# Patient Record
Sex: MALE | Race: BLACK OR AFRICAN AMERICAN | Employment: OTHER | URBAN - METROPOLITAN AREA
[De-identification: names, ages, dates, MRNs, and addresses within clinical notes are randomized per-mention and may not be internally consistent; named-entity substitution may affect disease eponyms.]

---

## 2019-10-30 ENCOUNTER — HOSPITAL ENCOUNTER (OUTPATIENT)
Dept: NUCLEAR MEDICINE | Age: 59
Discharge: HOME | End: 2019-10-30
Attending: INTERNAL MEDICINE
Payer: MEDICARE

## 2019-10-30 ENCOUNTER — HOSPITAL ENCOUNTER (OUTPATIENT)
Dept: NON INVASIVE DIAGNOSTICS | Age: 59
Discharge: HOME | End: 2019-10-30
Attending: INTERNAL MEDICINE
Payer: MEDICARE

## 2019-10-30 DIAGNOSIS — Z01.810 PRE-OPERATIVE CARDIOVASCULAR EXAMINATION: ICD-10-CM

## 2019-10-30 DIAGNOSIS — R06.02 SHORTNESS OF BREATH: ICD-10-CM

## 2019-10-30 PROCEDURE — A9500 TC99M SESTAMIBI: HCPCS

## 2019-10-30 PROCEDURE — 93017 CV STRESS TEST TRACING ONLY: CPT

## 2019-10-30 PROCEDURE — 74011250636 HC RX REV CODE- 250/636: Performed by: INTERNAL MEDICINE

## 2019-10-30 RX ADMIN — REGADENOSON 0.4 MG: 0.08 INJECTION, SOLUTION INTRAVENOUS at 12:30

## 2020-06-01 LAB
STRESS BASELINE HR: 65 BPM
STRESS ESTIMATED WORKLOAD: 1 METS
STRESS EXERCISE DUR MIN: NORMAL
STRESS PEAK DIAS BP: 76 MMHG
STRESS PEAK SYS BP: 165 MMHG
STRESS PERCENT HR ACHIEVED: 52 %
STRESS POST PEAK HR: 83 BPM
STRESS RATE PRESSURE PRODUCT: NORMAL BPM*MMHG
STRESS ST DEPRESSION: 0 MM
STRESS ST ELEVATION: 0 MM
STRESS TARGET HR: 160 BPM

## 2022-06-27 ENCOUNTER — APPOINTMENT (OUTPATIENT)
Dept: GENERAL RADIOLOGY | Age: 62
DRG: 629 | End: 2022-06-27
Attending: PHYSICIAN ASSISTANT
Payer: MEDICARE

## 2022-06-27 ENCOUNTER — HOSPITAL ENCOUNTER (INPATIENT)
Age: 62
LOS: 24 days | Discharge: NURSING, REHAB OR LONG-TERM CARE | DRG: 629 | End: 2022-07-21
Attending: STUDENT IN AN ORGANIZED HEALTH CARE EDUCATION/TRAINING PROGRAM | Admitting: INTERNAL MEDICINE
Payer: MEDICARE

## 2022-06-27 ENCOUNTER — APPOINTMENT (OUTPATIENT)
Dept: MRI IMAGING | Age: 62
DRG: 629 | End: 2022-06-27
Attending: PHYSICIAN ASSISTANT
Payer: MEDICARE

## 2022-06-27 ENCOUNTER — APPOINTMENT (OUTPATIENT)
Dept: GENERAL RADIOLOGY | Age: 62
DRG: 629 | End: 2022-06-27
Attending: INTERNAL MEDICINE
Payer: MEDICARE

## 2022-06-27 DIAGNOSIS — R07.9 CHEST PAIN AT REST: ICD-10-CM

## 2022-06-27 DIAGNOSIS — Z79.4 TYPE 2 DIABETES MELLITUS WITH FOOT ULCER, WITH LONG-TERM CURRENT USE OF INSULIN (HCC): Primary | ICD-10-CM

## 2022-06-27 DIAGNOSIS — N18.6 ESRD ON HEMODIALYSIS (HCC): ICD-10-CM

## 2022-06-27 DIAGNOSIS — E11.621 TYPE 2 DIABETES MELLITUS WITH FOOT ULCER, WITH LONG-TERM CURRENT USE OF INSULIN (HCC): Primary | ICD-10-CM

## 2022-06-27 DIAGNOSIS — Z99.2 ESRD ON HEMODIALYSIS (HCC): ICD-10-CM

## 2022-06-27 DIAGNOSIS — I25.10 CAD (CORONARY ARTERY DISEASE): ICD-10-CM

## 2022-06-27 DIAGNOSIS — L97.509 TYPE 2 DIABETES MELLITUS WITH FOOT ULCER, WITH LONG-TERM CURRENT USE OF INSULIN (HCC): Primary | ICD-10-CM

## 2022-06-27 DIAGNOSIS — M86.9 OSTEOMYELITIS OF RIGHT FOOT, UNSPECIFIED TYPE (HCC): ICD-10-CM

## 2022-06-27 DIAGNOSIS — R94.39 ABNORMAL NUCLEAR STRESS TEST: ICD-10-CM

## 2022-06-27 LAB
ANION GAP SERPL CALC-SCNC: 6 MMOL/L (ref 3–18)
BASOPHILS # BLD: 0 K/UL (ref 0–0.1)
BASOPHILS NFR BLD: 0 % (ref 0–2)
BUN SERPL-MCNC: 44 MG/DL (ref 7–18)
BUN/CREAT SERPL: 6 (ref 12–20)
CALCIUM SERPL-MCNC: 8.5 MG/DL (ref 8.5–10.1)
CHLORIDE SERPL-SCNC: 96 MMOL/L (ref 100–111)
CO2 SERPL-SCNC: 32 MMOL/L (ref 21–32)
CREAT SERPL-MCNC: 7.5 MG/DL (ref 0.6–1.3)
DIFFERENTIAL METHOD BLD: ABNORMAL
EOSINOPHIL # BLD: 0.3 K/UL (ref 0–0.4)
EOSINOPHIL NFR BLD: 2 % (ref 0–5)
ERYTHROCYTE [DISTWIDTH] IN BLOOD BY AUTOMATED COUNT: 16.7 % (ref 11.6–14.5)
GLUCOSE BLD STRIP.AUTO-MCNC: 210 MG/DL (ref 70–110)
GLUCOSE BLD STRIP.AUTO-MCNC: 293 MG/DL (ref 70–110)
GLUCOSE BLD STRIP.AUTO-MCNC: 417 MG/DL (ref 70–110)
GLUCOSE SERPL-MCNC: 237 MG/DL (ref 74–99)
HCT VFR BLD AUTO: 30 % (ref 36–48)
HGB BLD-MCNC: 9.6 G/DL (ref 13–16)
IMM GRANULOCYTES # BLD AUTO: 0.1 K/UL (ref 0–0.04)
IMM GRANULOCYTES NFR BLD AUTO: 1 % (ref 0–0.5)
LYMPHOCYTES # BLD: 1.3 K/UL (ref 0.9–3.6)
LYMPHOCYTES NFR BLD: 9 % (ref 21–52)
MCH RBC QN AUTO: 30.6 PG (ref 24–34)
MCHC RBC AUTO-ENTMCNC: 32 G/DL (ref 31–37)
MCV RBC AUTO: 95.5 FL (ref 78–100)
MONOCYTES # BLD: 1.9 K/UL (ref 0.05–1.2)
MONOCYTES NFR BLD: 13 % (ref 3–10)
NEUTS SEG # BLD: 11.2 K/UL (ref 1.8–8)
NEUTS SEG NFR BLD: 75 % (ref 40–73)
NRBC # BLD: 0 K/UL (ref 0–0.01)
NRBC BLD-RTO: 0 PER 100 WBC
PLATELET # BLD AUTO: 259 K/UL (ref 135–420)
PMV BLD AUTO: 10.8 FL (ref 9.2–11.8)
POTASSIUM SERPL-SCNC: 4.4 MMOL/L (ref 3.5–5.5)
RBC # BLD AUTO: 3.14 M/UL (ref 4.35–5.65)
RBC MORPH BLD: ABNORMAL
SODIUM SERPL-SCNC: 134 MMOL/L (ref 136–145)
WBC # BLD AUTO: 14.8 K/UL (ref 4.6–13.2)

## 2022-06-27 PROCEDURE — 74011000258 HC RX REV CODE- 258: Performed by: PHYSICIAN ASSISTANT

## 2022-06-27 PROCEDURE — 87040 BLOOD CULTURE FOR BACTERIA: CPT

## 2022-06-27 PROCEDURE — 73620 X-RAY EXAM OF FOOT: CPT

## 2022-06-27 PROCEDURE — 74011636637 HC RX REV CODE- 636/637: Performed by: INTERNAL MEDICINE

## 2022-06-27 PROCEDURE — 87205 SMEAR GRAM STAIN: CPT

## 2022-06-27 PROCEDURE — 74011250636 HC RX REV CODE- 250/636: Performed by: PHYSICIAN ASSISTANT

## 2022-06-27 PROCEDURE — 80048 BASIC METABOLIC PNL TOTAL CA: CPT

## 2022-06-27 PROCEDURE — 74011250637 HC RX REV CODE- 250/637: Performed by: FAMILY MEDICINE

## 2022-06-27 PROCEDURE — 82962 GLUCOSE BLOOD TEST: CPT

## 2022-06-27 PROCEDURE — 96374 THER/PROPH/DIAG INJ IV PUSH: CPT

## 2022-06-27 PROCEDURE — 73718 MRI LOWER EXTREMITY W/O DYE: CPT

## 2022-06-27 PROCEDURE — 99285 EMERGENCY DEPT VISIT HI MDM: CPT

## 2022-06-27 PROCEDURE — 85025 COMPLETE CBC W/AUTO DIFF WBC: CPT

## 2022-06-27 PROCEDURE — 71045 X-RAY EXAM CHEST 1 VIEW: CPT

## 2022-06-27 PROCEDURE — 65270000029 HC RM PRIVATE

## 2022-06-27 RX ORDER — AMLODIPINE BESYLATE 10 MG/1
10 TABLET ORAL DAILY
COMMUNITY

## 2022-06-27 RX ORDER — VANCOMYCIN 2 GRAM/500 ML IN 0.9 % SODIUM CHLORIDE INTRAVENOUS
2000 ONCE
Status: DISCONTINUED | OUTPATIENT
Start: 2022-06-27 | End: 2022-06-27

## 2022-06-27 RX ORDER — CARVEDILOL 12.5 MG/1
12.5 TABLET ORAL 2 TIMES DAILY
COMMUNITY

## 2022-06-27 RX ORDER — FUROSEMIDE 80 MG/1
80 TABLET ORAL 2 TIMES DAILY
COMMUNITY
End: 2022-07-21

## 2022-06-27 RX ORDER — ALLOPURINOL 100 MG/1
100 TABLET ORAL DAILY
COMMUNITY

## 2022-06-27 RX ORDER — INSULIN LISPRO 100 [IU]/ML
INJECTION, SOLUTION INTRAVENOUS; SUBCUTANEOUS
Status: DISCONTINUED | OUTPATIENT
Start: 2022-06-27 | End: 2022-07-21 | Stop reason: HOSPADM

## 2022-06-27 RX ORDER — GUAIFENESIN 100 MG/5ML
81 LIQUID (ML) ORAL DAILY
COMMUNITY

## 2022-06-27 RX ORDER — DEXTROSE MONOHYDRATE 100 MG/ML
0-250 INJECTION, SOLUTION INTRAVENOUS AS NEEDED
Status: DISCONTINUED | OUTPATIENT
Start: 2022-06-27 | End: 2022-07-21 | Stop reason: HOSPADM

## 2022-06-27 RX ORDER — ASCORBIC ACID 500 MG
500 TABLET ORAL
COMMUNITY

## 2022-06-27 RX ORDER — INSULIN LISPRO 100 [IU]/ML
INJECTION, SOLUTION INTRAVENOUS; SUBCUTANEOUS
COMMUNITY
End: 2022-07-21

## 2022-06-27 RX ORDER — ACETAMINOPHEN 325 MG/1
650 TABLET ORAL
Status: DISCONTINUED | OUTPATIENT
Start: 2022-06-27 | End: 2022-07-21 | Stop reason: HOSPADM

## 2022-06-27 RX ORDER — EZETIMIBE 10 MG/1
10 TABLET ORAL
COMMUNITY

## 2022-06-27 RX ORDER — INSULIN GLARGINE 100 [IU]/ML
10 INJECTION, SOLUTION SUBCUTANEOUS
COMMUNITY

## 2022-06-27 RX ORDER — MAGNESIUM SULFATE 100 %
4 CRYSTALS MISCELLANEOUS AS NEEDED
Status: DISCONTINUED | OUTPATIENT
Start: 2022-06-27 | End: 2022-07-21 | Stop reason: HOSPADM

## 2022-06-27 RX ADMIN — ACETAMINOPHEN 650 MG: 325 TABLET ORAL at 21:53

## 2022-06-27 RX ADMIN — PIPERACILLIN AND TAZOBACTAM 4.5 G: 4; .5 INJECTION, POWDER, LYOPHILIZED, FOR SOLUTION INTRAVENOUS at 13:33

## 2022-06-27 RX ADMIN — Medication 6 UNITS: at 21:52

## 2022-06-27 RX ADMIN — PIPERACILLIN AND TAZOBACTAM 2.25 G: 2; .25 INJECTION, POWDER, LYOPHILIZED, FOR SOLUTION INTRAVENOUS at 21:52

## 2022-06-27 NOTE — CONSULTS
Podiatry Consult    Subjective:         Date of Consultation: June 27, 2022     Referring Physician: Dr. Farideh Golden    Patient is a 60 y.o.  male who is being seen for open necrotic infected right heel ulcer. Patient states that this ulcer started approximately 2 to 3 months ago, and he was seeing his foot doctor, Dr. Marjorie Almonte weekly. He states the last month it seemed to worsen with increased odor, and was given antibiotics. He states he did not help very much and now is much worse. He states Dr. Ewelina Garber sent him here today to [facility] today for admission, but does not have privileges at this hospital.  He has hypertension end-stage renal disease on hemodialysis. He states he had Charcot reconstruction of his right ankle and foot about 8 years ago. Patient Active Problem List    Diagnosis Date Noted    Diabetes mellitus with foot ulcer ([facility] [address] 06/27/2022     Past Medical History:   Diagnosis Date    Diabetes mellitus     Hypertension       History reviewed. No pertinent surgical history. History reviewed. No pertinent family history.    Social History     Tobacco Use    Smoking status: Not on file    Smokeless tobacco: Not on file   Substance Use Topics    Alcohol use: Not on file     Current Facility-Administered Medications   Medication Dose Route Frequency Provider Last Rate Last Admin    piperacillin-tazobactam (ZOSYN) 2.25 g in 0.9% sodium chloride (MBP/ADV) 50 mL MBP  2.25 g IntraVENous Q8H Sinai Damon PA        insulin lispro (HUMALOG) injection   SubCUTAneous AC&HS Polina Garcia MD        glucose chewable tablet 16 g  4 Tablet Oral PRN Polina Garcia MD        glucagon (GLUCAGEN) injection 1 mg  1 mg IntraMUSCular PRN Polina Garcia MD        dextrose 10% infusion 0-250 mL  0-250 mL IntraVENous PRN Polina Garcia MD          No Known Allergies     Review of Systems:  A comprehensive review of systems was negative except for that written in the History of Present Illness. Objective:     Patient Vitals for the past 8 hrs:   BP Temp Pulse Resp SpO2 Height Weight   22 1611 138/66 -- 71 -- 100 % -- --   22 1506 (!) 149/65 -- -- -- 100 % -- --   22 1451 (!) 156/96 -- -- -- 100 % -- --   22 1436 (!) 145/67 -- -- -- 100 % -- --   22 1421 (!) 144/95 -- -- -- 100 % -- --   22 1406 (!) 152/66 -- -- -- 100 % -- --   22 1351 (!) 142/64 -- -- -- 98 % -- --   22 1345 (!) 136/59 -- -- -- 100 % -- --   22 1336 -- -- -- -- 100 % -- --   22 1330 -- -- -- -- 100 % -- --   22 1321 -- -- -- -- 100 % -- --   22 1315 -- -- -- -- 100 % -- --   22 1306 -- -- -- -- 100 % -- --   22 1300 -- -- -- -- 100 % -- --   22 1251 -- -- -- -- 100 % -- --   22 1245 (!) 125/58 -- -- -- 96 % -- --   22 1236 -- -- -- -- (!) 86 % -- --   22 1230 (!) 130/57 -- -- -- 90 % -- --   22 1221 -- -- -- -- 95 % -- --   22 1215 (!) 134/58 -- -- -- 100 % -- --   22 1206 -- -- -- -- 100 % -- --   22 1205 (!) 122/58 -- -- -- 100 % -- --   22 1200 -- -- -- -- 96 % -- --   22 1155 -- -- -- -- 98 % -- --   22 1146 (!) 109/58 97.3 °F (36.3 °C) 67 18 100 % 6' 2\" (1.88 m) 96.2 kg (212 lb)     Temp (24hrs), Av.3 °F (36.3 °C), Min:97.3 °F (36.3 °C), Max:97.3 °F (36.3 °C)      Physical Exam:    Vascular:  Dorsalis pedis pulses are 0/4 bilateral.  Posterior tibial pulses are 0/4 bilateral.  Capillary fill time is greater than 3 seconds, bilateral.  Edema is observed right lower extremity. Varicosities are not observed bilateral lower extremities. Derm:  Skin temperature of lower extremities warm to cool proximal to distal bilateral.  Inspection of skin shows additive digital hair with thin shiny skin bilateral.  Large open ulcer right posterior heel measuring approximately 6 x 6 x 1 cm.   There is gray-black necrotic tissue centrally with brown fibrotic base with hyperkeratotic rim. Positive serosanguineous drainage surrounding erythema. Positive malodor. Positive probing to bone/calcaneus. Ortho:  Muscle strength 4/5 for all groups tested. Muscle tone normal. Inspection/palpation of bones - joints- muscle shows - within normal limits on the bilateral lower extremities. Status post right ankle fusion and status post left hallux amputation. neuro:  Light touch, sharp/dull, vibratory, and proprioception sensations all decreased bilateral lower extremities. Deep tendon reflexes decreased bilaterally. Lab Review:   Recent Results (from the past 24 hour(s))   CBC WITH AUTOMATED DIFF    Collection Time: 06/27/22  1:20 PM   Result Value Ref Range    WBC 14.8 (H) 4.6 - 13.2 K/uL    RBC 3.14 (L) 4.35 - 5.65 M/uL    HGB 9.6 (L) 13.0 - 16.0 g/dL    HCT 30.0 (L) 36.0 - 48.0 %    MCV 95.5 78.0 - 100.0 FL    MCH 30.6 24.0 - 34.0 PG    MCHC 32.0 31.0 - 37.0 g/dL    RDW 16.7 (H) 11.6 - 14.5 %    PLATELET 736 675 - 134 K/uL    MPV 10.8 9.2 - 11.8 FL    NRBC 0.0 0  WBC    ABSOLUTE NRBC 0.00 0.00 - 0.01 K/uL    NEUTROPHILS 75 (H) 40 - 73 %    LYMPHOCYTES 9 (L) 21 - 52 %    MONOCYTES 13 (H) 3 - 10 %    EOSINOPHILS 2 0 - 5 %    BASOPHILS 0 0 - 2 %    IMMATURE GRANULOCYTES 1 (H) 0.0 - 0.5 %    ABS. NEUTROPHILS 11.2 (H) 1.8 - 8.0 K/UL    ABS. LYMPHOCYTES 1.3 0.9 - 3.6 K/UL    ABS. MONOCYTES 1.9 (H) 0.05 - 1.2 K/UL    ABS. EOSINOPHILS 0.3 0.0 - 0.4 K/UL    ABS. BASOPHILS 0.0 0.0 - 0.1 K/UL    ABS. IMM.  GRANS. 0.1 (H) 0.00 - 0.04 K/UL    DF AUTOMATED      RBC COMMENTS ANISOCYTOSIS  1+       METABOLIC PANEL, BASIC    Collection Time: 06/27/22  1:20 PM   Result Value Ref Range    Sodium 134 (L) 136 - 145 mmol/L    Potassium 4.4 3.5 - 5.5 mmol/L    Chloride 96 (L) 100 - 111 mmol/L    CO2 32 21 - 32 mmol/L    Anion gap 6 3.0 - 18 mmol/L    Glucose 237 (H) 74 - 99 mg/dL    BUN 44 (H) 7.0 - 18 MG/DL    Creatinine 7.50 (H) 0.6 - 1.3 MG/DL    BUN/Creatinine ratio 6 (L) 12 - 20 GFR est AA 9 (L) >60 ml/min/1.73m2    GFR est non-AA 7 (L) >60 ml/min/1.73m2    Calcium 8.5 8.5 - 10.1 MG/DL   GLUCOSE, POC    Collection Time: 06/27/22  5:34 PM   Result Value Ref Range    Glucose (POC) 210 (H) 70 - 110 mg/dL       Impression:   Grade 3 Sanchez ulcer right heel with radiographic signs of osteomyelitis (MRI pending)  Cellulitis right heel ulcer  Stage renal disease with hemodialysis and diabetes ASO PVD PN bilateral lower extremities with Charcot right foot    Recommendation:   IV antibiotics as per Dr. Manriquez/ID  Recommend surgical I&D with calcaneal debridement and bone biopsy for ID. We will try and schedule for Tuesday afternoon around 6 PM given OR availability. Dr. Prashanth Kyle is aware of dialysis controversy with anesthesia for tomorrow's surgery, and is planning on holding dialysis until after surgery. Recommend arterial Doppler bilateral lower extremities (PALAK) due to nonpalpable pedal pulses. Depending on results, may need vascular consult. Recommend wound care consult Baltazar Downs. Discussed the severity of his right heel ulcer with the patient including the possibility of loss of his right leg, and answered all his questions. Patient stated that he understood this and agreed for surgical intervention tomorrow.

## 2022-06-27 NOTE — Clinical Note
Contrast Dose Calculator:   Patient's age: 60.   Patient's sex: Male. Patient weight (kg) = 102.6. Creatinine level (mg/dL) = 8.75. Creatinine clearance (mL/min): 12.54. Max Contrast dose per Creatinine Cl calculator = 28.22 mL.

## 2022-06-27 NOTE — ED TRIAGE NOTES
Pt arrives to ed reporting right foot pain that has worsened. Pt was at his foot doctor and instructed to come here for further eval. Pt states his right foot does have an odor and he has had chills for two days.

## 2022-06-27 NOTE — PROGRESS NOTES
Podiatry:  Seen at bedside today for evaluation and treatment of chronic infected right heel ulcer with osteomyelitis. He states it started over 2 months ago was seeing Dr. Meliza Laguna but unfortunately progressed to the point requiring admission with IV antibiotics and surgical intervention. Dr. Darlin Núñez will follow him for IV antibiotic treatment. Try to schedule him for surgery Tuesday afternoon around 6 for I&D, calcaneal debridement with bone cultures for infectious disease. Patient was scheduled for dialysis tomorrow, and Dr. Dequan Tello will hold this until after surgery due to anesthesia. We will call the OR in the morning for availability. See consult for details. .. Paty Potter

## 2022-06-27 NOTE — PROGRESS NOTES
Called the floor and spoke with RN Carlo Rivas about the exam getting read in the morning by the Radiologist.  Per nurse patient is not going to surgery tonight and can be read in the am by Radiologist. Dr. Clotilde Martin aware that it can be read in the AM per floor RN called at 16:40 .

## 2022-06-27 NOTE — PROGRESS NOTES
[facility] Pharmacokinetic Monitoring Service - Vancomycin - Day 1    Phill Yang is a 60 y.o. male starting on vancomycin therapy for Diabetic Foot Infection. Pharmacy consulted by Chayo Stauffer for monitoring and adjustment. Target Concentration: Goal AUC/LYNDA 400-600 mg*hr/L    Additional Antimicrobials: zosyn    Pertinent Laboratory Values: Wt Readings from Last 1 Encounters:   06/27/22 96.2 kg (212 lb)     Temp Readings from Last 1 Encounters:   06/27/22 97.3 °F (36.3 °C)     No components found for: PROCAL  Estimated Creatinine Clearance: 11.7 mL/min (A) (based on SCr of 7.5 mg/dL (H)). Recent Labs     06/27/22  1320   WBC 14.8*       Pertinent Cultures:  Culture Date Source Results   6/27 blood pending   MRSA Nasal Swab: N/A. Non-respiratory infection. .    Plan:  Dosing recommendations based on Bayesian software  Start vancomycin 2000 mg IV x 1  Dose per levels  Renal labs as indicated   Vancomycin concentration ordered for 6/28 @ 0400   Pharmacy will continue to monitor patient and adjust therapy as indicated    Thank you for the consult,  Chu Ty, PHARMD  6/27/2022 2:05 PM

## 2022-06-27 NOTE — Clinical Note
TRANSFER - IN REPORT:     Verbal report received from: rn.     Report consisted of patient's Situation, Background, Assessment and   Recommendations(SBAR). Opportunity for questions and clarification was provided. Assessment completed upon patient's arrival to unit and care assumed.

## 2022-06-27 NOTE — PROGRESS NOTES
Problem: General Medical Care Plan  Goal: *Vital signs within specified parameters  6/27/2022 1738 by Blair Johnson RN  Outcome: Progressing Towards Goal  6/27/2022 1737 by Blair Johnson RN  Outcome: Progressing Towards Goal  Goal: *Labs within defined limits  6/27/2022 1738 by Blair Johnson RN  Outcome: Progressing Towards Goal  6/27/2022 1737 by Blair Johnson RN  Outcome: Progressing Towards Goal  Goal: *Absence of infection signs and symptoms  6/27/2022 1738 by Blair Johnson RN  Outcome: Progressing Towards Goal  6/27/2022 1737 by Blair Johnson RN  Outcome: Progressing Towards Goal  Goal: *Optimal pain control at patient's stated goal  6/27/2022 1738 by Blair Johnson RN  Outcome: Progressing Towards Goal  6/27/2022 1737 by Blair Johnson RN  Outcome: Progressing Towards Goal  Goal: *Skin integrity maintained  6/27/2022 1738 by Blair Johnson RN  Outcome: Progressing Towards Goal  6/27/2022 1737 by Blair Johnson RN  Outcome: Progressing Towards Goal  Goal: *Fluid volume balance  6/27/2022 1738 by Blair Johnson RN  Outcome: Progressing Towards Goal  6/27/2022 1737 by Blair Johnson RN  Outcome: Progressing Towards Goal  Goal: *Optimize nutritional status  6/27/2022 1738 by Blair Johnson RN  Outcome: Progressing Towards Goal  6/27/2022 1737 by Blair Johnson RN  Outcome: Progressing Towards Goal  Goal: *Anxiety reduced or absent  6/27/2022 1738 by Blair Johnson RN  Outcome: Progressing Towards Goal  6/27/2022 1737 by Blair Johnson RN  Outcome: Progressing Towards Goal  Goal: *Progressive mobility and function (eg: ADL's)  6/27/2022 1738 by Blair Johnson RN  Outcome: Progressing Towards Goal  6/27/2022 1737 by Blair Johnson RN  Outcome: Progressing Towards Goal

## 2022-06-27 NOTE — CALL BACK NOTE
Called by ED PA for ID consult  ESRD on HD     Pt with chronic heel infection-- going on for 2 months-- worse past 3 weeks, had received amoxacillin by Outside Podiatarist  Sent in for inpatient management  X-ray suggestive of OM    Wbc is up 14K--no bands. No fever, no hypotension. No tachycardia  Visit Vitals  BP (!) 136/59   Pulse 67   Temp 97.3 °F (36.3 °C)   Resp 18   Ht 6' 2\" (1.88 m)   Wt 96.2 kg (212 lb)   SpO2 100%   BMI 27.22 kg/m²     Received 1 dose of Zosyn    Suggest:    Chronic OM heel-> over many weeks. Can wait few more days for Podiatry interventon and intra-op cultures  Will be monitored as inpatient, so can hold off ABX    If high fevers/ progressing soft tissue infection under our watch, can start Unasyn/Vanco emperic rx    [facility] ED PA  Podiatry consult - also to be placed. Thanks for the consult. Please call if questions/Concern    Amanda Yeh MD  [facility]  Office #:     [phone]   Office Fax: [phone]
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
## 2022-06-27 NOTE — PROGRESS NOTES
Pharmacy Renal Dosing Services:     Zosyn  was automatically dose-adjusted per [facility] P[facility]T [facility] Protocol, with respect to renal function. Consult provided for this   60 y.o. , male , for the indication of Diabetic Foot Infection  Dose adjusted to:  Zosyn 2.25 grams IV q8h    Pt Weight:   Wt Readings from Last 1 Encounters:   06/27/22 96.2 kg (212 lb)     Previous Regimen   Zosyn 4.5 grams IV q6h   Serum Creatinine Lab Results   Component Value Date/Time    Creatinine 7.50 (H) 06/27/2022 01:20 PM       Creatinine Clearance Estimated Creatinine Clearance: 11.7 mL/min (A) (based on SCr of 7.5 mg/dL (H)). BUN Lab Results   Component Value Date/Time    BUN 44 (H) 06/27/2022 01:20 PM         Pharmacy to continue to monitor patient daily. Will make dosage adjustments based upon changing renal function.   Signed Kathy Alvarado information:  [phone]

## 2022-06-27 NOTE — ED NOTES
TRANSFER - OUT REPORT:    Verbal report given to [address] Jesus Torres RN (name) on Nahun Zepeda  being transferred to MEDICAL (unit) for routine progression of care       Report consisted of patients Situation, Background, Assessment and   Recommendations(SBAR). Information from the following report(s) SBAR, ED Summary, [facility] and Recent Results was reviewed with the receiving nurse. Lines:   Peripheral IV 06/27/22 Right Antecubital (Active)   Site Assessment Clean, dry, & intact 06/27/22 1204   Phlebitis Assessment 0 06/27/22 1204   Infiltration Assessment 0 06/27/22 1204   Dressing Status Clean, dry, & intact 06/27/22 1204   Dressing Type Transparent 06/27/22 1204   Hub Color/Line Status Patent; Flushed 06/27/22 1204   Action Taken Blood drawn 06/27/22 1204        Opportunity for questions and clarification was provided.       Patient transported with:   Registered Nurse        '

## 2022-06-27 NOTE — ED PROVIDER NOTES
EMERGENCY DEPARTMENT HISTORY AND PHYSICAL EXAM    Date: 6/27/2022  Patient Name: Petar Oneil    History of Presenting Illness     Chief Complaint   Patient presents with    Foot Ulcer         History Provided By: Patient    1:51 PM  Petar Oneil is a 60 y.o. male with PMHX of HTN, DM, ESRD on HD (T/Th/Sat) who presents to the emergency department C/O nonhealing/worsening malodorous right heel wound x 2 months. Completed antibiotics about a month ago without any change. For the last 2 days he has had some chills and nausea. Seen by his podiatrist Dr. Karen Martin today and was sent to ED for admission, IV antibiotics and imaging to rule out osteomyelitis. PHSx of right Charcot foot fusion. Pt denies new or worsening leg swelling, injury or trauma, fever, and any other sxs or complaints. PCP: Isamar Street MD    Current Facility-Administered Medications   Medication Dose Route Frequency Provider Last Rate Last Admin    piperacillin-tazobactam (ZOSYN) 4.5 g in 0.9% sodium chloride (MBP/ADV) 100 mL MBP  4.5 g IntraVENous Q6H Gerald Blizzard J,  mL/hr at 06/27/22 1333 4.5 g at 06/27/22 1333     Current Outpatient Medications   Medication Sig Dispense Refill    lisinopril (PRINIVIL, ZESTRIL) 40 mg tablet Take 40 mg by mouth daily.  simvastatin (ZOCOR) 10 mg tablet Take  by mouth nightly.  potassium chloride SR (KLOR-CON 10) 10 mEq tablet Take 20 mEq by mouth two (2) times a day. Past History     Past Medical History:  Past Medical History:   Diagnosis Date    Diabetes mellitus     Hypertension        Past Surgical History:  No past surgical history on file. Family History:  No family history on file.     Social History:  Social History     Tobacco Use    Smoking status: Not on file    Smokeless tobacco: Not on file   Substance Use Topics    Alcohol use: Not on file    Drug use: Not on file       Allergies:  No Known Allergies      Review of Systems   Review of Systems   Constitutional: Positive for chills. Negative for fever. Gastrointestinal: Positive for nausea. Musculoskeletal: Positive for arthralgias, joint swelling and myalgias. Skin: Positive for wound. All other systems reviewed and are negative. Physical Exam     Vitals:    06/27/22 1300 06/27/22 1315 06/27/22 1330 06/27/22 1345   BP:    (!) 136/59   Pulse:       Resp:       Temp:       SpO2: 100% 100% 100% 100%   Weight:       Height:         Physical Exam  Vitals and nursing note reviewed. Constitutional:       General: He is not in acute distress. Appearance: Normal appearance. He is normal weight. HENT:      Head: Normocephalic and atraumatic. Musculoskeletal:        Feet:       Comments: RLE: + Slightly swollen with scattered areas of chronically hypo- and hyperpigmented skin. Skin:     General: Skin is warm and dry. Neurological:      General: No focal deficit present. Mental Status: He is alert and oriented to person, place, and time. Psychiatric:         Mood and Affect: Mood normal.         Behavior: Behavior normal.               Diagnostic Study Results     Labs -     Recent Results (from the past 12 hour(s))   CBC WITH AUTOMATED DIFF    Collection Time: 06/27/22  1:20 PM   Result Value Ref Range    WBC 14.8 (H) 4.6 - 13.2 K/uL    RBC 3.14 (L) 4.35 - 5.65 M/uL    HGB 9.6 (L) 13.0 - 16.0 g/dL    HCT 30.0 (L) 36.0 - 48.0 %    MCV 95.5 78.0 - 100.0 FL    MCH 30.6 24.0 - 34.0 PG    MCHC 32.0 31.0 - 37.0 g/dL    RDW 16.7 (H) 11.6 - 14.5 %    PLATELET 458 207 - 119 K/uL    MPV 10.8 9.2 - 11.8 FL    NRBC 0.0 0  WBC    ABSOLUTE NRBC 0.00 0.00 - 0.01 K/uL    NEUTROPHILS 75 (H) 40 - 73 %    LYMPHOCYTES 9 (L) 21 - 52 %    MONOCYTES 13 (H) 3 - 10 %    EOSINOPHILS 2 0 - 5 %    BASOPHILS 0 0 - 2 %    IMMATURE GRANULOCYTES 1 (H) 0.0 - 0.5 %    ABS. NEUTROPHILS 11.2 (H) 1.8 - 8.0 K/UL    ABS. LYMPHOCYTES 1.3 0.9 - 3.6 K/UL    ABS. MONOCYTES 1.9 (H) 0.05 - 1.2 K/UL    ABS. EOSINOPHILS 0.3 0.0 - 0.4 K/UL    ABS. BASOPHILS 0.0 0.0 - 0.1 K/UL    ABS. IMM. GRANS. 0.1 (H) 0.00 - 0.04 K/UL    DF AUTOMATED      RBC COMMENTS ANISOCYTOSIS  1+       METABOLIC PANEL, BASIC    Collection Time: 06/27/22  1:20 PM   Result Value Ref Range    Sodium 134 (L) 136 - 145 mmol/L    Potassium 4.4 3.5 - 5.5 mmol/L    Chloride 96 (L) 100 - 111 mmol/L    CO2 32 21 - 32 mmol/L    Anion gap 6 3.0 - 18 mmol/L    Glucose 237 (H) 74 - 99 mg/dL    BUN 44 (H) 7.0 - 18 MG/DL    Creatinine 7.50 (H) 0.6 - 1.3 MG/DL    BUN/Creatinine ratio 6 (L) 12 - 20      GFR est AA 9 (L) >60 ml/min/1.73m2    GFR est non-AA 7 (L) >60 ml/min/1.73m2    Calcium 8.5 8.5 - 10.1 MG/DL       Radiologic Studies -   XR FOOT RT AP/LAT   Final Result      Ulceration overlying the heel with likely exposed calcaneus highly suggestive of   osteomyelitis with adjacent cortical irregularity and sclerosis. Periprosthetic lucency along the first ray orthopedic screw. Periprosthetic   infection and/or loosening suggested. Chronic deformity and collapse of the mid/hindfoot and ankle compatible with   Charcot arthropathy. Irregular cortical destructive change noted of the distal   fifth metatarsal head possibly related to this process as well although   superimposed infection not excluded. Miguelito Cici MRI FOOT RT WO CONT    (Results Pending)     CT Results  (Last 48 hours)    None        CXR Results  (Last 48 hours)    None          Medications given in the ED-  Medications   piperacillin-tazobactam (ZOSYN) 4.5 g in 0.9% sodium chloride (MBP/ADV) 100 mL MBP (4.5 g IntraVENous New Bag 6/27/22 1333)         Medical Decision Making   I am the first provider for this patient. I reviewed the vital signs, available nursing notes, past medical history, past surgical history, family history and social history. Vital Signs-Reviewed the patient's vital signs.     Records Reviewed: Nursing Notes and Old Medical Records      Procedures:  Procedures    ED Course:  1:51 PM   Initial assessment performed. The patients presenting problems have been discussed, and they are in agreement with the care plan formulated and outlined with them. I have encouraged them to ask questions as they arise throughout their visit. 1:55 PM consult note  Case discussed with ED attending Dr. Crystal Levin who agrees with consulting hospitalist for admission. Awaiting callback from patient's podiatrist, though he did send patient with a note outlining request for admission IV antibiotics etc.    2:30 PM consult note  Case discussed with hospitalist Dr. Arleen Villarreal who will admit to medical floor. He is aware that I am waiting callback from podiatry. 2:40 p.m. consult note  Case discussed with podiatrist Dr. Sharee Pratt, who states that he does not have privileges at this hospital and that his partner Dr. Olamide Garcia will be unable to see him as well. Requests that we consult staff podiatrist.    2:46 PM consult note  Case discussed with podiatrist on-call Dr. Katherine Carbajal, who will consult, requests potential wound culture if any drainage can be expressed and consulting infectious disease for antibiotic management. 3 PM consult note  Case discussed with infectious disease, Dr. Enid Bray, who actually recommends holding off on further antibiotics. Is aware that patient already received Zosyn but recommends canceling the vancomycin. She would like to hold off on further antibiotic therapy until podiatry can evaluate and direct abx therapy based on cultures. Diagnosis and Disposition       ADMISSION NOTE:  2:30 PM  Patient is being admitted to the hospital by Dr. Arleen Villarreal. The results of their tests and reasons for their admission have been discussed with them and/or available family. They convey agreement and understanding for the need to be admitted and for their admission diagnosis.         CONDITIONS ON ADMISSION:  Deep Vein Thrombosis is not present at the time of admission. Thrombosis is not present at the time of admission. Urinary Tract Infection is not present at the time of admission. Pneumonia is not present at the time of admission. MRSA is not present at the time of admission. Wound infection is not present at the time of admission. Pressure Ulcer is not present at the time of admission. CLINICAL IMPRESSION:    1. Type 2 diabetes mellitus with foot ulcer, with long-term current use of insulin ([facility]    2. Osteomyelitis of right foot, unspecified type ([facility]    3. ESRD on hemodialysis ([facility]        PLAN:  1. Admit        Please note that this dictation was completed with uTest, the computer voice recognition software. Quite often unanticipated grammatical, syntax, homophones, and other interpretive errors are inadvertently transcribed by the computer software. Please disregard these errors. Please excuse any errors that have escaped final proofreading.

## 2022-06-28 ENCOUNTER — APPOINTMENT (OUTPATIENT)
Dept: VASCULAR SURGERY | Age: 62
DRG: 629 | End: 2022-06-28
Attending: INTERNAL MEDICINE
Payer: MEDICARE

## 2022-06-28 ENCOUNTER — APPOINTMENT (OUTPATIENT)
Dept: GENERAL RADIOLOGY | Age: 62
DRG: 629 | End: 2022-06-28
Attending: PODIATRIST
Payer: MEDICARE

## 2022-06-28 ENCOUNTER — ANESTHESIA EVENT (OUTPATIENT)
Dept: SURGERY | Age: 62
DRG: 629 | End: 2022-06-28
Payer: MEDICARE

## 2022-06-28 ENCOUNTER — ANESTHESIA (OUTPATIENT)
Dept: SURGERY | Age: 62
DRG: 629 | End: 2022-06-28
Payer: MEDICARE

## 2022-06-28 PROBLEM — M86.071 ACUTE HEMATOGENOUS OSTEOMYELITIS OF RIGHT FOOT (HCC): Status: ACTIVE | Noted: 2022-06-28

## 2022-06-28 PROBLEM — E11.69 DIABETIC FOOT ULCER WITH OSTEOMYELITIS (HCC): Status: ACTIVE | Noted: 2022-06-28

## 2022-06-28 PROBLEM — I25.10 CAD (CORONARY ARTERY DISEASE): Status: ACTIVE | Noted: 2022-06-28

## 2022-06-28 PROBLEM — N18.6 ESRD (END STAGE RENAL DISEASE) (HCC): Status: ACTIVE | Noted: 2022-06-28

## 2022-06-28 PROBLEM — L97.414 ULCER OF RIGHT HEEL, WITH NECROSIS OF BONE (HCC): Status: ACTIVE | Noted: 2022-06-28

## 2022-06-28 PROBLEM — L03.115 CELLULITIS OF RIGHT HEEL: Status: ACTIVE | Noted: 2022-06-28

## 2022-06-28 PROBLEM — M86.9 DIABETIC FOOT ULCER WITH OSTEOMYELITIS (HCC): Status: ACTIVE | Noted: 2022-06-28

## 2022-06-28 PROBLEM — E11.621 DIABETIC FOOT ULCER WITH OSTEOMYELITIS (HCC): Status: ACTIVE | Noted: 2022-06-28

## 2022-06-28 PROBLEM — L97.509 DIABETIC FOOT ULCER WITH OSTEOMYELITIS (HCC): Status: ACTIVE | Noted: 2022-06-28

## 2022-06-28 LAB
ANION GAP SERPL CALC-SCNC: 7 MMOL/L (ref 3–18)
BUN SERPL-MCNC: 51 MG/DL (ref 7–18)
BUN/CREAT SERPL: 6 (ref 12–20)
CALCIUM SERPL-MCNC: 7.8 MG/DL (ref 8.5–10.1)
CHLORIDE SERPL-SCNC: 98 MMOL/L (ref 100–111)
CO2 SERPL-SCNC: 29 MMOL/L (ref 21–32)
CREAT SERPL-MCNC: 8.9 MG/DL (ref 0.6–1.3)
EST. AVERAGE GLUCOSE BLD GHB EST-MCNC: 200 MG/DL
GLUCOSE BLD STRIP.AUTO-MCNC: 103 MG/DL (ref 70–110)
GLUCOSE BLD STRIP.AUTO-MCNC: 127 MG/DL (ref 70–110)
GLUCOSE BLD STRIP.AUTO-MCNC: 158 MG/DL (ref 70–110)
GLUCOSE BLD STRIP.AUTO-MCNC: 207 MG/DL (ref 70–110)
GLUCOSE BLD STRIP.AUTO-MCNC: 289 MG/DL (ref 70–110)
GLUCOSE BLD STRIP.AUTO-MCNC: 304 MG/DL (ref 70–110)
GLUCOSE SERPL-MCNC: 286 MG/DL (ref 74–99)
HBA1C MFR BLD: 8.6 % (ref 4.2–5.6)
HBV SURFACE AB SER QL IA: POSITIVE
HBV SURFACE AB SERPL IA-ACNC: 21.09 MIU/ML
HBV SURFACE AG SER QL: <0.1 INDEX
HBV SURFACE AG SER QL: NEGATIVE
HEP BS AB COMMENT,HBSAC: NORMAL
LEFT CFA DIST SYS PSV: 105.2 CM/S
LEFT DIST ATA VELOCITY: 67.6 CM/S
LEFT DIST PTA PSV: 110.3 CM/S
LEFT PERONEAL DIST VELOCITY: 113.3 CM/S
LEFT POP A PROX VEL RATIO: 0.64
LEFT POPLITEAL DIST SYS PSV: 123.3 CM/S
LEFT POPLITEAL PROX SYS PSV: 110.4 CM/S
LEFT PROX PFA A PSV: 66.8 CM/S
LEFT PTA MID SYS PSV: 105.2 CM/S
LEFT SFA DIST VEL RATIO: 1.41
LEFT SFA MID VEL RATIO: 1.1
LEFT SFA PROX VEL RATIO: 1.05
LEFT SUPER FEMORAL DIST SYS PSV: 172.5 CM/S
LEFT SUPER FEMORAL MID SYS PSV: 122 CM/S
LEFT SUPER FEMORAL PROX SYS PSV: 110.6 CM/S
POTASSIUM SERPL-SCNC: 4.7 MMOL/L (ref 3.5–5.5)
RIGHT CFA DIST SYS PSV: 105.2 CM/S
RIGHT DIST ATA VELOCITY: 39.3 CM/S
RIGHT DIST PTA PSV: 83.3 CM/S
RIGHT POP A PROX VEL RATIO: 0.64
RIGHT POPLITEAL DIST SYS PSV: 123.3 CM/S
RIGHT POPLITEAL PROX SYS PSV: 110.4 CM/S
RIGHT PROX PFA A PSV: 66.8 CM/S
RIGHT PTA MID SYS PSV: 74.1 CM/S
RIGHT SFA DIST VEL RATIO: 1.56
RIGHT SFA MID VEL RATIO: 1
RIGHT SFA PROX VEL RATIO: 1.1
RIGHT SUPER FEMORAL DIST SYS PSV: 172.5 CM/S
RIGHT SUPER FEMORAL MID SYS PSV: 110.6 CM/S
RIGHT SUPER FEMORAL PROX SYS PSV: 110.6 CM/S
SODIUM SERPL-SCNC: 134 MMOL/L (ref 136–145)

## 2022-06-28 PROCEDURE — 74011250636 HC RX REV CODE- 250/636: Performed by: NURSE ANESTHETIST, CERTIFIED REGISTERED

## 2022-06-28 PROCEDURE — 36415 COLL VENOUS BLD VENIPUNCTURE: CPT

## 2022-06-28 PROCEDURE — 65270000029 HC RM PRIVATE

## 2022-06-28 PROCEDURE — 0QBL0ZX EXCISION OF RIGHT TARSAL, OPEN APPROACH, DIAGNOSTIC: ICD-10-PCS | Performed by: PODIATRIST

## 2022-06-28 PROCEDURE — 87205 SMEAR GRAM STAIN: CPT

## 2022-06-28 PROCEDURE — 88311 DECALCIFY TISSUE: CPT

## 2022-06-28 PROCEDURE — 77030031139 HC SUT VCRL2 J&J -A: Performed by: PODIATRIST

## 2022-06-28 PROCEDURE — 76060000034 HC ANESTHESIA 1.5 TO 2 HR: Performed by: PODIATRIST

## 2022-06-28 PROCEDURE — 0QBL0ZZ EXCISION OF RIGHT TARSAL, OPEN APPROACH: ICD-10-PCS | Performed by: PODIATRIST

## 2022-06-28 PROCEDURE — 87077 CULTURE AEROBIC IDENTIFY: CPT

## 2022-06-28 PROCEDURE — 87116 MYCOBACTERIA CULTURE: CPT

## 2022-06-28 PROCEDURE — 80048 BASIC METABOLIC PNL TOTAL CA: CPT

## 2022-06-28 PROCEDURE — 74011250636 HC RX REV CODE- 250/636: Performed by: PHYSICIAN ASSISTANT

## 2022-06-28 PROCEDURE — 74011000250 HC RX REV CODE- 250: Performed by: NURSE ANESTHETIST, CERTIFIED REGISTERED

## 2022-06-28 PROCEDURE — 77030040361 HC SLV COMPR DVT MDII -B: Performed by: PODIATRIST

## 2022-06-28 PROCEDURE — C1713 ANCHOR/SCREW BN/BN,TIS/BN: HCPCS | Performed by: PODIATRIST

## 2022-06-28 PROCEDURE — 77030002933 HC SUT MCRYL J&J -A: Performed by: PODIATRIST

## 2022-06-28 PROCEDURE — 77030008683 HC TU ET CUF COVD -A: Performed by: ANESTHESIOLOGY

## 2022-06-28 PROCEDURE — 3E0V329 INTRODUCTION OF OTHER ANTI-INFECTIVE INTO BONES, PERCUTANEOUS APPROACH: ICD-10-PCS | Performed by: PODIATRIST

## 2022-06-28 PROCEDURE — 5A1D70Z PERFORMANCE OF URINARY FILTRATION, INTERMITTENT, LESS THAN 6 HOURS PER DAY: ICD-10-PCS | Performed by: INTERNAL MEDICINE

## 2022-06-28 PROCEDURE — 77030006643: Performed by: ANESTHESIOLOGY

## 2022-06-28 PROCEDURE — 87340 HEPATITIS B SURFACE AG IA: CPT

## 2022-06-28 PROCEDURE — 74011000250 HC RX REV CODE- 250: Performed by: PODIATRIST

## 2022-06-28 PROCEDURE — 87075 CULTR BACTERIA EXCEPT BLOOD: CPT

## 2022-06-28 PROCEDURE — 74011636637 HC RX REV CODE- 636/637: Performed by: ANESTHESIOLOGY

## 2022-06-28 PROCEDURE — 87102 FUNGUS ISOLATION CULTURE: CPT

## 2022-06-28 PROCEDURE — 73620 X-RAY EXAM OF FOOT: CPT

## 2022-06-28 PROCEDURE — 74011000258 HC RX REV CODE- 258: Performed by: PHYSICIAN ASSISTANT

## 2022-06-28 PROCEDURE — 2709999900 HC NON-CHARGEABLE SUPPLY: Performed by: PODIATRIST

## 2022-06-28 PROCEDURE — 77030000032 HC CUF TRNQT ZIMM -B: Performed by: PODIATRIST

## 2022-06-28 PROCEDURE — 77030019895 HC PCKNG STRP IODO -A: Performed by: PODIATRIST

## 2022-06-28 PROCEDURE — 88304 TISSUE EXAM BY PATHOLOGIST: CPT

## 2022-06-28 PROCEDURE — 82962 GLUCOSE BLOOD TEST: CPT

## 2022-06-28 PROCEDURE — 87185 SC STD ENZYME DETCJ PER NZM: CPT

## 2022-06-28 PROCEDURE — 86706 HEP B SURFACE ANTIBODY: CPT

## 2022-06-28 PROCEDURE — 74011636637 HC RX REV CODE- 636/637: Performed by: INTERNAL MEDICINE

## 2022-06-28 PROCEDURE — 74011250636 HC RX REV CODE- 250/636: Performed by: PODIATRIST

## 2022-06-28 PROCEDURE — 83036 HEMOGLOBIN GLYCOSYLATED A1C: CPT

## 2022-06-28 PROCEDURE — 88307 TISSUE EXAM BY PATHOLOGIST: CPT

## 2022-06-28 PROCEDURE — 76210000006 HC OR PH I REC 0.5 TO 1 HR: Performed by: PODIATRIST

## 2022-06-28 PROCEDURE — 76010000153 HC OR TIME 1.5 TO 2 HR: Performed by: PODIATRIST

## 2022-06-28 PROCEDURE — 77030020782 HC GWN BAIR PAWS FLX 3M -B: Performed by: PODIATRIST

## 2022-06-28 PROCEDURE — 87186 SC STD MICRODIL/AGAR DIL: CPT

## 2022-06-28 PROCEDURE — 93970 EXTREMITY STUDY: CPT

## 2022-06-28 PROCEDURE — 77030002916 HC SUT ETHLN J&J -A: Performed by: PODIATRIST

## 2022-06-28 PROCEDURE — 02HV33Z INSERTION OF INFUSION DEVICE INTO SUPERIOR VENA CAVA, PERCUTANEOUS APPROACH: ICD-10-PCS | Performed by: SURGERY

## 2022-06-28 PROCEDURE — 77030008477 HC STYL SATN SLP COVD -A: Performed by: ANESTHESIOLOGY

## 2022-06-28 PROCEDURE — 93925 LOWER EXTREMITY STUDY: CPT

## 2022-06-28 PROCEDURE — 74011000258 HC RX REV CODE- 258: Performed by: PODIATRIST

## 2022-06-28 DEVICE — STIMULAN® RAPID CURE PROVIDED STERILE FOR SINGLE PATIENT USE. STIMULAN® RAPID CURE CONTAINS CALCIUM SULFATE POWDER AND MIXING SOLUTION IN PRE-MEASURED QUANTITIES SO THAT WHEN MIXED TOGETHER IN A STERILE MIXING BOWL, THE RESULTANT PASTE IS TO BE DIGITALLY PACKED INTO OPEN BONE VOID/GAP TO SET INSITU OR PLACED INTO THE MOULD PROVIDED, THE MIXTURE SETS TO FORM BEADS. THE BIODEGRADABLE, RADIOPAQUE BEADS ARE RESORBED IN APPROXIMATELY 30 – 60 DAYS WHEN USED IN ACCORDANCE WITH THE DEVICE LABELLING. STIMULAN® RAPID CURE IS MANUFACTURED FROM SYNTHETIC IMPLANT GRADE CALCIUM SULFATE DIHYDRATE(CASO4.2H2O) THAT RESORBS AND IS REPLACED WITH BONE DURING THE HEALING PROCESS. ALSO, AS THE BONE VOID FILLER BEADS ARE BIODEGRADABLE AND BIOCOMPATIBLE, THEY MAY BE USED AT AN INFECTED SITE.
Type: IMPLANTABLE DEVICE | Site: HEEL | Status: FUNCTIONAL
Brand: STIMULAN® RAPID CURE

## 2022-06-28 RX ORDER — MIDAZOLAM HYDROCHLORIDE 1 MG/ML
INJECTION, SOLUTION INTRAMUSCULAR; INTRAVENOUS AS NEEDED
Status: DISCONTINUED | OUTPATIENT
Start: 2022-06-28 | End: 2022-06-28 | Stop reason: HOSPADM

## 2022-06-28 RX ORDER — ALBUTEROL SULFATE 0.83 MG/ML
2.5 SOLUTION RESPIRATORY (INHALATION) AS NEEDED
Status: DISCONTINUED | OUTPATIENT
Start: 2022-06-28 | End: 2022-06-28 | Stop reason: HOSPADM

## 2022-06-28 RX ORDER — FENTANYL CITRATE 50 UG/ML
25 INJECTION, SOLUTION INTRAMUSCULAR; INTRAVENOUS AS NEEDED
Status: DISCONTINUED | OUTPATIENT
Start: 2022-06-28 | End: 2022-06-28 | Stop reason: HOSPADM

## 2022-06-28 RX ORDER — NALOXONE HYDROCHLORIDE 0.4 MG/ML
0.2 INJECTION, SOLUTION INTRAMUSCULAR; INTRAVENOUS; SUBCUTANEOUS AS NEEDED
Status: DISCONTINUED | OUTPATIENT
Start: 2022-06-28 | End: 2022-06-28 | Stop reason: HOSPADM

## 2022-06-28 RX ORDER — DIPHENHYDRAMINE HYDROCHLORIDE 50 MG/ML
12.5 INJECTION, SOLUTION INTRAMUSCULAR; INTRAVENOUS
Status: DISCONTINUED | OUTPATIENT
Start: 2022-06-28 | End: 2022-06-28 | Stop reason: HOSPADM

## 2022-06-28 RX ORDER — INSULIN LISPRO 100 [IU]/ML
INJECTION, SOLUTION INTRAVENOUS; SUBCUTANEOUS ONCE
Status: CANCELLED | OUTPATIENT
Start: 2022-06-28 | End: 2022-06-29

## 2022-06-28 RX ORDER — DEXTROSE MONOHYDRATE 100 MG/ML
0-250 INJECTION, SOLUTION INTRAVENOUS AS NEEDED
Status: DISCONTINUED | OUTPATIENT
Start: 2022-06-28 | End: 2022-06-28 | Stop reason: HOSPADM

## 2022-06-28 RX ORDER — DEXTROSE MONOHYDRATE 100 MG/ML
0-250 INJECTION, SOLUTION INTRAVENOUS AS NEEDED
Status: CANCELLED | OUTPATIENT
Start: 2022-06-28

## 2022-06-28 RX ORDER — GLYCOPYRROLATE 0.2 MG/ML
INJECTION INTRAMUSCULAR; INTRAVENOUS AS NEEDED
Status: DISCONTINUED | OUTPATIENT
Start: 2022-06-28 | End: 2022-06-28 | Stop reason: HOSPADM

## 2022-06-28 RX ORDER — PROPOFOL 10 MG/ML
INJECTION, EMULSION INTRAVENOUS AS NEEDED
Status: DISCONTINUED | OUTPATIENT
Start: 2022-06-28 | End: 2022-06-28 | Stop reason: HOSPADM

## 2022-06-28 RX ORDER — SODIUM CHLORIDE, SODIUM LACTATE, POTASSIUM CHLORIDE, CALCIUM CHLORIDE 600; 310; 30; 20 MG/100ML; MG/100ML; MG/100ML; MG/100ML
1000 INJECTION, SOLUTION INTRAVENOUS CONTINUOUS
Status: DISCONTINUED | OUTPATIENT
Start: 2022-06-28 | End: 2022-06-28 | Stop reason: HOSPADM

## 2022-06-28 RX ORDER — ONDANSETRON 2 MG/ML
INJECTION INTRAMUSCULAR; INTRAVENOUS AS NEEDED
Status: DISCONTINUED | OUTPATIENT
Start: 2022-06-28 | End: 2022-06-28 | Stop reason: HOSPADM

## 2022-06-28 RX ORDER — INSULIN LISPRO 100 [IU]/ML
3 INJECTION, SOLUTION INTRAVENOUS; SUBCUTANEOUS ONCE
Status: COMPLETED | OUTPATIENT
Start: 2022-06-28 | End: 2022-06-28

## 2022-06-28 RX ORDER — NEOSTIGMINE METHYLSULFATE 1 MG/ML
INJECTION, SOLUTION INTRAVENOUS AS NEEDED
Status: DISCONTINUED | OUTPATIENT
Start: 2022-06-28 | End: 2022-06-28 | Stop reason: HOSPADM

## 2022-06-28 RX ORDER — HYDROMORPHONE HYDROCHLORIDE 1 MG/ML
0.5 INJECTION, SOLUTION INTRAMUSCULAR; INTRAVENOUS; SUBCUTANEOUS
Status: DISCONTINUED | OUTPATIENT
Start: 2022-06-28 | End: 2022-06-28 | Stop reason: HOSPADM

## 2022-06-28 RX ORDER — MAGNESIUM SULFATE 100 %
4 CRYSTALS MISCELLANEOUS AS NEEDED
Status: DISCONTINUED | OUTPATIENT
Start: 2022-06-28 | End: 2022-06-28 | Stop reason: HOSPADM

## 2022-06-28 RX ORDER — SODIUM CHLORIDE 9 MG/ML
INJECTION, SOLUTION INTRAVENOUS
Status: DISCONTINUED | OUTPATIENT
Start: 2022-06-28 | End: 2022-06-28 | Stop reason: HOSPADM

## 2022-06-28 RX ORDER — INSULIN LISPRO 100 [IU]/ML
INJECTION, SOLUTION INTRAVENOUS; SUBCUTANEOUS ONCE
Status: DISCONTINUED | OUTPATIENT
Start: 2022-06-28 | End: 2022-06-28 | Stop reason: HOSPADM

## 2022-06-28 RX ORDER — ONDANSETRON 2 MG/ML
4 INJECTION INTRAMUSCULAR; INTRAVENOUS ONCE
Status: DISCONTINUED | OUTPATIENT
Start: 2022-06-28 | End: 2022-06-28 | Stop reason: HOSPADM

## 2022-06-28 RX ORDER — VANCOMYCIN HYDROCHLORIDE 1 G/20ML
INJECTION, POWDER, LYOPHILIZED, FOR SOLUTION INTRAVENOUS AS NEEDED
Status: DISCONTINUED | OUTPATIENT
Start: 2022-06-28 | End: 2022-06-28 | Stop reason: HOSPADM

## 2022-06-28 RX ORDER — RIFAMPIN 600 MG/10ML
INJECTION, POWDER, LYOPHILIZED, FOR SOLUTION INTRAVENOUS AS NEEDED
Status: DISCONTINUED | OUTPATIENT
Start: 2022-06-28 | End: 2022-06-28 | Stop reason: HOSPADM

## 2022-06-28 RX ORDER — OXYCODONE AND ACETAMINOPHEN 5; 325 MG/1; MG/1
1 TABLET ORAL AS NEEDED
Status: DISCONTINUED | OUTPATIENT
Start: 2022-06-28 | End: 2022-06-28 | Stop reason: HOSPADM

## 2022-06-28 RX ORDER — FENTANYL CITRATE 50 UG/ML
INJECTION, SOLUTION INTRAMUSCULAR; INTRAVENOUS AS NEEDED
Status: DISCONTINUED | OUTPATIENT
Start: 2022-06-28 | End: 2022-06-28 | Stop reason: HOSPADM

## 2022-06-28 RX ORDER — MAGNESIUM SULFATE 100 %
4 CRYSTALS MISCELLANEOUS AS NEEDED
Status: CANCELLED | OUTPATIENT
Start: 2022-06-28

## 2022-06-28 RX ORDER — CISATRACURIUM BESYLATE 2 MG/ML
INJECTION, SOLUTION INTRAVENOUS AS NEEDED
Status: DISCONTINUED | OUTPATIENT
Start: 2022-06-28 | End: 2022-06-28 | Stop reason: HOSPADM

## 2022-06-28 RX ORDER — LIDOCAINE HYDROCHLORIDE 20 MG/ML
INJECTION, SOLUTION EPIDURAL; INFILTRATION; INTRACAUDAL; PERINEURAL AS NEEDED
Status: DISCONTINUED | OUTPATIENT
Start: 2022-06-28 | End: 2022-06-28 | Stop reason: HOSPADM

## 2022-06-28 RX ORDER — SODIUM CHLORIDE 9 MG/ML
25 INJECTION, SOLUTION INTRAVENOUS ONCE
Status: COMPLETED | OUTPATIENT
Start: 2022-06-28 | End: 2022-06-28

## 2022-06-28 RX ORDER — FLUMAZENIL 0.1 MG/ML
0.2 INJECTION INTRAVENOUS
Status: DISCONTINUED | OUTPATIENT
Start: 2022-06-28 | End: 2022-06-28 | Stop reason: HOSPADM

## 2022-06-28 RX ADMIN — SODIUM CHLORIDE: 900 INJECTION, SOLUTION INTRAVENOUS at 18:44

## 2022-06-28 RX ADMIN — ONDANSETRON HYDROCHLORIDE 4 MG: 2 INJECTION INTRAMUSCULAR; INTRAVENOUS at 20:07

## 2022-06-28 RX ADMIN — Medication 8 UNITS: at 11:43

## 2022-06-28 RX ADMIN — Medication 6 UNITS: at 08:17

## 2022-06-28 RX ADMIN — PIPERACILLIN AND TAZOBACTAM 2.25 G: 2; .25 INJECTION, POWDER, LYOPHILIZED, FOR SOLUTION INTRAVENOUS at 22:26

## 2022-06-28 RX ADMIN — CISATRACURIUM BESYLATE 10 MG: 2 INJECTION, SOLUTION INTRAVENOUS at 18:48

## 2022-06-28 RX ADMIN — PIPERACILLIN AND TAZOBACTAM 2.25 G: 2; .25 INJECTION, POWDER, LYOPHILIZED, FOR SOLUTION INTRAVENOUS at 13:33

## 2022-06-28 RX ADMIN — PIPERACILLIN AND TAZOBACTAM 2.25 G: 2; .25 INJECTION, POWDER, LYOPHILIZED, FOR SOLUTION INTRAVENOUS at 05:57

## 2022-06-28 RX ADMIN — Medication 3 UNITS: at 18:15

## 2022-06-28 RX ADMIN — SODIUM CHLORIDE 25 ML/HR: 9 INJECTION, SOLUTION INTRAVENOUS at 18:18

## 2022-06-28 RX ADMIN — PROPOFOL 150 MG: 10 INJECTION, EMULSION INTRAVENOUS at 18:48

## 2022-06-28 RX ADMIN — Medication 3 MG: at 20:08

## 2022-06-28 RX ADMIN — Medication 4 UNITS: at 16:21

## 2022-06-28 RX ADMIN — FENTANYL CITRATE 100 MCG: 50 INJECTION, SOLUTION INTRAMUSCULAR; INTRAVENOUS at 18:48

## 2022-06-28 RX ADMIN — MIDAZOLAM 2 MG: 1 INJECTION INTRAMUSCULAR; INTRAVENOUS at 18:42

## 2022-06-28 RX ADMIN — LIDOCAINE HYDROCHLORIDE 60 MG: 20 INJECTION, SOLUTION INTRAVENOUS at 18:48

## 2022-06-28 RX ADMIN — GLYCOPYRROLATE 0.4 MG: 0.2 INJECTION INTRAMUSCULAR; INTRAVENOUS at 20:08

## 2022-06-28 NOTE — ANESTHESIA PREPROCEDURE EVALUATION
Relevant Problems   CARDIOVASCULAR   (+) CAD (coronary artery disease)      RENAL FAILURE   (+) ESRD (end stage renal disease) (HCC)      ENDOCRINE   (+) Diabetes mellitus with foot ulcer (HCC)       Anesthetic History   No history of anesthetic complications            Review of Systems / Medical History  Patient summary reviewed and nursing notes reviewed    Pulmonary  Within defined limits                 Neuro/Psych   Within defined limits           Cardiovascular    Hypertension          CAD    Exercise tolerance: >4 METS     GI/Hepatic/Renal         Renal disease: ESRD       Endo/Other    Diabetes    Obesity     Other Findings              Physical Exam    Airway  Mallampati: II  TM Distance: 4 - 6 cm  Neck ROM: normal range of motion   Mouth opening: Normal     Cardiovascular  Regular rate and rhythm,  S1 and S2 normal,  no murmur, click, rub, or gallop             Dental  No notable dental hx       Pulmonary  Breath sounds clear to auscultation               Abdominal  GI exam deferred       Other Findings            Anesthetic Plan    ASA: 4  Anesthesia type: general          Induction: Intravenous  Anesthetic plan and risks discussed with: Patient

## 2022-06-28 NOTE — PROGRESS NOTES
Hospitalist Progress Note    Patient: Phill Yang MRN: [id number]  CSN: [id number]    YOB: 1960  Age: 60 y.o. Sex: male    DOA: 6/27/2022 LOS:  LOS: 1 day          Chief Complaint:    worsening pain and swelling and chills     Assessment/Plan   Phill Yang is a 60 y.o. male who history, stent, hypertension, Charcot's foot presents with worsening pain and swelling and chills in his right foot despite multiple cleanings and being managed by podiatry as outpatient.     Diabetes mellitus with foot ulcer   End-stage renal disease on dialysis Tuesday Thursday and Saturday  History of coronary artery disease  Chronic compressive myelopathy pending surgery    Surgical I&D with calcaneal debridement and bone biopsy for ID, likely today at 6pm.  Planning on holding dialysis until after surgery, nephrology agrees  MRI in keeping with cellulitis, suspicion for osteomyelitis, possible degenerative/Charcot  Arthropathy  seen by his cardiologist 5/14/22 per H&P, Dr. John Villalobos, for preop clearance for his neck echo was done which showed normal EF and left ventricular hypertrophy  Will order ekg today  ID folllowing       Disposition : TBD  Patient Active Problem List   Diagnosis Code    Diabetes mellitus with foot ulcer ([facility] E11.621, L97.509    CAD (coronary artery disease) I25.10    ESRD (end stage renal disease) ([facility] N18.6       Subjective:    Wants to know how soon he can go home  Ready for surgery today    Review of systems:    Constitutional: denies fevers, chills, myalgias  Respiratory: denies SOB, cough  Cardiovascular: denies chest pain, palpitations  Gastrointestinal: denies nausea, vomiting, diarrhea      Vital signs/Intake and Output:  Visit Vitals  /66 (BP 1 Location: Right upper arm, BP Patient Position: At rest)   Pulse 63   Temp 98.5 °F (36.9 °C)   Resp 18   Ht 6' 2\" (1.88 m)   Wt 101.4 kg (223 lb 8 oz)   SpO2 99%   BMI 28.70 kg/m²     Current Shift:  06/28 0701 - 06/28 1900  In: 100 [P.O.:100]  Out: -   Last three shifts:  06/26 1901 - 06/28 0700  In: 240 [P.O.:240]  Out: -     Exam:    General: Well developed, alert, NAD, OX3  Head/Neck: NCAT, supple, No masses, No lymphadenopathy  CVS:Regular rate and rhythm, no M/R/G, S1/S2 heard, no thrill  Lungs:Clear to auscultation bilaterally, no wheezes, rhonchi, or rales  Abdomen: Soft, Nontender, No distention, Normal Bowel sounds, No hepatomegaly  Extremities: No C/C/E, pulses palpable 2+  Skin:normal texture and turgor, no rashes, no lesions  Neuro:grossly normal , follows commands  Psych:appropriate                Labs: Results:       Chemistry Recent Labs     06/28/22  0558 06/27/22  1320   * 237*   * 134*   K 4.7 4.4   CL 98* 96*   CO2 29 32   BUN 51* 44*   CREA 8.90* 7.50*   CA 7.8* 8.5   AGAP 7 6   BUCR 6* 6*      CBC w/Diff Recent Labs     06/27/22  1320   WBC 14.8*   RBC 3.14*   HGB 9.6*   HCT 30.0*      GRANS 75*   LYMPH 9*   EOS 2      Cardiac Enzymes No results for input(s): CPK, CKND1, PAULO in the last 72 hours. No lab exists for component: CKRMB, TROIP   Coagulation No results for input(s): PTP, INR, APTT, INREXT in the last 72 hours. Lipid Panel No results found for: CHOL, CHOLPOCT, CHOLX, CHLST, CHOLV, 370392, HDL, HDLP, LDL, LDLC, DLDLP, 788869, VLDLC, VLDL, TGLX, TRIGL, TRIGP, TGLPOCT, CHHD, CHHDX   BNP No results for input(s): BNPP in the last 72 hours. Liver Enzymes No results for input(s): TP, ALB, TBIL, AP in the last 72 hours.     No lab exists for component: SGOT, GPT, DBIL   Thyroid Studies No results found for: T4, T3U, TSH, TSHEXT     Procedures/imaging: see electronic medical records for all procedures/Xrays and details which were not copied into this note but were reviewed prior to creation of Erin Keith MD

## 2022-06-28 NOTE — CONSULTS
Consult Note  Consult requested by: Dr Annalisa Gardner is a 60 y.o. male BLACK/ who is being seen on consult for ESRD. Chief Complaint   Patient presents with    Foot Ulcer     Admission diagnosis: Diabetes mellitus with foot ulcer ([facility]    HPI:  61 yo PMH DM, HTN, CAD hx stentem ESRD on HD TTS admitted for right foot infection. MRI suggested osteomyelitis. No fever, chills. Seen by Podiatry who plans debridement, I&D. Pt has been on abx  Past Medical History:   Diagnosis Date    Diabetes mellitus     Hypertension      History reviewed. No pertinent surgical history. Social History     Socioeconomic History    Marital status:      Spouse name: Not on file    Number of children: Not on file    Years of education: Not on file    Highest education level: Not on file   Occupational History    Not on file   Tobacco Use    Smoking status: Not on file    Smokeless tobacco: Not on file   Substance and Sexual Activity    Alcohol use: Not on file    Drug use: Not on file    Sexual activity: Not on file   Other Topics Concern    Dental Braces Not Asked    Endoscopic Camera Pill Not Asked    Metallic Foreign Body Not Asked    Medication Patches Not Asked    Taking Feraheme Not Asked    Claustrophobic Not Asked   Social History Narrative    Not on file     Social Determinants of Health     Financial Resource Strain:     Difficulty of Paying Living Expenses: Not on file   Food Insecurity:     Worried About Running Out of Food in the Last Year: Not on file    Vane of Food in the Last Year: Not on file   Transportation Needs:     Lack of Transportation (Medical): Not on file    Lack of Transportation (Non-Medical):  Not on file   Physical Activity:     Days of Exercise per Week: Not on file    Minutes of Exercise per Session: Not on file   Stress:     Feeling of Stress : Not on file   Social Connections:     Frequency of Communication with Friends and Family: Not on file    Frequency of Social Gatherings with Friends and Family: Not on file    Attends [employer] Services: Not on file    Active Member of Clubs or Organizations: Not on file    Attends Club or Organization Meetings: Not on file    Marital Status: Not on file   Intimate Partner Violence:     Fear of Current or Ex-Partner: Not on file    Emotionally Abused: Not on file    Physically Abused: Not on file    Sexually Abused: Not on file   Housing Stability:     Unable to Pay for Housing in the Last Year: Not on file    Number of [place] in the Last Year: Not on file    Unstable Housing in the Last Year: Not on file       Family Hx:no hx kidney disease  No Known Allergies    Home Medications:     reviewed  Review of Systems:     Visit Vitals  /66 (BP 1 Location: Right upper arm, BP Patient Position: At rest)   Pulse 63   Temp 98.5 °F (36.9 °C)   Resp 18   Ht 6' 2\" (1.88 m)   Wt 101.4 kg (223 lb 8 oz)   SpO2 99%   BMI 28.70 kg/m²     GENERAL: Patient alert, awake and oriented times 3, able to communicate full sentences and not in distress. HEENT: No change in vision, no earache, tinnitus, sore throat or sinus congestion. NECK: No pain or stiffness. PULMONARY: No shortness of breath, cough or wheeze. Cardiovascular: no pnd or orthopnea, no CP  GASTROINTESTINAL: No abdominal pain, nausea, vomiting or diarrhea, melena or bright red blood per rectum. GENITOURINARY: No urinary frequency, urgency, hesitancy or dysuria. MUSCULOSKELETAL: No joint or muscle pain, no back pain, no recent trauma. DERMATOLOGIC: No rash, no itching, no lesions. ENDOCRINE: No polyuria, polydipsia, no heat or cold intolerance. No recent change in weight. HEMATOLOGICAL: No anemia or easy bruising or bleeding.    NEUROLOGIC: No headache, seizures, numbness, tingling or weakness      Physical Assessment:     Wt Readings from Last 3 Encounters:   06/27/22 101.4 kg (223 lb 8 oz)     Temp Readings from Last 3 Encounters:   06/28/22 98.5 °F (36.9 °C)     BP Readings from Last 3 Encounters:   06/28/22 131/66     Pulse Readings from Last 3 Encounters:   06/28/22 63        General: Well developed, alert, NAD, OX3  Head/Neck: NCAT, supple, No masses, No lymphadenopathy  CVS:Regular rate and rhythm, no M/R/G, S1/S2 heard, no thrill  Lungs:Clear to auscultation bilaterally, no wheezes, rhonchi, or rales  Abdomen: Soft, Nontender, No distention, Normal Bowel sounds, No hepatomegaly  Extremities: No C/C/E, pulses palpable 2+  Skin:normal texture and turgor, no rashes, no lesions  Neuro:grossly normal , follows commands  Psych:appropriate    WBC 14.8 H/H 9.6/30 plt 259  Na 134 K 4.7 Cl 98 CO2 29 BUN/Cr 51/8.9 Ca 7.8  Impression:   ESRD  Diabetic foot ulcer with possible osteomyelitis  DM  HTN  Anemia  SHPT  CAD  Plan:   Pt due to HD today but anesthesia would like it done after sugery  Pt comfortable, labs stable  For debridement, I&D today  If surgery is late tonight, can have HD in the AM    Kathryn Cueto MD  W- [phone]  O-[phone]

## 2022-06-28 NOTE — H&P
Date of Surgery Update:  Lauri Stinson was seen and examined. History and physical has been reviewed. The patient has been examined.  There have been no significant clinical changes since the completion of the originally dated History and Physical.    Signed By: Lucia Chapman DPM     June 28, 2022 6:31 PM

## 2022-06-28 NOTE — PROGRESS NOTES
D/C plan: Home vs home / [facility] pending medical progression. Chart review assessment completed as pt is off of the floor. Pt lives at home. Pt is a T,Th, S dialysis pt. CM to f/u w/ pt to confirm location and chair time. Reason for Admission:   Diabetes mellitus with foot ulcer ([facility] [J63.402, L97.509]    PCP: Emily Hernandez MD    There are currently no Active Isolations  No active infections                RUR Score:    15%               Barriers to discharge: Pain            Plan for utilizing home health:    yes                          Likelihood of readmission:   Moderate       Transition of Care Plan:         Home vs home / [facility]           Initial assessment completed with past medical records. Cognitive status of patient: oriented to time, place, person and situation. Face sheet information confirmed:  no.   Patient is able to navigate steps as needed. Prior to hospitalization, patient was considered to be independent with ADLs/IADLS : yes . Patient is not currently active with home health. This patient is on dialysis/days :yes    If yes, hemodialysis patient and receives treatment on Tuesday/Thursday/Saturday at         The patient states that he can obtain his medications from the pharmacy, and take his medications as directed. Patient's current insurance is Payor: [facility] MEDICARE COMPLETE / Plan: [employer] [facility] MEDICARE COMPLETE / Product Type: Managed Care Medicare /        Care Management Interventions  PCP Verified by CM: Yes (Per chart: Dr. Myron Dee )  Mode of Transport at Discharge:  Other (see comment)  Transition of Care Consult (CM Consult): Home Health (Anticipate home [facility] pending medical progression )  Discharge Durable Medical Equipment:  (TBD)  Physical Therapy Consult: Yes  Occupational Therapy Consult: Yes  Support Systems: Child(dafne)  Confirm Follow Up Transport: Family  The Plan for Transition of Care is Related to the Following Treatment Goals : Home vs home w/ [facility]  Discharge Location  Patient Expects to be Discharged to[de-identified] Home

## 2022-06-28 NOTE — PROGRESS NOTES
As per Dr. Dequan Soto, Nephrologist patient will be dialyzed tomorrow 06/29/2022. Primary Nurse London Huber made aware of same.

## 2022-06-28 NOTE — DIABETES MGMT
Diabetes/ Glycemic Control Plan of Care  Recommendations:   Recommend to initiate basal insulin, suggest 10 units Lantus q 24 hours. Assessment: Patient with a history of ESRD-HD and type 2 diabetes admitted with a foot ulcer. BG ranging 210-417 mg/dl over the last 24 hours. Patient is currently receiving corrective insulin and may benefit with the addition of basal insulin. A1c pending. DX:   1. Type 2 diabetes mellitus with foot ulcer, with long-term current use of insulin ([facility] [address] [address]     2. Osteomyelitis of right foot, unspecified type ([facility]     3. ESRD on hemodialysis (HCC)       Recent Glucose Results:   Lab Results   Component Value Date/Time     (H) 06/28/2022 05:58 AM     (H) 06/27/2022 01:20 PM    GLUCPOC 289 (H) 06/28/2022 07:50 AM    GLUCPOC 293 (H) 06/27/2022 09:33 PM    GLUCPOC 417 ([facility]) 06/27/2022 09:31 PM          BG within target range (non-ICU: <180; -180):  [] Yes    [x] No   Current insulin orders: corrective Humalog  Total Daily Dose previous 24 hours = 12 units Humalog  Current A1c: No results found for: HBA1C, HRL0RIUA, [id number]     Nutrition/Diet: DIET NPO   Meal Intake:  Patient Vitals for the past 168 hrs:   % Diet Eaten   06/27/22 1820 51 - 75%     Home diabetes medications:   Key Antihyperglycemic Medications             insulin glargine (Lantus U-100 Insulin) 100 unit/mL injection (Taking) 10 Units by SubCUTAneous route nightly. insulin lispro (HUMALOG) 100 unit/mL injection (Taking) by SubCUTAneous route. Plan/Goals:   Blood glucose will be within target of 70 - 180 mg/dl within 72 hours  Reinforce dietary and medication compliance at home.        Education:  [] Refer to Diabetes Education Record                       [x] Education not indicated at this time - patient off floor     Kaylene Baez RD  Glycemic Control Team  [phone]    Monday-Friday   9 am - 3 pm

## 2022-06-28 NOTE — CONSULTS
[facility]  (A Division of [address] [place] [address])                                                                                                                      Jass Clark MD  Office #: - Option # [phone]  Fax #: [phone]     Date of Admission: 6/27/2022Date of Note: 6/28/2022      Reason for Referral: Evaluation and antibiotic management of R heel infection    Thank you for involving me in the care of this patient. Please do not hesitate to contact me on the above number if question or concern. Current Antimicrobials:    Prior Antimicrobials:  Zosyn 6/27 to date   Bactrim 1 month ago       Assessment- ID related:  --------------------------------------------------------------------------  · DFU R heel  · Subacute/chronic OM of heel-- over 2 months time  · Leucocytosis 2/2 above  --BCX 6/27: NGSF  --WCX-- GNR and GPC in pairs--> pending  · ESRD on HD TTS  · Obese BMI of 28  · DM   · History of L hallux ampuation for infection- 5yrs ago Recommendation for ID issues I am following:  --------------------------------------------------------------------------------    FU BCX/WCX: NGSF    Zosyn on board for high concern of sepsis by admitting team-- can continue    OR today-- will be able to know how much can be salvaged after OR look-- DW Dr Jene Cooks           HPI:  Kathy Meyer is a 60 y.o. BLACK/ with PMH as listed below-- he had ulcer for 2 months or so that he was followed by as OP by Podiatry. His heel wound progressed and was advised to come to ED yesterday. Had foot pain and fould drainage, but denies high F/C/R/SOB/ N/V prior to admission. BCX/WCX taken in ED, started on Zosyn and plan was to hold off abx and monitor until surgery. Admission assessment there was high concern for sepsis and Zosyn was continued. Hx of MRSA infection and treatment few years ago.        Active Hospital Problems    Diagnosis Date Noted    CAD (coronary artery disease) 06/28/2022    ESRD (end stage renal disease) ([facility] 06/28/2022    Diabetes mellitus with foot ulcer ([facility] 06/27/2022     Past Medical History:   Diagnosis Date    Diabetes mellitus     Hypertension      History reviewed. No pertinent surgical history. History reviewed. No pertinent family history. Social History     Socioeconomic History    Marital status:      Spouse name: Not on file    Number of children: Not on file    Years of education: Not on file    Highest education level: Not on file   Occupational History    Not on file   Tobacco Use    Smoking status: Not on file    Smokeless tobacco: Not on file   Substance and Sexual Activity    Alcohol use: Not on file    Drug use: Not on file    Sexual activity: Not on file   Other Topics Concern    Dental Braces Not Asked    Endoscopic Camera Pill Not Asked    Metallic Foreign Body Not Asked    Medication Patches Not Asked    Taking Feraheme Not Asked    Claustrophobic Not Asked   Social History Narrative    Not on file     Social Determinants of Health     Financial Resource Strain:     Difficulty of Paying Living Expenses: Not on file   Food Insecurity:     Worried About Running Out of Food in the Last Year: Not on file    Vane of Food in the Last Year: Not on file   Transportation Needs:     Lack of Transportation (Medical): Not on file    Lack of Transportation (Non-Medical):  Not on file   Physical Activity:     Days of Exercise per Week: Not on file    Minutes of Exercise per Session: Not on file   Stress:     Feeling of Stress : Not on file   Social Connections:     Frequency of Communication with Friends and Family: Not on file    Frequency of Social Gatherings with Friends and Family: Not on file    Attends Uatsdin Services: Not on file    Active Member of Clubs or Organizations: Not on file    Attends Club or Organization Meetings: Not on file    Marital Status: Not on file Intimate Partner Violence:     Fear of Current or Ex-Partner: Not on file    Emotionally Abused: Not on file    Physically Abused: Not on file    Sexually Abused: Not on file   Housing Stability:     Unable to Pay for Housing in the Last Year: Not on file    Number of [place] in the Last Year: Not on file    Unstable Housing in the Last Year: Not on file       Allergies:  Patient has no known allergies. Medications:  Current Facility-Administered Medications   Medication Dose Route Frequency    piperacillin-tazobactam (ZOSYN) 2.25 g in 0.9% sodium chloride (MBP/ADV) 50 mL MBP  2.25 g IntraVENous Q8H    insulin lispro (HUMALOG) injection   SubCUTAneous AC&HS    glucose chewable tablet 16 g  4 Tablet Oral PRN    glucagon (GLUCAGEN) injection 1 mg  1 mg IntraMUSCular PRN    dextrose 10% infusion 0-250 mL  0-250 mL IntraVENous PRN    acetaminophen (TYLENOL) tablet 650 mg  650 mg Oral Q6H PRN        ROS:  Pertinent items are noted in the History of Present Illness. Physical Exam:    Temp (24hrs), Av.7 °F (37.1 °C), Min:98.3 °F (36.8 °C), Max:99.2 °F (37.3 °C)    Visit Vitals  /66 (BP 1 Location: Right upper arm, BP Patient Position: At rest)   Pulse 63   Temp 98.5 °F (36.9 °C)   Resp 18   Ht 6' 2\" (1.88 m)   Wt 101.4 kg (223 lb 8 oz)   SpO2 99%   BMI 28.70 kg/m²      GEN: WD Obese, on RA--not in resp distress. Right chest TDC    HEENT: Unicteric. EOMI intact  No neck swelling  CHEST: Non laboured breathing. CTA  CVS:RRR, no mur/gallop  ABD: Obese/soft. Non tender. PASCUAL: Deferred  EXT: No apparent swelling or redness on UE/LE joints except:  R foot with deformity, heel ulcer with necrotic greyish tissue, no cellulitis extending up his leg  Foul smell  Left hallux old ampuation  Skin: Dry and intact. No rash, no redness. CNS: A, OX3. Moves all extremity. CN grossly ok.       Microbiology  All Micro Results     Procedure Component Value Units Date/Time    CULTURE, BLOOD [[id number]] Collected: 06/27/22 1240    Order Status: Completed Specimen: Blood Updated: 06/28/22 0715     Special Requests: NO SPECIAL REQUESTS        Culture result: NO GROWTH AFTER 16 HOURS       CULTURE, BLOOD [[id number]] Collected: 06/27/22 1245    Order Status: Completed Specimen: Blood Updated: 06/28/22 0715     Special Requests: NO SPECIAL REQUESTS        Culture result: NO GROWTH AFTER 18 HOURS       CULTURE, WOUND Raford Payer STAIN [[id number]] Collected: 06/27/22 1530    Order Status: Completed Specimen: Wound Drainage Updated: 06/28/22 0007     Special Requests: NO SPECIAL REQUESTS        GRAM STAIN RARE WBCS SEEN         2+ GRAM NEGATIVE RODS               1+ GRAM POSITIVE COCCI IN PAIRS           Culture result: PENDING           Lab results:    Chemistry  Recent Labs     06/28/22  0558 06/27/22  1320   * 237*   * 134*   K 4.7 4.4   CL 98* 96*   CO2 29 32   BUN 51* 44*   CREA 8.90* 7.50*   CA 7.8* 8.5   AGAP 7 6   BUCR 6* 6*       CBC w/ Diff  Recent Labs     06/27/22  1320   WBC 14.8*   RBC 3.14*   HGB 9.6*   HCT 30.0*      GRANS 75*   LYMPH 9*   EOS 2       Imaging: report reviewed and as posted by radiologist   No results found for this or any previous visit. MRI:       1. Posterior heel skin defect, at the margin of the Achilles tendon attachment  on the calcaneus, in keeping with known ulcer. Infiltration of subjacent  subcutaneous fat in keeping with cellulitis.     2. Cortical discontinuity and marrow signal abnormality in the subjacent dorsal  calcaneus extending to the threaded tip of the oblique tibiocalcaneal screw is  suspicious for osteomyelitis.     3.  Fluid signal intensity surrounding the threaded tibial tip of the 1st digit  long partially threaded screw, with osseous fragments at the inferior margin,  concerning for loosening and/or infection.     4. Marrow signal abnormalities at the 2nd-3rd and to a lesser degree 4th-5th  tarsometatarsal joint, potentially simply related to degenerative/Charcot  arthropathy. In view of marrow signal abnormalities, infection cannot be  excluded if clinically suspected. If clinically warranted, consider correlation with nuclear medicine imaging to  further assess.     5. Edema/myositis in the musculature above the ankle. There is discontinuity of  FHL and peroneal tendons, best correlated with operative findings/history of  prior intervention for significance/chronicity. Fatty change in the musculature  about the foot.

## 2022-06-29 ENCOUNTER — APPOINTMENT (OUTPATIENT)
Dept: INTERVENTIONAL RADIOLOGY/VASCULAR | Age: 62
DRG: 629 | End: 2022-06-29
Attending: SURGERY
Payer: MEDICARE

## 2022-06-29 LAB
ANION GAP SERPL CALC-SCNC: 7 MMOL/L (ref 3–18)
BACTERIA SPEC CULT: NORMAL
BACTERIA SPEC CULT: NORMAL
BUN SERPL-MCNC: 61 MG/DL (ref 7–18)
BUN/CREAT SERPL: 6 (ref 12–20)
CALCIUM SERPL-MCNC: 7.5 MG/DL (ref 8.5–10.1)
CHLORIDE SERPL-SCNC: 97 MMOL/L (ref 100–111)
CO2 SERPL-SCNC: 30 MMOL/L (ref 21–32)
CREAT SERPL-MCNC: 10.2 MG/DL (ref 0.6–1.3)
GLUCOSE BLD STRIP.AUTO-MCNC: 176 MG/DL (ref 70–110)
GLUCOSE BLD STRIP.AUTO-MCNC: 223 MG/DL (ref 70–110)
GLUCOSE BLD STRIP.AUTO-MCNC: 224 MG/DL (ref 70–110)
GLUCOSE BLD STRIP.AUTO-MCNC: 235 MG/DL (ref 70–110)
GLUCOSE SERPL-MCNC: 262 MG/DL (ref 74–99)
GRAM STN SPEC: NORMAL
POTASSIUM SERPL-SCNC: 6 MMOL/L (ref 3.5–5.5)
SERVICE CMNT-IMP: NORMAL
SODIUM SERPL-SCNC: 134 MMOL/L (ref 136–145)

## 2022-06-29 PROCEDURE — 74011636637 HC RX REV CODE- 636/637: Performed by: INTERNAL MEDICINE

## 2022-06-29 PROCEDURE — 74011000250 HC RX REV CODE- 250

## 2022-06-29 PROCEDURE — 77010033678 HC OXYGEN DAILY

## 2022-06-29 PROCEDURE — 90935 HEMODIALYSIS ONE EVALUATION: CPT

## 2022-06-29 PROCEDURE — 77001 FLUOROGUIDE FOR VEIN DEVICE: CPT

## 2022-06-29 PROCEDURE — 74011250636 HC RX REV CODE- 250/636

## 2022-06-29 PROCEDURE — 74011250636 HC RX REV CODE- 250/636: Performed by: PHYSICIAN ASSISTANT

## 2022-06-29 PROCEDURE — 82962 GLUCOSE BLOOD TEST: CPT

## 2022-06-29 PROCEDURE — 80048 BASIC METABOLIC PNL TOTAL CA: CPT

## 2022-06-29 PROCEDURE — 74011250636 HC RX REV CODE- 250/636: Performed by: SURGERY

## 2022-06-29 PROCEDURE — 74011000258 HC RX REV CODE- 258: Performed by: PHYSICIAN ASSISTANT

## 2022-06-29 PROCEDURE — 65270000029 HC RM PRIVATE

## 2022-06-29 PROCEDURE — 36415 COLL VENOUS BLD VENIPUNCTURE: CPT

## 2022-06-29 RX ORDER — DIPHENHYDRAMINE HYDROCHLORIDE 50 MG/ML
25-50 INJECTION, SOLUTION INTRAMUSCULAR; INTRAVENOUS ONCE
Status: COMPLETED | OUTPATIENT
Start: 2022-06-29 | End: 2022-06-29

## 2022-06-29 RX ORDER — HEPARIN SODIUM 1000 [USP'U]/ML
10000 INJECTION, SOLUTION INTRAVENOUS; SUBCUTANEOUS
Status: COMPLETED | OUTPATIENT
Start: 2022-06-29 | End: 2022-06-29

## 2022-06-29 RX ORDER — HEPARIN SODIUM 200 [USP'U]/100ML
INJECTION, SOLUTION INTRAVENOUS
Status: COMPLETED
Start: 2022-06-29 | End: 2022-06-29

## 2022-06-29 RX ORDER — FENTANYL CITRATE 50 UG/ML
25-100 INJECTION, SOLUTION INTRAMUSCULAR; INTRAVENOUS
Status: DISCONTINUED | OUTPATIENT
Start: 2022-06-29 | End: 2022-06-29

## 2022-06-29 RX ORDER — LIDOCAINE HYDROCHLORIDE 10 MG/ML
INJECTION, SOLUTION EPIDURAL; INFILTRATION; INTRACAUDAL; PERINEURAL
Status: COMPLETED
Start: 2022-06-29 | End: 2022-06-29

## 2022-06-29 RX ORDER — HEPARIN SODIUM 1000 [USP'U]/ML
INJECTION, SOLUTION INTRAVENOUS; SUBCUTANEOUS
Status: COMPLETED
Start: 2022-06-29 | End: 2022-06-29

## 2022-06-29 RX ORDER — LIDOCAINE HYDROCHLORIDE 10 MG/ML
1-10 INJECTION, SOLUTION EPIDURAL; INFILTRATION; INTRACAUDAL; PERINEURAL
Status: COMPLETED | OUTPATIENT
Start: 2022-06-29 | End: 2022-06-29

## 2022-06-29 RX ORDER — NALOXONE HYDROCHLORIDE 0.4 MG/ML
0.4 INJECTION, SOLUTION INTRAMUSCULAR; INTRAVENOUS; SUBCUTANEOUS
Status: DISCONTINUED | OUTPATIENT
Start: 2022-06-29 | End: 2022-06-29

## 2022-06-29 RX ORDER — HEPARIN SODIUM 200 [USP'U]/100ML
500 INJECTION, SOLUTION INTRAVENOUS
Status: COMPLETED | OUTPATIENT
Start: 2022-06-29 | End: 2022-06-29

## 2022-06-29 RX ADMIN — FENTANYL CITRATE 50 MCG: 50 INJECTION, SOLUTION INTRAMUSCULAR; INTRAVENOUS at 13:50

## 2022-06-29 RX ADMIN — Medication 4 UNITS: at 08:49

## 2022-06-29 RX ADMIN — HEPARIN SODIUM 1000 UNITS: 200 INJECTION, SOLUTION INTRAVENOUS at 14:14

## 2022-06-29 RX ADMIN — HEPARIN SODIUM 3600 UNITS: 1000 INJECTION, SOLUTION INTRAVENOUS; SUBCUTANEOUS at 14:11

## 2022-06-29 RX ADMIN — PIPERACILLIN AND TAZOBACTAM 2.25 G: 2; .25 INJECTION, POWDER, LYOPHILIZED, FOR SOLUTION INTRAVENOUS at 13:17

## 2022-06-29 RX ADMIN — HEPARIN SODIUM IN SODIUM CHLORIDE 1000 UNITS: 200 INJECTION INTRAVENOUS at 14:14

## 2022-06-29 RX ADMIN — HEPARIN SODIUM 3600 UNITS: 1000 INJECTION INTRAVENOUS; SUBCUTANEOUS at 14:11

## 2022-06-29 RX ADMIN — LIDOCAINE HYDROCHLORIDE 20 ML: 10 INJECTION, SOLUTION EPIDURAL; INFILTRATION; INTRACAUDAL; PERINEURAL at 14:10

## 2022-06-29 RX ADMIN — PIPERACILLIN AND TAZOBACTAM 2.25 G: 2; .25 INJECTION, POWDER, LYOPHILIZED, FOR SOLUTION INTRAVENOUS at 06:00

## 2022-06-29 RX ADMIN — PIPERACILLIN AND TAZOBACTAM 2.25 G: 2; .25 INJECTION, POWDER, LYOPHILIZED, FOR SOLUTION INTRAVENOUS at 22:08

## 2022-06-29 RX ADMIN — Medication 4 UNITS: at 12:15

## 2022-06-29 RX ADMIN — DIPHENHYDRAMINE HYDROCHLORIDE 50 MG: 50 INJECTION, SOLUTION INTRAMUSCULAR; INTRAVENOUS at 13:50

## 2022-06-29 RX ADMIN — Medication 2 UNITS: at 18:24

## 2022-06-29 RX ADMIN — Medication 4 UNITS: at 22:08

## 2022-06-29 NOTE — PROGRESS NOTES
3555 Assessed patient with dialysis RN . ... no dialysis catheter present in right chest wall; only catheter incision site. .. assessed  fistula in L arm; no bruit or thrill present. ..   1000 Discussed concern with Dr. Conrad Asencio. .. advised will consult vascular. .. rcvd order to make patient NPO. .. Patient updated. .. Hygiene care completed. . catheter found in patient sheets. .. catheter appears intact with blood at distal tip. ..

## 2022-06-29 NOTE — PERIOP NOTES
TRANSFER - OUT REPORT:    Verbal report given to [address] RN(name) on Meliza Boles  being transferred to [address] [place] [address](unit) for routine post - op       Report consisted of patients Situation, Background, Assessment and   Recommendations(SBAR). Information from the following report(s) SBAR, OR Summary, Procedure Summary, Intake/Output, MAR, Recent Results, Med Rec Status and Cardiac Rhythm NSR was reviewed with the receiving nurse. Lines:   Peripheral IV 06/28/22 Right Forearm (Active)   Site Assessment Clean, dry, & intact 06/28/22 2035   Phlebitis Assessment 0 06/28/22 2035   Infiltration Assessment 0 06/28/22 2035   Dressing Status Clean, dry, & intact 06/28/22 2035   Dressing Type Tape;Transparent 06/28/22 2035   Hub Color/Line Status Infusing 06/28/22 2035        Opportunity for questions and clarification was provided.       Patient transported with:   O2 @ 2 liters  Registered Nurse

## 2022-06-29 NOTE — PROGRESS NOTES
Reported to offer dialysis treatment to MR. Carlos Chowdhury, at 10:00 am, and after  preparing the machine, done pt  assessment and found out that the patient had no central line in place on his right upper chest. Reported to primary nurse and both primary doctor, and nephrologist were informed. A new arrangement was made for cath to be placed, since his Right A V G was also not functioning. Second dialysis  treatment started at 1530 ,after started the patient, he keeps on covering his access, I told him, not to, he became agitated, and started, I am cold, cut the AC off. I pleaded with him, if we can offer more blankets, since it was hot inside even with [facility], but he escalated and stated, \"I need to talk to house supervisor, and pt care advocate\". I don't care about your comfort, its my comfort. \" The primary nurse was called who tried to offer him more blankets , he refused, finally we cut off the air, and  I continue to work eventhough the environment was not favorable.

## 2022-06-29 NOTE — OP NOTES
Operative Note    Patient: Max Hernandez  YOB: 1960  MRN: [id number]    Date of Procedure: 6/28/2022     Pre-Op Diagnosis: DIABETES MELLUELITUS WITH FOOT ULCER    Post-Op Diagnosis: Right heel ulcer with calcaneal osteomyelitis, deep abscess with bone necrosis      Procedure(s):  INCISION BONE CORTEX RIGHT CALCANEOUS, INCISION AND DRAINAGE RIGHT HEAL ABSCESS, DEEP WOUND DEBRIDEMENT WITH MULTIPLE BONE BIOPSIES  AND APPLICATION OF ANTIBIOTIC BEADS    Surgeon(s):  Eliseo Pandey DPM    Surgical Assistant: Surg Asst-1: Collin Palomares    Anesthesia: MAC     Estimated Blood Loss (mL):  Minimal    Complications: None    Specimens:   ID Type Source Tests Collected by Time Destination   1 : RIGHT Calcanceous Bone Preservative Foot, Right  Eliseo Pandey DPM 6/28/2022 1947 Pathology   1 : RIGHT HEEL WOUND  Wound Heel CULTURE, ANAEROBIC, CULTURE, WOUND W GRAM STAIN, GRAM STAIN, CULTURE & SMEAR, AFB, CULTURE, Robet Bence 6/28/2022 1925 Microbiology   2 : RIGHT Calcanceous Bone Tissue Heel CULTURE, ANAEROBIC, CULTURE, TISSUE W Bonilla Paulinoy, GRAM Jessica Gambino 6/28/2022 1926 Microbiology        Implants:   Implant Name Type Inv.  Item Serial No.  Lot No. LRB No. Used Action   GRAFT BNE SUB 10CC BEAD 25CC CA SULPHATE RAP SET W/ INDIV - [id number]  GRAFT BNE SUB 10CC BEAD 25CC CA SULPHATE RAP SET W/ INDIV  [employer] [id number] 1 Implanted       Drains: * No LDAs found *    Findings: Large right heel necrotic ulcer with osteomyelitis of the calcaneus, and deep abscess    Detailed Description of Procedure:   See op note    Electronically Signed by Ashely oTrres DPM on 6/28/2022 at 8:30 PM

## 2022-06-29 NOTE — PROGRESS NOTES
TRANSFER - OUT REPORT:    Verbal report given to Abigail ALVAREZ on Serge Reza  being transferred to  for routine progression of care       Report consisted of patients Situation, Background, Assessment and   Recommendations(SBAR). Information from the following report(s) SBAR, Procedure Summary and MAR was reviewed with the receiving nurse. Lines:   Peripheral IV 06/28/22 Right Forearm (Active)   Site Assessment Clean, dry, & intact 06/29/22 0800   Phlebitis Assessment 0 06/29/22 0800   Infiltration Assessment 0 06/29/22 0800   Dressing Status Clean, dry, & intact 06/29/22 0800   Dressing Type Transparent 06/29/22 0800   Hub Color/Line Status Infusing 06/28/22 2035   Action Taken Open ports on tubing capped 06/29/22 0800        Opportunity for questions and clarification was provided.       Patient transported with:   [employer]

## 2022-06-29 NOTE — PROGRESS NOTES
Pt educated on procedure and sedation. Verbalized understanding. Pt ate at 0830. Pt not currently on anticoagulation medication.  Pt confirmed no issues with sedation in the past.

## 2022-06-29 NOTE — ANESTHESIA POSTPROCEDURE EVALUATION
Post-Anesthesia Evaluation and Assessment    Cardiovascular Function/Vital Signs  Visit Vitals  BP (!) 140/62   Pulse 60   Temp 36.3 °C (97.4 °F)   Resp 23   Ht 6' 2\" (1.88 m)   Wt 101.4 kg (223 lb 8 oz)   SpO2 100%   BMI 28.70 kg/m²       Patient is status post Procedure(s):  INCISION BONE CORTEX RIGHT CALCANEOUS, INCISION AND DRAINAGE RIGHT HEAL ABSCESS, DEEP WOUND DEBRIDEMENT WITH MULTIPLE BONE BIOPSIES  AND APPLICATION OF ANTIBIOTIC BEADS. Nausea/Vomiting: Controlled. Postoperative hydration reviewed and adequate. Pain:  Pain Scale 1: Numeric (0 - 10) (06/28/22 2035)  Pain Intensity 1: 0 (06/28/22 2035)   Managed. Neurological Status:   Neuro (WDL): Exceptions to WDL (06/28/22 2035)   At baseline. Mental Status and Level of Consciousness: Arousable. Pulmonary Status:   O2 Device: Nasal cannula (06/28/22 2030)   Adequate oxygenation and airway patent. Complications related to anesthesia: None    Post-anesthesia assessment completed. No concerns. Patient has met all discharge requirements.     Signed By: Stevie Mohan CRNA    June 28, 2022

## 2022-06-29 NOTE — OP NOTES
Operative Note    Patient: Meliza Boles  YOB: 1960  MRN: [id number]    Date of Procedure: 6/28/2022     Pre-Op Diagnosis: DIABETES MELLUELITUS WITH FOOT ULCER    Post-Op Diagnosis: Same as preoperative diagnosis. Procedure(s):  INCISION BONE CORTEX RIGHT CALCANEOUS, INCISION AND DRAINAGE RIGHT HEAL ABSCESS, DEEP WOUND DEBRIDEMENT WITH MULTIPLE BONE BIOPSIES  AND APPLICATION OF ANTIBIOTIC BEADS    Surgeon(s):  Analia Fields DPM    Surgical Assistant: Surg Asst-1: Sierra Paez    Anesthesia: MAC     Estimated Blood Loss (mL):  Minimal    Complications: None    Specimens:   ID Type Source Tests Collected by Time Destination   1 : RIGHT Calcanceous Bone Preservative Foot, Right  Analia Fields DPM 6/28/2022 1947 Pathology   1 : RIGHT HEEL WOUND  Wound Heel CULTURE, ANAEROBIC, CULTURE, WOUND W GRAM STAIN, GRAM STAIN, CULTURE & SMEAR, AFB, CULTURE, Sirisha Jordan 6/28/2022 1925 Microbiology   2 : RIGHT Calcanceous Bone Tissue Heel CULTURE, ANAEROBIC, CULTURE, TISSUE W ANGLE Castellano 6/28/2022 1926 Microbiology        Implants:   Implant Name Type Inv.  Item Serial No.  Lot No. LRB No. Used Action   GRAFT BNE SUB 10CC BEAD 25CC CA SULPHATE RAP SET W/ INDIV - [id number]  GRAFT BNE SUB 10CC BEAD 25CC CA SULPHATE RAP SET W/ INDIV  [employer] [id number] Right 1 Implanted       Drains: * No LDAs found *    Findings: Large necrotic ulcer right posterior heel with osteomyelitis of the calcaneus and deep abscess    Detailed Description of Procedure:   See op note for details    Electronically Signed by Beba Camarillo DPM on 6/28/2022 at 8:32 PM

## 2022-06-29 NOTE — PROGRESS NOTES
Nephrology Progress Note    Tee Montague is a 60 y.o. male BLACK/ who is being seen on consult for ESRD. Chief Complaint   Patient presents with    Foot Ulcer     Admission diagnosis: Acute hematogenous osteomyelitis of right foot ([address]    Subjectives: This is a 59 yo male with a PMH of DM, HTN, CAD, ESRD on HD TTS admitted for right foot infection. MRI suggested osteomyelitis. No fever, chills. Seen by Podiatry who plans debridement, I&D. Pt has been on abx. Catheter fell off overnight. Vascular was consulted. Past Medical History:   Diagnosis Date    Diabetes mellitus     Hypertension      History reviewed. No pertinent surgical history. Social History     Socioeconomic History    Marital status:      Spouse name: Not on file    Number of children: Not on file    Years of education: Not on file    Highest education level: Not on file   Occupational History    Not on file   Tobacco Use    Smoking status: Not on file    Smokeless tobacco: Not on file   Substance and Sexual Activity    Alcohol use: Not on file    Drug use: Not on file    Sexual activity: Not on file   Other Topics Concern    Dental Braces Not Asked    Endoscopic Camera Pill Not Asked    Metallic Foreign Body Not Asked    Medication Patches Not Asked    Taking Feraheme Not Asked    Claustrophobic Not Asked   Social History Narrative    Not on file     Social Determinants of Health     Financial Resource Strain:     Difficulty of Paying Living Expenses: Not on file   Food Insecurity:     Worried About Running Out of Food in the Last Year: Not on file    Vane of Food in the Last Year: Not on file   Transportation Needs:     Lack of Transportation (Medical): Not on file    Lack of Transportation (Non-Medical):  Not on file   Physical Activity:     Days of Exercise per Week: Not on file    Minutes of Exercise per Session: Not on file   Stress:     Feeling of Stress : Not on file Social Connections:     Frequency of Communication with Friends and Family: Not on file    Frequency of Social Gatherings with Friends and Family: Not on file    Attends Pentecostalism Services: Not on file    Active Member of Clubs or Organizations: Not on file    Attends Club or Organization Meetings: Not on file    Marital Status: Not on file   Intimate Partner Violence:     Fear of Current or Ex-Partner: Not on file    Emotionally Abused: Not on file    Physically Abused: Not on file    Sexually Abused: Not on file   Housing Stability:     Unable to Pay for Housing in the Last Year: Not on file    Number of [place] in the Last Year: Not on file    Unstable Housing in the Last Year: Not on file       Family Hx:no hx kidney disease  No Known Allergies    Meds:  reviewed    Visit Vitals  BP (!) 169/89   Pulse 72   Temp 98.1 °F (36.7 °C)   Resp 18   Ht 6' 2\" (1.88 m)   Wt 101.9 kg (224 lb 11.2 oz)   SpO2 99%   BMI 28.85 kg/m²       Physical Assessment:     Wt Readings from Last 3 Encounters:   06/28/22 101.9 kg (224 lb 11.2 oz)     Temp Readings from Last 3 Encounters:   06/29/22 98.1 °F (36.7 °C)     BP Readings from Last 3 Encounters:   06/29/22 (!) 169/89     Pulse Readings from Last 3 Encounters:   06/29/22 72        General: Well developed, alert, NAD  Head/Neck: NCAT, supple, No masses, No lymphadenopathy  CVS:Regular rate and rhythm, no M/R/G, S1/S2 heard, no thrill  Lungs:Clear to auscultation bilaterally, no wheezes, rhonchi, or rales  Abdomen: Soft, Nontender, No distention, Normal Bowel sounds, No hepatomegaly  Extremities: No C/C/E, pulses palpable 2+  Skin:normal texture and turgor, no rashes, no lesions  Neuro:grossly normal , follows commands    Labs: reviewed    Impression:   ESRD on HD, has no access this AM.  Diabetic foot ulcer with possible osteomyelitis  DM  HTN  Anemia in CKD  SHPT  CAD    Plan:   Podiatry following  Consult vascular for a new dialysis catheter  Will arrange for HD after catheter in.     Signed By: Philippe Goncalves MD     June 29, 2022

## 2022-06-29 NOTE — PROGRESS NOTES
Podiatry:  Patient is status post right heel I&D with incision bone cortex right calcaneus secondary to osteomyelitis. Multiple deep bone biopsies were taken for infectious disease reference and pathology. Patient will require long-term IV antibiotics as per Dr. Jocelin Steven. Antibiotic beads with vancomycin and Rocephin were implemented. Patient is nonweightbearing right foot. Will order Prevalon boots for both heels. Nursing to monitor right heel wound dressing for any bleedthrough, and do dressing change in 2 days being careful to leave antibiotic beads in place. We will plan on returning to the operating room in approximately 1 week for removal of antibiotic beads and possible further debridement and STRAVIX grafting of the large heel ulcer with wound VAC. I will follow the patient as needed.

## 2022-06-29 NOTE — OP NOTES
Operative Report    Patient: Tee Montague MRN: [id number]  SSN: [id number]    YOB: 1960  Age: 60 y.o. Sex: male       Date of Surgery: 6/28/2022     Preoperative Diagnosis: Dislodged right-sided tunneled dialysis catheter    Postoperative Diagnosis: Dislodged right-sided IJ tunneled dialysis catheter    Surgeon(s) and Role:     Merrick Ventura MD      Anesthesia: Local with lidocaine     Procedure: Replacement of right sided tunneled dialysis catheter right IJ. With fluoroscopy guidance    Procedure in Detail: After informed consent the patient was brought to the angios suite in supine position the right neck and right chest were prepped and draped in standard sterile fashion there was no purulent discharge from the previous tunneling port in the right neck after confirming the appropriate patient appropriate site the area was numbed using lidocaine and the tract was dilated using a hemostat. Over the same tunnel a bare catheter followed by a glide advantage wire was navigated into the IJ venotomy site then navigated down all the way to the IVC. A catheter was followed all the way to the IVC and then the wire was taken out and a stiff wire was inserted into the IVC. Over the stiff wire were able to put a new 23 cm tunneled dialysis catheter the end of the terminated in the right atrium with no ectopic heartbeats. It flushed and aspirated easily and secured in place using nylon sutures the area where the catheter entered the chest wall was also secured in place using nylon suture. The cuff was inside the chest wall. The catheter was flushed with heparin the patient Radha Ivory procedure well he had no immediate complications the catheter can be used right away with dialysis      Estimated Blood Loss: Minimal                      Complications: None    Counts: Sponge and needle counts were correct times two.     Signed By:  Merrick Ventura MD     June 29, 2022

## 2022-06-29 NOTE — PROGRESS NOTES
Hospitalist Progress Note    Patient: Tee Montague MRN: [id number]  CSN: [id number]    YOB: 1960  Age: 60 y.o. Sex: male    DOA: 6/27/2022 LOS:  LOS: 2 days          Chief Complaint:    worsening pain and swelling and chills     Assessment/Plan   Tee Montague is a 60 y.o. male who history, stent, hypertension, Charcot's foot presents with worsening pain and swelling and chills in his right foot despite multiple cleanings and being managed by podiatry as outpatient.     Large necrotic ulcer right posterior heel with osteomyelitis of the calcaneus and deep abscess-  S/p incision bone cortex right valcaneous, I&D right heel abcsess, deep wound debridement with multiple bone biopsies and application of antibiotic beads of vancomycin and rocephin  cultures pending   Dr. Cleve Betancourt following, 2nd surgery will be required in about a week  Dr. Kai Chiu following     Diabetes mellitus -  ADA, SSI, FSBG qac and qhs     End-stage renal disease on dialysis Tuesday Thursday and Saturday -  Patient apparently pulled out his [facility] catheter while sleeping, patient states he has no memory of doing this  Catheter found in the bed along with stitches  Nephrology consulted, stat vascular consult needed, hemodialysis will be done once catheter replaced    History of coronary artery disease-  Supportive care    Chronic compressive myelopathy pending surgery -  Supportive care    DVT and GI prophylaxis        Disposition : TBD  Patient Active Problem List   Diagnosis Code    Diabetes mellitus with foot ulcer ([address] E11.621, L97.509    CAD (coronary artery disease) I25.10    ESRD (end stage renal disease) ([address] N18.6    Acute hematogenous osteomyelitis of right foot ([address] M86.071    Cellulitis of right heel L03.115    Diabetic foot ulcer with osteomyelitis ([address] E11.621, E11.69, L97.509, M86.9    Ulcer of right heel, with necrosis of bone ([address] L97.414       Subjective:    Says he is not sure how he pulled out his [facility] catheter, states \"I was sleep! I don't know what happened. \"    No other concerns     Review of systems:    Constitutional: denies fevers, chills, myalgias  Respiratory: denies SOB, cough  Cardiovascular: denies chest pain, palpitations  Gastrointestinal: denies nausea, vomiting, diarrhea      Vital signs/Intake and Output:  Visit Vitals  /61   Pulse 67   Temp 98.9 °F (37.2 °C)   Resp 16   Ht 6' 2\" (1.88 m)   Wt 101.9 kg (224 lb 11.2 oz)   SpO2 91%   BMI 28.85 kg/m²     Current Shift:  No intake/output data recorded. Last three shifts:  06/27 1901 - 06/29 0700  In: 180 [P.O.:100; I.V.:80]  Out: 10     Exam:    General: Well developed, alert, NAD, OX3  Head/Neck: NCAT, supple, No masses, No lymphadenopathy  CVS:Regular rate and rhythm, no M/R/G, S1/S2 heard, no thrill  Lungs:Clear to auscultation bilaterally, no wheezes, rhonchi, or rales  Abdomen: Soft, Nontender, No distention, Normal Bowel sounds, No hepatomegaly  Extremities: right foot in surgical boot  Skin:normal texture and turgor, no rashes, no lesions  Neuro:grossly normal , follows commands  Psych:appropriate                Labs: Results:       Chemistry Recent Labs     06/29/22  0355 06/28/22  0558 06/27/22  1320   * 286* 237*   * 134* 134*   K 6.0* 4.7 4.4   CL 97* 98* 96*   CO2 30 29 32   BUN 61* 51* 44*   CREA 10.20* 8.90* 7.50*   CA 7.5* 7.8* 8.5   AGAP 7 7 6   BUCR 6* 6* 6*      CBC w/Diff Recent Labs     06/27/22  1320   WBC 14.8*   RBC 3.14*   HGB 9.6*   HCT 30.0*      GRANS 75*   LYMPH 9*   EOS 2      Cardiac Enzymes No results for input(s): CPK, CKND1, PAULO in the last 72 hours. No lab exists for component: CKRMB, TROIP   Coagulation No results for input(s): PTP, INR, APTT, INREXT, INREXT in the last 72 hours.     Lipid Panel No results found for: CHOL, CHOLPOCT, CHOLX, CHLST, CHOLV, 399468, HDL, HDLP, LDL, LDLC, DLDLP, 920400, VLDLC, VLDL, TGLX, TRIGL, TRIGP, TGLPOCT, CHHD, CHHDX   BNP No results for input(s): BNPP in the last 72 hours. Liver Enzymes No results for input(s): TP, ALB, TBIL, AP in the last 72 hours.     No lab exists for component: SGOT, GPT, DBIL   Thyroid Studies No results found for: T4, T3U, TSH, TSHEXT, TSHEXT     Procedures/imaging: see electronic medical records for all procedures/Xrays and details which were not copied into this note but were reviewed prior to creation of Javier Grissom MD

## 2022-06-29 NOTE — PROGRESS NOTES
Problem: Patient Education: Go to Patient Education Activity  Goal: Patient/Family Education  Outcome: Progressing Towards Goal     Problem: Chronic Renal Failure  Goal: *Fluid and electrolytes stabilized  Outcome: Progressing Towards Goal

## 2022-06-29 NOTE — PROGRESS NOTES
Pt's TDC was accidentally removed while pt was sleeping. Vascular re-consulted today for replacement of his TDC. Of note potassium was 6.0 on am labs. Proceed with TDC replacement by Dr. Bill Arriaga today.     Thompson Vásquez NP, pager [phone]  [facility]

## 2022-06-29 NOTE — ROUTINE PROCESS
Dr. Pradip Reyes called and notified that patient has no HD access. He was noted to have only gauze over his right chest this morning. Patient will be NPO now.

## 2022-06-29 NOTE — PROGRESS NOTES
Patient arrived to floor from PACU s/p I & D R heel wound, via stretcher accompanied by staff. Patient drowsy but oriented, VSS, no c/o pain at this time. Per orders, suspension boot applied to R heel, dressing clean/dry/intact. SCD devices applied to bilateral LE for DVT prophylaxis. Patient's supper tray warmed, consumed 90% without difficulty. Patient repositioned for comfort, no s/sx distress/discomfort noted.

## 2022-06-29 NOTE — PROGRESS NOTES
TREATMENT SUMMARY      Patient in room 325 dialyzed for 3.5 hours. Tolerated tx well with without complaint or complications. Right TDC functioning without complication accessing or BFR        2800 ml UF removed with a net UF removal of 2000 ml. Report given to [facility] with all questions answered. Patient left in bed after eating dinner, watching TV. TREATMENT NOTES       Received report from Seble Deleon RN at 1500. TX was initiated while patient was in bed without difficulty. Aseptically, the CVC ports were accesed and flushed without problem. Treatment was started without reversing the lines. Will continue to monitor patients closely during treatment.                                                                                                ACUTE HEMODIALYSIS FLOW SHEET       PATIENT INFORMATION   [facility]       [id number]    []1st Time Acute  []Stat[x] Routine []Urgent []Chronic Unit   []Acute Room []Bedside  []ICU/CCU []ER   Isolation Precautions: []Dialysis[] Airborne []Contact []Droplet []Reverse    Special Considerations:_______  [] Blood Consent Verified  [x]N/A   Allergies:[x] NKA                 [] _____________ Code Status [x]Full Code [] DNR  [] Other_____   Diet: [] Renal [] NPO [x] Diabetic   [] Enteral Feeding [] Cardiac Diabetic: [x]Yes []No     [x]Signed Treatment Consent Verified   [x] Time Out/ Safety Check 4635   PRIMARY NURSE REPORT: FIRST INITIAL/ LAST NAME/TITLE  PRE DIALYSIS:  Seble Deleon RN                                        TIME:1500   ACCESS   CATHETER ACCESS: [] N/A  [x] RIGHT  [] LEFT  [x] IJ  [] SUBCL [] FEM                    [] First use X-ray  [] Tunnel     [] Non-Tunneled      [x] No S/S infection  [] Redness [] Drainage  [] Cultured [] Swelling [] Pain                    [] Medical Aseptic [] Prep Dressing Changed                  [] Clotted [x] Patent []      Flows: [x] Good [] Poor [] Reversed If Access Problem Dr. Han Given: [] Yes [] No    Date:_____  [x] N/A[]   GRAFT/FISTULA ACCESS:  [] N/A  [] RIGHT  [x] LEFT  [x] UE   [] LE       [x] AVG  [] AVF [] BUTTONHOLE    [] +BRUIT/THRILL [] MEDICAL ASEPTIC PREP     [] No S/S infection  [] Redness [] Drainage  [] Cultured [] Swelling [] Pain              If Access Problem Dr. Han Given: [] Yes [] No    Date:______ [] N/A. . non functional   GENERAL ASSESSMENT   LUNGS:  SaO2% ____ [] Clear [x] Coarse [] Crackles [] Wheezing               [x] Diminished Location: [x] RLL [x] LLL [] RUL [] JOBY    COUGH:  [] Productive  [] Loose[x] Dry [] N/A  RESPIRATIONS: [x] Easy [] Labored    THERAPY: [x] RA   [] NC _____. L/min    Mask: [] NRB [] Venti  _____O2%                  [] Ventilator [] Intubated [] Trach [] BiPap [] CPap [] HI Flow   CARDIAC: [x] Regular [] Irregular [] Pericardial Rub [] JVD               Monitored Rhythm:______ [] N/A   EDEMA: [] None [] Generalized [] Facial [] Pedal [] UE [] LE             [] Pitting [x] 1 [] 2 [] 3 [] 4    [] Right [] Left [x] Bilateral   SKIN:    [x] Warm [] Hot [] Cold  [] Dry [] Pale [] Diaphoretic              [] Flushed [] Jaundiced [] Cyanotic [] Rash [] Weeping     LOC:    [x] Alert  Oriented to: [] Person [] Place [] Time             [] Confused [] Lethargic [] Medicated [] Non-responsive    GI/ABDOMEN: [] Flat [] Distended [] Soft [] Firm [] Diarrhea [] Bowel Sounds Present [] Nausea [] Vomiting    PAIN: [x] 0 [] 1 [] 2 [] 3 [] 4 [] 5 [] 6 [] 7 [] 8 [] 9 [] 10          Scale 1-10 Action/Follow Up_____   MOBILITY: [] Amb [x] Amb/Assist [] Bed  [] Wheelchair    CURRENT LABS   HBsAg ONLY: Date Drawn: 06/28/22            [x] Negative [] Positive [] Unknown.      HBsAb: Date Drawn:  06/28/22            [] Susceptible <10 [x] Immune ?10 [] Unknown   Date of Current Labs:  ATTACHED     EDUCATION   Person Educated: [x] Patient [] Other_________   Knowledge base: [] None [] Minimal [] Substantial    Barriers to learning  [x] N Preferred method of learning: [] Written [] Oral [x] Visual [] Hands on    Topic: [] Access Care [] S&S of infection [x] Fluid Management   [x] K+  [x] Procedural  [] Albumin [] Medications [] Tx Options   [] Transplant [] Diet [] Other    Teaching Tools: [] Explain [] Demonstration [] Handout_____ [] Video______  CARE PLAN    [x] Renal Failure (Adult)  Interdisciplinary  · Fluid and electrolytes stabilized  ? Interventions  · Dehydration signs and symptoms (eg: Weight/lab monitoring; vomiting/diarrhea/urine; tenting; mucous membranes; dizziness/lethargy/irritability/confusion; weak pulse; tachycardia; blood pressure; I&O)  · Fluid overload signs and symptoms assessment (eg: Body weight increased; dyspnea; edema; hypertension; respiratory crackles/wheezing; JVD; lab monitoring; mental status changes; I&O)  · Monitor appropriate lab values  · COMPLIANCE WITH PRESCRIBED THERAPY  · ARTERIAL ACCESS SITE ASSESSMENT  · NUTRITION SCREENING  · Vital signs monitoring per assessed patient condition or unit standard  · Cardiac monitoring  · Hydration management  · Intake and output measurement  · Body weight monitoring  · Skin care  · DIALYSIS  · Nutrition Care Process per nutrition screen  · Oral hygiene care every 2 hours  · Pain management     · Outcome   ? [x] Progressing Towards Goal  ? [] Not Progressing Towards Goals  ? [] Goals Met/Resolved  ? [] Goals Not Met/ Resolved        · Patient/ Family Education  ?  Progressing Towards Goals          RO/HEMODIAYLSIS MACHINE SAFETY CHECKS- BEFORE EACH TREATMENT          [] [facility]: Machine Serial #1:  [id number]   RO Serial #1: [id number]                       [x] [facility]: Machine Serial #2:  [id number]   RO Serial #2: [id number]        [] [facility]: Machine Serial #3:  [id number]   RO Serial #[id number]    Alarm Test: [x] Pass  Time_1515_  [x] RO/Machine Log Complete    [x] Extracorporeal circuit Tested for integrity           Dialyzer_[id number]_   Tubing [id number]    Dialysate: pH_7.4__ Temp.____Conductivity: Meter 14.0_ HD Machine_13.9   CHLORINE TESTING- BEFORE EACH TREATMENT AND EVERY 4 HOURS   Total Chlorine: [x] Less than 0.1 ppm Time:_1515_2nd Check Time:_1800  (If greater than 0.1 ppm from Primary then every 30 minutes from Secondary)   TREATMENT INIATION-WITH DIALYSIS PRECAUTIONS   [x] All Connections Secured   [x] Saline Line Double Clamped    [x] Venous Parameters Set [x] Arterial Parameters Set    [x] Prime Given 250 ml     [x] Air Foam Detector Engaged   PRE-TREATMENT   UF Calculations: Wt to lose:_2000___ml(+) Oral:_300_ml(+)IV Meds/Fluids/Blood prods___ml(+) Prime/Rinse_500__ml(=)Total UF Goal_2800___mL   Scale Type:[x] Bed scale [] Sling Scale [] Wheel Chair Scale []  Not Ordered [] [] Unable to obtain pt on stretcher/ bed scale malfunctioning      [x] Time Out/Safety Check  Time:_1525   INTRADIALYTIC MONITORING  (SEE ATTACHED FLOWSHEET)     POST TREATMENT    Time Medication Dose Volume Route    Initials                                           [facility] Signatures Title Initials Time                     Dialyzer cleared: [x] Good [] Fair [] Poor     Blood Processed _76.8__Liters    Net UF Removed 2000____mL  Post Tx Access:                  AVF/AVG: Bleeding Stop       Art.___min Avtar.____min []+bruit/thrill                Catheter: Locking Solution [] Heparin 1 ml/1000 units  [x] Normal Saline                                                                               Art._1.8____ ml Avtar._1.8____ml  Post Assessment:              Skin: [x]Warm []Dry []Diaphoretic []Flushed [] Pale []Cyanotic            Lungs: [x]Clear []Coarse []Crackles []Wheezing             Cardiac: [x]Regular []Irregular  []Monitored rhythm____ []N/A            Edema: []None []General []Facial []Pedal  []UE []LE []RIGHT []LEFT            Pain: []0 [x]1 []2 []3 []4 []5 []6 []7 []8 []9 []10   POST Tx Note: Patient in room 325 dialyzed for 3.5 hours. Tolerated tx well with without complaint or complications.   Right TDC functioning without complication accessing or BFR        2800 ml UF removed with a net UF removal of 2000 ml. Report given to Carine Ferro with all questions answered. Patient left in bed after eating dinner, watching TV.        Primary Nurse Report: First initial/Last name/Title    Post Dialysis:Army Forrester Rn         Time:_1920   Abbreviations: AVG-arterial venous graft, AVF-arterial venous fistula, IJ-Internal Jugular,  Subcl-Subclavian, Fem-Femoral, Tx-treatment, AP/HR-apical heart rate, DFR-dialysate flow rate, BFR-blood flow rate, AP-arterial pressure, -venous pressure, UF-ultrafiltrate, TMP-transmembrane pressure, Avtar-Venous, Art-Arterial, RO-Reverse Osmosis

## 2022-06-29 NOTE — PROGRESS NOTES
[facility]  (A Division of [address] [place] [address])                                                                                                                     Jaylon Campbell MD  Office #: - Option # [phone]  Fax #: [phone]     Date of Admission: 6/27/2022Date of Note: 6/29/2022  Reason for Referral: Evaluation and antibiotic management of R heel infection    Current Antimicrobials:    Prior Antimicrobials:  Zosyn 6/27 to date   Bactrim 1 month ago       Assessment- ID related:  --------------------------------------------------------------------------  · DFU and OM R heel  OP finding: Large right heel necrotic ulcer with osteomyelitis of the calcaneus, and deep abscess  · Subacute/chronic OM of heel-- over 2 months time  · Leucocytosis 2/2 above  --BCX 6/27: NGSF  --WCX-- GNR and GPC in pairs--> pending  · ESRD on HD TTS  · Obese BMI of 28  · DM   · History of L hallux ampuation for infection- 5yrs ago Recommendation for ID issues I am following:  --------------------------------------------------------------------------------  MILTON Sandoval Fam- on OR findings    FU BCX/WCX: NGSF  FU OR culture-6/28, histology    Cont Mariya    Likely will need PICC line, will do close to his DC         Subjective:  Pt seen during HD  No complaint  No issue with HD  No F/N/V     HPI:  Mala Salamanca is a 60 y.o. BLACK/ with PMH as listed below-- he had ulcer for 2 months or so that he was followed by as OP by Podiatry. His heel wound progressed and was advised to come to ED yesterday. Had foot pain and fould drainage, but denies high F/C/R/SOB/ N/V prior to admission. BCX/WCX taken in ED, started on Zosyn and plan was to hold off abx and monitor until surgery. Admission assessment there was high concern for sepsis and Zosyn was continued. Hx of MRSA infection and treatment few years ago.        Active Hospital Problems    Diagnosis Date Noted    CAD (coronary artery disease) 06/28/2022    ESRD (end stage renal disease) ([address] 06/28/2022    Acute hematogenous osteomyelitis of right foot ([address] 06/28/2022    Cellulitis of right heel 06/28/2022    Diabetic foot ulcer with osteomyelitis ([address] 06/28/2022    Ulcer of right heel, with necrosis of bone ([address] 06/28/2022    Diabetes mellitus with foot ulcer ([address] 06/27/2022     Past Medical History:   Diagnosis Date    Diabetes mellitus     Hypertension      History reviewed. No pertinent surgical history. History reviewed. No pertinent family history. Social History     Socioeconomic History    Marital status:      Spouse name: Not on file    Number of children: Not on file    Years of education: Not on file    Highest education level: Not on file   Occupational History    Not on file   Tobacco Use    Smoking status: Not on file    Smokeless tobacco: Not on file   Substance and Sexual Activity    Alcohol use: Not on file    Drug use: Not on file    Sexual activity: Not on file   Other Topics Concern    Dental Braces Not Asked    Endoscopic Camera Pill Not Asked    Metallic Foreign Body Not Asked    Medication Patches Not Asked    Taking Feraheme Not Asked    Claustrophobic Not Asked   Social History Narrative    Not on file     Social Determinants of Health     Financial Resource Strain:     Difficulty of Paying Living Expenses: Not on file   Food Insecurity:     Worried About Running Out of Food in the Last Year: Not on file    Vane of Food in the Last Year: Not on file   Transportation Needs:     Lack of Transportation (Medical): Not on file    Lack of Transportation (Non-Medical):  Not on file   Physical Activity:     Days of Exercise per Week: Not on file    Minutes of Exercise per Session: Not on file   Stress:     Feeling of Stress : Not on file   Social Connections:     Frequency of Communication with Friends and Family: Not on file    Frequency of Social Gatherings with Friends and Family: Not on file    Attends Gnosticism Services: Not on file    Active Member of Clubs or Organizations: Not on file    Attends Club or Organization Meetings: Not on file    Marital Status: Not on file   Intimate Partner Violence:     Fear of Current or Ex-Partner: Not on file    Emotionally Abused: Not on file    Physically Abused: Not on file    Sexually Abused: Not on file   Housing Stability:     Unable to Pay for Housing in the Last Year: Not on file    Number of [place] in the Last Year: Not on file    Unstable Housing in the Last Year: Not on file       Allergies:  Patient has no known allergies. Medications:  Current Facility-Administered Medications   Medication Dose Route Frequency    piperacillin-tazobactam (ZOSYN) 2.25 g in 0.9% sodium chloride (MBP/ADV) 50 mL MBP  2.25 g IntraVENous Q8H    insulin lispro (HUMALOG) injection   SubCUTAneous AC&HS    glucose chewable tablet 16 g  4 Tablet Oral PRN    glucagon (GLUCAGEN) injection 1 mg  1 mg IntraMUSCular PRN    dextrose 10% infusion 0-250 mL  0-250 mL IntraVENous PRN    acetaminophen (TYLENOL) tablet 650 mg  650 mg Oral Q6H PRN        ROS:  Pertinent items are noted in the History of Present Illness. Physical Exam:    Temp (24hrs), Av.3 °F (36.8 °C), Min:97.4 °F (36.3 °C), Max:99 °F (37.2 °C)    Visit Vitals  BP (!) 169/89   Pulse 72   Temp 98.1 °F (36.7 °C)   Resp 18   Ht 6' 2\" (1.88 m)   Wt 101.9 kg (224 lb 11.2 oz)   SpO2 99%   BMI 28.85 kg/m²      GEN: WD Obese, on RA--not in resp distress. Right chest TDC    HEENT: Unicteric. EOMI intact  No neck swelling  CHEST: Non laboured breathing. CTA  CVS:RRR, no mur/gallop  ABD: Obese/soft. Non tender. PASCUAL: Deferred  EXT: not examined today- dressed  Skin: Dry and intact. No rash, no redness. CNS: A, OX3. Moves all extremity. CN grossly ok.       Microbiology  All Micro Results     Procedure Component Value Units Date/Time    CULTURE, WOUND Davonte Trujillo STAIN [[id number]] Collected: 06/27/22 1530    Order Status: Completed Specimen: Wound Drainage Updated: 06/29/22 1511     Special Requests: NO SPECIAL REQUESTS        GRAM STAIN RARE WBCS SEEN         2+ GRAM NEGATIVE RODS               1+ GRAM POSITIVE COCCI IN PAIRS           Culture result:       MODERATE  MIXED ENTERIC GRAM NEGATIVE RODS              HEAVY MIXED SKIN TO ISOLATED          CULTURE, TISSUE W Ann Davey [[id number]] Collected: 06/28/22 1926    Order Status: Completed Specimen: Bone Updated: 06/29/22 1025     Special Requests: NO SPECIAL REQUESTS        GRAM STAIN 2+ WBCS SEEN         NO ORGANISMS SEEN        Culture result: PENDING    CULTURE, Audie Merl STAIN [[id number]] Collected: 06/28/22 1925    Order Status: Completed Specimen: Heel Updated: 06/29/22 0939     Special Requests: NO SPECIAL REQUESTS        GRAM STAIN OCCASIONAL WBCS SEEN         NO ORGANISMS SEEN        Culture result: PENDING    CULTURE, FUNGUS [[id number]] Collected: 06/28/22 1925    Order Status: Completed Updated: 06/29/22 0758    CULTURE, ANAEROBIC [[id number]] Collected: 06/28/22 1925    Order Status: Completed Updated: 06/29/22 0758    CULTURE, BLOOD [[id number]] Collected: 06/27/22 1240    Order Status: Completed Specimen: Blood Updated: 06/29/22 0706     Special Requests: NO SPECIAL REQUESTS        Culture result: NO GROWTH 2 DAYS       CULTURE, BLOOD [[id number]] Collected: 06/27/22 1245    Order Status: Completed Specimen: Blood Updated: 06/29/22 0706     Special Requests: NO SPECIAL REQUESTS        Culture result: NO GROWTH 2 DAYS       AFB CULTURE + SMEAR W/RFLX ID FROM CULTURE [[id number]] Collected: 06/28/22 1925    Order Status: Completed Updated: 06/28/22 2148    CULTURE, ANAEROBIC [[id number]] Collected: 06/28/22 1926    Order Status: Canceled     AFB CULTURE + SMEAR W/RFLX ID FROM CULTURE [[id number]]     Order Status: Sent            Lab results:    Chemistry  Recent Labs     06/29/22  0355 06/28/22  3712 06/27/22  1320   * 286* 237*   * 134* 134*   K 6.0* 4.7 4.4   CL 97* 98* 96*   CO2 30 29 32   BUN 61* 51* 44*   CREA 10.20* 8.90* 7.50*   CA 7.5* 7.8* 8.5   AGAP 7 7 6   BUCR 6* 6* 6*       CBC w/ Diff  Recent Labs     06/27/22  1320   WBC 14.8*   RBC 3.14*   HGB 9.6*   HCT 30.0*      GRANS 75*   LYMPH 9*   EOS 2       Imaging: report reviewed and as posted by radiologist   No results found for this or any previous visit. MRI:       1. Posterior heel skin defect, at the margin of the Achilles tendon attachment  on the calcaneus, in keeping with known ulcer. Infiltration of subjacent  subcutaneous fat in keeping with cellulitis.     2. Cortical discontinuity and marrow signal abnormality in the subjacent dorsal  calcaneus extending to the threaded tip of the oblique tibiocalcaneal screw is  suspicious for osteomyelitis.     3. Fluid signal intensity surrounding the threaded tibial tip of the 1st digit  long partially threaded screw, with osseous fragments at the inferior margin,  concerning for loosening and/or infection.     4. Marrow signal abnormalities at the 2nd-3rd and to a lesser degree 4th-5th  tarsometatarsal joint, potentially simply related to degenerative/Charcot  arthropathy. In view of marrow signal abnormalities, infection cannot be  excluded if clinically suspected. If clinically warranted, consider correlation with nuclear medicine imaging to  further assess.     5. Edema/myositis in the musculature above the ankle. There is discontinuity of  FHL and peroneal tendons, best correlated with operative findings/history of  prior intervention for significance/chronicity. Fatty change in the musculature  about the foot.

## 2022-06-30 LAB
ANION GAP SERPL CALC-SCNC: 7 MMOL/L (ref 3–18)
BASOPHILS # BLD: 0.1 K/UL (ref 0–0.1)
BASOPHILS NFR BLD: 1 % (ref 0–2)
BUN SERPL-MCNC: 36 MG/DL (ref 7–18)
BUN/CREAT SERPL: 5 (ref 12–20)
CALCIUM SERPL-MCNC: 7.8 MG/DL (ref 8.5–10.1)
CHLORIDE SERPL-SCNC: 100 MMOL/L (ref 100–111)
CO2 SERPL-SCNC: 30 MMOL/L (ref 21–32)
CREAT SERPL-MCNC: 6.96 MG/DL (ref 0.6–1.3)
DIFFERENTIAL METHOD BLD: ABNORMAL
EOSINOPHIL # BLD: 0.3 K/UL (ref 0–0.4)
EOSINOPHIL NFR BLD: 3 % (ref 0–5)
ERYTHROCYTE [DISTWIDTH] IN BLOOD BY AUTOMATED COUNT: 16.5 % (ref 11.6–14.5)
GLUCOSE BLD STRIP.AUTO-MCNC: 136 MG/DL (ref 70–110)
GLUCOSE BLD STRIP.AUTO-MCNC: 250 MG/DL (ref 70–110)
GLUCOSE BLD STRIP.AUTO-MCNC: 253 MG/DL (ref 70–110)
GLUCOSE BLD STRIP.AUTO-MCNC: 260 MG/DL (ref 70–110)
GLUCOSE SERPL-MCNC: 269 MG/DL (ref 74–99)
HCT VFR BLD AUTO: 31.7 % (ref 36–48)
HGB BLD-MCNC: 10 G/DL (ref 13–16)
IMM GRANULOCYTES # BLD AUTO: 0.1 K/UL (ref 0–0.04)
IMM GRANULOCYTES NFR BLD AUTO: 1 % (ref 0–0.5)
LYMPHOCYTES # BLD: 1.3 K/UL (ref 0.9–3.6)
LYMPHOCYTES NFR BLD: 13 % (ref 21–52)
MCH RBC QN AUTO: 30.6 PG (ref 24–34)
MCHC RBC AUTO-ENTMCNC: 31.5 G/DL (ref 31–37)
MCV RBC AUTO: 96.9 FL (ref 78–100)
MONOCYTES # BLD: 1.1 K/UL (ref 0.05–1.2)
MONOCYTES NFR BLD: 11 % (ref 3–10)
NEUTS SEG # BLD: 7.2 K/UL (ref 1.8–8)
NEUTS SEG NFR BLD: 72 % (ref 40–73)
NRBC # BLD: 0 K/UL (ref 0–0.01)
NRBC BLD-RTO: 0 PER 100 WBC
PLATELET # BLD AUTO: 240 K/UL (ref 135–420)
PMV BLD AUTO: 10.6 FL (ref 9.2–11.8)
POTASSIUM SERPL-SCNC: 5 MMOL/L (ref 3.5–5.5)
RBC # BLD AUTO: 3.27 M/UL (ref 4.35–5.65)
SODIUM SERPL-SCNC: 137 MMOL/L (ref 136–145)
WBC # BLD AUTO: 10 K/UL (ref 4.6–13.2)

## 2022-06-30 PROCEDURE — 74011636637 HC RX REV CODE- 636/637: Performed by: INTERNAL MEDICINE

## 2022-06-30 PROCEDURE — 77030019934 HC DRSG VAC ASST KCON -B

## 2022-06-30 PROCEDURE — 77030027138 HC INCENT SPIROMETER -A

## 2022-06-30 PROCEDURE — 74011000258 HC RX REV CODE- 258: Performed by: PHYSICIAN ASSISTANT

## 2022-06-30 PROCEDURE — 80048 BASIC METABOLIC PNL TOTAL CA: CPT

## 2022-06-30 PROCEDURE — 2709999900 HC NON-CHARGEABLE SUPPLY

## 2022-06-30 PROCEDURE — 90935 HEMODIALYSIS ONE EVALUATION: CPT

## 2022-06-30 PROCEDURE — 85025 COMPLETE CBC W/AUTO DIFF WBC: CPT

## 2022-06-30 PROCEDURE — 74011250636 HC RX REV CODE- 250/636: Performed by: INTERNAL MEDICINE

## 2022-06-30 PROCEDURE — 36415 COLL VENOUS BLD VENIPUNCTURE: CPT

## 2022-06-30 PROCEDURE — 74011250636 HC RX REV CODE- 250/636: Performed by: PHYSICIAN ASSISTANT

## 2022-06-30 PROCEDURE — 82962 GLUCOSE BLOOD TEST: CPT

## 2022-06-30 PROCEDURE — 97605 NEG PRS WND THER DME<=50SQCM: CPT

## 2022-06-30 PROCEDURE — 65270000029 HC RM PRIVATE

## 2022-06-30 RX ADMIN — PIPERACILLIN AND TAZOBACTAM 2.25 G: 2; .25 INJECTION, POWDER, LYOPHILIZED, FOR SOLUTION INTRAVENOUS at 22:09

## 2022-06-30 RX ADMIN — EPOETIN ALFA-EPBX 4000 UNITS: 4000 INJECTION, SOLUTION INTRAVENOUS; SUBCUTANEOUS at 22:21

## 2022-06-30 RX ADMIN — Medication 6 UNITS: at 12:34

## 2022-06-30 RX ADMIN — Medication 6 UNITS: at 22:22

## 2022-06-30 RX ADMIN — Medication 6 UNITS: at 08:10

## 2022-06-30 RX ADMIN — PIPERACILLIN AND TAZOBACTAM 2.25 G: 2; .25 INJECTION, POWDER, LYOPHILIZED, FOR SOLUTION INTRAVENOUS at 06:14

## 2022-06-30 NOTE — PROGRESS NOTES
TREATMENT SUMMARY      Patient in room 325 dialyzed for 3.5 hours. Tolerated tx well with without complaint or complications. Right TDC functioning without complication accessing or BFR        2500 ml UF removed with a net UF removal of 2000 ml. Report given to [facility] with all questions answered. Patient left in bed talking, watching TV, and without complaints. Red Curos caps placed to the end of both lumens.                     TREATMENT NOTES       [facility] Tx initiated without difficulty. Aseptically, the CVC ports were accessed and flushed without problem. Treatment was started with no issues. Will continue to monitor patient. Gauze dressing intact and dry. Changed to sterile occlusive dressing per policy.                                                                                                                         ACUTE HEMODIALYSIS FLOW SHEET              PATIENT INFORMATION      [address] Efraín Killian MD       []? 1st Time Acute  []? Stat[x]? Routine []? Urgent []? Chronic Unit   []? Acute Room []? Bedside  []? ICU/CCU []?ER      Isolation Precautions: []? Dialysis[]? Airborne [x]? Contact []? Droplet []? Reverse       Special Considerations:_______  []? Blood Consent Verified  [x]? N/A      Allergies:[x]? NKA                 []? _____________ Code Status [x]? Full Code []? DNR  []? Other_____       Diet: [x]? Renal []? NPO [x]? Diabetic   []? Enteral Feeding []? Cardiac Diabetic: [x]? Yes []? No          [x]? Signed Treatment Consent Verified   [x]? Time Out/ Safety Check 1330      PRIMARY NURSE REPORT: FIRST INITIAL/ LAST NAME/TITLE  PRE DIALYSIS: St kadie RN                                        [id number]      ACCESS      CATHETER ACCESS: []? N/A  [x]? RIGHT  []? LEFT  [x]? IJ  []? SUBCL []? FEM                       []? First use X-ray  []? Tunnel     []? Non-Tunneled         [x]? No S/S infection  []? Redness []? Drainage  []? Cultured []? Swelling []? Pain                       []? Medical Aseptic []? Prep Dressing Changed                     []? Clotted [x]? Patent []? Flows: [x]? Good []? Poor []? Reversed                    If Access Problem Dr. Luz Banegas: []? Yes []? No    Date:_____  [x]? N/A[]? GRAFT/FISTULA ACCESS:  []? N/A  []? RIGHT  []? LEFT  []? UE   []? LE          []? AVG  []? AVF []? BUTTONHOLE    []? +BRUIT/THRILL []? MEDICAL ASEPTIC PREP        []? No S/S infection  []? Redness []? Drainage  []? Cultured []? Swelling []? Pain                 If Access Problem Dr. Luz Banegas: []? Yes []? No    Date:______ []? N/A. . non functional      GENERAL ASSESSMENT      LUNGS:  SaO2% __98__ []? Clear [x]? Coarse []? Crackles []? Wheezing               [x]? Diminished Location: [x]? RLL [x]? LLL []? RUL []? JOBY       COUGH:  []? Productive  []? Loose[]? Dry [x]? N/A  RESPIRATIONS: [x]? Easy []? Labored       THERAPY: [x]? RA   []? NC _____. L/min    Mask: []? NRB []? Venti  ___98__O2%                  []? Ventilator []? Intubated []? Trach []? BiPap []? CPap []? HI Flow      CARDIAC: [x]? Regular []? Irregular []? Pericardial Rub []? JVD               Monitored Rhythm:______ []? N/A      EDEMA: []? None []? Generalized []? Facial []? Pedal []? UE []? LE             []? Pitting [x]? 1 []? 2 []? 3 []? 4    []? Right []? Left [x]? Bilateral      SKIN:    [x]? Warm []? Hot []? Cold  [x]? Dry []? Pale []? Diaphoretic              []? Flushed []? Jaundiced []? Cyanotic []? Rash []? Weeping        LOC:    [x]? Alert  Oriented to: [x]? Person [x]? Place [x]? Time             []? Confused []? Lethargic []? Medicated []? Non-responsive       GI/ABDOMEN: []? Flat [x]? Distended [x]? Soft []? Firm []? Diarrhea [x]? Bowel Sounds Present []? Nausea []? Vomiting       PAIN: [x]? 0 []? 1 []? 2 []? 3 []? 4 []? 5 []? 6 []? 7 []? 8 []? 9 []? 10          Scale 1-10 Action/Follow Up_____      MOBILITY: []? Amb []? Amb/Assist [x]? Bed  []?  Wheelchair CURRENT LABS      HBsAg ONLY: Date Drawn: 06/28/22            [x]? Negative []? Positive []? Unknown. HBsAb: Date Drawn:  06/28/22            []? Susceptible <10 [x]? Immune ?10 []? Unknown      Date of Current Labs:6/30/22      EDUCATION   Person Educated: [x]? Patient []? Other_________   Knowledge base: []? None []? Minimal [x]? Substantial    Barriers to learning  [x]? N   Preferred method of learning: []? Written []? Oral [x]? Visual []? Hands on    Topic: []? Access Care []? S&S of infection [x]? Fluid Management   []? K+  [x]? Procedural  []? Albumin []? Medications []? Tx Options   []? Transplant []? Diet []? Other    Teaching Tools: []? Explain []? Demonstration []? Handout_____ []? Video______  CARE PLAN    [x]?  Renal Failure (Adult)  Interdisciplinary  · Fluid and electrolytes stabilized  ? Interventions  · Dehydration signs and symptoms (eg: Weight/lab monitoring; vomiting/diarrhea/urine; tenting; mucous membranes; dizziness/lethargy/irritability/confusion; weak pulse; tachycardia; blood pressure; I&O)  · Fluid overload signs and symptoms assessment (eg: Body weight increased; dyspnea; edema; hypertension; respiratory crackles/wheezing; JVD; lab monitoring; mental status changes; I&O)  · Monitor appropriate lab values  · COMPLIANCE WITH PRESCRIBED THERAPY  · ARTERIAL ACCESS SITE ASSESSMENT  · NUTRITION SCREENING  · Vital signs monitoring per assessed patient condition or unit standard  · Cardiac monitoring  · Hydration management  · Intake and output measurement  · Body weight monitoring  · Skin care  · DIALYSIS  · Nutrition Care Process per nutrition screen  · Oral hygiene care every 2 hours  · Pain management     · Outcome   ? [x]? Progressing Towards Goal  ? []? Not Progressing Towards Goals  ? []? Goals Met/Resolved  ? []? Goals Not Met/ Resolved        · Patient/ Family Education  ?  Progressing Towards Goals          RO/HEMODIAYLSIS MACHINE SAFETY CHECKS- BEFORE EACH TREATMENT  [x]? [facility]: Machine Serial #1: [id number] Serial #1: [id number]                       []? [facility]: Machine Serial #2: [id number] Serial #2: [id number]        []? [facility]: Machine Serial #3:  [id number]    Serial [id number]     Alarm Test: [x]? Pass  Time_1330_  [x]? RO/Machine Log Complete    [x]? Extracorporeal circuit Tested for integrity           Dialyzer_[id number]_   Tubing 30M64-32    Dialysate: pH_7.4__  Temp.____Conductivity: Meter 14.0_ HD Machine_13.9   CHLORINE TESTING- BEFORE EACH TREATMENT AND EVERY 4 HOURS   Total Chlorine: [x]? Less than 0.1 ppm Time:_1330_2nd Check Time:  (If greater than 0.1 ppm from Primary then every 30 minutes from Secondary)   TREATMENT INIATION-WITH DIALYSIS PRECAUTIONS   [x]? All Connections Secured   [x]? Saline Line Double Clamped    [x]? Venous Parameters Set [x]? Arterial Parameters Set    [x]? Prime Given 250 ml     [x]? Air Foam Detector Engaged   PRE-TREATMENT   UF Calculations: Wt to lose:_2000___ml(+) Oral:__ml(+)IV Meds/Fluids/Blood prods___ml(+) Prime/Rinse_500__ml(=)Total UF Goal_2500___mL   Scale Type:[x]? Bed scale []? Sling Scale []? Wheel Chair Scale []? Not Ordered []? []? Unable to obtain pt on stretcher/ bed scale malfunctioning       [x]? Time Out/Safety Check  Time:_1330   INTRADIALYTIC MONITORING  (SEE ATTACHED FLOWSHEET)      POST TREATMENT    Time Medication Dose Volume Route    Initials                                                                         [facility] Signatures Title Initials Time                                     Dialyzer cleared: [x]? Good []? Fair []? Poor     Blood Processed _66.7_Liters    Net UF Removed 2000____mL  Post Tx Access:                  AVF/AVG: Bleeding Stop       Art.___min Avtar.____min []? +bruit/thrill                Catheter: Locking Solution [x]? Heparin 1 ml/1000 units  []?  Normal Saline                                                                               Art._1.8____ ml Avtar._1.8____ml  Post Assessment:              Skin: [x]? Warm []? Dry []? Diaphoretic []? Flushed []? Pale []? Cyanotic            Lungs: [x]? Clear []? Coarse []? Crackles []? Wheezing             Cardiac: [x]? Regular []? Irregular  []? Monitored rhythm____ []?N/A            Edema: []? None []? General []? Facial []? Pedal  []?UE []?LE []? RIGHT []? LEFT            Pain: []?0 [x]? 1 []?2 []?3 []?4 []?5 []?6 []?7 []?8 []?9 []?10   POST Tx Note: Patient in room 325 dialyzed for 3.5 hours. Tolerated tx well with without complaint or complications. Right TDC functioning without complication accessing or BFR        2500 ml UF removed with a net UF removal of 2000 ml. Report given to [facility] with all questions answered.  Patient left in stable condition.         Primary Nurse Report: First initial/Last name/Title    Post Dialysis:Duke Brown         Time:_1720   Abbreviations: AVG-arterial venous graft, AVF-arterial venous fistula, IJ-Internal Jugular,  Subcl-Subclavian, Fem-Femoral, Tx-treatment, AP/HR-apical heart rate, DFR-dialysate flow rate, BFR-blood flow rate, AP-arterial pressure, -venous pressure, UF-ultrafiltrate, TMP-transmembrane pressure, Avtar-Venous, Art-Arterial, RO-Reverse Osmosis

## 2022-06-30 NOTE — PROGRESS NOTES
Hospitalist Progress Note    Patient: Ana M Crespo MRN: [id number]  [facility]: [id number]    YOB: 1960  Age: 60 y.o. Sex: male    DOA: 6/27/2022 LOS:  LOS: 3 days          Chief Complaint:    worsening pain and swelling and chills     Assessment/Plan   Ana M Crespo is a 60 y.o. male who history, stent, hypertension, Charcot's foot presents with worsening pain and swelling and chills in his right foot despite multiple cleanings and being managed by podiatry as outpatient. Large necrotic ulcer right posterior heel with osteomyelitis of the calcaneus and deep abscess-  S/p incision bone cortex right valcaneous, I&D right heel abcsess, deep wound debridement with multiple bone biopsies and application of antibiotic beads of vancomycin and rocephin  cultures pending   Dr. Brian Conrad following, 2nd surgery will be required in about a week, detailed discussion with Dr. Brian Conrad, pt's best chance of saving his foot is too leave the abx beads in place for ~1 week and complete second surgery next Tuesday pm. He will likely need facility placement afterwards. Based on his overall condition and situation, he needs to remain hospitalized until 2nd surgery. Reinterated to pt as well.    Dr. Jane Peres following     Diabetes mellitus -  ADA, SSI, FSBG qac and qhs     End-stage renal disease on dialysis Tuesday Thursday and Saturday -  S/p [facility] replacement   HD per nephrology     History of coronary artery disease-  Supportive care    Chronic compressive myelopathy pending surgery -  Supportive care    DVT and GI prophylaxis        Disposition : TBD  Patient Active Problem List   Diagnosis Code    Diabetes mellitus with foot ulcer ([facility] [address] E11.621, L97.509    CAD (coronary artery disease) I25.10    ESRD (end stage renal disease) ([facility] [address] N18.6    Acute hematogenous osteomyelitis of right foot ([facility] [address] M86.071    Cellulitis of right heel L03.115    Diabetic foot ulcer with osteomyelitis ([facility] [address] E11.621, E11.69, L97.509, M86.9    Ulcer of right heel, with necrosis of bone (H[facility]) L97.414       Subjective: In good spirits  \"hey doc, ill do whatever I need to do to try and keep my foot\"    No other concerns     Review of systems:    Constitutional: denies fevers, chills, myalgias  Respiratory: denies SOB, cough  Cardiovascular: denies chest pain, palpitations  Gastrointestinal: denies nausea, vomiting, diarrhea      Vital signs/Intake and Output:  Visit Vitals  /61 (BP 1 Location: Right upper arm, BP Patient Position: At rest)   Pulse 68   Temp 98.9 °F (37.2 °C)   Resp 18   Ht 6' 2\" (1.88 m)   Wt 101.9 kg (224 lb 11.2 oz)   SpO2 97%   BMI 28.85 kg/m²     Current Shift:  No intake/output data recorded. Last three shifts:  06/28 1901 - 06/30 0700  In: 660 [P.O.:480; I.V.:180]  Out: 2010     Exam:    General: Well developed, alert, NAD, OX3  Head/Neck: NCAT, supple, No masses, No lymphadenopathy  CVS:Regular rate and rhythm, no M/R/G, S1/S2 heard, no thrill  Lungs:Clear to auscultation bilaterally, no wheezes, rhonchi, or rales  Abdomen: Soft, Nontender, No distention, Normal Bowel sounds, No hepatomegaly  Extremities: right foot in surgical boot  Skin:normal texture and turgor, no rashes, no lesions  Neuro:grossly normal , follows commands  Psych:appropriate                Labs: Results:       Chemistry Recent Labs     06/30/22  0030 06/29/22  0355 06/28/22  0558   * 262* 286*    134* 134*   K 5.0 6.0* 4.7    97* 98*   CO2 30 30 29   BUN 36* 61* 51*   CREA 6.96* 10.20* 8.90*   CA 7.8* 7.5* 7.8*   AGAP 7 7 7   BUCR 5* 6* 6*      CBC w/Diff Recent Labs     06/30/22  0030 06/27/22  1320   WBC 10.0 14.8*   RBC 3.27* 3.14*   HGB 10.0* 9.6*   HCT 31.7* 30.0*    259   GRANS 72 75*   LYMPH 13* 9*   EOS 3 2      Cardiac Enzymes No results for input(s): CPK, CKND1, PAULO in the last 72 hours.     No lab exists for component: CKRMB, TROIP   Coagulation No results for input(s): PTP, INR, APTT, INREXT, INREXT in the last 72 hours. Lipid Panel No results found for: CHOL, CHOLPOCT, CHOLX, CHLST, CHOLV, 467456, HDL, HDLP, LDL, LDLC, DLDLP, 653052, VLDLC, VLDL, TGLX, TRIGL, TRIGP, TGLPOCT, CHHD, CHHDX   BNP No results for input(s): BNPP in the last 72 hours. Liver Enzymes No results for input(s): TP, ALB, TBIL, AP in the last 72 hours.     No lab exists for component: SGOT, GPT, DBIL   Thyroid Studies No results found for: T4, T3U, TSH, TSHEXT, TSHEXT     Procedures/imaging: see electronic medical records for all procedures/Xrays and details which were not copied into this note but were reviewed prior to creation of Ngozi Clay MD

## 2022-06-30 NOTE — PROGRESS NOTES
[facility]  (A Division of [address] [place] [address])                                                                                                                     Radha Newsome MD  Office #: - Option # [phone]  Fax #: [phone]     Date of Admission: 6/27/2022Date of Note: 6/30/2022  Reason for Referral: Evaluation and antibiotic management of R heel infection    Tomorrow- Friday July 1, Dr Georgina Puente will be covering. I am on call over weekend. Please call via  or Perfect Serve. Thanks. Current Antimicrobials:    Prior Antimicrobials:  Zosyn 6/27 to date   Bactrim 1 month ago       Assessment- ID related:  --------------------------------------------------------------------------  · DFU and OM R heel  OP finding: Large right heel necrotic ulcer with osteomyelitis of the calcaneus, and deep abscess  · Subacute/chronic OM of heel-- over 2 months time  · Leucocytosis 2/2 above  --BCX 6/27: NGSF  --WCX-- GNR and GPC in pairs--> pending  · ESRD on HD TTS  · Obese BMI of 28  · DM   · History of L hallux ampuation for infection- 5yrs ago Recommendation for ID issues I am following:  --------------------------------------------------------------------------------  MILTON renal MD, wound care RN    FU OR culture until final- E.fecalis and Proteus so far  Once final SS - available, if Cefazolin and Vancomycin is an option, can be done post hemodialysis TTS-- Dr Noris Gonzalez agreeable to plan. Alternate would be TDC and 6 weeks abx     Cont Zosyn-> will await prior to making final plan    Wound care per Podiatry    Dr Georgina Puente will finalize plan if adequate data available        Subjective:  Pt seen during HD  No complaint  No issue with HD  No F/N/V  Wound care RN changing heel wound and placing vac         HPI:  Ramya Gaspar is a 60 y.o. BLACK/ with PMH as listed below-- he had ulcer for 2 months or so that he was followed by as OP by Podiatry.  His heel wound progressed and was advised to come to ED yesterday. Had foot pain and fould drainage, but denies high F/C/R/SOB/ N/V prior to admission. BCX/WCX taken in ED, started on Zosyn and plan was to hold off abx and monitor until surgery. Admission assessment there was high concern for sepsis and Zosyn was continued. Hx of MRSA infection and treatment few years ago. Active Hospital Problems    Diagnosis Date Noted    CAD (coronary artery disease) 06/28/2022    ESRD (end stage renal disease) ([facility] 06/28/2022    Acute hematogenous osteomyelitis of right foot ([facility] [address] 06/28/2022    Cellulitis of right heel 06/28/2022    Diabetic foot ulcer with osteomyelitis ([facility] [address] 06/28/2022    Ulcer of right heel, with necrosis of bone ([facility] [address] 06/28/2022    Diabetes mellitus with foot ulcer ([facility] [address] 06/27/2022     Past Medical History:   Diagnosis Date    Diabetes mellitus     Hypertension      History reviewed. No pertinent surgical history. History reviewed. No pertinent family history.   Social History     Socioeconomic History    Marital status:      Spouse name: Not on file    Number of children: Not on file    Years of education: Not on file    Highest education level: Not on file   Occupational History    Not on file   Tobacco Use    Smoking status: Not on file    Smokeless tobacco: Not on file   Substance and Sexual Activity    Alcohol use: Not on file    Drug use: Not on file    Sexual activity: Not on file   Other Topics Concern    Dental Braces Not Asked    Endoscopic Camera Pill Not Asked    Metallic Foreign Body Not Asked    Medication Patches Not Asked    Taking Feraheme Not Asked    Claustrophobic Not Asked   Social History Narrative    Not on file     Social Determinants of Health     Financial Resource Strain:     Difficulty of Paying Living Expenses: Not on file   Food Insecurity:     Worried About Running Out of Food in the Last Year: Not on file    [address] in the Last Year: Not on file   Transportation Needs:     Lack of Transportation (Medical): Not on file    Lack of Transportation (Non-Medical): Not on file   Physical Activity:     Days of Exercise per Week: Not on file    Minutes of Exercise per Session: Not on file   Stress:     Feeling of Stress : Not on file   Social Connections:     Frequency of Communication with Friends and Family: Not on file    Frequency of Social Gatherings with Friends and Family: Not on file    Attends [employer] Services: Not on file    Active Member of [address] [place] [address] [employer] or Organizations: Not on file    Attends Club or Organization Meetings: Not on file    Marital Status: Not on file   Intimate Partner Violence:     Fear of Current or Ex-Partner: Not on file    Emotionally Abused: Not on file    Physically Abused: Not on file    Sexually Abused: Not on file   Housing Stability:     Unable to Pay for Housing in the Last Year: Not on file    Number of [place] in the Last Year: Not on file    Unstable Housing in the Last Year: Not on file       Allergies:  Patient has no known allergies. Medications:  Current Facility-Administered Medications   Medication Dose Route Frequency    epoetin lisette-epbx (RETACRIT) injection 4,000 Units  4,000 Units SubCUTAneous Q TUE, THU & SAT    piperacillin-tazobactam (ZOSYN) 2.25 g in 0.9% sodium chloride (MBP/ADV) 50 mL MBP  2.25 g IntraVENous Q8H    insulin lispro (HUMALOG) injection   SubCUTAneous AC&HS    glucose chewable tablet 16 g  4 Tablet Oral PRN    glucagon (GLUCAGEN) injection 1 mg  1 mg IntraMUSCular PRN    dextrose 10% infusion 0-250 mL  0-250 mL IntraVENous PRN    acetaminophen (TYLENOL) tablet 650 mg  650 mg Oral Q6H PRN        ROS:  Pertinent items are noted in the History of Present Illness.            Physical Exam:    Temp (24hrs), Av.7 °F (37.1 °C), Min:97.2 °F (36.2 °C), Max:99.5 °F (37.5 °C)    Visit Vitals  BP (!) 142/67   Pulse 71   Temp 97.2 °F (36.2 °C)   Resp 18   Ht 6' 2\" (1.88 m)   Wt 101.9 kg (224 lb 11.2 oz)   SpO2 98%   BMI 28.85 kg/m²      GEN: WD Obese, on RA--not in resp distress. Right chest TDC    HEENT: Unicteric. EOMI intact  No neck swelling  CHEST: Non laboured breathing. ABD: Obese/soft. Non tender. PASCUAL: Deferred  EXT: not examined today- dressed  Skin: Dry and intact. No rash, no redness. CNS: A, OX3. Moves all extremity. CN grossly ok.       Microbiology  All Micro Results     Procedure Component Value Units Date/Time    CULTURE, TISSUE Kim Cordial STAIN [[id number]]  (Abnormal) Collected: 06/28/22 1926    Order Status: Completed Specimen: Bone Updated: 06/30/22 1133     Special Requests: NO SPECIAL REQUESTS        GRAM STAIN 2+ WBCS SEEN         NO ORGANISMS SEEN        Culture result: FEW ENTEROCOCCUS FAECALIS               POSSIBLE SCANT PROTEUS SPECIES          CULTURE, Ananya Hacking STAIN [[id number]]  (Abnormal) Collected: 06/28/22 1925    Order Status: Completed Specimen: Heel Updated: 06/30/22 0845     Special Requests: NO SPECIAL REQUESTS        GRAM STAIN OCCASIONAL WBCS SEEN         NO ORGANISMS SEEN        Culture result:       PROBABLE FEW PROTEUS SPECIES                  CHECKING FOR POSSIBLE OTHER ORGANISIMS          CULTURE, BLOOD [[id number]] Collected: 06/27/22 1240    Order Status: Completed Specimen: Blood Updated: 06/30/22 0738     Special Requests: NO SPECIAL REQUESTS        Culture result: NO GROWTH 3 DAYS       CULTURE, BLOOD [[id number]] Collected: 06/27/22 1245    Order Status: Completed Specimen: Blood Updated: 06/30/22 0738     Special Requests: NO SPECIAL REQUESTS        Culture result: NO GROWTH 3 DAYS       CULTURE, Ananya Hacking STAIN [[id number]] Collected: 06/27/22 1530    Order Status: Completed Specimen: Wound Drainage Updated: 06/29/22 1511     Special Requests: NO SPECIAL REQUESTS        GRAM STAIN RARE WBCS SEEN         2+ GRAM NEGATIVE RODS               1+ GRAM POSITIVE COCCI IN PAIRS           Culture result: MODERATE  MIXED ENTERIC GRAM NEGATIVE RODS              HEAVY MIXED SKIN TO ISOLATED          CULTURE, FUNGUS [[id number]] Collected: 06/28/22 1925    Order Status: Completed Updated: 06/29/22 0758    CULTURE, ANAEROBIC [[id number]] Collected: 06/28/22 1925    Order Status: Completed Updated: 06/29/22 0758    AFB CULTURE + SMEAR W/RFLX ID FROM CULTURE [[id number]] Collected: 06/28/22 1925    Order Status: Completed Updated: 06/28/22 2148    CULTURE, ANAEROBIC [[id number]] Collected: 06/28/22 1926    Order Status: Canceled     AFB CULTURE + SMEAR W/RFLX ID FROM CULTURE [[id number]]     Order Status: Sent            Lab results:    Chemistry  Recent Labs     06/30/22  0030 06/29/22  0355 06/28/22  0558   * 262* 286*    134* 134*   K 5.0 6.0* 4.7    97* 98*   CO2 30 30 29   BUN 36* 61* 51*   CREA 6.96* 10.20* 8.90*   CA 7.8* 7.5* 7.8*   AGAP 7 7 7   BUCR 5* 6* 6*       CBC w/ Diff  Recent Labs     06/30/22  0030   WBC 10.0   RBC 3.27*   HGB 10.0*   HCT 31.7*      GRANS 72   LYMPH 13*   EOS 3       Imaging: report reviewed and as posted by radiologist   No results found for this or any previous visit. MRI:       1. Posterior heel skin defect, at the margin of the Achilles tendon attachment  on the calcaneus, in keeping with known ulcer. Infiltration of subjacent  subcutaneous fat in keeping with cellulitis.     2. Cortical discontinuity and marrow signal abnormality in the subjacent dorsal  calcaneus extending to the threaded tip of the oblique tibiocalcaneal screw is  suspicious for osteomyelitis.     3. Fluid signal intensity surrounding the threaded tibial tip of the 1st digit  long partially threaded screw, with osseous fragments at the inferior margin,  concerning for loosening and/or infection.     4. Marrow signal abnormalities at the 2nd-3rd and to a lesser degree 4th-5th  tarsometatarsal joint, potentially simply related to degenerative/Charcot  arthropathy.  In view of marrow signal abnormalities, infection cannot be  excluded if clinically suspected. If clinically warranted, consider correlation with nuclear medicine imaging to  further assess.     5. Edema/myositis in the musculature above the ankle. There is discontinuity of  FHL and peroneal tendons, best correlated with operative findings/history of  prior intervention for significance/chronicity. Fatty change in the musculature  about the foot.

## 2022-06-30 NOTE — WOUND CARE
IP WOUND CONSULT    Ramya Gaspar  MEDICAL RECORD NUMBER:  [id number]  AGE: 60 y.o. GENDER: male  : 1960  TODAY'S DATE:  2022    GENERAL     [] Follow-up   [x] New Consult    Ramya Gaspar is a 60 y.o. male referred by:   [] Physician  [x] Nursing  [] Other:         PAST MEDICAL HISTORY    Past Medical History:   Diagnosis Date    Diabetes mellitus     Hypertension         PAST SURGICAL HISTORY    History reviewed. No pertinent surgical history. FAMILY HISTORY    History reviewed. No pertinent family history. SOCIAL HISTORY    Social History     Tobacco Use    Smoking status: Not on file    Smokeless tobacco: Not on file   Substance Use Topics    Alcohol use: Not on file    Drug use: Not on file       ALLERGIES    No Known Allergies    MEDICATIONS    No current facility-administered medications on file prior to encounter. Current Outpatient Medications on File Prior to Encounter   Medication Sig Dispense Refill    allopurinoL (Zyloprim) 100 mg tablet Take 100 mg by mouth daily.  ezetimibe (Zetia) 10 mg tablet Take 10 mg by mouth.  carvediloL (COREG) 12.5 mg tablet Take 12.5 mg by mouth two (2) times a day.  amLODIPine (NORVASC) 10 mg tablet Take 10 mg by mouth daily.  aspirin 81 mg chewable tablet Take 81 mg by mouth daily.  ascorbic acid, vitamin C, (Vitamin C) 500 mg tablet Take 500 mg by mouth.  insulin glargine (Lantus U-100 Insulin) 100 unit/mL injection 10 Units by SubCUTAneous route nightly.  insulin lispro (HUMALOG) 100 unit/mL injection by SubCUTAneous route.  furosemide (Lasix) 80 mg tablet Take 80 mg by mouth two (2) times a day.  lisinopril (PRINIVIL, ZESTRIL) 40 mg tablet Take 40 mg by mouth daily.  simvastatin (ZOCOR) 10 mg tablet Take  by mouth nightly.  potassium chloride SR (KLOR-CON 10) 10 mEq tablet Take 20 mEq by mouth two (2) times a day.    (Patient not taking: Reported on 2022)         Wt Readings from Last 3 Encounters:   06/28/22 101.9 kg (224 lb 11.2 oz)       [unfilled]  Visit Vitals  BP (!) 130/58   Pulse 66   Temp 97.2 °F (36.2 °C)   Resp 18   Ht 6' 2\" (1.88 m)   Wt 101.9 kg (224 lb 11.2 oz)   SpO2 98%   BMI 28.85 kg/m²       ASSESSMENT     Skin impairment Identification:  Type: surgical wound    Contributing Factors: none    Incision 06/28/22 Foot Right (Active)   Wound Image   06/30/22 1443   Dressing Status Intact 06/30/22 1443   Dressing Change Due 07/05/22 06/30/22 1443   Dressing/Treatment Non-adherent;Negative Pressure Wound Therapy 06/30/22 1443   Wound Length (cm) 7 cm 06/30/22 1443   Wound Width (cm) 7 cm 06/30/22 1443   Wound Odor None 06/30/22 1443   Leticia-Wound/Incision Assessment Intact 06/30/22 1443   Number of days: 2          PLAN     Skin Care & Pressure Relief Recommendations  Minimize layers of linen  Pads under patient to optimize support surface  Promote continence; Skin Protective lotion/cream to buttocks and sacrum daily and as needed with incontinence care  Offloading boots    Recommendations: if no able to maintain suction follow instructions in order set    Teaching completed with:   [x] Patient           [] Family member       [] Caregiver          [x] Nursing  [] Other    Patient/Caregiver Teaching:  Level of patient/caregiver understanding able to:   [x] Indicates understanding       [] Needs reinforcement  [] Unsuccessful      [] Verbal Understanding  [] Demonstrated understanding       [] No evidence of learning  [] Refused teaching         [] N/A       Electronically signed by Donis Reyes RN on 6/30/2022 at 2:45 PM

## 2022-06-30 NOTE — PROGRESS NOTES
Barrier cream applied to sacral area, pt repositioned self without assist. Snack provided as requested. Dressing to R foot CDI.    0600 On O2 at 1-2 li prn specially when pt is asleep and his O2 sats can drop to the 80's. IS by bedside and instructed on use.    0720 Bedside and Verbal shift change report given to Michele Mckay RN (oncoming nurse) by Amanda Mason RN   (offgoing nurse). Report included the following information SBAR, Kardex, Intake/Output, MAR and Recent Results.

## 2022-06-30 NOTE — PROGRESS NOTES
Nephrology Progress Note    Ana M Crespo is a 60 y.o. male BLACK/ who is being seen on consult for ESRD. Chief Complaint   Patient presents with    Foot Ulcer     Admission diagnosis: Acute hematogenous osteomyelitis of right foot ([address]    Subjectives: This is a 59 yo male with a PMH of DM, HTN, CAD, ESRD on HD TTS admitted for right foot infection. MRI suggested osteomyelitis. No fever, chills. Pt s/p debridement, I&D- on abx. Catheter fell off overnight- replaced yesterday. S/p HD yesterday  For HD this AM    Past Medical History:   Diagnosis Date    Diabetes mellitus     Hypertension      History reviewed. No pertinent surgical history. Social History     Socioeconomic History    Marital status:      Spouse name: Not on file    Number of children: Not on file    Years of education: Not on file    Highest education level: Not on file   Occupational History    Not on file   Tobacco Use    Smoking status: Not on file    Smokeless tobacco: Not on file   Substance and Sexual Activity    Alcohol use: Not on file    Drug use: Not on file    Sexual activity: Not on file   Other Topics Concern    Dental Braces Not Asked    Endoscopic Camera Pill Not Asked    Metallic Foreign Body Not Asked    Medication Patches Not Asked    Taking Feraheme Not Asked    Claustrophobic Not Asked   Social History Narrative    Not on file     Social Determinants of Health     Financial Resource Strain:     Difficulty of Paying Living Expenses: Not on file   Food Insecurity:     Worried About Running Out of Food in the Last Year: Not on file    Vane of Food in the Last Year: Not on file   Transportation Needs:     Lack of Transportation (Medical): Not on file    Lack of Transportation (Non-Medical):  Not on file   Physical Activity:     Days of Exercise per Week: Not on file    Minutes of Exercise per Session: Not on file   Stress:     Feeling of Stress : Not on file Social Connections:     Frequency of Communication with Friends and Family: Not on file    Frequency of Social Gatherings with Friends and Family: Not on file    Attends Sikh Services: Not on file    Active Member of Clubs or Organizations: Not on file    Attends Club or Organization Meetings: Not on file    Marital Status: Not on file   Intimate Partner Violence:     Fear of Current or Ex-Partner: Not on file    Emotionally Abused: Not on file    Physically Abused: Not on file    Sexually Abused: Not on file   Housing Stability:     Unable to Pay for Housing in the Last Year: Not on file    Number of [place] in the Last Year: Not on file    Unstable Housing in the Last Year: Not on file       Family Hx:no hx kidney disease  No Known Allergies    Meds:  reviewed    Visit Vitals  /61 (BP 1 Location: Right upper arm, BP Patient Position: At rest)   Pulse 68   Temp 98.9 °F (37.2 °C)   Resp 18   Ht 6' 2\" (1.88 m)   Wt 101.9 kg (224 lb 11.2 oz)   SpO2 97%   BMI 28.85 kg/m²       Physical Assessment:     Wt Readings from Last 3 Encounters:   06/28/22 101.9 kg (224 lb 11.2 oz)     Temp Readings from Last 3 Encounters:   06/30/22 98.9 °F (37.2 °C)     BP Readings from Last 3 Encounters:   06/30/22 134/61     Pulse Readings from Last 3 Encounters:   06/30/22 68        General: Well developed, alert, NAD  Head/Neck: NCAT, supple, No masses, No lymphadenopathy  CVS:Regular rate and rhythm, no M/R/G, S1/S2 heard, no thrill  Lungs:Clear to auscultation bilaterally, no wheezes, rhonchi, or rales  Abdomen: Soft, Nontender, No distention, Normal Bowel sounds, No hepatomegaly  Extremities: No C/C/E, pulses palpable 2+  Skin:normal texture and turgor, no rashes, no lesions  Neuro:grossly normal , follows commands    Labs: reviewed    Impression:   ESRD on HD, has no access this AM.  Diabetic foot ulcer with possible osteomyelitis  DM  HTN  Anemia in CKD  SHPT  CAD    Plan:   Podiatry following  HD today per usual schedule  [place]      Signed By: Jennifer Sotomayor MD     June 30, 2022

## 2022-06-30 NOTE — PROGRESS NOTES
D/c plan: SNF pending acceptance and insurance auth. Pt will need stretcher transport. CM spoke w/ pt. Confirmed info on pt's face sheet. Pt lives home alone and is independent at base line w/ a RW and a w/c. Confirmed pt is a [place], Th S dialysis pt at [facility] on [address]. Pt's chair time is 10:00am and run time is 4 hours. Pt states he uses [employer] [facility] to get to and from dialysis. Discussed SNF at d/c. Pt is in agreement. Advised him that PT/OT will evaluate him s/p his upcoming surgery on Tuesday and those evaluations will assist w/ obtaining a SNF bed for the pt if their evaluation deems SNF is the safest level of care at d/c. The pt is in agreement w/ referrals being sent to all [facility] and  SNF's and will choose one from the accepting facilities. CM to continue to follow. Care Management Interventions  PCP Verified by CM:  Yes (Dr. Ricardo Butler )  Mode of Transport at Discharge: BLS  Transition of Care Consult (CM Consult): SNF (Pt is in agreement w/ SNF at d/c. )  Discharge Durable Medical Equipment:  (TBD)  Physical Therapy Consult: Yes  Occupational Therapy Consult: Yes  Support Systems: Child(dafne)  Confirm Follow Up Transport: Family  The Plan for Transition of Care is Related to the Following Treatment Goals : Skilled nursing facility  The Patient and/or Patient Representative was Provided with a Choice of Provider and Agrees with the Discharge Plan?: Yes (The pt is alert and oriented. )  Freedom of Choice List was Provided with Basic Dialogue that Supports the Patient's Individualized Plan of Care/Goals, Treatment Preferences and Shares the Quality Data Associated with the Providers?: Yes (Pt is in agreement w/ referrals being sent to all  and [facility] facilities and then he will choose one from the accepting facilities. )  Discharge Location  Patient Expects to be Discharged to[de-identified] Skilled nursing facility

## 2022-07-01 LAB
GLUCOSE BLD STRIP.AUTO-MCNC: 226 MG/DL (ref 70–110)
GLUCOSE BLD STRIP.AUTO-MCNC: 228 MG/DL (ref 70–110)
GLUCOSE BLD STRIP.AUTO-MCNC: 237 MG/DL (ref 70–110)
GLUCOSE BLD STRIP.AUTO-MCNC: 300 MG/DL (ref 70–110)

## 2022-07-01 PROCEDURE — 74011250636 HC RX REV CODE- 250/636: Performed by: PHYSICIAN ASSISTANT

## 2022-07-01 PROCEDURE — 65270000029 HC RM PRIVATE

## 2022-07-01 PROCEDURE — 74011000258 HC RX REV CODE- 258: Performed by: PHYSICIAN ASSISTANT

## 2022-07-01 PROCEDURE — 74011636637 HC RX REV CODE- 636/637: Performed by: INTERNAL MEDICINE

## 2022-07-01 PROCEDURE — 74011636637 HC RX REV CODE- 636/637: Performed by: FAMILY MEDICINE

## 2022-07-01 PROCEDURE — 82962 GLUCOSE BLOOD TEST: CPT

## 2022-07-01 RX ORDER — INSULIN GLARGINE 100 [IU]/ML
10 INJECTION, SOLUTION SUBCUTANEOUS
Status: DISCONTINUED | OUTPATIENT
Start: 2022-07-01 | End: 2022-07-21 | Stop reason: HOSPADM

## 2022-07-01 RX ORDER — POLYETHYLENE GLYCOL 3350 17 G/17G
17 POWDER, FOR SOLUTION ORAL DAILY
Status: DISCONTINUED | OUTPATIENT
Start: 2022-07-02 | End: 2022-07-02

## 2022-07-01 RX ADMIN — INSULIN GLARGINE 10 UNITS: 100 INJECTION, SOLUTION SUBCUTANEOUS at 21:39

## 2022-07-01 RX ADMIN — Medication 8 UNITS: at 21:38

## 2022-07-01 RX ADMIN — PIPERACILLIN AND TAZOBACTAM 2.25 G: 2; .25 INJECTION, POWDER, LYOPHILIZED, FOR SOLUTION INTRAVENOUS at 21:39

## 2022-07-01 RX ADMIN — Medication 4 UNITS: at 11:59

## 2022-07-01 RX ADMIN — PIPERACILLIN AND TAZOBACTAM 2.25 G: 2; .25 INJECTION, POWDER, LYOPHILIZED, FOR SOLUTION INTRAVENOUS at 13:40

## 2022-07-01 RX ADMIN — Medication 4 UNITS: at 09:00

## 2022-07-01 RX ADMIN — PIPERACILLIN AND TAZOBACTAM 2.25 G: 2; .25 INJECTION, POWDER, LYOPHILIZED, FOR SOLUTION INTRAVENOUS at 05:20

## 2022-07-01 RX ADMIN — Medication 4 UNITS: at 17:00

## 2022-07-01 NOTE — PROGRESS NOTES
Hospitalist Progress Note    Patient: Adi Angulo MRN: [id number]  CSN: [id number]    YOB: 1960  Age: 60 y.o. Sex: male    DOA: 6/27/2022 LOS:  LOS: 4 days          Chief Complaint:    worsening pain and swelling and chills     Assessment/Plan   Adi Angulo is a 60 y.o. male who history, stent, hypertension, Charcot's foot presents with worsening pain and swelling and chills in his right foot despite multiple cleanings and being managed by podiatry as outpatient. Large necrotic ulcer right posterior heel with osteomyelitis of the calcaneus and deep abscess-  S/p incision bone cortex right valcaneous, I&D right heel abcsess, deep wound debridement with multiple bone biopsies and application of antibiotic beads of vancomycin and rocephin  cultures pending   Dr. Lafonda Kayser following, 2nd surgery will be required in about a week, detailed discussion with Dr. Lafonda Kayser, pt's best chance of saving his foot is too leave the abx beads in place for ~1 week and complete second surgery next Tuesday pm. He will likely need facility placement afterwards. Based on his overall condition and situation, he needs to remain hospitalized until 2nd surgery. Reinterated to pt as well.    Dr. Tyrel Browning following     Diabetes mellitus -  ADA, SSI, FSBG qac and qhs   Hyperglycemia -will start lantus 10units qpm     End-stage renal disease on dialysis Tuesday Thursday and Saturday -  S/p [facility] replacement   HD per nephrology     History of coronary artery disease-  Supportive care    Chronic compressive myelopathy pending surgery -  Supportive care    DVT and GI prophylaxis        Disposition : TBD  Patient Active Problem List   Diagnosis Code    Diabetes mellitus with foot ulcer ([facility] E11.621, L97.509    CAD (coronary artery disease) I25.10    ESRD (end stage renal disease) ([facility] N18.6    Acute hematogenous osteomyelitis of right foot ([facility] [facility] M86.071    Cellulitis of right heel L03.115    Diabetic foot ulcer with osteomyelitis ([facility] E11.621, E11.69, L97.509, M86.9    Ulcer of right heel, with necrosis of bone (HCC) L97.414       Subjective:    Wants help with constipation   No other complaints      No other concerns     Review of systems:    Constitutional: denies fevers, chills, myalgias  Respiratory: denies SOB, cough  Cardiovascular: denies chest pain, palpitations  Gastrointestinal: denies nausea, vomiting, diarrhea      Vital signs/Intake and Output:  Visit Vitals  BP (!) 147/63   Pulse 77   Temp 98.7 °F (37.1 °C)   Resp 16   Ht 6' 2\" (1.88 m)   Wt 96.7 kg (213 lb 3 oz)   SpO2 92%   BMI 27.37 kg/m²     Current Shift:  No intake/output data recorded. Last three shifts:  06/29 1901 - 07/01 0700  In: -   Out: 2000     Exam:    General: Well developed, alert, NAD, OX3  Head/Neck: NCAT, supple, No masses, No lymphadenopathy  CVS:Regular rate and rhythm, no M/R/G, S1/S2 heard, no thrill  Lungs:Clear to auscultation bilaterally, no wheezes, rhonchi, or rales  Abdomen: Soft, Nontender, No distention, Normal Bowel sounds, No hepatomegaly  Extremities: right foot in surgical boot  Skin:normal texture and turgor, no rashes, no lesions  Neuro:grossly normal , follows commands  Psych:appropriate                Labs: Results:       Chemistry Recent Labs     06/30/22  0030 06/29/22  0355   * 262*    134*   K 5.0 6.0*    97*   CO2 30 30   BUN 36* 61*   CREA 6.96* 10.20*   CA 7.8* 7.5*   AGAP 7 7   BUCR 5* 6*      CBC w/Diff Recent Labs     06/30/22  0030   WBC 10.0   RBC 3.27*   HGB 10.0*   HCT 31.7*      GRANS 72   LYMPH 13*   EOS 3      Cardiac Enzymes No results for input(s): CPK, CKND1, PAULO in the last 72 hours. No lab exists for component: CKRMB, TROIP   Coagulation No results for input(s): PTP, INR, APTT, INREXT, INREXT in the last 72 hours.     Lipid Panel No results found for: CHOL, CHOLPOCT, CHOLX, CHLST, CHOLV, 700992, HDL, HDLP, LDL, LDLC, DLDLP, 458964, VLDLC, VLDL, TGLX, TRIGL, TRIGP, TGLPOCT, CHHD, CHHDX   BNP No results for input(s): BNPP in the last 72 hours. Liver Enzymes No results for input(s): TP, ALB, TBIL, AP in the last 72 hours.     No lab exists for component: SGOT, GPT, DBIL   Thyroid Studies No results found for: T4, T3U, TSH, TSHEXT, TSHEXT     Procedures/imaging: see electronic medical records for all procedures/Xrays and details which were not copied into this note but were reviewed prior to creation of Gualberto Putnam MD

## 2022-07-01 NOTE — PROGRESS NOTES
Comprehensive Nutrition Assessment    Type and Reason for Visit: Initial,Consult (Wound)    Nutrition Recommendations/Plan:   1. Nicolás BID to help with wound healing. Malnutrition Assessment:  Malnutrition Status:  No malnutrition (07/01/22 1110)      Nutrition Assessment:    H/o HTN, Charcot's foot, DM. Also ESRD on HD s/p TDC replacement. Admitted with Large necrotic ulcer right posterior heel with osteomyelitis of the calcaneus and deep abscess. S/p incision bone cortex right valcaneous, I&D right heel abcsess, deep wound debridement with multiple bone biopsies and application of antibiotic beads of vancomycin and rocephin. Spoke with pt- had breakfast this AM (~%). Pt reports good intake. Informed pt regarding [place]; educated pt to mix with liquid such as juice or water. Pt has no questions or concerns at this time. Nutrition Related Findings:    Glucose 269, POC glucose 136-253, GFR est AA 10, BUN 36, creatinine 6.96. Humalog, retacrit. No significant weight lost noted per chart review: 213lb (6/25/2021), 220lb (8/28/2021), 225lb (12/30/2021), 220lb (1/24/2022), 202lb (4/13/2022), 212lb (5/13/2022). Wound Type: Surgical incision    Current Nutrition Intake & Therapies:  Average Meal Intake: Unable to assess  Average Supplement Intake: None ordered  ADULT DIET Regular; 4 carb choices (60 gm/meal)    Anthropometric Measures:  Height: 6' 2\" (188 cm)  Ideal Body Weight (IBW): 190 lbs (86 kg)  Admission Body Weight: 212 lb (per H&P)  Current Body Wt:  96.7 kg (213 lb 3 oz), 112.2 % IBW. Current BMI (kg/m2): 27.4  Usual Body Weight: 99.8 kg (220 lb) (1/24/2022)  % Weight Change (Calculated): -3.1  Weight Adjustment: No adjustment                 BMI Category: Overweight (BMI 25.0-29. 9)    Estimated Daily Nutrient Needs:  Energy Requirements Based On: Formula  Weight Used for Energy Requirements: Current  Energy (kcal/day): 1123-5002  Weight Used for Protein Requirements: Current  Protein (g/day): 116-125  Method Used for Fluid Requirements: 1 ml/kcal  Fluid (ml/day): 1621-6632    Nutrition Diagnosis:   · Increased nutrient needs related to acute injury/trauma as evidenced by wounds      Nutrition Interventions:   Food and/or Nutrient Delivery: Continue current diet,Start oral nutrition supplement  Nutrition Education/Counseling: No recommendations at this time  Coordination of Nutrition Care: Continue to monitor while inpatient       Goals:     Goals: PO intake 75% or greater,by next RD assessment       Nutrition Monitoring and Evaluation:   Behavioral-Environmental Outcomes: None identified  Food/Nutrient Intake Outcomes: Food and nutrient intake,Supplement intake  Physical Signs/Symptoms Outcomes: Biochemical data,Meal time behavior,Hemodynamic status,Nutrition focused physical findings,Skin,Weight    Discharge Planning:    Continue current diet    Lila Castillo RD

## 2022-07-01 NOTE — WOUND CARE
Wound Care nurse in to check wound Vac. Found VAC off and sponge expanded with small amount of rust colored drainage between sponge and drape. further assessment of problem showed that machine was unplugged. Wound care nurse restarted machine and informed bedside nurse.

## 2022-07-01 NOTE — PROGRESS NOTES
Problem: General Medical Care Plan  Goal: *Vital signs within specified parameters  Outcome: Progressing Towards Goal  Goal: *Labs within defined limits  Outcome: Progressing Towards Goal  Goal: *Absence of infection signs and symptoms  Outcome: Progressing Towards Goal  Goal: *Optimal pain control at patient's stated goal  Outcome: Progressing Towards Goal  Goal: *Skin integrity maintained  Outcome: Progressing Towards Goal  Goal: *Fluid volume balance  Outcome: Progressing Towards Goal  Goal: *Optimize nutritional status  Outcome: Progressing Towards Goal  Goal: *Anxiety reduced or absent  Outcome: Progressing Towards Goal  Goal: *Progressive mobility and function (eg: ADL's)  Outcome: Progressing Towards Goal     Problem: Patient Education: Go to Patient Education Activity  Goal: Patient/Family Education  Outcome: Progressing Towards Goal     Problem: Falls - Risk of  Goal: *Absence of Falls  Description: Document Jaxson Fall Risk and appropriate interventions in the flowsheet. Outcome: Progressing Towards Goal  Note: Fall Risk Interventions:  Mobility Interventions: Bed/chair exit alarm,PT Consult for mobility concerns,Patient to call before getting OOB,Utilize walker, cane, or other assistive device,Strengthening exercises (ROM-active/passive)         Medication Interventions: Patient to call before getting OOB,Bed/chair exit alarm,Teach patient to arise slowly    Elimination Interventions: Call light in reach,Toilet paper/wipes in reach              Problem: Patient Education: Go to Patient Education Activity  Goal: Patient/Family Education  Outcome: Progressing Towards Goal     Problem: Pressure Injury - Risk of  Goal: *Prevention of pressure injury  Description: Document Semaj Scale and appropriate interventions in the flowsheet.   Outcome: Progressing Towards Goal     Problem: Patient Education: Go to Patient Education Activity  Goal: Patient/Family Education  Outcome: Progressing Towards Goal Problem: Diabetes Self-Management  Goal: *Disease process and treatment process  Description: Define diabetes and identify own type of diabetes; list 3 options for treating diabetes. Outcome: Progressing Towards Goal  Goal: *Incorporating nutritional management into lifestyle  Description: Describe effect of type, amount and timing of food on blood glucose; list 3 methods for planning meals. Outcome: Progressing Towards Goal  Goal: *Incorporating physical activity into lifestyle  Description: State effect of exercise on blood glucose levels. Outcome: Progressing Towards Goal  Goal: *Developing strategies to promote health/change behavior  Description: Define the ABC's of diabetes; identify appropriate screenings, schedule and personal plan for screenings. Outcome: Progressing Towards Goal  Goal: *Using medications safely  Description: State effect of diabetes medications on diabetes; name diabetes medication taking, action and side effects. Outcome: Progressing Towards Goal  Goal: *Monitoring blood glucose, interpreting and using results  Description: Identify recommended blood glucose targets  and personal targets. Outcome: Progressing Towards Goal  Goal: *Prevention, detection, treatment of acute complications  Description: List symptoms of hyper- and hypoglycemia; describe how to treat low blood sugar and actions for lowering  high blood glucose level. Outcome: Progressing Towards Goal  Goal: *Prevention, detection and treatment of chronic complications  Description: Define the natural course of diabetes and describe the relationship of blood glucose levels to long term complications of diabetes.   Outcome: Progressing Towards Goal  Goal: *Developing strategies to address psychosocial issues  Description: Describe feelings about living with diabetes; identify support needed and support network  Outcome: Progressing Towards Goal  Goal: *Insulin pump training  Outcome: Progressing Towards Goal  Goal: *Sick day guidelines  Outcome: Progressing Towards Goal  Goal: *Patient Specific Goal (EDIT GOAL, INSERT TEXT)  Outcome: Progressing Towards Goal     Problem: Patient Education: Go to Patient Education Activity  Goal: Patient/Family Education  Outcome: Progressing Towards Goal     Problem: Chronic Renal Failure  Goal: *Fluid and electrolytes stabilized  Outcome: Progressing Towards Goal

## 2022-07-01 NOTE — PROGRESS NOTES
Nephrology Progress Note    Marcel Stevenson is a 60 y.o. male BLACK/ who is being seen on consult for ESRD. Chief Complaint   Patient presents with    Foot Ulcer     Admission diagnosis: Acute hematogenous osteomyelitis of right foot ([address]    Subjectives: This is a 59 yo male with a PMH of DM, HTN, CAD, ESRD on HD TTS admitted for right foot infection. MRI suggested osteomyelitis. No fever, chills. Pt s/p debridement, I&D- on abx. HD Catheter fell off and was  replaced 6/29. Most recent HD session 6/30    Uneventful night. Denies SOB/fever/chills today    Past Medical History:   Diagnosis Date    Diabetes mellitus     Hypertension      History reviewed. No pertinent surgical history. Social History     Socioeconomic History    Marital status:      Spouse name: Not on file    Number of children: Not on file    Years of education: Not on file    Highest education level: Not on file   Occupational History    Not on file   Tobacco Use    Smoking status: Not on file    Smokeless tobacco: Not on file   Substance and Sexual Activity    Alcohol use: Not on file    Drug use: Not on file    Sexual activity: Not on file   Other Topics Concern    Dental Braces Not Asked    Endoscopic Camera Pill Not Asked    Metallic Foreign Body Not Asked    Medication Patches Not Asked    Taking Feraheme Not Asked    Claustrophobic Not Asked   Social History Narrative    Not on file     Social Determinants of Health     Financial Resource Strain:     Difficulty of Paying Living Expenses: Not on file   Food Insecurity:     Worried About Running Out of Food in the Last Year: Not on file    Vane of Food in the Last Year: Not on file   Transportation Needs:     Lack of Transportation (Medical): Not on file    Lack of Transportation (Non-Medical):  Not on file   Physical Activity:     Days of Exercise per Week: Not on file    Minutes of Exercise per Session: Not on file   Stress:  Feeling of Stress : Not on file   Social Connections:     Frequency of Communication with Friends and Family: Not on file    Frequency of Social Gatherings with Friends and Family: Not on file    Attends Latter-day Services: Not on file    Active Member of Clubs or Organizations: Not on file    Attends Club or Organization Meetings: Not on file    Marital Status: Not on file   Intimate Partner Violence:     Fear of Current or Ex-Partner: Not on file    Emotionally Abused: Not on file    Physically Abused: Not on file    Sexually Abused: Not on file   Housing Stability:     Unable to Pay for Housing in the Last Year: Not on file    Number of [place] in the Last Year: Not on file    Unstable Housing in the Last Year: Not on file       Family Hx:no hx kidney disease  No Known Allergies    Meds:  reviewed    Visit Vitals  BP (!) 147/63   Pulse 77   Temp 98.7 °F (37.1 °C)   Resp 16   Ht 6' 2\" (1.88 m)   Wt 96.7 kg (213 lb 3 oz)   SpO2 92%   BMI 27.37 kg/m²       Physical Assessment:     Wt Readings from Last 3 Encounters:   06/30/22 96.7 kg (213 lb 3 oz)     Temp Readings from Last 3 Encounters:   07/01/22 98.7 °F (37.1 °C)     BP Readings from Last 3 Encounters:   07/01/22 (!) 147/63     Pulse Readings from Last 3 Encounters:   07/01/22 77        General: Well developed, alert, NAD  Head/Neck: NCAT, supple, No JVD No lymphadenopathy  CVS:Regular rate and rhythm, no M/R/G, S1/S2 heard  Lungs:Clear to auscultation bilaterally, no wheezes, rhonchi, or rales  Abdomen: Soft, Nontender, No distention, Normal Bowel sounds  Extremities: Wound Vac R foot  Skin:normal texture and turgor, no rashes, no lesions  Neuro:grossly normal , follows commands    Labs: reviewed    Impression:   ESRD on HD  Diabetic foot ulcer with possible osteomyelitis  DM  HTN  Anemia in CKD  SHPT  CAD    Plan:   HD tomorrow 7/2  IV Antibiotics  Podiatry following  [place]      Signed By: Radha Kim MD     July 1, 2022

## 2022-07-01 NOTE — DIABETES MGMT
Diabetes/ Glycemic Control Plan of Care  Recommendations:   Recommend initiation of basal insulin, suggest Lantus to 10 units q 24 hrs. Assessment: BG ranging 136-253 mg/dl over the last 24 hours. Morning BG of 228 mg/dl. Patient is currently only receiving corrective coverage, TDD -22 units Humalog. Patient would benefit from a starting dose of basal insulin. Recent Glucose Results:   Lab Results   Component Value Date/Time    GLUCPOC 228 (H) 07/01/2022 08:08 AM    GLUCPOC 260 (H) 06/30/2022 09:37 PM    GLUCPOC 136 (H) 06/30/2022 05:19 PM           BG within target range (non-ICU: <180; -180):  [] Yes    [x] No   Current insulin orders: corrective Humalog  Total Daily Dose previous 24 hours = 22 units Humalog     Plan/Goals:   Blood glucose will be within target of 70 - 180 mg/dl within 72 hours  Reinforce dietary and medication compliance at home. Education:  [x] Refer to Diabetes Education Record                       [] Education not indicated at this time                                                          Diabetes Patient/Family Education Record    Factors That May Influence Patients Ability to Learn or Comply with Recommendations   []   Language barrier    []   Cultural needs   []   Motivation    []   Cognitive limitation    []   Physical   []   Education    []   Physiological factors   []   Hearing/vision/speaking impairment   []   Faith beliefs    []   Financial factors   []  Other:   [x]  No factors identified at this time. Person Instructed:   [x]   Patient   []   Family   []  Other     Preference for Learning:   [x]   Verbal   []   Written   []  Demonstration     Level of Comprehension & Competence:    [x]  Good                                      [] Fair                                     []  Poor                             []  Needs Reinforcement   []  Teach back completed    Education Component: Patient reports compliance with home insulin regimen.  He monitors his BG 2-3 times per day. Patient tends to snack on items like chips and crackers. Reviewed MyPlate as a way to incorporate more vegetables and provide more a more filling meal. Patient able to identify multiple non-starchy vegetables he enjoys. He enjoys eating grapes, encouraged to pair fruit with protein/fats. []  Medication management, including how to administer insulin (if appropriate) and potential medication interactions    [x]  Nutritional management - [x] Obtained usual meal pattern   []   Basic carbohydrate counting  [x]  Plate method  []  Limit concentrated sweets and avoid sweetened beverages  [x]  Portion control  []    Avoid skipping meals   []  Exercise   []  Signs, symptoms, and treatment of hyperglycemia and hypoglycemia   [] Prevention, recognition and treatment of hyperglycemia and hypoglycemia   []  Importance of blood glucose monitoring  [] Blood Glucose targets   []   Provided patient with blood glucose meter  [x]  Has glucometer and supplies at home   []  Instruction on use of the blood glucose meter and recommended monitoring schedule   [x]  Discuss the importance of HbA1C monitoring. Patients A1c is 8.6%.  This is equivalent to average glucose of ___ mg/dl for the past 2-3 months.   []  Sick day guidelines   []  Proper use and disposal of lancets, needles, syringes or insulin pens (if appropriate)   []  Potential long-term complications (retinopathy, kidney disease, neuropathy, foot care)   [] Information about whom to contact in case of emergency or for more information    []  Goal:  Patient/family will demonstrate understanding of Diabetes Self- Management Skills by: (date) __7/8_____  Plan for post-discharge education or self-management support:    [] Outpatient class schedule provided            [] Patient Declined    [] Scheduled for outpatient classes (date) _______    [] Written information provided  Verify: [x] Prior to admission Diabetes medications    Does patient understand how diabetes medications work? ____________________________  Does patient have difficulty obtaining diabetes medications or testing supplies? _________________       Reese Hogan RD  Glycemic Control Team  [phone]    Monday-Friday   9 am - 3 pm

## 2022-07-01 NOTE — PROGRESS NOTES
D/C Plan: SNF with a wound vac    Noted plan for surgical intervention on Tuesday (7/5/22) with podiatry. Anticipate pt will transition to SNF when medically stable. Confirmed pt is a [de-identified], Th S dialysis pt at [facility] on [address]. Pt's chair time is 10:00am and run time is 4 hours. Pt states he uses [facility] to get to and from dialysis. Anticipate pt will remain inpt through the weekend. Care Management Interventions  PCP Verified by CM:  Yes (Dr. Radha Moctezuma )  Mode of Transport at Discharge: BLS  Transition of Care Consult (CM Consult): SNF (Pt is in agreement w/ SNF at d/c. )  Discharge Durable Medical Equipment:  (TBD)  Physical Therapy Consult: Yes  Occupational Therapy Consult: Yes  Support Systems: Child(dafne)  Confirm Follow Up Transport: Family  The Plan for Transition of Care is Related to the Following Treatment Goals : Skilled nursing facility  The Patient and/or Patient Representative was Provided with a Choice of Provider and Agrees with the Discharge Plan?: Yes (The pt is alert and oriented. )  Freedom of Choice List was Provided with Basic Dialogue that Supports the Patient's Individualized Plan of Care/Goals, Treatment Preferences and Shares the Quality Data Associated with the Providers?: Yes (Pt is in agreement w/ referrals being sent to all  and [place] facilities and then he will choose one from the accepting facilities. )  Discharge Location  Patient Expects to be Discharged to[de-identified] Skilled nursing facility

## 2022-07-01 NOTE — PROGRESS NOTES
[facility]  (A Division of [address] [place] [address])                                                                                                                     [place], [place]  Office #: - Option # [phone]  Fax #: [phone]     Date of Admission: 6/27/2022Date of Note: 7/1/2022  Reason for Referral: Evaluation and antibiotic management of R heel infection    Dr. Evelia Banegas to cover patients this weekend. Please call her at [phone] with questions. Current Antimicrobials:    Prior Antimicrobials:  Zosyn 6/27 to date   Bactrim 1 month ago       Assessment- ID related:  --------------------------------------------------------------------------  · DFU and OM R heel  OP finding: Large right heel necrotic ulcer with osteomyelitis of the calcaneus, and deep abscess  · Subacute/chronic OM of heel-- over 2 months time  · Leucocytosis 2/2 above  --BCX 6/27: NGSF  --WCX-- GNR and GPC in pairs--> pending  · ESRD on HD TTS  · Obese BMI of 28  · DM   · History of L hallux ampuation for infection- 5yrs ago Recommendation for ID issues I am following:  --------------------------------------------------------------------------------  Continue with wound vac    FU OR culture until final- Alpha strep, E.fecalis and Proteus so far  Once final SS - available, if Cefazolin and Vancomycin is an option, can be done post hemodialysis TTS-- Dr Tony Tavarez agreeable to plan. Alternate would be TDC and 6 weeks abx     Cont Zosyn-> will await prior to making final plan    Patient will remain in hospital per podiatry until additional sx on Tues- gives a little extra time to adjust Abx            Subjective:  Doing ok. Slept ok last night. No fevers or chills. No n/v/d. Pain controlled. HPI:  Grant Levin is a 60 y.o. BLACK/ with PMH as listed below-- he had ulcer for 2 months or so that he was followed by as OP by Podiatry.  His heel wound progressed and was advised to come to ED yesterday. Had foot pain and fould drainage, but denies high F/C/R/SOB/ N/V prior to admission. BCX/WCX taken in ED, started on Zosyn and plan was to hold off abx and monitor until surgery. Admission assessment there was high concern for sepsis and Zosyn was continued. Hx of MRSA infection and treatment few years ago. Active Hospital Problems    Diagnosis Date Noted    CAD (coronary artery disease) 06/28/2022    ESRD (end stage renal disease) ([facility] 06/28/2022    Acute hematogenous osteomyelitis of right foot ([facility] 06/28/2022    Cellulitis of right heel 06/28/2022    [facility] 06/28/[facility]22    Ulcer of right heel, with necrosis of bone ([facility] 06/28/2022    Diabetes mellitus with foot ulcer ([facility] 06/27/2022     Past Medical History:   Diagnosis Date    Diabetes mellitus     Hypertension      History reviewed. No pertinent surgical history. History reviewed. No pertinent family history.   Social History     Socioeconomic History    Marital status:      Spouse name: Not on file    Number of children: Not on file    Years of education: Not on file    Highest education level: Not on file   Occupational History    Not on file   Tobacco Use    Smoking status: Not on file    Smokeless tobacco: Not on file   Substance and Sexual Activity    Alcohol use: Not on file    Drug use: Not on file    Sexual activity: Not on file   Other Topics Concern    Dental Braces Not Asked    Endoscopic Camera Pill Not Asked    Metallic Foreign Body Not Asked    Medication Patches Not Asked    Taking Feraheme Not Asked    Claustrophobic Not Asked   Social History Narrative    Not on file     Social Determinants of Health     Financial Resource Strain:     Difficulty of Paying Living Expenses: Not on file   Food Insecurity:     Worried About Running Out of Food in the Last Year: Not on file    Vane of Food in the Last Year: Not on file Transportation Needs:     Lack of Transportation (Medical): Not on file    Lack of Transportation (Non-Medical): Not on file   Physical Activity:     Days of Exercise per Week: Not on file    Minutes of Exercise per Session: Not on file   Stress:     Feeling of Stress : Not on file   Social Connections:     Frequency of Communication with Friends and Family: Not on file    Frequency of Social Gatherings with Friends and Family: Not on file    Attends [employer] Services: Not on file    Active Member of [address] [place] [address] [employer] or Organizations: Not on file    Attends Club or Organization Meetings: Not on file    Marital Status: Not on file   Intimate Partner Violence:     Fear of Current or Ex-Partner: Not on file    Emotionally Abused: Not on file    Physically Abused: Not on file    Sexually Abused: Not on file   Housing Stability:     Unable to Pay for Housing in the Last Year: Not on file    Number of [place] in the Last Year: Not on file    Unstable Housing in the Last Year: Not on file       Allergies:  Patient has no known allergies. Medications:  Current Facility-Administered Medications   Medication Dose Route Frequency    [START ON 2022] polyethylene glycol (MIRALAX) packet 17 g  17 g Oral DAILY    insulin glargine (LANTUS) injection 10 Units  10 Units SubCUTAneous QHS    epoetin lisette-epbx (RETACRIT) injection 4,000 Units  4,000 Units SubCUTAneous Q TUE, THU & SAT    piperacillin-tazobactam (ZOSYN) 2.25 g in 0.9% sodium chloride (MBP/ADV) 50 mL MBP  2.25 g IntraVENous Q8H    insulin lispro (HUMALOG) injection   SubCUTAneous AC&HS    glucose chewable tablet 16 g  4 Tablet Oral PRN    glucagon (GLUCAGEN) injection 1 mg  1 mg IntraMUSCular PRN    dextrose 10% infusion 0-250 mL  0-250 mL IntraVENous PRN    acetaminophen (TYLENOL) tablet 650 mg  650 mg Oral Q6H PRN        ROS:  Pertinent items are noted in the History of Present Illness.            Physical Exam:    Temp (24hrs), Av.3 °F (36.8 °C), Min:97.5 °F (36.4 °C), Max:98.7 °F (37.1 °C)    Visit Vitals  BP (!) 147/63   Pulse 77   Temp 98.7 °F (37.1 °C)   Resp 16   Ht 6' 2\" (1.88 m)   Wt 96.7 kg (213 lb 3 oz)   SpO2 92%   BMI 27.37 kg/m²      GEN: WD Obese, on RA--not in resp distress. Right chest TDC    HEENT: Unicteric. EOMI intact  No neck swelling  CHEST: Non laboured breathing. ABD: Obese/soft. Non tender. PASCUAL: Deferred  EXT: not examined today- dressed  Skin: Dry and intact. No rash, no redness. CNS: A, OX3. Moves all extremity. CN grossly ok.       Microbiology  All Micro Results     Procedure Component Value Units Date/Time    CULTURE, TISSUE [place] STAIN [[id number]]  (Abnormal) Collected: 06/28/22 1926    Order Status: Completed Specimen: Bone Updated: 07/01/22 1342     Special Requests: NO SPECIAL REQUESTS        GRAM STAIN 2+ WBCS SEEN         NO ORGANISMS SEEN        Culture result: FEW ENTEROCOCCUS FAECALIS               POSSIBLE SCANT PROTEUS SPECIES            SCANT ALPHA STREPTOCOCCUS               CHECKING FOR POSSIBLE 2ND GRAM NEGATIVE JORI          CULTURE, ANAEROBIC [[id number]] Collected: 06/28/22 1925    Order Status: Completed Specimen: Heel Updated: 07/01/22 1048     Special Requests: NO SPECIAL REQUESTS        Culture result:       CHECKING FOR POSSIBLE ANAEROBES          CULTURE, [place] STAIN [[id number]]  (Abnormal) Collected: 06/28/22 1925    Order Status: Completed Specimen: Heel Updated: 07/01/22 1024     Special Requests: NO SPECIAL REQUESTS        GRAM STAIN OCCASIONAL WBCS SEEN         NO ORGANISMS SEEN        Culture result:       PROBABLE FEW PROTEUS SPECIES                  CHECKING FOR POSSIBLE 2ND GRAM NEGATIVE JORI                  MODERATE POSSIBLE ENTEROCOCCUS SPECIES          CULTURE, BLOOD [[id number]] Collected: 06/27/22 1240    Order Status: Completed Specimen: Blood Updated: 07/01/22 0722     Special Requests: NO SPECIAL REQUESTS        Culture result: NO GROWTH 4 DAYS       CULTURE, BLOOD [[id number]] Collected: 06/27/22 1245    Order Status: Completed Specimen: Blood Updated: 07/01/22 0722     Special Requests: NO SPECIAL REQUESTS        Culture result: NO GROWTH 4 DAYS       AFB CULTURE + SMEAR W/RFLX ID FROM CULTURE [[id number]] Collected: 06/28/22 1925    Order Status: Completed Specimen: Miscellaneous sample Updated: 06/30/22 1738     Source [email or web address]. [email or web address]        AFB Specimen processing Concentration     Acid Fast Smear Negative        Comment: (NOTE)  Performed At: [address] [id number]  Deya Conrad MD [place]:[id number]          Acid Fast Culture PENDING    CULTURE, Audie Merl STAIN [[id number]] Collected: 06/27/22 1530    Order Status: Completed Specimen: Wound Drainage Updated: 06/29/22 1511     Special Requests: NO SPECIAL REQUESTS        GRAM STAIN RARE WBCS SEEN         2+ GRAM NEGATIVE RODS               1+ GRAM POSITIVE COCCI IN PAIRS           Culture result:       MODERATE  MIXED ENTERIC GRAM NEGATIVE RODS              HEAVY MIXED SKIN TO ISOLATED          CULTURE, FUNGUS [[id number]] Collected: 06/28/22 1925    Order Status: Completed Updated: 06/29/22 0758    AFB CULTURE + SMEAR W/RFLX ID FROM CULTURE [[id number]] Collected: 06/28/22 1930    Order Status: Canceled     CULTURE, ANAEROBIC [[id number]] Collected: 06/28/22 1926    Order Status: Canceled            Lab results:    Chemistry  Recent Labs     06/30/22  0030 06/29/22  0355   * 262*    134*   K 5.0 6.0*    97*   CO2 30 30   BUN 36* 61*   CREA 6.96* 10.20*   CA 7.8* 7.5*   AGAP 7 7   BUCR 5* 6*       CBC w/ Diff  Recent Labs     06/30/22  0030   WBC 10.0   RBC 3.27*   HGB 10.0*   HCT 31.7*      GRANS 72   LYMPH 13*   EOS 3       Imaging: report reviewed and as posted by radiologist   No results found for this or any previous visit. MRI:       1.  Posterior heel skin defect, at the margin of the Achilles tendon attachment  on the calcaneus, in keeping with known ulcer. Infiltration of subjacent  subcutaneous fat in keeping with cellulitis.     2. Cortical discontinuity and marrow signal abnormality in the subjacent dorsal  calcaneus extending to the threaded tip of the oblique tibiocalcaneal screw is  suspicious for osteomyelitis.     3. Fluid signal intensity surrounding the threaded tibial tip of the 1st digit  long partially threaded screw, with osseous fragments at the inferior margin,  concerning for loosening and/or infection.     4. Marrow signal abnormalities at the 2nd-3rd and to a lesser degree 4th-5th  tarsometatarsal joint, potentially simply related to degenerative/Charcot  arthropathy. In view of marrow signal abnormalities, infection cannot be  excluded if clinically suspected. If clinically warranted, consider correlation with nuclear medicine imaging to  further assess.     5. Edema/myositis in the musculature above the ankle. There is discontinuity of  FHL and peroneal tendons, best correlated with operative findings/history of  prior intervention for significance/chronicity. Fatty change in the musculature  about the foot.

## 2022-07-02 LAB
BACTERIA SPEC CULT: ABNORMAL
GLUCOSE BLD STRIP.AUTO-MCNC: 167 MG/DL (ref 70–110)
GLUCOSE BLD STRIP.AUTO-MCNC: 183 MG/DL (ref 70–110)
GLUCOSE BLD STRIP.AUTO-MCNC: 187 MG/DL (ref 70–110)
GLUCOSE BLD STRIP.AUTO-MCNC: 195 MG/DL (ref 70–110)
GRAM STN SPEC: ABNORMAL
SERVICE CMNT-IMP: ABNORMAL
SERVICE CMNT-IMP: ABNORMAL

## 2022-07-02 PROCEDURE — 74011636637 HC RX REV CODE- 636/637: Performed by: INTERNAL MEDICINE

## 2022-07-02 PROCEDURE — 74011636637 HC RX REV CODE- 636/637: Performed by: HOSPITALIST

## 2022-07-02 PROCEDURE — 90935 HEMODIALYSIS ONE EVALUATION: CPT

## 2022-07-02 PROCEDURE — 74011636637 HC RX REV CODE- 636/637: Performed by: FAMILY MEDICINE

## 2022-07-02 PROCEDURE — 82962 GLUCOSE BLOOD TEST: CPT

## 2022-07-02 PROCEDURE — 65270000029 HC RM PRIVATE

## 2022-07-02 PROCEDURE — 74011250637 HC RX REV CODE- 250/637: Performed by: HOSPITALIST

## 2022-07-02 PROCEDURE — 74011250636 HC RX REV CODE- 250/636: Performed by: PHYSICIAN ASSISTANT

## 2022-07-02 PROCEDURE — 74011250636 HC RX REV CODE- 250/636: Performed by: INTERNAL MEDICINE

## 2022-07-02 PROCEDURE — 74011000258 HC RX REV CODE- 258: Performed by: PHYSICIAN ASSISTANT

## 2022-07-02 RX ORDER — ALLOPURINOL 100 MG/1
100 TABLET ORAL DAILY
Status: DISCONTINUED | OUTPATIENT
Start: 2022-07-03 | End: 2022-07-21 | Stop reason: HOSPADM

## 2022-07-02 RX ORDER — INSULIN LISPRO 100 [IU]/ML
3 INJECTION, SOLUTION INTRAVENOUS; SUBCUTANEOUS
Status: DISCONTINUED | OUTPATIENT
Start: 2022-07-02 | End: 2022-07-21 | Stop reason: HOSPADM

## 2022-07-02 RX ORDER — CARVEDILOL 12.5 MG/1
12.5 TABLET ORAL 2 TIMES DAILY
Status: DISCONTINUED | OUTPATIENT
Start: 2022-07-02 | End: 2022-07-21 | Stop reason: HOSPADM

## 2022-07-02 RX ORDER — GABAPENTIN 300 MG/1
300 CAPSULE ORAL
COMMUNITY

## 2022-07-02 RX ORDER — GUAIFENESIN 100 MG/5ML
81 LIQUID (ML) ORAL DAILY
Status: DISCONTINUED | OUTPATIENT
Start: 2022-07-03 | End: 2022-07-21 | Stop reason: HOSPADM

## 2022-07-02 RX ORDER — ATORVASTATIN CALCIUM 40 MG/1
40 TABLET, FILM COATED ORAL DAILY
COMMUNITY

## 2022-07-02 RX ORDER — AMLODIPINE BESYLATE 5 MG/1
10 TABLET ORAL DAILY
Status: DISCONTINUED | OUTPATIENT
Start: 2022-07-03 | End: 2022-07-21 | Stop reason: HOSPADM

## 2022-07-02 RX ORDER — EZETIMIBE 10 MG/1
10 TABLET ORAL DAILY
Status: DISCONTINUED | OUTPATIENT
Start: 2022-07-03 | End: 2022-07-21 | Stop reason: HOSPADM

## 2022-07-02 RX ADMIN — INSULIN LISPRO 3 UNITS: 100 INJECTION, SOLUTION INTRAVENOUS; SUBCUTANEOUS at 13:39

## 2022-07-02 RX ADMIN — PIPERACILLIN AND TAZOBACTAM 2.25 G: 2; .25 INJECTION, POWDER, LYOPHILIZED, FOR SOLUTION INTRAVENOUS at 13:42

## 2022-07-02 RX ADMIN — Medication 2 UNITS: at 17:31

## 2022-07-02 RX ADMIN — Medication 2 UNITS: at 21:27

## 2022-07-02 RX ADMIN — PIPERACILLIN AND TAZOBACTAM 2.25 G: 2; .25 INJECTION, POWDER, LYOPHILIZED, FOR SOLUTION INTRAVENOUS at 06:01

## 2022-07-02 RX ADMIN — Medication 2 UNITS: at 08:47

## 2022-07-02 RX ADMIN — CARVEDILOL 12.5 MG: 12.5 TABLET, FILM COATED ORAL at 20:30

## 2022-07-02 RX ADMIN — INSULIN LISPRO 3 UNITS: 100 INJECTION, SOLUTION INTRAVENOUS; SUBCUTANEOUS at 17:30

## 2022-07-02 RX ADMIN — Medication 2 UNITS: at 12:00

## 2022-07-02 RX ADMIN — EPOETIN ALFA-EPBX 8000 UNITS: 4000 INJECTION, SOLUTION INTRAVENOUS; SUBCUTANEOUS at 20:32

## 2022-07-02 RX ADMIN — INSULIN GLARGINE 10 UNITS: 100 INJECTION, SOLUTION SUBCUTANEOUS at 21:28

## 2022-07-02 RX ADMIN — PIPERACILLIN AND TAZOBACTAM 2.25 G: 2; .25 INJECTION, POWDER, LYOPHILIZED, FOR SOLUTION INTRAVENOUS at 21:27

## 2022-07-02 NOTE — PROGRESS NOTES
Podiatry:  Patient is scheduled for right foot surgery on Tuesday afternoon at 6 PM, to remove antibiotic beads and further debridement, then grafting with Stravix graft to try and close his large wound. Will continue IV antibiotics as per Dr. Sunita Yeh. Continue wound VAC. and prevalon boots. He will require long term IV antibiotics with PICC.

## 2022-07-02 NOTE — PROGRESS NOTES
Nephrology Progress Note      Subjectives: This is a 59 yo male with a PMH of DM, HTN, CAD, ESRD on HD TTS admitted for right foot infection. MRI suggested osteomyelitis. No fever, chills. Pt s/p debridement, I&D- on abx. HD Catheter fell off and was replaced 6/29. Most recent HD session 6/30    Uneventful night. Pending HD today. ID on board. Past Medical History:   Diagnosis Date    Diabetes mellitus     Hypertension      History reviewed. No pertinent surgical history. Social History     Socioeconomic History    Marital status:      Spouse name: Not on file    Number of children: Not on file    Years of education: Not on file    Highest education level: Not on file   Occupational History    Not on file   Tobacco Use    Smoking status: Not on file    Smokeless tobacco: Not on file   Substance and Sexual Activity    Alcohol use: Not on file    Drug use: Not on file    Sexual activity: Not on file   Other Topics Concern    Dental Braces Not Asked    Endoscopic Camera Pill Not Asked    Metallic Foreign Body Not Asked    Medication Patches Not Asked    Taking Feraheme Not Asked    Claustrophobic Not Asked   Social History Narrative    Not on file     Social Determinants of Health     Financial Resource Strain:     Difficulty of Paying Living Expenses: Not on file   Food Insecurity:     Worried About Running Out of Food in the Last Year: Not on file    Vane of Food in the Last Year: Not on file   Transportation Needs:     Lack of Transportation (Medical): Not on file    Lack of Transportation (Non-Medical):  Not on file   Physical Activity:     Days of Exercise per Week: Not on file    Minutes of Exercise per Session: Not on file   Stress:     Feeling of Stress : Not on file   Social Connections:     Frequency of Communication with Friends and Family: Not on file    Frequency of Social Gatherings with Friends and Family: Not on file    Attends Ca[employer] Services: Not on file   [address] [facility] [employer] or Organizations: Not on file    Attends Club or Organization Meetings: Not on file    Marital Status: Not on file   Intimate Partner Violence:     Fear of Current or Ex-Partner: Not on file    Emotionally Abused: Not on file    Physically Abused: Not on file    Sexually Abused: Not on file   Housing Stability:     Unable to Pay for Housing in the Last Year: Not on file    Number of [place] in the Last Year: Not on file    Unstable Housing in the Last Year: Not on file         No Known Allergies    Meds:  reviewed    Visit Vitals  BP (!) 145/58 (BP 1 Location: Right upper arm, BP Patient Position: At rest)   Pulse 66   Temp 98.5 °F (36.9 °C)   Resp 16   Ht 6' 2\" (1.88 m)   Wt 108 kg (238 lb)   SpO2 93%   BMI 30.56 kg/m²       Physical Assessment:     Wt Readings from Last 3 Encounters:   07/01/22 108 kg (238 lb)     Temp Readings from Last 3 Encounters:   07/02/22 98.5 °F (36.9 °C)     BP Readings from Last 3 Encounters:   07/02/22 (!) 145/58     Pulse Readings from Last 3 Encounters:   07/02/22 66      PE:  General: NAD  Head/Neck: NCAT, supple, No JVD  CVS:Regular rate and rhythm, no M/R/G, S1/S2 heard  Lungs:Clear to auscultation bilaterally, no wheezes, rhonchi, or rales  Abdomen: Soft, Nontender, No distention, Normal Bowel sounds  Extremities: Wound Vac R foot  Skin:normal texture and turgor, no rashes, no lesions  Neuro:grossly normal , follows commands    Labs: reviewed    Impression:   ESRD on HD TTS  Diabetic foot ulcer with possible osteomyelitis  DM  HTN  Anemia in CKD  SHPT  CAD    Plan:   HD today Saturday via [facility]  IV Antibiotics per ID  Podiatry following  [place] with dialysis  D/w HD staff      Signed By: Ariadna Hernandez MD     July 2, 2022

## 2022-07-02 NOTE — PROGRESS NOTES
Problem: General Medical Care Plan  Goal: *Vital signs within specified parameters  Outcome: Progressing Towards Goal  Goal: *Labs within defined limits  Outcome: Progressing Towards Goal  Goal: *Absence of infection signs and symptoms  Outcome: Progressing Towards Goal  Goal: *Optimal pain control at patient's stated goal  Outcome: Progressing Towards Goal  Goal: *Skin integrity maintained  Outcome: Progressing Towards Goal  Goal: *Fluid volume balance  Outcome: Progressing Towards Goal  Goal: *Optimize nutritional status  Outcome: Progressing Towards Goal  Goal: *Anxiety reduced or absent  Outcome: Progressing Towards Goal  Goal: *Progressive mobility and function (eg: ADL's)  Outcome: Progressing Towards Goal     Problem: Patient Education: Go to Patient Education Activity  Goal: Patient/Family Education  Outcome: Progressing Towards Goal     Problem: Falls - Risk of  Goal: *Absence of Falls  Description: Document Jaxson Fall Risk and appropriate interventions in the flowsheet. Outcome: Progressing Towards Goal  Note: Fall Risk Interventions:  Mobility Interventions: Bed/chair exit alarm,Assess mobility with egress test,PT Consult for mobility concerns         Medication Interventions: Patient to call before getting OOB,Evaluate medications/consider consulting pharmacy    Elimination Interventions: Call light in reach,Bed/chair exit alarm              Problem: Patient Education: Go to Patient Education Activity  Goal: Patient/Family Education  Outcome: Progressing Towards Goal     Problem: Pressure Injury - Risk of  Goal: *Prevention of pressure injury  Description: Document Semaj Scale and appropriate interventions in the flowsheet.   Outcome: Progressing Towards Goal     Problem: Patient Education: Go to Patient Education Activity  Goal: Patient/Family Education  Outcome: Progressing Towards Goal     Problem: Diabetes Self-Management  Goal: *Disease process and treatment process  Description: Define diabetes and identify own type of diabetes; list 3 options for treating diabetes. Outcome: Progressing Towards Goal  Goal: *Incorporating nutritional management into lifestyle  Description: Describe effect of type, amount and timing of food on blood glucose; list 3 methods for planning meals. Outcome: Progressing Towards Goal  Goal: *Incorporating physical activity into lifestyle  Description: State effect of exercise on blood glucose levels. Outcome: Progressing Towards Goal  Goal: *Developing strategies to promote health/change behavior  Description: Define the ABC's of diabetes; identify appropriate screenings, schedule and personal plan for screenings. Outcome: Progressing Towards Goal  Goal: *Using medications safely  Description: State effect of diabetes medications on diabetes; name diabetes medication taking, action and side effects. Outcome: Progressing Towards Goal  Goal: *Monitoring blood glucose, interpreting and using results  Description: Identify recommended blood glucose targets  and personal targets. Outcome: Progressing Towards Goal  Goal: *Prevention, detection, treatment of acute complications  Description: List symptoms of hyper- and hypoglycemia; describe how to treat low blood sugar and actions for lowering  high blood glucose level. Outcome: Progressing Towards Goal  Goal: *Prevention, detection and treatment of chronic complications  Description: Define the natural course of diabetes and describe the relationship of blood glucose levels to long term complications of diabetes.   Outcome: Progressing Towards Goal  Goal: *Developing strategies to address psychosocial issues  Description: Describe feelings about living with diabetes; identify support needed and support network  Outcome: Progressing Towards Goal  Goal: *Insulin pump training  Outcome: Progressing Towards Goal  Goal: *Sick day guidelines  Outcome: Progressing Towards Goal  Goal: *Patient Specific Goal (EDIT GOAL, INSERT TEXT)  Outcome: Progressing Towards Goal     Problem: Patient Education: Go to Patient Education Activity  Goal: Patient/Family Education  Outcome: Progressing Towards Goal     Problem: Chronic Renal Failure  Goal: *Fluid and electrolytes stabilized  Outcome: Progressing Towards Goal

## 2022-07-02 NOTE — PROGRESS NOTES
Chart remotely reviewed    Notes/Labs/Imaging reviewed  --he is waiting for 2nd OR look on 7/5  --OR culture growing multiple GNR including Morganella, E.fecalis and anaerobic GNR    He will need Tunneled line placement-- for Zosyn 6 weeks    I am on call over weekend. Please call via  or Perfect Serve. Thanks. Jf Clarke MD  [facility])  Office #:     [phone][phone]   Office Fax: [phone]

## 2022-07-02 NOTE — PROGRESS NOTES
Hospitalist Progress Note-critical care note     Patient: Petar Oneil MRN: [id number]  CSN: [id number]    YOB: 1960  Age: 60 y.o. Sex: male    DOA: 6/27/2022 LOS:  LOS: 5 days            Chief complaint: cad , esrd on hd, OM , cellulitis , DM ,     Assessment/Plan         Hospital Problems  Date Reviewed: 6/28/2022          Codes Class Noted POA    CAD (coronary artery disease) ICD-10-CM: I25.10  ICD-9-CM: 414.00  6/28/2022 Unknown        ESRD (end stage renal disease) ([facility] ICD-10-CM: N18.6  ICD-9-CM: 585.6  6/28/2022 Unknown        * (Principal) Acute hematogenous osteomyelitis of right foot ([facility] ICD-10-CM: M86.071  ICD-9-CM: 730.07  6/28/2022 Unknown        Cellulitis of right heel ICD-10-CM: L03.115  ICD-9-CM: 682.7  6/28/2022 Unknown        Diabetic foot ulcer with osteomyelitis ([facility] ICD-10-CM: E11.621, E11.69, L97.509, M86.9  ICD-9-CM: 250.80, 707.15, 730.27, 731.8  6/28/2022 Unknown        Ulcer of right heel, with necrosis of bone ([facility] ICD-10-CM: L97.414  ICD-9-CM: 707.14, 730.17  6/28/2022 Unknown        Diabetes mellitus with foot ulcer ([facility] ICD-10-CM: E11.621, L97.509  ICD-9-CM: 250.80, 707.15  6/27/2022 Unknown                 Gideon Dom a 60 y. o. male who history, stent, hypertension, Charcot's foot presents with worsening pain and swelling and chills in his right foot despite multiple cleanings and being managed by podiatry as outpatient.     Large necrotic ulcer right posterior heel with osteomyelitis of the calcaneus and deep abscess-  S/p incision bone cortex right valcaneous, I&D right heel abcsess, deep wound debridement with multiple bone biopsies and application of antibiotic beads done per Dr. Madeline Aguayo and will have  2nd surgery on Tuesday   Podiatrist and ID f/u   cx multiple GNR-on zosyn   Need long term abx , need tunnel catheter for long term abx      Diabetes mellitus -  On  lantus 10units qpm add premeal insulin and continue ssi      End-stage renal disease on dialysis Tuesday Thursday and Saturday -  S/p [facility] replacement   HD per nephrology      History of coronary artery disease-  Restart coreg and aspirin      Chronic compressive myelopathy pending surgery -  Supportive care    Subjective: I did not received allupurinal , zetia and my vitamin and binder , some loose stool     He cannot remember phose  binder name and dose -will defer to renal     Restarted home meds , d/c marilax     Disposition :tbd,   Review of systems:    General: No fevers or chills. Cardiovascular: No chest pain or pressure. No palpitations. Pulmonary: No shortness of breath. Gastrointestinal: No nausea, vomiting. Vital signs/Intake and Output:  Visit Vitals  BP (!) 145/58 (BP 1 Location: Right upper arm, BP Patient Position: At rest)   Pulse 66   Temp 98.5 °F (36.9 °C)   Resp 16   Ht 6' 2\" (1.88 m)   Wt 108 kg (238 lb)   SpO2 93%   BMI 30.56 kg/m²     Current Shift:  No intake/output data recorded. Last three shifts:  No intake/output data recorded. Physical Exam:  General: WD, WN. Alert, cooperative, no acute distress    HEENT: NC, Atraumatic. PERRLA, anicteric sclerae. Lungs: CTA Bilaterally. No Wheezing/Rhonchi/Rales. HD cath noted   Heart:  Regular  rhythm,  No murmur, No Rubs, No Gallops  Abdomen: Soft, Non distended, Non tender. +Bowel sounds,   Extremities: No c/c, bilateral feet covered with protect boots and wrapped with ace   Psych:   Not anxious or agitated. Neurologic:  No acute neurological deficit. Labs: Results:       Chemistry Recent Labs     06/30/22  0030   *      K 5.0      CO2 30   BUN 36*   CREA 6.96*   CA 7.8*   AGAP 7   BUCR 5*      CBC w/Diff Recent Labs     06/30/22  0030   WBC 10.0   RBC 3.27*   HGB 10.0*   HCT 31.7*      GRANS 72   LYMPH 13*   EOS 3      Cardiac Enzymes No results for input(s): CPK, CKND1, PAULO in the last 72 hours.     No lab exists for component: CKRMB, TROIP   Coagulation No results for input(s): PTP, INR, APTT, INREXT in the last 72 hours. Lipid Panel No results found for: CHOL, CHOLPOCT, CHOLX, CHLST, CHOLV, 503932, HDL, HDLP, LDL, LDLC, DLDLP, 337909, VLDLC, VLDL, TGLX, TRIGL, TRIGP, TGLPOCT, CHHD, CHHDX   BNP No results for input(s): BNPP in the last 72 hours. Liver Enzymes No results for input(s): TP, ALB, TBIL, AP in the last 72 hours. No lab exists for component: SGOT, GPT, DBIL   Thyroid Studies No results found for: T4, T3U, TSH, TSHEXT     Procedures/imaging: see electronic medical records for all procedures/Xrays and details which were not copied into this note but were reviewed prior to creation of Plan    XR FOOT RT AP/LAT    Result Date: 6/29/2022  EXAM: FOOT SERIES Indication: Postoperative. Technique: Frontal and lateral views of the right foot. Comparison: 6/27/2022 _______________ FINDINGS: -OSSEOUS: Interval postoperative changes of posterior calcaneal osteotomy with placement of adjacent drug-eluting beads at the osteotomy site. Overlying bandage material partially degrades evaluation of the osteotomy site. The anterior approach tibial calcaneal screw tip projects at or slightly beyond the osteotomy site, difficult to evaluate given overlying leads and bandage material. Redemonstration of mid and hindfoot deformity, with cannulated screws, and loss of normal definable anatomy. There is extensive periosteal thickening and irregularity of the first metatarsal, with unchanged plantar displacement distal screw head, with adjacent bone fragment. There is diffuse lucency surrounding the proximal and distal thirds of the indwelling screw. There is diffuse periosteal thickening of the posterior distal talus. No acute displaced fracture is identified. -SOFT TISSUES: Diffuse soft tissue edematous changes, with numerous drug-eluting beads at the posterior aspect of the calcaneal osteotomy. Diffuse atherosclerotic changes.      1. Interval posterior calcaneal osteotomy with adjacent drug-eluting beads and bandage material. Otherwise, no acute osseous abnormality. XR FOOT RT AP/LAT    Result Date: 6/27/2022  EXAM:  XR FOOT RT AP/LAT INDICATION:   right foot pain/wound with odor COMPARISON:  None. FINDINGS: 2 views of the right foot demonstrate chronic deformity, fragmentation, and collapse of the mid/hindfoot. Orthopedic screw in traversing the first ray with periprosthetic lucency. Additional hardware in the mid foot and ankle/hindfoot noted as well. Soft tissue defect seen along the calcaneus posteriorly with adjacent cortical irregularity of the calcaneal cortex. Irregular destructive appearance noted of the distal fifth metatarsal.     Ulceration overlying the heel with likely exposed calcaneus highly suggestive of osteomyelitis with adjacent cortical irregularity and sclerosis. Periprosthetic lucency along the first ray orthopedic screw. Periprosthetic infection and/or loosening suggested. Chronic deformity and collapse of the mid/hindfoot and ankle compatible with Charcot arthropathy. Irregular cortical destructive change noted of the distal fifth metatarsal head possibly related to this process as well although superimposed infection not excluded. [facility] MRI FOOT RT WO CONT    Result Date: 6/28/2022  ============================ [facility] ============================ HISTORY: -From Provider:  right heel wound, r/o osteomyelitis -Additional: None TECHNIQUE: Sagittal T1, STIR and T2 fat-sat; axial PD and T2 with fat saturation; coronal T2 and STIR with fat saturation and axial oblique PD images of the right ankle were obtained. COMPARISONS: None. FINDINGS: ----------- Postoperative changes are present in the mid and hindfoot, with sequela of prior fusion delineation of original osseous structures.  Punctate foci of susceptibility artifact in the subcutaneous fat about the ankle would be consistent with postoperative changes, assuming gas is not identified on plain film. There is plantar settling of the tibia and fibula, in the setting of hindfoot/midfoot fusion and associated postoperative deformity/remodeling. Susceptibility artifact from fixation hardware is present about the ankle. There is a partially visualized, long partially threaded cannulated screw in the 1st digit, terminating in the hind foot. There is a small amount of fluid signal intensity surrounding the tip of the screw tracking posteriorly and inferiorly, with suggested tiny osseous fragments. There is an oblique partially threaded cannulated screw from the lateral tibia to the dorsal calcaneus. There is surrounding T1 hypointense, STIR hyperintense dorsal calcaneal marrow signal, extending to the discontinuous calcaneal cortex, deep to the skin defect/ulceration. There are 2 oblique partially threaded cannulated screws in the fused hindfoot, extending from plantar aspect of the medial cuneiform and residual cuboid to the to the distal tibia. There is fatty change in the musculature about the ankle. Feathery hyperintensity is present in the distal calf musculature above the ankle. Retracted FHL tendon stump cannot be followed distal to the ankle. Peroneus longus and brevis appear discontinuous at the ankle. There is ill-defined soft tissue thickening, severe cartilage loss in marrow hyperintensity on both sides of the 2nd and 3rd and minimally at the 4th and 5th tarsometatarsal joints. Anterior tendons and Achilles tendons unremarkable within limits of exam. OTHER: Plantar fascia: Undulating/thinned plantar aponeurosis to insertion on inferior calcaneal enthesophyte.     -------------- 1. Posterior heel skin defect, at the margin of the Achilles tendon attachment on the calcaneus, in keeping with known ulcer. Infiltration of subjacent subcutaneous fat in keeping with cellulitis.  2. Cortical discontinuity and marrow signal abnormality in the subjacent dorsal calcaneus extending to the threaded tip of the oblique tibiocalcaneal screw is suspicious for osteomyelitis. 3. Fluid signal intensity surrounding the threaded tibial tip of the 1st digit long partially threaded screw, with osseous fragments at the inferior margin, concerning for loosening and/or infection. 4. Marrow signal abnormalities at the 2nd-3rd and to a lesser degree 4th-5th tarsometatarsal joint, potentially simply related to degenerative/Charcot arthropathy. In view of marrow signal abnormalities, infection cannot be excluded if clinically suspected. If clinically warranted, consider correlation with nuclear medicine imaging to further assess. 5. Edema/myositis in the musculature above the ankle. There is discontinuity of FHL and peroneal tendons, best correlated with operative findings/history of prior intervention for significance/chronicity. Fatty change in the musculature about the foot. XR CHEST PORT    Result Date: 6/27/2022  CLINICAL: Chest pain. COMPARISON: December 1, 2008. A portable view of the chest from 1909 hours: Mild elevation left hemidiaphragm. No focal airspace density. Pulmonary vascular congestion. Right IJ PermCath. No pneumothorax. Pulmonary vascular congestion. No focal airspace densities. DUPLEX LOWER EXT VENOUS BILAT    Result Date: 6/28/2022  · No evidence of deep vein thrombosis in the left lower extremity. · No evidence of deep vein thrombosis in the right lower extremity. DUPLEX LOWER EXT ARTERY BILAT    Result Date: 6/28/2022  · Bilateral tibial arteries are patnet at the ankle but with monophasic doppler signal. · Bilateral anterior tibial artery is occluded proximally but reconstituted distally by the collateral flow.         Kye Atwood MD

## 2022-07-02 NOTE — PROGRESS NOTES
TREATMENT SUMMARY      Patient in room 325 dialyzed for 3.5 hours. Tolerated tx well with without complaint or complications. Right TDC functioning without complication accessing or BFR  BFR 400  DFR 800   3500 ml UF removed with a net UF removal of 3000 ml. Report given to [facility] with all questions answered. Patient left in bed talking, watching TV, and without complaints. Red Curos caps placed to the end of both lumens.                     TREATMENT NOTES       Received report from [facility] at 1540. Tx initiated without difficulty. Aseptically, the CVC ports were accessed and flushed without problem. Treatment was started with no issues. Will continue to monitor patient. Dressing is dry and intact, dated 6/30.                                                                                                                                            ACUTE HEMODIALYSIS FLOW SHEET                  PATIENT INFORMATION         Hospital:[facility]         []??1st Time Acute  []? ?Stat[x]? ? Routine []? ? Urgent []? ? Chronic Unit   []? ? Acute Room []? ?Bedside  []??ICU/CCU []??ER         Isolation Precautions: []??Dialysis[]? ? Airborne [x]? ? Contact []? ? Droplet []? ? Reverse          Special Considerations:_______  []? ?[address] Consent Verified  [x]? ? N/A         Allergies:[x]? ? NKA                 []? ? _____________ Code Status [x]? ? Full Code []? ?[facility]  []?? Other_____           Diet: [x]? ? Renal []? ? NPO [x]? ? Diabetic   []? ? Enteral Feeding []? ? Cardiac Diabetic: [x]? ?Yes []? ? No              [x]? ? Signed Treatment Consent Verified   [x]? ? Time Out/ Safety Check 1540         PRIMARY NURSE REPORT: FIRST INITIAL/ LAST NAME/TITLE  PRE DIALYSIS: Noe ZAKIA                                        [id number]         ACCESS         CATHETER ACCESS: []?? N/A  [x]? ? RIGHT  []?? LEFT  [x]? ? IJ  []?? SUBCL []?? FEM                          []? ? First use X-ray  []? ? Tunnel     []? ? Non-Tunneled          [x]? ? No S/S infection  []? ? Redness []? ? Drainage  []?? Cultured []? ? Swelling []? ? Pain                          []? ? Medical Aseptic []? ? Prep Dressing Changed                        []? ? Clotted [x]? ? Patent []? ?      Flows: [x]? ? Good []? ? Poor []? ? Reversed                       If Access Problem Dr. Jg Palafox: []?? Yes []? ? No    Date:_____  [x]? ? N/A[]? ?         GRAFT/FISTULA ACCESS:  []?? N/A  []?? RIGHT  []?? LEFT  []?? UE   []? ? LE             []? ? AVG  []?? AVF []?? BUTTONHOLE    []? ? +BRUIT/THRILL []?? MEDICAL ASEPTIC PREP           []? ? No S/S infection  []? ? Redness []? ? Drainage  []?? Cultured []? ? Swelling []? ?Jessenia Burch              If Access Problem Dr. Jg Palafox: []?? Yes []? ? No    Date:______ []? ? N/A. . non functional         GENERAL ASSESSMENT         LUNGS:  SaO2% __99__ []? ? Clear [x]? ? Coarse []? ? Crackles []? ? Wheezing               [x]? ? Diminished Location: [x]? ? RLL [x]? ? LLL []? ?Anthony Nelson []? ? JOBY          COUGH:  []? ? Productive  []? ? Loose[]? ? Dry [x]? ? N/A  RESPIRATIONS: [x]? ? Easy []? ? Labored          THERAPY: [x]? ? RA   []? ?[address] _____. L/min    Mask: []?? NRB []? ? Venti  ___99__O2%                  []? ? Ventilator []? ? Intubated []? ?Landon Chadwick []? ? BiPap []? ? CPap []?? HI Flow         CARDIAC: [x]? ? Regular []? ? Irregular []? ? Pericardial Rub []?? JVD               Monitored Rhythm:______ []? ? N/A         EDEMA: []?? None []? ? Generalized []? ? Facial []? ? Pedal []?? UE []?? LE             []? ? Pitting [x]? ? 1 []?? 2 []? ? 3 []? ? 4    []? ? Right []? ? Left [x]? ? Bilateral         SKIN:    [x]? ? Warm []? ? Hot []? ?[address]  [x]? ? Dry []? ? Pale []? ? Diaphoretic              []? ? Flushed []?? Jaundiced []? ? Cyanotic []? ? Rash []? ? Weeping           LOC:    [x]? ? Alert  Oriented to: [x]? ? Person [x]? ? Place [x]? ? Time             []? ? Confused []? ? Lethargic []? ? Medicated []? ? Non-responsive          GI/ABDOMEN: []?? Flat [x]? ? Distended [x]? ? Soft []? ? Firm []? ?Beuford Cozier [x]? ? Bowel Sounds Present []? ? Nausea []? ? Vomiting          PAIN: [x]? ? 0 []? ? 1 []?? 2 []? ? 3 []? ? 4 []? ? 5 []? ? 6 []?? 7 []?? 8 []?? 9 []?? 10          Scale 1-10 Action/Follow Up_____         MOBILITY: []?? Amb []? ? Amb/Assist [x]? ? Bed  []? ? Wheelchair          CURRENT LABS         HBsAg ONLY: Date Drawn: 06/28/22            [x]? ? Negative []? ? Positive []? ? Unknown.           HBsAb: Date Drawn:  06/28/22            []? ? Susceptible <10 [x]? ? Immune ?10 []? ? Unknown         Date of Current Labs:      EDUCATION   Person Educated: [x]? ? Patient []? ? Other_________   Knowledge base: []?? None []? ? Minimal [x]? ? Substantial    Barriers to learning  [x]? ? N   Preferred method of learning: []?? Written []? ? Oral [x]? ? Visual []? ? Hands on    Topic: []?? Access Care []? ? S&S of infection [x]? ? Fluid Management   []?? K+  [x]? ? Procedural  []? ? Albumin []? ? Medications []? ? Tx Options   []? ? Transplant []? ? Diet []? ? Other    Teaching Tools: []?? Explain []? ? Demonstration []? ? Handout_____ []? ? Video______  CARE PLAN    [x]? ? Renal Failure (Adult)  Interdisciplinary  · Fluid and electrolytes stabilized  ?  Interventions  · Dehydration signs and symptoms (eg: Weight/lab monitoring; vomiting/diarrhea/urine; tenting; mucous membranes; dizziness/lethargy/irritability/confusion; weak pulse; tachycardia; blood pressure; I&O)  · Fluid overload signs and symptoms assessment (eg: Body weight increased; dyspnea; edema; hypertension; respiratory crackles/wheezing; JVD; lab monitoring; mental status changes; I&O)  · Monitor appropriate lab values  · COMPLIANCE WITH PRESCRIBED THERAPY  · ARTERIAL ACCESS SITE ASSESSMENT  · NUTRITION SCREENING  · Vital signs monitoring per assessed patient condition or unit standard  · Cardiac monitoring  · Hydration management  · Intake and output measurement  · Body weight monitoring  · Skin care  · DIALYSIS  · Nutrition Care Process per nutrition screen  · Oral hygiene care every 2 hours  · Pain management     · Outcome   ? [x]? ? Progressing Towards Goal  ? []?? Not Progressing Towards Goals  ? []?? Goals Met/Resolved  ? []?? Goals Not Met/ Resolved        · Patient/ Family Education  ? Progressing Towards Goals          RO/HEMODIAYLSIS MACHINE SAFETY CHECKS- BEFORE EACH TREATMENT          [x]? ?[facility]: Machine Serial #1: [id number] Serial #1: [id number]                       []? ?[address]: Machine Serial #2: [id number] Serial #2: [id number]      []? ?[address] [facility]: Machine Serial #3: [id number] Serial [id number]     Alarm Test: [x]? ? Pass  Time_1540_  [x]? ? RO/Machine Log Complete    [x]? ? Extracorporeal circuit Tested for integrity           Dialyzer_[id number]_   Tubing [id number]    Dialysate: pH_7.4__  Temp.____Conductivity: Meter 14_ HD Machine_14   CHLORINE TESTING- BEFORE EACH TREATMENT AND EVERY 4 HOURS   Total Chlorine: [x]? ? Less than 0.1 ppm Time:_1540_2nd Check Time:  (If greater than 0.1 ppm from Primary then every 30 minutes from Secondary)   TREATMENT INIATION-WITH DIALYSIS PRECAUTIONS   [x]? ? All Connections Secured   [x]? ?[employer] Double Clamped    [x]? ? Venous Parameters Set [x]? ? Arterial Parameters Set    [x]? ? Prime Given 250 ml     [x]? ? Air Foam Detector Engaged   PRE-TREATMENT   UF Calculations: Wt to lose:_3000___ml(+) Oral:__ml(+)IV Meds/Fluids/Blood prods___ml(+) Prime/Rinse_500__ml(=)Total UF Goal_3500___mL   Scale Type:[x]? ? Bed scale []? ? Sling Scale []? ? Wheel Chair Scale []? ?  Not Ordered []?? []?? Unable to obtain pt on stretcher/ bed scale malfunctioning       [x]? ? Time Out/Safety Check  Time:_1540   INTRADIALYTIC MONITORING  (SEE ATTACHED FLOWSHEET)      POST TREATMENT    Time Medication Dose Volume Route    Initials                                                                         [facility] Signatures Title Initials Time                                         Dialyzer cleared: [x]? ? Good []? ? Fair []? ? Poor     Blood Processed 68.1__Liters    Net UF Removed 3000_mL  Post Tx Access:                  AVF/AVG: Bleeding Stop       Art.___min Avtar.____min []??+bruit/thrill                Catheter: Locking Solution [x]? ? Heparin 1 ml/1000 units  []? ? Normal Saline                                                                               Art. _1.8____ ml Avtar._1.8____ml  Post Assessment:              Skin: [x]? ? Warm []? ?Dry []? ? Diaphoretic []? ? Flushed []? ? Pale []? ?Cyanotic            Lungs: [x]? ? Clear []? ?Coarse []? ?Crackles []? ?Wheezing             Cardiac: [x]? ? Regular []? ?Irregular  []? ? Monitored rhythm____ []? ? N/A            Edema: []? ?None []? ?General []? ?Facial []? ?Pedal  []??UE []??LE []??RIGHT []? ?LEFT            Pain: []??0 [x]? ?1 []??2 []??3 []??4 []??5 []??6 []??7 []??8 []??9 []??10   POST Tx Note: Patient in room 325 dialyzed for 3.5 hours. Tolerated tx well with without complaint or complications. Right TDC functioning without complication accessing or BFR  BFR 400  DFR 800   3500 ml UF removed with a net UF removal of 3000 ml.   Report given to KIM Liu with all questions answered. Patient left in stable condition.         Primary Nurse Report: First initial/Last name/Title    Post Dialysis:_, QW         [facility]:_[id number]   Abbreviations: AVG-arterial venous graft, AVF-arterial venous fistula, IJ-Internal Jugular,  Subcl-Subclavian, Fem-Femoral, Tx-treatment, AP/HR-apical heart rate, DFR-dialysate flow rate, BFR-blood flow rate, AP-arterial pressure, -venous pressure, UF-ultrafiltrate, TMP-transmembrane pressure, Avtar-Venous, Art-Arterial, RO-Reverse Osmosis

## 2022-07-03 LAB
ALBUMIN SERPL-MCNC: 2.4 G/DL (ref 3.4–5)
ALBUMIN/GLOB SERPL: 0.6 {RATIO} (ref 0.8–1.7)
ALP SERPL-CCNC: 116 U/L (ref 45–117)
ALT SERPL-CCNC: 30 U/L (ref 16–61)
ANION GAP SERPL CALC-SCNC: 7 MMOL/L (ref 3–18)
AST SERPL-CCNC: 28 U/L (ref 10–38)
BACTERIA SPEC CULT: NORMAL
BACTERIA SPEC CULT: NORMAL
BASOPHILS # BLD: 0.1 K/UL (ref 0–0.1)
BASOPHILS NFR BLD: 1 % (ref 0–2)
BILIRUB SERPL-MCNC: 0.4 MG/DL (ref 0.2–1)
BUN SERPL-MCNC: 39 MG/DL (ref 7–18)
BUN/CREAT SERPL: 6 (ref 12–20)
CALCIUM SERPL-MCNC: 8.3 MG/DL (ref 8.5–10.1)
CHLORIDE SERPL-SCNC: 103 MMOL/L (ref 100–111)
CO2 SERPL-SCNC: 29 MMOL/L (ref 21–32)
CREAT SERPL-MCNC: 6.6 MG/DL (ref 0.6–1.3)
DIFFERENTIAL METHOD BLD: ABNORMAL
EOSINOPHIL # BLD: 0.4 K/UL (ref 0–0.4)
EOSINOPHIL NFR BLD: 4 % (ref 0–5)
ERYTHROCYTE [DISTWIDTH] IN BLOOD BY AUTOMATED COUNT: 16.3 % (ref 11.6–14.5)
GLOBULIN SER CALC-MCNC: 4.3 G/DL (ref 2–4)
GLUCOSE BLD STRIP.AUTO-MCNC: 154 MG/DL (ref 70–110)
GLUCOSE BLD STRIP.AUTO-MCNC: 182 MG/DL (ref 70–110)
GLUCOSE BLD STRIP.AUTO-MCNC: 204 MG/DL (ref 70–110)
GLUCOSE BLD STRIP.AUTO-MCNC: 215 MG/DL (ref 70–110)
GLUCOSE SERPL-MCNC: 200 MG/DL (ref 74–99)
HCT VFR BLD AUTO: 32 % (ref 36–48)
HGB BLD-MCNC: 10.1 G/DL (ref 13–16)
IMM GRANULOCYTES # BLD AUTO: 0.1 K/UL (ref 0–0.04)
IMM GRANULOCYTES NFR BLD AUTO: 1 % (ref 0–0.5)
LYMPHOCYTES # BLD: 1.7 K/UL (ref 0.9–3.6)
LYMPHOCYTES NFR BLD: 15 % (ref 21–52)
MAGNESIUM SERPL-MCNC: 2.3 MG/DL (ref 1.6–2.6)
MCH RBC QN AUTO: 30.4 PG (ref 24–34)
MCHC RBC AUTO-ENTMCNC: 31.6 G/DL (ref 31–37)
MCV RBC AUTO: 96.4 FL (ref 78–100)
MONOCYTES # BLD: 0.9 K/UL (ref 0.05–1.2)
MONOCYTES NFR BLD: 8 % (ref 3–10)
NEUTS SEG # BLD: 8.2 K/UL (ref 1.8–8)
NEUTS SEG NFR BLD: 72 % (ref 40–73)
NRBC # BLD: 0 K/UL (ref 0–0.01)
NRBC BLD-RTO: 0 PER 100 WBC
PLATELET # BLD AUTO: 257 K/UL (ref 135–420)
PMV BLD AUTO: 10.6 FL (ref 9.2–11.8)
POTASSIUM SERPL-SCNC: 4.2 MMOL/L (ref 3.5–5.5)
PROT SERPL-MCNC: 6.7 G/DL (ref 6.4–8.2)
RBC # BLD AUTO: 3.32 M/UL (ref 4.35–5.65)
SERVICE CMNT-IMP: NORMAL
SERVICE CMNT-IMP: NORMAL
SODIUM SERPL-SCNC: 139 MMOL/L (ref 136–145)
WBC # BLD AUTO: 11.4 K/UL (ref 4.6–13.2)

## 2022-07-03 PROCEDURE — 83735 ASSAY OF MAGNESIUM: CPT

## 2022-07-03 PROCEDURE — 74011000258 HC RX REV CODE- 258: Performed by: PHYSICIAN ASSISTANT

## 2022-07-03 PROCEDURE — 74011636637 HC RX REV CODE- 636/637: Performed by: FAMILY MEDICINE

## 2022-07-03 PROCEDURE — 74011250636 HC RX REV CODE- 250/636: Performed by: PHYSICIAN ASSISTANT

## 2022-07-03 PROCEDURE — 74011636637 HC RX REV CODE- 636/637: Performed by: HOSPITALIST

## 2022-07-03 PROCEDURE — 65270000029 HC RM PRIVATE

## 2022-07-03 PROCEDURE — 85025 COMPLETE CBC W/AUTO DIFF WBC: CPT

## 2022-07-03 PROCEDURE — 80053 COMPREHEN METABOLIC PANEL: CPT

## 2022-07-03 PROCEDURE — 74011636637 HC RX REV CODE- 636/637: Performed by: INTERNAL MEDICINE

## 2022-07-03 PROCEDURE — 74011250637 HC RX REV CODE- 250/637: Performed by: INTERNAL MEDICINE

## 2022-07-03 PROCEDURE — 82962 GLUCOSE BLOOD TEST: CPT

## 2022-07-03 PROCEDURE — 36415 COLL VENOUS BLD VENIPUNCTURE: CPT

## 2022-07-03 PROCEDURE — 74011250637 HC RX REV CODE- 250/637: Performed by: HOSPITALIST

## 2022-07-03 RX ORDER — UREA 10 %
2 LOTION (ML) TOPICAL 2 TIMES DAILY
Status: DISCONTINUED | OUTPATIENT
Start: 2022-07-03 | End: 2022-07-18 | Stop reason: ALTCHOICE

## 2022-07-03 RX ORDER — LOPERAMIDE HYDROCHLORIDE 2 MG/1
2 CAPSULE ORAL
Status: COMPLETED | OUTPATIENT
Start: 2022-07-03 | End: 2022-07-03

## 2022-07-03 RX ORDER — PSEUDOEPHED/ACETAMINOPHEN/CPM 30-500-2MG
2 TABLET ORAL
Status: DISPENSED | OUTPATIENT
Start: 2022-07-03 | End: 2022-07-03

## 2022-07-03 RX ORDER — PSEUDOEPHED/ACETAMINOPHEN/CPM 30-500-2MG
2 TABLET ORAL
Status: DISCONTINUED | OUTPATIENT
Start: 2022-07-03 | End: 2022-07-03 | Stop reason: RX

## 2022-07-03 RX ADMIN — ASPIRIN 81 MG: 81 TABLET, CHEWABLE ORAL at 08:29

## 2022-07-03 RX ADMIN — ALLOPURINOL 100 MG: 100 TABLET ORAL at 08:28

## 2022-07-03 RX ADMIN — CARVEDILOL 12.5 MG: 12.5 TABLET, FILM COATED ORAL at 08:29

## 2022-07-03 RX ADMIN — INSULIN LISPRO 3 UNITS: 100 INJECTION, SOLUTION INTRAVENOUS; SUBCUTANEOUS at 17:18

## 2022-07-03 RX ADMIN — AMLODIPINE BESYLATE 10 MG: 5 TABLET ORAL at 08:29

## 2022-07-03 RX ADMIN — B-COMPLEX W/ C & FOLIC ACID TAB 1 MG 1 TABLET: 1 TAB at 08:28

## 2022-07-03 RX ADMIN — Medication 2 TABLET: at 21:26

## 2022-07-03 RX ADMIN — Medication 4 UNITS: at 17:18

## 2022-07-03 RX ADMIN — Medication 2 UNITS: at 08:29

## 2022-07-03 RX ADMIN — EZETIMIBE 10 MG: 10 TABLET ORAL at 08:28

## 2022-07-03 RX ADMIN — CARVEDILOL 12.5 MG: 12.5 TABLET, FILM COATED ORAL at 21:27

## 2022-07-03 RX ADMIN — PIPERACILLIN AND TAZOBACTAM 2.25 G: 2; .25 INJECTION, POWDER, LYOPHILIZED, FOR SOLUTION INTRAVENOUS at 21:27

## 2022-07-03 RX ADMIN — LOPERAMIDE HYDROCHLORIDE 2 MG: 2 CAPSULE ORAL at 04:30

## 2022-07-03 RX ADMIN — Medication 2 TABLET: at 08:28

## 2022-07-03 RX ADMIN — INSULIN LISPRO 3 UNITS: 100 INJECTION, SOLUTION INTRAVENOUS; SUBCUTANEOUS at 12:25

## 2022-07-03 RX ADMIN — INSULIN LISPRO 3 UNITS: 100 INJECTION, SOLUTION INTRAVENOUS; SUBCUTANEOUS at 08:29

## 2022-07-03 RX ADMIN — PIPERACILLIN AND TAZOBACTAM 2.25 G: 2; .25 INJECTION, POWDER, LYOPHILIZED, FOR SOLUTION INTRAVENOUS at 13:48

## 2022-07-03 RX ADMIN — Medication 4 UNITS: at 21:30

## 2022-07-03 RX ADMIN — Medication 2 UNITS: at 12:25

## 2022-07-03 RX ADMIN — INSULIN GLARGINE 10 UNITS: 100 INJECTION, SOLUTION SUBCUTANEOUS at 21:30

## 2022-07-03 RX ADMIN — PIPERACILLIN AND TAZOBACTAM 2.25 G: 2; .25 INJECTION, POWDER, LYOPHILIZED, FOR SOLUTION INTRAVENOUS at 05:25

## 2022-07-03 NOTE — PROGRESS NOTES
[facility]  (A Division of [address] [place] [address])                                                                                                                     Dr Nell Hare #: - Option # [phone]  Fax #: [phone]     Date of Admission: 6/27/2022Date of Note: 7/3/2022  Reason for Referral: Evaluation and antibiotic management of R heel infection    Current Antimicrobials:    Prior Antimicrobials:  Zosyn 6/27 to date   Bactrim 1 month ago       Assessment- ID related:  --------------------------------------------------------------------------  · DFU and OM R heel  OP finding: Large right heel necrotic ulcer with osteomyelitis of the calcaneus, and deep abscess  --Proteus/Mroganella/ E.fecalis and alpha strep on culture  · Subacute/chronic OM of heel-- over 2 months time  · Leucocytosis 2/2 above  --BCX 6/27: NGSF  --WCX-- GNR and GPC in pairs--> pending  · ESRD on HD TTS  · Obese BMI of 28  · DM   · History of L hallux ampuation for infection- 5yrs ago Recommendation for ID issues I am following:  --------------------------------------------------------------------------------  Continue with wound vac    Need TDC for Zosyn X 6 weeks abx     Daily CBC with diff, monitor BM response with probiotics. Monitor abd-- if going up WBC or worsening of BM, check CDI      Patient will remain in hospital per podiatry until additional sx on Tues- gives a little extra time to adjust Abx            Subjective:  Afebrile, reports loose BM X2 today and X2 yesterday    Review of systems:    No F/C/R  No N/V  No cough/sob/chest pain  No joint swelling/pain  No itching or rash           HPI:  Phill Yang is a 60 y.o. BLACK/ with PMH as listed below-- he had ulcer for 2 months or so that he was followed by as OP by Podiatry. His heel wound progressed and was advised to come to ED yesterday.  Had foot pain and fould drainage, but denies high F/C/R/SOB/ N/V prior to admission. BCX/WCX taken in ED, started on Zosyn and plan was to hold off abx and monitor until surgery. Admission assessment there was high concern for sepsis and Zosyn was continued. Hx of MRSA infection and treatment few years ago. Active Hospital Problems    Diagnosis Date Noted    CAD (coronary artery disease) 06/28/2022    ESRD (end stage renal disease) ([facility] 06/28/2022    [facility] hemat[facility] osteomyel[facility] foot ([facility] 06/28/2022    Cellulitis of right heel 06/28/2022    Diabetic foot ulcer with osteomyelitis ([facility] 06/28/2022    Ulcer of right heel, with necrosis of bone ([facility] 06/28/2022    Diabetes mellitus with foot ulcer ([facility] 06/27/2022     Past Medical History:   Diagnosis Date    Diabetes mellitus     Hypertension      History reviewed. No pertinent surgical history. History reviewed. No pertinent family history. Social History     Socioeconomic History    Marital status:      Spouse name: Not on file    Number of children: Not on file    Years of education: Not on file    Highest education level: Not on file   Occupational History    Not on file   Tobacco Use    Smoking status: Not on file    Smokeless tobacco: Not on file   Substance and Sexual Activity    Alcohol use: Not on file    Drug use: Not on file    Sexual activity: Not on file   Other Topics Concern    Dental Braces Not Asked    Endoscopic Camera Pill Not Asked    Metallic Foreign Body Not Asked    Medication Patches Not Asked    Taking Feraheme Not Asked    Claustrophobic Not Asked   Social History Narrative    Not on file     Social Determinants of Health     Financial Resource Strain:     Difficulty of Paying Living Expenses: Not on file   Food Insecurity:     Worried About Running Out of Food in the Last Year: Not on file    Vane of Food in the Last Year: Not on file   Transportation Needs:     Lack of Transportation (Medical):  Not on file    Lack of Transportation (Non-Medical): Not on file   Physical Activity:     Days of Exercise per Week: Not on file    Minutes of Exercise per Session: Not on file   Stress:     Feeling of Stress : Not on file   Social Connections:     Frequency of Communication with Friends and Family: Not on file    Frequency of Social Gatherings with Friends and Family: Not on file    Attends Mandaen Services: Not on file    Active Member of [address] [place] [address] or Organizations: Not on file    Attends Club or Organization Meetings: Not on file    Marital Status: Not on file   Intimate Partner Violence:     Fear of Current or Ex-Partner: Not on file    Emotionally Abused: Not on file    Physically Abused: Not on file    Sexually Abused: Not on file   Housing Stability:     Unable to Pay for Housing in the Last Year: Not on file    Number of [place] in the Last Year: Not on file    Unstable Housing in the Last Year: Not on file       Allergies:  Patient has no known allergies.      Medications:  Current Facility-Administered Medications   Medication Dose Route Frequency    loperamide (IMODIUM) 1 mg/7.5 mL oral solution 2 mg  2 mg Oral NOW    Lactobacillus Acidoph & Bulgar (FLORANEX) tablet 2 Tablet  2 Tablet Oral BID    epoetin lisette-epbx (RETACRIT) injection 8,000 Units  8,000 Units SubCUTAneous Q TUE, THU & SAT    allopurinoL (ZYLOPRIM) tablet 100 mg  100 mg Oral DAILY    carvediloL (COREG) tablet 12.5 mg  12.5 mg Oral BID    ezetimibe (ZETIA) tablet 10 mg  10 mg Oral DAILY    amLODIPine (NORVASC) tablet 10 mg  10 mg Oral DAILY    vit B Cmplx 3-FA-Vit C-Biotin (NEPHRO NIKITA RX) tablet 1 Tablet  1 Tablet Oral DAILY    insulin lispro (HUMALOG) injection 3 Units  3 Units SubCUTAneous TIDAC    aspirin chewable tablet 81 mg  81 mg Oral DAILY    insulin glargine (LANTUS) injection 10 Units  10 Units SubCUTAneous QHS    piperacillin-tazobactam (ZOSYN) 2.25 g in 0.9% sodium chloride (MBP/ADV) 50 mL MBP  2.25 g IntraVENous Q8H    insulin lispro (HUMALOG) injection   SubCUTAneous AC&HS    glucose chewable tablet 16 g  4 Tablet Oral PRN    glucagon (GLUCAGEN) injection 1 mg  1 mg IntraMUSCular PRN    dextrose 10% infusion 0-250 mL  0-250 mL IntraVENous PRN    acetaminophen (TYLENOL) tablet 650 mg  650 mg Oral Q6H PRN        ROS:  Pertinent items are noted in the History of Present Illness. Physical Exam:    Temp (24hrs), Av.3 °F (36.8 °C), Min:98 °F (36.7 °C), Max:99 °F (37.2 °C)    Visit Vitals  BP (!) 149/58 (BP 1 Location: Right upper arm, BP Patient Position: At rest)   Pulse 63   Temp 98 °F (36.7 °C)   Resp 18   Ht 6' 2\" (1.88 m)   Wt 104.3 kg (230 lb)   SpO2 99%   BMI 29.53 kg/m²      GEN: WD Obese, on RA--not in resp distress. Right chest TDC for HD    HEENT: Unicteric. EOMI intact  No neck swelling  CHEST: Non laboured breathing. CTA  CVS: RRR  ABD: Obese/soft. Non tender. PASCUAL: Deferred  EXT: not examined today- dressed- wound vac in place  Skin: Dry and intact. No rash, no redness. CNS: A, OX3. Moves all extremity. CN grossly ok.       Microbiology  All Micro Results     Procedure Component Value Units Date/Time    CULTURE, BLOOD [[id number]] Collected: 22 1240    Order Status: Completed Specimen: Blood Updated: 22     Special Requests: NO SPECIAL REQUESTS        Culture result: NO GROWTH 6 DAYS       CULTURE, BLOOD [[id number]] Collected: 22 1245    Order Status: Completed Specimen: Blood Updated: 22     Special Requests: NO SPECIAL REQUESTS        Culture result: NO GROWTH 6 DAYS       CULTURE, TISSUE Walsh Kayser STAIN [[id number]]  (Abnormal) Collected: 22 1926    Order Status: Completed Specimen: Bone Updated: 22 1221     Special Requests: NO SPECIAL REQUESTS        GRAM STAIN 2+ WBCS SEEN         NO ORGANISMS SEEN        Culture result: FEW ENTEROCOCCUS FAECALIS         SCANT PROTEUS VULGARIS         SCANT ALPHA STREPTOCOCCUS               SCANT Morganella morganii ssp morganii            REFER TO [facility] [id number] FOR SENSITIVITIES    CULTURE, ANAEROBIC [[id number]]  (Abnormal) Collected: 06/28/22 1925    Order Status: Completed Specimen: Heel Updated: 07/02/22 1007     Special Requests: NO SPECIAL REQUESTS        Culture result:       CHECKING FOR POSSIBLE ANAEROBIC GRAM NEGATIVE RODS          CULTURE, WOUND Davonte Mckusick STAIN [[id number]]  (Abnormal)  (Susceptibility) Collected: 06/28/22 1925    Order Status: Completed Specimen: Heel Updated: 07/02/22 1003     Special Requests: NO SPECIAL REQUESTS        GRAM STAIN OCCASIONAL WBCS SEEN         NO ORGANISMS SEEN        Culture result: LIGHT PROTEUS VULGARIS               SCANT Morganella morganii ssp morganii                  MODERATE ENTEROCOCCUS FAECALIS          AFB CULTURE + SMEAR W/RFLX ID FROM CULTURE [[id number]] Collected: 06/28/22 1925    Order Status: Completed Specimen: Miscellaneous sample Updated: 06/30/22 1738     Source MISC.  WOUND        AFB Specimen processing Concentration     Acid Fast Smear Negative        Comment: (NOTE)  Performed At: [facility] & [address] [id number]  Deya Conrad MD HX:[id number]          Acid Fast Culture PENDING    CULTURE, Audie Merl STAIN [[id number]] Collected: 06/27/22 1530    Order Status: Completed Specimen: Wound Drainage Updated: 06/29/22 1511     Special Requests: NO SPECIAL REQUESTS        GRAM STAIN RARE WBCS SEEN         2+ GRAM NEGATIVE RODS               1+ GRAM POSITIVE COCCI IN PAIRS           Culture result:       MODERATE  MIXED ENTERIC GRAM NEGATIVE RODS              HEAVY MIXED SKIN TO ISOLATED          CULTURE, Nam Mcneill [[id number]] Collected: 06/28/22 1925    Order Status: Completed Updated: 06/29/22 0758    AFB CULTURE + SMEAR W/RFLX ID FROM CULTURE [[id number]] Collected: 06/28/22 1930    Order Status: Chava Pedro [[id number]] Collected: 06/28/22 1926    Order Status: Canceled            Lab results:    Chemistry  Recent Labs     07/03/22  0527   *      K 4.2      CO2 29   BUN 39*   CREA 6.60*   CA 8.3*   AGAP 7   BUCR 6*      TP 6.7   ALB 2.4*   GLOB 4.3*   AGRAT 0.6*       CBC w/ Diff  Recent Labs     07/03/22  0527   WBC 11.4   RBC 3.32*   HGB 10.1*   HCT 32.0*      GRANS 72   LYMPH 15*   EOS 4       Imaging: report reviewed and as posted by radiologist   No results found for this or any previous visit. MRI:       1. Posterior heel skin defect, at the margin of the Achilles tendon attachment  on the calcaneus, in keeping with known ulcer. Infiltration of subjacent  subcutaneous fat in keeping with cellulitis.     2. Cortical discontinuity and marrow signal abnormality in the subjacent dorsal  calcaneus extending to the threaded tip of the oblique tibiocalcaneal screw is  suspicious for osteomyelitis.     3. Fluid signal intensity surrounding the threaded tibial tip of the 1st digit  long partially threaded screw, with osseous fragments at the inferior margin,  concerning for loosening and/or infection.     4. Marrow signal abnormalities at the 2nd-3rd and to a lesser degree 4th-5th  tarsometatarsal joint, potentially simply related to degenerative/Charcot  arthropathy. In view of marrow signal abnormalities, infection cannot be  excluded if clinically suspected. If clinically warranted, consider correlation with nuclear medicine imaging to  further assess.     5. Edema/myositis in the musculature above the ankle. There is discontinuity of  FHL and peroneal tendons, best correlated with operative findings/history of  prior intervention for significance/chronicity. Fatty change in the musculature  about the foot.

## 2022-07-03 NOTE — PROGRESS NOTES
Hospitalist Progress Note-critical care note     Patient: Marcel Stevenson MRN: [id number]  CSN: [id number]    YOB: 1960  Age: 60 y.o. Sex: male    DOA: 6/27/2022 LOS:  LOS: 6 days            Chief complaint: cad , esrd on hd, OM , cellulitis , DM ,     Assessment/Plan         Hospital Problems  Date Reviewed: 6/28/2022          Codes Class Noted POA    CAD (coronary artery disease) ICD-10-CM: I25.10  ICD-9-CM: 414.00  6/28/2022 Unknown        ESRD (end stage renal disease) ([facility] ICD-10-CM: N18.6  ICD-9-CM: 585.6  6/28/2022 Unknown        * (Principal) Acute hematogenous osteomyelitis of right foot ([facility] ICD-10-CM: M86.071  ICD-9-CM: 730.07  6/28/2022 Unknown        Cellulitis of right heel ICD-10-CM: L03.115  ICD-9-CM: 682.7  6/28/2022 Unknown        Diabetic foot ulcer with osteomyelitis ([facility] ICD-10-CM: E11.621, E11.69, L97.509, M86.9  ICD-9-CM: 250.80, 707.15, 730.27, 731.8  6/28/2022 Unknown        Ulcer of right heel, with necrosis of bone ([facility] ICD-10-CM: L97.414  ICD-9-CM: 707.14, 730.17  6/28/2022 Unknown        Diabetes mellitus with foot ulcer ([facility] ICD-10-CM: E11.621, L97.509  ICD-9-CM: 250.80, 707.15  6/27/2022 Unknown                 Lindsey Finch a 60 y. o. male who history, stent, hypertension, Charcot's foot presents with worsening pain and swelling and chills in his right foot despite multiple cleanings and being managed by podiatry as outpatient.     Large necrotic ulcer right posterior heel with osteomyelitis of the calcaneus and deep abscess-  S/p incision bone cortex right valcaneous, I&D right heel abcsess, deep wound debridement with multiple bone biopsies and application of antibiotic beads done per Dr. Carlos Molina and will have  2nd surgery on Tuesday   Podiatrist and ID f/u   cx multiple GNR-on zosyn     Diabetes mellitus -  On  lantus 10units qpm add premeal insulin and continue ssi better controlled and will continue current regimen      End-stage renal disease on dialysis Tuesday Thursday and Saturday -  S/p [facility] replacement   HD per nephrology      History of coronary artery disease-  Restart coreg and aspirin      Chronic compressive myelopathy pending surgery -  Supportive care    Subjective: I am doing fine, some loose stool       Disposition :tbd,   Review of systems:    General: No fevers or chills. Cardiovascular: No chest pain or pressure. No palpitations. Pulmonary: No shortness of breath. Gastrointestinal: No nausea, vomiting. Vital signs/Intake and Output:  Visit Vitals  BP (!) 149/58 (BP 1 Location: Right upper arm, BP Patient Position: At rest)   Pulse 63   Temp 98 °F (36.7 °C)   Resp 18   Ht 6' 2\" (1.88 m)   Wt 104.3 kg (230 lb)   SpO2 99%   BMI 29.53 kg/m²     Current Shift:  No intake/output data recorded. Last three shifts:  07/01 1901 - 07/03 0700  In: -   Out: 3000     Physical Exam:  General: WD, WN. Alert, cooperative, no acute distress    HEENT: NC, Atraumatic. PERRLA, anicteric sclerae. Lungs: CTA Bilaterally. No Wheezing/Rhonchi/Rales. HD cath noted   Heart:  Regular  rhythm,  No murmur, No Rubs, No Gallops  Abdomen: Soft, Non distended, Non tender. +Bowel sounds,   Extremities: No c/c, bilateral feet covered with protect boots and wrapped with ace , wound vac noted   Psych:   Not anxious or agitated. Neurologic:  No acute neurological deficit. Labs: Results:       Chemistry Recent Labs     07/03/22  0527   *      K 4.2      CO2 29   BUN 39*   CREA 6.60*   CA 8.3*   AGAP 7   BUCR 6*      TP 6.7   ALB 2.4*   GLOB 4.3*   AGRAT 0.6*      CBC w/Diff Recent Labs     07/03/22  0527   WBC 11.4   RBC 3.32*   HGB 10.1*   HCT 32.0*      GRANS 72   LYMPH 15*   EOS 4      Cardiac Enzymes No results for input(s): CPK, CKND1, PAULO in the last 72 hours. No lab exists for component: CKRMB, TROIP   Coagulation No results for input(s): PTP, INR, APTT, INREXT, INREXT in the last 72 hours.     Lipid Panel No results found for: CHOL, CHOLPOCT, CHOLX, CHLST, CHOLV, 981996, HDL, HDLP, LDL, LDLC, DLDLP, 795807, VLDLC, VLDL, TGLX, TRIGL, TRIGP, TGLPOCT, CHHD, CHHDX   BNP No results for input(s): BNPP in the last 72 hours. Liver Enzymes Recent Labs     07/03/22  0527   TP 6.7   ALB 2.4*         Thyroid Studies No results found for: T4, T3U, TSH, TSHEXT, TSHEXT     Procedures/imaging: see electronic medical records for all procedures/Xrays and details which were not copied into this note but were reviewed prior to creation of Plan    XR FOOT RT AP/LAT    Result Date: 6/29/2022  EXAM: FOOT SERIES Indication: Postoperative. Technique: Frontal and lateral views of the right foot. Comparison: 6/27/2022 _______________ FINDINGS: -OSSEOUS: Interval postoperative changes of posterior calcaneal osteotomy with placement of adjacent drug-eluting beads at the osteotomy site. Overlying bandage material partially degrades evaluation of the osteotomy site. The anterior approach tibial calcaneal screw tip projects at or slightly beyond the osteotomy site, difficult to evaluate given overlying leads and bandage material. Redemonstration of mid and hindfoot deformity, with cannulated screws, and loss of normal definable anatomy. There is extensive periosteal thickening and irregularity of the first metatarsal, with unchanged plantar displacement distal screw head, with adjacent bone fragment. There is diffuse lucency surrounding the proximal and distal thirds of the indwelling screw. There is diffuse periosteal thickening of the posterior distal talus. No acute displaced fracture is identified. -SOFT TISSUES: Diffuse soft tissue edematous changes, with numerous drug-eluting beads at the posterior aspect of the calcaneal osteotomy. Diffuse atherosclerotic changes. 1. Interval posterior calcaneal osteotomy with adjacent drug-eluting beads and bandage material. Otherwise, no acute osseous abnormality.      XR FOOT RT AP/LAT    Result Date: 6/27/2022  EXAM:  XR FOOT RT AP/LAT INDICATION:   right foot pain/wound with odor COMPARISON:  None. FINDINGS: 2 views of the right foot demonstrate chronic deformity, fragmentation, and collapse of the mid/hindfoot. Orthopedic screw in traversing the first ray with periprosthetic lucency. Additional hardware in the mid foot and ankle/hindfoot noted as well. Soft tissue defect seen along the calcaneus posteriorly with adjacent cortical irregularity of the calcaneal cortex. Irregular destructive appearance noted of the distal fifth metatarsal.     Ulceration overlying the heel with likely exposed calcaneus highly suggestive of osteomyelitis with adjacent cortical irregularity and sclerosis. Periprosthetic lucency along the first ray orthopedic screw. Periprosthetic infection and/or loosening suggested. Chronic deformity and collapse of the mid/hindfoot and ankle compatible with Charcot arthropathy. Irregular cortical destructive change noted of the distal fifth metatarsal head possibly related to this process as well although superimposed infection not excluded. Liya Ojeda MRI FOOT RT WO CONT    Result Date: 6/28/2022  ============================ [facility] ============================ HISTORY: -From Provider:  right heel wound, r/o osteomyelitis -Additional: None TECHNIQUE: Sagittal T1, STIR and T2 fat-sat; axial PD and T2 with fat saturation; coronal T2 and STIR with fat saturation and axial oblique PD images of the right ankle were obtained. COMPARISONS: None. FINDINGS: ----------- Postoperative changes are present in the mid and hindfoot, with sequela of prior fusion delineation of original osseous structures. Punctate foci of susceptibility artifact in the subcutaneous fat about the ankle would be consistent with postoperative changes, assuming gas is not identified on plain film.  There is plantar settling of the tibia and fibula, in the setting of hindfoot/midfoot fusion and associated postoperative deformity/remodeling. Susceptibility artifact from fixation hardware is present about the ankle. There is a partially visualized, long partially threaded cannulated screw in the 1st digit, terminating in the hind foot. There is a small amount of fluid signal intensity surrounding the tip of the screw tracking posteriorly and inferiorly, with suggested tiny osseous fragments. There is an oblique partially threaded cannulated screw from the lateral tibia to the dorsal calcaneus. There is surrounding T1 hypointense, STIR hyperintense dorsal calcaneal marrow signal, extending to the discontinuous calcaneal cortex, deep to the skin defect/ulceration. There are 2 oblique partially threaded cannulated screws in the fused hindfoot, extending from plantar aspect of the medial cuneiform and residual cuboid to the to the distal tibia. There is fatty change in the musculature about the ankle. Feathery hyperintensity is present in the distal calf musculature above the ankle. Retracted FHL tendon stump cannot be followed distal to the ankle. Peroneus longus and brevis appear discontinuous at the ankle. There is ill-defined soft tissue thickening, severe cartilage loss in marrow hyperintensity on both sides of the 2nd and 3rd and minimally at the 4th and 5th tarsometatarsal joints. Anterior tendons and Achilles tendons unremarkable within limits of exam. OTHER: Plantar fascia: Undulating/thinned plantar aponeurosis to insertion on inferior calcaneal enthesophyte.     -------------- 1. Posterior heel skin defect, at the margin of the Achilles tendon attachment on the calcaneus, in keeping with known ulcer. Infiltration of subjacent subcutaneous fat in keeping with cellulitis. 2. Cortical discontinuity and marrow signal abnormality in the subjacent dorsal calcaneus extending to the threaded tip of the oblique tibiocalcaneal screw is suspicious for osteomyelitis.  3. Fluid signal intensity surrounding the threaded tibial tip of the 1st digit long partially threaded screw, with osseous fragments at the inferior margin, concerning for loosening and/or infection. 4. Marrow signal abnormalities at the 2nd-3rd and to a lesser degree 4th-5th tarsometatarsal joint, potentially simply related to degenerative/Charcot arthropathy. In view of marrow signal abnormalities, infection cannot be excluded if clinically suspected. If clinically warranted, consider correlation with nuclear medicine imaging to further assess. 5. Edema/myositis in the musculature above the ankle. There is discontinuity of FHL and peroneal tendons, best correlated with operative findings/history of prior intervention for significance/chronicity. Fatty change in the musculature about the foot. XR CHEST PORT    Result Date: 6/27/2022  CLINICAL: Chest pain. COMPARISON: December 1, 2008. A portable view of the chest from 1909 hours: Mild elevation left hemidiaphragm. No focal airspace density. Pulmonary vascular congestion. Right IJ PermCath. No pneumothorax. Pulmonary vascular congestion. No focal airspace densities. DUPLEX LOWER EXT VENOUS BILAT    Result Date: 6/28/2022  · No evidence of deep vein thrombosis in the left lower extremity. · No evidence of deep vein thrombosis in the right lower extremity. DUPLEX LOWER EXT ARTERY BILAT    Result Date: 6/28/2022  · Bilateral tibial arteries are patnet at the ankle but with monophasic doppler signal. · Bilateral anterior tibial artery is occluded proximally but reconstituted distally by the collateral flow.         Fransisco Miller MD

## 2022-07-03 NOTE — PROGRESS NOTES
Physician Progress Note      PATIENT:               Alanna Barajas  CSN #:                  [id number]  :                       1960  ADMIT DATE:       2022 11:48 AM  DISCH DATE:  RESPONDING  PROVIDER #:        Kindra Ruff DPM          QUERY TEXT:    Patient admitted with  DM with foot ulcer. Per Op note dated /  documentation of debridement. To accurately reflect the procedure performed please document if debridement was excisional or nonexcisional and the deepest depth of tissue removed as down to and including: The medical record reflects the following:  Risk Factors: diabetic foot ulcer  Clinical Indicators: DIABETES MELLUELITUS WITH FOOT ULCER  ? Treatment: INCISION BONE CORTEX RIGHT CALCANEOUS, INCISION AND DRAINAGE RIGHT HEAL ABSCESS, DEEP WOUND DEBRIDEMENT WITH MULTIPLE BONE BIOPSIES  AND APPLICATION OF ANTIBIOTIC BEADS    Thank You  Dominguez Veliz RN, CDI ,CRCR  Options provided:  -- Nonexcisional debridement of skin  -- Excisional debridement of skin  -- Nonexcisional debridement of subcutaneous tissue  -- Excisional debridement of subcutaneous tissue  -- Nonexcisional debridement of fascia  -- Excisional debridement of fascia  -- Nonexcisional debridement of muscle  -- Excisional debridement of muscle  -- Nonexcisional debridement of bone  -- Excisional debridement of bone  -- Other - I will add my own diagnosis  -- Disagree - Not applicable / Not valid  -- Disagree - Clinically unable to determine / Unknown  -- Refer to Clinical Documentation Reviewer    PROVIDER RESPONSE TEXT:    Excisional debridement of bone of right Heel was performed during procedure on .     Query created by: Zahira Bobo on 2022 2:01 PM      Electronically signed by:  Kindra Ruff DPM 7/3/2022 1:18 PM

## 2022-07-03 NOTE — PROGRESS NOTES
Nephrology Progress Note      Subjectives: This is a 61 yo male with a PMH of DM, HTN, CAD, ESRD on HD TTS admitted for right foot infection. MRI suggested osteomyelitis. No fever, chills. Pt s/p debridement, I&D- on abx. HD Catheter fell off and was replaced 6/29. Most recent HD session 6/30    Uneventful night. Tolerated HD yesterday    Past Medical History:   Diagnosis Date    Diabetes mellitus     Hypertension      History reviewed. No pertinent surgical history. Social History     Socioeconomic History    Marital status:      Spouse name: Not on file    Number of children: Not on file    Years of education: Not on file    Highest education level: Not on file   Occupational History    Not on file   Tobacco Use    Smoking status: Not on file    Smokeless tobacco: Not on file   Substance and Sexual Activity    Alcohol use: Not on file    Drug use: Not on file    Sexual activity: Not on file   Other Topics Concern    Dental Braces Not Asked    Endoscopic Camera Pill Not Asked    Metallic Foreign Body Not Asked    Medication Patches Not Asked    Taking Feraheme Not Asked    Claustrophobic Not Asked   Social History Narrative    Not on file     Social Determinants of Health     Financial Resource Strain:     Difficulty of Paying Living Expenses: Not on file   Food Insecurity:     Worried About Running Out of Food in the Last Year: Not on file    Vane of Food in the Last Year: Not on file   Transportation Needs:     Lack of Transportation (Medical): Not on file    Lack of Transportation (Non-Medical):  Not on file   Physical Activity:     Days of Exercise per Week: Not on file    Minutes of Exercise per Session: Not on file   Stress:     Feeling of Stress : Not on file   Social Connections:     Frequency of Communication with Friends and Family: Not on file    Frequency of Social Gatherings with Friends and Family: Not on file    Attends Mormon Services: Not on file  Active Member of Clubs or Organizations: Not on file    Attends Club or Organization Meetings: Not on file    Marital Status: Not on file   Intimate Partner Violence:     Fear of Current or Ex-Partner: Not on file    Emotionally Abused: Not on file    Physically Abused: Not on file    Sexually Abused: Not on file   Housing Stability:     Unable to Pay for Housing in the Last Year: Not on file    Number of [place] in the Last Year: Not on file    Unstable Housing in the Last Year: Not on file         No Known Allergies    Meds:  reviewed    Visit Vitals  BP (!) 148/57 (BP 1 Location: Right upper arm, BP Patient Position: At rest)   Pulse 63   Temp 98 °F (36.7 °C)   Resp 18   Ht 6' 2\" (1.88 m)   Wt 104.3 kg (230 lb)   SpO2 96%   BMI 29.53 kg/m²       Physical Assessment:     Wt Readings from Last 3 Encounters:   07/03/22 104.3 kg (230 lb)     Temp Readings from Last 3 Encounters:   07/03/22 98 °F (36.7 °C)     BP Readings from Last 3 Encounters:   07/03/22 (!) 148/57     Pulse Readings from Last 3 Encounters:   07/03/22 63      PE:  General: No resp distress  Head/Neck: NCAT, supple, No JVD  CVS:Regular rate and rhythm, no M/R/G, S1/S2 heard  Lungs:Clear to auscultation bilaterally, no wheezes, rhonchi, or rales  Abdomen: Soft, Nontender, No distention, Normal Bowel sounds  Extremities: Wound Vac R foot  Skin:normal texture and turgor, no rashes, no lesions  Neuro:no change    Labs: reviewed    Impression:   ESRD on HD TTS schedule  Diabetic foot ulcer with possible osteomyelitis  DM  HTN  Anemia in CKD  SHPT  CAD    Plan:   Cont HD on TTS schedule  IV Antibiotics per ID  Podiatry following  [facility] with dialysis      Signed By: Kieran Joyner MD     July 3, 2022

## 2022-07-03 NOTE — PROGRESS NOTES
Problem: General Medical Care Plan  Goal: *Vital signs within specified parameters  Outcome: Progressing Towards Goal  Goal: *Labs within defined limits  Outcome: Progressing Towards Goal  Goal: *Absence of infection signs and symptoms  Outcome: Progressing Towards Goal  Goal: *Optimal pain control at patient's stated goal  Outcome: Progressing Towards Goal  Goal: *Skin integrity maintained  Outcome: Progressing Towards Goal  Goal: *Fluid volume balance  Outcome: Progressing Towards Goal  Goal: *Optimize nutritional status  Outcome: Progressing Towards Goal  Goal: *Anxiety reduced or absent  Outcome: Progressing Towards Goal  Goal: *Progressive mobility and function (eg: ADL's)  Outcome: Progressing Towards Goal     Problem: Patient Education: Go to Patient Education Activity  Goal: Patient/Family Education  Outcome: Progressing Towards Goal     Problem: Falls - Risk of  Goal: *Absence of Falls  Description: Document Jaxson Fall Risk and appropriate interventions in the flowsheet. Outcome: Progressing Towards Goal  Note: Fall Risk Interventions:  Mobility Interventions: Bed/chair exit alarm,PT Consult for mobility concerns,Strengthening exercises (ROM-active/passive),Utilize walker, cane, or other assistive device         Medication Interventions: Patient to call before getting OOB,Teach patient to arise slowly    Elimination Interventions: Call light in reach,Bed/chair exit alarm              Problem: Patient Education: Go to Patient Education Activity  Goal: Patient/Family Education  Outcome: Progressing Towards Goal     Problem: Pressure Injury - Risk of  Goal: *Prevention of pressure injury  Description: Document Semaj Scale and appropriate interventions in the flowsheet.   Outcome: Progressing Towards Goal  Note: Pressure Injury Interventions:  Sensory Interventions: Pressure redistribution bed/mattress (bed type),Monitor skin under medical devices,Minimize linen layers    Moisture Interventions: Absorbent underpads,Limit adult briefs,Maintain skin hydration (lotion/cream),Minimize layers    Activity Interventions: PT/OT evaluation,Pressure redistribution bed/mattress(bed type)    Mobility Interventions: PT/OT evaluation,Pressure redistribution bed/mattress (bed type)    Nutrition Interventions: Document food/fluid/supplement intake    Friction and Shear Interventions: Minimize layers                Problem: Patient Education: Go to Patient Education Activity  Goal: Patient/Family Education  Outcome: Progressing Towards Goal     Problem: Diabetes Self-Management  Goal: *Disease process and treatment process  Description: Define diabetes and identify own type of diabetes; list 3 options for treating diabetes. Outcome: Progressing Towards Goal  Goal: *Incorporating nutritional management into lifestyle  Description: Describe effect of type, amount and timing of food on blood glucose; list 3 methods for planning meals. Outcome: Progressing Towards Goal  Goal: *Incorporating physical activity into lifestyle  Description: State effect of exercise on blood glucose levels. Outcome: Progressing Towards Goal  Goal: *Developing strategies to promote health/change behavior  Description: Define the ABC's of diabetes; identify appropriate screenings, schedule and personal plan for screenings. Outcome: Progressing Towards Goal  Goal: *Using medications safely  Description: State effect of diabetes medications on diabetes; name diabetes medication taking, action and side effects. Outcome: Progressing Towards Goal  Goal: *Monitoring blood glucose, interpreting and using results  Description: Identify recommended blood glucose targets  and personal targets. Outcome: Progressing Towards Goal  Goal: *Prevention, detection, treatment of acute complications  Description: List symptoms of hyper- and hypoglycemia; describe how to treat low blood sugar and actions for lowering  high blood glucose level.   Outcome: Progressing Towards Goal  Goal: *Prevention, detection and treatment of chronic complications  Description: Define the natural course of diabetes and describe the relationship of blood glucose levels to long term complications of diabetes.   Outcome: Progressing Towards Goal  Goal: *Developing strategies to address psychosocial issues  Description: Describe feelings about living with diabetes; identify support needed and support network  Outcome: Progressing Towards Goal  Goal: *Insulin pump training  Outcome: Progressing Towards Goal  Goal: *Sick day guidelines  Outcome: Progressing Towards Goal  Goal: *Patient Specific Goal (EDIT GOAL, INSERT TEXT)  Outcome: Progressing Towards Goal     Problem: Patient Education: Go to Patient Education Activity  Goal: Patient/Family Education  Outcome: Progressing Towards Goal     Problem: Chronic Renal Failure  Goal: *Fluid and electrolytes stabilized  Outcome: Progressing Towards Goal

## 2022-07-04 LAB
ALBUMIN SERPL-MCNC: 2.3 G/DL (ref 3.4–5)
ALBUMIN/GLOB SERPL: 0.5 {RATIO} (ref 0.8–1.7)
ALP SERPL-CCNC: 116 U/L (ref 45–117)
ALT SERPL-CCNC: 26 U/L (ref 16–61)
ANION GAP SERPL CALC-SCNC: 11 MMOL/L (ref 3–18)
AST SERPL-CCNC: 23 U/L (ref 10–38)
BACTERIA SPEC CULT: ABNORMAL
BASOPHILS # BLD: 0.1 K/UL (ref 0–0.1)
BASOPHILS NFR BLD: 1 % (ref 0–2)
BILIRUB SERPL-MCNC: 0.4 MG/DL (ref 0.2–1)
BUN SERPL-MCNC: 56 MG/DL (ref 7–18)
BUN/CREAT SERPL: 7 (ref 12–20)
CALCIUM SERPL-MCNC: 7.8 MG/DL (ref 8.5–10.1)
CHLORIDE SERPL-SCNC: 103 MMOL/L (ref 100–111)
CO2 SERPL-SCNC: 24 MMOL/L (ref 21–32)
CREAT SERPL-MCNC: 8.42 MG/DL (ref 0.6–1.3)
DIFFERENTIAL METHOD BLD: ABNORMAL
EOSINOPHIL # BLD: 0.4 K/UL (ref 0–0.4)
EOSINOPHIL NFR BLD: 4 % (ref 0–5)
ERYTHROCYTE [DISTWIDTH] IN BLOOD BY AUTOMATED COUNT: 16.3 % (ref 11.6–14.5)
GLOBULIN SER CALC-MCNC: 4.3 G/DL (ref 2–4)
GLUCOSE BLD STRIP.AUTO-MCNC: 178 MG/DL (ref 70–110)
GLUCOSE BLD STRIP.AUTO-MCNC: 196 MG/DL (ref 70–110)
GLUCOSE BLD STRIP.AUTO-MCNC: 203 MG/DL (ref 70–110)
GLUCOSE BLD STRIP.AUTO-MCNC: 271 MG/DL (ref 70–110)
GLUCOSE SERPL-MCNC: 178 MG/DL (ref 74–99)
HCT VFR BLD AUTO: 32.3 % (ref 36–48)
HGB BLD-MCNC: 10.3 G/DL (ref 13–16)
IMM GRANULOCYTES # BLD AUTO: 0.1 K/UL (ref 0–0.04)
IMM GRANULOCYTES NFR BLD AUTO: 1 % (ref 0–0.5)
LYMPHOCYTES # BLD: 1.7 K/UL (ref 0.9–3.6)
LYMPHOCYTES NFR BLD: 16 % (ref 21–52)
MAGNESIUM SERPL-MCNC: 2.3 MG/DL (ref 1.6–2.6)
MCH RBC QN AUTO: 30.3 PG (ref 24–34)
MCHC RBC AUTO-ENTMCNC: 31.9 G/DL (ref 31–37)
MCV RBC AUTO: 95 FL (ref 78–100)
MONOCYTES # BLD: 0.9 K/UL (ref 0.05–1.2)
MONOCYTES NFR BLD: 9 % (ref 3–10)
NEUTS SEG # BLD: 7.6 K/UL (ref 1.8–8)
NEUTS SEG NFR BLD: 70 % (ref 40–73)
NRBC # BLD: 0 K/UL (ref 0–0.01)
NRBC BLD-RTO: 0 PER 100 WBC
PLATELET # BLD AUTO: 256 K/UL (ref 135–420)
PMV BLD AUTO: 10 FL (ref 9.2–11.8)
POTASSIUM SERPL-SCNC: 4.5 MMOL/L (ref 3.5–5.5)
PROT SERPL-MCNC: 6.6 G/DL (ref 6.4–8.2)
RBC # BLD AUTO: 3.4 M/UL (ref 4.35–5.65)
SERVICE CMNT-IMP: ABNORMAL
SODIUM SERPL-SCNC: 138 MMOL/L (ref 136–145)
WBC # BLD AUTO: 10.8 K/UL (ref 4.6–13.2)

## 2022-07-04 PROCEDURE — 74011250637 HC RX REV CODE- 250/637: Performed by: HOSPITALIST

## 2022-07-04 PROCEDURE — 85025 COMPLETE CBC W/AUTO DIFF WBC: CPT

## 2022-07-04 PROCEDURE — 74011250637 HC RX REV CODE- 250/637: Performed by: INTERNAL MEDICINE

## 2022-07-04 PROCEDURE — 80053 COMPREHEN METABOLIC PANEL: CPT

## 2022-07-04 PROCEDURE — 83735 ASSAY OF MAGNESIUM: CPT

## 2022-07-04 PROCEDURE — 82962 GLUCOSE BLOOD TEST: CPT

## 2022-07-04 PROCEDURE — 74011636637 HC RX REV CODE- 636/637: Performed by: FAMILY MEDICINE

## 2022-07-04 PROCEDURE — 74011250636 HC RX REV CODE- 250/636: Performed by: PHYSICIAN ASSISTANT

## 2022-07-04 PROCEDURE — 74011636637 HC RX REV CODE- 636/637: Performed by: HOSPITALIST

## 2022-07-04 PROCEDURE — 36415 COLL VENOUS BLD VENIPUNCTURE: CPT

## 2022-07-04 PROCEDURE — 65270000029 HC RM PRIVATE

## 2022-07-04 PROCEDURE — 74011636637 HC RX REV CODE- 636/637: Performed by: INTERNAL MEDICINE

## 2022-07-04 PROCEDURE — 74011000258 HC RX REV CODE- 258: Performed by: PHYSICIAN ASSISTANT

## 2022-07-04 RX ORDER — SEVELAMER CARBONATE 800 MG/1
1600 TABLET, FILM COATED ORAL
Status: DISCONTINUED | OUTPATIENT
Start: 2022-07-05 | End: 2022-07-21 | Stop reason: HOSPADM

## 2022-07-04 RX ADMIN — INSULIN LISPRO 3 UNITS: 100 INJECTION, SOLUTION INTRAVENOUS; SUBCUTANEOUS at 16:52

## 2022-07-04 RX ADMIN — B-COMPLEX W/ C & FOLIC ACID TAB 1 MG 1 TABLET: 1 TAB at 08:15

## 2022-07-04 RX ADMIN — INSULIN GLARGINE 10 UNITS: 100 INJECTION, SOLUTION SUBCUTANEOUS at 21:52

## 2022-07-04 RX ADMIN — AMLODIPINE BESYLATE 10 MG: 5 TABLET ORAL at 08:15

## 2022-07-04 RX ADMIN — EZETIMIBE 10 MG: 10 TABLET ORAL at 08:15

## 2022-07-04 RX ADMIN — PIPERACILLIN AND TAZOBACTAM 2.25 G: 2; .25 INJECTION, POWDER, LYOPHILIZED, FOR SOLUTION INTRAVENOUS at 05:56

## 2022-07-04 RX ADMIN — CARVEDILOL 12.5 MG: 12.5 TABLET, FILM COATED ORAL at 08:15

## 2022-07-04 RX ADMIN — CARVEDILOL 12.5 MG: 12.5 TABLET, FILM COATED ORAL at 21:12

## 2022-07-04 RX ADMIN — ASPIRIN 81 MG: 81 TABLET, CHEWABLE ORAL at 08:15

## 2022-07-04 RX ADMIN — Medication 6 UNITS: at 21:53

## 2022-07-04 RX ADMIN — Medication 2 UNITS: at 08:15

## 2022-07-04 RX ADMIN — PIPERACILLIN AND TAZOBACTAM 2.25 G: 2; .25 INJECTION, POWDER, LYOPHILIZED, FOR SOLUTION INTRAVENOUS at 21:19

## 2022-07-04 RX ADMIN — Medication 2 UNITS: at 16:52

## 2022-07-04 RX ADMIN — Medication 2 TABLET: at 21:11

## 2022-07-04 RX ADMIN — Medication 2 TABLET: at 08:15

## 2022-07-04 RX ADMIN — Medication 4 UNITS: at 12:21

## 2022-07-04 RX ADMIN — INSULIN LISPRO 3 UNITS: 100 INJECTION, SOLUTION INTRAVENOUS; SUBCUTANEOUS at 08:15

## 2022-07-04 RX ADMIN — PIPERACILLIN AND TAZOBACTAM 2.25 G: 2; .25 INJECTION, POWDER, LYOPHILIZED, FOR SOLUTION INTRAVENOUS at 13:08

## 2022-07-04 RX ADMIN — INSULIN LISPRO 3 UNITS: 100 INJECTION, SOLUTION INTRAVENOUS; SUBCUTANEOUS at 12:21

## 2022-07-04 RX ADMIN — ALLOPURINOL 100 MG: 100 TABLET ORAL at 08:16

## 2022-07-04 NOTE — PROGRESS NOTES
CM spoke with podiatry on rounds, patient will go to the OR at 1800 on 7/5, has osteomyelitis of heel, per ID will need [address] for Zosyn 6 weeks for 6/28 to end August 8.

## 2022-07-04 NOTE — PROGRESS NOTES
Podiatry:  Patient scheduled to the OR on Tuesday afternoon at [facility] for right heel surgery and Stravix grafting.

## 2022-07-04 NOTE — PROGRESS NOTES
Nephrology Progress Note    Sharolyn Seip is a 60 y.o. male BLACK/ who is being seen on consult for ESRD. Chief Complaint   Patient presents with    Foot Ulcer     Admission diagnosis: Acute hematogenous osteomyelitis of right foot ([address]    Subjectives: This is a 61 yo male with a PMH of DM, HTN, CAD, ESRD on HD TTS admitted for right foot infection. MRI suggested osteomyelitis. No fever, chills. Pt s/p debridement, I&D- on abx. HD Catheter fell off and was  replaced 6/29. Most recent HD session 7/3  Uneventful night. Denies SOB/fever/chills today    Past Medical History:   Diagnosis Date    Diabetes mellitus     Hypertension      History reviewed. No pertinent surgical history. Social History     Socioeconomic History    Marital status:      Spouse name: Not on file    Number of children: Not on file    Years of education: Not on file    Highest education level: Not on file   Occupational History    Not on file   Tobacco Use    Smoking status: Not on file    Smokeless tobacco: Not on file   Substance and Sexual Activity    Alcohol use: Not on file    Drug use: Not on file    Sexual activity: Not on file   Other Topics Concern    Dental Braces Not Asked    Endoscopic Camera Pill Not Asked    Metallic Foreign Body Not Asked    Medication Patches Not Asked    Taking Feraheme Not Asked    Claustrophobic Not Asked   Social History Narrative    Not on file     Social Determinants of Health     Financial Resource Strain:     Difficulty of Paying Living Expenses: Not on file   Food Insecurity:     Worried About Running Out of Food in the Last Year: Not on file    Vane of Food in the Last Year: Not on file   Transportation Needs:     Lack of Transportation (Medical): Not on file    Lack of Transportation (Non-Medical):  Not on file   Physical Activity:     Days of Exercise per Week: Not on file    Minutes of Exercise per Session: Not on file   Stress:  Feeling of Stress : Not on file   Social Connections:     Frequency of Communication with Friends and Family: Not on file    Frequency of Social Gatherings with Friends and Family: Not on file    Attends Mormonism Services: Not on file    Active Member of Clubs or Organizations: Not on file    Attends Club or Organization Meetings: Not on file    Marital Status: Not on file   Intimate Partner Violence:     Fear of Current or Ex-Partner: Not on file    Emotionally Abused: Not on file    Physically Abused: Not on file    Sexually Abused: Not on file   Housing Stability:     Unable to Pay for Housing in the Last Year: Not on file    Number of [place] in the Last Year: Not on file    Unstable Housing in the Last Year: Not on file       Family Hx:no hx kidney disease  No Known Allergies    Meds:  reviewed    Visit Vitals  BP (!) 148/66 (BP 1 Location: Right upper arm, BP Patient Position: At rest)   Pulse 65   Temp 98 °F (36.7 °C)   Resp 19   Ht 6' 2\" (1.88 m)   Wt 104.3 kg (230 lb)   SpO2 96%   BMI 29.53 kg/m²       Physical Assessment:     Wt Readings from Last 3 Encounters:   07/03/22 104.3 kg (230 lb)     Temp Readings from Last 3 Encounters:   07/04/22 98 °F (36.7 °C)     BP Readings from Last 3 Encounters:   07/04/22 (!) 148/66     Pulse Readings from Last 3 Encounters:   07/04/22 65        General: Well developed, alert, NAD  Head/Neck: NCAT, supple, No JVD No lymphadenopathy  CVS:Regular rate and rhythm, no M/R/G, S1/S2 heard  Lungs:Clear to auscultation bilaterally, no wheezes, rhonchi, or rales  Abdomen: Soft, Nontender, No distention, Normal Bowel sounds  Extremities: Wound Vac R foot  Skin:normal texture and turgor, no rashes, no lesions  Neuro:grossly normal , follows commands    Labs: reviewed    Impression:   ESRD on HD  Diabetic foot ulcer with possible osteomyelitis  DM  HTN  Anemia in CKD  SHPT  CAD    Plan:   HD tomorrow 7/2  Anticipate Surgery tomorrow after HD  IV Antibiotics  [facility]  [facility] for Phos binder in place of [place]      Signed By: Royer Salas MD     July 4, 2022

## 2022-07-04 NOTE — PROGRESS NOTES
Hospitalist Progress Note-critical care note     Patient: Johnathon Lerma MRN: [id number]  [facility]: [id number]    YOB: 1960  Age: 60 y.o. Sex: male    DOA: 6/27/2022 LOS:  LOS: 7 days            Chief complaint: cad , esrd on hd, OM , cellulitis , DM ,     Assessment/Plan         Hospital Problems  Date Reviewed: 6/28/2022          Codes Class Noted POA    CAD (coronary artery disease) ICD-10-CM: I25.10  ICD-9-CM: 414.00  6/28/2022 Unknown        ESRD (end stage renal disease) ([facility] ICD-10-CM: N18.6  ICD-9-CM: 585.6  6/28/2022 Unknown        * (Principal) Acute hematogenous osteomyelitis of right foot ([facility] ICD-10-CM: M86.071  ICD-9-CM: 730.07  6/28/2022 Unknown        Cellulitis of right heel ICD-10-CM: L03.115  ICD-9-CM: 682.7  6/28/2022 Unknown        Diabetic foot ulcer with osteomyelitis ([facility] ICD-10-CM: E11.621, E11.69, L97.509, M86.9  ICD-9-CM: 250.80, 707.15, 730.27, 731.8  6/28/2022 Unknown        Ulcer of right heel, with necrosis of bone ([facility] ICD-10-CM: L97.414  ICD-9-CM: 707.14, 730.17  6/28/2022 Unknown        Diabetes mellitus with foot ulcer ([facility] ICD-10-CM: E11.621, L97.509  ICD-9-CM: 250.80, 707.15  6/27/2022 Unknown                 [place] a 60 y. o. male who history, stent, hypertension, Charcot's foot presents with worsening pain and swelling and chills in his right foot despite multiple cleanings and being managed by podiatry as outpatient.     Large necrotic ulcer right posterior heel with osteomyelitis of the calcaneus and deep abscess-  S/p incision bone cortex right valcaneous, I&D right heel abcsess, deep wound debridement with multiple bone biopsies and application of antibiotic beads done per Dr. Anita Olivares and will have  2nd surgery on Tuesday at 6 PM will keep him npo at 10 AM -discussed with Dr. Cami Lanza and ID f/u   cx multiple GNR-on zosyn-need tunnel catheter      Diabetes mellitus -  On  lantus 10units qpm add premeal insulin and continue ssi better controlled and will continue current regimen      End-stage renal disease on dialysis Tuesday Thursday and Saturday -  S/p [facility] replacement   HD per nephrology      History of coronary artery disease-  Restart coreg and aspirin      Chronic compressive myelopathy pending surgery -  Supportive care    Subjective: I am thinking about my HD and surgery, will I have HD tomorrow       Disposition :tbd,   Review of systems:    General: No fevers or chills. Cardiovascular: No chest pain or pressure. No palpitations. Pulmonary: No shortness of breath. Gastrointestinal: No nausea, vomiting. Vital signs/Intake and Output:  Visit Vitals  BP (!) 145/63 (BP 1 Location: Right upper arm, BP Patient Position: At rest)   Pulse 63   Temp 97.9 °F (36.6 °C)   Resp 18   Ht 6' 2\" (1.88 m)   Wt 104.3 kg (230 lb)   SpO2 92%   BMI 29.53 kg/m²     Current Shift:  No intake/output data recorded. Last three shifts:  07/02 1901 - 07/04 0700  In: 0   Out: 3000     Physical Exam:  General: WD, WN. Alert, cooperative, no acute distress    HEENT: NC, Atraumatic. PERRLA, anicteric sclerae. Lungs: CTA Bilaterally. No Wheezing/Rhonchi/Rales. HD cath noted   Heart:  Regular  rhythm,  No murmur, No Rubs, No Gallops  Abdomen: Soft, Non distended, Non tender. +Bowel sounds,   Extremities: No c/c, bilateral feet covered with protect boots and wrapped with ace , wound vac noted   Psych:   Not anxious or agitated. Neurologic:  No acute neurological deficit.              Labs: Results:       Chemistry Recent Labs     07/04/22  0511 07/03/22  0527   * 200*    139   K 4.5 4.2    103   CO2 24 29   BUN 56* 39*   CREA 8.42* 6.60*   CA 7.8* 8.3*   AGAP 11 7   BUCR 7* 6*    116   TP 6.6 6.7   ALB 2.3* 2.4*   GLOB 4.3* 4.3*   AGRAT 0.5* 0.6*      CBC w/Diff Recent Labs     07/04/22  0511 07/03/22  0527   WBC 10.8 11.4   RBC 3.40* 3.32*   HGB 10.3* 10.1*   HCT 32.3* 32.0*    257   GRANS 70 72   LYMPH 16* 15*   EOS 4 4      Cardiac Enzymes No results for input(s): CPK, CKND1, PAULO in the last 72 hours. No lab exists for component: CKRMB, TROIP   Coagulation No results for input(s): PTP, INR, APTT, INREXT, INREXT in the last 72 hours. Lipid Panel No results found for: CHOL, CHOLPOCT, CHOLX, CHLST, CHOLV, 830362, HDL, HDLP, LDL, LDLC, DLDLP, 841710, VLDLC, VLDL, TGLX, TRIGL, TRIGP, TGLPOCT, CHHD, CHHDX   BNP No results for input(s): BNPP in the last 72 hours. Liver Enzymes Recent Labs     07/04/22  0511   TP 6.6   ALB 2.3*         Thyroid Studies No results found for: T4, T3U, TSH, TSHEXT, TSHEXT     Procedures/imaging: see electronic medical records for all procedures/Xrays and details which were not copied into this note but were reviewed prior to creation of Plan    XR FOOT RT AP/LAT    Result Date: 6/29/2022  EXAM: FOOT SERIES Indication: Postoperative. Technique: Frontal and lateral views of the right foot. Comparison: 6/27/2022 _______________ FINDINGS: -OSSEOUS: Interval postoperative changes of posterior calcaneal osteotomy with placement of adjacent drug-eluting beads at the osteotomy site. Overlying bandage material partially degrades evaluation of the osteotomy site. The anterior approach tibial calcaneal screw tip projects at or slightly beyond the osteotomy site, difficult to evaluate given overlying leads and bandage material. Redemonstration of mid and hindfoot deformity, with cannulated screws, and loss of normal definable anatomy. There is extensive periosteal thickening and irregularity of the first metatarsal, with unchanged plantar displacement distal screw head, with adjacent bone fragment. There is diffuse lucency surrounding the proximal and distal thirds of the indwelling screw. There is diffuse periosteal thickening of the posterior distal talus. No acute displaced fracture is identified.  -SOFT TISSUES: Diffuse soft tissue edematous changes, with numerous drug-eluting beads at the posterior aspect of the calcaneal osteotomy. Diffuse atherosclerotic changes. 1. Interval posterior calcaneal osteotomy with adjacent drug-eluting beads and bandage material. Otherwise, no acute osseous abnormality. XR FOOT RT AP/LAT    Result Date: 6/27/2022  EXAM:  XR FOOT RT AP/LAT INDICATION:   right foot pain/wound with odor COMPARISON:  None. FINDINGS: 2 views of the right foot demonstrate chronic deformity, fragmentation, and collapse of the mid/hindfoot. Orthopedic screw in traversing the first ray with periprosthetic lucency. Additional hardware in the mid foot and ankle/hindfoot noted as well. Soft tissue defect seen along the calcaneus posteriorly with adjacent cortical irregularity of the calcaneal cortex. Irregular destructive appearance noted of the distal fifth metatarsal.     Ulceration overlying the heel with likely exposed calcaneus highly suggestive of osteomyelitis with adjacent cortical irregularity and sclerosis. Periprosthetic lucency along the first ray orthopedic screw. Periprosthetic infection and/or loosening suggested. Chronic deformity and collapse of the mid/hindfoot and ankle compatible with Charcot arthropathy. Irregular cortical destructive change noted of the distal fifth metatarsal head possibly related to this process as well although superimposed infection not excluded. Rick [place] MRI FOOT RT WO CONT    Result Date: 6/28/2022  ============================ [facility] ============================ HISTORY: -From Provider:  right heel wound, r/o osteomyelitis -Additional: None TECHNIQUE: Sagittal T1, STIR and T2 fat-sat; axial PD and T2 with fat saturation; coronal T2 and STIR with fat saturation and axial oblique PD images of the right ankle were obtained. COMPARISONS: None. FINDINGS: ----------- Postoperative changes are present in the mid and hindfoot, with sequela of prior fusion delineation of original osseous structures.  Punctate foci of susceptibility artifact in the subcutaneous fat about the ankle would be consistent with postoperative changes, assuming gas is not identified on plain film. There is plantar settling of the tibia and fibula, in the setting of hindfoot/midfoot fusion and associated postoperative deformity/remodeling. Susceptibility artifact from fixation hardware is present about the ankle. There is a partially visualized, long partially threaded cannulated screw in the 1st digit, terminating in the hind foot. There is a small amount of fluid signal intensity surrounding the tip of the screw tracking posteriorly and inferiorly, with suggested tiny osseous fragments. There is an oblique partially threaded cannulated screw from the lateral tibia to the dorsal calcaneus. There is surrounding T1 hypointense, STIR hyperintense dorsal calcaneal marrow signal, extending to the discontinuous calcaneal cortex, deep to the skin defect/ulceration. There are 2 oblique partially threaded cannulated screws in the fused hindfoot, extending from plantar aspect of the medial cuneiform and residual cuboid to the to the distal tibia. There is fatty change in the musculature about the ankle. Feathery hyperintensity is present in the distal calf musculature above the ankle. Retracted FHL tendon stump cannot be followed distal to the ankle. Peroneus longus and brevis appear discontinuous at the ankle. There is ill-defined soft tissue thickening, severe cartilage loss in marrow hyperintensity on both sides of the 2nd and 3rd and minimally at the 4th and 5th tarsometatarsal joints. Anterior tendons and Achilles tendons unremarkable within limits of exam. OTHER: Plantar fascia: Undulating/thinned plantar aponeurosis to insertion on inferior calcaneal enthesophyte.     -------------- 1. Posterior heel skin defect, at the margin of the Achilles tendon attachment on the calcaneus, in keeping with known ulcer. Infiltration of subjacent subcutaneous fat in keeping with cellulitis.  2. Cortical discontinuity and marrow signal abnormality in the subjacent dorsal calcaneus extending to the threaded tip of the oblique tibiocalcaneal screw is suspicious for osteomyelitis. 3. Fluid signal intensity surrounding the threaded tibial tip of the 1st digit long partially threaded screw, with osseous fragments at the inferior margin, concerning for loosening and/or infection. 4. Marrow signal abnormalities at the 2nd-3rd and to a lesser degree 4th-5th tarsometatarsal joint, potentially simply related to degenerative/Charcot arthropathy. In view of marrow signal abnormalities, infection cannot be excluded if clinically suspected. If clinically warranted, consider correlation with nuclear medicine imaging to further assess. 5. Edema/myositis in the musculature above the ankle. There is discontinuity of FHL and peroneal tendons, best correlated with operative findings/history of prior intervention for significance/chronicity. Fatty change in the musculature about the foot. XR CHEST PORT    Result Date: 6/27/2022  CLINICAL: Chest pain. COMPARISON: December 1, 2008. A portable view of the chest from 1909 hours: Mild elevation left hemidiaphragm. No focal airspace density. Pulmonary vascular congestion. Right IJ PermCath. No pneumothorax. Pulmonary vascular congestion. No focal airspace densities. DUPLEX LOWER EXT VENOUS BILAT    Result Date: 6/28/2022  · No evidence of deep vein thrombosis in the left lower extremity. · No evidence of deep vein thrombosis in the right lower extremity. DUPLEX LOWER EXT ARTERY BILAT    Result Date: 6/28/2022  · Bilateral tibial arteries are patnet at the ankle but with monophasic doppler signal. · Bilateral anterior tibial artery is occluded proximally but reconstituted distally by the collateral flow.         Nora Trejo MD

## 2022-07-04 NOTE — PROGRESS NOTES
[id number]  Bedside and verbal shift change report given to Tiffani Hodge RN (on coming nurse) by [address], RN (off going nurse). Report included the following information SBAR, Kardex, Intake/Output and MAR. KIM Moncada updated Dr. Leana Hurt.    [id number]  Bedside and verbal shift change report given by KIM Tran (off going nurse) to Reno Halsted, RN(on coming nurse). Report included the following information SBAR, Kardex, Intake/Output and MAR. Pt wanted to have Pepto Bismo. Notified the next shift RN for protonix.

## 2022-07-04 NOTE — PROGRESS NOTES
Problem: General Medical Care Plan  Goal: *Absence of infection signs and symptoms  Outcome: Progressing Towards Goal  Goal: *Optimal pain control at patient's stated goal  Outcome: Progressing Towards Goal  Goal: *Skin integrity maintained  Outcome: Progressing Towards Goal  Goal: *Progressive mobility and function (eg: ADL's)  Outcome: Progressing Towards Goal     Problem: Falls - Risk of  Goal: *Absence of Falls  Description: Document Jaxson Fall Risk and appropriate interventions in the flowsheet.   Outcome: Progressing Towards Goal  Note: Fall Risk Interventions:  Mobility Interventions: Assess mobility with egress test         Medication Interventions: Teach patient to arise slowly    Elimination Interventions: Call light in reach

## 2022-07-04 NOTE — PROGRESS NOTES
[facility]  (A Division of [address] [place] [address])                                                                                                                     Dr Sarah Salazar #: - Option # [phone]  Fax #: [phone]     Date of Admission: 6/27/2022Date of Note: 7/4/2022  Reason for Referral: Evaluation and antibiotic management of R heel infection    Current Antimicrobials:    Prior Antimicrobials:  Zosyn 6/27 to date   Bactrim 1 month ago       Assessment- ID related:  --------------------------------------------------------------------------  · DFU and OM R heel  OP finding: Large right heel necrotic ulcer with osteomyelitis of the calcaneus, and deep abscess  --Proteus/Mroganella/ E.fecalis and alpha strep on culture  · Subacute/chronic OM of heel-- over 2 months time  · Leucocytosis 2/2 above  --BCX 6/27: NGSF  --WCX-- GNR and GPC in pairs--> pending  · ESRD on HD TTS  · Obese BMI of 28  · DM   · History of L hallux ampuation for infection- 5yrs ago Recommendation for ID issues I am following:  --------------------------------------------------------------------------------  Continue with wound vac per Podiatry      Need TDC for Zosyn X 6 weeks abx ( from 6/28)  End: August 8    Daily CBC with diff, monitor BM response with probiotics. Monitor abd-- if going up WBC or worsening of BM, check CDI      Patient will remain in hospital per podiatry until additional sx on Tues- gives a little extra time to adjust Abx            Subjective:  Afebrile, reports BM is better  Order for [facility] is already placed    Review of systems:    No F/C/R  No N/V  No cough/sob/chest pain  No joint swelling/pain  No itching or rash           HPI:  Sol Montiel is a 60 y.o. BLACK/ with PMH as listed below-- he had ulcer for 2 months or so that he was followed by as OP by Podiatry. His heel wound progressed and was advised to come to ED yesterday.  Had foot pain and fould drainage, but denies high F/C/R/SOB/ N/V prior to admission. BCX/WCX taken in ED, started on Zosyn and plan was to hold off abx and monitor until surgery. Admission assessment there was high concern for sepsis and Zosyn was continued. Hx of MRSA infection and treatment few years ago. Active Hospital Problems    Diagnosis Date Noted    CAD (coronary artery disease) 06/28/2022    ESRD (end stage renal disease) ([facility] 06/28/2022    Acute hematogenous osteomyelitis of right foot ([facility] 06/28/2022    Cellulitis of right heel 06/28/2022    Diabetic [facility]elitis [facility] 06/28/2022    Ulcer of right heel, with necrosis of bone ([facility] 06/28/2022    Diabetes mellitus with foot ulcer ([facility] 06/27/2022     Past Medical History:   Diagnosis Date    Diabetes mellitus     Hypertension      History reviewed. No pertinent surgical history. History reviewed. No pertinent family history.   Social History     Socioeconomic History    Marital status:      Spouse name: Not on file    Number of children: Not on file    Years of education: Not on file    Highest education level: Not on file   Occupational History    Not on file   Tobacco Use    Smoking status: Not on file    Smokeless tobacco: Not on file   Substance and Sexual Activity    Alcohol use: Not on file    Drug use: Not on file    Sexual activity: Not on file   Other Topics Concern    Dental Braces Not Asked    Endoscopic Camera Pill Not Asked    Metallic Foreign Body Not Asked    Medication Patches Not Asked    Taking Feraheme Not Asked    Claustrophobic Not Asked   Social History Narrative    Not on file     Social Determinants of Health     Financial Resource Strain:     Difficulty of Paying Living Expenses: Not on file   Food Insecurity:     Worried About Running Out of Food in the Last Year: Not on file    Vane of Food in the Last Year: Not on file   Transportation Needs:     Lack of Transportation (Medical): Not on file    Lack of Transportation (Non-Medical): Not on file   Physical Activity:     Days of Exercise per Week: Not on file    Minutes of Exercise per Session: Not on file   Stress:     Feeling of Stress : Not on file   Social Connections:     Frequency of Communication with Friends and Family: Not on file    Frequency of Social Gatherings with Friends and Family: Not on file    Attends Congregational Services: Not on file    Active Member of [address] [place] [address] or Organizations: Not on file    Attends Club or Organization Meetings: Not on file    Marital Status: Not on file   Intimate Partner Violence:     Fear of Current or Ex-Partner: Not on file    Emotionally Abused: Not on file    Physically Abused: Not on file    Sexually Abused: Not on file   Housing Stability:     Unable to Pay for Housing in the Last Year: Not on file    Number of [place] in the Last Year: Not on file    Unstable Housing in the Last Year: Not on file       Allergies:  Patient has no known allergies.      Medications:  Current Facility-Administered Medications   Medication Dose Route Frequency    Lactobacillus Acidoph & Bulgar (FLORANEX) tablet 2 Tablet  2 Tablet Oral BID    epoetin lisette-epbx (RETACRIT) injection 8,000 Units  8,000 Units SubCUTAneous Q TUE, THU & SAT    allopurinoL (ZYLOPRIM) tablet 100 mg  100 mg Oral DAILY    carvediloL (COREG) tablet 12.5 mg  12.5 mg Oral BID    ezetimibe (ZETIA) tablet 10 mg  10 mg Oral DAILY    amLODIPine (NORVASC) tablet 10 mg  10 mg Oral DAILY    vit B Cmplx 3-FA-Vit C-Biotin (NEPHRO NIKITA RX) tablet 1 Tablet  1 Tablet Oral DAILY    insulin lispro (HUMALOG) injection 3 Units  3 Units SubCUTAneous TIDAC    aspirin chewable tablet 81 mg  81 mg Oral DAILY    insulin glargine (LANTUS) injection 10 Units  10 Units SubCUTAneous QHS    piperacillin-tazobactam (ZOSYN) 2.25 g in 0.9% sodium chloride (MBP/ADV) 50 mL MBP  2.25 g IntraVENous Q8H    insulin lispro (HUMALOG) injection   SubCUTAneous AC&HS    glucose chewable tablet 16 g  4 Tablet Oral PRN    glucagon (GLUCAGEN) injection 1 mg  1 mg IntraMUSCular PRN    dextrose 10% infusion 0-250 mL  0-250 mL IntraVENous PRN    acetaminophen (TYLENOL) tablet 650 mg  650 mg Oral Q6H PRN        ROS:  Pertinent items are noted in the History of Present Illness. Physical Exam:    Temp (24hrs), Av.3 °F (36.8 °C), Min:97.9 °F (36.6 °C), Max:98.6 °F (37 °C)    Visit Vitals  BP (!) 145/63 (BP 1 Location: Right upper arm, BP Patient Position: At rest)   Pulse 63   Temp 97.9 °F (36.6 °C)   Resp 18   Ht 6' 2\" (1.88 m)   Wt 104.3 kg (230 lb)   SpO2 92%   BMI 29.53 kg/m²      GEN: WD Obese, on RA--not in resp distress. Right chest TDC for HD    HEENT: Unicteric. EOMI intact  No neck swelling  CHEST: Non laboured breathing. CTA  CVS: RRR  ABD: Obese/soft. Non tender. PASCUAL: Deferred  EXT: not examined today- dressed- wound vac in place  Skin: Dry and intact. No rash, no redness. CNS: A, OX3. Moves all extremity. CN grossly ok.       Microbiology  All Micro Results     Procedure Component Value Units Date/Time    CULTURE, BLOOD [[id number]] Collected: 22 1240    Order Status: Completed Specimen: Blood Updated: 22     Special Requests: NO SPECIAL REQUESTS        Culture result: NO GROWTH 6 DAYS       CULTURE, BLOOD [[id number]] Collected: 22 1245    Order Status: Completed Specimen: Blood Updated: 2234     Special Requests: NO SPECIAL REQUESTS        Culture result: NO GROWTH 6 DAYS       CULTURE, TISSUE Raford Payer STAIN [[id number]]  (Abnormal) Collected: 22 1926    Order Status: Completed Specimen: Bone Updated: 22 1221     Special Requests: NO SPECIAL REQUESTS        GRAM STAIN 2+ WBCS SEEN         NO ORGANISMS SEEN        Culture result: FEW ENTEROCOCCUS FAECALIS         SCANT PROTEUS VULGARIS         SCANT ALPHA STREPTOCOCCUS               SCANT Morganella morganii ssp morganii REFER TO [facility] [id number] FOR SENSITIVITIES    CULTURE, ANAEROBIC [[id number]]  (Abnormal) Collected: 06/28/22 1925    Order Status: Completed Specimen: Heel Updated: 07/02/22 1007     Special Requests: NO SPECIAL REQUESTS        Culture result:       CHECKING FOR POSSIBLE ANAEROBIC GRAM NEGATIVE RODS          CULTURE, WOUND [place] STAIN [[id number]]  (Abnormal)  (Susceptibility) Collected: 06/28/22 1925    Order Status: Completed Specimen: Heel Updated: 07/02/22 1003     Special Requests: NO SPECIAL REQUESTS        GRAM STAIN OCCASIONAL WBCS SEEN         NO ORGANISMS SEEN        Culture result: LIGHT PROTEUS VULGARIS               SCANT Morganella morganii ssp morganii                  MODERATE ENTEROCOCCUS FAECALIS          AFB CULTURE + SMEAR W/RFLX ID FROM CULTURE [[id number]] Collected: 06/28/22 1925    Order Status: Completed Specimen: Miscellaneous sample Updated: 06/30/22 1738     Source MISC.  WOUND        AFB Specimen processing Concentration     Acid Fast Smear Negative        Comment: (NOTE)  Performed At: [facility] & [address] [id number]  Xenia Castillo MD Serbian:[id number]          Acid Fast Culture PENDING    CULTURE, Thompson Jesus STAIN [[id number]] Collected: 06/27/22 1530    Order Status: Completed Specimen: Wound Drainage Updated: 06/29/22 1511     Special Requests: NO SPECIAL REQUESTS        GRAM STAIN RARE WBCS SEEN         2+ GRAM NEGATIVE RODS               1+ GRAM POSITIVE COCCI IN PAIRS           Culture result:       MODERATE  MIXED ENTERIC GRAM NEGATIVE RODS              HEAVY MIXED SKIN TO ISOLATED          CULTURE, Hulon Sacks [[id number]] Collected: 06/28/22 1925    Order Status: Completed Updated: 06/29/22 0758    AFB CULTURE + SMEAR W/RFLX ID FROM CULTURE [[id number]] Collected: 06/28/22 1930    Order Status: Meri De Jesus [[id number]] Collected: 06/28/22 1926    Order Status: Canceled            Lab results:    Chemistry  Recent Labs 07/04/22  0511 07/03/22 0527   * 200*    139   K 4.5 4.2    103   CO2 24 29   BUN 56* 39*   CREA 8.42* 6.60*   CA 7.8* 8.3*   AGAP 11 7   BUCR 7* 6*    116   TP 6.6 6.7   ALB 2.3* 2.4*   GLOB 4.3* 4.3*   AGRAT 0.5* 0.6*       CBC w/ Diff  Recent Labs     07/04/22 0511 07/03/22 0527   WBC 10.8 11.4   RBC 3.40* 3.32*   HGB 10.3* 10.1*   HCT 32.3* 32.0*    257   GRANS 70 72   LYMPH 16* 15*   EOS 4 4       Imaging: report reviewed and as posted by radiologist   No results found for this or any previous visit. MRI:       1. Posterior heel skin defect, at the margin of the Achilles tendon attachment  on the calcaneus, in keeping with known ulcer. Infiltration of subjacent  subcutaneous fat in keeping with cellulitis.     2. Cortical discontinuity and marrow signal abnormality in the subjacent dorsal  calcaneus extending to the threaded tip of the oblique tibiocalcaneal screw is  suspicious for osteomyelitis.     3. Fluid signal intensity surrounding the threaded tibial tip of the 1st digit  long partially threaded screw, with osseous fragments at the inferior margin,  concerning for loosening and/or infection.     4. Marrow signal abnormalities at the 2nd-3rd and to a lesser degree 4th-5th  tarsometatarsal joint, potentially simply related to degenerative/Charcot  arthropathy. In view of marrow signal abnormalities, infection cannot be  excluded if clinically suspected. If clinically warranted, consider correlation with nuclear medicine imaging to  further assess.     5. Edema/myositis in the musculature above the ankle. There is discontinuity of  FHL and peroneal tendons, best correlated with operative findings/history of  prior intervention for significance/chronicity. Fatty change in the musculature  about the foot.

## 2022-07-05 ENCOUNTER — ANESTHESIA (OUTPATIENT)
Dept: SURGERY | Age: 62
DRG: 629 | End: 2022-07-05
Payer: MEDICARE

## 2022-07-05 ENCOUNTER — APPOINTMENT (OUTPATIENT)
Dept: GENERAL RADIOLOGY | Age: 62
DRG: 629 | End: 2022-07-05
Attending: PODIATRIST
Payer: MEDICARE

## 2022-07-05 ENCOUNTER — ANESTHESIA EVENT (OUTPATIENT)
Dept: SURGERY | Age: 62
DRG: 629 | End: 2022-07-05
Payer: MEDICARE

## 2022-07-05 PROBLEM — T84.69XA: Status: ACTIVE | Noted: 2022-07-05

## 2022-07-05 LAB
ALBUMIN SERPL-MCNC: 2.3 G/DL (ref 3.4–5)
ALBUMIN/GLOB SERPL: 0.6 {RATIO} (ref 0.8–1.7)
ALP SERPL-CCNC: 115 U/L (ref 45–117)
ALT SERPL-CCNC: 26 U/L (ref 16–61)
ANION GAP SERPL CALC-SCNC: 10 MMOL/L (ref 3–18)
AST SERPL-CCNC: 22 U/L (ref 10–38)
BASOPHILS # BLD: 0.1 K/UL (ref 0–0.1)
BASOPHILS NFR BLD: 1 % (ref 0–2)
BILIRUB SERPL-MCNC: 0.4 MG/DL (ref 0.2–1)
BUN SERPL-MCNC: 71 MG/DL (ref 7–18)
BUN/CREAT SERPL: 7 (ref 12–20)
CALCIUM SERPL-MCNC: 7.4 MG/DL (ref 8.5–10.1)
CHLORIDE SERPL-SCNC: 103 MMOL/L (ref 100–111)
CO2 SERPL-SCNC: 24 MMOL/L (ref 21–32)
CREAT SERPL-MCNC: 10.1 MG/DL (ref 0.6–1.3)
DIFFERENTIAL METHOD BLD: ABNORMAL
EOSINOPHIL # BLD: 0.4 K/UL (ref 0–0.4)
EOSINOPHIL NFR BLD: 4 % (ref 0–5)
ERYTHROCYTE [DISTWIDTH] IN BLOOD BY AUTOMATED COUNT: 16.7 % (ref 11.6–14.5)
GLOBULIN SER CALC-MCNC: 3.9 G/DL (ref 2–4)
GLUCOSE BLD STRIP.AUTO-MCNC: 118 MG/DL (ref 70–110)
GLUCOSE BLD STRIP.AUTO-MCNC: 125 MG/DL (ref 70–110)
GLUCOSE BLD STRIP.AUTO-MCNC: 144 MG/DL (ref 70–110)
GLUCOSE BLD STRIP.AUTO-MCNC: 155 MG/DL (ref 70–110)
GLUCOSE BLD STRIP.AUTO-MCNC: 181 MG/DL (ref 70–110)
GLUCOSE SERPL-MCNC: 224 MG/DL (ref 74–99)
HCT VFR BLD AUTO: 29 % (ref 36–48)
HGB BLD-MCNC: 9.2 G/DL (ref 13–16)
IMM GRANULOCYTES # BLD AUTO: 0.1 K/UL (ref 0–0.04)
IMM GRANULOCYTES NFR BLD AUTO: 1 % (ref 0–0.5)
LYMPHOCYTES # BLD: 1.6 K/UL (ref 0.9–3.6)
LYMPHOCYTES NFR BLD: 16 % (ref 21–52)
MAGNESIUM SERPL-MCNC: 2.2 MG/DL (ref 1.6–2.6)
MCH RBC QN AUTO: 30.2 PG (ref 24–34)
MCHC RBC AUTO-ENTMCNC: 31.7 G/DL (ref 31–37)
MCV RBC AUTO: 95.1 FL (ref 78–100)
MONOCYTES # BLD: 0.7 K/UL (ref 0.05–1.2)
MONOCYTES NFR BLD: 7 % (ref 3–10)
NEUTS SEG # BLD: 7.2 K/UL (ref 1.8–8)
NEUTS SEG NFR BLD: 71 % (ref 40–73)
NRBC # BLD: 0 K/UL (ref 0–0.01)
NRBC BLD-RTO: 0 PER 100 WBC
PLATELET # BLD AUTO: 233 K/UL (ref 135–420)
PMV BLD AUTO: 10.6 FL (ref 9.2–11.8)
POTASSIUM SERPL-SCNC: 4.7 MMOL/L (ref 3.5–5.5)
POTASSIUM SERPL-SCNC: 5 MMOL/L (ref 3.5–5.5)
PROT SERPL-MCNC: 6.2 G/DL (ref 6.4–8.2)
RBC # BLD AUTO: 3.05 M/UL (ref 4.35–5.65)
SODIUM SERPL-SCNC: 137 MMOL/L (ref 136–145)
WBC # BLD AUTO: 10.1 K/UL (ref 4.6–13.2)

## 2022-07-05 PROCEDURE — 74011000250 HC RX REV CODE- 250

## 2022-07-05 PROCEDURE — 74011250636 HC RX REV CODE- 250/636: Performed by: PHYSICIAN ASSISTANT

## 2022-07-05 PROCEDURE — 77030020782 HC GWN BAIR PAWS FLX 3M -B: Performed by: PODIATRIST

## 2022-07-05 PROCEDURE — 74011000258 HC RX REV CODE- 258: Performed by: PHYSICIAN ASSISTANT

## 2022-07-05 PROCEDURE — 77030006643

## 2022-07-05 PROCEDURE — 65270000029 HC RM PRIVATE

## 2022-07-05 PROCEDURE — 74011636637 HC RX REV CODE- 636/637: Performed by: HOSPITALIST

## 2022-07-05 PROCEDURE — 0L8S0ZZ DIVISION OF RIGHT ANKLE TENDON, OPEN APPROACH: ICD-10-PCS | Performed by: PODIATRIST

## 2022-07-05 PROCEDURE — 74011000250 HC RX REV CODE- 250: Performed by: PODIATRIST

## 2022-07-05 PROCEDURE — 74011636637 HC RX REV CODE- 636/637: Performed by: INTERNAL MEDICINE

## 2022-07-05 PROCEDURE — 77030002916 HC SUT ETHLN J&J -A: Performed by: PODIATRIST

## 2022-07-05 PROCEDURE — 74011250636 HC RX REV CODE- 250/636: Performed by: PODIATRIST

## 2022-07-05 PROCEDURE — 2709999900 HC NON-CHARGEABLE SUPPLY: Performed by: PODIATRIST

## 2022-07-05 PROCEDURE — 0QPL04Z REMOVAL OF INTERNAL FIXATION DEVICE FROM RIGHT TARSAL, OPEN APPROACH: ICD-10-PCS | Performed by: PODIATRIST

## 2022-07-05 PROCEDURE — 77030013079 HC BLNKT BAIR HGGR 3M -A

## 2022-07-05 PROCEDURE — 73620 X-RAY EXAM OF FOOT: CPT

## 2022-07-05 PROCEDURE — 88300 SURGICAL PATH GROSS: CPT

## 2022-07-05 PROCEDURE — 74011250637 HC RX REV CODE- 250/637: Performed by: INTERNAL MEDICINE

## 2022-07-05 PROCEDURE — 36415 COLL VENOUS BLD VENIPUNCTURE: CPT

## 2022-07-05 PROCEDURE — 0LMN0ZZ REATTACHMENT OF RIGHT LOWER LEG TENDON, OPEN APPROACH: ICD-10-PCS | Performed by: PODIATRIST

## 2022-07-05 PROCEDURE — 74011000272 HC RX REV CODE- 272: Performed by: PODIATRIST

## 2022-07-05 PROCEDURE — 90935 HEMODIALYSIS ONE EVALUATION: CPT

## 2022-07-05 PROCEDURE — 77030008683 HC TU ET CUF COVD -A

## 2022-07-05 PROCEDURE — 83735 ASSAY OF MAGNESIUM: CPT

## 2022-07-05 PROCEDURE — 76210000006 HC OR PH I REC 0.5 TO 1 HR: Performed by: PODIATRIST

## 2022-07-05 PROCEDURE — 77030018835 HC SOL IRR LR ICUM -A: Performed by: PODIATRIST

## 2022-07-05 PROCEDURE — 82962 GLUCOSE BLOOD TEST: CPT

## 2022-07-05 PROCEDURE — 77030032015 HC HNDPC IRR VRSAJET EXCT S&N -F: Performed by: PODIATRIST

## 2022-07-05 PROCEDURE — 74011250637 HC RX REV CODE- 250/637: Performed by: HOSPITALIST

## 2022-07-05 PROCEDURE — 77030013708 HC HNDPC SUC IRR PULS STRY –B: Performed by: PODIATRIST

## 2022-07-05 PROCEDURE — 77030040361 HC SLV COMPR DVT MDII -B: Performed by: PODIATRIST

## 2022-07-05 PROCEDURE — 74011000250 HC RX REV CODE- 250: Performed by: ANESTHESIOLOGY

## 2022-07-05 PROCEDURE — 0LBN0ZZ EXCISION OF RIGHT LOWER LEG TENDON, OPEN APPROACH: ICD-10-PCS | Performed by: PODIATRIST

## 2022-07-05 PROCEDURE — 0HRMXK3 REPLACEMENT OF RIGHT FOOT SKIN WITH NONAUTOLOGOUS TISSUE SUBSTITUTE, FULL THICKNESS, EXTERNAL APPROACH: ICD-10-PCS | Performed by: PODIATRIST

## 2022-07-05 PROCEDURE — 84132 ASSAY OF SERUM POTASSIUM: CPT

## 2022-07-05 PROCEDURE — 76060000036 HC ANESTHESIA 2.5 TO 3 HR: Performed by: PODIATRIST

## 2022-07-05 PROCEDURE — 0QBL0ZZ EXCISION OF RIGHT TARSAL, OPEN APPROACH: ICD-10-PCS | Performed by: PODIATRIST

## 2022-07-05 PROCEDURE — 85025 COMPLETE CBC W/AUTO DIFF WBC: CPT

## 2022-07-05 PROCEDURE — 0QPG04Z REMOVAL OF INTERNAL FIXATION DEVICE FROM RIGHT TIBIA, OPEN APPROACH: ICD-10-PCS | Performed by: PODIATRIST

## 2022-07-05 PROCEDURE — 77030008477 HC STYL SATN SLP COVD -A

## 2022-07-05 PROCEDURE — 74011636637 HC RX REV CODE- 636/637: Performed by: ANESTHESIOLOGY

## 2022-07-05 PROCEDURE — 74011250636 HC RX REV CODE- 250/636

## 2022-07-05 PROCEDURE — 93005 ELECTROCARDIOGRAM TRACING: CPT

## 2022-07-05 PROCEDURE — 77030031139 HC SUT VCRL2 J&J -A: Performed by: PODIATRIST

## 2022-07-05 PROCEDURE — 76010000132 HC OR TIME 2.5 TO 3 HR: Performed by: PODIATRIST

## 2022-07-05 PROCEDURE — 0HRKXK3 REPLACEMENT OF RIGHT LOWER LEG SKIN WITH NONAUTOLOGOUS TISSUE SUBSTITUTE, FULL THICKNESS, EXTERNAL APPROACH: ICD-10-PCS | Performed by: PODIATRIST

## 2022-07-05 PROCEDURE — 77030018836 HC SOL IRR NACL ICUM -A: Performed by: PODIATRIST

## 2022-07-05 DEVICE — GRAFT HUM TISS W3XL6CM CRYOPRESERVED PLCNTA TISS UMB AMNION: Type: IMPLANTABLE DEVICE | Site: HEEL | Status: FUNCTIONAL

## 2022-07-05 RX ORDER — CISATRACURIUM BESYLATE 2 MG/ML
INJECTION, SOLUTION INTRAVENOUS AS NEEDED
Status: DISCONTINUED | OUTPATIENT
Start: 2022-07-05 | End: 2022-07-05 | Stop reason: HOSPADM

## 2022-07-05 RX ORDER — PROPOFOL 10 MG/ML
INJECTION, EMULSION INTRAVENOUS AS NEEDED
Status: DISCONTINUED | OUTPATIENT
Start: 2022-07-05 | End: 2022-07-05 | Stop reason: HOSPADM

## 2022-07-05 RX ORDER — SUCCINYLCHOLINE CHLORIDE 100 MG/5ML
SYRINGE (ML) INTRAVENOUS AS NEEDED
Status: DISCONTINUED | OUTPATIENT
Start: 2022-07-05 | End: 2022-07-05 | Stop reason: HOSPADM

## 2022-07-05 RX ORDER — SODIUM CHLORIDE 0.9 % (FLUSH) 0.9 %
5-40 SYRINGE (ML) INJECTION EVERY 8 HOURS
Status: DISCONTINUED | OUTPATIENT
Start: 2022-07-05 | End: 2022-07-05 | Stop reason: HOSPADM

## 2022-07-05 RX ORDER — EPHEDRINE SULFATE/0.9% NACL/PF 50 MG/5 ML
SYRINGE (ML) INTRAVENOUS AS NEEDED
Status: DISCONTINUED | OUTPATIENT
Start: 2022-07-05 | End: 2022-07-05 | Stop reason: HOSPADM

## 2022-07-05 RX ORDER — SODIUM CHLORIDE, SODIUM LACTATE, POTASSIUM CHLORIDE, CALCIUM CHLORIDE 600; 310; 30; 20 MG/100ML; MG/100ML; MG/100ML; MG/100ML
125 INJECTION, SOLUTION INTRAVENOUS CONTINUOUS
Status: DISCONTINUED | OUTPATIENT
Start: 2022-07-05 | End: 2022-07-05 | Stop reason: HOSPADM

## 2022-07-05 RX ORDER — FENTANYL CITRATE 50 UG/ML
25 INJECTION, SOLUTION INTRAMUSCULAR; INTRAVENOUS AS NEEDED
Status: DISCONTINUED | OUTPATIENT
Start: 2022-07-05 | End: 2022-07-05 | Stop reason: HOSPADM

## 2022-07-05 RX ORDER — INSULIN LISPRO 100 [IU]/ML
INJECTION, SOLUTION INTRAVENOUS; SUBCUTANEOUS ONCE
Status: COMPLETED | OUTPATIENT
Start: 2022-07-05 | End: 2022-07-05

## 2022-07-05 RX ORDER — FENTANYL CITRATE 50 UG/ML
INJECTION, SOLUTION INTRAMUSCULAR; INTRAVENOUS AS NEEDED
Status: DISCONTINUED | OUTPATIENT
Start: 2022-07-05 | End: 2022-07-05 | Stop reason: HOSPADM

## 2022-07-05 RX ORDER — MAGNESIUM SULFATE 100 %
4 CRYSTALS MISCELLANEOUS AS NEEDED
Status: DISCONTINUED | OUTPATIENT
Start: 2022-07-05 | End: 2022-07-05 | Stop reason: HOSPADM

## 2022-07-05 RX ORDER — HYDROMORPHONE HYDROCHLORIDE 1 MG/ML
0.5 INJECTION, SOLUTION INTRAMUSCULAR; INTRAVENOUS; SUBCUTANEOUS
Status: DISCONTINUED | OUTPATIENT
Start: 2022-07-05 | End: 2022-07-05 | Stop reason: HOSPADM

## 2022-07-05 RX ORDER — SODIUM CHLORIDE 9 MG/ML
75 INJECTION, SOLUTION INTRAVENOUS CONTINUOUS
Status: DISCONTINUED | OUTPATIENT
Start: 2022-07-05 | End: 2022-07-08

## 2022-07-05 RX ORDER — SODIUM CHLORIDE 0.9 % (FLUSH) 0.9 %
5-40 SYRINGE (ML) INJECTION AS NEEDED
Status: DISCONTINUED | OUTPATIENT
Start: 2022-07-05 | End: 2022-07-05 | Stop reason: HOSPADM

## 2022-07-05 RX ORDER — LIDOCAINE HYDROCHLORIDE 20 MG/ML
INJECTION, SOLUTION EPIDURAL; INFILTRATION; INTRACAUDAL; PERINEURAL AS NEEDED
Status: DISCONTINUED | OUTPATIENT
Start: 2022-07-05 | End: 2022-07-05 | Stop reason: HOSPADM

## 2022-07-05 RX ORDER — DEXAMETHASONE SODIUM PHOSPHATE 4 MG/ML
INJECTION, SOLUTION INTRA-ARTICULAR; INTRALESIONAL; INTRAMUSCULAR; INTRAVENOUS; SOFT TISSUE AS NEEDED
Status: DISCONTINUED | OUTPATIENT
Start: 2022-07-05 | End: 2022-07-05 | Stop reason: HOSPADM

## 2022-07-05 RX ORDER — ONDANSETRON 2 MG/ML
INJECTION INTRAMUSCULAR; INTRAVENOUS AS NEEDED
Status: DISCONTINUED | OUTPATIENT
Start: 2022-07-05 | End: 2022-07-05 | Stop reason: HOSPADM

## 2022-07-05 RX ADMIN — DEXAMETHASONE SODIUM PHOSPHATE 4 MG: 4 INJECTION, SOLUTION INTRAMUSCULAR; INTRAVENOUS at 19:17

## 2022-07-05 RX ADMIN — Medication 2 TABLET: at 23:51

## 2022-07-05 RX ADMIN — Medication 2 UNITS: at 08:33

## 2022-07-05 RX ADMIN — Medication 140 MG: at 18:55

## 2022-07-05 RX ADMIN — FENTANYL CITRATE 50 MCG: 50 INJECTION, SOLUTION INTRAMUSCULAR; INTRAVENOUS at 18:53

## 2022-07-05 RX ADMIN — LIDOCAINE HYDROCHLORIDE 100 MG: 20 INJECTION, SOLUTION INTRAVENOUS at 18:54

## 2022-07-05 RX ADMIN — AMLODIPINE BESYLATE 10 MG: 5 TABLET ORAL at 08:34

## 2022-07-05 RX ADMIN — SEVELAMER CARBONATE 1600 MG: 800 TABLET, FILM COATED ORAL at 08:34

## 2022-07-05 RX ADMIN — FENTANYL CITRATE 25 MCG: 50 INJECTION, SOLUTION INTRAMUSCULAR; INTRAVENOUS at 19:37

## 2022-07-05 RX ADMIN — EZETIMIBE 10 MG: 10 TABLET ORAL at 08:34

## 2022-07-05 RX ADMIN — PIPERACILLIN AND TAZOBACTAM 2.25 G: 2; .25 INJECTION, POWDER, LYOPHILIZED, FOR SOLUTION INTRAVENOUS at 15:19

## 2022-07-05 RX ADMIN — FENTANYL CITRATE 25 MCG: 50 INJECTION, SOLUTION INTRAMUSCULAR; INTRAVENOUS at 19:22

## 2022-07-05 RX ADMIN — ALLOPURINOL 100 MG: 100 TABLET ORAL at 08:34

## 2022-07-05 RX ADMIN — INSULIN LISPRO 2 UNITS: 100 INJECTION, SOLUTION INTRAVENOUS; SUBCUTANEOUS at 21:53

## 2022-07-05 RX ADMIN — INSULIN LISPRO 3 UNITS: 100 INJECTION, SOLUTION INTRAVENOUS; SUBCUTANEOUS at 08:34

## 2022-07-05 RX ADMIN — Medication 2 TABLET: at 08:34

## 2022-07-05 RX ADMIN — SODIUM CHLORIDE 75 ML/HR: 900 INJECTION, SOLUTION INTRAVENOUS at 18:00

## 2022-07-05 RX ADMIN — Medication 10 MG: at 19:43

## 2022-07-05 RX ADMIN — CARVEDILOL 12.5 MG: 12.5 TABLET, FILM COATED ORAL at 08:35

## 2022-07-05 RX ADMIN — B-COMPLEX W/ C & FOLIC ACID TAB 1 MG 1 TABLET: 1 TAB at 08:34

## 2022-07-05 RX ADMIN — PROPOFOL 130 MG: 10 INJECTION, EMULSION INTRAVENOUS at 18:54

## 2022-07-05 RX ADMIN — CISATRACURIUM BESYLATE 2 MG: 2 INJECTION, SOLUTION INTRAVENOUS at 18:54

## 2022-07-05 RX ADMIN — SODIUM CHLORIDE, PRESERVATIVE FREE 10 ML: 5 INJECTION INTRAVENOUS at 22:00

## 2022-07-05 RX ADMIN — SODIUM CHLORIDE: 900 INJECTION, SOLUTION INTRAVENOUS at 20:59

## 2022-07-05 RX ADMIN — ASPIRIN 81 MG: 81 TABLET, CHEWABLE ORAL at 08:34

## 2022-07-05 RX ADMIN — PROPOFOL 50 MG: 10 INJECTION, EMULSION INTRAVENOUS at 18:55

## 2022-07-05 RX ADMIN — Medication 10 MG: at 19:28

## 2022-07-05 RX ADMIN — CARVEDILOL 12.5 MG: 12.5 TABLET, FILM COATED ORAL at 23:51

## 2022-07-05 RX ADMIN — PIPERACILLIN AND TAZOBACTAM 2.25 G: 2; .25 INJECTION, POWDER, LYOPHILIZED, FOR SOLUTION INTRAVENOUS at 06:36

## 2022-07-05 RX ADMIN — ONDANSETRON HYDROCHLORIDE 4 MG: 2 INJECTION INTRAMUSCULAR; INTRAVENOUS at 19:17

## 2022-07-05 NOTE — PROGRESS NOTES
PENDING INSURANCE AUTHORIZATION    D/C Plan: [facility]     Pt has been accepted to [address], [facility] and [facility]. CM spoke with pt and notified him of acceptances. Pt has selected [facility]. CMS has been notified. Facility has been made aware pt will need a wound vac at the facility. Pt with plan surgical intervention today. Pt will arrange handi ride to transport him to and from dialysis. CM to continue to follow and assist.    Transition of care to SNF: [facility]     Communication to Patient/Family:  Met with patient and family, and they are agreeable to the transition plan. The Plan for Transition of Care is related to the following treatment goals: SNF    The Patient and/or patient representative was provided with a choice of provider and agrees   with the discharge plan. Yes [x] No []    Freedom of choice list was provided with basic dialogue that supports the patient's individualized plan of care/goals and shares the quality data associated with the providers. Yes [x] No []    SNF/Rehab Transition:  Patient has been accepted to [facility] at [address] [facility], [address] [facility], and meets criteria for admission. Patient will transported by medical transport and expected to leave once medically stable and insurance [place] has been obtained. Communication to SNF/Rehab:  Bedside RN has been notified to update the transition plan to the facility and call report [phone]    Discharge information has been updated on the AVS and sent via [facility] and/or CC link. Discharge instructions to be fax'd to facility at [phone]     Please include all hard scripts for controlled substances, med rec and dc summary, and AVS in packet. Please medicate for pain prior to dc if possible and needed to help offset delay when patient first arrives to facility.     Reviewed and confirmed with facility, [address] can manage the patient care needs for the following:     Robb Neighbours with (X) only those applicable:  Medication:  []Medications are available at the facility  []IV Antibiotics    []Controlled Substance - hard copies available sent. []Weekly Labs    Equipment:  []CPAP/BiPAP  []Wound Vacuum  []Mcmahon or Urinary Device  []PICC/Central Line  []Nebulizer  []Ventilator    Treatment:  []Isolation (for MRSA, VRE, etc.)  []Surgical Drain Management  []Tracheostomy Care  []Dressing Changes  []Dialysis with transportation  []PEG Care  []Oxygen  []Daily Weights for Heart Failure    Dietary:  []Any diet limitations  []Tube Feedings   []Total Parenteral Management (TPN)    Financial Resources:  []Medicaid Application Completed    []UAI Completed and copy given to pt/family    []A screening has previously been completed. []Level II Completed    [] Private pay individual who will not become   financially eligible for Medicaid within 6 months from admission to a [address] [place] [address] facility. [] Individual refused to have screening conducted. []Medicaid Application Completed    []The screening denied because it was determined individual did not need/did not qualify for nursing facility level of care. [] Out of state residents seeking direct admission to a [address] facility. [] Individuals who are inpatients of an out of state hospital, or in state or out of state veterans/ hospital and seek direct admission to a [address] facility  [] Individuals who are pateints or residents of a state owned/operated facility that is licensed by Department of [employer] and seek direct admission to [address] [place] [address]  [] A screening not required for enrollment in [facility] services as set out in [facility]-  [] [facility]) staff shall perform screenings of the [facility] clients.     Advanced Care Plan:  []Surrogate Decision Maker of Care  []POA  []Communicated Code Status and copy sent. Other:           Care Management Interventions  PCP Verified by CM:  Yes (Dr. Ricardo Butler )  Mode of Transport at Discharge: BLS  Transition of Care Consult (CM Consult): SNF (Pt is in agreement w/ SNF at d/c. )  Discharge Durable Medical Equipment:  (TBD)  Physical Therapy Consult: Yes  Occupational Therapy Consult: Yes  Support Systems: Child(dafne)  Confirm Follow Up Transport: Family  The Plan for Transition of Care is Related to the Following Treatment Goals : Skilled nursing facility  The Patient and/or Patient Representative was Provided with a Choice of Provider and Agrees with the Discharge Plan?: Yes (The pt is alert and oriented. )  Freedom of Choice List was Provided with Basic Dialogue that Supports the Patient's Individualized Plan of Care/Goals, Treatment Preferences and Shares the Quality Data Associated with the Providers?: Yes (Pt is in agreement w/ referrals being sent to all  and [place] facilities and then he will choose one from the accepting facilities. )  Discharge Location  Patient Expects to be Discharged to[de-identified] Skilled nursing facility

## 2022-07-05 NOTE — H&P
.Date of Surgery Update:  Marcel Stevenson was seen and examined. History and physical has been reviewed. The patient has been examined.  There have been no significant clinical changes since the completion of the originally dated History and Physical.    Signed By: Danielle Cox DPM     July 5, 2022 6:16 PM

## 2022-07-05 NOTE — PROGRESS NOTES
Comprehensive Nutrition Assessment    Type and Reason for Visit: Reassess    Nutrition Recommendations/Plan:   1. Continue with oral diet and Nicolás to meet nutritional needs and heal wounds. 2. Monitor intake of meals and supplement. Malnutrition Assessment:  Malnutrition Status:  No malnutrition (07/01/22 1110)      Nutrition Assessment:    H/o HTN, Charcot's foot, DM. Also ESRD on HD s/p TDC replacement. Admitted with Large necrotic ulcer right posterior heel with osteomyelitis of the calcaneus and deep abscess. S/p incision bone cortex right valcaneous, I&D right heel abcsess, deep wound debridement with multiple bone biopsies and application of antibiotic beads of vancomycin and rocephin. Nutrition Related Findings:    GFR est AA 6, BUN 71, creatinine 10.10, glucose 224. Nephro natividad rx, renvela, floranex, humalog, lantus. BM 7/4. Attempted to speak with pt- busy. Will reattempt at a better time Wound Type: Surgical incision    Current Nutrition Intake & Therapies:  Average Meal Intake: Unable to assess  Average Supplement Intake: Unable to assess  ADULT ORAL NUTRITION SUPPLEMENT Breakfast, Dinner; Wound Healing Supplement  DIET NPO    Anthropometric Measures:  Height: 6' 2\" (188 cm)  Ideal Body Weight (IBW): 190 lbs (86 kg)  Admission Body Weight: 212 lb (per H&P)  Current Body Wt:  96.7 kg (213 lb 3 oz), 112.2 % IBW. Current BMI (kg/m2): 27.4  Usual Body Weight: 99.8 kg (220 lb) (1/24/2022)  % Weight Change (Calculated): -3.1  Weight Adjustment: No adjustment                 BMI Category: Overweight (BMI 25.0-29. 9)    Estimated Daily Nutrient Needs:  Energy Requirements Based On: Formula  Weight Used for Energy Requirements: Current  Energy (kcal/day): 2894-4164  Weight Used for Protein Requirements: Current  Protein (g/day): 116-125  Method Used for Fluid Requirements: 1 ml/kcal  Fluid (ml/day): 7001-4176    Nutrition Diagnosis:   · Increased nutrient needs related to acute injury/trauma as evidenced by wounds      Nutrition Interventions:   Food and/or Nutrient Delivery: Continue current diet,Continue oral nutrition supplement  Nutrition Education/Counseling: No recommendations at this time  Coordination of Nutrition Care: Continue to monitor while inpatient       Goals:     Goals: PO intake 75% or greater,by next RD assessment       Nutrition Monitoring and Evaluation:   Behavioral-Environmental Outcomes: None identified  Food/Nutrient Intake Outcomes: Food and nutrient intake,Supplement intake  Physical Signs/Symptoms Outcomes: Biochemical data,Hemodynamic status,Meal time behavior,Nutrition focused physical findings,Skin,Weight    Discharge Planning:    Continue current diet    Antonio Mcbride RD

## 2022-07-05 NOTE — PROGRESS NOTES
TREATMENT SUMMARY   Patient in room 325 dialyzed for 3.5 hours. Tolerated tx well with without complaint or complications. (right TDC) functioning without complication accessing or BFR      3000 ml UF removed with a net UF removal of 2500 ml. Report given to Karen Macias RN with all questions answered. TREATMENT NOTES       Received report from Oleg Hughes RN at 10:00 [address] was initiated while patient was in bed without difficulty. Aseptically, the CVC ports were accesed and flushed without problem. Treatment was started without reversing the lines. Will continue to monitor patients slowly during treatment. ACUTE HEMODIALYSIS FLOW SHEET       PATIENT INFORMATION   [address] [facility]       DR. Apple    []1st Time Acute  []Stat[x] Routine []Urgent []Chronic Unit   []Acute Room [x]Bedside  []ICU/CCU []ER   Isolation Precautions: []Dialysis[] Airborne []Contact [x]Droplet []Reverse    Special Considerations:_______  [] Blood Consent Verified  []N/A   Allergies:[x] NKA                 [] _____________ Code Status [x]Full Code [] DNR  [] Other_____   Diet: [] Renal [] NPO [x] Diabetic   [] Enteral Feeding [] Cardiac Diabetic: [x]Yes []No     [x]Signed Treatment Consent Verified   [x] Time Out/ Safety Check 10:25   PRIMARY NURSE REPORT: FIRST INITIAL/ LAST NAME/TITLE  PRE DIALYSIS:  Daxa Arin.                                          TIME:10:00   ACCESS   CATHETER ACCESS: [] N/A  [x] RIGHT  [] LEFT  [x] IJ  [] SUBCL [] FEM                    [] First use X-ray  [] Tunnel     [] Non-Tunneled      [x] No S/S infection  [] Redness [] Drainage  [] Cultured [] Swelling [] Pain                    [] Medical Aseptic [] Prep Dressing Changed                  [] Clotted [] Patent []      Flows: [x] Good [] Poor [] Reversed                 If Access Problem Dr. Bing Adames: [] Yes [] No    Date:_____  [] N/A[]   GRAFT/FISTULA ACCESS:  [] N/A  [] RIGHT  [] LEFT  [] UE   [] LE       [] AVG  [] AVF [] BUTTONHOLE    [] +BRUIT/THRILL [] MEDICAL ASEPTIC PREP     [] No S/S infection  [] Redness [] Drainage  [] Cultured [] Swelling [] Pain              If Access Problem Dr. Suleman Jones: [] Yes [] No    Date:______ [x] N/A   GENERAL ASSESSMENT   LUNGS:  SaO2% ____ [x] Clear [] Coarse [] Crackles [] Wheezing               [] Diminished Location: [] RLL [] LLL [] RUL [] JOBY    COUGH:  [] Productive  [] Loose[x] Dry [] N/A  RESPIRATIONS: [] Easy [] Labored    THERAPY: [x] RA   [] NC _____. L/min    Mask: [] NRB [] Venti  _____O2%                  [] Ventilator [] Intubated [] Trach [] BiPap [] CPap [] HI Flow   CARDIAC: [x] Regular [] Irregular [] Pericardial Rub [] JVD               Monitored Rhythm:______ [] N/A   EDEMA: [x] None [] Generalized [] Facial [] Pedal [] UE [] LE             [] Pitting [] 1 [] 2 [] 3 [] 4    [] Right [] Left [] Bilateral   SKIN:    [x] Warm [] Hot [] Cold  [] Dry [] Pale [] Diaphoretic              [] Flushed [] Jaundiced [] Cyanotic [] Rash [] Weeping     LOC:    [x] Alert  Oriented to: [x] Person [x] Place [x] Time             [] Confused [] Lethargic [] Medicated [] Non-responsive    GI/ABDOMEN: [x] Flat [] Distended [] Soft [] Firm [] Diarrhea [x] Bowel Sounds Present [] Nausea [] Vomiting    PAIN: [x] 0 [] 1 [] 2 [] 3 [] 4 [] 5 [] 6 [] 7 [] 8 [] 9 [] 10          Scale 1-10 Action/Follow Up_____   MOBILITY: [] Amb [x] Amb/Assist [] Bed  [] Wheelchair    CURRENT LABS   HBsAg ONLY: Date Drawn: 06/28/2022            [x] Negative [] Positive [] Unknown.      HBsAb: Date Drawn: 06/28/2022             [] Susceptible <10 [x] Immune ?10 [] Unknown   Date of Current Labs:  ATTACHED     EDUCATION   Person Educated: [x] Patient [] Other__   Knowledge base: [] None [] Minimal [x] Substantial    Barriers to learning  [x] None _______________   Preferred method of learning: [] Written [] Oral [x] Visual [] Hands on    Topic: [] Access Care [x] S&S of infection [x] Fluid Management   [] K+  [x] Procedural  [] Albumin [] Medications [] Tx Options   [] Transplant [] Diet [] Other    Teaching Tools: [] Explain [] Demonstration [] Handout_____ [] Video______  CARE PLAN    [x] Renal Failure (Adult)  Interdisciplinary  · Fluid and electrolytes stabilized  ? Interventions  · Dehydration signs and symptoms (eg: Weight/lab monitoring; vomiting/diarrhea/urine; tenting; mucous membranes; dizziness/lethargy/irritability/confusion; weak pulse; tachycardia; blood pressure; I&O)  · Fluid overload signs and symptoms assessment (eg: Body weight increased; dyspnea; edema; hypertension; respiratory crackles/wheezing; JVD; lab monitoring; mental status changes; I&O)  · Monitor appropriate lab values  · COMPLIANCE WITH PRESCRIBED THERAPY  · ARTERIAL ACCESS SITE ASSESSMENT  · NUTRITION SCREENING  · Vital signs monitoring per assessed patient condition or unit standard  · Cardiac monitoring  · Hydration management  · Intake and output measurement  · Body weight monitoring  · Skin care  · DIALYSIS  · Nutrition Care Process per nutrition screen  · Oral hygiene care every 2 hours  · Pain management     · Outcome   ? [x] Progressing Towards Goal  ? [] Not Progressing Towards Goals  ? [] Goals Met/Resolved  ? [] Goals Not Met/ Resolved        · Patient/ Family Education  ? Progressing Towards Goals          RO/HEMODIAYLSIS MACHINE SAFETY CHECKS- BEFORE EACH TREATMENT          [x] [facility]: Machine Serial [id number]       RO Serial [id number]                                   Alarm Test: [x] Pass  Time 10:11  [x] RO/Machine Log Complete    [x] Extracorporeal circuit Tested for integrity           Dialyzer [id number]   [id number]   Dialysate: pH 7.2 Temp. 36_Conductivity: Meter 14.0_ HD Machine_13.9   CHLORINE TESTING- BEFORE EACH TREATMENT AND EVERY 4 HOURS   Total Chlorine: [x] Less than 0.1 ppm Time:_1015_2nd Check Time:_1230  (If greater than 0.1 ppm from Primary then every 30 minutes from Secondary)   TREATMENT INIATION-WITH DIALYSIS PRECAUTIONS   [x] All Connections Secured   [x] Saline Line Double Clamped    [x] Venous Parameters Set [x] Arterial Parameters Set    [x] Prime Given 250 ml     [x] Air Foam Detector Engaged   PRE-TREATMENT   UF Calculations: Wt to lose:2500 ml(+) Oral:ml (+)IV Meds/Fluids/Blood prods_ml(+) Prime/Rinse 500 ml(=)Total UF Goal 3000 mL   Scale Type:[x] Bed scale [] Sling Scale [] Wheel Chair Scale []  Not Ordered [] [] Unable to obtain pt on stretcher/ bed scale malfunctioning      [x] Time Out/Safety Check: Time: 10:25   INTRADIALYTIC MONITORING  (SEE ATTACHED FLOWSHEET)     POST TREATMENT    Time Medication Dose Volume Route    Initials                                           [facility] Signatures Title Initials Time                     Dialyzer cleared: [x] Good [] Fair [] Poor     Blood Processed 74.9 Liters    Net UF Removed 2500 mL  Post Tx Access:                  AVF/AVG: Bleeding Stop       Art.___min Avtar.____min []+bruit/thrill                Catheter: Locking Solution [] Heparin 1 ml/1000 units  [x] Normal Saline                                                                               Art.1.6 ml Avtar. 1.6 ml  Post Assessment:              Skin: [x]Warm []Dry []Diaphoretic []Flushed [] Pale []Cyanotic            Lungs: [x]Clear []Coarse []Crackles []Wheezing             Cardiac: [x]Regular []Irregular  []Monitored rhythm____ []N/A            Edema: [x]None []General []Facial []Pedal  []UE []LE []RIGHT []LEFT            Pain: [x]0 []1 []2 []3 []4 []5 []6 []7 []8 []9 []10   POST Tx Note:     Patient in room 325 dialyzed for 3.5 hours. Tolerated tx well with without complaint or complications. (right TDC) functioning without complication accessing or BFR      3000 ml UF removed with a net UF removal of 2500 ml. Report given to Arsenio Madrigal RN with all questions answered.        Primary Nurse Report: First initial/Last name/Title    Post Dialysis: Keeley Perez RN         [id number]    Abbreviations: AVG-arterial venous graft, AVF-arterial venous fistula, IJ-Internal Jugular,  Subcl-Subclavian, Fem-Femoral,Tx-treatment, AP/HR-apical heart rate, DFR-dialysate flow rate, BFR-blood flow rate, AP-arterial pressure, -venous pressure, UF-ultrafiltrate, TMP-transmembrane pressure, Avtar-Venous, Art-Arterial, RO-Reverse Osmosis

## 2022-07-05 NOTE — PERIOP NOTES
Patient assessed and admitted to Holding. Patient does have dialysis cath present to right upper chest.  Recent incisions to lower left arm.     Paged Dr. Rashmi Kat to read EKG

## 2022-07-05 NOTE — PROGRESS NOTES
[facility]  (A Division of [address] [place] [address])                                                                                                                     Dr Adele Ocampo #: - Option # [phone]  Fax #: [phone]     Date of Admission: 6/27/2022Date of Note: 7/5/2022  Reason for Referral: Evaluation and antibiotic management of R heel infection    Current Antimicrobials:    Prior Antimicrobials:  Zosyn 6/27 to date   Bactrim 1 month ago       Assessment- ID related:  --------------------------------------------------------------------------  · DFU and OM R heel  OP finding: Large right heel necrotic ulcer with osteomyelitis of the calcaneus, and deep abscess  --Proteus/Mroganella/ E.fecalis and alpha strep on culture  · Subacute/chronic OM of heel-- over 2 months time  · Leucocytosis 2/2 above  --BCX 6/27: NGSF  --WCX-- GNR and GPC in pairs--> pending  · ESRD on HD TTS  · Obese BMI of 28  · DM   · History of L hallux ampuation for infection- 5yrs ago Recommendation for ID issues I am following:  --------------------------------------------------------------------------------  Continue with wound vac per Podiatry  Awaiting OR for graft   TDC placement pending    Need TDC for Zosyn 2.25gm Q8 hour X 6 weeks abx ( from 6/28)  End: August 8  OPAT to be arranged for above by CM +  Weekly labs- cbc w/diff, bmp, LFT, uantitative CRP on Mon  Monitor above labs for ABX adverse effects  Fax labs to Dr Darlin Núñez-- [phone]     FU in 2-3 weeks in ID clinic on DC                    Subjective:  Afebrile, occasional abd cramps, overall better  Placement of TDC pending  Seen during HD    Review of systems:    No F/C/R  No N/V  No cough/sob/chest pain  No joint swelling/pain  No itching or rash           HPI:  Gaudencio Kerns is a 60 y.o. BLACK/ with PMH as listed below-- he had ulcer for 2 months or so that he was followed by as OP by Podiatry. His heel wound progressed and was advised to come to ED yesterday. Had foot pain and fould drainage, but denies high F/C/R/SOB/ N/V prior to admission. BCX/WCX taken in ED, started on Zosyn and plan was to hold off abx and monitor until surgery. Admission assessment there was high concern for sepsis and Zosyn was continued. Hx of MRSA infection and treatment few years ago. Active Hospital Problems    Diagnosis Date Noted    CAD (coronary artery disease) 06/28/2022    ESRD (end stage renal disease) ([address] 06/28/2022    Acute hematogenous osteomyelitis of right foot ([address] 06/28/2022    Cellulitis of right heel 06/28/2022    Diabetic foot ulcer with osteomyelitis ([address] 06/28/2022    Ulcer of right heel, with necrosis of bone ([address] 06/28/2022    Diabetes mellitus with foot ulcer ([address] 06/27/2022     Past Medical History:   Diagnosis Date    Diabetes mellitus     Hypertension      History reviewed. No pertinent surgical history. History reviewed. No pertinent family history.   Social History     Socioeconomic History    Marital status:      Spouse name: Not on file    Number of children: Not on file    Years of education: Not on file    Highest education level: Not on file   Occupational History    Not on file   Tobacco Use    Smoking status: Not on file    Smokeless tobacco: Not on file   Substance and Sexual Activity    Alcohol use: Not on file    Drug use: Not on file    Sexual activity: Not on file   Other Topics Concern    Dental Braces Not Asked    Endoscopic Camera Pill Not Asked    Metallic Foreign Body Not Asked    Medication Patches Not Asked    Taking Feraheme Not Asked    Claustrophobic Not Asked   Social History Narrative    Not on file     Social Determinants of Health     Financial Resource Strain:     Difficulty of Paying Living Expenses: Not on file   Food Insecurity:     Worried About Running Out of Food in the Last Year: Not on file    [address] in the Last Year: Not on file   Transportation Needs:     Lack of Transportation (Medical): Not on file    Lack of Transportation (Non-Medical): Not on file   Physical Activity:     Days of Exercise per Week: Not on file    Minutes of Exercise per Session: Not on file   Stress:     Feeling of Stress : Not on file   Social Connections:     Frequency of Communication with Friends and Family: Not on file    Frequency of Social Gatherings with Friends and Family: Not on file    Attends [employer] Services: Not on file    Active Member of [address] [place] [address] [employer] or Organizations: Not on file    Attends Club or Organization Meetings: Not on file    Marital Status: Not on file   Intimate Partner Violence:     Fear of Current or Ex-Partner: Not on file    Emotionally Abused: Not on file    Physically Abused: Not on file    Sexually Abused: Not on file   Housing Stability:     Unable to Pay for Housing in the Last Year: Not on file    Number of [place] in the Last Year: Not on file    Unstable Housing in the Last Year: Not on file       Allergies:  Patient has no known allergies.      Medications:  Current Facility-Administered Medications   Medication Dose Route Frequency    sevelamer carbonate (RENVELA) tab 1,600 mg  1,600 mg Oral TID WITH MEALS    Lactobacillus Acidoph & Bulgar (FLORANEX) tablet 2 Tablet  2 Tablet Oral BID    epoetin lisette-epbx (RETACRIT) injection 8,000 Units  8,000 Units SubCUTAneous Q TUE, THU & SAT    allopurinoL (ZYLOPRIM) tablet 100 mg  100 mg Oral DAILY    carvediloL (COREG) tablet 12.5 mg  12.5 mg Oral BID    ezetimibe (ZETIA) tablet 10 mg  10 mg Oral DAILY    amLODIPine (NORVASC) tablet 10 mg  10 mg Oral DAILY    vit B Cmplx 3-FA-Vit C-Biotin (NEPHRO NIKITA RX) tablet 1 Tablet  1 Tablet Oral DAILY    insulin lispro (HUMALOG) injection 3 Units  3 Units SubCUTAneous TIDAC    aspirin chewable tablet 81 mg  81 mg Oral DAILY    insulin glargine (LANTUS) injection 10 Units  10 Units SubCUTAneous QHS    piperacillin-tazobactam (ZOSYN) 2.25 g in 0.9% sodium chloride (MBP/ADV) 50 mL MBP  2.25 g IntraVENous Q8H    insulin lispro (HUMALOG) injection   SubCUTAneous AC&HS    glucose chewable tablet 16 g  4 Tablet Oral PRN    glucagon (GLUCAGEN) injection 1 mg  1 mg IntraMUSCular PRN    dextrose 10% infusion 0-250 mL  0-250 mL IntraVENous PRN    acetaminophen (TYLENOL) tablet 650 mg  650 mg Oral Q6H PRN        ROS:  Pertinent items are noted in the History of Present Illness. Physical Exam:    Temp (24hrs), Av.1 °F (36.7 °C), Min:97.8 °F (36.6 °C), Max:98.3 °F (36.8 °C)    Visit Vitals  BP (!) 157/79   Pulse 64   Temp 98.3 °F (36.8 °C)   Resp 18   Ht 6' 2\" (1.88 m)   Wt 104.3 kg (230 lb)   SpO2 97%   BMI 29.53 kg/m²      GEN: WD Obese, on RA--not in resp distress. Right chest TDC for HD    HEENT: Unicteric. EOMI intact  No neck swelling  CHEST: Non laboured breathing. ABD: Obese/soft. Non tender. PASCUAL: Deferred  EXT: not examined today- dressed- wound vac in place  Skin: Dry and intact. No rash, no redness. CNS: A, OX3. Moves all extremity. CN grossly ok.       Microbiology  All Micro Results     Procedure Component Value Units Date/Time    CULTURE, FUNGUS [[id number]] Collected: 22    Order Status: Completed Specimen: Heel Updated: 22 1223     Special Requests: NO SPECIAL REQUESTS        Culture result: NO FUNGUS ISOLATED 6 DAYS       CULTURE, ANAEROBIC [[id number]]  (Abnormal) Collected: 22    Order Status: Completed Specimen: Heel Updated: 22 1637     Special Requests: NO SPECIAL REQUESTS        Culture result:       MODERATE Porphyromonas assacharolyticus (Bacteroides) BETA LACTAMASE POSITIVE          CULTURE, BLOOD [[id number]] Collected: 22 1240    Order Status: Completed Specimen: Blood Updated: 22 0734     Special Requests: NO SPECIAL REQUESTS        Culture result: NO GROWTH 6 DAYS       CULTURE, BLOOD [[id number]] Collected: 06/27/22 1245    Order Status: Completed Specimen: Blood Updated: 07/03/22 0734     Special Requests: NO SPECIAL REQUESTS        Culture result: NO GROWTH 6 DAYS       CULTURE, TISSUE Dannial Hane STAIN [[id number]]  (Abnormal) Collected: 06/28/22 1926    Order Status: Completed Specimen: Bone Updated: 07/02/22 1221     Special Requests: NO SPECIAL REQUESTS        GRAM STAIN 2+ WBCS SEEN         NO ORGANISMS SEEN        Culture result: FEW ENTEROCOCCUS FAECALIS         SCANT PROTEUS VULGARIS         SCANT ALPHA STREPTOCOCCUS               SCANT Morganella morganii ssp morganii            REFER TO [facility] [id number] FOR SENSITIVITIES    CULTURE, Darral Pears STAIN [[id number]]  (Abnormal)  (Susceptibility) Collected: 06/28/22 1925    Order Status: Completed Specimen: Heel Updated: 07/02/22 1003     Special Requests: NO SPECIAL REQUESTS        GRAM STAIN OCCASIONAL WBCS SEEN         NO ORGANISMS SEEN        Culture result: LIGHT PROTEUS VULGARIS               SCANT Morganella morganii ssp morganii                  MODERATE ENTEROCOCCUS FAECALIS          AFB CULTURE + SMEAR W/RFLX ID FROM CULTURE [[id number]] Collected: 06/28/22 1925    Order Status: Completed Specimen: Miscellaneous sample Updated: 06/30/22 1738     Source MISC.  WOUND        AFB Specimen processing Concentration     Acid Fast Smear Negative        Comment: (NOTE)  Performed At: [facility] & [address] [id number]  Derek Ledezma MD NB:[id number]          Acid Fast Culture PENDING    CULTURE, Darral Pears STAIN [[id number]] Collected: 06/27/22 1530    Order Status: Completed Specimen: Wound Drainage Updated: 06/29/22 1511     Special Requests: NO SPECIAL REQUESTS        GRAM STAIN RARE WBCS SEEN         2+ GRAM NEGATIVE RODS               1+ GRAM POSITIVE COCCI IN PAIRS           Culture result:       MODERATE  MIXED ENTERIC GRAM NEGATIVE RODS              HEAVY MIXED SKIN TO ISOLATED          AFB CULTURE + SMEAR W/RFLX ID FROM CULTURE [[id number]] Collected: 06/28/22 1930    Order Status: Canceled     CULTURE, ANAEROBIC [[id number]] Collected: 06/28/22 1926    Order Status: Canceled            Lab results:    Chemistry  Recent Labs     07/05/22  0140 07/04/22  0511 07/03/22 0527   * 178* 200*    138 139   K 4.7 4.5 4.2    103 103   CO2 24 24 29   BUN 71* 56* 39*   CREA 10.10* 8.42* 6.60*   CA 7.4* 7.8* 8.3*   AGAP 10 11 7   BUCR 7* 7* 6*    116 116   TP 6.2* 6.6 6.7   ALB 2.3* 2.3* 2.4*   GLOB 3.9 4.3* 4.3*   AGRAT 0.6* 0.5* 0.6*       CBC w/ Diff  Recent Labs     07/05/22 0140 07/04/22 0511 07/03/22 0527   WBC 10.1 10.8 11.4   RBC 3.05* 3.40* 3.32*   HGB 9.2* 10.3* 10.1*   HCT 29.0* 32.3* 32.0*    256 257   GRANS 71 70 72   LYMPH 16* 16* 15*   EOS 4 4 4       Imaging: report reviewed and as posted by radiologist   No results found for this or any previous visit. MRI:       1. Posterior heel skin defect, at the margin of the Achilles tendon attachment  on the calcaneus, in keeping with known ulcer. Infiltration of subjacent  subcutaneous fat in keeping with cellulitis.     2. Cortical discontinuity and marrow signal abnormality in the subjacent dorsal  calcaneus extending to the threaded tip of the oblique tibiocalcaneal screw is  suspicious for osteomyelitis.     3. Fluid signal intensity surrounding the threaded tibial tip of the 1st digit  long partially threaded screw, with osseous fragments at the inferior margin,  concerning for loosening and/or infection.     4. Marrow signal abnormalities at the 2nd-3rd and to a lesser degree 4th-5th  tarsometatarsal joint, potentially simply related to degenerative/Charcot  arthropathy. In view of marrow signal abnormalities, infection cannot be  excluded if clinically suspected. If clinically warranted, consider correlation with nuclear medicine imaging to  further assess.     5. Edema/myositis in the musculature above the ankle.  There is discontinuity of  FHL and peroneal tendons, best correlated with operative findings/history of  prior intervention for significance/chronicity. Fatty change in the musculature  about the foot.

## 2022-07-05 NOTE — PROGRESS NOTES
Problem: General Medical Care Plan  Goal: *Vital signs within specified parameters  Outcome: Progressing Towards Goal     Problem: General Medical Care Plan  Goal: *Absence of infection signs and symptoms  Outcome: Progressing Towards Goal     Problem: General Medical Care Plan  Goal: *Optimal pain control at patient's stated goal  Outcome: Progressing Towards Goal     Problem: General Medical Care Plan  Goal: *Skin integrity maintained  Outcome: Progressing Towards Goal     Problem: Falls - Risk of  Goal: *Absence of Falls  Description: Document Jaxson Fall Risk and appropriate interventions in the flowsheet.   Outcome: Progressing Towards Goal  Note: Fall Risk Interventions:  Mobility Interventions: Assess mobility with egress test         Medication Interventions: Bed/chair exit alarm    Elimination Interventions: Call light in reach

## 2022-07-05 NOTE — PROGRESS NOTES
Nephrology Progress note    Subjective:     Sandra Perkins is a 60 y.o. male with  a PMH of DM, HTN, CAD, ESRD on HD TTS admitted for right foot infection. MRI suggested osteomyelitis. No fever, chills. Pt s/p debridement, I&D- on abx.     HD Catheter fell off and was  replaced 6/29.        Uneventful night.   Seen on HD today, tolerating procedure well  Denies SOB/fever/chills today     Admit Date: 6/27/2022  Principal Problem:    Acute hematogenous osteomyelitis of right foot ([address] (6/28/2022)    Active Problems:    Diabetes mellitus with foot ulcer ([address] (6/27/2022)      CAD (coronary artery disease) (6/28/2022)      ESRD (end stage renal disease) ([facility] (6/28/2022)      Cellulitis of right heel (6/28/2022)      Diabetic foot ulcer with osteomyelitis ([facility] (6/28/2022)      Ulcer of right heel, with necrosis of bone ([facility] (6/28/2022)      Current Facility-Administered Medications   Medication Dose Route Frequency    sevelamer carbonate (RENVELA) tab 1,600 mg  1,600 mg Oral TID WITH MEALS    Lactobacillus Acidoph & Bulgar (FLORANEX) tablet 2 Tablet  2 Tablet Oral BID    epoetin lisette-epbx (RETACRIT) injection 8,000 Units  8,000 Units SubCUTAneous Q TUE, THU & SAT    allopurinoL (ZYLOPRIM) tablet 100 mg  100 mg Oral DAILY    carvediloL (COREG) tablet 12.5 mg  12.5 mg Oral BID    ezetimibe (ZETIA) tablet 10 mg  10 mg Oral DAILY    amLODIPine (NORVASC) tablet 10 mg  10 mg Oral DAILY    vit B Cmplx 3-FA-Vit C-Biotin (NEPHRO NIKITA RX) tablet 1 Tablet  1 Tablet Oral DAILY    insulin lispro (HUMALOG) injection 3 Units  3 Units SubCUTAneous TIDAC    aspirin chewable tablet 81 mg  81 mg Oral DAILY    insulin glargine (LANTUS) injection 10 Units  10 Units SubCUTAneous QHS    piperacillin-tazobactam (ZOSYN) 2.25 g in 0.9% sodium chloride (MBP/ADV) 50 mL MBP  2.25 g IntraVENous Q8H    insulin lispro (HUMALOG) injection   SubCUTAneous AC&HS    glucose chewable tablet 16 g  4 Tablet Oral PRN    glucagon (GLUCAGEN) injection 1 mg  1 mg IntraMUSCular PRN    dextrose 10% infusion 0-250 mL  0-250 mL IntraVENous PRN    acetaminophen (TYLENOL) tablet 650 mg  650 mg Oral Q6H PRN         Allergy:   No Known Allergies     Objective:     Visit Vitals  BP (!) 151/78   Pulse 64   Temp 97.8 °F (36.6 °C)   Resp 20   Ht 6' 2\" (1.88 m)   Wt 104.3 kg (230 lb)   SpO2 97%   BMI 29.53 kg/m²         Intake/Output Summary (Last 24 hours) at 7/5/2022 1218  Last data filed at 7/5/2022 0620  Gross per 24 hour   Intake 200 ml   Output --   Net 200 ml       Physical Exam:       General: No acute distress   HENT: Atraumatic and normocephalic   Eyes: Normal conjunctiva   Neck: Supple No JVD   Cardiovascular: Normal S1 & S2, no m/r/g   Pulmonary/Chest Wall: Clear to auscultation bilaterally   Abdominal: Soft and non-tender   Musculoskeletal: Wound Vac on R foot   Neurological: AA and O X 3, No focal deficits     RIJ TDC in Place with good flow    Data Review:  Lab Results   Component Value Date/Time    Sodium 137 07/05/2022 01:40 AM    Potassium 4.7 07/05/2022 01:40 AM    Chloride 103 07/05/2022 01:40 AM    CO2 24 07/05/2022 01:40 AM    Anion gap 10 07/05/2022 01:40 AM    Glucose 224 (H) 07/05/2022 01:40 AM    BUN 71 (H) 07/05/2022 01:40 AM    Creatinine 10.10 (H) 07/05/2022 01:40 AM    BUN/Creatinine ratio 7 (L) 07/05/2022 01:40 AM    GFR est AA 6 (L) 07/05/2022 01:40 AM    GFR est non-AA 5 (L) 07/05/2022 01:40 AM    Calcium 7.4 (L) 07/05/2022 01:40 AM     Lab Results   Component Value Date/Time    WBC 10.1 07/05/2022 01:40 AM    HGB 9.2 (L) 07/05/2022 01:40 AM    HCT 29.0 (L) 07/05/2022 01:40 AM    PLATELET 718 38/45/6362 01:40 AM    MCV 95.1 07/05/2022 01:40 AM     Lab Results   Component Value Date/Time    Calcium 7.4 (L) 07/05/2022 01:40 AM     No results found for: IRON, FE, TIBC, IBCT, PSAT, FERR  No results found for: FERR      Impression:     ESRD on HD  Diabetic foot ulcer with osteomyelitis  DM  HTN  Anemia in CKD  SHPT  CAD    Plan:     HD today  Anticipate Surgery today after HD (corrected)  IV Antibiotics  DALTON  Renvela for Phos binder in place of Velph[place]        Deborah Tyson MD, MPH [facility]  [phone]

## 2022-07-05 NOTE — ANESTHESIA PREPROCEDURE EVALUATION
Relevant Problems   CARDIOVASCULAR   (+) CAD (coronary artery disease)      RENAL FAILURE   (+) ESRD (end stage renal disease) (HCC)      ENDOCRINE   (+) Diabetes mellitus with foot ulcer (HCC)   (+) Diabetic foot ulcer with osteomyelitis (HCC)      HEMATOLOGY   (+) Acute hematogenous osteomyelitis of right foot (HCC)       Anesthetic History   No history of anesthetic complications            Review of Systems / Medical History  Patient summary reviewed, nursing notes reviewed and pertinent labs reviewed    Pulmonary  Within defined limits                 Neuro/Psych   Within defined limits           Cardiovascular    Hypertension          CAD    Exercise tolerance: >4 METS     GI/Hepatic/Renal         Renal disease: ESRD and dialysis       Endo/Other    Diabetes         Other Findings              Physical Exam    Airway  Mallampati: III  TM Distance: 4 - 6 cm  Neck ROM: decreased range of motion        Cardiovascular  Regular rate and rhythm,  S1 and S2 normal,  no murmur, click, rub, or gallop  Rhythm: regular  Rate: normal         Dental  No notable dental hx       Pulmonary  Breath sounds clear to auscultation               Abdominal  GI exam deferred       Other Findings            Anesthetic Plan    ASA: 4  Anesthesia type: general            Anesthetic plan and risks discussed with: Patient

## 2022-07-05 NOTE — PROGRESS NOTES
Hospitalist Progress Note-critical care note     Patient: Rosie Otero MRN: [id number]  CSN: [id number]    YOB: 1960  Age: 60 y.o. Sex: male    DOA: 6/27/2022 LOS:  LOS: 8 days            Chief complaint: cad , esrd on hd, OM , cellulitis , DM ,     Assessment/Plan         Hospital Problems  Date Reviewed: 6/28/2022          Codes Class Noted POA    CAD (coronary artery disease) ICD-10-CM: I25.10  ICD-9-CM: 414.00  6/28/2022 Unknown        ESRD (end stage renal disease) ([facility] ICD-10-CM: N18.6  ICD-9-CM: 585.6  6/28/2022 Unknown        * (Principal) Acute hematogenous osteomyelitis of right foot ([facility] ICD-10-CM: M86.071  ICD-9-CM: 730.07  6/28/2022 Unknown        Cellulitis of right heel ICD-10-CM: L03.115  ICD-9-CM: 682.7  6/28/2022 Unknown        Diabetic foot ulcer with osteomyelitis ([facility] ICD-10-CM: E11.621, E11.69, L97.509, M86.9  ICD-9-CM: 250.80, 707.15, 730.27, 731.8  6/28/2022 Unknown        Ulcer of right heel, with necrosis of bone ([facility] ICD-10-CM: L97.414  ICD-9-CM: 707.14, 730.17  6/28/2022 Unknown        Diabetes mellitus with foot ulcer ([facility] ICD-10-CM: E11.621, L97.509  ICD-9-CM: 250.80, 707.15  6/27/2022 Unknown                 Maricarmen Falling a 60 y. o. male who history, stent, hypertension, Charcot's foot presents with worsening pain and swelling and chills in his right foot despite multiple cleanings and being managed by podiatry as outpatient.     Large necrotic ulcer right posterior heel with osteomyelitis of the calcaneus and deep abscess-  S/p incision bone cortex right valcaneous, I&D right heel abcsess, deep wound debridement with multiple bone biopsies and application of antibiotic beads done  Dr. Elbert Resendiz   will have  2nd surgery today   Podiatrist and ID f/u   cx multiple GNR-on zosyn-need tunnel catheter -ID f/u      Diabetes mellitus -  On  lantus 10units and  premeal insulin and continue ssi better controlled and will continue current regimen      End-stage renal disease on dialysis Tuesday Thursday and Saturday -  S/p [facility] replacement   HD per nephrology      History of coronary artery disease-  Restart coreg and aspirin      Chronic compressive myelopathy pending surgery -  Supportive care    Subjective: will I have hd before surgery     Disposition :d/c planning , 1-2 days   Review of systems:    General: No fevers or chills. Cardiovascular: No chest pain or pressure. No palpitations. Pulmonary: No shortness of breath. Gastrointestinal: No nausea, vomiting. Vital signs/Intake and Output:  Visit Vitals  BP (!) 151/78   Pulse 64   Temp 97.8 °F (36.6 °C)   Resp 20   Ht 6' 2\" (1.88 m)   Wt 104.3 kg (230 lb)   SpO2 97%   BMI 29.53 kg/m²     Current Shift:  No intake/output data recorded. Last three shifts:  07/03 1901 - 07/05 0700  In: 200 [P.O.:150; I.V.:50]  Out: -     Physical Exam:  General: WD, WN. Alert, cooperative, no acute distress    HEENT: NC, Atraumatic. PERRLA, anicteric sclerae. Lungs: CTA Bilaterally. No Wheezing/Rhonchi/Rales. HD cath noted   Heart:  Regular  rhythm,  No murmur, No Rubs, No Gallops  Abdomen: Soft, Non distended, Non tender. +Bowel sounds,   Extremities: No c/c, bilateral feet covered with protect boots and wrapped with ace , wound vac noted   Psych:   Not anxious or agitated. Neurologic:  No acute neurological deficit.              Labs: Results:       Chemistry Recent Labs     07/05/22  0140 07/04/22  0511 07/03/22  0527   * 178* 200*    138 139   K 4.7 4.5 4.2    103 103   CO2 24 24 29   BUN 71* 56* 39*   CREA 10.10* 8.42* 6.60*   CA 7.4* 7.8* 8.3*   AGAP 10 11 7   BUCR 7* 7* 6*    116 116   TP 6.2* 6.6 6.7   ALB 2.3* 2.3* 2.4*   GLOB 3.9 4.3* 4.3*   AGRAT 0.6* 0.5* 0.6*      CBC w/Diff Recent Labs     07/05/22  0140 07/04/22  0511 07/03/22  0527   WBC 10.1 10.8 11.4   RBC 3.05* 3.40* 3.32*   HGB 9.2* 10.3* 10.1*   HCT 29.0* 32.3* 32.0*    256 257   GRANS 71 70 72   LYMPH 16* 16* 15*   EOS 4 4 4      Cardiac Enzymes No results for input(s): CPK, CKND1, PAULO in the last 72 hours. No lab exists for component: CKRMB, TROIP   Coagulation No results for input(s): PTP, INR, APTT, INREXT, INREXT in the last 72 hours. Lipid Panel No results found for: CHOL, CHOLPOCT, CHOLX, CHLST, CHOLV, 035693, HDL, HDLP, LDL, LDLC, DLDLP, 233601, VLDLC, VLDL, TGLX, TRIGL, TRIGP, TGLPOCT, CHHD, CHHDX   BNP No results for input(s): BNPP in the last 72 hours. Liver Enzymes Recent Labs     07/05/22  0140   TP 6.2*   ALB 2.3*         Thyroid Studies No results found for: T4, T3U, TSH, TSHEXT, TSHEXT     Procedures/imaging: see electronic medical records for all procedures/Xrays and details which were not copied into this note but were reviewed prior to creation of Plan    XR FOOT RT AP/LAT    Result Date: 6/29/2022  EXAM: FOOT SERIES Indication: Postoperative. Technique: Frontal and lateral views of the right foot. Comparison: 6/27/2022 _______________ FINDINGS: -OSSEOUS: Interval postoperative changes of posterior calcaneal osteotomy with placement of adjacent drug-eluting beads at the osteotomy site. Overlying bandage material partially degrades evaluation of the osteotomy site. The anterior approach tibial calcaneal screw tip projects at or slightly beyond the osteotomy site, difficult to evaluate given overlying leads and bandage material. Redemonstration of mid and hindfoot deformity, with cannulated screws, and loss of normal definable anatomy. There is extensive periosteal thickening and irregularity of the first metatarsal, with unchanged plantar displacement distal screw head, with adjacent bone fragment. There is diffuse lucency surrounding the proximal and distal thirds of the indwelling screw. There is diffuse periosteal thickening of the posterior distal talus. No acute displaced fracture is identified.  -SOFT TISSUES: Diffuse soft tissue edematous changes, with numerous drug-eluting beads at the posterior aspect of the calcaneal osteotomy. Diffuse atherosclerotic changes. 1. Interval posterior calcaneal osteotomy with adjacent drug-eluting beads and bandage material. Otherwise, no acute osseous abnormality. XR FOOT RT AP/LAT    Result Date: 6/27/2022  EXAM:  XR FOOT RT AP/LAT INDICATION:   right foot pain/wound with odor COMPARISON:  None. FINDINGS: 2 views of the right foot demonstrate chronic deformity, fragmentation, and collapse of the mid/hindfoot. Orthopedic screw in traversing the first ray with periprosthetic lucency. Additional hardware in the mid foot and ankle/hindfoot noted as well. Soft tissue defect seen along the calcaneus posteriorly with adjacent cortical irregularity of the calcaneal cortex. Irregular destructive appearance noted of the distal fifth metatarsal.     Ulceration overlying the heel with likely exposed calcaneus highly suggestive of osteomyelitis with adjacent cortical irregularity and sclerosis. Periprosthetic lucency along the first ray orthopedic screw. Periprosthetic infection and/or loosening suggested. Chronic deformity and collapse of the mid/hindfoot and ankle compatible with Charcot arthropathy. Irregular cortical destructive change noted of the distal fifth metatarsal head possibly related to this process as well although superimposed infection not excluded. Honey Sotelo MRI FOOT RT WO CONT    Result Date: 6/28/2022  ============================ [facility] ============================ HISTORY: -From Provider:  right heel wound, r/o osteomyelitis -Additional: None TECHNIQUE: Sagittal T1, STIR and T2 fat-sat; axial PD and T2 with fat saturation; coronal T2 and STIR with fat saturation and axial oblique PD images of the right ankle were obtained. COMPARISONS: None. FINDINGS: ----------- Postoperative changes are present in the mid and hindfoot, with sequela of prior fusion delineation of original osseous structures.  Punctate foci of susceptibility artifact in the subcutaneous fat about the ankle would be consistent with postoperative changes, assuming gas is not identified on plain film. There is plantar settling of the tibia and fibula, in the setting of hindfoot/midfoot fusion and associated postoperative deformity/remodeling. Susceptibility artifact from fixation hardware is present about the ankle. There is a partially visualized, long partially threaded cannulated screw in the 1st digit, terminating in the hind foot. There is a small amount of fluid signal intensity surrounding the tip of the screw tracking posteriorly and inferiorly, with suggested tiny osseous fragments. There is an oblique partially threaded cannulated screw from the lateral tibia to the dorsal calcaneus. There is surrounding T1 hypointense, STIR hyperintense dorsal calcaneal marrow signal, extending to the discontinuous calcaneal cortex, deep to the skin defect/ulceration. There are 2 oblique partially threaded cannulated screws in the fused hindfoot, extending from plantar aspect of the medial cuneiform and residual cuboid to the to the distal tibia. There is fatty change in the musculature about the ankle. Feathery hyperintensity is present in the distal calf musculature above the ankle. Retracted FHL tendon stump cannot be followed distal to the ankle. Peroneus longus and brevis appear discontinuous at the ankle. There is ill-defined soft tissue thickening, severe cartilage loss in marrow hyperintensity on both sides of the 2nd and 3rd and minimally at the 4th and 5th tarsometatarsal joints. Anterior tendons and Achilles tendons unremarkable within limits of exam. OTHER: Plantar fascia: Undulating/thinned plantar aponeurosis to insertion on inferior calcaneal enthesophyte.     -------------- 1. Posterior heel skin defect, at the margin of the Achilles tendon attachment on the calcaneus, in keeping with known ulcer. Infiltration of subjacent subcutaneous fat in keeping with cellulitis.  2. Cortical discontinuity and marrow signal abnormality in the subjacent dorsal calcaneus extending to the threaded tip of the oblique tibiocalcaneal screw is suspicious for osteomyelitis. 3. Fluid signal intensity surrounding the threaded tibial tip of the 1st digit long partially threaded screw, with osseous fragments at the inferior margin, concerning for loosening and/or infection. 4. Marrow signal abnormalities at the 2nd-3rd and to a lesser degree 4th-5th tarsometatarsal joint, potentially simply related to degenerative/Charcot arthropathy. In view of marrow signal abnormalities, infection cannot be excluded if clinically suspected. If clinically warranted, consider correlation with nuclear medicine imaging to further assess. 5. Edema/myositis in the musculature above the ankle. There is discontinuity of FHL and peroneal tendons, best correlated with operative findings/history of prior intervention for significance/chronicity. Fatty change in the musculature about the foot. XR CHEST PORT    Result Date: 6/27/2022  CLINICAL: Chest pain. COMPARISON: December 1, 2008. A portable view of the chest from 1909 hours: Mild elevation left hemidiaphragm. No focal airspace density. Pulmonary vascular congestion. Right IJ PermCath. No pneumothorax. Pulmonary vascular congestion. No focal airspace densities. DUPLEX LOWER EXT VENOUS BILAT    Result Date: 6/28/2022  · No evidence of deep vein thrombosis in the left lower extremity. · No evidence of deep vein thrombosis in the right lower extremity. DUPLEX LOWER EXT ARTERY BILAT    Result Date: 6/28/2022  · Bilateral tibial arteries are patnet at the ankle but with monophasic doppler signal. · Bilateral anterior tibial artery is occluded proximally but reconstituted distally by the collateral flow.         Felipa Porter MD

## 2022-07-05 NOTE — PERIOP NOTES
TRANSFER - IN REPORT:    Verbal report received from Chalino Spence RN on Sharolyn Seip  being received from PACU for ordered procedure      Report consisted of patients Situation, Background, Assessment and   Recommendations(SBAR). Information from the following report(s) SBAR, Kardex and MAR was reviewed with the receiving nurse. Opportunity for questions and clarification was provided. Assessment completed upon patients arrival to unit and care assumed.

## 2022-07-05 NOTE — NURSE NAVIGATOR
Per Dr. Leopold Borne, nephrologist \"Okay to proceed with surgery today at 6 p.m. Dr. Cleve Betancourt and anesthesia aware.

## 2022-07-06 LAB
ALBUMIN SERPL-MCNC: 2.5 G/DL (ref 3.4–5)
ALBUMIN/GLOB SERPL: 0.6 {RATIO} (ref 0.8–1.7)
ALP SERPL-CCNC: 114 U/L (ref 45–117)
ALT SERPL-CCNC: 22 U/L (ref 16–61)
ANION GAP SERPL CALC-SCNC: 10 MMOL/L (ref 3–18)
AST SERPL-CCNC: 19 U/L (ref 10–38)
BASOPHILS # BLD: 0.1 K/UL (ref 0–0.1)
BASOPHILS NFR BLD: 1 % (ref 0–2)
BILIRUB SERPL-MCNC: 0.5 MG/DL (ref 0.2–1)
BUN SERPL-MCNC: 49 MG/DL (ref 7–18)
BUN/CREAT SERPL: 7 (ref 12–20)
CALCIUM SERPL-MCNC: 7.6 MG/DL (ref 8.5–10.1)
CHLORIDE SERPL-SCNC: 102 MMOL/L (ref 100–111)
CO2 SERPL-SCNC: 24 MMOL/L (ref 21–32)
CREAT SERPL-MCNC: 7.18 MG/DL (ref 0.6–1.3)
DIFFERENTIAL METHOD BLD: ABNORMAL
EOSINOPHIL # BLD: 0 K/UL (ref 0–0.4)
EOSINOPHIL NFR BLD: 0 % (ref 0–5)
ERYTHROCYTE [DISTWIDTH] IN BLOOD BY AUTOMATED COUNT: 16.8 % (ref 11.6–14.5)
GLOBULIN SER CALC-MCNC: 4.1 G/DL (ref 2–4)
GLUCOSE BLD STRIP.AUTO-MCNC: 173 MG/DL (ref 70–110)
GLUCOSE BLD STRIP.AUTO-MCNC: 174 MG/DL (ref 70–110)
GLUCOSE BLD STRIP.AUTO-MCNC: 209 MG/DL (ref 70–110)
GLUCOSE BLD STRIP.AUTO-MCNC: 239 MG/DL (ref 70–110)
GLUCOSE BLD STRIP.AUTO-MCNC: 249 MG/DL (ref 70–110)
GLUCOSE SERPL-MCNC: 300 MG/DL (ref 74–99)
HCT VFR BLD AUTO: 31.5 % (ref 36–48)
HGB BLD-MCNC: 9.9 G/DL (ref 13–16)
IMM GRANULOCYTES # BLD AUTO: 0.1 K/UL (ref 0–0.04)
IMM GRANULOCYTES NFR BLD AUTO: 1 % (ref 0–0.5)
LYMPHOCYTES # BLD: 0.6 K/UL (ref 0.9–3.6)
LYMPHOCYTES NFR BLD: 4 % (ref 21–52)
MAGNESIUM SERPL-MCNC: 2.1 MG/DL (ref 1.6–2.6)
MCH RBC QN AUTO: 30.4 PG (ref 24–34)
MCHC RBC AUTO-ENTMCNC: 31.4 G/DL (ref 31–37)
MCV RBC AUTO: 96.6 FL (ref 78–100)
MONOCYTES # BLD: 0.3 K/UL (ref 0.05–1.2)
MONOCYTES NFR BLD: 2 % (ref 3–10)
NEUTS SEG # BLD: 12.5 K/UL (ref 1.8–8)
NEUTS SEG NFR BLD: 93 % (ref 40–73)
NRBC # BLD: 0 K/UL (ref 0–0.01)
NRBC BLD-RTO: 0 PER 100 WBC
PLATELET # BLD AUTO: 242 K/UL (ref 135–420)
PMV BLD AUTO: 10.9 FL (ref 9.2–11.8)
POTASSIUM SERPL-SCNC: 5.1 MMOL/L (ref 3.5–5.5)
PROT SERPL-MCNC: 6.6 G/DL (ref 6.4–8.2)
RBC # BLD AUTO: 3.26 M/UL (ref 4.35–5.65)
SODIUM SERPL-SCNC: 136 MMOL/L (ref 136–145)
WBC # BLD AUTO: 13.5 K/UL (ref 4.6–13.2)

## 2022-07-06 PROCEDURE — 74011636637 HC RX REV CODE- 636/637: Performed by: FAMILY MEDICINE

## 2022-07-06 PROCEDURE — 74011636637 HC RX REV CODE- 636/637: Performed by: INTERNAL MEDICINE

## 2022-07-06 PROCEDURE — 74011250636 HC RX REV CODE- 250/636: Performed by: PHYSICIAN ASSISTANT

## 2022-07-06 PROCEDURE — 65270000029 HC RM PRIVATE

## 2022-07-06 PROCEDURE — 82962 GLUCOSE BLOOD TEST: CPT

## 2022-07-06 PROCEDURE — 85025 COMPLETE CBC W/AUTO DIFF WBC: CPT

## 2022-07-06 PROCEDURE — 2709999900 HC NON-CHARGEABLE SUPPLY

## 2022-07-06 PROCEDURE — 36415 COLL VENOUS BLD VENIPUNCTURE: CPT

## 2022-07-06 PROCEDURE — 83735 ASSAY OF MAGNESIUM: CPT

## 2022-07-06 PROCEDURE — 77030019934 HC DRSG VAC ASST KCON -B

## 2022-07-06 PROCEDURE — 74011250637 HC RX REV CODE- 250/637: Performed by: HOSPITALIST

## 2022-07-06 PROCEDURE — 74011636637 HC RX REV CODE- 636/637: Performed by: HOSPITALIST

## 2022-07-06 PROCEDURE — 74011250637 HC RX REV CODE- 250/637: Performed by: INTERNAL MEDICINE

## 2022-07-06 PROCEDURE — 74011000258 HC RX REV CODE- 258: Performed by: PHYSICIAN ASSISTANT

## 2022-07-06 PROCEDURE — 80053 COMPREHEN METABOLIC PANEL: CPT

## 2022-07-06 PROCEDURE — 74011250636 HC RX REV CODE- 250/636: Performed by: PODIATRIST

## 2022-07-06 RX ORDER — LOPERAMIDE HYDROCHLORIDE 2 MG/1
2 CAPSULE ORAL
Status: DISCONTINUED | OUTPATIENT
Start: 2022-07-06 | End: 2022-07-21 | Stop reason: HOSPADM

## 2022-07-06 RX ADMIN — PIPERACILLIN AND TAZOBACTAM 4.5 G: 4; .5 INJECTION, POWDER, FOR SOLUTION INTRAVENOUS at 21:13

## 2022-07-06 RX ADMIN — INSULIN GLARGINE 10 UNITS: 100 INJECTION, SOLUTION SUBCUTANEOUS at 21:59

## 2022-07-06 RX ADMIN — EZETIMIBE 10 MG: 10 TABLET ORAL at 08:33

## 2022-07-06 RX ADMIN — Medication 4 UNITS: at 12:03

## 2022-07-06 RX ADMIN — Medication 2 UNITS: at 16:41

## 2022-07-06 RX ADMIN — Medication 4 UNITS: at 21:59

## 2022-07-06 RX ADMIN — ALLOPURINOL 100 MG: 100 TABLET ORAL at 08:33

## 2022-07-06 RX ADMIN — INSULIN LISPRO 3 UNITS: 100 INJECTION, SOLUTION INTRAVENOUS; SUBCUTANEOUS at 08:35

## 2022-07-06 RX ADMIN — SEVELAMER CARBONATE 1600 MG: 800 TABLET, FILM COATED ORAL at 08:32

## 2022-07-06 RX ADMIN — PIPERACILLIN AND TAZOBACTAM 2.25 G: 2; .25 INJECTION, POWDER, LYOPHILIZED, FOR SOLUTION INTRAVENOUS at 00:00

## 2022-07-06 RX ADMIN — SEVELAMER CARBONATE 1600 MG: 800 TABLET, FILM COATED ORAL at 12:04

## 2022-07-06 RX ADMIN — CARVEDILOL 12.5 MG: 12.5 TABLET, FILM COATED ORAL at 08:33

## 2022-07-06 RX ADMIN — PIPERACILLIN AND TAZOBACTAM 2.25 G: 2; .25 INJECTION, POWDER, LYOPHILIZED, FOR SOLUTION INTRAVENOUS at 06:55

## 2022-07-06 RX ADMIN — ASPIRIN 81 MG: 81 TABLET, CHEWABLE ORAL at 08:32

## 2022-07-06 RX ADMIN — AMLODIPINE BESYLATE 10 MG: 5 TABLET ORAL at 08:33

## 2022-07-06 RX ADMIN — INSULIN LISPRO 3 UNITS: 100 INJECTION, SOLUTION INTRAVENOUS; SUBCUTANEOUS at 12:04

## 2022-07-06 RX ADMIN — Medication 2 TABLET: at 21:13

## 2022-07-06 RX ADMIN — Medication 2 TABLET: at 08:32

## 2022-07-06 RX ADMIN — INSULIN LISPRO 3 UNITS: 100 INJECTION, SOLUTION INTRAVENOUS; SUBCUTANEOUS at 16:42

## 2022-07-06 RX ADMIN — Medication 4 UNITS: at 08:35

## 2022-07-06 RX ADMIN — INSULIN GLARGINE 10 UNITS: 100 INJECTION, SOLUTION SUBCUTANEOUS at 00:00

## 2022-07-06 RX ADMIN — SODIUM CHLORIDE 75 ML/HR: 900 INJECTION, SOLUTION INTRAVENOUS at 08:36

## 2022-07-06 RX ADMIN — CARVEDILOL 12.5 MG: 12.5 TABLET, FILM COATED ORAL at 21:13

## 2022-07-06 RX ADMIN — B-COMPLEX W/ C & FOLIC ACID TAB 1 MG 1 TABLET: 1 TAB at 08:32

## 2022-07-06 RX ADMIN — SEVELAMER CARBONATE 1600 MG: 800 TABLET, FILM COATED ORAL at 16:42

## 2022-07-06 NOTE — PERIOP NOTES
Patient's wound vac that he came with in PACU with patient to go back to floor. Patient still wearing heel padding boot on left foot and right taken with patient.

## 2022-07-06 NOTE — PROGRESS NOTES
Nephrology Progress note    Subjective:     Meliza Boles is a 60 y.o. male with  a PMH of DM, HTN, CAD, ESRD on HD TTS admitted for right foot infection. MRI suggested osteomyelitis. No fever, chills. Pt s/p debridement, I&D- on abx.     HD Catheter fell off and was  replaced 6/29.        Uneventful night.  S/p HD yesterday  Denies SOB/fever/chills today     Admit Date: 6/27/2022  Principal Problem:    Acute hematogenous osteomyelitis of right foot ([facility] (6/28/2022)    Active Problems:    Diabetes mellitus with foot ulcer ([facility] (6/27/2022)      CAD (coronary artery disease) (6/28/2022)      ESRD (end stage renal disease) ([facility] (6/28/2022)      Cellulitis of right heel (6/28/2022)      Diabetic foot ulcer with osteomyelitis ([facility] (6/28/2022)      Ulcer of right heel, with necrosis of bone ([facility] (6/28/2022)      Infection and inflammatory reaction due to internal fixation device of other site, initial encounter ([facility] (7/5/2022)      Current Facility-Administered Medications   Medication Dose Route Frequency    loperamide (IMODIUM) capsule 2 mg  2 mg Oral Q4H PRN    piperacillin-tazobactam (ZOSYN) 4.5 g in 0.9% sodium chloride (MBP/ADV) 100 mL MBP  4.5 g IntraVENous Q12H    0.9% sodium chloride infusion  75 mL/hr IntraVENous CONTINUOUS    sevelamer carbonate (RENVELA) tab 1,600 mg  1,600 mg Oral TID WITH MEALS    Lactobacillus Acidoph & Bulgar (FLORANEX) tablet 2 Tablet  2 Tablet Oral BID    epoetin lisette-epbx (RETACRIT) injection 8,000 Units  8,000 Units SubCUTAneous Q TUE, THU & SAT    allopurinoL (ZYLOPRIM) tablet 100 mg  100 mg Oral DAILY    carvediloL (COREG) tablet 12.5 mg  12.5 mg Oral BID    ezetimibe (ZETIA) tablet 10 mg  10 mg Oral DAILY    amLODIPine (NORVASC) tablet 10 mg  10 mg Oral DAILY    vit B Cmplx 3-FA-Vit C-Biotin (NEPHRO NIKITA RX) tablet 1 Tablet  1 Tablet Oral DAILY    insulin lispro (HUMALOG) injection 3 Units  3 Units SubCUTAneous TIDAC    aspirin chewable tablet 81 mg  81 mg Oral DAILY    insulin glargine (LANTUS) injection 10 Units  10 Units SubCUTAneous QHS    insulin lispro (HUMALOG) injection   SubCUTAneous AC&HS    glucose chewable tablet 16 g  4 Tablet Oral PRN    glucagon (GLUCAGEN) injection 1 mg  1 mg IntraMUSCular PRN    dextrose 10% infusion 0-250 mL  0-250 mL IntraVENous PRN    acetaminophen (TYLENOL) tablet 650 mg  650 mg Oral Q6H PRN         Allergy:   No Known Allergies     Objective:     Visit Vitals  BP (!) 152/84 (BP 1 Location: Right upper arm, BP Patient Position: At rest)   Pulse 70   Temp 98.2 °F (36.8 °C)   Resp 17   Ht 6' 2\" (1.88 m)   Wt 101.6 kg (224 lb)   SpO2 96%   BMI 28.76 kg/m²         Intake/Output Summary (Last 24 hours) at 7/6/2022 1450  Last data filed at 7/6/2022 8659  Gross per 24 hour   Intake 1298 ml   Output --   Net 1298 ml       Physical Exam:       General: No acute distress   HENT: Atraumatic and normocephalic   Eyes: Normal conjunctiva   Neck: Supple No JVD   Cardiovascular: Normal S1 & S2, no m/r/g   Pulmonary/Chest Wall: Clear to auscultation bilaterally   Abdominal: Soft and non-tender   Musculoskeletal: Wound Vac on R foot   Neurological: AA and O X 3, No focal deficits     RIJ TDC in Place with good flow    Data Review:  Lab Results   Component Value Date/Time    Sodium 136 07/06/2022 03:10 AM    Potassium 5.1 07/06/2022 03:10 AM    Chloride 102 07/06/2022 03:10 AM    CO2 24 07/06/2022 03:10 AM    Anion gap 10 07/06/2022 03:10 AM    Glucose 300 (H) 07/06/2022 03:10 AM    BUN 49 (H) 07/06/2022 03:10 AM    Creatinine 7.18 (H) 07/06/2022 03:10 AM    BUN/Creatinine ratio 7 (L) 07/06/2022 03:10 AM    GFR est AA 9 (L) 07/06/2022 03:10 AM    GFR est non-AA 8 (L) 07/06/2022 03:10 AM    Calcium 7.6 (L) 07/06/2022 03:10 AM     Lab Results   Component Value Date/Time    WBC 13.5 (H) 07/06/2022 03:10 AM    HGB 9.9 (L) 07/06/2022 03:10 AM    HCT 31.5 (L) 07/06/2022 03:10 AM    PLATELET 594 39/83/3909 03:10 AM    MCV 96.6 07/06/2022 03:10 AM Lab Results   Component Value Date/Time    Calcium 7.6 (L) 07/06/2022 03:10 AM     No results found for: IRON, FE, TIBC, IBCT, PSAT, FERR  No results found for: FERR      Impression:     ESRD on HD  Diabetic foot ulcer with osteomyelitis s/p rt heel debridement w/grafting  DM  HTN  Anemia in CKD  SHPT  CAD    Plan:     HD tomorrow  IV Antibiotics  DALTON  Renvela for Phos binder in place of [address] [facility] MD Bonifacio Kaye  [phone]

## 2022-07-06 NOTE — DIABETES MGMT
Diabetes/ Glycemic Control Plan of Care  Recommendations:  Continue 10 units Lantus daily and 3 units Humalog TIDAC. Continue corrective coverage. Assessment: BG ranging 118-300 mg/dl over the last 24 hours. BG elevation noted and is likely related to one time dose of steroid. Recent Glucose Results:   Lab Results   Component Value Date/Time     (H) 07/06/2022 03:10 AM    GLUCPOC 249 (H) 07/06/2022 07:43 AM    GLUCPOC 174 (H) 07/05/2022 11:49 PM    GLUCPOC 155 (H) 07/05/2022 09:49 PM           BG within target range (non-ICU: <180; -180):  [] Yes    [x] No   Current insulin orders: 10 units Lantus, 3 units Humalog, corrective Humalog  Total Daily Dose previous 24 hours = 24 units      Plan/Goals:   Blood glucose will be within target of 70 - 180 mg/dl within 72 hours - 7/1  Reinforce dietary and medication compliance at home.        Education:  [x] Refer to Diabetes Education Record                       [] Education not indicated at this time     Shruthi Cat RD  Glycemic Control Team  [phone]    Monday-Friday   9 am - 3 pm

## 2022-07-06 NOTE — CALL BACK NOTE
Pt was admitted on 6/28/22  with R chest [facility] for his HD, it fell off and was replaced on 6/29. Due to his mixed infection on R heel, he will need central line that can be used to deliver Zosyn Q8 hour. Will check with renal MD if this can be used for his ABX delivery or what they would recommend for central line for IV abx-- usually PICC is avoided in dialysis patient. Addendum: contacted Renal -Dr Pradip Reyes. The current TDC on R chest - not for long term ABX IV infusion. Advised can place central line- either central or PICC. Will place order. Alvarez Santamaria MD  [facility])  Office #:     [phone]  [phone] [phone]   Office Fax: [phone]

## 2022-07-06 NOTE — PROGRESS NOTES
Podiatry:  Patient is status post right foot surgery with removal of screw from calcaneus secondary to bone infection, partial resection of posterior calcaneus, deep debridement with Application of Stratus graft and repair of ruptured Achilles tendon. Patient will continue IV antibiotics as per infectious disease and will require PICC line with long-term antibiotics as per Dr. Cathie Bowie. He will continue nonweightbearing right foot and continue the Prevalon boots. Jean Quinones for wound care will reapply his wound VAC Wednesday morning to his right heel. We will continue care as per medicine, and from podiatry standpoint he may be discharged to [facility] when stable. I will follow-up with him in the wound care center on Friday when possible.

## 2022-07-06 NOTE — PROGRESS NOTES
Pharmacy Renal Dosing Services:     Zosyn  was automatically dose-adjusted per THE [facility] P&T Committee Protocol, with respect to renal function. Consult provided for this   60 y.o. , male , for the indication of Diabetic Foot Infection  Dose adjusted to:  Zosyn 4.5 grams IV q12h    Pt Weight:   Wt Readings from Last 1 Encounters:   07/05/22 101.6 kg (224 lb)     Previous Regimen   Zosyn 2.25 grams IV q8h   Serum Creatinine Lab Results   Component Value Date/Time    Creatinine 7.18 (H) 07/06/2022 03:10 AM       Creatinine Clearance Estimated Creatinine Clearance: 13.4 mL/min (A) (based on SCr of 7.18 mg/dL (H)). BUN Lab Results   Component Value Date/Time    BUN 49 (H) 07/06/2022 03:10 AM         Pharmacy to continue to monitor patient daily. Will make dosage adjustments based upon changing renal function.   [id number] information:  [phone]

## 2022-07-06 NOTE — PROGRESS NOTES
[facility]  (A Division of [address] [place] [address])                                                                                                                     Dr Alanna Ba #: - Option # [phone]  Fax #: [phone]     Date of Admission: 6/27/2022Date of Note: 7/6/2022  Reason for Referral: Evaluation and antibiotic management of R heel infection    Current Antimicrobials:    Prior Antimicrobials:  Zosyn 6/27 to date   Bactrim 1 month ago       Assessment- ID related:  --------------------------------------------------------------------------  · DFU and OM R heel  OP finding: Large right heel necrotic ulcer with osteomyelitis of the calcaneus, and deep abscess  --Proteus/Mroganella/ E.fecalis and alpha strep on culture  --S/P OR 6/5/22- R heel I and D with grafting, removal of screw  · Subacute/chronic OM of heel-- over 2 months time  · Leucocytosis 2/2 above  --BCX 6/27: NGSF  --WCX-- GNR and GPC in pairs--> pending  · ESRD on HD TTS  · Obese BMI of 28  · DM   · History of L hallux ampuation for infection- 5yrs ago Recommendation for ID issues I am following:  --------------------------------------------------------------------------------  DW Dr Christy Berger, RN  DC MRSA isolation  Wound care/dressing per Dr Leana Hurt-- is there still bone necrosis? Biopsy sent 7/5     [facility] placement pending--for zosyn    Need TDC for Zosyn 2.25gm Q8 hour X 6 weeks abx ( from 6/28).  End: August 8  OPAT to be arranged for above by CM +  Weekly labs- cbc w/diff, LFT, uantitative CRP on Mon  Monitor above labs for ABX adverse effects  Fax labs to Dr Cathie Bowie-- [phone]     FU in 2-3 weeks in ID clinic on DC    If the above rx fails,  Likely for BKA       Subjective:  Afebrile, occasional abd cramps, overall better  BM recorded lower on I/O, pt tells me  Placement of TDC pending    Review of systems:    No F/C/R  No N/V  No cough/sob/chest pain  No joint swelling/pain  No itching or rash           HPI:  Ana M Crespo is a 60 y.o. BLACK/ with PMH as listed below-- he had ulcer for 2 months or so that he was followed by as OP by Podiatry. His heel wound progressed and was advised to come to ED yesterday. Had foot pain and fould drainage, but denies high F/C/R/SOB/ N/V prior to admission. BCX/WCX taken in ED, started on Zosyn and plan was to hold off abx and monitor until surgery. Admission assessment there was high concern for sepsis and Zosyn was continued. Hx of MRSA infection and treatment few years ago. Active Hospital Problems    Diagnosis Date Noted    Infection and inflammatory reaction due to internal fixation device of other site, initial encounter ([facility] 07/05/2022    CAD (coronary artery disease) 06/28/2022    ESRD (end stage renal disease) ([facility] 06/28/2022    Acute hematogenous osteomyelitis of right foot ([facility] 06/28/2022    Cellulitis of right heel 06/28/2022    Diabetic foot ulcer with osteomyelitis ([facility] 06/28/2022    Ulcer of right heel, with necrosis of bone ([facility] 06/28/2022    Diabetes mellitus with foot ulcer ([facility] 06/27/2022     Past Medical History:   Diagnosis Date    Diabetes mellitus     Hypertension      History reviewed. No pertinent surgical history. History reviewed. No pertinent family history.   Social History     Socioeconomic History    Marital status:      Spouse name: Not on file    Number of children: Not on file    Years of education: Not on file    Highest education level: Not on file   Occupational History    Not on file   Tobacco Use    Smoking status: Not on file    Smokeless tobacco: Not on file   Substance and Sexual Activity    Alcohol use: Not on file    Drug use: Not on file    Sexual activity: Not on file   Other Topics Concern    Dental Braces Not Asked    Endoscopic Camera Pill Not Asked    Metallic Foreign Body Not Asked    Medication Patches Not Asked    Taking Feraheme Not Asked [facility] Claustrophobic Not Asked   Social History Narrative    Not on file     Social Determinants of Health     Financial Resource Strain:     Difficulty of Paying Living Expenses: Not on file   Food Insecurity:     Worried About Running Out of Food in the Last Year: Not on file    Vane of Food in the Last Year: Not on file   Transportation Needs:     Lack of Transportation (Medical): Not on file    Lack of Transportation (Non-Medical): Not on file   Physical Activity:     Days of Exercise per Week: Not on file    Minutes of Exercise per Session: Not on file   Stress:     Feeling of Stress : Not on file   Social Connections:     Frequency of Communication with Friends and Family: Not on file    Frequency of Social Gatherings with Friends and Family: Not on file    Attends Anabaptism Services: Not on file    Active Member of [address] [place] [address] FullContact or Organizations: Not on file    Attends Club or Organization Meetings: Not on file    Marital Status: Not on file   Intimate Partner Violence:     Fear of Current or Ex-Partner: Not on file    Emotionally Abused: Not on file    Physically Abused: Not on file    Sexually Abused: Not on file   Housing Stability:     Unable to Pay for Housing in the Last Year: Not on file    Number of [place] in the Last Year: Not on file    Unstable Housing in the Last Year: Not on file       Allergies:  Patient has no known allergies.      Medications:  Current Facility-Administered Medications   Medication Dose Route Frequency    loperamide (IMODIUM) capsule 2 mg  2 mg Oral Q4H PRN    piperacillin-tazobactam (ZOSYN) 4.5 g in 0.9% sodium chloride (MBP/ADV) 100 mL MBP  4.5 g IntraVENous Q12H    0.9% sodium chloride infusion  75 mL/hr IntraVENous CONTINUOUS    sevelamer carbonate (RENVELA) tab 1,600 mg  1,600 mg Oral TID WITH MEALS    Lactobacillus Acidoph & Bulgar (FLORANEX) tablet 2 Tablet  2 Tablet Oral BID    epoetin lisette-epbx (RETACRIT) injection 8,000 Units  8,000 Units SubCUTAneous Q TUE, THU & SAT    allopurinoL (ZYLOPRIM) tablet 100 mg  100 mg Oral DAILY    carvediloL (COREG) tablet 12.5 mg  12.5 mg Oral BID    ezetimibe (ZETIA) tablet 10 mg  10 mg Oral DAILY    amLODIPine (NORVASC) tablet 10 mg  10 mg Oral DAILY    vit B Cmplx 3-FA-Vit C-Biotin (NEPHRO NIKITA RX) tablet 1 Tablet  1 Tablet Oral DAILY    insulin lispro (HUMALOG) injection 3 Units  3 Units SubCUTAneous TIDAC    aspirin chewable tablet 81 mg  81 mg Oral DAILY    insulin glargine (LANTUS) injection 10 Units  10 Units SubCUTAneous QHS    insulin lispro (HUMALOG) injection   SubCUTAneous AC&HS    glucose chewable tablet 16 g  4 Tablet Oral PRN    glucagon (GLUCAGEN) injection 1 mg  1 mg IntraMUSCular PRN    dextrose 10% infusion 0-250 mL  0-250 mL IntraVENous PRN    acetaminophen (TYLENOL) tablet 650 mg  650 mg Oral Q6H PRN        ROS:  Pertinent items are noted in the History of Present Illness. Physical Exam:    Temp (24hrs), Av.3 °F (36.8 °C), Min:97.9 °F (36.6 °C), Max:98.7 °F (37.1 °C)    Visit Vitals  BP (!) 152/84 (BP 1 Location: Right upper arm, BP Patient Position: At rest)   Pulse 70   Temp 98.2 °F (36.8 °C)   Resp 17   Ht 6' 2\" (1.88 m)   Wt 101.6 kg (224 lb)   SpO2 96%   BMI 28.76 kg/m²      GEN: WD Obese, on RA--not in resp distress. Right chest TDC for HD    HEENT: Unicteric. EOMI intact  No neck swelling  CHEST: Non laboured breathing. ABD: Obese/soft. Non tender. PASCUAL: Deferred  EXT: not examined today- dressed- wound vac is removed from foot  Skin: Dry and intact. No rash, no redness. CNS: A, OX3. Moves all extremity. CN grossly ok.       Microbiology  All Micro Results     Procedure Component Value Units Date/Time    CULTURE, FUNGUS [[id number]] Collected: 22 1925    Order Status: Completed Specimen: Heel Updated: 22 1223     Special Requests: NO SPECIAL REQUESTS        Culture result: NO FUNGUS ISOLATED 6 DAYS       CULTURE, ANAEROBIC [[id number]]  (Abnormal) Collected: 06/28/22 1925    Order Status: Completed Specimen: Heel Updated: 07/04/22 1637     Special Requests: NO SPECIAL REQUESTS        Culture result:       MODERATE Porphyromonas assacharolyticus (Bacteroides) BETA LACTAMASE POSITIVE          CULTURE, BLOOD [[id number]] Collected: 06/27/22 1240    Order Status: Completed Specimen: Blood Updated: 07/03/22 0734     Special Requests: NO SPECIAL REQUESTS        Culture result: NO GROWTH 6 DAYS       CULTURE, BLOOD [[id number]] Collected: 06/27/22 1245    Order Status: Completed Specimen: Blood Updated: 07/03/22 0734     Special Requests: NO SPECIAL REQUESTS        Culture result: NO GROWTH 6 DAYS       CULTURE, TISSUE Jennye Coombe STAIN [[id number]]  (Abnormal) Collected: 06/28/22 1926    Order Status: Completed Specimen: Bone Updated: 07/02/22 1221     Special Requests: NO SPECIAL REQUESTS        GRAM STAIN 2+ WBCS SEEN         NO ORGANISMS SEEN        Culture result: FEW ENTEROCOCCUS FAECALIS         SCANT PROTEUS VULGARIS         SCANT ALPHA STREPTOCOCCUS               SCANT Morganella morganii ssp morganii            REFER TO [facility] [id number] FOR SENSITIVITIES    CULTURE, Gorge Saleh STAIN [[id number]]  (Abnormal)  (Susceptibility) Collected: 06/28/22 1925    Order Status: Completed Specimen: Heel Updated: 07/02/22 1003     Special Requests: NO SPECIAL REQUESTS        GRAM STAIN OCCASIONAL WBCS SEEN         NO ORGANISMS SEEN        Culture result: LIGHT PROTEUS VULGARIS               SCANT Morganella morganii ssp morganii                  MODERATE ENTEROCOCCUS FAECALIS          AFB CULTURE + SMEAR W/RFLX ID FROM CULTURE [[id number]] Collected: 06/28/22 1925    Order Status: Completed Specimen: Miscellaneous sample Updated: 06/30/22 1738     Source MISC.  WOUND        AFB Specimen processing Concentration     Acid Fast Smear Negative        Comment: (NOTE)  Performed At: [facility] & [address] [id number]  Nanci Pickett MD EL:[id number] Acid Fast Culture PENDING    CULTURE, Mikayla Duarte STAIN [[id number]] Collected: 06/27/22 1530    Order Status: Completed Specimen: Wound Drainage Updated: 06/29/22 1511     Special Requests: NO SPECIAL REQUESTS        GRAM STAIN RARE WBCS SEEN         2+ GRAM NEGATIVE RODS               1+ GRAM POSITIVE COCCI IN PAIRS           Culture result:       MODERATE  MIXED ENTERIC GRAM NEGATIVE RODS              HEAVY MIXED SKIN TO ISOLATED          AFB CULTURE + SMEAR W/RFLX ID FROM CULTURE [[id number]] Collected: 06/28/22 1930    Order Status: Canceled     CULTURE, ANAEROBIC [[id number]] Collected: 06/28/22 1926    Order Status: Canceled            Lab results:    Chemistry  Recent Labs     07/06/22 0310 07/05/22  1612 07/05/22  0140 07/04/22  0511 07/04/22  0511   *  --  224*  --  178*     --  137  --  138   K 5.1 5.0 4.7   < > 4.5     --  103  --  103   CO2 24  --  24  --  24   BUN 49*  --  71*  --  56*   CREA 7.18*  --  10.10*  --  8.42*   CA 7.6*  --  7.4*  --  7.8*   AGAP 10  --  10  --  11   BUCR 7*  --  7*  --  7*     --  115  --  116   TP 6.6  --  6.2*  --  6.6   ALB 2.5*  --  2.3*  --  2.3*   GLOB 4.1*  --  3.9  --  4.3*   AGRAT 0.6*  --  0.6*  --  0.5*    < > = values in this interval not displayed. CBC w/ Diff  Recent Labs     07/06/22 0310 07/05/22  0140 07/04/22  0511   WBC 13.5* 10.1 10.8   RBC 3.26* 3.05* 3.40*   HGB 9.9* 9.2* 10.3*   HCT 31.5* 29.0* 32.3*    233 256   GRANS 93* 71 70   LYMPH 4* 16* 16*   EOS 0 4 4       Imaging: report reviewed and as posted by radiologist   No results found for this or any previous visit. MRI:       1. Posterior heel skin defect, at the margin of the Achilles tendon attachment  on the calcaneus, in keeping with known ulcer. Infiltration of subjacent  subcutaneous fat in keeping with cellulitis.     2.  Cortical discontinuity and marrow signal abnormality in the subjacent dorsal  calcaneus extending to the threaded tip of the oblique tibiocalcaneal screw is  suspicious for osteomyelitis.     3. Fluid signal intensity surrounding the threaded tibial tip of the 1st digit  long partially threaded screw, with osseous fragments at the inferior margin,  concerning for loosening and/or infection.     4. Marrow signal abnormalities at the 2nd-3rd and to a lesser degree 4th-5th  tarsometatarsal joint, potentially simply related to degenerative/Charcot  arthropathy. In view of marrow signal abnormalities, infection cannot be  excluded if clinically suspected. If clinically warranted, consider correlation with nuclear medicine imaging to  further assess.     5. Edema/myositis in the musculature above the ankle. There is discontinuity of  FHL and peroneal tendons, best correlated with operative findings/history of  prior intervention for significance/chronicity. Fatty change in the musculature  about the foot.

## 2022-07-06 NOTE — PERIOP NOTES
TRANSFER - OUT REPORT:    Verbal report given to Anatoly Fletcher RN on Sarai Mcginnis  being transferred to [facility] for routine post - op       Report consisted of patients Situation, Background, Assessment and   Recommendations(SBAR). Information from the following report(s) SBAR, Kardex, OR Summary, Intake/Output and MAR was reviewed with the receiving nurse. Lines:   Peripheral IV 06/28/22 Right Forearm (Active)   Site Assessment Clean, dry, & intact 07/05/22 2144   Phlebitis Assessment 0 07/05/22 2144   Infiltration Assessment 0 07/05/22 2144   Dressing Status Clean, dry, & intact 07/05/22 2144   Dressing Type Tape;Transparent 07/05/22 2144   Hub Color/Line Status Blue; Infusing;Patent 07/05/22 2144   Action Taken Open ports on tubing capped 07/04/22 2342   Alcohol Cap Used No 07/05/22 1800        Opportunity for questions and clarification was provided.       Patient transported with:   Registered Nurse

## 2022-07-06 NOTE — ROUTINE PROCESS
TRANSFER - IN REPORT:    Verbal report received from KIM Hutchins(name) on Will Craig  being received from KIM Carter(unit) for routine post - op      Report consisted of patients Situation, Background, Assessment and   Recommendations(SBAR). Information from the following report(s) SBAR, Kardex, Intake/Output and MAR was reviewed with the receiving nurse. Opportunity for questions and clarification was provided. Assessment completed upon patients arrival to unit and care assumed. 5761 - Dressing to right foot CDI. BLE with numbness due to neuropathy. Popliteal and femoral pulses present. Patient denied pain or discomfort through the night. Vital signs stable. 0720 - Bedside and Verbal shift change report given to KIM Wooten (oncoming nurse) by Violet Mendoza (offgoing nurse). Report included the following information SBAR, Kardex, Intake/Output and MAR.

## 2022-07-06 NOTE — ANESTHESIA POSTPROCEDURE EVALUATION
Procedure(s):  RIGHT HEEL DEBRIDEMENT WITH GRAFTING,REMOVAL OF SCREW  (PT IN ROOM #325) WITH C-ARM. MAC    Anesthesia Post Evaluation      Multimodal analgesia: multimodal analgesia used between 6 hours prior to anesthesia start to PACU discharge  Patient location during evaluation: bedside  Patient participation: complete - patient participated  Level of consciousness: awake  Pain score: 0  Pain management: adequate  Airway patency: patent  Anesthetic complications: no  Cardiovascular status: acceptable  Respiratory status: acceptable  Hydration status: acceptable  Post anesthesia nausea and vomiting:  none  Final Post Anesthesia Temperature Assessment:  Normothermia (36.0-37.5 degrees C)      INITIAL Post-op Vital signs:   Vitals Value Taken Time   /86 07/05/22 2134   Temp 36.6 °C (97.9 °F) 07/05/22 2134   Pulse 71 07/05/22 2135   Resp 14 07/05/22 2134   SpO2 98 % 07/05/22 2136   Vitals shown include unvalidated device data.

## 2022-07-06 NOTE — OP NOTES
Operative Note    Patient: Yomi Hammond  YOB: 1960  MRN: [id number]    Date of Procedure: 7/5/2022     Pre-Op Diagnosis: RIGHT HEEL ABSCESS    Post-Op Diagnosis: Orthopedic implant/screw causing calcaneal osteomyelitis, open ulcer with bone necrosis posterior right heel, spontaneous rupture right Achilles tendon, osteomyelitis right calcaneus      Procedure(s):  RIGHT HEEL DEBRIDEMENT WITH GRAFTING,REMOVAL OF SCREW  (PT IN ROOM #325) WITH C-ARM    Surgeon(s):  Dougie Shankar DPM    Surgical Assistant: None    Anesthesia: General     Estimated Blood Loss (mL):  Minimal    Complications: None    Specimens:   ID Type Source Tests Collected by Time Destination   1 : Explanted Right Foot Screw Fresh Foot, Right  Dougie Shankar DPM 7/5/2022 2050 Pathology        Implants: * No implants in log *    Drains: * No LDAs found *    Findings: Orthopedic implant/screw causing bone infection, osteomyelitis right calcaneus spontaneous rupture right Achilles tendon, open ulcer with bone necrosis posterior right heel    Detailed Description of Procedure:   Dictate follow-up report into the system    Electronically Signed by Talib Gipson DPM on 7/5/2022 at 9:44 PM

## 2022-07-06 NOTE — WOUND CARE
IP WOUND CONSULT    Leonora Rust  MEDICAL RECORD NUMBER:  [id number]  AGE: 60 y.o. GENDER: male  : 1960  TODAY'S DATE:  2022    GENERAL     [x] Follow-up   [] New Consult    Leonora Rust is a 60 y.o. male referred by:   [x] Physician  [] Nursing  [] Other:         PAST MEDICAL HISTORY    Past Medical History:   Diagnosis Date    Diabetes mellitus     Hypertension         PAST SURGICAL HISTORY    History reviewed. No pertinent surgical history. FAMILY HISTORY    History reviewed. No pertinent family history. SOCIAL HISTORY    Social History     Tobacco Use    Smoking status: Not on file    Smokeless tobacco: Not on file   Substance Use Topics    Alcohol use: Not on file    Drug use: Not on file       ALLERGIES    No Known Allergies    MEDICATIONS    No current facility-administered medications on file prior to encounter. Current Outpatient Medications on File Prior to Encounter   Medication Sig Dispense Refill    atorvastatin (LIPITOR) 40 mg tablet Take 40 mg by mouth daily.  gabapentin (NEURONTIN) 300 mg capsule Take 300 mg by mouth nightly.  allopurinoL (Zyloprim) 100 mg tablet Take 100 mg by mouth daily.  ezetimibe (Zetia) 10 mg tablet Take 10 mg by mouth.  carvediloL (COREG) 12.5 mg tablet Take 12.5 mg by mouth two (2) times a day.  amLODIPine (NORVASC) 10 mg tablet Take 10 mg by mouth daily.  aspirin 81 mg chewable tablet Take 81 mg by mouth daily.  ascorbic acid, vitamin C, (Vitamin C) 500 mg tablet Take 500 mg by mouth.  insulin glargine (Lantus U-100 Insulin) 100 unit/mL injection 10 Units by SubCUTAneous route nightly.  insulin lispro (HUMALOG) 100 unit/mL injection by SubCUTAneous route.  furosemide (Lasix) 80 mg tablet Take 80 mg by mouth two (2) times a day.  lisinopril (PRINIVIL, ZESTRIL) 40 mg tablet Take 40 mg by mouth daily.         potassium chloride SR (KLOR-CON 10) 10 mEq tablet Take 20 mEq by mouth two (2) times a day. (Patient not taking: Reported on 6/27/2022)         Wt Readings from Last 3 Encounters:   07/05/22 101.6 kg (224 lb)       [unfilled]  Visit Vitals  BP (!) 141/60 (BP 1 Location: Right upper arm, BP Patient Position: At rest)   Pulse 71   Temp 98.2 °F (36.8 °C)   Resp 17   Ht 6' 2\" (1.88 m)   Wt 101.6 kg (224 lb)   SpO2 97%   BMI 28.76 kg/m²       ASSESSMENT     Skin impairment Identification:  Type: surgical    Contributing Factors: chronic pressure    Incision 07/05/22 Foot Right (Active)   Wound Image   07/06/22 1601   Dressing Status Reinforced dressing;Breakthrough drainage noted 07/06/22 1601   Cleansed Cleansed with saline 07/06/22 1601   Dressing/Treatment ABD pad;Roll gauze 07/06/22 1601   Wound Length (cm) 7 cm 07/06/22 1601   Wound Width (cm) 6.5 cm 07/06/22 1601   Closure Other (Comment) 07/06/22 1601   Margins Other (Comment) 07/06/22 1601   Incision Assessment Other (Comment) 07/06/22 1601   Drainage Amount Large 07/06/22 1601   Drainage Description Sanguineous 07/06/22 1601   Wound Odor Other (Comment) 07/06/22 1601   Leticia-Wound/Incision Assessment Excoriated 07/06/22 1601   Number of days: 1       Incision 07/05/22 Foot Right (Active)   Dressing Status Clean;Dry; Intact 07/05/22 2115   Dressing/Treatment Other (Comment) 07/05/22 2115   Number of days: 1       [REMOVED] Incision 06/28/22 Foot Right (Removed)   Wound Image   06/30/22 1443   Dressing Status Clean;Dry; Intact 07/05/22 2144   Dressing Change Due 07/05/22 06/30/22 1443   Dressing/Treatment Non-adherent;Negative Pressure Wound Therapy 06/30/22 1443   Wound Length (cm) 7 cm 06/30/22 1443   Wound Width (cm) 7 cm 06/30/22 1443   Wound Odor None 06/30/22 1443   Leticia-Wound/Incision Assessment Intact 06/30/22 1443   Number of days: 7          PLAN     Skin Care & Pressure Relief Recommendations  Minimize layers of linen  Pads under patient to optimize support surface  Turn/reposition approximately every 2 hours  Manage incontinence   Promote continence; Skin Protective lotion/cream to buttocks and sacrum daily and as needed with incontinence care    Recommendations: reinforce dressing for bleed through if have  To reinforce more than 1 time in 24 hours call Dr. Chon Eli for further orders    Teaching completed with:   [x] Patient           [] Family member       [] Caregiver          [x] Nursing  [] Other    Patient/Caregiver Teaching:  Level of patient/caregiver understanding able to:   [x] Indicates understanding       [] Needs reinforcement  [] Unsuccessful      [] Verbal Understanding  [] Demonstrated understanding       [] No evidence of learning  [] Refused teaching         [] N/A       Electronically signed by Gay Bella RN on 7/6/2022 at 4:06 PM

## 2022-07-06 NOTE — PROGRESS NOTES
Hospitalist Progress Note-critical care note     Patient: Will Craig MRN: [id number]  [facility]: [id number]    YOB: 1960  Age: 60 y.o. Sex: male    DOA: 6/27/2022 LOS:  LOS: 9 days            Chief complaint: cad , esrd on hd, OM , cellulitis , DM ,     Assessment/Plan         Hospital Problems  Date Reviewed: 6/28/2022          Codes Class Noted POA    Infection and inflammatory reaction due to internal fixation device of other site, initial encounter [facility]) ICD-10-CM: Z44.58GL  ICD-9-CM: 996.67  7/5/2022 Unknown        CAD (coronary artery disease) ICD-10-CM: I25.10  ICD-9-CM: 414.00  6/28/2022 Unknown        ESRD (end stage renal disease) ([facility] ICD-10-CM: N18.6  ICD-9-CM: 585.6  6/28/2022 Unknown        * (Principal) Acute hematogenous osteomyelitis of right foot ([facility] ICD-10-CM: M86.071  ICD-9-CM: 730.07  6/28/2022 Unknown        Cellulitis of right heel ICD-10-CM: L03.115  ICD-9-CM: 682.7  6/28/2022 Unknown        Diabetic foot ulcer with osteomyelitis ([facility] ICD-10-CM: E11.621, E11.69, L97.509, M86.9  ICD-9-CM: 250.80, 707.15, 730.27, 731.8  6/28/2022 Unknown        Ulcer of right heel, with necrosis of bone ([facility] ICD-10-CM: L97.414  ICD-9-CM: 707.14, 730.17  6/28/2022 Unknown        Diabetes mellitus with foot ulcer ([facility] ICD-10-CM: E11.621, L97.509  ICD-9-CM: 250.80, 707.15  6/27/2022 Unknown                 Electerin Reyes a 60 y. o. male who history, stent, hypertension, Charcot's foot presents with worsening pain and swelling and chills in his right foot despite multiple cleanings and being managed by podiatry as outpatient.     Large necrotic ulcer right posterior heel with osteomyelitis of the calcaneus and deep abscess-  S/p incision bone cortex right valcaneous, I&D right heel abcsess, deep wound debridement with multiple bone biopsies and application of antibiotic beads done  Dr. Jo Ann Shields   will have  2nd surgery done   Podiatrist and ID f/u   Tunnel line today-needs long term abx      Diabetes mellitus -  On  lantus 10units and  premeal insulin and continue ssi better controlled and will continue current regimen      End-stage renal disease on dialysis Tuesday Thursday and Saturday -  S/p [facility] replacement   HD per nephrology      History of coronary artery disease-  Restart coreg and aspirin      Chronic compressive myelopathy pending surgery -  Supportive care    Subjective: diarrhea     Put imodium     Disposition :d/c planning when having authorization   Review of systems:    General: No fevers or chills. Cardiovascular: No chest pain or pressure. No palpitations. Pulmonary: No shortness of breath. Gastrointestinal: No nausea, vomiting. Vital signs/Intake and Output:  Visit Vitals  BP (!) 138/98 (BP 1 Location: Right upper arm, BP Patient Position: At rest)   Pulse 72   Temp 97.4 °F (36.3 °C)   Resp 18   Ht 6' 2\" (1.88 m)   Wt 101.6 kg (224 lb)   SpO2 99%   BMI 28.76 kg/m²     Current Shift:  No intake/output data recorded. Last three shifts:  07/04 1901 - 07/06 0700  In: 1498 [P.O.:390; I.V.:1108]  Out: 2500     Physical Exam:  General: WD, WN. Alert, cooperative, no acute distress    HEENT: NC, Atraumatic. PERRLA, anicteric sclerae. Lungs: CTA Bilaterally. No Wheezing/Rhonchi/Rales. HD cath noted   Heart:  Regular  rhythm,  No murmur, No Rubs, No Gallops  Abdomen: Soft, Non distended, Non tender. +Bowel sounds,   Extremities: No c/c, bilateral feet covered with protect boots and wrapped with ace , wound vac noted   Psych:   Not anxious or agitated. Neurologic:  No acute neurological deficit.              Labs: Results:       Chemistry Recent Labs     07/06/22  0310 07/05/22  1612 07/05/22  0140 07/04/22  0511 07/04/22  0511   *  --  224*  --  178*     --  137  --  138   K 5.1 5.0 4.7   < > 4.5     --  103  --  103   CO2 24  --  24  --  24   BUN 49*  --  71*  --  56*   CREA 7.18*  --  10.10*  --  8.42*   CA 7.6*  --  7.4*  --  7.8*   AGAP 10  --  10 --  11   BUCR 7*  --  7*  --  7*     --  115  --  116   TP 6.6  --  6.2*  --  6.6   ALB 2.5*  --  2.3*  --  2.3*   GLOB 4.1*  --  3.9  --  4.3*   AGRAT 0.6*  --  0.6*  --  0.5*    < > = values in this interval not displayed. CBC w/Diff Recent Labs     07/06/22  0310 07/05/22  0140 07/04/22  0511   WBC 13.5* 10.1 10.8   RBC 3.26* 3.05* 3.40*   HGB 9.9* 9.2* 10.3*   HCT 31.5* 29.0* 32.3*    233 256   GRANS 93* 71 70   LYMPH 4* 16* 16*   EOS 0 4 4      Cardiac Enzymes No results for input(s): CPK, CKND1, PAULO in the last 72 hours. No lab exists for component: CKRMB, TROIP   Coagulation No results for input(s): PTP, INR, APTT, INREXT, INREXT in the last 72 hours. Lipid Panel No results found for: CHOL, CHOLPOCT, CHOLX, CHLST, CHOLV, 265053, HDL, HDLP, LDL, LDLC, DLDLP, 203100, VLDLC, VLDL, TGLX, TRIGL, TRIGP, TGLPOCT, CHHD, CHHDX   BNP No results for input(s): BNPP in the last 72 hours. Liver Enzymes Recent Labs     07/06/22  0310   TP 6.6   ALB 2.5*         Thyroid Studies No results found for: T4, T3U, TSH, TSHEXT, TSHEXT     Procedures/imaging: see electronic medical records for all procedures/Xrays and details which were not copied into this note but were reviewed prior to creation of Plan    XR FOOT RT AP/LAT    Result Date: 6/29/2022  EXAM: FOOT SERIES Indication: Postoperative. Technique: Frontal and lateral views of the right foot. Comparison: 6/27/2022 _______________ FINDINGS: -OSSEOUS: Interval postoperative changes of posterior calcaneal osteotomy with placement of adjacent drug-eluting beads at the osteotomy site. Overlying bandage material partially degrades evaluation of the osteotomy site. The anterior approach tibial calcaneal screw tip projects at or slightly beyond the osteotomy site, difficult to evaluate given overlying leads and bandage material. Redemonstration of mid and hindfoot deformity, with cannulated screws, and loss of normal definable anatomy.  There is extensive periosteal thickening and irregularity of the first metatarsal, with unchanged plantar displacement distal screw head, with adjacent bone fragment. There is diffuse lucency surrounding the proximal and distal thirds of the indwelling screw. There is diffuse periosteal thickening of the posterior distal talus. No acute displaced fracture is identified. -SOFT TISSUES: Diffuse soft tissue edematous changes, with numerous drug-eluting beads at the posterior aspect of the calcaneal osteotomy. Diffuse atherosclerotic changes. 1. Interval posterior calcaneal osteotomy with adjacent drug-eluting beads and bandage material. Otherwise, no acute osseous abnormality. XR FOOT RT AP/LAT    Result Date: 6/27/2022  EXAM:  XR FOOT RT AP/LAT INDICATION:   right foot pain/wound with odor COMPARISON:  None. FINDINGS: 2 views of the right foot demonstrate chronic deformity, fragmentation, and collapse of the mid/hindfoot. Orthopedic screw in traversing the first ray with periprosthetic lucency. Additional hardware in the mid foot and ankle/hindfoot noted as well. Soft tissue defect seen along the calcaneus posteriorly with adjacent cortical irregularity of the calcaneal cortex. Irregular destructive appearance noted of the distal fifth metatarsal.     Ulceration overlying the heel with likely exposed calcaneus highly suggestive of osteomyelitis with adjacent cortical irregularity and sclerosis. Periprosthetic lucency along the first ray orthopedic screw. Periprosthetic infection and/or loosening suggested. Chronic deformity and collapse of the mid/hindfoot and ankle compatible with Charcot arthropathy. Irregular cortical destructive change noted of the distal fifth metatarsal head possibly related to this process as well although superimposed infection not excluded. [facility]      MRI FOOT RT WO CONT    Result Date: 6/28/2022  ============================ [facility] ============================ HISTORY: -From Provider:  right heel wound, r/o osteomyelitis -Additional: None TECHNIQUE: Sagittal T1, STIR and T2 fat-sat; axial PD and T2 with fat saturation; coronal T2 and STIR with fat saturation and axial oblique PD images of the right ankle were obtained. COMPARISONS: None. FINDINGS: ----------- Postoperative changes are present in the mid and hindfoot, with sequela of prior fusion delineation of original osseous structures. Punctate foci of susceptibility artifact in the subcutaneous fat about the ankle would be consistent with postoperative changes, assuming gas is not identified on plain film. There is plantar settling of the tibia and fibula, in the setting of hindfoot/midfoot fusion and associated postoperative deformity/remodeling. Susceptibility artifact from fixation hardware is present about the ankle. There is a partially visualized, long partially threaded cannulated screw in the 1st digit, terminating in the hind foot. There is a small amount of fluid signal intensity surrounding the tip of the screw tracking posteriorly and inferiorly, with suggested tiny osseous fragments. There is an oblique partially threaded cannulated screw from the lateral tibia to the dorsal calcaneus. There is surrounding T1 hypointense, STIR hyperintense dorsal calcaneal marrow signal, extending to the discontinuous calcaneal cortex, deep to the skin defect/ulceration. There are 2 oblique partially threaded cannulated screws in the fused hindfoot, extending from plantar aspect of the medial cuneiform and residual cuboid to the to the distal tibia. There is fatty change in the musculature about the ankle. Feathery hyperintensity is present in the distal calf musculature above the ankle. Retracted FHL tendon stump cannot be followed distal to the ankle. Peroneus longus and brevis appear discontinuous at the ankle.  There is ill-defined soft tissue thickening, severe cartilage loss in marrow hyperintensity on both sides of the 2nd and 3rd and minimally at the 4th and 5th tarsometatarsal joints. Anterior tendons and Achilles tendons unremarkable within limits of exam. OTHER: Plantar fascia: Undulating/thinned plantar aponeurosis to insertion on inferior calcaneal enthesophyte.     -------------- 1. Posterior heel skin defect, at the margin of the Achilles tendon attachment on the calcaneus, in keeping with known ulcer. Infiltration of subjacent subcutaneous fat in keeping with cellulitis. 2. Cortical discontinuity and marrow signal abnormality in the subjacent dorsal calcaneus extending to the threaded tip of the oblique tibiocalcaneal screw is suspicious for osteomyelitis. 3. Fluid signal intensity surrounding the threaded tibial tip of the 1st digit long partially threaded screw, with osseous fragments at the inferior margin, concerning for loosening and/or infection. 4. Marrow signal abnormalities at the 2nd-3rd and to a lesser degree 4th-5th tarsometatarsal joint, potentially simply related to degenerative/Charcot arthropathy. In view of marrow signal abnormalities, infection cannot be excluded if clinically suspected. If clinically warranted, consider correlation with nuclear medicine imaging to further assess. 5. Edema/myositis in the musculature above the ankle. There is discontinuity of FHL and peroneal tendons, best correlated with operative findings/history of prior intervention for significance/chronicity. Fatty change in the musculature about the foot. XR CHEST PORT    Result Date: 6/27/2022  CLINICAL: Chest pain. COMPARISON: December 1, 2008. A portable view of the chest from 1909 hours: Mild elevation left hemidiaphragm. No focal airspace density. Pulmonary vascular congestion. Right IJ PermCath. No pneumothorax. Pulmonary vascular congestion. No focal airspace densities. DUPLEX LOWER EXT VENOUS BILAT    Result Date: 6/28/2022  · No evidence of deep vein thrombosis in the left lower extremity.  · No evidence of deep vein thrombosis in the right lower extremity. DUPLEX LOWER EXT ARTERY BILAT    Result Date: 6/28/2022  · Bilateral tibial arteries are patnet at the ankle but with monophasic doppler signal. · Bilateral anterior tibial artery is occluded proximally but reconstituted distally by the collateral flow.         Cherie Patino MD

## 2022-07-06 NOTE — PROGRESS NOTES
Order for [facility] in chart. Please arrange today    Amanda Iyer MD  [facility]  Office #:     [phone]  [id number] [phone]   Office Fax: [phone]

## 2022-07-06 NOTE — PROGRESS NOTES
Problem: Falls - Risk of  Goal: *Absence of Falls  Description: Document Arron Sol Fall Risk and appropriate interventions in the flowsheet. Outcome: Progressing Towards Goal  Note: Fall Risk Interventions:  Mobility Interventions: Communicate number of staff needed for ambulation/transfer,Patient to call before getting OOB         Medication Interventions: Patient to call before getting OOB    Elimination Interventions: Patient to call for help with toileting needs,Call light in reach    History of Falls Interventions: Consult care management for discharge planning,Door open when patient unattended         Problem: Pressure Injury - Risk of  Goal: *Prevention of pressure injury  Description: Document Semaj Scale and appropriate interventions in the flowsheet.   Outcome: Progressing Towards Goal  Note: Pressure Injury Interventions:  Sensory Interventions: Keep linens dry and wrinkle-free,Pressure redistribution bed/mattress (bed type)    Moisture Interventions: Check for incontinence Q2 hours and as needed,Apply protective barrier, creams and emollients,Absorbent underpads    Activity Interventions: Pressure redistribution bed/mattress(bed type)    Mobility Interventions: Pressure redistribution bed/mattress (bed type)    Nutrition Interventions: Offer support with meals,snacks and hydration    Friction and Shear Interventions: Apply protective barrier, creams and emollients                Problem: Chronic Renal Failure  Goal: *Fluid and electrolytes stabilized  Outcome: Progressing Towards Goal

## 2022-07-06 NOTE — BRIEF OP NOTE
Brief Postoperative Note    Patient: Neli Dorantes  YOB: 1960  MRN: [id number]    Date of Procedure: 7/5/2022     Pre-Op Diagnosis: RIGHT HEEL ABSCESS    Post-Op Diagnosis: Osteomyelitis right calcaneous, open ulcer with bone necrosis right heel, spontaneous rupture right Achilles tendon, orthopedic hardware/screw causing infection right calcaneus      Procedure(s):  RIGHT HEEL DEBRIDEMENT WITH GRAFTING,REMOVAL OF SCREW  (PT IN ROOM #325) WITH C-ARM    Surgeon(s):  Camille Lundy DPM    Surgical Assistant: None    Anesthesia: General     Estimated Blood Loss (mL): Minimal    Complications: None    Specimens:   ID Type Source Tests Collected by Time Destination   1 : Explanted Right Foot Screw Fresh Foot, Right  Camille Lundy DPM 7/5/2022 2050 Pathology        Implants: * No implants in log *    Drains: * No LDAs found *    Findings: Orthopedic implant/screw causing bone infection, osteomyelitis right calcaneus spontaneous rupture right Achilles tendon, open ulcer with bone necrosis posterior right heel    Electronically Signed by Jorge Rosas DPM on 7/5/2022 at 9:36 PM

## 2022-07-07 ENCOUNTER — APPOINTMENT (OUTPATIENT)
Dept: INTERVENTIONAL RADIOLOGY/VASCULAR | Age: 62
DRG: 629 | End: 2022-07-07
Attending: INTERNAL MEDICINE
Payer: MEDICARE

## 2022-07-07 LAB
ALBUMIN SERPL-MCNC: 2.4 G/DL (ref 3.4–5)
ALBUMIN/GLOB SERPL: 0.5 {RATIO} (ref 0.8–1.7)
ALP SERPL-CCNC: 124 U/L (ref 45–117)
ALT SERPL-CCNC: 18 U/L (ref 16–61)
ANION GAP SERPL CALC-SCNC: 9 MMOL/L (ref 3–18)
AST SERPL-CCNC: 16 U/L (ref 10–38)
BASOPHILS # BLD: 0.1 K/UL (ref 0–0.1)
BASOPHILS NFR BLD: 0 % (ref 0–2)
BILIRUB SERPL-MCNC: 0.3 MG/DL (ref 0.2–1)
BUN SERPL-MCNC: 61 MG/DL (ref 7–18)
BUN/CREAT SERPL: 7 (ref 12–20)
CALCIUM SERPL-MCNC: 7.7 MG/DL (ref 8.5–10.1)
CHLORIDE SERPL-SCNC: 105 MMOL/L (ref 100–111)
CO2 SERPL-SCNC: 25 MMOL/L (ref 21–32)
CREAT SERPL-MCNC: 8.76 MG/DL (ref 0.6–1.3)
CRP SERPL-MCNC: 4.1 MG/DL (ref 0–0.3)
DIFFERENTIAL METHOD BLD: ABNORMAL
EOSINOPHIL # BLD: 0.4 K/UL (ref 0–0.4)
EOSINOPHIL NFR BLD: 3 % (ref 0–5)
ERYTHROCYTE [DISTWIDTH] IN BLOOD BY AUTOMATED COUNT: 17.1 % (ref 11.6–14.5)
ERYTHROCYTE [SEDIMENTATION RATE] IN BLOOD: 75 MM/HR (ref 0–20)
GLOBULIN SER CALC-MCNC: 4.8 G/DL (ref 2–4)
GLUCOSE BLD STRIP.AUTO-MCNC: 135 MG/DL (ref 70–110)
GLUCOSE BLD STRIP.AUTO-MCNC: 151 MG/DL (ref 70–110)
GLUCOSE BLD STRIP.AUTO-MCNC: 185 MG/DL (ref 70–110)
GLUCOSE BLD STRIP.AUTO-MCNC: 193 MG/DL (ref 70–110)
GLUCOSE SERPL-MCNC: 197 MG/DL (ref 74–99)
HCT VFR BLD AUTO: 28 % (ref 36–48)
HGB BLD-MCNC: 8.8 G/DL (ref 13–16)
IMM GRANULOCYTES # BLD AUTO: 0.1 K/UL (ref 0–0.04)
IMM GRANULOCYTES NFR BLD AUTO: 1 % (ref 0–0.5)
INR PPP: 1 (ref 0.8–1.2)
LYMPHOCYTES # BLD: 1.7 K/UL (ref 0.9–3.6)
LYMPHOCYTES NFR BLD: 14 % (ref 21–52)
MAGNESIUM SERPL-MCNC: 2.3 MG/DL (ref 1.6–2.6)
MCH RBC QN AUTO: 30.8 PG (ref 24–34)
MCHC RBC AUTO-ENTMCNC: 31.4 G/DL (ref 31–37)
MCV RBC AUTO: 97.9 FL (ref 78–100)
MONOCYTES # BLD: 1 K/UL (ref 0.05–1.2)
MONOCYTES NFR BLD: 9 % (ref 3–10)
NEUTS SEG # BLD: 8.4 K/UL (ref 1.8–8)
NEUTS SEG NFR BLD: 73 % (ref 40–73)
NRBC # BLD: 0 K/UL (ref 0–0.01)
NRBC BLD-RTO: 0 PER 100 WBC
PLATELET # BLD AUTO: 227 K/UL (ref 135–420)
PMV BLD AUTO: 10.7 FL (ref 9.2–11.8)
POTASSIUM SERPL-SCNC: 4.4 MMOL/L (ref 3.5–5.5)
PROT SERPL-MCNC: 7.2 G/DL (ref 6.4–8.2)
PROTHROMBIN TIME: 14 SEC (ref 11.5–15.2)
RBC # BLD AUTO: 2.86 M/UL (ref 4.35–5.65)
SODIUM SERPL-SCNC: 139 MMOL/L (ref 136–145)
WBC # BLD AUTO: 11.6 K/UL (ref 4.6–13.2)

## 2022-07-07 PROCEDURE — 74011250637 HC RX REV CODE- 250/637: Performed by: INTERNAL MEDICINE

## 2022-07-07 PROCEDURE — 85025 COMPLETE CBC W/AUTO DIFF WBC: CPT

## 2022-07-07 PROCEDURE — 80053 COMPREHEN METABOLIC PANEL: CPT

## 2022-07-07 PROCEDURE — 74011250637 HC RX REV CODE- 250/637: Performed by: HOSPITALIST

## 2022-07-07 PROCEDURE — 74011636637 HC RX REV CODE- 636/637: Performed by: FAMILY MEDICINE

## 2022-07-07 PROCEDURE — 74011000258 HC RX REV CODE- 258: Performed by: PHYSICIAN ASSISTANT

## 2022-07-07 PROCEDURE — 77001 FLUOROGUIDE FOR VEIN DEVICE: CPT

## 2022-07-07 PROCEDURE — 74011250636 HC RX REV CODE- 250/636: Performed by: INTERNAL MEDICINE

## 2022-07-07 PROCEDURE — 74011000636 HC RX REV CODE- 636: Performed by: RADIOLOGY

## 2022-07-07 PROCEDURE — 74011250636 HC RX REV CODE- 250/636: Performed by: PHYSICIAN ASSISTANT

## 2022-07-07 PROCEDURE — 74011636637 HC RX REV CODE- 636/637: Performed by: HOSPITALIST

## 2022-07-07 PROCEDURE — 74011636637 HC RX REV CODE- 636/637: Performed by: INTERNAL MEDICINE

## 2022-07-07 PROCEDURE — 85652 RBC SED RATE AUTOMATED: CPT

## 2022-07-07 PROCEDURE — 85610 PROTHROMBIN TIME: CPT

## 2022-07-07 PROCEDURE — 65270000029 HC RM PRIVATE

## 2022-07-07 PROCEDURE — 74011250636 HC RX REV CODE- 250/636: Performed by: PODIATRIST

## 2022-07-07 PROCEDURE — 74011000250 HC RX REV CODE- 250: Performed by: RADIOLOGY

## 2022-07-07 PROCEDURE — 74011000258 HC RX REV CODE- 258: Performed by: RADIOLOGY

## 2022-07-07 PROCEDURE — 36415 COLL VENOUS BLD VENIPUNCTURE: CPT

## 2022-07-07 PROCEDURE — 82962 GLUCOSE BLOOD TEST: CPT

## 2022-07-07 PROCEDURE — 86140 C-REACTIVE PROTEIN: CPT

## 2022-07-07 PROCEDURE — 83735 ASSAY OF MAGNESIUM: CPT

## 2022-07-07 PROCEDURE — 90935 HEMODIALYSIS ONE EVALUATION: CPT

## 2022-07-07 PROCEDURE — 74011250636 HC RX REV CODE- 250/636: Performed by: RADIOLOGY

## 2022-07-07 RX ORDER — LIDOCAINE HYDROCHLORIDE 10 MG/ML
1-5 INJECTION INFILTRATION; PERINEURAL
Status: COMPLETED | OUTPATIENT
Start: 2022-07-07 | End: 2022-07-07

## 2022-07-07 RX ORDER — LIDOCAINE HYDROCHLORIDE 10 MG/ML
1-20 INJECTION, SOLUTION EPIDURAL; INFILTRATION; INTRACAUDAL; PERINEURAL
Status: ACTIVE | OUTPATIENT
Start: 2022-07-07 | End: 2022-07-08

## 2022-07-07 RX ORDER — NALOXONE HYDROCHLORIDE 0.4 MG/ML
0.1 INJECTION, SOLUTION INTRAMUSCULAR; INTRAVENOUS; SUBCUTANEOUS
Status: DISCONTINUED | OUTPATIENT
Start: 2022-07-07 | End: 2022-07-21 | Stop reason: HOSPADM

## 2022-07-07 RX ORDER — LIDOCAINE HYDROCHLORIDE 10 MG/ML
1-20 INJECTION INFILTRATION; PERINEURAL
Status: DISCONTINUED | OUTPATIENT
Start: 2022-07-07 | End: 2022-07-07 | Stop reason: RX

## 2022-07-07 RX ORDER — HEPARIN SODIUM 200 [USP'U]/100ML
500 INJECTION, SOLUTION INTRAVENOUS
Status: COMPLETED | OUTPATIENT
Start: 2022-07-07 | End: 2022-07-07

## 2022-07-07 RX ORDER — HEPARIN SODIUM 1000 [USP'U]/ML
3400 INJECTION, SOLUTION INTRAVENOUS; SUBCUTANEOUS
Status: DISCONTINUED | OUTPATIENT
Start: 2022-07-07 | End: 2022-07-21 | Stop reason: HOSPADM

## 2022-07-07 RX ORDER — SODIUM CHLORIDE 0.9 % (FLUSH) 0.9 %
5-40 SYRINGE (ML) INJECTION AS NEEDED
Status: DISCONTINUED | OUTPATIENT
Start: 2022-07-07 | End: 2022-07-21 | Stop reason: HOSPADM

## 2022-07-07 RX ORDER — HEPARIN SODIUM (PORCINE) LOCK FLUSH IV SOLN 100 UNIT/ML 100 UNIT/ML
500 SOLUTION INTRAVENOUS
Status: DISCONTINUED | OUTPATIENT
Start: 2022-07-07 | End: 2022-07-21 | Stop reason: HOSPADM

## 2022-07-07 RX ORDER — FENTANYL CITRATE 50 UG/ML
25-100 INJECTION, SOLUTION INTRAMUSCULAR; INTRAVENOUS
Status: DISCONTINUED | OUTPATIENT
Start: 2022-07-07 | End: 2022-07-18 | Stop reason: HOSPADM

## 2022-07-07 RX ORDER — SODIUM CHLORIDE 0.9 % (FLUSH) 0.9 %
5-40 SYRINGE (ML) INJECTION EVERY 8 HOURS
Status: DISCONTINUED | OUTPATIENT
Start: 2022-07-07 | End: 2022-07-21 | Stop reason: HOSPADM

## 2022-07-07 RX ADMIN — B-COMPLEX W/ C & FOLIC ACID TAB 1 MG 1 TABLET: 1 TAB at 08:50

## 2022-07-07 RX ADMIN — EPOETIN ALFA-EPBX 10000 UNITS: 10000 INJECTION, SOLUTION INTRAVENOUS; SUBCUTANEOUS at 22:00

## 2022-07-07 RX ADMIN — SEVELAMER CARBONATE 1600 MG: 800 TABLET, FILM COATED ORAL at 08:51

## 2022-07-07 RX ADMIN — INSULIN LISPRO 3 UNITS: 100 INJECTION, SOLUTION INTRAVENOUS; SUBCUTANEOUS at 13:11

## 2022-07-07 RX ADMIN — AMLODIPINE BESYLATE 10 MG: 5 TABLET ORAL at 08:51

## 2022-07-07 RX ADMIN — ALLOPURINOL 100 MG: 100 TABLET ORAL at 08:51

## 2022-07-07 RX ADMIN — SODIUM CHLORIDE, PRESERVATIVE FREE 10 ML: 5 INJECTION INTRAVENOUS at 22:00

## 2022-07-07 RX ADMIN — PIPERACILLIN AND TAZOBACTAM 4.5 G: 4; .5 INJECTION, POWDER, FOR SOLUTION INTRAVENOUS at 21:09

## 2022-07-07 RX ADMIN — SODIUM CHLORIDE 75 ML/HR: 900 INJECTION, SOLUTION INTRAVENOUS at 00:41

## 2022-07-07 RX ADMIN — EZETIMIBE 10 MG: 10 TABLET ORAL at 08:51

## 2022-07-07 RX ADMIN — Medication 2 UNITS: at 08:48

## 2022-07-07 RX ADMIN — SEVELAMER CARBONATE 1600 MG: 800 TABLET, FILM COATED ORAL at 13:12

## 2022-07-07 RX ADMIN — CARVEDILOL 12.5 MG: 12.5 TABLET, FILM COATED ORAL at 21:10

## 2022-07-07 RX ADMIN — HEPARIN SODIUM (PORCINE) LOCK FLUSH IV SOLN 100 UNIT/ML 200 UNITS: 100 SOLUTION at 15:20

## 2022-07-07 RX ADMIN — Medication 2 UNITS: at 16:16

## 2022-07-07 RX ADMIN — INSULIN LISPRO 3 UNITS: 100 INJECTION, SOLUTION INTRAVENOUS; SUBCUTANEOUS at 08:48

## 2022-07-07 RX ADMIN — Medication 2 TABLET: at 21:09

## 2022-07-07 RX ADMIN — CARVEDILOL 12.5 MG: 12.5 TABLET, FILM COATED ORAL at 08:51

## 2022-07-07 RX ADMIN — INSULIN GLARGINE 10 UNITS: 100 INJECTION, SOLUTION SUBCUTANEOUS at 21:11

## 2022-07-07 RX ADMIN — CEFAZOLIN SODIUM 1 G: 1 INJECTION, POWDER, FOR SOLUTION INTRAMUSCULAR; INTRAVENOUS at 14:43

## 2022-07-07 RX ADMIN — INSULIN LISPRO 3 UNITS: 100 INJECTION, SOLUTION INTRAVENOUS; SUBCUTANEOUS at 16:17

## 2022-07-07 RX ADMIN — PIPERACILLIN AND TAZOBACTAM 4.5 G: 4; .5 INJECTION, POWDER, FOR SOLUTION INTRAVENOUS at 08:49

## 2022-07-07 RX ADMIN — Medication 2 UNITS: at 21:22

## 2022-07-07 RX ADMIN — IOPAMIDOL 7 ML: 612 INJECTION, SOLUTION INTRAVENOUS at 15:29

## 2022-07-07 RX ADMIN — HEPARIN SODIUM IN SODIUM CHLORIDE 1000 UNITS: 200 INJECTION INTRAVENOUS at 15:20

## 2022-07-07 RX ADMIN — LIDOCAINE HYDROCHLORIDE 5 ML: 10 INJECTION, SOLUTION INFILTRATION; PERINEURAL at 15:26

## 2022-07-07 RX ADMIN — SEVELAMER CARBONATE 1600 MG: 800 TABLET, FILM COATED ORAL at 16:17

## 2022-07-07 RX ADMIN — Medication 2 TABLET: at 08:50

## 2022-07-07 RX ADMIN — ASPIRIN 81 MG: 81 TABLET, CHEWABLE ORAL at 08:50

## 2022-07-07 NOTE — PROGRESS NOTES
PT order received and chart reviewed. Attempted in AM 1155 and pt on HD. In PM pt going NATASHA for testing/procedure. Will follow up as pt schedule allows.

## 2022-07-07 NOTE — PROGRESS NOTES
TRANSFER - OUT REPORT:    Verbal report given to Isa 61 (name) on Phill Snowball  being transferred to [facility](unit) for routine progression of care       Report consisted of patients Situation, Background, Assessment and   Recommendations(SBAR). Information from the following report(s) SBAR, Procedure Summary, Intake/Output, MAR and Recent Results was reviewed with the receiving nurse. Lines:   Peripheral IV 07/06/22 Anterior;Proximal;Right Forearm (Active)   Dressing Status New 07/06/22 0100   Dressing Type Transparent 07/06/22 0100   Action Taken Open ports on tubing capped 07/06/22 0100        Opportunity for questions and clarification was provided.       Patient transported with:   [employer]

## 2022-07-07 NOTE — PROGRESS NOTES
Patient educated on procedure. Time for questions provided. Patient has not been NPO, no moderate sedation for procedure.

## 2022-07-07 NOTE — PROGRESS NOTES
[facility]  (A Division of [address] [place] [address])                                                                                                                     Dr Geetha Vann #: - Option # [phone]  Fax #: [phone]     Date of Admission: 6/27/2022Date of Note: 7/7/2022  Reason for Referral: Evaluation and antibiotic management of R heel infection    Current Antimicrobials:    Prior Antimicrobials:  Zosyn 6/27 to date   Bactrim 1 month ago       Assessment- ID related:  --------------------------------------------------------------------------  · DFU and OM R heel  S/P I and D 6/28:  Large right heel necrotic ulcer with osteomyelitis of the calcaneus, and deep abscess  --Proteus/Mroganella/ E.fecalis / alpha strep and Bacteroides on culture  --S/P OR 7/5/22- R heel I and D with grafting, removal of screw. No culture sent  · Subacute/chronic OM of heel-- over 2 months time  · Leucocytosis 2/2 above  --BCX 6/27: NGSF  --WCX-- GNR and GPC in pairs--> pending  · ESRD on HD TTS  · Obese BMI of 28  · DM   · History of L hallux ampuation for infection- 5yrs ago    R chest TDC is for his HD Recommendation for ID issues I am following:  --------------------------------------------------------------------------------  MILTON Chacon, RN  DC MRSA isolation  Wound care/dressing per Dr Goldie Pennington     [facility] placement pending--for zosyn-Pending    Need TDC for Zosyn 2.25gm Q8 hour X 6 weeks abx ( from 6/28).  End: August 8  OPAT to be arranged for above by CM     Weekly labs- cbc w/diff, LFT, uantitative CRP on Mon  Monitor above labs for ABX adverse effects  Fax labs to Dr Kavitha Tobias-- [phone]     FU in 2 weeks in ID clinic on DC    If the above rx fails,  Likely needs BKA       Subjective:  Afebrile, occasional abd cramps, overall better  BM recorded lower on I/O, pt tells me  Placement of TDC pending  Seen during HD  Reviewed issues with his IV access for ABX yesterday/todayBM X3 overnight, none so far [facility]  WBC 11K    Review of systems:    No F/C/R  No N/V  No cough/sob/chest pain  No itching or rash       HPI:  Maria D Hendrickson is a 60 y.o. BLACK/ with PMH as listed below-- he had ulcer for 2 months or so that he was followed by as OP by Podiatry. His heel wound progressed and was advised to come to ED yesterday. Had foot pain and fould drainage, but denies high F/C/R/SOB/ N/V prior to admission. BCX/WCX taken in ED, started on Zosyn and plan was to hold off abx and monitor until surgery. Admission assessment there was high concern for sepsis and Zosyn was continued. Hx of MRSA infection and treatment few years ago. Active Hospital Problems    Diagnosis Date Noted    Infection and inflammatory reaction due to internal fixation device of other site, [facility] 07/05/2022    CAD (coronary artery disease) 06/28/2022    ESRD (end stage renal disease) ([facility] 06/28/2022    Acute hematogenous osteomyelitis of right foot ([facility] 06/28/2022    Cellulitis of right heel 06/28/2022    Diabetic foot ulcer with osteomyelitis ([facility] 06/28/2022    Ulcer of right heel, with necrosis of bone ([facility] 06/28/2022    Diabetes mellitus with foot ulcer ([facility] 06/27/2022     Past Medical History:   Diagnosis Date    Diabetes mellitus     Hypertension      History reviewed. No pertinent surgical history. History reviewed. No pertinent family history.   Social History     Socioeconomic History    Marital status:      Spouse name: Not on file    Number of children: Not on file    Years of education: Not on file    Highest education level: Not on file   Occupational History    Not on file   Tobacco Use    Smoking status: Not on file    Smokeless tobacco: Not on file   Substance and Sexual Activity    Alcohol use: Not on file    Drug use: Not on file    Sexual activity: Not on file   Other Topics Concern    Dental Braces Not Asked    Endoscopic Camera Pill Not Asked    Metallic Foreign Body Not Asked    Medication Patches Not Asked    Taking Feraheme Not Asked    Claustrophobic Not Asked   Social History Narrative    Not on file     Social Determinants of Health     Financial Resource Strain:     Difficulty of Paying Living Expenses: Not on file   Food Insecurity:     Worried About Running Out of Food in the Last Year: Not on file    Vane of Food in the Last Year: Not on file   Transportation Needs:     Lack of Transportation (Medical): Not on file    Lack of Transportation (Non-Medical): Not on file   Physical Activity:     Days of Exercise per Week: Not on file    Minutes of Exercise per Session: Not on file   Stress:     Feeling of Stress : Not on file   Social Connections:     Frequency of Communication with Friends and Family: Not on file    Frequency of Social Gatherings with Friends and Family: Not on file    Attends Worship Services: Not on file    Active Member of [address] [place] [address] or Organizations: Not on file    Attends Club or Organization Meetings: Not on file    Marital Status: Not on file   Intimate Partner Violence:     Fear of Current or Ex-Partner: Not on file    Emotionally Abused: Not on file    Physically Abused: Not on file    Sexually Abused: Not on file   Housing Stability:     Unable to Pay for Housing in the Last Year: Not on file    Number of [place] in the Last Year: Not on file    Unstable Housing in the Last Year: Not on file       Allergies:  Patient has no known allergies.      Medications:  Current Facility-Administered Medications   Medication Dose Route Frequency    epoetin lisette-epbx (RETACRIT) injection 10,000 Units  10,000 Units SubCUTAneous Q TUE, THU & SAT    heparin (porcine) 1,000 unit/mL injection 3,400 Units  3,400 Units Hemodialysis DIALYSIS PRN    loperamide (IMODIUM) capsule 2 mg  2 mg Oral Q4H PRN    piperacillin-tazobactam (ZOSYN) 4.5 g in 0.9% sodium chloride (MBP/ADV) 100 mL MBP 4.5 g IntraVENous Q12H    0.9% sodium chloride infusion  75 mL/hr IntraVENous CONTINUOUS    sevelamer carbonate (RENVELA) tab 1,600 mg  1,600 mg Oral TID WITH MEALS    Lactobacillus Acidoph & Bulgar (FLORANEX) tablet 2 Tablet  2 Tablet Oral BID    allopurinoL (ZYLOPRIM) tablet 100 mg  100 mg Oral DAILY    carvediloL (COREG) tablet 12.5 mg  12.5 mg Oral BID    ezetimibe (ZETIA) tablet 10 mg  10 mg Oral DAILY    amLODIPine (NORVASC) tablet 10 mg  10 mg Oral DAILY    vit B Cmplx 3-FA-Vit C-Biotin (NEPHRO NIKITA RX) tablet 1 Tablet  1 Tablet Oral DAILY    insulin lispro (HUMALOG) injection 3 Units  3 Units SubCUTAneous TIDAC    aspirin chewable tablet 81 mg  81 mg Oral DAILY    insulin glargine (LANTUS) injection 10 Units  10 Units SubCUTAneous QHS    insulin lispro (HUMALOG) injection   SubCUTAneous AC&HS    glucose chewable tablet 16 g  4 Tablet Oral PRN    glucagon (GLUCAGEN) injection 1 mg  1 mg IntraMUSCular PRN    dextrose 10% infusion 0-250 mL  0-250 mL IntraVENous PRN    acetaminophen (TYLENOL) tablet 650 mg  650 mg Oral Q6H PRN        ROS:  Pertinent items are noted in the History of Present Illness. Physical Exam:    Temp (24hrs), Av.9 °F (36.6 °C), Min:96.8 °F (36 °C), Max:98.8 °F (37.1 °C)    Visit Vitals  BP (!) 157/67 (BP 1 Location: Right upper arm, BP Patient Position: At rest)   Pulse 67   Temp 97.3 °F (36.3 °C)   Resp 18   Ht 6' 2\" (1.88 m)   Wt 101.6 kg (224 lb)   SpO2 96%   BMI 28.76 kg/m²      GEN: WD Obese, on RA--not in resp distress. Right chest TDC for HD    HEENT: Unicteric. EOMI intact  No neck swelling  CHEST: Non laboured breathing. ABD: Obese/soft. Non tender. PASCUAL: Deferred  EXT: not examined today- dressed- wound vac is removed from foot  Skin: Dry and intact. No rash, no redness. CNS: A, OX3. Moves all extremity. CN grossly ok.       Microbiology  All Micro Results     Procedure Component Value Units Date/Time    CULTURE, FUNGUS [[id number]] Collected: 06/28/22 1925    Order Status: Completed Specimen: Heel Updated: 07/05/22 1223     Special Requests: NO SPECIAL REQUESTS        Culture result: NO FUNGUS ISOLATED 6 DAYS       CULTURE, ANAEROBIC [[id number]]  (Abnormal) Collected: 06/28/22 1925    Order Status: Completed Specimen: Heel Updated: 07/04/22 1637     Special Requests: NO SPECIAL REQUESTS        Culture result:       MODERATE Porphyromonas assacharolyticus (Bacteroides) BETA LACTAMASE POSITIVE          CULTURE, BLOOD [[id number]] Collected: 06/27/22 1240    Order Status: Completed Specimen: Blood Updated: 07/03/22 0734     Special Requests: NO SPECIAL REQUESTS        Culture result: NO GROWTH 6 DAYS       CULTURE, BLOOD [[id number]] Collected: 06/27/22 1245    Order Status: Completed Specimen: Blood Updated: 07/03/22 0734     Special Requests: NO SPECIAL REQUESTS        Culture result: NO GROWTH 6 DAYS       CULTURE, TISSUE Lavada Gambles STAIN [[id number]]  (Abnormal) Collected: 06/28/22 1926    Order Status: Completed Specimen: Bone Updated: 07/02/22 1221     Special Requests: NO SPECIAL REQUESTS        GRAM STAIN 2+ WBCS SEEN         NO ORGANISMS SEEN        Culture result: FEW ENTEROCOCCUS FAECALIS         SCANT PROTEUS VULGARIS         SCANT ALPHA STREPTOCOCCUS               SCANT Morganella morganii ssp morganii            REFER TO [facility] [id number] FOR SENSITIVITIES    CULTURE, Ivar [place] STAIN [[id number]]  (Abnormal)  (Susceptibility) Collected: 06/28/22 1925    Order Status: Completed Specimen: Heel Updated: 07/02/22 1003     Special Requests: NO SPECIAL REQUESTS        GRAM STAIN OCCASIONAL WBCS SEEN         NO ORGANISMS SEEN        Culture result: LIGHT PROTEUS VULGARIS               SCANT Morganella morganii ssp morganii                  MODERATE ENTEROCOCCUS FAECALIS          AFB CULTURE + SMEAR W/RFLX ID FROM CULTURE [[id number]] Collected: 06/28/22 1925    Order Status: Completed Specimen: Miscellaneous sample Updated: 06/30/22 1736     Source MISC.  WOUND AFB Specimen processing Concentration     Acid Fast Smear Negative        Comment: (NOTE)  Performed At: [facility]  [address], [place] [id number]  Jayda Preston MD UL:[id number]          Acid Fast Culture PENDING    CULTURE, Bethblake Damian STAIN [[id number]] Collected: 06/27/22 1530    Order Status: Completed Specimen: Wound Drainage Updated: 06/29/22 1511     Special Requests: NO SPECIAL REQUESTS        GRAM STAIN RARE WBCS SEEN         2+ GRAM NEGATIVE RODS               1+ GRAM POSITIVE COCCI IN PAIRS           Culture result:       MODERATE  MIXED ENTERIC GRAM NEGATIVE RODS              HEAVY MIXED SKIN TO ISOLATED          AFB CULTURE + SMEAR W/RFLX ID FROM CULTURE [[id number]] Collected: 06/28/22 1930    Order Status: Canceled     CULTURE, ANAEROBIC [[id number]] Collected: 06/28/22 1926    Order Status: Canceled            Lab results:    Chemistry  Recent Labs     07/07/22 0215 07/06/22 0310 07/05/22  1612 07/05/22  0140 07/05/22  0140   * 300*  --   --  224*    136  --   --  137   K 4.4 5.1 5.0   < > 4.7    102  --   --  103   CO2 25 24  --   --  24   BUN 61* 49*  --   --  71*   CREA 8.76* 7.18*  --   --  10.10*   CA 7.7* 7.6*  --   --  7.4*   AGAP 9 10  --   --  10   BUCR 7* 7*  --   --  7*   * 114  --   --  115   TP 7.2 6.6  --   --  6.2*   ALB 2.4* 2.5*  --   --  2.3*   GLOB 4.8* 4.1*  --   --  3.9   AGRAT 0.5* 0.6*  --   --  0.6*    < > = values in this interval not displayed. CBC w/ Diff  Recent Labs     07/07/22 0215 07/06/22  0310 07/05/22  0140   WBC 11.6 13.5* 10.1   RBC 2.86* 3.26* 3.05*   HGB 8.8* 9.9* 9.2*   HCT 28.0* 31.5* 29.0*    242 233   GRANS 73 93* 71   LYMPH 14* 4* 16*   EOS 3 0 4       Imaging: report reviewed and as posted by radiologist   No results found for this or any previous visit. MRI:       1.  Posterior heel skin defect, at the margin of the Achilles tendon attachment  on the calcaneus, in keeping with known ulcer. Infiltration of subjacent  subcutaneous fat in keeping with cellulitis.     2. Cortical discontinuity and marrow signal abnormality in the subjacent dorsal  calcaneus extending to the threaded tip of the oblique tibiocalcaneal screw is  suspicious for osteomyelitis.     3. Fluid signal intensity surrounding the threaded tibial tip of the 1st digit  long partially threaded screw, with osseous fragments at the inferior margin,  concerning for loosening and/or infection.     4. Marrow signal abnormalities at the 2nd-3rd and to a lesser degree 4th-5th  tarsometatarsal joint, potentially simply related to degenerative/Charcot  arthropathy. In view of marrow signal abnormalities, infection cannot be  excluded if clinically suspected. If clinically warranted, consider correlation with nuclear medicine imaging to  further assess.     5. Edema/myositis in the musculature above the ankle. There is discontinuity of  FHL and peroneal tendons, best correlated with operative findings/history of  prior intervention for significance/chronicity. Fatty change in the musculature  about the foot.

## 2022-07-07 NOTE — PROGRESS NOTES
Occupational Therapy Evaluation Attempt     OT eval orders received. Chart reviewed. Attempted Occupational Therapy Evaluation, however, patient unable to be seen due to:  []  Nausea/vomiting  []  Eating  []  Pain  []  Patient too lethargic  []  Off Unit for testing/procedure  [x]  Dialysis treatment in progress  []  Telemetry Results  []  Other:      Will f/u later as patient's schedule allows.   Miriam Odell, OTR/L

## 2022-07-07 NOTE — PROGRESS NOTES
D/C Plan: [address] for wound vac placement. Wound vac has not been able to be placed at this time. Clinical has been sent to [facility] for review to assist with care transition. Anticipate pt will transition to the above facility onc pt is medically stable and insurance [employer] [place] has been obtained. CM spoke with pt and provided an update. CM to continue to follow and assist.      Care Management Interventions  PCP Verified by CM:  Yes (Dr. Gilford Hone )  Mode of Transport at Discharge: BLS  Transition of Care Consult (CM Consult): SNF (Pt is in agreement w/ SNF at d/c. )  Discharge Durable Medical Equipment:  (TBD)  Physical Therapy Consult: Yes  Occupational Therapy Consult: Yes  Support Systems: Child(dafne)  Confirm Follow Up Transport: Family  The Plan for Transition of Care is Related to the Following Treatment Goals : Skilled nursing facility  The Patient and/or Patient Representative was Provided with a Choice of Provider and Agrees with the Discharge Plan?: Yes (The pt is alert and oriented. )  Freedom of Choice List was Provided with Basic Dialogue that Supports the Patient's Individualized Plan of Care/Goals, Treatment Preferences and Shares the Quality Data Associated with the Providers?: Yes (Pt is in agreement w/ referrals being sent to all  and [place] facilities and then he will choose one from the accepting facilities. )  Discharge Location  Patient Expects to be Discharged to[de-identified] Skilled nursing facility

## 2022-07-07 NOTE — PROCEDURES
Interventional Radiology Brief Procedure Note    Interventional Radiologist: Luh Servin MD    Pre-operative Diagnosis: Osteomyelitis. Post-operative Diagnosis: Same as pre-operative diagnosis    Procedure(s) Performed: Tunneled central venous catheter placement    Anesthesia:  Local     Findings: Tunneled central venous catheter placed via right IJV. Ready for use.      Complications: None    Estimated Blood Loss:  minimal    Specimens: None    Condition: Good    Luh Servin MD  [facility]  7/7/2022

## 2022-07-07 NOTE — DIALYSIS
TREATMENT SUMMARY   Patient dialyzed in room 325  Tolerated treatment well without complaint or complications. RIJ TDC functioning well without complication of BFR or accessing       3000 removed via UF with a with a net removal 2500 ml  Report given to  With all questions answered  all questions answered     TREATMENT  NOTES      HD conducted by Rocco Pace PCT, Treatment vitals and machine details located on [email or web address] paper flow sheet located in hard chart to be scanned.                                                                                                    ACUTE HEMODIALYSIS FLOW SHEET       ACCESS   CATHETER ACCESS: [] N/A  [] RIGHT  [] LEFT  [] IJ  [] SUBCL [] FEM                    [] First use X-ray  [] Tunnel     [] Non-Tunneled      [] No S/S infection  [] Redness [] Drainage  [] Cultured [] Swelling [] Pain                    [] Medical Aseptic [] Prep Dressing Changed                  [] Clotted [] Patent []      Flows: [] Good [] Poor [] Reversed                 If Access Problem Dr. Emily Hickey: [] Yes [] No    Date:_____  [] N/A[]   GRAFT/FISTULA ACCESS:  [x] N/A  [] RIGHT  [] LEFT  [] UE   [] LE       [] AVG  [] AVF [] BUTTONHOLE    [] +BRUIT/THRILL [] MEDICAL ASEPTIC PREP     [] No S/S infection  [] Redness [] Drainage  [] Cultured [] Swelling [] Pain              If Access Problem Dr. Emily Hickey: [] Yes [] No    Date:______ [] N/A     RO/HEMODIAYLSIS MACHINE SAFETY CHECKS- 2 Blaise Dufur TREATMENT          [] [facility]: Machine Serial #1:  [id number]    RO Serial #4:[id number]                       [x] [facility]: Machine Serial #2:  [id number] RO Serial #3:[id number]     [] [facility]: Machine Serial #3:  [id number]  RO Serial #4:[id number]    Alarm Test: [x] Pass  Time__0920______  [x] RO/Machine Log Complete    [x] Extracorporeal circuit Tested for integrity              Dialysate: pH__7.4_  Temp.__37___Conductivity: Meter __14__ HD Machine__13.8_   CHLORINE TESTING- BEFORE EACH TREATMENT AND EVERY 4 HOURS   Total Chlorine: [x] Less than 0.1 ppm Time:_____2nd Check Time:______  (If greater than 0.1 ppm from Primary then every 30 minutes from Secondary)   TREATMENT INIATION-WITH DIALYSIS PRECAUTIONS   [x] All Connections Secured   [x] Saline Line Double Clamped    [x] Venous Parameters Set [x] Arterial Parameters Set    [x] Prime Given 250 ml     [x] Air Foam Detector Engaged   PRE-TREATMENT   UF Calculations: Wt to lose:_2500___ml(+) Oral:_0_ml(+)IV Meds/Fluids/Blood prods_0__ml(+) Prime/Rinse__500_ml(=)Total UF Goal__3000__mL     Tx Initiation Note: RECEIVED REPORT. PATIENT ALERT AND ORIENTED. VITAL SIGNS WNL. NAD. ALL QUESTIONS ANSWERED WITH PATIENT  SAFETY CHECKS COMPLETE. TIME OUT COMPLETE. TREATMENT INITIATED VIA _RIJ TDC____. NO CONCERNS NOTED.     [x] Time Out/Safety Check  Time:_0930____     Dialyzer cleared: [x] Good [] Fair [] Poor     Blood Volume Processed __76.9__L   Net UF Removed _2500___mL  Post Tx Access:                  AVF/AVG: Bleeding Stop Time      Art1_min Avtar._min []+bruit/thrill                              Catheter: Locking Solution  [] Heparin 1 ml/1000 units                                                             [] Normal Saline                                                                      Art.__1.6___ ml Avtar.__1.8___ml                                                           [x] New caps placed       Abbreviations: AVG-arterial venous graft, AVF-arterial venous fistula, IJ-Internal Jugular,  Subcl-Subclavian, Fem-Femoral, Tx-treatment, AP/HR-apical heart rate, DFR-dialysate flow rate, BFR-blood flow rate, AP-arterial pressure, -venous pressure, UF-ultrafiltrate, TMP-transmembrane pressure, Avtar-Venous, Art-Arterial, RO-Reverse Osmosis

## 2022-07-07 NOTE — PROGRESS NOTES
Nephrology Progress/HD note    Subjective:     Sol Montiel is a 60 y.o. male with  a PMH of DM, HTN, CAD, ESRD on HD TTS admitted for right foot infection. MRI suggested osteomyelitis. No fever, chills. Pt s/p debridement, I&D- on abx.     HD Catheter fell off and was  replaced 6/29.      Uneventful night.  Seen on HD today, stable.     Admit Date: 6/27/2022  Principal Problem:    Acute hematogenous osteomyelitis of right foot ([facility] (6/28/2022)    Active Problems:    Diabetes mellitus with foot ulcer ([facility] (6/27/2022)      CAD (coronary artery disease) (6/28/2022)      ESRD (end stage renal disease) ([facility] (6/28/2022)      Cellulitis of right heel (6/28/2022)      Diabetic foot ulcer with osteomyelitis ([facility] (6/28/2022)      Ulcer of right heel, with necrosis of bone ([facility] (6/28/2022)      Infection and inflammatory reaction due to internal fixation device of other site, initial encounter ([facility] (7/5/2022)      Current Facility-Administered Medications   Medication Dose Route Frequency    loperamide (IMODIUM) capsule 2 mg  2 mg Oral Q4H PRN    piperacillin-tazobactam (ZOSYN) 4.5 g in 0.9% sodium chloride (MBP/ADV) 100 mL MBP  4.5 g IntraVENous Q12H    0.9% sodium chloride infusion  75 mL/hr IntraVENous CONTINUOUS    sevelamer carbonate (RENVELA) tab 1,600 mg  1,600 mg Oral TID WITH MEALS    Lactobacillus Acidoph & Bulgar (FLORANEX) tablet 2 Tablet  2 Tablet Oral BID    epoetin lisette-epbx (RETACRIT) injection 8,000 Units  8,000 Units SubCUTAneous Q TUE, THU & SAT    allopurinoL (ZYLOPRIM) tablet 100 mg  100 mg Oral DAILY    carvediloL (COREG) tablet 12.5 mg  12.5 mg Oral BID    ezetimibe (ZETIA) tablet 10 mg  10 mg Oral DAILY    amLODIPine (NORVASC) tablet 10 mg  10 mg Oral DAILY    vit B Cmplx 3-FA-Vit C-Biotin (NEPHRO NIKITA RX) tablet 1 Tablet  1 Tablet Oral DAILY    insulin lispro (HUMALOG) injection 3 Units  3 Units SubCUTAneous TIDAC    aspirin chewable tablet 81 mg  81 mg Oral DAILY    insulin glargine (LANTUS) injection 10 Units  10 Units SubCUTAneous QHS    insulin lispro (HUMALOG) injection   SubCUTAneous AC&HS    glucose chewable tablet 16 g  4 Tablet Oral PRN    glucagon (GLUCAGEN) injection 1 mg  1 mg IntraMUSCular PRN    dextrose 10% infusion 0-250 mL  0-250 mL IntraVENous PRN    acetaminophen (TYLENOL) tablet 650 mg  650 mg Oral Q6H PRN         Allergy:   No Known Allergies     Objective:     Visit Vitals  BP (!) 178/71 (BP 1 Location: Left upper arm, BP Patient Position: At rest)   Pulse 79   Temp 98.4 °F (36.9 °C)   Resp 18   Ht 6' 2\" (1.88 m)   Wt 101.6 kg (224 lb)   SpO2 98%   BMI 28.76 kg/m²       No intake or output data in the 24 hours ending 07/07/22 0826    Physical Exam:       General: No acute distress   HENT: Atraumatic and normocephalic   Eyes: Normal conjunctiva   Neck: Supple, no mass   Cardiovascular: Normal S1 & S2, no m/r/g   Pulmonary/Chest Wall: Clear to auscultation bilaterally   Abdominal: Soft and non-tender   Musculoskeletal: Wound Vac on R foot   Neurological: AA and O X 3, No focal deficits   Access:  RIJ TDC with good Qb    Data Review:  Lab Results   Component Value Date/Time    Sodium 139 07/07/2022 02:15 AM    Potassium 4.4 07/07/2022 02:15 AM    Chloride 105 07/07/2022 02:15 AM    CO2 25 07/07/2022 02:15 AM    Anion gap 9 07/07/2022 02:15 AM    Glucose 197 (H) 07/07/2022 02:15 AM    BUN 61 (H) 07/07/2022 02:15 AM    Creatinine 8.76 (H) 07/07/2022 02:15 AM    BUN/Creatinine ratio 7 (L) 07/07/2022 02:15 AM    GFR est AA 7 (L) 07/07/2022 02:15 AM    GFR est non-AA 6 (L) 07/07/2022 02:15 AM    Calcium 7.7 (L) 07/07/2022 02:15 AM     Lab Results   Component Value Date/Time    WBC 11.6 07/07/2022 02:15 AM    HGB 8.8 (L) 07/07/2022 02:15 AM    HCT 28.0 (L) 07/07/2022 02:15 AM    PLATELET 968 87/30/7268 02:15 AM    MCV 97.9 07/07/2022 02:15 AM     Lab Results   Component Value Date/Time    Calcium 7.7 (L) 07/07/2022 02:15 AM     No results found for: IRON, FE, TIBC, IBCT, PSAT, FERR  No results found for: FERR      Impression:     ESRD on HD TTS schedule  Diabetic foot ulcer with osteomyelitis s/p rt heel debridement w/grafting  DM  HTN  Anemia in CKD  SHPT  CAD    Plan:     HD today  IV Antibiotics per ID specialist  Cont DALTON  Renvela for Phos binder in place of Velphoro   D/w HD staff    MD Bonifacio Rao  [phone]

## 2022-07-07 NOTE — PROGRESS NOTES
Hospitalist Progress Note    Patient: Max Hernandez MRN: [id number]  CSN: [id number]    YOB: 1960  Age: 60 y.o. Sex: male    DOA: 6/27/2022 LOS:  LOS: 10 days          Chief Complaint:    Heel ulcer      Assessment/Plan        Yordy khan 60 y. o. male who history of stent, hypertension, Charcot's foot presents with worsening pain and swelling  in his right foot      Large necrotic ulcer right posterior heel with osteomyelitis of the calcaneus and deep abscess-  S/p incision bone cortex right calcaneous, I&D right heel abcsess, deep wound debridement with multiple bone biopsies and application of antibiotic beads done    Podiatrist and ID f/u   May need hematoma tx prior to wound vac applcn    Tunnel line today-needs long term abx      Diabetes mellitus -  On  lantus 10units and  premeal insulin     Anemia of ESRD     End-stage renal disease on dialysis Tuesday Thursday and Saturday -  S/p [facility] replacement   HD per nephrology today     History of coronary artery disease-  coreg and aspirin      Chronic compressive myelopathy pending surgery -  Supportive care    Awaiting wound vac, needs IV abx until 8/8  Will go to SNF once auth received          Disposition :  Patient Active Problem List   Diagnosis Code    Diabetes mellitus with foot ulcer ([address] E11.621, L97.509    CAD (coronary artery disease) I25.10    ESRD (end stage renal disease) ([address] N18.6    Acute hematogenous osteomyelitis of right foot ([address] M86.071    Cellulitis of right heel L03.115    Diabetic foot ulcer with osteomyelitis ([address] E11.621, E11.69, L97.509, M86.9    Ulcer of right heel, with necrosis of bone ([address] L97.414    Infection and inflammatory reaction due to internal fixation device of other site, initial encounter ([address] T84.69XA       Subjective:    sleeping during dialysis  No new issues per nursing  Ate full lunch tray    Review of systems:  None reported  Just fell asleep prior to my arrival, per dialysis nurse so did not awaken      Vital signs/Intake and Output:  Visit Vitals  BP (!) 145/61 (BP 1 Location: Right upper arm, BP Patient Position: At rest)   Pulse 69   Temp 98.8 °F (37.1 °C)   Resp 18   Ht 6' 2\" (1.88 m)   Wt 101.6 kg (224 lb)   SpO2 94%   BMI 28.76 kg/m²     Current Shift:  No intake/output data recorded. Last three shifts:  07/05 1901 - 07/07 0700  In: 7446 [P.O.:240; I.V.:1058]  Out: -     Exam:    General: chronically ill appearing male, asleep, NAD  CVS:Regular rate and rhythm, no M/R/G, S1/S2 heard, no thrill  Lungs:Clear to auscultation bilaterally, no wheezes, rhonchi, or rales  Abdomen: Soft, Nontender, No distention, Normal Bowel sounds  Extremities: heel boots on, dressing right foot  Neuro:grossly normal                 Labs: Results:       Chemistry Recent Labs     07/07/22 0215 07/06/22 0310 07/05/22  1612 07/05/22  0140 07/05/22  0140   * 300*  --   --  224*    136  --   --  137   K 4.4 5.1 5.0   < > 4.7    102  --   --  103   CO2 25 24  --   --  24   BUN 61* 49*  --   --  71*   CREA 8.76* 7.18*  --   --  10.10*   CA 7.7* 7.6*  --   --  7.4*   AGAP 9 10  --   --  10   BUCR 7* 7*  --   --  7*   * 114  --   --  115   TP 7.2 6.6  --   --  6.2*   ALB 2.4* 2.5*  --   --  2.3*   GLOB 4.8* 4.1*  --   --  3.9   AGRAT 0.5* 0.6*  --   --  0.6*    < > = values in this interval not displayed. CBC w/Diff Recent Labs     07/07/22 0215 07/06/22  0310 07/05/22  0140   WBC 11.6 13.5* 10.1   RBC 2.86* 3.26* 3.05*   HGB 8.8* 9.9* 9.2*   HCT 28.0* 31.5* 29.0*    242 233   GRANS 73 93* 71   LYMPH 14* 4* 16*   EOS 3 0 4      Cardiac Enzymes No results for input(s): CPK, CKND1, PAULO in the last 72 hours. No lab exists for component: CKRMB, TROIP   Coagulation No results for input(s): PTP, INR, APTT, INREXT in the last 72 hours.     Lipid Panel No results found for: CHOL, CHOLPOCT, CHOLX, CHLST, CHOLV, 546144, HDL, HDLP, LDL, LDLC, DLDLP, 870292, VLDLC, VLDL, TGLX, TRIGL, TRIGP, TGLPOCT, CHHD, CHHDX   BNP No results for input(s): BNPP in the last 72 hours.    Liver Enzymes Recent Labs     07/07/22  0215   TP 7.2   ALB 2.4*   *      Thyroid Studies No results found for: T4, T3U, TSH, TSHEXT     Procedures/imaging: see electronic medical records for all procedures/Xrays and details which were not copied into this note but were reviewed prior to creation of Jenni Cummings MD

## 2022-07-07 NOTE — PROGRESS NOTES
Problem: Chronic Renal Failure  Goal: *Fluid and electrolytes stabilized  7/7/2022 1209 by Caroline Boyd RN  Outcome: Progressing Towards Goal  7/7/2022 1208 by Caroline Boyd RN  Outcome: Progressing Towards Goal     Problem: Patient Education: Go to Patient Education Activity  Goal: Patient/Family Education  Outcome: Progressing Towards Goal

## 2022-07-08 LAB
GLUCOSE BLD STRIP.AUTO-MCNC: 118 MG/DL (ref 70–110)
GLUCOSE BLD STRIP.AUTO-MCNC: 142 MG/DL (ref 70–110)
GLUCOSE BLD STRIP.AUTO-MCNC: 163 MG/DL (ref 70–110)
GLUCOSE BLD STRIP.AUTO-MCNC: 176 MG/DL (ref 70–110)
MAGNESIUM SERPL-MCNC: 2.1 MG/DL (ref 1.6–2.6)

## 2022-07-08 PROCEDURE — 97605 NEG PRS WND THER DME<=50SQCM: CPT

## 2022-07-08 PROCEDURE — 97161 PT EVAL LOW COMPLEX 20 MIN: CPT

## 2022-07-08 PROCEDURE — 97530 THERAPEUTIC ACTIVITIES: CPT

## 2022-07-08 PROCEDURE — 74011000250 HC RX REV CODE- 250: Performed by: RADIOLOGY

## 2022-07-08 PROCEDURE — 36415 COLL VENOUS BLD VENIPUNCTURE: CPT

## 2022-07-08 PROCEDURE — 74011636637 HC RX REV CODE- 636/637: Performed by: HOSPITALIST

## 2022-07-08 PROCEDURE — 74011000258 HC RX REV CODE- 258: Performed by: PHYSICIAN ASSISTANT

## 2022-07-08 PROCEDURE — 74011250637 HC RX REV CODE- 250/637: Performed by: HOSPITALIST

## 2022-07-08 PROCEDURE — 02HV33Z INSERTION OF INFUSION DEVICE INTO SUPERIOR VENA CAVA, PERCUTANEOUS APPROACH: ICD-10-PCS | Performed by: RADIOLOGY

## 2022-07-08 PROCEDURE — 65270000029 HC RM PRIVATE

## 2022-07-08 PROCEDURE — 82962 GLUCOSE BLOOD TEST: CPT

## 2022-07-08 PROCEDURE — 0JH60WZ INSERTION OF TOTALLY IMPLANTABLE VASCULAR ACCESS DEVICE INTO CHEST SUBCUTANEOUS TISSUE AND FASCIA, OPEN APPROACH: ICD-10-PCS | Performed by: RADIOLOGY

## 2022-07-08 PROCEDURE — 83735 ASSAY OF MAGNESIUM: CPT

## 2022-07-08 PROCEDURE — 97166 OT EVAL MOD COMPLEX 45 MIN: CPT

## 2022-07-08 PROCEDURE — 74011636637 HC RX REV CODE- 636/637: Performed by: FAMILY MEDICINE

## 2022-07-08 PROCEDURE — 74011250636 HC RX REV CODE- 250/636: Performed by: PHYSICIAN ASSISTANT

## 2022-07-08 PROCEDURE — 74011636637 HC RX REV CODE- 636/637: Performed by: INTERNAL MEDICINE

## 2022-07-08 PROCEDURE — 97535 SELF CARE MNGMENT TRAINING: CPT

## 2022-07-08 PROCEDURE — 74011250637 HC RX REV CODE- 250/637: Performed by: INTERNAL MEDICINE

## 2022-07-08 RX ADMIN — CARVEDILOL 12.5 MG: 12.5 TABLET, FILM COATED ORAL at 21:58

## 2022-07-08 RX ADMIN — B-COMPLEX W/ C & FOLIC ACID TAB 1 MG 1 TABLET: 1 TAB at 08:16

## 2022-07-08 RX ADMIN — INSULIN LISPRO 3 UNITS: 100 INJECTION, SOLUTION INTRAVENOUS; SUBCUTANEOUS at 12:02

## 2022-07-08 RX ADMIN — SODIUM CHLORIDE, PRESERVATIVE FREE 10 ML: 5 INJECTION INTRAVENOUS at 22:17

## 2022-07-08 RX ADMIN — AMLODIPINE BESYLATE 10 MG: 5 TABLET ORAL at 08:16

## 2022-07-08 RX ADMIN — ALLOPURINOL 100 MG: 100 TABLET ORAL at 08:16

## 2022-07-08 RX ADMIN — PIPERACILLIN AND TAZOBACTAM 4.5 G: 4; .5 INJECTION, POWDER, FOR SOLUTION INTRAVENOUS at 21:57

## 2022-07-08 RX ADMIN — CARVEDILOL 12.5 MG: 12.5 TABLET, FILM COATED ORAL at 08:16

## 2022-07-08 RX ADMIN — INSULIN LISPRO 3 UNITS: 100 INJECTION, SOLUTION INTRAVENOUS; SUBCUTANEOUS at 08:09

## 2022-07-08 RX ADMIN — INSULIN LISPRO 3 UNITS: 100 INJECTION, SOLUTION INTRAVENOUS; SUBCUTANEOUS at 16:35

## 2022-07-08 RX ADMIN — ASPIRIN 81 MG: 81 TABLET, CHEWABLE ORAL at 08:16

## 2022-07-08 RX ADMIN — SEVELAMER CARBONATE 1600 MG: 800 TABLET, FILM COATED ORAL at 12:03

## 2022-07-08 RX ADMIN — SEVELAMER CARBONATE 1600 MG: 800 TABLET, FILM COATED ORAL at 17:00

## 2022-07-08 RX ADMIN — Medication 2 TABLET: at 08:16

## 2022-07-08 RX ADMIN — Medication 2 UNITS: at 12:02

## 2022-07-08 RX ADMIN — Medication 2 UNITS: at 16:36

## 2022-07-08 RX ADMIN — PIPERACILLIN AND TAZOBACTAM 4.5 G: 4; .5 INJECTION, POWDER, FOR SOLUTION INTRAVENOUS at 08:08

## 2022-07-08 RX ADMIN — EZETIMIBE 10 MG: 10 TABLET ORAL at 08:16

## 2022-07-08 RX ADMIN — SODIUM CHLORIDE, PRESERVATIVE FREE 10 ML: 5 INJECTION INTRAVENOUS at 14:00

## 2022-07-08 RX ADMIN — SODIUM CHLORIDE, PRESERVATIVE FREE 10 ML: 5 INJECTION INTRAVENOUS at 08:17

## 2022-07-08 RX ADMIN — Medication 2 TABLET: at 21:58

## 2022-07-08 RX ADMIN — INSULIN GLARGINE 10 UNITS: 100 INJECTION, SOLUTION SUBCUTANEOUS at 22:17

## 2022-07-08 RX ADMIN — SEVELAMER CARBONATE 1600 MG: 800 TABLET, FILM COATED ORAL at 08:09

## 2022-07-08 NOTE — PROGRESS NOTES
Nephrology Progress/HD note    Subjective:     Ramya Gaspar is a 60 y.o. male with  a PMH of DM, HTN, CAD, ESRD on HD TTS admitted for right foot infection. MRI suggested osteomyelitis. No fever, chills. Pt s/p debridement, I&D- on abx.     HD Catheter fell off and was  replaced 6/29.      Uneventful night.  S/p HD yesterday     Admit Date: 6/27/2022  Principal Problem:    Acute hematogenous osteomyelitis of right foot ([facility] (6/28/2022)    Active Problems:    Diabetes mellitus with foot ulcer ([facility] (6/27/2022)      CAD (coronary artery disease) (6/28/2022)      ESRD (end stage renal disease) ([facility] (6/28/2022)      Cellulitis of right heel (6/28/2022)      Diabetic foot ulcer with osteomyelitis ([facility] (6/28/2022)      Ulcer of right heel, with necrosis of bone ([facility] (6/28/2022)      Infection and inflammatory reaction due to internal fixation device of other site, initial encounter ([facility] (7/5/2022)      Current Facility-Administered Medications   Medication Dose Route Frequency    epoetin lisette-epbx (RETACRIT) injection 10,000 Units  10,000 Units SubCUTAneous Q TUE, THU & SAT    heparin (porcine) 1,000 unit/mL injection 3,400 Units  3,400 Units Hemodialysis DIALYSIS PRN    heparin (porcine) 100 unit/mL injection 500 Units  500 Units InterCATHeter Q8H PRN    sodium chloride (NS) flush 5-40 mL  5-40 mL IntraVENous Q8H    sodium chloride (NS) flush 5-40 mL  5-40 mL IntraVENous PRN    fentaNYL citrate (PF) injection  mcg   mcg IntraVENous Rad Multiple    naloxone (NARCAN) injection 0.1 mg  0.1 mg IntraVENous Multiple    loperamide (IMODIUM) capsule 2 mg  2 mg Oral Q4H PRN    piperacillin-tazobactam (ZOSYN) 4.5 g in 0.9% sodium chloride (MBP/ADV) 100 mL MBP  4.5 g IntraVENous Q12H    sevelamer carbonate (RENVELA) tab 1,600 mg  1,600 mg Oral TID WITH MEALS    Lactobacillus Acidoph & Bulgar (FLORANEX) tablet 2 Tablet  2 Tablet Oral BID    allopurinoL (ZYLOPRIM) tablet 100 mg  100 mg Oral DAILY  carvediloL (COREG) tablet 12.5 mg  12.5 mg Oral BID    ezetimibe (ZETIA) tablet 10 mg  10 mg Oral DAILY    amLODIPine (NORVASC) tablet 10 mg  10 mg Oral DAILY    vit B Cmplx 3-FA-Vit C-Biotin (NEPHRO INKITA RX) tablet 1 Tablet  1 Tablet Oral DAILY    insulin lispro (HUMALOG) injection 3 Units  3 Units SubCUTAneous TIDAC    aspirin chewable tablet 81 mg  81 mg Oral DAILY    insulin glargine (LANTUS) injection 10 Units  10 Units SubCUTAneous QHS    insulin lispro (HUMALOG) injection   SubCUTAneous AC&HS    glucose chewable tablet 16 g  4 Tablet Oral PRN    glucagon (GLUCAGEN) injection 1 mg  1 mg IntraMUSCular PRN    dextrose 10% infusion 0-250 mL  0-250 mL IntraVENous PRN    acetaminophen (TYLENOL) tablet 650 mg  650 mg Oral Q6H PRN         Allergy:   No Known Allergies     Objective:     Visit Vitals  BP (!) 152/71 (BP 1 Location: Right upper arm, BP Patient Position: At rest)   Pulse 67   Temp 98 °F (36.7 °C)   Resp 18   Ht 6' 2\" (1.88 m)   Wt 103.6 kg (228 lb 8 oz)   SpO2 92% Comment: notified Abigail RN about pt dipping into 80s    BMI 29.34 kg/m²       No intake or output data in the 24 hours ending 07/08/22 1408    Physical Exam:       General: No acute distress   HENT: Atraumatic and normocephalic   Eyes: Normal conjunctiva   Neck: Supple, no mass   Cardiovascular: Normal S1 & S2, no m/r/g   Pulmonary/Chest Wall: Clear to auscultation bilaterally   Abdominal: Soft and non-tender   Musculoskeletal: Wound Vac on R foot   Neurological: AA and O X 3, No focal deficits   Access:  [facility] TDC with good Qb    Data Review:  Lab Results   Component Value Date/Time    Sodium 139 07/07/2022 02:15 AM    Potassium 4.4 07/07/2022 02:15 AM    Chloride 105 07/07/2022 02:15 AM    CO2 25 07/07/2022 02:15 AM    Anion gap 9 07/07/2022 02:15 AM    Glucose 197 (H) 07/07/2022 02:15 AM    BUN 61 (H) 07/07/2022 02:15 AM    Creatinine 8.76 (H) 07/07/2022 02:15 AM    BUN/Creatinine ratio 7 (L) 07/07/2022 02:15 AM    GFR est AA 7 (L) 07/07/2022 02:15 AM    GFR est non-AA 6 (L) 07/07/2022 02:15 AM    Calcium 7.7 (L) 07/07/2022 02:15 AM     Lab Results   Component Value Date/Time    WBC 11.6 07/07/2022 02:15 AM    HGB 8.8 (L) 07/07/2022 02:15 AM    HCT 28.0 (L) 07/07/2022 02:15 AM    PLATELET 813 22/15/1768 02:15 AM    MCV 97.9 07/07/2022 02:15 AM     Lab Results   Component Value Date/Time    Calcium 7.7 (L) 07/07/2022 02:15 AM     No results found for: IRON, FE, TIBC, IBCT, PSAT, FERR  No results found for: FERR      Impression:     ESRD on HD TTS schedule  Diabetic foot ulcer with osteomyelitis s/p rt heel debridement w/grafting  DM  HTN  Anemia in CKD  SHPT  CAD    Plan:     HD tomorrow  IV Antibiotics per ID specialist  Cont DALTON  SNF    MD Bonifacio Hi  [phone]

## 2022-07-08 NOTE — ROUTINE PROCESS
Bedside shift change report given to Thor Carrizales RN (oncoming nurse) by Ponce Schwab, RN   (offgoing nurse). Report included the following information SBAR, Kardex, Intake/Output, MAR and Recent Results.

## 2022-07-08 NOTE — PROGRESS NOTES
Problem: Self Care Deficits Care Plan (Adult)  Goal: *Acute Goals and Plan of Care (Insert Text)  Description: Occupational Therapy Goals  Initiated 7/8/2022 within 7 day(s). 1.  Patient will perform grooming with supervision/set-up seated in chair. 2.  Patient will perform upper body dressing with supervision/set-up. 3.  Patient will perform lower body dressing with minimal assistance/contact guard assist and use of AEs as needed. 4.  Patient will perform bed<>bsc transfers with minimal assistance/contact guard assist.  5.  Patient will perform all aspects of toileting with minimal assistance/contact guard assist.  6.  Patient will participate in upper extremity therapeutic exercise/activities with supervision/set-up for 10 minutes. 7.  Patient will utilize energy conservation techniques during functional activities with verbal, visual, and tactile cues. OCCUPATIONAL THERAPY EVALUATION    Patient: Sarai Mcginnis (74 y.o. male)  Date: 7/8/2022  Primary Diagnosis: Diabetes mellitus with foot ulcer ([facility] [E11.621, L97.509]  Procedure(s) (LRB):  RIGHT HEEL DEBRIDEMENT WITH GRAFTING,REMOVAL OF SCREW  (PT IN ROOM #325) WITH C-ARM (Right) 3 Days Post-Op   Precautions:   Fall,NWB  PLOF:     ASSESSMENT :  Based on the objective data described below, the patient presents with decreased strength, endurance and balance for carryover of ADLs and transfers following above mentioned surgical procedure. Pt presented supine in bed at the beginning of session and agrees to participate with therapy. Pt reports having a bowel movement and requires assistance for ashutosh care. Pt performed bed mobility, supine<>sit, sit<>stand transfers, and ashutosh-care with min-mod A. Pt tolerated sitting EOB for ~15 minutes while linens were changed. Requires occasional verbal cues to maintain NWB at R LEPt was left supine in bed with prevolone boots on at feet at the end of session.     Patient will benefit from skilled intervention to address the above impairments. Patient's rehabilitation potential is considered to be Good  Factors which may influence rehabilitation potential include:   []             None noted  []             Mental ability/status  [x]             Medical condition  []             Home/family situation and support systems  []             Safety awareness  []             Pain tolerance/management  []             Other:      PLAN :  Recommendations and Planned Interventions:   [x]               Self Care Training                  [x]      Therapeutic Activities  [x]               Functional Mobility Training   []      Cognitive Retraining  [x]               Therapeutic Exercises           [x]      Endurance Activities  [x]               Balance Training                    []      Neuromuscular Re-Education  []               Visual/Perceptual Training     [x]      Home Safety Training  [x]               Patient Education                   [x]      Family Training/Education  []               Other (comment):    Frequency/Duration: Patient will be followed by occupational therapy 1-2 times per day/4-7 days per week to address goals. Discharge Recommendations: Skilled Nursing Facility  Further Equipment Recommendations for Discharge: N/A     SUBJECTIVE:   Patient stated I am doing alright.     OBJECTIVE DATA SUMMARY:     Past Medical History:   Diagnosis Date    Diabetes mellitus     Hypertension      Past Surgical History:   Procedure Laterality Date    IR INSERT TUNL CVC W/O PORT OVER 5 YR  7/7/2022     Barriers to Learning/Limitations: None  Compensate with: visual, verbal, tactile, kinesthetic cues/model    Home Situation:   Home Situation  Home Environment: Private residence  # Steps to Enter: 3  Rails to Enter: Yes  Hand Rails : Right  One/Two Story Residence: One story  Living Alone: Yes  Support Systems: Child(dafne)  Patient Expects to be Discharged to[de-identified] Skilled nursing facility  Current DME Used/Available at Home: Keara Lee  []  Right hand dominant   []  Left hand dominant    Cognitive/Behavioral Status:  Neurologic State: Alert  Orientation Level: Oriented X4  Cognition: Appropriate for age attention/concentration; Follows commands  Safety/Judgement: Fall prevention    Skin: intact  Edema: none    Vision/Perceptual:    Tracking: Able to track stimulus in all quadrants w/o difficulty    Coordination: BUE  Coordination: Within functional limits  Fine Motor Skills-Upper: Left Intact; Right Intact    Gross Motor Skills-Upper: Left Intact; Right Intact  Balance:  Sitting: Impaired; With support  Sitting - Static: Fair (occasional)  Sitting - Dynamic: Fair (occasional)  Standing: Impaired;Pull to stand; With support  Standing - Static: Poor  Standing - Dynamic : Not tested  Strength: BUE  Strength: Generally decreased, functional  Tone & Sensation: BUE  Tone: Normal  Sensation: Intact  Range of Motion: BUE  AROM: Generally decreased, functional  PROM: Generally decreased, functional  Functional Mobility and Transfers for ADLs:  Bed Mobility:  Rolling: Moderate assistance  Supine to Sit: Moderate assistance  Sit to Supine: Moderate assistance  Transfers:  Sit to Stand: Moderate assistance; Additional time;Assist x2  Stand to Sit: Moderate assistance  ADL Assessment:   Feeding: Setup    Oral Facial Hygiene/Grooming: Minimum assistance    Upper Body Dressing: Minimum assistance    Lower Body Dressing: Total assistance    Toileting: Total assistance  ADL Intervention:  Lower Body Bathing  Bathing Assistance: Total assistance(dependent)  Perineal  : Total assistance (dependent)  Position Performed: Supine    Lower Body Dressing Assistance  Dressing Assistance: Total assistance(dependent)  Socks: Total assistance (dependent)  Leg Crossed Method Used: No  Position Performed: Supine  Cues: Don;Doff    Toileting  Toileting Assistance: Total assistance(dependent) (bed level)  Bowel Hygiene:  Total assistance (dependent)    Cognitive Retraining  Safety/Judgement: Fall prevention  Pain:  Pain level pre-treatment: 0/10   Pain level post-treatment: 0/10   Pain Intervention(s): Medication (see MAR); Rest, Ice, Repositioning   Response to intervention: Nurse notified, See doc flow    Activity Tolerance:   Good   Please refer to the flowsheet for vital signs taken during this treatment. After treatment:   [] Patient left in no apparent distress sitting up in chair  [x] Patient left in no apparent distress in bed  [x] Call bell left within reach  [x] Nursing notified  [] Caregiver present  [] Bed alarm activated    COMMUNICATION/EDUCATION:   [x] Role of Occupational Therapy in the acute care setting  [x] Home safety education was provided and the patient/caregiver indicated understanding. [x] Patient/family have participated as able in goal setting and plan of care. [x] Patient/family agree to work toward stated goals and plan of care. [] Patient understands intent and goals of therapy, but is neutral about his/her participation. [] Patient is unable to participate in goal setting and plan of care. Thank you for this referral.  Amor Culp, OTR/L  Time Calculation: 41 mins    Eval Complexity: History: MEDIUM Complexity : Expanded review of history including physical, cognitive and psychosocial  history ; Examination: MEDIUM Complexity : 3-5 performance deficits relating to physical, cognitive , or psychosocial skils that result in activity limitations and / or participation restrictions; Decision Making:MEDIUM Complexity : Patient may present with comorbidities that affect occupational performnce.  Miniml to moderate modification of tasks or assistance (eg, physical or verbal ) with assesment(s) is necessary to enable patient to complete evaluation

## 2022-07-08 NOTE — PROGRESS NOTES
a  [address]   Progress Note and Procedure Note     Jaki Farah  MEDICAL RECORD NUMBER:  [id number]  AGE: 60 y.o. RACE BLACK/  GENDER: male  : 1960  EPISODE DATE:  2022    Subjective:     Chief Complaint   Patient presents with    Foot Ulcer         HISTORY of PRESENT ILLNESS HPI    [email or web address] Shital Maldonado is a 60 y.o. male who presents today for wound/ulcer evaluation. History of Wound Context: Patient is status post 1 day right calcaneal and heel wound debridement and application of Stravix graft. Unfortunately he developed a large hematoma under the graft from bone bleeding. Therefore we were unable to reapply the wound VAC. This required a bedside procedure to evacuate the hematoma under the graft of his right heel today. Wound/Ulcer Pain Timing/Severity: none  Quality of pain: dull  Severity:  2 / 10   Modifying Factors: None  Associated Signs/Symptoms: edema    Ulcer Identification:  Ulcer Type: diabetic and pressure    Contributing Factors: anticoagulation therapy and Postop surgery    Wound: Right posterior heel        PAST MEDICAL HISTORY    Past Medical History:   Diagnosis Date    Diabetes mellitus     Hypertension         PAST SURGICAL HISTORY    Past Surgical History:   Procedure Laterality Date    IR INSERT TUNL CVC W/O PORT OVER 5 YR  2022       FAMILY HISTORY    History reviewed. No pertinent family history. SOCIAL HISTORY    Social History     Tobacco Use    Smoking status: Not on file    Smokeless tobacco: Not on file   Substance Use Topics    Alcohol use: Not on file    Drug use: Not on file       ALLERGIES    No Known Allergies    MEDICATIONS    No current facility-administered medications on file prior to encounter. Current Outpatient Medications on File Prior to Encounter   Medication Sig Dispense Refill    atorvastatin (LIPITOR) 40 mg tablet Take 40 mg by mouth daily.       gabapentin (NEURONTIN) 300 mg capsule Take 300 mg by mouth nightly.  allopurinoL (Zyloprim) 100 mg tablet Take 100 mg by mouth daily.  ezetimibe (Zetia) 10 mg tablet Take 10 mg by mouth.  carvediloL (COREG) 12.5 mg tablet Take 12.5 mg by mouth two (2) times a day.  amLODIPine (NORVASC) 10 mg tablet Take 10 mg by mouth daily.  aspirin 81 mg chewable tablet Take 81 mg by mouth daily.  ascorbic acid, vitamin C, (Vitamin C) 500 mg tablet Take 500 mg by mouth.  insulin glargine (Lantus U-100 Insulin) 100 unit/mL injection 10 Units by SubCUTAneous route nightly.  insulin lispro (HUMALOG) 100 unit/mL injection by SubCUTAneous route.  furosemide (Lasix) 80 mg tablet Take 80 mg by mouth two (2) times a day.  lisinopril (PRINIVIL, ZESTRIL) 40 mg tablet Take 40 mg by mouth daily.  potassium chloride SR (KLOR-CON 10) 10 mEq tablet Take 20 mEq by mouth two (2) times a day. (Patient not taking: Reported on 6/27/2022)         REVIEW OF SYSTEMS    A comprehensive review of systems was negative except for that written in the HPI. Objective:     Visit Vitals  BP (!) 152/71 (BP 1 Location: Right upper arm, BP Patient Position: At rest)   Pulse 67   Temp 98 °F (36.7 °C)   Resp 18   Ht 6' 2\" (1.88 m)   Wt 103.6 kg (228 lb 8 oz)   SpO2 92% Comment: notified Abigail ALVAREZ about pt dipping into 80s    BMI 29.34 kg/m²       Wt Readings from Last 3 Encounters:   07/07/22 103.6 kg (228 lb 8 oz)       PHYSICAL EXAM    Dermatology: Open wound right posterior heel 8 x 7 x 1 cm. Large hematoma is noted under the [address] graft which is sutured in place with 3-0 Vicryl. Negative erythema, negative drainage, positive probing to bone/calcaneus.     Assessment:      Problem List Items Addressed This Visit     None      Visit Diagnoses     Type 2 diabetes mellitus with foot ulcer, with long-term current use of insulin (HCC)    -  Primary    Relevant Medications    ezetimibe (Zetia) 10 mg tablet    carvediloL (COREG) 12.5 mg tablet    amLODIPine (NORVASC) 10 mg tablet    aspirin 81 mg chewable tablet    insulin glargine (Lantus U-100 Insulin) 100 unit/mL injection    insulin lispro (HUMALOG) 100 unit/mL injection    furosemide (Lasix) 80 mg tablet    atorvastatin (LIPITOR) 40 mg tablet    gabapentin (NEURONTIN) 300 mg capsule    Osteomyelitis of right foot, unspecified type (HCC)        ESRD on hemodialysis (HCC)        Relevant Medications    furosemide (Lasix) 80 mg tablet          POP IN PROCEDURE TYPE (DEBRIDEMENT, BIOPSY, I&D, SKIN SUB, CHEMICAL CAUERTY)     Plan:   Remove previous dressing, aggressive saline flush under the stravix graft to break up the hematoma, then surgical debridement with curette to evacuate the hematoma under the graft deep to bone. Apply dry sterile dressing. Mc Alfaro from wound care will reapply the wound VAC today for continued negative pressure therapy. I will monitor his right heel for the next few days as needed. If looks like the graft will not take, he may require return to the operating room for a new graft.     Electronically signed by Daryn Shields DPM on 7/8/2022 at 1:05 PM

## 2022-07-08 NOTE — PROGRESS NOTES
Problem: Mobility Impaired (Adult and Pediatric)  Goal: *Acute Goals and Plan of Care (Insert Text)  Description: Physical Therapy Goals  Initiated 7/8/2022 and to be accomplished within 7 day(s)  1. Patient will move from supine to sit and sit to supine  in bed with supervision/set-up. 2.  Patient will transfer from bed to chair and chair to bed with minimal assistance/contact guard assist using the least restrictive device. 3.  Patient will perform sit to stand with moderate assistance . 7/8/2022 1853 by Mary PAGAN  Note:   physical Therapy TREATMENT    Patient: Nahun Zepeda (9 y.o. male)  Date: 7/8/2022  Diagnosis: Diabetes mellitus with foot ulcer ([facility] [E11.621, L97.509] Acute hematogenous osteomyelitis of right foot ([facility]  Procedure(s) (LRB):  RIGHT HEEL DEBRIDEMENT WITH GRAFTING,REMOVAL OF SCREW  (PT IN ROOM #325) WITH C-ARM (Right) 3 Days Post-Op  Precautions: Fall,NWB   Chart, physical therapy assessment, plan of care and goals were reviewed. ASSESSMENT:  Seen with OT for second session. Pt performed sit to stand with MaxAx2. Pt continues to have good adherence to NWB and caution take to prevent R foot from touching floor. Pt able to stand for increased time in upright ~ 1 minute. Pt returned to supine with Justine. Will continue to progress mobility as pt tolerates. Progression toward goals:  []      Improving appropriately and progressing toward goals  [x]      Improving slowly and progressing toward goals  []      Not making progress toward goals and plan of care will be adjusted     PLAN:  Patient continues to benefit from skilled intervention to address the above impairments. Continue treatment per established plan of care. Discharge Recommendations:  Hakan Story  Further Equipment Recommendations for Discharge:  N/A     SUBJECTIVE:   Patient stated I am doing ok.     OBJECTIVE DATA SUMMARY:   Critical Behavior:  Neurologic State: Alert  Functional Mobility Training:  Bed Mobility:  Supine to Sit: Moderate assistance; Additional time;Bed Modified  Transfers:  Sit to Stand: Maximum assistance;Assist x2  Stand to Sit: Maximum assistance  Balance:  Sitting: Impaired;High guard; With support  Sitting - Static: Fair (occasional)  Sitting - Dynamic: Fair (occasional)  Standing: With support; Impaired  Standing - Static: Poor  Standing - Dynamic : Poor  Activity Tolerance:   Fair  Please refer to the flowsheet for vital signs taken during this treatment. After treatment:   [] Patient left in no apparent distress sitting up in chair  [x] Patient left in no apparent distress in bed  [x] Call bell left within reach  [x] Nursing notified  [] Caregiver present  [] Bed alarm activated      Lynda Nawaf Lopez   Time Calculation: 17 mins   7/8/2022 1132 by Liz PAGAN  Note:   physical Therapy EVALUATION    Patient: Maria D Hendrickson (51 y.o. male)  Date: 7/8/2022  Primary Diagnosis: Diabetes mellitus with foot ulcer ([facility] [E11.621, L97.509]  Procedure(s) (LRB):  RIGHT HEEL DEBRIDEMENT WITH GRAFTING,REMOVAL OF SCREW  (PT IN ROOM #325) WITH C-ARM (Right) 3 Days Post-Op   Precautions:   Fall,NWB    ASSESSMENT :  Based on the objective data described below, the patient presents with lower extremity weakness, impaired bed mobility and transfers, s/p R heel debridement with grafting. Pt is NWB on R LE. Pt performed supine to sit with ModA from elevated HOB with extra time and effort. Several rest breaks required. Performed 1 sit to partial stand from elevated bed with MaxAx2 with careful caution take to ensure NWB on R foot. Pt with good adherence to NWB status. Patient assisted with return to supine with Justine. Pt repositioned at [facility] with [address] [place]. Will continue to progress mobility as pt tolerates. Pt would benefit from rehab placement at d/c. Patient will benefit from skilled intervention to address the above impairments.   Patients rehabilitation potential is considered to be Good  Factors which may influence rehabilitation potential include:   []         None noted  []         Mental ability/status  [x]         Medical condition  [x]         Home/family situation and support systems  [x]         Safety awareness  [x]         Pain tolerance/management  []         Other:      PLAN :  Recommendations and Planned Interventions:  [x]           Bed Mobility Training             []    Neuromuscular Re-Education  [x]           Transfer Training                   []    Orthotic/Prosthetic Training  []           Gait Training                          []    Modalities  [x]           Therapeutic Exercises          []    Edema Management/Control  [x]           Therapeutic Activities            [x]    Patient and Family Training/Education  []           Other (comment):    Frequency/Duration: Patient will be followed by physical therapy 1-2 times per day for 3-7 days per week to address goals.   Discharge Recommendations: Skilled Nursing Facility  Further Equipment Recommendations for Discharge: N/A     SUBJECTIVE:   Patient stated I want this foot to heal.    OBJECTIVE DATA SUMMARY:     Past Medical History:   Diagnosis Date    Diabetes mellitus     Hypertension      Past Surgical History:   Procedure Laterality Date    IR INSERT TUNL CVC W/O PORT OVER 5 YR  7/7/2022     Barriers to Learning/Limitations: None  Compensate with: Visual Cues, Verbal Cues, and Tactile Cues  Prior Level of Function/Home Situation: Independent ambulation for short distances with RW, w/c  Home Situation  Home Environment: Private residence  # Steps to Enter: 3  Rails to Enter: Yes  Hand Rails : Right  One/Two Story Residence: One story  Living Alone: Yes  Support Systems: Child(dafne)  Patient Expects to be Discharged to[de-identified] Skilled nursing facility  Current DME Used/Available at Home: [employer],Wheelchair  Critical Behavior:  Neurologic State: Alert  Psychosocial  Purposeful Interaction: Yes  Pt Identified Daily Priority: Clinical issues (comment)  Ashley Process: Establish trust  Caring Interventions: Therapeutic modalities  Reassure: Therapeutic listening; Informing;Caring rounds  Strength:    Strength: Generally decreased, functional  Tone & Sensation:   Tone: Normal  Sensation: Impaired  Range Of Motion:  AROM: Generally decreased, functional  PROM: Generally decreased, functional  Functional Mobility:  Bed Mobility:  Supine to Sit: Moderate assistance; Additional time;Bed Modified  Sit to Supine: Minimum assistance  Balance:   Sitting: Impaired; With support  Sitting - Static: Fair (occasional)  Sitting - Dynamic: Fair (occasional)  Standing: With support; Impaired  Standing - Static: Poor  Standing - Dynamic : Poor  Pain:  6/10 R foot  Activity Tolerance:   Good  Please refer to the flowsheet for vital signs taken during this treatment. After treatment:   []         Patient left in no apparent distress sitting up in chair  [x]         Patient left in no apparent distress in bed  [x]         Call bell left within reach  [x]         Nursing notified  []         Caregiver present  []         Bed alarm activated    COMMUNICATION/EDUCATION:   [x]         Fall prevention education was provided and the patient/caregiver indicated understanding. [x]         Patient/family have participated as able in goal setting and plan of care. [x]         Patient/family agree to work toward stated goals and plan of care. []         Patient understands intent and goals of therapy, but is neutral about his/her participation. []         Patient is unable to participate in goal setting and plan of care.     Thank you for this referral.  Fausto Lopez   Time Calculation: 30 mins    Eval Complexity: History: MEDIUM  Complexity : 1-2 comorbidities / personal factors will impact the outcome/ POC Exam:MEDIUM Complexity : 3 Standardized tests and measures addressing body structure, function, activity limitation and / or participation in recreation  Presentation: MEDIUM Complexity : Evolving with changing characteristics  Clinical Decision Making:Medium Complexity    Overall Complexity:MEDIUM

## 2022-07-08 NOTE — PROGRESS NOTES
[facility]  (A Division of [address] [place] [address])                                                                                                                     Dr Karen Banks #: - Option # [phone]  Fax #: [phone]     Date of Admission: 6/27/2022Date of Note: 7/8/2022  Reason for Referral: Evaluation and antibiotic management of R heel infection    Current Antimicrobials:    Prior Antimicrobials:  Zosyn 6/27 to date   Bactrim 1 month ago       Assessment- ID related:  --------------------------------------------------------------------------  · DFU and OM R heel  S/P I and D 6/28:  Large right heel necrotic ulcer with osteomyelitis of the calcaneus, and deep abscess  --Proteus/Mroganella/ E.fecalis / alpha strep and Bacteroides on culture  --S/P OR 7/5/22- R heel I and D with grafting, removal of screw. No culture sent  · Subacute/chronic OM of heel-- over 2 months time  · Leucocytosis 2/2 above  --BCX 6/27: NGSF  --WCX-- GNR and GPC in pairs--> pending  · ESRD on HD TTS  · Obese BMI of 28  · DM   · History of L hallux ampuation for infection- 5yrs ago    R chest TDC is for his HD  R IV line for his ABX- 7/8/22 Recommendation for ID issues I am following:  --------------------------------------------------------------------------------    Wound care/dressing per Dr Danuta Paul     [facility] placement pending--for zosyn-Pending    OPAT with Zosyn 2.25gm Q8 hour X 6 weeks abx ( from 6/28).  End: August 8  OPAT to be arranged for above by CM     Weekly labs- cbc w/diff, LFT, uantitative CRP on Mon  Monitor above labs for ABX adverse effects  Fax labs to Dr Imani Vivas-- [phone]     FU in 2 weeks in ID clinic on DC    If the above rx fails,  Likely needs BKA       Subjective:  IV line placed on same side as HD  He feels ok, asking which rehab he is going      Review of systems:    No F/C/R  No N/V  No cough/sob/chest pain  No itching or rash       HPI:  Derik Amaya Calderon Uribe is a 60 y.o. BLACK/ with PMH as listed below-- he had ulcer for 2 months or so that he was followed by as OP by Podiatry. His heel wound progressed and was advised to come to ED yesterday. Had foot pain and fould drainage, but denies high F/C/R/SOB/ N/V prior to admission. BCX/WCX taken in ED, started on Zosyn and plan was to hold off abx and monitor until surgery. Admission assessment there was high concern for sepsis and Zosyn was continued. Hx of MRSA infection and treatment few years ago. Active Hospital Problems    Diagnosis Date Noted    Infection and inflammatory reaction due to internal fixation device of other site, initial encounter ([facility] 07/05/2022    CAD (coronary artery disease) 06/28/2022    ESRD (end stage renal disease) ([facility] 06/28/2022    Acute hematogenous osteomyelitis of right foot ([facility] 06/28/2022    Cellulitis of right heel 06/28/2022    Diabetic foot ulcer with osteomyelitis ([address] 06/28/2022    Ulcer of right heel, with necrosis of bone ([address] 06/28/2022    Diabetes mellitus with foot ulcer ([address] 06/27/2022     Past Medical History:   Diagnosis Date    Diabetes mellitus     Hypertension      Past Surgical History:   Procedure Laterality Date    IR INSERT TUNL CVC W/O PORT OVER 5 YR  7/7/2022     History reviewed. No pertinent family history.   Social History     Socioeconomic History    Marital status:      Spouse name: Not on file    Number of children: Not on file    Years of education: Not on file    Highest education level: Not on file   Occupational History    Not on file   Tobacco Use    Smoking status: Not on file    Smokeless tobacco: Not on file   Substance and Sexual Activity    Alcohol use: Not on file    Drug use: Not on file    Sexual activity: Not on file   Other Topics Concern    Dental Braces Not Asked    Endoscopic Camera Pill Not Asked    Metallic Foreign Body Not Asked    Medication Patches Not Asked    Taking Feraheme Not Asked    Claustrophobic Not Asked   Social History Narrative    Not on file     Social Determinants of Health     Financial Resource Strain:     Difficulty of Paying Living Expenses: Not on file   Food Insecurity:     Worried About Running Out of Food in the Last Year: Not on file    Vane of Food in the Last Year: Not on file   Transportation Needs:     Lack of Transportation (Medical): Not on file    Lack of Transportation (Non-Medical): Not on file   Physical Activity:     Days of Exercise per Week: Not on file    Minutes of Exercise per Session: Not on file   Stress:     Feeling of Stress : Not on file   Social Connections:     Frequency of Communication with Friends and Family: Not on file    Frequency of Social Gatherings with Friends and Family: Not on file    Attends Hinduism Services: Not on file    Active Member of [address] [place] [address] [employer] or Organizations: Not on file    Attends Club or Organization Meetings: Not on file    Marital Status: Not on file   Intimate Partner Violence:     Fear of Current or Ex-Partner: Not on file    Emotionally Abused: Not on file    Physically Abused: Not on file    Sexually Abused: Not on file   Housing Stability:     Unable to Pay for Housing in the Last Year: Not on file    Number of [place] in the Last Year: Not on file    Unstable Housing in the Last Year: Not on file       Allergies:  Patient has no known allergies.      Medications:  Current Facility-Administered Medications   Medication Dose Route Frequency    epoetin lisette-epbx (RETACRIT) injection 10,000 Units  10,000 Units SubCUTAneous Q TUE, THU & SAT    heparin (porcine) 1,000 unit/mL injection 3,400 Units  3,400 Units Hemodialysis DIALYSIS PRN    heparin (porcine) 100 unit/mL injection 500 Units  500 Units InterCATHeter Q8H PRN    sodium chloride (NS) flush 5-40 mL  5-40 mL IntraVENous Q8H    sodium chloride (NS) flush 5-40 mL  5-40 mL IntraVENous PRN    fentaNYL citrate (PF) injection  mcg   mcg IntraVENous Rad Multiple    naloxone (NARCAN) injection 0.1 mg  0.1 mg IntraVENous Multiple    loperamide (IMODIUM) capsule 2 mg  2 mg Oral Q4H PRN    piperacillin-tazobactam (ZOSYN) 4.5 g in 0.9% sodium chloride (MBP/ADV) 100 mL MBP  4.5 g IntraVENous Q12H    sevelamer carbonate (RENVELA) tab 1,600 mg  1,600 mg Oral TID WITH MEALS    Lactobacillus Acidoph & Bulgar (FLORANEX) tablet 2 Tablet  2 Tablet Oral BID    allopurinoL (ZYLOPRIM) tablet 100 mg  100 mg Oral DAILY    carvediloL (COREG) tablet 12.5 mg  12.5 mg Oral BID    ezetimibe (ZETIA) tablet 10 mg  10 mg Oral DAILY    amLODIPine (NORVASC) tablet 10 mg  10 mg Oral DAILY    vit B Cmplx 3-FA-Vit C-Biotin (NEPHRO NIKITA RX) tablet 1 Tablet  1 Tablet Oral DAILY    insulin lispro (HUMALOG) injection 3 Units  3 Units SubCUTAneous TIDAC    aspirin chewable tablet 81 mg  81 mg Oral DAILY    insulin glargine (LANTUS) injection 10 Units  10 Units SubCUTAneous QHS    insulin lispro (HUMALOG) injection   SubCUTAneous AC&HS    glucose chewable tablet 16 g  4 Tablet Oral PRN    glucagon (GLUCAGEN) injection 1 mg  1 mg IntraMUSCular PRN    dextrose 10% infusion 0-250 mL  0-250 mL IntraVENous PRN    acetaminophen (TYLENOL) tablet 650 mg  650 mg Oral Q6H PRN        ROS:  Pertinent items are noted in the History of Present Illness. Physical Exam:    Temp (24hrs), Av.3 °F (36.8 °C), Min:98 °F (36.7 °C), Max:98.7 °F (37.1 °C)    Visit Vitals  BP (!) 151/63 (BP 1 Location: Right upper arm, BP Patient Position: At rest)   Pulse 69   Temp 98.7 °F (37.1 °C)   Resp 20   Ht 6' 2\" (1.88 m)   Wt 103.6 kg (228 lb 8 oz)   SpO2 100%   BMI 29.34 kg/m²      GEN: WD Obese, on RA--not in resp distress. Right chest TDC for HD    HEENT: Unicteric. EOMI intact  No neck swelling  CHEST: Non laboured breathing. ABD: Obese/soft. Non tender. PASCUAL: Deferred  EXT: not examined today- dressed-   Skin: Dry and intact.  No rash, no redness. CNS: A, OX3. Moves all extremity. CN grossly ok.       Microbiology  All Micro Results     Procedure Component Value Units Date/Time    CULTURE, FUNGUS [[id number]] Collected: 06/28/22 1925    Order Status: Completed Specimen: Heel Updated: 07/05/22 1223     Special Requests: NO SPECIAL REQUESTS        Culture result: NO FUNGUS ISOLATED 6 DAYS       CULTURE, ANAEROBIC [[id number]]  (Abnormal) Collected: 06/28/22 1925    Order Status: Completed Specimen: Heel Updated: 07/04/22 1637     Special Requests: NO SPECIAL REQUESTS        Culture result:       MODERATE Porphyromonas assacharolyticus (Bacteroides) BETA LACTAMASE POSITIVE          CULTURE, BLOOD [[id number]] Collected: 06/27/22 1240    Order Status: Completed Specimen: Blood Updated: 07/03/22 0734     Special Requests: NO SPECIAL REQUESTS        Culture result: NO GROWTH 6 DAYS       CULTURE, BLOOD [[id number]] Collected: 06/27/22 1245    Order Status: Completed Specimen: Blood Updated: 07/03/22 0734     Special Requests: NO SPECIAL REQUESTS        Culture result: NO GROWTH 6 DAYS       CULTURE, TISSUE Kim Cordial STAIN [[id number]]  (Abnormal) Collected: 06/28/22 1926    Order Status: Completed Specimen: Bone Updated: 07/02/22 1221     Special Requests: NO SPECIAL REQUESTS        GRAM STAIN 2+ WBCS SEEN         NO ORGANISMS SEEN        Culture result: FEW ENTEROCOCCUS FAECALIS         SCANT PROTEUS VULGARIS         SCANT ALPHA STREPTOCOCCUS               SCANT Morganella morganii ssp morganii            REFER TO [facility] [id number] FOR SENSITIVITIES    CULTURE, Ananya Hacking STAIN [[id number]]  (Abnormal)  (Susceptibility) Collected: 06/28/22 1925    Order Status: Completed Specimen: Heel Updated: 07/02/22 1003     Special Requests: NO SPECIAL REQUESTS        GRAM STAIN OCCASIONAL WBCS SEEN         NO ORGANISMS SEEN        Culture result: LIGHT PROTEUS VULGARIS               SCANT Morganella morganii ssp morganii                  MODERATE ENTEROCOCCUS FAECALIS AFB CULTURE + SMEAR W/RFLX ID FROM CULTURE [[id number]] Collected: 06/28/22 1925    Order Status: Completed Specimen: Miscellaneous sample Updated: 06/30/22 1738     Source MISC. WOUND        AFB Specimen processing Concentration     Acid Fast Smear Negative        Comment: (NOTE)  Performed At: [facility] & [address] [id number]  Xenia Castillo MD YQ:[id number]          Acid Fast Culture PENDING    CULTURE, [place] STAIN [[id number]] Collected: 06/27/22 1530    Order Status: Completed Specimen: Wound Drainage Updated: 06/29/22 1511     Special Requests: NO SPECIAL REQUESTS        GRAM STAIN RARE WBCS SEEN         2+ GRAM NEGATIVE RODS               1+ GRAM POSITIVE COCCI IN PAIRS           Culture result:       MODERATE  MIXED ENTERIC GRAM NEGATIVE RODS              HEAVY MIXED SKIN TO ISOLATED          AFB CULTURE + SMEAR W/RFLX ID FROM CULTURE [[id number]] Collected: 06/28/22 1930    Order Status: Canceled     CULTURE, ANAEROBIC [[id number]] Collected: 06/28/22 1926    Order Status: Canceled            Lab results:    Chemistry  Recent Labs     07/07/22 0215 07/06/22 0310   * 300*    136   K 4.4 5.1    102   CO2 25 24   BUN 61* 49*   CREA 8.76* 7.18*   CA 7.7* 7.6*   AGAP 9 10   BUCR 7* 7*   * 114   TP 7.2 6.6   ALB 2.4* 2.5*   GLOB 4.8* 4.1*   AGRAT 0.5* 0.6*       CBC w/ Diff  Recent Labs     07/07/22 0215 07/06/22  0310   WBC 11.6 13.5*   RBC 2.86* 3.26*   HGB 8.8* 9.9*   HCT 28.0* 31.5*    242   GRANS 73 93*   LYMPH 14* 4*   EOS 3 0       Imaging: report reviewed and as posted by radiologist   No results found for this or any previous visit. MRI:       1. Posterior heel skin defect, at the margin of the Achilles tendon attachment  on the calcaneus, in keeping with known ulcer. Infiltration of subjacent  subcutaneous fat in keeping with cellulitis.     2.  Cortical discontinuity and marrow signal abnormality in the subjacent dorsal  calcaneus extending to the threaded tip of the oblique tibiocalcaneal screw is  suspicious for osteomyelitis.     3. Fluid signal intensity surrounding the threaded tibial tip of the 1st digit  long partially threaded screw, with osseous fragments at the inferior margin,  concerning for loosening and/or infection.     4. Marrow signal abnormalities at the 2nd-3rd and to a lesser degree 4th-5th  tarsometatarsal joint, potentially simply related to degenerative/Charcot  arthropathy. In view of marrow signal abnormalities, infection cannot be  excluded if clinically suspected. If clinically warranted, consider correlation with nuclear medicine imaging to  further assess.     5. Edema/myositis in the musculature above the ankle. There is discontinuity of  FHL and peroneal tendons, best correlated with operative findings/history of  prior intervention for significance/chronicity. Fatty change in the musculature  about the foot.

## 2022-07-08 NOTE — PROGRESS NOTES
Comprehensive Nutrition Assessment    Type and Reason for Visit: Reassess    Nutrition Recommendations/Plan:   1. Continue with current diet and Nicolás to help with wounds. 2. Continue to monitor intake of meals and supplement. Malnutrition Assessment:  Malnutrition Status:  No malnutrition (07/01/22 1110)      Nutrition Assessment:    Admitted with Large necrotic ulcer right posterior heel with osteomyelitis of the calcaneus and deep abscess. S/p incision bone cortex right valcaneous, I&D right heel abcsess, deep wound debridement with multiple bone biopsies and application of antibiotic beads. Nutrition Related Findings:    Nephro natividad rx, renvela, floranex, humalog, lantus. BM 7/7. Per pt- appetite has been good. Radha Fent is okay. Informed pt again to mix with liquid of choice. Wound Type: Surgical incision    Current Nutrition Intake & Therapies:  Average Meal Intake: %  Average Supplement Intake: Unable to assess  ADULT ORAL NUTRITION SUPPLEMENT Breakfast, Dinner; Wound Healing Supplement  ADULT DIET Regular; 4 carb choices (60 gm/meal)    Anthropometric Measures:  Height: 6' 2\" (188 cm)  Ideal Body Weight (IBW): 190 lbs (86 kg)  Admission Body Weight: 212 lb (per H&P)  Current Body Wt:  103.6 kg (228 lb 6.3 oz), 120.2 % IBW. Current BMI (kg/m2): 29.3  Usual Body Weight: 99.8 kg (220 lb) (1/24/2022)  % Weight Change (Calculated): -3.1  Weight Adjustment: No adjustment                 BMI Category: Overweight (BMI 25.0-29. 9)    Estimated Daily Nutrient Needs:  Energy Requirements Based On: Formula  Weight Used for Energy Requirements: Current  Energy (kcal/day): 9259-3939  Weight Used for Protein Requirements: Current  Protein (g/day): 124-135  Method Used for Fluid Requirements: 1 ml/kcal  Fluid (ml/day): 6636-0708    Nutrition Diagnosis:   · Increased nutrient needs related to acute injury/trauma as evidenced by wounds    Nutrition Interventions:   Food and/or Nutrient Delivery: Continue current diet,Continue oral nutrition supplement  Nutrition Education/Counseling: No recommendations at this time  Coordination of Nutrition Care: Continue to monitor while inpatient       Goals:  Previous Goal Met: Progressing toward goal(s)  Goals: PO intake 75% or greater,by next RD assessment       Nutrition Monitoring and Evaluation:   Behavioral-Environmental Outcomes: None identified  Food/Nutrient Intake Outcomes: Food and nutrient intake,Supplement intake  Physical Signs/Symptoms Outcomes: Biochemical data,Hemodynamic status,Meal time behavior,Nutrition focused physical findings,Skin,Weight    Discharge Planning:    Continue current diet    Marino Wong RD

## 2022-07-08 NOTE — PROGRESS NOTES
PENDING INSURANCE AUTHORIZATION     D/C Plan: [facility]      Pt has been accepted [facility] and insurance Herlene Montalvo has been initiated. Pt will arrange handi ride to transport him to and from dialysis from the facility. CM to continue to follow and assist.     Transition of care to SNF: [facility]      Communication to Patient/Family:  Met with patient and family, and they are agreeable to the transition plan. The Plan for Transition of Care is related to the following treatment goals: SNF     The Patient and/or patient representative was provided with a choice of provider and agrees   with the discharge plan.  Yes [x]? No []?     Freedom of choice list was provided with basic dialogue that supports the patient's individualized plan of care/goals and shares the quality data associated with the providers.     Yes [x]? No []?     SNF/Rehab Transition:  Patient has been accepted to [facility] at [facility], [address] [place] [address], and meets criteria for admission. Patient will transported by medical transport and expected to leave once medically stable and insurance Herlene Montalvo has been obtained.       Communication to SNF/Rehab:  Bedside RN has been notified to update the transition plan to the facility and call report [phone]     Discharge information has been updated on the AVS and sent via [facility] and/or CC link. Discharge instructions to be fax'd to facility at [phone]     Please include all hard scripts for controlled substances, med rec and dc summary, and AVS in packet. Please medicate for pain prior to dc if possible and needed to help offset delay when patient first arrives to facility.     Reviewed and confirmed with facility, [address] can manage the patient care needs for the following:      [place] with (X) only those applicable:  Medication:  []? Medications are available at the facility  []? IV Antibiotics []?Controlled Substance - hard copies available sent.  []? Weekly Labs     Equipment:  []?CPAP/BiPAP  []? Wound Vacuum  []? Mcmahon or Urinary Device  []? PICC/Central Line  []? Nebulizer  []? Ventilator     Treatment:  []? Isolation (for MRSA, VRE, etc.)  []? Surgical Drain Management  []? Tracheostomy Care  []? Dressing Changes  []? Dialysis with transportation  []? PEG Care  []? Oxygen  []? Daily Weights for Heart Failure     Dietary:  []? Any diet limitations  []? Tube Feedings   []? Total Parenteral Management (TPN)     Financial Resources:  []? Medicaid Application Completed     []? [employer] Completed and copy given to pt/family     []? A screening has previously been completed.     []? Level II Completed     []? Private pay individual who will not become   financially eligible for Medicaid within 6 months from admission to a [address] [place] [address] facility.      []? Individual refused to have screening conducted.      []? Medicaid Application Completed     []? The screening denied because it was determined individual did not need/did not qualify for nursing facility level of care. []? Out of state residents seeking direct admission to a [address] facility.  []? Individuals who are inpatients of an out of state hospital, or in state or out of state veterans/ hospital and seek direct admission to a [address] facility  []? Individuals who are pateints or residents of a state owned/operated facility that is licensed by Department of Behavioral Services (DBHDS) and seek direct admission to [address] facility  []? A screening not required for enrollment in [facility] services as set out in [facility]-  []? [address] [facility]) staff shall perform screenings of the [facility] clients.     Advanced Care Plan:  []? Surrogate Decision Maker of Care  []? POA  []? Communicated Code Status and copy sent.     Other:              Care Management Interventions  PCP Verified by CM:  Yes (Dr. Lui Sharpe )  Mode of Transport at Discharge: BLS  Transition of Care Consult (CM Consult): SNF (Pt is in agreement w/ SNF at d/c. )  Discharge Durable Medical Equipment:  (TBD)  Physical Therapy Consult: Yes  Occupational Therapy Consult: Yes  Support Systems: Child(dafne)  Confirm Follow Up Transport: Family  The Plan for Transition of Care is Related to the Following Treatment Goals : Skilled nursing facility  The Patient and/or Patient Representative was Provided with a Choice of Provider and Agrees with the Discharge Plan?: Yes (The pt is alert and oriented. )  Freedom of Choice List was Provided with Basic Dialogue that Supports the Patient's Individualized Plan of Care/Goals, Treatment Preferences and Shares the Quality Data Associated with the Providers?: Yes (Pt is in agreement w/ referrals being sent to all  and [place] Levels facilities and then he will choose one from the accepting facilities. )  Discharge Location  Patient Expects to be Discharged to[de-identified] Skilled nursing facility

## 2022-07-08 NOTE — PROGRESS NOTES
Hospitalist Progress Note    Patient: Lauri Stinson MRN: [id number]  CSN: [id number]    YOB: 1960  Age: 60 y.o. Sex: male    DOA: 6/27/2022 LOS:  LOS: 11 days          Chief Complaint:    Heel infection      Assessment/Plan        Arabella Wilks a 60 y. o. male who history of stent, hypertension, Charcot's foot presents with worsening pain and swelling  in his right foot      Large necrotic ulcer right posterior heel with osteomyelitis of the calcaneus and deep abscess-  S/p incision bone cortex right calcaneous, I&D right heel abcsess, deep wound debridement with multiple bone biopsies and application of antibiotic beads done    May need hematoma tx prior to wound vac applcn     Tunnel line in place-needs long term abx      Diabetes mellitus -  On lantus 10units and  premeal insulin      Anemia of ESRD     End-stage renal disease on dialysis Tuesday Thursday and Saturday -  S/p [facility] replacement      History of coronary artery disease-  coreg and aspirin      Chronic compressive myelopathy pending surgery -  Supportive care     Awaiting wound vac, needs IV abx until 8/8  Will go to SNF once auth received    Disposition :SNF  Patient Active Problem List   Diagnosis Code    Diabetes mellitus with foot ulcer ([address] E11.621, L97.509    CAD (coronary artery disease) I25.10    ESRD (end stage renal disease) ([address] N18.6    Acute hematogenous osteomyelitis of right foot ([address] M86.071    Cellulitis of right heel L03.115    Diabetic foot ulcer with osteomyelitis ([address] E11.621, E11.69, L97.509, M86.9    Ulcer of right heel, with necrosis of bone ([address] L97.414    Infection and inflammatory reaction due to internal fixation device of other site, initial encounter ([address] T84.69XA       Subjective:    He asked about a  as lives alone usually  Denies any new physical issues today  No uncontrolled pain, SOB or CP    Review of systems:    Constitutional: denies fevers, chills  Respiratory: denies SOB  Cardiovascular: denies chest pain  Gastrointestinal: denies nausea, vomiting, diarrhea      Vital signs/Intake and Output:  Visit Vitals  BP (!) 155/59 (BP 1 Location: Right upper arm, BP Patient Position: At rest)   Pulse 70   Temp 98.3 °F (36.8 °C)   Resp 18   Ht 6' 2\" (1.88 m)   Wt 103.6 kg (228 lb 8 oz)   SpO2 95%   BMI 29.34 kg/m²     Current Shift:  No intake/output data recorded. Last three shifts:  No intake/output data recorded. Exam:    General: generally weal elderly appearing AAm, alert, NAD, OX3  CVS:Regular rate and rhythm, no M/R/G, S1/S2 heard, no thrill  Lungs:Clear to auscultation bilaterally, no wheezes, rhonchi, or rales  Abdomen: Soft, Nontender, No distention, Normal Bowel sounds, No hepatomegaly  Extremities: right foot ulcer dressed  Neuro:grossly normal , follows commands  Psych:appropriate                Labs: Results:       Chemistry Recent Labs     07/07/22 0215 07/06/22  0310 07/05/22  1612   * 300*  --     136  --    K 4.4 5.1 5.0    102  --    CO2 25 24  --    BUN 61* 49*  --    CREA 8.76* 7.18*  --    CA 7.7* 7.6*  --    AGAP 9 10  --    BUCR 7* 7*  --    * 114  --    TP 7.2 6.6  --    ALB 2.4* 2.5*  --    GLOB 4.8* 4.1*  --    AGRAT 0.5* 0.6*  --       CBC w/Diff Recent Labs     07/07/22 0215 07/06/22  0310   WBC 11.6 13.5*   RBC 2.86* 3.26*   HGB 8.8* 9.9*   HCT 28.0* 31.5*    242   GRANS 73 93*   LYMPH 14* 4*   EOS 3 0      Cardiac Enzymes No results for input(s): CPK, CKND1, PAULO in the last 72 hours. No lab exists for component: CKRMB, TROIP   Coagulation Recent Labs     07/07/22  1400   PTP 14.0   INR 1.0       Lipid Panel No results found for: CHOL, CHOLPOCT, CHOLX, CHLST, CHOLV, 808750, HDL, HDLP, LDL, LDLC, DLDLP, 613415, VLDLC, VLDL, TGLX, TRIGL, TRIGP, TGLPOCT, CHHD, CHHDX   BNP No results for input(s): BNPP in the last 72 hours.    Liver Enzymes Recent Labs     07/07/22  0215   TP 7.2   ALB 2.4*   AP 124*      Thyroid Studies No results found for: T4, T3U, TSH, TSHEXT     Procedures/imaging: see electronic medical records for all procedures/Xrays and details which were not copied into this note but were reviewed prior to creation of Joaquin Senior MD

## 2022-07-08 NOTE — PROGRESS NOTES
Problem: Mobility Impaired (Adult and Pediatric)  Goal: *Acute Goals and Plan of Care (Insert Text)  Description: Physical Therapy Goals  Initiated 7/8/2022 and to be accomplished within 7 day(s)  1. Patient will move from supine to sit and sit to supine  in bed with supervision/set-up. 2.  Patient will transfer from bed to chair and chair to bed with minimal assistance/contact guard assist using the least restrictive device. 3.  Patient will perform sit to stand with moderate assistance . Note:   physical Therapy EVALUATION    Patient: Grant Levin (1 y.o. male)  Date: 7/8/2022  Primary Diagnosis: Diabetes mellitus with foot ulcer ([facility] [E11.621, L97.509]  Procedure(s) (LRB):  RIGHT HEEL DEBRIDEMENT WITH GRAFTING,REMOVAL OF SCREW  (PT IN ROOM #325) WITH C-ARM (Right) 3 Days Post-Op   Precautions:   Fall,NWB    ASSESSMENT :  Based on the objective data described below, the patient presents with lower extremity weakness, impaired bed mobility and transfers, s/p R heel debridement with grafting. Pt is NWB on R LE. Pt performed supine to sit with ModA from elevated HOB with extra time and effort. Several rest breaks required. Performed 1 sit to partial stand from elevated bed with MaxAx2 with careful caution take to ensure NWB on R foot. Pt with good adherence to NWB status. Patient assisted with return to supine with Justine. Pt repositioned at [facility] with [address]. Will continue to progress mobility as pt tolerates. Pt would benefit from rehab placement at d/c. Patient will benefit from skilled intervention to address the above impairments.   Patients rehabilitation potential is considered to be Good  Factors which may influence rehabilitation potential include:   []         None noted  []         Mental ability/status  [x]         Medical condition  [x]         Home/family situation and support systems  [x]         Safety awareness  [x]         Pain tolerance/management  []         Other:      PLAN :  Recommendations and Planned Interventions:  [x]           Bed Mobility Training             []    Neuromuscular Re-Education  [x]           Transfer Training                   []    Orthotic/Prosthetic Training  []           Gait Training                          []    Modalities  [x]           Therapeutic Exercises          []    Edema Management/Control  [x]           Therapeutic Activities            [x]    Patient and Family Training/Education  []           Other (comment):    Frequency/Duration: Patient will be followed by physical therapy 1-2 times per day for 3-7 days per week to address goals. Discharge Recommendations: Skilled Nursing Facility  Further Equipment Recommendations for Discharge: N/A     SUBJECTIVE:   Patient stated I want this foot to heal.    OBJECTIVE DATA SUMMARY:     Past Medical History:   Diagnosis Date    Diabetes mellitus     Hypertension      Past Surgical History:   Procedure Laterality Date    IR INSERT TUNL CVC W/O PORT OVER 5 YR  7/7/2022     Barriers to Learning/Limitations: None  Compensate with: Visual Cues, Verbal Cues, and Tactile Cues  Prior Level of Function/Home Situation: Independent ambulation for short distances with RW, w/c  Home Situation  Home Environment: Private residence  # Steps to Enter: 3  Rails to Enter: Yes  Hand Rails : Right  One/Two Story Residence: One story  Living Alone: Yes  Support Systems: Child(dafne)  Patient Expects to be Discharged to[de-identified] Skilled nursing facility  Current DME Used/Available at Home: [employer] [place], rolling,Wheelchair  Critical Behavior:  Neurologic State: Alert  Psychosocial  Purposeful Interaction: Yes  Pt Identified Daily Priority: Clinical issues (comment)  Caritas Process: Establish trust  Caring Interventions: Therapeutic modalities  Reassure: Therapeutic listening; Informing;Caring rounds  Strength:    Strength: Generally decreased, functional  Tone & Sensation:   Tone: Normal  Sensation: Impaired  Range Of Motion:  AROM: Generally decreased, functional  PROM: Generally decreased, functional  Functional Mobility:  Bed Mobility:  Supine to Sit: Moderate assistance; Additional time;Bed Modified  Sit to Supine: Minimum assistance  Balance:   Sitting: Impaired; With support  Sitting - Static: Fair (occasional)  Sitting - Dynamic: Fair (occasional)  Standing: With support; Impaired  Standing - Static: Poor  Standing - Dynamic : Poor  Pain:  6/10 R foot  Activity Tolerance:   Good  Please refer to the flowsheet for vital signs taken during this treatment. After treatment:   []         Patient left in no apparent distress sitting up in chair  [x]         Patient left in no apparent distress in bed  [x]         Call bell left within reach  [x]         Nursing notified  []         Caregiver present  []         Bed alarm activated    COMMUNICATION/EDUCATION:   [x]         Fall prevention education was provided and the patient/caregiver indicated understanding. [x]         Patient/family have participated as able in goal setting and plan of care. [x]         Patient/family agree to work toward stated goals and plan of care. []         Patient understands intent and goals of therapy, but is neutral about his/her participation. []         Patient is unable to participate in goal setting and plan of care.     Thank you for this referral.  Carmen Lopez   Time Calculation: 30 mins    Eval Complexity: History: MEDIUM  Complexity : 1-2 comorbidities / personal factors will impact the outcome/ POC Exam:MEDIUM Complexity : 3 Standardized tests and measures addressing body structure, function, activity limitation and / or participation in recreation  Presentation: MEDIUM Complexity : Evolving with changing characteristics  Clinical Decision Making:Medium Complexity    Overall Complexity:MEDIUM

## 2022-07-08 NOTE — WOUND CARE
IP WOUND CONSULT    Sandra Perkins  MEDICAL RECORD NUMBER:  [id number]  AGE: 60 y.o. GENDER: male  : 1960  TODAY'S DATE:  2022    GENERAL     [x] Follow-up   [] New Consult    Sandra Perkins is a 60 y.o. male referred by:   [x] Physician  [] Nursing  [] Other:         PAST MEDICAL HISTORY    Past Medical History:   Diagnosis Date    Diabetes mellitus     Hypertension         PAST SURGICAL HISTORY    Past Surgical History:   Procedure Laterality Date    IR INSERT TUNL CVC W/O PORT OVER 5 YR  2022       FAMILY HISTORY    History reviewed. No pertinent family history. SOCIAL HISTORY    Social History     Tobacco Use    Smoking status: Not on file    Smokeless tobacco: Not on file   Substance Use Topics    Alcohol use: Not on file    Drug use: Not on file       ALLERGIES    No Known Allergies    MEDICATIONS    No current facility-administered medications on file prior to encounter. Current Outpatient Medications on File Prior to Encounter   Medication Sig Dispense Refill    atorvastatin (LIPITOR) 40 mg tablet Take 40 mg by mouth daily.  gabapentin (NEURONTIN) 300 mg capsule Take 300 mg by mouth nightly.  allopurinoL (Zyloprim) 100 mg tablet Take 100 mg by mouth daily.  ezetimibe (Zetia) 10 mg tablet Take 10 mg by mouth.  carvediloL (COREG) 12.5 mg tablet Take 12.5 mg by mouth two (2) times a day.  amLODIPine (NORVASC) 10 mg tablet Take 10 mg by mouth daily.  aspirin 81 mg chewable tablet Take 81 mg by mouth daily.  ascorbic acid, vitamin C, (Vitamin C) 500 mg tablet Take 500 mg by mouth.  insulin glargine (Lantus U-100 Insulin) 100 unit/mL injection 10 Units by SubCUTAneous route nightly.  insulin lispro (HUMALOG) 100 unit/mL injection by SubCUTAneous route.  furosemide (Lasix) 80 mg tablet Take 80 mg by mouth two (2) times a day.  lisinopril (PRINIVIL, ZESTRIL) 40 mg tablet Take 40 mg by mouth daily.         potassium chloride SR (KLOR-CON 10) 10 mEq tablet Take 20 mEq by mouth two (2) times a day. (Patient not taking: Reported on 6/27/2022)         Wt Readings from Last 3 Encounters:   07/07/22 103.6 kg (228 lb 8 oz)       [unfilled]  Visit Vitals  BP (!) 152/71 (BP 1 Location: Right upper arm, BP Patient Position: At rest)   Pulse 67   Temp 98 °F (36.7 °C)   Resp 18   Ht 6' 2\" (1.88 m)   Wt 103.6 kg (228 lb 8 oz)   SpO2 92% Comment: notified Abigail RN about pt dipping into 80s    BMI 29.34 kg/m²       ASSESSMENT     Skin impairment Identification:  Type: surgical    Contributing Factors: diabetes    Incision 07/05/22 Foot Right (Active)   Wound Image   07/08/22 1445   Dressing Status New drainage noted; Intact 07/08/22 1445   Dressing Change Due 07/11/22 07/08/22 1445   Cleansed Irrigated with saline;Cleansed with saline 07/08/22 1445   Dressing/Treatment Non-adherent;Negative Pressure Wound Therapy 07/08/22 1445   Wound Length (cm) 9 cm 07/08/22 1445   Wound Width (cm) 7 cm 07/08/22 1445   Closure Other (Comment) 07/08/22 1445   Margins Other (Comment) 07/06/22 1601   Incision Assessment Other (Comment) 07/06/22 1601   Drainage Amount Large 07/08/22 1445   Drainage Description Sanguineous 07/08/22 1445   Wound Odor Other (Comment) 07/06/22 1601   [place]osh-Wound/Incision Assessment Intact 07/08/22 1445   Number of days: 3       Negative Pressure    NAME:  Yulia Kent  YOB: 1960  MEDICAL RECORD NUMBER:  [id number]  DATE:  6/27/2022     Applied Negative Pressure to heel wound.  [x] Applied skin barrier prep to ashutosh-wound.  [x] Cut strips of plastic drape to picture frame wound so that ashutosh-wound is     covered with the drape.  [x] Cut sponge, gauze or channel drain to size which will fit into the wound/ulcer bed without being forced.  [x] Be sure the sponge is large enough to hold the entire round plastic flange which is attached to the tubing.   Never allow flange to be larger than the sponge or it will produce suction damaging intact skin.  Total number of individual pieces of foam used within the wound bed: 1.    [] Covered sponge, gauze or channel drain with plastic drape.  [x] Cut a hole in this plastic drape directly over the sponge the same size as the plastic drain tubing.  [x] Removed plastic liner from flange and apply it directly over the hole you cut.  [x] Removed the plastic cover from the flange.  [x] Attached the tubing to the wound/ulcer Negative Pressure Therapy and turn it on to be sure a vacuum is created and that there are no leaks.        Response to treatment: Well tolerated by patient      Applied per  Guidelines        PLAN     Skin Care & Pressure Relief Recommendations  Minimize layers of linen  Pads under patient to optimize support surface  Turn/reposition approximately every 2 hours  Promote continence; Skin Protective lotion/cream to buttocks and sacrum daily and as needed with incontinence care    Recommendations: keep dressing in place if not able to keep suction for more than 2 hours remove dressing and apply ABD and loose kerlix    Teaching completed with:   [x] Patient           [] Family member       [] Caregiver          [x] Nursing  [] Other    Patient/Caregiver Teaching:  Level of patient/caregiver understanding able to:   [x] Indicates understanding       [] Needs reinforcement  [] Unsuccessful      [] Verbal Understanding  [] Demonstrated understanding       [] No evidence of learning  [] Refused teaching         [] N/A       Electronically signed by Rik Melvin RN on 7/8/2022 at 2:59 PM

## 2022-07-09 LAB
GLUCOSE BLD STRIP.AUTO-MCNC: 119 MG/DL (ref 70–110)
GLUCOSE BLD STRIP.AUTO-MCNC: 157 MG/DL (ref 70–110)
GLUCOSE BLD STRIP.AUTO-MCNC: 175 MG/DL (ref 70–110)
GLUCOSE BLD STRIP.AUTO-MCNC: 191 MG/DL (ref 70–110)
GLUCOSE BLD STRIP.AUTO-MCNC: 217 MG/DL (ref 70–110)
MAGNESIUM SERPL-MCNC: 2.2 MG/DL (ref 1.6–2.6)

## 2022-07-09 PROCEDURE — 77010033678 HC OXYGEN DAILY

## 2022-07-09 PROCEDURE — 36415 COLL VENOUS BLD VENIPUNCTURE: CPT

## 2022-07-09 PROCEDURE — 74011250637 HC RX REV CODE- 250/637: Performed by: INTERNAL MEDICINE

## 2022-07-09 PROCEDURE — 74011000250 HC RX REV CODE- 250: Performed by: RADIOLOGY

## 2022-07-09 PROCEDURE — 74011250637 HC RX REV CODE- 250/637: Performed by: HOSPITALIST

## 2022-07-09 PROCEDURE — 74011000258 HC RX REV CODE- 258: Performed by: PHYSICIAN ASSISTANT

## 2022-07-09 PROCEDURE — 74011636637 HC RX REV CODE- 636/637: Performed by: FAMILY MEDICINE

## 2022-07-09 PROCEDURE — 65270000029 HC RM PRIVATE

## 2022-07-09 PROCEDURE — 74011250637 HC RX REV CODE- 250/637: Performed by: FAMILY MEDICINE

## 2022-07-09 PROCEDURE — 83735 ASSAY OF MAGNESIUM: CPT

## 2022-07-09 PROCEDURE — 74011636637 HC RX REV CODE- 636/637: Performed by: HOSPITALIST

## 2022-07-09 PROCEDURE — 82962 GLUCOSE BLOOD TEST: CPT

## 2022-07-09 PROCEDURE — 90935 HEMODIALYSIS ONE EVALUATION: CPT

## 2022-07-09 PROCEDURE — 74011250636 HC RX REV CODE- 250/636: Performed by: PHYSICIAN ASSISTANT

## 2022-07-09 PROCEDURE — 74011636637 HC RX REV CODE- 636/637: Performed by: INTERNAL MEDICINE

## 2022-07-09 PROCEDURE — 74011250636 HC RX REV CODE- 250/636: Performed by: INTERNAL MEDICINE

## 2022-07-09 RX ORDER — CALCIUM CARB/MAGNESIUM CARB 311-232MG
10 TABLET ORAL
Status: DISCONTINUED | OUTPATIENT
Start: 2022-07-09 | End: 2022-07-21 | Stop reason: HOSPADM

## 2022-07-09 RX ORDER — HEPARIN SODIUM (PORCINE) LOCK FLUSH IV SOLN 100 UNIT/ML 100 UNIT/ML
SOLUTION INTRAVENOUS
Status: DISCONTINUED
Start: 2022-07-09 | End: 2022-07-09 | Stop reason: WASHOUT

## 2022-07-09 RX ORDER — HEPARIN SODIUM 5000 [USP'U]/ML
INJECTION, SOLUTION INTRAVENOUS; SUBCUTANEOUS
Status: DISCONTINUED
Start: 2022-07-09 | End: 2022-07-09 | Stop reason: WASHOUT

## 2022-07-09 RX ADMIN — Medication 2 UNITS: at 08:40

## 2022-07-09 RX ADMIN — SEVELAMER CARBONATE 1600 MG: 800 TABLET, FILM COATED ORAL at 08:40

## 2022-07-09 RX ADMIN — B-COMPLEX W/ C & FOLIC ACID TAB 1 MG 1 TABLET: 1 TAB at 08:39

## 2022-07-09 RX ADMIN — ALLOPURINOL 100 MG: 100 TABLET ORAL at 08:40

## 2022-07-09 RX ADMIN — SODIUM CHLORIDE, PRESERVATIVE FREE 10 ML: 5 INJECTION INTRAVENOUS at 21:54

## 2022-07-09 RX ADMIN — AMLODIPINE BESYLATE 10 MG: 5 TABLET ORAL at 08:39

## 2022-07-09 RX ADMIN — EZETIMIBE 10 MG: 10 TABLET ORAL at 08:39

## 2022-07-09 RX ADMIN — HEPARIN SODIUM 3600 UNITS: 1000 INJECTION INTRAVENOUS; SUBCUTANEOUS at 14:37

## 2022-07-09 RX ADMIN — SEVELAMER CARBONATE 1600 MG: 800 TABLET, FILM COATED ORAL at 16:47

## 2022-07-09 RX ADMIN — INSULIN LISPRO 3 UNITS: 100 INJECTION, SOLUTION INTRAVENOUS; SUBCUTANEOUS at 08:46

## 2022-07-09 RX ADMIN — CARVEDILOL 12.5 MG: 12.5 TABLET, FILM COATED ORAL at 08:40

## 2022-07-09 RX ADMIN — Medication 10 MG: at 22:48

## 2022-07-09 RX ADMIN — INSULIN GLARGINE 10 UNITS: 100 INJECTION, SOLUTION SUBCUTANEOUS at 21:53

## 2022-07-09 RX ADMIN — Medication 2 TABLET: at 21:53

## 2022-07-09 RX ADMIN — SODIUM CHLORIDE, PRESERVATIVE FREE 10 ML: 5 INJECTION INTRAVENOUS at 14:15

## 2022-07-09 RX ADMIN — LOPERAMIDE HYDROCHLORIDE 2 MG: 2 CAPSULE ORAL at 08:39

## 2022-07-09 RX ADMIN — Medication 2 UNITS: at 22:48

## 2022-07-09 RX ADMIN — PIPERACILLIN AND TAZOBACTAM 4.5 G: 4; .5 INJECTION, POWDER, FOR SOLUTION INTRAVENOUS at 08:38

## 2022-07-09 RX ADMIN — CARVEDILOL 12.5 MG: 12.5 TABLET, FILM COATED ORAL at 21:53

## 2022-07-09 RX ADMIN — Medication 7 UNITS: at 16:46

## 2022-07-09 RX ADMIN — PIPERACILLIN AND TAZOBACTAM 4.5 G: 4; .5 INJECTION, POWDER, FOR SOLUTION INTRAVENOUS at 21:53

## 2022-07-09 RX ADMIN — INSULIN LISPRO 3 UNITS: 100 INJECTION, SOLUTION INTRAVENOUS; SUBCUTANEOUS at 16:46

## 2022-07-09 RX ADMIN — EPOETIN ALFA-EPBX 10000 UNITS: 10000 INJECTION, SOLUTION INTRAVENOUS; SUBCUTANEOUS at 22:48

## 2022-07-09 RX ADMIN — Medication 2 TABLET: at 08:38

## 2022-07-09 RX ADMIN — ASPIRIN 81 MG: 81 TABLET, CHEWABLE ORAL at 08:38

## 2022-07-09 NOTE — PROGRESS NOTES
Wound vac to right foot intact with popliteal and femoral pulses intact, good coloration. Patient denied pain or discomfort overnight. BP elevated, but scheduled for dialysis today. 0720 - Bedside and Verbal shift change report given to DANIEL Ortiz RN (oncoming nurse) by Fabian Dupree (offgoing nurse). Report included the following information SBAR, Kardex, Intake/Output and MAR.

## 2022-07-09 NOTE — PROGRESS NOTES
TREATMENT SUMMARY   Patient in room 325 dialyzed for 3.5 hours. Tolerated tx well with without complaint or complications. (Right TDC) functioning without complication accessing or BFR      3000 ml UF removed with a net UF removal of 2500 ml. Report given to DAVID Sinclair RN with all questions answered. TREATMENT NOTES       Received report from DAVID Henry RN at 10:15. TX was initiated while patient was in bed without difficulty. Aseptically, the CVC ports were accesed and flushed without problem. Treatment was started without reversing the lines. Will continue to monitor patients closely during treatment. ACUTE HEMODIALYSIS FLOW SHEET       PATIENT INFORMATION   [facility]       DR. Yomi Ortiz    []1st Time Acute  []Stat[x] Routine []Urgent []Chronic Unit   []Acute Room []Bedside  []ICU/CCU []ER   Isolation Precautions: []Dialysis[] Airborne [x]Contact []Droplet []Reverse    Special Considerations:_______  [] Blood Consent Verified  []N/A   Allergies:[x] NKA                 [] _____________ Code Status [x]Full Code [] DNR  [] Other_____   Diet: [] Renal [] NPO [] Diabetic   [] Enteral Feeding [] Cardiac Diabetic: [x]Yes []No     [x]Signed Treatment Consent Verified   [x] Time Out/ Safety Check 10:40   PRIMARY NURSE REPORT: FIRST INITIAL/ LAST NAME/TITLE  PRE DIALYSIS:   Sandeep Chan RN                                        TIME: 10:15   ACCESS   CATHETER ACCESS: [] N/A  [x] RIGHT  [] LEFT  [x] IJ  [] SUBCL [] FEM                    [] First use X-ray  [] Tunnel     [] Non-Tunneled      [x] No S/S infection  [] Redness [] Drainage  [] Cultured [] Swelling [] Pain                    [] Medical Aseptic [] Prep Dressing Changed                  [] Clotted [] Patent []      Flows: [x] Good [] Poor [] Reversed                 If Access Problem Dr. Chavez Lean: [] Yes [] No    Date:_____  [] N/A[x]   GRAFT/FISTULA ACCESS:  [] N/A  [] RIGHT  [x] LEFT  [x] UE   [] LE       [] AVG  [] AVF [] BUTTONHOLE    [] +BRUIT/THRILL [] MEDICAL ASEPTIC PREP     [x] No S/S infection  [] Redness [] Drainage  [] Cultured [] Swelling [] Pain              If Access Problem Dr. Sheryle Sora: [] Yes [] No    Date:______ [] N/A   GENERAL ASSESSMENT   LUNGS:  SaO2% ____ [] Clear [] Coarse [] Crackles [] Wheezing               [x] Diminished Location: [x] RLL [x] LLL [] RUL [] JOBY    COUGH:  [] Productive  [] Loose[x] Dry [] N/A  RESPIRATIONS: [] Easy [] Labored    THERAPY: [] RA   [x] NC __2___. L/min    Mask: [] NRB [] Venti  _____O2%                  [] Ventilator [] Intubated [] Trach [] BiPap [] CPap [] HI Flow   CARDIAC: [x] Regular [] Irregular [] Pericardial Rub [] JVD               Monitored Rhythm:______ [] N/A   EDEMA: [x] None [] Generalized [] Facial [] Pedal [] UE [] LE             [] Pitting [] 1 [] 2 [] 3 [] 4    [] Right [] Left [] Bilateral   SKIN:    [x] Warm [] Hot [] Cold  [] Dry [] Pale [] Diaphoretic              [] Flushed [] Jaundiced [] Cyanotic [] Rash [] Weeping     LOC:    [x] Alert  Oriented to: [] Person [] Place [] Time             [] Confused [] Lethargic [] Medicated [] Non-responsive    GI/ABDOMEN: [x] Flat [] Distended [] Soft [] Firm [] Diarrhea [x] Bowel Sounds Present [] Nausea [] Vomiting    PAIN: [x] 0 [] 1 [] 2 [] 3 [] 4 [] 5 [] 6 [] 7 [] 8 [] 9 [] 10          Scale 1-10 Action/Follow Up_____   MOBILITY: [] Amb [x] Amb/Assist [] Bed  [] Wheelchair    CURRENT LABS   HBsAg ONLY: Date Drawn:  6/28/22           [x] Negative [] Positive [] Unknown.      HBsAb: Date Drawn: 6/28/2022             [] Susceptible <10 [x] Immune ?10 [] Unknown   Date of Current Labs:  ATTACHED     EDUCATION   Person Educated: [x] Patient [] Other_________   Knowledge base: [] None [x] Minimal [] Substantial    Barriers to learning  [x] None _______________   Preferred method of learning: [] Written [] Oral [x] Visual [] Hands on    Topic: [] Access Care [] S&S of infection [] Fluid Management   [x] K+  [x] Procedural  [] Albumin [] Medications [] Tx Options   [] Transplant [] Diet [] Other    Teaching Tools: [] Explain [] Demonstration [] Handout_____ [] Video______  CARE PLAN    [x] Renal Failure (Adult)  Interdisciplinary  · Fluid and electrolytes stabilized  ? Interventions  · Dehydration signs and symptoms (eg: Weight/lab monitoring; vomiting/diarrhea/urine; tenting; mucous membranes; dizziness/lethargy/irritability/confusion; weak pulse; tachycardia; blood pressure; I&O)  · Fluid overload signs and symptoms assessment (eg: Body weight increased; dyspnea; edema; hypertension; respiratory crackles/wheezing; JVD; lab monitoring; mental status changes; I&O)  · Monitor appropriate lab values  · COMPLIANCE WITH PRESCRIBED THERAPY  · ARTERIAL ACCESS SITE ASSESSMENT  · NUTRITION SCREENING  · Vital signs monitoring per assessed patient condition or unit standard  · Cardiac monitoring  · Hydration management  · Intake and output measurement  · Body weight monitoring  · Skin care  · DIALYSIS  · Nutrition Care Process per nutrition screen  · Oral hygiene care every 2 hours  · Pain management     · Outcome   ? [x] Progressing Towards Goal  ? [] Not Progressing Towards Goals  ? [] Goals Met/Resolved  ? [] Goals Not Met/ Resolved        · Patient/ Family Education  ?  Progressing Towards Goals          RO/HEMODIAYLSIS MACHINE SAFETY CHECKS- BEFORE EACH TREATMENT          [] [facility]: Machine Serial #1:  [id number]   RO Serial #1: [id number]                       [x] [facility]: Machine Serial #2:  [id number]   RO Serial #2: [id number]        [] [facility]: Machine Serial #3:  [id number]    Serial #5:[id number]    Alarm Test: [x] Pass  Time_1041_  [x] RO/Machine Log Complete    [x] Extracorporeal circuit Tested for integrity           Dialyzer_[id number]_   Tubing_21G27-12    Dialysate: pH_7.4_  Temp._36.0_Conductivity: Meter 14.0_ HD Machine_14.2   CHLORINE TESTING- BEFORE EACH TREATMENT AND EVERY 4 HOURS   Total Chlorine: [x] Less than 0.1 ppm Time:_1030_2nd Check Time:1330_  (If greater than 0.1 ppm from Primary then every 30 minutes from Secondary)   TREATMENT INIATION-WITH DIALYSIS PRECAUTIONS   [x] All Connections Secured   [x] Saline Line Double Clamped    [x] Venous Parameters Set [x] Arterial Parameters Set    [x] Prime Given 250 ml     [x] Air Foam Detector Engaged   PRE-TREATMENT   UF Calculations: Wt to lose:2500_ml(+) Oral:__ml(+)IV Meds/Fluids/Blood prods___ml(+) Prime/Rinse 500_ml(=)Total UF Goal_3000_mL   Scale Type:[x] Bed scale [] Sling Scale [] Wheel Chair Scale []  Not Ordered [] [] Unable to obtain pt on stretcher/ bed scale malfunctioning      [x] Time Out/Safety Check  Time:_1040_   INTRADIALYTIC MONITORING  (SEE ATTACHED FLOWSHEET)     POST TREATMENT    Time Medication Dose Volume Route    Initials                                           [facility] Signatures Title Initials Time                     Dialyzer cleared: [x] Good [] Fair [] Poor     Blood Processed 72___Liters    Net UF Removed 2500_mL  Post Tx Access:                  AVF/AVG: Bleeding Stop       Art.___min Avtar.____min []+bruit/thrill                Catheter: Locking Solution [] Heparin 1 ml/1000 units  [x] Normal Saline                                                                               Art._1.6____ ml Avtar._1.6 ml  Post Assessment:             Skin: [x]Warm []Dry []Diaphoretic []Flushed [] Pale []Cyanotic            Lungs: [x]Clear []Coarse []Crackles []Wheezing             Cardiac: [x]Regular []Irregular  []Monitored rhythm____ []N/A            Edema: [x]None []General []Facial []Pedal  []UE []LE []RIGHT []LEFT            Pain: [x]0 []1 []2 []3 []4 []5 []6 []7 []8 []9 []10   POST Tx Note: Patient in room 325 dialyzed for 3.5 hours. Tolerated tx well with without complaint or complications.   (Right TDC) functioning without complication accessing or BFR    DFR 800  3000 ml UF removed with a net UF removal of 2500 ml. Report given to DAVID Sinclair RN with all questions answered. Primary Nurse Report: First initial/Last name/Title    Post Dialysis:_DAVID Paz RN_         Time:_1430_   Abbreviations: AVG-arterial venous graft, AVF-arterial venous fistula, IJ-Internal Jugular,  Subcl-Subclavian, Fem-Femoral, Tx-treatment, AP/HR-apical heart rate, DFR-dialysate flow rate, BFR-blood flow rate, AP-arterial pressure, -venous pressure, UF-ultrafiltrate, TMP-transmembrane pressure, Avtar-Venous, Art-Arterial, RO-Reverse Osmosis

## 2022-07-09 NOTE — PROGRESS NOTES
Hospitalist Progress Note    Patient: Sreedhar Garner MRN: [id number]  CSN: [id number]    YOB: 1960  Age: 60 y.o. Sex: male    DOA: 6/27/2022 LOS:  LOS: 12 days          Chief Complaint:    Heel infection      Assessment/Plan     [facility] a 60 y. o. male who history of stent, hypertension, Charcot's foot presents with worsening pain and swelling  in his right foot      Large necrotic ulcer right posterior heel with osteomyelitis of the calcaneus and deep abscess-  S/p incision bone cortex right calcaneous, I&D right heel abcsess, deep wound debridement with multiple bone biopsies and application of antibiotic beads done    Hematoma evac, now with wound vac  Monitor graft take per podiatry     Tunnel line in place-needs long term abx until 8/8     Diabetes mellitus -  On lantus 10units and  premeal insulin      Anemia of ESRD stable     End-stage renal disease on dialysis Tuesday Thursday and Saturday -  S/p [facility] replacement      History of coronary artery disease-  coreg and aspirin      Chronic compressive myelopathy pending surgery -  Supportive care     needs IV abx until 8/8  Will go to SNF once auth received      Disposition :  Patient Active Problem List   Diagnosis Code    Diabetes mellitus with foot ulcer ([address] E11.621, L97.509    CAD (coronary artery disease) I25.10    ESRD (end stage renal disease) ([address] N18.6    Acute hematogenous osteomyelitis of right foot ([address] M86.071    Cellulitis of right heel L03.115    Diabetic foot ulcer with osteomyelitis ([address] E11.621, E11.69, L97.509, M86.9    Ulcer of right heel, with necrosis of bone ([address] L97.414    Infection and inflammatory reaction due to internal fixation device of other site, initial encounter ([address] T84.69XA       Subjective:  Feeling ok  Denies any new issues      Review of systems:    Constitutional: denies fevers, chills  Respiratory: denies SOB  Cardiovascular: denies chest pain  Gastrointestinal: denies nausea, vomiting, diarrhea      Vital signs/Intake and Output:  Visit Vitals  BP (!) 176/88   Pulse 74   Temp 97.8 °F (36.6 °C)   Resp 18   Ht 6' 2\" (1.88 m)   Wt 103.6 kg (228 lb 8 oz)   SpO2 95%   BMI 29.34 kg/m²     Current Shift:  No intake/output data recorded. Last three shifts:  07/07 1901 - 07/09 0700  In: 2180 [P.O.:1680; I.V.:500]  Out: 0     Exam:    General: debilitated AAM alert, NAD, OX3  CVS:Regular rate and rhythm, no M/R/G, S1/S2 heard, no thrill  Lungs:Clear to auscultation bilaterally, no wheezes, rhonchi, or rales  Abdomen: Soft, Nontender, No distention, Normal Bowel sounds  Extremities: right heel dressed, vacuum  Neuro:grossly normal , follows commands  Psych:appropriate                Labs: Results:       Chemistry Recent Labs     07/07/22 0215   *      K 4.4      CO2 25   BUN 61*   CREA 8.76*   CA 7.7*   AGAP 9   BUCR 7*   *   TP 7.2   ALB 2.4*   GLOB 4.8*   AGRAT 0.5*      CBC w/Diff Recent Labs     07/07/22 0215   WBC 11.6   RBC 2.86*   HGB 8.8*   HCT 28.0*      GRANS 73   LYMPH 14*   EOS 3      Cardiac Enzymes No results for input(s): CPK, CKND1, PAULO in the last 72 hours. No lab exists for component: CKRMB, TROIP   Coagulation Recent Labs     07/07/22  1400   PTP 14.0   INR 1.0       Lipid Panel No results found for: CHOL, CHOLPOCT, CHOLX, CHLST, CHOLV, 504829, HDL, HDLP, LDL, LDLC, DLDLP, 138952, VLDLC, VLDL, TGLX, TRIGL, TRIGP, TGLPOCT, CHHD, CHHDX   BNP No results for input(s): BNPP in the last 72 hours.    Liver Enzymes Recent Labs     07/07/22 0215   TP 7.2   ALB 2.4*   *      Thyroid Studies No results found for: T4, T3U, TSH, TSHEXT     Procedures/imaging: see electronic medical records for all procedures/Xrays and details which were not copied into this note but were reviewed prior to creation of Baldemar Segura MD

## 2022-07-09 NOTE — PROGRESS NOTES
In patient nephrology progress note    Admit Date:    2022  Name:  Sarai Mcginnis  Age :  60 y.o. Impression:   ESRD TTS   Osteomyelitis   DM  CAD   Recommendations:   HD tomorrow  IV Antibiotics per ID specialist  Cont [facility]  SNF    Subjective:   Sarai Mcginnis is a 60 y.o. male with  a PMH of DM, HTN, CAD, ESRD on HD TTS admitted for right foot infection. MRI suggested osteomyelitis. No fever, chills. Pt s/p debridement, I&D- on abx. HD Catheter fell off and replaced .   : no acute events over night see patient during HD today .  Patient has no c/o's      Objective:   Temp (24hrs), Av.3 °F (36.8 °C), Min:97 °F (36.1 °C), Max:100.2 °F (37.9 °C)      Intake/Output Summary (Last 24 hours) at 2022 1603  Last data filed at 2022 1415  Gross per 24 hour   Intake 1220 ml   Output 2500 ml   Net -1280 ml     Last 3 Recorded Weights in this Encounter    22 0827 22 2350 22 2314   Weight: 104.3 kg (230 lb) 101.6 kg (224 lb) 103.6 kg (228 lb 8 oz)       Physical Exam:     General Appearance: no pain, no distress  Head: atraumatic  HEENT: no jaundice, moist oral mucosa  Neck: no JVD noted  Chest: LS clear to auscultation bilaterally  Heart: S1/S2, no murmur, gallop or rub, RRR  Adbomen: soft, nontender, BS active   : no flank tenderness, no lew catheter  Skin: intact, no rash  Extremity: no edema, cyanosis or clubbing  MS: No joint deformity or tenderness  Neuro: conscious, alert, oriented x 3, no focal deficit    Data Review:    BMP  Lab Results   Component Value Date/Time    Sodium 139 2022 02:15 AM    Potassium 4.4 2022 02:15 AM    Chloride 105 2022 02:15 AM    CO2 25 2022 02:15 AM    Anion gap 9 2022 02:15 AM    Glucose 197 (H) 2022 02:15 AM    BUN 61 (H) 2022 02:15 AM    Creatinine 8.76 (H) 2022 02:15 AM    BUN/Creatinine ratio 7 (L) 2022 02:15 AM    GFR est AA 7 (L) 2022 02:15 AM    GFR est non-AA 6 (L) 07/07/2022 02:15 AM    Calcium 7.7 (L) 07/07/2022 02:15 AM       CBC  Lab Results   Component Value Date/Time    WBC 11.6 07/07/2022 02:15 AM    HGB 8.8 (L) 07/07/2022 02:15 AM    HCT 28.0 (L) 07/07/2022 02:15 AM    PLATELET 653 64/29/6782 02:15 AM    MCV 97.9 07/07/2022 02:15 AM       No components found for: PTHINT  No results found for: IRON      Chito Kyle MD  [facility] [phone]  Pager [phone]

## 2022-07-10 ENCOUNTER — APPOINTMENT (OUTPATIENT)
Dept: VASCULAR SURGERY | Age: 62
DRG: 629 | End: 2022-07-10
Attending: HOSPITALIST
Payer: MEDICARE

## 2022-07-10 PROBLEM — M79.89 LEFT ARM SWELLING: Status: ACTIVE | Noted: 2022-07-10

## 2022-07-10 LAB
BASOPHILS # BLD: 0.1 K/UL (ref 0–0.1)
BASOPHILS NFR BLD: 1 % (ref 0–2)
DIFFERENTIAL METHOD BLD: ABNORMAL
EOSINOPHIL # BLD: 0.5 K/UL (ref 0–0.4)
EOSINOPHIL NFR BLD: 5 % (ref 0–5)
ERYTHROCYTE [DISTWIDTH] IN BLOOD BY AUTOMATED COUNT: 17.7 % (ref 11.6–14.5)
GLUCOSE BLD STRIP.AUTO-MCNC: 141 MG/DL (ref 70–110)
GLUCOSE BLD STRIP.AUTO-MCNC: 154 MG/DL (ref 70–110)
GLUCOSE BLD STRIP.AUTO-MCNC: 158 MG/DL (ref 70–110)
GLUCOSE BLD STRIP.AUTO-MCNC: 163 MG/DL (ref 70–110)
HCT VFR BLD AUTO: 29.2 % (ref 36–48)
HGB BLD-MCNC: 9.2 G/DL (ref 13–16)
IMM GRANULOCYTES # BLD AUTO: 0.1 K/UL (ref 0–0.04)
IMM GRANULOCYTES NFR BLD AUTO: 1 % (ref 0–0.5)
LYMPHOCYTES # BLD: 1.2 K/UL (ref 0.9–3.6)
LYMPHOCYTES NFR BLD: 11 % (ref 21–52)
MAGNESIUM SERPL-MCNC: 2.3 MG/DL (ref 1.6–2.6)
MCH RBC QN AUTO: 31.7 PG (ref 24–34)
MCHC RBC AUTO-ENTMCNC: 31.5 G/DL (ref 31–37)
MCV RBC AUTO: 100.7 FL (ref 78–100)
MONOCYTES # BLD: 1.1 K/UL (ref 0.05–1.2)
MONOCYTES NFR BLD: 10 % (ref 3–10)
NEUTS SEG # BLD: 7.9 K/UL (ref 1.8–8)
NEUTS SEG NFR BLD: 73 % (ref 40–73)
NRBC # BLD: 0 K/UL (ref 0–0.01)
NRBC BLD-RTO: 0 PER 100 WBC
PLATELET # BLD AUTO: 210 K/UL (ref 135–420)
PMV BLD AUTO: 11 FL (ref 9.2–11.8)
RBC # BLD AUTO: 2.9 M/UL (ref 4.35–5.65)
WBC # BLD AUTO: 10.9 K/UL (ref 4.6–13.2)

## 2022-07-10 PROCEDURE — 85025 COMPLETE CBC W/AUTO DIFF WBC: CPT

## 2022-07-10 PROCEDURE — 82962 GLUCOSE BLOOD TEST: CPT

## 2022-07-10 PROCEDURE — 65270000029 HC RM PRIVATE

## 2022-07-10 PROCEDURE — 74011250636 HC RX REV CODE- 250/636: Performed by: PHYSICIAN ASSISTANT

## 2022-07-10 PROCEDURE — 74011636637 HC RX REV CODE- 636/637: Performed by: INTERNAL MEDICINE

## 2022-07-10 PROCEDURE — 74011000258 HC RX REV CODE- 258: Performed by: PHYSICIAN ASSISTANT

## 2022-07-10 PROCEDURE — 93971 EXTREMITY STUDY: CPT

## 2022-07-10 PROCEDURE — 74011000250 HC RX REV CODE- 250: Performed by: RADIOLOGY

## 2022-07-10 PROCEDURE — 74011250637 HC RX REV CODE- 250/637: Performed by: INTERNAL MEDICINE

## 2022-07-10 PROCEDURE — 36415 COLL VENOUS BLD VENIPUNCTURE: CPT

## 2022-07-10 PROCEDURE — 74011636637 HC RX REV CODE- 636/637: Performed by: HOSPITALIST

## 2022-07-10 PROCEDURE — 74011250636 HC RX REV CODE- 250/636: Performed by: HOSPITALIST

## 2022-07-10 PROCEDURE — 83735 ASSAY OF MAGNESIUM: CPT

## 2022-07-10 PROCEDURE — 74011250637 HC RX REV CODE- 250/637: Performed by: HOSPITALIST

## 2022-07-10 PROCEDURE — 74011636637 HC RX REV CODE- 636/637: Performed by: FAMILY MEDICINE

## 2022-07-10 RX ORDER — HEPARIN SODIUM 5000 [USP'U]/ML
5000 INJECTION, SOLUTION INTRAVENOUS; SUBCUTANEOUS EVERY 8 HOURS
Status: DISCONTINUED | OUTPATIENT
Start: 2022-07-10 | End: 2022-07-21 | Stop reason: HOSPADM

## 2022-07-10 RX ADMIN — Medication 2 TABLET: at 09:10

## 2022-07-10 RX ADMIN — SODIUM CHLORIDE, PRESERVATIVE FREE 10 ML: 5 INJECTION INTRAVENOUS at 16:20

## 2022-07-10 RX ADMIN — INSULIN LISPRO 3 UNITS: 100 INJECTION, SOLUTION INTRAVENOUS; SUBCUTANEOUS at 16:18

## 2022-07-10 RX ADMIN — EZETIMIBE 10 MG: 10 TABLET ORAL at 09:09

## 2022-07-10 RX ADMIN — B-COMPLEX W/ C & FOLIC ACID TAB 1 MG 1 TABLET: 1 TAB at 09:09

## 2022-07-10 RX ADMIN — SEVELAMER CARBONATE 1600 MG: 800 TABLET, FILM COATED ORAL at 11:25

## 2022-07-10 RX ADMIN — PIPERACILLIN AND TAZOBACTAM 4.5 G: 4; .5 INJECTION, POWDER, FOR SOLUTION INTRAVENOUS at 09:11

## 2022-07-10 RX ADMIN — INSULIN LISPRO 3 UNITS: 100 INJECTION, SOLUTION INTRAVENOUS; SUBCUTANEOUS at 09:11

## 2022-07-10 RX ADMIN — INSULIN LISPRO 3 UNITS: 100 INJECTION, SOLUTION INTRAVENOUS; SUBCUTANEOUS at 11:26

## 2022-07-10 RX ADMIN — SEVELAMER CARBONATE 1600 MG: 800 TABLET, FILM COATED ORAL at 16:17

## 2022-07-10 RX ADMIN — SODIUM CHLORIDE, PRESERVATIVE FREE 10 ML: 5 INJECTION INTRAVENOUS at 21:36

## 2022-07-10 RX ADMIN — Medication 2 TABLET: at 21:32

## 2022-07-10 RX ADMIN — Medication 2 UNITS: at 16:17

## 2022-07-10 RX ADMIN — Medication 2 UNITS: at 11:25

## 2022-07-10 RX ADMIN — Medication 2 UNITS: at 09:10

## 2022-07-10 RX ADMIN — AMLODIPINE BESYLATE 10 MG: 5 TABLET ORAL at 09:09

## 2022-07-10 RX ADMIN — ALLOPURINOL 100 MG: 100 TABLET ORAL at 09:09

## 2022-07-10 RX ADMIN — CARVEDILOL 12.5 MG: 12.5 TABLET, FILM COATED ORAL at 09:10

## 2022-07-10 RX ADMIN — CARVEDILOL 12.5 MG: 12.5 TABLET, FILM COATED ORAL at 21:32

## 2022-07-10 RX ADMIN — HEPARIN SODIUM 5000 UNITS: 5000 INJECTION INTRAVENOUS; SUBCUTANEOUS at 18:23

## 2022-07-10 RX ADMIN — ASPIRIN 81 MG: 81 TABLET, CHEWABLE ORAL at 09:10

## 2022-07-10 RX ADMIN — SEVELAMER CARBONATE 1600 MG: 800 TABLET, FILM COATED ORAL at 09:09

## 2022-07-10 RX ADMIN — PIPERACILLIN AND TAZOBACTAM 4.5 G: 4; .5 INJECTION, POWDER, FOR SOLUTION INTRAVENOUS at 21:32

## 2022-07-10 RX ADMIN — HEPARIN SODIUM 5000 UNITS: 5000 INJECTION INTRAVENOUS; SUBCUTANEOUS at 11:24

## 2022-07-10 RX ADMIN — INSULIN GLARGINE 10 UNITS: 100 INJECTION, SOLUTION SUBCUTANEOUS at 21:34

## 2022-07-10 NOTE — PROGRESS NOTES
Hospitalist Progress Note    Patient: Rosie Otero MRN: [id number]  [facility]: [id number]    YOB: 1960  Age: 60 y.o. Sex: male    DOA: 6/27/2022 LOS:  LOS: 13 days          Chief Complaint:    Arm swelling      Assessment/Plan        Maricarmen Falling a 60 y. o. male who history of stent, hypertension, Charcot's foot presents with worsening pain and swelling  in his right foot      Large necrotic ulcer right posterior heel with osteomyelitis of the calcaneus and deep abscess-  S/p incision bone cortex right calcaneous, I&D right heel abcsess, deep wound debridement with multiple bone biopsies and application of antibiotic beads done    Hematoma evac, now with wound vac  Monitor graft take per podiatry    He was not on heparin due to development of hematoma in heel, will start again now with edema and possible DVT-need stat US LUE  Continue asa  ELEVATE left UE     Tunnel line in place-needs long term abx until 8/8     Diabetes mellitus -  On lantus 10units and  premeal insulin      Anemia of ESRD stable     End-stage renal disease on dialysis Tuesday Thursday and Saturday -  S/p [facility] replacement      History of coronary artery disease-  coreg and aspirin      Chronic compressive myelopathy pending surgery -  Supportive care     needs IV abx until 8/8  Will go to SNF once auth received     Disposition :  Patient Active Problem List   Diagnosis Code    Diabetes mellitus with foot ulcer ([address] E11.621, L97.509    CAD (coronary artery disease) I25.10    ESRD (end stage renal disease) ([address] N18.6    Acute hematogenous osteomyelitis of right foot ([facility]) M86.071    Cellulitis of right heel L03.115    Diabetic foot ulcer with osteomyelitis ([address] E11.621, E11.69, L97.509, M86.9    Ulcer of right heel, with necrosis of bone ([address] L97.414    Infection and inflammatory reaction due to internal fixation device of other site, initial encounter ([address] T84.69XA    Left arm swelling M79.89 Subjective:    My left arm hurts and is swollen  Tender in left AC area  Swelling to hand  Started last night      Review of systems:    Constitutional: denies fevers, chills  Respiratory: denies SOB  Cardiovascular: denies chest pain  Gastrointestinal: denies nausea, vomiting, diarrhea      Vital signs/Intake and Output:  Visit Vitals  BP (!) 155/68 (BP 1 Location: Right upper arm)   Pulse 91   Temp 97.8 °F (36.6 °C)   Resp 17   Ht 6' 2\" (1.88 m)   Wt 107.7 kg (237 lb 8 oz)   SpO2 95%   BMI 30.49 kg/m²     Current Shift:  07/10 0701 - 07/10 1900  In: 360 [P.O.:360]  Out: -   Last three shifts:  07/08 1901 - 07/10 0700  In: 340 [P.O.:240; I.V.:100]  Out: 2500     Exam:    General: elderly appearing AAM, nAD 0x3  CVS:Regular rate and rhythm, no M/R/G, S1/S2 heard, no thrill  Lungs:Clear to auscultation bilaterally, no wheezes, rhonchi, or rales  Abdomen: Soft, Nontender, No distention, Normal Bowel sounds  Extremities:heel boots on, right foot wound vac and dressing  LUE edema and tenderness Left AC fossa, swelling to hand and radial pulse palpable  Prior surgery scars left wrist and over fistula area left upper ext  Neuro:grossly normal , follows commands  Psych:appropriate                Labs: Results:       Chemistry No results for input(s): GLU, NA, K, CL, CO2, BUN, CREA, CA, AGAP, BUCR, TBIL, AP, TP, ALB, GLOB, AGRAT in the last 72 hours. No lab exists for component: GPT   CBC w/Diff Recent Labs     07/10/22  0541   WBC 10.9   RBC 2.90*   HGB 9.2*   HCT 29.2*      GRANS 73   LYMPH 11*   EOS 5      Cardiac Enzymes No results for input(s): CPK, CKND1, PAULO in the last 72 hours.     No lab exists for component: CKRMB, TROIP   Coagulation Recent Labs     07/07/22  1400   PTP 14.0   INR 1.0       Lipid Panel No results found for: CHOL, CHOLPOCT, CHOLX, CHLST, CHOLV, 599320, HDL, HDLP, LDL, LDLC, DLDLP, 213995, VLDLC, VLDL, TGLX, TRIGL, TRIGP, TGLPOCT, CHHD, CHHDX   BNP No results for input(s): BNPP in the last 72 hours. Liver Enzymes No results for input(s): TP, ALB, TBIL, AP in the last 72 hours.     No lab exists for component: SGOT, GPT, DBIL   Thyroid Studies No results found for: T4, T3U, TSH, TSHEXT, TSHEXT     Procedures/imaging: see electronic medical records for all procedures/Xrays and details which were not copied into this note but were reviewed prior to creation of Baldemar Segura MD

## 2022-07-10 NOTE — PROGRESS NOTES
In patient nephrology progress note    Admit Date:    2022  Name:  Johnathon Lerma  Age :  60 y.o. Impression:   ESRD TTS   Osteomyelitis   DM  CAD   Recommendations:   No indication for HD today   IV Antibiotics per ID specialist  Cont [facility]  SNF     Subjective:   Diana Hooper a 60 y. o. male with  a PMH of DM, HTN, CAD, ESRD on HD TTS admitted for right foot infection. MRI suggested osteomyelitis. No fever, chills. Pt s/p debridement, I&D- on abx. HD Catheter fell off and replaced .   : no acute events over night see patient during HD today .  Patient has no c/o's    7/10: right food pain, no other complaints     Objective:   Temp (24hrs), Av.3 °F (36.8 °C), Min:97 °F (36.1 °C), Max:100.2 °F (37.9 °C)      Intake/Output Summary (Last 24 hours) at 7/10/2022 2328  Last data filed at 2022 1415  Gross per 24 hour   Intake --   Output 2500 ml   Net -2500 ml     Last 3 Recorded Weights in this Encounter    22 2350 22 2314 22 2337   Weight: 101.6 kg (224 lb) 103.6 kg (228 lb 8 oz) 107.7 kg (237 lb 8 oz)       Physical Exam:     General Appearance: no pain, no distress  Head: atraumatic  HEENT: no jaundice, moist oral mucosa  Neck: no JVD noted  Chest: LS clear to auscultation bilaterally  Heart: S1/S2, no murmur, gallop or rub, RRR  Adbomen: soft, nontender, BS active   : no flank tenderness, no lew catheter  Skin: intact, no rash  Extremity: no edema, cyanosis or clubbing  MS: No joint deformity or tenderness  Neuro: conscious, alert, oriented x 3, no focal deficit    Data Review:    BMP  Lab Results   Component Value Date/Time    Sodium 139 2022 02:15 AM    Potassium 4.4 2022 02:15 AM    Chloride 105 2022 02:15 AM    CO2 25 2022 02:15 AM    Anion gap 9 2022 02:15 AM    Glucose 197 (H) 2022 02:15 AM    BUN 61 (H) 2022 02:15 AM    Creatinine 8.76 (H) 2022 02:15 AM    BUN/Creatinine ratio 7 (L) 2022 02:15 AM    GFR est AA 7 (L) 07/07/2022 02:15 AM    GFR est non-AA 6 (L) 07/07/2022 02:15 AM    Calcium 7.7 (L) 07/07/2022 02:15 AM       CBC  Lab Results   Component Value Date/Time    WBC 10.9 07/10/2022 05:41 AM    HGB 9.2 (L) 07/10/2022 05:41 AM    HCT 29.2 (L) 07/10/2022 05:41 AM    PLATELET 142 67/80/3793 05:41 AM    .7 (H) 07/10/2022 05:41 AM       No components found for: PTHINT  No results found for: IRON      Atiya Stover MD  [facility] [phone]  Pager [phone]

## 2022-07-10 NOTE — PROGRESS NOTES
Incorrect number paged by  on first attempt. Vascular tech now paged for STAT venous duplex, vascular tech Juanita notified.

## 2022-07-10 NOTE — PROGRESS NOTES
Patient reports pain and swelling in left arm that he stated he noted last night. Dr. Mark Salinas notified, no new orders at this time.

## 2022-07-11 LAB
ALBUMIN SERPL-MCNC: 2.6 G/DL (ref 3.4–5)
ANION GAP SERPL CALC-SCNC: 10 MMOL/L (ref 3–18)
BASOPHILS # BLD: 0.1 K/UL (ref 0–0.1)
BASOPHILS NFR BLD: 1 % (ref 0–2)
BUN SERPL-MCNC: 47 MG/DL (ref 7–18)
BUN/CREAT SERPL: 6 (ref 12–20)
CALCIUM SERPL-MCNC: 8 MG/DL (ref 8.5–10.1)
CHLORIDE SERPL-SCNC: 106 MMOL/L (ref 100–111)
CO2 SERPL-SCNC: 26 MMOL/L (ref 21–32)
CREAT SERPL-MCNC: 7.92 MG/DL (ref 0.6–1.3)
DIFFERENTIAL METHOD BLD: ABNORMAL
EOSINOPHIL # BLD: 0.6 K/UL (ref 0–0.4)
EOSINOPHIL NFR BLD: 5 % (ref 0–5)
ERYTHROCYTE [DISTWIDTH] IN BLOOD BY AUTOMATED COUNT: 17.6 % (ref 11.6–14.5)
GLUCOSE BLD STRIP.AUTO-MCNC: 115 MG/DL (ref 70–110)
GLUCOSE BLD STRIP.AUTO-MCNC: 128 MG/DL (ref 70–110)
GLUCOSE BLD STRIP.AUTO-MCNC: 225 MG/DL (ref 70–110)
GLUCOSE BLD STRIP.AUTO-MCNC: 92 MG/DL (ref 70–110)
GLUCOSE SERPL-MCNC: 117 MG/DL (ref 74–99)
HCT VFR BLD AUTO: 29.1 % (ref 36–48)
HGB BLD-MCNC: 9.2 G/DL (ref 13–16)
IMM GRANULOCYTES # BLD AUTO: 0.1 K/UL (ref 0–0.04)
IMM GRANULOCYTES NFR BLD AUTO: 1 % (ref 0–0.5)
LYMPHOCYTES # BLD: 1.6 K/UL (ref 0.9–3.6)
LYMPHOCYTES NFR BLD: 15 % (ref 21–52)
MCH RBC QN AUTO: 30.9 PG (ref 24–34)
MCHC RBC AUTO-ENTMCNC: 31.6 G/DL (ref 31–37)
MCV RBC AUTO: 97.7 FL (ref 78–100)
MONOCYTES # BLD: 1 K/UL (ref 0.05–1.2)
MONOCYTES NFR BLD: 9 % (ref 3–10)
NEUTS SEG # BLD: 7.3 K/UL (ref 1.8–8)
NEUTS SEG NFR BLD: 69 % (ref 40–73)
NRBC # BLD: 0 K/UL (ref 0–0.01)
NRBC BLD-RTO: 0 PER 100 WBC
PHOSPHATE SERPL-MCNC: 4.8 MG/DL (ref 2.5–4.9)
PLATELET # BLD AUTO: 209 K/UL (ref 135–420)
PMV BLD AUTO: 10.8 FL (ref 9.2–11.8)
POTASSIUM SERPL-SCNC: 5 MMOL/L (ref 3.5–5.5)
RBC # BLD AUTO: 2.98 M/UL (ref 4.35–5.65)
SODIUM SERPL-SCNC: 142 MMOL/L (ref 136–145)
WBC # BLD AUTO: 10.5 K/UL (ref 4.6–13.2)

## 2022-07-11 PROCEDURE — 65270000029 HC RM PRIVATE

## 2022-07-11 PROCEDURE — 74011250637 HC RX REV CODE- 250/637: Performed by: INTERNAL MEDICINE

## 2022-07-11 PROCEDURE — 77030019934 HC DRSG VAC ASST KCON -B

## 2022-07-11 PROCEDURE — 74011250637 HC RX REV CODE- 250/637: Performed by: HOSPITALIST

## 2022-07-11 PROCEDURE — 74011250637 HC RX REV CODE- 250/637: Performed by: FAMILY MEDICINE

## 2022-07-11 PROCEDURE — 74011636637 HC RX REV CODE- 636/637: Performed by: INTERNAL MEDICINE

## 2022-07-11 PROCEDURE — 97530 THERAPEUTIC ACTIVITIES: CPT

## 2022-07-11 PROCEDURE — 97605 NEG PRS WND THER DME<=50SQCM: CPT

## 2022-07-11 PROCEDURE — 74011250636 HC RX REV CODE- 250/636: Performed by: HOSPITALIST

## 2022-07-11 PROCEDURE — 82962 GLUCOSE BLOOD TEST: CPT

## 2022-07-11 PROCEDURE — 74011636637 HC RX REV CODE- 636/637: Performed by: FAMILY MEDICINE

## 2022-07-11 PROCEDURE — 74011000250 HC RX REV CODE- 250: Performed by: RADIOLOGY

## 2022-07-11 PROCEDURE — 74011000258 HC RX REV CODE- 258: Performed by: PHYSICIAN ASSISTANT

## 2022-07-11 PROCEDURE — 36415 COLL VENOUS BLD VENIPUNCTURE: CPT

## 2022-07-11 PROCEDURE — 85025 COMPLETE CBC W/AUTO DIFF WBC: CPT

## 2022-07-11 PROCEDURE — 80069 RENAL FUNCTION PANEL: CPT

## 2022-07-11 PROCEDURE — 74011636637 HC RX REV CODE- 636/637: Performed by: HOSPITALIST

## 2022-07-11 PROCEDURE — 74011250636 HC RX REV CODE- 250/636: Performed by: PHYSICIAN ASSISTANT

## 2022-07-11 RX ORDER — GABAPENTIN 300 MG/1
300 CAPSULE ORAL
Status: DISCONTINUED | OUTPATIENT
Start: 2022-07-12 | End: 2022-07-21 | Stop reason: HOSPADM

## 2022-07-11 RX ORDER — ONDANSETRON 2 MG/ML
4 INJECTION INTRAMUSCULAR; INTRAVENOUS
Status: DISCONTINUED | OUTPATIENT
Start: 2022-07-11 | End: 2022-07-21 | Stop reason: HOSPADM

## 2022-07-11 RX ADMIN — SODIUM CHLORIDE, PRESERVATIVE FREE 10 ML: 5 INJECTION INTRAVENOUS at 08:28

## 2022-07-11 RX ADMIN — HEPARIN SODIUM 5000 UNITS: 5000 INJECTION INTRAVENOUS; SUBCUTANEOUS at 18:46

## 2022-07-11 RX ADMIN — Medication 2 TABLET: at 08:27

## 2022-07-11 RX ADMIN — CARVEDILOL 12.5 MG: 12.5 TABLET, FILM COATED ORAL at 08:28

## 2022-07-11 RX ADMIN — SEVELAMER CARBONATE 1600 MG: 800 TABLET, FILM COATED ORAL at 08:28

## 2022-07-11 RX ADMIN — CARVEDILOL 12.5 MG: 12.5 TABLET, FILM COATED ORAL at 20:08

## 2022-07-11 RX ADMIN — SODIUM CHLORIDE, PRESERVATIVE FREE 10 ML: 5 INJECTION INTRAVENOUS at 21:51

## 2022-07-11 RX ADMIN — B-COMPLEX W/ C & FOLIC ACID TAB 1 MG 1 TABLET: 1 TAB at 08:27

## 2022-07-11 RX ADMIN — PIPERACILLIN AND TAZOBACTAM 4.5 G: 4; .5 INJECTION, POWDER, FOR SOLUTION INTRAVENOUS at 20:08

## 2022-07-11 RX ADMIN — PIPERACILLIN AND TAZOBACTAM 4.5 G: 4; .5 INJECTION, POWDER, FOR SOLUTION INTRAVENOUS at 08:26

## 2022-07-11 RX ADMIN — INSULIN LISPRO 3 UNITS: 100 INJECTION, SOLUTION INTRAVENOUS; SUBCUTANEOUS at 17:12

## 2022-07-11 RX ADMIN — AMLODIPINE BESYLATE 10 MG: 5 TABLET ORAL at 08:28

## 2022-07-11 RX ADMIN — ASPIRIN 81 MG: 81 TABLET, CHEWABLE ORAL at 08:28

## 2022-07-11 RX ADMIN — Medication 4 UNITS: at 21:51

## 2022-07-11 RX ADMIN — SEVELAMER CARBONATE 1600 MG: 800 TABLET, FILM COATED ORAL at 11:12

## 2022-07-11 RX ADMIN — Medication 2 TABLET: at 20:08

## 2022-07-11 RX ADMIN — SODIUM CHLORIDE, PRESERVATIVE FREE 10 ML: 5 INJECTION INTRAVENOUS at 17:13

## 2022-07-11 RX ADMIN — EZETIMIBE 10 MG: 10 TABLET ORAL at 08:28

## 2022-07-11 RX ADMIN — SEVELAMER CARBONATE 1600 MG: 800 TABLET, FILM COATED ORAL at 17:11

## 2022-07-11 RX ADMIN — Medication 10 MG: at 03:15

## 2022-07-11 RX ADMIN — HEPARIN SODIUM 5000 UNITS: 5000 INJECTION INTRAVENOUS; SUBCUTANEOUS at 10:34

## 2022-07-11 RX ADMIN — INSULIN GLARGINE 10 UNITS: 100 INJECTION, SOLUTION SUBCUTANEOUS at 21:51

## 2022-07-11 RX ADMIN — ALLOPURINOL 100 MG: 100 TABLET ORAL at 08:28

## 2022-07-11 RX ADMIN — INSULIN LISPRO 3 UNITS: 100 INJECTION, SOLUTION INTRAVENOUS; SUBCUTANEOUS at 08:27

## 2022-07-11 RX ADMIN — HEPARIN SODIUM 5000 UNITS: 5000 INJECTION INTRAVENOUS; SUBCUTANEOUS at 03:16

## 2022-07-11 RX ADMIN — ONDANSETRON HYDROCHLORIDE 4 MG: 2 INJECTION INTRAMUSCULAR; INTRAVENOUS at 11:12

## 2022-07-11 RX ADMIN — INSULIN LISPRO 3 UNITS: 100 INJECTION, SOLUTION INTRAVENOUS; SUBCUTANEOUS at 12:29

## 2022-07-11 NOTE — PROGRESS NOTES
Problem: General Medical Care Plan  Goal: *Vital signs within specified parameters  Outcome: Progressing Towards Goal  Goal: *Labs within defined limits  Outcome: Progressing Towards Goal  Goal: *Absence of infection signs and symptoms  Outcome: Progressing Towards Goal  Goal: *Optimal pain control at patient's stated goal  Outcome: Progressing Towards Goal  Goal: *Skin integrity maintained  Outcome: Progressing Towards Goal  Goal: *Fluid volume balance  Outcome: Progressing Towards Goal  Goal: *Optimize nutritional status  Outcome: Progressing Towards Goal  Goal: *Anxiety reduced or absent  Outcome: Progressing Towards Goal  Goal: *Progressive mobility and function (eg: ADL's)  Outcome: Progressing Towards Goal     Problem: Patient Education: Go to Patient Education Activity  Goal: Patient/Family Education  Outcome: Progressing Towards Goal     Problem: Falls - Risk of  Goal: *Absence of Falls  Description: Document Jaxson Fall Risk and appropriate interventions in the flowsheet. Outcome: Progressing Towards Goal  Note: Fall Risk Interventions:  Mobility Interventions: Communicate number of staff needed for ambulation/transfer         Medication Interventions: Patient to call before getting OOB    Elimination Interventions: Call light in reach    History of Falls Interventions: Consult care management for discharge planning         Problem: Patient Education: Go to Patient Education Activity  Goal: Patient/Family Education  Outcome: Progressing Towards Goal     Problem: Pressure Injury - Risk of  Goal: *Prevention of pressure injury  Description: Document Semaj Scale and appropriate interventions in the flowsheet.   Outcome: Progressing Towards Goal  Note: Pressure Injury Interventions:  Sensory Interventions: Assess changes in LOC,Minimize linen layers    Moisture Interventions: Absorbent underpads,Maintain skin hydration (lotion/cream),Minimize layers    Activity Interventions: Assess need for specialty bed    Mobility Interventions: Pressure redistribution bed/mattress (bed type)    Nutrition Interventions: Document food/fluid/supplement intake    Friction and Shear Interventions: Minimize layers                Problem: Patient Education: Go to Patient Education Activity  Goal: Patient/Family Education  Outcome: Progressing Towards Goal     Problem: Diabetes Self-Management  Goal: *Disease process and treatment process  Description: Define diabetes and identify own type of diabetes; list 3 options for treating diabetes. Outcome: Progressing Towards Goal  Goal: *Incorporating nutritional management into lifestyle  Description: Describe effect of type, amount and timing of food on blood glucose; list 3 methods for planning meals. Outcome: Progressing Towards Goal  Goal: *Incorporating physical activity into lifestyle  Description: State effect of exercise on blood glucose levels. Outcome: Progressing Towards Goal  Goal: *Developing strategies to promote health/change behavior  Description: Define the ABC's of diabetes; identify appropriate screenings, schedule and personal plan for screenings. Outcome: Progressing Towards Goal  Goal: *Using medications safely  Description: State effect of diabetes medications on diabetes; name diabetes medication taking, action and side effects. Outcome: Progressing Towards Goal  Goal: *Monitoring blood glucose, interpreting and using results  Description: Identify recommended blood glucose targets  and personal targets. Outcome: Progressing Towards Goal  Goal: *Prevention, detection, treatment of acute complications  Description: List symptoms of hyper- and hypoglycemia; describe how to treat low blood sugar and actions for lowering  high blood glucose level.   Outcome: Progressing Towards Goal  Goal: *Prevention, detection and treatment of chronic complications  Description: Define the natural course of diabetes and describe the relationship of blood glucose levels to long term complications of diabetes.   Outcome: Progressing Towards Goal  Goal: *Developing strategies to address psychosocial issues  Description: Describe feelings about living with diabetes; identify support needed and support network  Outcome: Progressing Towards Goal  Goal: *Insulin pump training  Outcome: Progressing Towards Goal  Goal: *Sick day guidelines  Outcome: Progressing Towards Goal  Goal: *Patient Specific Goal (EDIT GOAL, INSERT TEXT)  Outcome: Progressing Towards Goal     Problem: Patient Education: Go to Patient Education Activity  Goal: Patient/Family Education  Outcome: Progressing Towards Goal     Problem: Chronic Renal Failure  Goal: *Fluid and electrolytes stabilized  Outcome: Progressing Towards Goal     Problem: Chronic Renal Failure  Goal: *Fluid and electrolytes stabilized  Outcome: Progressing Towards Goal     Problem: Patient Education: Go to Patient Education Activity  Goal: Patient/Family Education  Outcome: Progressing Towards Goal     Problem: Patient Education: Go to Patient Education Activity  Goal: Patient/Family Education  Outcome: Progressing Towards Goal     Problem: Patient Education: Go to Patient Education Activity  Goal: Patient/Family Education  Outcome: Progressing Towards Goal

## 2022-07-11 NOTE — PROGRESS NOTES
OCCUPATIONAL THERAPY TREATMENT    Problem: Self Care Deficits Care Plan (Adult)  Goal: *Acute Goals and Plan of Care (Insert Text)  Description: Occupational Therapy Goals  Initiated 7/8/2022 within 7 day(s). 1.  Patient will perform grooming with supervision/set-up seated in chair. 2.  Patient will perform upper body dressing with supervision/set-up. 3.  Patient will perform lower body dressing with minimal assistance/contact guard assist and use of AEs as needed. 4.  Patient will perform bed<>bsc transfers with minimal assistance/contact guard assist.  5.  Patient will perform all aspects of toileting with minimal assistance/contact guard assist.  6.  Patient will participate in upper extremity therapeutic exercise/activities with supervision/set-up for 10 minutes. 7.  Patient will utilize energy conservation techniques during functional activities with verbal, visual, and tactile cues. Outcome: Progressing Towards Goal     Patient: Marcel Stevenson (44 y.o. male)  Date: 7/11/2022  Diagnosis: Diabetes mellitus with foot ulcer ([facility] [R18.171, L97.509] Acute hematogenous osteomyelitis of right foot ([address]  Procedure(s) (LRB):  RIGHT HEEL DEBRIDEMENT WITH GRAFTING,REMOVAL OF SCREW  (PT IN ROOM #325) WITH C-ARM (Right) 6 Days Post-Op  Precautions: Fall,NWB      Chart, occupational therapy assessment, plan of care, and goals were reviewed. ASSESSMENT:  Pt reports in sidelying, agreeable to attempt therapy today. Pt is able to maintain sitting balance during LB dressing, spends extended time with rest breaks doffing soft boot and donning shoe but is able to maintain balance while crossing legs and adjusting body position. Pt still requires mod A to right side to stand is very unbalance while in standing. Able to maintain for less than 15 seconds prior to needing to sit x2. Pt returns to supine with CGA, pull up in bed to CGA.    Progression toward goals:  []          Improving appropriately and progressing toward goals  [x]          Improving slowly and progressing toward goals  []          Not making progress toward goals and plan of care will be adjusted     PLAN:  Patient continues to benefit from skilled intervention to address the above impairments. Continue treatment per established plan of care. Discharge Recommendations:  Rehab  Further Equipment Recommendations for Discharge:  TBD      SUBJECTIVE:   Patient stated I can try it.     OBJECTIVE DATA SUMMARY:   Cognitive/Behavioral Status:  Neurologic State: Alert,Appropriate for age  Orientation Level: Oriented X4  Cognition: Follows commands  Safety/Judgement: Fall prevention    Functional Mobility and Transfers for ADLs:   Bed Mobility:     Supine to Sit: Minimum assistance  Sit to Supine: Contact guard assistance  Scooting: Contact guard assistance   Transfers:  Sit to Stand: Moderate assistance      Balance:  Sitting: Intact  Sitting - Static: Good (unsupported)  Standing: Impaired;Pull to stand; With support  Standing - Static: Poor  Standing - Dynamic : Not tested    ADL Intervention:    Lower Body Dressing Assistance  Dressing Assistance: Moderate assistance  Socks: Moderate assistance  Shoes with Elastic Laces: Moderate assistance  Leg Crossed Method Used: Yes  Position Performed: Seated edge of bed  Cues: Verbal cues provided         Cognitive Retraining  Problem Solving: General alternative solution  Executive Functions: Managing time;Regulating behavior  Safety/Judgement: Fall prevention    Pain:  Pain level pre-treatment: 3/10   Pain level post-treatment: 3/10  Pain Intervention(s): Medication administered by RN (see MAR); Rest, Ice, Repositioning   Response to intervention: Nurse notified, See doc flow sheet    Activity Tolerance:    Improving but still takes additional time in all tasks, very unsteady in standing and with attempt at functional transfers.      Please refer to the flowsheet for vital signs taken during this treatment. After treatment:   []  Patient left in no apparent distress sitting up in chair  [x]  Patient left in no apparent distress in bed  [x]  Call bell left within reach  [x]  Nursing notified  []  Caregiver present  []  Bed alarm activated    COMMUNICATION/EDUCATION:   [x] Role of Occupational Therapy in the acute care setting  [x] Home safety education was provided and the patient/caregiver indicated understanding. [x] Patient/family have participated as able in working towards goals and plan of care. [x] Patient/family agree to work toward stated goals and plan of care. [] Patient understands intent and goals of therapy, but is neutral about his/her participation. [] Patient is unable to participate in goal setting and plan of care.       Thank you for this referral.  Debria Freeze OTD, OTR/L   Time Calculation: 30 mins

## 2022-07-11 NOTE — WOUND CARE
IP WOUND CONSULT    Rosie Otero  MEDICAL RECORD NUMBER:  [id number]  AGE: 60 y.o. GENDER: male  : 1960  TODAY'S DATE:  2022    GENERAL     [x] Follow-up   [] New Consult    Rosie Otero is a 60 y.o. male referred by:   [] Physician  [x] Nursing  [] Other:         PAST MEDICAL HISTORY    Past Medical History:   Diagnosis Date    Diabetes mellitus     Hypertension         PAST SURGICAL HISTORY    Past Surgical History:   Procedure Laterality Date    IR INSERT TUNL CVC W/O PORT OVER 5 YR  2022       FAMILY HISTORY    History reviewed. No pertinent family history. SOCIAL HISTORY    Social History     Tobacco Use    Smoking status: Not on file    Smokeless tobacco: Not on file   Substance Use Topics    Alcohol use: Not on file    Drug use: Not on file       ALLERGIES    No Known Allergies    MEDICATIONS    No current facility-administered medications on file prior to encounter. Current Outpatient Medications on File Prior to Encounter   Medication Sig Dispense Refill    atorvastatin (LIPITOR) 40 mg tablet Take 40 mg by mouth daily.  gabapentin (NEURONTIN) 300 mg capsule Take 300 mg by mouth nightly.  allopurinoL (Zyloprim) 100 mg tablet Take 100 mg by mouth daily.  ezetimibe (Zetia) 10 mg tablet Take 10 mg by mouth.  carvediloL (COREG) 12.5 mg tablet Take 12.5 mg by mouth two (2) times a day.  amLODIPine (NORVASC) 10 mg tablet Take 10 mg by mouth daily.  aspirin 81 mg chewable tablet Take 81 mg by mouth daily.  ascorbic acid, vitamin C, (Vitamin C) 500 mg tablet Take 500 mg by mouth.  insulin glargine (Lantus U-100 Insulin) 100 unit/mL injection 10 Units by SubCUTAneous route nightly.  insulin lispro (HUMALOG) 100 unit/mL injection by SubCUTAneous route.  furosemide (Lasix) 80 mg tablet Take 80 mg by mouth two (2) times a day.  lisinopril (PRINIVIL, ZESTRIL) 40 mg tablet Take 40 mg by mouth daily.         potassium chloride SR (KLOR-CON 10) 10 mEq tablet Take 20 mEq by mouth two (2) times a day. (Patient not taking: Reported on 6/27/2022)         Wt Readings from Last 3 Encounters:   07/09/22 107.7 kg (237 lb 8 oz)       [unfilled]  Visit Vitals  BP (!) 134/59 (BP 1 Location: Right upper arm, BP Patient Position: Sitting)   Pulse 69   Temp 98.1 °F (36.7 °C)   Resp 17   Ht 6' 2\" (1.88 m)   Wt 107.7 kg (237 lb 8 oz)   SpO2 91%   BMI 30.49 kg/m²       ASSESSMENT     Skin impairment Identification:  Type: surgical    Contributing Factors: diabetes    Incision 07/05/22 Foot Right (Active)   Wound Image  picture not taken 07/08/22 1445   Dressing Status Old drainage noted;New dressing applied;Breakthrough drainage noted 07/11/22 1505   Dressing Change Due 07/14/22 07/11/22 1505   Cleansed Cleansed with saline 07/11/22 1505   Dressing/Treatment Negative Pressure Wound Therapy 07/11/22 1505   Wound Length (cm) 6 cm 07/11/22 1505   Wound Width (cm) 6 cm 07/11/22 1505   Wound Depth (cm) 0.1 cm 07/11/22 1505   Wound Volume (cm^3) 3.6 cm^3 07/11/22 1505   Closure Other (Comment) 07/11/22 1505   Margins Other (Comment) 07/11/22 1505   Incision Assessment Other (Comment) 07/06/22 1601   Drainage Amount Moderate 07/11/22 1505   Drainage Description Serosanguinous 07/11/22 1505   Wound Odor None 07/11/22 1505   Ashutosh-Wound/Incision Assessment Intact 07/11/22 1505   Number of days: 6   Negative Pressure    NAME:  Gaudencio Kerns  YOB: 1960  MEDICAL RECORD NUMBER:  [id number]  DATE:  6/27/2022     Applied Negative Pressure to heel wound wound(s)/ulcer(s).  [x] Applied skin barrier prep to ashutosh-wound.  [x] Cut strips of plastic drape to picture frame wound so that ashutosh-wound is     covered with the drape.  [x] Cut sponge, gauze or channel drain to size which will fit into the wound/ulcer bed without being forced.     [x] Be sure the sponge is large enough to hold the entire round plastic flange which is attached to the tubing. Never allow flange to be larger than the sponge or it will produce suction damaging intact skin.  Total number of individual pieces of foam used within the wound bed: 1 black sponge   [x] Covered sponge, gauze or channel drain with plastic drape.  [x] Cut a hole in this plastic drape directly over the sponge the same size as the plastic drain tubing.  [x] Removed plastic liner from flange and apply it directly over the hole you cut.  [x] Removed the plastic cover from the flange.  [x] Attached the tubing to the wound/ulcer Negative Pressure Therapy and turn it on to be sure a vacuum is created and that there are no leaks.  [x] If air leaks occur, use plastic drape to patch them.         Response to treatment: Well tolerated by patient      Applied per  Guidelines             PLAN     Skin Care & Pressure Relief Recommendations  Minimize layers of linen  Pads under patient to optimize support surface  Turn/reposition approximately every 2 hours  Pillow wedges  Promote continence; Skin Protective lotion/cream to buttocks and sacrum daily and as needed with incontinence care    Recommendations: remove negative pressure dressing and apply wet to dry  With rolled gauze  at time of transfer to SNF do not send VAC with patient     Teaching completed with:   [x] Patient           [] Family member       [] Caregiver          [] Nursing  [] Other    Patient/Caregiver Teaching:  Level of patient/caregiver understanding able to:   [x] Indicates understanding       [] Needs reinforcement  [] Unsuccessful      [] Verbal Understanding  [] Demonstrated understanding       [] No evidence of learning  [] Refused teaching         [] N/A       Electronically signed by Martell Ohara RN on 7/11/2022 at 3:10 PM

## 2022-07-11 NOTE — PROGRESS NOTES
conducted an initial consultation and Spiritual Assessment for Bryce Goldstein, who is a 60 y.o.,male. Patients Primary Language is: [place]. According to the patients EMR Adventism Affiliation is: [facility].     The reason the Patient came to the hospital is:   Patient Active Problem List    Diagnosis Date Noted    Left arm swelling 07/10/2022    Infection and inflammatory reaction due to internal fixation device of other site, initial encounter ([facility] 07/05/2022    CAD (coronary artery disease) 06/28/2022    ESRD (end stage renal disease) ([facility] 06/28/2022    Acute hematogenous osteomyelitis of right foot ([facility] 06/28/2022    Cellulitis of right heel 06/28/2022    Diabetic foot ulcer with osteomyelitis ([facility] 06/28/2022    Ulcer of right heel, with necrosis of bone ([facility] 06/28/2022    Diabetes mellitus with foot ulcer ([facility] 06/27/2022        The  provided the following Interventions:  Initiated a relationship of care and support. Explored issues of jenny, belief, spirituality and Sabianist/ritual needs while hospitalized. Listened empathically. Provided chaplaincy education. Provided information about Spiritual Care Services. Offered assurance of continued prayers on patients behalf. Chart reviewed. The following outcomes were achieved:  Patient shared limited information about both their medical narrative and spiritual journey/beliefs. Patient processed feeling about current hospitalization. Patient expressed gratitude for pastoral care visit. Assessment:  Patient does not have any Sabianist/cultural needs that will affect patients preferences in health care. There are no further spiritual or Sabianist issues which require intervention at this time. Plan:  Chaplains will continue to follow and will provide pastoral care on an as needed/requested basis.    recommends bedside caregivers page  on duty if patient shows signs of acute spiritual or emotional distress.       Sister Peter Blount, [place], [address]  [phone]

## 2022-07-11 NOTE — PROGRESS NOTES
7/11/2022  PT treatment not completed due to:  [] Off Unit for testing/procedure  [] Pain  [] Eating  [] Patient too lethargic  [x] Nausea -worse earlier  [] Dialysis treatment in progress   [x] Other: c/o no appetite (min eating dinner). Reports pain 7/10 R foot. Pt supine in bed, bilat foot protectors in place. Will f/u tomorrow for PT treatment. Thank you.    Karely Hyatt, PT

## 2022-07-11 NOTE — PROGRESS NOTES
Nephrology Progress note    Subjective:     Serge Reza is a 60 y.o. male with  a PMH of DM, HTN, CAD, ESRD on HD TTS admitted for right foot infection. MRI suggested osteomyelitis. No fever, chills. Pt s/p debridement, I&D- on abx.     HD Catheter fell off and was  replaced 6/29.      Last HD session 7/12  Denies SOB/fever/chills today     Admit Date: 6/27/2022  Principal Problem:    Acute hematogenous osteomyelitis of right foot ([address] (6/28/2022)    Active Problems:    Diabetes mellitus with foot ulcer ([address] (6/27/2022)      CAD (coronary artery disease) (6/28/2022)      ESRD (end stage renal disease) ([address] (6/28/2022)      Cellulitis of right heel (6/28/2022)      Diabetic foot ulcer with osteomyelitis ([facility] (6/28/2022)      Ulcer of right heel, with necrosis of bone ([address] (6/28/2022)      Infection and inflammatory reaction due to internal fixation device of other site, initial encounter ([address] (7/5/2022)      Left arm swelling (7/10/2022)      Current Facility-Administered Medications   Medication Dose Route Frequency    ondansetron (ZOFRAN) injection 4 mg  4 mg IntraVENous Q6H PRN    heparin (porcine) injection 5,000 Units  5,000 Units SubCUTAneous Q8H    melatonin (rapid dissolve) tablet 10 mg  10 mg Oral QHS PRN    epoetin lisette-epbx (RETACRIT) injection 10,000 Units  10,000 Units SubCUTAneous Q TUE, THU & SAT    heparin (porcine) 1,000 unit/mL injection 3,400 Units  3,400 Units Hemodialysis DIALYSIS PRN    heparin (porcine) 100 unit/mL injection 500 Units  500 Units InterCATHeter Q8H PRN    sodium chloride (NS) flush 5-40 mL  5-40 mL IntraVENous Q8H    sodium chloride (NS) flush 5-40 mL  5-40 mL IntraVENous PRN    fentaNYL citrate (PF) injection  mcg   mcg IntraVENous Rad Multiple    naloxone (NARCAN) injection 0.1 mg  0.1 mg IntraVENous Multiple    loperamide (IMODIUM) capsule 2 mg  2 mg Oral Q4H PRN    piperacillin-tazobactam (ZOSYN) 4.5 g in 0.9% sodium chloride (MBP/ADV) 100 mL MBP  4.5 g IntraVENous Q12H    sevelamer carbonate (RENVELA) tab 1,600 mg  1,600 mg Oral TID WITH MEALS    Lactobacillus Acidoph & Bulgar (FLORANEX) tablet 2 Tablet  2 Tablet Oral BID    allopurinoL (ZYLOPRIM) tablet 100 mg  100 mg Oral DAILY    carvediloL (COREG) tablet 12.5 mg  12.5 mg Oral BID    ezetimibe (ZETIA) tablet 10 mg  10 mg Oral DAILY    amLODIPine (NORVASC) tablet 10 mg  10 mg Oral DAILY    vit B Cmplx 3-FA-Vit C-Biotin (NEPHRO NIKITA RX) tablet 1 Tablet  1 Tablet Oral DAILY    insulin lispro (HUMALOG) injection 3 Units  3 Units SubCUTAneous TIDAC    aspirin chewable tablet 81 mg  81 mg Oral DAILY    insulin glargine (LANTUS) injection 10 Units  10 Units SubCUTAneous QHS    insulin lispro (HUMALOG) injection   SubCUTAneous AC&HS    glucose chewable tablet 16 g  4 Tablet Oral PRN    glucagon (GLUCAGEN) injection 1 mg  1 mg IntraMUSCular PRN    dextrose 10% infusion 0-250 mL  0-250 mL IntraVENous PRN    acetaminophen (TYLENOL) tablet 650 mg  650 mg Oral Q6H PRN         Allergy:   No Known Allergies     Objective:     Visit Vitals  BP (!) 153/83 (BP 1 Location: Right upper arm, BP Patient Position: Lying; At rest)   Pulse 69   Temp 98.1 °F (36.7 °C)   Resp 16   Ht 6' 2\" (1.88 m)   Wt 107.7 kg (237 lb 8 oz)   SpO2 95%   BMI 30.49 kg/m²         Intake/Output Summary (Last 24 hours) at 7/11/2022 1854  Last data filed at 7/11/2022 1505  Gross per 24 hour   Intake 1200 ml   Output 0 ml   Net 1200 ml       Physical Exam:       General: No acute distress   HENT: Atraumatic and normocephalic   Eyes: Normal conjunctiva   Neck: Supple No JVD   Cardiovascular: Normal S1 & S2, no m/r/g   Pulmonary/Chest Wall: Clear to auscultation bilaterally   Abdominal: Soft and non-tender   Musculoskeletal: Wound Vac on R foot   Neurological: AA and O X 3, No focal deficits     RIJ TDC in Place     Data Review:  Lab Results   Component Value Date/Time    Sodium 142 07/11/2022 05:30 AM    Potassium 5.0 07/11/2022 05:30 AM    Chloride 106 07/11/2022 05:30 AM    CO2 26 07/11/2022 05:30 AM    Anion gap 10 07/11/2022 05:30 AM    Glucose 117 (H) 07/11/2022 05:30 AM    BUN 47 (H) 07/11/2022 05:30 AM    Creatinine 7.92 (H) 07/11/2022 05:30 AM    BUN/Creatinine ratio 6 (L) 07/11/2022 05:30 AM    GFR est AA 8 (L) 07/11/2022 05:30 AM    GFR est non-AA 7 (L) 07/11/2022 05:30 AM    Calcium 8.0 (L) 07/11/2022 05:30 AM     Lab Results   Component Value Date/Time    WBC 10.5 07/11/2022 05:30 AM    HGB 9.2 (L) 07/11/2022 05:30 AM    HCT 29.1 (L) 07/11/2022 05:30 AM    PLATELET 023 22/63/9436 05:30 AM    MCV 97.7 07/11/2022 05:30 AM     Lab Results   Component Value Date/Time    Calcium 8.0 (L) 07/11/2022 05:30 AM    Phosphorus 4.8 07/11/2022 05:30 AM     No results found for: IRON, FE, TIBC, IBCT, PSAT, FERR  No results found for: FERR      Impression:     ESRD on HD  Diabetic foot ulcer with osteomyelitis  DM  HTN  Anemia in CKD  SHPT  CAD    Plan:     HD Tuesday 7/12  IV Antibiotics: Zosyn  DALTON  Renvela for Phos binder       Sandra Mendoza MD, MPH [facility]  [phone]

## 2022-07-11 NOTE — ROUTINE PROCESS
Bedside shift change report given to [facility]', RN (oncoming nurse) by Keli Levine RN   (offgoing nurse). Report included the following information SBAR, Kardex, Intake/Output, MAR and Recent Results.

## 2022-07-11 NOTE — PROGRESS NOTES
PENDING INSURANCE AUTHORIZATION     D/C Plan: [facility] updates have been provided to assist with obtaining insurance auth. Anticipate pt will transition to the above facility once insurance auth has been obtained. Pt will arrange handi ride to transport him to and from dialysis from the facility. CM to continue to follow and assist.     Transition of care to SNF: [facility]      Communication to Patient/Family:  Met with patient and family, and they are agreeable to the transition plan. The Plan for Transition of Care is related to the following treatment goals: SNF     The Patient and/or patient representative was provided with a choice of provider and agrees   with the discharge plan.  Yes [x]? ? No []? ?     Freedom of choice list was provided with basic dialogue that supports the patient's individualized plan of care/goals and shares the quality data associated with the providers.     Yes [x]? ? No []? ?     SNF/Rehab Transition:  Patient has been accepted to [facility] at [address] [facility], [facility] meets criteria for admission. Theodora Dacosta will transported by medical transport and expected to [address] has been obtained.       Communication to SNF/Rehab:  Bedside RN has been notified to update the transition plan to the facility and call report [phone]     Discharge information has been updated on the AVS and sent via [employer] and/or CC link.    Discharge instructions to be fax'd to facility at      Please include all hard scripts for controlled substances, med rec and dc summary, and AVS in packet.  Please medicate for pain prior to dc if possible and needed to help offset delay when patient first arrives to facility.     Reviewed and confirmed with facility, [address]es can manage the patient care needs for the following:      [place] with (X) only those applicable:  Medication:  []??Medications are available at the facility  []? ?IV Antibiotics    []? ? Controlled Substance - hard copies available sent.  []?? Weekly Labs     Equipment:  []??CPAP/BiPAP  []? ? Wound Vacuum  []? ? Mcmahon or Urinary Device  []? ?PICC/Central Line  []? ?Nebulizer  []? ? Ventilator     Treatment:  []??Isolation (for MRSA, VRE, etc.)  []? ?Surgical Drain Management  []? ? Tracheostomy Care  []? ?Dressing Changes  []? ?Dialysis with transportation  []? ?PEG Care  []? ? Oxygen  []? ?Daily Weights for Heart Failure     Dietary:  []??Any diet limitations  []? ?Tube Feedings   []? ? Total Parenteral Management (TPN)     Financial Resources:  []??Medicaid Application Completed     []?? [employer]AI Completed and copy given to pt/family     []? ? A screening has previously been completed.     []? ?Level II Completed     []? ? Private pay individual who will not become   financially eligible for Medicaid within 6 months from admission to a [address] [facility].      []? ? Individual refused to have screening conducted.      []? ? Medicaid Application Completed     []? ? The screening denied because it was determined individual did not need/did not qualify for nursing facility level of care. []?? Out of state residents seeking direct admission to a [address] [place] [address] Street.  []?? Individuals who are inpatients of an out of state hospital, or in state or out of state veterans/ hospital and seek direct admission to a [address] facility  []? ? Individuals who are pateints or residents of a state owned/operated facility that is licensed by Department of Behavioral Services (DBHDS) and seek direct admission to [address] facility  []? ? A screening not required for enrollment in [facility] services as set out in [address] [facility] [address]-  []? ?[facility] staff shall perform screenings of the [facility] clients.     Advanced Care Plan:  []??Surrogate Decision Maker of Care  []? ?POA  []??Communicated Code Status and copy sent.     Other:              Care Management Interventions  PCP Verified by CM:  Yes (Dr. Myron Dee )  Mode of Transport at Discharge: BLS  Transition of Care Consult (CM Consult): SNF (Pt is in agreement w/ SNF at d/c. )  Discharge Durable Medical Equipment:  (TBD)  Physical Therapy Consult: Yes  Occupational Therapy Consult: Yes  Support Systems: Child(dafne)  Confirm Follow Up Transport: Family  The Plan for Transition of Care is Related to the Following Treatment Goals : Skilled nursing facility  The Patient and/or Patient Representative was Provided with a Choice of Provider and Agrees with the Discharge Plan?: Yes (The pt is alert and oriented. )  Freedom of Choice List was Provided with Basic Dialogue that Supports the Patient's Individualized Plan of Care/Goals, Treatment Preferences and Shares the Quality Data Associated with the Providers?: Yes (Pt is in agreement w/ referrals being sent to all  and [place] facilities and then he will choose one from the accepting facilities. )  Discharge Location  Patient Expects to be Discharged to[de-identified] Skilled nursing facility

## 2022-07-11 NOTE — PROGRESS NOTES
Nutrition Note    Consulted for wounds. Nutrition Recommendations/Plan:  1. Continue with oral diet to meet nutritional needs. Continue with Nicolás BID to help with healing wound. 2. Monitor intake of meals and supplements. Current Nutrition Intake & Therapies:  ADULT ORAL NUTRITION SUPPLEMENT Breakfast, Dinner; Wound Healing Supplement  ADULT DIET Regular; 4 carb choices (60 gm/meal)    Noted good PO intake of mostly 51-75%. Noted 0-25% on 7/9/22. Pt already receiving Nicolás BID (breakfast and dinner) to help with wound healing. Continue with Nicolás- mix with liquid of choice. Full assessment on 7/8/22.     Electronically signed by Beryle Lovely, RD on 7/11/2022 at 1:42 PM

## 2022-07-11 NOTE — PROGRESS NOTES
Hospitalist Progress Note    Patient: Rosie Otero MRN: [id number]  CSN: [id number]    YOB: 1960  Age: 60 y.o. Sex: male    DOA: 6/27/2022 LOS:  LOS: 14 days          Chief Complaint:    Infected foot and heel      Assessment/Plan     Maricarmen Falling a 60 y. o. male who history of stent, hypertension, Charcot's foot presents with worsening pain and swelling  in his right foot      Large necrotic ulcer right posterior heel with osteomyelitis of the calcaneus and deep abscess-  S/p incision bone cortex right calcaneous, I&D right heel abcsess, deep wound debridement with multiple bone biopsies and application of antibiotic beads done    Hematoma evac, now with wound vac  Monitor graft take per podiatry     He was not on heparin due to development of hematoma in heel, started again now  DVT-need stat US LUE  Continue asa  ELEVATE left UE, no DVT left arm  Has chronic thrombus right IJ, will see if nephrology recommends [facility] with his port there     Tunnel line in place-needs long term abx until 8/8     Diabetes mellitus -  On lantus 10units and  premeal insulin      Anemia of ESRD stable     End-stage renal disease on dialysis Tuesday Thursday and Saturday -  S/p [facility] replacement      History of coronary artery disease-  coreg and aspirin      Chronic compressive myelopathy pending surgery -  Supportive care     needs IV abx until 8/8  Will go to SNF once auth received     Disposition :  Patient Active Problem List   Diagnosis Code    Diabetes mellitus with foot ulcer ([address] E11.621, L97.509    CAD (coronary artery disease) I25.10    ESRD (end stage renal disease) ([address] N18.6    Acute hematogenous osteomyelitis of right foot ([address] M86.071    Cellulitis of right heel L03.115    Diabetic foot ulcer with osteomyelitis ([address] E11.621, E11.69, L97.509, M86.9    Ulcer of right heel, with necrosis of bone ([address] L97.414    Infection and inflammatory reaction due to internal fixation device of other site, initial encounter [facility]) T84.69XA    Left arm swelling M79.89       Subjective:    Left arm feels better  No new issues  Had BM    Review of systems:    Constitutional: denies fevers, chills  Respiratory: denies SOB  Cardiovascular: denies chest pain  Gastrointestinal: denies nausea, vomiting, diarrhea      Vital signs/Intake and Output:  Visit Vitals  BP (!) 162/70 (BP 1 Location: Right upper arm, BP Patient Position: At rest)   Pulse 78   Temp 98.2 °F (36.8 °C)   Resp 17   Ht 6' 2\" (1.88 m)   Wt 107.7 kg (237 lb 8 oz)   SpO2 97%   BMI 30.49 kg/m²     Current Shift:  No intake/output data recorded. Last three shifts:  07/09 1901 - 07/11 0700  In: 1320 [P.O.:1320]  Out: -     Exam:    General: chronically ill AAM alert, NAD, OX3  CVS:Regular rate and rhythm, no M/R/G, S1/S2 heard, no thrill  Lungs:Clear to auscultation bilaterally, no wheezes, rhonchi, or rales  Abdomen: Soft, Nontender, No distention, Normal Bowel sounds  Extremities: both feet in heel floats, left arm with less edema of forearm and hand today  Skin:normal texture and turgor, no rashes, no lesions  Neuro:grossly normal , follows commands  Psych:appropriate                Labs: Results:       Chemistry Recent Labs     07/11/22  0530   *      K 5.0      CO2 26   BUN 47*   CREA 7.92*   CA 8.0*   AGAP 10   BUCR 6*   ALB 2.6*      CBC w/Diff Recent Labs     07/11/22  0530 07/10/22  0541   WBC 10.5 10.9   RBC 2.98* 2.90*   HGB 9.2* 9.2*   HCT 29.1* 29.2*    210   GRANS 69 73   LYMPH 15* 11*   EOS 5 5      Cardiac Enzymes No results for input(s): CPK, CKND1, PAULO in the last 72 hours. No lab exists for component: CKRMB, TROIP   Coagulation No results for input(s): PTP, INR, APTT, INREXT in the last 72 hours.     Lipid Panel No results found for: CHOL, CHOLPOCT, CHOLX, CHLST, CHOLV, 146974, HDL, HDLP, LDL, LDLC, DLDLP, 375043, VLDLC, VLDL, TGLX, TRIGL, TRIGP, TGLPOCT, CHHD, CHHDX   BNP No results for input(s): BNPP in the last 72 hours.    Liver Enzymes Recent Labs     07/11/22  0530   ALB 2.6*      Thyroid Studies No results found for: T4, T3U, TSH, TSHEXT     Procedures/imaging: see electronic medical records for all procedures/Xrays and details which were not copied into this note but were reviewed prior to creation of Ruperto Coffey MD

## 2022-07-11 NOTE — PROGRESS NOTES
Wound vac alarm tube clogged. Per order set wound vac removed and wound care order placed to access.

## 2022-07-12 ENCOUNTER — APPOINTMENT (OUTPATIENT)
Dept: GENERAL RADIOLOGY | Age: 62
DRG: 629 | End: 2022-07-12
Attending: HOSPITALIST
Payer: MEDICARE

## 2022-07-12 LAB
ATRIAL RATE: 70 BPM
BASOPHILS # BLD: 0.1 K/UL (ref 0–0.1)
BASOPHILS NFR BLD: 1 % (ref 0–2)
CALCULATED P AXIS, ECG09: 96 DEGREES
CALCULATED R AXIS, ECG10: -28 DEGREES
CALCULATED T AXIS, ECG11: 35 DEGREES
DIAGNOSIS, 93000: NORMAL
DIFFERENTIAL METHOD BLD: ABNORMAL
EOSINOPHIL # BLD: 0.6 K/UL (ref 0–0.4)
EOSINOPHIL NFR BLD: 5 % (ref 0–5)
ERYTHROCYTE [DISTWIDTH] IN BLOOD BY AUTOMATED COUNT: 17.7 % (ref 11.6–14.5)
GLUCOSE BLD STRIP.AUTO-MCNC: 113 MG/DL (ref 70–110)
GLUCOSE BLD STRIP.AUTO-MCNC: 131 MG/DL (ref 70–110)
GLUCOSE BLD STRIP.AUTO-MCNC: 156 MG/DL (ref 70–110)
GLUCOSE BLD STRIP.AUTO-MCNC: 228 MG/DL (ref 70–110)
HCT VFR BLD AUTO: 28.9 % (ref 36–48)
HGB BLD-MCNC: 9 G/DL (ref 13–16)
IMM GRANULOCYTES # BLD AUTO: 0.1 K/UL (ref 0–0.04)
IMM GRANULOCYTES NFR BLD AUTO: 1 % (ref 0–0.5)
LYMPHOCYTES # BLD: 1.3 K/UL (ref 0.9–3.6)
LYMPHOCYTES NFR BLD: 11 % (ref 21–52)
MCH RBC QN AUTO: 30.9 PG (ref 24–34)
MCHC RBC AUTO-ENTMCNC: 31.1 G/DL (ref 31–37)
MCV RBC AUTO: 99.3 FL (ref 78–100)
MONOCYTES # BLD: 0.9 K/UL (ref 0.05–1.2)
MONOCYTES NFR BLD: 8 % (ref 3–10)
NEUTS SEG # BLD: 8.1 K/UL (ref 1.8–8)
NEUTS SEG NFR BLD: 74 % (ref 40–73)
NRBC # BLD: 0 K/UL (ref 0–0.01)
NRBC BLD-RTO: 0 PER 100 WBC
P-R INTERVAL, ECG05: 176 MS
PLATELET # BLD AUTO: 194 K/UL (ref 135–420)
PMV BLD AUTO: 11.1 FL (ref 9.2–11.8)
Q-T INTERVAL, ECG07: 472 MS
QRS DURATION, ECG06: 86 MS
QTC CALCULATION (BEZET), ECG08: 509 MS
RBC # BLD AUTO: 2.91 M/UL (ref 4.35–5.65)
TROPONIN-HIGH SENSITIVITY: 19 NG/L (ref 0–78)
VENTRICULAR RATE, ECG03: 70 BPM
WBC # BLD AUTO: 11 K/UL (ref 4.6–13.2)

## 2022-07-12 PROCEDURE — 74011250636 HC RX REV CODE- 250/636: Performed by: INTERNAL MEDICINE

## 2022-07-12 PROCEDURE — 74011636637 HC RX REV CODE- 636/637: Performed by: INTERNAL MEDICINE

## 2022-07-12 PROCEDURE — 84484 ASSAY OF TROPONIN QUANT: CPT

## 2022-07-12 PROCEDURE — 97530 THERAPEUTIC ACTIVITIES: CPT

## 2022-07-12 PROCEDURE — 74011250637 HC RX REV CODE- 250/637: Performed by: INTERNAL MEDICINE

## 2022-07-12 PROCEDURE — 93005 ELECTROCARDIOGRAM TRACING: CPT

## 2022-07-12 PROCEDURE — 90935 HEMODIALYSIS ONE EVALUATION: CPT

## 2022-07-12 PROCEDURE — 74011000258 HC RX REV CODE- 258: Performed by: PHYSICIAN ASSISTANT

## 2022-07-12 PROCEDURE — 71045 X-RAY EXAM CHEST 1 VIEW: CPT

## 2022-07-12 PROCEDURE — 82962 GLUCOSE BLOOD TEST: CPT

## 2022-07-12 PROCEDURE — 74011250637 HC RX REV CODE- 250/637: Performed by: HOSPITALIST

## 2022-07-12 PROCEDURE — 74011250636 HC RX REV CODE- 250/636: Performed by: HOSPITALIST

## 2022-07-12 PROCEDURE — 74011000250 HC RX REV CODE- 250: Performed by: HOSPITALIST

## 2022-07-12 PROCEDURE — 65270000029 HC RM PRIVATE

## 2022-07-12 PROCEDURE — 74011250636 HC RX REV CODE- 250/636: Performed by: PHYSICIAN ASSISTANT

## 2022-07-12 PROCEDURE — 74011000250 HC RX REV CODE- 250: Performed by: RADIOLOGY

## 2022-07-12 PROCEDURE — 85025 COMPLETE CBC W/AUTO DIFF WBC: CPT

## 2022-07-12 PROCEDURE — 74011636637 HC RX REV CODE- 636/637: Performed by: HOSPITALIST

## 2022-07-12 PROCEDURE — 74011250637 HC RX REV CODE- 250/637: Performed by: FAMILY MEDICINE

## 2022-07-12 PROCEDURE — 74011636637 HC RX REV CODE- 636/637: Performed by: FAMILY MEDICINE

## 2022-07-12 PROCEDURE — C9113 INJ PANTOPRAZOLE SODIUM, VIA: HCPCS | Performed by: HOSPITALIST

## 2022-07-12 PROCEDURE — 36415 COLL VENOUS BLD VENIPUNCTURE: CPT

## 2022-07-12 RX ORDER — MORPHINE SULFATE 2 MG/ML
1 INJECTION, SOLUTION INTRAMUSCULAR; INTRAVENOUS
Status: DISCONTINUED | OUTPATIENT
Start: 2022-07-12 | End: 2022-07-21 | Stop reason: HOSPADM

## 2022-07-12 RX ORDER — MORPHINE SULFATE 2 MG/ML
1 INJECTION, SOLUTION INTRAMUSCULAR; INTRAVENOUS
Status: DISCONTINUED | OUTPATIENT
Start: 2022-07-12 | End: 2022-07-12

## 2022-07-12 RX ORDER — NITROGLYCERIN 0.4 MG/1
0.4 TABLET SUBLINGUAL AS NEEDED
Status: DISCONTINUED | OUTPATIENT
Start: 2022-07-12 | End: 2022-07-21 | Stop reason: HOSPADM

## 2022-07-12 RX ADMIN — GABAPENTIN 300 MG: 300 CAPSULE ORAL at 00:00

## 2022-07-12 RX ADMIN — GABAPENTIN 300 MG: 300 CAPSULE ORAL at 21:36

## 2022-07-12 RX ADMIN — INSULIN GLARGINE 10 UNITS: 100 INJECTION, SOLUTION SUBCUTANEOUS at 21:59

## 2022-07-12 RX ADMIN — HEPARIN SODIUM 5000 UNITS: 5000 INJECTION INTRAVENOUS; SUBCUTANEOUS at 02:10

## 2022-07-12 RX ADMIN — PIPERACILLIN AND TAZOBACTAM 4.5 G: 4; .5 INJECTION, POWDER, FOR SOLUTION INTRAVENOUS at 19:27

## 2022-07-12 RX ADMIN — PIPERACILLIN AND TAZOBACTAM 4.5 G: 4; .5 INJECTION, POWDER, FOR SOLUTION INTRAVENOUS at 08:08

## 2022-07-12 RX ADMIN — B-COMPLEX W/ C & FOLIC ACID TAB 1 MG 1 TABLET: 1 TAB at 08:07

## 2022-07-12 RX ADMIN — Medication 2 TABLET: at 09:39

## 2022-07-12 RX ADMIN — HEPARIN SODIUM 5000 UNITS: 5000 INJECTION INTRAVENOUS; SUBCUTANEOUS at 11:49

## 2022-07-12 RX ADMIN — Medication 2 TABLET: at 21:34

## 2022-07-12 RX ADMIN — HEPARIN SODIUM 3400 UNITS: 1000 INJECTION INTRAVENOUS; SUBCUTANEOUS at 18:14

## 2022-07-12 RX ADMIN — CARVEDILOL 12.5 MG: 12.5 TABLET, FILM COATED ORAL at 21:34

## 2022-07-12 RX ADMIN — EZETIMIBE 10 MG: 10 TABLET ORAL at 08:07

## 2022-07-12 RX ADMIN — INSULIN LISPRO 3 UNITS: 100 INJECTION, SOLUTION INTRAVENOUS; SUBCUTANEOUS at 11:48

## 2022-07-12 RX ADMIN — EPOETIN ALFA-EPBX 10000 UNITS: 10000 INJECTION, SOLUTION INTRAVENOUS; SUBCUTANEOUS at 22:00

## 2022-07-12 RX ADMIN — SEVELAMER CARBONATE 1600 MG: 800 TABLET, FILM COATED ORAL at 08:07

## 2022-07-12 RX ADMIN — Medication 2 UNITS: at 11:48

## 2022-07-12 RX ADMIN — SEVELAMER CARBONATE 1600 MG: 800 TABLET, FILM COATED ORAL at 11:49

## 2022-07-12 RX ADMIN — HEPARIN SODIUM 5000 UNITS: 5000 INJECTION INTRAVENOUS; SUBCUTANEOUS at 21:33

## 2022-07-12 RX ADMIN — MORPHINE SULFATE 1 MG: 2 INJECTION, SOLUTION INTRAMUSCULAR; INTRAVENOUS at 08:57

## 2022-07-12 RX ADMIN — INSULIN LISPRO 3 UNITS: 100 INJECTION, SOLUTION INTRAVENOUS; SUBCUTANEOUS at 08:07

## 2022-07-12 RX ADMIN — LOPERAMIDE HYDROCHLORIDE 2 MG: 2 CAPSULE ORAL at 05:15

## 2022-07-12 RX ADMIN — ASPIRIN 81 MG: 81 TABLET, CHEWABLE ORAL at 08:07

## 2022-07-12 RX ADMIN — Medication 10 MG: at 00:00

## 2022-07-12 RX ADMIN — SODIUM CHLORIDE, PRESERVATIVE FREE 10 ML: 5 INJECTION INTRAVENOUS at 06:34

## 2022-07-12 RX ADMIN — SODIUM CHLORIDE 40 MG: 9 INJECTION, SOLUTION INTRAMUSCULAR; INTRAVENOUS; SUBCUTANEOUS at 09:40

## 2022-07-12 RX ADMIN — SODIUM CHLORIDE, PRESERVATIVE FREE 10 ML: 5 INJECTION INTRAVENOUS at 19:28

## 2022-07-12 RX ADMIN — Medication 4 UNITS: at 21:58

## 2022-07-12 RX ADMIN — ALLOPURINOL 100 MG: 100 TABLET ORAL at 08:07

## 2022-07-12 NOTE — PROGRESS NOTES
Patient c/o of chest pain and discomfort unrelieved by Imodium and supplemental O2. STAT EKG ordered and this result is normal and unchanged from previous EKG obtained on 7/5. At present, patient is lying in bed with his eyes closed. No signs/symptoms of distress noted or reported at this time. Nursing will continue to monitor.

## 2022-07-12 NOTE — PROGRESS NOTES
Bedside and verbal shift change report given to Edelmira Diaz RN (on coming nurse) by Dariel Knapp RN (off going nurse). Report included the following information SBAR, Kardex, OR Summary, Intake/Output and MAR.    0807 Pt c/o difficulty breathing and chest tighting and 7/10 pain to right foot and states \"it feels like I cant get enough air. \"  No s/s of distress noted. Lungs sound clear diminish in bases. Page Awa Coyle MD aware and will place orders. 0940 Reassess pt, pt states\" pain is 3/10 and I can breath better. \"    1930 Bedside and verbal shift change report given by Edelmira Diaz RN (off going nurse) to [address] , RN(on coming nurse). Report included the following information SBAR, Kardex, OR Summary, Intake/Output and MAR.

## 2022-07-12 NOTE — PROGRESS NOTES
Hospitalist Progress Note    Patient: Petar Oneil MRN: [id number]  CSN: [id number]    YOB: 1960  Age: 60 y.o. Sex: male    DOA: 6/27/2022 LOS:  LOS: 15 days          Chief Complaint:    Chest pain      Assessment/Plan     Gideon Priest a 60 y. o. male who history of stent, hypertension, Charcot's foot presents with worsening pain and swelling  in his right foot      Large necrotic ulcer right posterior heel with osteomyelitis of the calcaneus and deep abscess-  S/p incision bone cortex right calcaneous, I&D right heel abcsess, deep wound debridement with multiple bone biopsies and application of antibiotic beads done    Hematoma evac, now with wound vac  Monitor graft take per podiatry     Chronic thrombus right IJ  No acute DVT LUE    Chest pain-CXR shows small effusion  Troponin is normal  EKG unchanged  Morphine PRN severe pain, and GI cocktail this am  Monitor for recurrence  On medical therapy fo rhx of CAD  Last stress test 2019 neg for ischemia  monitor     Tunnel line in place-needs long term abx until 8/8     Diabetes mellitus -  On lantus 10units and  premeal insulin      Anemia of ESRD stable     End-stage renal disease on dialysis Tuesday Thursday and Saturday -  S/p [facility] replacement      History of coronary artery disease-  coreg and aspirin      Chronic compressive myelopathy pending surgery -  Supportive care     needs IV abx until 8/8  Will go to SNF once auth received      Disposition :  Patient Active Problem List   Diagnosis Code    Diabetes mellitus with foot ulcer ([address] E11.621, L97.509    CAD (coronary artery disease) I25.10    ESRD (end stage renal disease) ([address] N18.6    Acute hematogenous osteomyelitis of right foot ([facility]) M86.071    Cellulitis of right heel L03.115    Diabetic foot ulcer with osteomyelitis ([address] E11.621, E11.69, L97.509, M86.9    Ulcer of right heel, with necrosis of bone ([address] L97.414    Infection and inflammatory reaction due to internal fixation device of other site, initial encounter [facility]) T84.69XA    Left arm swelling M79.89       Subjective:    Chest pain  Started last night  making it hard to take breath  Burns and is pressure he states    Review of systems:    Constitutional: denies fevers, chills  Respiratory: denies  cough  Cardiovascular: denies palpitations  Gastrointestinal: denies nausea, vomiting, diarrhea      Vital signs/Intake and Output:  Visit Vitals  BP (!) 142/63 (BP 1 Location: Right upper arm, BP Patient Position: At rest)   Pulse 70   Temp 98.2 °F (36.8 °C)   Resp 20   Ht 6' 2\" (1.88 m)   Wt 107.7 kg (237 lb 8 oz)   SpO2 100%   BMI 30.49 kg/m²     Current Shift:  No intake/output data recorded. Last three shifts:  07/10 1901 - 07/12 0700  In: 1200 [P.O.:1200]  Out: 0     Exam:    General: chronically debilitated AAM, NAD ox3  CVS:Regular rate and rhythm, no M/R/G, S1/S2 heard, no thrill  Lungs:Clear to auscultation bilaterally, no wheezes, rhonchi, or rales  Abdomen: Soft, Nontender, No distention, Normal Bowel sounds  Extremities: heel floats, LUE with reduced edema, no tenderness  Neuro:grossly normal , follows commands  Psych:appropriate                Labs: Results:       Chemistry Recent Labs     07/11/22  0530   *      K 5.0      CO2 26   BUN 47*   CREA 7.92*   CA 8.0*   AGAP 10   BUCR 6*   ALB 2.6*      CBC w/Diff Recent Labs     07/12/22  0228 07/11/22  0530 07/10/22  0541   WBC 11.0 10.5 10.9   RBC 2.91* 2.98* 2.90*   HGB 9.0* 9.2* 9.2*   HCT 28.9* 29.1* 29.2*    209 210   GRANS 74* 69 73   LYMPH 11* 15* 11*   EOS 5 5 5      Cardiac Enzymes No results for input(s): CPK, CKND1, PAULO in the last 72 hours. No lab exists for component: CKRMB, TROIP   Coagulation No results for input(s): PTP, INR, APTT, INREXT in the last 72 hours.     Lipid Panel No results found for: CHOL, CHOLPOCT, CHOLX, CHLST, CHOLV, 835280, HDL, HDLP, LDL, LDLC, DLDLP, 808712, VLDLC, VLDL, TGLX, TRIGL, TRIGP, TGLPOCT, CHHD, CHHDX   BNP No results for input(s): BNPP in the last 72 hours.    Liver Enzymes Recent Labs     07/11/22  0530   ALB 2.6*      Thyroid Studies No results found for: T4, T3U, TSH, TSHEXT     Procedures/imaging: see electronic medical records for all procedures/Xrays and details which were not copied into this note but were reviewed prior to creation of Maribel Schulz MD

## 2022-07-12 NOTE — PROGRESS NOTES
Nephrology Progress note    Subjective:     Lauri Stinson is a 60 y.o. male with  a PMH of DM, HTN, CAD, ESRD on HD TTS admitted for right foot infection. MRI suggested osteomyelitis. No fever, chills. Pt s/p debridement, I&D- on abx.     HD Catheter fell off and was replaced 6/29. C/o Chest pains this a.m. and mild SOB.  Pain is sternal in location, non-radiating, worse after breakfast.          Admit Date: 6/27/2022  Principal Problem:    Acute hematogenous osteomyelitis of right foot ([facility] [address] (6/28/2022)    Active Problems:    Diabetes mellitus with foot ulcer ([facility] [address] (6/27/2022)      CAD (coronary artery disease) (6/28/2022)      ESRD (end stage renal disease) ([facility] [address] (6/28/2022)      Cellulitis of right heel (6/28/2022)      Diabetic foot ulcer with osteomyelitis ([facility] [address] (6/28/2022)      Ulcer of right heel, with necrosis of bone ([facility] (6/28/2022)      Infection and inflammatory reaction due to internal fixation device of other site, initial encounter ([facility] [address] (7/5/2022)      Left arm swelling (7/10/2022)      Current Facility-Administered Medications   Medication Dose Route Frequency    morphine injection 1 mg  1 mg IntraVENous Q2H PRN    nitroglycerin (NITROSTAT) tablet 0.4 mg  0.4 mg SubLINGual PRN    pantoprazole (PROTONIX) 40 mg in 0.9% sodium chloride 10 mL injection  40 mg IntraVENous DAILY    mylanta/viscous lidocaine (GI COCKTAIL)  40 mL Oral ONCE    ondansetron (ZOFRAN) injection 4 mg  4 mg IntraVENous Q6H PRN    gabapentin (NEURONTIN) capsule 300 mg  300 mg Oral QHS    heparin (porcine) injection 5,000 Units  5,000 Units SubCUTAneous Q8H    melatonin (rapid dissolve) tablet 10 mg  10 mg Oral QHS PRN    epoetin lisette-epbx (RETACRIT) injection 10,000 Units  10,000 Units SubCUTAneous Q TUE, THU & SAT    heparin (porcine) 1,000 unit/mL injection 3,400 Units  3,400 Units Hemodialysis DIALYSIS PRN    heparin (porcine) 100 unit/mL injection 500 Units  500 Units InterCATHeter Q8H PRN    sodium chloride (NS) flush 5-40 mL  5-40 mL IntraVENous Q8H    sodium chloride (NS) flush 5-40 mL  5-40 mL IntraVENous PRN    fentaNYL citrate (PF) injection  mcg   mcg IntraVENous Rad Multiple    naloxone (NARCAN) injection 0.1 mg  0.1 mg IntraVENous Multiple    loperamide (IMODIUM) capsule 2 mg  2 mg Oral Q4H PRN    piperacillin-tazobactam (ZOSYN) 4.5 g in 0.9% sodium chloride (MBP/ADV) 100 mL MBP  4.5 g IntraVENous Q12H    sevelamer carbonate (RENVELA) tab 1,600 mg  1,600 mg Oral TID WITH MEALS    Lactobacillus Acidoph & Bulgar (FLORANEX) tablet 2 Tablet  2 Tablet Oral BID    allopurinoL (ZYLOPRIM) tablet 100 mg  100 mg Oral DAILY    carvediloL (COREG) tablet 12.5 mg  12.5 mg Oral BID    ezetimibe (ZETIA) tablet 10 mg  10 mg Oral DAILY    amLODIPine (NORVASC) tablet 10 mg  10 mg Oral DAILY    vit B Cmplx 3-FA-Vit C-Biotin (NEPHRO NIKITA RX) tablet 1 Tablet  1 Tablet Oral DAILY    insulin lispro (HUMALOG) injection 3 Units  3 Units SubCUTAneous TIDAC    aspirin chewable tablet 81 mg  81 mg Oral DAILY    insulin glargine (LANTUS) injection 10 Units  10 Units SubCUTAneous QHS    insulin lispro (HUMALOG) injection   SubCUTAneous AC&HS    glucose chewable tablet 16 g  4 Tablet Oral PRN    glucagon (GLUCAGEN) injection 1 mg  1 mg IntraMUSCular PRN    dextrose 10% infusion 0-250 mL  0-250 mL IntraVENous PRN    acetaminophen (TYLENOL) tablet 650 mg  650 mg Oral Q6H PRN         Allergy:   No Known Allergies     Objective:     Visit Vitals  BP (!) 142/63 (BP 1 Location: Right upper arm, BP Patient Position: At rest)   Pulse 70   Temp 98.2 °F (36.8 °C)   Resp 20   Ht 6' 2\" (1.88 m)   Wt 107.7 kg (237 lb 8 oz)   SpO2 100%   BMI 30.49 kg/m²         Intake/Output Summary (Last 24 hours) at 7/12/2022 0946  Last data filed at 7/11/2022 1505  Gross per 24 hour   Intake 720 ml   Output 0 ml   Net 720 ml       Physical Exam:       General: No acute distress   HENT: Atraumatic and normocephalic   Eyes: Normal conjunctiva   Neck: Supple No JVD   Cardiovascular: Normal S1 & S2, no m/r/g   Pulmonary/Chest Wall: Clear to auscultation bilaterally   Abdominal: Soft and non-tender   Musculoskeletal: Wound Vac on R foot   Neurological: AA and O X 3, No focal deficits     RIJ TDC in Place     Data Review:  Lab Results   Component Value Date/Time    Sodium 142 07/11/2022 05:30 AM    Potassium 5.0 07/11/2022 05:30 AM    Chloride 106 07/11/2022 05:30 AM    CO2 26 07/11/2022 05:30 AM    Anion gap 10 07/11/2022 05:30 AM    Glucose 117 (H) 07/11/2022 05:30 AM    BUN 47 (H) 07/11/2022 05:30 AM    Creatinine 7.92 (H) 07/11/2022 05:30 AM    BUN/Creatinine ratio 6 (L) 07/11/2022 05:30 AM    GFR est AA 8 (L) 07/11/2022 05:30 AM    GFR est non-AA 7 (L) 07/11/2022 05:30 AM    Calcium 8.0 (L) 07/11/2022 05:30 AM     Lab Results   Component Value Date/Time    WBC 11.0 07/12/2022 02:28 AM    HGB 9.0 (L) 07/12/2022 02:28 AM    HCT 28.9 (L) 07/12/2022 02:28 AM    PLATELET 163 27/90/4625 02:28 AM    MCV 99.3 07/12/2022 02:28 AM     Lab Results   Component Value Date/Time    Calcium 8.0 (L) 07/11/2022 05:30 AM    Phosphorus 4.8 07/11/2022 05:30 AM     No results found for: IRON, FE, TIBC, IBCT, PSAT, FERR  No results found for: FERR      Impression:     ESRD on HD  Diabetic foot ulcer with osteomyelitis  DM  HTN  Anemia in CKD  SHPT  Chest Pain.   Work up initiated by Attending Hospitalist    Plan:     HD Today  IV Antibiotics: Zosyn  DALTON  Renvela for Phos binder       Mack Yu MD,  [address]  [phone]

## 2022-07-12 NOTE — PROGRESS NOTES
TREATMENT SUMMARY   Patient dialyzed in room 325  Tolerated treatment well without complaint or complications.    RIJ TDC very positional with movements causing reduced BFR/ blood flow rate  -350    3000 ml removed via UF with a with a net removal 2500  Report given to Christ Ashraf RN with all questions answered     TREATMENT  NOTES                                                                                                     ACUTE HEMODIALYSIS FLOW SHEET       ACCESS   CATHETER ACCESS: [] N/A  [x] RIGHT  [] LEFT  [] IJ  [] SUBCL [] FEM                    [] First use X-ray  [x] Tunnel     [] Non-Tunneled      [x] No S/S infection  [] Redness [] Drainage  [] Cultured [] Swelling [] Pain                    [x] Medical Aseptic [] Prep Dressing Changed                  [] Clotted [] Patent []      Flows: [] Good [] Poor [] Reversed                 If Access Problem Dr. Hussain tSover: [] Yes [] No    Date:_____  [] N/A[]   GRAFT/FISTULA ACCESS:  [x] N/A  [] RIGHT  [] LEFT  [] UE   [] LE       [] AVG  [] AVF [] BUTTONHOLE    [] +BRUIT/THRILL [] MEDICAL ASEPTIC PREP     [] No S/S infection  [] Redness [] Drainage  [] Cultured [] Swelling [] Pain              If Access Problem Dr. Hussain Stover: [] Yes [] No    Date:______ [] N/A     RO/HEMODIAYLSIS MACHINE [address] [place] [address] TREATMENT          [] [facility]: Machine Serial #1:  [id number]    RO Serial #[id number]                       [x] [facility]: Machine Serial #2:  [id number] RO Serial #[id number]     [] [facility]: Machine Serial #3:  [id number]  RO Serial #[id number]    Alarm Test: [x] Pass  Time_1434__  [x] RO/Machine Log Complete    [x] Extracorporeal circuit Tested for integrity           Dialyzer_[id number]_   Tubing_21i02-11_    Dialysate: pH__7.2_  Temp.__37___Conductivity: Meter _13.8__ HD Machine__13.8_   CHLORINE TESTING- BEFORE EACH TREATMENT AND EVERY 4 HOURS   Total Chlorine: [x] Less than 0.1 ppm Time:__1430___2nd Check Time:__nr____  (If greater than 0.1 ppm from Primary then every 30 minutes from Secondary)   TREATMENT INIATION-WITH DIALYSIS PRECAUTIONS   [x] All Connections Secured   [x] Saline Line Double Clamped    [x] Venous Parameters Set [x] Arterial Parameters Set    [x] Prime Given 250 ml     [x] Air Foam Detector Engaged   PRE-TREATMENT   UF Calculations: Wt to lose:_2500___ml(+) Oral:_0_ml(+)IV Meds/Fluids/Blood prods_0__ml(+) Prime/Rinse__500_ml(=)Total UF Goal__3000__mL     Tx Initiation Note: RECEIVED REPORT. PATIENT ALERT AND ORIENTED. VITAL SIGNS WNL. NAD. ALL QUESTIONS ANSWERED WITH PATIENT  SAFETY CHECKS COMPLETE. TIME OUT COMPLETE. TREATMENT INITIATED VIA _RIJ TDC____. NO CONCERNS NOTED. [x] Time Out/Safety Check  Time:__1440__     Dialyzer cleared: [x] Good [] Fair [] Poor     Blood Volume Processed _57.7___L   Net UF Removed _2500___mL  Post Tx Access:                  AVF/AVG: Bleeding Stop Time      Art. NA_min Avtar.__NA_min []+bruit/thrill                              Catheter: Locking Solution  [x] Heparin 1 ml/1000 units                                                             [] Normal Saline                                                                      Art.__1.8___ ml Avtar.__1.8___ml                                                           [x] New caps placed       Abbreviations: AVG-arterial venous graft, AVF-arterial venous fistula, IJ-Internal Jugular,  Subcl-Subclavian, Fem-Femoral, Tx-treatment, AP/HR-apical heart rate, DFR-dialysate flow rate, BFR-blood flow rate, AP-arterial pressure, -venous pressure, UF-ultrafiltrate, TMP-transmembrane pressure, Avtar-Venous, Art-Arterial, RO-Reverse Osmosis

## 2022-07-12 NOTE — PROGRESS NOTES
[facility]  (A Division of [address] [place] [address])                                                                                                                     Dr Nazario Dominguez #: - Option # 8  Fax #: [phone]     Date of Admission: 6/27/2022Date of Note: 7/12/2022  Reason for Referral: Evaluation and antibiotic management of R heel infection    Current Antimicrobials:    Prior Antimicrobials:  Zosyn 6/27 to date   Bactrim 1 month ago       Assessment- ID related:  --------------------------------------------------------------------------  · DFU and OM R heel  S/P I and D 6/28:  Large right heel necrotic ulcer with osteomyelitis of the calcaneus, and deep abscess  --Proteus/Mroganella/ E.fecalis / alpha strep and Bacteroides on culture  --S/P OR 7/5/22- R heel I and D with grafting, removal of screw. No culture sent  · Subacute/chronic OM of heel-- over 2 months time  · Leucocytosis 2/2 above  --BCX 6/27: NGSF  --WCX-- GNR and GPC in pairs--> pending  · ESRD on HD TTS  · Obese BMI of 28  · DM   · History of L hallux ampuation for infection- 5yrs ago  ESR 75, CRP 4.1- 7/7/22  R chest TDC is for his HD  R IV line for his ABX- 7/8/22 Recommendation for ID issues I am following:  --------------------------------------------------------------------------------    No new ID plans/order  Call PRN if ID issues  Will see him Friday/Monday if still here    Wound care/dressing per Dr Melisa BERTRAND with Zosyn 2.25gm Q8 hour X 6 weeks abx ( from 6/28).  End: August 8  OPAT to be arranged for above by CM with SNF    Weekly labs- cbc w/diff, LFT, uantitative CRP on Mon  Monitor above labs for ABX adverse effects  Fax labs to Dr Mikhail Nieves-- [phone]     FU in 2-3 weeks in ID clinic on DC    If the above rx fails,  Likely needs BKA       Subjective:  Seen before 10am, charted late    Issues with CP earlier , but resting comfortably at time of exam  Review of systems:    No F/C/R  No N/V  No itching or rash       HPI:  Kathy Meyer is a 60 y.o. BLACK/ with PMH as listed below-- he had ulcer for 2 months or so that he was followed by as OP by Podiatry. His heel wound progressed and was advised to come to ED yesterday. Had foot pain and fould drainage, but denies high F/C/R/SOB/ N/V prior to admission. BCX/WCX taken in ED, started on Zosyn and plan was to hold off abx and monitor until surgery. Admission assessment there was high concern for sepsis and Zosyn was continued. Hx of MRSA infection and treatment few years ago. Active Hospital Problems    Diagnosis Date Noted    Left arm swelling 07/10/2022    Infection and inflammatory reaction due to internal fixation device of other site, initial encounter ([facility] [address] 07/05/2022    CAD (coronary artery disease) 06/28/2022    ESRD (end stage renal disease) ([facility] 06/28/2022    Acute hematogenous osteomyelitis of right foot ([address] 06/28/2022    Cellulitis of right heel 06/28/2022    Diabetic foot ulcer with osteomyelitis ([facility] [address] 06/28/2022    Ulcer of right heel, with necrosis of bone ([address] 06/28/2022    Diabetes mellitus with foot ulcer ([facility] [address] 06/27/2022     Past Medical History:   Diagnosis Date    Diabetes mellitus     Hypertension      Past Surgical History:   Procedure Laterality Date    IR INSERT TUNL CVC W/O PORT OVER 5 YR  7/7/2022     History reviewed. No pertinent family history.   Social History     Socioeconomic History    Marital status:      Spouse name: Not on file    Number of children: Not on file    Years of education: Not on file    Highest education level: Not on file   Occupational History    Not on file   Tobacco Use    Smoking status: Not on file    Smokeless tobacco: Not on file   Substance and Sexual Activity    Alcohol use: Not on file    Drug use: Not on file    Sexual activity: Not on file   Other Topics Concern   Regis Roman Not Asked    Endoscopic Camera Pill Not Asked    Metallic Foreign Body Not Asked    Medication Patches Not Asked    Taking Feraheme Not Asked    Claustrophobic Not Asked   Social History Narrative    Not on file     Social Determinants of Health     Financial Resource Strain:     Difficulty of Paying Living Expenses: Not on file   Food Insecurity:     Worried About Running Out of Food in the Last Year: Not on file    Vane of Food in the Last Year: Not on file   Transportation Needs:     Lack of Transportation (Medical): Not on file    Lack of Transportation (Non-Medical): Not on file   Physical Activity:     Days of Exercise per Week: Not on file    Minutes of Exercise per Session: Not on file   Stress:     Feeling of Stress : Not on file   Social Connections:     Frequency of Communication with Friends and Family: Not on file    Frequency of Social Gatherings with Friends and Family: Not on file    Attends Quaker Services: Not on file    Active Member of [address] [place] [address] or Organizations: Not on file    Attends Club or Organization Meetings: Not on file    Marital Status: Not on file   Intimate Partner Violence:     Fear of Current or Ex-Partner: Not on file    Emotionally Abused: Not on file    Physically Abused: Not on file    Sexually Abused: Not on file   Housing Stability:     Unable to Pay for Housing in the Last Year: Not on file    Number of [place] in the Last Year: Not on file    Unstable Housing in the Last Year: Not on file       Allergies:  Patient has no known allergies.      Medications:  Current Facility-Administered Medications   Medication Dose Route Frequency    nitroglycerin (NITROSTAT) tablet 0.4 mg  0.4 mg SubLINGual PRN    pantoprazole (PROTONIX) 40 mg in 0.9% sodium chloride 10 mL injection  40 mg IntraVENous DAILY    mylanta/viscous lidocaine (GI COCKTAIL)  40 mL Oral ONCE    morphine injection 1 mg  1 mg IntraVENous Q4H PRN    ondansetron (ZOFRAN) injection 4 mg  4 mg IntraVENous Q6H PRN    gabapentin (NEURONTIN) capsule 300 mg  300 mg Oral QHS    heparin (porcine) injection 5,000 Units  5,000 Units SubCUTAneous Q8H    melatonin (rapid dissolve) tablet 10 mg  10 mg Oral QHS PRN    epoetin lisette-epbx (RETACRIT) injection 10,000 Units  10,000 Units SubCUTAneous Q TUE, THU & SAT    heparin (porcine) 1,000 unit/mL injection 3,400 Units  3,400 Units Hemodialysis DIALYSIS PRN    heparin (porcine) 100 unit/mL injection 500 Units  500 Units InterCATHeter Q8H PRN    sodium chloride (NS) flush 5-40 mL  5-40 mL IntraVENous Q8H    sodium chloride (NS) flush 5-40 mL  5-40 mL IntraVENous PRN    fentaNYL citrate (PF) injection  mcg   mcg IntraVENous Rad Multiple    naloxone (NARCAN) injection 0.1 mg  0.1 mg IntraVENous Multiple    loperamide (IMODIUM) capsule 2 mg  2 mg Oral Q4H PRN    piperacillin-tazobactam (ZOSYN) 4.5 g in 0.9% sodium chloride (MBP/ADV) 100 mL MBP  4.5 g IntraVENous Q12H    sevelamer carbonate (RENVELA) tab 1,600 mg  1,600 mg Oral TID WITH MEALS    Lactobacillus Acidoph & Bulgar (FLORANEX) tablet 2 Tablet  2 Tablet Oral BID    allopurinoL (ZYLOPRIM) tablet 100 mg  100 mg Oral DAILY    carvediloL (COREG) tablet 12.5 mg  12.5 mg Oral BID    ezetimibe (ZETIA) tablet 10 mg  10 mg Oral DAILY    amLODIPine (NORVASC) tablet 10 mg  10 mg Oral DAILY    vit B Cmplx 3-FA-Vit C-Biotin (NEPHRO NIKITA RX) tablet 1 Tablet  1 Tablet Oral DAILY    insulin lispro (HUMALOG) injection 3 Units  3 Units SubCUTAneous TIDAC    aspirin chewable tablet 81 mg  81 mg Oral DAILY    insulin glargine (LANTUS) injection 10 Units  10 Units SubCUTAneous QHS    insulin lispro (HUMALOG) injection   SubCUTAneous AC&HS    glucose chewable tablet 16 g  4 Tablet Oral PRN    glucagon (GLUCAGEN) injection 1 mg  1 mg IntraMUSCular PRN    dextrose 10% infusion 0-250 mL  0-250 mL IntraVENous PRN    acetaminophen (TYLENOL) tablet 650 mg  650 mg Oral Q6H PRN        ROS:  Pertinent items are noted in the History of Present Illness. Physical Exam:    Temp (24hrs), Av.1 °F (36.7 °C), Min:97.5 °F (36.4 °C), Max:98.6 °F (37 °C)    Visit Vitals  BP (!) 150/71   Pulse 69   Temp 98.6 °F (37 °C)   Resp 20   Ht 6' 2\" (1.88 m)   Wt 107.7 kg (237 lb 8 oz)   SpO2 100%   BMI 30.49 kg/m²      GEN: WD Obese, on RA--not in resp distress. Right chest TDC for HD    HEENT: Unicteric. EOMI intact  No neck swelling  CHEST: Non laboured breathing. ABD: Obese/soft. Non tender. PASCUAL: Deferred  EXT: not examined today- dressed-   Skin: Dry and intact. No rash, no redness.   CNS: A, comfortable     Microbiology  All Micro Results     Procedure Component Value Units Date/Time    CULTURE, FUNGUS [[id number]] Collected: 22    Order Status: Completed Specimen: Heel Updated: 22 1407     Special Requests: NO SPECIAL REQUESTS        Culture result:       NO FUNGUS ISOLATED 12 DAYS          CULTURE, ANAEROBIC [[id number]]  (Abnormal) Collected: 22    Order Status: Completed Specimen: Heel Updated: 22 1637     Special Requests: NO SPECIAL REQUESTS        Culture result:       MODERATE Porphyromonas assacharolyticus (Bacteroides) BETA LACTAMASE POSITIVE          CULTURE, BLOOD [[id number]] Collected: 22 1240    Order Status: Completed Specimen: Blood Updated: 2234     Special Requests: NO SPECIAL REQUESTS        Culture result: NO GROWTH 6 DAYS       CULTURE, BLOOD [[id number]] Collected: 22 1245    Order Status: Completed Specimen: Blood Updated: 2234     Special Requests: NO SPECIAL REQUESTS        Culture result: NO GROWTH 6 DAYS       CULTURE, TISSUE Daiana Ananya STAIN [[id number]]  (Abnormal) Collected: 22    Order Status: Completed Specimen: Bone Updated: 22 1221     Special Requests: NO SPECIAL REQUESTS        GRAM STAIN 2+ WBCS SEEN         NO ORGANISMS SEEN        Culture result: FEW ENTEROCOCCUS FAECALIS         SCANT PROTEUS VULGARIS SCANT ALPHA STREPTOCOCCUS               SCANT Morganella morganii ssp morganii            REFER TO [facility] [id number] FOR SENSITIVITIES    CULTURE, Jesse Yury STAIN [[id number]]  (Abnormal)  (Susceptibility) Collected: 06/28/22 1925    Order Status: Completed Specimen: Heel Updated: 07/02/22 1003     Special Requests: NO SPECIAL REQUESTS        GRAM STAIN OCCASIONAL WBCS SEEN         NO ORGANISMS SEEN        Culture result: LIGHT PROTEUS VULGARIS               SCANT Morganella morganii ssp morganii                  MODERATE ENTEROCOCCUS FAECALIS          AFB CULTURE + SMEAR W/RFLX ID FROM CULTURE [[id number]] Collected: 06/28/22 1925    Order Status: Completed Specimen: Miscellaneous sample Updated: 06/30/22 1738     Source MISC.  WOUND        AFB Specimen processing Concentration     Acid Fast Smear Negative        Comment: (NOTE)  Performed At: [address] [id number]  Monserrat Bethea MD [id number]          Acid Fast Culture PENDING    CULTURE, Jesse Yury STAIN [[id number]] Collected: 06/27/22 1530    Order Status: Completed Specimen: Wound Drainage Updated: 06/29/22 1511     Special Requests: NO SPECIAL REQUESTS        GRAM STAIN RARE WBCS SEEN         2+ GRAM NEGATIVE RODS               1+ GRAM POSITIVE COCCI IN PAIRS           Culture result:       MODERATE  MIXED ENTERIC GRAM NEGATIVE RODS              HEAVY MIXED SKIN TO ISOLATED          AFB CULTURE + SMEAR W/RFLX ID FROM CULTURE [[id number]] Collected: 06/28/22 1930    Order Status: Canceled     CULTURE, ANAEROBIC [[id number]] Collected: 06/28/22 1926    Order Status: Canceled            Lab results:    Chemistry  Recent Labs     07/11/22  0530   *      K 5.0      CO2 26   BUN 47*   CREA 7.92*   CA 8.0*   AGAP 10   BUCR 6*   ALB 2.6*       CBC w/ Diff  Recent Labs     07/12/22  0228 07/11/22  0530 07/10/22  0541   WBC 11.0 10.5 10.9   RBC 2.91* 2.98* 2.90*   HGB 9.0* 9.2* 9.2*   HCT 28.9* 29.1* 29.2*    209 210   GRANS 74* 69 73   LYMPH 11* 15* 11*   EOS 5 5 5       Imaging: report reviewed and as posted by radiologist   No results found for this or any previous visit. MRI:       1. Posterior heel skin defect, at the margin of the Achilles tendon attachment  on the calcaneus, in keeping with known ulcer. Infiltration of subjacent  subcutaneous fat in keeping with cellulitis.     2. Cortical discontinuity and marrow signal abnormality in the subjacent dorsal  calcaneus extending to the threaded tip of the oblique tibiocalcaneal screw is  suspicious for osteomyelitis.     3. Fluid signal intensity surrounding the threaded tibial tip of the 1st digit  long partially threaded screw, with osseous fragments at the inferior margin,  concerning for loosening and/or infection.     4. Marrow signal abnormalities at the 2nd-3rd and to a lesser degree 4th-5th  tarsometatarsal joint, potentially simply related to degenerative/Charcot  arthropathy. In view of marrow signal abnormalities, infection cannot be  excluded if clinically suspected. If clinically warranted, consider correlation with nuclear medicine imaging to  further assess.     5. Edema/myositis in the musculature above the ankle. There is discontinuity of  FHL and peroneal tendons, best correlated with operative findings/history of  prior intervention for significance/chronicity. Fatty change in the musculature  about the foot.

## 2022-07-12 NOTE — PROGRESS NOTES
Problem: Mobility Impaired (Adult and Pediatric)  Goal: *Acute Goals and Plan of Care (Insert Text)  Description: Physical Therapy Goals  Initiated 7/8/2022 and to be accomplished within 7 day(s)  1. Patient will move from supine to sit and sit to supine  in bed with supervision/set-up. 2.  Patient will transfer from bed to chair and chair to bed with minimal assistance/contact guard assist using the least restrictive device. 3.  Patient will perform sit to stand with moderate assistance . Outcome: Progressing Towards Goal    physical Therapy TREATMENT    Patient: Grant Levin (89 y.o. male)  Date: 7/12/2022  Diagnosis: Diabetes mellitus with foot ulcer ([facility] [I87.826, L97.509] Acute hematogenous osteomyelitis of right foot ([facility] [address]  Procedure(s) (LRB):  RIGHT HEEL DEBRIDEMENT WITH GRAFTING,REMOVAL OF SCREW  (PT IN ROOM #325) WITH C-ARM (Right) 7 Days Post-Op  Precautions: Fall,NWB   Chart, physical therapy assessment, plan of care and goals were reviewed. ASSESSMENT:  Pt found supine in bed; sleeping but awakened when name called and willing to participate with PT session. Nurse in at start of session for meds administration. Pt c/o being soiled and requesting cleanup. Noted [place] sheet soiled, at the end of its length and in need of change also. Hygiene care completed, with brief applied, new gown donned and new sheet to [place] [id number] bed. Pt rolls L/R with min A to pelvis. L hand/arm edematous and pt with incr'd difficulty gripping bedrail with L hand. Pt supine>sit CGA. Adaptive shoe donned by PT to L foot. Wound vac remains in place R foot. Pt able to scoot along side of bed with Andrade/additional time/vc for safety. Transfers bed >chair performed using slide board, with pt moving toward R side and requiring min/mod assist.  Pt left up in chair with LE's elevated in armless chair (no leg rests on w/c), all needs in reach and pt in NAD (calling lunch order in).   Nurse Mary Anne Martino notified of above.  Will continue work on transfers via slide board as pt with poor standing balance JANE RODGERS. Recommend SNF for rehab at discharge. Progression toward goals:  []      Improving appropriately and progressing toward goals  [x]      Improving slowly and progressing toward goals  []      Not making progress toward goals and plan of care will be adjusted     PLAN:  Patient continues to benefit from skilled intervention to address the above impairments. Continue treatment per established plan of care. Discharge Recommendations:  [facility] for rehab  Further Equipment Recommendations for Discharge:  TBD     SUBJECTIVE:   Patient stated I;m sleepy.     OBJECTIVE DATA SUMMARY:   Critical Behavior:  Neurologic State: Alert  Orientation Level: Oriented X4  Cognition: Follows commands  Safety/Judgement: Fall prevention  Functional Mobility Training:  Bed Mobility:  Rolling: Minimum assistance  Supine to Sit: Contact guard assistance  Scooting: Minimum assistance; Additional time (seated at side of bed )  Transfers:  Sliding Board : Minimum assistance; Moderate assistance; Additional time (bed to w/c)  Balance:  Sitting: Intact  Sitting - Static: Good (unsupported); Fair (occasional)  Sitting - Dynamic: Fair (occasional)  Pain:  Pain Scale 1: Numeric (0 - 10)  Pain Intensity 1: 0  Pain Location 1: Chest;Foot  Pain Orientation 1: Anterior; Lower  Pain Description 1: Burning  Pain Intervention(s) 1: Medication (see MAR)  Activity Tolerance:   Good  Please refer to the flowsheet for vital signs taken during this treatment.   After treatment:   [x] Patient left in no apparent distress sitting up in chair  [] Patient left in no apparent distress in bed  [x] Call bell left within reach  [x] Nursing notified  [] Caregiver present  [] Bed alarm activated      Jade Mazariegos PT   Time Calculation: 56 mins

## 2022-07-12 NOTE — PROGRESS NOTES
Occupational Therapy Treatment Attempt     Chart reviewed. Attempted Occupational Therapy Treatment, however, patient unable to be seen due to:  []  Nausea/vomiting  []  Eating  []  Pain  []  Patient too lethargic  [x]  Off Unit for testing/procedure  []  Dialysis treatment in progress  []  Telemetry Results  []  Other:      Will f/u later as patient's schedule allows.   Ad Flanagan, OTR/L

## 2022-07-12 NOTE — PROGRESS NOTES
PENDING INSURANCE AUTHORIZATION     D/C Plan: [facility]      Chart reviewed. Noted pt was unable to work with therapy yesterday. CM met with pt at bedside and notified him of the importance of working with both PT and OT to assist with insurance authorization. Pt verbalized an understanding. Anticipate pt will transition to the above facility once insurance auth has been obtained. Pt will arrange handi ride to transport him to and from dialysis from the facility.  CM to continue to follow and assist.     Transition of care to SNF: [facility]      Communication to Patient/Family:  Met with patient and family, and they are agreeable to the transition plan. The Plan for Transition of Care is related to the following treatment goals: SNF     The Patient and/or patient representative was provided with a choice of provider and agrees   with the discharge plan.  Yes [x]? ?? No []???     Freedom of choice list was provided with basic dialogue that supports the patient's individualized plan of care/goals and shares the quality data associated with the providers.     Yes [x]? ?? No []???     SNF/Rehab Transition:  Patient has been accepted to [facility] at [facility], [address] [place] [address] Drive meets criteria for admission. Dearl Nim will transported by medical transport and expected to [address] has been obtained.       Communication to SNF/Rehab:  Bedside RN has been notified to update the transition plan to the facility and call report [phone]     Discharge information has been updated on the AVS and sent via [facility] and/or CC link.    Discharge instructions to be fax'd to facility at      Please include all hard scripts for controlled substances, med rec and dc summary, and AVS in packet.  Please medicate for pain prior to dc if possible and needed to help offset delay when patient first arrives to facility.     Reviewed and confirmed with facility, Norbert Coles can manage the patient care needs for the following:      [place] with (X) only those applicable:  Medication:  []? ? ? Medications are available at the facility  []? ??IV Antibiotics    []? ? ? Controlled Substance - hard copies available sent.  []?? ?Weekly Labs     Equipment:  []???CPAP/BiPAP  []? ? ? Wound Vacuum  []? ? ? Mcmahon or Urinary Device  []? ??PICC/Central Line  []? ? ?Nebulizer  []? ? ? Ventilator     Treatment:  []? ? ?Isolation (for MRSA, VRE, etc.)  []? ??Surgical Drain Management  []? ? ? Tracheostomy Care  []? ? ?Dressing Changes  []? ? ? Dialysis with transportation  []? ??PEG Care  []? ? ? Oxygen  []? ? ?Daily Weights for Heart Failure     Dietary:  []? ? ? Any diet limitations  []? ? ?Tube Feedings   []? ? ? Total Parenteral Management (TPN)     Financial Resources:  []? ? ? Medicaid Application Completed     []? ??UAI Completed and copy given to pt/family     []? ? ? A screening has previously been completed.     []? ? ?Level II Completed     []? ?? Private pay individual who will not become   financially eligible for Medicaid within 6 months from admission to a [facility].      []??? Individual refused to have screening conducted.      []? ? ? Medicaid Application Completed     []? ? ? The screening denied because it was determined individual did not need/did not qualify for nursing facility level of care.  []??? Out of state residents seeking direct admission to a [address] facility.  []??? Individuals who are inpatients of an out of state hospital, or in state or out of state veterans/ hospital and seek direct admission to a [address] facility  []? ?? Individuals who are pateints or residents of a state owned/operated facility that is licensed by [address] [facility] and seek direct admission to [address] facility  []? ?? A screening not required for enrollment in [facility] services as set out in [facility]-  []??? [facility]) staff shall perform screenings of the [facility] clients.     Advanced Care Plan:  []? ? ?Surrogate Decision Maker of Care  []? ??POA  []? ?? Communicated Code Status and copy sent.     Other:          Care Management Interventions  PCP Verified by CM:  Yes (Dr. Pancho Lennon )  Mode of Transport at Discharge: S  Transition of Care Consult (CM Consult): SNF (Pt is in agreement w/ SNF at d/c. )  Discharge Durable Medical Equipment:  (TBD)  Physical Therapy Consult: Yes  Occupational Therapy Consult: Yes  Support Systems: Child(dafne)  Confirm Follow Up Transport: Family  The Plan for Transition of Care is Related to the Following Treatment Goals : Skilled nursing facility  The Patient and/or Patient Representative was Provided with a Choice of Provider and Agrees with the Discharge Plan?: Yes (The pt is alert and oriented. )  Freedom of Choice List was Provided with Basic Dialogue that Supports the Patient's Individualized Plan of Care/Goals, Treatment Preferences and Shares the Quality Data Associated with the Providers?: Yes (Pt is in agreement w/ referrals being sent to all  and [place] facilities and then he will choose one from the accepting facilities. )  Discharge Location  Patient Expects to be Discharged to[de-identified] Skilled nursing facility

## 2022-07-12 NOTE — PROGRESS NOTES
Problem: Chronic Renal Failure  Goal: *Fluid and electrolytes stabilized  Outcome: Progressing Towards Goal     Problem: Chronic Renal Failure  Goal: *Fluid and electrolytes stabilized  Outcome: Progressing Towards Goal     Problem: Patient Education: Go to Patient Education Activity  Goal: Patient/Family Education  Outcome: Progressing Towards Goal

## 2022-07-12 NOTE — PROGRESS NOTES
Problem: General Medical Care Plan  Goal: *Vital signs within specified parameters  Outcome: Progressing Towards Goal     Problem: Falls - Risk of  Goal: *Absence of Falls  Description: Document Arron Sol Fall Risk and appropriate interventions in the flowsheet.   Outcome: Progressing Towards Goal  Note: Fall Risk Interventions:  Mobility Interventions: Communicate number of staff needed for ambulation/transfer         Medication Interventions: Patient to call before getting OOB    Elimination Interventions: Call light in reach    History of Falls Interventions: Consult care management for discharge planning

## 2022-07-13 ENCOUNTER — APPOINTMENT (OUTPATIENT)
Dept: VASCULAR SURGERY | Age: 62
DRG: 629 | End: 2022-07-13
Attending: INTERNAL MEDICINE
Payer: MEDICARE

## 2022-07-13 LAB
GLUCOSE BLD STRIP.AUTO-MCNC: 149 MG/DL (ref 70–110)
GLUCOSE BLD STRIP.AUTO-MCNC: 175 MG/DL (ref 70–110)
GLUCOSE BLD STRIP.AUTO-MCNC: 181 MG/DL (ref 70–110)
GLUCOSE BLD STRIP.AUTO-MCNC: 199 MG/DL (ref 70–110)

## 2022-07-13 PROCEDURE — 65270000029 HC RM PRIVATE

## 2022-07-13 PROCEDURE — 74011250636 HC RX REV CODE- 250/636: Performed by: PHYSICIAN ASSISTANT

## 2022-07-13 PROCEDURE — 74011250637 HC RX REV CODE- 250/637: Performed by: FAMILY MEDICINE

## 2022-07-13 PROCEDURE — 74011250637 HC RX REV CODE- 250/637: Performed by: INTERNAL MEDICINE

## 2022-07-13 PROCEDURE — 74011636637 HC RX REV CODE- 636/637: Performed by: FAMILY MEDICINE

## 2022-07-13 PROCEDURE — 74011000258 HC RX REV CODE- 258: Performed by: PHYSICIAN ASSISTANT

## 2022-07-13 PROCEDURE — 74011636637 HC RX REV CODE- 636/637: Performed by: HOSPITALIST

## 2022-07-13 PROCEDURE — 82962 GLUCOSE BLOOD TEST: CPT

## 2022-07-13 PROCEDURE — 74011000250 HC RX REV CODE- 250: Performed by: RADIOLOGY

## 2022-07-13 PROCEDURE — 77010033678 HC OXYGEN DAILY

## 2022-07-13 PROCEDURE — 93970 EXTREMITY STUDY: CPT

## 2022-07-13 PROCEDURE — 74011250637 HC RX REV CODE- 250/637: Performed by: HOSPITALIST

## 2022-07-13 PROCEDURE — 74011000250 HC RX REV CODE- 250: Performed by: HOSPITALIST

## 2022-07-13 PROCEDURE — C9113 INJ PANTOPRAZOLE SODIUM, VIA: HCPCS | Performed by: HOSPITALIST

## 2022-07-13 PROCEDURE — 74011250636 HC RX REV CODE- 250/636: Performed by: HOSPITALIST

## 2022-07-13 PROCEDURE — 74011636637 HC RX REV CODE- 636/637: Performed by: INTERNAL MEDICINE

## 2022-07-13 RX ORDER — UREA 10 %
2 LOTION (ML) TOPICAL 2 TIMES DAILY
Qty: 60 TABLET | Refills: 0 | Status: SHIPPED
Start: 2022-07-13

## 2022-07-13 RX ORDER — CALCIUM CARB/MAGNESIUM CARB 311-232MG
10 TABLET ORAL
Qty: 30 TABLET | Refills: 0 | Status: SHIPPED
Start: 2022-07-13

## 2022-07-13 RX ORDER — INSULIN LISPRO 100 [IU]/ML
INJECTION, SOLUTION INTRAVENOUS; SUBCUTANEOUS
Qty: 1 EACH | Refills: 0 | Status: SHIPPED
Start: 2022-07-13

## 2022-07-13 RX ORDER — SEVELAMER CARBONATE 800 MG/1
1600 TABLET, FILM COATED ORAL
Qty: 90 TABLET | Refills: 0 | Status: SHIPPED
Start: 2022-07-13

## 2022-07-13 RX ADMIN — Medication 2 UNITS: at 21:49

## 2022-07-13 RX ADMIN — SODIUM CHLORIDE, PRESERVATIVE FREE 10 ML: 5 INJECTION INTRAVENOUS at 14:21

## 2022-07-13 RX ADMIN — PIPERACILLIN AND TAZOBACTAM 4.5 G: 4; .5 INJECTION, POWDER, FOR SOLUTION INTRAVENOUS at 20:16

## 2022-07-13 RX ADMIN — INSULIN LISPRO 3 UNITS: 100 INJECTION, SOLUTION INTRAVENOUS; SUBCUTANEOUS at 17:06

## 2022-07-13 RX ADMIN — Medication 2 UNITS: at 17:07

## 2022-07-13 RX ADMIN — Medication 2 TABLET: at 08:21

## 2022-07-13 RX ADMIN — EZETIMIBE 10 MG: 10 TABLET ORAL at 08:21

## 2022-07-13 RX ADMIN — SEVELAMER CARBONATE 1600 MG: 800 TABLET, FILM COATED ORAL at 17:07

## 2022-07-13 RX ADMIN — SEVELAMER CARBONATE 1600 MG: 800 TABLET, FILM COATED ORAL at 11:45

## 2022-07-13 RX ADMIN — B-COMPLEX W/ C & FOLIC ACID TAB 1 MG 1 TABLET: 1 TAB at 08:21

## 2022-07-13 RX ADMIN — HEPARIN SODIUM 5000 UNITS: 5000 INJECTION INTRAVENOUS; SUBCUTANEOUS at 21:48

## 2022-07-13 RX ADMIN — INSULIN LISPRO 3 UNITS: 100 INJECTION, SOLUTION INTRAVENOUS; SUBCUTANEOUS at 08:21

## 2022-07-13 RX ADMIN — ASPIRIN 81 MG: 81 TABLET, CHEWABLE ORAL at 08:21

## 2022-07-13 RX ADMIN — SODIUM CHLORIDE 40 MG: 9 INJECTION, SOLUTION INTRAMUSCULAR; INTRAVENOUS; SUBCUTANEOUS at 08:21

## 2022-07-13 RX ADMIN — AMLODIPINE BESYLATE 10 MG: 5 TABLET ORAL at 08:21

## 2022-07-13 RX ADMIN — Medication 2 TABLET: at 21:49

## 2022-07-13 RX ADMIN — GABAPENTIN 300 MG: 300 CAPSULE ORAL at 21:49

## 2022-07-13 RX ADMIN — SEVELAMER CARBONATE 1600 MG: 800 TABLET, FILM COATED ORAL at 08:21

## 2022-07-13 RX ADMIN — SODIUM CHLORIDE, PRESERVATIVE FREE 10 ML: 5 INJECTION INTRAVENOUS at 05:01

## 2022-07-13 RX ADMIN — ALLOPURINOL 100 MG: 100 TABLET ORAL at 08:21

## 2022-07-13 RX ADMIN — CARVEDILOL 12.5 MG: 12.5 TABLET, FILM COATED ORAL at 21:49

## 2022-07-13 RX ADMIN — INSULIN LISPRO 3 UNITS: 100 INJECTION, SOLUTION INTRAVENOUS; SUBCUTANEOUS at 11:46

## 2022-07-13 RX ADMIN — INSULIN GLARGINE 10 UNITS: 100 INJECTION, SOLUTION SUBCUTANEOUS at 21:50

## 2022-07-13 RX ADMIN — HEPARIN SODIUM 5000 UNITS: 5000 INJECTION INTRAVENOUS; SUBCUTANEOUS at 05:00

## 2022-07-13 RX ADMIN — HEPARIN SODIUM 5000 UNITS: 5000 INJECTION INTRAVENOUS; SUBCUTANEOUS at 14:21

## 2022-07-13 RX ADMIN — PIPERACILLIN AND TAZOBACTAM 4.5 G: 4; .5 INJECTION, POWDER, FOR SOLUTION INTRAVENOUS at 08:21

## 2022-07-13 RX ADMIN — Medication 2 UNITS: at 11:46

## 2022-07-13 RX ADMIN — CARVEDILOL 12.5 MG: 12.5 TABLET, FILM COATED ORAL at 08:22

## 2022-07-13 RX ADMIN — SODIUM CHLORIDE, PRESERVATIVE FREE 10 ML: 5 INJECTION INTRAVENOUS at 21:53

## 2022-07-13 NOTE — PROGRESS NOTES
Bedside and verbal shift change report given to Kierra Singletary RN (on coming nurse) by Naya Carrion RN (off going nurse). Report included the following information SBAR, Kardex, OR Summary, Intake/Output and MAR.    1424 titrate off oxygen. 90% on room air    Shift summary: Wound vac intact. No s/s of distress note. 1915 Bedside and verbal shift change report given by Kierra Singletary RN (off going nurse) to KIM Cai(on coming nurse). Report included the following information SBAR, Kardex, OR Summary, Intake/Output and MAR.

## 2022-07-13 NOTE — PROGRESS NOTES
Comprehensive Nutrition Assessment    Type and Reason for Visit: Reassess    Nutrition Recommendations/Plan:   1. Continue with oral diet to meet nutritional needs. Continue with Nicolás BID to help with wounds. Encourage pt to mix nicolás with water or juice. Malnutrition Assessment:  Malnutrition Status:  No malnutrition (07/01/22 1110)      Nutrition Assessment:    Admitted with Large necrotic ulcer right posterior heel with osteomyelitis of the calcaneus and deep abscess. S/p incision bone cortex right valcaneous, I&D right heel abcsess, deep wound debridement with multiple bone biopsies and application of antibiotic beads. Nutrition Related Findings:    Nephro natividad rx, renvela, protonix, floranex, humalog, lantus. Labs (7/11): GFR est AA 8, BUN 47, creatinine 7.92. BM 7/12. 100% breakfast this AM. Per pr- appetite has been well. States enjoys breakfast the most. Intake at lunch and dinner varies- states tired of food selection however does try to eat something during meals. Noted Nicolás on table. Pt was unsure of how to mix Nicolás. Informed pt to mix with water or juice. pt states will try it again later. Wound Type: Surgical incision    Current Nutrition Intake & Therapies:  Average Meal Intake: %  Average Supplement Intake: Unable to assess  ADULT ORAL NUTRITION SUPPLEMENT Breakfast, Dinner; Wound Healing Supplement  ADULT DIET Regular; 4 carb choices (60 gm/meal)    Anthropometric Measures:  Height: 6' 2\" (188 cm)  Ideal Body Weight (IBW): 190 lbs (86 kg)  Admission Body Weight: 212 lb (per H&P)  Current Body Wt:  103.5 kg (228 lb 2.8 oz), 120.2 % IBW. Bed scale  Current BMI (kg/m2): 29.3  Usual Body Weight: 99.8 kg (220 lb) (1/24/2022)  % Weight Change (Calculated): -3.1  Weight Adjustment: No adjustment                 BMI Category: Overweight (BMI 25.0-29. 9)    Estimated Daily Nutrient Needs:  Energy Requirements Based On: Formula  Weight Used for Energy Requirements: Current  Energy (kcal/day): 1952-6094  Weight Used for Protein Requirements: Current  Protein (g/day): 124-135  Method Used for Fluid Requirements: 1 ml/kcal  Fluid (ml/day): 5353-1079    Nutrition Diagnosis:   · Increased nutrient needs related to acute injury/trauma as evidenced by wounds      Nutrition Interventions:   Food and/or Nutrient Delivery: Continue current diet,Continue oral nutrition supplement  Nutrition Education/Counseling: No recommendations at this time  Coordination of Nutrition Care: Continue to monitor while inpatient       Goals:  Previous Goal Met: Progressing toward goal(s)  Goals: PO intake 75% or greater,by next RD assessment       Nutrition Monitoring and Evaluation:   Behavioral-Environmental Outcomes: None identified  Food/Nutrient Intake Outcomes: Food and nutrient intake,Supplement intake  Physical Signs/Symptoms Outcomes: Biochemical data,Hemodynamic status,Meal time behavior,Nutrition focused physical findings,Skin,Weight    Discharge Planning:    Continue current diet    Marino Wong RD

## 2022-07-13 NOTE — ROUTINE PROCESS
Shift summary: Pt is on bed rest, no complaint nor required pain medication. R foot with cradle boot. R heel attached to wound vac at 125 mmHg - no drainage noted in cannister. No urine output nor BM for shift. Dialysis TTS. Verbal shift change report given to Delilah Paz RN/Sandra nurse extern (oncoming nurse) by Zev Biswas RN   (offgoing nurse). Report included the following information SBAR, Kardex, Intake/Output, MAR and Recent Results.

## 2022-07-13 NOTE — PROGRESS NOTES
Hospitalist Progress Note    Patient: Ana M Crespo MRN: [id number]  CSN: [id number]    YOB: 1960  Age: 60 y.o. Sex: male    DOA: 6/27/2022 LOS:  LOS: 16 days          Chief Complaint:    Chest pain      Assessment/Plan     Evie Saavedra a 60 y. o. male who history of stent, hypertension, Charcot's foot presents with worsening pain and swelling  in his right foot      Large necrotic ulcer right posterior heel with osteomyelitis of the calcaneus and deep abscess-  S/p incision bone cortex right calcaneous, I&D right heel abcsess, deep wound debridement with multiple bone biopsies and application of antibiotic beads done    Hematoma evac, now with wound vac  Monitor graft take per podiatry     Chronic thrombus right IJ  No acute DVT LUE    Chest pain-CXR shows small effusion  Troponin is normal  EKG unchanged  Morphine PRN severe pain, and GI cocktail this am  Monitor for recurrence  On medical therapy fo rhx of CAD  Last stress test 2019 neg for ischemia  monitor     Tunnel line in place-needs long term abx until 8/8     Diabetes mellitus -  On lantus 10units and  premeal insulin      Anemia of ESRD stable     End-stage renal disease on dialysis Tuesday Thursday and Saturday -  S/p [facility] replacement      History of coronary artery disease-  coreg and aspirin      Chronic compressive myelopathy pending surgery -  Supportive care     needs IV abx until 8/8  Will go to SNF once auth received     Sleep Apnea   Restart Cpap wean oxygen  Xray chronic congestion    Disposition :  Patient Active Problem List   Diagnosis Code    Diabetes mellitus with foot ulcer ([facility] E11.621, L97.509    CAD (coronary artery disease) I25.10    ESRD (end stage renal disease) ([address] N18.6    Acute hematogenous [facility]omyelitis of right foot ([facility]) M86.071    Cellulitis of right heel L03.115    Diabetic foot ulcer with osteomyelitis ([address] E11.621, E11.69, L97.509, M86.9    Ulcer of right heel, with necrosis of bone ([facility] L97.414    Infection and inflammatory reaction due to internal fixation device of other site, initial encounter [facility]) T84.69XA    Left arm swelling M79.89       Subjective:    Cdenies chest pain or dyspnea today  Had dialysis yesterday    Review of systems:    Constitutional: denies fevers, chills  Respiratory: denies  cough  Cardiovascular: denies palpitations  Gastrointestinal: denies nausea, vomiting, diarrhea      Vital signs/Intake and Output:  Visit Vitals  BP (!) 163/86 (BP 1 Location: Right upper arm, BP Patient Position: At rest)   Pulse 70   Temp 98.1 °F (36.7 °C)   Resp 16   Ht 6' 2\" (1.88 m)   Wt 103.5 kg (228 lb 3.2 oz)   SpO2 98%   BMI 29.30 kg/m²     Current Shift:  No intake/output data recorded. Last three shifts:  07/11 1901 - 07/13 0700  In: -   Out: 2500     Exam:    General: chronically debilitated AAM, NAD ox3  CVS:Regular rate and rhythm, no M/R/G, S1/S2 heard, no thrill  Lungs:Clear to auscultation bilaterally, no wheezes, rhonchi, or rales  Abdomen: Soft, Nontender, No distention, Normal Bowel sounds  Extremities: heel floats, LUE with reduced edema, no tenderness  Neuro:grossly normal , follows commands  Psych:appropriate                Labs: Results:       Chemistry Recent Labs     07/11/22  0530   *      K 5.0      CO2 26   BUN 47*   CREA 7.92*   CA 8.0*   AGAP 10   BUCR 6*   ALB 2.6*      CBC w/Diff Recent Labs     07/12/22  0228 07/11/22  0530   WBC 11.0 10.5   RBC 2.91* 2.98*   HGB 9.0* 9.2*   HCT 28.9* 29.1*    209   GRANS 74* 69   LYMPH 11* 15*   EOS 5 5      Cardiac Enzymes No results for input(s): CPK, CKND1, PAULO in the last 72 hours. No lab exists for component: CKRMB, TROIP   Coagulation No results for input(s): PTP, INR, APTT, INREXT, INREXT in the last 72 hours.     Lipid Panel No results found for: CHOL, CHOLPOCT, CHOLX, CHLST, CHOLV, 556351, HDL, HDLP, LDL, LDLC, DLDLP, 203135, VLDLC, VLDL, TGLX, TRIGL, TRIGP, TGLPOCT, CHHD, CHHDX BNP No results for input(s): BNPP in the last 72 hours.    Liver Enzymes Recent Labs     07/11/22  0530   ALB 2.6*      Thyroid Studies No results found for: T4, T3U, TSH, TSHEXT, TSHEXT     Procedures/imaging: see electronic medical records for all procedures/Xrays and details which were not copied into this note but were reviewed prior to creation of Erin Beckwith MD

## 2022-07-13 NOTE — PROGRESS NOTES
Nephrology Progress note    Subjective:     Ramya Gaspar is a 60 y.o. male with  a PMH of DM, HTN, CAD, ESRD on HD TTS admitted for right foot infection. MRI suggested osteomyelitis. No fever, chills. Pt s/p debridement, I&D- on abx.     HD Catheter fell off and was replaced 6/29.      Last HD session 7/12    Denies CP/SOB/Fever/Chills       Admit Date: 6/27/2022  Principal Problem:    Acute hematogenous osteomyelitis of right foot ([facility] (6/28/2022)    Active Problems:    Diabetes mellitus with foot ulcer ([facility] (6/27/2022)      CAD (coronary artery disease) (6/28/2022)      ESRD (end stage renal disease) ([facility] (6/28/2022)      Cellulitis of right heel (6/28/2022)      Diabetic foot ulcer with osteomyelitis ([facility] (6/28/2022)      Ulcer of right heel, with necrosis of bone ([facility] (6/28/2022)      Infection and inflammatory reaction due to internal fixation device of other site, initial encounter ([facility] (7/5/2022)      Left arm swelling (7/10/2022)      Current Facility-Administered Medications   Medication Dose Route Frequency    nitroglycerin (NITROSTAT) tablet 0.4 mg  0.4 mg SubLINGual PRN    pantoprazole (PROTONIX) 40 mg in 0.9% sodium chloride 10 mL injection  40 mg IntraVENous DAILY    morphine injection 1 mg  1 mg IntraVENous Q4H PRN    ondansetron (ZOFRAN) injection 4 mg  4 mg IntraVENous Q6H PRN    gabapentin (NEURONTIN) capsule 300 mg  300 mg Oral QHS    heparin (porcine) injection 5,000 Units  5,000 Units SubCUTAneous Q8H    melatonin (rapid dissolve) tablet 10 mg  10 mg Oral QHS PRN    epoetin lisette-epbx (RETACRIT) injection 10,000 Units  10,000 Units SubCUTAneous Q TUE, THU & SAT    heparin (porcine) 1,000 unit/mL injection 3,400 Units  3,400 Units Hemodialysis DIALYSIS PRN    heparin (porcine) 100 unit/mL injection 500 Units  500 Units InterCATHeter Q8H PRN    sodium chloride (NS) flush 5-40 mL  5-40 mL IntraVENous Q8H    sodium chloride (NS) flush 5-40 mL  5-40 mL IntraVENous PRN    fentaNYL citrate (PF) injection  mcg   mcg IntraVENous Rad Multiple    naloxone (NARCAN) injection 0.1 mg  0.1 mg IntraVENous Multiple    loperamide (IMODIUM) capsule 2 mg  2 mg Oral Q4H PRN    piperacillin-tazobactam (ZOSYN) 4.5 g in 0.9% sodium chloride (MBP/ADV) 100 mL MBP  4.5 g IntraVENous Q12H    sevelamer carbonate (RENVELA) tab 1,600 mg  1,600 mg Oral TID WITH MEALS    Lactobacillus Acidoph & Bulgar (FLORANEX) tablet 2 Tablet  2 Tablet Oral BID    allopurinoL (ZYLOPRIM) tablet 100 mg  100 mg Oral DAILY    carvediloL (COREG) tablet 12.5 mg  12.5 mg Oral BID    ezetimibe (ZETIA) tablet 10 mg  10 mg Oral DAILY    amLODIPine (NORVASC) tablet 10 mg  10 mg Oral DAILY    vit B Cmplx 3-FA-Vit C-Biotin (NEPHRO NIKITA RX) tablet 1 Tablet  1 Tablet Oral DAILY    insulin lispro (HUMALOG) injection 3 Units  3 Units SubCUTAneous TIDAC    aspirin chewable tablet 81 mg  81 mg Oral DAILY    insulin glargine (LANTUS) injection 10 Units  10 Units SubCUTAneous QHS    insulin lispro (HUMALOG) injection   SubCUTAneous AC&HS    glucose chewable tablet 16 g  4 Tablet Oral PRN    glucagon (GLUCAGEN) injection 1 mg  1 mg IntraMUSCular PRN    dextrose 10% infusion 0-250 mL  0-250 mL IntraVENous PRN    acetaminophen (TYLENOL) tablet 650 mg  650 mg Oral Q6H PRN         Allergy:   No Known Allergies     Objective:     Visit Vitals  /64 (BP 1 Location: Right upper arm, BP Patient Position: At rest)   Pulse 69   Temp 98.2 °F (36.8 °C)   Resp 18   Ht 6' 2\" (1.88 m)   Wt 103.5 kg (228 lb 3.2 oz)   SpO2 93%   BMI 29.30 kg/m²         Intake/Output Summary (Last 24 hours) at 7/13/2022 1811  Last data filed at 7/12/2022 1814  Gross per 24 hour   Intake --   Output 2500 ml   Net -2500 ml       Physical Exam:       General: No acute distress   HENT: Atraumatic and normocephalic   Eyes: Normal conjunctiva   Neck: Supple No JVD   Cardiovascular: Normal S1 & S2, no m/r/g   Pulmonary/Chest Wall: Clear to auscultation bilaterally   Abdominal: Soft and non-tender   Musculoskeletal: Wound Vac on R foot   Neurological: AA and O X 3, No focal deficits     RIJ TDC in Place     Data Review:  Lab Results   Component Value Date/Time    Sodium 142 07/11/2022 05:30 AM    Potassium 5.0 07/11/2022 05:30 AM    Chloride 106 07/11/2022 05:30 AM    CO2 26 07/11/2022 05:30 AM    Anion gap 10 07/11/2022 05:30 AM    Glucose 117 (H) 07/11/2022 05:30 AM    BUN 47 (H) 07/11/2022 05:30 AM    Creatinine 7.92 (H) 07/11/2022 05:30 AM    BUN/Creatinine ratio 6 (L) 07/11/2022 05:30 AM    GFR est AA 8 (L) 07/11/2022 05:30 AM    GFR est non-AA 7 (L) 07/11/2022 05:30 AM    Calcium 8.0 (L) 07/11/2022 05:30 AM     Lab Results   Component Value Date/Time    WBC 11.0 07/12/2022 02:28 AM    HGB 9.0 (L) 07/12/2022 02:28 AM    HCT 28.9 (L) 07/12/2022 02:28 AM    PLATELET 255 57/21/7585 02:28 AM    MCV 99.3 07/12/2022 02:28 AM     Lab Results   Component Value Date/Time    Calcium 8.0 (L) 07/11/2022 05:30 AM    Phosphorus 4.8 07/11/2022 05:30 AM     No results found for: IRON, FE, TIBC, IBCT, PSAT, FERR  No results found for: FERR      Impression:     ESRD on HD  Diabetic foot ulcer with osteomyelitis  DM  HTN  Anemia in CKD  SHPT      Plan:     HD tomorrow 7/14  IV Antibiotics: Zosyn  DALTON  Renvela for Phos binder       Radha Kim MD,  [address]  [phone]

## 2022-07-14 LAB
ATRIAL RATE: 63 BPM
CALCULATED P AXIS, ECG09: 94 DEGREES
CALCULATED R AXIS, ECG10: -34 DEGREES
CALCULATED T AXIS, ECG11: 61 DEGREES
DIAGNOSIS, 93000: NORMAL
GLUCOSE BLD STRIP.AUTO-MCNC: 159 MG/DL (ref 70–110)
GLUCOSE BLD STRIP.AUTO-MCNC: 240 MG/DL (ref 70–110)
GLUCOSE BLD STRIP.AUTO-MCNC: 97 MG/DL (ref 70–110)
GLUCOSE BLD STRIP.AUTO-MCNC: 98 MG/DL (ref 70–110)
P-R INTERVAL, ECG05: 170 MS
Q-T INTERVAL, ECG07: 494 MS
QRS DURATION, ECG06: 90 MS
QTC CALCULATION (BEZET), ECG08: 505 MS
VENTRICULAR RATE, ECG03: 63 BPM

## 2022-07-14 PROCEDURE — 74011250637 HC RX REV CODE- 250/637: Performed by: INTERNAL MEDICINE

## 2022-07-14 PROCEDURE — 94660 CPAP INITIATION&MGMT: CPT

## 2022-07-14 PROCEDURE — 90935 HEMODIALYSIS ONE EVALUATION: CPT

## 2022-07-14 PROCEDURE — 74011250636 HC RX REV CODE- 250/636: Performed by: PHYSICIAN ASSISTANT

## 2022-07-14 PROCEDURE — 74011250636 HC RX REV CODE- 250/636: Performed by: HOSPITALIST

## 2022-07-14 PROCEDURE — 74011250636 HC RX REV CODE- 250/636: Performed by: INTERNAL MEDICINE

## 2022-07-14 PROCEDURE — 74011250637 HC RX REV CODE- 250/637: Performed by: HOSPITALIST

## 2022-07-14 PROCEDURE — 65270000029 HC RM PRIVATE

## 2022-07-14 PROCEDURE — 74011250637 HC RX REV CODE- 250/637: Performed by: FAMILY MEDICINE

## 2022-07-14 PROCEDURE — C9113 INJ PANTOPRAZOLE SODIUM, VIA: HCPCS | Performed by: HOSPITALIST

## 2022-07-14 PROCEDURE — 74011636637 HC RX REV CODE- 636/637: Performed by: FAMILY MEDICINE

## 2022-07-14 PROCEDURE — 74011636637 HC RX REV CODE- 636/637: Performed by: INTERNAL MEDICINE

## 2022-07-14 PROCEDURE — 74011000250 HC RX REV CODE- 250: Performed by: HOSPITALIST

## 2022-07-14 PROCEDURE — 82962 GLUCOSE BLOOD TEST: CPT

## 2022-07-14 PROCEDURE — 74011000250 HC RX REV CODE- 250: Performed by: RADIOLOGY

## 2022-07-14 PROCEDURE — 74011000258 HC RX REV CODE- 258: Performed by: PHYSICIAN ASSISTANT

## 2022-07-14 PROCEDURE — 74011636637 HC RX REV CODE- 636/637: Performed by: HOSPITALIST

## 2022-07-14 PROCEDURE — 74011000250 HC RX REV CODE- 250: Performed by: INTERNAL MEDICINE

## 2022-07-14 PROCEDURE — 97110 THERAPEUTIC EXERCISES: CPT

## 2022-07-14 PROCEDURE — 97530 THERAPEUTIC ACTIVITIES: CPT

## 2022-07-14 PROCEDURE — 5A09357 ASSISTANCE WITH RESPIRATORY VENTILATION, LESS THAN 24 CONSECUTIVE HOURS, CONTINUOUS POSITIVE AIRWAY PRESSURE: ICD-10-PCS | Performed by: HOSPITALIST

## 2022-07-14 RX ORDER — PANTOPRAZOLE SODIUM 40 MG/1
40 TABLET, DELAYED RELEASE ORAL
Status: DISCONTINUED | OUTPATIENT
Start: 2022-07-15 | End: 2022-07-21 | Stop reason: HOSPADM

## 2022-07-14 RX ADMIN — SEVELAMER CARBONATE 1600 MG: 800 TABLET, FILM COATED ORAL at 09:03

## 2022-07-14 RX ADMIN — GABAPENTIN 300 MG: 300 CAPSULE ORAL at 21:24

## 2022-07-14 RX ADMIN — PIPERACILLIN AND TAZOBACTAM 4.5 G: 4; .5 INJECTION, POWDER, FOR SOLUTION INTRAVENOUS at 20:10

## 2022-07-14 RX ADMIN — INSULIN GLARGINE 10 UNITS: 100 INJECTION, SOLUTION SUBCUTANEOUS at 21:25

## 2022-07-14 RX ADMIN — ALLOPURINOL 100 MG: 100 TABLET ORAL at 09:02

## 2022-07-14 RX ADMIN — WATER 1 MG: 1 INJECTION INTRAMUSCULAR; INTRAVENOUS; SUBCUTANEOUS at 17:31

## 2022-07-14 RX ADMIN — SODIUM CHLORIDE 40 MG: 9 INJECTION, SOLUTION INTRAMUSCULAR; INTRAVENOUS; SUBCUTANEOUS at 09:02

## 2022-07-14 RX ADMIN — PIPERACILLIN AND TAZOBACTAM 4.5 G: 4; .5 INJECTION, POWDER, FOR SOLUTION INTRAVENOUS at 09:02

## 2022-07-14 RX ADMIN — CARVEDILOL 12.5 MG: 12.5 TABLET, FILM COATED ORAL at 09:03

## 2022-07-14 RX ADMIN — INSULIN LISPRO 3 UNITS: 100 INJECTION, SOLUTION INTRAVENOUS; SUBCUTANEOUS at 09:01

## 2022-07-14 RX ADMIN — Medication 2 TABLET: at 09:03

## 2022-07-14 RX ADMIN — EZETIMIBE 10 MG: 10 TABLET ORAL at 09:02

## 2022-07-14 RX ADMIN — SODIUM CHLORIDE, PRESERVATIVE FREE 10 ML: 5 INJECTION INTRAVENOUS at 21:26

## 2022-07-14 RX ADMIN — SODIUM CHLORIDE, PRESERVATIVE FREE 10 ML: 5 INJECTION INTRAVENOUS at 06:26

## 2022-07-14 RX ADMIN — CARVEDILOL 12.5 MG: 12.5 TABLET, FILM COATED ORAL at 21:24

## 2022-07-14 RX ADMIN — Medication 2 UNITS: at 09:02

## 2022-07-14 RX ADMIN — Medication 2 TABLET: at 21:24

## 2022-07-14 RX ADMIN — HEPARIN SODIUM 5000 UNITS: 5000 INJECTION INTRAVENOUS; SUBCUTANEOUS at 21:24

## 2022-07-14 RX ADMIN — EPOETIN ALFA-EPBX 10000 UNITS: 10000 INJECTION, SOLUTION INTRAVENOUS; SUBCUTANEOUS at 21:24

## 2022-07-14 RX ADMIN — HEPARIN SODIUM 5000 UNITS: 5000 INJECTION INTRAVENOUS; SUBCUTANEOUS at 06:26

## 2022-07-14 RX ADMIN — AMLODIPINE BESYLATE 10 MG: 5 TABLET ORAL at 09:02

## 2022-07-14 RX ADMIN — WATER 1 MG: 1 INJECTION INTRAMUSCULAR; INTRAVENOUS; SUBCUTANEOUS at 17:30

## 2022-07-14 RX ADMIN — B-COMPLEX W/ C & FOLIC ACID TAB 1 MG 1 TABLET: 1 TAB at 09:02

## 2022-07-14 RX ADMIN — SEVELAMER CARBONATE 1600 MG: 800 TABLET, FILM COATED ORAL at 16:25

## 2022-07-14 RX ADMIN — ASPIRIN 81 MG: 81 TABLET, CHEWABLE ORAL at 09:02

## 2022-07-14 RX ADMIN — Medication 4 UNITS: at 21:25

## 2022-07-14 NOTE — PROGRESS NOTES
Hospitalist Progress Note    Patient: Bryce Goldstein MRN: [id number]  CSN: [id number]    YOB: 1960  Age: 60 y.o. Sex: male    DOA: 6/27/2022 LOS:  LOS: 17 days          Chief Complaint:    Foot infection      Assessment/Plan     Corinna Christopher a 60 y. o. male who history of stent, hypertension, Charcot's foot presents with worsening pain and swelling in his right foot      Large necrotic ulcer right posterior heel with osteomyelitis of the calcaneus and deep abscess-  S/p incision bone cortex right calcaneous, I&D right heel abcsess, deep wound debridement with multiple bone biopsies and application of antibiotic beads done    Hematoma evac, now with wound vac  Monitor graft take per podiatry     Chronic thrombus right IJ  No acute DVT LUE  No DVT in legs     Chest pain-CXR shows small effusion  Troponin is normal  EKG unchanged  Monitor for recurrence  On medical therapy fo rhx of CAD  Last stress test 2019 neg for ischemia     Tunnel line in place-needs long term abx until 8/8     Diabetes mellitus -  On lantus 10units and  premeal insulin      Anemia of ESRD stable     End-stage renal disease on dialysis Tuesday Thursday and Saturday -  S/p [facility] replacement      History of coronary artery disease-  coreg and aspirin      Chronic compressive myelopathy pending surgery -  Supportive care     needs IV abx until 8/8  Will go to SNF once auth received     Sleep Apnea   Restart Cpap wean oxygen    Disposition :  Patient Active Problem List   Diagnosis Code    Diabetes mellitus with foot ulcer ([address] E11.621, L97.509    CAD (coronary artery disease) I25.10    ESRD (end stage renal disease) ([address] N18.6    Acute hematogenous osteomyelitis of right foot ([facility]) M86.071    Cellulitis of right heel L03.115    Diabetic foot ulcer with osteomyelitis ([address] E11.621, E11.69, L97.509, M86.9    Ulcer of right heel, with necrosis of bone ([address] L97.414    Infection and inflammatory reaction due to internal fixation device of other site, initial encounter [facility]) T84.69XA    Left arm swelling M79.89       Subjective:    Doing better  Resting this am  Left arm swelling better  Has a cpap now  No changes with foot, wound vac on    Review of systems:    Constitutional: denies fevers, chills  Respiratory: denies SOB  Cardiovascular: denies chest pain  Gastrointestinal: denies nausea, vomiting, diarrhea      Vital signs/Intake and Output:  Visit Vitals  BP (!) 148/64 (BP 1 Location: Right upper arm, BP Patient Position: At rest)   Pulse 75   Temp 97.9 °F (36.6 °C)   Resp 18   Ht 6' 2\" (1.88 m)   Wt 103.5 kg (228 lb 3.2 oz)   SpO2 93%   BMI 29.30 kg/m²     Current Shift:  No intake/output data recorded. Last three shifts:  07/12 1901 - 07/14 0700  In: -   Out: 25 [Drains:25]    Exam:    General: chronically ill AAM, alert, NAD, OX3  CVS:Regular rate and rhythm, no M/R/G, S1/S2 heard, no thrill  Lungs:Clear to auscultation bilaterally, no wheezes, rhonchi, or rales  Abdomen: Soft, Nontender, No distention, Normal Bowel sounds  Extremities: right foot dressed and vac in place  Neuro:grossly normal , follows commands  Psych:appropriate                Labs: Results:       Chemistry No results for input(s): GLU, NA, K, CL, CO2, BUN, CREA, CA, AGAP, BUCR, TBIL, AP, TP, ALB, GLOB, AGRAT in the last 72 hours. No lab exists for component: GPT   CBC w/Diff Recent Labs     07/12/22  0228   WBC 11.0   RBC 2.91*   HGB 9.0*   HCT 28.9*      GRANS 74*   LYMPH 11*   EOS 5      Cardiac Enzymes No results for input(s): CPK, CKND1, PAULO in the last 72 hours. No lab exists for component: CKRMB, TROIP   Coagulation No results for input(s): PTP, INR, APTT, INREXT in the last 72 hours. Lipid Panel No results found for: CHOL, CHOLPOCT, CHOLX, CHLST, CHOLV, 172819, HDL, HDLP, LDL, LDLC, DLDLP, 926979, VLDLC, VLDL, TGLX, TRIGL, TRIGP, TGLPOCT, CHHD, CHHDX   BNP No results for input(s): BNPP in the last 72 hours.    Liver Enzymes No results for input(s): TP, ALB, TBIL, AP in the last 72 hours.     No lab exists for component: SGOT, GPT, DBIL   Thyroid Studies No results found for: T4, T3U, TSH, TSHEXT     Procedures/imaging: see electronic medical records for all procedures/Xrays and details which were not copied into this note but were reviewed prior to creation of Ashlee Terrazas MD

## 2022-07-14 NOTE — PROGRESS NOTES
Nephrology Progress note    Subjective:     Blair Hutchison is a 60 y.o. male with  a PMH of DM, HTN, CAD, ESRD on HD TTS admitted for right foot infection. MRI suggested osteomyelitis. No fever, chills. Pt s/p debridement, I&D- on abx.     HD Catheter fell off and was replaced 6/29.       -Pt seen on HD.  Denies CP/SOB/Fever/Chills       Admit Date: 6/27/2022  Principal Problem:    Acute hematogenous osteomyelitis of right foot ([facility] (6/28/2022)    Active Problems:    Diabetes mellitus with foot ulcer ([address] (6/27/2022)      CAD (coronary artery disease) (6/28/2022)      ESRD (end stage renal disease) ([facility] (6/28/2022)      Cellulitis of right heel (6/28/2022)      Diabetic foot ulcer with osteomyelitis ([facility] (6/28/2022)      Ulcer of right heel, with necrosis of bone ([facility] (6/28/2022)      Infection and inflammatory reaction due to internal fixation device of other site, initial encounter ([facility] (7/5/2022)      Left arm swelling (7/10/2022)      Current Facility-Administered Medications   Medication Dose Route Frequency    [START ON 7/15/2022] pantoprazole (PROTONIX) tablet 40 mg  40 mg Oral ACB    alteplase (CATHFLO) 1 mg in sterile water (preservative free) 1 mL injection  1 mg InterCATHeter DIALYSIS PRN    nitroglycerin (NITROSTAT) tablet 0.4 mg  0.4 mg SubLINGual PRN    morphine injection 1 mg  1 mg IntraVENous Q4H PRN    ondansetron (ZOFRAN) injection 4 mg  4 mg IntraVENous Q6H PRN    gabapentin (NEURONTIN) capsule 300 mg  300 mg Oral QHS    heparin (porcine) injection 5,000 Units  5,000 Units SubCUTAneous Q8H    melatonin (rapid dissolve) tablet 10 mg  10 mg Oral QHS PRN    epoetin lisette-epbx (RETACRIT) injection 10,000 Units  10,000 Units SubCUTAneous Q TUE, THU & SAT    heparin (porcine) 1,000 unit/mL injection 3,400 Units  3,400 Units Hemodialysis DIALYSIS PRN    heparin (porcine) 100 unit/mL injection 500 Units  500 Units InterCATHeter Q8H PRN    sodium chloride (NS) flush 5-40 mL  5-40 mL IntraVENous Q8H    sodium chloride (NS) flush 5-40 mL  5-40 mL IntraVENous PRN    fentaNYL citrate (PF) injection  mcg   mcg IntraVENous Rad Multiple    naloxone (NARCAN) injection 0.1 mg  0.1 mg IntraVENous Multiple    loperamide (IMODIUM) capsule 2 mg  2 mg Oral Q4H PRN    piperacillin-tazobactam (ZOSYN) 4.5 g in 0.9% sodium chloride (MBP/ADV) 100 mL MBP  4.5 g IntraVENous Q12H    sevelamer carbonate (RENVELA) tab 1,600 mg  1,600 mg Oral TID WITH MEALS    Lactobacillus Acidoph & Bulgar (FLORANEX) tablet 2 Tablet  2 Tablet Oral BID    allopurinoL (ZYLOPRIM) tablet 100 mg  100 mg Oral DAILY    carvediloL (COREG) tablet 12.5 mg  12.5 mg Oral BID    ezetimibe (ZETIA) tablet 10 mg  10 mg Oral DAILY    amLODIPine (NORVASC) tablet 10 mg  10 mg Oral DAILY    vit B Cmplx 3-FA-Vit C-Biotin (NEPHRO NIKITA RX) tablet 1 Tablet  1 Tablet Oral DAILY    insulin lispro (HUMALOG) injection 3 Units  3 Units SubCUTAneous TIDAC    aspirin chewable tablet 81 mg  81 mg Oral DAILY    insulin glargine (LANTUS) injection 10 Units  10 Units SubCUTAneous QHS    insulin lispro (HUMALOG) injection   SubCUTAneous AC&HS    glucose chewable tablet 16 g  4 Tablet Oral PRN    glucagon (GLUCAGEN) injection 1 mg  1 mg IntraMUSCular PRN    dextrose 10% infusion 0-250 mL  0-250 mL IntraVENous PRN    acetaminophen (TYLENOL) tablet 650 mg  650 mg Oral Q6H PRN         Allergy:   No Known Allergies     Objective:     Visit Vitals  BP (!) 143/59 (BP 1 Location: Right upper arm, BP Patient Position: At rest;Lying)   Pulse 78   Temp 98.9 °F (37.2 °C)   Resp 18   Ht 6' 2\" (1.88 m)   Wt 104.4 kg (230 lb 3.2 oz)   SpO2 91%   BMI 29.56 kg/m²         Intake/Output Summary (Last 24 hours) at 7/14/2022 1815  Last data filed at 7/14/2022 0935  Gross per 24 hour   Intake 440 ml   Output 25 ml   Net 415 ml       Physical Exam:       General: No acute distress   HENT: Atraumatic and normocephalic   Eyes: Normal conjunctiva   Neck: Supple No JVD   Cardiovascular: Normal S1 & S2, no m/r/g   Pulmonary/Chest Wall: Clear to auscultation bilaterally   Abdominal: Soft and non-tender   Musculoskeletal: Wound Vac on R foot   Neurological: AA and O X 3, No focal deficits     RIJ TDC in Place     Data Review:  Lab Results   Component Value Date/Time    Sodium 142 07/11/2022 05:30 AM    Potassium 5.0 07/11/2022 05:30 AM    Chloride 106 07/11/2022 05:30 AM    CO2 26 07/11/2022 05:30 AM    Anion gap 10 07/11/2022 05:30 AM    Glucose 117 (H) 07/11/2022 05:30 AM    BUN 47 (H) 07/11/2022 05:30 AM    Creatinine 7.92 (H) 07/11/2022 05:30 AM    BUN/Creatinine ratio 6 (L) 07/11/2022 05:30 AM    GFR est AA 8 (L) 07/11/2022 05:30 AM    GFR est non-AA 7 (L) 07/11/2022 05:30 AM    Calcium 8.0 (L) 07/11/2022 05:30 AM     Lab Results   Component Value Date/Time    WBC 11.0 07/12/2022 02:28 AM    HGB 9.0 (L) 07/12/2022 02:28 AM    HCT 28.9 (L) 07/12/2022 02:28 AM    PLATELET 917 16/00/3782 02:28 AM    MCV 99.3 07/12/2022 02:28 AM     Lab Results   Component Value Date/Time    Calcium 8.0 (L) 07/11/2022 05:30 AM    Phosphorus 4.8 07/11/2022 05:30 AM     No results found for: IRON, FE, TIBC, IBCT, PSAT, FERR  No results found for: FERR      Impression:   -ESRD on HD  -Diabetic foot ulcer with osteomyelitis  -DM  -HTN  -Anemia in CKD  -SHPT      Plan:   HD today  IV Antibiotics: Zosyn  DALTON  Renvela for Phos binder         MD Bonifacio Duran  [phone]

## 2022-07-14 NOTE — PROGRESS NOTES
Occupational Therapy Treatment Attempt     Chart reviewed. Attempted Occupational Therapy Treatment, however, patient unable to be seen due to:  []  Nausea/vomiting  []  Eating  []  Pain  []  Patient too lethargic  []  Off Unit for testing/procedure  [x]  Dialysis treatment in progress  []  Telemetry Results  []  Other:      Will f/u later as patient's schedule allows.   Kylee Prado, OTR/L

## 2022-07-14 NOTE — PROGRESS NOTES
IV to PO Conversion Recommendation     Patient: Ramya Gaspar   MRN#: [id number]   Admission Date: [id number]     IV TO PO  Medication(s) Pantoprazole   Oral Meds  Yes   Diet:     Active Orders   Diet    ADULT DIET Regular; 4 carb choices (60 gm/meal)      TEMP 98.7 °F (37.1 °C)   WBC Lab Results   Component Value Date/Time    WBC 11.0 07/12/2022 02:28 AM           Pharmacy Dosing Services:  Summary:   Does the patient meet all criteria for IV to PO switch as listed below? Yes   If no, list:    Is the patient excluded from IV to PO automatic switch based on criteria listed below? No   If yes, list:      Criteria for IV to PO switch - Antibiotics   Received IV therapy for at least 24 hours   2. Functioning GI tract   Taking scheduled oral medications  Tolerating diet more advanced than clear liquids   3. No signs/symptoms of shock  No vasopressor support    Criteria for IV to PO switch - Nonantibiotics   Functioning GI tract   Taking scheduled oral medications  Toleratind duet more advanced than clear liquids  2. No signs/symptoms of shock  No vasopressor support        3. No seizures for >72 hours (antiepileptic medications only)    Exclusion Criteria   Patient is being treated for an infection that requires IV therapy such as: endocarditis, osteomyelitis, meningitis, pancreatitis (antibiotics only)   NG tube with continous suction   Nausea and/or vomiting or severe diarrhea within past 24 hours  Malabsorption syndromes, partial or total gastrectomy, ileus, gastric outlet or bowel obstruction  Active GI bleeding   Significant painful oral ulceration  Unable to swallow  On total parenteral nutrition with a NPO order  NPO  Febrile in last 24 hours (antibiotics only)  Clinically deteriorating or unstable (antibiotics only)      Assessment/Recommendation:    Pt on REGUALR DIET for several days    This IV to PO conversion is based on the [facility] P&T approved automatic conversion policy for eligible patients. Please call with questions.     Juan Francisco Romero  Pharmacist  [phone]

## 2022-07-14 NOTE — PROGRESS NOTES
Problem: Mobility Impaired (Adult and Pediatric)  Goal: *Acute Goals and Plan of Care (Insert Text)  Description: Physical Therapy Goals  Initiated 7/8/2022 and to be accomplished within 7 day(s)  1. Patient will move from supine to sit and sit to supine  in bed with supervision/set-up. 2.  Patient will transfer from bed to chair and chair to bed with minimal assistance/contact guard assist using the least restrictive device. 3.  Patient will perform sit to stand with moderate assistance . Note:   physical Therapy TREATMENT    Patient: Phill Yang (67 y.o. male)  Date: 7/14/2022  Diagnosis: Diabetes mellitus with foot ulcer ([facility] [L23.958, L97.509] Acute hematogenous osteomyelitis of right foot ([facility]  Procedure(s) (LRB):  RIGHT HEEL DEBRIDEMENT WITH GRAFTING,REMOVAL OF SCREW  (PT IN ROOM #325) WITH C-ARM (Right) 9 Days Post-Op  Precautions: Fall,NWB   Chart, physical therapy assessment, plan of care and goals were reviewed. ASSESSMENT:  Pt continues to be motivated to participate in PT session and motivated to improve functional mobility. Pt with area of skin stuck to prevalon boot; appreciate RN assistance to address. Pt recently finished dialysis however still able to tolerate PT. Transition to EOB with CGA. Pt tolerates supine and seated there ex for LE and core strengthening. Pt performed scooting up EOB and tricep press with Justine-ModA. Pt transitioned to EOB with CGA. Will continue to progress as pt tolerates. Progression toward goals:  [x]      Improving appropriately and progressing toward goals  []      Improving slowly and progressing toward goals  []      Not making progress toward goals and plan of care will be adjusted     PLAN:  Patient continues to benefit from skilled intervention to address the above impairments. Continue treatment per established plan of care.   Discharge Recommendations:  Hakan Story  Further Equipment Recommendations for Discharge:  N/A SUBJECTIVE:   Patient stated I really want to work with you.     OBJECTIVE DATA SUMMARY:   Critical Behavior:  Neurologic State: Eyes open spontaneously  Orientation Level: Oriented X4  Cognition: Follows commands  Safety/Judgement: Fall prevention  Functional Mobility Training:  Bed Mobility:   Supine to Sit: Contact guard assistance  Sit to Supine: Contact guard assistance  Scooting: Minimum assistance; Moderate assistance  Balance:  Sitting: Intact  Therapeutic Exercises:   Seated there ex: LAQ, marches, gentle hamstring stretch, x10 reps ea  Activity Tolerance:   Good  Please refer to the flowsheet for vital signs taken during this treatment.   After treatment:   [] Patient left in no apparent distress sitting up in chair  [x] Patient left in no apparent distress in bed  [x] Call bell left within reach  [x] Nursing notified  [] Caregiver present  [] Bed alarm activated      Fausto Lopez   Time Calculation: 54 mins

## 2022-07-14 NOTE — PROGRESS NOTES
7/14/2022  PT treatment not completed due to:  [] Off Unit for testing/procedure  [] Pain  [] Eating  [] Patient too lethargic  [] Nausea/vomiting  [x] Dialysis treatment in progress   [] Other: pt with 1 hr 45 min treatment time left per HD nurse. Will f/u at later time for PT treatment. Thank you.    Nelson Cash, PT

## 2022-07-14 NOTE — PROGRESS NOTES
PENDING INSURANCE AUTHORIZATION     D/C Plan: [address] spoke with the above facility and pt will need current PT and OT notes with in a 48 hour period submitted to pt's insurance company to assist with obtaining authorization for SNF. Noted pt was unable to work with therapy today due to pt receiving dialysis. Anticipate pt will transition to the above facility once insurance auth has been obtained.  Pt will arrange handi ride to transport him to and from dialysis from the facility.  CM to continue to follow and assist.     Transition of care to SNF: [facility]      Communication to Patient/Family:  Met with patient and family, and they are agreeable to the transition plan. The Plan for Transition of Care is related to the following treatment goals: SNF     The Patient and/or patient representative was provided with a choice of provider and agrees   with the discharge plan.  Yes [x]???? No []????     Freedom of choice list was provided with basic dialogue that supports the patient's individualized plan of care/goals and shares the quality data associated with the providers.     Yes [x]???? No []????     SNF/Rehab Transition:  Patient has been accepted to [facility] at [address] [facility] meets criteria for admission.   Patient will transported by medical transport and expected to [address] has been obtained.       Communication to SNF/Rehab:  Bedside RN has been notified to update the transition plan to the facility and call report [phone]     Discharge information has been updated on the AVS and sent via [facility] and/or CC link.    Discharge instructions to be fax'd to facility at      Please include all hard scripts for controlled substances, med rec and dc summary, and AVS in packet.  Please medicate for pain prior to dc if possible and needed to help offset delay when patient first arrives to facility.     Reviewed and confirmed with facility, [address] can manage the patient care needs for the following:      [place] with (X) only those applicable:  Medication:  []????Medications are available at the facility  []????IV Antibiotics    []???? Controlled Substance - hard copies available sent.  []????Weekly Labs     Equipment:  []????CPAP/BiPAP  []???? Wound Vacuum  []????[place] or Urinary Device  []????PICC/Central Line  []????Nebulizer  []????Ventilator     Treatment:  []????Isolation (for MRSA, VRE, etc.)  []????Surgical Drain Management  []????Tracheostomy Care  []????Dressing Changes  []????Dialysis with transportation  []????PEG Care  []?? ??Oxygen  []????Daily Weights for Heart Failure     Dietary:  []?? ?? Any diet limitations  []????Tube Feedings   []???? Total Parenteral Management (TPN)     Financial Resources:  []????Medicaid Application Completed     []????UAI Completed and copy given to pt/family     []????A screening has previously been completed.     []?? ??Level II Completed     []???? Private pay individual who will not become   financially eligible for Medicaid within 6 months from admission to a [address] [facility].      []???? Individual refused to have screening conducted.      []????Medicaid Application Completed     []?? ?? The screening denied because it was determined individual did not need/did not qualify for nursing facility level of care.  []???? Out of state residents seeking direct admission to a [address] facility.  []???? Individuals who are inpatients of an out of state hospital, or in state or out of state veterans/ hospital and seek direct admission to a [address] facility  []???? Individuals who are pateints or residents of a state owned/operated facility that is licensed by [employer] ([employer]) and seek direct admission to [address] [place] [address]  []???? A screening not required for enrollment in [facility] Hospice services as set out in [address] [facility] 18-  []???? [facility]) staff shall perform screenings of the [facility] clients.     Advanced Care Plan:  []????Surrogate Decision Maker of Care  []????POA  []????Communicated Code Status and copy sent.     Other:              Care Management Interventions  PCP Verified by CM:  Yes (Dr. Ana Lozano )  Mode of Transport at Discharge: BLS  Transition of Care Consult (CM Consult): SNF (Pt is in agreement w/ SNF at d/c. )  Discharge Durable Medical Equipment:  (TBD)  Physical Therapy Consult: Yes  Occupational Therapy Consult: Yes  Support Systems: Child(dafne)  Confirm Follow Up Transport: Family  The Plan for Transition of Care is Related to the Following Treatment Goals : Skilled nursing facility  The Patient and/or Patient Representative was Provided with a Choice of Provider and Agrees with the Discharge Plan?: Yes (The pt is alert and oriented. )  Freedom of Choice List was Provided with Basic Dialogue that Supports the Patient's Individualized Plan of Care/Goals, Treatment Preferences and Shares the Quality Data Associated with the Providers?: Yes (Pt is in agreement w/ referrals being sent to all  and [place] facilities and then he will choose one from the accepting facilities. )  Discharge Location  Patient Expects to be Discharged to[de-identified] Skilled nursing facility

## 2022-07-15 LAB
GLUCOSE BLD STRIP.AUTO-MCNC: 127 MG/DL (ref 70–110)
GLUCOSE BLD STRIP.AUTO-MCNC: 148 MG/DL (ref 70–110)
GLUCOSE BLD STRIP.AUTO-MCNC: 165 MG/DL (ref 70–110)
GLUCOSE BLD STRIP.AUTO-MCNC: 195 MG/DL (ref 70–110)

## 2022-07-15 PROCEDURE — 74011250637 HC RX REV CODE- 250/637: Performed by: INTERNAL MEDICINE

## 2022-07-15 PROCEDURE — 2709999900 HC NON-CHARGEABLE SUPPLY

## 2022-07-15 PROCEDURE — 74011250636 HC RX REV CODE- 250/636: Performed by: PHYSICIAN ASSISTANT

## 2022-07-15 PROCEDURE — 74011250636 HC RX REV CODE- 250/636: Performed by: HOSPITALIST

## 2022-07-15 PROCEDURE — 74011636637 HC RX REV CODE- 636/637: Performed by: INTERNAL MEDICINE

## 2022-07-15 PROCEDURE — 77010033678 HC OXYGEN DAILY

## 2022-07-15 PROCEDURE — 97110 THERAPEUTIC EXERCISES: CPT

## 2022-07-15 PROCEDURE — 74011250637 HC RX REV CODE- 250/637: Performed by: HOSPITALIST

## 2022-07-15 PROCEDURE — 65270000029 HC RM PRIVATE

## 2022-07-15 PROCEDURE — 74011000258 HC RX REV CODE- 258: Performed by: PHYSICIAN ASSISTANT

## 2022-07-15 PROCEDURE — 97535 SELF CARE MNGMENT TRAINING: CPT

## 2022-07-15 PROCEDURE — 97164 PT RE-EVAL EST PLAN CARE: CPT

## 2022-07-15 PROCEDURE — 97605 NEG PRS WND THER DME<=50SQCM: CPT

## 2022-07-15 PROCEDURE — 74011250637 HC RX REV CODE- 250/637: Performed by: FAMILY MEDICINE

## 2022-07-15 PROCEDURE — 74011636637 HC RX REV CODE- 636/637: Performed by: HOSPITALIST

## 2022-07-15 PROCEDURE — 94660 CPAP INITIATION&MGMT: CPT

## 2022-07-15 PROCEDURE — 74011636637 HC RX REV CODE- 636/637: Performed by: FAMILY MEDICINE

## 2022-07-15 PROCEDURE — 82962 GLUCOSE BLOOD TEST: CPT

## 2022-07-15 PROCEDURE — 97530 THERAPEUTIC ACTIVITIES: CPT

## 2022-07-15 PROCEDURE — 74011000250 HC RX REV CODE- 250: Performed by: RADIOLOGY

## 2022-07-15 PROCEDURE — 77030019934 HC DRSG VAC ASST KCON -B

## 2022-07-15 RX ADMIN — SEVELAMER CARBONATE 1600 MG: 800 TABLET, FILM COATED ORAL at 09:05

## 2022-07-15 RX ADMIN — AMLODIPINE BESYLATE 10 MG: 5 TABLET ORAL at 09:05

## 2022-07-15 RX ADMIN — SEVELAMER CARBONATE 1600 MG: 800 TABLET, FILM COATED ORAL at 12:21

## 2022-07-15 RX ADMIN — Medication 2 TABLET: at 09:05

## 2022-07-15 RX ADMIN — Medication 2 UNITS: at 16:48

## 2022-07-15 RX ADMIN — ALLOPURINOL 100 MG: 100 TABLET ORAL at 09:06

## 2022-07-15 RX ADMIN — HEPARIN SODIUM 5000 UNITS: 5000 INJECTION INTRAVENOUS; SUBCUTANEOUS at 06:30

## 2022-07-15 RX ADMIN — EZETIMIBE 10 MG: 10 TABLET ORAL at 09:05

## 2022-07-15 RX ADMIN — SEVELAMER CARBONATE 1600 MG: 800 TABLET, FILM COATED ORAL at 16:47

## 2022-07-15 RX ADMIN — Medication 2 TABLET: at 21:29

## 2022-07-15 RX ADMIN — CARVEDILOL 12.5 MG: 12.5 TABLET, FILM COATED ORAL at 09:05

## 2022-07-15 RX ADMIN — ASPIRIN 81 MG: 81 TABLET, CHEWABLE ORAL at 09:05

## 2022-07-15 RX ADMIN — SODIUM CHLORIDE, PRESERVATIVE FREE 10 ML: 5 INJECTION INTRAVENOUS at 06:33

## 2022-07-15 RX ADMIN — HEPARIN SODIUM 5000 UNITS: 5000 INJECTION INTRAVENOUS; SUBCUTANEOUS at 21:28

## 2022-07-15 RX ADMIN — GABAPENTIN 300 MG: 300 CAPSULE ORAL at 21:28

## 2022-07-15 RX ADMIN — PIPERACILLIN AND TAZOBACTAM 4.5 G: 4; .5 INJECTION, POWDER, FOR SOLUTION INTRAVENOUS at 20:43

## 2022-07-15 RX ADMIN — PANTOPRAZOLE SODIUM 40 MG: 40 TABLET, DELAYED RELEASE ORAL at 09:06

## 2022-07-15 RX ADMIN — INSULIN LISPRO 3 UNITS: 100 INJECTION, SOLUTION INTRAVENOUS; SUBCUTANEOUS at 09:05

## 2022-07-15 RX ADMIN — CARVEDILOL 12.5 MG: 12.5 TABLET, FILM COATED ORAL at 21:28

## 2022-07-15 RX ADMIN — SODIUM CHLORIDE, PRESERVATIVE FREE 10 ML: 5 INJECTION INTRAVENOUS at 21:31

## 2022-07-15 RX ADMIN — SODIUM CHLORIDE, PRESERVATIVE FREE 10 ML: 5 INJECTION INTRAVENOUS at 14:00

## 2022-07-15 RX ADMIN — B-COMPLEX W/ C & FOLIC ACID TAB 1 MG 1 TABLET: 1 TAB at 09:05

## 2022-07-15 RX ADMIN — INSULIN LISPRO 3 UNITS: 100 INJECTION, SOLUTION INTRAVENOUS; SUBCUTANEOUS at 16:48

## 2022-07-15 RX ADMIN — HEPARIN SODIUM 5000 UNITS: 5000 INJECTION INTRAVENOUS; SUBCUTANEOUS at 12:21

## 2022-07-15 RX ADMIN — INSULIN GLARGINE 10 UNITS: 100 INJECTION, SOLUTION SUBCUTANEOUS at 21:29

## 2022-07-15 RX ADMIN — Medication 2 UNITS: at 21:29

## 2022-07-15 RX ADMIN — INSULIN LISPRO 3 UNITS: 100 INJECTION, SOLUTION INTRAVENOUS; SUBCUTANEOUS at 12:20

## 2022-07-15 RX ADMIN — PIPERACILLIN AND TAZOBACTAM 4.5 G: 4; .5 INJECTION, POWDER, FOR SOLUTION INTRAVENOUS at 09:04

## 2022-07-15 NOTE — WOUND CARE
IP WOUND CONSULT    Marcel Stevenson  MEDICAL RECORD NUMBER:  [id number]  AGE: 60 y.o. GENDER: male  : 1960  TODAY'S DATE:  7/15/2022    GENERAL     [x] Follow-up   [] New Consult    Marcel Stevenson is a 60 y.o. male referred by:   [x] Physician  [] Nursing  [] Other:         PAST MEDICAL HISTORY    Past Medical History:   Diagnosis Date    Diabetes mellitus     Hypertension         PAST SURGICAL HISTORY    Past Surgical History:   Procedure Laterality Date    IR INSERT TUNL CVC W/O PORT OVER 5 YR  2022       FAMILY HISTORY    History reviewed. No pertinent family history. SOCIAL HISTORY    Social History     Tobacco Use    Smoking status: Not on file    Smokeless tobacco: Not on file   Substance Use Topics    Alcohol use: Not on file    Drug use: Not on file       ALLERGIES    No Known Allergies    MEDICATIONS    No current facility-administered medications on file prior to encounter. Current Outpatient Medications on File Prior to Encounter   Medication Sig Dispense Refill    gabapentin (NEURONTIN) 300 mg capsule Take 300 mg by mouth nightly.  allopurinoL (Zyloprim) 100 mg tablet Take 100 mg by mouth daily.  ezetimibe (Zetia) 10 mg tablet Take 10 mg by mouth.  carvediloL (COREG) 12.5 mg tablet Take 12.5 mg by mouth two (2) times a day.  amLODIPine (NORVASC) 10 mg tablet Take 10 mg by mouth daily.  aspirin 81 mg chewable tablet Take 81 mg by mouth daily.  ascorbic acid, vitamin C, (Vitamin C) 500 mg tablet Take 500 mg by mouth.  insulin glargine (Lantus U-100 Insulin) 100 unit/mL injection 10 Units by SubCUTAneous route nightly.  insulin lispro (HUMALOG) 100 unit/mL injection by SubCUTAneous route.  lisinopril (PRINIVIL, ZESTRIL) 40 mg tablet Take 40 mg by mouth daily.  atorvastatin (LIPITOR) 40 mg tablet Take 40 mg by mouth daily.  furosemide (Lasix) 80 mg tablet Take 80 mg by mouth two (2) times a day.       potassium chloride SR (KLOR-CON 10) 10 mEq tablet Take 20 mEq by mouth two (2) times a day. (Patient not taking: Reported on 6/27/2022)         Wt Readings from Last 3 Encounters:   07/14/22 104.4 kg (230 lb 3.2 oz)       [unfilled]  Visit Vitals  BP (!) 149/70 (BP 1 Location: Right upper arm, BP Patient Position: At rest)   Pulse 69   Temp 98 °F (36.7 °C)   Resp 18   Ht 6' 2\" (1.88 m)   Wt 104.4 kg (230 lb 3.2 oz)   SpO2 97%   BMI 29.56 kg/m²       ASSESSMENT     Skin impairment Identification:  Type: skin tear and surgical    Contributing Factors: diabetes and ESRD    Wound Ankle Right;Medial;Proximal (Active)   Wound Image   07/15/22 1020   Wound Etiology Skin Tear 07/15/22 1020   Dressing Status Clean;Dry; Intact 07/15/22 1020   Dressing/Treatment Xeroform; Adhesive bandage 07/15/22 1020   Wound Length (cm) 1.5 cm 07/15/22 1020   Wound Width (cm) 1.5 cm 07/15/22 1020   Wound Depth (cm) 0.1 cm 07/15/22 1020   Wound Surface Area (cm^2) 2.25 cm^2 07/15/22 1020   Wound Volume (cm^3) 0.225 cm^3 07/15/22 1020   Wound Assessment Pink/red 07/15/22 1020   Drainage Amount Small 07/15/22 1020   Drainage Description Serosanguinous 07/15/22 1020   Wound Odor None 07/15/22 1020   Leticia-Wound/Incision Assessment Fragile 07/15/22 1020   Edges Attached edges 07/15/22 1020   Wound Thickness Description Partial thickness 07/15/22 1020   Number of days: 0       Wound Ankle Right;Medial (Active)   Wound Image   07/15/22 1020   Wound Etiology Skin Tear 07/15/22 1020   Dressing Status Clean;Dry; Intact 07/15/22 1020   Cleansed Other (Comment) 07/15/22 1020   Dressing/Treatment Xeroform;Band-Aid/Adhesive bandage 07/15/22 1020   Wound Length (cm) 5.5 cm 07/15/22 1020   Wound Width (cm) 3 cm 07/15/22 1020   Wound Depth (cm) 0.1 cm 07/15/22 1020   Wound Surface Area (cm^2) 16.5 cm^2 07/15/22 1020   Wound Volume (cm^3) 1.65 cm^3 07/15/22 1020   Wound Assessment Granulation tissue 07/15/22 1020   Drainage Amount Moderate 07/15/22 1020 Drainage Description Serosanguinous 07/15/22 1020   Wound Odor None 07/15/22 1020   Leticia-Wound/Incision Assessment Fragile 07/15/22 1020   Edges Attached edges 07/15/22 1020   Wound Thickness Description Partial thickness 07/15/22 1020   Number of days: 0       Incision 07/05/22 Foot Right (Active)   Wound Image   07/15/22 1020   Dressing Status Clean;Dry; Intact 07/15/22 1020   Dressing Change Due 07/18/22 07/15/22 1020   Cleansed Cleansed with saline 07/15/22 1020   Dressing/Treatment Negative Pressure Wound Therapy 07/15/22 1020   Wound Length (cm) 6 cm 07/15/22 1020   Wound Width (cm) 6 cm 07/15/22 1020   Wound Depth (cm) 0.2 cm 07/15/22 1020   Wound Volume (cm^3) 7.2 cm^3 07/15/22 1020   Wound Healing % -100 07/15/22 1020   Closure Other (Comment) 07/15/22 1020   Margins Other (Comment) 07/15/22 1020   Incision Assessment Other (Comment) 07/15/22 1020   Drainage Amount Small 07/15/22 1020   Drainage Description Serosanguinous 07/15/22 1020   Wound Odor None 07/15/22 1020   Leticia-Wound/Incision Assessment Intact 07/15/22 1020   Number of days: 10       Incision 07/05/22 Foot Right;Dorsal (Active)   Dressing Status Clean;Dry; Intact 07/15/22 1020   Dressing Change Due 07/18/22 07/15/22 1020   Cleansed Cleansed with saline 07/15/22 1020   Dressing/Treatment Band-Aid/Adhesive bandage 07/15/22 1020   Wound Length (cm) 0.3 cm 07/15/22 1020   Wound Width (cm) 0.3 cm 07/15/22 1020   Closure Sutures 07/15/22 1020   Margins Approximated 07/15/22 1020   Incision Assessment Epithelialization 07/15/22 1020   Drainage Amount None 07/15/22 1020   Wound Odor None 07/15/22 1020   Leticia-Wound/Incision Assessment Fragile 07/15/22 1020   Number of days: 10       [REMOVED] Incision 06/28/22 Foot Right (Removed)   Wound Image   06/30/22 1443   Dressing Status Clean;Dry; Intact 07/05/22 2144   Dressing Change Due 07/05/22 06/30/22 1443   Dressing/Treatment Non-adherent;Negative Pressure Wound Therapy 06/30/22 1443   Wound Length (cm) 7 cm 06/30/22 1443   Wound Width (cm) 7 cm 06/30/22 1443   Wound Odor None 06/30/22 1443   Leticia-Wound/Incision Assessment Intact 06/30/22 1443   Number of days: 7      Negative Pressure    NAME:  Jaki Farah  YOB: 1960  MEDICAL RECORD NUMBER:  [id number]  DATE:  6/27/2022     Applied Negative Pressure to right heel wound.  [x] Applied skin barrier prep to leticia-wound.  [x] Cut strips of plastic drape to picture frame wound so that leticia-wound is     covered with the drape.  [x] Cut sponge, gauze or channel drain to size which will fit into the wound/ulcer bed without being forced.  [x] Be sure the sponge is large enough to hold the entire round plastic flange which is attached to the tubing. Never allow flange to be larger than the sponge or it will produce suction damaging intact skin.  Total number of individual pieces of foam used within the wound bed: 1 black   [x] Covered sponge, gauze or channel drain with plastic drape.  [x] Cut a hole in this plastic drape directly over the sponge the same size as the plastic drain tubing.  [x] Removed plastic liner from flange and apply it directly over the hole you cut.  [x] Removed the plastic cover from the flange.  [x] Attached the tubing to the wound/ulcer Negative Pressure Therapy and turn it on to be sure a vacuum is created and that there are no leaks.  [x] If air leaks occur, use plastic drape to patch them.        Response to treatment: Well tolerated by patient      Applied per  Guidelines          PLAN     Skin Care & Pressure Relief Recommendations  Minimize layers of linen  Pads under patient to optimize support surface  Turn/reposition approximately every 2 hours  Promote continence; Skin Protective lotion/cream to buttocks and sacrum daily and as needed with incontinence care    Recommendations: leave dressing intact do not wrap leg with kerlix    Teaching completed with:   [x] Patient           [] Family member       [] Caregiver          [] Nursing  [] Other    Patient/Caregiver Teaching:  Level of patient/caregiver understanding able to:   [x] Indicates understanding       [x] Needs reinforcement  [] Unsuccessful      [] Verbal Understanding  [] Demonstrated understanding       [] No evidence of learning  [] Refused teaching         [] N/A       Electronically signed by Jerel Bautista RN on 7/15/2022 at 11:36 AM

## 2022-07-15 NOTE — DIALYSIS
Dialysis complete by Donato Dugan RN. Patient tolerated treatment without distress or complications. 3000 ml removed via UF with a net removal of 2500 ml  Full treatment record completed on [facility] Flow Sheet and placed in hard chart.

## 2022-07-15 NOTE — PROGRESS NOTES
[facility]  (A Division of [address] [place] [address])    Follow-up Note    My partner Jj Weiss MD will  ID service Monday morning. Date of Admission: 6/27/2022       Date of Note:  7/15/2022    Summary:  Meliza Boles is a 60 y.o. BLACK/ with PMH as listed below-- he had ulcer for 2 months or so that he was followed by as OP by Podiatry. His heel wound progressed and was advised to come to ED yesterday. Had foot pain and fould drainage, but denies high F/C/R/SOB/ N/V prior to admission.     BCX/WCX taken in ED, started on Zosyn and plan was to hold off abx and monitor until surgery. Admission assessment there was high concern for sepsis and Zosyn was continued.     Hx of MRSA infection and treatment few years ago.      CC:  \"Heel doing good\"  HPI:  Chart reviewed/pt seen  Doing well. Lined up for SNF early next week  Tm <100  No complaints. [address] working fine. Current Antimicrobials:    Prior Antimicrobials:  1. Pip/tzb IV (6/27-) #10 1. Tmp-smx PO PTA       Assessment: Rec / Plan:   R Heel DFU/OM - polymicrobial  7/7:  CRP 41mg/L  Reviewed everything. On target. 2wks down and 4 to go. Just need once weekly labs -- will order some for this coming Monday. Ready for DC next week/Monday. ->Pip/tzb #10/42    HH orders in per Dr Darryl Lovett.    ESRD/HD    DMT2 poor control A1c 8.6%    HTN            Microbiology:  6/28 - OR (+) Proteus vulgaris (R amp, cefazolin), Morganella morganii (R amp-sulb, cefazolin, ceftazidime), Enterococcus faecalis (S amp), and an anaerobe Porphyromonas assacharolyticus (BL +)     6/27 - BCx x2 (-)      Lines / Catheters: RCW line and HD access        Patient Active Problem List   Diagnosis Code    Diabetes mellitus with foot ulcer ([facility] E11.621, L97.509    CAD (coronary artery disease) I25.10    ESRD (end stage renal disease) ([facility] N18.6    Acute hematogenous osteomyelitis of right foot (HCC) M86.071    Cellulitis of right heel L03.115    Diabetic foot ulcer with osteomyelitis ([facility] E11.621, E11.69, L97.509, M86.9    Ulcer of right heel, with necrosis of bone (HCC) L97.414    Infection and inflammatory reaction due to internal fixation device of other site, initial encounter ([facility] T84.69XA    Left arm swelling M79.89       Current Facility-Administered Medications   Medication Dose Route Frequency    pantoprazole (PROTONIX) tablet 40 mg  40 mg Oral ACB    alteplase (CATHFLO) 1 mg in sterile water (preservative free) 1 mL injection  1 mg InterCATHeter DIALYSIS PRN    nitroglycerin (NITROSTAT) tablet 0.4 mg  0.4 mg SubLINGual PRN    morphine injection 1 mg  1 mg IntraVENous Q4H PRN    ondansetron (ZOFRAN) injection 4 mg  4 mg IntraVENous Q6H PRN    gabapentin (NEURONTIN) capsule 300 mg  300 mg Oral QHS    heparin (porcine) injection 5,000 Units  5,000 Units SubCUTAneous Q8H    melatonin (rapid dissolve) tablet 10 mg  10 mg Oral QHS PRN    epoetin lisette-epbx (RETACRIT) injection 10,000 Units  10,000 Units SubCUTAneous Q TUE, THU & SAT    heparin (porcine) 1,000 unit/mL injection 3,400 Units  3,400 Units Hemodialysis DIALYSIS PRN    heparin (porcine) 100 unit/mL injection 500 Units  500 Units InterCATHeter Q8H PRN    sodium chloride (NS) flush 5-40 mL  5-40 mL IntraVENous Q8H    sodium chloride (NS) flush 5-40 mL  5-40 mL IntraVENous PRN    fentaNYL citrate (PF) injection  mcg   mcg IntraVENous Rad Multiple    naloxone (NARCAN) injection 0.1 mg  0.1 mg IntraVENous Multiple    loperamide (IMODIUM) capsule 2 mg  2 mg Oral Q4H PRN    piperacillin-tazobactam (ZOSYN) 4.5 g in 0.9% sodium chloride (MBP/ADV) 100 mL MBP  4.5 g IntraVENous Q12H    sevelamer carbonate (RENVELA) tab 1,600 mg  1,600 mg Oral TID WITH MEALS    Lactobacillus Acidoph & Bulgar (FLORANEX) tablet 2 Tablet  2 Tablet Oral BID    allopurinoL (ZYLOPRIM) tablet 100 mg  100 mg Oral DAILY    carvediloL (COREG) tablet 12.5 mg  12.5 mg Oral BID    ezetimibe (ZETIA) tablet 10 mg  10 mg Oral DAILY    amLODIPine (NORVASC) tablet 10 mg  10 mg Oral DAILY    vit B Cmplx 3-FA-Vit C-Biotin (NEPHRO NIKITA RX) tablet 1 Tablet  1 Tablet Oral DAILY    insulin lispro (HUMALOG) injection 3 Units  3 Units SubCUTAneous TIDAC    aspirin chewable tablet 81 mg  81 mg Oral DAILY    insulin glargine (LANTUS) injection 10 Units  10 Units SubCUTAneous QHS    insulin lispro (HUMALOG) injection   SubCUTAneous AC&HS    glucose chewable tablet 16 g  4 Tablet Oral PRN    glucagon (GLUCAGEN) injection 1 mg  1 mg IntraMUSCular PRN    dextrose 10% infusion 0-250 mL  0-250 mL IntraVENous PRN    acetaminophen (TYLENOL) tablet 650 mg  650 mg Oral Q6H PRN         Review of Systems - General ROS: negative for - chills, fever or night sweats  Respiratory ROS: no cough, shortness of breath, or wheezing  Cardiovascular ROS: no chest pain or dyspnea on exertion  Gastrointestinal ROS: no abdominal pain, change in bowel habits, or black or bloody stools       Objective:    Visit Vitals  /62 (BP 1 Location: Right upper arm, BP Patient Position: Sitting)   Pulse 73   Temp 98.9 °F (37.2 °C)   Resp 18   Ht 6' 2\" (1.88 m)   Wt 104.4 kg (230 lb 3.2 oz)   SpO2 94%   BMI 29.56 kg/m²       Temp (24hrs), Av.6 °F (37 °C), Min:98 °F (36.7 °C), Max:99 °F (37.2 °C)      GEN: WDWN AAM in NAD  HEENT: anicteric  CHEST: CTA  CVS:RRR  ABD: NT  EXT: R heel wrapped         Lab results:    Chemistry  No results for input(s): GLU, NA, K, CL, CO2, BUN, CREA, CA, AGAP, BUCR, TBIL, AP, TP, ALB, GLOB, AGRAT in the last 72 hours. No lab exists for component: GPT    CBC w/ Diff  No results for input(s): WBC, RBC, HGB, HCT, PLT, GRANS, LYMPH, EOS, HGBEXT, HCTEXT, PLTEXT in the last 72 hours.     Microbiology  All Micro Results     Procedure Component Value Units Date/Time    CULTURE, FUNGUS [[id number]] Collected: 22 1925    Order Status: Completed Specimen: Heel Updated: 22 1406 Special Requests: NO SPECIAL REQUESTS        Culture result:       NO FUNGUS ISOLATED 12 DAYS          CULTURE, ANAEROBIC [[id number]]  (Abnormal) Collected: 06/28/22 1925    Order Status: Completed Specimen: Heel Updated: 07/04/22 1637     Special Requests: NO SPECIAL REQUESTS        Culture result:       MODERATE Porphyromonas assacharolyticus (Bacteroides) BETA LACTAMASE POSITIVE          CULTURE, BLOOD [[id number]] Collected: 06/27/22 1240    Order Status: Completed Specimen: Blood Updated: 07/03/22 0734     Special Requests: NO SPECIAL REQUESTS        Culture result: NO GROWTH 6 DAYS       CULTURE, BLOOD [[id number]] Collected: 06/27/22 1245    Order Status: Completed Specimen: Blood Updated: 07/03/22 0734     Special Requests: NO SPECIAL REQUESTS        Culture result: NO GROWTH 6 DAYS       CULTURE, TISSUE Raford Payer STAIN [[id number]]  (Abnormal) Collected: 06/28/22 1926    Order Status: Completed Specimen: Bone Updated: 07/02/22 1221     Special Requests: NO SPECIAL REQUESTS        GRAM STAIN 2+ WBCS SEEN         NO ORGANISMS SEEN        Culture result: FEW ENTEROCOCCUS FAECALIS         SCANT PROTEUS VULGARIS         SCANT ALPHA STREPTOCOCCUS               SCANT Morganella morganii ssp morganii            REFER TO [facility] [id number] FOR SENSITIVITIES    CULTURE, Maria Eugenia Page STAIN [[id number]]  (Abnormal)  (Susceptibility) Collected: 06/28/22 1925    Order Status: Completed Specimen: Heel Updated: 07/02/22 1003     Special Requests: NO SPECIAL REQUESTS        GRAM STAIN OCCASIONAL WBCS SEEN         NO ORGANISMS SEEN        Culture result: LIGHT PROTEUS VULGARIS               SCANT Morganella morganii ssp morganii                  MODERATE ENTEROCOCCUS FAECALIS          AFB CULTURE + SMEAR W/RFLX ID FROM CULTURE [[id number]] Collected: 06/28/22 1925    Order Status: Completed Specimen: Miscellaneous sample Updated: 06/30/22 1738     Source MISC.  WOUND        AFB Specimen processing Concentration     Acid Fast Smear Negative        Comment: (NOTE)  Performed At: [facility] & [address] [id number]  Hany Hamm MD OV:[id number]          Acid Fast Culture PENDING    CULTURE, Saint Milling STAIN [[id number]] Collected: 06/27/22 1530    Order Status: Completed Specimen: Wound Drainage Updated: 06/29/22 1511     Special Requests: NO SPECIAL REQUESTS        GRAM STAIN RARE WBCS SEEN         2+ GRAM NEGATIVE RODS               1+ GRAM POSITIVE COCCI IN PAIRS           Culture result:       MODERATE  MIXED ENTERIC GRAM NEGATIVE RODS              HEAVY MIXED SKIN TO ISOLATED          AFB CULTURE + SMEAR W/RFLX ID FROM CULTURE [[id number]] Collected: 06/28/22 1930    Order Status: Canceled     CULTURE, Hiram Cotton [[id number]] Collected: 06/28/22 1926    Order Status: Linnea Caldwell MD  Cell [phone]  [facility]   7/15/2022   1:22 PM

## 2022-07-15 NOTE — PROGRESS NOTES
Problem: Mobility Impaired (Adult and Pediatric)  Goal: *Acute Goals and Plan of Care (Insert Text)  Description: Physical Therapy Goals  Updated  7/8/2022 and to be accomplished within 7 day(s)  1. Patient will move from supine to sit and sit to supine in bed with supervision/set-up. 2.  Patient will transfer from bed <> w/c  with min/contact guard assist via slide board. 3.  Patient will propel w/c with SBA 50ft in [place] for increased functional independence with w/c mobility. 4.  Patient will demonstrate LE strengthening exercise program for achievement of above goals. Physical Therapy Goals  Initiated 7/8/2022 and to be accomplished within 7 day(s)  1. Patient will move from supine to sit and sit to supine  in bed with supervision/set-up. 2.  Patient will transfer from bed to chair and chair to bed with minimal assistance/contact guard assist using the least restrictive device. 3.  Patient will perform sit to stand with moderate assistance . Outcome: Progressing Towards Goal  physical Therapy RE-EVALUATION and TREATMENT    Patient: Jose Richards (54 y.o. male)  Date: 7/15/2022  Diagnosis: Diabetes mellitus with foot ulcer ([facility] [T30.522, L97.509] Acute hematogenous osteomyelitis of right foot (HCC)  Procedure(s) (LRB):  RIGHT HEEL DEBRIDEMENT WITH GRAFTING,REMOVAL OF SCREW  (PT IN ROOM #325) WITH C-ARM (Right) 10 Days Post-Op  Precautions: Fall,NWB (R foot-wound vac in place)    ASSESSMENT:  Pt found partial seated EOB, reclining back on pillows. Reports no pain or nausea and willing to participate with PT session. Wound vac remains in place R foot and pt NWB same. ROM/motor performance BUE's decr'd-L UE <R and edematous; LE's decr'd grossly 3-/5 hip flexors bilat, 3+/5 quadriceps/HS, 3-/5 L DF. Pt with decr'd sitting balance, requiring UE support often. UE's with decr'd functional strength and fatigue quickly. Fair tolerance for balance activity as noted below.   Able to use slide board for bed > w/c transfers min/mod assist and vc/tc for technique and safety. Encouraged pt to self propel w/c for UE strengthening short distance in room. Pt left up in chair with R LE supported for R heel floating. All needs in reach and nurse [facility] aware. Rec SNF at discharge to continue rehab. Patient's progression toward goals since last assessment: Goals updated as above     PLAN:  Goals have been updated based on progression since last assessment. Patient continues to benefit from skilled intervention to address the above impairments. Continue to follow the patient 1-2 times per day/4-7 days per week to address goals. Planned Interventions:  [x]     Bed Mobility Training          [x]     Neuromuscular Re-Education  [x]     Transfer Training                []    Orthotic/Prosthetic Training  []     Gait Training                       []     Modalities  [x]     Therapeutic Exercises       []     Edema Management/Control  [x]     Therapeutic Activities         []     Patient and Family Training/Education  []     Other (comment):  Discharge Recommendations: Skilled Nursing Facility  Further Equipment Recommendations for Discharge: N/A     SUBJECTIVE:   Patient stated Okay, no pain, no nausea.     OBJECTIVE DATA SUMMARY:   Critical Behavior:  Neurologic State: Alert  Orientation Level: Oriented X4  Cognition: Appropriate for age attention/concentration,Follows commands  Safety/Judgement: Fall prevention  Functional Mobility Training:  Bed Mobility:  Scooting: Contact guard assistance to move out further while seated EOB  Transfers:  Sliding Board : Minimum assistance; Moderate assistance  Balance:  Sitting: Intact; With support  Sitting - Static: Good (unsupported), Fair (occasional)  Sitting - Dynamic: Fair (occasional)  Ambulation/Gait Training:  Right Side Weight Bearing: Non-weight bearing  Therapeutic Exercises:   Seated EOB static and dynamic balance activity with emphasis on core engagement: Static sitting progressing from bilat UE ->single UE -> no UE support; Forward/backward lean HHA to UE's, and Target reaching L/R UE outside COG x 5 each  Pain:  Pain Scale 1: Numeric (0 - 10)  Pain Intensity 1: 0 no pain, no nausea  Activity Tolerance:   Fair   Please refer to the flowsheet for vital signs taken during this treatment.   After treatment:   [x] Patient left in no apparent distress sitting up in w/c  [] Patient left in no apparent distress in bed  [x] Call bell left within reach  [x] Nursing notified-Pau  [] Caregiver present  [] Bed alarm activated      Hécotr Renato, PT   Time Calculation: 35 mins    []  Patient left in no apparent distress in bed  []  Call bell left within reach  []  Nursing notified  []  Caregiver present  []  Bed alarm activated    Héctor Renato, PT   Time Calculation: 35 mins

## 2022-07-15 NOTE — PROGRESS NOTES
Problem: Self Care Deficits Care Plan (Adult)  Goal: *Acute Goals and Plan of Care (Insert Text)  Description: Occupational Therapy Goals  Initiated 7/8/2022 within 7 day(s). 1.  Patient will perform grooming with supervision/set-up seated in chair. 2.  Patient will perform upper body dressing with supervision/set-up. 3.  Patient will perform lower body dressing with minimal assistance/contact guard assist and use of AEs as needed. 4.  Patient will perform bed<>bsc transfers with minimal assistance/contact guard assist.  5.  Patient will perform all aspects of toileting with minimal assistance/contact guard assist.  6.  Patient will participate in upper extremity therapeutic exercise/activities with supervision/set-up for 10 minutes. 7.  Patient will utilize energy conservation techniques during functional activities with verbal, visual, and tactile cues. Outcome: Progressing Towards Goal    OCCUPATIONAL THERAPY TREATMENT    Patient: Gaudencio Kerns (63 y.o. male)  Date: 7/15/2022  Diagnosis: Diabetes mellitus with foot ulcer ([facility] [D71.219, L97.509] Acute hematogenous osteomyelitis of right foot ([facility]  Procedure(s) (LRB):  RIGHT HEEL DEBRIDEMENT WITH GRAFTING,REMOVAL OF SCREW  (PT IN ROOM #325) WITH C-ARM (Right) 10 Days Post-Op  Precautions: Fall,NWB  PLOF:     Chart, occupational therapy assessment, plan of care, and goals were reviewed. ASSESSMENT:  Pt presented seated in w/c after participating with PT. Pt agreeable to participate with therapy. Participated with UB strengthening exercises and grooming activities while sitting in w/c. Pt attempted w/c push-ups for carryover to perform safe transfers but unable to do so with max A provided and RLE elevated. Pt was left seated in w/c to eat lunch at the end of session in NAD.      Progression toward goals:  []          Improving appropriately and progressing toward goals  [x]          Improving slowly and progressing toward goals  []          Not making progress toward goals and plan of care will be adjusted     PLAN:  Patient continues to benefit from skilled intervention to address the above impairments. Continue treatment per established plan of care. Discharge Recommendations:  Hakan Story  Further Equipment Recommendations for Discharge:  N/A     SUBJECTIVE:   Patient stated  I will try.     OBJECTIVE DATA SUMMARY:   Cognitive/Behavioral Status:  Neurologic State: Alert  Orientation Level: Oriented X4  Cognition: Appropriate for age attention/concentration,Follows commands  Safety/Judgement: Fall prevention    Functional Mobility and Transfers for ADLs:   Bed Mobility:  Scooting: Maximum assistance  Balance:  Sitting: Intact; With support  Sitting - Static: Good (unsupported)  Sitting - Dynamic: Fair (occasional)  ADL Intervention:  Grooming  Grooming Assistance: Minimum assistance  Position Performed: Seated in chair  Washing Hands: Minimum assistance    Cognitive Retraining  Safety/Judgement: Fall prevention    UE Therapeutic Exercises:   BUE strengthening exercises; 2 x10. 1-2 rest breaks required with each set. Pain:  Pain level pre-treatment: 4/10   Pain level post-treatment: 4/10  Pain Intervention(s): Medication (see MAR); Rest, Ice, Repositioning   Response to intervention: Nurse notified, See doc flow    Activity Tolerance:    Fair     Please refer to the flowsheet for vital signs taken during this treatment. After treatment:   [x]  Patient left in no apparent distress sitting up in wheelchair  []  Patient left in no apparent distress in bed  [x]  Call bell left within reach  [x]  Nursing notified  []  Caregiver present  []  Bed alarm activated    COMMUNICATION/EDUCATION:   [x] Role of Occupational Therapy in the acute care setting  [] Home safety education was provided and the patient/caregiver indicated understanding. [x] Patient/family have participated as able in working towards goals and plan of care.   [x] Patient/family agree to work toward stated goals and plan of care. [] Patient understands intent and goals of therapy, but is neutral about his/her participation. [] Patient is unable to participate in goal setting and plan of care.       Thank you for this referral.  Esperanza Mcfarlane, OTR/L  Time Calculation: 24 mins

## 2022-07-15 NOTE — PROGRESS NOTES
PENDING INSURANCE AUTHORIZATION     D/C Plan: [address] spoke with the above facility and pt will need current PT and OT notes with in a 48 hour period submitted to pt's insurance company to assist with obtaining authorization for SNF. Anticipate pt will transition to the above facility once insurance auth has been obtained.  Pt will arrange handi ride to transport him to and from dialysis from the facility.  CM to continue to follow and assist.     Transition of care to SNF: [facility]      Communication to Patient/Family:  Met with patient and family, and they are agreeable to the transition plan. The Plan for Transition of Care is related to the following treatment goals: SNF     The Patient and/or patient representative was provided with a choice of provider and agrees   with the discharge plan.  Yes [x]????? No []?????     Freedom of choice list was provided with basic dialogue that supports the patient's individualized plan of care/goals and shares the quality data associated with the providers.     Yes [x]????? No []?????     SNF/Rehab Transition:  Patient has been accepted to [facility] at [address] meets criteria for admission.   Patient will transported by medical transport and expected to [address] has been obtained.       Communication to SNF/Rehab:  Bedside RN has been notified to update the transition plan to the facility and call report [phone]     Discharge information has been updated on the AVS and sent via [facility] and/or CC link.    Discharge instructions to be fax'd to facility at      Please include all hard scripts for controlled substances, med rec and dc summary, and AVS in packet.  Please medicate for pain prior to dc if possible and needed to help offset delay when patient first arrives to facility.     Reviewed and confirmed with facility, [facility] can manage the patient care needs for the following:      [place] with (X) only those applicable:  Medication:  []??? ? ? Medications are available at the facility  []??? ? ?IV Antibiotics    []??? ? ? Controlled Substance - hard copies available sent.  []??? ? ? Weekly Labs     Equipment:  []?????CPAP/BiPAP  []??? ?? Wound Vacuum  []??? ? ? Mcmahon or Urinary Device  []?????PICC/Central Line  []??? ??Nebulizer  []??? ? ? Ventilator     Treatment:  []??? ? ? Isolation (for MRSA, VRE, etc.)  []??? ??Surgical Drain Management  []??? ? ? Tracheostomy Care  []??? ? ? Dressing Changes  []??? ? ? Dialysis with transportation  []?????PEG Care  []??? ? ? Oxygen  []??? ?? Daily Weights for Heart Failure     Dietary:  []??? ? ? Any diet limitations  []??? ??Tube Feedings   []??? ? ? Total Parenteral Management (TPN)     Financial Resources:  []??? ? ? Medicaid Application Completed     []?????UAI Completed and copy given to pt/family     []??? ??A screening has previously been completed.     []??? ? ? Level II Completed     []????? Private pay individual who will not become   financially eligible for Medicaid within 6 months from admission to a [address] [facility].      []????? Individual refused to have screening conducted.      []??? ? ? Medicaid Application Completed     []??? ? ? The screening denied because it was determined individual did not need/did not qualify for nursing facility level of care.  []????? Out of state residents seeking direct admission to a [address] facility.  []????? Individuals who are inpatients of an out of state hospital, or in state or out of state veterans/ hospital and seek direct admission to a [address] facility  []????? Individuals who are pateints or residents of a state owned/operated facility that is licensed by [employer] ([employer]) and seek direct admission to [address] [place] [address]  []????? A screening not required for enrollment in [facility] services as set out in [facility]-  []????? Matt [address] [place] [address] - [place]) staff shall perform screenings of the [facility] clients.     Advanced Care Plan:  []??? ? ?Surrogate Decision Maker of Care  []?????POA  []??? ? ? Communicated Code Status and copy sent.     Other:              Care Management Interventions  PCP Verified by CM:  Yes (Dr. Lui Sharpe )  Mode of Transport at Discharge: BLS  Transition of Care Consult (CM Consult): SNF (Pt is in agreement w/ SNF at d/c. )  Discharge Durable Medical Equipment:  (TBD)  Physical Therapy Consult: Yes  Occupational Therapy Consult: Yes  Support Systems: Child(dafne)  Confirm Follow Up Transport: Family  The Plan for Transition of Care is Related to the Following Treatment Goals : Skilled nursing facility  The Patient and/or Patient Representative was Provided with a Choice of Provider and Agrees with the Discharge Plan?: Yes (The pt is alert and oriented. )  Freedom of Choice List was Provided with Basic Dialogue that Supports the Patient's Individualized Plan of Care/Goals, Treatment Preferences and Shares the Quality Data Associated with the Providers?: Yes (Pt is in agreement w/ referrals being sent to all  and [place] facilities and then he will choose one from the accepting facilities. )  Discharge Location  Patient Expects to be Discharged to[de-identified] Skilled nursing facility

## 2022-07-15 NOTE — PROGRESS NOTES
Hospitalist Progress Note    Patient: Sarai Mcginnis MRN: [id number]  CSN: [id number]    YOB: 1960  Age: 60 y.o. Sex: male    DOA: 6/27/2022 LOS:  LOS: 18 days          Chief Complaint:    Foot infection      Assessment/Plan        Celso khan 60 y. o. male who history of stent, hypertension, Charcot's foot presents with worsening pain and swelling in his right foot      Large necrotic ulcer right posterior heel with osteomyelitis of the calcaneus and deep abscess-  S/p incision bone cortex right calcaneous, I&D right heel abcsess, deep wound debridement with multiple bone biopsies and application of antibiotic beads done    Hematoma evac, now with wound vac  Graft doing well     Chronic thrombus right IJ  No acute DVT LUE  No DVT in legs    LUE swelling is better     Chest pain-resolved  Troponin is normal  EKG unchanged  Monitor for recurrence  On medical therapy for hx of CAD  Last stress test 2019 neg for ischemia     Tunnel line in place-needs long term abx until 8/8     Diabetes mellitus -  On lantus 10units and  premeal insulin      Anemia of ESRD stable     End-stage renal disease on dialysis Tuesday Thursday and Saturday -  S/p [facility] replacement      History of coronary artery disease-  coreg and aspirin      Chronic compressive myelopathy pending surgery -  Supportive care     needs IV abx until 8/8  Will go to SNF once auth received-expected Monday     Sleep Apnea   CPAP     Disposition :  Patient Active Problem List   Diagnosis Code    Diabetes mellitus with foot ulcer ([address] E11.621, L97.509    CAD (coronary artery disease) I25.10    ESRD (end stage renal disease) ([address] N18.6    Acute hematogenous osteomyelitis of right foot ([facility]) M86.071    Cellulitis of right heel L03.115    Diabetic foot ulcer with osteomyelitis ([address] E11.621, E11.69, L97.509, M86.9    Ulcer of right heel, with necrosis of bone ([address] L97.414    Infection and inflammatory reaction due to internal fixation device of other site, initial encounter [facility]) T84.69XA    Left arm swelling M79.89       Subjective:    I feel fine  No complaints  Podiatry states foot is stable and ready for d/c  Wound vac being reapplied after exam by wound care nurse    Review of systems:    Constitutional: denies fevers  Respiratory: denies SOB  Cardiovascular: denies chest pain, palpitations  Gastrointestinal: denies nausea, vomiting, diarrhea      Vital signs/Intake and Output:  Visit Vitals  BP (!) 149/70 (BP 1 Location: Right upper arm, BP Patient Position: At rest)   Pulse 69   Temp 98 °F (36.7 °C)   Resp 18   Ht 6' 2\" (1.88 m)   Wt 104.4 kg (230 lb 3.2 oz)   SpO2 97%   BMI 29.56 kg/m²     Current Shift:  07/15 0701 - 07/15 1900  In: 240 [P.O.:240]  Out: -   Last three shifts:  07/13 1901 - 07/15 0700  In: 540 [P.O.:440]  Out: 2525 [Drains:25]    Exam:    General: debilitated AAM, alert, NAD, OX3  CVS:Regular rate and rhythm, no M/R/G, S1/S2 heard, no thrill  Lungs:Clear to auscultation bilaterally, no wheezes, rhonchi, or rales  Abdomen: Soft, Nontender, No distention, Normal Bowel sounds  Extremities: No C/C/E, pulses palpable 2+  Neuro:grossly normal , follows commands  Psych:appropriate                Labs: Results:       Chemistry No results for input(s): GLU, NA, K, CL, CO2, BUN, CREA, CA, AGAP, BUCR, TBIL, AP, TP, ALB, GLOB, AGRAT in the last 72 hours. No lab exists for component: GPT   CBC w/Diff No results for input(s): WBC, RBC, HGB, HCT, PLT, GRANS, LYMPH, EOS, HGBEXT, HCTEXT, PLTEXT, HGBEXT, HCTEXT, PLTEXT in the last 72 hours. Cardiac Enzymes No results for input(s): CPK, CKND1, PAULO in the last 72 hours. No lab exists for component: CKRMB, TROIP   Coagulation No results for input(s): PTP, INR, APTT, INREXT, INREXT in the last 72 hours.     Lipid Panel No results found for: CHOL, CHOLPOCT, CHOLX, CHLST, CHOLV, 672269, HDL, HDLP, LDL, LDLC, DLDLP, 811446, VLDLC, VLDL, TGLX, TRIGL, TRIGP, TGLPOCT, CHHD, CHHDX   BNP No results for input(s): BNPP in the last 72 hours. Liver Enzymes No results for input(s): TP, ALB, TBIL, AP in the last 72 hours.     No lab exists for component: SGOT, GPT, DBIL   Thyroid Studies No results found for: T4, T3U, TSH, TSHEXT, TSHEXT     Procedures/imaging: see electronic medical records for all procedures/Xrays and details which were not copied into this note but were reviewed prior to creation of Lydia Morales MD

## 2022-07-15 NOTE — PROGRESS NOTES
Nephrology Progress note    Subjective:     Ulysses Lung is a 60 y.o. male with  a PMH of DM, HTN, CAD, ESRD on HD TTS admitted for right foot infection. MRI suggested osteomyelitis. No fever, chills. Pt s/p debridement, I&D- on abx.     HD Catheter fell off and was replaced 6/29.       -Pt denies CP/SOB/Fever/Chills.  Last HD 7/14       Admit Date: 6/27/2022  Principal Problem:    Acute hematogenous osteomyelitis of right foot ([facility] (6/28/2022)    Active Problems:    Diabetes mellitus with foot ulcer ([address] (6/27/2022)      CAD (coronary artery disease) (6/28/2022)      ESRD (end stage renal disease) ([facility] (6/28/2022)      Cellulitis of right heel (6/28/2022)      Diabetic foot ulcer with osteomyelitis ([facility] (6/28/2022)      Ulcer of right heel, with necrosis of bone ([address] (6/28/2022)      Infection and inflammatory reaction due to internal fixation device of other site, initial encounter ([facility] (7/5/2022)      Left arm swelling (7/10/2022)      Current Facility-Administered Medications   Medication Dose Route Frequency    pantoprazole (PROTONIX) tablet 40 mg  40 mg Oral ACB    alteplase (CATHFLO) 1 mg in sterile water (preservative free) 1 mL injection  1 mg InterCATHeter DIALYSIS PRN    nitroglycerin (NITROSTAT) tablet 0.4 mg  0.4 mg SubLINGual PRN    morphine injection 1 mg  1 mg IntraVENous Q4H PRN    ondansetron (ZOFRAN) injection 4 mg  4 mg IntraVENous Q6H PRN    gabapentin (NEURONTIN) capsule 300 mg  300 mg Oral QHS    heparin (porcine) injection 5,000 Units  5,000 Units SubCUTAneous Q8H    melatonin (rapid dissolve) tablet 10 mg  10 mg Oral QHS PRN    epoetin lisette-epbx (RETACRIT) injection 10,000 Units  10,000 Units SubCUTAneous Q TUE, THU & SAT    heparin (porcine) 1,000 unit/mL injection 3,400 Units  3,400 Units Hemodialysis DIALYSIS PRN    heparin (porcine) 100 unit/mL injection 500 Units  500 Units InterCATHeter Q8H PRN    sodium chloride (NS) flush 5-40 mL  5-40 mL IntraVENous Q8H    sodium chloride (NS) flush 5-40 mL  5-40 mL IntraVENous PRN    fentaNYL citrate (PF) injection  mcg   mcg IntraVENous Rad Multiple    naloxone (NARCAN) injection 0.1 mg  0.1 mg IntraVENous Multiple    loperamide (IMODIUM) capsule 2 mg  2 mg Oral Q4H PRN    piperacillin-tazobactam (ZOSYN) 4.5 g in 0.9% sodium chloride (MBP/ADV) 100 mL MBP  4.5 g IntraVENous Q12H    sevelamer carbonate (RENVELA) tab 1,600 mg  1,600 mg Oral TID WITH MEALS    Lactobacillus Acidoph & Bulgar (FLORANEX) tablet 2 Tablet  2 Tablet Oral BID    allopurinoL (ZYLOPRIM) tablet 100 mg  100 mg Oral DAILY    carvediloL (COREG) tablet 12.5 mg  12.5 mg Oral BID    ezetimibe (ZETIA) tablet 10 mg  10 mg Oral DAILY    amLODIPine (NORVASC) tablet 10 mg  10 mg Oral DAILY    vit B Cmplx 3-FA-Vit C-Biotin (NEPHRO NIKITA RX) tablet 1 Tablet  1 Tablet Oral DAILY    insulin lispro (HUMALOG) injection 3 Units  3 Units SubCUTAneous TIDAC    aspirin chewable tablet 81 mg  81 mg Oral DAILY    insulin glargine (LANTUS) injection 10 Units  10 Units SubCUTAneous QHS    insulin lispro (HUMALOG) injection   SubCUTAneous AC&HS    glucose chewable tablet 16 g  4 Tablet Oral PRN    glucagon (GLUCAGEN) injection 1 mg  1 mg IntraMUSCular PRN    dextrose 10% infusion 0-250 mL  0-250 mL IntraVENous PRN    acetaminophen (TYLENOL) tablet 650 mg  650 mg Oral Q6H PRN         Allergy:   No Known Allergies     Objective:     Visit Vitals  /62 (BP 1 Location: Right upper arm, BP Patient Position: Sitting)   Pulse 73   Temp 98.9 °F (37.2 °C)   Resp 18   Ht 6' 2\" (1.88 m)   Wt 104.4 kg (230 lb 3.2 oz)   SpO2 94%   BMI 29.56 kg/m²         Intake/Output Summary (Last 24 hours) at 7/15/2022 1508  Last data filed at 7/15/2022 4936  Gross per 24 hour   Intake 290 ml   Output 2500 ml   Net -2210 ml       Physical Exam:       General: No acute distress   HENT: Atraumatic and normocephalic   Eyes: Normal conjunctiva   Neck: Supple No JVD   Cardiovascular: Normal S1 & S2, no m/r/g   Pulmonary/Chest Wall: Clear to auscultation bilaterally   Abdominal: Soft and non-tender   Musculoskeletal: Wound Vac on R foot   Neurological: AA and O X 3, No focal deficits     RIJ TDC in Place     Data Review:  Lab Results   Component Value Date/Time    Sodium 142 07/11/2022 05:30 AM    Potassium 5.0 07/11/2022 05:30 AM    Chloride 106 07/11/2022 05:30 AM    CO2 26 07/11/2022 05:30 AM    Anion gap 10 07/11/2022 05:30 AM    Glucose 117 (H) 07/11/2022 05:30 AM    BUN 47 (H) 07/11/2022 05:30 AM    Creatinine 7.92 (H) 07/11/2022 05:30 AM    BUN/Creatinine ratio 6 (L) 07/11/2022 05:30 AM    GFR est AA 8 (L) 07/11/2022 05:30 AM    GFR est non-AA 7 (L) 07/11/2022 05:30 AM    Calcium 8.0 (L) 07/11/2022 05:30 AM     Lab Results   Component Value Date/Time    WBC 11.0 07/12/2022 02:28 AM    HGB 9.0 (L) 07/12/2022 02:28 AM    HCT 28.9 (L) 07/12/2022 02:28 AM    PLATELET 693 03/43/9913 02:28 AM    MCV 99.3 07/12/2022 02:28 AM     Lab Results   Component Value Date/Time    Calcium 8.0 (L) 07/11/2022 05:30 AM    Phosphorus 4.8 07/11/2022 05:30 AM     No results found for: IRON, FE, TIBC, IBCT, PSAT, FERR  No results found for: FERR      Impression:   -ESRD on HD  -Diabetic foot ulcer with osteomyelitis, on abx   -DM  -HTN  -Anemia in CKD  -SHPT  -Hyperphosphatemia: on Renvela       Plan:   Next HD tomorrow   DALTON TIMD Bonifacio Augustin  [phone]

## 2022-07-16 LAB
GLUCOSE BLD STRIP.AUTO-MCNC: 117 MG/DL (ref 70–110)
GLUCOSE BLD STRIP.AUTO-MCNC: 147 MG/DL (ref 70–110)
GLUCOSE BLD STRIP.AUTO-MCNC: 165 MG/DL (ref 70–110)
GLUCOSE BLD STRIP.AUTO-MCNC: 253 MG/DL (ref 70–110)
GLUCOSE BLD STRIP.AUTO-MCNC: 81 MG/DL (ref 70–110)

## 2022-07-16 PROCEDURE — 74011000250 HC RX REV CODE- 250: Performed by: RADIOLOGY

## 2022-07-16 PROCEDURE — 74011250636 HC RX REV CODE- 250/636: Performed by: HOSPITALIST

## 2022-07-16 PROCEDURE — 97530 THERAPEUTIC ACTIVITIES: CPT

## 2022-07-16 PROCEDURE — 74011000258 HC RX REV CODE- 258: Performed by: PHYSICIAN ASSISTANT

## 2022-07-16 PROCEDURE — 77010033678 HC OXYGEN DAILY

## 2022-07-16 PROCEDURE — 74011250637 HC RX REV CODE- 250/637: Performed by: HOSPITALIST

## 2022-07-16 PROCEDURE — 74011250637 HC RX REV CODE- 250/637: Performed by: INTERNAL MEDICINE

## 2022-07-16 PROCEDURE — 74011250636 HC RX REV CODE- 250/636: Performed by: INTERNAL MEDICINE

## 2022-07-16 PROCEDURE — 82962 GLUCOSE BLOOD TEST: CPT

## 2022-07-16 PROCEDURE — 90935 HEMODIALYSIS ONE EVALUATION: CPT

## 2022-07-16 PROCEDURE — 74011636637 HC RX REV CODE- 636/637: Performed by: FAMILY MEDICINE

## 2022-07-16 PROCEDURE — 74011636637 HC RX REV CODE- 636/637: Performed by: HOSPITALIST

## 2022-07-16 PROCEDURE — 97535 SELF CARE MNGMENT TRAINING: CPT

## 2022-07-16 PROCEDURE — 74011250637 HC RX REV CODE- 250/637: Performed by: FAMILY MEDICINE

## 2022-07-16 PROCEDURE — 65270000029 HC RM PRIVATE

## 2022-07-16 PROCEDURE — 74011636637 HC RX REV CODE- 636/637: Performed by: INTERNAL MEDICINE

## 2022-07-16 PROCEDURE — 94660 CPAP INITIATION&MGMT: CPT

## 2022-07-16 PROCEDURE — 74011250636 HC RX REV CODE- 250/636: Performed by: PHYSICIAN ASSISTANT

## 2022-07-16 RX ADMIN — HEPARIN SODIUM 5000 UNITS: 5000 INJECTION INTRAVENOUS; SUBCUTANEOUS at 04:52

## 2022-07-16 RX ADMIN — Medication 2 TABLET: at 21:34

## 2022-07-16 RX ADMIN — INSULIN LISPRO 3 UNITS: 100 INJECTION, SOLUTION INTRAVENOUS; SUBCUTANEOUS at 09:07

## 2022-07-16 RX ADMIN — CARVEDILOL 12.5 MG: 12.5 TABLET, FILM COATED ORAL at 21:35

## 2022-07-16 RX ADMIN — EZETIMIBE 10 MG: 10 TABLET ORAL at 09:07

## 2022-07-16 RX ADMIN — PIPERACILLIN AND TAZOBACTAM 4.5 G: 4; .5 INJECTION, POWDER, FOR SOLUTION INTRAVENOUS at 20:28

## 2022-07-16 RX ADMIN — Medication 6 UNITS: at 22:48

## 2022-07-16 RX ADMIN — HEPARIN SODIUM 5000 UNITS: 5000 INJECTION INTRAVENOUS; SUBCUTANEOUS at 21:35

## 2022-07-16 RX ADMIN — EPOETIN ALFA-EPBX 10000 UNITS: 10000 INJECTION, SOLUTION INTRAVENOUS; SUBCUTANEOUS at 21:46

## 2022-07-16 RX ADMIN — PIPERACILLIN AND TAZOBACTAM 4.5 G: 4; .5 INJECTION, POWDER, FOR SOLUTION INTRAVENOUS at 09:11

## 2022-07-16 RX ADMIN — SODIUM CHLORIDE, PRESERVATIVE FREE 10 ML: 5 INJECTION INTRAVENOUS at 06:07

## 2022-07-16 RX ADMIN — SODIUM CHLORIDE, PRESERVATIVE FREE 10 ML: 5 INJECTION INTRAVENOUS at 22:41

## 2022-07-16 RX ADMIN — ASPIRIN 81 MG: 81 TABLET, CHEWABLE ORAL at 09:07

## 2022-07-16 RX ADMIN — B-COMPLEX W/ C & FOLIC ACID TAB 1 MG 1 TABLET: 1 TAB at 09:07

## 2022-07-16 RX ADMIN — INSULIN GLARGINE 10 UNITS: 100 INJECTION, SOLUTION SUBCUTANEOUS at 22:48

## 2022-07-16 RX ADMIN — HEPARIN SODIUM 5000 UNITS: 5000 INJECTION INTRAVENOUS; SUBCUTANEOUS at 13:31

## 2022-07-16 RX ADMIN — SEVELAMER CARBONATE 1600 MG: 800 TABLET, FILM COATED ORAL at 17:34

## 2022-07-16 RX ADMIN — PANTOPRAZOLE SODIUM 40 MG: 40 TABLET, DELAYED RELEASE ORAL at 09:07

## 2022-07-16 RX ADMIN — INSULIN LISPRO 3 UNITS: 100 INJECTION, SOLUTION INTRAVENOUS; SUBCUTANEOUS at 17:39

## 2022-07-16 RX ADMIN — Medication 2 TABLET: at 09:06

## 2022-07-16 RX ADMIN — SEVELAMER CARBONATE 1600 MG: 800 TABLET, FILM COATED ORAL at 09:07

## 2022-07-16 RX ADMIN — CARVEDILOL 12.5 MG: 12.5 TABLET, FILM COATED ORAL at 09:07

## 2022-07-16 RX ADMIN — ALLOPURINOL 100 MG: 100 TABLET ORAL at 09:07

## 2022-07-16 RX ADMIN — AMLODIPINE BESYLATE 10 MG: 5 TABLET ORAL at 09:07

## 2022-07-16 RX ADMIN — INSULIN LISPRO 3 UNITS: 100 INJECTION, SOLUTION INTRAVENOUS; SUBCUTANEOUS at 13:32

## 2022-07-16 RX ADMIN — GABAPENTIN 300 MG: 300 CAPSULE ORAL at 21:34

## 2022-07-16 RX ADMIN — SODIUM CHLORIDE, PRESERVATIVE FREE 10 ML: 5 INJECTION INTRAVENOUS at 14:00

## 2022-07-16 NOTE — PROGRESS NOTES
Problem: General Medical Care Plan  Goal: *Vital signs within specified parameters  Outcome: Progressing Towards Goal  Goal: *Labs within defined limits  Outcome: Progressing Towards Goal  Goal: *Absence of infection signs and symptoms  Outcome: Progressing Towards Goal  Goal: *Optimal pain control at patient's stated goal  Outcome: Progressing Towards Goal  Goal: *Skin integrity maintained  Outcome: Progressing Towards Goal  Goal: *Fluid volume balance  Outcome: Progressing Towards Goal  Goal: *Optimize nutritional status  Outcome: Progressing Towards Goal  Goal: *Anxiety reduced or absent  Outcome: Progressing Towards Goal  Goal: *Progressive mobility and function (eg: ADL's)  Outcome: Progressing Towards Goal     Problem: Patient Education: Go to Patient Education Activity  Goal: Patient/Family Education  Outcome: Progressing Towards Goal     Problem: Falls - Risk of  Goal: *Absence of Falls  Description: Document Jaxson Fall Risk and appropriate interventions in the flowsheet. Outcome: Progressing Towards Goal  Note: Fall Risk Interventions:  Mobility Interventions: Patient to call before getting OOB,OT consult for ADLs,Bed/chair exit alarm,PT Consult for mobility concerns         Medication Interventions: Bed/chair exit alarm,Patient to call before getting OOB,Teach patient to arise slowly    Elimination Interventions: Call light in reach,Patient to call for help with toileting needs    History of Falls Interventions: Door open when patient unattended,Utilize gait belt for transfer/ambulation,Evaluate medications/consider consulting pharmacy         Problem: Patient Education: Go to Patient Education Activity  Goal: Patient/Family Education  Outcome: Progressing Towards Goal     Problem: Pressure Injury - Risk of  Goal: *Prevention of pressure injury  Description: Document Semaj Scale and appropriate interventions in the flowsheet.   Outcome: Progressing Towards Goal  Note: Pressure Injury Interventions:  Sensory Interventions: Minimize linen layers,Discuss PT/OT consult with provider,Monitor skin under medical devices,Pad between skin to skin,Pressure redistribution bed/mattress (bed type)    Moisture Interventions: Maintain skin hydration (lotion/cream),Limit adult briefs,Minimize layers,Check for incontinence Q2 hours and as needed    Activity Interventions: Pressure redistribution bed/mattress(bed type),PT/OT evaluation    Mobility Interventions: PT/OT evaluation,Pressure redistribution bed/mattress (bed type)    Nutrition Interventions: Document food/fluid/supplement intake    Friction and Shear Interventions: Minimize layers                Problem: Patient Education: Go to Patient Education Activity  Goal: Patient/Family Education  Outcome: Progressing Towards Goal     Problem: Diabetes Self-Management  Goal: *Disease process and treatment process  Description: Define diabetes and identify own type of diabetes; list 3 options for treating diabetes. Outcome: Progressing Towards Goal  Goal: *Incorporating nutritional management into lifestyle  Description: Describe effect of type, amount and timing of food on blood glucose; list 3 methods for planning meals. Outcome: Progressing Towards Goal  Goal: *Incorporating physical activity into lifestyle  Description: State effect of exercise on blood glucose levels. Outcome: Progressing Towards Goal  Goal: *Developing strategies to promote health/change behavior  Description: Define the ABC's of diabetes; identify appropriate screenings, schedule and personal plan for screenings. Outcome: Progressing Towards Goal  Goal: *Using medications safely  Description: State effect of diabetes medications on diabetes; name diabetes medication taking, action and side effects. Outcome: Progressing Towards Goal  Goal: *Monitoring blood glucose, interpreting and using results  Description: Identify recommended blood glucose targets  and personal targets.   Outcome: Progressing Towards Goal  Goal: *Prevention, detection, treatment of acute complications  Description: List symptoms of hyper- and hypoglycemia; describe how to treat low blood sugar and actions for lowering  high blood glucose level. Outcome: Progressing Towards Goal  Goal: *Prevention, detection and treatment of chronic complications  Description: Define the natural course of diabetes and describe the relationship of blood glucose levels to long term complications of diabetes.   Outcome: Progressing Towards Goal  Goal: *Developing strategies to address psychosocial issues  Description: Describe feelings about living with diabetes; identify support needed and support network  Outcome: Progressing Towards Goal  Goal: *Insulin pump training  Outcome: Progressing Towards Goal  Goal: *Sick day guidelines  Outcome: Progressing Towards Goal  Goal: *Patient Specific Goal (EDIT GOAL, INSERT TEXT)  Outcome: Progressing Towards Goal     Problem: Patient Education: Go to Patient Education Activity  Goal: Patient/Family Education  Outcome: Progressing Towards Goal     Problem: Chronic Renal Failure  Goal: *Fluid and electrolytes stabilized  Outcome: Progressing Towards Goal     Problem: Chronic Renal Failure  Goal: *Fluid and electrolytes stabilized  Outcome: Progressing Towards Goal     Problem: Patient Education: Go to Patient Education Activity  Goal: Patient/Family Education  Outcome: Progressing Towards Goal     Problem: Patient Education: Go to Patient Education Activity  Goal: Patient/Family Education  Outcome: Progressing Towards Goal     Problem: Patient Education: Go to Patient Education Activity  Goal: Patient/Family Education  Outcome: Progressing Towards Goal

## 2022-07-16 NOTE — PROGRESS NOTES
7/16/2022  PT treatment not completed due to:  [] Off Unit for testing/procedure  [] Pain  [] Eating  [] Patient too lethargic  [] Nausea/vomiting  [x] Dialysis treatment in progress   [] Other:  Will f/u at later time as pt schedule allows. Thank you.    Helen Livingston, PT

## 2022-07-16 NOTE — PROGRESS NOTES
Hospitalist Progress Note-critical care note     Patient: Maria D Hendrickson MRN: [id number]  [facility]: [id number]    YOB: 1960  Age: 60 y.o. Sex: male    DOA: 6/27/2022 LOS:  LOS: 19 days            Chief complaint: cad , esrd on hd, OM , cellulitis , DM ,     Assessment/Plan         Hospital Problems  Date Reviewed: 6/28/2022          Codes Class Noted POA    Left arm swelling ICD-10-CM: M79.89  ICD-9-CM: 729.81  7/10/2022 Unknown        Infection and inflammatory reaction due to internal fixation device of other site, initial encounter [facility]) ICD-10-CM: K87.48ME  ICD-9-CM: 996.67  7/5/2022 Unknown        CAD (coronary artery disease) ICD-10-CM: I25.10  ICD-9-CM: 414.00  6/28/2022 Unknown        ESRD (end stage renal disease) ([facility] ICD-10-CM: N18.6  ICD-9-CM: 585.6  6/28/2022 Unknown        * (Principal) Acute hematogenous osteomyelitis of right foot ([facility] ICD-10-CM: M86.071  ICD-9-CM: 730.07  6/28/2022 Unknown        Cellulitis of right heel ICD-10-CM: L03.115  ICD-9-CM: 682.7  6/28/2022 Unknown        Diabetic foot ulcer with osteomyelitis ([facility] ICD-10-CM: E11.621, E11.69, L97.509, M86.9  ICD-9-CM: 250.80, 707.15, 730.27, 731.8  6/28/2022 Unknown        Ulcer of right heel, with necrosis of bone ([facility] ICD-10-CM: L97.414  ICD-9-CM: 707.14, 730.17  6/28/2022 Unknown        Diabetes mellitus with foot ulcer ([facility] ICD-10-CM: E11.621, L97.509  ICD-9-CM: 250.80, 707.15  6/27/2022 Unknown                 Cardell Hatchet a 60 y. o. male who history, stent, hypertension, Charcot's foot presents with worsening pain and swelling and chills in his right foot despite multiple cleanings and being managed by podiatry as outpatient.     Large necrotic ulcer right posterior heel with osteomyelitis of the calcaneus and deep abscess-  S/p incision bone cortex right valcaneous, I&D right heel abcsess, deep wound debridement with multiple bone biopsies and application of antibiotic beads done  Dr. De La Rosa Failing Podiatrist and ID f/u Tunnel line placed ,continue iv abx      Diabetes mellitus -  On  lantus 10units and  premeal insulin and continue ssi better controlled and will continue current regimen      End-stage renal disease on dialysis Tuesday Thursday and Saturday -  S/p [facility] replacement   HD per nephrology      History of coronary artery disease-  Restart coreg and aspirin      Chronic compressive myelopathy pending surgery -  Supportive care    Subjective: I feel fine, used cpap 2 hrs     Encourage pt to use cpap as prescribed     Disposition :[facility]   Review of systems:    General: No fevers or chills. Cardiovascular: No chest pain or pressure. No palpitations. Pulmonary: No shortness of breath. Gastrointestinal: No nausea, vomiting. Vital signs/Intake and Output:  Visit Vitals  BP (!) 146/70   Pulse 72   Temp 98.2 °F (36.8 °C)   Resp 18   Ht 6' 2\" (1.88 m)   Wt 110.4 kg (243 lb 4.8 oz)   SpO2 90%   BMI 31.24 kg/m²     Current Shift:  No intake/output data recorded. Last three shifts:  07/14 1901 - 07/16 0700  In: 290 [P.O.:240]  Out: -     Physical Exam:  General: WD, WN. Alert, cooperative, no acute distress    HEENT: NC, Atraumatic. PERRLA, anicteric sclerae. Lungs: CTA Bilaterally. No Wheezing/Rhonchi/Rales. HD cath noted   Heart:  Regular  rhythm,  No murmur, No Rubs, No Gallops  Abdomen: Soft, Non distended, Non tender. +Bowel sounds,   Extremities: No c/c, bilateral feet covered with protect boots and wrapped with ace , wound vac noted   Psych:   Not anxious or agitated. Neurologic:  No acute neurological deficit. Labs: Results:       Chemistry No results for input(s): GLU, NA, K, CL, CO2, BUN, CREA, CA, AGAP, BUCR, TBIL, AP, TP, ALB, GLOB, AGRAT in the last 72 hours. No lab exists for component: GPT   CBC w/Diff No results for input(s): WBC, RBC, HGB, HCT, PLT, GRANS, LYMPH, EOS, HGBEXT, HCTEXT, PLTEXT, HGBEXT, HCTEXT, PLTEXT in the last 72 hours.    Cardiac Enzymes No results for input(s): CPK, CKND1, PAULO in the last 72 hours. No lab exists for component: CKRMB, TROIP   Coagulation No results for input(s): PTP, INR, APTT, INREXT, INREXT in the last 72 hours. Lipid Panel No results found for: CHOL, CHOLPOCT, CHOLX, CHLST, CHOLV, 735408, HDL, HDLP, LDL, LDLC, DLDLP, 545902, VLDLC, VLDL, TGLX, TRIGL, TRIGP, TGLPOCT, CHHD, CHHDX   BNP No results for input(s): BNPP in the last 72 hours. Liver Enzymes No results for input(s): TP, ALB, TBIL, AP in the last 72 hours. No lab exists for component: SGOT, GPT, DBIL   Thyroid Studies No results found for: T4, T3U, TSH, TSHEXT, TSHEXT     Procedures/imaging: see electronic medical records for all procedures/Xrays and details which were not copied into this note but were reviewed prior to creation of Plan    XR FOOT RT AP/LAT    Result Date: 6/29/2022  EXAM: FOOT SERIES Indication: Postoperative. Technique: Frontal and lateral views of the right foot. Comparison: 6/27/2022 _______________ FINDINGS: -OSSEOUS: Interval postoperative changes of posterior calcaneal osteotomy with placement of adjacent drug-eluting beads at the osteotomy site. Overlying bandage material partially degrades evaluation of the osteotomy site. The anterior approach tibial calcaneal screw tip projects at or slightly beyond the osteotomy site, difficult to evaluate given overlying leads and bandage material. Redemonstration of mid and hindfoot deformity, with cannulated screws, and loss of normal definable anatomy. There is extensive periosteal thickening and irregularity of the first metatarsal, with unchanged plantar displacement distal screw head, with adjacent bone fragment. There is diffuse lucency surrounding the proximal and distal thirds of the indwelling screw. There is diffuse periosteal thickening of the posterior distal talus. No acute displaced fracture is identified.  -SOFT TISSUES: Diffuse soft tissue edematous changes, with numerous drug-eluting beads at the posterior aspect of the calcaneal osteotomy. Diffuse atherosclerotic changes. 1. Interval posterior calcaneal osteotomy with adjacent drug-eluting beads and bandage material. Otherwise, no acute osseous abnormality. XR FOOT RT AP/LAT    Result Date: 6/27/2022  EXAM:  XR FOOT RT AP/LAT INDICATION:   right foot pain/wound with odor COMPARISON:  None. FINDINGS: 2 views of the right foot demonstrate chronic deformity, fragmentation, and collapse of the mid/hindfoot. Orthopedic screw in traversing the first ray with periprosthetic lucency. Additional hardware in the mid foot and ankle/hindfoot noted as well. Soft tissue defect seen along the calcaneus posteriorly with adjacent cortical irregularity of the calcaneal cortex. Irregular destructive appearance noted of the distal fifth metatarsal.     Ulceration overlying the heel with likely exposed calcaneus highly suggestive of osteomyelitis with adjacent cortical irregularity and sclerosis. Periprosthetic lucency along the first ray orthopedic screw. Periprosthetic infection and/or loosening suggested. Chronic deformity and collapse of the mid/hindfoot and ankle compatible with Charcot arthropathy. Irregular cortical destructive change noted of the distal fifth metatarsal head possibly related to this process as well although superimposed infection not excluded. Leslie Eastmanmackenzie MRI FOOT RT WO CONT    Result Date: 6/28/2022  ============================ [facility] ============================ HISTORY: -From Provider:  right heel wound, r/o osteomyelitis -Additional: None TECHNIQUE: Sagittal T1, STIR and T2 fat-sat; axial PD and T2 with fat saturation; coronal T2 and STIR with fat saturation and axial oblique PD images of the right ankle were obtained. COMPARISONS: None. FINDINGS: ----------- Postoperative changes are present in the mid and hindfoot, with sequela of prior fusion delineation of original osseous structures.  Punctate foci of susceptibility artifact in the subcutaneous fat about the ankle would be consistent with postoperative changes, assuming gas is not identified on plain film. There is plantar settling of the tibia and fibula, in the setting of hindfoot/midfoot fusion and associated postoperative deformity/remodeling. Susceptibility artifact from fixation hardware is present about the ankle. There is a partially visualized, long partially threaded cannulated screw in the 1st digit, terminating in the hind foot. There is a small amount of fluid signal intensity surrounding the tip of the screw tracking posteriorly and inferiorly, with suggested tiny osseous fragments. There is an oblique partially threaded cannulated screw from the lateral tibia to the dorsal calcaneus. There is surrounding T1 hypointense, STIR hyperintense dorsal calcaneal marrow signal, extending to the discontinuous calcaneal cortex, deep to the skin defect/ulceration. There are 2 oblique partially threaded cannulated screws in the fused hindfoot, extending from plantar aspect of the medial cuneiform and residual cuboid to the to the distal tibia. There is fatty change in the musculature about the ankle. Feathery hyperintensity is present in the distal calf musculature above the ankle. Retracted FHL tendon stump cannot be followed distal to the ankle. Peroneus longus and brevis appear discontinuous at the ankle. There is ill-defined soft tissue thickening, severe cartilage loss in marrow hyperintensity on both sides of the 2nd and 3rd and minimally at the 4th and 5th tarsometatarsal joints. Anterior tendons and Achilles tendons unremarkable within limits of exam. OTHER: Plantar fascia: Undulating/thinned plantar aponeurosis to insertion on inferior calcaneal enthesophyte.     -------------- 1. Posterior heel skin defect, at the margin of the Achilles tendon attachment on the calcaneus, in keeping with known ulcer.  Infiltration of subjacent subcutaneous fat in keeping with cellulitis. 2. Cortical discontinuity and marrow signal abnormality in the subjacent dorsal calcaneus extending to the threaded tip of the oblique tibiocalcaneal screw is suspicious for osteomyelitis. 3. Fluid signal intensity surrounding the threaded tibial tip of the 1st digit long partially threaded screw, with osseous fragments at the inferior margin, concerning for loosening and/or infection. 4. Marrow signal abnormalities at the 2nd-3rd and to a lesser degree 4th-5th tarsometatarsal joint, potentially simply related to degenerative/Charcot arthropathy. In view of marrow signal abnormalities, infection cannot be excluded if clinically suspected. If clinically warranted, consider correlation with nuclear medicine imaging to further assess. 5. Edema/myositis in the musculature above the ankle. There is discontinuity of FHL and peroneal tendons, best correlated with operative findings/history of prior intervention for significance/chronicity. Fatty change in the musculature about the foot. XR CHEST PORT    Result Date: 6/27/2022  CLINICAL: Chest pain. COMPARISON: December 1, 2008. A portable view of the chest from 1909 hours: Mild elevation left hemidiaphragm. No focal airspace density. Pulmonary vascular congestion. Right IJ PermCath. No pneumothorax. Pulmonary vascular congestion. No focal airspace densities. DUPLEX LOWER EXT VENOUS BILAT    Result Date: 6/28/2022  · No evidence of deep vein thrombosis in the left lower extremity. · No evidence of deep vein thrombosis in the right lower extremity. DUPLEX LOWER EXT ARTERY BILAT    Result Date: 6/28/2022  · Bilateral tibial arteries are patnet at the ankle but with monophasic doppler signal. · Bilateral anterior tibial artery is occluded proximally but reconstituted distally by the collateral flow.         Kisha See MD

## 2022-07-16 NOTE — PROGRESS NOTES
TREATMENT SUMMARY   Patient dialyzed in room 325  Tolerated treatment well without complaint or complications.    RIJ TDC functioning well without complications      3000 ml removed via UF with a with a net removal 2500  Report given to Niru Lagos RN with all questions answered     TREATMENT  NOTES                                                                                                     ACUTE HEMODIALYSIS FLOW SHEET       ACCESS   CATHETER ACCESS: [] N/A  [x] RIGHT  [] LEFT  [] IJ  [] SUBCL [] FEM                    [] First use X-ray  [x] Tunnel     [] Non-Tunneled      [x] No S/S infection  [] Redness [] Drainage  [] Cultured [] Swelling [] Pain                    [x] Medical Aseptic [] Prep Dressing Changed                  [] Clotted [] Patent []      Flows: [] Good [] Poor [] Reversed                 If Access Problem Dr. Elle Montiel: [] Yes [] No    Date:_____  [] N/A[]   GRAFT/FISTULA ACCESS:  [x] N/A  [] RIGHT  [] LEFT  [] UE   [] LE       [] AVG  [] AVF [] BUTTONHOLE    [] +BRUIT/THRILL [] MEDICAL ASEPTIC PREP     [] No S/S infection  [] Redness [] Drainage  [] Cultured [] Swelling [] Pain              If Access Problem Dr. Elle Montiel: [] Yes [] No    Date:______ [] N/A     RO/HEMODIAYLSIS MACHINE [address] [place] [address] TREATMENT          [] [facility]: Machine Serial #1:  [id number]    RO Serial #[id number]                       [x] [facility]: Machine Serial #2:  [id number] RO Serial #[id number]     [] [facility]: Machine Serial #3:  [id number]  RO Serial #[id number]    Alarm Test: [x] Pass  Time_1051__  [x] RO/Machine Log Complete    [x] Extracorporeal circuit Tested for integrity           Dialyzer_[id number]_   Tubing_[id number]_    Dialysate: pH__7.2_  Temp.__37___Conductivity: Quaml43__ HD Machine__14.1   CHLORINE TESTING- BEFORE EACH TREATMENT AND EVERY 4 HOURS   Total Chlorine: [x] Less than 0.1 ppm Time:__1045___2nd Check Time:__nr____  (If greater than 0.1 ppm from Primary then every 30 minutes from Secondary)   TREATMENT INIATION-WITH DIALYSIS PRECAUTIONS   [x] All Connections Secured   [x] Saline Line Double Clamped    [x] Venous Parameters Set [x] Arterial Parameters Set    [x] Prime Given 250 ml     [x] Air Foam Detector Engaged   PRE-TREATMENT   UF Calculations: Wt to lose:_2500___ml(+) Oral:_0_ml(+)IV Meds/Fluids/Blood prods_0__ml(+) Prime/Rinse__500_ml(=)Total UF Goal__3000__mL     Tx Initiation Note: RECEIVED REPORT. PATIENT ALERT AND ORIENTED. VITAL SIGNS WNL. NAD. ALL QUESTIONS ANSWERED WITH PATIENT  SAFETY CHECKS COMPLETE. TIME OUT COMPLETE. TREATMENT INITIATED VIA _RIJ TDC____. NO CONCERNS NOTED. [x] Time Out/Safety Check  Time:__1055__     Dialyzer cleared: [x] Good [] Fair [] Poor     Blood Volume Processed _79___L   Net UF Removed _2500___mL  Post Tx Access:                  AVF/AVG: Bleeding Stop Time      Art. NA_min Avtar.__NA_min []+bruit/thrill                              Catheter: Locking Solution  [x] Heparin 1 ml/1000 units                                                             [] Normal Saline                                                                      Art.__1.8___ ml Avtar.__1.8___ml                                                           [x] New caps placed       Abbreviations: AVG-arterial venous graft, AVF-arterial venous fistula, IJ-Internal Jugular,  Subcl-Subclavian, Fem-Femoral, Tx-treatment, AP/HR-apical heart rate, DFR-dialysate flow rate, BFR-blood flow rate, AP-arterial pressure, -venous pressure, UF-ultrafiltrate, TMP-transmembrane pressure, Avtar-Venous, Art-Arterial, RO-Reverse Osmosis

## 2022-07-16 NOTE — PROGRESS NOTES
Nephrology Progress note    Subjective:     Nahun Zepeda is a 60 y.o. male with  a PMH of DM, HTN, CAD, ESRD on HD TTS admitted for right foot infection. MRI suggested osteomyelitis. No fever, chills. Pt s/p debridement, I&D- on abx.     HD Catheter fell off and was replaced 6/29.       -Pt denies CP/SOB/Fever/Chills.    Seen on HD today- felt vernell this AM       Admit Date: 6/27/2022  Principal Problem:    Acute hematogenous osteomyelitis of right foot ([address] (6/28/2022)    Active Problems:    Diabetes mellitus with foot ulcer ([address] (6/27/2022)      CAD (coronary artery disease) (6/28/2022)      ESRD (end stage renal disease) ([address] (6/28/2022)      Cellulitis of right heel (6/28/2022)      Diabetic foot ulcer with osteomyelitis ([address] (6/28/2022)      Ulcer of right heel, with necrosis of bone ([address] (6/28/2022)      Infection and inflammatory reaction due to internal fixation device of other site, initial encounter ([address] (7/5/2022)      Left arm swelling (7/10/2022)      Current Facility-Administered Medications   Medication Dose Route Frequency    pantoprazole (PROTONIX) tablet 40 mg  40 mg Oral ACB    alteplase (CATHFLO) 1 mg in sterile water (preservative free) 1 mL injection  1 mg InterCATHeter DIALYSIS PRN    nitroglycerin (NITROSTAT) tablet 0.4 mg  0.4 mg SubLINGual PRN    morphine injection 1 mg  1 mg IntraVENous Q4H PRN    ondansetron (ZOFRAN) injection 4 mg  4 mg IntraVENous Q6H PRN    gabapentin (NEURONTIN) capsule 300 mg  300 mg Oral QHS    heparin (porcine) injection 5,000 Units  5,000 Units SubCUTAneous Q8H    melatonin (rapid dissolve) tablet 10 mg  10 mg Oral QHS PRN    epoetin lisette-epbx (RETACRIT) injection 10,000 Units  10,000 Units SubCUTAneous Q TUE, THU & SAT    heparin (porcine) 1,000 unit/mL injection 3,400 Units  3,400 Units Hemodialysis DIALYSIS PRN    heparin (porcine) 100 unit/mL injection 500 Units  500 Units InterCATHeter Q8H PRN    sodium chloride (NS) flush 5-40 mL 5-40 mL IntraVENous Q8H    sodium chloride (NS) flush 5-40 mL  5-40 mL IntraVENous PRN    fentaNYL citrate (PF) injection  mcg   mcg IntraVENous Rad Multiple    naloxone (NARCAN) injection 0.1 mg  0.1 mg IntraVENous Multiple    loperamide (IMODIUM) capsule 2 mg  2 mg Oral Q4H PRN    piperacillin-tazobactam (ZOSYN) 4.5 g in 0.9% sodium chloride (MBP/ADV) 100 mL MBP  4.5 g IntraVENous Q12H    sevelamer carbonate (RENVELA) tab 1,600 mg  1,600 mg Oral TID WITH MEALS    Lactobacillus Acidoph & Bulgar (FLORANEX) tablet 2 Tablet  2 Tablet Oral BID    allopurinoL (ZYLOPRIM) tablet 100 mg  100 mg Oral DAILY    carvediloL (COREG) tablet 12.5 mg  12.5 mg Oral BID    ezetimibe (ZETIA) tablet 10 mg  10 mg Oral DAILY    amLODIPine (NORVASC) tablet 10 mg  10 mg Oral DAILY    vit B Cmplx 3-FA-Vit C-Biotin (NEPHRO NIKITA RX) tablet 1 Tablet  1 Tablet Oral DAILY    insulin lispro (HUMALOG) injection 3 Units  3 Units SubCUTAneous TIDAC    aspirin chewable tablet 81 mg  81 mg Oral DAILY    insulin glargine (LANTUS) injection 10 Units  10 Units SubCUTAneous QHS    insulin lispro (HUMALOG) injection   SubCUTAneous AC&HS    glucose chewable tablet 16 g  4 Tablet Oral PRN    glucagon (GLUCAGEN) injection 1 mg  1 mg IntraMUSCular PRN    dextrose 10% infusion 0-250 mL  0-250 mL IntraVENous PRN    acetaminophen (TYLENOL) tablet 650 mg  650 mg Oral Q6H PRN         Allergy:   No Known Allergies     Objective:     Visit Vitals  BP (!) 165/88   Pulse 76   Temp 98.5 °F (36.9 °C) (Oral)   Resp 19   Ht 6' 2\" (1.88 m)   Wt 110.4 kg (243 lb 4.8 oz)   SpO2 97%   BMI 31.24 kg/m²       No intake or output data in the 24 hours ending 07/16/22 1216    Physical Exam:       General: No acute distress   HENT: Atraumatic and normocephalic   Eyes: Normal conjunctiva   Neck: Supple No JVD   Cardiovascular: Normal S1 & S2, no m/r/g   Pulmonary/Chest Wall: Clear to auscultation bilaterally   Abdominal: Soft and non-tender Musculoskeletal: Wound Vac on R foot   Neurological: AA and O X 3, No focal deficits     RIJ TDC in Place     Data Review:  Lab Results   Component Value Date/Time    Sodium 142 07/11/2022 05:30 AM    Potassium 5.0 07/11/2022 05:30 AM    Chloride 106 07/11/2022 05:30 AM    CO2 26 07/11/2022 05:30 AM    Anion gap 10 07/11/2022 05:30 AM    Glucose 117 (H) 07/11/2022 05:30 AM    BUN 47 (H) 07/11/2022 05:30 AM    Creatinine 7.92 (H) 07/11/2022 05:30 AM    BUN/Creatinine ratio 6 (L) 07/11/2022 05:30 AM    GFR est AA 8 (L) 07/11/2022 05:30 AM    GFR est non-AA 7 (L) 07/11/2022 05:30 AM    Calcium 8.0 (L) 07/11/2022 05:30 AM     Lab Results   Component Value Date/Time    WBC 11.0 07/12/2022 02:28 AM    HGB 9.0 (L) 07/12/2022 02:28 AM    HCT 28.9 (L) 07/12/2022 02:28 AM    PLATELET 771 23/89/9274 02:28 AM    MCV 99.3 07/12/2022 02:28 AM     Lab Results   Component Value Date/Time    Calcium 8.0 (L) 07/11/2022 05:30 AM    Phosphorus 4.8 07/11/2022 05:30 AM     No results found for: IRON, FE, TIBC, IBCT, PSAT, FERR  No results found for: FERR      Impression:   -ESRD on HD  -Diabetic foot ulcer with osteomyelitis, on abx   -DM  -HTN  -Anemia in CKD  -SHPT  -Hyperphosphatemia: on Renvela       Plan:   HD today  [facility]AL[facility] TIW  May need to cut back on pain meds           MD Bonifacio Penanison  [phone]

## 2022-07-16 NOTE — PROGRESS NOTES
Problem: Self Care Deficits Care Plan (Adult)  Goal: *Acute Goals and Plan of Care (Insert Text)  Description: Occupational Therapy Goals  Initiated 7/8/2022 within 7 day(s). 1.  Patient will perform grooming with supervision/set-up seated in chair. 2.  Patient will perform upper body dressing with supervision/set-up. 3.  Patient will perform lower body dressing with minimal assistance/contact guard assist and use of AEs as needed. 4.  Patient will perform bed<>bsc transfers with minimal assistance/contact guard assist.  5.  Patient will perform all aspects of toileting with minimal assistance/contact guard assist.  6.  Patient will participate in upper extremity therapeutic exercise/activities with supervision/set-up for 10 minutes. 7.  Patient will utilize energy conservation techniques during functional activities with verbal, visual, and tactile cues. Outcome: Progressing Towards Goal    OCCUPATIONAL THERAPY TREATMENT    Patient: Rosie Otero (34 y.o. male)  Date: 7/16/2022  Diagnosis: Diabetes mellitus with foot ulcer ([address] [B69.473, L97.509] Acute hematogenous osteomyelitis of right foot ([address]  Procedure(s) (LRB):  RIGHT HEEL DEBRIDEMENT WITH GRAFTING,REMOVAL OF SCREW  (PT IN ROOM #325) WITH C-ARM (Right) 11 Days Post-Op  Precautions: Fall,NWB (R foot-wound vac in place)  PLOF:     Chart, occupational therapy assessment, plan of care, and goals were reviewed. ASSESSMENT:  Pt presented supine in bed at the beginning of session and agrees to participate with therapy. Pt co-treat with PT for the need of another set of skilled hands and safety with transfers/ADLs. Pt performed bed mobility, rolling R<>L for ashutosh care and don brief before attempting OOB mobility. Pt performed supine to sit, bed to w/c transfers utilizing sliding board; frequent verbal cues for safe hand placement with transfers on sliding board were provided. Pt requires mod-max A x2 for transfers and be mobility.  Pt was left seated in w/c at the end of session in NAD. Progression toward goals:  []          Improving appropriately and progressing toward goals  [x]          Improving slowly and progressing toward goals  []          Not making progress toward goals and plan of care will be adjusted     PLAN:  Patient continues to benefit from skilled intervention to address the above impairments. Continue treatment per established plan of care. Discharge Recommendations:  Skilled Nursing Facility  Further Equipment Recommendations for Discharge:  N/A     SUBJECTIVE:   Patient stated I can get up.     OBJECTIVE DATA SUMMARY:   Cognitive/Behavioral Status:  Neurologic State: Alert  Orientation Level: Oriented X4  Cognition: Appropriate for age attention/concentration,Follows commands  Safety/Judgement: Fall prevention    Functional Mobility and Transfers for ADLs:   Bed Mobility:  Rolling: Moderate assistance  Supine to Sit: Minimum assistance  Scooting: Moderate assistance   Transfers:  Bed to Chair: Moderate assistance; Additional time;Assist x2 (sliding board transfers)  Balance:  Sitting: Impaired; With support  Sitting - Static: Fair (occasional)  Sitting - Dynamic: Fair (occasional)  ADL Interventio  Lower Body Dressing Assistance  Dressing Assistance: Maximum assistance  Protective Undergarmet: Maximum assistance  Shoes with Velcro: Maximum assistance  Leg Crossed Method Used: No  Position Performed: Seated edge of bed  Cues: Apolinar Edi  Toileting Assistance: Maximum assistance  Bowel Hygiene: Maximum assistance  Clothing Management: Maximum assistance    Cognitive Retraining  Safety/Judgement: Fall prevention  Pain:  Pain level pre-treatment: 6/10   Pain level post-treatment: 6/10  Pain Intervention(s): Medication (see MAR);  Rest, Ice, Repositioning   Response to intervention: Nurse notified, See doc flow    Activity Tolerance:    Good     Please refer to the flowsheet for vital signs taken during this treatment. After treatment:   [x]  Patient left in no apparent distress sitting up in wheelchair  []  Patient left in no apparent distress in bed  [x]  Call bell left within reach  [x]  Nursing notified  []  Caregiver present  []  Bed alarm activated    COMMUNICATION/EDUCATION:   [x] Role of Occupational Therapy in the acute care setting  [x] Home safety education was provided and the patient/caregiver indicated understanding. [x] Patient/family have participated as able in working towards goals and plan of care. [x] Patient/family agree to work toward stated goals and plan of care. [] Patient understands intent and goals of therapy, but is neutral about his/her participation. [] Patient is unable to participate in goal setting and plan of care.       Thank you for this referral.  Tamara Vega OTR/L  Time Calculation: 34 mins

## 2022-07-16 NOTE — PROGRESS NOTES
Problem: Mobility Impaired (Adult and Pediatric)  Goal: *Acute Goals and Plan of Care (Insert Text)  Description: Physical Therapy Goals  Updated  7/8/2022 and to be accomplished within 7 day(s)  1. Patient will move from supine to sit and sit to supine in bed with supervision/set-up. 2.  Patient will transfer from bed <> w/c  with min/contact guard assist via slide board. 3.  Patient will propel w/c with SBA 50ft in [place] for increased functional independence with w/c mobility. 4.  Patient will demonstrate LE strengthening exercise program for achievement of above goals. Physical Therapy Goals  Initiated 7/8/2022 and to be accomplished within 7 day(s)  1. Patient will move from supine to sit and sit to supine  in bed with supervision/set-up. 2.  Patient will transfer from bed to chair and chair to bed with minimal assistance/contact guard assist using the least restrictive device. 3.  Patient will perform sit to stand with moderate assistance . Outcome: Progressing Towards Goal   physical Therapy TREATMENT    Patient: Max Hernandez (76 y.o. male)  Date: 7/16/2022 (late entry)  Diagnosis: Diabetes mellitus with foot ulcer ([facility] [E11.621, L97.509] Acute hematogenous osteomyelitis of right foot ([facility] [address]  Procedure(s) (LRB):  RIGHT HEEL DEBRIDEMENT WITH GRAFTING,REMOVAL OF SCREW  (PT IN ROOM #325) WITH C-ARM (Right) 11 Days Post-Op  Precautions: Fall,NWB (R foot-wound vac in place)   Chart, physical therapy assessment, plan of care and goals were reviewed. ASSESSMENT:  Pt c/o being sleepy on arrival but willing to participate with therapy session. Seen with OT for second set of skilled hands. PT/OT donned brief on pt in prep for therapeutic activity. Pt supine>sit EOB with min/mod assist.  Sitting balance fair today. Utilized slide board for bed to w/c transfer with pt requiring mod A of 2 and vc for safe use of slide board. Performs transfer bed with orthotic shoe applied to L foot. Wound vac R foot. Greater assist today for balance and transfer to chair, possibly due to fatigue following HD treatment. Left up in chair in NAD with all needs in reach and nurse aware. Progression toward goals:  []      Improving appropriately and progressing toward goals  [x]      Improving slowly and progressing toward goals  []      Not making progress toward goals and plan of care will be adjusted     PLAN:  Patient continues to benefit from skilled intervention to address the above impairments. Continue treatment per established plan of care. Discharge Recommendations:  [facility]  Further Equipment Recommendations for Discharge:  N/A     SUBJECTIVE:   Patient stated I'm still sleeping.     OBJECTIVE DATA SUMMARY:   Critical Behavior:  Neurologic State: Alert  Orientation Level: Oriented X4  Cognition: Appropriate for age attention/concentration,Follows commands  Safety/Judgement: Fall prevention  Functional Mobility Training:  Bed Mobility:  Rolling: Moderate assistance  Supine to Sit: Minimum assistance  Scooting: Moderate assistance  Transfers:  Bed to Chair: Moderate assistance; Additional time;Assist x2 (sliding board transfers)  Balance:  Sitting: Impaired; With support  Sitting - Static: Fair (occasional)  Sitting - Dynamic: Fair (occasional)  Pain:  Pain Scale 1: Numeric (0 - 10)  Pain Intensity 1: 0  Activity Tolerance:   Fair   Please refer to the flowsheet for vital signs taken during this treatment.   After treatment:   [x] Patient left in no apparent distress sitting up in w/chair  [] Patient left in no apparent distress in bed  [x] Call bell left within reach  [x] Nursing notified  [] Caregiver present  [] Bed alarm activated      Selina Cates PT   Time Calculation: 34 mins

## 2022-07-17 LAB
GLUCOSE BLD STRIP.AUTO-MCNC: 113 MG/DL (ref 70–110)
GLUCOSE BLD STRIP.AUTO-MCNC: 115 MG/DL (ref 70–110)
GLUCOSE BLD STRIP.AUTO-MCNC: 152 MG/DL (ref 70–110)
GLUCOSE BLD STRIP.AUTO-MCNC: 183 MG/DL (ref 70–110)

## 2022-07-17 PROCEDURE — 74011636637 HC RX REV CODE- 636/637: Performed by: INTERNAL MEDICINE

## 2022-07-17 PROCEDURE — 74011250637 HC RX REV CODE- 250/637: Performed by: FAMILY MEDICINE

## 2022-07-17 PROCEDURE — 82962 GLUCOSE BLOOD TEST: CPT

## 2022-07-17 PROCEDURE — 74011250637 HC RX REV CODE- 250/637: Performed by: INTERNAL MEDICINE

## 2022-07-17 PROCEDURE — 74011000258 HC RX REV CODE- 258: Performed by: PHYSICIAN ASSISTANT

## 2022-07-17 PROCEDURE — 74011000250 HC RX REV CODE- 250: Performed by: RADIOLOGY

## 2022-07-17 PROCEDURE — 74011250637 HC RX REV CODE- 250/637: Performed by: HOSPITALIST

## 2022-07-17 PROCEDURE — 97110 THERAPEUTIC EXERCISES: CPT

## 2022-07-17 PROCEDURE — 74011636637 HC RX REV CODE- 636/637: Performed by: HOSPITALIST

## 2022-07-17 PROCEDURE — 74011250636 HC RX REV CODE- 250/636: Performed by: HOSPITALIST

## 2022-07-17 PROCEDURE — 94660 CPAP INITIATION&MGMT: CPT

## 2022-07-17 PROCEDURE — 77010033678 HC OXYGEN DAILY

## 2022-07-17 PROCEDURE — 65270000029 HC RM PRIVATE

## 2022-07-17 PROCEDURE — 74011250636 HC RX REV CODE- 250/636: Performed by: PHYSICIAN ASSISTANT

## 2022-07-17 RX ORDER — BACITRACIN ZINC 500 UNIT/G
OINTMENT (GRAM) TOPICAL 2 TIMES DAILY
Status: DISCONTINUED | OUTPATIENT
Start: 2022-07-17 | End: 2022-07-21 | Stop reason: HOSPADM

## 2022-07-17 RX ADMIN — HEPARIN SODIUM 5000 UNITS: 5000 INJECTION INTRAVENOUS; SUBCUTANEOUS at 21:28

## 2022-07-17 RX ADMIN — SODIUM CHLORIDE, PRESERVATIVE FREE 10 ML: 5 INJECTION INTRAVENOUS at 14:47

## 2022-07-17 RX ADMIN — GABAPENTIN 300 MG: 300 CAPSULE ORAL at 21:28

## 2022-07-17 RX ADMIN — SODIUM CHLORIDE, PRESERVATIVE FREE 10 ML: 5 INJECTION INTRAVENOUS at 21:29

## 2022-07-17 RX ADMIN — INSULIN LISPRO 3 UNITS: 100 INJECTION, SOLUTION INTRAVENOUS; SUBCUTANEOUS at 14:46

## 2022-07-17 RX ADMIN — Medication 2 UNITS: at 18:40

## 2022-07-17 RX ADMIN — PIPERACILLIN AND TAZOBACTAM 4.5 G: 4; .5 INJECTION, POWDER, FOR SOLUTION INTRAVENOUS at 09:11

## 2022-07-17 RX ADMIN — PIPERACILLIN AND TAZOBACTAM 4.5 G: 4; .5 INJECTION, POWDER, FOR SOLUTION INTRAVENOUS at 21:28

## 2022-07-17 RX ADMIN — Medication 2 TABLET: at 21:28

## 2022-07-17 RX ADMIN — Medication 2 TABLET: at 09:11

## 2022-07-17 RX ADMIN — B-COMPLEX W/ C & FOLIC ACID TAB 1 MG 1 TABLET: 1 TAB at 09:11

## 2022-07-17 RX ADMIN — EZETIMIBE 10 MG: 10 TABLET ORAL at 09:11

## 2022-07-17 RX ADMIN — SEVELAMER CARBONATE 1600 MG: 800 TABLET, FILM COATED ORAL at 18:40

## 2022-07-17 RX ADMIN — CARVEDILOL 12.5 MG: 12.5 TABLET, FILM COATED ORAL at 09:12

## 2022-07-17 RX ADMIN — CARVEDILOL 12.5 MG: 12.5 TABLET, FILM COATED ORAL at 21:28

## 2022-07-17 RX ADMIN — INSULIN LISPRO 3 UNITS: 100 INJECTION, SOLUTION INTRAVENOUS; SUBCUTANEOUS at 09:10

## 2022-07-17 RX ADMIN — SEVELAMER CARBONATE 1600 MG: 800 TABLET, FILM COATED ORAL at 14:46

## 2022-07-17 RX ADMIN — LOPERAMIDE HYDROCHLORIDE 2 MG: 2 CAPSULE ORAL at 05:51

## 2022-07-17 RX ADMIN — SEVELAMER CARBONATE 1600 MG: 800 TABLET, FILM COATED ORAL at 09:12

## 2022-07-17 RX ADMIN — INSULIN LISPRO 3 UNITS: 100 INJECTION, SOLUTION INTRAVENOUS; SUBCUTANEOUS at 18:40

## 2022-07-17 RX ADMIN — AMLODIPINE BESYLATE 10 MG: 5 TABLET ORAL at 09:11

## 2022-07-17 RX ADMIN — HEPARIN SODIUM 5000 UNITS: 5000 INJECTION INTRAVENOUS; SUBCUTANEOUS at 05:35

## 2022-07-17 RX ADMIN — ALLOPURINOL 100 MG: 100 TABLET ORAL at 09:11

## 2022-07-17 RX ADMIN — PANTOPRAZOLE SODIUM 40 MG: 40 TABLET, DELAYED RELEASE ORAL at 09:12

## 2022-07-17 RX ADMIN — HEPARIN SODIUM 5000 UNITS: 5000 INJECTION INTRAVENOUS; SUBCUTANEOUS at 14:46

## 2022-07-17 RX ADMIN — ASPIRIN 81 MG: 81 TABLET, CHEWABLE ORAL at 09:11

## 2022-07-17 RX ADMIN — SODIUM CHLORIDE, PRESERVATIVE FREE 10 ML: 5 INJECTION INTRAVENOUS at 05:55

## 2022-07-17 NOTE — PROGRESS NOTES
Problem: Mobility Impaired (Adult and Pediatric)  Goal: *Acute Goals and Plan of Care (Insert Text)  Description: Physical Therapy Goals  Updated  7/8/2022 and to be accomplished within 7 day(s)  1. Patient will move from supine to sit and sit to supine in bed with supervision/set-up. 2.  Patient will transfer from bed <> w/c  with min/contact guard assist via slide board. 3.  Patient will propel w/c with SBA 50ft in [place] for increased functional independence with w/c mobility. 4.  Patient will demonstrate LE strengthening exercise program for achievement of above goals. Physical Therapy Goals  Initiated 7/8/2022 and to be accomplished within 7 day(s)  1. Patient will move from supine to sit and sit to supine  in bed with supervision/set-up. 2.  Patient will transfer from bed to chair and chair to bed with minimal assistance/contact guard assist using the least restrictive device. 3.  Patient will perform sit to stand with moderate assistance . Outcome: Progressing Towards Goal  physical Therapy TREATMENT    Patient: Sharolyn Seip (81 y.o. male)  Date: 7/17/2022  Diagnosis: Diabetes mellitus with foot ulcer ([facility] [W07.629, L97.509] Acute hematogenous osteomyelitis of right foot ([facility] [address]  Procedure(s) (LRB):  RIGHT HEEL DEBRIDEMENT WITH GRAFTING,REMOVAL OF SCREW  (PT IN ROOM #325) WITH C-ARM (Right) 12 Days Post-Op  Precautions: Fall,NWB (R foot-wound vac in place)   Chart, physical therapy assessment, plan of care and goals were reviewed. ASSESSMENT:  Appears fatigued but willing to participate with therapy session. LE's weak but pt with fair tolerance for ROM/strengthening ex LE's. Tendency to keep L LE ER with slight knee flex at rest and during ex. Pain 4/10 R foot pre/post session. Pt repositioned to [facility] with [place] bed control utilized often during session. Pt would benefit from SNF for rehab at discharge.   Progression toward goals:  []      Improving appropriately and progressing toward goals  [x]      Improving slowly and progressing toward goals  []      Not making progress toward goals and plan of care will be adjusted     PLAN:  Patient continues to benefit from skilled intervention to address the above impairments. Continue treatment per established plan of care. Discharge Recommendations:  Skilled Nursing Facility  Further Equipment Recommendations for Discharge:  N/A     SUBJECTIVE:   Patient stated I feel alright.     OBJECTIVE DATA SUMMARY:   Critical Behavior:  Neurologic State: Alert  Orientation Level: Oriented X4  Cognition: Appropriate decision making  Safety/Judgement: Fall prevention  Functional Mobility Training:  Bed Mobility:  Scooting: Other (comment) ([place] bed utilized to move up in bed)  Therapeutic Exercises:   QS/GS, Unilat Bridging L 2x 5, HS L x 10, SKTC R x 5, SLR 2x5 Bilat  Pain:  Pain Scale 1: Numeric (0 - 10)  Pain Intensity 1: 4  Pain Location 1: Foot  Pain Orientation 1: Right  Pain Description 1: Aching  Pain Intervention(s) 1: Repositioned  Activity Tolerance:   Fair:  appears tired and needs vc intermittently to remain engaged  Please refer to the flowsheet for vital signs taken during this treatment.   After treatment:   [] Patient left in no apparent distress sitting up in chair  [x] Patient left in no apparent distress in bed  [x] Call bell left within reach  [] Nursing notified  [] Caregiver present  [] Bed alarm activated      Marcelina Ramos PT   Time Calculation: 34 mins

## 2022-07-17 NOTE — PROGRESS NOTES
Hospitalist Progress Note-critical care note     Patient: An aM Crespo MRN: [id number]  CSN: [id number]    YOB: 1960  Age: 60 y.o. Sex: male    DOA: 6/27/2022 LOS:  LOS: 20 days            Chief complaint: cad , esrd on hd, OM , cellulitis , DM ,     Assessment/Plan         Hospital Problems  Date Reviewed: 6/28/2022          Codes Class Noted POA    Left arm swelling ICD-10-CM: M79.89  ICD-9-CM: 729.81  7/10/2022 Unknown        Infection and inflammatory reaction due to internal fixation device of other site, initial encounter [facility]) ICD-10-CM: U67.87VJ  ICD-9-CM: 996.67  7/5/2022 Unknown        CAD (coronary artery disease) ICD-10-CM: I25.10  ICD-9-CM: 414.00  6/28/2022 Unknown        ESRD (end stage renal disease) ([facility] ICD-10-CM: N18.6  ICD-9-CM: 585.6  6/28/2022 Unknown        * (Principal) Acute hematogenous osteomyelitis of right foot ([facility] ICD-10-CM: M86.071  ICD-9-CM: 730.07  6/28/2022 Unknown        Cellulitis of right heel ICD-10-CM: L03.115  ICD-9-CM: 682.7  6/28/2022 Unknown        Diabetic foot ulcer with osteomyelitis ([facility] ICD-10-CM: E11.621, E11.69, L97.509, M86.9  ICD-9-CM: 250.80, 707.15, 730.27, 731.8  6/28/2022 Unknown        Ulcer of right heel, with necrosis of bone ([facility] ICD-10-CM: L97.414  ICD-9-CM: 707.14, 730.17  6/28/2022 Unknown        Diabetes mellitus with foot ulcer ([facility] ICD-10-CM: E11.621, L97.509  ICD-9-CM: 250.80, 707.15  6/27/2022 Unknown                 Evie Saavedra a 60 y. o. male who history, stent, hypertension, Charcot's foot presents with worsening pain and swelling and chills in his right foot despite multiple cleanings and being managed by podiatry as outpatient.     Large necrotic ulcer right posterior heel with osteomyelitis of the calcaneus and deep abscess-  I&D and grafts done,    Podiatrist and ID f/u Tunnel line placed ,continue iv abx and local wound care -waiting for placement      Diabetes mellitus -  On  lantus 10units and premeal insulin and continue ssi better controlled and will continue current regimen      End-stage renal disease on dialysis Tuesday Thursday and Saturday -  S/p [facility] [place], operated by [facility] replacement   HD per nephrology , received HD yesterday and tolerated well      History of coronary artery disease-  Restart coreg and aspirin      Chronic compressive myelopathy pending surgery -  Supportive care    mis continue cpap     Subjective: I am ok, used cpap for several hrs last night       Disposition :snf   Review of systems:    General: No fevers or chills. Cardiovascular: No chest pain or pressure. No palpitations. Pulmonary: No shortness of breath. Gastrointestinal: No nausea, vomiting. Vital signs/Intake and Output:  Visit Vitals  BP (!) 143/61 (BP 1 Location: Right upper arm, BP Patient Position: At rest)   Pulse 73   Temp 98.4 °F (36.9 °C)   Resp 18   Ht 6' 2\" (1.88 m)   Wt 110.4 kg (243 lb 4.8 oz)   SpO2 95%   BMI 31.24 kg/m²     Current Shift:  No intake/output data recorded. Last three shifts:  07/15 1901 - 07/17 0700  In: -   Out: 2500     Physical Exam:  General: WD, WN. Alert, cooperative, no acute distress    HEENT: NC, Atraumatic. PERRLA, anicteric sclerae. Lungs: CTA Bilaterally. No Wheezing/Rhonchi/Rales. HD cath noted   Heart:  Regular  rhythm,  No murmur, No Rubs, No Gallops  Abdomen: Soft, Non distended, Non tender. +Bowel sounds,   Extremities: No c/c, bilateral feet covered with protect boots and wrapped with ace , wound vac noted   Psych:   Not anxious or agitated. Neurologic:  No acute neurological deficit. Labs: Results:       Chemistry No results for input(s): GLU, NA, K, CL, CO2, BUN, CREA, CA, AGAP, BUCR, TBIL, AP, TP, ALB, GLOB, AGRAT in the last 72 hours. No lab exists for component: GPT   CBC w/Diff No results for input(s): WBC, RBC, HGB, HCT, PLT, GRANS, LYMPH, EOS, HGBEXT, HCTEXT, PLTEXT, HGBEXT, HCTEXT, PLTEXT in the last 72 hours.    Cardiac Enzymes No results for input(s): CPK, CKND1, PAULO in the last 72 hours. No lab exists for component: CKRMB, TROIP   Coagulation No results for input(s): PTP, INR, APTT, INREXT, INREXT in the last 72 hours. Lipid Panel No results found for: CHOL, CHOLPOCT, CHOLX, CHLST, CHOLV, 113095, HDL, HDLP, LDL, LDLC, DLDLP, 110658, VLDLC, VLDL, TGLX, TRIGL, TRIGP, TGLPOCT, CHHD, CHHDX   BNP No results for input(s): BNPP in the last 72 hours. Liver Enzymes No results for input(s): TP, ALB, TBIL, AP in the last 72 hours. No lab exists for component: SGOT, GPT, DBIL   Thyroid Studies No results found for: T4, T3U, TSH, TSHEXT, TSHEXT     Procedures/imaging: see electronic medical records for all procedures/Xrays and details which were not copied into this note but were reviewed prior to creation of Plan    XR FOOT RT AP/LAT    Result Date: 6/29/2022  EXAM: FOOT SERIES Indication: Postoperative. Technique: Frontal and lateral views of the right foot. Comparison: 6/27/2022 _______________ FINDINGS: -OSSEOUS: Interval postoperative changes of posterior calcaneal osteotomy with placement of adjacent drug-eluting beads at the osteotomy site. Overlying bandage material partially degrades evaluation of the osteotomy site. The anterior approach tibial calcaneal screw tip projects at or slightly beyond the osteotomy site, difficult to evaluate given overlying leads and bandage material. Redemonstration of mid and hindfoot deformity, with cannulated screws, and loss of normal definable anatomy. There is extensive periosteal thickening and irregularity of the first metatarsal, with unchanged plantar displacement distal screw head, with adjacent bone fragment. There is diffuse lucency surrounding the proximal and distal thirds of the indwelling screw. There is diffuse periosteal thickening of the posterior distal talus. No acute displaced fracture is identified.  -SOFT TISSUES: Diffuse soft tissue edematous changes, with numerous drug-eluting beads at the posterior aspect of the calcaneal osteotomy. Diffuse atherosclerotic changes. 1. Interval posterior calcaneal osteotomy with adjacent drug-eluting beads and bandage material. Otherwise, no acute osseous abnormality. XR FOOT RT AP/LAT    Result Date: 6/27/2022  EXAM:  XR FOOT RT AP/LAT INDICATION:   right foot pain/wound with odor COMPARISON:  None. FINDINGS: 2 views of the right foot demonstrate chronic deformity, fragmentation, and collapse of the mid/hindfoot. Orthopedic screw in traversing the first ray with periprosthetic lucency. Additional hardware in the mid foot and ankle/hindfoot noted as well. Soft tissue defect seen along the calcaneus posteriorly with adjacent cortical irregularity of the calcaneal cortex. Irregular destructive appearance noted of the distal fifth metatarsal.     Ulceration overlying the heel with likely exposed calcaneus highly suggestive of osteomyelitis with adjacent cortical irregularity and sclerosis. Periprosthetic lucency along the first ray orthopedic screw. Periprosthetic infection and/or loosening suggested. Chronic deformity and collapse of the mid/hindfoot and ankle compatible with Charcot arthropathy. Irregular cortical destructive change noted of the distal fifth metatarsal head possibly related to this process as well although superimposed infection not excluded. Steve Serragrazyna MRI FOOT RT WO CONT    Result Date: 6/28/2022  ============================ [facility] ============================ HISTORY: -From Provider:  right heel wound, r/o osteomyelitis -Additional: None TECHNIQUE: Sagittal T1, STIR and T2 fat-sat; axial PD and T2 with fat saturation; coronal T2 and STIR with fat saturation and axial oblique PD images of the right ankle were obtained. COMPARISONS: None. FINDINGS: ----------- Postoperative changes are present in the mid and hindfoot, with sequela of prior fusion delineation of original osseous structures.  Punctate foci of susceptibility artifact in the subcutaneous fat about the ankle would be consistent with postoperative changes, assuming gas is not identified on plain film. There is plantar settling of the tibia and fibula, in the setting of hindfoot/midfoot fusion and associated postoperative deformity/remodeling. Susceptibility artifact from fixation hardware is present about the ankle. There is a partially visualized, long partially threaded cannulated screw in the 1st digit, terminating in the hind foot. There is a small amount of fluid signal intensity surrounding the tip of the screw tracking posteriorly and inferiorly, with suggested tiny osseous fragments. There is an oblique partially threaded cannulated screw from the lateral tibia to the dorsal calcaneus. There is surrounding T1 hypointense, STIR hyperintense dorsal calcaneal marrow signal, extending to the discontinuous calcaneal cortex, deep to the skin defect/ulceration. There are 2 oblique partially threaded cannulated screws in the fused hindfoot, extending from plantar aspect of the medial cuneiform and residual cuboid to the to the distal tibia. There is fatty change in the musculature about the ankle. Feathery hyperintensity is present in the distal calf musculature above the ankle. Retracted FHL tendon stump cannot be followed distal to the ankle. Peroneus longus and brevis appear discontinuous at the ankle. There is ill-defined soft tissue thickening, severe cartilage loss in marrow hyperintensity on both sides of the 2nd and 3rd and minimally at the 4th and 5th tarsometatarsal joints. Anterior tendons and Achilles tendons unremarkable within limits of exam. OTHER: Plantar fascia: Undulating/thinned plantar aponeurosis to insertion on inferior calcaneal enthesophyte.     -------------- 1. Posterior heel skin defect, at the margin of the Achilles tendon attachment on the calcaneus, in keeping with known ulcer. Infiltration of subjacent subcutaneous fat in keeping with cellulitis.  2. Cortical discontinuity and marrow signal abnormality in the subjacent dorsal calcaneus extending to the threaded tip of the oblique tibiocalcaneal screw is suspicious for osteomyelitis. 3. Fluid signal intensity surrounding the threaded tibial tip of the 1st digit long partially threaded screw, with osseous fragments at the inferior margin, concerning for loosening and/or infection. 4. Marrow signal abnormalities at the 2nd-3rd and to a lesser degree 4th-5th tarsometatarsal joint, potentially simply related to degenerative/Charcot arthropathy. In view of marrow signal abnormalities, infection cannot be excluded if clinically suspected. If clinically warranted, consider correlation with nuclear medicine imaging to further assess. 5. Edema/myositis in the musculature above the ankle. There is discontinuity of FHL and peroneal tendons, best correlated with operative findings/history of prior intervention for significance/chronicity. Fatty change in the musculature about the foot. XR CHEST PORT    Result Date: 6/27/2022  CLINICAL: Chest pain. COMPARISON: December 1, 2008. A portable view of the chest from 1909 hours: Mild elevation left hemidiaphragm. No focal airspace density. Pulmonary vascular congestion. Right IJ PermCath. No pneumothorax. Pulmonary vascular congestion. No focal airspace densities. DUPLEX LOWER EXT VENOUS BILAT    Result Date: 6/28/2022  · No evidence of deep vein thrombosis in the left lower extremity. · No evidence of deep vein thrombosis in the right lower extremity. DUPLEX LOWER EXT ARTERY BILAT    Result Date: 6/28/2022  · Bilateral tibial arteries are patnet at the ankle but with monophasic doppler signal. · Bilateral anterior tibial artery is occluded proximally but reconstituted distally by the collateral flow.         Jayshree Barakat MD

## 2022-07-17 NOTE — PROGRESS NOTES
7/17/2022  PT treatment not completed due to:  [] Off Unit for testing/procedure  [] Pain  [] Eating  [] Patient too lethargic  [] Nausea/vomiting  [] Dialysis treatment in progress   [x] Other:Nurse Meka Urena in changing dressing and performing hygiene care. Will f/u at later time as pt schedule allows. Thank you.    Gabriella Wynne, PT

## 2022-07-17 NOTE — PROGRESS NOTES
Nephrology Progress note    Subjective:     Sarai Mcginnis is a 60 y.o. male with  a PMH of DM, HTN, CAD, ESRD on HD TTS admitted for right foot infection. MRI suggested osteomyelitis. No fever, chills. Pt s/p debridement, I&D- on abx.     HD Catheter fell off and was replaced 6/29.       -Pt denies CP/SOB/Fever/Chills.    S/p HD yest, c/o loose stools with abx       Admit Date: 6/27/2022  Principal Problem:    Acute hematogenous osteomyelitis of right foot ([facility] (6/28/2022)    Active Problems:    Diabetes mellitus with foot ulcer ([facility] (6/27/2022)      CAD (coronary artery disease) (6/28/2022)      ESRD (end stage renal disease) ([facility] (6/28/2022)      Cellulitis of right heel (6/28/2022)      Diabetic foot ulcer with osteomyelitis ([facility] (6/28/2022)      Ulcer of right heel, with necrosis of bone ([facility] (6/28/2022)      Infection and inflammatory reaction due to internal fixation device of other site, initial encounter ([facility] (7/5/2022)      Left arm swelling (7/10/2022)      Current Facility-Administered Medications   Medication Dose Route Frequency    pantoprazole (PROTONIX) tablet 40 mg  40 mg Oral ACB    alteplase (CATHFLO) 1 mg in sterile water (preservative free) 1 mL injection  1 mg InterCATHeter DIALYSIS PRN    nitroglycerin (NITROSTAT) tablet 0.4 mg  0.4 mg SubLINGual PRN    morphine injection 1 mg  1 mg IntraVENous Q4H PRN    ondansetron (ZOFRAN) injection 4 mg  4 mg IntraVENous Q6H PRN    gabapentin (NEURONTIN) capsule 300 mg  300 mg Oral QHS    heparin (porcine) injection 5,000 Units  5,000 Units SubCUTAneous Q8H    melatonin (rapid dissolve) tablet 10 mg  10 mg Oral QHS PRN    epoetin lisette-epbx (RETACRIT) injection 10,000 Units  10,000 Units SubCUTAneous Q TUE, THU & SAT    heparin (porcine) 1,000 unit/mL injection 3,400 Units  3,400 Units Hemodialysis DIALYSIS PRN    heparin (porcine) 100 unit/mL injection 500 Units  500 Units InterCATHeter Q8H PRN    sodium chloride (NS) flush 5-40 mL 5-40 mL IntraVENous Q8H    sodium chloride (NS) flush 5-40 mL  5-40 mL IntraVENous PRN    fentaNYL citrate (PF) injection  mcg   mcg IntraVENous Rad Multiple    naloxone (NARCAN) injection 0.1 mg  0.1 mg IntraVENous Multiple    loperamide (IMODIUM) capsule 2 mg  2 mg Oral Q4H PRN    piperacillin-tazobactam (ZOSYN) 4.5 g in 0.9% sodium chloride (MBP/ADV) 100 mL MBP  4.5 g IntraVENous Q12H    sevelamer carbonate (RENVELA) tab 1,600 mg  1,600 mg Oral TID WITH MEALS    Lactobacillus Acidoph & Bulgar (FLORANEX) tablet 2 Tablet  2 Tablet Oral BID    allopurinoL (ZYLOPRIM) tablet 100 mg  100 mg Oral DAILY    carvediloL (COREG) tablet 12.5 mg  12.5 mg Oral BID    ezetimibe (ZETIA) tablet 10 mg  10 mg Oral DAILY    amLODIPine (NORVASC) tablet 10 mg  10 mg Oral DAILY    vit B Cmplx 3-FA-Vit C-Biotin (NEPHRO NIKITA RX) tablet 1 Tablet  1 Tablet Oral DAILY    insulin lispro (HUMALOG) injection 3 Units  3 Units SubCUTAneous TIDAC    aspirin chewable tablet 81 mg  81 mg Oral DAILY    insulin glargine (LANTUS) injection 10 Units  10 Units SubCUTAneous QHS    insulin lispro (HUMALOG) injection   SubCUTAneous AC&HS    glucose chewable tablet 16 g  4 Tablet Oral PRN    glucagon (GLUCAGEN) injection 1 mg  1 mg IntraMUSCular PRN    dextrose 10% infusion 0-250 mL  0-250 mL IntraVENous PRN    acetaminophen (TYLENOL) tablet 650 mg  650 mg Oral Q6H PRN         Allergy:   No Known Allergies     Objective:     Visit Vitals  BP (!) 145/75   Pulse 72   Temp 98.7 °F (37.1 °C)   Resp 18   Ht 6' 2\" (1.88 m)   Wt 110.4 kg (243 lb 4.8 oz)   SpO2 95%   BMI 31.24 kg/m²         Intake/Output Summary (Last 24 hours) at 7/17/2022 1418  Last data filed at 7/16/2022 1427  Gross per 24 hour   Intake --   Output 2500 ml   Net -2500 ml       Physical Exam:       General: No acute distress   HENT: Atraumatic and normocephalic   Eyes: Normal conjunctiva   Neck: Supple No JVD   Cardiovascular: Normal S1 & S2, no m/r/g Pulmonary/Chest Wall: Clear to auscultation bilaterally   Abdominal: Soft and non-tender   Musculoskeletal: Wound Vac on R foot   Neurological: AA and O X 3, No focal deficits     RIJ TDC in Place     Data Review:  Lab Results   Component Value Date/Time    Sodium 142 07/11/2022 05:30 AM    Potassium 5.0 07/11/2022 05:30 AM    Chloride 106 07/11/2022 05:30 AM    CO2 26 07/11/2022 05:30 AM    Anion gap 10 07/11/2022 05:30 AM    Glucose 117 (H) 07/11/2022 05:30 AM    BUN 47 (H) 07/11/2022 05:30 AM    Creatinine 7.92 (H) 07/11/2022 05:30 AM    BUN/Creatinine ratio 6 (L) 07/11/2022 05:30 AM    GFR est AA 8 (L) 07/11/2022 05:30 AM    GFR est non-AA 7 (L) 07/11/2022 05:30 AM    Calcium 8.0 (L) 07/11/2022 05:30 AM     Lab Results   Component Value Date/Time    WBC 11.0 07/12/2022 02:28 AM    HGB 9.0 (L) 07/12/2022 02:28 AM    HCT 28.9 (L) 07/12/2022 02:28 AM    PLATELET 186 89/67/8894 02:28 AM    MCV 99.3 07/12/2022 02:28 AM     Lab Results   Component Value Date/Time    Calcium 8.0 (L) 07/11/2022 05:30 AM    Phosphorus 4.8 07/11/2022 05:30 AM     No results found for: IRON, FE, TIBC, IBCT, PSAT, FERR  No results found for: FERR      Impression:   -ESRD on HD  -Diabetic foot ulcer with osteomyelitis, on abx   -DM  -HTN  -Anemia in CKD  -SHPT  -Hyperphosphatemia: on Renvela       Plan:   HD TTS  [facility] TIW           MD Bonifacio Lanier  [phone]

## 2022-07-18 ENCOUNTER — APPOINTMENT (OUTPATIENT)
Dept: NON INVASIVE DIAGNOSTICS | Age: 62
DRG: 629 | End: 2022-07-18
Attending: HOSPITALIST
Payer: MEDICARE

## 2022-07-18 LAB
ANION GAP SERPL CALC-SCNC: 8 MMOL/L (ref 3–18)
BASOPHILS # BLD: 0.1 K/UL (ref 0–0.1)
BASOPHILS NFR BLD: 1 % (ref 0–2)
BUN SERPL-MCNC: 59 MG/DL (ref 7–18)
BUN/CREAT SERPL: 7 (ref 12–20)
CALCIUM SERPL-MCNC: 8.3 MG/DL (ref 8.5–10.1)
CHLORIDE SERPL-SCNC: 104 MMOL/L (ref 100–111)
CO2 SERPL-SCNC: 26 MMOL/L (ref 21–32)
CREAT SERPL-MCNC: 8.67 MG/DL (ref 0.6–1.3)
CRP SERPL-MCNC: 6.2 MG/DL (ref 0–0.3)
DIFFERENTIAL METHOD BLD: ABNORMAL
ECHO AO ASC DIAM: 3 CM
ECHO AO ASCENDING AORTA INDEX: 1.32 CM/M2
ECHO AV AREA PEAK VELOCITY: 2.2 CM2
ECHO AV AREA VTI: 2.2 CM2
ECHO AV AREA/BSA PEAK VELOCITY: 1 CM2/M2
ECHO AV AREA/BSA VTI: 1 CM2/M2
ECHO AV MEAN GRADIENT: 3 MMHG
ECHO AV MEAN VELOCITY: 0.9 M/S
ECHO AV PEAK GRADIENT: 6 MMHG
ECHO AV PEAK VELOCITY: 1.2 M/S
ECHO AV VELOCITY RATIO: 0.58
ECHO AV VTI: 29.7 CM
ECHO LA DIAMETER INDEX: 2.02 CM/M2
ECHO LA DIAMETER: 4.6 CM
ECHO LA VOL 2C: 55 ML (ref 18–58)
ECHO LA VOL 4C: 92 ML (ref 18–58)
ECHO LA VOL BP: 73 ML (ref 18–58)
ECHO LA VOL/BSA BIPLANE: 32 ML/M2 (ref 16–34)
ECHO LA VOLUME AREA LENGTH: 79 ML
ECHO LA VOLUME INDEX A2C: 24 ML/M2 (ref 16–34)
ECHO LA VOLUME INDEX A4C: 40 ML/M2 (ref 16–34)
ECHO LA VOLUME INDEX AREA LENGTH: 35 ML/M2 (ref 16–34)
ECHO LV E' LATERAL VELOCITY: 6 CM/S
ECHO LV E' SEPTAL VELOCITY: 5 CM/S
ECHO LV EDV A2C: 213 ML
ECHO LV EDV A4C: 173 ML
ECHO LV EDV BP: 197 ML (ref 67–155)
ECHO LV EDV INDEX A4C: 76 ML/M2
ECHO LV EDV INDEX BP: 86 ML/M2
ECHO LV EDV NDEX A2C: 93 ML/M2
ECHO LV EJECTION FRACTION A2C: 53 %
ECHO LV EJECTION FRACTION A4C: 51 %
ECHO LV EJECTION FRACTION BIPLANE: 53 % (ref 55–100)
ECHO LV ESV A2C: 100 ML
ECHO LV ESV A4C: 84 ML
ECHO LV ESV BP: 93 ML (ref 22–58)
ECHO LV ESV INDEX A2C: 44 ML/M2
ECHO LV ESV INDEX A4C: 37 ML/M2
ECHO LV ESV INDEX BP: 41 ML/M2
ECHO LV FRACTIONAL SHORTENING: 20 % (ref 28–44)
ECHO LV INTERNAL DIMENSION DIASTOLE INDEX: 2.41 CM/M2
ECHO LV INTERNAL DIMENSION DIASTOLIC: 5.5 CM (ref 4.2–5.9)
ECHO LV INTERNAL DIMENSION SYSTOLIC INDEX: 1.93 CM/M2
ECHO LV INTERNAL DIMENSION SYSTOLIC: 4.4 CM
ECHO LV IVSD: 1 CM (ref 0.6–1)
ECHO LV MASS 2D: 185.8 G (ref 88–224)
ECHO LV MASS INDEX 2D: 81.5 G/M2 (ref 49–115)
ECHO LV POSTERIOR WALL DIASTOLIC: 0.8 CM (ref 0.6–1)
ECHO LV RELATIVE WALL THICKNESS RATIO: 0.29
ECHO LVOT AREA: 3.8 CM2
ECHO LVOT AV VTI INDEX: 0.59
ECHO LVOT DIAM: 2.2 CM
ECHO LVOT MEAN GRADIENT: 1 MMHG
ECHO LVOT PEAK GRADIENT: 2 MMHG
ECHO LVOT PEAK VELOCITY: 0.7 M/S
ECHO LVOT STROKE VOLUME INDEX: 29 ML/M2
ECHO LVOT SV: 66.1 ML
ECHO LVOT VTI: 17.4 CM
ECHO MV A VELOCITY: 0.77 M/S
ECHO MV E DECELERATION TIME (DT): 161.2 MS
ECHO MV E VELOCITY: 0.85 M/S
ECHO MV E/A RATIO: 1.1
ECHO MV E/E' LATERAL: 14.17
ECHO MV E/E' RATIO (AVERAGED): 15.58
ECHO MV E/E' SEPTAL: 17
ECHO PV MAX VELOCITY: 0.6 M/S
ECHO PV MEAN GRADIENT: 1 MMHG
ECHO PV MEAN VELOCITY: 0.4 M/S
ECHO PV PEAK GRADIENT: 1 MMHG
ECHO RV FREE WALL PEAK S': 9 CM/S
ECHO RV INTERNAL DIMENSION: 3.9 CM
ECHO RV TAPSE: 1.8 CM (ref 1.7–?)
EOSINOPHIL # BLD: 0.6 K/UL (ref 0–0.4)
EOSINOPHIL NFR BLD: 5 % (ref 0–5)
ERYTHROCYTE [DISTWIDTH] IN BLOOD BY AUTOMATED COUNT: 17.3 % (ref 11.6–14.5)
GLUCOSE BLD STRIP.AUTO-MCNC: 178 MG/DL (ref 70–110)
GLUCOSE BLD STRIP.AUTO-MCNC: 182 MG/DL (ref 70–110)
GLUCOSE BLD STRIP.AUTO-MCNC: 201 MG/DL (ref 70–110)
GLUCOSE BLD STRIP.AUTO-MCNC: 249 MG/DL (ref 70–110)
GLUCOSE BLD STRIP.AUTO-MCNC: 341 MG/DL (ref 70–110)
GLUCOSE SERPL-MCNC: 187 MG/DL (ref 74–99)
HCT VFR BLD AUTO: 28.3 % (ref 36–48)
HGB BLD-MCNC: 8.8 G/DL (ref 13–16)
IMM GRANULOCYTES # BLD AUTO: 0.1 K/UL (ref 0–0.04)
IMM GRANULOCYTES NFR BLD AUTO: 1 % (ref 0–0.5)
LYMPHOCYTES # BLD: 1.2 K/UL (ref 0.9–3.6)
LYMPHOCYTES NFR BLD: 11 % (ref 21–52)
MCH RBC QN AUTO: 31.7 PG (ref 24–34)
MCHC RBC AUTO-ENTMCNC: 31.1 G/DL (ref 31–37)
MCV RBC AUTO: 101.8 FL (ref 78–100)
MONOCYTES # BLD: 1.1 K/UL (ref 0.05–1.2)
MONOCYTES NFR BLD: 9 % (ref 3–10)
NEUTS SEG # BLD: 8.2 K/UL (ref 1.8–8)
NEUTS SEG NFR BLD: 73 % (ref 40–73)
NRBC # BLD: 0 K/UL (ref 0–0.01)
NRBC BLD-RTO: 0 PER 100 WBC
PLATELET # BLD AUTO: 186 K/UL (ref 135–420)
PMV BLD AUTO: 11.5 FL (ref 9.2–11.8)
POTASSIUM SERPL-SCNC: 5.9 MMOL/L (ref 3.5–5.5)
RBC # BLD AUTO: 2.78 M/UL (ref 4.35–5.65)
SODIUM SERPL-SCNC: 138 MMOL/L (ref 136–145)
TROPONIN-HIGH SENSITIVITY: 23 NG/L (ref 0–78)
WBC # BLD AUTO: 11.2 K/UL (ref 4.6–13.2)

## 2022-07-18 PROCEDURE — 74011250637 HC RX REV CODE- 250/637: Performed by: INTERNAL MEDICINE

## 2022-07-18 PROCEDURE — 86140 C-REACTIVE PROTEIN: CPT

## 2022-07-18 PROCEDURE — 74011250637 HC RX REV CODE- 250/637: Performed by: HOSPITALIST

## 2022-07-18 PROCEDURE — 97530 THERAPEUTIC ACTIVITIES: CPT

## 2022-07-18 PROCEDURE — 74011250637 HC RX REV CODE- 250/637: Performed by: FAMILY MEDICINE

## 2022-07-18 PROCEDURE — 74011000250 HC RX REV CODE- 250: Performed by: INTERNAL MEDICINE

## 2022-07-18 PROCEDURE — 74011250636 HC RX REV CODE- 250/636: Performed by: PHYSICIAN ASSISTANT

## 2022-07-18 PROCEDURE — 74011000258 HC RX REV CODE- 258: Performed by: PHYSICIAN ASSISTANT

## 2022-07-18 PROCEDURE — 36415 COLL VENOUS BLD VENIPUNCTURE: CPT

## 2022-07-18 PROCEDURE — 84484 ASSAY OF TROPONIN QUANT: CPT

## 2022-07-18 PROCEDURE — 80048 BASIC METABOLIC PNL TOTAL CA: CPT

## 2022-07-18 PROCEDURE — 74011636637 HC RX REV CODE- 636/637: Performed by: FAMILY MEDICINE

## 2022-07-18 PROCEDURE — 93306 TTE W/DOPPLER COMPLETE: CPT

## 2022-07-18 PROCEDURE — 74011000250 HC RX REV CODE- 250: Performed by: RADIOLOGY

## 2022-07-18 PROCEDURE — 97535 SELF CARE MNGMENT TRAINING: CPT

## 2022-07-18 PROCEDURE — 74011250636 HC RX REV CODE- 250/636: Performed by: HOSPITALIST

## 2022-07-18 PROCEDURE — 74011636637 HC RX REV CODE- 636/637: Performed by: HOSPITALIST

## 2022-07-18 PROCEDURE — 65270000029 HC RM PRIVATE

## 2022-07-18 PROCEDURE — 97110 THERAPEUTIC EXERCISES: CPT

## 2022-07-18 PROCEDURE — 74011636637 HC RX REV CODE- 636/637: Performed by: INTERNAL MEDICINE

## 2022-07-18 PROCEDURE — 93005 ELECTROCARDIOGRAM TRACING: CPT

## 2022-07-18 PROCEDURE — 82962 GLUCOSE BLOOD TEST: CPT

## 2022-07-18 PROCEDURE — 85025 COMPLETE CBC W/AUTO DIFF WBC: CPT

## 2022-07-18 RX ORDER — SAME BUTANEDISULFONATE/BETAINE 400-600 MG
250 POWDER IN PACKET (EA) ORAL 2 TIMES DAILY
Status: DISCONTINUED | OUTPATIENT
Start: 2022-07-18 | End: 2022-07-21 | Stop reason: HOSPADM

## 2022-07-18 RX ORDER — NYSTATIN 100000 [USP'U]/G
POWDER TOPICAL 2 TIMES DAILY
Status: DISCONTINUED | OUTPATIENT
Start: 2022-07-18 | End: 2022-07-21 | Stop reason: HOSPADM

## 2022-07-18 RX ORDER — ISOSORBIDE MONONITRATE 30 MG/1
30 TABLET, EXTENDED RELEASE ORAL DAILY
Status: DISCONTINUED | OUTPATIENT
Start: 2022-07-19 | End: 2022-07-21 | Stop reason: HOSPADM

## 2022-07-18 RX ADMIN — PANTOPRAZOLE SODIUM 40 MG: 40 TABLET, DELAYED RELEASE ORAL at 08:19

## 2022-07-18 RX ADMIN — SEVELAMER CARBONATE 1600 MG: 800 TABLET, FILM COATED ORAL at 14:14

## 2022-07-18 RX ADMIN — INSULIN LISPRO 3 UNITS: 100 INJECTION, SOLUTION INTRAVENOUS; SUBCUTANEOUS at 14:14

## 2022-07-18 RX ADMIN — Medication 4 UNITS: at 18:14

## 2022-07-18 RX ADMIN — Medication 250 MG: at 20:55

## 2022-07-18 RX ADMIN — NYSTATIN: 100000 POWDER TOPICAL at 21:00

## 2022-07-18 RX ADMIN — HEPARIN SODIUM 5000 UNITS: 5000 INJECTION INTRAVENOUS; SUBCUTANEOUS at 20:55

## 2022-07-18 RX ADMIN — SODIUM CHLORIDE, PRESERVATIVE FREE 10 ML: 5 INJECTION INTRAVENOUS at 14:20

## 2022-07-18 RX ADMIN — ASPIRIN 81 MG: 81 TABLET, CHEWABLE ORAL at 08:19

## 2022-07-18 RX ADMIN — Medication 4 UNITS: at 08:18

## 2022-07-18 RX ADMIN — SEVELAMER CARBONATE 1600 MG: 800 TABLET, FILM COATED ORAL at 08:19

## 2022-07-18 RX ADMIN — SODIUM CHLORIDE, PRESERVATIVE FREE 10 ML: 5 INJECTION INTRAVENOUS at 20:58

## 2022-07-18 RX ADMIN — B-COMPLEX W/ C & FOLIC ACID TAB 1 MG 1 TABLET: 1 TAB at 08:19

## 2022-07-18 RX ADMIN — CARVEDILOL 12.5 MG: 12.5 TABLET, FILM COATED ORAL at 21:00

## 2022-07-18 RX ADMIN — Medication: at 21:00

## 2022-07-18 RX ADMIN — Medication 2 TABLET: at 08:19

## 2022-07-18 RX ADMIN — INSULIN GLARGINE 10 UNITS: 100 INJECTION, SOLUTION SUBCUTANEOUS at 00:04

## 2022-07-18 RX ADMIN — NYSTATIN: 100000 POWDER TOPICAL at 14:19

## 2022-07-18 RX ADMIN — INSULIN LISPRO 3 UNITS: 100 INJECTION, SOLUTION INTRAVENOUS; SUBCUTANEOUS at 08:19

## 2022-07-18 RX ADMIN — ALLOPURINOL 100 MG: 100 TABLET ORAL at 08:19

## 2022-07-18 RX ADMIN — HEPARIN SODIUM 5000 UNITS: 5000 INJECTION INTRAVENOUS; SUBCUTANEOUS at 14:19

## 2022-07-18 RX ADMIN — EZETIMIBE 10 MG: 10 TABLET ORAL at 08:19

## 2022-07-18 RX ADMIN — Medication: at 09:00

## 2022-07-18 RX ADMIN — SEVELAMER CARBONATE 1600 MG: 800 TABLET, FILM COATED ORAL at 18:13

## 2022-07-18 RX ADMIN — AMLODIPINE BESYLATE 10 MG: 5 TABLET ORAL at 08:49

## 2022-07-18 RX ADMIN — INSULIN GLARGINE 10 UNITS: 100 INJECTION, SOLUTION SUBCUTANEOUS at 22:40

## 2022-07-18 RX ADMIN — PIPERACILLIN AND TAZOBACTAM 4.5 G: 4; .5 INJECTION, POWDER, FOR SOLUTION INTRAVENOUS at 08:19

## 2022-07-18 RX ADMIN — Medication 8 UNITS: at 22:39

## 2022-07-18 RX ADMIN — CARVEDILOL 12.5 MG: 12.5 TABLET, FILM COATED ORAL at 08:20

## 2022-07-18 RX ADMIN — INSULIN LISPRO 3 UNITS: 100 INJECTION, SOLUTION INTRAVENOUS; SUBCUTANEOUS at 18:13

## 2022-07-18 RX ADMIN — PIPERACILLIN AND TAZOBACTAM 4.5 G: 4; .5 INJECTION, POWDER, FOR SOLUTION INTRAVENOUS at 20:55

## 2022-07-18 RX ADMIN — GABAPENTIN 300 MG: 300 CAPSULE ORAL at 22:39

## 2022-07-18 RX ADMIN — Medication 2 UNITS: at 00:05

## 2022-07-18 RX ADMIN — Medication 2 UNITS: at 14:16

## 2022-07-18 RX ADMIN — HEPARIN SODIUM 5000 UNITS: 5000 INJECTION INTRAVENOUS; SUBCUTANEOUS at 04:54

## 2022-07-18 RX ADMIN — SODIUM CHLORIDE, PRESERVATIVE FREE 10 ML: 5 INJECTION INTRAVENOUS at 05:16

## 2022-07-18 NOTE — PROGRESS NOTES
Hospitalist Progress Note    Patient: Sreedhar Garner MRN: [id number]  CSN: [id number]    YOB: 1960  Age: 60 y.o. Sex: male    DOA: 6/27/2022 LOS:  LOS: 21 days          Chief Complaint:    Chest pain      Assessment/Plan        [facility] a 60 y. o. male who history, stent, hypertension, Charcot's foot presents with worsening pain and swelling and chills in his right foot despite multiple cleanings and being managed by podiatry as outpatient.     Large necrotic ulcer right posterior heel with osteomyelitis of the calcaneus and deep abscess-  I&D and grafts done,    Podiatrist and ID f/u Tunnel line placed ,continue iv abx and local wound care -waiting for placement      Diabetes mellitus -  On  lantus 10units and  premeal insulin and continue ssi better controlled and will continue current regimen      End-stage renal disease on dialysis Tuesday Thursday and Saturday -  S/p [facility] replacement      History of coronary artery disease-  On coreg, asa    He has had recurrent chest pain, eval last week neg with trops, EKG, but with second recurrence, will pursue stress test prior to d/c due to risk factors and history  Repeat troponin  Get echo as well  Stress test toorrow  Consult with cardiology    Anemia of ESRD stable     Chronic compressive myelopathy pending surgery -  Supportive care     mis continue cpap     Debility and weakness    Add nystatin to rectal area due to soreness/redness    For SNF when stable and auth received  Disposition :  Patient Active Problem List   Diagnosis Code    Diabetes mellitus with foot ulcer ([address] E11.621, L97.509    CAD (coronary artery disease) I25.10    ESRD (end stage renal disease) ([address] N18.6    Acute hematogenous osteomyelitis of right foot ([address] M86.071    Cellulitis of right heel L03.115    Diabetic foot ulcer with osteomyelitis ([address] E11.621, E11.69, L97.509, M86.9    Ulcer of right heel, with necrosis of bone ([address] L97.414    Infection and inflammatory reaction due to internal fixation device of other site, initial encounter ([facility] T84.69XA    Left arm swelling M79.89       Subjective: Had episode again of chest filemon last night per nurse  Pt denies any this morning  States he is tired  No arm pain  No SOB  Wore CPAP last milton  C/o pain in rectal area with stools      Review of systems:    Constitutional: denies fevers, chills  Respiratory: denies SOB, cough  Cardiovascular: denies palpitations  Gastrointestinal: denies nausea, vomiting, diarrhea      Vital signs/Intake and Output:  Visit Vitals  BP (!) 159/71   Pulse 79   Temp 98.7 °F (37.1 °C)   Resp 18   Ht 6' 2\" (1.88 m)   Wt 106.1 kg (234 lb)   SpO2 99%   BMI 30.04 kg/m²     Current Shift:  No intake/output data recorded. Last three shifts:  No intake/output data recorded. Exam:    General: debilitated OW AAm, alert, NAD, OX3  CVS:Regular rate and rhythm, no M/R/G, S1/S2 heard, no thrill  Lungs:Clear to auscultation bilaterally, no wheezes, rhonchi, or rales  Abdomen: Soft, Nontender, No distention, Normal Bowel sounds  Extremities: right foot wound vac  Mild edema LUE  Neuro:grossly normal , follows commands  Psych:appropriate                Labs: Results:       Chemistry Recent Labs     07/18/22  0130   *      K 5.9*      CO2 26   BUN 59*   CREA 8.67*   CA 8.3*   AGAP 8   BUCR 7*      CBC w/Diff Recent Labs     07/18/22  0130   WBC 11.2   RBC 2.78*   HGB 8.8*   HCT 28.3*      GRANS 73   LYMPH 11*   EOS 5      Cardiac Enzymes No results for input(s): CPK, CKND1, PAULO in the last 72 hours. No lab exists for component: CKRMB, TROIP   Coagulation No results for input(s): PTP, INR, APTT, INREXT in the last 72 hours. Lipid Panel No results found for: CHOL, CHOLPOCT, CHOLX, CHLST, CHOLV, 838956, HDL, HDLP, LDL, LDLC, DLDLP, 214587, VLDLC, VLDL, TGLX, TRIGL, TRIGP, TGLPOCT, CHHD, CHHDX   BNP No results for input(s): BNPP in the last 72 hours.    Liver Enzymes No results for input(s): TP, ALB, TBIL, AP in the last 72 hours.     No lab exists for component: SGOT, GPT, DBIL   Thyroid Studies No results found for: T4, T3U, TSH, TSHEXT     Procedures/imaging: see electronic medical records for all procedures/Xrays and details which were not copied into this note but were reviewed prior to creation of Baldemar Segura MD

## 2022-07-18 NOTE — DIABETES MGMT
GLYCEMIC CONTROL PLAN OF CARE      Diabetes/ Glycemic Control Plan of Care  Recommendations: continue current regimen  - recommend monitor trends and make adjustments to home regimen to address current HbA1C  - pt might benefit from scheduled dose of insulin at meals versus SSI    Assessment: known h/o T2DM, HbA1C not within target range for age + comorbids on basal/bolus home regimen  DX:   1. Type 2 diabetes mellitus with foot ulcer, with long-term current use of insulin ([facility] [address]     2. Osteomyelitis of right foot, unspecified type ([facility] [address]     3.  ESRD on hemodialysis ([facility] [address]        Fasting/ Morning blood glucose:   Lab Results   Component Value Date/Time    Glucose 187 (H) 07/18/2022 01:30 AM    Glucose (POC) 201 (H) 07/18/2022 07:39 AM     Recent blood glucose:   Lab Results   Component Value Date/Time     (H) 07/18/2022 01:30 AM    GLUCPOC 201 (H) 07/18/2022 07:39 AM    GLUCPOC 183 (H) 07/17/2022 08:59 PM    GLUCPOC 152 (H) 07/17/2022 04:02 PM         IV Fluids containing dextrose: no  Steroids:   no    Blood glucose values: 115-201 mg/dL    Within target range (non-ICU: <140; ICU<180):  [] Yes    [x] No  Current insulin orders: Lantus 10 units + 3 qac + - Humalog Normal Insulin Sensitivity Corrective Coverage  Total Daily Dose previous 24 hours = 11 (9(3) MTI + 2 units - Humalog Normal Insulin Sensitivity Corrective Coverage)  Current A1c:   Lab Results   Component Value Date/Time    Hemoglobin A1c 8.6 (H) 06/28/2022 08:43 AM      equivalent  to ave Blood Glucose of 200 mg/dl for 2-3 months prior to admission  Adequate glycemic control PTA:   [] Yes   [x] No  Nutrition/Diet:   Active Orders   Diet    ADULT DIET Regular; 4 carb choices (60 gm/meal)    DIET NPO      Meal Intake:  Patient Vitals for the past 168 hrs:   % Diet Eaten   07/15/22 0905 76 - 100%   07/14/22 0935 76 - 100%   07/12/22 2148 76 - 100%     Supplement Intake:  Home diabetes medications:   Key Antihyperglycemic Medications             insulin lispro (HUMALOG) 100 unit/mL injection (Taking) Use as directed    insulin glargine (Lantus U-100 Insulin) 100 unit/mL injection (Taking) 10 Units by SubCUTAneous route nightly. insulin lispro (HUMALOG) 100 unit/mL injection (Taking) by SubCUTAneous route. Plan/Goals:   Blood Glucose will be within target range within 72 hours  Reinforce dietary and medication compliance at home.          Education:  [x] Refer to Diabetes Education Record                       [] Education not indicated at this time       Sol Jhaveri MS, RN, CDE  Glycemic Control Team  [phone]

## 2022-07-18 NOTE — PROGRESS NOTES
[facility]  (A Division of [address] [place] [address])                                                                                                                     Dr Donna Brandt #: - Option # [phone]  Fax #: [phone]     Date of Admission: 6/27/2022Date of Note: 7/18/2022  Reason for Referral: Evaluation and antibiotic management of R heel infection    Current Antimicrobials:    Prior Antimicrobials:  Zosyn 6/27 to date   Bactrim 1 month ago       Assessment- ID related:  --------------------------------------------------------------------------  · DFU and OM R heel  S/P I and D 6/28:  Large right heel necrotic ulcer with osteomyelitis of the calcaneus, and deep abscess  --Proteus/Mroganella/ E.fecalis / alpha strep and Bacteroides on culture  --S/P OR 7/5/22- R heel I and D with grafting, removal of screw. No culture sent  · Subacute/chronic OM of heel-- over 2 months time  · Leucocytosis 2/2 above  --BCX 6/27: NGSF  --WCX-- GNR and GPC in pairs--> pending  · ESRD on HD TTS  · Obese BMI of 28  · DM   · History of L hallux ampuation for infection- 5yrs ago  ESR 75  CRP 4.1- 7/7/22--> 6.2 7/18  R chest TDC is for his HD  R IV line for his ABX- 7/8/22 Recommendation for ID issues I am following:  --------------------------------------------------------------------------------    Wound care/dressing per Dr Willie Chester     OPAT with Zosyn 2.25gm Q8 hour X 6 weeks abx ( from 6/28). End: August 8  OPAT to be arranged for above by CM with SNF    Weekly labs- cbc w/diff, LFT, uantitative CRP on Mon  Monitor above labs for ABX adverse effects  Fax labs to Dr Lashaun Hanley-- [phone]     FU in 2-3 weeks in ID clinic on DC    If the above rx fails,  Likely needs BKA       Subjective:  Seen before noon -- charted late  Still issues with BM- loose anywhere from 2-4 BM, no associated abd pain or fever.   No Vomiting-- C/O skin itching  Notes/Labs reviewed--issues with CP and work up noted       HPI:  Ulysses Lung is a 60 y.o. BLACK/ with PMH as listed below-- he had ulcer for 2 months or so that he was followed by as OP by Podiatry. His heel wound progressed and was advised to come to ED yesterday. Had foot pain and fould drainage, but denies high F/C/R/SOB/ N/V prior to admission. BCX/WCX taken in ED, started on Zosyn and plan was to hold off abx and monitor until surgery. Admission assessment there was high concern for sepsis and Zosyn was continued. Hx of MRSA infection and treatment few years ago. Active Hospital Problems    Diagnosis Date Noted    Left arm swelling 07/10/2022    Infection and inflammatory reaction due to internal fixation device of other site, initial encounter ([facility] [address] 07/05/2022    CAD (coronary artery disease) 06/28/2022    ESRD (end stage renal disease) ([address] 06/28/2022    Acute hematogenous osteomyelitis of right foot ([address] 06/28/2022    Cellulitis of right heel 06/28/2022    Diabetic foot ulcer with osteomyelitis ([address] 06/28/2022    Ulcer of right heel, with necrosis of bone ([address] 06/28/2022    Diabetes mellitus with foot ulcer ([address] 06/27/2022     Past Medical History:   Diagnosis Date    Diabetes mellitus     Hypertension      Past Surgical History:   Procedure Laterality Date    IR INSERT TUNL CVC W/O PORT OVER 5 YR  7/7/2022     History reviewed. No pertinent family history.   Social History     Socioeconomic History    Marital status:      Spouse name: Not on file    Number of children: Not on file    Years of education: Not on file    Highest education level: Not on file   Occupational History    Not on file   Tobacco Use    Smoking status: Not on file    Smokeless tobacco: Not on file   Substance and Sexual Activity    Alcohol use: Not on file    Drug use: Not on file    Sexual activity: Not on file   Other Topics Concern    Dental Braces Not Asked    Endoscopic Camera Pill Not Asked    Metallic Foreign Body Not Asked    Medication Patches Not Asked    Taking Feraheme Not Asked    Claustrophobic Not Asked   Social History Narrative    Not on file     Social Determinants of Health     Financial Resource Strain:     Difficulty of Paying Living Expenses: Not on file   Food Insecurity:     Worried About Running Out of Food in the Last Year: Not on file    Vane of Food in the Last Year: Not on file   Transportation Needs:     Lack of Transportation (Medical): Not on file    Lack of Transportation (Non-Medical): Not on file   Physical Activity:     Days of Exercise per Week: Not on file    Minutes of Exercise per Session: Not on file   Stress:     Feeling of Stress : Not on file   Social Connections:     Frequency of Communication with Friends and Family: Not on file    Frequency of Social Gatherings with Friends and Family: Not on file    Attends [employer] Services: Not on file    Active Member of [address] [place] [address] or Organizations: Not on file    Attends Club or Organization Meetings: Not on file    Marital Status: Not on file   Intimate Partner Violence:     Fear of Current or Ex-Partner: Not on file    Emotionally Abused: Not on file    Physically Abused: Not on file    Sexually Abused: Not on file   Housing Stability:     Unable to Pay for Housing in the Last Year: Not on file    Number of [place] in the Last Year: Not on file    Unstable Housing in the Last Year: Not on file       Allergies:  Patient has no known allergies.      Medications:  Current Facility-Administered Medications   Medication Dose Route Frequency    nystatin (MYCOSTATIN) 100,000 unit/gram powder   Topical BID    Saccharomyces boulardii (FLORASTOR) capsule 250 mg  250 mg Oral BID    bacitracin zinc (BACITRACIN) 500 unit/gram ointment   Topical BID    pantoprazole (PROTONIX) tablet 40 mg  40 mg Oral ACB    alteplase (CATHFLO) 1 mg in sterile water (preservative free) 1 mL injection  1 mg InterCATHeter DIALYSIS PRN    nitroglycerin (NITROSTAT) tablet 0.4 mg  0.4 mg SubLINGual PRN    morphine injection 1 mg  1 mg IntraVENous Q4H PRN    ondansetron (ZOFRAN) injection 4 mg  4 mg IntraVENous Q6H PRN    gabapentin (NEURONTIN) capsule 300 mg  300 mg Oral QHS    heparin (porcine) injection 5,000 Units  5,000 Units SubCUTAneous Q8H    melatonin (rapid dissolve) tablet 10 mg  10 mg Oral QHS PRN    epoetin lisette-epbx (RETACRIT) injection 10,000 Units  10,000 Units SubCUTAneous Q TUE, THU & SAT    heparin (porcine) 1,000 unit/mL injection 3,400 Units  3,400 Units Hemodialysis DIALYSIS PRN    heparin (porcine) 100 unit/mL injection 500 Units  500 Units InterCATHeter Q8H PRN    sodium chloride (NS) flush 5-40 mL  5-40 mL IntraVENous Q8H    sodium chloride (NS) flush 5-40 mL  5-40 mL IntraVENous PRN    naloxone (NARCAN) injection 0.1 mg  0.1 mg IntraVENous Multiple    loperamide (IMODIUM) capsule 2 mg  2 mg Oral Q4H PRN    piperacillin-tazobactam (ZOSYN) 4.5 g in 0.9% sodium chloride (MBP/ADV) 100 mL MBP  4.5 g IntraVENous Q12H    sevelamer carbonate (RENVELA) tab 1,600 mg  1,600 mg Oral TID WITH MEALS    allopurinoL (ZYLOPRIM) tablet 100 mg  100 mg Oral DAILY    carvediloL (COREG) tablet 12.5 mg  12.5 mg Oral BID    ezetimibe (ZETIA) tablet 10 mg  10 mg Oral DAILY    amLODIPine (NORVASC) tablet 10 mg  10 mg Oral DAILY    vit B Cmplx 3-FA-Vit C-Biotin (NEPHRO NIKITA RX) tablet 1 Tablet  1 Tablet Oral DAILY    insulin lispro (HUMALOG) injection 3 Units  3 Units SubCUTAneous TIDAC    aspirin chewable tablet 81 mg  81 mg Oral DAILY    insulin glargine (LANTUS) injection 10 Units  10 Units SubCUTAneous QHS    insulin lispro (HUMALOG) injection   SubCUTAneous AC&HS    glucose chewable tablet 16 g  4 Tablet Oral PRN    glucagon (GLUCAGEN) injection 1 mg  1 mg IntraMUSCular PRN    dextrose 10% infusion 0-250 mL  0-250 mL IntraVENous PRN    acetaminophen (TYLENOL) tablet 650 mg  650 mg Oral Q6H PRN ROS:  Pertinent items are noted in the History of Present Illness. Physical Exam:    Temp (24hrs), Av.4 °F (36.9 °C), Min:97.6 °F (36.4 °C), Max:98.8 °F (37.1 °C)    Visit Vitals  /66   Pulse 75   Temp 97.6 °F (36.4 °C)   Resp 18   Ht 6' (1.829 m)   Wt 106 kg (233 lb 11 oz)   SpO2 100%   BMI 31.69 kg/m²      GEN: WD Obese, on RA--not in resp distress. Right chest TDC for HD    HEENT: Unicteric. EOMI intact  No neck swelling  CHEST: Non laboured breathing. ABD: Obese/soft. Non tender. PASCUAL: Deferred  EXT: not examined today- dressed- wound vac in place  Skin: Dry and intact. No rash, no redness. -- buttock area not seen  CNS: A, comfortable     Microbiology  All Micro Results     Procedure Component Value Units Date/Time    CULTURE, FUNGUS [[id number]] Collected: 22    Order Status: Completed Specimen: Heel Updated: 2225     Special Requests: NO SPECIAL REQUESTS        Culture result:       NO FUNGUS ISOLATED 19 DAYS          CULTURE, ANAEROBIC [[id number]]  (Abnormal) Collected: 22    Order Status: Completed Specimen: Heel Updated: 22 1637     Special Requests: NO SPECIAL REQUESTS        Culture result:       MODERATE Porphyromonas assacharolyticus (Bacteroides) BETA LACTAMASE POSITIVE          CULTURE, BLOOD [[id number]] Collected: 22 1240    Order Status: Completed Specimen: Blood Updated: 2234     Special Requests: NO SPECIAL REQUESTS        Culture result: NO GROWTH 6 DAYS       CULTURE, BLOOD [[id number]] Collected: 22 1245    Order Status: Completed Specimen: Blood Updated: 2234     Special Requests: NO SPECIAL REQUESTS        Culture result: NO GROWTH 6 DAYS       CULTURE, TISSUE Darrel Nimesh STAIN [[id number]]  (Abnormal) Collected: 22    Order Status: Completed Specimen: Bone Updated: 22 1221     Special Requests: NO SPECIAL REQUESTS        GRAM STAIN 2+ WBCS SEEN         NO ORGANISMS SEEN        Culture result: FEW ENTEROCOCCUS FAECALIS         SCANT PROTEUS VULGARIS         SCANT ALPHA STREPTOCOCCUS               SCANT Morganella morganii ssp morganii            REFER TO [facility] [id number] FOR SENSITIVITIES    CULTURE, Tomrashid Clas STAIN [[id number]]  (Abnormal)  (Susceptibility) Collected: 06/28/22 1925    Order Status: Completed Specimen: Heel Updated: 07/02/22 1003     Special Requests: NO SPECIAL REQUESTS        GRAM STAIN OCCASIONAL WBCS SEEN         NO ORGANISMS SEEN        Culture result: LIGHT PROTEUS VULGARIS               SCANT Morganella morganii ssp morganii                  MODERATE ENTEROCOCCUS FAECALIS          AFB CULTURE + SMEAR W/RFLX ID FROM CULTURE [[id number]] Collected: 06/28/22 1925    Order Status: Completed Specimen: Miscellaneous sample Updated: 06/30/22 1738     Source MISC.  WOUND        AFB Specimen processing Concentration     Acid Fast Smear Negative        Comment: (NOTE)  Performed At: [facility] & [address] [id number]  Stas Yi MD LR:[id number]          Acid Fast Culture PENDING    CULTURE, Tina Clas STAIN [[id number]] Collected: 06/27/22 1530    Order Status: Completed Specimen: Wound Drainage Updated: 06/29/22 1511     Special Requests: NO SPECIAL REQUESTS        GRAM STAIN RARE WBCS SEEN         2+ GRAM NEGATIVE RODS               1+ GRAM POSITIVE COCCI IN PAIRS           Culture result:       MODERATE  MIXED ENTERIC GRAM NEGATIVE RODS              HEAVY MIXED SKIN TO ISOLATED          AFB CULTURE + SMEAR W/RFLX ID FROM CULTURE [[id number]] Collected: 06/28/22 1930    Order Status: Canceled     CULTURE, ANAEROBIC [[id number]] Collected: 06/28/22 1926    Order Status: Canceled            Lab results:    Chemistry  Recent Labs     07/18/22  0130   *      K 5.9*      CO2 26   BUN 59*   CREA 8.67*   CA 8.3*   AGAP 8   BUCR 7*       CBC w/ Diff  Recent Labs     07/18/22  0130   WBC 11.2   RBC 2.78*   HGB 8.8*   HCT 28.3*    GRANS 73   LYMPH 11*   EOS 5       Imaging: report reviewed and as posted by radiologist   No results found for this or any previous visit. MRI:       1. Posterior heel skin defect, at the margin of the Achilles tendon attachment  on the calcaneus, in keeping with known ulcer. Infiltration of subjacent  subcutaneous fat in keeping with cellulitis.     2. Cortical discontinuity and marrow signal abnormality in the subjacent dorsal  calcaneus extending to the threaded tip of the oblique tibiocalcaneal screw is  suspicious for osteomyelitis.     3. Fluid signal intensity surrounding the threaded tibial tip of the 1st digit  long partially threaded screw, with osseous fragments at the inferior margin,  concerning for loosening and/or infection.     4. Marrow signal abnormalities at the 2nd-3rd and to a lesser degree 4th-5th  tarsometatarsal joint, potentially simply related to degenerative/Charcot  arthropathy. In view of marrow signal abnormalities, infection cannot be  excluded if clinically suspected. If clinically warranted, consider correlation with nuclear medicine imaging to  further assess.     5. Edema/myositis in the musculature above the ankle. There is discontinuity of  FHL and peroneal tendons, best correlated with operative findings/history of  prior intervention for significance/chronicity. Fatty change in the musculature  about the foot.

## 2022-07-18 NOTE — PROGRESS NOTES
Problem: Falls - Risk of  Goal: *Absence of Falls  Description: Document Leafy Montalvo Fall Risk and appropriate interventions in the flowsheet. Outcome: Progressing Towards Goal  Note: Fall Risk Interventions:  Mobility Interventions: Communicate number of staff needed for ambulation/transfer         Medication Interventions: Patient to call before getting OOB    Elimination Interventions: Bed/chair exit alarm,Call light in reach    History of Falls Interventions: Bed/chair exit alarm         Problem: Pressure Injury - Risk of  Goal: *Prevention of pressure injury  Description: Document Semaj Scale and appropriate interventions in the flowsheet.   Outcome: Progressing Towards Goal  Note: Pressure Injury Interventions:  Sensory Interventions: Minimize linen layers    Moisture Interventions: Minimize layers    Activity Interventions: Assess need for specialty bed,Pressure redistribution bed/mattress(bed type)    Mobility Interventions: Assess need for specialty bed,Pressure redistribution bed/mattress (bed type)    Nutrition Interventions: Document food/fluid/supplement intake    Friction and Shear Interventions: Minimize layers,Foam dressings/transparent film/skin sealants

## 2022-07-18 NOTE — PROGRESS NOTES
OCCUPATIONAL THERAPY TREATMENT    Problem: Self Care Deficits Care Plan (Adult)  Goal: *Acute Goals and Plan of Care (Insert Text)  Description: Occupational Therapy Goals  Initiated 7/8/2022 within 7 day(s). 1.  Patient will perform grooming with supervision/set-up seated in chair. 2.  Patient will perform upper body dressing with supervision/set-up. 3.  Patient will perform lower body dressing with minimal assistance/contact guard assist and use of AEs as needed. 4.  Patient will perform bed<>bsc transfers with minimal assistance/contact guard assist.  5.  Patient will perform all aspects of toileting with minimal assistance/contact guard assist.  6.  Patient will participate in upper extremity therapeutic exercise/activities with supervision/set-up for 10 minutes. 7.  Patient will utilize energy conservation techniques during functional activities with verbal, visual, and tactile cues. Outcome: Progressing Towards Goal     Patient: Kathy Meyer (37 y.o. male)  Date: 7/18/2022  Diagnosis: Diabetes mellitus with foot ulcer ([facility] [address] [X84.233, L97.509] Acute hematogenous osteomyelitis of right foot ([facility] [address]  Procedure(s) (LRB):  RIGHT HEEL DEBRIDEMENT WITH GRAFTING,REMOVAL OF SCREW  (PT IN ROOM #325) WITH C-ARM (Right) 13 Days Post-Op  Precautions: Fall,NWB      Chart, occupational therapy assessment, plan of care, and goals were reviewed. ASSESSMENT:  Pt presents supine in bed, wound vac in place, agreeable to therapy. Pt CGA supine to sit, pt aware of NWB status. Pt able to achieve siting, needs cues to stop heavy leaning to left, pt has strength to support but typically rests in \"comfortable\" position. Education on core strength and rotatation needed to improve transfers. Pt able to perform sitting un assisted while eating lunch for 20 minutes. Cues needed to maintain midline sitting. Pt then practice slide board transfer with manipulation in bed.  Pt able to get clearance to move along slide board and back. 5 scoots to clear slide board and pt expressing heavy fatigue and returns to supine with CGA. Left with all needs in reach. Progression toward goals:  []          Improving appropriately and progressing toward goals  [x]          Improving slowly and progressing toward goals  []          Not making progress toward goals and plan of care will be adjusted     PLAN:  Patient continues to benefit from skilled intervention to address the above impairments. Continue treatment per established plan of care. Discharge Recommendations:  Rehab  Further Equipment Recommendations for Discharge:  Slide board, drop arm BSC     SUBJECTIVE:   Patient stated that wears me out.     OBJECTIVE DATA SUMMARY:   Cognitive/Behavioral Status:  Neurologic State: Alert  Orientation Level: Oriented X4  Cognition: Appropriate decision making  Safety/Judgement: Fall prevention,Good awareness of safety precautions    Functional Mobility and Transfers for ADLs:   Bed Mobility:     Supine to Sit: Contact guard assistance  Sit to Supine: Contact guard assistance  Scooting: Contact guard assistance   Transfers: Toilet Transfer : Maximum assistance         Balance:  Sitting: Intact  Sitting - Static: Good (unsupported)  Sitting - Dynamic: Good (unsupported)    ADL Intervention:  Feeding  Feeding Assistance: Set-up      Cognitive Retraining  Problem Solving: General alternative solution  Executive Functions: Managing time;Regulating behavior  Safety/Judgement: Fall prevention;Good awareness of safety precautions    Pain:  Pain level pre-treatment: 4/10   Pain level post-treatment: 4/10  Pain Intervention(s): Medication administered by RN (see MAR); Rest, Ice, Repositioning   Response to intervention: Nurse notified, See doc flow sheet    Activity Tolerance:    Fair, needs motivation and additional time to complete tasks. Please refer to the flowsheet for vital signs taken during this treatment.   After treatment:   []  Patient left in no apparent distress sitting up in chair  [x]  Patient left in no apparent distress in bed  [x]  Call bell left within reach  [x]  Nursing notified  []  Caregiver present  []  Bed alarm activated    COMMUNICATION/EDUCATION:   [x] Role of Occupational Therapy in the acute care setting  [x] Home safety education was provided and the patient/caregiver indicated understanding. [x] Patient/family have participated as able in working towards goals and plan of care. [x] Patient/family agree to work toward stated goals and plan of care. [] Patient understands intent and goals of therapy, but is neutral about his/her participation. [] Patient is unable to participate in goal setting and plan of care.       Thank you for this referral.  Florence Beck OTD, OTR/L   Time Calculation: 29 mins

## 2022-07-18 NOTE — PROGRESS NOTES
[facility] at this time. 1950 - Assessment completed at this time. Pt A&Ox4, on 2l NC. Denies chest pain/SOB. Lung sounds clear. Woundvac with dressings intact and patent. Pain rated 4/10, pt refused pain medication at this time. No concerns voiced. Telephone and call bell within reach, bed in lowest position. Pt encouraged to call for assistance.

## 2022-07-18 NOTE — PROGRESS NOTES
D/C Plan: [facility] updates have been sent to assist with care transition. CM spoke with the above facility and pt will need current PT and OT notes with in a 48 hour period submitted to pt's insurance company to assist with obtaining authorization for SNF.   Anticipate pt will transition to the above facility once insurance auth has been obtained.  Pt will arrange handi ride to transport him to and from [address] the facility.  CM to continue to follow and assist.    [id number]: Insurance Tim Green has been obtained. CM has requested medical transport be arranged at 3:00pm tomorrow per facility request.  CM spoke with pt and he is aware. CM discussed transport to and from dialysis and pt has confirmed that he will arrange transport to and from dialysis through [facility]. CM to continue to follow and assist.     Transition [facility] care [facility]      Communication to Patient/Family:  Met with patient and family, and they are agreeable to the transition plan. The Plan for Transition of Care is related to the following treatment goals: SNF     The Patient and/or patient representative was provided with a choice of provider and agrees   with the discharge plan.  Yes [x]?????? No []??????     Freedom of choice list was provided with basic dialogue that supports the patient's individualized plan of care/goals and shares the quality data associated with the providers.     Yes [x]?????? No []??????     SNF/Rehab Transition:  [facility] [address] [place] [address], [address] [place] [address] meets criteria for admission.   Patient will transported by medical transport and expected to leave tomorrow at 3:00pm.       Communication to SNF/Rehab:  Bedside RN has been notified to update the transition plan to the facility and call report [phone]     Discharge information has been updated on the AVS and sent via [facility] and/or CC link.    Discharge instructions to be fax'd to facility at [phone]     Please include all hard scripts for controlled substances, med rec and dc summary, and AVS in packet. Please medicate for pain prior to dc if possible and needed to help offset delay when patient first arrives to facility.     Reviewed and confirmed with facility, [address] can manage the patient care needs for the following:      [place] with (X) only those applicable:  Medication:  []???? ? ? Medications are available at the facility  []??????IV Antibiotics    []?????? Controlled Substance - hard copies available sent.  []?????? Weekly Labs     Equipment:  []??????CPAP/BiPAP  []?????? Wound Vacuum  []???? ?? Mcmahon or Urinary Device  []??????PICC/Central Line  []???? ??Nebulizer  []???? ?? Ventilator     Treatment:  []?????? Isolation (for MRSA, VRE, etc.)  []??????Surgical Drain Management  []???? ? ? Tracheostomy Care  []???? ?? Dressing Changes  []???? ?? Dialysis with transportation  []??????PEG Care  []???? ? ? Oxygen  []???? ?? Daily Weights for Heart Failure     Dietary:  []???? ? ? Any diet limitations  []???? ??Tube Feedings   []???? ? ? Total Parenteral Management (TPN)     Financial Resources:  []???? ? ? Medicaid Application Completed     []??????UAI Completed and copy given to pt/family     []???? ??A screening has previously been completed.     []???? ? ? Level II Completed     []?????? Private pay individual who will not become   financially eligible for Medicaid within 6 months from admission to a [address] [facility] [facility].      []?????? Individual refused to have screening conducted.      []???? ? ? Medicaid Application Completed     []???? ? ? The screening denied because it was determined individual did not need/did not qualify for nursing facility level of care.  []?????? Out of state residents seeking direct admission to a [address] [place] [address] facility.  []?????? Individuals who are inpatients of an out of state hospital, or in state or out of state veterans/ hospital and seek direct admission to a [address] [place] [address]  []?????? Individuals who are pateints or residents of a state owned/operated facility that is licensed by [facility] ([facility]) and seek direct admission to [address] [place] [address]  []?????? A screening not required for enrollment in [facility] services as set out in [facility]-  []?????? Matt [address] [place] [address] - [employer]) staff shall perform screenings of the [facility] clients.     Advanced Care Plan:  []??????Surrogate Decision Maker of Care  []??????POA  []???? ?? Communicated Code Status and copy sent.     Other:              Care Management Interventions  PCP Verified by CM:  Yes (Dr. Leslie Tam )  Mode of Transport at Discharge: BLS  Transition of Care Consult (CM Consult): SNF (Pt is in agreement w/ SNF at d/c. )  Discharge Durable Medical Equipment:  (TBD)  Physical Therapy Consult: Yes  Occupational Therapy Consult: Yes  Support Systems: Child(dafne)  Confirm Follow Up Transport: Family  The Plan for Transition of Care is Related to the Following Treatment Goals : Skilled nursing facility  The Patient and/or Patient Representative was Provided with a Choice of Provider and Agrees with the Discharge Plan?: Yes (The pt is alert and oriented. )  Freedom of Choice List was Provided with Basic Dialogue that Supports the Patient's Individualized Plan of Care/Goals, Treatment Preferences and Shares the Quality Data Associated with the Providers?: Yes (Pt is in agreement w/ referrals being sent to all  and [place] facilities and then he will choose one from the accepting facilities. )  Discharge Location  Patient Expects to be Discharged to[de-identified] Skilled nursing facility

## 2022-07-18 NOTE — DIABETES MGMT
Diabetes Patient/Family Education Record    Factors That May Influence Patients Ability to Learn or Comply with Recommendations   []   Language barrier    []   Cultural needs   []   Motivation    []   Cognitive limitation    []   Physical   []   Education    []   Physiological factors   []   Hearing/vision/speaking impairment   []   Yarsanism beliefs    []   Financial factors   []  Other:   [x]  No factors identified at this time. Person Instructed:   [x]   Patient   []   Family   []  Other     Preference for Learning:   [x]   Verbal   []   Written   []  Demonstration     Level of Comprehension & Competence:    []  Good                                      [x] Fair                                     []  Poor                             [x]  Needs Reinforcement   [x]  Teach back completed    Education Component:   [x]  Medication management, including how to administer insulin (if appropriate) and potential medication interactions    []  Nutritional management - [] Obtained usual meal pattern   []   Basic carbohydrate counting  []  Plate method  []  Limit concentrated sweets and avoid sweetened beverages  []  Portion control  []    Avoid skipping meals   []  Exercise   []  Signs, symptoms, and treatment of hyperglycemia and hypoglycemia   [] Prevention, recognition and treatment of hyperglycemia and hypoglycemia   [x]  Importance of blood glucose monitoring  [x] Blood Glucose targets   []   Provided patient with blood glucose meter  [x]  Has glucometer and supplies at home   []  Instruction on use of the blood glucose meter and recommended monitoring schedule   [x]  Discuss the importance of HbA1C monitoring. Patients A1c is 8.6___ %.  This is equivalent to average glucose of 200___ mg/dl for the past 2-3 months.   []  Sick day guidelines   []  Proper use and disposal of lancets, needles, syringes or insulin pens (if appropriate)   [x]  Potential long-term complications (retinopathy, kidney disease, neuropathy, foot care)   [] Information about whom to contact in case of emergency or for more information    [x]  Goal:  Patient/family will demonstrate understanding of Diabetes Self- Management Skills by: (date) ___9/30____  Plan for post-discharge education or self-management support:    [] Outpatient class schedule provided            [] Patient Declined    [] Scheduled for outpatient classes (date) _______    [] Written information provided  Verify: [x] Prior to admission Diabetes medications    Does patient understand how diabetes medications work? __________yes__________________  Does patient have difficulty obtaining diabetes medications or testing supplies? ______no___________       Bernie Ghosh MS, RN, CDE  Glycemic Control Team  [phone]

## 2022-07-18 NOTE — PROGRESS NOTES
Problem: Mobility Impaired (Adult and Pediatric)  Goal: *Acute Goals and Plan of Care (Insert Text)  Description: Physical Therapy Goals  Updated  7/8/2022 and to be accomplished within 7 day(s)  1. Patient will move from supine to sit and sit to supine in bed with supervision/set-up. 2.  Patient will transfer from bed <> w/c  with min/contact guard assist via slide board. 3.  Patient will propel w/c with SBA 50ft in g[place] for increased functional independence with w/c mobility. 4.  Patient will demonstrate LE strengthening exercise program for achievement of above goals. Physical Therapy Goals  Initiated 7/8/2022 and to be accomplished within 7 day(s)  1. Patient will move from supine to sit and sit to supine  in bed with supervision/set-up. 2.  Patient will transfer from bed to chair and chair to bed with minimal assistance/contact guard assist using the least restrictive device. 3.  Patient will perform sit to stand with moderate assistance . Note:   physical Therapy TREATMENT    Patient: Sheryle Capers (8 y.o. male)  Date: 7/18/2022  Diagnosis: Diabetes mellitus with foot ulcer ([facility] [address] [C59.200, L97.509] Acute hematogenous osteomyelitis of right foot ([facility] [address]  Procedure(s) (LRB):  RIGHT HEEL DEBRIDEMENT WITH GRAFTING,REMOVAL OF SCREW  (PT IN ROOM #325) WITH C-ARM (Right) 13 Days Post-Op  Precautions: Fall,NWB   Chart, physical therapy assessment, plan of care and goals were reviewed. ASSESSMENT:  Pt worked with OT earlier in the day. Fatigued however agreeable and motivated to participate with PT. Pt performs bed mobility with CGA. Pt required frequent cues today for sitting balance. Pt performed exercises for LE and core strengthening. Cues for breathing technique. Pt performed scooting with Justine. Continue to progress bed mobility, chair transfers, endurance training, strengthening and balance training. Consider SNF at d/c.   Progression toward goals:  []      Improving appropriately and progressing toward goals  [x]      Improving slowly and progressing toward goals  []      Not making progress toward goals and plan of care will be adjusted     PLAN:  Patient continues to benefit from skilled intervention to address the above impairments. Continue treatment per established plan of care. Discharge Recommendations:  Hakan Story  Further Equipment Recommendations for Discharge:  N/A     SUBJECTIVE:   Patient stated I am up for working.     OBJECTIVE DATA SUMMARY:   Critical Behavior:  Neurologic State: Alert  Orientation Level: Oriented X4  Cognition: Appropriate decision making  Safety/Judgement: Fall prevention,Good awareness of safety precautions  Functional Mobility Training:  Bed Mobility:   Supine to Sit: Contact guard assistance  Sit to Supine: Contact guard assistance  Scooting: Contact guard assistance  Balance:  Sitting: Intact  Sitting - Static: Good (unsupported)  Sitting - Dynamic: Good (unsupported)  Therapeutic Exercises:   Seated there ex: eldon AMBROCIO, gentle stretching to R hamstrings, scooting R/L  Activity Tolerance:   Good  Please refer to the flowsheet for vital signs taken during this treatment.   After treatment:   [] Patient left in no apparent distress sitting up in chair  [x] Patient left in no apparent distress in bed  [x] Call bell left within reach  [x] Nursing notified  [] Caregiver present  [] Bed alarm activated      Binu Running Titcomb   Time Calculation: 38 mins

## 2022-07-18 NOTE — PROGRESS NOTES
Nephrology Progress note    Subjective:     Alecia Butterfield is a 60 y.o. male with  a PMH of DM, HTN, CAD, ESRD on HD TTS admitted for right foot infection. MRI suggested osteomyelitis. No fever, chills. Pt s/p debridement, I&D- on abx.     HD Catheter fell off and was replaced 6/29.       -Pt denies CP/SOB/Fever/Chills.    S/p HD Sat, c/o loose stools with abx, c/o CP       Admit Date: 6/27/2022  Principal Problem:    Acute hematogenous osteomyelitis of right foot ([facility] [address] [address] (6/28/2022)    Active Problems:    Diabetes mellitus with foot ulcer ([facility] [address] (6/27/2022)      CAD (coronary artery disease) (6/28/2022)      ESRD (end stage renal disease) ([facility] [address] (6/28/2022)      Cellulitis of right heel (6/28/2022)      Diabetic foot ulcer with osteomyelitis ([facility] [address] (6/28/2022)      Ulcer of right heel, with necrosis of bone ([facility] [address] (6/28/2022)      Infection and inflammatory reaction due to internal fixation device of other site, initial encounter ([facility] [address] (7/5/2022)      Left arm swelling (7/10/2022)      Current Facility-Administered Medications   Medication Dose Route Frequency    nystatin (MYCOSTATIN) 100,000 unit/gram powder   Topical BID    bacitracin zinc (BACITRACIN) 500 unit/gram ointment   Topical BID    pantoprazole (PROTONIX) tablet 40 mg  40 mg Oral ACB    alteplase (CATHFLO) 1 mg in sterile water (preservative free) 1 mL injection  1 mg InterCATHeter DIALYSIS PRN    nitroglycerin (NITROSTAT) tablet 0.4 mg  0.4 mg SubLINGual PRN    morphine injection 1 mg  1 mg IntraVENous Q4H PRN    ondansetron (ZOFRAN) injection 4 mg  4 mg IntraVENous Q6H PRN    gabapentin (NEURONTIN) capsule 300 mg  300 mg Oral QHS    heparin (porcine) injection 5,000 Units  5,000 Units SubCUTAneous Q8H    melatonin (rapid dissolve) tablet 10 mg  10 mg Oral QHS PRN    epoetin lisette-epbx (RETACRIT) injection 10,000 Units  10,000 Units SubCUTAneous Q TUE, THU & SAT    heparin (porcine) 1,000 unit/mL injection 3,400 Units  3,400 Units Hemodialysis DIALYSIS PRN    heparin (porcine) 100 unit/mL injection 500 Units  500 Units InterCATHeter Q8H PRN    sodium chloride (NS) flush 5-40 mL  5-40 mL IntraVENous Q8H    sodium chloride (NS) flush 5-40 mL  5-40 mL IntraVENous PRN    fentaNYL citrate (PF) injection  mcg   mcg IntraVENous Rad Multiple    naloxone (NARCAN) injection 0.1 mg  0.1 mg IntraVENous Multiple    loperamide (IMODIUM) capsule 2 mg  2 mg Oral Q4H PRN    piperacillin-tazobactam (ZOSYN) 4.5 g in 0.9% sodium chloride (MBP/ADV) 100 mL MBP  4.5 g IntraVENous Q12H    sevelamer carbonate (RENVELA) tab 1,600 mg  1,600 mg Oral TID WITH MEALS    Lactobacillus Acidoph & Bulgar (FLORANEX) tablet 2 Tablet  2 Tablet Oral BID    allopurinoL (ZYLOPRIM) tablet 100 mg  100 mg Oral DAILY    carvediloL (COREG) tablet 12.5 mg  12.5 mg Oral BID    ezetimibe (ZETIA) tablet 10 mg  10 mg Oral DAILY    amLODIPine (NORVASC) tablet 10 mg  10 mg Oral DAILY    vit B Cmplx 3-FA-Vit C-Biotin (NEPHRO NIKITA RX) tablet 1 Tablet  1 Tablet Oral DAILY    insulin lispro (HUMALOG) injection 3 Units  3 Units SubCUTAneous TIDAC    aspirin chewable tablet 81 mg  81 mg Oral DAILY    insulin glargine (LANTUS) injection 10 Units  10 Units SubCUTAneous QHS    insulin lispro (HUMALOG) injection   SubCUTAneous AC&HS    glucose chewable tablet 16 g  4 Tablet Oral PRN    glucagon (GLUCAGEN) injection 1 mg  1 mg IntraMUSCular PRN    dextrose 10% infusion 0-250 mL  0-250 mL IntraVENous PRN    acetaminophen (TYLENOL) tablet 650 mg  650 mg Oral Q6H PRN         Allergy:   No Known Allergies     Objective:     Visit Vitals  BP (!) 159/71   Pulse 79   Temp 98.8 °F (37.1 °C)   Resp 18   Ht 6' 2\" (1.88 m)   Wt 106.1 kg (234 lb)   SpO2 99%   BMI 30.04 kg/m²       No intake or output data in the 24 hours ending 07/18/22 1011    Physical Exam:       General: No acute distress   HENT: Atraumatic and normocephalic   Eyes: Normal conjunctiva   Neck: Supple No JVD   Cardiovascular: Normal S1 & S2, no m/r/g   Pulmonary/Chest Wall: Clear to auscultation bilaterally   Abdominal: Soft and non-tender   Musculoskeletal: Wound Vac on R foot   Neurological: AA and O X 3, No focal deficits     RIJ TDC in Place     Data Review:  Lab Results   Component Value Date/Time    Sodium 138 07/18/2022 01:30 AM    Potassium 5.9 (H) 07/18/2022 01:30 AM    Chloride 104 07/18/2022 01:30 AM    CO2 26 07/18/2022 01:30 AM    Anion gap 8 07/18/2022 01:30 AM    Glucose 187 (H) 07/18/2022 01:30 AM    BUN 59 (H) 07/18/2022 01:30 AM    Creatinine 8.67 (H) 07/18/2022 01:30 AM    BUN/Creatinine ratio 7 (L) 07/18/2022 01:30 AM    GFR est AA 8 (L) 07/18/2022 01:30 AM    GFR est non-AA 6 (L) 07/18/2022 01:30 AM    Calcium 8.3 (L) 07/18/2022 01:30 AM     Lab Results   Component Value Date/Time    WBC 11.2 07/18/2022 01:30 AM    HGB 8.8 (L) 07/18/2022 01:30 AM    HCT 28.3 (L) 07/18/2022 01:30 AM    PLATELET 715 48/54/7330 01:30 AM    .8 (H) 07/18/2022 01:30 AM     Lab Results   Component Value Date/Time    Calcium 8.3 (L) 07/18/2022 01:30 AM    Phosphorus 4.8 07/11/2022 05:30 AM     No results found for: IRON, FE, TIBC, IBCT, PSAT, FERR  No results found for: FERR      Impression:   -ESRD on HD  -Diabetic foot ulcer with osteomyelitis, on abx   -DM  -HTN  -Anemia in CKD  -SHPT  -Hyperphosphatemia: on Renvela       Plan:   HD TTS  DALTON TIW  Stress test, cardiology consult           MD Bonifacio Varela  [phone]

## 2022-07-18 NOTE — PROGRESS NOTES
4830 - Bedside shift report received from [facility]. Assumed care of patient. Patient noted resting in bed at this time. Call light in reach. 2104 - Assessment completed as per flowsheet. Patient alert and oriented x4. On 1L via NC a this time. Respirations even and unlabored. No s/s of any respiratory distress. Reports frequesnt loose stools. Excoriation noted to sacrum. Wound vac intact to right heel. Unna boots in place bilaterally. Patient resting in bed with call light in reach. 1446 - Air leak noted to wound vac dressing. Dressing reinforced. Incontinence care provided. 2005 - On-call MD Dr. Seble Archibald notified of excoriation to groin and sacrum and request made for an order for a better ointment to help treat excoriation.

## 2022-07-18 NOTE — PROGRESS NOTES
Problem: General Medical Care Plan  Goal: *Vital signs within specified parameters  Outcome: Progressing Towards Goal  Goal: *Labs within defined limits  Outcome: Progressing Towards Goal  Goal: *Absence of infection signs and symptoms  Outcome: Progressing Towards Goal  Goal: *Optimal pain control at patient's stated goal  Outcome: Progressing Towards Goal  Goal: *Skin integrity maintained  Outcome: Progressing Towards Goal  Goal: *Fluid volume balance  Outcome: Progressing Towards Goal  Goal: *Optimize nutritional status  Outcome: Progressing Towards Goal  Goal: *Anxiety reduced or absent  Outcome: Progressing Towards Goal  Goal: *Progressive mobility and function (eg: ADL's)  Outcome: Progressing Towards Goal     Problem: Patient Education: Go to Patient Education Activity  Goal: Patient/Family Education  Outcome: Progressing Towards Goal     Problem: Falls - Risk of  Goal: *Absence of Falls  Description: Document Jaxson Fall Risk and appropriate interventions in the flowsheet. Outcome: Progressing Towards Goal  Note: Fall Risk Interventions:  Mobility Interventions: Communicate number of staff needed for ambulation/transfer         Medication Interventions: Patient to call before getting OOB    Elimination Interventions: Bed/chair exit alarm,Call light in reach    History of Falls Interventions: Bed/chair exit alarm         Problem: Patient Education: Go to Patient Education Activity  Goal: Patient/Family Education  Outcome: Progressing Towards Goal     Problem: Pressure Injury - Risk of  Goal: *Prevention of pressure injury  Description: Document Semaj Scale and appropriate interventions in the flowsheet.   Outcome: Progressing Towards Goal  Note: Pressure Injury Interventions:  Sensory Interventions: Minimize linen layers    Moisture Interventions: Minimize layers    Activity Interventions: Assess need for specialty bed,Pressure redistribution bed/mattress(bed type)    Mobility Interventions: Assess need for specialty bed,Pressure redistribution bed/mattress (bed type)    Nutrition Interventions: Document food/fluid/supplement intake    Friction and Shear Interventions: Minimize layers,Foam dressings/transparent film/skin sealants                Problem: Patient Education: Go to Patient Education Activity  Goal: Patient/Family Education  Outcome: Progressing Towards Goal     Problem: Diabetes Self-Management  Goal: *Disease process and treatment process  Description: Define diabetes and identify own type of diabetes; list 3 options for treating diabetes. Outcome: Progressing Towards Goal  Goal: *Incorporating nutritional management into lifestyle  Description: Describe effect of type, amount and timing of food on blood glucose; list 3 methods for planning meals. Outcome: Progressing Towards Goal  Goal: *Incorporating physical activity into lifestyle  Description: State effect of exercise on blood glucose levels. Outcome: Progressing Towards Goal  Goal: *Developing strategies to promote health/change behavior  Description: Define the ABC's of diabetes; identify appropriate screenings, schedule and personal plan for screenings. Outcome: Progressing Towards Goal  Goal: *Using medications safely  Description: State effect of diabetes medications on diabetes; name diabetes medication taking, action and side effects. Outcome: Progressing Towards Goal  Goal: *Monitoring blood glucose, interpreting and using results  Description: Identify recommended blood glucose targets  and personal targets. Outcome: Progressing Towards Goal  Goal: *Prevention, detection, treatment of acute complications  Description: List symptoms of hyper- and hypoglycemia; describe how to treat low blood sugar and actions for lowering  high blood glucose level.   Outcome: Progressing Towards Goal  Goal: *Prevention, detection and treatment of chronic complications  Description: Define the natural course of diabetes and describe the relationship of blood glucose levels to long term complications of diabetes.   Outcome: Progressing Towards Goal  Goal: *Developing strategies to address psychosocial issues  Description: Describe feelings about living with diabetes; identify support needed and support network  Outcome: Progressing Towards Goal  Goal: *Insulin pump training  Outcome: Progressing Towards Goal  Goal: *Sick day guidelines  Outcome: Progressing Towards Goal  Goal: *Patient Specific Goal (EDIT GOAL, INSERT TEXT)  Outcome: Progressing Towards Goal     Problem: Patient Education: Go to Patient Education Activity  Goal: Patient/Family Education  Outcome: Progressing Towards Goal     Problem: Chronic Renal Failure  Goal: *Fluid and electrolytes stabilized  Outcome: Progressing Towards Goal     Problem: Chronic Renal Failure  Goal: *Fluid and electrolytes stabilized  Outcome: Progressing Towards Goal     Problem: Patient Education: Go to Patient Education Activity  Goal: Patient/Family Education  Outcome: Progressing Towards Goal

## 2022-07-18 NOTE — ROUTINE PROCESS
Bedside and Verbal shift change report given to KIM Hayes (oncoming nurse) by JOANNA Mobley RN (offgoing nurse). Report included the following information SBAR, Kardex, ED Summary, Intake/Output, MAR and Recent Results.

## 2022-07-19 ENCOUNTER — APPOINTMENT (OUTPATIENT)
Dept: NON INVASIVE DIAGNOSTICS | Age: 62
DRG: 629 | End: 2022-07-19
Attending: INTERNAL MEDICINE
Payer: MEDICARE

## 2022-07-19 ENCOUNTER — APPOINTMENT (OUTPATIENT)
Dept: NUCLEAR MEDICINE | Age: 62
DRG: 629 | End: 2022-07-19
Attending: INTERNAL MEDICINE
Payer: MEDICARE

## 2022-07-19 PROBLEM — R94.39 ABNORMAL NUCLEAR STRESS TEST: Status: ACTIVE | Noted: 2022-06-27

## 2022-07-19 PROBLEM — R07.9 CHEST PAIN AT REST: Status: ACTIVE | Noted: 2022-06-27

## 2022-07-19 LAB
ATRIAL RATE: 78 BPM
CALCULATED P AXIS, ECG09: 94 DEGREES
CALCULATED R AXIS, ECG10: -36 DEGREES
CALCULATED T AXIS, ECG11: 59 DEGREES
CHOLEST SERPL-MCNC: 188 MG/DL
DIAGNOSIS, 93000: NORMAL
ERYTHROCYTE [SEDIMENTATION RATE] IN BLOOD: >140 MM/HR (ref 0–20)
GLUCOSE BLD STRIP.AUTO-MCNC: 100 MG/DL (ref 70–110)
GLUCOSE BLD STRIP.AUTO-MCNC: 138 MG/DL (ref 70–110)
GLUCOSE BLD STRIP.AUTO-MCNC: 173 MG/DL (ref 70–110)
HDLC SERPL-MCNC: 42 MG/DL (ref 40–60)
HDLC SERPL: 4.5 {RATIO} (ref 0–5)
LDLC SERPL CALC-MCNC: 131.2 MG/DL (ref 0–100)
LIPID PROFILE,FLP: ABNORMAL
NUC STRESS EJECTION FRACTION: 48 %
P-R INTERVAL, ECG05: 186 MS
Q-T INTERVAL, ECG07: 436 MS
QRS DURATION, ECG06: 90 MS
QTC CALCULATION (BEZET), ECG08: 497 MS
STRESS BASELINE HR: 68 BPM
STRESS ESTIMATED WORKLOAD: 1 METS
STRESS EXERCISE DUR MIN: 0 MIN
STRESS EXERCISE DUR SEC: 58 SEC
STRESS PEAK DIAS BP: 65 MMHG
STRESS PEAK SYS BP: 167 MMHG
STRESS PERCENT HR ACHIEVED: 46 %
STRESS POST PEAK HR: 73 BPM
STRESS RATE PRESSURE PRODUCT: NORMAL BPM*MMHG
STRESS ST DEPRESSION: 0 MM
STRESS TARGET HR: 157 BPM
TID: 1.02
TRIGL SERPL-MCNC: 74 MG/DL (ref ?–150)
VENTRICULAR RATE, ECG03: 78 BPM
VLDLC SERPL CALC-MCNC: 14.8 MG/DL

## 2022-07-19 PROCEDURE — 65270000029 HC RM PRIVATE

## 2022-07-19 PROCEDURE — 77010033678 HC OXYGEN DAILY

## 2022-07-19 PROCEDURE — 74011250637 HC RX REV CODE- 250/637: Performed by: INTERNAL MEDICINE

## 2022-07-19 PROCEDURE — 74011250636 HC RX REV CODE- 250/636: Performed by: INTERNAL MEDICINE

## 2022-07-19 PROCEDURE — 74011636637 HC RX REV CODE- 636/637: Performed by: INTERNAL MEDICINE

## 2022-07-19 PROCEDURE — 74011250636 HC RX REV CODE- 250/636: Performed by: PHYSICIAN ASSISTANT

## 2022-07-19 PROCEDURE — 74011250636 HC RX REV CODE- 250/636: Performed by: HOSPITALIST

## 2022-07-19 PROCEDURE — 90935 HEMODIALYSIS ONE EVALUATION: CPT

## 2022-07-19 PROCEDURE — 36415 COLL VENOUS BLD VENIPUNCTURE: CPT

## 2022-07-19 PROCEDURE — 74011000250 HC RX REV CODE- 250: Performed by: RADIOLOGY

## 2022-07-19 PROCEDURE — 74011000258 HC RX REV CODE- 258: Performed by: PHYSICIAN ASSISTANT

## 2022-07-19 PROCEDURE — 74011250637 HC RX REV CODE- 250/637: Performed by: FAMILY MEDICINE

## 2022-07-19 PROCEDURE — 80061 LIPID PANEL: CPT

## 2022-07-19 PROCEDURE — 85652 RBC SED RATE AUTOMATED: CPT

## 2022-07-19 PROCEDURE — 94660 CPAP INITIATION&MGMT: CPT

## 2022-07-19 PROCEDURE — 93017 CV STRESS TEST TRACING ONLY: CPT

## 2022-07-19 PROCEDURE — A9500 TC99M SESTAMIBI: HCPCS

## 2022-07-19 PROCEDURE — 74011250637 HC RX REV CODE- 250/637: Performed by: HOSPITALIST

## 2022-07-19 PROCEDURE — 74011636637 HC RX REV CODE- 636/637: Performed by: FAMILY MEDICINE

## 2022-07-19 PROCEDURE — 74011636637 HC RX REV CODE- 636/637: Performed by: HOSPITALIST

## 2022-07-19 PROCEDURE — 82962 GLUCOSE BLOOD TEST: CPT

## 2022-07-19 RX ORDER — ISOSORBIDE MONONITRATE 30 MG/1
30 TABLET, EXTENDED RELEASE ORAL DAILY
Qty: 30 TABLET | Refills: 0 | Status: SHIPPED
Start: 2022-07-20

## 2022-07-19 RX ORDER — SAME BUTANEDISULFONATE/BETAINE 400-600 MG
250 POWDER IN PACKET (EA) ORAL 2 TIMES DAILY
Qty: 14 CAPSULE | Refills: 0 | Status: SHIPPED
Start: 2022-07-19 | End: 2022-07-26

## 2022-07-19 RX ORDER — HYDRALAZINE HYDROCHLORIDE 20 MG/ML
10 INJECTION INTRAMUSCULAR; INTRAVENOUS
Status: DISCONTINUED | OUTPATIENT
Start: 2022-07-19 | End: 2022-07-21 | Stop reason: HOSPADM

## 2022-07-19 RX ORDER — PANTOPRAZOLE SODIUM 40 MG/1
40 TABLET, DELAYED RELEASE ORAL
Qty: 30 TABLET | Refills: 0 | Status: SHIPPED | OUTPATIENT
Start: 2022-07-20

## 2022-07-19 RX ORDER — BACITRACIN ZINC 500 UNIT/G
OINTMENT (GRAM) TOPICAL 2 TIMES DAILY
Qty: 15 G | Refills: 0 | Status: SHIPPED | OUTPATIENT
Start: 2022-07-19

## 2022-07-19 RX ORDER — NYSTATIN 100000 [USP'U]/G
POWDER TOPICAL 2 TIMES DAILY
Qty: 5 G | Refills: 0 | Status: SHIPPED
Start: 2022-07-19

## 2022-07-19 RX ADMIN — PIPERACILLIN AND TAZOBACTAM 4.5 G: 4; .5 INJECTION, POWDER, FOR SOLUTION INTRAVENOUS at 20:27

## 2022-07-19 RX ADMIN — NYSTATIN: 100000 POWDER TOPICAL at 22:23

## 2022-07-19 RX ADMIN — SEVELAMER CARBONATE 1600 MG: 800 TABLET, FILM COATED ORAL at 18:07

## 2022-07-19 RX ADMIN — EPOETIN ALFA-EPBX 10000 UNITS: 10000 INJECTION, SOLUTION INTRAVENOUS; SUBCUTANEOUS at 22:23

## 2022-07-19 RX ADMIN — SODIUM CHLORIDE, PRESERVATIVE FREE 10 ML: 5 INJECTION INTRAVENOUS at 15:44

## 2022-07-19 RX ADMIN — PANTOPRAZOLE SODIUM 40 MG: 40 TABLET, DELAYED RELEASE ORAL at 06:54

## 2022-07-19 RX ADMIN — GABAPENTIN 300 MG: 300 CAPSULE ORAL at 21:57

## 2022-07-19 RX ADMIN — EZETIMIBE 10 MG: 10 TABLET ORAL at 12:37

## 2022-07-19 RX ADMIN — INSULIN LISPRO 3 UNITS: 100 INJECTION, SOLUTION INTRAVENOUS; SUBCUTANEOUS at 18:05

## 2022-07-19 RX ADMIN — INSULIN GLARGINE 10 UNITS: 100 INJECTION, SOLUTION SUBCUTANEOUS at 21:55

## 2022-07-19 RX ADMIN — HEPARIN SODIUM 5000 UNITS: 5000 INJECTION INTRAVENOUS; SUBCUTANEOUS at 05:44

## 2022-07-19 RX ADMIN — Medication 2 UNITS: at 21:55

## 2022-07-19 RX ADMIN — REGADENOSON 0.4 MG: 0.08 INJECTION, SOLUTION INTRAVENOUS at 11:00

## 2022-07-19 RX ADMIN — B-COMPLEX W/ C & FOLIC ACID TAB 1 MG 1 TABLET: 1 TAB at 12:40

## 2022-07-19 RX ADMIN — CARVEDILOL 12.5 MG: 12.5 TABLET, FILM COATED ORAL at 20:27

## 2022-07-19 RX ADMIN — HYDRALAZINE HYDROCHLORIDE 10 MG: 20 INJECTION INTRAMUSCULAR; INTRAVENOUS at 21:55

## 2022-07-19 RX ADMIN — NYSTATIN: 100000 POWDER TOPICAL at 12:39

## 2022-07-19 RX ADMIN — SODIUM CHLORIDE, PRESERVATIVE FREE 10 ML: 5 INJECTION INTRAVENOUS at 22:15

## 2022-07-19 RX ADMIN — SODIUM CHLORIDE, PRESERVATIVE FREE 10 ML: 5 INJECTION INTRAVENOUS at 05:44

## 2022-07-19 RX ADMIN — HEPARIN SODIUM 5000 UNITS: 5000 INJECTION INTRAVENOUS; SUBCUTANEOUS at 12:35

## 2022-07-19 RX ADMIN — Medication 250 MG: at 12:37

## 2022-07-19 RX ADMIN — Medication: at 21:00

## 2022-07-19 RX ADMIN — Medication 250 MG: at 20:27

## 2022-07-19 RX ADMIN — LOPERAMIDE HYDROCHLORIDE 2 MG: 2 CAPSULE ORAL at 15:53

## 2022-07-19 RX ADMIN — Medication: at 09:00

## 2022-07-19 RX ADMIN — ALLOPURINOL 100 MG: 100 TABLET ORAL at 12:37

## 2022-07-19 RX ADMIN — SEVELAMER CARBONATE 1600 MG: 800 TABLET, FILM COATED ORAL at 12:40

## 2022-07-19 RX ADMIN — ASPIRIN 81 MG: 81 TABLET, CHEWABLE ORAL at 12:37

## 2022-07-19 RX ADMIN — INSULIN LISPRO 3 UNITS: 100 INJECTION, SOLUTION INTRAVENOUS; SUBCUTANEOUS at 12:39

## 2022-07-19 RX ADMIN — HEPARIN SODIUM 5000 UNITS: 5000 INJECTION INTRAVENOUS; SUBCUTANEOUS at 21:56

## 2022-07-19 RX ADMIN — HEPARIN SODIUM 3400 UNITS: 1000 INJECTION INTRAVENOUS; SUBCUTANEOUS at 19:01

## 2022-07-19 NOTE — PROGRESS NOTES
Cardiology Progress Note        Patient: Jose Richards        Sex: male          DOA: 6/27/2022  YOB: 1960      Age:  60 y.o.        LOS:  LOS: 22 days   Assessment/Plan     Principal Problem:    Acute hematogenous osteomyelitis of right foot ([address] (6/28/2022)    Active Problems:    Diabetes mellitus with foot ulcer ([address] (6/27/2022)      CAD (coronary artery disease) (6/28/2022)      ESRD (end stage renal disease) ([address] (6/28/2022)      Cellulitis of right heel (6/28/2022)      Diabetic foot ulcer with osteomyelitis ([address] (6/28/2022)      Ulcer of right heel, with necrosis of bone ([address] (6/28/2022)      Infection and inflammatory reaction due to internal fixation device of other site, initial encounter ([address] (7/5/2022)      Left arm swelling (7/10/2022)        Plan:    Chest pain  Known CAD  Abnormal stress test  Finding discussed in detail with the patient  Patient preferred to undergo cardiac catheterization possible PCI  I will schedule tomorrow. I have discussed with patient risk of bleeding, stroke, heart attack patient is already on dialysis  Will discuss with Dr. Tita Lozano it is okay to do procedure from an infection standpoint                    Subjective:    cc:  Chest pain        REVIEW OF SYSTEMS:     General: No fevers or chills. Cardiovascular: No chest pain or pressure. No palpitations. No ankle swelling  Pulmonary: No SOB, orthopnea, PND  Gastrointestinal: No nausea, vomiting or diarrhea      Objective:      Visit Vitals  BP (!) 128/103   Pulse 96   Temp 98.1 °F (36.7 °C)   Resp 21   Ht 6' (1.829 m)   Wt 106 kg (233 lb 11 oz)   SpO2 100%   BMI 31.69 kg/m²     Body mass index is 31.69 kg/m². Physical Exam:  General Appearance: Comfortable, not using accessory muscles of respiration. NECK: No JVD, no thyroidomeglay. LUNGS: Clear bilaterally.    HEART: S1+S2 audible,    ABD: Non-tender, BS Audible       PSYCHIATRIC EXAM: Mood is appropriate.     Medication:  Current Facility-Administered Medications   Medication Dose Route Frequency    nystatin (MYCOSTATIN) 100,000 unit/gram powder   Topical BID    Saccharomyces boulardii (FLORASTOR) capsule 250 mg  250 mg Oral BID    isosorbide mononitrate ER (IMDUR) tablet 30 mg  30 mg Oral DAILY    bacitracin zinc (BACITRACIN) 500 unit/gram ointment   Topical BID    pantoprazole (PROTONIX) tablet 40 mg  40 mg Oral ACB    alteplase (CATHFLO) 1 mg in sterile water (preservative free) 1 mL injection  1 mg InterCATHeter DIALYSIS PRN    nitroglycerin (NITROSTAT) tablet 0.4 mg  0.4 mg SubLINGual PRN    morphine injection 1 mg  1 mg IntraVENous Q4H PRN    ondansetron (ZOFRAN) injection 4 mg  4 mg IntraVENous Q6H PRN    gabapentin (NEURONTIN) capsule 300 mg  300 mg Oral QHS    heparin (porcine) injection 5,000 Units  5,000 Units SubCUTAneous Q8H    melatonin (rapid dissolve) tablet 10 mg  10 mg Oral QHS PRN    epoetin lisette-epbx (RETACRIT) injection 10,000 Units  10,000 Units SubCUTAneous Q TUE, THU & SAT    heparin (porcine) 1,000 unit/mL injection 3,400 Units  3,400 Units Hemodialysis DIALYSIS PRN    heparin (porcine) 100 unit/mL injection 500 Units  500 Units InterCATHeter Q8H PRN    sodium chloride (NS) flush 5-40 mL  5-40 mL IntraVENous Q8H    sodium chloride (NS) flush 5-40 mL  5-40 mL IntraVENous PRN    naloxone (NARCAN) injection 0.1 mg  0.1 mg IntraVENous Multiple    loperamide (IMODIUM) capsule 2 mg  2 mg Oral Q4H PRN    piperacillin-tazobactam (ZOSYN) 4.5 g in 0.9% sodium chloride (MBP/ADV) 100 mL MBP  4.5 g IntraVENous Q12H    sevelamer carbonate (RENVELA) tab 1,600 mg  1,600 mg Oral TID WITH MEALS    allopurinoL (ZYLOPRIM) tablet 100 mg  100 mg Oral DAILY    carvediloL (COREG) tablet 12.5 mg  12.5 mg Oral BID    ezetimibe (ZETIA) tablet 10 mg  10 mg Oral DAILY    amLODIPine (NORVASC) tablet 10 mg  10 mg Oral DAILY    vit B Cmplx 3-FA-Vit C-Biotin (NEPHRO NIKITA RX) tablet 1 Tablet  1 Tablet Oral DAILY    insulin lispro (HUMALOG) injection 3 Units  3 Units SubCUTAneous TIDAC    aspirin chewable tablet 81 mg  81 mg Oral DAILY    insulin glargine (LANTUS) injection 10 Units  10 Units SubCUTAneous QHS    insulin lispro (HUMALOG) injection   SubCUTAneous AC&HS    glucose chewable tablet 16 g  4 Tablet Oral PRN    glucagon (GLUCAGEN) injection 1 mg  1 mg IntraMUSCular PRN    dextrose 10% infusion 0-250 mL  0-250 mL IntraVENous PRN    acetaminophen (TYLENOL) tablet 650 mg  650 mg Oral Q6H PRN               Lab/Data Reviewed:  Procedures/imaging: see electronic medical records for all procedures/Xrays   and details which were not copied into this note but were reviewed prior to creation of Plan       All lab results for the last 24 hours reviewed.      Recent Labs     07/18/22  0130   WBC 11.2   HGB 8.8*   HCT 28.3*        Recent Labs     07/18/22  0130      K 5.9*      CO2 26   *   BUN 59*   CREA 8.67*   CA 8.3*       RADIOLOGY:  CT Results  (Last 48 hours)    None        CXR Results  (Last 48 hours)    None            Cardiology Procedures:   Results for orders placed or performed during the hospital encounter of 06/27/22   EKG, 12 LEAD, INITIAL   Result Value Ref Range    Ventricular Rate 78 BPM    Atrial Rate 78 BPM    P-R Interval 186 ms    QRS Duration 90 ms    Q-T Interval 436 ms    QTC Calculation (Bezet) 497 ms    Calculated P Axis 94 degrees    Calculated R Axis -36 degrees    Calculated T Axis 59 degrees    Diagnosis       Sinus rhythm with premature atrial complexes  Left axis deviation  Prolonged QT  Abnormal ECG  When compared with ECG of 12-JUL-2022 05:56,  premature atrial complexes are now present        Echo Results  (Last 48 hours)    None       Cardiolite (Tc-99m Sestamibi) stress test    Signed By: Jackson Chowdhury MD     July 19, 2022

## 2022-07-19 NOTE — ROUTINE PROCESS
Bedside and Verbal shift change report given to BREANA Goodman RN (oncoming nurse) by JOANNA Jules RN (offgoing nurse). Report included the following information SBAR, Kardex, OR Summary, Intake/Output, MAR and Recent Results.

## 2022-07-19 NOTE — PROGRESS NOTES
Comprehensive Nutrition Assessment    Type and Reason for Visit: Reassess    Nutrition Recommendations/Plan:   1. Advance diet when feasible to meet nutritional needs. Avoid prolong inadequate nutrition intake. Malnutrition Assessment:  Malnutrition Status:  No malnutrition (07/01/22 1110)      Nutrition Assessment:    Admitted with Large necrotic ulcer right posterior heel with osteomyelitis of the calcaneus and deep abscess. NPO since midnight for stress test. New weight obtained- no weight lost noted. Noted per CM 7/18- expected to discharge today at 3pm    Nutrition Related Findings:    nephro natividad rx, renvela, florastor, protonix, humalog, lantus. BM 7/18. % intake prior to NPO. Wound Type: Surgical incision    Current Nutrition Intake & Therapies:  Average Meal Intake: NPO  Average Supplement Intake: NPO  ADULT ORAL NUTRITION SUPPLEMENT Breakfast, Dinner; Wound Healing Supplement  DIET NPO    Anthropometric Measures:  Height: 6' (182.9 cm)  Ideal Body Weight (IBW): 178 lbs (81 kg)  Admission Body Weight: 212 lb (per H&P)  Current Body Wt:  106 kg (233 lb 11 oz), 120.2 % IBW. Bed scale  Current BMI (kg/m2): 31.7  Usual Body Weight: 99.8 kg (220 lb) (1/24/2022)  % Weight Change (Calculated): -3.1  Weight Adjustment: No adjustment                 BMI Category: Obese class 1 (BMI 30.0-34. 9)    Estimated Daily Nutrient Needs:  Energy Requirements Based On: Formula  Weight Used for Energy Requirements: Current  Energy (kcal/day): 9463-7847  Weight Used for Protein Requirements: Current  Protein (g/day): 127-138  Method Used for Fluid Requirements: 1 ml/kcal  Fluid (ml/day): 9426-7221    Nutrition Diagnosis:   · Inadequate oral intake related to acute injury/trauma as evidenced by NPO or clear liquid status due to medical condition      Nutrition Interventions:   Food and/or Nutrient Delivery: Start oral diet,Continue oral nutrition supplement  Nutrition Education/Counseling: No recommendations at this time  Coordination of Nutrition Care: Continue to monitor while inpatient       Goals:  Previous Goal Met: Goal(s) achieved  Goals: PO intake 75% or greater,by next RD assessment       Nutrition Monitoring and Evaluation:   Behavioral-Environmental Outcomes: None identified  Food/Nutrient Intake Outcomes: Food and nutrient intake,Supplement intake  Physical Signs/Symptoms Outcomes: Biochemical data,Hemodynamic status,Meal time behavior,Nutrition focused physical findings,Skin,Weight    Discharge Planning:    Continue current diet    Ally Ba RD

## 2022-07-19 NOTE — DIABETES MGMT
Diabetes Patient/Family Education Record    Factors That May Influence Patients Ability to Learn or Comply with Recommendations   []   Language barrier    []   Cultural needs   []   Motivation    []   Cognitive limitation    []   Physical   []   Education    []   Physiological factors   []   Hearing/vision/speaking impairment   []   Roman Catholic beliefs    []   Financial factors   []  Other:   [x]  No factors identified at this time. Person Instructed:   [x]   Patient   []   Family   []  Other     Preference for Learning:   [x]   Verbal   [x]   Written   []  Demonstration     Level of Comprehension & Competence:    []  Good                                      [x] Fair                                     []  Poor                             []  Needs Reinforcement   [x]  Teach back completed    Education Component:   [x]  Medication management, including how to administer insulin (if appropriate) and potential medication interactions   Reinforced importance of taking insulin as prescribed, effect of missed doses on BG, and importance of regular PCP follow up for insulin management/adjustment. [x]  Nutritional management - [x] Obtained usual meal pattern   []   Basic carbohydrate counting  [x]  Plate method  [x]  Limit concentrated sweets and avoid sweetened beverages  [x]  Portion control  []    Avoid skipping meals   []  Exercise   []  Signs, symptoms, and treatment of hyperglycemia and hypoglycemia   [] Prevention, recognition and treatment of hyperglycemia and hypoglycemia   [x]  Importance of blood glucose monitoring  [x] Blood Glucose targets   []   Provided patient with blood glucose meter  [x]  Has glucometer and supplies at home  Patient uses a OT Verio meter - reports that he is almost out of test strips, however he usually gets them through his regular pharmacy, [facility].  Instructed to contact PCP for new Rx if needed.    []  Instruction on use of the blood glucose meter and recommended monitoring schedule   [x]  Discuss the importance of HbA1C monitoring. Patients A1c is _8.6__ %.  This is equivalent to average glucose of _200__ mg/dl for the past 2-3 months.   []  Sick day guidelines   []  Proper use and disposal of lancets, needles, syringes or insulin pens (if appropriate)   []  Potential long-term complications (retinopathy, kidney disease, neuropathy, foot care)   [] Information about whom to contact in case of emergency or for more information    [x]  Goal:  Patient/family will demonstrate understanding of Diabetes Self- Management Skills by: (date) _8/19/2022______  Plan for post-discharge education or self-management support:    [x] Outpatient class schedule provided            [] Patient Declined    [] Scheduled for outpatient classes (date) _______    [x] Written information provided  Verify: [x] Prior to admission Diabetes medications    Does patient understand how diabetes medications work? ______yes______________________  Does patient have difficulty obtaining diabetes medications or testing supplies? ______No___________    Donald Hong RN, BSN, [facility]  Professional   Glycemic Control Team   Phone:  [phone]  Tues - Thurs 8:30 - 4:30

## 2022-07-19 NOTE — PROGRESS NOTES
Nephrology Progress note    Subjective:     Phill Yang is a 60 y.o. male with  a PMH of DM, HTN, CAD, ESRD on HD TTS admitted for right foot infection. MRI suggested osteomyelitis. No fever, chills. Pt s/p debridement, I&D- on abx.     HD Catheter fell off and was replaced 6/29.       -Pt denies CP/SOB/Fever/Chills.  Seen on HD.       Admit Date: 6/27/2022  Principal Problem:    Acute hematogenous osteomyelitis of right foot ([facility] (6/28/2022)    Active Problems:    Diabetes mellitus with foot ulcer ([facility] (6/27/2022)      CAD (coronary artery disease) (6/28/2022)      ESRD (end stage renal disease) ([facility] (6/28/2022)      Cellulitis of right heel (6/28/2022)      Diabetic foot ulcer with osteomyelitis ([facility] [facility] (6/28/2022)      Ulcer of right heel, with necrosis of bone ([facility] [facility] (6/28/2022)      Infection and inflammatory reaction due to internal fixation device of other site, initial encounter ([facility] (7/5/2022)      Left arm swelling (7/10/2022)      Current Facility-Administered Medications   Medication Dose Route Frequency    nystatin (MYCOSTATIN) 100,000 unit/gram powder   Topical BID    Saccharomyces boulardii (FLORASTOR) capsule 250 mg  250 mg Oral BID    isosorbide mononitrate ER (IMDUR) tablet 30 mg  30 mg Oral DAILY    bacitracin zinc (BACITRACIN) 500 unit/gram ointment   Topical BID    pantoprazole (PROTONIX) tablet 40 mg  40 mg Oral ACB    alteplase (CATHFLO) 1 mg in sterile water (preservative free) 1 mL injection  1 mg InterCATHeter DIALYSIS PRN    nitroglycerin (NITROSTAT) tablet 0.4 mg  0.4 mg SubLINGual PRN    morphine injection 1 mg  1 mg IntraVENous Q4H PRN    ondansetron (ZOFRAN) injection 4 mg  4 mg IntraVENous Q6H PRN    gabapentin (NEURONTIN) capsule 300 mg  300 mg Oral QHS    heparin (porcine) injection 5,000 Units  5,000 Units SubCUTAneous Q8H    melatonin (rapid dissolve) tablet 10 mg  10 mg Oral QHS PRN    epoetin lisette-epbx (RETACRIT) injection 10,000 Units  10,000 Units SubCUTAneous Q TUE, THU & SAT    heparin (porcine) 1,000 unit/mL injection 3,400 Units  3,400 Units Hemodialysis DIALYSIS PRN    heparin (porcine) 100 unit/mL injection 500 Units  500 Units InterCATHeter Q8H PRN    sodium chloride (NS) flush 5-40 mL  5-40 mL IntraVENous Q8H    sodium chloride (NS) flush 5-40 mL  5-40 mL IntraVENous PRN    naloxone (NARCAN) injection 0.1 mg  0.1 mg IntraVENous Multiple    loperamide (IMODIUM) capsule 2 mg  2 mg Oral Q4H PRN    piperacillin-tazobactam (ZOSYN) 4.5 g in 0.9% sodium chloride (MBP/ADV) 100 mL MBP  4.5 g IntraVENous Q12H    sevelamer carbonate (RENVELA) tab 1,600 mg  1,600 mg Oral TID WITH MEALS    allopurinoL (ZYLOPRIM) tablet 100 mg  100 mg Oral DAILY    carvediloL (COREG) tablet 12.5 mg  12.5 mg Oral BID    ezetimibe (ZETIA) tablet 10 mg  10 mg Oral DAILY    amLODIPine (NORVASC) tablet 10 mg  10 mg Oral DAILY    vit B Cmplx 3-FA-Vit C-Biotin (NEPHRO NIKITA RX) tablet 1 Tablet  1 Tablet Oral DAILY    insulin lispro (HUMALOG) injection 3 Units  3 Units SubCUTAneous TIDAC    aspirin chewable tablet 81 mg  81 mg Oral DAILY    insulin glargine (LANTUS) injection 10 Units  10 Units SubCUTAneous QHS    insulin lispro (HUMALOG) injection   SubCUTAneous AC&HS    glucose chewable tablet 16 g  4 Tablet Oral PRN    glucagon (GLUCAGEN) injection 1 mg  1 mg IntraMUSCular PRN    dextrose 10% infusion 0-250 mL  0-250 mL IntraVENous PRN    acetaminophen (TYLENOL) tablet 650 mg  650 mg Oral Q6H PRN         Allergy:   No Known Allergies     Objective:     Visit Vitals  /63 (BP 1 Location: Right upper arm, BP Patient Position: At rest;Lying)   Pulse 72   Temp 97.4 °F (36.3 °C)   Resp 20   Ht 6' (1.829 m)   Wt 106 kg (233 lb 11 oz)   SpO2 99%   BMI 31.69 kg/m²         Intake/Output Summary (Last 24 hours) at 7/19/2022 1515  Last data filed at 7/18/2022 1955  Gross per 24 hour   Intake 360 ml   Output --   Net 360 ml       Physical Exam:       General: No acute distress   HENT: Atraumatic and normocephalic   Eyes: Normal conjunctiva   Neck: Supple No JVD   Cardiovascular: Normal S1 & S2, no m/r/g   Pulmonary/Chest Wall: Clear to auscultation bilaterally   Abdominal: Soft and non-tender   Musculoskeletal: Wound Vac on R foot   Neurological: AA and O X 3, No focal deficits     RIJ TDC in Place     Data Review:  Lab Results   Component Value Date/Time    Sodium 138 07/18/2022 01:30 AM    Potassium 5.9 (H) 07/18/2022 01:30 AM    Chloride 104 07/18/2022 01:30 AM    CO2 26 07/18/2022 01:30 AM    Anion gap 8 07/18/2022 01:30 AM    Glucose 187 (H) 07/18/2022 01:30 AM    BUN 59 (H) 07/18/2022 01:30 AM    Creatinine 8.67 (H) 07/18/2022 01:30 AM    BUN/Creatinine ratio 7 (L) 07/18/2022 01:30 AM    GFR est AA 8 (L) 07/18/2022 01:30 AM    GFR est non-AA 6 (L) 07/18/2022 01:30 AM    Calcium 8.3 (L) 07/18/2022 01:30 AM     Lab Results   Component Value Date/Time    WBC 11.2 07/18/2022 01:30 AM    HGB 8.8 (L) 07/18/2022 01:30 AM    HCT 28.3 (L) 07/18/2022 01:30 AM    PLATELET 012 21/53/4500 01:30 AM    .8 (H) 07/18/2022 01:30 AM     Lab Results   Component Value Date/Time    Calcium 8.3 (L) 07/18/2022 01:30 AM    Phosphorus 4.8 07/11/2022 05:30 AM     No results found for: IRON, FE, TIBC, IBCT, PSAT, FERR  No results found for: FERR      Impression:   -ESRD on HD TTS  -Diabetic foot ulcer with osteomyelitis, on abx   -DM  -HTN  -Anemia in CKD  -SHPT  -Hyperphosphatemia: on Renvela       Plan:   HD today  DALTON TIW  Cardiac stress test result pending            MD Bonifacio Kelley  [phone]

## 2022-07-19 NOTE — OP NOTES
[facility] [place]  OPERATIVE REPORT    Name:  Samm Crabtree  MR#:   [id number]  :  1960  ACCOUNT #:  [de-identified]  DATE OF SERVICE:  2022    LOCATION:  OPS. PREOPERATIVE DIAGNOSES  1. Diabetic with spontaneous rupture of the right Achilles tendon secondary to infection. 2.  Chronic multifocal osteomyelitis of the right calcaneus. 3.  Acquired short right Achilles tendon deformity. 4.  Ulcer of the right heel and ankle with necrosis of muscle and bone. 5.  Bone infection due to internal orthopedic implant, right calcaneus. POSTOPERATIVE DIAGNOSES:  1. Diabetic with spontaneous rupture of the right Achilles tendon secondary to infection. 2.  Chronic multifocal osteomyelitis of the right calcaneus. 3.  Acquired short right Achilles tendon deformity. 4.  Ulcer of the right heel and ankle with necrosis of muscle and bone. 5.  Bone infection due to internal orthopedic implant, right calcaneus. PROCEDURES PERFORMED:  1. Repair primary right Achilles tendon. 2.  Partial excision, right calcaneus secondary to osteomyelitis. 3.  Right lengthening of the Achilles tendon with a Z-plasty technique secondary to short Achilles tendon. 4.  Removal of deep orthopedic implant, right tibia, callus, and calcaneus secondary to bone infection of the calcaneus. The implant was a long screw. 5.  Application of skin substitute which is a [employer] Stravix graft, right posterior heel and ankle to cover the Achilles tendon and heel wound. SURGEON:  Radha Roberto DPM    ASSISTANT:  Meghan Bearden    ANESTHESIA:  General with local consisting of 8 mL of 0.5% Marcaine with epinephrine given to the right posterior ankle and heel area. HEMOSTASIS:  There was no tourniquet used in this procedure. COMPLICATIONS:  None. SPECIMENS REMOVED:  Screw from the right calcaneus. IMPLANTS:  None. ESTIMATED BLOOD LOSS:  Minimal, less than 5 mL. MATERIALS:  Nation and [employer] graft. Also, 3-0 Vicryl and 3-0 nylon. INJECTABLES:  None. INDICATIONS FOR PROCEDURE:  The patient is a 79-year-old male diabetic, who was admitted to [facility] on 06/27/2022 for evaluation and treatment of right heel and ankle open wound with osteomyelitis of the calcaneus, also deep screw into the infected calcaneus and spontaneous rupture of the right Achilles tendon and open wound. All conservative treatments have failed to offer the patient adequate relief and the patient desires surgical correction. The planned procedures were explained to the patient in detail including all risks, benefits and possible complications and the patient still desires surgical correction. Medical clearance was obtained prior to surgery. Consent was signed and on chart. All the patient's questions were answered and no guarantees were given. Lastly, the patient was asked if he had any history of bleeding disorders, blood clots, DVTs or sickle cell anemia and he stated that he had none. It was also explained to the patient that this procedure would be considered a salvage procedure with no guarantees being given due to the fact of the severe infection of his right calcaneus and also the ruptured Achilles tendon and past surgical procedures. The patient stated that he understood this and agreed. PROCEDURE:  The patient was brought to the operating room and placed on the operating table in the supine position. The patient was then transferred to a prone position. The right calf area, ankle, and heel were then anesthetized using 8 mL of 0.5% Marcaine with epinephrine. The right lower extremity was then prepped and draped in the usual sterile manner. Again, no tourniquet was used in this procedure.     Next, attention was then drawn to the patient's anterior ankle, where a 3-cm linear skin incision was made and then deepened down to bone to expose the head of a previously placed screw that extends into the tibia, callus, and calcaneus. Unfortunately, the screw extends into the infected body of the calcaneus and must be removed in order to allow the 6 weeks of IV antibiotics to cure his osteomyelitis in his calcaneus. Therefore, the proper star screwdriver was then used to remove this deep screw. The skin incision was then closed using 3-0 nylon in a simple interrupted technique. Attention was then drawn to the posterior right ankle and heel area where a large open wound, approximately 7 cm x 6 cm deep to bone was then observed. Next, the orange antibiotic beads were all then removed from the previous surgery. Next, a #10 blade was then used to remove some of the dark, necrotic tissue at the posterior right heel and ankle area. This exposed the posterior calcaneus and also showed the ruptured Achilles tendon at its attachment. The surface of the calcaneal bone was then noted to have several 2 cm to 3 cm sections of soft gray bone that appeared to be nonviable in nature due to the osteomyelitis. Therefore, a partial excision of calcaneal bone was then done using an osteotome and a bone curette to remove all necrotic bone from the posterior aspect of the right calcaneus down to healthy bleeding tissue. Next, the ruptured distal Achilles tendon at the calcaneal attachment was then observed and there was noted to be an approximately 2 cm to 3 cm necrotic tendon section that was then excised at the end of the Achilles tendon with a #10 blade. This was done up to healthy bleeding tendon. This was done in preparation to prep the tendon for repair after this debridement of the tendon. This ended up leaving approximately a 4 cm gap at the distal Achilles tendon measured down to its attachment at the calcaneus. At this point, the blood supply of the Achilles tendon appeared to be adequate.   The distal end of the Achilles tendon was then grasped using an Allis, hemostat and then mobilized to try to reapproximate the end of the Achilles tendon down to its calcaneal attachments. At this point, the gap measured approximately 3 cm which still remained due to his short Achilles tendon. Therefore, the Achilles tendon needed to be lengthened. Next, a #10 blade was then used to make an approximately 5-cm linear incision of the back of the patient's left ankle area to expose the Achilles tendon. A second #10 blade was then used to deepen this incision to expose the Achilles tendon. A Z-plasty technique was then used to length the Achilles tendon. The Achilles tendon was then sutured and repaired using 3-0 Vicryl in a running technique. This procedure was done to reduce the Achilles tendon gap distally and it was found to be totally reduced with the Achilles tendon able to repair into the calcaneus. Next, attention was then drawn to the distal Achilles tendon for reattachment through the remaining portion of the calcaneus. Normally, bone anchors would be used, but they could not be used due to calcaneal osteomyelitis or infection. Therefore, 3-0 nylon was then used for reattachment of the distal Achilles tendon back to the calcaneus using a Wheelwright suture technique. Due to the large original open ulcer or wound in the back of the left heel and ankle, there was no way to close this wound with skin and also close over the exposed Achilles tendon. Therefore, the surgical wound was then prepped using a #10 blade and [employer] ultrasonic high-pressure water debridement system. This was used to removed any necrotic and remaining nonviable tissue, down to healthy bleeding bone and soft tissue. This was done to complete the surgical wound prep. This was also done to accept the Stravix graft. Next, two pieces of Smith and [employer] graft, 3 cm x 6 cm were then placed over the exposed Achilles tendon and calcaneus and sutured into place using 3-0 Vicryl in a simple interrupted technique.   The wound was then dressed using Adaptic, 4x4s, ABDs, Kerlix, and an Ace wrap. The patient was then transported to the recovery room with vital signs stable and neurovascular status returned to baseline for the patient's right lower extremity. POSTOPERATIVE CHECK. The patient was later seen in the recovery room with his dressings clean, dry, and intact with no bleeding noted to his right heel or ankle. The patient stated that he had no pain. The patient's capillary refill time was then checked for all five toes and found to be less than 4 seconds for each toe. The patient was later returned to the room medical floor, third floor, for continued IV antibiotic treatment and medical care. Dr. Jeanne Villalobos will follow this patient as needed. A wound VAC will be planned to be placed on his right heel wound tomorrow to complete his dressing.       NICOLLE [id number]  D:  07/18/2022 18:07  T:  07/19/2022 6:47  JOB #:  [id number]

## 2022-07-19 NOTE — PROGRESS NOTES
INSURANCE AUTH WILL HAVE TO BE UPDATED    D/C Plan: [facility]      Noted pt had a positive stress test.  Pt is pending further work up.   Pt will need current PT and OT notes with in a 48 hour period submitted to pt's insurance company to assist with obtaining authorization for SNF.   Anticipate pt will transition to the above facility once pt is medically stable and  insurance auth has been obtained.  Pt will arrange handi ride to transport him to and from dialysis from the facility.  CM to continue to follow and assist.

## 2022-07-19 NOTE — PROGRESS NOTES
1100 - Bedside shift report received from \[facility]\"". Assumed care of patient. Patient noted resting in bed at this time. Call light in reach. 1420 - Assessment completed as per flowsheet. Patient alert and oriented x4. Patient on 2L via NC. No s/s of any respiratory distress at this time. CPAP at bedside. Continues with excoriation to groin/sacrum. Wound vac intact to right heel. Dressings remain intact to right foot/ankle. Unna boots to bilateral feet. Dialysis catheter intact to right chest. IV access to right upper chest. Patient resting in bed with call light in reach.

## 2022-07-19 NOTE — PROGRESS NOTES
Hospitalist Progress Note-critical care note     Patient: Ulysses Lung MRN: [id number]  CSN: [id number]    YOB: 1960  Age: 60 y.o. Sex: male    DOA: 6/27/2022 LOS:  LOS: 22 days            Chief complaint: cad , esrd on hd, OM , cellulitis , DM ,     Assessment/Plan         Hospital Problems  Date Reviewed: 6/28/2022          Codes Class Noted POA    Left arm swelling ICD-10-CM: M79.89  ICD-9-CM: 729.81  7/10/2022 Unknown        Infection and inflammatory reaction due to internal fixation device of other site, initial encounter [facility]) ICD-10-CM: P67.24QY  ICD-9-CM: 996.67  7/5/2022 Unknown        CAD (coronary artery disease) ICD-10-CM: I25.10  ICD-9-CM: 414.00  6/28/2022 Unknown        ESRD (end stage renal disease) ([facility] ICD-10-CM: N18.6  ICD-9-CM: 585.6  6/28/2022 Unknown        * (Principal) Acute hematogenous osteomyelitis of right foot ([facility] ICD-10-CM: M86.071  ICD-9-CM: 730.07  6/28/2022 Unknown        Cellulitis of right heel ICD-10-CM: L03.115  ICD-9-CM: 682.7  6/28/2022 Unknown        Diabetic foot ulcer with osteomyelitis ([facility] ICD-10-CM: E11.621, E11.69, L97.509, M86.9  ICD-9-CM: 250.80, 707.15, 730.27, 731.8  6/28/2022 Unknown        Ulcer of right heel, with necrosis of bone ([facility] ICD-10-CM: L97.414  ICD-9-CM: 707.14, 730.17  6/28/2022 Unknown        Diabetes mellitus with foot ulcer ([facility] ICD-10-CM: E11.621, L97.509  ICD-9-CM: 250.80, 707.15  6/27/2022 Unknown                 Sb Brown a 60 y. o. male who history, stent, hypertension, Charcot's foot presents with worsening pain and swelling and chills in his right foot despite multiple cleanings and being managed by podiatry as outpatient.       Chest pain cardiologist consulted stress test today -discussed with dr. Finesse Arias -like positive -he will discuss with pt about cath     Large necrotic ulcer right posterior heel with osteomyelitis of the calcaneus and deep abscess-  I&D and grafts done,    Podiatrist and ID f/u Tunnel line placed ,continue iv abx and local wound care      Diabetes mellitus -  On  lantus 10units and  premeal insulin and continue ssi better controlled and will continue current regimen      End-stage renal disease on dialysis Tuesday Thursday and Saturday -  S/p [facility] [place], operated by [facility] replacement   HD per nephrology , received HD yesterday and tolerated well      History of coronary artery disease-  Restart coreg and aspirin      Chronic compressive myelopathy pending surgery -  Supportive care    mis continue cpap     Subjective: no chest pain now, I got stress test today     Disposition :d/c hold b/c positive stress test   Review of systems:    General: No fevers or chills. Cardiovascular: No chest pain or pressure. No palpitations. Pulmonary: No shortness of breath. Gastrointestinal: No nausea, vomiting. Vital signs/Intake and Output:  Visit Vitals  BP (!) (P) 185/95   Pulse (P) 90   Temp 97.9 °F (36.6 °C)   Resp (P) 20   Ht 6' (1.829 m)   Wt 106 kg (233 lb 11 oz)   SpO2 99%   BMI 31.69 kg/m²     Current Shift:  No intake/output data recorded. Last three shifts:  07/17 1901 - 07/19 0700  In: 720 [P.O.:720]  Out: -     Physical Exam:  General: WD, WN. Alert, cooperative, no acute distress    HEENT: NC, Atraumatic. PERRLA, anicteric sclerae. Lungs: CTA Bilaterally. No Wheezing/Rhonchi/Rales. HD cath noted   Heart:  Regular  rhythm,  No murmur, No Rubs, No Gallops  Abdomen: Soft, Non distended, Non tender. +Bowel sounds,   Extremities: No c/c, bilateral feet covered with protect boots and wrapped with ace , wound vac noted   Psych:   Not anxious or agitated. Neurologic:  No acute neurological deficit.              Labs: Results:       Chemistry Recent Labs     07/18/22  0130   *      K 5.9*      CO2 26   BUN 59*   CREA 8.67*   CA 8.3*   AGAP 8   BUCR 7*      CBC w/Diff Recent Labs     07/18/22  0130   WBC 11.2   RBC 2.78*   HGB 8.8*   HCT 28.3*      GRANS 73   LYMPH 11*   EOS 5 Cardiac Enzymes No results for input(s): CPK, CKND1, PAULO in the last 72 hours. No lab exists for component: CKRMB, TROIP   Coagulation No results for input(s): PTP, INR, APTT, INREXT, INREXT in the last 72 hours. Lipid Panel Lab Results   Component Value Date/Time    Cholesterol, total 188 07/19/2022 03:12 AM    HDL Cholesterol 42 07/19/2022 03:12 AM    LDL, calculated 131.2 (H) 07/19/2022 03:12 AM    VLDL, calculated 14.8 07/19/2022 03:12 AM    Triglyceride 74 07/19/2022 03:12 AM    CHOL/HDL Ratio 4.5 07/19/2022 03:12 AM      BNP No results for input(s): BNPP in the last 72 hours. Liver Enzymes No results for input(s): TP, ALB, TBIL, AP in the last 72 hours. No lab exists for component: SGOT, GPT, DBIL   Thyroid Studies No results found for: T4, T3U, TSH, TSHEXT, TSHEXT     Procedures/imaging: see electronic medical records for all procedures/Xrays and details which were not copied into this note but were reviewed prior to creation of Plan    XR FOOT RT AP/LAT    Result Date: 6/29/2022  EXAM: FOOT SERIES Indication: Postoperative. Technique: Frontal and lateral views of the right foot. Comparison: 6/27/2022 _______________ FINDINGS: -OSSEOUS: Interval postoperative changes of posterior calcaneal osteotomy with placement of adjacent drug-eluting beads at the osteotomy site. Overlying bandage material partially degrades evaluation of the osteotomy site. The anterior approach tibial calcaneal screw tip projects at or slightly beyond the osteotomy site, difficult to evaluate given overlying leads and bandage material. Redemonstration of mid and hindfoot deformity, with cannulated screws, and loss of normal definable anatomy. There is extensive periosteal thickening and irregularity of the first metatarsal, with unchanged plantar displacement distal screw head, with adjacent bone fragment. There is diffuse lucency surrounding the proximal and distal thirds of the indwelling screw.  There is diffuse periosteal thickening of the posterior distal talus. No acute displaced fracture is identified. -SOFT TISSUES: Diffuse soft tissue edematous changes, with numerous drug-eluting beads at the posterior aspect of the calcaneal osteotomy. Diffuse atherosclerotic changes. 1. Interval posterior calcaneal osteotomy with adjacent drug-eluting beads and bandage material. Otherwise, no acute osseous abnormality. XR FOOT RT AP/LAT    Result Date: 6/27/2022  EXAM:  XR FOOT RT AP/LAT INDICATION:   right foot pain/wound with odor COMPARISON:  None. FINDINGS: 2 views of the right foot demonstrate chronic deformity, fragmentation, and collapse of the mid/hindfoot. Orthopedic screw in traversing the first ray with periprosthetic lucency. Additional hardware in the mid foot and ankle/hindfoot noted as well. Soft tissue defect seen along the calcaneus posteriorly with adjacent cortical irregularity of the calcaneal cortex. Irregular destructive appearance noted of the distal fifth metatarsal.     Ulceration overlying the heel with likely exposed calcaneus highly suggestive of osteomyelitis with adjacent cortical irregularity and sclerosis. Periprosthetic lucency along the first ray orthopedic screw. Periprosthetic infection and/or loosening suggested. Chronic deformity and collapse of the mid/hindfoot and ankle compatible with Charcot arthropathy. Irregular cortical destructive change noted of the distal fifth metatarsal head possibly related to this process as well although superimposed infection not excluded. Bashir Ra MRI FOOT RT WO CONT    Result Date: 6/28/2022  ============================ [facility] ============================ HISTORY: -From Provider:  right heel wound, r/o osteomyelitis -Additional: None TECHNIQUE: Sagittal T1, STIR and T2 fat-sat; axial PD and T2 with fat saturation; coronal T2 and STIR with fat saturation and axial oblique PD images of the right ankle were obtained.  COMPARISONS: None. FINDINGS: ----------- Postoperative changes are present in the mid and hindfoot, with sequela of prior fusion delineation of original osseous structures. Punctate foci of susceptibility artifact in the subcutaneous fat about the ankle would be consistent with postoperative changes, assuming gas is not identified on plain film. There is plantar settling of the tibia and fibula, in the setting of hindfoot/midfoot fusion and associated postoperative deformity/remodeling. Susceptibility artifact from fixation hardware is present about the ankle. There is a partially visualized, long partially threaded cannulated screw in the 1st digit, terminating in the hind foot. There is a small amount of fluid signal intensity surrounding the tip of the screw tracking posteriorly and inferiorly, with suggested tiny osseous fragments. There is an oblique partially threaded cannulated screw from the lateral tibia to the dorsal calcaneus. There is surrounding T1 hypointense, STIR hyperintense dorsal calcaneal marrow signal, extending to the discontinuous calcaneal cortex, deep to the skin defect/ulceration. There are 2 oblique partially threaded cannulated screws in the fused hindfoot, extending from plantar aspect of the medial cuneiform and residual cuboid to the to the distal tibia. There is fatty change in the musculature about the ankle. Feathery hyperintensity is present in the distal calf musculature above the ankle. Retracted FHL tendon stump cannot be followed distal to the ankle. Peroneus longus and brevis appear discontinuous at the ankle. There is ill-defined soft tissue thickening, severe cartilage loss in marrow hyperintensity on both sides of the 2nd and 3rd and minimally at the 4th and 5th tarsometatarsal joints.  Anterior tendons and Achilles tendons unremarkable within limits of exam. OTHER: Plantar fascia: Undulating/thinned plantar aponeurosis to insertion on inferior calcaneal enthesophyte.     -------------- 1. Posterior heel skin defect, at the margin of the Achilles tendon attachment on the calcaneus, in keeping with known ulcer. Infiltration of subjacent subcutaneous fat in keeping with cellulitis. 2. Cortical discontinuity and marrow signal abnormality in the subjacent dorsal calcaneus extending to the threaded tip of the oblique tibiocalcaneal screw is suspicious for osteomyelitis. 3. Fluid signal intensity surrounding the threaded tibial tip of the 1st digit long partially threaded screw, with osseous fragments at the inferior margin, concerning for loosening and/or infection. 4. Marrow signal abnormalities at the 2nd-3rd and to a lesser degree 4th-5th tarsometatarsal joint, potentially simply related to degenerative/Charcot arthropathy. In view of marrow signal abnormalities, infection cannot be excluded if clinically suspected. If clinically warranted, consider correlation with nuclear medicine imaging to further assess. 5. Edema/myositis in the musculature above the ankle. There is discontinuity of FHL and peroneal tendons, best correlated with operative findings/history of prior intervention for significance/chronicity. Fatty change in the musculature about the foot. XR CHEST PORT    Result Date: 6/27/2022  CLINICAL: Chest pain. COMPARISON: December 1, 2008. A portable view of the chest from 1909 hours: Mild elevation left hemidiaphragm. No focal airspace density. Pulmonary vascular congestion. Right IJ PermCath. No pneumothorax. Pulmonary vascular congestion. No focal airspace densities. DUPLEX LOWER EXT VENOUS BILAT    Result Date: 6/28/2022  · No evidence of deep vein thrombosis in the left lower extremity. · No evidence of deep vein thrombosis in the right lower extremity.       DUPLEX LOWER EXT ARTERY BILAT    Result Date: 6/28/2022  · Bilateral tibial arteries are patnet at the ankle but with monophasic doppler signal. · Bilateral anterior tibial artery is occluded proximally but reconstituted distally by the collateral flow.         Lesly Diaz MD

## 2022-07-19 NOTE — OP NOTES
[facility]  OPERAT[facility] REPORT    Name:  Jane Gallegos  MR#:   [id number]  :  1960  ACCOUNT #:  [de-identified]  DATE OF SERVICE:  2022    LOCATION:  OPS. PREOPERATIVE DIAGNOSES  1. Diabetes mellitus with osteomyelitis of the right calcaneus. 2.  Cellulitis with abscess of the right heel. 3.  Grade IIIB [employer] necrotic ulcer of the right heel. POSTOPERATIVE DIAGNOSES  1. Diabetes mellitus with osteomyelitis of the right calcaneus. 2.  Cellulitis with abscess of the right heel. 3.  Grade IIIB [employer] necrotic ulcer of the right heel. PROCEDURES PERFORMED  1. Incision bone cortex right calcaneus with implantation of antibiotic beads secondary to osteomyelitis. 2.  Incision and drainage deep/complicated, right heel. 3.  Surgical preparation of right heel wound for planned Stravix graft in 1-1/2 weeks to the right heel. 4.  Multiple bone biopsies of the right calcaneus secondary to osteomyelitis, done as a separate procedure. SURGEON:  Quang Goodman DPM    ASSISTANT:  Oliverio Zavala. ANESTHESIA:  General with local consisting of 10 mL of 0.5% Marcaine with epinephrine given to the right heel. COMPLICATIONS:  None. HEMOSTASIS:  None. There was no tourniquet used in this procedure. SPECIMENS REMOVED:  A portion of the right calcaneus, given bone biopsies and also wound cultures. MATERIALS:  Stimulan Rapid Cure antibiotic beads with vancomycin and Rocephin were used. Also, aerobic and anaerobic cultures were used. INJECTABLES:  None. IMPLANTS:  Stimulan antibiotic beads. ESTIMATED BLOOD LOSS:  Minimal, less than 10 mL. INDICATIONS FOR PROCEDURE:  The patient is a 39-year-old diabetic male, who was admitted to the [facility] on 2022 for evaluation and treatment of open and infected ulcer with abscess of the right heel, also osteomyelitis of the calcaneus.   The patient is on IV antibiotics and his MRI shows that the body of the calcaneus requires surgical correction secondary to chronic osteomyelitis. All conservative treatments have failed to offer the patient adequate relief and the patient desires surgical correction. The planned procedures were explained to the patient in detail including all risks, benefits and possible complications and the patient still desires surgical correction. Medical clearance was obtained prior to surgery. Consent was signed and on chart. All the patient's questions were answered. No guarantees were given. Lastly, the patient was asked if he had any history of bleeding disorders, blood clots, or sickle cell anemia and the patient stated that he had none. It should also be noted, at this time, that this will be the first of multiple planned surgeries to remove the infected bone and then apply stem cell mesenchymal Stravix graft to his right heel and followup planned procedures in approximately one and half weeks and this will be a staged and planned surgery ahead of time for his right heel to try and help this large open wound approximately 7 cm x 6 cm deep to bone on his right heel and this will be done to try to help this close up. PROCEDURE:  The patient was brought to the operating room and placed on the operating room table in the supine position. The patient's right heel was then anesthetized using 10 mL of 0.5% Marcaine with epinephrine as a local block to the right heel. Next, the patient was rotated prone. The patient's right foot was then prepped and draped in the usual sterile manner. Attention was then drawn to the posterior right heel area where there was noted a large necrotic open ulcer approximately 7 cm x 6 cm with a black gangrenous eschar over the ulcer. Next, a #10 blade was then used to debride away the gangrenous necrotic tissue of the right heel. This was done deep to bone.   Next, the incision and drainage of the right heel was then done using a #10 blade to make an approximately 5-cm linear skin incision at both the medial and lateral aspects of the right heel. A second #10 blade was then used to deepen these incisions down to muscle fascia and then the bone layer. Next, both aerobic and anaerobic cultures were then taken and sent to micro for Gram stain and sensitivity. Blunt dissection was then continued deeper into the hind foot to drain all the pus and infected exudate, both medial and laterally and superiorly and inferiorly in the heel. Next, the surgical site was then flushed with copious amounts of normal sterile saline. Next, a pulse lavage mixed with polymyxin was completed, and this was done to finish the deep incision and drainage complex of the right heel. Next, the body of the calcaneus was then inspected and found to be soft at the posterior aspect and gray in nature. Therefore, an osteotome and a bone curette was then used to incise the bone cortex and then complete saucerization and curettage of the right calcaneus. This was done to remove all soft, gray, necrotic, infected calcaneal bone down to healthy bleeding bone. The remaining portion of the calcaneus was then inspected and found to be hard and healthy looking. Next, a rongeur was then used to take multiple bone biopsies deep into the calcaneus to send for Pathology and also micro. This was done to help the Infectious Disease doctor reference the correct antibiotic for long-term IV antibiotic treatment to try and cure the osteomyelitis with a PICC line. Next, Stimulan antibiotic beads were then fabricated and made with Rocephin and vancomycin. The antibiotic beads were then pressed into the large deficits in the posterior aspect of the right calcaneus. This was done to try to eradicate and try to get rid of the osteomyelitis.     Next, a surgical wound prep was then performed in preparation for the mesenchymal stem cell Calera and [employer] graft that will be placed on the right heel in one and half weeks. This is staged or planned procedure and this will try to cover over the exposed calcaneus bone and try to get the large, now 7 cm x 6 cm x 2.5 cm deep wound to close the patient's right heel. The wound was then dressed with Xeroform, 4x4s, ABD, Kerlix, and and Ace wrap. The patient was then transported to the recovery room with vital signs stable and neurovascular status returned to baseline for the patient's right heel and foot. The patient tolerated the procedures and anesthesia well with no complications. POSTOPERATIVE CHECK:  The patient was later seen in the recovery room with his dressings clean, dry and intact with no bleeding noted to his right foot. He stated that he had no pain. The patient was then returned to his room on the medical floor for continued IV antibiotic and medical care. Dr. Sangeetha Cardona will follow this patient as needed.       Aayush Allison DPM      [id number]  D:  07/18/2022 16:26  T:  07/19/2022 6:29  JOB #:  [id number]

## 2022-07-19 NOTE — PROGRESS NOTES
[facility]  (A Division of [address] [place] [address])                                                                                                                     Dr Heidy Azul #: - Option # [phone]  Fax #: [phone]     Date of Admission: 6/27/2022Date of Note: 7/19/2022  Reason for Referral: Evaluation and antibiotic management of R heel infection    Current Antimicrobials:    Prior Antimicrobials:  Zosyn 6/27 to date   Bactrim 1 month ago       Assessment- ID related:  --------------------------------------------------------------------------  · Increasing inflammatory markers  --DW wound care-> his wounds are improving, graft taking. Issues with CP ongoing GEORGE noted  · DFU and OM R heel  S/P I and D 6/28:  Large right heel necrotic ulcer with osteomyelitis of the calcaneus, and deep abscess  --Proteus/Mroganella/ E.fecalis / alpha strep and Bacteroides on culture  --S/P OR 7/5/22- R heel I and D with grafting, removal of screw. No culture sent  · Subacute/chronic OM of heel-- over 2 months time  · Leucocytosis 2/2 above  --BCX 6/27: NGSF  --WCX-- GNR and GPC in pairs--> pending  · ESRD on HD TTS  · Obese BMI of 28  · DM   · History of L hallux ampuation for infection- 5yrs ago  ESR 75-> >140 7/19  CRP 4.1- 7/7/22--> 6.2 7/18  R chest TDC is for his HD  R IV line for his ABX- 7/8/22 Recommendation for ID issues I am following:  --------------------------------------------------------------------------------  --could be other non infectious issues contributing for elevated inflammatory markers. . will continue periodic change without change    Wound care/dressing per Dr Senia Patterson     OPAT with Zosyn 2.25gm Q8 hour X 6 weeks abx ( from 6/28).  End: August 8  OPAT to be arranged for above by CM with SNF    Weekly labs- cbc w/diff, LFT, quantitative CRP on Mon  Monitor above labs for ABX adverse effects  Fax labs to Dr Alisha Lundy-- [phone]     FU in 2-3 weeks in ID clinic on DC    If the above rx fails,  Likely needs BKA       Subjective:  Pt seen, is resting post stress test. No complaints of abd pain  Formed and fewer BM per nursing  No fevers    DW wound care RN, wound pictures reviewed       HPI:  Diamond Martinez is a 60 y.o. BLACK/ with PMH as listed below-- he had ulcer for 2 months or so that he was followed by as OP by Podiatry. His heel wound progressed and was advised to come to ED yesterday. Had foot pain and fould drainage, but denies high F/C/R/SOB/ N/V prior to admission. BCX/WCX taken in ED, started on Zosyn and plan was to hold off abx and monitor until surgery. Admission assessment there was high concern for sepsis and Zosyn was continued. Hx of MRSA infection and treatment few years ago. Active Hospital Problems    Diagnosis Date Noted    Left arm swelling 07/10/2022    Infection and inflammatory reaction due to internal fixation device of other site, [facility] 07/05/2022    CAD (coronary artery disease) 06/28/2022    ESRD (end stage renal disease) ([facility] 06/28/2022    Acute hematogenous osteomyelitis of right foot ([address] 06/28/2022    Cellulitis of right heel 06/28/2022    Diabetic foot ulcer with osteomyelitis ([address] 06/28/2022    Ulcer of right heel, with necrosis of bone ([address] 06/28/2022    Diabetes mellitus with foot ulcer ([facility] 06/27/2022     Past Medical History:   Diagnosis Date    Diabetes mellitus     Hypertension      Past Surgical History:   Procedure Laterality Date    IR INSERT TUNL CVC W/O PORT OVER 5 YR  7/7/2022     History reviewed. No pertinent family history.   Social History     Socioeconomic History    Marital status:      Spouse name: Not on file    Number of children: Not on file    Years of education: Not on file    Highest education level: Not on file   Occupational History    Not on file   Tobacco Use    Smoking status: Not on file    Smokeless tobacco: Not on file   Substance and Sexual Activity    Alcohol use: Not on file    Drug use: Not on file    Sexual activity: Not on file   Other Topics Concern    Dental Braces Not Asked    Endoscopic Camera Pill Not Asked    Metallic Foreign Body Not Asked    Medication Patches Not Asked    Taking Feraheme Not Asked    Claustrophobic Not Asked   Social History Narrative    Not on file     Social Determinants of Health     Financial Resource Strain:     Difficulty of Paying Living Expenses: Not on file   Food Insecurity:     Worried About Running Out of Food in the Last Year: Not on file    Vane of Food in the Last Year: Not on file   Transportation Needs:     Lack of Transportation (Medical): Not on file    Lack of Transportation (Non-Medical): Not on file   Physical Activity:     Days of Exercise per Week: Not on file    Minutes of Exercise per Session: Not on file   Stress:     Feeling of Stress : Not on file   Social Connections:     Frequency of Communication with Friends and Family: Not on file    Frequency of Social Gatherings with Friends and Family: Not on file    Attends Orthodox Services: Not on file    Active Member of [address] [place] [address] or Organizations: Not on file    Attends Club or Organization Meetings: Not on file    Marital Status: Not on file   Intimate Partner Violence:     Fear of Current or Ex-Partner: Not on file    Emotionally Abused: Not on file    Physically Abused: Not on file    Sexually Abused: Not on file   Housing Stability:     Unable to Pay for Housing in the Last Year: Not on file    Number of [place] in the Last Year: Not on file    Unstable Housing in the Last Year: Not on file       Allergies:  Patient has no known allergies.      Medications:  Current Facility-Administered Medications   Medication Dose Route Frequency    nystatin (MYCOSTATIN) 100,000 unit/gram powder   Topical BID    Saccharomyces boulardii (FLORASTOR) capsule 250 mg  250 mg Oral BID    isosorbide mononitrate ER (IMDUR) tablet 30 mg  30 mg Oral DAILY    bacitracin zinc (BACITRACIN) 500 unit/gram ointment   Topical BID    pantoprazole (PROTONIX) tablet 40 mg  40 mg Oral ACB    alteplase (CATHFLO) 1 mg in sterile water (preservative free) 1 mL injection  1 mg InterCATHeter DIALYSIS PRN    nitroglycerin (NITROSTAT) tablet 0.4 mg  0.4 mg SubLINGual PRN    morphine injection 1 mg  1 mg IntraVENous Q4H PRN    ondansetron (ZOFRAN) injection 4 mg  4 mg IntraVENous Q6H PRN    gabapentin (NEURONTIN) capsule 300 mg  300 mg Oral QHS    heparin (porcine) injection 5,000 Units  5,000 Units SubCUTAneous Q8H    melatonin (rapid dissolve) tablet 10 mg  10 mg Oral QHS PRN    epoetin lisette-epbx (RETACRIT) injection 10,000 Units  10,000 Units SubCUTAneous Q TUE, THU & SAT    heparin (porcine) 1,000 unit/mL injection 3,400 Units  3,400 Units Hemodialysis DIALYSIS PRN    heparin (porcine) 100 unit/mL injection 500 Units  500 Units InterCATHeter Q8H PRN    sodium chloride (NS) flush 5-40 mL  5-40 mL IntraVENous Q8H    sodium chloride (NS) flush 5-40 mL  5-40 mL IntraVENous PRN    naloxone (NARCAN) injection 0.1 mg  0.1 mg IntraVENous Multiple    loperamide (IMODIUM) capsule 2 mg  2 mg Oral Q4H PRN    piperacillin-tazobactam (ZOSYN) 4.5 g in 0.9% sodium chloride (MBP/ADV) 100 mL MBP  4.5 g IntraVENous Q12H    sevelamer carbonate (RENVELA) tab 1,600 mg  1,600 mg Oral TID WITH MEALS    allopurinoL (ZYLOPRIM) tablet 100 mg  100 mg Oral DAILY    carvediloL (COREG) tablet 12.5 mg  12.5 mg Oral BID    ezetimibe (ZETIA) tablet 10 mg  10 mg Oral DAILY    amLODIPine (NORVASC) tablet 10 mg  10 mg Oral DAILY    vit B Cmplx 3-FA-Vit C-Biotin (NEPHRO NIKITA RX) tablet 1 Tablet  1 Tablet Oral DAILY    insulin lispro (HUMALOG) injection 3 Units  3 Units SubCUTAneous TIDAC    aspirin chewable tablet 81 mg  81 mg Oral DAILY    insulin glargine (LANTUS) injection 10 Units  10 Units SubCUTAneous QHS    insulin lispro (HUMALOG) injection   SubCUTAneous AC&HS    glucose chewable tablet 16 g  4 Tablet Oral PRN    glucagon (GLUCAGEN) injection 1 mg  1 mg IntraMUSCular PRN    dextrose 10% infusion 0-250 mL  0-250 mL IntraVENous PRN    acetaminophen (TYLENOL) tablet 650 mg  650 mg Oral Q6H PRN        ROS:  Pertinent items are noted in the History of Present Illness. Physical Exam:    Temp (24hrs), Av.9 °F (36.6 °C), Min:97.4 °F (36.3 °C), Max:98.4 °F (36.9 °C)    Visit Vitals  /63 (BP 1 Location: Right upper arm, BP Patient Position: At rest;Lying)   Pulse 72   Temp 97.4 °F (36.3 °C)   Resp 20   Ht 6' (1.829 m)   Wt 106 kg (233 lb 11 oz)   SpO2 99%   BMI 31.69 kg/m²      GEN: WD Obese, on RA--not in resp distress. Right chest TDC for HD    HEENT: Unicteric. EOMI intact  No neck swelling  CHEST: Non laboured breathing. ABD: Obese/soft. Non tender.    PASCUAL: Deferred  EXT: not examined today- dressed- wound vac in place--pictures reviewed  Skin: Dry and intact on limited exam  CNS: A, comfortable     Microbiology  All Micro Results     Procedure Component Value Units Date/Time    CULTURE, FUNGUS [[id number]] Collected: 22    Order Status: Completed Specimen: Heel Updated: 22 0825     Special Requests: NO SPECIAL REQUESTS        Culture result:       NO FUNGUS ISOLATED 19 DAYS          CULTURE, ANAEROBIC [[id number]]  (Abnormal) Collected: 22    Order Status: Completed Specimen: Heel Updated: 22 1637     Special Requests: NO SPECIAL REQUESTS        Culture result:       MODERATE Porphyromonas assacharolyticus (Bacteroides) BETA LACTAMASE POSITIVE          CULTURE, BLOOD [[id number]] Collected: 22 124    Order Status: Completed Specimen: Blood Updated: 2234     Special Requests: NO SPECIAL REQUESTS        Culture result: NO GROWTH 6 DAYS       CULTURE, BLOOD [[id number]] Collected: 22    Order Status: Completed Specimen: Blood Updated: 2234     Special Requests: NO SPECIAL REQUESTS        Culture result: NO GROWTH 6 DAYS       CULTURE, TISSUE Lavada Gambles STAIN [[id number]]  (Abnormal) Collected: 06/28/22 1926    Order Status: Completed Specimen: Bone Updated: 07/02/22 1221     Special Requests: NO SPECIAL REQUESTS        GRAM STAIN 2+ WBCS SEEN         NO ORGANISMS SEEN        Culture result: FEW ENTEROCOCCUS FAECALIS         SCANT PROTEUS VULGARIS         SCANT ALPHA STREPTOCOCCUS               SCANT Morganella morganii ssp morganii            REFER TO [facility] [id number] FOR SENSITIVITIES    CULTURE, Ivar Ackerman STAIN [[id number]]  (Abnormal)  (Susceptibility) Collected: 06/28/22 1925    Order Status: Completed Specimen: Heel Updated: 07/02/22 1003     Special Requests: NO SPECIAL REQUESTS        GRAM STAIN OCCASIONAL WBCS SEEN         NO ORGANISMS SEEN        Culture result: LIGHT PROTEUS VULGARIS               SCANT Morganella morganii ssp morganii                  MODERATE ENTEROCOCCUS FAECALIS          AFB CULTURE + SMEAR W/RFLX ID FROM CULTURE [[id number]] Collected: 06/28/22 1925    Order Status: Completed Specimen: Miscellaneous sample Updated: 06/30/22 1738     Source MISC.  WOUND        AFB Specimen processing Concentration     Acid Fast Smear Negative        Comment: (NOTE)  Performed At: [facility] & [address] [id number]  Sheryle Sicks MD Telugu:[id number]          Acid Fast Culture PENDING    CULTURE, Ivar Ackerman STAIN [[id number]] Collected: 06/27/22 1530    Order Status: Completed Specimen: Wound Drainage Updated: 06/29/22 1511     Special Requests: NO SPECIAL REQUESTS        GRAM STAIN RARE WBCS SEEN         2+ GRAM NEGATIVE RODS               1+ GRAM POSITIVE COCCI IN PAIRS           Culture result:       MODERATE  MIXED ENTERIC GRAM NEGATIVE RODS              HEAVY MIXED SKIN TO ISOLATED          AFB CULTURE + SMEAR W/RFLX ID FROM CULTURE [[id number]] Collected: 06/28/22 1930    Order Status: Canceled     CULTURE, ANAEROBIC [[id number]] Collected: 06/28/22 1926    Order Status: Canceled            Lab results:    Chemistry  Recent Labs     07/18/22  0130   *      K 5.9*      CO2 26   BUN 59*   CREA 8.67*   CA 8.3*   AGAP 8   BUCR 7*       CBC w/ Diff  Recent Labs     07/18/22  0130   WBC 11.2   RBC 2.78*   HGB 8.8*   HCT 28.3*      GRANS 73   LYMPH 11*   EOS 5       Imaging: report reviewed and as posted by radiologist   No results found for this or any previous visit. MRI:       1. Posterior heel skin defect, at the margin of the Achilles tendon attachment  on the calcaneus, in keeping with known ulcer. Infiltration of subjacent  subcutaneous fat in keeping with cellulitis.     2. Cortical discontinuity and marrow signal abnormality in the subjacent dorsal  calcaneus extending to the threaded tip of the oblique tibiocalcaneal screw is  suspicious for osteomyelitis.     3. Fluid signal intensity surrounding the threaded tibial tip of the 1st digit  long partially threaded screw, with osseous fragments at the inferior margin,  concerning for loosening and/or infection.     4. Marrow signal abnormalities at the 2nd-3rd and to a lesser degree 4th-5th  tarsometatarsal joint, potentially simply related to degenerative/Charcot  arthropathy. In view of marrow signal abnormalities, infection cannot be  excluded if clinically suspected. If clinically warranted, consider correlation with nuclear medicine imaging to  further assess.     5. Edema/myositis in the musculature above the ankle. There is discontinuity of  FHL and peroneal tendons, best correlated with operative findings/history of  prior intervention for significance/chronicity. Fatty change in the musculature  about the foot.

## 2022-07-19 NOTE — PROGRESS NOTES
Occupational Therapy Treatment Attempt     Chart reviewed. Attempted Occupational Therapy Treatment, however, patient unable to be seen due to:  []  Nausea/vomiting  []  Eating  []  Pain  []  Patient too lethargic  []  Off Unit for testing/procedure  [x]  Dialysis treatment in progress  []  Telemetry Results  []  Other:      Will f/u later as patient's schedule allows.   Min Barraza, OTR/L

## 2022-07-19 NOTE — PROGRESS NOTES
7/19/2022  PT treatment not completed due to:  [x] Off Unit for testing/procedure-Nuc Med  [] Pain  [] Eating  [] Patient too lethargic  [] Nausea/vomiting  [x] Dialysis treatment in progress   [] Other: pt being cleaned in prep for HD treatment (unit in room). Also note pt with positive stress test today. Will f/up accordingly tomorrow. Thank you.    Nanette Sicard, PT

## 2022-07-19 NOTE — PROGRESS NOTES
TREATMENT SUMMARY   Patient dialyzed in room 325  Tolerated treatment well without complaint or complications.    RIJ TDC functioning well without complications      3000 ml removed via UF with a with a net removal 2500  Report given to Bob Santos RN with all questions answered     TREATMENT  NOTES      1845 Bob Santos RN to change HD cath dressing r/t shared space/proximity with central line cath  dressing                                                                                                   ACUTE HEMODIALYSIS FLOW SHEET       ACCESS   CATHETER ACCESS: [] N/A  [x] RIGHT  [] LEFT  [] IJ  [] SUBCL [] FEM                    [] First use X-ray  [x] Tunnel     [] Non-Tunneled      [x] No S/S infection  [] Redness [] Drainage  [] Cultured [] Swelling [] Pain                    [x] Medical Aseptic [] Prep Dressing Changed TO BE CHANGED BY Bob Santos RN                  [] Clotted [x] Patent []      Flows: [x] Good [] Poor [] Reversed                 If Access Problem Dr. Emily Hickey: [] Yes [] No    Date:_____  [] N/A[]   GRAFT/FISTULA ACCESS:  [x] N/A  [] RIGHT  [] LEFT  [] UE   [] LE       [] AVG  [] AVF [] BUTTONHOLE    [] +BRUIT/THRILL [] MEDICAL ASEPTIC PREP     [] No S/S infection  [] Redness [] Drainage  [] Cultured [] Swelling [] Pain              If Access Problem Dr. Emily Hickey: [] Yes [] No    Date:______ [] N/A     RO/HEMODIAYLSIS MACHINE [facility] TREATMENT          [] THE [facility]: Machine Serial #1:  [id number]    RO Serial #[id number]                       [x] THE [facility]: Machine Serial #2:  [id number] Serial #[id number]     [] THE [facility]: Machine Serial #3:  [id number]  RO Serial #[id number]    Alarm Test: [x] Pass  Time_1450_  [x] RO/Machine Log Complete    [x] Extracorporeal circuit Tested for integrity           Dialyzer_[id number]_   Tubing_21i02-11_    Dialysate: pH__7.2_  Temp.__37___Conductivity: Rqvom91__ HD Machine__14.1   CHLORINE TESTING- BEFORE EACH TREATMENT AND EVERY 4 HOURS   Total Chlorine: [x] Less than 0.1 ppm Time:__1540___2nd Check Time:__nr____  (If greater than 0.1 ppm from Primary then every 30 minutes from Secondary)   TREATMENT INIATION-WITH DIALYSIS PRECAUTIONS   [x] All Connections Secured   [x] Saline Line Double Clamped    [x] Venous Parameters Set [x] Arterial Parameters Set    [x] Prime Given 250 ml     [x] Air Foam Detector Engaged   PRE-TREATMENT   UF Calculations: Wt to lose:_2500___ml(+) Oral:_0_ml(+)IV Meds/Fluids/Blood prods_0__ml(+) Prime/Rinse__500_ml(=)Total UF Goal__3000__mL     Tx Initiation Note: RECEIVED REPORT. PATIENT ALERT AND ORIENTED. VITAL SIGNS WNL. NAD. ALL QUESTIONS ANSWERED WITH PATIENT  SAFETY CHECKS COMPLETE. TIME OUT COMPLETE. TREATMENT INITIATED VIA _RIJ TDC____. NO CONCERNS NOTED. [x] Time Out/Safety Check  Time:__1055__     Dialyzer cleared: [x] Good [] Fair [] Poor     Blood Volume Processed _67___L   Net UF Removed _2500___mL  Post Tx Access:                  AVF/AVG: Bleeding Stop Time      Art. NA_min Avtar.__NA_min []+bruit/thrill                              Catheter: Locking Solution  [x] Heparin 1 ml/1000 units                                                             [] Normal Saline                                                                      Art.__1.8___ ml Avtar.__1.8___ml                                                           [x] New caps placed       Abbreviations: AVG-arterial venous graft, AVF-arterial venous fistula, IJ-Internal Jugular,  Subcl-Subclavian, Fem-Femoral, Tx-treatment, AP/HR-apical heart rate, DFR-dialysate flow rate, BFR-blood flow rate, AP-arterial pressure, -venous pressure, UF-ultrafiltrate, TMP-transmembrane pressure, Avtar-Venous, Art-Arterial, RO-Reverse Osmosis

## 2022-07-19 NOTE — WOUND CARE
Discharge Instructions from  [facility]  [address], [address] [facility]  [phone] Fax [phone]    NAME:  Cookie Breeding OF BIRTH:  1960  MEDICAL RECORD NUMBER:  [id number]  DATE:  6/27/2022    Wound Cleansing:   Do not scrub or use excessive force. Cleanse wound prior to applying a clean dressing with:   Normal Saline ONLY    Topical Treatments:  Do not apply lotions, creams, or ointments to wound bed unless directed.      Dressings:           Wound Location right heel  Apply Primary Dressing:  Mepitel one or other non adherent dressing    Saline moist gauze  Gauze [] Kerlix and secure with  Ace Wrap    Change dressing:  Daily   Until wound Vac is available    Pressure Relief:  [] prevalon boots to both feet while in bed      Electronically signed Blanca Bonilla RN on 7/19/2022 at 2:45 PM

## 2022-07-19 NOTE — CONSULTS
[facility] Consult Note      Patient: Ana M Crespo MRN: [id number]  SSN: [id number]    YOB: 1960  Age: 60 y.o. Sex: male        Date of Consultation: 7/18/2022  Referring Physician: Dr. Tamiko Cisneros  Reason for Consultation: Chest pain    HPI:  I was asked by  to see this pleasant patient for chest pain. Lisa Alvarez is a 69-year-old gentleman with a history of diabetes mellitus, hypertension and CAD history of PCI in 2020 to LAD and RCA. Patient is on hemodialysis. Patient was admitted with diabetic foot ulcer. Patient having on and off chest pain without any evidence of ACS. Cardiology was called to assess his chest pain. He is nonambulatory.     Past Medical History:   Diagnosis Date    Diabetes mellitus     Hypertension      Past Surgical History:   Procedure Laterality Date    IR INSERT TUNL CVC W/O PORT OVER 5 YR  7/7/2022     Current Facility-Administered Medications   Medication Dose Route Frequency    nystatin (MYCOSTATIN) 100,000 unit/gram powder   Topical BID    Saccharomyces boulardii (FLORASTOR) capsule 250 mg  250 mg Oral BID    bacitracin zinc (BACITRACIN) 500 unit/gram ointment   Topical BID    pantoprazole (PROTONIX) tablet 40 mg  40 mg Oral ACB    alteplase (CATHFLO) 1 mg in sterile water (preservative free) 1 mL injection  1 mg InterCATHeter DIALYSIS PRN    nitroglycerin (NITROSTAT) tablet 0.4 mg  0.4 mg SubLINGual PRN    morphine injection 1 mg  1 mg IntraVENous Q4H PRN    ondansetron (ZOFRAN) injection 4 mg  4 mg IntraVENous Q6H PRN    gabapentin (NEURONTIN) capsule 300 mg  300 mg Oral QHS    heparin (porcine) injection 5,000 Units  5,000 Units SubCUTAneous Q8H    melatonin (rapid dissolve) tablet 10 mg  10 mg Oral QHS PRN    epoetin lisette-epbx (RETACRIT) injection 10,000 Units  10,000 Units SubCUTAneous Q TUE, THU & SAT    heparin (porcine) 1,000 unit/mL injection 3,400 Units  3,400 Units Hemodialysis DIALYSIS PRN    heparin (porcine) 100 unit/mL injection 500 Units  500 Units InterCATHeter Q8H PRN    sodium chloride (NS) flush 5-40 mL  5-40 mL IntraVENous Q8H    sodium chloride (NS) flush 5-40 mL  5-40 mL IntraVENous PRN    naloxone (NARCAN) injection 0.1 mg  0.1 mg IntraVENous Multiple    loperamide (IMODIUM) capsule 2 mg  2 mg Oral Q4H PRN    piperacillin-tazobactam (ZOSYN) 4.5 g in 0.9% sodium chloride (MBP/ADV) 100 mL MBP  4.5 g IntraVENous Q12H    sevelamer carbonate (RENVELA) tab 1,600 mg  1,600 mg Oral TID WITH MEALS    allopurinoL (ZYLOPRIM) tablet 100 mg  100 mg Oral DAILY    carvediloL (COREG) tablet 12.5 mg  12.5 mg Oral BID    ezetimibe (ZETIA) tablet 10 mg  10 mg Oral DAILY    amLODIPine (NORVASC) tablet 10 mg  10 mg Oral DAILY    vit B Cmplx 3-FA-Vit C-Biotin (NEPHRO NIKITA RX) tablet 1 Tablet  1 Tablet Oral DAILY    insulin lispro (HUMALOG) injection 3 Units  3 Units SubCUTAneous TIDAC    aspirin chewable tablet 81 mg  81 mg Oral DAILY    insulin glargine (LANTUS) injection 10 Units  10 Units SubCUTAneous QHS    insulin lispro (HUMALOG) injection   SubCUTAneous AC&HS    glucose chewable tablet 16 g  4 Tablet Oral PRN    glucagon (GLUCAGEN) injection 1 mg  1 mg IntraMUSCular PRN    dextrose 10% infusion 0-250 mL  0-250 mL IntraVENous PRN    acetaminophen (TYLENOL) tablet 650 mg  650 mg Oral Q6H PRN       Allergies and Intolerances:   No Known Allergies    Family History:   History reviewed. No pertinent family history. Social History:   He  has no history on file for tobacco use. He  has no history on file for alcohol use. Review of Systems  Gen: No fever, chills, malaise, weight loss/gain. Heent: No headache, rhinorrhea, epistaxis, ear pain, hearing loss, sinus pain, neck pain/stiffness, sore throat. Heart: + chest pain, palpitations, SIGALA, pnd, or orthopnea. Resp: No cough, hemoptysis, wheezing and shortness of breath. GI: No nausea, vomiting, diarrhea, constipation, melena or hematochezia.    : No urinary obstruction, dysuria or hematuria. Derm: No rash, new skin lesion or pruritis. Musc/skeletal: + bone or +joint complains. Vasc: No edema, cyanosis or claudication. Endo: No heat/cold intolerance, no polyuria,polydipsia or polyphagia. Neuro: No unilateral weakness, numbness, tingling. No seizures. Heme: No easy bruising or bleeding. Physical:   Patient Vitals for the past 6 hrs:   Temp Pulse Resp BP   07/18/22 1525 97.6 °F (36.4 °C) 75 18 139/66         Exam:   General Appearance: Comfortable, not using accessory muscles of respiration. HEENT: SANDOVAL. HEAD: Atraumatic  NECK: No JVD, no thyroidomeglay. LUNGS: Bilateral entry positive   HEART: S1+S2     ABD: Non-tender, BS Audible    PSYCHIATRIC EXAM: Mood is appropriate.     NEUROLOGICAL: Alert oriented in time place and person  Review of Data:   LABS:   Lab Results   Component Value Date/Time    WBC 11.2 07/18/2022 01:30 AM    HGB 8.8 (L) 07/18/2022 01:30 AM    HCT 28.3 (L) 07/18/2022 01:30 AM    PLATELET 278 11/92/9247 01:30 AM     Lab Results   Component Value Date/Time    Sodium 138 07/18/2022 01:30 AM    Potassium 5.9 (H) 07/18/2022 01:30 AM    Chloride 104 07/18/2022 01:30 AM    CO2 26 07/18/2022 01:30 AM    Glucose 187 (H) 07/18/2022 01:30 AM    BUN 59 (H) 07/18/2022 01:30 AM    Creatinine 8.67 (H) 07/18/2022 01:30 AM     No results found for: CHOL, CHOLX, CHLST, CHOLV, HDL, HDLP, LDL, LDLC, DLDLP, TGLX, TRIGL, TRIGP  No components found for: GPT  Lab Results   Component Value Date/Time    Hemoglobin A1c 8.6 (H) 06/28/2022 08:43 AM       RADIOLOGY:  CT Results  (Last 48 hours)    None        CXR Results  (Last 48 hours)    None            Cardiology Procedures:   Results for orders placed or performed during the hospital encounter of 06/27/22   EKG, 12 LEAD, INITIAL   Result Value Ref Range    Ventricular Rate 78 BPM    Atrial Rate 78 BPM    P-R Interval 186 ms    QRS Duration 90 ms    Q-T Interval 436 ms    QTC Calculation (Bezet) 497 ms    Calculated P Axis 94 degrees    Calculated R Axis -36 degrees    Calculated T Axis 59 degrees    Diagnosis       Sinus rhythm with premature atrial complexes  Left axis deviation  Prolonged QT  Abnormal ECG  When compared with ECG of 12-JUL-2022 05:56,  premature atrial complexes are now present        Echo Results  (Last 48 hours)    None       Cardiolite (Tc-99m Sestamibi) stress test        Impression / Plan:    Patient Active Problem List   Diagnosis Code    Diabetes mellitus with foot ulcer ([facility] E11.621, L97.509    CAD (coronary artery disease) I25.10    ESRD (end stage renal disease) ([facility]) N18.6    Acute hematogenous osteomyelitis of right foot ([facility]) M86.071    Cellulitis of right heel L03.115    Diabetic foot ulcer with osteomyelitis ([facility] E11.621, E11.69, L97.509, M86.9    Ulcer of right heel, with necrosis of bone ([facility]) L97.414    Infection and inflammatory reaction due to internal fixation device of other site, initial encounter ([facility] T84.69XA    Left arm swelling M79.89       Assessment and plan    Recurrent chest pain  CAD with history of PCI to LAD and RCA in 2020  Diabetes mellitus  End-stage renal disease on dialysis      Plan. Echocardiogram done overall EF is low normal  Clinically no evidence of ACS  I will add low-dose isosorbide mononitrate  Check lipid profile  Consider restarting statins if there is no contraindication  Stress test is scheduled for tomorrow.   Signed By: Maldonado Mancuso MD     July 18, 2022

## 2022-07-19 NOTE — ROUTINE PROCESS
Bedside and Verbal shift change report given to Concepcion Roca RN by Josefina Bland RN. Report included the following information SBAR, Kardex, Intake/Output and MAR.

## 2022-07-20 LAB
ACT BLD: 341 SECS (ref 79–138)
ANION GAP SERPL CALC-SCNC: 7 MMOL/L (ref 3–18)
BASOPHILS # BLD: 0 K/UL (ref 0–0.1)
BASOPHILS NFR BLD: 0 % (ref 0–2)
BUN SERPL-MCNC: 49 MG/DL (ref 7–18)
BUN/CREAT SERPL: 6 (ref 12–20)
CALCIUM SERPL-MCNC: 8.6 MG/DL (ref 8.5–10.1)
CHLORIDE SERPL-SCNC: 103 MMOL/L (ref 100–111)
CO2 SERPL-SCNC: 27 MMOL/L (ref 21–32)
CREAT SERPL-MCNC: 8.75 MG/DL (ref 0.6–1.3)
DIFFERENTIAL METHOD BLD: ABNORMAL
EOSINOPHIL # BLD: 0.5 K/UL (ref 0–0.4)
EOSINOPHIL NFR BLD: 5 % (ref 0–5)
ERYTHROCYTE [DISTWIDTH] IN BLOOD BY AUTOMATED COUNT: 17.2 % (ref 11.6–14.5)
GLUCOSE BLD STRIP.AUTO-MCNC: 124 MG/DL (ref 70–110)
GLUCOSE BLD STRIP.AUTO-MCNC: 194 MG/DL (ref 70–110)
GLUCOSE BLD STRIP.AUTO-MCNC: 203 MG/DL (ref 70–110)
GLUCOSE SERPL-MCNC: 225 MG/DL (ref 74–99)
HCT VFR BLD AUTO: 25 % (ref 36–48)
HCT VFR BLD AUTO: 27 % (ref 36–48)
HGB BLD-MCNC: 7.7 G/DL (ref 13–16)
HGB BLD-MCNC: 8.3 G/DL (ref 13–16)
IMM GRANULOCYTES # BLD AUTO: 0 K/UL (ref 0–0.04)
IMM GRANULOCYTES NFR BLD AUTO: 0 % (ref 0–0.5)
LYMPHOCYTES # BLD: 0.9 K/UL (ref 0.9–3.6)
LYMPHOCYTES NFR BLD: 9 % (ref 21–52)
MCH RBC QN AUTO: 31.3 PG (ref 24–34)
MCHC RBC AUTO-ENTMCNC: 30.7 G/DL (ref 31–37)
MCV RBC AUTO: 101.9 FL (ref 78–100)
MONOCYTES # BLD: 1 K/UL (ref 0.05–1.2)
MONOCYTES NFR BLD: 10 % (ref 3–10)
NEUTS SEG # BLD: 7.8 K/UL (ref 1.8–8)
NEUTS SEG NFR BLD: 76 % (ref 40–73)
NRBC # BLD: 0 K/UL (ref 0–0.01)
NRBC BLD-RTO: 0 PER 100 WBC
PLATELET # BLD AUTO: 171 K/UL (ref 135–420)
PMV BLD AUTO: 11.4 FL (ref 9.2–11.8)
POTASSIUM SERPL-SCNC: 5.3 MMOL/L (ref 3.5–5.5)
RBC # BLD AUTO: 2.65 M/UL (ref 4.35–5.65)
SODIUM SERPL-SCNC: 137 MMOL/L (ref 136–145)
WBC # BLD AUTO: 10.3 K/UL (ref 4.6–13.2)

## 2022-07-20 PROCEDURE — C1894 INTRO/SHEATH, NON-LASER: HCPCS | Performed by: INTERNAL MEDICINE

## 2022-07-20 PROCEDURE — 74011636637 HC RX REV CODE- 636/637: Performed by: HOSPITALIST

## 2022-07-20 PROCEDURE — 82962 GLUCOSE BLOOD TEST: CPT

## 2022-07-20 PROCEDURE — 77030004522 HC CATH ANGI DX EXPO BSC -A: Performed by: INTERNAL MEDICINE

## 2022-07-20 PROCEDURE — 65270000046 HC RM TELEMETRY

## 2022-07-20 PROCEDURE — 74011000250 HC RX REV CODE- 250: Performed by: RADIOLOGY

## 2022-07-20 PROCEDURE — 36415 COLL VENOUS BLD VENIPUNCTURE: CPT

## 2022-07-20 PROCEDURE — 74011250636 HC RX REV CODE- 250/636: Performed by: INTERNAL MEDICINE

## 2022-07-20 PROCEDURE — C1725 CATH, TRANSLUMIN NON-LASER: HCPCS | Performed by: INTERNAL MEDICINE

## 2022-07-20 PROCEDURE — 85347 COAGULATION TIME ACTIVATED: CPT

## 2022-07-20 PROCEDURE — B2111ZZ FLUOROSCOPY OF MULTIPLE CORONARY ARTERIES USING LOW OSMOLAR CONTRAST: ICD-10-PCS | Performed by: INTERNAL MEDICINE

## 2022-07-20 PROCEDURE — C1874 STENT, COATED/COV W/DEL SYS: HCPCS | Performed by: INTERNAL MEDICINE

## 2022-07-20 PROCEDURE — 99152 MOD SED SAME PHYS/QHP 5/>YRS: CPT | Performed by: INTERNAL MEDICINE

## 2022-07-20 PROCEDURE — 94660 CPAP INITIATION&MGMT: CPT

## 2022-07-20 PROCEDURE — 74011636637 HC RX REV CODE- 636/637: Performed by: FAMILY MEDICINE

## 2022-07-20 PROCEDURE — C1769 GUIDE WIRE: HCPCS | Performed by: INTERNAL MEDICINE

## 2022-07-20 PROCEDURE — 77030010221 HC SPLNT WR POS TELE -B: Performed by: INTERNAL MEDICINE

## 2022-07-20 PROCEDURE — 74011250637 HC RX REV CODE- 250/637: Performed by: FAMILY MEDICINE

## 2022-07-20 PROCEDURE — 74011000258 HC RX REV CODE- 258: Performed by: PHYSICIAN ASSISTANT

## 2022-07-20 PROCEDURE — 77030008543 HC TBNG MON PRSS MRTM -A: Performed by: INTERNAL MEDICINE

## 2022-07-20 PROCEDURE — 74011250636 HC RX REV CODE- 250/636: Performed by: PHYSICIAN ASSISTANT

## 2022-07-20 PROCEDURE — 74011636637 HC RX REV CODE- 636/637: Performed by: INTERNAL MEDICINE

## 2022-07-20 PROCEDURE — 74011000250 HC RX REV CODE- 250: Performed by: INTERNAL MEDICINE

## 2022-07-20 PROCEDURE — 74011250637 HC RX REV CODE- 250/637: Performed by: HOSPITALIST

## 2022-07-20 PROCEDURE — 80048 BASIC METABOLIC PNL TOTAL CA: CPT

## 2022-07-20 PROCEDURE — 93458 L HRT ARTERY/VENTRICLE ANGIO: CPT | Performed by: INTERNAL MEDICINE

## 2022-07-20 PROCEDURE — 92928 PRQ TCAT PLMT NTRAC ST 1 LES: CPT | Performed by: INTERNAL MEDICINE

## 2022-07-20 PROCEDURE — 74011250637 HC RX REV CODE- 250/637: Performed by: INTERNAL MEDICINE

## 2022-07-20 PROCEDURE — 85018 HEMOGLOBIN: CPT

## 2022-07-20 PROCEDURE — 4A023N7 MEASUREMENT OF CARDIAC SAMPLING AND PRESSURE, LEFT HEART, PERCUTANEOUS APPROACH: ICD-10-PCS | Performed by: INTERNAL MEDICINE

## 2022-07-20 PROCEDURE — 74011000636 HC RX REV CODE- 636: Performed by: INTERNAL MEDICINE

## 2022-07-20 PROCEDURE — 77030016699 HC CATH ANGI DX INFN1 CARD -A: Performed by: INTERNAL MEDICINE

## 2022-07-20 PROCEDURE — 74011000258 HC RX REV CODE- 258: Performed by: INTERNAL MEDICINE

## 2022-07-20 PROCEDURE — 027034Z DILATION OF CORONARY ARTERY, ONE ARTERY WITH DRUG-ELUTING INTRALUMINAL DEVICE, PERCUTANEOUS APPROACH: ICD-10-PCS | Performed by: INTERNAL MEDICINE

## 2022-07-20 PROCEDURE — 85025 COMPLETE CBC W/AUTO DIFF WBC: CPT

## 2022-07-20 PROCEDURE — 77030004521 HC CATH ANGI DX COOK -B: Performed by: INTERNAL MEDICINE

## 2022-07-20 PROCEDURE — C1887 CATHETER, GUIDING: HCPCS | Performed by: INTERNAL MEDICINE

## 2022-07-20 PROCEDURE — 99153 MOD SED SAME PHYS/QHP EA: CPT | Performed by: INTERNAL MEDICINE

## 2022-07-20 PROCEDURE — 77030013797 HC KT TRNSDUC PRSSR EDWD -A: Performed by: INTERNAL MEDICINE

## 2022-07-20 PROCEDURE — 77030012468 HC VLV BLEEDBK CNTRL ABBT -B: Performed by: INTERNAL MEDICINE

## 2022-07-20 DEVICE — STENT RONYX30022UX RESOLUTE ONYX 3.00X22
Type: IMPLANTABLE DEVICE | Status: FUNCTIONAL
Brand: RESOLUTE ONYX™

## 2022-07-20 RX ORDER — SODIUM CHLORIDE 0.9 % (FLUSH) 0.9 %
5-40 SYRINGE (ML) INJECTION EVERY 8 HOURS
Status: CANCELLED | OUTPATIENT
Start: 2022-07-20

## 2022-07-20 RX ORDER — HEPARIN SODIUM 200 [USP'U]/100ML
INJECTION, SOLUTION INTRAVENOUS
Status: COMPLETED | OUTPATIENT
Start: 2022-07-20 | End: 2022-07-20

## 2022-07-20 RX ORDER — CLOPIDOGREL BISULFATE 75 MG/1
75 TABLET ORAL DAILY
Status: DISCONTINUED | OUTPATIENT
Start: 2022-07-21 | End: 2022-07-21 | Stop reason: HOSPADM

## 2022-07-20 RX ORDER — FENTANYL CITRATE 50 UG/ML
INJECTION, SOLUTION INTRAMUSCULAR; INTRAVENOUS AS NEEDED
Status: DISCONTINUED | OUTPATIENT
Start: 2022-07-20 | End: 2022-07-20 | Stop reason: HOSPADM

## 2022-07-20 RX ORDER — CLOPIDOGREL 300 MG/1
600 TABLET, FILM COATED ORAL ONCE
Status: COMPLETED | OUTPATIENT
Start: 2022-07-20 | End: 2022-07-20

## 2022-07-20 RX ORDER — CLOPIDOGREL 300 MG/1
TABLET, FILM COATED ORAL AS NEEDED
Status: DISCONTINUED | OUTPATIENT
Start: 2022-07-20 | End: 2022-07-20 | Stop reason: HOSPADM

## 2022-07-20 RX ORDER — LIDOCAINE HYDROCHLORIDE 10 MG/ML
INJECTION INFILTRATION; PERINEURAL AS NEEDED
Status: DISCONTINUED | OUTPATIENT
Start: 2022-07-20 | End: 2022-07-20 | Stop reason: HOSPADM

## 2022-07-20 RX ORDER — ATORVASTATIN CALCIUM 20 MG/1
40 TABLET, FILM COATED ORAL DAILY
Status: DISCONTINUED | OUTPATIENT
Start: 2022-07-20 | End: 2022-07-21 | Stop reason: HOSPADM

## 2022-07-20 RX ORDER — SODIUM CHLORIDE 0.9 % (FLUSH) 0.9 %
5-40 SYRINGE (ML) INJECTION AS NEEDED
Status: CANCELLED | OUTPATIENT
Start: 2022-07-20

## 2022-07-20 RX ORDER — MIDAZOLAM HYDROCHLORIDE 1 MG/ML
INJECTION, SOLUTION INTRAMUSCULAR; INTRAVENOUS AS NEEDED
Status: DISCONTINUED | OUTPATIENT
Start: 2022-07-20 | End: 2022-07-20 | Stop reason: HOSPADM

## 2022-07-20 RX ADMIN — B-COMPLEX W/ C & FOLIC ACID TAB 1 MG 1 TABLET: 1 TAB at 08:28

## 2022-07-20 RX ADMIN — GABAPENTIN 300 MG: 300 CAPSULE ORAL at 22:02

## 2022-07-20 RX ADMIN — CARVEDILOL 12.5 MG: 12.5 TABLET, FILM COATED ORAL at 22:05

## 2022-07-20 RX ADMIN — CARVEDILOL 12.5 MG: 12.5 TABLET, FILM COATED ORAL at 08:29

## 2022-07-20 RX ADMIN — Medication 250 MG: at 08:28

## 2022-07-20 RX ADMIN — Medication 2 UNITS: at 08:27

## 2022-07-20 RX ADMIN — Medication 250 MG: at 22:03

## 2022-07-20 RX ADMIN — PIPERACILLIN AND TAZOBACTAM 4.5 G: 4; .5 INJECTION, POWDER, FOR SOLUTION INTRAVENOUS at 22:02

## 2022-07-20 RX ADMIN — ATORVASTATIN CALCIUM 40 MG: 20 TABLET, FILM COATED ORAL at 22:03

## 2022-07-20 RX ADMIN — Medication 4 UNITS: at 22:03

## 2022-07-20 RX ADMIN — SODIUM CHLORIDE, PRESERVATIVE FREE 10 ML: 5 INJECTION INTRAVENOUS at 15:45

## 2022-07-20 RX ADMIN — EZETIMIBE 10 MG: 10 TABLET ORAL at 08:28

## 2022-07-20 RX ADMIN — PIPERACILLIN AND TAZOBACTAM 4.5 G: 4; .5 INJECTION, POWDER, FOR SOLUTION INTRAVENOUS at 08:29

## 2022-07-20 RX ADMIN — ASPIRIN 81 MG: 81 TABLET, CHEWABLE ORAL at 08:28

## 2022-07-20 RX ADMIN — SEVELAMER CARBONATE 1600 MG: 800 TABLET, FILM COATED ORAL at 08:29

## 2022-07-20 RX ADMIN — SODIUM CHLORIDE, PRESERVATIVE FREE 10 ML: 5 INJECTION INTRAVENOUS at 05:01

## 2022-07-20 RX ADMIN — INSULIN LISPRO 3 UNITS: 100 INJECTION, SOLUTION INTRAVENOUS; SUBCUTANEOUS at 08:25

## 2022-07-20 RX ADMIN — NYSTATIN: 100000 POWDER TOPICAL at 10:08

## 2022-07-20 RX ADMIN — INSULIN GLARGINE 10 UNITS: 100 INJECTION, SOLUTION SUBCUTANEOUS at 22:04

## 2022-07-20 RX ADMIN — AMLODIPINE BESYLATE 10 MG: 5 TABLET ORAL at 08:29

## 2022-07-20 RX ADMIN — Medication: at 10:08

## 2022-07-20 RX ADMIN — SODIUM CHLORIDE, PRESERVATIVE FREE 10 ML: 5 INJECTION INTRAVENOUS at 22:08

## 2022-07-20 RX ADMIN — PANTOPRAZOLE SODIUM 40 MG: 40 TABLET, DELAYED RELEASE ORAL at 08:28

## 2022-07-20 RX ADMIN — ALLOPURINOL 100 MG: 100 TABLET ORAL at 08:29

## 2022-07-20 RX ADMIN — ISOSORBIDE MONONITRATE 30 MG: 30 TABLET, EXTENDED RELEASE ORAL at 08:28

## 2022-07-20 NOTE — PROGRESS NOTES
INSURANCE AUTH WILL HAVE TO BE UPDATED     D/C Plan: [facility]    Noted plan for a cardiac calth today. Clinical updates have been sent to [facility]. Care Management Interventions  PCP Verified by CM:  Yes (Dr. Cathryn Herrera )  Mode of Transport at Discharge: BLS  Transition of Care Consult (CM Consult): SNF (Pt is in agreement w/ SNF at d/c. )  Discharge Durable Medical Equipment:  (TBD)  Physical Therapy Consult: Yes  Occupational Therapy Consult: Yes  Support Systems: Child(dafne)  Confirm Follow Up Transport: Family  The Plan for Transition of Care is Related to the Following Treatment Goals : Skilled nursing facility  The Patient and/or Patient Representative was Provided with a Choice of Provider and Agrees with the Discharge Plan?: Yes (The pt is alert and oriented. )  Freedom of Choice List was Provided with Basic Dialogue that Supports the Patient's Individualized Plan of Care/Goals, Treatment Preferences and Shares the Quality Data Associated with the Providers?: Yes (Pt is in agreement w/ referrals being sent to all  and [place] facilities and then he will choose one from the accepting facilities. )  Discharge Location  Patient Expects to be Discharged to[de-identified] Skilled nursing facility

## 2022-07-20 NOTE — PROGRESS NOTES
Cardiology Progress Note        Patient: Marcel Stevenson        Sex: male          DOA: 6/27/2022  YOB: 1960      Age:  60 y.o.        LOS:  LOS: 23 days   Assessment/Plan     Principal Problem:    Acute hematogenous osteomyelitis of right foot ([address] (6/28/2022)    Active Problems:    Diabetes mellitus with foot ulcer ([address] (6/27/2022)      CAD (coronary artery disease) (6/28/2022)      ESRD (end stage renal disease) ([address] (6/28/2022)      Cellulitis of right heel (6/28/2022)      Diabetic foot ulcer with osteomyelitis ([address] (6/28/2022)      Ulcer of right heel, with necrosis of bone ([address] (6/28/2022)      Infection and inflammatory reaction due to internal fixation device of other site, initial encounter ([address] (7/5/2022)      Left arm swelling (7/10/2022)      Chest pain at rest (6/27/2022)      Overview: Added automatically from request for surgery [id number]      Abnormal nuclear stress test (6/27/2022)      Overview: Added automatically from request for surgery [id number]      Plan:  7/20/2022  Recurrent chest pain with known history of CAD and PCI to RCA and LAD  Abnormal stress test  Left heart cath possible PCI today  Discussed with Dr. Jensen Hughes, ID clear from ID standpoint to proceed for procedure  Risk, benefits and alternatives were discussed in detail with patient and patient agreed to proceed. Chest pain  Known CAD  Abnormal stress test  Finding discussed in detail with the patient  Patient preferred to undergo cardiac catheterization possible PCI  I will schedule tomorrow. I have discussed with patient risk of bleeding, stroke, heart attack patient is already on dialysis  Will discuss with Dr. Jensen Hughes it is okay to do procedure from an infection standpoint                    Subjective:    cc:  Chest pain        REVIEW OF SYSTEMS:     General: No fevers or chills. Cardiovascular: No chest pain or pressure. No palpitations.  No ankle swelling  Pulmonary: No SOB, orthopnea, PND  Gastrointestinal: No nausea, vomiting or diarrhea      Objective:      Visit Vitals  /72 (BP 1 Location: Right upper arm, BP Patient Position: At rest)   Pulse 78   Temp 98.3 °F (36.8 °C)   Resp 18   Ht 6' (1.829 m)   Wt 102.6 kg (226 lb 4.8 oz)   SpO2 100%   BMI 30.69 kg/m²     Body mass index is 30.69 kg/m². Physical Exam:  General Appearance: Comfortable, not using accessory muscles of respiration. NECK: No JVD, no thyroidomeglay. LUNGS: Clear bilaterally. HEART: S1+S2 audible,    ABD: Non-tender, BS Audible       PSYCHIATRIC EXAM: Mood is appropriate.     Medication:  Current Facility-Administered Medications   Medication Dose Route Frequency    hydrALAZINE (APRESOLINE) 20 mg/mL injection 10 mg  10 mg IntraVENous Q6H PRN    nystatin (MYCOSTATIN) 100,000 unit/gram powder   Topical BID    Saccharomyces boulardii (FLORASTOR) capsule 250 mg  250 mg Oral BID    isosorbide mononitrate ER (IMDUR) tablet 30 mg  30 mg Oral DAILY    bacitracin zinc (BACITRACIN) 500 unit/gram ointment   Topical BID    pantoprazole (PROTONIX) tablet 40 mg  40 mg Oral ACB    alteplase (CATHFLO) 1 mg in sterile water (preservative free) 1 mL injection  1 mg InterCATHeter DIALYSIS PRN    nitroglycerin (NITROSTAT) tablet 0.4 mg  0.4 mg SubLINGual PRN    morphine injection 1 mg  1 mg IntraVENous Q4H PRN    ondansetron (ZOFRAN) injection 4 mg  4 mg IntraVENous Q6H PRN    gabapentin (NEURONTIN) capsule 300 mg  300 mg Oral QHS    heparin (porcine) injection 5,000 Units  5,000 Units SubCUTAneous Q8H    melatonin (rapid dissolve) tablet 10 mg  10 mg Oral QHS PRN    epoetin lisette-epbx (RETACRIT) injection 10,000 Units  10,000 Units SubCUTAneous Q TUE, THU & SAT    heparin (porcine) 1,000 unit/mL injection 3,400 Units  3,400 Units Hemodialysis DIALYSIS PRN    heparin (porcine) 100 unit/mL injection 500 Units  500 Units InterCATHeter Q8H PRN    sodium chloride (NS) flush 5-40 mL  5-40 mL IntraVENous Q8H    sodium chloride (NS) flush 5-40 mL  5-40 mL IntraVENous PRN    naloxone (NARCAN) injection 0.1 mg  0.1 mg IntraVENous Multiple    loperamide (IMODIUM) capsule 2 mg  2 mg Oral Q4H PRN    piperacillin-tazobactam (ZOSYN) 4.5 g in 0.9% sodium chloride (MBP/ADV) 100 mL MBP  4.5 g IntraVENous Q12H    sevelamer carbonate (RENVELA) tab 1,600 mg  1,600 mg Oral TID WITH MEALS    allopurinoL (ZYLOPRIM) tablet 100 mg  100 mg Oral DAILY    carvediloL (COREG) tablet 12.5 mg  12.5 mg Oral BID    ezetimibe (ZETIA) tablet 10 mg  10 mg Oral DAILY    amLODIPine (NORVASC) tablet 10 mg  10 mg Oral DAILY    vit B Cmplx 3-FA-Vit C-Biotin (NEPHRO NIKITA RX) tablet 1 Tablet  1 Tablet Oral DAILY    insulin lispro (HUMALOG) injection 3 Units  3 Units SubCUTAneous TIDAC    aspirin chewable tablet 81 mg  81 mg Oral DAILY    insulin glargine (LANTUS) injection 10 Units  10 Units SubCUTAneous QHS    insulin lispro (HUMALOG) injection   SubCUTAneous AC&HS    glucose chewable tablet 16 g  4 Tablet Oral PRN    glucagon (GLUCAGEN) injection 1 mg  1 mg IntraMUSCular PRN    dextrose 10% infusion 0-250 mL  0-250 mL IntraVENous PRN    acetaminophen (TYLENOL) tablet 650 mg  650 mg Oral Q6H PRN               Lab/Data Reviewed:  Procedures/imaging: see electronic medical records for all procedures/Xrays   and details which were not copied into this note but were reviewed prior to creation of Plan       All lab results for the last 24 hours reviewed.      Recent Labs     07/20/22  0922 07/18/22  0130   WBC 10.3 11.2   HGB 8.3* 8.8*   HCT 27.0* 28.3*    186       Recent Labs     07/20/22  0922 07/18/22  0130    138   K 5.3 5.9*    104   CO2 27 26   * 187*   BUN 49* 59*   CREA 8.75* 8.67*   CA 8.6 8.3*         RADIOLOGY:  CT Results  (Last 48 hours)      None          CXR Results  (Last 48 hours)      None              Cardiology Procedures:   Results for orders placed or performed during the hospital encounter of 06/27/22   EKG, 12 LEAD, INITIAL Result Value Ref Range    Ventricular Rate 78 BPM    Atrial Rate 78 BPM    P-R Interval 186 ms    QRS Duration 90 ms    Q-T Interval 436 ms    QTC Calculation (Bezet) 497 ms    Calculated P Axis 94 degrees    Calculated R Axis -36 degrees    Calculated T Axis 59 degrees    Diagnosis       Sinus rhythm with premature atrial complexes  Left axis deviation  Cannot exclude Inferior infarct , age undetermined  Prolonged QT  Abnormal ECG  When compared with ECG of 12-JUL-2022 05:56,  premature atrial complexes are now present  Confirmed by Chana Bennett MD. ([id number]) on 7/19/2022 11:52:11 PM        Echo Results  (Last 48 hours)      None         Cardiolite (Tc-99m Sestamibi) stress test    Signed By: Mariann Cash MD     July 20, 2022

## 2022-07-20 NOTE — PROGRESS NOTES
Pt is all prepped and ready for procedure    7759 Pt picked up for procedure    1400 Pt back to care unit [facility] with stent placement. Pt awake and alert and tolerated procedure well. Rt femoral accessed with arterial sheath still in place attached to Heparin flush bag to a pressure bag. Site WNl. Rt foot with wound in place attached to wound vac, unable assess pulses. Left pedal and post tibial  palpable. Pt laying flat for complete bedrest. Angiomax drip infusing well, to run for three hours as per report. 1452 Angiomax drip stopped    1755 ACT run by [employer] =resulted 348   1800 Repeated by her resulted= 242  1845 Repeated, result = 231   Has been flushed every 5 minutes by Viktoriya    1915 ACT Resulted= [facility] groin arterial sheath pulled by Dossie Plater, ongoing manual pressure for 30 minutes applied   2000 Sheath pulled, no bleeding.  Tegaderm and pressure dressing applied by Dossie Plater  2015 Report called to receiving nurse with update  1030 Pt taken to room in stable conditio by two RICS in stable condition

## 2022-07-20 NOTE — BRIEF OP NOTE
Brief Postoperative Note    Patient: Grant Levin  YOB: 1960  MRN: [id number]    Date of Procedure: 7/20/2022     Pre-Op Diagnosis: Chest pain at rest [R07.9]  CAD (coronary artery disease) [I25.10]  Abnormal nuclear stress test [R94.39]    Post-Op Diagnosis: Same as preoperative diagnosis. Procedure(s):  LEFT HEART CATH / CORONARY ANGIOGRAPHY    Surgeon(s):  Edith Navarro MD    Surgical Assistant: None    Anesthesia: Other     Estimated Blood Loss (mL): less than 50     Complications: Bleeding    Specimens: * No specimens in log *     Implants:   Implant Name Type Inv. Item Serial No.  Lot No. LRB No. Used Action   STENT COR MEGHAN [id number] -- MEGHAN RESOLUTE SHERYL - [id number]  STENT COR MEGHAN [id number] -- MEGHAN RESOLUTE SHERYL  [employer]WD [id number] N/A 1 Implanted       Drains: * No LDAs found *    Findings: Distal RCA have 90% stenosis treated with single drug-eluting stent with excellent result. Complete report to follow.   Thanks    Electronically Signed by Carol Churchill MD on 7/20/2022 at 1:56 PM

## 2022-07-20 NOTE — PROGRESS NOTES
Occupational Therapy Treatment Attempt     Chart reviewed. Attempted Occupational Therapy Treatment, however, patient unable to be seen due to:  []  Nausea/vomiting  []  Eating  []  Pain  []  Patient too lethargic  [x]  Off Unit for testing/procedure  []  Dialysis treatment in progress  []  Telemetry Results  []  Other:      Will f/u later as patient's schedule allows.   Hyacinth Aguirre, OTR/L

## 2022-07-20 NOTE — PROGRESS NOTES
7/20/2022  PT treatment not completed due to:  [x] Off Unit for testing/procedure  [] Pain  [] Eating  [] Patient too lethargic  [] Nausea/vomiting  [] Dialysis treatment in progress   [] Other:  Will f/u at later time as pt schedule allows. Thank you.    Trudy Cain, PT

## 2022-07-20 NOTE — PROGRESS NOTES
Paged Dr. Vera Moder to clarify if heparin should be held since patient will have cardiac cath with possible PCI tomorrow. Orders received to give heparin dose tonight and hold AM dose. /93, received orders for PRN hydralazine. Port dressings changed.

## 2022-07-20 NOTE — PROGRESS NOTES
Nephrology Progress note    Subjective:     Lauri Stinson is a 60 y.o. male with  a PMH of DM, HTN, CAD, ESRD on HD TTS admitted for right foot infection. MRI suggested osteomyelitis. No fever, chills. Pt s/p debridement, I&D- on abx. HD Catheter fell off and was replaced 6/29.       -Pt denies CP/SOB/Fever/Chills.  S/p HD yesterday       Admit Date: 6/27/2022  Principal Problem:    Acute hematogenous osteomyelitis of right foot ([address] (6/28/2022)    Active Problems:    Diabetes mellitus with foot ulcer ([address] (6/27/2022)      CAD (coronary artery disease) (6/28/2022)      ESRD (end stage renal disease) ([address] (6/28/2022)      Cellulitis of right heel (6/28/2022)      Diabetic foot ulcer with osteomyelitis ([address] (6/28/2022)      Ulcer of right heel, with necrosis of bone ([address] (6/28/2022)      Infection and inflammatory reaction due to internal fixation device of other site, initial encounter ([address] (7/5/2022)      Left arm swelling (7/10/2022)      Chest pain at rest (6/27/2022)      Overview: Added automatically from request for surgery [id number]      Abnormal nuclear stress test (6/27/2022)      Overview: Added automatically from request for surgery [id number]    Current Facility-Administered Medications   Medication Dose Route Frequency    hydrALAZINE (APRESOLINE) 20 mg/mL injection 10 mg  10 mg IntraVENous Q6H PRN    nystatin (MYCOSTATIN) 100,000 unit/gram powder   Topical BID    Saccharomyces boulardii (FLORASTOR) capsule 250 mg  250 mg Oral BID    isosorbide mononitrate ER (IMDUR) tablet 30 mg  30 mg Oral DAILY    bacitracin zinc (BACITRACIN) 500 unit/gram ointment   Topical BID    pantoprazole (PROTONIX) tablet 40 mg  40 mg Oral ACB    alteplase (CATHFLO) 1 mg in sterile water (preservative free) 1 mL injection  1 mg InterCATHeter DIALYSIS PRN    nitroglycerin (NITROSTAT) tablet 0.4 mg  0.4 mg SubLINGual PRN    morphine injection 1 mg  1 mg IntraVENous Q4H PRN    ondansetron (ZOFRAN) injection 4 mg  4 mg IntraVENous Q6H PRN    gabapentin (NEURONTIN) capsule 300 mg  300 mg Oral QHS    heparin (porcine) injection 5,000 Units  5,000 Units SubCUTAneous Q8H    melatonin (rapid dissolve) tablet 10 mg  10 mg Oral QHS PRN    epoetin lisette-epbx (RETACRIT) injection 10,000 Units  10,000 Units SubCUTAneous Q TUE, THU & SAT    heparin (porcine) 1,000 unit/mL injection 3,400 Units  3,400 Units Hemodialysis DIALYSIS PRN    heparin (porcine) 100 unit/mL injection 500 Units  500 Units InterCATHeter Q8H PRN    sodium chloride (NS) flush 5-40 mL  5-40 mL IntraVENous Q8H    sodium chloride (NS) flush 5-40 mL  5-40 mL IntraVENous PRN    naloxone (NARCAN) injection 0.1 mg  0.1 mg IntraVENous Multiple    loperamide (IMODIUM) capsule 2 mg  2 mg Oral Q4H PRN    piperacillin-tazobactam (ZOSYN) 4.5 g in 0.9% sodium chloride (MBP/ADV) 100 mL MBP  4.5 g IntraVENous Q12H    sevelamer carbonate (RENVELA) tab 1,600 mg  1,600 mg Oral TID WITH MEALS    allopurinoL (ZYLOPRIM) tablet 100 mg  100 mg Oral DAILY    carvediloL (COREG) tablet 12.5 mg  12.5 mg Oral BID    ezetimibe (ZETIA) tablet 10 mg  10 mg Oral DAILY    amLODIPine (NORVASC) tablet 10 mg  10 mg Oral DAILY    vit B Cmplx 3-FA-Vit C-Biotin (NEPHRO NIKITA RX) tablet 1 Tablet  1 Tablet Oral DAILY    insulin lispro (HUMALOG) injection 3 Units  3 Units SubCUTAneous TIDAC    aspirin chewable tablet 81 mg  81 mg Oral DAILY    insulin glargine (LANTUS) injection 10 Units  10 Units SubCUTAneous QHS    insulin lispro (HUMALOG) injection   SubCUTAneous AC&HS    glucose chewable tablet 16 g  4 Tablet Oral PRN    glucagon (GLUCAGEN) injection 1 mg  1 mg IntraMUSCular PRN    dextrose 10% infusion 0-250 mL  0-250 mL IntraVENous PRN    acetaminophen (TYLENOL) tablet 650 mg  650 mg Oral Q6H PRN         Allergy:   No Known Allergies     Objective:     Visit Vitals  /72 (BP 1 Location: Right upper arm, BP Patient Position: At rest)   Pulse 78   Temp 98.3 °F (36.8 °C)   Resp 18   Ht 6' (1.829 m)   [facility] 102.6 kg (226 lb 4.8 oz)   SpO2 100%   BMI 30.69 kg/m²         Intake/Output Summary (Last 24 hours) at 7/20/2022 6669  Last data filed at 7/20/2022 5722  Gross per 24 hour   Intake 550 ml   Output 2500 ml   Net -1950 ml         Physical Exam:       General: No acute distress   HENT: Atraumatic and normocephalic   Eyes: Normal conjunctiva   Neck: Supple No JVD   Cardiovascular: Normal S1 & S2, no m/r/g   Pulmonary/Chest Wall: Clear to auscultation bilaterally   Abdominal: Soft and non-tender   Musculoskeletal: Wound Vac on R foot   Neurological: AA and O X 3, No focal deficits     RIJ TDC in Place     Data Review:  Lab Results   Component Value Date/Time    Sodium 138 07/18/2022 01:30 AM    Potassium 5.9 (H) 07/18/2022 01:30 AM    Chloride 104 07/18/2022 01:30 AM    CO2 26 07/18/2022 01:30 AM    Anion gap 8 07/18/2022 01:30 AM    Glucose 187 (H) 07/18/2022 01:30 AM    BUN 59 (H) 07/18/2022 01:30 AM    Creatinine 8.67 (H) 07/18/2022 01:30 AM    BUN/Creatinine ratio 7 (L) 07/18/2022 01:30 AM    GFR est AA 8 (L) 07/18/2022 01:30 AM    GFR est non-AA 6 (L) 07/18/2022 01:30 AM    Calcium 8.3 (L) 07/18/2022 01:30 AM     Lab Results   Component Value Date/Time    WBC 11.2 07/18/2022 01:30 AM    HGB 8.8 (L) 07/18/2022 01:30 AM    HCT 28.3 (L) 07/18/2022 01:30 AM    PLATELET 525 60/17/4459 01:30 AM    .8 (H) 07/18/2022 01:30 AM     Lab Results   Component Value Date/Time    Calcium 8.3 (L) 07/18/2022 01:30 AM    Phosphorus 4.8 07/11/2022 05:30 AM     No results found for: IRON, FE, TIBC, IBCT, PSAT, FERR  No results found for: FERR      Impression:   -ESRD on HD TTS  -Diabetic foot ulcer with osteomyelitis, on abx   -DM  -HTN  -Anemia in CKD  -SHPT  -Hyperphosphatemia: on Renvela   - Abn stress test    Plan:   HD tomorrow  [facility] TIW  For cath/PCI today           MD Bonifacio Ware  [phone]

## 2022-07-20 NOTE — ROUTINE PROCESS
Assumed patient care. Patient is alert and oriented. Resting on bed. Wound vac to  right foot. Bed at low position, wheels locked and call light within reach.

## 2022-07-20 NOTE — PROGRESS NOTES
Hospitalist Progress Note    Patient: Grant Levin MRN: [id number]  CSN: [id number]    YOB: 1960  Age: 60 y.o. Sex: male    DOA: 6/27/2022 LOS:  LOS: 23 days          Chief Complaint:    Chest pain      Assessment/Plan          Grant Levin is a 60 y.o. male who history, stent, hypertension, Charcot's foot presents with worsening pain and swelling and chills in his right foot despite multiple cleanings and being managed by podiatry as outpatient.      Abnormal stress test-for cardiac cath now, stress test done for chest pain complaints     Large necrotic ulcer right posterior heel with osteomyelitis of the calcaneus and deep abscess-  I&D and grafts done,    Podiatrist and ID f/u Tunnel line placed ,continue iv abx and local wound care, wound vacuum     Diabetes mellitus -  On  lantus 10units and  premeal insulin and continue ssi better controlled and will continue current regimen     End-stage renal disease on dialysis Tuesday Thursday and Saturday -  S/p [facility] replacement  HD per nephrology , received HD yesterday and tolerated well     History of coronary artery disease-  coreg and aspirin    Debility and weakness, needs SNF on discharge     Chronic compressive myelopathy pending surgery -  Supportive care     mis -continue cpap    Obesity    LUE swelling-no DVT    Cath today    Further d/c plans pending results       Disposition :  Patient Active Problem List   Diagnosis Code    Diabetes mellitus with foot ulcer ([address] E11.621, L97.509    CAD (coronary artery disease) I25.10    ESRD (end stage renal disease) ([address] N18.6    Acute hematogenous osteomyelitis of right foot ([address] M86.071    Cellulitis of right heel L03.115    Diabetic foot ulcer with osteomyelitis ([address] E11.621, E11.69, L97.509, M86.9    Ulcer of right heel, with necrosis of bone ([address] L97.414    Infection and inflammatory reaction due to internal fixation device of other site, initial encounter [facility]) T84.69XA    Left arm swelling M79.89    Chest pain at rest R07.9    Abnormal nuclear stress test R94.39       Subjective:    Feeling ok    Denies CP, SOB this am    Review of systems:    Constitutional: denies fevers, chills  Respiratory: denies SOB, cough  Cardiovascular: denies chest pain  Gastrointestinal: denies nausea, vomiting, diarrhea      Vital signs/Intake and Output:  Visit Vitals  /72 (BP 1 Location: Right upper arm, BP Patient Position: At rest)   Pulse 78   Temp 98.3 °F (36.8 °C)   Resp 18   Ht 6' (1.829 m)   Wt 102.6 kg (226 lb 4.8 oz)   SpO2 100%   BMI 30.69 kg/m²     Current Shift:  No intake/output data recorded. Last three shifts:  07/18 1901 - 07/20 0700  In: 910 [P.O.:810; I.V.:100]  Out: 2500     Exam:    General: debilitated obese AAM, alert, NAD, OX3  CVS:Regular rate and rhythm, no M/R/G, S1/S2 heard, no thrill  Lungs:Clear to auscultation bilaterally, no wheezes, rhonchi, or rales  Abdomen: Soft, Nontender, No distention, Normal Bowel sounds, No hepatomegaly  Extremities: right foot in vac and heel float  Neuro:grossly normal , follows commands  Psych:appropriate                Labs: Results:       Chemistry Recent Labs     07/18/22  0130   *      K 5.9*      CO2 26   BUN 59*   CREA 8.67*   CA 8.3*   AGAP 8   BUCR 7*      CBC w/Diff Recent Labs     07/20/22  0922 07/18/22  0130   WBC 10.3 11.2   RBC 2.65* 2.78*   HGB 8.3* 8.8*   HCT 27.0* 28.3*    186   GRANS 76* 73   LYMPH 9* 11*   EOS 5 5      Cardiac Enzymes No results for input(s): CPK, CKND1, PAULO in the last 72 hours. No lab exists for component: CKRMB, TROIP   Coagulation No results for input(s): PTP, INR, APTT, INREXT, INREXT in the last 72 hours.     Lipid Panel Lab Results   Component Value Date/Time    Cholesterol, total 188 07/19/2022 03:12 AM    HDL Cholesterol 42 07/19/2022 03:12 AM    LDL, calculated 131.2 (H) 07/19/2022 03:12 AM    VLDL, calculated 14.8 07/19/2022 03:12 AM    Triglyceride 74 07/19/2022 03:12 AM    CHOL/HDL Ratio 4.5 07/19/2022 03:12 AM      BNP No results for input(s): BNPP in the last 72 hours. Liver Enzymes No results for input(s): TP, ALB, TBIL, AP in the last 72 hours.     No lab exists for component: SGOT, GPT, DBIL   Thyroid Studies No results found for: T4, T3U, TSH, TSHEXT, TSHEXT     Procedures/imaging: see electronic medical records for all procedures/Xrays and details which were not copied into this note but were reviewed prior to creation of Lydia Morales MD

## 2022-07-20 NOTE — PROGRESS NOTES
Call received from Cardiology- Dr Anuja Santamaria regarding proceed with cardiac cath, pt with abn stress test    No bacteremia- on IV abx-> no contra-indication to proceed from ID perspective if testing needed    Amanda Nieves MD  [facility]  Office #:     [phone]  [phone]   Office Fax: [phone]

## 2022-07-21 VITALS
OXYGEN SATURATION: 97 % | WEIGHT: 226.3 LBS | BODY MASS INDEX: 30.65 KG/M2 | HEIGHT: 72 IN | RESPIRATION RATE: 20 BRPM | TEMPERATURE: 98.2 F | SYSTOLIC BLOOD PRESSURE: 150 MMHG | DIASTOLIC BLOOD PRESSURE: 72 MMHG | HEART RATE: 84 BPM

## 2022-07-21 PROBLEM — Z95.820 S/P ANGIOPLASTY WITH STENT: Status: ACTIVE | Noted: 2022-07-21

## 2022-07-21 PROBLEM — Z86.2 HISTORY OF ANEMIA DUE TO CKD: Status: ACTIVE | Noted: 2022-07-21

## 2022-07-21 PROBLEM — I10 HYPERTENSION: Status: ACTIVE | Noted: 2022-07-21

## 2022-07-21 PROBLEM — N18.9 HISTORY OF ANEMIA DUE TO CKD: Status: ACTIVE | Noted: 2022-07-21

## 2022-07-21 LAB
ACT BLD: 225 SECS (ref 79–138)
ACT BLD: 231 SECS (ref 79–138)
ACT BLD: 242 SECS (ref 79–138)
ACT BLD: 248 SECS (ref 79–138)
ANION GAP SERPL CALC-SCNC: 8 MMOL/L (ref 3–18)
BUN SERPL-MCNC: 33 MG/DL (ref 7–18)
BUN/CREAT SERPL: 6 (ref 12–20)
CALCIUM SERPL-MCNC: 9 MG/DL (ref 8.5–10.1)
CHLORIDE SERPL-SCNC: 101 MMOL/L (ref 100–111)
CO2 SERPL-SCNC: 30 MMOL/L (ref 21–32)
CREAT SERPL-MCNC: 5.5 MG/DL (ref 0.6–1.3)
ERYTHROCYTE [DISTWIDTH] IN BLOOD BY AUTOMATED COUNT: 17.1 % (ref 11.6–14.5)
GLUCOSE BLD STRIP.AUTO-MCNC: 208 MG/DL (ref 70–110)
GLUCOSE BLD STRIP.AUTO-MCNC: 280 MG/DL (ref 70–110)
GLUCOSE SERPL-MCNC: 119 MG/DL (ref 74–99)
HCT VFR BLD AUTO: 25.3 % (ref 36–48)
HGB BLD-MCNC: 7.8 G/DL (ref 13–16)
MCH RBC QN AUTO: 31 PG (ref 24–34)
MCHC RBC AUTO-ENTMCNC: 30.8 G/DL (ref 31–37)
MCV RBC AUTO: 100.4 FL (ref 78–100)
NRBC # BLD: 0 K/UL (ref 0–0.01)
NRBC BLD-RTO: 0 PER 100 WBC
PLATELET # BLD AUTO: 178 K/UL (ref 135–420)
PMV BLD AUTO: 11 FL (ref 9.2–11.8)
POTASSIUM SERPL-SCNC: 3.4 MMOL/L (ref 3.5–5.5)
RBC # BLD AUTO: 2.52 M/UL (ref 4.35–5.65)
SODIUM SERPL-SCNC: 139 MMOL/L (ref 136–145)
WBC # BLD AUTO: 10.8 K/UL (ref 4.6–13.2)

## 2022-07-21 PROCEDURE — 74011250637 HC RX REV CODE- 250/637: Performed by: HOSPITALIST

## 2022-07-21 PROCEDURE — 74011636637 HC RX REV CODE- 636/637: Performed by: INTERNAL MEDICINE

## 2022-07-21 PROCEDURE — 74011250637 HC RX REV CODE- 250/637: Performed by: INTERNAL MEDICINE

## 2022-07-21 PROCEDURE — 85027 COMPLETE CBC AUTOMATED: CPT

## 2022-07-21 PROCEDURE — 80048 BASIC METABOLIC PNL TOTAL CA: CPT

## 2022-07-21 PROCEDURE — 74011000258 HC RX REV CODE- 258: Performed by: PHYSICIAN ASSISTANT

## 2022-07-21 PROCEDURE — 74011250636 HC RX REV CODE- 250/636: Performed by: PHYSICIAN ASSISTANT

## 2022-07-21 PROCEDURE — 90935 HEMODIALYSIS ONE EVALUATION: CPT

## 2022-07-21 PROCEDURE — 82962 GLUCOSE BLOOD TEST: CPT

## 2022-07-21 PROCEDURE — 94660 CPAP INITIATION&MGMT: CPT

## 2022-07-21 PROCEDURE — 74011250636 HC RX REV CODE- 250/636: Performed by: HOSPITALIST

## 2022-07-21 PROCEDURE — 74011636637 HC RX REV CODE- 636/637: Performed by: HOSPITALIST

## 2022-07-21 PROCEDURE — 74011000250 HC RX REV CODE- 250: Performed by: RADIOLOGY

## 2022-07-21 RX ORDER — CLOPIDOGREL BISULFATE 75 MG/1
75 TABLET ORAL DAILY
Qty: 30 TABLET | Refills: 2 | Status: SHIPPED
Start: 2022-07-22

## 2022-07-21 RX ADMIN — SEVELAMER CARBONATE 1600 MG: 800 TABLET, FILM COATED ORAL at 09:36

## 2022-07-21 RX ADMIN — ATORVASTATIN CALCIUM 40 MG: 20 TABLET, FILM COATED ORAL at 09:37

## 2022-07-21 RX ADMIN — INSULIN LISPRO 3 UNITS: 100 INJECTION, SOLUTION INTRAVENOUS; SUBCUTANEOUS at 12:32

## 2022-07-21 RX ADMIN — AMLODIPINE BESYLATE 10 MG: 5 TABLET ORAL at 09:37

## 2022-07-21 RX ADMIN — Medication 6 UNITS: at 09:38

## 2022-07-21 RX ADMIN — ASPIRIN 81 MG: 81 TABLET, CHEWABLE ORAL at 09:37

## 2022-07-21 RX ADMIN — Medication: at 09:00

## 2022-07-21 RX ADMIN — CLOPIDOGREL BISULFATE 75 MG: 75 TABLET ORAL at 09:37

## 2022-07-21 RX ADMIN — SODIUM CHLORIDE, PRESERVATIVE FREE 10 ML: 5 INJECTION INTRAVENOUS at 05:31

## 2022-07-21 RX ADMIN — ALLOPURINOL 100 MG: 100 TABLET ORAL at 09:38

## 2022-07-21 RX ADMIN — Medication 4 UNITS: at 12:34

## 2022-07-21 RX ADMIN — INSULIN LISPRO 3 UNITS: 100 INJECTION, SOLUTION INTRAVENOUS; SUBCUTANEOUS at 09:36

## 2022-07-21 RX ADMIN — PANTOPRAZOLE SODIUM 40 MG: 40 TABLET, DELAYED RELEASE ORAL at 09:38

## 2022-07-21 RX ADMIN — B-COMPLEX W/ C & FOLIC ACID TAB 1 MG 1 TABLET: 1 TAB at 09:37

## 2022-07-21 RX ADMIN — EZETIMIBE 10 MG: 10 TABLET ORAL at 09:37

## 2022-07-21 RX ADMIN — CARVEDILOL 12.5 MG: 12.5 TABLET, FILM COATED ORAL at 09:40

## 2022-07-21 RX ADMIN — Medication 250 MG: at 09:38

## 2022-07-21 RX ADMIN — HEPARIN SODIUM 5000 UNITS: 5000 INJECTION INTRAVENOUS; SUBCUTANEOUS at 12:33

## 2022-07-21 RX ADMIN — ISOSORBIDE MONONITRATE 30 MG: 30 TABLET, EXTENDED RELEASE ORAL at 09:37

## 2022-07-21 RX ADMIN — PIPERACILLIN AND TAZOBACTAM 4.5 G: 4; .5 INJECTION, POWDER, FOR SOLUTION INTRAVENOUS at 09:41

## 2022-07-21 RX ADMIN — NYSTATIN: 100000 POWDER TOPICAL at 09:00

## 2022-07-21 RX ADMIN — SEVELAMER CARBONATE 1600 MG: 800 TABLET, FILM COATED ORAL at 12:33

## 2022-07-21 NOTE — PROGRESS NOTES
Nephrology Progress note    Subjective:     Phill Yang is a 60 y.o. male with  a PMH of DM, HTN, CAD, ESRD on HD TTS admitted for right foot infection. MRI suggested osteomyelitis. No fever, chills. Pt s/p debridement, I&D- on abx. HD Catheter fell off and was replaced 6/29.       -Pt denies CP/SOB/Fever/Chills.  S/p HD        Admit Date: 6/27/2022  Principal Problem:    Acute hematogenous osteomyelitis of right foot ([address] (6/28/2022)    Active Problems:    Diabetes mellitus with foot ulcer ([address] (6/27/2022)      CAD (coronary artery disease) (6/28/2022)      ESRD (end stage renal disease) ([address] (6/28/2022)      Cellulitis of right heel (6/28/2022)      Diabetic foot ulcer with osteomyelitis ([address] (6/28/2022)      Ulcer of right heel, with necrosis of bone ([address] (6/28/2022)      Infection and inflammatory reaction due to internal fixation device of other site, initial encounter ([address] (7/5/2022)      Left arm swelling (7/10/2022)      Chest pain at rest (6/27/2022)      Overview: Added automatically from request for surgery [id number]      Abnormal nuclear stress test (6/27/2022)      Overview: Added automatically from request for surgery [id number]      History of anemia due to CKD (7/21/2022)      S/P angioplasty with stent (7/21/2022)      Hypertension (7/21/2022)    Current Facility-Administered Medications   Medication Dose Route Frequency    dimethicone 1 % topical cream   Topical BID    clopidogreL (PLAVIX) tablet 75 mg  75 mg Oral DAILY    atorvastatin (LIPITOR) tablet 40 mg  40 mg Oral DAILY    hydrALAZINE (APRESOLINE) 20 mg/mL injection 10 mg  10 mg IntraVENous Q6H PRN    nystatin (MYCOSTATIN) 100,000 unit/gram powder   Topical BID    Saccharomyces boulardii (FLORASTOR) capsule 250 mg  250 mg Oral BID    isosorbide mononitrate ER (IMDUR) tablet 30 mg  30 mg Oral DAILY    bacitracin zinc (BACITRACIN) 500 unit/gram ointment   Topical BID    pantoprazole (PROTONIX) tablet 40 mg  40 mg Oral ACB alteplase (CATHFLO) 1 mg in sterile water (preservative free) 1 mL injection  1 mg InterCATHeter DIALYSIS PRN    nitroglycerin (NITROSTAT) tablet 0.4 mg  0.4 mg SubLINGual PRN    morphine injection 1 mg  1 mg IntraVENous Q4H PRN    ondansetron (ZOFRAN) injection 4 mg  4 mg IntraVENous Q6H PRN    gabapentin (NEURONTIN) capsule 300 mg  300 mg Oral QHS    heparin (porcine) injection 5,000 Units  5,000 Units SubCUTAneous Q8H    melatonin (rapid dissolve) tablet 10 mg  10 mg Oral QHS PRN    epoetin lisette-epbx (RETACRIT) injection 10,000 Units  10,000 Units SubCUTAneous Q TUE, THU & SAT    heparin (porcine) 1,000 unit/mL injection 3,400 Units  3,400 Units Hemodialysis DIALYSIS PRN    heparin (porcine) 100 unit/mL injection 500 Units  500 Units InterCATHeter Q8H PRN    sodium chloride (NS) flush 5-40 mL  5-40 mL IntraVENous Q8H    sodium chloride (NS) flush 5-40 mL  5-40 mL IntraVENous PRN    naloxone (NARCAN) injection 0.1 mg  0.1 mg IntraVENous Multiple    loperamide (IMODIUM) capsule 2 mg  2 mg Oral Q4H PRN    piperacillin-tazobactam (ZOSYN) 4.5 g in 0.9% sodium chloride (MBP/ADV) 100 mL MBP  4.5 g IntraVENous Q12H    sevelamer carbonate (RENVELA) tab 1,600 mg  1,600 mg Oral TID WITH MEALS    allopurinoL (ZYLOPRIM) tablet 100 mg  100 mg Oral DAILY    carvediloL (COREG) tablet 12.5 mg  12.5 mg Oral BID    ezetimibe (ZETIA) tablet 10 mg  10 mg Oral DAILY    amLODIPine (NORVASC) tablet 10 mg  10 mg Oral DAILY    vit B Cmplx 3-FA-Vit C-Biotin (NEPHRO NIKITA RX) tablet 1 Tablet  1 Tablet Oral DAILY    insulin lispro (HUMALOG) injection 3 Units  3 Units SubCUTAneous TIDAC    aspirin chewable tablet 81 mg  81 mg Oral DAILY    insulin glargine (LANTUS) injection 10 Units  10 Units SubCUTAneous QHS    insulin lispro (HUMALOG) injection   SubCUTAneous AC&HS    glucose chewable tablet 16 g  4 Tablet Oral PRN    glucagon (GLUCAGEN) injection 1 mg  1 mg IntraMUSCular PRN    dextrose 10% infusion 0-250 mL  0-250 mL IntraVENous PRN acetaminophen (TYLENOL) tablet 650 mg  650 mg Oral Q6H PRN     Current Outpatient Medications   Medication Sig    [START ON 7/22/2022] clopidogreL (PLAVIX) 75 mg tab Take 1 Tablet by mouth in the morning. isosorbide mononitrate ER (IMDUR) 30 mg tablet Take 1 Tablet by mouth daily. pantoprazole (PROTONIX) 40 mg tablet Take 1 Tablet by mouth Daily (before breakfast). bacitracin zinc (BACITRACIN) ointment Apply  to affected area two (2) times a day. Saccharomyces boulardii (FLORASTOR) 250 mg capsule Take 1 Capsule by mouth two (2) times a day for 7 days. nystatin (MYCOSTATIN) powder Apply  to affected area two (2) times a day. insulin lispro (HUMALOG) 100 unit/mL injection Use as directed    Lactobacillus Acidoph & Bulgar (FLORANEX) 1 million cell tab tablet Take 2 Tablets by mouth two (2) times a day. melatonin, rapid dissolve, 5 mg TbDi tablet Take 2 Tablets by mouth nightly as needed (slsee[). sevelamer carbonate (RENVELA) 800 mg tab tab Take 2 Tablets by mouth three (3) times daily (with meals). vit B Cmplx 3-FA-Vit C-Biotin (NEPHRO NIKITA RX) 1- mg-mg-mcg tablet Take 1 Tablet by mouth daily. piperacillin-tazobactam 4.5 gram 4.5 g IVPB 4.5 g by IntraVENous route every twelve (12) hours. gabapentin (NEURONTIN) 300 mg capsule Take 300 mg by mouth nightly. allopurinoL (Zyloprim) 100 mg tablet Take 100 mg by mouth daily. ezetimibe (Zetia) 10 mg tablet Take 10 mg by mouth. carvediloL (COREG) 12.5 mg tablet Take 12.5 mg by mouth two (2) times a day. amLODIPine (NORVASC) 10 mg tablet Take 10 mg by mouth daily. aspirin 81 mg chewable tablet Take 81 mg by mouth daily. ascorbic acid, vitamin C, (Vitamin C) 500 mg tablet Take 500 mg by mouth. insulin glargine (Lantus U-100 Insulin) 100 unit/mL injection 10 Units by SubCUTAneous route nightly. atorvastatin (LIPITOR) 40 mg tablet Take 40 mg by mouth daily.          Allergy:   No Known Allergies     Objective:     Visit Vitals  BP (!) 150/72   Pulse 84   Temp 98.2 °F (36.8 °C) (Oral)   Resp 20   Ht 6' (1.829 m)   Wt 102.6 kg (226 lb 4.8 oz)   SpO2 97%   BMI 30.69 kg/m²         Intake/Output Summary (Last 24 hours) at 7/21/2022 1639  Last data filed at 7/21/2022 1405  Gross per 24 hour   Intake 420 ml   Output 2500 ml   Net -2080 ml         Physical Exam:       General: No acute distress   HENT: Atraumatic and normocephalic   Eyes: Normal conjunctiva   Neck: Supple No JVD   Cardiovascular: Normal S1 & S2, no m/r/g   Pulmonary/Chest Wall: Clear to auscultation bilaterally   Abdominal: Soft and non-tender   Musculoskeletal: Wound Vac on R foot   Neurological: AA and O X 3, No focal deficits     RIJ TDC in Place     Data Review:  Lab Results   Component Value Date/Time    Sodium 139 07/21/2022 02:15 PM    Potassium 3.4 (L) 07/21/2022 02:15 PM    Chloride 101 07/21/2022 02:15 PM    CO2 30 07/21/2022 02:15 PM    Anion gap 8 07/21/2022 02:15 PM    Glucose 119 (H) 07/21/2022 02:15 PM    BUN 33 (H) 07/21/2022 02:15 PM    Creatinine 5.50 (H) 07/21/2022 02:15 PM    BUN/Creatinine ratio 6 (L) 07/21/2022 02:15 PM    GFR est AA 13 (L) 07/21/2022 02:15 PM    GFR est non-AA 11 (L) 07/21/2022 02:15 PM    Calcium 9.0 07/21/2022 02:15 PM     Lab Results   Component Value Date/Time    WBC 10.8 07/21/2022 10:40 AM    HGB 7.8 (L) 07/21/2022 10:40 AM    HCT 25.3 (L) 07/21/2022 10:40 AM    PLATELET 745 78/64/6829 10:40 AM    .4 (H) 07/21/2022 10:40 AM     Lab Results   Component Value Date/Time    Calcium 9.0 07/21/2022 02:15 PM    Phosphorus 4.8 07/11/2022 05:30 AM     No results found for: IRON, FE, TIBC, IBCT, PSAT, FERR  No results found for: FERR      Impression:   -ESRD on HD TTS  -Diabetic foot ulcer with osteomyelitis, on abx   -DM  -HTN  -Anemia in CKD  -SHPT  -Hyperphosphatemia: on Renvela   - Abn stress test    Plan:   HD today  [facility] TIW  DC today           Maicol Abreu MD  [facility]  [phone]

## 2022-07-21 NOTE — DISCHARGE SUMMARY
[address]    Name:  Radha Spears  MR#:   [id number]  :  1960  ACCOUNT #:  [de-identified]  ADMIT DATE:  2022  DISCHARGE DATE:    DISCHARGE DIAGNOSES:  1. Significant infection of the right heel requiring surgical intervention and intravenous antibiotic treatment for osteomyelitis as well as I and D, graft work, and a tunneled line placed for continued intravenous antibiotics. He now has a wound VAC in place. 2.  Coronary disease, status post a right coronary artery stent placed on .  3.  Diabetes mellitus with end-stage renal disease. 4.  End-stage renal disease, on dialysis Tuesday, Thursday, Saturday. 5.  Debility and weakness. 6.  History of coronary artery disease. 7.  Hypertension. 8.  Anemia of chronic renal failure. 9.  Chronic compressive myelopathy. 10.  Obstructive sleep apnea, on CPAP. 11.  Obesity. 12.  Left upper extremity swelling. The patient is a Full code. His diet instruction is regular four carb choice. His tunneled port will continue with him for continued IV antibiotic therapy. He is on Accu-Cheks before meals and after meals. He is on dialysis Tuesday, Thursday, Saturday. His instructions from Cardiology are to continue his Plavix, aspirin, and statin. Wound Care has a wound VAC in place with Prevalon boot to both feet while in bed. He should have the wound cleansed prior to applying dressing with normal saline only. His infection followup is to continue on intravenous Zosyn 2.25 g every 8 hours for 6 weeks total, which ends on  and he is to have weekly labs of CBC with differential, LFTs, quantitative CRP on Monday, and these labs should be faxed to Dr. Lashaun Hanley [phone]. He is to follow up in the Infectious Disease clinic on discharge in 2-3 weeks.   His discharge followups include Dr. Willie Chester in 1 week, [facility] dialysis routine Tuesday, Thursday, Saturday, Dr. Lashaun Hanley in 2 weeks in the Infectious Disease Clinic, and the labs as noted. MEDICATIONS:  1. Imdur 30 mg daily. 2.  Plavix 75 mg daily. 3.  florastor two tablets twice daily for a week. 4.  Melatonin two tablets nightly as needed. 5.  Nystatin powder to affected area of rectum twice daily as needed. 6.  Protonix 40 mg daily. 7.  Zosyn is now 4.5 g IV q.12 h. until 08/08. 8.  Renvela 800 mg tablets two tablets three times daily with meals. 9.  Nephro-Joselyn one tablet daily. 10.  He also takes vitamin C 500 mg daily. 11.  Allopurinol 100 mg daily. 12.  Norvasc 10 mg daily. 13.  Aspirin 81 mg daily. 14.  Lipitor 40 mg at night. 15.  Coreg 12.5 mg twice daily. 16.  Zetia 10 mg at night. 17.  Neurontin 300 mg nightly. 18.  Lantus 10 units subcu daily and sliding scale as needed. 23.  Make sure he remains on Plavix, aspirin and statin, as well as a beta-blocker because of the coronary disease and recent right coronary stent placed here. HOSPITAL SUMMARY:  This is a 58-year-old gentleman who came in with a significant infection in his heel that required debridement, I and D, grafting. Extensive work by Devon Simon and subsequent wound VAC. Continued IV antibiotics for deep tissue and osteomyelitis infection. He has been doing well from that. During his stay waiting for authorization to go to skilled nursing, he had episode of chest pain with negative troponins and unremarkable workup. Then with recurrence of that, we recommended stress testing which was abnormal.  He subsequently went for cardiac catheterization revealing obstruction in the right coronary artery that required a stent placement. He has been followed by Cardiology here. He is cleared from their standpoint for discharge. He continues on his usual dialysis plan. He has been followed by Infectious Disease, Nephrology, and Cardiology, as well as Podiatry and Wound Care during his stay here.   At this point, he is stable for release from the hospital.  His blood pressure is 150/72, pulse 84, temperature 98.2, respiratory rate 20, SaO2 is 97% on room air. His most recent labs indicate an H and H of 7.8 and 25.3, platelet count is 537,080, white count is normal.  His chemistries consistent with end-stage renal disease, but normal electrolytes. His last blood sugar is 208. The patient is having no chest pain, shortness of breath, fevers, chills, or cough. 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 No evidence for acute DVT in the left upper extremity. Nephrology did not recommend anticoagulating the chronic thrombus at this point. He has a line for his antibiotics and a port for his dialysis. The patient is stable for release from the hospital today with continued care of his acute and chronic medical issues at this point. He is examined by myself this morning. He is awake and alert, in no acute distress. Overweight, debilitated gentleman. Lungs are clear. Cardiac exam, regular rate and rhythm. His abdomen is obese, soft, nontender. Lower extremities are in heel floats with a wound VAC on the right heel. His mentation is appropriate. He is stable to continue PT and OT at a nursing facility with followup as noted. 45 minutes discharge time.       Lizzy Hendrix MD      [id number]  D:  07/21/2022 14:59  T:  07/21/2022 15:32  JOB #:  [id number]

## 2022-07-21 NOTE — PROGRESS NOTES
Occupational Therapy Treatment Attempt     Chart reviewed. Attempted Occupational Therapy Treatment, however, patient unable to be seen due to:  []  Nausea/vomiting  []  Eating  []  Pain  []  Patient too lethargic  []  Off Unit for testing/procedure  [x]  Dialysis treatment in progress  []  Telemetry Results  []  Other:      Will f/u later as patient's schedule allows.   Anup Mckoy, OTR/L

## 2022-07-21 NOTE — PROGRESS NOTES
CM spoke with physician on rounds. Per physician the rehab facility can start [address] [facility] [address]. CM spoke with therapy to let them know as patient needs 50 hors of PT/OT notes for insurance auth. CM updated facility that auth could be started. CM informed that patient could come today on the previous authorization. Physician notified. Per facility, no need for another rapid Covid. Transition of care to SNF: [facility]     Communication to Patient/Family:  Met with patient and family, and they are agreeable to the transition plan. The Plan for Transition of Care is related to the following treatment goals: rehab    The Patient and/or patient representative  was provided with a choice of provider and agrees   with the discharge plan. Yes [x] No []    Freedom of choice list was provided with basic dialogue that supports the patient's individualized plan of care/goals and shares the quality data associated with the providers. Yes [x] No []    SNF/Rehab Transition:  Patient has been accepted to [facility] at [facility], [address] [place] [address], and meets criteria for admission. Patient will transported by [facility] and expected to leave at    . Communication to SNF/Rehab:  Bedside RN, Isai Nur, has been notified to update the transition plan to the facility and call report [phone]    Discharge information has been updated on the AVS and sent via [facility] and/or CC link. Discharge instructions to be fax'd to facility at [phone]     Please include all hard scripts for controlled substances, med rec and dc summary, and AVS in packet. Please medicate for pain prior to dc if possible and needed to help offset delay when patient first arrives to facility.     Reviewed and confirmed with facility, [address] can manage the patient care needs for the following:     Ludivina Montiel with (X) only those applicable:  Medication:  []Medications are available at the facility  [x]IV Antibiotics    []Controlled Substance - hard copies available sent. []Weekly Labs    Equipment:  []CPAP/BiPAP  []Wound Vacuum  []Mcmahon or Urinary Device  [x]PICC/Central Line  []Nebulizer  []Ventilator    Treatment:  []Isolation (for MRSA, VRE, etc.)  []Surgical Drain Management  []Tracheostomy Care  []Dressing Changes  []Dialysis with transportation  []PEG Care  []Oxygen  []Daily Weights for Heart Failure    Dietary:  []Any diet limitations  []Tube Feedings   []Total Parenteral Management (TPN)    Financial Resources:  []Medicaid Application Completed    []UAI Completed and copy given to pt/family    []A screening has previously been completed. []Level II Completed    [] Private pay individual who will not become   financially eligible for Medicaid within 6 months from admission to a [address] [place] [address] facility. [] Individual refused to have screening conducted. []Medicaid Application Completed    []The screening denied because it was determined individual did not need/did not qualify for nursing facility level of care. [] Out of state residents seeking direct admission to a [address] facility. [] Individuals who are inpatients of an out of state hospital, or in state or out of state veterans/ hospital and seek direct admission to a [address] facility  [] Individuals who are pateints or residents of a state owned/operated facility that is licensed by [employer] and seek direct admission to [address] [place] [address]  [] A screening not required for enrollment in [address] [address] [address] services as set out in [address] [place] [address] 99-  [] [facility]) staff shall perform screenings of the [facility] clients. Advanced Care Plan:  []Surrogate Decision Maker of Care  []POA  []Communicated Code Status and copy sent.     Other:

## 2022-07-21 NOTE — PROGRESS NOTES
. Left femoral sheath removed at 2000. Manual pressure held for 30 min. No hematoma or bruising noted. Patient tolerated well. Tegaderm applied. Pressure dressing applied due to patient moving leg occasionally.

## 2022-07-21 NOTE — PROGRESS NOTES
TREATMENT SUMMARY   Patient dialyzed in room 343  Tolerated treatment well without any complaints or complications. RIJ TDC functioning well, patent      3000 ml removed via UF with a with a net removal 2500  Report given to Cydney otto RN with all questions answered     TREATMENT  NOTES      CHG dressing is dry and intact. No redness, swelling, or drainage at site.                                                                                                            ACUTE HEMODIALYSIS FLOW SHEET           ACCESS   CATHETER ACCESS: [] N/A  [x] RIGHT  [] LEFT  [] IJ  [] SUBCL [] FEM                    [] First use X-ray  [x] Tunnel     [] Non-Tunneled      [x] No S/S infection  [] Redness [] Drainage  [] Cultured [] Swelling [] Pain                    [x] Medical Aseptic [] Prep Dressing Changed TO BE CHANGED BY Kiley Rosales RN                  [] Clotted [x] Patent []      Flows: [x] Good [] Poor [] Reversed                If Access Problem Dr. Cole Vu: [] Yes [] No    Date:_____  [] N/A[]   GRAFT/FISTULA ACCESS:  [x] N/A  [] RIGHT  [] LEFT  [] UE   [] LE      [] AVG  [] AVF [] BUTTONHOLE    [] +BRUIT/THRILL [] MEDICAL ASEPTIC PREP     [] No S/S infection  [] Redness [] Drainage  [] Cultured [] Swelling [] Pain              If Access Problem Dr. Cole Vu: [] Yes [] No    Date:______ [] N/A          RO/HEMODIAYLSIS MACHINE SAFETY CHECKS- 2 Blaise Colon TREATMENT          [] [facility]: Machine Serial #1:  [id number]    RO Serial #[id number]                       [x] [facility]: Machine Serial #2:  [id number] Serial #[id number]      [] [facility]: Machine Serial #3:  [id number]  RO Serial #[id number]     Alarm Test: [x] Pass  Time_1025_  [x] RO/Machine Log Complete    [x] Extracorporeal circuit Tested for integrity           Dialyzer_[id number]_   Tubing_21K22-11_   Dialysate: pH__7.2_  Temp.__37___Conductivity: Meter 14__   HD Machine__14   CHLORINE TESTING- BEFORE EACH TREATMENT AND EVERY 4 HOURS   Total Chlorine: [x] Less than 0.1 ppm Time:__1030___2nd Check Time:___  (If greater than 0.1 ppm from Primary then every 30 minutes from Secondary)   TREATMENT INIATION-WITH DIALYSIS PRECAUTIONS   [x] All Connections Secured   [x] Saline Line Double Clamped    [x] Venous Parameters Set [x] Arterial Parameters Set    [x] Prime Given 250 ml     [x] Air Foam Detector Engaged   PRE-TREATMENT   UF Calculations: Wt to lose:_2500___ml(+) Oral:_0_ml(+)IV Meds/Fluids/Blood prods_0__ml(+) Prime/Rinse__500_ml(=)Total UF Goal__3000__mL      Tx Initiation Note: RECEIVED REPORT. PATIENT ALERT AND ORIENTED. VITAL SIGNS WNL. ALL QUESTIONS ANSWERED WITH PATIENT SAFETY CHECKS COMPLETE. TIME OUT COMPLETE. TREATMENT INITIATED VIA _[facility] TDC____. NO CONCERNS NOTED. [x] Time Out/Safety Check  Time:__1030__      Dialyzer cleared: [x] Good [] Fair [] Poor     Blood Volume Processed _70.9_L  Net UF Removed _2500___mL  Post Tx Access:                  AVF/AVG: Bleeding Stop Time      Art. NA_min Avtar.__NA_min []+bruit/thrill                              Catheter: Locking Solution  [x] Heparin 1 ml/1000 units                                                             [] Normal Saline                                                                      Art.__1.8___ ml Avtar.__1.8___ml                                                           [x] New caps placed         Abbreviations: AVG-arterial venous graft, AVF-arterial venous fistula, IJ-Internal Jugular,  Subcl-Subclavian, Fem-Femoral, Tx-treatment, AP/HR-apical heart rate, DFR-dialysate flow rate, BFR-blood flow rate, AP-arterial pressure, -venous pressure, UF-ultrafiltrate, TMP-transmembrane pressure, Avtar-Venous, Art-Arterial, RO-Reverse Osmosis

## 2022-07-21 NOTE — PROGRESS NOTES
Problem: General Medical Care Plan  Goal: *Vital signs within specified parameters  Outcome: Progressing Towards Goal  Goal: *Labs within defined limits  Outcome: Progressing Towards Goal  Goal: *Absence of infection signs and symptoms  Outcome: Progressing Towards Goal  Goal: *Optimal pain control at patient's stated goal  Outcome: Progressing Towards Goal  Goal: *Skin integrity maintained  Outcome: Progressing Towards Goal  Goal: *Fluid volume balance  Outcome: Progressing Towards Goal  Goal: *Optimize nutritional status  Outcome: Progressing Towards Goal  Goal: *Anxiety reduced or absent  Outcome: Progressing Towards Goal  Goal: *Progressive mobility and function (eg: ADL's)  Outcome: Progressing Towards Goal     Problem: Patient Education: Go to Patient Education Activity  Goal: Patient/Family Education  Outcome: Progressing Towards Goal     Problem: Falls - Risk of  Goal: *Absence of Falls  Description: Document Jaxson Fall Risk and appropriate interventions in the flowsheet. Outcome: Progressing Towards Goal  Note: Fall Risk Interventions:  Mobility Interventions: Bed/chair exit alarm         Medication Interventions: Bed/chair exit alarm    Elimination Interventions: Bed/chair exit alarm    History of Falls Interventions: Bed/chair exit alarm         Problem: Patient Education: Go to Patient Education Activity  Goal: Patient/Family Education  Outcome: Progressing Towards Goal     Problem: Pressure Injury - Risk of  Goal: *Prevention of pressure injury  Description: Document Semaj Scale and appropriate interventions in the flowsheet.   Outcome: Progressing Towards Goal  Note: Pressure Injury Interventions:  Sensory Interventions: Assess changes in LOC    Moisture Interventions: Absorbent underpads, Apply protective barrier, creams and emollients    Activity Interventions: Pressure redistribution bed/mattress(bed type)    Mobility Interventions: Pressure redistribution bed/mattress (bed type)    Nutrition Interventions: Document food/fluid/supplement intake    Friction and Shear Interventions: Apply protective barrier, creams and emollients                Problem: Patient Education: Go to Patient Education Activity  Goal: Patient/Family Education  Outcome: Progressing Towards Goal     Problem: Diabetes Self-Management  Goal: *Disease process and treatment process  Description: Define diabetes and identify own type of diabetes; list 3 options for treating diabetes. Outcome: Progressing Towards Goal  Goal: *Incorporating nutritional management into lifestyle  Description: Describe effect of type, amount and timing of food on blood glucose; list 3 methods for planning meals. Outcome: Progressing Towards Goal  Goal: *Incorporating physical activity into lifestyle  Description: State effect of exercise on blood glucose levels. Outcome: Progressing Towards Goal  Goal: *Developing strategies to promote health/change behavior  Description: Define the ABC's of diabetes; identify appropriate screenings, schedule and personal plan for screenings. Outcome: Progressing Towards Goal  Goal: *Using medications safely  Description: State effect of diabetes medications on diabetes; name diabetes medication taking, action and side effects. Outcome: Progressing Towards Goal  Goal: *Monitoring blood glucose, interpreting and using results  Description: Identify recommended blood glucose targets  and personal targets. Outcome: Progressing Towards Goal  Goal: *Prevention, detection, treatment of acute complications  Description: List symptoms of hyper- and hypoglycemia; describe how to treat low blood sugar and actions for lowering  high blood glucose level. Outcome: Progressing Towards Goal  Goal: *Prevention, detection and treatment of chronic complications  Description: Define the natural course of diabetes and describe the relationship of blood glucose levels to long term complications of diabetes.   Outcome: Progressing Towards Goal  Goal: *Developing strategies to address psychosocial issues  Description: Describe feelings about living with diabetes; identify support needed and support network  Outcome: Progressing Towards Goal  Goal: *Insulin pump training  Outcome: Progressing Towards Goal  Goal: *Sick day guidelines  Outcome: Progressing Towards Goal  Goal: *Patient Specific Goal (EDIT GOAL, INSERT TEXT)  Outcome: Progressing Towards Goal     Problem: Patient Education: Go to Patient Education Activity  Goal: Patient/Family Education  Outcome: Progressing Towards Goal     Problem: Chronic Renal Failure  Goal: *Fluid and electrolytes stabilized  Outcome: Progressing Towards Goal     Problem: Chronic Renal Failure  Goal: *Fluid and electrolytes stabilized  Outcome: Progressing Towards Goal     Problem: Patient Education: Go to Patient Education Activity  Goal: Patient/Family Education  Outcome: Progressing Towards Goal     Problem: Patient Education: Go to Patient Education Activity  Goal: Patient/Family Education  Outcome: Progressing Towards Goal     Problem: Patient Education: Go to Patient Education Activity  Goal: Patient/Family Education  Outcome: Progressing Towards Goal     Problem:  Moderate Sedation (Adult)  Goal: *Patent airway  Outcome: Progressing Towards Goal  Goal: *Adequate oxygenation  Outcome: Progressing Towards Goal  Goal: *Absence of aspiration  Outcome: Progressing Towards Goal  Goal: *Hemodynamically stable  Outcome: Progressing Towards Goal  Goal: *Optimal pain control at patient's stated goal  Outcome: Progressing Towards Goal  Goal: *Absence of nausea/vomiting  Outcome: Progressing Towards Goal  Goal: *Anxiety reduced or absent  Outcome: Progressing Towards Goal  Goal: *Absence of injury  Outcome: Progressing Towards Goal  Goal: *Level of consciousness returns to baseline  Outcome: Progressing Towards Goal  Goal: Interventions  Outcome: Progressing Towards Goal     Problem: Patient Education: Go to Patient Education Activity  Goal: Patient/Family Education  Outcome: Progressing Towards Goal     Problem: Patient Education: Go to Patient Education Activity  Goal: Patient/Family Education  Outcome: Progressing Towards Goal     Problem: Cath Lab Procedures: Pre-Procedure  Goal: Off Pathway (Use only if patient is Off Pathway)  Outcome: Progressing Towards Goal  Goal: Activity/Safety  Outcome: Progressing Towards Goal  Goal: Consults, if ordered  Outcome: Progressing Towards Goal  Goal: Diagnostic Test/Procedures  Outcome: Progressing Towards Goal  Goal: Nutrition/Diet  Outcome: Progressing Towards Goal  Goal: Discharge Planning  Outcome: Progressing Towards Goal  Goal: Medications  Outcome: Progressing Towards Goal  Goal: Respiratory  Outcome: Progressing Towards Goal  Goal: Treatments/Interventions/Procedures  Outcome: Progressing Towards Goal  Goal: Psychosocial  Outcome: Progressing Towards Goal  Goal: *Verbalize description of procedure  Outcome: Progressing Towards Goal  Goal: *Consent signed  Outcome: Progressing Towards Goal     Problem: Cath Lab Procedures: Post-Cath Day of Procedure (Initiate SCIP Measures for Post-Op Care)  Goal: Off Pathway (Use only if patient is Off Pathway)  Outcome: Progressing Towards Goal  Goal: Activity/Safety  Outcome: Progressing Towards Goal  Goal: Consults, if ordered  Outcome: Progressing Towards Goal  Goal: Diagnostic Test/Procedures  Outcome: Progressing Towards Goal  Goal: Nutrition/Diet  Outcome: Progressing Towards Goal  Goal: Discharge Planning  Outcome: Progressing Towards Goal  Goal: Medications  Outcome: Progressing Towards Goal  Goal: Respiratory  Outcome: Progressing Towards Goal  Goal: Treatments/Interventions/Procedures  Outcome: Progressing Towards Goal  Goal: Psychosocial  Outcome: Progressing Towards Goal  Goal: *Procedure site is without bleeding and signs of infection six hours post sheath removal  Outcome: Progressing Towards Goal  Goal: *Hemodynamically stable  Outcome: Progressing Towards Goal  Goal: *Optimal pain control at patient's stated goal  Outcome: Progressing Towards Goal

## 2022-07-21 NOTE — PROGRESS NOTES
Hospitalist Progress Note    Patient: Tee Montague MRN: [id number]  CSN: [id number]    YOB: 1960  Age: 60 y.o. Sex: male    DOA: 6/27/2022 LOS:  LOS: 24 days          Chief Complaint:    Chest pain      Assessment/Plan       Tee Montague is a 60 y.o. male who history, stent, hypertension, Charcot's foot presents with worsening pain and swelling and chills in his right foot despite multiple cleanings and being managed by podiatry as outpatient.      CAD s/p RCA stent 7/20, on plavix, asa, coreg, statin-will continue these     Large necrotic ulcer right posterior heel with osteomyelitis of the calcaneus and deep abscess-  I&D and grafts done,    Podiatrist and ID f/u Tunnel line placed ,continue iv abx and local wound care, wound vacuum     Diabetes mellitus -  On  lantus 10units and  premeal insulin and continue ssi better controlled and will continue current regimen     End-stage renal disease on dialysis Tuesday Thursday and Saturday -  S/p [address] replacement  HD per nephrology, dilaysis today     History of coronary artery disease    HTN-continue current meds    Anemia of ESRD-recheck CBC     Debility and weakness, needs SNF on discharge     Chronic compressive myelopathy pending surgery -  Supportive care     mis -continue cpap     Obesity     LUE swelling-no DVT     Authorization needed for SNF     Further d/c plans based on acceptance and auth received              Disposition :  Patient Active Problem List   Diagnosis Code    Diabetes mellitus with foot ulcer ([address] E11.621, L97.509    CAD (coronary artery disease) I25.10    ESRD (end stage renal disease) ([address] N18.6    Acute hematogenous osteomyelitis of right foot ([address] M86.071    Cellulitis of right heel L03.115    Diabetic foot ulcer with osteomyelitis ([address] E11.621, E11.69, L97.509, M86.9    Ulcer of right heel, with necrosis of bone ([address] L97.414    Infection and inflammatory reaction due to internal fixation device of other site, initial encounter [facility]) T84.69XA    Left arm swelling M79.89    Chest pain at rest R07.9    Abnormal nuclear stress test R94.39    History of anemia due to CKD N18.9, Z86.2    S/P angioplasty with stent Z95.820    Hypertension I10       Subjective: Im ok  Denies CP, SOB, palp, HA    Review of systems:    Constitutional: denies fevers, chills    Gastrointestinal: denies nausea, vomiting, diarrhea      Vital signs/Intake and Output:  Visit Vitals  BP (!) 152/86   Pulse 84   Temp 98.1 °F (36.7 °C)   Resp 20   Ht 6' (1.829 m)   Wt 102.6 kg (226 lb 4.8 oz)   SpO2 96%   BMI 30.69 kg/m²     Current Shift:  07/21 0701 - 07/21 1900  In: 420 [P.O.:320; I.V.:100]  Out: -   Last three shifts:  07/19 1901 - 07/21 0700  In: 100 [I.V.:100]  Out: -     Exam:    General: debilitated AAM, alert, NAD, OX3  CVS:Regular rate and rhythm, no M/R/G, S1/S2 heard, no thrill  Lungs:Clear to auscultation bilaterally, no wheezes, rhonchi, or rales  Abdomen: Soft, Nontender, No distention, Normal Bowel sounds  Extremities:heel floats, wound vac right foot  Neuro:grossly normal , follows commands  Psych:appropriate                Labs: Results:       Chemistry Recent Labs     07/20/22  0922   *      K 5.3      CO2 27   BUN 49*   CREA 8.75*   CA 8.6   AGAP 7   BUCR 6*      CBC w/Diff Recent Labs     07/20/22  1330 07/20/22  0922   WBC  --  10.3   RBC  --  2.65*   HGB 7.7* 8.3*   HCT 25.0* 27.0*   PLT  --  171   GRANS  --  76*   LYMPH  --  9*   EOS  --  5      Cardiac Enzymes No results for input(s): CPK, CKND1, PAULO in the last 72 hours. No lab exists for component: CKRMB, TROIP   Coagulation No results for input(s): PTP, INR, APTT, INREXT, INREXT in the last 72 hours.     Lipid Panel Lab Results   Component Value Date/Time    Cholesterol, total 188 07/19/2022 03:12 AM    HDL Cholesterol 42 07/19/2022 03:12 AM    LDL, calculated 131.2 (H) 07/19/2022 03:12 AM    VLDL, calculated 14.8 07/19/2022 03:12 AM    Triglyceride 74 07/19/2022 03:12 AM    CHOL/HDL Ratio 4.5 07/19/2022 03:12 AM      BNP No results for input(s): BNPP in the last 72 hours. Liver Enzymes No results for input(s): TP, ALB, TBIL, AP in the last 72 hours.     No lab exists for component: SGOT, GPT, DBIL   Thyroid Studies No results found for: T4, T3U, TSH, TSHEXT, TSHEXT     Procedures/imaging: see electronic medical records for all procedures/Xrays and details which were not copied into this note but were reviewed prior to creation of Sarthak Castelan MD

## 2022-07-21 NOTE — PROGRESS NOTES
7/21/2022  PT treatment not completed due to:  [] Off Unit for testing/procedure  [] Pain  [] Eating  [] Patient too lethargic  [] Nausea/vomiting  [x] Dialysis treatment in progress   [] Other:   Will f/u at later time as pt schedule allows. Thank you. Emily Conn, PT    [id number]  2nd attempt: transport team here to  pt for discharge to SNF for rehab.   Emily Conn, PT

## 2022-07-22 ENCOUNTER — TELEPHONE (OUTPATIENT)
Dept: WOUND CARE | Age: 62
End: 2022-07-22

## 2022-07-22 NOTE — PROGRESS NOTES
CM contacted by the ID physician who received a call from [facility] requesting information on the IV AX order. THe facility stated that the medication was not on the d/c summary and they would not be able to get it from their pharmacy in [place] until Saturday. The physician as concerned as this was not the first time she has received a call like this. CM called the  who verified that the discharge summary had been sent and that the  had notified the facility that the patient would need IV Zosyn with an end date of 8/8/2022. CM called the facility and spoke to the  who stated the issue was that the discharge summary was not transferred from the . CM discussed with the  that the hospital can not send a patient without a discharge summary, and that the hospital should have been notified that the facility did not have the discharge summary, as per usual protocol. The  notified this CM that he was STAT ordering antibiotic if it is not already at the facility to minimize the number of missed ABX doses.

## 2022-07-22 NOTE — TELEPHONE ENCOUNTER
Call placed to [facility]. Made them aware of patient's follow up appointment   Future Appointments   Date Time Provider Regis Ham   7/29/2022 11:00 AM [facility]    Wound care nurse also verified receipt of VAC orders. Bed side nurse requested writer fax orders. Orders faxed as requested.

## 2022-07-25 ENCOUNTER — TELEPHONE (OUTPATIENT)
Dept: WOUND CARE | Age: 62
End: 2022-07-25

## 2022-07-25 NOTE — TELEPHONE ENCOUNTER
Discharge Instructions from  [facility] [facility]  [address] [place], [facility]  [phone] Fax [phone]    NAME:  Jasmine  OF BIRTH:  1960  MEDICAL RECORD NUMBER:  [id number]  DATE:  7/25/2022    Wound Cleansing:   Do not scrub or use excessive force. Cleanse wound prior to applying a clean dressing with:  [x] Normal Saline   Topical Treatments:  Do not apply lotions, creams, or ointments to wound bed unless directed. Negative Pressure:           Wound Location: right heel  [] Pressure@   125        mm/Hg  [x]Continuous  [x] Black  :   [x]Change dressing: [x]Three times per week    r:   Negative Pressure    NAME:  Jasmine  OF BIRTH:  1960  MEDICAL RECORD NUMBER:  [id number]  DATE:  7/25/2022    Apply Negative Pressure to right heel wound. [x] Apply skin barrier prep to ashutosh-wound. [x] Cut strips of plastic drape to picture frame wound so that ashutosh-wound is     covered with the drape. [x] If bridging dressing to less prominent site, cover any intact skin that will come in contact with the Negative Pressure Therapy sponge, gauze or channel drain with plastic drape. The sponge should never touch intact skin. [x] Cut sponge, gauze or channel drain to size which will fit into the wound/ulcer bed without being forced. [x] Be sure the sponge is large enough to hold the entire round plastic flange which is attached to the tubing. Never allow flange to be larger than the sponge or it will produce suction damaging intact skin. [] Covered sponge, gauze or channel drain with plastic drape. [x] Cut a hole in this plastic drape directly over the sponge the same size as the plastic drain tubing. [x] Remove plastic liner from flange and apply it directly over the hole you cut. [x] Removethe plastic cover from the flange.    [x] Attach the tubing to the wound/ulcer Negative Pressure Therapy and turn it on to be sure a vacuum is created and that there are no leaks. [x] If air leaks occur, use plastic drape to patch them.           Applied per  Guidelines      Electronically signed by Car Nelson RN on 7/25/2022 at 2:31 PM        Electronically signed Car Nelson RN on 7/25/2022 at 2:29 PM

## 2022-07-27 ENCOUNTER — TELEPHONE (OUTPATIENT)
Dept: WOUND CARE | Age: 62
End: 2022-07-27

## 2022-07-27 NOTE — TELEPHONE ENCOUNTER
Facility Nurse stated that at time of report from the hospital she was told that the david had Stage 3 pressure ulcer to his heel and that they were applying protective cream and a 4x4. Wound care nurse clarified that the wound was a acute surgical wound with a Stravix Graft to portions of the heel remaining and that the surgeon had wanted a wound vac to the heel with mepitel or some other nonadherent dressing directly on the wound . I went on to explain that if using something like adaptic then they would need to fenestrate the adaptic to make sure that drainage passed through. Janie Delgado went on to say the [facility] is there and available and they will place it today.

## 2022-07-29 ENCOUNTER — HOSPITAL ENCOUNTER (OUTPATIENT)
Dept: WOUND CARE | Age: 62
Discharge: HOME | End: 2022-07-29
Attending: PODIATRIST
Payer: MEDICARE

## 2022-07-29 ENCOUNTER — TELEPHONE (OUTPATIENT)
Dept: WOUND CARE | Age: 62
End: 2022-07-29

## 2022-07-29 ENCOUNTER — HOSPITAL ENCOUNTER (OUTPATIENT)
Dept: WOUND CARE | Age: 62
Discharge: HOME | End: 2022-07-29
Attending: INTERNAL MEDICINE
Payer: MEDICARE

## 2022-07-29 VITALS
HEART RATE: 74 BPM | DIASTOLIC BLOOD PRESSURE: 67 MMHG | TEMPERATURE: 99.2 F | SYSTOLIC BLOOD PRESSURE: 142 MMHG | OXYGEN SATURATION: 100 % | RESPIRATION RATE: 16 BRPM

## 2022-07-29 PROCEDURE — 99211 OFF/OP EST MAY X REQ PHY/QHP: CPT

## 2022-07-29 NOTE — TELEPHONE ENCOUNTER
Call placed to [facility] spoke with Dalton Villa. Patient verified with 2 identifiers name and . Nurse stated that the wound VAC was not placed because patient told them he was going to clinic today. VAC is available and ready for placement.

## 2022-07-29 NOTE — DISCHARGE INSTRUCTIONS
Cont Zosyn at current dose. 2nd dose should always be after HD  Send Monday labs-- cbc w/diff, lft, esr, quantitiate CRP to:    Amanda Steven MD  [facility])  Office #:     [phone]-IDOUNP [phone]   Office Fax: [phone]    Next follow up on August 12

## 2022-07-29 NOTE — DISCHARGE INSTRUCTIONS
Discharge Instructions from  [facility]  [address] [place] [address], [facility]  [phone] Fax [phone]    NAME:  Cuba Avila OF BIRTH:  1960  MEDICAL RECORD NUMBER:  [id number]  July 29, 2022    Wound Cleansing:   Do not scrub or use excessive force. Cleanse wound prior to applying a clean dressing with:  [x] Normal Saline [] Keep Wound Dry in Shower    [] Wound Cleanser   [] Cleanse wound with Mild Soap & Water  [] May Shower at Discharge   [] Other:      Topical Treatments:  Do not apply lotions, creams, or ointments to wound bed unless directed. [] Apply moisturizing lotion to skin surrounding the wound prior to dressing change.  [] Apply antifungal ointment to skin surrounding the wound prior to dressing change. [x] Apply thin film of moisture barrier ointment to skin immediately around wound/ allkare/skin prep  [] Other:       Dressings:           Wound Location right heel    [x] Apply Primary Dressing:       [] MediHoney Gel [] Alginate with Silver [] Alginate   [x] Collagen [] Collagen with Silver   [] Santyl with Moisten saline gauze     [] Hydrocolloid   [] MediHoney Alginate [] Foam with Silver   [] Foam   [] Hydrofera Blue    [] Mepilex Border    [] Moisten with Saline [] Hydrogel [x] Mepitel one/adaptic     [] Bactroban/Mupirocin [] Polysporin  [] Other:    [x] Cover and Secure with:     [] Gauze [] Dunia [x] Kerlix   [] Ace Wrap [] Cover Roll Tape [] ABD     [] Other:    Avoid contact of tape with skin.   [x] Change dressing: [] Daily    [] Every Other Day [x] Three times per week   [] Once a week [] Do Not Change Dressing   [] Other:     Negative Pressure:           Wound Location:   [x] Pressure @ 125mm/Hg  [x]Continuous []Intermittent   [x] Black  [] White Foam [] Other:   [x]Change dressing: [x]Three times per week    [x]Other: see specific instructions below     Off-Loading:   [x] Off-loading when [] walking  [x] in bed [x] sitting  [x] Total non-weight bearing  [x] Right Leg  [] Left Leg   [x] Assistive Device [] Walker [] Cane  [] Wheelchair  [] Crutches   [] Surgical shoe    [] Podus Boot(s)   [x] Foam Boot(s)  [] Roll About    [] Cast Boot [] CROW Boot  [] Other:            Return Appointment:  [] Wound and dressing supply provider:   [] ECF or Home Healthcare:  [] Wound Assessment: [] Physician or NP scheduled for Wound Assessment:   [] Return Appointment: With MD  in  2 Week(s)  [] Ordered tests:       Negative Pressure    Apply Negative Pressure towound(s)/ulcer(s). [x] Apply skin barrier prep to ashutosh-wound. [x] Cut strips of plastic drape to picture frame wound so that ashutosh-wound is covered with the drape. Apply collagen and non-adherant dressing to wound (adaptic or mepitel one)  [x] If bridging dressing to less prominent site, cover any intact skin that will come in contact with the Negative Pressure Therapy sponge, gauze or channel drain with plastic drape. The sponge should never touch intact skin. [x] Cut sponge, gauze or channel drain to size which will fit into the wound/ulcer bed without being forced. [x] Be sure the sponge is large enough to hold the entire round plastic flange which is attached to the tubing. Never allow flange to be larger than the sponge or it will produce suction damaging intact skin. Document Total number of individual pieces of foam used within the wound bed  [x] If bridging the dressing away from the primary site, be sure the bridge leads to a piece of sponge large enough to hold the entire flange without allowing any of the flange to overlap onto intact skin. [x] Covered sponge, gauze or channel drain with plastic drape. [x] Cut a hole in this plastic drape directly over the sponge the same size as the plastic drain tubing. [x] Removed plastic liner from flange and apply it directly over the hole you cut. [x] Removed the plastic cover from the flange.    [x] Attach the tubing to the wound/ulcer Negative Pressure Therapy and turn it on to be sure a vacuum is created and that there are no leaks. [x] If air leaks occur, use plastic drape to patch them. [x] Secured Negative Pressure Therapy dressing with ace wrap loosely if located on an extremity. Maintain tubing outside of ace wrap. Tubing must not exert pressure on intact skin.      Electronically signed Kym Trivedi on 7/29/2022 at 11:36 AM/ verbal orders Dr. Summer Paz

## 2022-07-29 NOTE — PROGRESS NOTES
[facility]  (A Division of [address] [place] [address])                                                                                                                   ID Clinic Follow UP  Dr Gomez Shell #: [phone] Option # [phone]  Fax #: [phone]     Date of Admission: 7/29/2022Date of Note: 7/29/2022  Reason for Referral: Evaluation and antibiotic management of R heel infection    Current Antimicrobials:    Prior Antimicrobials:  Zosyn 6/27 to date:  End--  8/8/22                                                                                                                                                                                                                                                  Bactrim 1 month ago       Assessment- ID related:  --------------------------------------------------------  DFU and OM R heel  S/P I and D 6/28:  Large right heel necrotic ulcer with osteomyelitis of the calcaneus, and deep abscess  --Proteus/Mroganella/ E.fecalis / alpha strep and Bacteroides on culture  --S/P OR 7/5/22- R heel I and D with grafting, removal of screw.  No culture sent  Subacute/chronic OM of heel-- over 2 months at time of admission  Chest pain, S/_ stent placement RCA 7/20/22  Leucocytosis 2/2 above-resolved  --BCX 6/27: NGSF  --WCX-- GNR and GPC in pairs--> pending  ESRD on HD TTS  Obese BMI of 28  DM   History of L hallux ampuation for infection- 5yrs ago  ESR 75-> >140 7/19--> >140-- 7/19  CRP 4.1- 7/7/22--> 6.2 7/18  R chest TDC is for his HD  R IV line for his ABX- 7/8/22 Recommendation for ID issues I am following:  --------------------------------------------------------------------------------cont Zosyn to end date: 8/8/22  Need labs on Mondays:    Weekly labs- cbc w/diff, LFT, quantitative CRP  and ESR on Mon  Monitor above labs for ABX adverse effects  Fax labs to Dr Aric Hussein-- [phone]     FU in 2-3 weeks in ID clinic --8/12/22 same day as wound clinic visit    If the above rx fails,  Likely needs BKA       Subjective:  Post hospital DC follow up, along with wound clinic  Denies fever, chills, rigors, shortness of breath or cough. No  abdominal pain, no rash. Has occasional vomiting-- associated with dialysis  BM is soft, but upto 3 X daily  Feels nauseaus  Central line site- R chest is ok      DW wound care--wound is healing/progressing well per their report     HPI:  Grant Levin is a 60 y.o. BLACK/ with PMH as listed below-- he had ulcer for 2 months or so that he was followed by as OP by Podiatry. His heel wound progressed and was advised to come to ED yesterday. Had foot pain and fould drainage, but denies high F/C/R/SOB/ N/V prior to admission. BCX/WCX taken in ED, started on Zosyn and plan was to hold off abx and monitor until surgery. Admission assessment there was high concern for sepsis and Zosyn was continued. Hx of MRSA infection and treatment few years ago. There are no active hospital problems to display for this patient.     Past Medical History:   Diagnosis Date    CAD (coronary artery disease)     Chronic kidney disease     Diabetes mellitus     Hypertension     Sleep apnea      Past Surgical History:   Procedure Laterality Date    HX ORTHOPAEDIC      HX PACEMAKER      IR INSERT TUNL CVC W/O PORT OVER 5 YR  07/07/2022    MI CARDIAC SURG PROCEDURE UNLIST       Family History   Problem Relation Age of Onset    Heart Disease Mother     Diabetes Father     Diabetes Sister     Diabetes Brother      Social History     Socioeconomic History    Marital status:      Spouse name: Not on file    Number of children: Not on file    Years of education: Not on file    Highest education level: Not on file   Occupational History    Not on file   Tobacco Use    Smoking status: Never    Smokeless tobacco: Never   Vaping Use    Vaping Use: Never used   Substance and Sexual Activity    Alcohol use: Not Currently    Drug use: Never    Sexual activity: Not on file   Other Topics Concern    Dental Braces Not Asked    Endoscopic Camera Pill Not Asked    Metallic Foreign Body Not Asked    Medication Patches Not Asked    Taking Feraheme Not Asked    Claustrophobic Not Asked   Social History Narrative    Not on file     Social Determinants of Health     Financial Resource Strain: Not on file   Food Insecurity: Not on file   Transportation Needs: Not on file   Physical Activity: Not on file   Stress: Not on file   Social Connections: Not on file   Intimate Partner Violence: Not on file   Housing Stability: Not on file       Allergies:  Patient has no known allergies. Medications:  Current Outpatient Medications   Medication Sig Dispense    clopidogreL (PLAVIX) 75 mg tab Take 1 Tablet by mouth in the morning. 30 Tablet    isosorbide mononitrate ER (IMDUR) 30 mg tablet Take 1 Tablet by mouth daily. 30 Tablet    pantoprazole (PROTONIX) 40 mg tablet Take 1 Tablet by mouth Daily (before breakfast). (Patient not taking: Reported on 7/29/2022) 30 Tablet    bacitracin zinc (BACITRACIN) ointment Apply  to affected area two (2) times a day. 15 g    nystatin (MYCOSTATIN) powder Apply  to affected area two (2) times a day. 5 g    insulin lispro (HUMALOG) 100 unit/mL injection Use as directed 1 Each    Lactobacillus Acidoph & Bulgar (FLORANEX) 1 million cell tab tablet Take 2 Tablets by mouth two (2) times a day. 60 Tablet    melatonin, rapid dissolve, 5 mg TbDi tablet Take 2 Tablets by mouth nightly as needed (slsee[). 30 Tablet    sevelamer carbonate (RENVELA) 800 mg tab tab Take 2 Tablets by mouth three (3) times daily (with meals). 90 Tablet    vit B Cmplx 3-FA-Vit C-Biotin (NEPHRO NIKITA RX) 1- mg-mg-mcg tablet Take 1 Tablet by mouth daily. 30 Tablet    piperacillin-tazobactam 4.5 gram 4.5 g IVPB 4.5 g by IntraVENous route every twelve (12) hours. 100 Dose    atorvastatin (LIPITOR) 40 mg tablet Take 40 mg by mouth daily. gabapentin (NEURONTIN) 300 mg capsule Take 300 mg by mouth nightly. allopurinoL (ZYLOPRIM) 100 mg tablet Take 100 mg by mouth daily. ezetimibe (ZETIA) 10 mg tablet Take 10 mg by mouth. carvediloL (COREG) 12.5 mg tablet Take 12.5 mg by mouth two (2) times a day. amLODIPine (NORVASC) 10 mg tablet Take 10 mg by mouth daily. aspirin 81 mg chewable tablet Take 81 mg by mouth daily. ascorbic acid, vitamin C, (VITAMIN C) 500 mg tablet Take 500 mg by mouth. insulin glargine (LANTUS) 100 unit/mL injection 10 Units by SubCUTAneous route nightly. No current facility-administered medications for this encounter. Physical Exam:    Temp (24hrs), Av.2 °F (37.3 °C), Min:99.2 °F (37.3 °C), Max:99.2 °F (37.3 °C)    Visit Vitals  BP (!) 142/67 (BP 1 Location: Right upper arm, BP Patient Position: At rest;Sitting)   Pulse 74   Temp 99.2 °F (37.3 °C)   Resp 16   SpO2 100%      GEN: WD Obese, on RA--not in resp distress. Right chest TDC for HD  Came in wheelchair    HEENT: Unicteric. EOMI intact  No neck swelling  CHEST: Non laboured breathing. ABD: Obese/soft. Non tender. PASCUAL: Deferred  EXT: not examined today- dressed-   Dry/healing heel wound L ankle  Skin: Dry and intact on limited exam  CNS: A, comfortable     Microbiology  All Micro Results       None             Lab results:    Chemistry  No results for input(s): GLU, NA, K, CL, CO2, BUN, CREA, CA, AGAP, BUCR, TBIL, AP, TP, ALB, GLOB, AGRAT in the last 72 hours. No lab exists for component: GPT      CBC w/ Diff  No results for input(s): WBC, RBC, HGB, HCT, PLT, GRANS, LYMPH, EOS, HGBEXT, HCTEXT, PLTEXT, HGBEXT, HCTEXT, PLTEXT in the last 72 hours. Imaging: report reviewed and as posted by radiologist   No results found for this or any previous visit. MRI:       1. Posterior heel skin defect, at the margin of the Achilles tendon attachment  on the calcaneus, in keeping with known ulcer.  Infiltration of subjacent  subcutaneous fat in keeping with cellulitis. 2. Cortical discontinuity and marrow signal abnormality in the subjacent dorsal  calcaneus extending to the threaded tip of the oblique tibiocalcaneal screw is  suspicious for osteomyelitis. 3. Fluid signal intensity surrounding the threaded tibial tip of the 1st digit  long partially threaded screw, with osseous fragments at the inferior margin,  concerning for loosening and/or infection. 4. Marrow signal abnormalities at the 2nd-3rd and to a lesser degree 4th-5th  tarsometatarsal joint, potentially simply related to degenerative/Charcot  arthropathy. In view of marrow signal abnormalities, infection cannot be  excluded if clinically suspected. If clinically warranted, consider correlation with nuclear medicine imaging to  further assess. 5. Edema/myositis in the musculature above the ankle. There is discontinuity of  FHL and peroneal tendons, best correlated with operative findings/history of  prior intervention for significance/chronicity. Fatty change in the musculature  about the foot.

## 2022-07-29 NOTE — WOUND CARE
Wound Care Orders  [address] [facility]  [address], [facility]  [phone] Fax [phone]    NAME:  Jo Low OF BIRTH:  1960  MEDICAL RECORD NUMBER:  [id number]  July 29, 2022    Wound Cleansing:   Do not scrub or use excessive force. Cleanse wound prior to applying a clean dressing with:  [x] Normal Saline [] Keep Wound Dry in Shower    [] Wound Cleanser   [] Cleanse wound with Mild Soap & Water  [] May Shower at Discharge   [] Other:      Topical Treatments:  Do not apply lotions, creams, or ointments to wound bed unless directed. [] Apply moisturizing lotion to skin surrounding the wound prior to dressing change.  [] Apply antifungal ointment to skin surrounding the wound prior to dressing change. [x] Apply thin film of moisture barrier ointment to skin immediately around wound/ allkare/skin prep  [] Other:       Dressings:           Wound Location right heel    [x] Apply Primary Dressing:       [] MediHoney Gel [] Alginate with Silver [] Alginate   [x] Collagen [] Collagen with Silver   [] Santyl with Moisten saline gauze     [] Hydrocolloid   [] MediHoney Alginate [] Foam with Silver   [] Foam   [] Hydrofera Blue    [] Mepilex Border    [] Moisten with Saline [] Hydrogel [x] Mepitel one/adaptic     [] Bactroban/Mupirocin [] Polysporin  [] Other:    [x] Cover and Secure with:     [] Gauze [] Dunia [x] Kerlix   [] Ace Wrap [] Cover Roll Tape [] ABD     [] Other:    Avoid contact of tape with skin.   [x] Change dressing: [] Daily    [] Every Other Day [x] Three times per week   [] Once a week [] Do Not Change Dressing   [] Other:     Negative Pressure:           Wound Location:   [x] Pressure @ 125mm/Hg  [x]Continuous []Intermittent   [x] Black  [] White Foam [] Other:   [x]Change dressing: [x]Three times per week    [x]Other: see specific instructions below     Off-Loading:   [x] Off-loading when [] walking  [x] in bed [x] sitting  [x] Total non-weight bearing  [x] Right Leg  [] Left Leg   [x] Assistive Device [] Walker [] Cane  [] Wheelchair  [] Crutches   [] Surgical shoe    [] Podus Boot(s)   [x] Foam Boot(s)  [] Roll About    [] Cast Boot [] CROW Boot  [] Other:            Return Appointment:  [] Wound and dressing supply provider:   [] ECF or Home Healthcare:  [] Wound Assessment: [] Physician or NP scheduled for Wound Assessment:   [] Return Appointment: With MD  in  2 Week(s)  [] Ordered tests:       Negative Pressure    Apply Negative Pressure towound(s)/ulcer(s). [x] Apply skin barrier prep to ashutosh-wound. [x] Cut strips of plastic drape to picture frame wound so that ashutosh-wound is covered with the drape. Apply collagen and non-adherant dressing to wound (adaptic or mepitel one)  [x] If bridging dressing to less prominent site, cover any intact skin that will come in contact with the Negative Pressure Therapy sponge, gauze or channel drain with plastic drape. The sponge should never touch intact skin. [x] Cut sponge, gauze or channel drain to size which will fit into the wound/ulcer bed without being forced. [x] Be sure the sponge is large enough to hold the entire round plastic flange which is attached to the tubing. Never allow flange to be larger than the sponge or it will produce suction damaging intact skin. Document Total number of individual pieces of foam used within the wound bed  [x] If bridging the dressing away from the primary site, be sure the bridge leads to a piece of sponge large enough to hold the entire flange without allowing any of the flange to overlap onto intact skin. [x] Covered sponge, gauze or channel drain with plastic drape. [x] Cut a hole in this plastic drape directly over the sponge the same size as the plastic drain tubing. [x] Removed plastic liner from flange and apply it directly over the hole you cut. [x] Removed the plastic cover from the flange.    [x] Attach the tubing to the wound/ulcer Negative Pressure Therapy and turn it on to be sure a vacuum is created and that there are no leaks. [x] If air leaks occur, use plastic drape to patch them. [x] Secured Negative Pressure Therapy dressing with ace wrap loosely if located on an extremity. Maintain tubing outside of ace wrap. Tubing must not exert pressure on intact skin.      Electronically signed Kym Winter on 7/29/2022 at 11:36 AM/ verbal orders Dr. Ladarius Wong

## 2022-08-01 LAB
BACTERIA SPEC CULT: NORMAL
SERVICE CMNT-IMP: NORMAL

## 2022-08-04 ENCOUNTER — TELEPHONE (OUTPATIENT)
Dept: WOUND CARE | Age: 62
End: 2022-08-04

## 2022-08-04 NOTE — TELEPHONE ENCOUNTER
Received a call from the patient stating he needed to talk to Dr. Lizet Hinds because he has not received his antibiotics for several days because his access is clogged. Patient stated that he needs a new access. RN directed patient to call his nurse and if he feels the nurses are not responding to his concerns he should call the . I also told the patient I would talk to Dr. Lizet Hinds about his PICC.

## 2022-08-05 NOTE — CALL BACK NOTE
Date of call-- 8/3 from patient  8/4 and 8/5-- to patient    Date of Note 8/5/22    Patient called stating his tunneled line is not working and needs new line for IV antibiotic  Left with <1 week of IV antibiotic    Asked my office to call SNF- to place him on Cipro and Augmentin oral in qpm.  He is to FU with wound clinic and me August 12    Tunneled line to be removed order placed in his chart    Amanda Choi MD  [facility])  Office #:     [phone]   Office Fax: [phone]

## 2022-08-10 NOTE — WOUND CARE
[address]   Progress Note and Procedure Note   NO Procedure      Bryce Goldstein  MEDICAL RECORD NUMBER:  [id number]  AGE: 60 y.o. RACE BLACK/  GENDER: male  : 1960  EPISODE DATE:  2022    Subjective:     Chief Complaint   Patient presents with    Wound Check         HISTORY of PRESENT ILLNESS HPI    [email or web address] Marichuy Lizarraga is a 60 y.o. male who presents today for wound/ulcer evaluation. History of Wound Context:  Mr. Gustabo Del Toro is a diabetic and returning to the [address] [place] [address], for a Status-post right heel surgery for a partial calcaneal excision and a PICC Line IV Antibiotic for Osteomyelitits, and Status post surgery on the right [place]'s Tendon repair and with a Stravix Graft and Wound VAC. Patient presents today with his dressings clean, dry and intact. Wound/Ulcer Pain Timing/Severity: moderate  Quality of pain: None  Severity:  2 / 10   Modifying Factors: Pain worsens with walking  Associated Signs/Symptoms: pain    Ulcer Identification:  Ulcer Type: diabetic and pressure    Contributing Factors: diabetes    Wound: Status post right heel surgery for partial calcaneal excision and a PICC line and IV antibiotic for Osteomyelitis, also, status post surgery right Achilles\" tendon repair and with a Stravix Graft right heel, and wound VAC.          PAST MEDICAL HISTORY    Past Medical History:   Diagnosis Date    CAD (coronary artery disease)     Chronic kidney disease     Diabetes mellitus     Hypertension     Sleep apnea         PAST SURGICAL HISTORY    Past Surgical History:   Procedure Laterality Date    HX ORTHOPAEDIC      HX PACEMAKER      IR INSERT TUNL CVC W/O PORT OVER 5 YR  2022    MO CARDIAC SURG PROCEDURE UNLIST         FAMILY HISTORY    Family History   Problem Relation Age of Onset    Heart Disease Mother     Diabetes Father     Diabetes Sister     Diabetes Brother        SOCIAL HISTORY    Social History Tobacco Use    Smoking status: Never    Smokeless tobacco: Never   Vaping Use    Vaping Use: Never used   Substance Use Topics    Alcohol use: Not Currently    Drug use: Never       ALLERGIES    No Known Allergies    MEDICATIONS    Current Outpatient Medications on File Prior to Encounter   Medication Sig Dispense Refill    clopidogreL (PLAVIX) 75 mg tab Take 1 Tablet by mouth in the morning. 30 Tablet 2    isosorbide mononitrate ER (IMDUR) 30 mg tablet Take 1 Tablet by mouth daily. 30 Tablet 0    bacitracin zinc (BACITRACIN) ointment Apply  to affected area two (2) times a day. 15 g 0    nystatin (MYCOSTATIN) powder Apply  to affected area two (2) times a day. 5 g 0    insulin lispro (HUMALOG) 100 unit/mL injection Use as directed 1 Each 0    Lactobacillus Acidoph & Bulgar (FLORANEX) 1 million cell tab tablet Take 2 Tablets by mouth two (2) times a day. 60 Tablet 0    melatonin, rapid dissolve, 5 mg TbDi tablet Take 2 Tablets by mouth nightly as needed (slsee[). 30 Tablet 0    sevelamer carbonate (RENVELA) 800 mg tab tab Take 2 Tablets by mouth three (3) times daily (with meals). 90 Tablet 0    vit B Cmplx 3-FA-Vit C-Biotin (NEPHRO NIKITA RX) 1- mg-mg-mcg tablet Take 1 Tablet by mouth daily. 30 Tablet 0    piperacillin-tazobactam 4.5 gram 4.5 g IVPB 4.5 g by IntraVENous route every twelve (12) hours. 100 Dose 0    atorvastatin (LIPITOR) 40 mg tablet Take 40 mg by mouth daily. gabapentin (NEURONTIN) 300 mg capsule Take 300 mg by mouth nightly. allopurinoL (ZYLOPRIM) 100 mg tablet Take 100 mg by mouth daily. ezetimibe (ZETIA) 10 mg tablet Take 10 mg by mouth. carvediloL (COREG) 12.5 mg tablet Take 12.5 mg by mouth two (2) times a day. amLODIPine (NORVASC) 10 mg tablet Take 10 mg by mouth daily. aspirin 81 mg chewable tablet Take 81 mg by mouth daily. ascorbic acid, vitamin C, (VITAMIN C) 500 mg tablet Take 500 mg by mouth.       insulin glargine (LANTUS) 100 unit/mL injection 10 Units by SubCUTAneous route nightly. pantoprazole (PROTONIX) 40 mg tablet Take 1 Tablet by mouth Daily (before breakfast). (Patient not taking: Reported on 7/29/2022) 30 Tablet 0     No current facility-administered medications on file prior to encounter. REVIEW OF SYSTEMS    A comprehensive review of systems was negative except for that written in the HPI. Objective: There were no vitals taken for this visit. Wt Readings from Last 3 Encounters:   07/19/22 102.6 kg (226 lb 4.8 oz)       PHYSICAL EXAM    Pertinent items are noted in HPI. Vascular:  Dorsalis pedis pulses are 2/4 bilateral.  Posterior tibial pulses are 2/4 bilateral.  Capillary fill time is less than 3 seconds, bilateral.  Edema is observed bilateral lower extremities. Varicosities are observed bilateral lower extremities. Derm:  Skin temperature of lower extremities warm to cool proximal to distal bilateral.  Inspection of skin shows -positive digital hair with healthy skin bilateral.  Status post with a Stravix Graft on the right heel measuring (5 cm x 5 cm x 0.5 cm) Graft intact, positive Osteomyelitis. Ortho:  Muscle strength 5/5 for all groups tested. Muscle tone normal. Inspection/palpation of bones - joints- muscle shows - within normal limits on the bilateral lower extremities. Neuro:  Light touch, sharp/dull, vibratory, and proprioception sensations all decreased bilateral lower extremities. Deep tendon reflexes decreased bilaterally. Assessment:       Status post right heel surgery for a partial calcaneal excision and a PICC line and IV Antibiotic for Osteomyelitis. Status post surgery Right Achilles\" tendon repair and with a Stravix Graft right heel, and wound VAC.          Problem List Items Addressed This Visit    None      Procedure Note  Indications:  Based on my examination of this patient's wound(s)/ulcer(s) today, debridement is not required to promote healing and evaluate the wound base.    Wound Ankle Right;Medial;Proximal (Active)   Number of days: 26       Wound Ankle Right;Medial (Active)   Number of days: 26       Incision 07/05/22 Foot Right (Active)   Number of days: 36       Incision 07/05/22 Foot Right;Dorsal (Active)   Number of days: 36        Plan:      Perform local wound care on the Status post surgery Right [place]'s Tendon repair and with a Stravix Graft on right heel, apply dressing and   Wound VAC. Patient is to continue with the PICC line antibiotic for Osteomyelitis on the right heel. Patient is to return to the [address] [place] [address] for continued follow up treatment as needed. Treatment Note please see attached Discharge Instructions    Written patient dismissal instructions given to patient or POA.          Electronically signed by Derik Anderson DPM on 8/10/2022 at 5:46 PM

## 2022-08-12 ENCOUNTER — HOSPITAL ENCOUNTER (OUTPATIENT)
Dept: INTERVENTIONAL RADIOLOGY/VASCULAR | Age: 62
Discharge: HOME | End: 2022-08-12
Attending: INTERNAL MEDICINE
Payer: MEDICARE

## 2022-08-12 ENCOUNTER — APPOINTMENT (OUTPATIENT)
Dept: WOUND CARE | Age: 62
End: 2022-08-12
Attending: PODIATRIST
Payer: MEDICARE

## 2022-08-12 ENCOUNTER — HOSPITAL ENCOUNTER (OUTPATIENT)
Dept: WOUND CARE | Age: 62
Discharge: HOME | End: 2022-08-12
Attending: INTERNAL MEDICINE
Payer: MEDICARE

## 2022-08-12 ENCOUNTER — HOSPITAL ENCOUNTER (OUTPATIENT)
Dept: WOUND CARE | Age: 62
Discharge: HOME | End: 2022-08-12
Attending: PODIATRIST
Payer: MEDICARE

## 2022-08-12 ENCOUNTER — HOME HEALTH ADMISSION (OUTPATIENT)
Dept: HOME HEALTH SERVICES | Facility: HOME HEALTH | Age: 62
End: 2022-08-12

## 2022-08-12 ENCOUNTER — TELEPHONE (OUTPATIENT)
Dept: WOUND CARE | Age: 62
End: 2022-08-12

## 2022-08-12 VITALS
DIASTOLIC BLOOD PRESSURE: 60 MMHG | SYSTOLIC BLOOD PRESSURE: 119 MMHG | RESPIRATION RATE: 18 BRPM | HEART RATE: 78 BPM | TEMPERATURE: 97.9 F | OXYGEN SATURATION: 99 %

## 2022-08-12 LAB
ACID FAST STN SPEC: NEGATIVE
MYCOBACTERIUM SPEC QL CULT: NEGATIVE
SPECIMEN PREPARATION: NORMAL
SPECIMEN SOURCE: NORMAL

## 2022-08-12 PROCEDURE — 11042 DBRDMT SUBQ TIS 1ST 20SQCM/<: CPT

## 2022-08-12 PROCEDURE — 36589 REMOVAL TUNNELED CV CATH: CPT

## 2022-08-12 NOTE — PROGRESS NOTES
[facility]  (A Division of [address] [place] [address])                                                                                                                   ID Clinic Follow UP  Dr Sury Galarza #: - Option # [phone]  Fax #: [phone]     Date of Admission: 8/12/2022Date of Note: 8/12/2022  Reason for Referral: Evaluation and antibiotic management of R heel infection  Wound care/Podiatry: Dr Wilmer Casas    Current Antimicrobials:    Prior Antimicrobials:  Zosyn 6/27 to date:  End--  8/8/22         Oral        ABX-- Cipro and Augmentin QPM      from 8/3 to date                                                                                                                                                                                                                              Bactrim 1 month ago       Assessment- ID related:  --------------------------------------------------------  DFU and OM R heel  --S/P I and D 6/28:  Large right heel necrotic ulcer with osteomyelitis of the calcaneus, and deep abscess  --Proteus/Mroganella/ E.fecalis / alpha strep and Bacteroides on culture  --S/P OR 7/5/22- R heel I and D with grafting, removal of screw.  No culture sent  Subacute/chronic OM of heel-- over 2 months at time of admission  Chest pain, S/_ stent placement RCA 7/20/22  Leucocytosis 2/2 above-resolved  --BCX 6/27: NGSF    ESRD on HD TTS  CAD/NSTEMI-- S/P cath and stent placement 07/2022  Obese BMI of 28  DM - A1C 8.6% 06/2022  History of L hallux ampuation for infection- 5yrs ago  ESR -->140(< -- 75   CRP  --6.2 <-- 4.1  R chest TDC is for his HD  R IV line for his ABX- 7/8/22 Recommendation for ID issues I am following:  --------------------------------------------------------------------------------continue Cipro 500 mg and Augmentin 500/125 mg QPM daily-- tolerating well  --refill for 30 days X2 ordered    Labs with esr/crp and HB A1C-- ordered    Wound vac/care per Dr Goldie Pennington    --> removal arranged for today with IR    He is home now and out of [facility])-- hopefull wound care would be better with [facility]     Subjective:  Last seen in clinic 7/29/22-- he was at [facility] at that time. No labs have been forwraded to me    Today, he came in without wound vac on his R heel. States he has been DC home and [facility] ordered by [facility] medical team. He is taking his oral Cipro and Augmentin. No issue with side effect so far. No F/C/R. Wound team is planning to resume vac at home, plan for future Graft placement. Medication with him reviewed. He has appointment for [facility] removal today. HPI:  Ramya Gaspar is a 60 y.o. BLACK/ with PMH as listed below-- he had ulcer for 2 months or so that he was followed by as OP by Podiatry. His heel wound progressed and was advised to come to ED yesterday. Had foot pain and fould drainage, but denies high F/C/R/SOB/ N/V prior to admission. BCX/WCX taken in ED, started on Zosyn and plan was to hold off abx and monitor until surgery. Admission assessment there was high concern for sepsis and Zosyn was continued. Hx of MRSA infection and treatment few years ago. There are no active hospital problems to display for this patient.     Past Medical History:   Diagnosis Date    CAD (coronary artery disease)     Chronic kidney disease     Diabetes mellitus     Hypertension     Sleep apnea      Past Surgical History:   Procedure Laterality Date    HX ORTHOPAEDIC      HX PACEMAKER      IR INSERT TUNL CVC W/O PORT OVER 5 YR  07/07/2022    IR REMOVE TUNL CVAD W/O PORT / PUMP  8/12/2022    MO CARDIAC SURG PROCEDURE UNLIST       Family History   Problem Relation Age of Onset    Heart Disease Mother     Diabetes Father     Diabetes Sister     Diabetes Brother      Social History     Socioeconomic History    Marital status:      Spouse name: Not on file    Number of children: Not on file    Years of education: Not on file    Highest education level: Not on file   Occupational History    Not on file   Tobacco Use    Smoking status: Never    Smokeless tobacco: Never   Vaping Use    Vaping Use: Never used   Substance and Sexual Activity    Alcohol use: Not Currently    Drug use: Never    Sexual activity: Not on file   Other Topics Concern    Dental Braces Not Asked    Endoscopic Camera Pill Not Asked    Metallic Foreign Body Not Asked    Medication Patches Not Asked    Taking Feraheme Not Asked    Claustrophobic Not Asked   Social History Narrative    Not on file     Social Determinants of Health     Financial Resource Strain: Not on file   Food Insecurity: Not on file   Transportation Needs: Not on file   Physical Activity: Not on file   Stress: Not on file   Social Connections: Not on file   Intimate Partner Violence: Not on file   Housing Stability: Not on file       Allergies:  Patient has no known allergies. Medications:  Current Outpatient Medications   Medication Sig Dispense    clopidogreL (PLAVIX) 75 mg tab Take 1 Tablet by mouth in the morning. 30 Tablet    isosorbide mononitrate ER (IMDUR) 30 mg tablet Take 1 Tablet by mouth daily. 30 Tablet    pantoprazole (PROTONIX) 40 mg tablet Take 1 Tablet by mouth Daily (before breakfast). (Patient not taking: Reported on 7/29/2022) 30 Tablet    bacitracin zinc (BACITRACIN) ointment Apply  to affected area two (2) times a day. 15 g    nystatin (MYCOSTATIN) powder Apply  to affected area two (2) times a day. 5 g    insulin lispro (HUMALOG) 100 unit/mL injection Use as directed 1 Each    Lactobacillus Acidoph & Bulgar (FLORANEX) 1 million cell tab tablet Take 2 Tablets by mouth two (2) times a day. 60 Tablet    melatonin, rapid dissolve, 5 mg TbDi tablet Take 2 Tablets by mouth nightly as needed (slsee[). 30 Tablet    sevelamer carbonate (RENVELA) 800 mg tab tab Take 2 Tablets by mouth three (3) times daily (with meals).  90 Tablet    vit B Cmplx 3-FA-Vit C-Biotin (NEPHRO NIKITA RX) 1- mg-mg-mcg tablet Take 1 Tablet by mouth daily. 30 Tablet    piperacillin-tazobactam 4.5 gram 4.5 g IVPB 4.5 g by IntraVENous route every twelve (12) hours. 100 Dose    atorvastatin (LIPITOR) 40 mg tablet Take 40 mg by mouth daily. gabapentin (NEURONTIN) 300 mg capsule Take 300 mg by mouth nightly. allopurinoL (ZYLOPRIM) 100 mg tablet Take 100 mg by mouth daily. ezetimibe (ZETIA) 10 mg tablet Take 10 mg by mouth. carvediloL (COREG) 12.5 mg tablet Take 12.5 mg by mouth two (2) times a day. amLODIPine (NORVASC) 10 mg tablet Take 10 mg by mouth daily. aspirin 81 mg chewable tablet Take 81 mg by mouth daily. ascorbic acid, vitamin C, (VITAMIN C) 500 mg tablet Take 500 mg by mouth. insulin glargine (LANTUS) 100 unit/mL injection 10 Units by SubCUTAneous route nightly. No current facility-administered medications for this encounter. Physical Exam:    Temp (24hrs), Av.9 °F (36.6 °C), Min:97.9 °F (36.6 °C), Max:97.9 °F (36.6 °C)       GEN: WD Obese, on RA--not in resp distress. Right chest TDC for HD    Came in wheelchair    HEENT: Unicteric. EOMI intact  No neck swelling  CHEST: Non laboured breathing. ABD: Obese/soft. Non tender. PASCUAL: Deferred  EXT: open large wound heel. . bone is covered. Some necrotic tissue on wound bed  Skin: Dry and intact on limited exam  CNS: A, comfortable     Microbiology  All Micro Results       None             Lab results:    Chemistry  No results for input(s): GLU, NA, K, CL, CO2, BUN, CREA, CA, AGAP, BUCR, TBIL, AP, TP, ALB, GLOB, AGRAT in the last 72 hours. No lab exists for component: GPT      CBC w/ Diff  No results for input(s): WBC, RBC, HGB, HCT, PLT, GRANS, LYMPH, EOS, HGBEXT, HCTEXT, PLTEXT, HGBEXT, HCTEXT, PLTEXT in the last 72 hours. Imaging: report reviewed and as posted by radiologist   No results found for this or any previous visit. MRI:       1.  Posterior heel skin defect, at the margin of the Achilles tendon attachment  on the calcaneus, in keeping with known ulcer. Infiltration of subjacent  subcutaneous fat in keeping with cellulitis. 2. Cortical discontinuity and marrow signal abnormality in the subjacent dorsal  calcaneus extending to the threaded tip of the oblique tibiocalcaneal screw is  suspicious for osteomyelitis. 3. Fluid signal intensity surrounding the threaded tibial tip of the 1st digit  long partially threaded screw, with osseous fragments at the inferior margin,  concerning for loosening and/or infection. 4. Marrow signal abnormalities at the 2nd-3rd and to a lesser degree 4th-5th  tarsometatarsal joint, potentially simply related to degenerative/Charcot  arthropathy. In view of marrow signal abnormalities, infection cannot be  excluded if clinically suspected. If clinically warranted, consider correlation with nuclear medicine imaging to  further assess. 5. Edema/myositis in the musculature above the ankle. There is discontinuity of  FHL and peroneal tendons, best correlated with operative findings/history of  prior intervention for significance/chronicity. Fatty change in the musculature  about the foot.

## 2022-08-12 NOTE — TELEPHONE ENCOUNTER
Call placed to [facility]. Spoke with Haroldo Dhaliwal, Discharge planner. Write Identified self by name, position and purpose of call. Patient Identified using name and date of birth. Verified with Alondra if Wound VAC was ordered she stated no. Asked what home health patient was set up with and was told that patient uses [facility].

## 2022-08-12 NOTE — WOUND CARE
08/12/22 1540   Wound Heel Right   Date First Assessed/Time First Assessed: 08/12/22 1200   Present on Hospital Admission: Yes  Primary Wound Type: Diabetic Ulcer  Location: Heel  Wound Location Orientation: Right   Wound Image     Wound Etiology Diabetic   Dressing Status Breakthrough drainage noted   Cleansed Wound cleanser;Vashe   Dressing/Treatment Gauze dressing/dressing sponge;Foam;Roll gauze  (mesalt)   Dressing Change Due 08/15/22   Wound Length (cm) 5 cm   Wound Width (cm) 4.5 cm   Wound Depth (cm) 1.4 cm   Wound Surface Area (cm^2) 22.5 cm^2   Wound Volume (cm^3) 31.5 cm^3   Post-Procedure Length (cm) 5 cm   Post-Procedure Width (cm) 4.5 cm   Post-Procedure Depth (cm) 1.5 cm   Post-Procedure Surface Area (cm^2) 22.5 cm^2   Post-Procedure Volume (cm^3) 33.75 cm^3   Wound Assessment Devitalized tissue   Drainage Amount Moderate   Drainage Description Serosanguinous   Wound Odor None   Leticia-Wound/Incision Assessment Intact; Hyperkeratosis (Callous)   Edges Defined edges   Wound Thickness Description Full thickness

## 2022-08-16 NOTE — WOUND CARE
Debridement Wound Care        Problem List Items Addressed This Visit    None      Procedure Note  Indications:  Based on my examination of this patient's wound(s)/ulcer(s) today, debridement is required to promote healing and evaluate the wound base. Patient is a diabetic and Status post surgery on his right heel would measuring (5 cm x 4.5 cm x 1.4 cm) with a brown fibrotic base, deep to the subcutaneous level. Sharp debridement required on the right heel  to remove all the necrotic/brown fibrotic tissue, deep through the subcutaneous level to a healthy bleeding base. Post debridement measurement (5 cm x 4.5 cm x 1.4 cm) round. Apply MeSalt (4x4) Exudry dressing. Will order a Mysonix and Theraskin Graft on his next visits. Will Order a new wound VAC for home to help right wound heal.  Patient has been discharged from a [address] [place] [address]. Performed by:  Keegan Blue DPM    Consent obtained: Yes    Time out taken: Yes    Debridement: Excisional    Using curette the wound(s)/ulcer(s) was/were sharply debrided down through and including the removal of    subcutaneous tissue    Devitalized Tissue Debrided:  brown fibritic tissue    Pre Debridement Measurements:  Are located in the Wound/Ulcer Documentation Flow Sheet    Wound/Ulcer #: 1    Post Debridement Measurements:  Wound/Ulcer Descriptions are Pre Debridement except measurements:Diabetic ulcer, fat layer exposed    Wound Ankle Right;Medial;Proximal (Active)   Number of days: 32       Wound Ankle Right;Medial (Active)   Number of days: 32       Wound Heel Right (Active)   Wound Image    08/12/22 1540   Wound Etiology Diabetic 08/12/22 1540   Dressing Status Breakthrough drainage noted 08/12/22 1540   Cleansed Wound cleanser;Vashe 08/12/22 1540   Dressing/Treatment Gauze dressing/dressing sponge;Foam;Roll gauze 08/12/22 1540   Dressing Change Due 08/15/22 08/12/22 1540   Wound Length (cm) 5 cm 08/12/22 1540   Wound Width (cm) 4.5 cm 08/12/22 1540   Wound Depth (cm) 1.4 cm 08/12/22 1540   Wound Surface Area (cm^2) 22.5 cm^2 08/12/22 1540   Wound Volume (cm^3) 31.5 cm^3 08/12/22 1540   Post-Procedure Length (cm) 5 cm 08/12/22 1540   Post-Procedure Width (cm) 4.5 cm 08/12/22 1540   Post-Procedure Depth (cm) 1.5 cm 08/12/22 1540   Post-Procedure Surface Area (cm^2) 22.5 cm^2 08/12/22 1540   Post-Procedure Volume (cm^3) 33.75 cm^3 08/12/22 1540   Wound Assessment Devitalized tissue 08/12/22 1540   Drainage Amount Moderate 08/12/22 1540   Drainage Description Serosanguinous 08/12/22 1540   Wound Odor None 08/12/22 1540   Leticia-Wound/Incision Assessment Intact; Hyperkeratosis (Callous) 08/12/22 1540   Edges Defined edges 08/12/22 1540   Wound Thickness Description Full thickness 08/12/22 1540   Number of days: 4       Incision 07/05/22 Foot Right (Active)   Number of days: 42       Incision 07/05/22 Foot Right;Dorsal (Active)   Number of days: 42        Percent of Wound(s)/Ulcer(s) Debrided: 100%    Total Surface Area Debrided:  22.5 sq cm     Diabetic/Pressure/Non PressueUlcers only:  Ulcer:     Estimated Blood Loss:  Minimal     Hemostasis Achieved: Pressure    Procedural Pain: 1 / 10     Post Procedural Pain: 2 / 10     Response to treatment: Well tolerated by patient

## 2022-08-26 ENCOUNTER — HOSPITAL ENCOUNTER (OUTPATIENT)
Dept: WOUND CARE | Age: 62
Discharge: HOME | End: 2022-08-26
Attending: INTERNAL MEDICINE
Payer: MEDICARE

## 2022-08-26 ENCOUNTER — HOSPITAL ENCOUNTER (OUTPATIENT)
Dept: WOUND CARE | Age: 62
Discharge: HOME | End: 2022-08-26
Attending: PODIATRIST
Payer: MEDICARE

## 2022-08-26 ENCOUNTER — HOSPITAL ENCOUNTER (OUTPATIENT)
Dept: LAB | Age: 62
Discharge: HOME | End: 2022-08-26
Payer: MEDICARE

## 2022-08-26 VITALS
HEART RATE: 76 BPM | OXYGEN SATURATION: 100 % | TEMPERATURE: 98.3 F | SYSTOLIC BLOOD PRESSURE: 107 MMHG | DIASTOLIC BLOOD PRESSURE: 57 MMHG

## 2022-08-26 LAB
BASOPHILS # BLD: 0.1 K/UL (ref 0–0.1)
BASOPHILS NFR BLD: 1 % (ref 0–2)
CRP SERPL-MCNC: 4.9 MG/DL (ref 0–0.3)
DIFFERENTIAL METHOD BLD: ABNORMAL
EOSINOPHIL # BLD: 0.4 K/UL (ref 0–0.4)
EOSINOPHIL NFR BLD: 4 % (ref 0–5)
ERYTHROCYTE [DISTWIDTH] IN BLOOD BY AUTOMATED COUNT: 17.3 % (ref 11.6–14.5)
ERYTHROCYTE [SEDIMENTATION RATE] IN BLOOD: 63 MM/HR (ref 0–20)
EST. AVERAGE GLUCOSE BLD GHB EST-MCNC: 151 MG/DL
HBA1C MFR BLD: 6.9 % (ref 4.2–5.6)
HCT VFR BLD AUTO: 37.1 % (ref 36–48)
HGB BLD-MCNC: 11.4 G/DL (ref 13–16)
IMM GRANULOCYTES # BLD AUTO: 0 K/UL (ref 0–0.04)
IMM GRANULOCYTES NFR BLD AUTO: 0 % (ref 0–0.5)
LYMPHOCYTES # BLD: 1.1 K/UL (ref 0.9–3.6)
LYMPHOCYTES NFR BLD: 11 % (ref 21–52)
MCH RBC QN AUTO: 29.3 PG (ref 24–34)
MCHC RBC AUTO-ENTMCNC: 30.7 G/DL (ref 31–37)
MCV RBC AUTO: 95.4 FL (ref 78–100)
MONOCYTES # BLD: 1.1 K/UL (ref 0.05–1.2)
MONOCYTES NFR BLD: 11 % (ref 3–10)
NEUTS SEG # BLD: 7.6 K/UL (ref 1.8–8)
NEUTS SEG NFR BLD: 74 % (ref 40–73)
NRBC # BLD: 0 K/UL (ref 0–0.01)
NRBC BLD-RTO: 0 PER 100 WBC
PLATELET # BLD AUTO: 240 K/UL (ref 135–420)
PMV BLD AUTO: 11.3 FL (ref 9.2–11.8)
RBC # BLD AUTO: 3.89 M/UL (ref 4.35–5.65)
WBC # BLD AUTO: 10.2 K/UL (ref 4.6–13.2)

## 2022-08-26 PROCEDURE — 99212 OFFICE O/P EST SF 10 MIN: CPT

## 2022-08-26 PROCEDURE — 85652 RBC SED RATE AUTOMATED: CPT

## 2022-08-26 PROCEDURE — 36415 COLL VENOUS BLD VENIPUNCTURE: CPT

## 2022-08-26 PROCEDURE — 86140 C-REACTIVE PROTEIN: CPT

## 2022-08-26 PROCEDURE — 99211 OFF/OP EST MAY X REQ PHY/QHP: CPT

## 2022-08-26 PROCEDURE — 83036 HEMOGLOBIN GLYCOSYLATED A1C: CPT

## 2022-08-26 PROCEDURE — 85025 COMPLETE CBC W/AUTO DIFF WBC: CPT

## 2022-08-26 NOTE — PROGRESS NOTES
[facility]  (A Division of [address] [place] [address])                                                                                                                   ID Clinic Follow UP  Dr Geetha Vann #: - Option # [phone]  Fax #: [phone]     Date of Clinic: 8/26/2022  Date of Note: 8/26/2022  Reason for Referral: Evaluation and antibiotic management of R heel infection  Wound care/Podiatry: Dr Goldie Pennington    Current Antimicrobials:    Prior Antimicrobials:  Zosyn 6/27 to End- date-  8/8/22         Oral  ABX-- Cipro and Augmentin QPM  from 8/3 to date                                                                                                                                                                                                                              Bactrim 1 month ago       Assessment- ID related:  --------------------------------------------------------  DFU and OM R heel  --S/P I and D 6/28:  Large right heel necrotic ulcer with osteomyelitis of the calcaneus, and deep abscess  --Proteus/Mroganella/ E.fecalis / alpha strep and Bacteroides on culture  --S/P OR 7/5/22- R heel I and D with grafting, removal of screw.  No culture sent  Subacute/chronic OM of heel-- over 2 months at time of admission  Chest pain, S/_ stent placement RCA 7/20/22  Leucocytosis 2/2 above-resolved  --BCX 6/27: NGSF    ESRD on HD TTS  CAD/NSTEMI-- S/P cath and stent placement 07/2022  Obese BMI of 28  DM - A1C 8.6% 06/2022  History of L hallux ampuation for infection- 5yrs ago  ESR -->140(< -- 75   CRP  --6.2 <-- 4.1  R chest TDC is for his HD  R IV line for his ABX- 7/8/22 Recommendation for ID issues I am following:  --------------------------------------------------------------------------------R [facility] for IV abx removed on 8/12    Continue Cipro 500 mg and Augmentin 500/125 mg QPM daily-- tolerating well  --will dc once esr/crp deysi or stable    Labs with esr/crp and HB A1C-- ordered    Wound vac/care per Dr Kyle Barber with me in 2 weeks     Subjective:  Last seen in clinic 8/12--- he was DC from SNF and at home, but didn't have his wound vac set up yet that time. No labs have been forwraded to me  He has no F/C/R/diarrhea-- he asks when he will stop ABX    Last visit labs not yet drawn- esr/crp/cbc    Medication with him reviewed. He has appointment for [facility] [place], operated by [facility] removal today. HPI:  Harpreet Roberts is a 60 y.o. BLACK/ with PMH as listed below-- he had ulcer for 2 months or so that he was followed by as OP by Podiatry. His heel wound progressed and was advised to come to ED yesterday. Had foot pain and fould drainage, but denies high F/C/R/SOB/ N/V prior to admission. BCX/WCX taken in ED, started on Zosyn and plan was to hold off abx and monitor until surgery. Admission assessment there was high concern for sepsis and Zosyn was continued. Hx of MRSA infection and treatment few years ago. There are no active hospital problems to display for this patient.     Past Medical History:   Diagnosis Date    CAD (coronary artery disease)     Chronic kidney disease     Diabetes mellitus     Hypertension     Sleep apnea      Past Surgical History:   Procedure Laterality Date    HX ORTHOPAEDIC      HX PACEMAKER      IR INSERT TUNL CVC W/O PORT OVER 5 YR  07/07/2022    IR REMOVE TUNL CVAD W/O PORT / PUMP  8/12/2022    CA CARDIAC SURG PROCEDURE UNLIST       Family History   Problem Relation Age of Onset    Heart Disease Mother     Diabetes Father     Diabetes Sister     Diabetes Brother      Social History     Socioeconomic History    Marital status:      Spouse name: Not on file    Number of children: Not on file    Years of education: Not on file    Highest education level: Not on file   Occupational History    Not on file   Tobacco Use    Smoking status: Never    Smokeless tobacco: Never   Vaping Use    Vaping Use: Never used   Substance and Sexual Activity    Alcohol use: Not Currently    Drug use: Never    Sexual activity: Not on file   Other Topics Concern    Dental Braces Not Asked    Endoscopic Camera Pill Not Asked    Metallic Foreign Body Not Asked    Medication Patches Not Asked    Taking Feraheme Not Asked    Claustrophobic Not Asked   Social History Narrative    Not on file     Social Determinants of Health     Financial Resource Strain: Not on file   Food Insecurity: Not on file   Transportation Needs: Not on file   Physical Activity: Not on file   Stress: Not on file   Social Connections: Not on file   Intimate Partner Violence: Not on file   Housing Stability: Not on file       Allergies:  Patient has no known allergies. Medications:  Current Outpatient Medications   Medication Sig Dispense    clopidogreL (PLAVIX) 75 mg tab Take 1 Tablet by mouth in the morning. 30 Tablet    isosorbide mononitrate ER (IMDUR) 30 mg tablet Take 1 Tablet by mouth daily. 30 Tablet    pantoprazole (PROTONIX) 40 mg tablet Take 1 Tablet by mouth Daily (before breakfast). (Patient not taking: Reported on 7/29/2022) 30 Tablet    bacitracin zinc (BACITRACIN) ointment Apply  to affected area two (2) times a day. 15 g    nystatin (MYCOSTATIN) powder Apply  to affected area two (2) times a day. 5 g    insulin lispro (HUMALOG) 100 unit/mL injection Use as directed 1 Each    Lactobacillus Acidoph & Bulgar (FLORANEX) 1 million cell tab tablet Take 2 Tablets by mouth two (2) times a day. 60 Tablet    melatonin, rapid dissolve, 5 mg TbDi tablet Take 2 Tablets by mouth nightly as needed (slsee[). 30 Tablet    sevelamer carbonate (RENVELA) 800 mg tab tab Take 2 Tablets by mouth three (3) times daily (with meals). 90 Tablet    vit B Cmplx 3-FA-Vit C-Biotin (NEPHRO NIKITA RX) 1- mg-mg-mcg tablet Take 1 Tablet by mouth daily. 30 Tablet    piperacillin-tazobactam 4.5 gram 4.5 g IVPB 4.5 g by IntraVENous route every twelve (12) hours.  100 Dose    atorvastatin (LIPITOR) 40 mg tablet Take 40 mg by mouth daily. gabapentin (NEURONTIN) 300 mg capsule Take 300 mg by mouth nightly. allopurinoL (ZYLOPRIM) 100 mg tablet Take 100 mg by mouth daily. ezetimibe (ZETIA) 10 mg tablet Take 10 mg by mouth. carvediloL (COREG) 12.5 mg tablet Take 12.5 mg by mouth two (2) times a day. amLODIPine (NORVASC) 10 mg tablet Take 10 mg by mouth daily. aspirin 81 mg chewable tablet Take 81 mg by mouth daily. ascorbic acid, vitamin C, (VITAMIN C) 500 mg tablet Take 500 mg by mouth. insulin glargine (LANTUS) 100 unit/mL injection 10 Units by SubCUTAneous route nightly. No current facility-administered medications for this encounter. Physical Exam:    Temp (24hrs), Av.3 °F (36.8 °C), Min:98.3 °F (36.8 °C), Max:98.3 °F (36.8 °C)       GEN: WD Obese, on RA--not in resp distress. Right chest TDC for HD in place      HEENT: Unicteric. EOMI intact  No neck swelling  CHEST: Non laboured breathing. ABD: Obese/soft. Non tender. PASCUAL: Deferred  EXT: open large wound heel. . bone is covered. Some necrotic tissue on wound bed looks  on today's exam  Skin: Dry and intact on limited exam  CNS: A, comfortable     Microbiology  All Micro Results       None             Lab results:    Chemistry  No results for input(s): GLU, NA, K, CL, CO2, BUN, CREA, CA, AGAP, BUCR, TBIL, AP, TP, ALB, GLOB, AGRAT in the last 72 hours. No lab exists for component: GPT      CBC w/ Diff  No results for input(s): WBC, RBC, HGB, HCT, PLT, GRANS, LYMPH, EOS, HGBEXT, HCTEXT, PLTEXT, HGBEXT, HCTEXT, PLTEXT in the last 72 hours. Imaging: report reviewed and as posted by radiologist   No results found for this or any previous visit. MRI:       1. Posterior heel skin defect, at the margin of the Achilles tendon attachment  on the calcaneus, in keeping with known ulcer. Infiltration of subjacent  subcutaneous fat in keeping with cellulitis.      2. Cortical discontinuity and marrow signal abnormality in the subjacent dorsal  calcaneus extending to the threaded tip of the oblique tibiocalcaneal screw is  suspicious for osteomyelitis. 3. Fluid signal intensity surrounding the threaded tibial tip of the 1st digit  long partially threaded screw, with osseous fragments at the inferior margin,  concerning for loosening and/or infection. 4. Marrow signal abnormalities at the 2nd-3rd and to a lesser degree 4th-5th  tarsometatarsal joint, potentially simply related to degenerative/Charcot  arthropathy. In view of marrow signal abnormalities, infection cannot be  excluded if clinically suspected. If clinically warranted, consider correlation with nuclear medicine imaging to  further assess. 5. Edema/myositis in the musculature above the ankle. There is discontinuity of  FHL and peroneal tendons, best correlated with operative findings/history of  prior intervention for significance/chronicity. Fatty change in the musculature  about the foot.

## 2022-08-26 NOTE — WOUND CARE
Discharge Instructions from  [address] [facility]  [address], [facility]  [phone] Fax [phone]    NAME:  John Tolbert OF BIRTH:  1960  MEDICAL RECORD NUMBER:  [id number]  DATE:  8/26/2022    Wound Cleansing:   Do not scrub or use excessive force. Cleanse wound prior to applying a clean dressing with:  [x] Normal Saline [x] Keep Wound Dry in Shower    [x] Wound Cleanser     Topical Treatments:  Do not apply lotions, creams, or ointments to wound bed unless directed. .  [x] Apply thin film of moisture barrier ointment to skin immediately around wound. [] Other:       Dressings:           Wound Location Right Foot Heel   [x] Apply Primary Dressing:       [] MediHoney Gel [] Alginate with Silver [] Alginate   [] Collagen [x] Collagen with Silver   [] Santyl with Moisten saline gauze     [] Hydrocolloid   [] MediHoney Alginate [] Foam with Silver   [] Foam   [] Hydrofera Blue    [] Mepilex Border    [] Moisten with Saline [] Hydrogel [x] Non-adherent dressing over collagen     [] Bactroban/Mupirocin [] Polysporin  [x] Other:     [x] Cover and Secure with:     [x] Gauze [] Dunia [x] Kerlix   [] Ace Wrap [] Cover Roll Tape [] ABD     [x] Other:    Avoid contact of tape with skin. [x] Change dressing: [x] Two times per week      Negative Pressure:           Wound Location:   [x] Pressure@  125   mm/Hg [x]Continue   [x] Black     [x]Change dressing: [x]Two times per week   [x]Other: apply collagen to wound bed secure with non-adherent dressing apply skin prep and drape to ashutosh-wound, apply black foam in wound bed, cover with kerlix and ace wrap. Pressure Relief:  [x] When sitting, shift position or do seat lifts every 15 minutes. [x] Wheelchair cushion [x] Specialty Bed/Mattress  [x] Turn every 2 hours when in bed. Avoid position directing pressure on wound site. Limit side lying to 30 degree tilt.   Limit HOB elevation to 30 degrees. [x] Elevate leg(s) above the level of the heart when sitting. [x] Avoid prolonged standing in one place. [x] Elevate leg(s) above the level of the heart when sitting. [x] Avoid prolonged standing in one place. Off-Loading:   [x] Off-loading when [x] in bed [x] sitting  [x] Total non-weight bearing  [x] Right Foot   [x] Assistive Device [x] Walker [x] Wheelchair     [x] Other: Diabetic shoes      Dietary:  [x] Diet as tolerated: [x] Calorie Diabetic Diet: [] No Added Salt:  [x] Increase Protein: [] Other:   Activity:  [x] Activity as tolerated:  [x] Patient has no activity restrictions     [] Strict Bedrest: [] Remain off Work:     [] May return to full duty work:                                   [] Return to work with restrictions:             Return Appointment:  [x] Wound and dressing supply provider:   [x] ECF or Home Healthcare:  [x] Wound Assessment: [x] Physician or NP scheduled for Wound Assessment:   [x] Return Appointment: 2 Week(s)  [x] Ordered tests:      Electronically signed David Wallace LPN on 9/44/9355 at 85:88 PM     Pauline Lucas 281: Should you experience any significant changes in your wound(s) or have questions about your wound care, please contact the [facility] at [address] [place] [address] 8:00 am - 4:30. If you need help with your wound outside these hours and cannot wait until we are again available, contact your PCP or go to the hospital emergency room. PLEASE NOTE: IF YOU ARE UNABLE TO OBTAIN WOUND SUPPLIES, CONTINUE TO USE THE SUPPLIES YOU HAVE AVAILABLE UNTIL YOU ARE ABLE TO REACH US. IT IS MOST IMPORTANT TO KEEP THE WOUND COVERED AT ALL TIMES.      Physician Signature:_______________________    Date: ___________ Time:  ____________

## 2022-09-01 NOTE — WOUND CARE
[address]   Progress Note and Procedure Note   NO Procedure      Sandra Perkins  MEDICAL RECORD NUMBER:  [id number]  AGE: 60 y.o. RACE BLACK/  GENDER: male  : 1960  EPISODE DATE:  2022    Subjective:     Chief Complaint   Patient presents with    Wound Check         HISTORY of PRESENT ILLNESS HPI    [email or web address] Leelee Kulkarni is a 60 y.o. male who presents today for wound/ulcer evaluation. History of Wound Context: Mr. Carlos Chowdhury, is a Diabetic and returning to the [address] [place] [address] for continued follow up treatment on his right heel wound with a \"Stravix Graft\". He presents today with his dressing clean, dry and intact. Wound/Ulcer Pain Timing/Severity: moderate  Quality of pain: aching, numbing, and tingling  Severity:  1 / 10 none  Modifying Factors: Pain worsens with walking  Associated Signs/Symptoms: none    Ulcer Identification:  Ulcer Type: diabetic and pressure    Contributing Factors: venous stasis and diabetes    Wound: Right heel wound with a \"Stravix Graft\".          PAST MEDICAL HISTORY    Past Medical History:   Diagnosis Date    CAD (coronary artery disease)     Chronic kidney disease     Diabetes mellitus     Hypertension     Sleep apnea         PAST SURGICAL HISTORY    Past Surgical History:   Procedure Laterality Date    HX ORTHOPAEDIC      HX PACEMAKER      IR INSERT TUNL CVC W/O PORT OVER 5 YR  2022    IR REMOVE TUNL CVAD W/O PORT / PUMP  2022    NE CARDIAC SURG PROCEDURE UNLIST         FAMILY HISTORY    Family History   Problem Relation Age of Onset    Heart Disease Mother     Diabetes Father     Diabetes Sister     Diabetes Brother        SOCIAL HISTORY    Social History     Tobacco Use    Smoking status: Never    Smokeless tobacco: Never   Vaping Use    Vaping Use: Never used   Substance Use Topics    Alcohol use: Not Currently    Drug use: Never       ALLERGIES    No Known Allergies    MEDICATIONS    Current Outpatient Medications on File Prior to Encounter   Medication Sig Dispense Refill    clopidogreL (PLAVIX) 75 mg tab Take 1 Tablet by mouth in the morning. 30 Tablet 2    isosorbide mononitrate ER (IMDUR) 30 mg tablet Take 1 Tablet by mouth daily. 30 Tablet 0    pantoprazole (PROTONIX) 40 mg tablet Take 1 Tablet by mouth Daily (before breakfast). 30 Tablet 0    bacitracin zinc (BACITRACIN) ointment Apply  to affected area two (2) times a day. 15 g 0    nystatin (MYCOSTATIN) powder Apply  to affected area two (2) times a day. 5 g 0    insulin lispro (HUMALOG) 100 unit/mL injection Use as directed 1 Each 0    Lactobacillus Acidoph & Bulgar (FLORANEX) 1 million cell tab tablet Take 2 Tablets by mouth two (2) times a day. 60 Tablet 0    melatonin, rapid dissolve, 5 mg TbDi tablet Take 2 Tablets by mouth nightly as needed (slsee[). 30 Tablet 0    sevelamer carbonate (RENVELA) 800 mg tab tab Take 2 Tablets by mouth three (3) times daily (with meals). 90 Tablet 0    vit B Cmplx 3-FA-Vit C-Biotin (NEPHRO NIKITA RX) 1- mg-mg-mcg tablet Take 1 Tablet by mouth daily. 30 Tablet 0    piperacillin-tazobactam 4.5 gram 4.5 g IVPB 4.5 g by IntraVENous route every twelve (12) hours. 100 Dose 0    atorvastatin (LIPITOR) 40 mg tablet Take 40 mg by mouth daily. gabapentin (NEURONTIN) 300 mg capsule Take 300 mg by mouth nightly. allopurinoL (ZYLOPRIM) 100 mg tablet Take 100 mg by mouth daily. ezetimibe (ZETIA) 10 mg tablet Take 10 mg by mouth. carvediloL (COREG) 12.5 mg tablet Take 12.5 mg by mouth two (2) times a day. amLODIPine (NORVASC) 10 mg tablet Take 10 mg by mouth daily. aspirin 81 mg chewable tablet Take 81 mg by mouth daily. ascorbic acid, vitamin C, (VITAMIN C) 500 mg tablet Take 500 mg by mouth. insulin glargine (LANTUS) 100 unit/mL injection 10 Units by SubCUTAneous route nightly.        No current facility-administered medications on file prior to encounter. REVIEW OF SYSTEMS    A comprehensive review of systems was negative except for that written in the HPI. Objective: There were no vitals taken for this visit. Wt Readings from Last 3 Encounters:   07/19/22 102.6 kg (226 lb 4.8 oz)       PHYSICAL EXAM    Pertinent items are noted in HPI. Vascular:  Dorsalis pedis pulses are 2/4 bilateral.  Posterior tibial pulses are 2/4 bilateral.  Capillary fill time is less than 3 seconds, bilateral.  Edema is observed bilateral lower extremities. Varicosities are observed bilateral lower extremities. Derm:  Skin temperature of lower extremities warm to cool proximal to distal bilateral.  Inspection of skin shows - negative digital hair with thin shinny skin bilateral.  Right heel wound measuring ( 4.8 cm x 4.8 cm x 1.5 cm) with a pink granulation base and \"Stravix Graft\" intact. Ortho:  Muscle strength 5/5 for all groups tested. Muscle tone normal. Inspection/palpation of bones - joints- muscle shows - within normal limits on the bilateral lower extremities. Neuro:  Light touch, sharp/dull, vibratory, and proprioception sensations all decreased bilateral lower extremities. Deep tendon reflexes decreased bilaterally. Assessment:       Right heel ulcer with a \"Stravix Graft\". 2.   Type II Diabetic. Problem List Items Addressed This Visit    None      Procedure Note  Indications:  Based on my examination of this patient's wound(s)/ulcer(s) today, debridement is not required to promote healing and evaluate the wound base.     Wound Ankle Right;Medial;Proximal (Active)   Number of days: 47       Wound Ankle Right;Medial (Active)   Number of days: 47       Wound Heel Right (Active)   Wound Image   08/26/22 1040   Wound Etiology Diabetic 08/26/22 1040   Dressing Status Breakthrough drainage noted 08/26/22 1040   Cleansed Wound cleanser 08/26/22 1040   Dressing/Treatment Gauze dressing/dressing sponge 08/26/22 1040   Dressing Change Due 09/09/22 08/26/22 1040   Wound Length (cm) 5 cm 08/12/22 1540   Wound Width (cm) 4.5 cm 08/12/22 1540   Wound Depth (cm) 1.4 cm 08/12/22 1540   Wound Surface Area (cm^2) 22.5 cm^2 08/12/22 1540   Wound Volume (cm^3) 31.5 cm^3 08/12/22 1540   Post-Procedure Length (cm) 5 cm 08/12/22 1540   Post-Procedure Width (cm) 4.5 cm 08/12/22 1540   Post-Procedure Depth (cm) 1.5 cm 08/12/22 1540   Post-Procedure Surface Area (cm^2) 22.5 cm^2 08/12/22 1540   Post-Procedure Volume (cm^3) 33.75 cm^3 08/12/22 1540   Wound Assessment Devitalized tissue 08/12/22 1540   Drainage Amount Moderate 08/12/22 1540   Drainage Description Serosanguinous 08/12/22 1540   Wound Odor None 08/12/22 1540   Leticia-Wound/Incision Assessment Intact; Hyperkeratosis (Callous) 08/12/22 1540   Edges Defined edges 08/12/22 1540   Wound Thickness Description Full thickness 08/12/22 1540   Number of days: 19       Incision 07/05/22 Foot Right (Active)   Number of days: 57       Incision 07/05/22 Foot Right;Dorsal (Active)   Number of days: 57        Plan:      Perform local wound care on the right heel wound with the \"Stravix Graft\", cleaned wound, applied Quin, Mepatel, Dunia, and Ace dressing. Patient is to return in two weeks to the [address] [place] [address] for continued care and follow up treatment. Treatment Note please see attached Discharge Instructions    Written patient dismissal instructions given to patient or POA.          Electronically signed by Derik Anderson DPM on 8/31/2022 at 9:20 PM

## 2022-09-09 ENCOUNTER — HOSPITAL ENCOUNTER (OUTPATIENT)
Dept: WOUND CARE | Age: 62
Discharge: HOME | End: 2022-09-09
Attending: PODIATRIST
Payer: MEDICARE

## 2022-09-09 VITALS
SYSTOLIC BLOOD PRESSURE: 116 MMHG | RESPIRATION RATE: 18 BRPM | TEMPERATURE: 97.7 F | HEART RATE: 71 BPM | DIASTOLIC BLOOD PRESSURE: 58 MMHG | OXYGEN SATURATION: 100 %

## 2022-09-09 DIAGNOSIS — M86.9 DIABETIC FOOT ULCER WITH OSTEOMYELITIS (HCC): ICD-10-CM

## 2022-09-09 DIAGNOSIS — M86.071 ACUTE HEMATOGENOUS OSTEOMYELITIS OF RIGHT FOOT (HCC): Primary | ICD-10-CM

## 2022-09-09 DIAGNOSIS — E11.69 DIABETIC FOOT ULCER WITH OSTEOMYELITIS (HCC): ICD-10-CM

## 2022-09-09 DIAGNOSIS — L97.509 DIABETIC FOOT ULCER WITH OSTEOMYELITIS (HCC): ICD-10-CM

## 2022-09-09 DIAGNOSIS — E11.621 DIABETIC FOOT ULCER WITH OSTEOMYELITIS (HCC): ICD-10-CM

## 2022-09-09 PROCEDURE — 11045 DBRDMT SUBQ TISS EACH ADDL: CPT

## 2022-09-09 PROCEDURE — 11042 DBRDMT SUBQ TIS 1ST 20SQCM/<: CPT

## 2022-09-09 RX ORDER — LIDOCAINE HYDROCHLORIDE 20 MG/ML
JELLY TOPICAL ONCE
Status: COMPLETED | OUTPATIENT
Start: 2022-09-09 | End: 2022-09-09

## 2022-09-09 RX ORDER — LIDOCAINE HYDROCHLORIDE 20 MG/ML
JELLY TOPICAL ONCE
Status: CANCELLED | OUTPATIENT
Start: 2022-09-09 | End: 2022-09-09

## 2022-09-09 RX ADMIN — LIDOCAINE HYDROCHLORIDE: 20 JELLY TOPICAL at 15:00

## 2022-09-09 NOTE — WOUND CARE
08/26/22 1040   Wound Heel Right   Date First Assessed/Time First Assessed: 08/12/22 1200   Present on Hospital Admission: Yes  Primary Wound Type: Diabetic Ulcer  Location: Heel  Wound Location Orientation: Right   Wound Image    Wound Etiology Diabetic   Dressing Status Breakthrough drainage noted   Cleansed Wound cleanser   Dressing/Treatment Gauze dressing/dressing sponge   Dressing Change Due 09/09/22   Wound Length (cm) 4.8 cm   Wound Width (cm) 4.8 cm   Wound Depth (cm) 1.5 cm   Wound Surface Area (cm^2) 23.04 cm^2   Change in Wound Size % -2.4   Wound Volume (cm^3) 34.56 cm^3   Wound Healing % -10   Post-Procedure Length (cm) 5 cm   Post-Procedure Width (cm) 5.1 cm   Post-Procedure Depth (cm) 1.2 cm   Post-Procedure Surface Area (cm^2) 25.5 cm^2   Post-Procedure Volume (cm^3) 30.6 cm^3   Wound Assessment Devitalized tissue   Drainage Amount Moderate   Drainage Description Serosanguinous   Wound Odor None   Leticia-Wound/Incision Assessment Blanchable erythema; Intact; Hyperkeratosis (Callous)   Edges Defined edges   Wound Thickness Description Full thickness

## 2022-09-09 NOTE — WOUND CARE
[address]  [address], [address] [place] [address]  [phone] Fax [phone]    NAME:  Lyle Avila OF BIRTH:  1960  MEDICAL RECORD NUMBER:  [id number]  DATE: 9/9/2022    Please increase dressing changes to Monday, Wednesday and Friday due to wound deterioration    Wound Cleansing:   Do not scrub or use excessive force. Cleanse wound prior to applying a clean dressing with:  [] Normal Saline [] Keep Wound Dry in Shower    [x] Wound Cleanser   [] Cleanse wound with Mild Soap & Water  [] May Shower at Discharge   [] Other:      Topical Treatments:  Do not apply lotions, creams, or ointments to wound bed unless directed. Negative Pressure:           Wound Location:   [x] Pressure@   125        mm/Hg  [x]Continuous []Intermittent   [x] Black  [] White Foam [] Other:   [x]Change dressing: [x]Three times per week    [x]Other: Wound clinic will apply every other Friday  Apply Negative Pressure to wound(s)/ulcer(s). [x] Apply skin barrier prep to ashutosh-wound. [x] Cut strips of plastic drape to picture frame wound so that ashutosh-wound is covered with the drape. [x] If bridging dressing to less prominent site, cover any intact skin that will come in contact with the Negative Pressure Therapy sponge, gauze or channel drain with plastic drape. The sponge should never touch intact skin. [x] Cut sponge, gauze or channel drain to size which will fit into the wound/ulcer bed without being forced. [x] Be sure the sponge is large enough to hold the entire round plastic flange which is attached to the tubing. Never allow flange to be larger than the sponge or it will produce suction damaging intact skin.   Document Total number of individual pieces of foam used within the wound bed  [x] If bridging the dressing away from the primary site, be sure the bridge leads to a piece of sponge large enough to hold the entire flange without allowing any of the flange to overlap onto intact skin. [x] Cover sponge, gauze or channel drain with plastic drape. [x] Cut a hole in this plastic drape directly over the sponge the same size as the plastic drain tubing. [x] Remove plastic liner from flange and apply it directly over the hole you cut. [x] Remove the plastic cover from the flange. [x] Attach the tubing to the wound/ulcer Negative Pressure Therapy and turn it on to be sure a vacuum is created and that there are no leaks. [x] If air leaks occur, use plastic drape to patch them. [x] Secure Negative Pressure Therapy dressing with ace wrap loosely if located on an extremity. Maintain tubing outside of ace wrap. Tubing must not exert pressure on intact skin.             Return Appointment:  [] Wound and dressing supply provider:   [] ECF or Home Healthcare:  [] Wound Assessment: [] Physician or NP scheduled for Wound Assessment:   [x] Return Appointment: With MD  in  2 [facility])  [] Ordered tests:      Electronically signed Kym JORGE RN [facility], [facility]. CMSRN/ verbal order Dr. Anthony Rodriguez

## 2022-09-09 NOTE — WOUND CARE
09/09/22 1010   Wound Heel Right   Date First Assessed/Time First Assessed: 08/12/22 1200   Present on Hospital Admission: Yes  Primary Wound Type: Diabetic Ulcer  Location: Heel  Wound Location Orientation: Right   Wound Image    Wound Etiology Diabetic   Dressing Status Breakthrough drainage noted; Old drainage noted   Cleansed Wound cleanser   Dressing/Treatment Collagen;Hydrofera Blue   Dressing Change Due 09/12/22   Wound Length (cm) 5 cm   Wound Width (cm) 6 cm   Wound Depth (cm) 1 cm   Wound Surface Area (cm^2) 30 cm^2   Change in Wound Size % -33.33   Wound Volume (cm^3) 30 cm^3   Wound Healing % 5   Post-Procedure Length (cm) 5.5 cm   Post-Procedure Width (cm) 6.5 cm   Post-Procedure Depth (cm) 1.5 cm   Post-Procedure Surface Area (cm^2) 35.75 cm^2   Post-Procedure Volume (cm^3) 53.625 cm^3   Wound Assessment Pale granulation tissue   Drainage Amount Moderate   Drainage Description Serosanguinous   Wound Odor Mild   Leticia-Wound/Incision Assessment Maceration   Edges Defined edges   Wound Thickness Description Full thickness     VAC to be applied by [facility] Monday

## 2022-09-09 NOTE — DISCHARGE INSTRUCTIONS
[address], [address] [place] [address]  [phone] Fax [phone]    NAME:  Darlyn Severs OF BIRTH:  1960  MEDICAL RECORD NUMBER:  [id number]  DATE: 9/9/2022    Please increase dressing changes to Monday, Wednesday and Friday due to wound deterioration    Wound Cleansing:   Do not scrub or use excessive force. Cleanse wound prior to applying a clean dressing with:  [] Normal Saline [] Keep Wound Dry in Shower    [x] Wound Cleanser   [] Cleanse wound with Mild Soap & Water  [] May Shower at Discharge   [] Other:      Topical Treatments:  Do not apply lotions, creams, or ointments to wound bed unless directed. Negative Pressure:           Wound Location:   [x] Pressure@   125        mm/Hg  [x]Continuous []Intermittent   [x] Black  [] White Foam [] Other:   [x]Change dressing: [x]Three times per week    [x]Other: Wound clinic will apply every other Friday  Apply Negative Pressure to wound(s)/ulcer(s). [x] Apply skin barrier prep to ashutosh-wound. [x] Cut strips of plastic drape to picture frame wound so that ashutosh-wound is covered with the drape. [x] If bridging dressing to less prominent site, cover any intact skin that will come in contact with the Negative Pressure Therapy sponge, gauze or channel drain with plastic drape. The sponge should never touch intact skin. [x] Cut sponge, gauze or channel drain to size which will fit into the wound/ulcer bed without being forced. [x] Be sure the sponge is large enough to hold the entire round plastic flange which is attached to the tubing. Never allow flange to be larger than the sponge or it will produce suction damaging intact skin.   Document Total number of individual pieces of foam used within the wound bed  [x] If bridging the dressing away from the primary site, be sure the bridge leads to a piece of sponge large enough to hold the entire flange without allowing any of the flange to overlap onto intact skin. [x] Cover sponge, gauze or channel drain with plastic drape. [x] Cut a hole in this plastic drape directly over the sponge the same size as the plastic drain tubing. [x] Remove plastic liner from flange and apply it directly over the hole you cut. [x] Remove the plastic cover from the flange. [x] Attach the tubing to the wound/ulcer Negative Pressure Therapy and turn it on to be sure a vacuum is created and that there are no leaks. [x] If air leaks occur, use plastic drape to patch them. [x] Secure Negative Pressure Therapy dressing with ace wrap loosely if located on an extremity. Maintain tubing outside of ace wrap. Tubing must not exert pressure on intact skin.             Return Appointment:  [] Wound and dressing supply provider:   [] ECF or Home Healthcare:  [] Wound Assessment: [] Physician or NP scheduled for Wound Assessment:   [x] Return Appointment: With MD  in  2 [facility])  [] Ordered tests:      Electronically signed Kym JORGE RN [facility], [facility]. CMSRN/ verbal order Dr. Marion Phillip

## 2022-09-23 ENCOUNTER — HOSPITAL ENCOUNTER (OUTPATIENT)
Dept: LAB | Age: 62
Discharge: HOME | End: 2022-09-23
Payer: MEDICARE

## 2022-09-23 ENCOUNTER — HOSPITAL ENCOUNTER (OUTPATIENT)
Dept: WOUND CARE | Age: 62
Discharge: HOME | End: 2022-09-23
Attending: INTERNAL MEDICINE
Payer: MEDICARE

## 2022-09-23 ENCOUNTER — HOSPITAL ENCOUNTER (OUTPATIENT)
Dept: WOUND CARE | Age: 62
Discharge: HOME | End: 2022-09-23
Attending: PODIATRIST
Payer: MEDICARE

## 2022-09-23 VITALS
OXYGEN SATURATION: 100 % | SYSTOLIC BLOOD PRESSURE: 126 MMHG | DIASTOLIC BLOOD PRESSURE: 64 MMHG | HEART RATE: 72 BPM | TEMPERATURE: 98.6 F | RESPIRATION RATE: 18 BRPM

## 2022-09-23 DIAGNOSIS — E11.621 TYPE 2 DIABETES MELLITUS WITH FOOT ULCER, WITH LONG-TERM CURRENT USE OF INSULIN (HCC): Primary | ICD-10-CM

## 2022-09-23 DIAGNOSIS — Z79.4 TYPE 2 DIABETES MELLITUS WITH FOOT ULCER, WITH LONG-TERM CURRENT USE OF INSULIN (HCC): Primary | ICD-10-CM

## 2022-09-23 DIAGNOSIS — L97.509 TYPE 2 DIABETES MELLITUS WITH FOOT ULCER, WITH LONG-TERM CURRENT USE OF INSULIN (HCC): Primary | ICD-10-CM

## 2022-09-23 DIAGNOSIS — N18.6 ESRD (END STAGE RENAL DISEASE) (HCC): ICD-10-CM

## 2022-09-23 LAB
CRP SERPL-MCNC: 3.8 MG/DL (ref 0–0.3)
ERYTHROCYTE [SEDIMENTATION RATE] IN BLOOD: 57 MM/HR (ref 0–20)

## 2022-09-23 PROCEDURE — 85652 RBC SED RATE AUTOMATED: CPT

## 2022-09-23 PROCEDURE — 99211 OFF/OP EST MAY X REQ PHY/QHP: CPT

## 2022-09-23 PROCEDURE — 11042 DBRDMT SUBQ TIS 1ST 20SQCM/<: CPT

## 2022-09-23 PROCEDURE — 36415 COLL VENOUS BLD VENIPUNCTURE: CPT

## 2022-09-23 PROCEDURE — 86140 C-REACTIVE PROTEIN: CPT

## 2022-09-23 RX ORDER — CIPROFLOXACIN 500 MG/1
500 TABLET ORAL EVERY EVENING
Qty: 30 TABLET | Refills: 0 | Status: SHIPPED | OUTPATIENT
Start: 2022-09-23 | End: 2022-10-23

## 2022-09-23 RX ORDER — LIDOCAINE HYDROCHLORIDE 20 MG/ML
JELLY TOPICAL ONCE
Status: CANCELLED | OUTPATIENT
Start: 2022-09-23 | End: 2022-09-23

## 2022-09-23 RX ORDER — AMOXICILLIN AND CLAVULANATE POTASSIUM 500; 125 MG/1; MG/1
1 TABLET, FILM COATED ORAL EVERY EVENING
Qty: 30 TABLET | Refills: 0 | Status: SHIPPED | OUTPATIENT
Start: 2022-09-23 | End: 2022-10-23

## 2022-09-23 NOTE — WOUND CARE
[facility]charge Instructions from  [address] [facility]  [address] [place], [address]  [phone] Fax [phone]    NAME:  Jovana Lee OF BIRTH:  1960  MEDICAL RECORD NUMBER:  [id number]  DATE:  9/23/2022    Wound Cleansing:   Do not scrub or use excessive force. Cleanse wound prior to applying a clean dressing with:   [x] Wound Cleanser           Negative Pressure:           Wound Location: right heel  [] Pressure@     125      mm/Hg  [x]Continuous []Intermittent   [x] Black  Please make sure that drape is cut to include the open incision drainage from this seems to be macerating the periwound   [x]Three times per week     NAME:  Rosie Otero  YOB: 1960  MEDICAL RECORD NUMBER:  [id number]  DATE:  9/23/2022    Applied Negative Pressure to right heel wound(s)/ulcer(s). [x] Apply skin barrier prep to ashutosh-wound. [x] Cut strips of plastic drape to picture frame wound so that ashutosh-wound is     covered with the drape. [x] If bridging dressing to less prominent site, cover any intact skin that will come in contact with the Negative Pressure Therapy sponge, gauze or channel drain with plastic drape. The sponge should never touch intact skin. [x] Cut sponge, gauze or channel drain to size which will fit into the wound/ulcer bed without being forced. [x] Be sure the sponge is large enough to hold the entire round plastic flange which is attached to the tubing. Never allow flange to be larger than the sponge or it will produce suction damaging intact skin. Total number of individual pieces of foam used within the wound bed: 1 black  [x] If bridging the dressing away from the primary site, be sure the bridge leads to a piece of sponge large enough to hold the entire flange without allowing any of the flange to overlap onto intact skin. [x] Cover sponge, gauze or channel drain with plastic drape.    [x] Cut a hole in this plastic drape directly over the sponge the same size as the plastic drain tubing. [x] Remove plastic liner from flange and apply it directly over the hole you cut. [x] Remove the plastic cover from the flange. [x] Attach the tubing to the wound/ulcer Negative Pressure Therapy and turn it on to be sure a vacuum is created and that there are no leaks. [x] If air leaks occur, use plastic drape to patch them. [x] Secure Negative Pressure Therapy dressing with ace wrap loosely if located on an extremity. Maintain tubing outside of ace wrap. Tubing must not exert pressure on intact skin.          Apply per  Guidelines      Electronically signed by Gay Bella RN on 9/23/2022 at 2:19 PM                     Return Appointment:  Future Appointments   Date Time Provider Regis Ham   10/14/2022 10:00 AM Thalia Nurse, DPM [facility]         Electronically signed Gay Bella RN on 9/23/2022 at 2:18 PM

## 2022-09-23 NOTE — PROGRESS NOTES
[facility]  (A Division of [address] [place] [address])                                                                                                                   ID Clinic Follow UP  Dr Lori Dangelo #: - Option # [phone]  Fax #: [phone]     Date of Clinic: 9/23/2022  Date of Note: 9/23/2022  Reason for Referral: Evaluation and antibiotic management of R heel infection  Wound care/Podiatry: Dr Marce Moyer    Current Antimicrobials:    Prior Antimicrobials:  Zosyn 6/27 to End- date-  8/8/22         Oral  ABX-- Cipro and Augmentin QPM  from 8/3 to date                                                                                                                                                                                                                              Bactrim 1 month ago       Assessment- ID related:  --------------------------------------------------------  DFU and OM R heel: wound improving  Wound vac management per Podiatry  --S/P I and D 6/28:  Large right heel necrotic ulcer with osteomyelitis of the calcaneus, and deep abscess  --Proteus/Mroganella/ E.fecalis / alpha strep and Bacteroides on culture  --S/P OR 7/5/22- R heel I and D with grafting, removal of screw.  No culture sent  Subacute/chronic OM of heel-- over 2 months at time of admission  Chest pain, S/_ stent placement RCA 7/20/22  Leucocytosis 2/2 above-resolved  --BCX 6/27: NGSF    ESRD on HD TTS  CAD/NSTEMI-- S/P cath and stent placement 07/2022  Obese BMI of 28  DM - A1C 8.6% 06/2022  History of L hallux ampuation for infection- 5yrs ago  ESR -->140(< -- 75   CRP  --6.2 <-- 4.1  R chest TDC is for his HD  R IV line for his ABX- 7/8/22 Recommendation for ID issues I am following:  -----------------------------------------------------------------FU monthly CRP/ESR-- falling in last month check    If normal or stablizes-- will stop ABX  Will let him know on Monday-- Scripts with him    FU in 4 weeks     Subjective:  Last seen in clinic August  Doing ok, no issue with current abx. Last labs reviewed with him  He took his meds yesterday. DW about repeat lab prior to filling in his meds    Medication with him reviewed. He has appointment for [facility] removal today. HPI:  Grant Levin is a 60 y.o. BLACK/ with PMH as listed below-- he had ulcer for 2 months or so that he was followed by as OP by Podiatry. His heel wound progressed and was advised to come to ED yesterday. Had foot pain and fould drainage, but denies high F/C/R/SOB/ N/V prior to admission. BCX/WCX taken in ED, started on Zosyn and plan was to hold off abx and monitor until surgery. Admission assessment there was high concern for sepsis and Zosyn was continued. Hx of MRSA infection and treatment few years ago. There are no active hospital problems to display for this patient.     Past Medical History:   Diagnosis Date    CAD (coronary artery disease)     Chronic kidney disease     Diabetes mellitus     Hypertension     Sleep apnea      Past Surgical History:   Procedure Laterality Date    HX ORTHOPAEDIC      HX PACEMAKER      IR INSERT TUNL CVC W/O PORT OVER 5 YR  07/07/2022    IR REMOVE TUNL CVAD W/O PORT / PUMP  8/12/2022    W[place] CARDIAC SURG PROCEDURE UNLIST       Family History   Problem Relation Age of Onset    Heart Disease Mother     Diabetes Father     Diabetes Sister     Diabetes Brother      Social History     Socioeconomic History    Marital status:      Spouse name: Not on file    Number of children: Not on file    Years of education: Not on file    Highest education level: Not on file   Occupational History    Not on file   Tobacco Use    Smoking status: Never    Smokeless tobacco: Never   Vaping Use    Vaping Use: Never used   Substance and Sexual Activity    Alcohol use: Not Currently    Drug use: Never    Sexual activity: Not on file   Other Topics Concern    Dental Braces Not Asked    Endoscopic Camera Pill Not Asked    Metallic Foreign Body Not Asked    Medication Patches Not Asked    Taking Feraheme Not Asked    Claustrophobic Not Asked   Social History Narrative    Not on file     Social Determinants of Health     Financial Resource Strain: Not on file   Food Insecurity: Not on file   Transportation Needs: Not on file   Physical Activity: Not on file   Stress: Not on file   Social Connections: Not on file   Intimate Partner Violence: Not on file   Housing Stability: Not on file       Allergies:  Patient has no allergy information on record. Medications:  Current Outpatient Medications   Medication Sig Dispense    amoxicillin-clavulanate (Augmentin) 500-125 mg per tablet Take 1 Tablet by mouth every evening for 30 days. 30 Tablet    ciprofloxacin HCl (Cipro) 500 mg tablet Take 1 Tablet by mouth every evening for 30 days. 30 Tablet    clopidogreL (PLAVIX) 75 mg tab Take 1 Tablet by mouth in the morning. 30 Tablet    isosorbide mononitrate ER (IMDUR) 30 mg tablet Take 1 Tablet by mouth daily. 30 Tablet    pantoprazole (PROTONIX) 40 mg tablet Take 1 Tablet by mouth Daily (before breakfast). 30 Tablet    bacitracin zinc (BACITRACIN) ointment Apply  to affected area two (2) times a day. 15 g    nystatin (MYCOSTATIN) powder Apply  to affected area two (2) times a day. 5 g    insulin lispro (HUMALOG) 100 unit/mL injection Use as directed 1 Each    Lactobacillus Acidoph & Bulgar (FLORANEX) 1 million cell tab tablet Take 2 Tablets by mouth two (2) times a day. 60 Tablet    melatonin, rapid dissolve, 5 mg TbDi tablet Take 2 Tablets by mouth nightly as needed (slsee[). 30 Tablet    sevelamer carbonate (RENVELA) 800 mg tab tab Take 2 Tablets by mouth three (3) times daily (with meals). 90 Tablet    vit B Cmplx 3-FA-Vit C-Biotin (NEPHRO NIKITA RX) 1- mg-mg-mcg tablet Take 1 Tablet by mouth daily. 30 Tablet    atorvastatin (LIPITOR) 40 mg tablet Take 40 mg by mouth daily. gabapentin (NEURONTIN) 300 mg capsule Take 300 mg by mouth nightly. allopurinoL (ZYLOPRIM) 100 mg tablet Take 100 mg by mouth daily. ezetimibe (ZETIA) 10 mg tablet Take 10 mg by mouth. carvediloL (COREG) 12.5 mg tablet Take 12.5 mg by mouth two (2) times a day. amLODIPine (NORVASC) 10 mg tablet Take 10 mg by mouth daily. aspirin 81 mg chewable tablet Take 81 mg by mouth daily. ascorbic acid, vitamin C, (VITAMIN C) 500 mg tablet Take 500 mg by mouth. insulin glargine (LANTUS) 100 unit/mL injection 10 Units by SubCUTAneous route nightly. No current facility-administered medications for this encounter. Physical Exam:    Temp (24hrs), Av.6 °F (37 °C), Min:98.6 °F (37 °C), Max:98.6 °F (37 °C)       GEN: WD Obese, on RA--not in resp distress. HEENT: Unicteric. EOMI intact  No neck swelling  CHEST: Non laboured breathing. ABD: Obese/soft. Non tender. PASCUAL: Deferred  EXT: open large wound heel. . bone is covered. Wound getting smaller, but has macerated edges due to position of wound vac  Skin: Dry and intact on limited exam  CNS: A, comfortable     Microbiology  Lab results:    Chemistry  No results for input(s): GLU, NA, K, CL, CO2, BUN, CREA, CA, AGAP, BUCR, TBIL, AP, TP, ALB, GLOB, AGRAT in the last 72 hours. No lab exists for component: GPT      CBC w/ Diff  No results for input(s): WBC, RBC, HGB, HCT, PLT, GRANS, LYMPH, EOS, HGBEXT, HCTEXT, PLTEXT, HGBEXT, HCTEXT, PLTEXT in the last 72 hours. Imaging: report reviewed and as posted by radiologist   No results found for this or any previous visit. MRI:       1. Posterior heel skin defect, at the margin of the Achilles tendon attachment  on the calcaneus, in keeping with known ulcer. Infiltration of subjacent  subcutaneous fat in keeping with cellulitis.      2. Cortical discontinuity and marrow signal abnormality in the subjacent dorsal  calcaneus extending to the threaded tip of the oblique tibiocalcaneal screw is  suspicious for osteomyelitis. 3. Fluid signal intensity surrounding the threaded tibial tip of the 1st digit  long partially threaded screw, with osseous fragments at the inferior margin,  concerning for loosening and/or infection. 4. Marrow signal abnormalities at the 2nd-3rd and to a lesser degree 4th-5th  tarsometatarsal joint, potentially simply related to degenerative/Charcot  arthropathy. In view of marrow signal abnormalities, infection cannot be  excluded if clinically suspected. If clinically warranted, consider correlation with nuclear medicine imaging to  further assess. 5. Edema/myositis in the musculature above the ankle. There is discontinuity of  FHL and peroneal tendons, best correlated with operative findings/history of  prior intervention for significance/chronicity. Fatty change in the musculature  about the foot.

## 2022-09-23 NOTE — WOUND CARE
09/23/22 0954   Wound Heel Right   Date First Assessed/Time First Assessed: 08/12/22 1200   Present on Hospital Admission: Yes  Primary Wound Type: Diabetic Ulcer  Location: Heel  Wound Location Orientation: Right   Wound Image     Wound Etiology Diabetic   Dressing Status Breakthrough drainage noted   Cleansed Wound cleanser   Dressing/Treatment Collagen with Ag;Negative Pressure Wound Therapy   Wound Length (cm) 4 cm   Wound Width (cm) 4 cm   Wound Depth (cm) 0.8 cm   Wound Surface Area (cm^2) 16 cm^2   Change in Wound Size % 28.89   Wound Volume (cm^3) 12.8 cm^3   Wound Healing % 59   Post-Procedure Length (cm) 4.5 cm   Post-Procedure Width (cm) 5.2 cm   Post-Procedure Depth (cm) 1 cm   Post-Procedure Surface Area (cm^2) 23.4 cm^2   Post-Procedure Volume (cm^3) 23.4 cm^3   Wound Assessment Pale granulation tissue   Drainage Amount Moderate   Drainage Description Serosanguinous   Wound Odor Mild   Ashutosh-Wound/Incision Assessment Hyperkeratosis (Callous); Maceration   Edges Defined edges   Wound Thickness Description Full thickness     Negative Pressure    NAME:  Yulia Kent  YOB: 1960  MEDICAL RECORD NUMBER:  [id number]  DATE:  9/23/2022    Applied Negative Pressure to right heel wound(s)/ulcer(s). [x] Applied skin barrier prep to ashutosh-wound. [x] Cut strips of plastic drape to picture frame wound so that ashutosh-wound is     covered with the drape. [x] Cut sponge, gauze or channel drain to size which will fit into the wound/ulcer bed without being forced. [x] Be sure the sponge is large enough to hold the entire round plastic flange which is attached to the tubing. Never allow flange to be larger than the sponge or it will produce suction damaging intact skin. Total number of individual pieces of foam used within the wound bed: 1 black  [x] Covered sponge, gauze or channel drain with plastic drape.    [x] Cut a hole in this plastic drape directly over the sponge the same size as the plastic drain tubing. [x] Removed plastic liner from flange and apply it directly over the hole you cut. [x] Removed the plastic cover from the flange. [x] Attached the tubing to the wound/ulcer Negative Pressure Therapy and turn it on to be sure a vacuum is created and that there are no leaks. [x] If air leaks occur, use plastic drape to patch them. [x] Secured Negative Pressure Therapy dressing with ace wrap loosely if located on an extremity. Maintain tubing outside of ace wrap. Tubing must not exert pressure on intact skin.     Response to treatment: Well tolerated by patient      Applied per  Guidelines      Electronically signed by Amina Wheatley RN on 9/23/2022 at 2:16 PM

## 2022-09-26 NOTE — WOUND CARE
Debridement Wound Care        Problem List Items Addressed This Visit          Endocrine    Diabetic foot ulcer with osteomyelitis ([facility] [address]       Skeletal    Acute hematogenous osteomyelitis of right foot ([facility] [address] - Primary       Procedure Note  Indications:  Based on my examination of this patient's wound(s)/ulcer(s) today, debridement is required to promote healing and evaluate the wound base. Patient is a IDDM male, returning to the [address] [place] [address] , for continued follow up treatment on his right posterior heel, measuring (5.0 cm x 6.0 cm x 0.1 cm) with a white macerated base, deep to the subcutaneous layer, mild wound odor, moderate serosanguineous drainage. Sharp debridement on the right posterior heel, to remove the white granulation tissue, deep through the subcutaneous level to a healthy bleeding base. Post debridement measuring ( 5.5 cm x 6.5 cm x 1.5 cm) round. Apply Collagen, Hydrofera Blue dressing. Plan on a \"Theraskin\" on his next visit. Patient is to continue with his Wound VAC. This morning his FBS was 112. Performed by: Keegan Blue DPM    Consent obtained: Yes    Time out taken: Yes    Debridement: Excisional    Using curette the wound(s)/ulcer(s) was/were sharply debrided down through and including the removal of    subcutaneous tissue    Devitalized Tissue Debrided: slough    Pre Debridement Measurements:  Are located in the [place]  Documentation Flow Sheet    Wound/Ulcer #: 1    Post Debridement Measurements:  Wound/Ulcer Descriptions are Pre Debridement except measurements:Diabetic ulcer, fat layer exposed    09/09/22 1010    Wound Heel Right   Date First Assessed/Time First Assessed: 08/12/22 1200   Present on Hospital Admission: Yes  Primary Wound Type: Diabetic Ulcer  Location: Heel  Wound Location Orientation: Right   Wound Image    Wound Etiology Diabetic   Dressing Status Breakthrough drainage noted; Old drainage noted   Cleansed Wound cleanser   Dressing/Treatment Collagen;Hydrofera Blue   Dressing Change Due 09/12/22   Wound Length (cm) 5 cm   Wound Width (cm) 6 cm   Wound Depth (cm) 1 cm   Wound Surface Area (cm^2) 30 cm^2   Change in Wound Size % -33.33   Wound Volume (cm^3) 30 cm^3   Wound Healing % 5   Post-Procedure Length (cm) 5.5 cm   Post-Procedure Width (cm) 6.5 cm   Post-Procedure Depth (cm) 1.5 cm   Post-Procedure Surface Area (cm^2) 35.75 cm^2   Post-Procedure Volume (cm^3) 53.625 cm^3   Wound Assessment Pale granulation tissue   Drainage Amount Moderate   Drainage Description Serosanguinous   Wound Odor Mild   Leticia-Wound/Incision Assessment Maceration   Edges Defined edges   Wound Thickness Description Full thickness      VAC to be applied by [place] Monday           Number of days: 44       Incision 07/05/22 Foot Right;Dorsal (Active)   Number of days: 82        Percent of Wound(s)/Ulcer(s) Debrided: 100%    Total Surface Area Debrided:  30 sq cm     Diabetic/Pressure/Non Pressure Ulcers only:  Ulcer:     Estimated Blood Loss:  Minimal     Hemostasis Achieved: Pressure    Procedural Pain: 1 / 10     Post Procedural Pain: 2 / 10     Response to treatment: Well tolerated by patient

## 2022-10-03 NOTE — WOUND CARE
Debridement Wound Care        Problem List Items Addressed This Visit    None      Procedure Note  Indications:  Based on my examination of this patient's wound(s)/ulcer(s) today, debridement is required to promote healing and evaluate the wound base. Mr. 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 He is still currently taking Oral Cipro and antibiotic, Augmentin, and continues the Wound VAC with Diabetic Shoes. He has had [address], once a day for one week to treat the ulcer on the plantar right heel, measuring (4.0 cm x 4.0 cm x 0.8 cm) with a yellow fibrotic base, moderate serosanguineous drainage, mild odor and Hyperkeratosis/maceration. Sharp debridement required to remove the yellow fibrotic tissue, deep through the subcutaneous level to a healthy bleeding base. Post debridement measurements, (4.5 cm x 5.2 cm x 1.0 cm) round. Apply Quin, dry, clean, dressing and Wound VAC. Performed by:  Jacquelynn Favre, DPM    Consent obtained: Yes    Time out taken: Yes    Debridement: Excisional    Using curette the wound(s)/ulcer(s) was/were sharply debrided down through and including the removal of    subcutaneous tissue    Devitalized Tissue Debrided: slough    Pre Debridement Measurements:  Are located in the Wound/Ulcer Documentation Flow Sheet    Wound/Ulcer #: 1    Post Debridement Measurements:  Wound/Ulcer Descriptions are Pre Debridement except measurements:Diabetic ulcer, fat layer exposed    Wound Ankle Right;Medial;Proximal (Active)   Number of days: 80       Wound Ankle Right;Medial (Active)   Number of days: 80       Wound Heel Right (Active)   Wound Image    09/23/22 0954   Wound Etiology Diabetic 09/23/22 0954   Dressing Status Breakthrough drainage noted 09/23/22 0954   Cleansed Wound cleanser 09/23/22 0954   Dressing/Treatment Collagen with Ag;Negative Pressure Wound Therapy 09/23/22 0954   Dressing Change Due 09/12/22 09/09/22 1010   Wound Length (cm) 4 cm 09/23/22 0954   Wound Width (cm) 4 cm 09/23/22 0954   Wound Depth (cm) 0.8 cm 09/23/22 0954   Wound Surface Area (cm^2) 16 cm^2 09/23/22 0954   Change in Wound Size % 28.89 09/23/22 0954   Wound Volume (cm^3) 12.8 cm^3 09/23/22 0954   Wound Healing % 59 09/23/22 0954   Post-Procedure Length (cm) 4.5 cm 09/23/22 0954   Post-Procedure Width (cm) 5.2 cm 09/23/22 0954   Post-Procedure Depth (cm) 1 cm 09/23/22 0954   Post-Procedure Surface Area (cm^2) 23.4 cm^2 09/23/22 0954   Post-Procedure Volume (cm^3) 23.4 cm^3 09/23/22 0954   Wound Assessment Pale granulation tissue 09/23/22 0954   Drainage Amount Moderate 09/23/22 0954   Drainage Description Serosanguinous 09/23/22 0954   Wound Odor Mild 09/23/22 0954   Leticia-Wound/Incision Assessment Hyperkeratosis (Callous); Maceration 09/23/22 0954   Edges Defined edges 09/23/22 0954   Wound Thickness Description Full thickness 09/23/22 0954   Number of days: 52       Incision 07/05/22 Foot Right;Dorsal (Active)   Number of days: 90        Percent of Wound(s)/Ulcer(s) Debrided: 100%    Total Surface Area Debrided:  23.4 sq cm     Diabetic/Pressure/Non Pressure Ulcers only:  Ulcer:     Estimated Blood Loss:  Minimal     Hemostasis Achieved: Pressure    Procedural Pain: 1 / 10     Post Procedural Pain: 2 / 10     Response to treatment: Well tolerated by patient

## 2022-10-14 ENCOUNTER — HOSPITAL ENCOUNTER (OUTPATIENT)
Dept: WOUND CARE | Age: 62
Discharge: HOME | End: 2022-10-14
Attending: PODIATRIST
Payer: MEDICARE

## 2022-10-14 VITALS — HEART RATE: 77 BPM | DIASTOLIC BLOOD PRESSURE: 47 MMHG | SYSTOLIC BLOOD PRESSURE: 96 MMHG

## 2022-10-14 DIAGNOSIS — M86.071 ACUTE HEMATOGENOUS OSTEOMYELITIS OF RIGHT FOOT (HCC): Primary | ICD-10-CM

## 2022-10-14 DIAGNOSIS — M86.9 DIABETIC FOOT ULCER WITH OSTEOMYELITIS (HCC): ICD-10-CM

## 2022-10-14 DIAGNOSIS — E11.621 DIABETIC FOOT ULCER WITH OSTEOMYELITIS (HCC): ICD-10-CM

## 2022-10-14 DIAGNOSIS — E11.69 DIABETIC FOOT ULCER WITH OSTEOMYELITIS (HCC): ICD-10-CM

## 2022-10-14 DIAGNOSIS — L97.509 DIABETIC FOOT ULCER WITH OSTEOMYELITIS (HCC): ICD-10-CM

## 2022-10-14 PROCEDURE — 11042 DBRDMT SUBQ TIS 1ST 20SQCM/<: CPT

## 2022-10-14 RX ORDER — LIDOCAINE HYDROCHLORIDE 20 MG/ML
JELLY TOPICAL ONCE
Status: CANCELLED | OUTPATIENT
Start: 2022-10-14 | End: 2022-10-14

## 2022-10-14 RX ORDER — LIDOCAINE HYDROCHLORIDE 20 MG/ML
JELLY TOPICAL ONCE
Status: DISPENSED | OUTPATIENT
Start: 2022-10-14 | End: 2022-10-14

## 2022-10-14 NOTE — WOUND CARE
Discharge Instructions from  [facility]  [address] [place], [address] [place] [address]  [phone] Fax [phone]    NAME:  Tika Griffin OF BIRTH:  1960  MEDICAL RECORD NUMBER:  [id number]  DATE:  10/14/2022    Wound Cleansing:   Do not scrub or use excessive force. Cleanse wound prior to applying a clean dressing with:  [x] Keep Wound Dry in Shower    [x] Wound Cleanser         Topical Treatments:  Do not apply lotions, creams, or ointments to wound bed unless directed.         Dressings:           Wound Location right heel     [x] Collagen with Silver       Negative Pressure:           Wound Location:   [x] Pressure@  125 mm/Hg  [x]Continuous []Intermittent   [x] Black  [] White Foam [] Other:   [x]Change dressing:   [x]Other: 3 times a week            Return Appointment:    [x] Return Appointment:  Future Appointments   Date Time Provider Regis Ham   10/28/2022 10:00 AM Raegan Peguero DPM [facility]           Electronically signed Tia Hughes RN on 10/14/2022 at 12:13 PM

## 2022-10-14 NOTE — WOUND CARE
10/14/22 1120   Wound Heel Right   Date First Assessed/Time First Assessed: 08/12/22 1200   Present on Hospital Admission: Yes  Primary Wound Type: Diabetic Ulcer  Location: Heel  Wound Location Orientation: Right   Wound Image     Wound Length (cm) 4.5 cm   Wound Width (cm) 2.8 cm   Wound Depth (cm) 0.5 cm   Wound Surface Area (cm^2) 12.6 cm^2   Change in Wound Size % 44   Wound Volume (cm^3) 6.3 cm^3   Wound Healing % 80   Post-Procedure Length (cm) 4.5 cm   Post-Procedure Width (cm) 3 cm   Post-Procedure Depth (cm) 0.6 cm   Post-Procedure Surface Area (cm^2) 13.5 cm^2   Post-Procedure Volume (cm^3) 8.1 cm^3   Wound Assessment Pale granulation tissue   Drainage Amount Scant   Drainage Description Serosanguinous   Wound Odor Mild   Leticia-Wound/Incision Assessment Hyperkeratosis (Callous)   Edges Defined edges   Wound Thickness Description Full thickness

## 2022-10-15 NOTE — WOUND CARE
Debridement Wound Care        Problem List Items Addressed This Visit          Endocrine    Diabetic foot ulcer with osteomyelitis ([facility] [address]    Relevant Orders    INITIATE OUTPATIENT WOUND CARE PROTOCOL       Skeletal    Acute hematogenous osteomyelitis of right foot ([facility] [address] - Primary    Relevant Orders    INITIATE OUTPATIENT WOUND CARE PROTOCOL     Is a 58-year-old diabetic who returns to the wound care clinic for follow-up care of right heel surgical wound. He is status post surgical debridement right calcaneus secondary to osteomyelitis. He completed 6 weeks of IV antibiotics and STRAVIX grafting of his right heel. Today nonweight bearing right heel with a wheelchair. His dressing is clean dry and intact. He relates minimal pain. His fasting blood sugar is 123. Procedure Note  Indications:  Based on my examination of this patient's wound(s)/ulcer(s) today, debridement is required to promote healing and evaluate the wound base. Performed by:  Adwoa Neal DPM    Consent obtained: Yes    Time out taken: Yes    Debridement: Excisional    Using curette and # 10 blade scalpel the wound(s)/ulcer(s) was/were sharply debrided down through and including the removal of    subcutaneous tissue    Devitalized Tissue Debrided: slough    Pre Debridement Measurements:  Are located in the Wound/Ulcer Documentation Flow Sheet    Wound/Ulcer #: 1    Post Debridement Measurements:  Wound/Ulcer Descriptions are Pre Debridement except measurements:Diabetic ulcer, fat layer exposed    Wound Ankle Right;Medial;Proximal (Active)   Number of days: 92       Wound Ankle Right;Medial (Active)   Number of days: 92       Wound Heel Right (Active)   Wound Image    10/14/22 1120   Wound Etiology Diabetic 09/23/22 0954   Dressing Status Breakthrough drainage noted 09/23/22 0954   Cleansed Wound cleanser 09/23/22 0954   Dressing/Treatment Collagen with Ag;Negative Pressure Wound Therapy 09/23/22 0954   Dressing Change Due 09/12/22 09/09/22 1010   Wound Length (cm) 4.5 cm 10/14/22 1120   Wound Width (cm) 2.8 cm 10/14/22 1120   Wound Depth (cm) 0.5 cm 10/14/22 1120   Wound Surface Area (cm^2) 12.6 cm^2 10/14/22 1120   Change in Wound Size % 44 10/14/22 1120   Wound Volume (cm^3) 6.3 cm^3 10/14/22 1120   Wound Healing % 80 10/14/22 1120   Post-Procedure Length (cm) 4.5 cm 10/14/22 1120   Post-Procedure Width (cm) 3 cm 10/14/22 1120   Post-Procedure Depth (cm) 0.6 cm 10/14/22 1120   Post-Procedure Surface Area (cm^2) 13.5 cm^2 10/14/22 1120   Post-Procedure Volume (cm^3) 8.1 cm^3 10/14/22 1120   Wound Assessment Pale granulation tissue 10/14/22 1120   Drainage Amount Scant 10/14/22 1120   Drainage Description Serosanguinous 10/14/22 1120   Wound Odor Mild 10/14/22 1120   [place]-Wound/Incision Assessment Hyperkeratosis (Callous) 10/14/22 1120   Edges Defined edges 10/14/22 1120   Wound Thickness Description Full thickness 10/14/22 1120   Number of days: 64       Incision 07/05/22 Foot Right;Dorsal (Active)   Number of days: 102        Percent of Wound(s)/Ulcer(s) Debrided: 100%    Total Surface Area Debrided:  12 sq cm     Diabetic/Pressure/Non Pressure Ulcers only:  Ulcer:     Estimated Blood Loss:  Minimal     Hemostasis Achieved: Pressure    Procedural Pain: 1 / 10     Post Procedural Pain: 2 / 10     Response to treatment: Well tolerated by patient

## 2022-10-28 ENCOUNTER — HOSPITAL ENCOUNTER (OUTPATIENT)
Dept: WOUND CARE | Age: 62
Discharge: HOME | End: 2022-10-28
Attending: PODIATRIST
Payer: MEDICARE

## 2022-10-28 VITALS
RESPIRATION RATE: 18 BRPM | HEART RATE: 74 BPM | OXYGEN SATURATION: 98 % | DIASTOLIC BLOOD PRESSURE: 56 MMHG | SYSTOLIC BLOOD PRESSURE: 102 MMHG | TEMPERATURE: 98.6 F

## 2022-10-28 DIAGNOSIS — L97.509 DIABETIC FOOT ULCER WITH OSTEOMYELITIS (HCC): ICD-10-CM

## 2022-10-28 DIAGNOSIS — M86.9 DIABETIC FOOT ULCER WITH OSTEOMYELITIS (HCC): ICD-10-CM

## 2022-10-28 DIAGNOSIS — M86.071 ACUTE HEMATOGENOUS OSTEOMYELITIS OF RIGHT FOOT (HCC): Primary | ICD-10-CM

## 2022-10-28 DIAGNOSIS — E11.621 DIABETIC FOOT ULCER WITH OSTEOMYELITIS (HCC): ICD-10-CM

## 2022-10-28 DIAGNOSIS — E11.69 DIABETIC FOOT ULCER WITH OSTEOMYELITIS (HCC): ICD-10-CM

## 2022-10-28 PROCEDURE — 11042 DBRDMT SUBQ TIS 1ST 20SQCM/<: CPT

## 2022-10-28 RX ORDER — LIDOCAINE HYDROCHLORIDE 20 MG/ML
JELLY TOPICAL ONCE
Status: CANCELLED | OUTPATIENT
Start: 2022-10-28 | End: 2022-10-28

## 2022-10-28 RX ORDER — LIDOCAINE HYDROCHLORIDE 20 MG/ML
JELLY TOPICAL ONCE
Status: COMPLETED | OUTPATIENT
Start: 2022-10-28 | End: 2022-10-28

## 2022-10-28 RX ADMIN — LIDOCAINE HYDROCHLORIDE: 20 JELLY TOPICAL at 12:00

## 2022-10-28 NOTE — WOUND CARE
Discharge Instructions from  [facility]  [address] [place], [place], [facility]  [phone] Fax [phone]    NAME:  Augustin Muller OF BIRTH:  1960  MEDICAL RECORD NUMBER:  [id number]  DATE:  10/28/2022    Wound Cleansing:   Do not scrub or use excessive force. Cleanse wound prior to applying a clean dressing with:  [] Normal Saline [] Keep Wound Dry in Shower    [x] Wound Cleanser   [] Cleanse wound with Mild Soap & Water  [] May Shower at Discharge   [] Other:      Topical Treatments:  Do not apply lotions, creams, or ointments to wound bed unless directed. [] Apply moisturizing lotion to skin surrounding the wound prior to dressing change.  [] Apply antifungal ointment to skin surrounding the wound prior to dressing change.  [] Apply thin film of moisture barrier ointment to skin immediately around wound. [] Other:       Dressings:           Wound Location: Right Heel   [] Apply Primary Dressing:       [] MediHoney Gel [] Alginate with Silver [x] Alginate   [] Collagen [x] Collagen with Silver   [] Santyl with Moisten saline gauze     [] Hydrocolloid   [] MediHoney Alginate [] Foam with Silver   [] Foam   [] Hydrofera Blue    [] Mepilex Border    [] Moisten with Saline [] Hydrogel [] Mepitel     [] Bactroban/Mupirocin [] Polysporin  [] Other:    [] Pack wound loosely with  [] Iodoform   [] Plain Packing  [] Other   [x] Cover and Secure with:     [x] Gauze [x] Dunia [] Kerlix   [x] Ace Wrap [] Cover Roll Tape [x] ABD     [] Other:    Avoid contact of tape with skin.   [x] Change dressing: [] Daily    [] Every Other Day [x] Three times per week   [] Once a week [] Do Not Change Dressing   [] Other: Wound vac on hold for 2 weeks    Edema Control:  Apply: [] Compression Stocking []Right Leg []Left Leg   [] Tubigrip []Right Leg Double Layer []Left Leg Double Layer       []Right Leg Single Layer []Left Leg Single Layer   [] SpandaGrip []Right Leg []Left Leg      []Low compression 5-10 mm/Hg      []Medium compression 10-20 mm/Hg     []High compression  20-30 mm/Hg   every morning immediately when getting up should be applied to affected leg(s) from mid foot to knee making sure to cover the heel. Remove every night before going to bed. [x] Elevate leg(s) above the level of the heart when sitting. [] Avoid prolonged standing in one place. Off-Loading:   [x] Off-loading when [x] walking  [x] in bed [x] sitting  [] Total non-weight bearing  [] Right Leg  [] Left Leg   [] Assistive Device [] Walker [] Cane  [] Wheelchair  [] Crutches   [] Surgical shoe    [] Podus Boot(s)   [] Foam Boot(s)  [] Roll About    [] Cast Boot [] CROW Boot  [] Other:        Dietary:  [x] Diet as tolerated: [] Calorie Diabetic Diet: [] No Added Salt:  [] Increase Protein: [] Other:       Activity:  [x] Activity as tolerated:  [] Patient has no activity restrictions     [] Strict Bedrest: [] Remain off Work:     [] May return to full duty work:                                   [] Return to work with restrictions:             Return Appointment:  [] Wound and dressing supply provider:   [] ECF or Home Healthcare:  [] Wound Assessment: [] Physician or NP scheduled for Wound Assessment:   [x] Return Appointment: With Dr. Steve De Leon  in  [facility])  [] Ordered tests:      Electronically signed Miriam Sharpe RN on 10/28/2022 at 11:55 AM     Pauline [address]: Should you experience any significant changes in your wound(s) or have questions about your wound care, please contact the [address] at [address] [place] [address] 8:00 am - 4:30. If you need help with your wound outside these hours and cannot wait until we are again available, contact your PCP or go to the hospital emergency room. PLEASE NOTE: IF YOU ARE UNABLE TO OBTAIN WOUND SUPPLIES, CONTINUE TO USE THE SUPPLIES YOU HAVE AVAILABLE UNTIL YOU ARE ABLE TO REACH US.  IT IS MOST IMPORTANT TO KEEP THE WOUND COVERED AT ALL TIMES.      Physician Signature:_______________________    Date: ___________ Time:  ____________

## 2022-10-28 NOTE — WOUND CARE
10/28/22 1133   Wound Heel Right   Date First Assessed/Time First Assessed: 08/12/22 1200   Present on Hospital Admission: Yes  Primary Wound Type: Diabetic Ulcer  Location: Heel  Wound Location Orientation: Right   Wound Image     Wound Etiology Diabetic   Dressing Status New dressing applied   Cleansed Wound cleanser   Dressing/Treatment Collagen with Ag;Alginate;Dry dressing;Roll gauze; Ace wrap  (gentian violet to edges)   Dressing Change Due 11/11/22  (Return to clinic in 2 weeks.  on 10/31)   Wound Length (cm) 2.4 cm   Wound Width (cm) 2 cm   Wound Depth (cm) 0.2 cm   Wound Surface Area (cm^2) 4.8 cm^2   Change in Wound Size % 78.67   Wound Volume (cm^3) 0.96 cm^3   Wound Healing % 97   Post-Procedure Length (cm) 2.4 cm   Post-Procedure Width (cm) 3 cm   Post-Procedure Depth (cm) 0.4 cm   Post-Procedure Surface Area (cm^2) 7.2 cm^2   Post-Procedure Volume (cm^3) 2.88 cm^3   Wound Assessment Granulation tissue; Devitalized tissue   Drainage Amount Moderate   Drainage Description Serosanguinous   Wound Odor Mild   Leticia-Wound/Incision Assessment Hyperkeratosis (Callous)   Edges Defined edges   Wound Thickness Description Full thickness     Wound vac placed on hold for 2 weeks

## 2022-11-06 NOTE — WOUND CARE
Debridement Wound Care        Problem List Items Addressed This Visit          Endocrine    Diabetic foot ulcer with osteomyelitis ([facility] [address]       Skeletal    Acute hematogenous osteomyelitis of right foot ([facility] [address] - Primary       Procedure Note  Indications:  Based on my examination of this patient's wound(s)/ulcer(s) today, debridement is required to promote healing and evaluate the wound base. Mr. Gena Murry is a diabetic, and returning today for follow up treatment on his right heel wound, measuring 2.4 cm x 2 cm x 0.2 cm, with white macerated base, deep to the subcutaneous level, moderate serosanguinous drainage, mild odor, and Hyperkeratosis, along with a Wound VAC and post operative antibiotics. 1.)  Sharp debridement is required on the right heel to remove all the macerated white tissue, deep through to a healthy bleeding base. Post debridement measurements 2.4 cm x 3 cm x 0.4 cm round. Apply Endoform and Absorbent dressing.  2.) Discharge Wound VAC, but continue with post operative antibiotics until completed. Mr. Gena Murry is to return in two weeks to the [address] [place] [address] for a dressing change. Performed by:  Adwoa Neal DPM    Consent obtained: Yes    Time out taken: Yes    Debridement: Excisional    Using curette the wound(s)/ulcer(s) was/were sharply debrided down through and including the removal of    subcutaneous tissue    Devitalized Tissue Debrided: slough    Pre Debridement Measurements:  Are located in the Wound/Ulcer Documentation Flow Sheet    Wound/Ulcer #: 1    Post Debridement Measurements:  Wound/Ulcer Descriptions are Pre Debridement except measurements:Diabetic ulcer, fat layer exposed    Wound Ankle Right;Medial;Proximal (Active)   Number of days: 114       Wound Ankle Right;Medial (Active)   Number of days: 114       Wound Heel Right (Active)   Wound Image    10/28/22 1133   Wound Etiology Diabetic 10/28/22 1133   Dressing Status New dressing applied 10/28/22 1133   Cleansed Wound cleanser 10/28/22 1133   Dressing/Treatment Collagen with Ag;Alginate;Dry dressing;Roll gauze; Ace wrap 10/28/22 1133   Dressing Change Due 11/11/22 10/28/22 1133   Wound Length (cm) 2.4 cm 10/28/22 1133   Wound Width (cm) 2 cm 10/28/22 1133   Wound Depth (cm) 0.2 cm 10/28/22 1133   Wound Surface Area (cm^2) 4.8 cm^2 10/28/22 1133   Change in Wound Size % 78.67 10/28/22 1133   Wound Volume (cm^3) 0.96 cm^3 10/28/22 1133   Wound Healing % 97 10/28/22 1133   Post-Procedure Length (cm) 2.4 cm 10/28/22 1133   Post-Procedure Width (cm) 3 cm 10/28/22 1133   Post-Procedure Depth (cm) 0.4 cm 10/28/22 1133   Post-Procedure Surface Area (cm^2) 7.2 cm^2 10/28/22 1133   Post-Procedure Volume (cm^3) 2.88 cm^3 10/28/22 1133   Wound Assessment Granulation tissue; Devitalized tissue 10/28/22 1133   Drainage Amount Moderate 10/28/22 1133   Drainage Description Serosanguinous 10/28/22 1133   Wound Odor Mild 10/28/22 1133   Leticia-Wound/Incision Assessment Hyperkeratosis (Callous) 10/28/22 1133   Edges Defined edges 10/28/22 1133   Wound Thickness Description Full thickness 10/28/22 1133   Number of days: 86       Incision 07/05/22 Foot Right;Dorsal (Active)   Number of days: 124        Percent of Wound(s)/Ulcer(s) Debrided: 100%    Total Surface Area Debrided:   7.2 sq cm     Diabetic/Pressure/Non Pressure Ulcers only:  Ulcer:     Estimated Blood Loss:  Minimal     Hemostasis Achieved: Pressure    Procedural Pain: 1 / 10     Post Procedural Pain: 2 / 10     Response to treatment: Well tolerated by patient

## 2022-11-11 ENCOUNTER — HOSPITAL ENCOUNTER (OUTPATIENT)
Dept: WOUND CARE | Age: 62
Discharge: HOME | End: 2022-11-11
Attending: PODIATRIST
Payer: MEDICARE

## 2022-11-11 VITALS
OXYGEN SATURATION: 95 % | TEMPERATURE: 99 F | DIASTOLIC BLOOD PRESSURE: 53 MMHG | SYSTOLIC BLOOD PRESSURE: 97 MMHG | RESPIRATION RATE: 18 BRPM | HEART RATE: 84 BPM

## 2022-11-11 DIAGNOSIS — L97.509 DIABETIC FOOT ULCER WITH OSTEOMYELITIS (HCC): ICD-10-CM

## 2022-11-11 DIAGNOSIS — M86.071 ACUTE HEMATOGENOUS OSTEOMYELITIS OF RIGHT FOOT (HCC): Primary | ICD-10-CM

## 2022-11-11 DIAGNOSIS — E11.621 DIABETIC FOOT ULCER WITH OSTEOMYELITIS (HCC): ICD-10-CM

## 2022-11-11 DIAGNOSIS — E11.69 DIABETIC FOOT ULCER WITH OSTEOMYELITIS (HCC): ICD-10-CM

## 2022-11-11 DIAGNOSIS — M86.9 DIABETIC FOOT ULCER WITH OSTEOMYELITIS (HCC): ICD-10-CM

## 2022-11-11 PROCEDURE — 11042 DBRDMT SUBQ TIS 1ST 20SQCM/<: CPT

## 2022-11-11 RX ORDER — LIDOCAINE HYDROCHLORIDE 20 MG/ML
JELLY TOPICAL ONCE
Status: COMPLETED | OUTPATIENT
Start: 2022-11-11 | End: 2022-11-11

## 2022-11-11 RX ORDER — LIDOCAINE HYDROCHLORIDE 20 MG/ML
JELLY TOPICAL ONCE
OUTPATIENT
Start: 2022-11-11 | End: 2022-11-11

## 2022-11-11 RX ADMIN — LIDOCAINE HYDROCHLORIDE: 20 JELLY TOPICAL at 10:15

## 2022-11-11 NOTE — WOUND CARE
Discharge Instructions from  [address] [facility]  [address], [place], [facility]  [phone] Fax [phone]    NAME:  Hamzah Sale OF BIRTH:  1960  MEDICAL RECORD NUMBER:  [id number]  DATE:  11/11/2022    Wound Cleansing:   Do not scrub or use excessive force. Cleanse wound prior to applying a clean dressing with:  [] Normal Saline        [] Keep Wound Dry in Shower    [x] Wound Cleanser   [] Cleanse wound with Mild Soap & Water  [] May Shower at Discharge   [] Other:       Topical Treatments:  Do not apply lotions, creams, or ointments to wound bed unless directed. [] Apply moisturizing lotion to skin surrounding the wound prior to dressing change.  [] Apply antifungal ointment to skin surrounding the wound prior to dressing change. [x] Apply thin film of moisture barrier ointment to skin immediately around wound. [] Other:                  Dressings:                  Wound Location: Right Heel            [] Apply Primary Dressing:                                          [] MediHoney Gel      [] Alginate with Silver            [x] Alginate              [] Collagen     [x] Collagen with Silver   [] Santyl with Moisten saline gauze                [] Hydrocolloid             [] MediHoney Alginate        [] Foam with Silver              [] Foam   [] Hydrofera Blue             [] Mepilex Border                    [] Moisten with Saline           [] Hydrogel     [] Mepitel                      [] Bactroban/Mupirocin         [] Polysporin              [] Other:    [] Pack wound loosely with   [] Iodoform   [] Plain Packing          [] Other   [x] Cover and Secure with:                   [x] Gauze        [x] Dunia           [] Kerlix              [x] Ace Wrap   [] Cover Roll Tape     [x] ABD                                      [] Other:               Avoid contact of tape with skin.   [x] Change dressing:  [] Daily           [] Every Other Day    [x] Three times per week              [] Once a week          [] Do Not Change Dressing    [] Other: Wound vac has been discontinued     Edema Control:  Apply:  [] Compression Stocking      []Right Leg     []Left Leg              [] Tubigrip      []Right Leg Double Layer      []Left Leg Double Layer                                                  []Right Leg Single Layer       []Left Leg Single Layer              [] SpandaGrip           []Right Leg     []Left Leg                                      []Low compression 5-10 mm/Hg                                            []Medium compression 10-20 mm/Hg                                      []High compression  20-30 mm/Hg              every morning immediately when getting up should be applied to affected leg(s) from mid foot to knee making sure to cover the heel. Remove every night before going to bed. [x] Elevate leg(s) above the level of the heart when sitting. [] Avoid prolonged standing in one place.             Off-Loading:   [x] Off-loading when   [x] walking       [x] in bed         [x] sitting  [] Total non-weight bearing  [] Right Leg  [] Left Leg         [] Assistive Device    [] Walker        [] Cane          [] Wheelchair  [] Crutches              [] Surgical shoe    [] Podus Boot(s)   [] Foam Boot(s)  [] Roll About              [] Cast Boot   [] CROW Boot  [] Other:           Dietary:  [x] Diet as tolerated:   [] Calorie Diabetic Diet:         [] No Added Salt:  [] Increase Protein:   [] Other:                   Activity:  [x] Activity as tolerated:  [] Patient has no activity restrictions     [] Strict Bedrest:           [] Remain off Work:     [] May return to full duty work:                                   [] Return to work with restrictions:                        Return Appointment:  [] Wound and dressing supply provider:   [] ECF or Home Healthcare:  [] Wound Assessment:         [] Physician or NP scheduled for Wound Assessment:   [x] Return Appointment: With Dr. Grant Dangelo  in 4 Week(s) (12/09/22)  [] Ordered tests:           Electronically signed Rosanna KIM Austin on 11/11/2022 at [address]: Should you experience any significant changes in your wound(s) or have questions about your wound care, please contact the [facility] Main at [address] [place] [address] 8:00 am - 4:30. If you need help with your wound outside these hours and cannot wait until we are again available, contact your PCP or go to the hospital emergency room. PLEASE NOTE: IF YOU ARE UNABLE TO OBTAIN WOUND SUPPLIES, CONTINUE TO USE THE SUPPLIES YOU HAVE AVAILABLE UNTIL YOU ARE ABLE TO REACH US. IT IS MOST IMPORTANT TO KEEP THE WOUND COVERED AT ALL TIMES.      Physician Signature:_______________________    Date: ___________ Time:  ____________

## 2022-11-11 NOTE — WOUND CARE
11/11/22 0959   Wound Heel Right   Date First Assessed/Time First Assessed: 08/12/22 1200   Present on Hospital Admission: Yes  Primary Wound Type: Diabetic Ulcer  Location: Heel  Wound Location Orientation: Right   Wound Image     Wound Etiology Diabetic   Dressing Status Intact   Cleansed Vashe   Dressing/Treatment Collagen;Alginate with Ag;Dry dressing;Roll gauze; Ace wrap  (gentian violet to periwound)   Dressing Change Due 12/09/22  (Pt has home health)   Wound Length (cm) 2 cm   Wound Width (cm) 1.3 cm   Wound Depth (cm) 0.2 cm   Wound Surface Area (cm^2) 2.6 cm^2   Change in Wound Size % 88.44   Wound Volume (cm^3) 0.52 cm^3   Wound Healing % 98   Post-Procedure Length (cm) 2.1 cm   Post-Procedure Width (cm) 1.4 cm   Post-Procedure Depth (cm) 0.4 cm   Post-Procedure Surface Area (cm^2) 2.94 cm^2   Post-Procedure Volume (cm^3) 1.176 cm^3   Wound Assessment Devitalized tissue   Drainage Amount Moderate   Drainage Description Serosanguinous   Wound Odor None   Leticia-Wound/Incision Assessment Hyperkeratosis (Callous)   Edges Defined edges   Wound Thickness Description Full thickness

## 2022-11-20 NOTE — WOUND CARE
Debridement Wound Care        Problem List Items Addressed This Visit          Endocrine    Diabetic foot ulcer with osteomyelitis ([facility] [address]       Skeletal    Acute hematogenous osteomyelitis of right foot ([facility] [address] - Primary       Procedure Note  Indications:  Based on my examination of this patient's wound(s)/ulcer(s) today, debridement is required to promote healing and evaluate the wound base. Patient is returning to the [address] [place] [address] for continued follow up treatment on his right heel ulcer, measuring 2 x 1.3 x 0.2 cm, with a white fibrotic base, deep to the subcutaneous layer, moderate serosanguinous drainage, negative signs of infection and Hyperkeratosis. He is a diabetic and this morning his fasting blood sugar was 187. A Sharp debridement was performed on the right heel ulcer, by removing the white fibrotic tissue deep through to the subcutaneous level to a healthy bleeding base. Post debridement measurements 2.1 x 1.4 x 0.4 cm round. Applied thin film of moisture barrier ointment to the skin immediately around the wound. Also, Alginate, Collagen with Ag, Gauze, Dunia, Ace Wrap. Patient is advised the dressing is to be changed three times during the week. Also, patient is to offload his right heel, while walking, in bed or sitting. Performed by:  Donato Ye DPM    Consent obtained: Yes    Time out taken: Yes    Debridement: Excisional    Using curette the wound(s)/ulcer(s) was/were sharply debrided down through and including the removal of    subcutaneous tissue    Devitalized Tissue Debrided: slough    Pre Debridement Measurements:  Are located in the Wound/Ulcer Documentation Flow Sheet    Wound/Ulcer #: 1    Post Debridement Measurements:  Wound/Ulcer Descriptions are Pre Debridement except measurements:Diabetic ulcer, fat layer exposed    Wound Ankle Right;Medial;Proximal (Active)   Number of days: 128       Wound Ankle Right;Medial (Active)   Number of days: 128       Wound Heel Right (Active)   Wound Image    11/11/22 0959   Wound Etiology Diabetic 11/11/22 0959   Dressing Status Intact 11/11/22 0959   Cleansed Vashe 11/11/22 0959   Dressing/Treatment Collagen;Alginate with Ag;Dry dressing;Roll gauze; Ace wrap 11/11/22 0959   Dressing Change Due 12/09/22 11/11/22 0959   Wound Length (cm) 2 cm 11/11/22 0959   Wound Width (cm) 1.3 cm 11/11/22 0959   Wound Depth (cm) 0.2 cm 11/11/22 0959   Wound Surface Area (cm^2) 2.6 cm^2 11/11/22 0959   Change in Wound Size % 88.44 11/11/22 0959   Wound Volume (cm^3) 0.52 cm^3 11/11/22 0959   Wound Healing % 98 11/11/22 0959   Post-Procedure Length (cm) 2.1 cm 11/11/22 0959   Post-Procedure Width (cm) 1.4 cm 11/11/22 0959   Post-Procedure Depth (cm) 0.4 cm 11/11/22 0959   Post-Procedure Surface Area (cm^2) 2.94 cm^2 11/11/22 0959   Post-Procedure Volume (cm^3) 1.176 cm^3 11/11/22 0959   Wound Assessment Devitalized tissue 11/11/22 0959   Drainage Amount Moderate 11/11/22 0959   Drainage Description Serosanguinous 11/11/22 0959   Wound Odor None 11/11/22 0959   Leticia-Wound/Incision Assessment Hyperkeratosis (Callous) 11/11/22 0959   Edges Defined edges 11/11/22 0959   Wound Thickness Description Full thickness 11/11/22 0959   Number of days: 100       Incision 07/05/22 Foot Right;Dorsal (Active)   Number of days: 138        Percent of Wound(s)/Ulcer(s) Debrided: 100%    Total Surface Area Debrided:  2.94 sq cm     Diabetic/Pressure/Non Pressure Ulcers only:  Ulcer:     Estimated Blood Loss:  Minimal     Hemostasis Achieved: Pressure    Procedural Pain: 1 / 10     Post Procedural Pain: 2 / 10     Response to treatment: Well tolerated by patient

## 2022-12-09 ENCOUNTER — APPOINTMENT (OUTPATIENT)
Dept: GENERAL RADIOLOGY | Age: 62
DRG: 239 | End: 2022-12-09
Attending: PODIATRIST
Payer: MEDICARE

## 2022-12-09 ENCOUNTER — APPOINTMENT (OUTPATIENT)
Dept: GENERAL RADIOLOGY | Age: 62
DRG: 239 | End: 2022-12-09
Attending: EMERGENCY MEDICINE
Payer: MEDICARE

## 2022-12-09 ENCOUNTER — APPOINTMENT (OUTPATIENT)
Dept: CT IMAGING | Age: 62
DRG: 239 | End: 2022-12-09
Attending: EMERGENCY MEDICINE
Payer: MEDICARE

## 2022-12-09 ENCOUNTER — APPOINTMENT (OUTPATIENT)
Dept: MRI IMAGING | Age: 62
DRG: 239 | End: 2022-12-09
Attending: EMERGENCY MEDICINE
Payer: MEDICARE

## 2022-12-09 ENCOUNTER — ANESTHESIA (OUTPATIENT)
Dept: SURGERY | Age: 62
DRG: 239 | End: 2022-12-09
Payer: MEDICARE

## 2022-12-09 ENCOUNTER — HOSPITAL ENCOUNTER (OUTPATIENT)
Dept: WOUND CARE | Age: 62
Discharge: HOME | End: 2022-12-09
Attending: PODIATRIST
Payer: MEDICARE

## 2022-12-09 ENCOUNTER — ANESTHESIA EVENT (OUTPATIENT)
Dept: SURGERY | Age: 62
DRG: 239 | End: 2022-12-09
Payer: MEDICARE

## 2022-12-09 ENCOUNTER — HOSPITAL ENCOUNTER (INPATIENT)
Age: 62
LOS: 34 days | Discharge: NURSING, REHAB OR LONG-TERM CARE | DRG: 239 | End: 2023-01-12
Attending: EMERGENCY MEDICINE | Admitting: HOSPITALIST
Payer: MEDICARE

## 2022-12-09 VITALS
OXYGEN SATURATION: 100 % | RESPIRATION RATE: 18 BRPM | DIASTOLIC BLOOD PRESSURE: 60 MMHG | TEMPERATURE: 99.1 F | HEART RATE: 76 BPM | SYSTOLIC BLOOD PRESSURE: 119 MMHG

## 2022-12-09 DIAGNOSIS — L97.529 DIABETIC ULCER OF TOE OF LEFT FOOT ASSOCIATED WITH TYPE 2 DIABETES MELLITUS, UNSPECIFIED ULCER STAGE (HCC): Primary | ICD-10-CM

## 2022-12-09 DIAGNOSIS — L97.509 DIABETIC FOOT ULCER WITH OSTEOMYELITIS (HCC): ICD-10-CM

## 2022-12-09 DIAGNOSIS — E11.69 DIABETIC FOOT ULCER WITH OSTEOMYELITIS (HCC): ICD-10-CM

## 2022-12-09 DIAGNOSIS — G62.9 NEUROPATHY: ICD-10-CM

## 2022-12-09 DIAGNOSIS — E11.621 DIABETIC ULCER OF TOE OF LEFT FOOT ASSOCIATED WITH TYPE 2 DIABETES MELLITUS, UNSPECIFIED ULCER STAGE (HCC): Primary | ICD-10-CM

## 2022-12-09 DIAGNOSIS — M86.9 DIABETIC FOOT ULCER WITH OSTEOMYELITIS (HCC): ICD-10-CM

## 2022-12-09 DIAGNOSIS — E11.621 DIABETIC FOOT ULCER WITH OSTEOMYELITIS (HCC): ICD-10-CM

## 2022-12-09 DIAGNOSIS — D72.829 LEUKOCYTOSIS, UNSPECIFIED TYPE: ICD-10-CM

## 2022-12-09 DIAGNOSIS — M86.071 ACUTE HEMATOGENOUS OSTEOMYELITIS OF RIGHT FOOT (HCC): Primary | ICD-10-CM

## 2022-12-09 PROBLEM — L03.116 CELLULITIS OF LEFT FOOT: Status: ACTIVE | Noted: 2022-12-09

## 2022-12-09 PROBLEM — R19.7 DIARRHEA: Status: ACTIVE | Noted: 2022-12-09

## 2022-12-09 PROBLEM — S92.335A: Status: ACTIVE | Noted: 2022-12-09

## 2022-12-09 PROBLEM — A48.0 GAS GANGRENE (HCC): Status: ACTIVE | Noted: 2022-12-09

## 2022-12-09 PROBLEM — S92.325A: Status: ACTIVE | Noted: 2022-12-09

## 2022-12-09 PROBLEM — I96 GANGRENE OF LEFT FOOT (HCC): Status: RESOLVED | Noted: 2022-12-09 | Resolved: 2022-12-09

## 2022-12-09 PROBLEM — L08.9 DIABETIC FOOT INFECTION (HCC): Status: ACTIVE | Noted: 2022-12-09

## 2022-12-09 PROBLEM — S92.345A CLOSED NONDISPLACED FRACTURE OF FOURTH METATARSAL BONE OF LEFT FOOT: Status: ACTIVE | Noted: 2022-12-09

## 2022-12-09 PROBLEM — E11.628 DIABETIC FOOT INFECTION (HCC): Status: ACTIVE | Noted: 2022-12-09

## 2022-12-09 PROBLEM — A48.0 GAS GANGRENE (HCC): Status: RESOLVED | Noted: 2022-12-09 | Resolved: 2022-12-09

## 2022-12-09 PROBLEM — I96 GANGRENE OF LEFT FOOT (HCC): Status: ACTIVE | Noted: 2022-12-09

## 2022-12-09 PROBLEM — L03.116 CELLULITIS OF LEFT FOOT: Status: RESOLVED | Noted: 2022-12-09 | Resolved: 2022-12-09

## 2022-12-09 PROBLEM — M86.072 ACUTE HEMATOGENOUS OSTEOMYELITIS OF LEFT FOOT (HCC): Status: ACTIVE | Noted: 2022-12-09

## 2022-12-09 LAB
ALBUMIN SERPL-MCNC: 2.7 G/DL (ref 3.4–5)
ALBUMIN/GLOB SERPL: 0.5 (ref 0.8–1.7)
ALP SERPL-CCNC: 152 U/L (ref 45–117)
ALT SERPL-CCNC: 168 U/L (ref 16–61)
ANION GAP SERPL CALC-SCNC: 8 MMOL/L (ref 3–18)
AST SERPL-CCNC: 108 U/L (ref 10–38)
BASOPHILS # BLD: 0.1 K/UL (ref 0–0.1)
BASOPHILS NFR BLD: 0 % (ref 0–2)
BILIRUB SERPL-MCNC: 0.6 MG/DL (ref 0.2–1)
BUN SERPL-MCNC: 39 MG/DL (ref 7–18)
BUN/CREAT SERPL: 7 (ref 12–20)
CALCIUM SERPL-MCNC: 8.4 MG/DL (ref 8.5–10.1)
CHLORIDE SERPL-SCNC: 92 MMOL/L (ref 100–111)
CO2 SERPL-SCNC: 30 MMOL/L (ref 21–32)
CREAT SERPL-MCNC: 5.98 MG/DL (ref 0.6–1.3)
DIFFERENTIAL METHOD BLD: ABNORMAL
EOSINOPHIL # BLD: 0.1 K/UL (ref 0–0.4)
EOSINOPHIL NFR BLD: 0 % (ref 0–5)
ERYTHROCYTE [DISTWIDTH] IN BLOOD BY AUTOMATED COUNT: 17.6 % (ref 11.6–14.5)
ERYTHROCYTE [SEDIMENTATION RATE] IN BLOOD: 92 MM/HR (ref 0–20)
EST. AVERAGE GLUCOSE BLD GHB EST-MCNC: 171 MG/DL
GLOBULIN SER CALC-MCNC: 5.6 G/DL (ref 2–4)
GLUCOSE BLD STRIP.AUTO-MCNC: 151 MG/DL (ref 70–110)
GLUCOSE BLD STRIP.AUTO-MCNC: 159 MG/DL (ref 70–110)
GLUCOSE BLD STRIP.AUTO-MCNC: 163 MG/DL (ref 70–110)
GLUCOSE SERPL-MCNC: 185 MG/DL (ref 74–99)
HBA1C MFR BLD: 7.6 % (ref 4.2–5.6)
HCT VFR BLD AUTO: 35.4 % (ref 36–48)
HGB BLD-MCNC: 11.6 G/DL (ref 13–16)
IMM GRANULOCYTES # BLD AUTO: 0.2 K/UL (ref 0–0.04)
IMM GRANULOCYTES NFR BLD AUTO: 1 % (ref 0–0.5)
LACTATE BLD-SCNC: 1.72 MMOL/L (ref 0.4–2)
LYMPHOCYTES # BLD: 1.4 K/UL (ref 0.9–3.6)
LYMPHOCYTES NFR BLD: 6 % (ref 21–52)
MCH RBC QN AUTO: 30.2 PG (ref 24–34)
MCHC RBC AUTO-ENTMCNC: 32.8 G/DL (ref 31–37)
MCV RBC AUTO: 92.2 FL (ref 78–100)
MONOCYTES # BLD: 1.9 K/UL (ref 0.05–1.2)
MONOCYTES NFR BLD: 8 % (ref 3–10)
NEUTS SEG # BLD: 19.4 K/UL (ref 1.8–8)
NEUTS SEG NFR BLD: 84 % (ref 40–73)
NRBC # BLD: 0 K/UL (ref 0–0.01)
NRBC BLD-RTO: 0 PER 100 WBC
PLATELET # BLD AUTO: 254 K/UL (ref 135–420)
PMV BLD AUTO: 11.3 FL (ref 9.2–11.8)
POTASSIUM SERPL-SCNC: 4.4 MMOL/L (ref 3.5–5.5)
PROCALCITONIN SERPL-MCNC: 3.12 NG/ML
PROT SERPL-MCNC: 8.3 G/DL (ref 6.4–8.2)
RBC # BLD AUTO: 3.84 M/UL (ref 4.35–5.65)
SODIUM SERPL-SCNC: 130 MMOL/L (ref 136–145)
TROPONIN-HIGH SENSITIVITY: 22 NG/L (ref 0–78)
WBC # BLD AUTO: 23 K/UL (ref 4.6–13.2)

## 2022-12-09 PROCEDURE — 77030020782 HC GWN BAIR PAWS FLX 3M -B: Performed by: PODIATRIST

## 2022-12-09 PROCEDURE — 77030000032 HC CUF TRNQT ZIMM -B: Performed by: PODIATRIST

## 2022-12-09 PROCEDURE — 74011636637 HC RX REV CODE- 636/637: Performed by: HOSPITALIST

## 2022-12-09 PROCEDURE — 93005 ELECTROCARDIOGRAM TRACING: CPT

## 2022-12-09 PROCEDURE — 80053 COMPREHEN METABOLIC PANEL: CPT

## 2022-12-09 PROCEDURE — 74011000250 HC RX REV CODE- 250: Performed by: NURSE ANESTHETIST, CERTIFIED REGISTERED

## 2022-12-09 PROCEDURE — 74011000250 HC RX REV CODE- 250: Performed by: PODIATRIST

## 2022-12-09 PROCEDURE — 74011250636 HC RX REV CODE- 250/636: Performed by: EMERGENCY MEDICINE

## 2022-12-09 PROCEDURE — 83605 ASSAY OF LACTIC ACID: CPT

## 2022-12-09 PROCEDURE — 87075 CULTR BACTERIA EXCEPT BLOOD: CPT

## 2022-12-09 PROCEDURE — 96374 THER/PROPH/DIAG INJ IV PUSH: CPT

## 2022-12-09 PROCEDURE — 85652 RBC SED RATE AUTOMATED: CPT

## 2022-12-09 PROCEDURE — 77030040361 HC SLV COMPR DVT MDII -B: Performed by: PODIATRIST

## 2022-12-09 PROCEDURE — 74011000258 HC RX REV CODE- 258: Performed by: EMERGENCY MEDICINE

## 2022-12-09 PROCEDURE — 73630 X-RAY EXAM OF FOOT: CPT

## 2022-12-09 PROCEDURE — 73620 X-RAY EXAM OF FOOT: CPT

## 2022-12-09 PROCEDURE — 0QBP0ZZ EXCISION OF LEFT METATARSAL, OPEN APPROACH: ICD-10-PCS | Performed by: PODIATRIST

## 2022-12-09 PROCEDURE — 0Y6U0Z0 DETACHMENT AT LEFT 3RD TOE, COMPLETE, OPEN APPROACH: ICD-10-PCS | Performed by: PODIATRIST

## 2022-12-09 PROCEDURE — 87040 BLOOD CULTURE FOR BACTERIA: CPT

## 2022-12-09 PROCEDURE — 87185 SC STD ENZYME DETCJ PER NZM: CPT

## 2022-12-09 PROCEDURE — 87150 DNA/RNA AMPLIFIED PROBE: CPT

## 2022-12-09 PROCEDURE — 83036 HEMOGLOBIN GLYCOSYLATED A1C: CPT

## 2022-12-09 PROCEDURE — 65270000029 HC RM PRIVATE

## 2022-12-09 PROCEDURE — 74176 CT ABD & PELVIS W/O CONTRAST: CPT

## 2022-12-09 PROCEDURE — 82962 GLUCOSE BLOOD TEST: CPT

## 2022-12-09 PROCEDURE — 84484 ASSAY OF TROPONIN QUANT: CPT

## 2022-12-09 PROCEDURE — 74011000272 HC RX REV CODE- 272: Performed by: PODIATRIST

## 2022-12-09 PROCEDURE — 85025 COMPLETE CBC W/AUTO DIFF WBC: CPT

## 2022-12-09 PROCEDURE — 99285 EMERGENCY DEPT VISIT HI MDM: CPT

## 2022-12-09 PROCEDURE — 71045 X-RAY EXAM CHEST 1 VIEW: CPT

## 2022-12-09 PROCEDURE — 2709999900 HC NON-CHARGEABLE SUPPLY: Performed by: PODIATRIST

## 2022-12-09 PROCEDURE — 74011000250 HC RX REV CODE- 250: Performed by: EMERGENCY MEDICINE

## 2022-12-09 PROCEDURE — 88311 DECALCIFY TISSUE: CPT

## 2022-12-09 PROCEDURE — 73718 MRI LOWER EXTREMITY W/O DYE: CPT

## 2022-12-09 PROCEDURE — 76060000033 HC ANESTHESIA 1 TO 1.5 HR: Performed by: PODIATRIST

## 2022-12-09 PROCEDURE — 77030002916 HC SUT ETHLN J&J -A: Performed by: PODIATRIST

## 2022-12-09 PROCEDURE — 74011250636 HC RX REV CODE- 250/636: Performed by: NURSE ANESTHETIST, CERTIFIED REGISTERED

## 2022-12-09 PROCEDURE — 76010000149 HC OR TIME 1 TO 1.5 HR: Performed by: PODIATRIST

## 2022-12-09 PROCEDURE — 74011250636 HC RX REV CODE- 250/636: Performed by: PODIATRIST

## 2022-12-09 PROCEDURE — 77030031139 HC SUT VCRL2 J&J -A: Performed by: PODIATRIST

## 2022-12-09 PROCEDURE — 88305 TISSUE EXAM BY PATHOLOGIST: CPT

## 2022-12-09 PROCEDURE — 87077 CULTURE AEROBIC IDENTIFY: CPT

## 2022-12-09 PROCEDURE — 76210000063 HC OR PH I REC FIRST 0.5 HR: Performed by: PODIATRIST

## 2022-12-09 PROCEDURE — 87186 SC STD MICRODIL/AGAR DIL: CPT

## 2022-12-09 PROCEDURE — 0Y6S0Z0 DETACHMENT AT LEFT 2ND TOE, COMPLETE, OPEN APPROACH: ICD-10-PCS | Performed by: PODIATRIST

## 2022-12-09 PROCEDURE — 77030013708 HC HNDPC SUC IRR PULS STRY –B: Performed by: PODIATRIST

## 2022-12-09 PROCEDURE — 84145 PROCALCITONIN (PCT): CPT

## 2022-12-09 PROCEDURE — 87205 SMEAR GRAM STAIN: CPT

## 2022-12-09 PROCEDURE — 0Y6W0Z0 DETACHMENT AT LEFT 4TH TOE, COMPLETE, OPEN APPROACH: ICD-10-PCS | Performed by: PODIATRIST

## 2022-12-09 RX ORDER — SODIUM CHLORIDE, SODIUM LACTATE, POTASSIUM CHLORIDE, CALCIUM CHLORIDE 600; 310; 30; 20 MG/100ML; MG/100ML; MG/100ML; MG/100ML
50 INJECTION, SOLUTION INTRAVENOUS CONTINUOUS
Status: DISCONTINUED | OUTPATIENT
Start: 2022-12-09 | End: 2022-12-10 | Stop reason: HOSPADM

## 2022-12-09 RX ORDER — EZETIMIBE 10 MG/1
10 TABLET ORAL DAILY
Status: DISCONTINUED | OUTPATIENT
Start: 2022-12-10 | End: 2023-01-12 | Stop reason: HOSPADM

## 2022-12-09 RX ORDER — PROPOFOL 10 MG/ML
INJECTION, EMULSION INTRAVENOUS AS NEEDED
Status: DISCONTINUED | OUTPATIENT
Start: 2022-12-09 | End: 2022-12-09 | Stop reason: HOSPADM

## 2022-12-09 RX ORDER — CLOPIDOGREL BISULFATE 75 MG/1
75 TABLET ORAL DAILY
Status: DISCONTINUED | OUTPATIENT
Start: 2022-12-10 | End: 2023-01-12 | Stop reason: HOSPADM

## 2022-12-09 RX ORDER — OXYCODONE AND ACETAMINOPHEN 5; 325 MG/1; MG/1
1 TABLET ORAL
Status: DISCONTINUED | OUTPATIENT
Start: 2022-12-09 | End: 2023-01-12 | Stop reason: HOSPADM

## 2022-12-09 RX ORDER — SODIUM CHLORIDE 0.9 % (FLUSH) 0.9 %
5-40 SYRINGE (ML) INJECTION EVERY 8 HOURS
Status: DISCONTINUED | OUTPATIENT
Start: 2022-12-09 | End: 2023-01-12 | Stop reason: HOSPADM

## 2022-12-09 RX ORDER — VANCOMYCIN HYDROCHLORIDE
1250 ONCE
Status: COMPLETED | OUTPATIENT
Start: 2022-12-09 | End: 2022-12-09

## 2022-12-09 RX ORDER — SODIUM CHLORIDE 0.9 % (FLUSH) 0.9 %
5-40 SYRINGE (ML) INJECTION AS NEEDED
Status: DISCONTINUED | OUTPATIENT
Start: 2022-12-09 | End: 2022-12-10 | Stop reason: HOSPADM

## 2022-12-09 RX ORDER — LIDOCAINE HYDROCHLORIDE 20 MG/ML
INJECTION, SOLUTION EPIDURAL; INFILTRATION; INTRACAUDAL; PERINEURAL AS NEEDED
Status: DISCONTINUED | OUTPATIENT
Start: 2022-12-09 | End: 2022-12-09 | Stop reason: HOSPADM

## 2022-12-09 RX ORDER — INSULIN GLARGINE 100 [IU]/ML
5 INJECTION, SOLUTION SUBCUTANEOUS
Status: DISCONTINUED | OUTPATIENT
Start: 2022-12-09 | End: 2022-12-11

## 2022-12-09 RX ORDER — BUPIVACAINE HYDROCHLORIDE AND EPINEPHRINE 5; 5 MG/ML; UG/ML
INJECTION, SOLUTION EPIDURAL; INTRACAUDAL; PERINEURAL AS NEEDED
Status: DISCONTINUED | OUTPATIENT
Start: 2022-12-09 | End: 2022-12-10 | Stop reason: HOSPADM

## 2022-12-09 RX ORDER — ACETAMINOPHEN 325 MG/1
650 TABLET ORAL
Status: DISCONTINUED | OUTPATIENT
Start: 2022-12-09 | End: 2023-01-12 | Stop reason: HOSPADM

## 2022-12-09 RX ORDER — HYDROMORPHONE HYDROCHLORIDE 1 MG/ML
0.5 INJECTION, SOLUTION INTRAMUSCULAR; INTRAVENOUS; SUBCUTANEOUS AS NEEDED
Status: DISCONTINUED | OUTPATIENT
Start: 2022-12-09 | End: 2022-12-10 | Stop reason: HOSPADM

## 2022-12-09 RX ORDER — LIDOCAINE HYDROCHLORIDE 20 MG/ML
JELLY TOPICAL ONCE
OUTPATIENT
Start: 2022-12-09 | End: 2022-12-09

## 2022-12-09 RX ORDER — SODIUM CHLORIDE 0.9 % (FLUSH) 0.9 %
5-40 SYRINGE (ML) INJECTION EVERY 8 HOURS
Status: DISCONTINUED | OUTPATIENT
Start: 2022-12-09 | End: 2022-12-10 | Stop reason: HOSPADM

## 2022-12-09 RX ORDER — GUAIFENESIN 100 MG/5ML
81 LIQUID (ML) ORAL DAILY
Status: DISCONTINUED | OUTPATIENT
Start: 2022-12-10 | End: 2023-01-12 | Stop reason: HOSPADM

## 2022-12-09 RX ORDER — HEPARIN SODIUM 5000 [USP'U]/ML
5000 INJECTION, SOLUTION INTRAVENOUS; SUBCUTANEOUS EVERY 8 HOURS
Status: DISCONTINUED | OUTPATIENT
Start: 2022-12-10 | End: 2023-01-12 | Stop reason: HOSPADM

## 2022-12-09 RX ORDER — BACITRACIN 500 [USP'U]/G
OINTMENT TOPICAL AS NEEDED
Status: DISCONTINUED | OUTPATIENT
Start: 2022-12-09 | End: 2022-12-10 | Stop reason: HOSPADM

## 2022-12-09 RX ORDER — MIDAZOLAM HYDROCHLORIDE 1 MG/ML
INJECTION, SOLUTION INTRAMUSCULAR; INTRAVENOUS AS NEEDED
Status: DISCONTINUED | OUTPATIENT
Start: 2022-12-09 | End: 2022-12-09 | Stop reason: HOSPADM

## 2022-12-09 RX ORDER — IBUPROFEN 200 MG
4 TABLET ORAL AS NEEDED
Status: DISCONTINUED | OUTPATIENT
Start: 2022-12-09 | End: 2022-12-14 | Stop reason: SDUPTHER

## 2022-12-09 RX ORDER — AMLODIPINE BESYLATE 5 MG/1
10 TABLET ORAL DAILY
Status: DISCONTINUED | OUTPATIENT
Start: 2022-12-10 | End: 2023-01-12 | Stop reason: HOSPADM

## 2022-12-09 RX ORDER — PANTOPRAZOLE SODIUM 40 MG/1
40 TABLET, DELAYED RELEASE ORAL
Status: DISCONTINUED | OUTPATIENT
Start: 2022-12-10 | End: 2023-01-01 | Stop reason: ALTCHOICE

## 2022-12-09 RX ORDER — ONDANSETRON 2 MG/ML
4 INJECTION INTRAMUSCULAR; INTRAVENOUS ONCE
Status: DISCONTINUED | OUTPATIENT
Start: 2022-12-09 | End: 2022-12-10 | Stop reason: HOSPADM

## 2022-12-09 RX ORDER — INSULIN LISPRO 100 [IU]/ML
INJECTION, SOLUTION INTRAVENOUS; SUBCUTANEOUS ONCE
Status: DISCONTINUED | OUTPATIENT
Start: 2022-12-09 | End: 2022-12-10 | Stop reason: HOSPADM

## 2022-12-09 RX ORDER — ALLOPURINOL 100 MG/1
100 TABLET ORAL DAILY
Status: DISCONTINUED | OUTPATIENT
Start: 2022-12-10 | End: 2023-01-12 | Stop reason: HOSPADM

## 2022-12-09 RX ORDER — NALOXONE HYDROCHLORIDE 0.4 MG/ML
0.04 INJECTION, SOLUTION INTRAMUSCULAR; INTRAVENOUS; SUBCUTANEOUS
Status: DISCONTINUED | OUTPATIENT
Start: 2022-12-09 | End: 2022-12-10 | Stop reason: HOSPADM

## 2022-12-09 RX ORDER — ONDANSETRON 2 MG/ML
4 INJECTION INTRAMUSCULAR; INTRAVENOUS
Status: DISCONTINUED | OUTPATIENT
Start: 2022-12-09 | End: 2023-01-12 | Stop reason: HOSPADM

## 2022-12-09 RX ORDER — SODIUM CHLORIDE 9 MG/ML
25 INJECTION, SOLUTION INTRAVENOUS
Status: DISCONTINUED | OUTPATIENT
Start: 2022-12-09 | End: 2023-01-12 | Stop reason: HOSPADM

## 2022-12-09 RX ORDER — PROMETHAZINE HYDROCHLORIDE 25 MG/1
12.5 TABLET ORAL
Status: DISCONTINUED | OUTPATIENT
Start: 2022-12-09 | End: 2023-01-12 | Stop reason: HOSPADM

## 2022-12-09 RX ORDER — EPHEDRINE SULFATE/0.9% NACL/PF 50 MG/5 ML
SYRINGE (ML) INTRAVENOUS AS NEEDED
Status: DISCONTINUED | OUTPATIENT
Start: 2022-12-09 | End: 2022-12-09 | Stop reason: HOSPADM

## 2022-12-09 RX ORDER — ACETAMINOPHEN 650 MG/1
650 SUPPOSITORY RECTAL
Status: DISCONTINUED | OUTPATIENT
Start: 2022-12-09 | End: 2023-01-12 | Stop reason: HOSPADM

## 2022-12-09 RX ORDER — SEVELAMER CARBONATE 800 MG/1
1600 TABLET, FILM COATED ORAL
Status: DISCONTINUED | OUTPATIENT
Start: 2022-12-10 | End: 2023-01-12 | Stop reason: HOSPADM

## 2022-12-09 RX ORDER — SODIUM CHLORIDE 0.9 % (FLUSH) 0.9 %
5-40 SYRINGE (ML) INJECTION AS NEEDED
Status: DISCONTINUED | OUTPATIENT
Start: 2022-12-09 | End: 2023-01-12 | Stop reason: HOSPADM

## 2022-12-09 RX ORDER — NALBUPHINE HYDROCHLORIDE 10 MG/ML
5 INJECTION, SOLUTION INTRAMUSCULAR; INTRAVENOUS; SUBCUTANEOUS
Status: DISCONTINUED | OUTPATIENT
Start: 2022-12-09 | End: 2022-12-10 | Stop reason: HOSPADM

## 2022-12-09 RX ORDER — DIPHENHYDRAMINE HYDROCHLORIDE 50 MG/ML
12.5 INJECTION, SOLUTION INTRAMUSCULAR; INTRAVENOUS
Status: DISCONTINUED | OUTPATIENT
Start: 2022-12-09 | End: 2022-12-10 | Stop reason: HOSPADM

## 2022-12-09 RX ORDER — INSULIN LISPRO 100 [IU]/ML
INJECTION, SOLUTION INTRAVENOUS; SUBCUTANEOUS EVERY 6 HOURS
Status: DISCONTINUED | OUTPATIENT
Start: 2022-12-09 | End: 2022-12-11

## 2022-12-09 RX ORDER — FACIAL-BODY WIPES
10 EACH TOPICAL DAILY PRN
Status: DISCONTINUED | OUTPATIENT
Start: 2022-12-09 | End: 2023-01-12 | Stop reason: HOSPADM

## 2022-12-09 RX ORDER — ALBUTEROL SULFATE 0.83 MG/ML
2.5 SOLUTION RESPIRATORY (INHALATION)
Status: DISCONTINUED | OUTPATIENT
Start: 2022-12-09 | End: 2022-12-10 | Stop reason: HOSPADM

## 2022-12-09 RX ORDER — ASCORBIC ACID 250 MG
500 TABLET ORAL DAILY
Status: DISCONTINUED | OUTPATIENT
Start: 2022-12-10 | End: 2023-01-12 | Stop reason: HOSPADM

## 2022-12-09 RX ORDER — CARVEDILOL 12.5 MG/1
12.5 TABLET ORAL 2 TIMES DAILY
Status: DISCONTINUED | OUTPATIENT
Start: 2022-12-09 | End: 2023-01-12 | Stop reason: HOSPADM

## 2022-12-09 RX ORDER — DEXTROSE MONOHYDRATE 100 MG/ML
0-250 INJECTION, SOLUTION INTRAVENOUS AS NEEDED
Status: DISCONTINUED | OUTPATIENT
Start: 2022-12-09 | End: 2023-01-12 | Stop reason: HOSPADM

## 2022-12-09 RX ORDER — SODIUM CHLORIDE 9 MG/ML
INJECTION, SOLUTION INTRAVENOUS
Status: DISCONTINUED | OUTPATIENT
Start: 2022-12-09 | End: 2022-12-09 | Stop reason: HOSPADM

## 2022-12-09 RX ORDER — FENTANYL CITRATE 50 UG/ML
50 INJECTION, SOLUTION INTRAMUSCULAR; INTRAVENOUS
Status: DISCONTINUED | OUTPATIENT
Start: 2022-12-09 | End: 2022-12-10 | Stop reason: HOSPADM

## 2022-12-09 RX ADMIN — PROPOFOL 50 MCG/KG/MIN: 10 INJECTION, EMULSION INTRAVENOUS at 21:50

## 2022-12-09 RX ADMIN — PIPERACILLIN AND TAZOBACTAM 4.5 G: 4; .5 INJECTION, POWDER, FOR SOLUTION INTRAVENOUS at 13:02

## 2022-12-09 RX ADMIN — MIDAZOLAM 2 MG: 1 INJECTION INTRAMUSCULAR; INTRAVENOUS at 21:38

## 2022-12-09 RX ADMIN — SODIUM CHLORIDE: 900 INJECTION, SOLUTION INTRAVENOUS at 21:40

## 2022-12-09 RX ADMIN — PROPOFOL 50 MG: 10 INJECTION, EMULSION INTRAVENOUS at 21:45

## 2022-12-09 RX ADMIN — VANCOMYCIN HYDROCHLORIDE 1250 MG: 10 INJECTION, POWDER, LYOPHILIZED, FOR SOLUTION INTRAVENOUS at 14:41

## 2022-12-09 RX ADMIN — Medication 10 MG: at 23:15

## 2022-12-09 RX ADMIN — LIDOCAINE HYDROCHLORIDE 60 MG: 20 INJECTION, SOLUTION EPIDURAL; INFILTRATION; INTRACAUDAL; PERINEURAL at 21:45

## 2022-12-09 RX ADMIN — Medication 125 MG: at 15:54

## 2022-12-09 RX ADMIN — SODIUM CHLORIDE: 900 INJECTION, SOLUTION INTRAVENOUS at 21:41

## 2022-12-09 RX ADMIN — Medication 2 UNITS: at 19:17

## 2022-12-09 NOTE — ED NOTES
Received patient per wheelchair, alert and oriented.   Placed patient in the bed and attached to cardiac monitor

## 2022-12-09 NOTE — DISCHARGE INSTRUCTIONS
Discharge Instructions from  [facility]  [address], [place], [facility]0 [facility]  [phone] Fax [phone]    NAME:  Sheldon Sheldon OF BIRTH:  1960  MEDICAL RECORD NUMBER:  [id number]  DATE:  @ED@    Wound Cleansing:   Do not scrub or use excessive force. Cleanse wound prior to applying a clean dressing with:  [] Normal Saline [] Keep Wound Dry in Shower    [] Wound Cleanser   [] Cleanse wound with Mild Soap & Water  [] May Shower at Discharge   [] Other:      Topical Treatments:  Do not apply lotions, creams, or ointments to wound bed unless directed. [] Apply moisturizing lotion to skin surrounding the wound prior to dressing change.  [] Apply antifungal ointment to skin surrounding the wound prior to dressing change.  [] Apply thin film of moisture barrier ointment to skin immediately around wound. [] Other:       Dressings:           Wound Location ***   [] Apply Primary Dressing:       [] MediHoney Gel [] Alginate with Silver [] Alginate   [] Collagen [] Collagen with Silver   [] Santyl with Moisten saline gauze     [] Hydrocolloid   [] MediHoney Alginate [] Foam with Silver   [] Foam   [] Hydrofera Blue    [] Mepilex Border    [] Moisten with Saline [] Hydrogel [] Mepitel     [] Bactroban/Mupirocin [] Polysporin  [] Other:    [] Pack wound loosely with  [] Iodoform   [] Plain Packing  [] Other   [] Cover and Secure with:     [] Gauze [] Dunia [] Kerlix   [] Ace Wrap [] Cover Roll Tape [] ABD     [] Other:    Avoid contact of tape with skin.   [] Change dressing: [] Daily    [] Every Other Day [] Three times per week   [] Once a week [] Do Not Change Dressing   [] Other:     Negative Pressure:           Wound Location:   [] Pressure@           mm/Hg  []Continuous []Intermittent   [] Black  [] White Foam [] Other:   []Change dressing: []Three times per week    []Other:     Pressure Relief:  [] When sitting, shift position or do seat lifts every 15 minutes.  [] Wheelchair cushion [] Specialty Bed/Mattress  [] Turn every 2 hours when in bed. Avoid position directing pressure on wound site. Limit side lying to 30 degree tilt. Limit HOB elevation to 30 degrees. Edema Control:  Apply: [] Compression Stocking []Right Leg []Left Leg   [] Tubigrip []Right Leg Double Layer []Left Leg Double Layer       []Right Leg Single Layer []Left Leg Single Layer   [] SpandaGrip []Right Leg  []Left Leg      []Low compression 5-10 mm/Hg      []Medium compression 10-20 mm/Hg     []High compression  20-30 mm/Hg   every morning immediately when getting up should be applied to affected leg(s) from mid foot to knee making sure to cover the heel. Remove every night before going to bed. [] Elevate leg(s) above the level of the heart when sitting. [] Avoid prolonged standing in one place. Compression:  Apply: [] Multilayer Compression Wrap Applied in Clinic []RightLeg []Left Leg   [] Multi-layer compression. Do not get leg(s) with wrap wet. If wraps become too tight call the center or completely remove the wrap. [] Elevate leg(s) above the level of the heart when sitting. [] Avoid prolonged standing in one place. Off-Loading:   [] Off-loading when [] walking  [] in bed [] sitting  [] Total non-weight bearing  [] Right Leg  [] Left Leg   [] Assistive Device [] Walker [] Cane  [] Wheelchair  [] Crutches   [] Surgical shoe    [] Podus Boot(s)   [] Foam Boot(s)  [] Roll About    [] Cast Boot [] CROW Boot  [] Other:    Contact Cast:  Apply: [] Total Contact Cast Applied in Clinic []RightLeg []Left Leg   [] Do not get cast wet. Contact center or go to emergency room if there is a foul odor or becomes uncomfortable due to feeling tight or swelling. Do not use objects inside of cast to scratch.       Dietary:  [] Diet as tolerated: [] Calorie Diabetic Diet: [] No Added Salt:  [] Increase Protein: [] Other:   Activity:  [] Activity as tolerated:  [] Patient has no activity restrictions     [] Strict Bedrest: [] Remain off Work:     [] May return to full duty work:                                   [] Return to work with restrictions:             Return Appointment:  [] Wound and dressing supply provider:   [] ECF or Home Healthcare:  [] Wound Assessment: [] Physician or NP scheduled for Wound Assessment:   [] Return Appointment: With ***  in  *** Week(s)  [] Ordered tests:      Electronically signed Mame Mustafa LPN on 64/5/7813 at 2:57 PM     Pauline Lucas 281: Should you experience any significant changes in your wound(s) or have questions about your wound care, please contact the [facility] Main at [address] [place] [address] 8:00 am - 4:30. If you need help with your wound outside these hours and cannot wait until we are again available, contact your PCP or go to the hospital emergency room. PLEASE NOTE: IF YOU ARE UNABLE TO OBTAIN WOUND SUPPLIES, CONTINUE TO USE THE SUPPLIES YOU HAVE AVAILABLE UNTIL YOU ARE ABLE TO REACH US. IT IS MOST IMPORTANT TO KEEP THE WOUND COVERED AT ALL TIMES.      Physician Signature:_______________________    Date: ___________ Time:  ____________

## 2022-12-09 NOTE — Clinical Note
Status[de-identified] INPATIENT [101]   Type of Bed: Medical [8]   Inpatient Hospitalization Certified Necessary for the Following Reasons: 3.  Patient receiving treatment that can only be provided in an inpatient setting (further clarification in H&P documentation)   Admitting Diagnosis: Diabetic foot infection [facility] [[id number]]   Admitting Diagnosis: Leukocytosis [200030]   Admitting Physician: Navjot Narayan [[id number]]   Attending Physician: Navjot Narayan [[id number]]   Estimated Length of Stay: 2 Midnights   Discharge Plan[de-identified] [facility]

## 2022-12-09 NOTE — PROGRESS NOTES
[facility] Pharmacokinetic Monitoring Service - Vancomycin     Akiko Hartley is a 60 y.o. male starting on vancomycin therapy for Diabetic Foot Infection. Pharmacy consulted by Dr. Vadim Hodgson for monitoring and adjustment. Target Concentration: Dosing based on anticipated concentration <15 mg/L due to renal impairment/insufficiency    Additional Antimicrobials: Zosyn    Pertinent Laboratory Values: Wt Readings from Last 1 Encounters:   07/19/22 102.6 kg (226 lb 4.8 oz)     Temp Readings from Last 1 Encounters:   12/09/22 98.7 °F (37.1 °C)     No components found for: PROCAL  CrCl cannot be calculated (Unknown ideal weight. ).   Recent Labs     12/09/22  1155   WBC 23.0*     Plan:  Concentration-guided dosing due to renal impairment/insufficiency  Start Vancomycin 1250 mg IV x 1  Renal labs as indicated   Will order Vancomycin Random Level before re-dosing  Pharmacy will continue to monitor patient and adjust therapy as indicated    Jonny Jones [facility]  12/9/2022 1:12 PM   [phone]

## 2022-12-09 NOTE — PROGRESS NOTES
Pharmacy Dosing Services: Zosyn    Indication: Diabetic Foot Infection    Previous Regimen Zosyn 4.5 gm IV q6hrs   Serum Creatinine Lab Results   Component Value Date/Time    Creatinine 5.98 (H) 12/09/2022 11:55 AM      Creatinine Clearance CrCl cannot be calculated (Unknown ideal weight.). BUN Lab Results   Component Value Date/Time    BUN 39 (H) 12/09/2022 11:55 AM         Dose administration notes:   Changed to Zosyn 4.5 gm IV q12hrs per renal dosing protocol    Plan:  Continue to monitor     [facility]

## 2022-12-09 NOTE — CONSULTS
[facility]  (A Division of [address] [place] [address])      Consultation Note      Date of Admission: 12/9/2022      Date of Note: 12/9/2022      Reason for Referral: Dead L foot        Current Antimicrobials:    Prior Antimicrobials:  Vanco IV (12/9-) #1 dose  Pip/tzb IV (12/9-) #1 dose  PO Vanco (12/9-) #1 dose        Assessment: Rec / Plan:   Dry Gangrene L foot distally  Going to get some surgery /amputation out of this. Good selection for what has come out of his other diabetic foot recently. Stay on pip/tzb+vanco for now. I'll scut MICRO after surgery    CDI? Like [employer], the chart does not look perfectly into [employer] system; I'll have to look later tonight when I get home to verify recent CDI. IF so, he'll need concurrent PO vanco BID to balance against the broad-spectrum abx in #1 for his dead foot. DMT2    Peripheral Vascular Disease    ESRD/HD          Microbiology:  12/9 - BCx x2 sent       OVS18 - R heel DFU (+)Proteus vulgaris, Morganella morganii, Enterococcus faecalis, and anaerobes      Lines / Catheters: peripheral      HPI:  CC:  My foot hurts  Mr [place] Concepcion is a diabetic 60y AAM who has been fighting chronic R heel sores for months/years, but noted onset of L foot pain along with f/s/c this past week. Had DPM appt this morning and re-directed to ED for urgent admission for surgery. Pretty vague on duration of symptoms, but pretty sure it was this week. Sugars have been elevated. Also has been having stomach issues with pain/anorexia/diarrhea off/on for past month.   Seen at [facility] 2wks ago and treated for \"stomach infection\" that required PO vanco.    Retired ship-         C/Tasha Gomez 110[address] Problems    Diagnosis Date Noted    Leukocytosis 12/09/2022    Diabetic foot infection ([address] 12/09/2022    Diarrhea 12/09/2022    Gangrene of left foot ([address] 12/09/2022    Hypertension 07/21/2022    CAD (coronary artery disease) 06/28/2022 Diabetes mellitus with foot ulcer ([facility] [address] 06/27/2022     Past Medical History:   Diagnosis Date    CAD (coronary artery disease)     Chronic kidney disease     Diabetes mellitus     Hypertension     Sleep apnea      Past Surgical History:   Procedure Laterality Date    HX ORTHOPAEDIC      HX PACEMAKER      IR INSERT TUNL CVC W/O PORT OVER 5 YR  07/07/2022    IR INSERT TUNL CVC W/O PORT OVER 5 YR  6/29/2022    IR REMOVE TUNL CVAD W/O PORT / PUMP  8/12/2022    SD CARDIAC SURG PROCEDURE UNLIST       Family History   Problem Relation Age of Onset    Heart Disease Mother     Diabetes Father     Diabetes Sister     Diabetes Brother      Social History     Socioeconomic History    Marital status:      Spouse name: Not on file    Number of children: Not on file    Years of education: Not on file    Highest education level: Not on file   Occupational History    Not on file   Tobacco Use    Smoking status: Never    Smokeless tobacco: Never   Vaping Use    Vaping Use: Never used   Substance and Sexual Activity    Alcohol use: Not Currently    Drug use: Never    Sexual activity: Not on file   Other Topics Concern    Dental Braces Not Asked    Endoscopic Camera Pill Not Asked    Metallic Foreign Body Not Asked    Medication Patches Not Asked    Taking Feraheme Not Asked    Claustrophobic Not Asked   Social History Narrative    Not on file     Social Determinants of Health     Financial Resource Strain: Not on file   Food Insecurity: Not on file   Transportation Needs: Not on file   Physical Activity: Not on file   Stress: Not on file   Social Connections: Not on file   Intimate Partner Violence: Not on file   Housing Stability: Not on file       Allergies:  Patient has no known allergies.      Medications:  Current Facility-Administered Medications   Medication Dose Route Frequency    Vancomycin - Pharmacy to Dose  1 Each Other Rx Dosing/Monitoring    [START ON 12/10/2022] Vancomycin Random Level at 13:00 12/10/2022  1 Each Other ONCE    [START ON 12/10/2022] piperacillin-tazobactam (ZOSYN) 4.5 g in 0.9% sodium chloride (MBP/ADV) 100 mL MBP  4.5 g IntraVENous Q12H    vancomycin 50 mg/mL oral solution (compounded) 125 mg  125 mg Oral Q12H    insulin lispro (HUMALOG) injection   SubCUTAneous Q6H    glucose chewable tablet 16 g  4 Tablet Oral PRN    glucagon (GLUCAGEN) injection 1 mg  1 mg IntraMUSCular PRN    dextrose 10% infusion 0-250 mL  0-250 mL IntraVENous PRN    0.9% sodium chloride infusion  25 mL/hr IntraVENous DIALYSIS PRN     Current Outpatient Medications   Medication Sig Dispense    clopidogreL (PLAVIX) 75 mg tab Take 1 Tablet by mouth in the morning. 30 Tablet    isosorbide mononitrate ER (IMDUR) 30 mg tablet Take 1 Tablet by mouth daily. 30 Tablet    pantoprazole (PROTONIX) 40 mg tablet Take 1 Tablet by mouth Daily (before breakfast). 30 Tablet    bacitracin zinc (BACITRACIN) ointment Apply  to affected area two (2) times a day. 15 g    nystatin (MYCOSTATIN) powder Apply  to affected area two (2) times a day. 5 g    insulin lispro (HUMALOG) 100 unit/mL injection Use as directed 1 Each    Lactobacillus Acidoph & Bulgar (FLORANEX) 1 million cell tab tablet Take 2 Tablets by mouth two (2) times a day. 60 Tablet    melatonin, rapid dissolve, 5 mg TbDi tablet Take 2 Tablets by mouth nightly as needed (slsee[). 30 Tablet    sevelamer carbonate (RENVELA) 800 mg tab tab Take 2 Tablets by mouth three (3) times daily (with meals). 90 Tablet    vit B Cmplx 3-FA-Vit C-Biotin (NEPHRO NIKITA RX) 1- mg-mg-mcg tablet Take 1 Tablet by mouth daily. 30 Tablet    atorvastatin (LIPITOR) 40 mg tablet Take 40 mg by mouth daily. gabapentin (NEURONTIN) 300 mg capsule Take 300 mg by mouth nightly. allopurinoL (ZYLOPRIM) 100 mg tablet Take 100 mg by mouth daily. ezetimibe (ZETIA) 10 mg tablet Take 10 mg by mouth. carvediloL (COREG) 12.5 mg tablet Take 12.5 mg by mouth two (2) times a day.      amLODIPine (NORVASC) 10 mg tablet Take 10 mg by mouth daily. aspirin 81 mg chewable tablet Take 81 mg by mouth daily. ascorbic acid, vitamin C, (VITAMIN C) 500 mg tablet Take 500 mg by mouth. insulin glargine (LANTUS) 100 unit/mL injection 10 Units by SubCUTAneous route nightly. ROS:  Constitutional: positive for fevers, chills, and sweats  Respiratory: negative for cough or dyspnea on exertion  Cardiovascular: negative for chest pain, dyspnea  Gastrointestinal: positive for abdominal pain and anorexia, negative for diarrhea     Physical Exam:    Temp (24hrs), Av.9 °F (37.2 °C), Min:98.7 °F (37.1 °C), Max:99.1 °F (37.3 °C)    Visit Vitals  /60 (BP 1 Location: Right upper arm, BP Patient Position: Sitting)   Pulse 71   Temp 98.7 °F (37.1 °C)   Resp 18   SpO2 100%       General: Well developed, well nourished 60 y.o. BLACK/ male in no acute distress. ENT: ENT exam normal, no neck nodes or sinus tenderness  Head: normocephalic, without obvious abnormality  Mouth:  mucous membranes moist, pharynx normal without lesions  Neck: supple, symmetrical, trachea midline   Cardio:  regular rate and rhythm, S1, S2 normal, no murmur, click, rub or gallop  Chest: inspection normal - no chest wall deformities or tenderness, respiratory effort normal  Lungs: clear to auscultation, no wheezes or rales, and unlabored breathing  Abdomen: soft, non-tender. Bowel sounds normal. No masses, no organomegaly.   Extremities:  no redness or tenderness in the calves or thighs, no edema, Distal L foot with torn/dry gangrenous 2nd toe along with pallor plantar across all the distal MTs;   Neuro: poor sensation to bilat feet/lower legs       Lab results:    Chemistry  Recent Labs     22  1155   *   *   K 4.4   CL 92*   CO2 30   BUN 39*   CREA 5.98*   CA 8.4*   AGAP 8   BUCR 7*   *   TP 8.3*   ALB 2.7*   GLOB 5.6*   AGRAT 0.5*       CBC w/ Diff  Recent Labs     22  1155   WBC 23.0*   RBC 3.84*   HGB 11.6*   HCT 35.4*      GRANS 84*   LYMPH 6*   EOS 0       Microbiology  All Micro Results       Procedure Component Value Units Date/Time    CULTURE, BLOOD 2nd DRAW (required for [facility]) [[id number]] Collected: 12/09/22 1215    Order Status: Sent Specimen: Blood Updated: 12/09/22 1311    C. DIFFICILE AG & TOXIN A/B [[id number]]     Order Status: Sent Specimen: Stool     CULTURE, BLOOD [[id number]] Collected: 12/09/22 1155    Order Status: Sent Specimen: Blood Updated: 12/09/22 Via Alex Tapia MD  Cell [phone]  [facility]  12/9/2022   6:54 PM

## 2022-12-09 NOTE — WOUND CARE
Patient has been admitted to hospital from wound care. 12/09/22 1038   Wound Heel Right   Date First Assessed/Time First Assessed: 08/12/22 1200   Present on Hospital Admission: Yes  Primary Wound Type: Diabetic Ulcer  Location: Heel  Wound Location Orientation: Right   Wound Image    Wound Etiology Diabetic   Dressing Status Intact   Cleansed Wound cleanser   Dressing/Treatment Gauze dressing/dressing sponge   Wound Length (cm) 2.5 cm   Wound Width (cm) 3 cm   Wound Depth (cm) 0.3 cm   Wound Surface Area (cm^2) 7.5 cm^2   Change in Wound Size % 66.67   Wound Volume (cm^3) 2.25 cm^3   Wound Healing % 93   Wound Assessment Devitalized tissue;Dusky;Eschar dry   Drainage Amount Small   Drainage Description Serosanguinous; Thick   Wound Odor Malodorous/Putrid   Leticia-Wound/Incision Assessment Dry/flaky; Intact; Hyperkeratosis (Callous); Maceration   Edges Undefined edges   Wound Thickness Description Full thickness   Wound Toe (Comment  which one) Left dry eschar, gangrenes, 12/09/22   No Date First Assessed or Time First Assessed found. Primary Wound Type: Gangrene  Location: Toe (Comment  which one)  Wound Location Orientation: Left  Wound Description: dry eschar, gangrenes,  Date of First Observation: 12/09/22   Wound Image     Wound Etiology Pressure Unstageable   Dressing Status Other (Comment)  (no dressing intacted.)   Dressing/Treatment Gauze dressing/dressing sponge   Wound Assessment Devitalized tissue;Dry;Eschar dry;Non-blanchable erythema   Drainage Amount Small   Drainage Description Thick; Serosanguinous   Wound Odor Malodorous/Putrid   Leticia-Wound/Incision Assessment Ecchymosis; Intact; Maceration; Non-Blanchable erythema; Warm;Edematous   Edges Undefined edges;Epibole (rolled edges)   Wound Thickness Description Full thickness

## 2022-12-09 NOTE — ED PROVIDER NOTES
EMERGENCY DEPARTMENT HISTORY AND PHYSICAL EXAM      Date: 12/9/2022  Patient Name: Haris Stringer      History of Presenting Illness     Chief Complaint   Patient presents with    Abdominal Pain    Foot Pain       History Provided By: Patient    Location/Duration/Severity/Modifying factors   Patient is a 58-year-old male who presents to the emergency room with a chief complaint of L foot wound and abdominal discomfort. Patient has a history of diabetes, end-stage renal disease on dialysis Tuesday, Thursday Saturday he was seen at the wound care clinic today by the wound care nurse as well as the podiatrist, Dr. Dallin Claros. The patient was sent in for \"emergency surgery\". Patient also further elaborates that he has had abdominal issues intestinal issues for the past at least 1 month. Was seen and treated at [facility], with what he says was oral vancomycin. He is not sure of the diagnosis but states it was a \"bacterial infection\". No chest pain or shortness of breath has not had a fever. He had a prior first toe amputation on the left foot. Patient denies any additional symptoms. States he completed the treatment with oral vancomycin. There are no other complaints, changes, or physical findings at this time.     PCP: Nevaeh Lima MD    Current Facility-Administered Medications   Medication Dose Route Frequency Provider Last Rate Last Admin    vancomycin (VANCOCIN) 1250 mg in  ml infusion  1,250 mg IntraVENous Davina Burden  mL/hr at 12/09/22 1441 1,250 mg at 12/09/22 1441    Vancomycin - Pharmacy to Dose  1 Each Other Rx Dosing/Monitoring Erika Zhu DO        [START ON 12/10/2022] Vancomycin Random Level at 13:00 12/10/2022  1 Each Other ONCE Sina Dave DO        [START ON 12/10/2022] piperacillin-tazobactam (ZOSYN) 4.5 g in 0.9% sodium chloride (MBP/ADV) 100 mL MBP  4.5 g IntraVENous Q12H Mela FINN DO        vancomycin 50 mg/mL oral solution (compounded) 125 mg 125 mg Oral Q12H Mary Cosme,          Current Outpatient Medications   Medication Sig Dispense Refill    clopidogreL (PLAVIX) 75 mg tab Take 1 Tablet by mouth in the morning. 30 Tablet 2    isosorbide mononitrate ER (IMDUR) 30 mg tablet Take 1 Tablet by mouth daily. 30 Tablet 0    pantoprazole (PROTONIX) 40 mg tablet Take 1 Tablet by mouth Daily (before breakfast). 30 Tablet 0    bacitracin zinc (BACITRACIN) ointment Apply  to affected area two (2) times a day. 15 g 0    nystatin (MYCOSTATIN) powder Apply  to affected area two (2) times a day. 5 g 0    insulin lispro (HUMALOG) 100 unit/mL injection Use as directed 1 Each 0    Lactobacillus Acidoph & Bulgar (FLORANEX) 1 million cell tab tablet Take 2 Tablets by mouth two (2) times a day. 60 Tablet 0    melatonin, rapid dissolve, 5 mg TbDi tablet Take 2 Tablets by mouth nightly as needed (slsee[). 30 Tablet 0    sevelamer carbonate (RENVELA) 800 mg tab tab Take 2 Tablets by mouth three (3) times daily (with meals). 90 Tablet 0    vit B Cmplx 3-FA-Vit C-Biotin (NEPHRO NIKITA RX) 1- mg-mg-mcg tablet Take 1 Tablet by mouth daily. 30 Tablet 0    atorvastatin (LIPITOR) 40 mg tablet Take 40 mg by mouth daily. gabapentin (NEURONTIN) 300 mg capsule Take 300 mg by mouth nightly. allopurinoL (ZYLOPRIM) 100 mg tablet Take 100 mg by mouth daily. ezetimibe (ZETIA) 10 mg tablet Take 10 mg by mouth. carvediloL (COREG) 12.5 mg tablet Take 12.5 mg by mouth two (2) times a day. amLODIPine (NORVASC) 10 mg tablet Take 10 mg by mouth daily. aspirin 81 mg chewable tablet Take 81 mg by mouth daily. ascorbic acid, vitamin C, (VITAMIN C) 500 mg tablet Take 500 mg by mouth. insulin glargine (LANTUS) 100 unit/mL injection 10 Units by SubCUTAneous route nightly.          Past History     Past Medical History:  Past Medical History:   Diagnosis Date    CAD (coronary artery disease)     Chronic kidney disease     Diabetes mellitus     Hypertension Sleep apnea        Past Surgical History:  Past Surgical History:   Procedure Laterality Date    HX ORTHOPAEDIC      HX PACEMAKER      IR INSERT TUNL CVC W/O PORT OVER 5 YR  07/07/2022    IR INSERT TUNL CVC W/O PORT OVER 5 YR  6/29/2022    IR REMOVE TUNL CVAD W/O PORT / PUMP  8/12/2022    OR CARDIAC SURG PROCEDURE UNLIST         Family History:  Family History   Problem Relation Age of Onset    Heart Disease Mother     Diabetes Father     Diabetes Sister     Diabetes Brother        Social History:  Social History     Tobacco Use    Smoking status: Never    Smokeless tobacco: Never   Vaping Use    Vaping Use: Never used   Substance Use Topics    Alcohol use: Not Currently    Drug use: Never       Allergies:  No Known Allergies    Social Determinants of Health:    Review of Systems     Review of Systems   Constitutional:  Negative for chills and fever. HENT:  Negative for congestion and rhinorrhea. Eyes:  Negative for visual disturbance. Respiratory:  Negative for cough and shortness of breath. Cardiovascular:  Negative for chest pain. Gastrointestinal:  Positive for abdominal pain and diarrhea. Negative for nausea and vomiting. Genitourinary:  Negative for urgency. Musculoskeletal:  Negative for myalgias. Skin:  Positive for color change and wound. Negative for rash. Neurological:  Negative for headaches. Physical Exam     Physical Exam  Constitutional:       General: He is not in acute distress. Appearance: Normal appearance. He is normal weight. He is not ill-appearing or toxic-appearing. Comments: Appears older than stated age, in poor health   HENT:      Head: Normocephalic and atraumatic. Right Ear: External ear normal.      Left Ear: External ear normal.      Nose: Nose normal.      Mouth/Throat:      Mouth: Mucous membranes are moist.      Pharynx: No oropharyngeal exudate or posterior oropharyngeal erythema.    Eyes:      Conjunctiva/sclera: Conjunctivae normal. Pupils: Pupils are equal, round, and reactive to light. Cardiovascular:      Rate and Rhythm: Normal rate and regular rhythm. Pulses: Normal pulses. Heart sounds: Normal heart sounds. No murmur heard. No friction rub. Pulmonary:      Effort: Pulmonary effort is normal.      Breath sounds: Normal breath sounds. No wheezing, rhonchi or rales. Abdominal:      General: Abdomen is flat. Palpations: Abdomen is soft. Tenderness: There is no abdominal tenderness. There is no guarding or rebound. Musculoskeletal:         General: No swelling or tenderness. Normal range of motion. Cervical back: Normal range of motion and neck supple. Right lower leg: No edema. Left lower leg: No edema. Comments: Status-post first MTP amputation, second digit is grossly necrotic, and gangrenous. There is some mild surrounding white tissue and erythema. There is no purulence coming from the wound. Skin:     General: Skin is warm and dry. Capillary Refill: Capillary refill takes less than 2 seconds. Findings: No rash. Neurological:      General: No focal deficit present. Mental Status: He is alert. Motor: No weakness. Lab and Diagnostic Study Results     Labs -  Recent Results (from the past 24 hour(s))   CBC WITH AUTOMATED DIFF    Collection Time: 12/09/22 11:55 AM   Result Value Ref Range    WBC 23.0 (H) 4.6 - 13.2 K/uL    RBC 3.84 (L) 4.35 - 5.65 M/uL    HGB 11.6 (L) 13.0 - 16.0 g/dL    HCT 35.4 (L) 36.0 - 48.0 %    MCV 92.2 78.0 - 100.0 FL    MCH 30.2 24.0 - 34.0 PG    MCHC 32.8 31.0 - 37.0 g/dL    RDW 17.6 (H) 11.6 - 14.5 %    PLATELET 559 704 - 918 K/uL    MPV 11.3 9.2 - 11.8 FL    NRBC 0.0 0  WBC    ABSOLUTE NRBC 0.00 0.00 - 0.01 K/uL    NEUTROPHILS 84 (H) 40 - 73 %    LYMPHOCYTES 6 (L) 21 - 52 %    MONOCYTES 8 3 - 10 %    EOSINOPHILS 0 0 - 5 %    BASOPHILS 0 0 - 2 %    IMMATURE GRANULOCYTES 1 (H) 0.0 - 0.5 %    ABS.  NEUTROPHILS 19.4 (H) 1.8 - 8.0 K/UL    ABS. LYMPHOCYTES 1.4 0.9 - 3.6 K/UL    ABS. MONOCYTES 1.9 (H) 0.05 - 1.2 K/UL    ABS. EOSINOPHILS 0.1 0.0 - 0.4 K/UL    ABS. BASOPHILS 0.1 0.0 - 0.1 K/UL    ABS. IMM. GRANS. 0.2 (H) 0.00 - 0.04 K/UL    DF AUTOMATED     METABOLIC PANEL, COMPREHENSIVE    Collection Time: 12/09/22 11:55 AM   Result Value Ref Range    Sodium 130 (L) 136 - 145 mmol/L    Potassium 4.4 3.5 - 5.5 mmol/L    Chloride 92 (L) 100 - 111 mmol/L    CO2 30 21 - 32 mmol/L    Anion gap 8 3.0 - 18 mmol/L    Glucose 185 (H) 74 - 99 mg/dL    BUN 39 (H) 7.0 - 18 MG/DL    Creatinine 5.98 (H) 0.6 - 1.3 MG/DL    BUN/Creatinine ratio 7 (L) 12 - 20      eGFR 10 (L) >60 ml/min/1.73m2    Calcium 8.4 (L) 8.5 - 10.1 MG/DL    Bilirubin, total 0.6 0.2 - 1.0 MG/DL    ALT (SGPT) 168 (H) 16 - 61 U/L    AST (SGOT) 108 (H) 10 - 38 U/L    Alk.  phosphatase 152 (H) 45 - 117 U/L    Protein, total 8.3 (H) 6.4 - 8.2 g/dL    Albumin 2.7 (L) 3.4 - 5.0 g/dL    Globulin 5.6 (H) 2.0 - 4.0 g/dL    A-G Ratio 0.5 (L) 0.8 - 1.7     SED RATE (ESR)    Collection Time: 12/09/22 11:55 AM   Result Value Ref Range    Sed rate, automated 92 (H) 0 - 20 mm/hr   POC LACTIC ACID    Collection Time: 12/09/22 12:04 PM   Result Value Ref Range    Lactic Acid (POC) 1.72 0.40 - 2.00 mmol/L   EKG, 12 LEAD, INITIAL    Collection Time: 12/09/22 12:14 PM   Result Value Ref Range    Ventricular Rate 72 BPM    Atrial Rate 72 BPM    P-R Interval 162 ms    QRS Duration 88 ms    Q-T Interval 436 ms    QTC Calculation (Bezet) 477 ms    Calculated P Axis 55 degrees    Calculated R Axis -37 degrees    Calculated T Axis 41 degrees    Diagnosis       Sinus rhythm with occasional premature ventricular complexes  Left axis deviation  Cannot rule out Anteroseptal infarct , age undetermined  Abnormal ECG  When compared with ECG of 18-JUL-2022 07:48,  premature ventricular complexes are now present  premature atrial complexes are no longer present  Minimal criteria for Anteroseptal infarct are now present Radiologic Studies -   CT ABD PELV WO CONT   Final Result      1. Cholelithiasis without evidence of cholecystitis. 2. No abnormal bowel wall thickening or dilatation. 3. No hydronephrosis or obstructive uropathy. 4. Several small right lower lobe pulmonary nodules (2-3 mm in size). See below   guidelines for proposed follow-up. 5. Borderline enlarged and nonspecific left inguinal lymph node, potentially   reactive in etiology.      ========      Fleischner Society pulmonary nodule guidelines (revised 2017): Multiple solid nodules <6 mm:   -Low risk for lung cancer: No follow-up. -High risk for lung cancer: Optional chest CT in 12 months. XR FOOT LT MIN 3 V   Final Result      Soft gas formation and ulceration involving the medial forefoot with findings   concerning for osteomyelitis involving the first and second rays described   above. XR CHEST PORT   Final Result      No active cardiopulmonary disease. MRI FOOT LT WO CONT    (Results Pending)       EKG interpretation(s): EG interpretation normal sinus rhythm with a rate of 72 bpm, occasional PVCs are present. Left axis deviation no ST elevation or ST depression. Overall sinus rhythm with occasional PVCs no acute ischemic change      Medical Decision Making and ED Course   - I am the first and primary provider for this patient AND AM THE PRIMARY PROVIDER OF RECORD. - I reviewed the vital signs, available nursing notes, past medical history, past surgical history, family history and social history. - Initial assessment performed. The patients presenting problems have been discussed, and the staff are in agreement with the care plan formulated and outlined with them. I have encouraged them to ask questions as they arise throughout their visit. Vital Signs-Reviewed the patient's vital signs.     Patient Vitals for the past 12 hrs:   Temp Pulse Resp BP SpO2   12/09/22 1131 98.7 °F (37.1 °C) 71 18 114/60 100 % Records Reviewed: Nursing Notes, Old Medical Records, Previous Radiology Studies, and Previous Laboratory Studies        Provider Notes (Medical Decision Making):     MDM  Number of Diagnoses or Management Options  Diabetic ulcer of toe of left foot associated with type 2 diabetes mellitus, unspecified ulcer stage (HCC)  Leukocytosis, unspecified type  Diagnosis management comments: Is a 78-year-old male who presents with foot wounds from the wound clinic also complains of abdominal discomfort. The patient was sent by podiatrist.    DDx: Diabetic foot wound, cellulitis, necrotizing fasciitis, osteomyelitis, wet gangrene, dry gangrene, C. difficile, etc.    Patient describes what sounds like a treatment for C. difficile infection last month given he was treated with oral vancomycin. We will recheck C. difficile to establish if that is the case although I do not see any prior testing done for C. difficile through care everywhere. We will go ahead and add that on today. 12:55 PM  Marked leukocytosis, could be due to C. difficile for the foot infection we will go ahead and start antibiotics. Despite having concerns for SIRS/SEPSIS, a standard fluid resuscitation of 30 mL/kg would harm the patient because the patient has Renal failure. Patient was discussed with podiatry, as well as infectious disease. Dr. Martina Banda had requested we obtain MRI which has been ordered. Spoke to ID, agrees with initial antibiotic management. Suggested adding on p.o. vancomycin for possibility of C. difficile. ED Course:     ED Course as of 12/09/22 1444   Fri Dec 09, 2022   1313 Infectious disease paged. [JENARO]   [id number] Discussed with ID, Dr. Ce Schaefer. Recommended starting on p.o. vancomycin 125 mg twice daily.  [JENARO]      ED Course User Index  [JENARO] Nick Hickey,      ------------------------------------------------------------------------------------------------------------        Consultations: Consultations: -  Hospitalist Consultant: Dr. Jailene Mathis: We have asked for emergent assistance with regard to this patient. We have discussed the patients HPI, ROS, PE and results this far. They will come and evaluate the patient for admission. and -  Dr Gianfranco Chapa , Dr Gloriajean Aschoff -- Infectious Disease    See the ED course section, if applicable for details on the content of consultations requested. Procedures and Critical Care       Performed by: Yu Villarreal DO    Procedures             CRITICAL CARE NOTE :  12:04 PM  CRITICAL CARE TIME: I have spent 47 minutes of critical care time involved in lab review, consultations with specialist, family decision-making, and documentation. During this entire length of time I was immediately available to the patient. Critical Care: The reason for providing this level of medical care for this critically ill patient was due a critical illness that impaired one or more vital organ systems such that there was a high probability of imminent or life threatening deterioration in the patients condition. This care involved high complexity decision making to assess, manipulate, and support vital system functions, to treat this vital organ system failure and to prevent further life threatening deterioration of the patients condition. Aggregate critical care time is exclusive of any separately billable procedures and teaching time. DO Yu Fisher DO        Disposition         Diagnosis:   1. Diabetic ulcer of toe of left foot associated with type 2 diabetes mellitus, unspecified ulcer stage (HCC)    2. Leukocytosis, unspecified type          Disposition: Admit    Follow-up Information    None         Patient's Medications   Start Taking    No medications on file   Continue Taking    ALLOPURINOL (ZYLOPRIM) 100 MG TABLET    Take 100 mg by mouth daily. AMLODIPINE (NORVASC) 10 MG TABLET    Take 10 mg by mouth daily.     ASCORBIC ACID, VITAMIN C, (VITAMIN C) 500 MG TABLET    Take 500 mg by mouth. ASPIRIN 81 MG CHEWABLE TABLET    Take 81 mg by mouth daily. ATORVASTATIN (LIPITOR) 40 MG TABLET    Take 40 mg by mouth daily. BACITRACIN ZINC (BACITRACIN) OINTMENT    Apply  to affected area two (2) times a day. CARVEDILOL (COREG) 12.5 MG TABLET    Take 12.5 mg by mouth two (2) times a day. CLOPIDOGREL (PLAVIX) 75 MG TAB    Take 1 Tablet by mouth in the morning. EZETIMIBE (ZETIA) 10 MG TABLET    Take 10 mg by mouth. GABAPENTIN (NEURONTIN) 300 MG CAPSULE    Take 300 mg by mouth nightly. INSULIN GLARGINE (LANTUS) 100 UNIT/ML INJECTION    10 Units by SubCUTAneous route nightly. INSULIN LISPRO (HUMALOG) 100 UNIT/ML INJECTION    Use as directed    ISOSORBIDE MONONITRATE ER (IMDUR) 30 MG TABLET    Take 1 Tablet by mouth daily. LACTOBACILLUS ACIDOPH & BULGAR (FLORANEX) 1 MILLION CELL TAB TABLET    Take 2 Tablets by mouth two (2) times a day. MELATONIN, RAPID DISSOLVE, 5 MG TBDI TABLET    Take 2 Tablets by mouth nightly as needed (slsee[). NYSTATIN (MYCOSTATIN) POWDER    Apply  to affected area two (2) times a day. PANTOPRAZOLE (PROTONIX) 40 MG TABLET    Take 1 Tablet by mouth Daily (before breakfast). SEVELAMER CARBONATE (RENVELA) 800 MG TAB TAB    Take 2 Tablets by mouth three (3) times daily (with meals). VIT B CMPLX 3-FA-VIT C-BIOTIN (NEPHRO NIKITA RX) 1- MG-MG-MCG TABLET    Take 1 Tablet by mouth daily. These Medications have changed    No medications on file   Stop Taking    No medications on file         Diagnosis     Clinical Impression:   1. Diabetic ulcer of toe of left foot associated with type 2 diabetes mellitus, unspecified ulcer stage (HCC)    2. Leukocytosis, unspecified type        Attestations:    Melecio oDnnelly, DO    Please note that this dictation was completed with [employer], the [employer] voice recognition software.   Quite often unanticipated grammatical, syntax, homophones, and other interpretive errors are inadvertently transcribed by the computer software. Please disregard these errors. Please excuse any errors that have escaped final proofreading. Thank you.

## 2022-12-09 NOTE — H&P
History & Physical    Patient: Refugio Hein MRN: [id number]  CSN: [id number]    YOB: 1960  Age: 60 y.o. Sex: male      DOA: 12/9/2022  Primary Care Provider:  Nicol Gagnon MD      Assessment/Plan     Hospital Problems  Date Reviewed: 6/28/2022            Codes Class Noted POA    Leukocytosis ICD-10-CM: D72.829  ICD-9-CM: 288.60  12/9/2022 Unknown        Diabetic foot infection ([facility] ICD-10-CM: E11.628, L08.9  ICD-9-CM: 250.80, 686.9  12/9/2022 Unknown        Diarrhea ICD-10-CM: R19.7  ICD-9-CM: 787.91  12/9/2022 Unknown        Gangrene of left foot ([facility] ICD-10-CM: D22  ICD-9-CM: 785.4  12/9/2022 Unknown        Hypertension ICD-10-CM: I10  ICD-9-CM: 401.9  7/21/2022 Yes        CAD (coronary artery disease) ICD-10-CM: I25.10  ICD-9-CM: 414.00  6/28/2022 Yes        Diabetes mellitus with foot ulcer ([facility] ICD-10-CM: E11.621, L97.509  ICD-9-CM: 250.80, 707.15  6/27/2022 Yes             Admit to remote tele     Gangrene of left foot, leukocytosis -wbc 23   ID is consulted Case discussed with Dr. Vamsi Adams   IV antibiotics started  Planning surgery tonight  Will keep n.p.o. for now    Diabetic foot ulcer  Follow-up with Dr. Camilla Culver wound care    CAD, last stent Severe single-vessel coronary artery disease. Distal RCA to drug-eluting stent in July 2022, continue plavix -he took plavix today   No chest pain  Will have one-time troponin  Reported epigastric pain  EKG PVC    Hypertension continue home medication    Diarrhea epigastric pain  CT abdomen no acute process  Will give Protonix    End-stage renal disease on HD  TTS, renal consulted    DM  type II   Lantus at night , ssi     Full code     Please note that this dictation was completed with [employer] the [employer] voice recognition software. Quite often unanticipated grammatical, syntax, homophones, and other interpretive errors are inadvertently transcribed by the computer software. Please disregard these errors.   Please excuse any errors that have escaped final proofreading    Estimate  length of stay : TBD     DVT : heparin ppi proph  CC: foot lesion        HPI:     Sravan Church is a 60 y.o. male with history of diabetes, diabetic ulcer, CAD, hyperlipidemia, hypertension end-stage renal disease on HD presented to ER due to left foot lesion. He visited wound clinic for right heel ulcer, he was found left foot gangrene lesion. He reported his foot lesion that this about 2 weeks. He was sent to ER per podiatrist for further evaluation and surgical treatment and IV antibiotics. ID has been consulted,he denies any fever, no chest pain but reported diarrhea and epigastric pain. CT abdomen and pelvic no acute process. Visit Vitals  /60 (BP 1 Location: Right upper arm, BP Patient Position: Sitting)   Pulse 71   Temp 98.7 °F (37.1 °C)   Resp 18   SpO2 100%      O2 Device: None (Room air)      Past Medical History:   Diagnosis Date    CAD (coronary artery disease)     Chronic kidney disease     Diabetes mellitus     Hypertension     Sleep apnea        Past Surgical History:   Procedure Laterality Date    HX ORTHOPAEDIC      HX PACEMAKER      IR INSERT TUNL CVC W/O PORT OVER 5 YR  07/07/2022    IR INSERT TUNL CVC W/O PORT OVER 5 YR  6/29/2022    IR REMOVE TUNL CVAD W/O PORT / PUMP  8/12/2022    ND CARDIAC SURG PROCEDURE UNLIST         Family History   Problem Relation Age of Onset    Heart Disease Mother     Diabetes Father     Diabetes Sister     Diabetes Brother        Social History     Socioeconomic History    Marital status:    Tobacco Use    Smoking status: Never    Smokeless tobacco: Never   Vaping Use    Vaping Use: Never used   Substance and Sexual Activity    Alcohol use: Not Currently    Drug use: Never       Prior to Admission medications    Medication Sig Start Date End Date Taking?  Authorizing Provider   clopidogreL (PLAVIX) 75 mg tab Take 1 Tablet by mouth in the morning. 7/22/22   Maryann Rivas MD isosorbide mononitrate ER (IMDUR) 30 mg tablet Take 1 Tablet by mouth daily. 7/20/22   Becky Ayers MD   pantoprazole (PROTONIX) 40 mg tablet Take 1 Tablet by mouth Daily (before breakfast). 7/20/22   Becky Ayers MD   bacitracin zinc (BACITRACIN) ointment Apply  to affected area two (2) times a day. 7/19/22   Becky Ayers MD   nystatin (MYCOSTATIN) powder Apply  to affected area two (2) times a day. 7/19/22   Becky Ayers MD   insulin lispro (HUMALOG) 100 unit/mL injection Use as directed 7/13/22   Yo Garcia MD   Lactobacillus Acidoph & [facility]) 1 million cell tab tablet Take 2 Tablets by mouth two (2) times a day. 7/13/22   Yo Garcia MD   melatonin, rapid dissolve, 5 mg TbDi tablet Take 2 Tablets by mouth nightly as needed (slsee[). 7/13/22   Yo Garcia MD   sevelamer carbonate (RENVELA) 800 mg tab tab Take 2 Tablets by mouth three (3) times daily (with meals). 7/13/22   Yo Garcia MD   vit B Cmplx 3-FA-Vit C-Biotin (NEPHRO NIKITA RX) 1- mg-mg-mcg tablet Take 1 Tablet by mouth daily. 7/13/22   Yo Garcia MD   atorvastatin (LIPITOR) 40 mg tablet Take 40 mg by mouth daily. Provider, Historical   gabapentin (NEURONTIN) 300 mg capsule Take 300 mg by mouth nightly. Provider, Historical   allopurinoL (ZYLOPRIM) 100 mg tablet Take 100 mg by mouth daily. Provider, Historical   ezetimibe (ZETIA) 10 mg tablet Take 10 mg by mouth. Provider, Historical   carvediloL (COREG) 12.5 mg tablet Take 12.5 mg by mouth two (2) times a day. Provider, Historical   amLODIPine (NORVASC) 10 mg tablet Take 10 mg by mouth daily. Provider, Historical   aspirin 81 mg chewable tablet Take 81 mg by mouth daily. Provider, Historical   ascorbic acid, vitamin C, (VITAMIN C) 500 mg tablet Take 500 mg by mouth. Provider, Historical   insulin glargine (LANTUS) 100 unit/mL injection 10 Units by SubCUTAneous route nightly.     Provider, Historical       No Known Allergies    Review of Systems  Gen: No fever, chills, malaise, weight loss/gain. Heent: No headache, rhinorrhea, epistaxis, ear pain, hearing loss, sinus pain, neck pain/stiffness, sore throat. Heart: No chest pain, palpitations, SIGALA, pnd, or orthopnea. Resp: No cough, hemoptysis, wheezing and shortness of breath. GI: +nausea, no  vomiting, + diarrhea, no constipation, melena or hematochezia. + epigastric pain   : No urinary obstruction, dysuria or hematuria. Derm: No rash, new skin lesion or pruritis. Musc/skeletal: foot lesion   Vasc: No edema, cyanosis or claudication. Endo: No heat/cold intolerance, no polyuria,polydipsia or polyphagia. Neuro: No unilateral weakness, numbness, tingling. No seizures. Heme: No easy bruising or bleeding. Physical Exam:     Physical Exam:  Visit Vitals  /60 (BP 1 Location: Right upper arm, BP Patient Position: Sitting)   Pulse 71   Temp 98.7 °F (37.1 °C)   Resp 18   SpO2 100%      O2 Device: None (Room air)    Temp (24hrs), Av.9 °F (37.2 °C), Min:98.7 °F (37.1 °C), Max:99.1 °F (37.3 °C)    No intake/output data recorded.  0701 -  1900  In: 350 [I.V.:350]  Out: -     General:  Awake, cooperative, no distress. Head:  Normocephalic, without obvious abnormality, atraumatic. Eyes:  Conjunctivae/corneas clear, sclera anicteric, PERRL, EOMs intact. Nose: Nares normal. No drainage or sinus tenderness. Throat: Lips, mucosa, and tongue normal. .   Neck: Supple, symmetrical, trachea midline, no adenopathy. Lungs:   Clear to auscultation bilaterally. Heart:  Regular rate and rhythm, S1, S2 normal, no murmur, click, rub or gallop. Abdomen: Soft, non-tender. Bowel sounds normal. No masses,  No organomegaly. Extremities: Extremities see above       atraumatic, no cyanosis or edema. Pulses: 2+ and symmetric all extremities. Skin: Skin color-pink, texture, turgor normal. No rashes or lesions.  Capillary refill normal Neurologic: CNII-XII intact. No focal motor or sensory deficit.        Labs Reviewed:    BMP:   Lab Results   Component Value Date/Time     (L) 12/09/2022 11:55 AM    K 4.4 12/09/2022 11:55 AM    CL 92 (L) 12/09/2022 11:55 AM    CO2 30 12/09/2022 11:55 AM    AGAP 8 12/09/2022 11:55 AM     (H) 12/09/2022 11:55 AM    BUN 39 (H) 12/09/2022 11:55 AM    CREA 5.98 (H) 12/09/2022 11:55 AM     CMP:   Lab Results   Component Value Date/Time     (L) 12/09/2022 11:55 AM    K 4.4 12/09/2022 11:55 AM    CL 92 (L) 12/09/2022 11:55 AM    CO2 30 12/09/2022 11:55 AM    AGAP 8 12/09/2022 11:55 AM     (H) 12/09/2022 11:55 AM    BUN 39 (H) 12/09/2022 11:55 AM    CREA 5.98 (H) 12/09/2022 11:55 AM    CA 8.4 (L) 12/09/2022 11:55 AM    ALB 2.7 (L) 12/09/2022 11:55 AM    TP 8.3 (H) 12/09/2022 11:55 AM    GLOB 5.6 (H) 12/09/2022 11:55 AM    AGRAT 0.5 (L) 12/09/2022 11:55 AM     (H) 12/09/2022 11:55 AM     CBC:   Lab Results   Component Value Date/Time    WBC 23.0 (H) 12/09/2022 11:55 AM    HGB 11.6 (L) 12/09/2022 11:55 AM    HCT 35.4 (L) 12/09/2022 11:55 AM     12/09/2022 11:55 AM     All Cardiac Markers in the last 24 hours: No results found for: CPK, CK, CKMMB, CKMB, RCK3, CKMBT, CKNDX, CKND1, PAULO, TROPT, TROIQ, PIYUSH, TROPT, TNIPOC, BNP, BNPP  Recent Glucose Results:   Lab Results   Component Value Date/Time     (H) 12/09/2022 11:55 AM     ABG: No results found for: PH, PHI, PCO2, PCO2I, PO2, PO2I, HCO3, HCO3I, FIO2, FIO2I  COAGS: No results found for: APTT, PTP, INR, INREXT, INREXT  Liver Panel:   Lab Results   Component Value Date/Time    ALB 2.7 (L) 12/09/2022 11:55 AM    TP 8.3 (H) 12/09/2022 11:55 AM    GLOB 5.6 (H) 12/09/2022 11:55 AM    AGRAT 0.5 (L) 12/09/2022 11:55 AM     (H) 12/09/2022 11:55 AM     (H) 12/09/2022 11:55 AM     Pancreatic Markers: No results found for: AMYLPOCT, AML, LIPPOCT, LPSE    XR FOOT LT MIN 3 V    Result Date: 12/9/2022  EXAM: XR FOOT LT MIN 3 V CLINICAL INDICATION/HISTORY: Gangrenous 2nd digit   > Additional: None. COMPARISON: None. TECHNIQUE: 3 views left foot _______________ FINDINGS: Soft tissue gas formation is seen along the second digit with ill-defined limitus changes extending along the medial forefoot. Prior amputation first right. Patchy demineralization and osseous erosions are seen involving the distal portion of the first metatarsal head. Additional patchy areas of osseous erosion are also noted involving the proximal phalanx of the second toe. Diffuse soft tissue swelling. Diffuse atherosclerotic vascular calcifications. No retained radiopaque foreign object. _______________     Soft gas formation and ulceration involving the medial forefoot with findings concerning for osteomyelitis involving the first and second rays described above. CT ABD PELV WO CONT    Result Date: 12/9/2022  EXAM: CT of the abdomen and pelvis INDICATION: Abdominal pain with distention. COMPARISON: None. TECHNIQUE: Axial CT imaging of the abdomen and pelvis was performed without intravenous contrast. Multiplanar reformats were generated. One or more dose reduction techniques were used on this CT: automated exposure control, adjustment of the mAs and/or kVp according to patient size, and iterative reconstruction techniques. The specific techniques used on this CT exam have been documented in the patient's electronic medical record. Digital Imaging and Communications in Medicine (DICOM) format image data are available to nonaffiliated external healthcare facilities or entities on a secure, media free, reciprocally searchable basis with patient authorization for at least a 12-month period after this study. _______________ FINDINGS: LOWER CHEST: Partial inclusion of multivessel coronary arterial atherosclerosis and central venous catheter. Clear lung bases.  Several punctate 2 to 3 mm pulmonary nodule seen in the right lower lobe (image 8) LIVER, BILIARY: Liver is normal. No biliary dilation. Color contains a gallstone without evidence of dilatation or gallbladder wall thickening. PANCREAS: Normal. SPLEEN: Punctate granulomatous calcifications otherwise unremarkable. ADRENALS: Mild adreniform thickening of each adrenal gland. KIDNEYS/URETERS/BLADDER: No hydronephrosis. Bilateral renal hypodensities are present either to small to accurately characterize or in keeping with simple cysts which are prior no further dedicated imaging follow-up. Bladder decompressed. PELVIC ORGANS: Unremarkable. VASCULATURE: Diffuse aortic and visceral arterial atherosclerosis without evidence of aneurysmal dilatation. LYMPH NODES: Scattered subcentimeter mesenteric and retroperitoneal lymph nodes are demonstrated without evidence of adenopathy. Prominent left inguinal lymph node is present (image 127) measuring approximately 15 mm in short axis dimension. GASTROINTESTINAL TRACT: No bowel dilation or wall thickening. Peritoneal gas. Normal appendix. Scattered colonic diverticula without evidence of diverticulitis. BONES: No acute or aggressive osseous abnormalities identified. OTHER: None. _______________     1. Cholelithiasis without evidence of cholecystitis. 2. No abnormal bowel wall thickening or dilatation. 3. No hydronephrosis or obstructive uropathy. 4. Several small right lower lobe pulmonary nodules (2-3 mm in size). See below guidelines for proposed follow-up. 5. Borderline enlarged and nonspecific left inguinal lymph node, potentially reactive in etiology. ======== Fleischner Society pulmonary nodule guidelines (revised 2017): Multiple solid nodules <6 mm: -Low risk for lung cancer: No follow-up. -High risk for lung cancer: Optional chest CT in 12 months.      XR CHEST PORT    Result Date: 12/9/2022  EXAM: XR CHEST PORT CLINICAL INDICATION/HISTORY: sepsis -Additional: None COMPARISON: July 12, 2022 TECHNIQUE: Portable frontal view of the chest _______________ FINDINGS: SUPPORT DEVICES: Right IJ approach dialysis catheter in stable position. HEART AND MEDIASTINUM: Stable appearing cardiac size and mediastinal contours. LUNGS AND PLEURAL SPACES: Lungs are clear. No focal pneumonic opacity. No pneumothorax or pleural effusion. BONY THORAX AND SOFT TISSUES: Unremarkable. _______________     No active cardiopulmonary disease.      Procedures/imaging: see electronic medical records for all procedures/Xrays and details which were not copied into this note but were reviewed prior to creation of Joan Lam MD, Internal Medicine     CC: Janelle Frost MD

## 2022-12-09 NOTE — CONSULTS
Consult Note  Consult requested by:Dr Patrick Galindo is a 60 y.o. male BLACK/ who is being seen on consult for ESRD. Chief Complaint   Patient presents with    Abdominal Pain    Foot Pain     Admission diagnosis: Left foot infection    HPI:  61 yo PMH DM, HTN. PVD, ESRD on HD TTS at [facility] under the [address] nephrology sent in for admission by wound care due to left foot infection ,was told he needed surgery. Podiatry in to see pt now.  He has been on abx recently- oral vanco  Past Medical History:   Diagnosis Date    CAD (coronary artery disease)     Chronic kidney disease     Diabetes mellitus     Hypertension     Sleep apnea      Past Surgical History:   Procedure Laterality Date    HX ORTHOPAEDIC      HX PACEMAKER      IR INSERT TUNL CVC W/O PORT OVER 5 YR  07/07/2022    IR INSERT TUNL CVC W/O PORT OVER 5 YR  6/29/2022    IR REMOVE TUNL CVAD W/O PORT / PUMP  8/12/2022    [place] CARDIAC SURG PROCEDURE UNLIST       Social History     Socioeconomic History    Marital status:      Spouse name: Not on file    Number of children: Not on file    Years of education: Not on file    Highest education level: Not on file   Occupational History    Not on file   Tobacco Use    Smoking status: Never    Smokeless tobacco: Never   Vaping Use    Vaping Use: Never used   Substance and Sexual Activity    Alcohol use: Not Currently    Drug use: Never    Sexual activity: Not on file   Other Topics Concern    Dental Braces Not Asked    Endoscopic Camera Pill Not Asked    Metallic Foreign Body Not Asked    Medication Patches Not Asked    Taking Feraheme Not Asked    Claustrophobic Not Asked   Social History Narrative    Not on file     Social Determinants of Health     Financial Resource Strain: Not on file   Food Insecurity: Not on file   Transportation Needs: Not on file   Physical Activity: Not on file   Stress: Not on file   Social Connections: Not on file   Intimate Partner Violence: Not on file Housing Stability: Not on file       Family Hx:no hx kidney disease  No Known Allergies    Home Medications:     reviewed  Review of Systems:       Visit Vitals  /60 (BP 1 Location: Right upper arm, BP Patient Position: Sitting)   Pulse 71   Temp 98.7 °F (37.1 °C)   Resp 18   SpO2 100%     Constitutional:  Negative for chills and fever. HENT:  Negative for congestion and rhinorrhea. Eyes:  Negative for visual disturbance. Respiratory:  Negative for cough and shortness of breath. Cardiovascular:  Negative for chest pain. Gastrointestinal:  Positive for abdominal pain and diarrhea. Negative for nausea and vomiting. Genitourinary:  Negative for urgency. Musculoskeletal:  Negative for myalgias. Left foot discomfort  Skin:   Negative for rash. Neurological:  Negative for headaches.        Physical Assessment:     Wt Readings from Last 3 Encounters:   07/19/22 102.6 kg (226 lb 4.8 oz)     Temp Readings from Last 3 Encounters:   12/09/22 98.7 °F (37.1 °C)   12/09/22 99.1 °F (37.3 °C)   11/11/22 99 °F (37.2 °C)     BP Readings from Last 3 Encounters:   12/09/22 114/60   12/09/22 119/60   11/11/22 (!) 97/53     Pulse Readings from Last 3 Encounters:   12/09/22 71   12/09/22 76   11/11/22 84   GEN- NAD  HEENT- NCAT  CV- RRR  Resp- clear  Abd- Soft, NTND +BS  Extremities- no edema, left foot with gangrenous skin changes  Skin- no rash   Neuro- no focal deficits    WBC 23 H/H 11.6/35.4 plt 254  Na 130 K 4.4 Cl 92 CO2 30 BUN/cr 39/5.9    Impression:   ESRD  Left diabetic foot infection w/ gangrenous changes  DM  HTN  PVD  Anemia  SHPT  Plan:   HD tomorrow  Podiatry planning surgery  abx    Kimberly Nascimento MD  W- [phone]  W-[phone]

## 2022-12-10 LAB
ANION GAP SERPL CALC-SCNC: 12 MMOL/L (ref 3–18)
BASOPHILS # BLD: 0 K/UL (ref 0–0.1)
BASOPHILS NFR BLD: 0 % (ref 0–2)
BUN SERPL-MCNC: 55 MG/DL (ref 7–18)
BUN/CREAT SERPL: 7 (ref 12–20)
CALCIUM SERPL-MCNC: 7.3 MG/DL (ref 8.5–10.1)
CHLORIDE SERPL-SCNC: 96 MMOL/L (ref 100–111)
CO2 SERPL-SCNC: 26 MMOL/L (ref 21–32)
CREAT SERPL-MCNC: 7.35 MG/DL (ref 0.6–1.3)
DIFFERENTIAL METHOD BLD: ABNORMAL
EOSINOPHIL # BLD: 0.1 K/UL (ref 0–0.4)
EOSINOPHIL NFR BLD: 1 % (ref 0–5)
ERYTHROCYTE [DISTWIDTH] IN BLOOD BY AUTOMATED COUNT: 17.2 % (ref 11.6–14.5)
GLUCOSE BLD STRIP.AUTO-MCNC: 154 MG/DL (ref 70–110)
GLUCOSE BLD STRIP.AUTO-MCNC: 162 MG/DL (ref 70–110)
GLUCOSE BLD STRIP.AUTO-MCNC: 206 MG/DL (ref 70–110)
GLUCOSE BLD STRIP.AUTO-MCNC: 216 MG/DL (ref 70–110)
GLUCOSE BLD STRIP.AUTO-MCNC: 252 MG/DL (ref 70–110)
GLUCOSE BLD STRIP.AUTO-MCNC: 347 MG/DL (ref 70–110)
GLUCOSE SERPL-MCNC: 198 MG/DL (ref 74–99)
HBV SURFACE AG SER QL: <0.1 INDEX
HBV SURFACE AG SER QL: NEGATIVE
HCT VFR BLD AUTO: 30.9 % (ref 36–48)
HGB BLD-MCNC: 10 G/DL (ref 13–16)
IMM GRANULOCYTES # BLD AUTO: 0.1 K/UL (ref 0–0.04)
IMM GRANULOCYTES NFR BLD AUTO: 1 % (ref 0–0.5)
LYMPHOCYTES # BLD: 0.9 K/UL (ref 0.9–3.6)
LYMPHOCYTES NFR BLD: 6 % (ref 21–52)
MAGNESIUM SERPL-MCNC: 2 MG/DL (ref 1.6–2.6)
MCH RBC QN AUTO: 29.5 PG (ref 24–34)
MCHC RBC AUTO-ENTMCNC: 32.4 G/DL (ref 31–37)
MCV RBC AUTO: 91.2 FL (ref 78–100)
MONOCYTES # BLD: 1.3 K/UL (ref 0.05–1.2)
MONOCYTES NFR BLD: 9 % (ref 3–10)
NEUTS SEG # BLD: 12.6 K/UL (ref 1.8–8)
NEUTS SEG NFR BLD: 84 % (ref 40–73)
NRBC # BLD: 0 K/UL (ref 0–0.01)
NRBC BLD-RTO: 0 PER 100 WBC
PLATELET # BLD AUTO: 215 K/UL (ref 135–420)
PMV BLD AUTO: 11 FL (ref 9.2–11.8)
POTASSIUM SERPL-SCNC: 4.4 MMOL/L (ref 3.5–5.5)
RBC # BLD AUTO: 3.39 M/UL (ref 4.35–5.65)
SODIUM SERPL-SCNC: 134 MMOL/L (ref 136–145)
VANCOMYCIN SERPL-MCNC: 7.3 UG/ML (ref 5–40)
WBC # BLD AUTO: 15 K/UL (ref 4.6–13.2)

## 2022-12-10 PROCEDURE — 74011000250 HC RX REV CODE- 250: Performed by: HOSPITALIST

## 2022-12-10 PROCEDURE — 74011250637 HC RX REV CODE- 250/637: Performed by: HOSPITALIST

## 2022-12-10 PROCEDURE — 74011250636 HC RX REV CODE- 250/636: Performed by: HOSPITALIST

## 2022-12-10 PROCEDURE — 86706 HEP B SURFACE ANTIBODY: CPT

## 2022-12-10 PROCEDURE — 85025 COMPLETE CBC W/AUTO DIFF WBC: CPT

## 2022-12-10 PROCEDURE — 74011636637 HC RX REV CODE- 636/637: Performed by: HOSPITALIST

## 2022-12-10 PROCEDURE — 74011000250 HC RX REV CODE- 250: Performed by: EMERGENCY MEDICINE

## 2022-12-10 PROCEDURE — 90935 HEMODIALYSIS ONE EVALUATION: CPT

## 2022-12-10 PROCEDURE — 77010033678 HC OXYGEN DAILY

## 2022-12-10 PROCEDURE — 80202 ASSAY OF VANCOMYCIN: CPT

## 2022-12-10 PROCEDURE — 65270000029 HC RM PRIVATE

## 2022-12-10 PROCEDURE — 5A1D70Z PERFORMANCE OF URINARY FILTRATION, INTERMITTENT, LESS THAN 6 HOURS PER DAY: ICD-10-PCS | Performed by: HOSPITALIST

## 2022-12-10 PROCEDURE — 87340 HEPATITIS B SURFACE AG IA: CPT

## 2022-12-10 PROCEDURE — 82962 GLUCOSE BLOOD TEST: CPT

## 2022-12-10 PROCEDURE — 80048 BASIC METABOLIC PNL TOTAL CA: CPT

## 2022-12-10 PROCEDURE — 74011250636 HC RX REV CODE- 250/636: Performed by: EMERGENCY MEDICINE

## 2022-12-10 PROCEDURE — 74011250636 HC RX REV CODE- 250/636: Performed by: INTERNAL MEDICINE

## 2022-12-10 PROCEDURE — 74011000258 HC RX REV CODE- 258: Performed by: EMERGENCY MEDICINE

## 2022-12-10 PROCEDURE — 36415 COLL VENOUS BLD VENIPUNCTURE: CPT

## 2022-12-10 PROCEDURE — 83735 ASSAY OF MAGNESIUM: CPT

## 2022-12-10 PROCEDURE — 74011250637 HC RX REV CODE- 250/637: Performed by: EMERGENCY MEDICINE

## 2022-12-10 RX ORDER — HEPARIN SODIUM 1000 [USP'U]/ML
3600 INJECTION, SOLUTION INTRAVENOUS; SUBCUTANEOUS
Status: DISCONTINUED | OUTPATIENT
Start: 2022-12-10 | End: 2023-01-12 | Stop reason: HOSPADM

## 2022-12-10 RX ADMIN — PANTOPRAZOLE SODIUM 40 MG: 40 TABLET, DELAYED RELEASE ORAL at 06:51

## 2022-12-10 RX ADMIN — ALLOPURINOL 100 MG: 100 TABLET ORAL at 14:53

## 2022-12-10 RX ADMIN — SEVELAMER CARBONATE 1600 MG: 800 TABLET, FILM COATED ORAL at 14:53

## 2022-12-10 RX ADMIN — Medication 8 UNITS: at 00:55

## 2022-12-10 RX ADMIN — SODIUM CHLORIDE, PRESERVATIVE FREE 10 ML: 5 INJECTION INTRAVENOUS at 14:54

## 2022-12-10 RX ADMIN — Medication 6 UNITS: at 18:00

## 2022-12-10 RX ADMIN — Medication 5 UNITS: at 00:56

## 2022-12-10 RX ADMIN — HEPARIN SODIUM 5000 UNITS: 5000 INJECTION INTRAVENOUS; SUBCUTANEOUS at 18:35

## 2022-12-10 RX ADMIN — SODIUM CHLORIDE, PRESERVATIVE FREE 10 ML: 5 INJECTION INTRAVENOUS at 06:26

## 2022-12-10 RX ADMIN — Medication 4 UNITS: at 06:22

## 2022-12-10 RX ADMIN — EZETIMIBE 10 MG: 10 TABLET ORAL at 14:53

## 2022-12-10 RX ADMIN — SEVELAMER CARBONATE 1600 MG: 800 TABLET, FILM COATED ORAL at 18:35

## 2022-12-10 RX ADMIN — PIPERACILLIN AND TAZOBACTAM 4.5 G: 4; .5 INJECTION, POWDER, FOR SOLUTION INTRAVENOUS at 00:51

## 2022-12-10 RX ADMIN — VANCOMYCIN HYDROCHLORIDE 1000 MG: 1 INJECTION, POWDER, LYOPHILIZED, FOR SOLUTION INTRAVENOUS at 18:41

## 2022-12-10 RX ADMIN — SODIUM CHLORIDE, PRESERVATIVE FREE 10 ML: 5 INJECTION INTRAVENOUS at 20:47

## 2022-12-10 RX ADMIN — B-COMPLEX W/ C & FOLIC ACID TAB 1 MG 1 TABLET: 1 TAB at 14:53

## 2022-12-10 RX ADMIN — Medication 500 MG: at 14:53

## 2022-12-10 RX ADMIN — CARVEDILOL 12.5 MG: 12.5 TABLET, FILM COATED ORAL at 00:55

## 2022-12-10 RX ADMIN — Medication 2 UNITS: at 12:00

## 2022-12-10 RX ADMIN — CARVEDILOL 12.5 MG: 12.5 TABLET, FILM COATED ORAL at 20:47

## 2022-12-10 RX ADMIN — CLOPIDOGREL BISULFATE 75 MG: 75 TABLET ORAL at 14:53

## 2022-12-10 RX ADMIN — Medication 5 UNITS: at 22:00

## 2022-12-10 RX ADMIN — Medication 125 MG: at 02:21

## 2022-12-10 RX ADMIN — HEPARIN SODIUM 3600 UNITS: 1000 INJECTION INTRAVENOUS; SUBCUTANEOUS at 14:30

## 2022-12-10 RX ADMIN — Medication 125 MG: at 15:00

## 2022-12-10 RX ADMIN — ASPIRIN 81 MG: 81 TABLET, CHEWABLE ORAL at 14:53

## 2022-12-10 NOTE — PERIOP NOTES
TRANSFER - IN REPORT:    Verbal report received from ORN & CRNA on Karyn Banner  being received from OR (unit) for routine post - op      Report consisted of patients Situation, Background, Assessment and   Recommendations(SBAR). Information from the following report(s) SBAR was reviewed with the receiving nurse. Opportunity for questions and clarification was provided. Assessment completed upon patients arrival to unit and care assumed.

## 2022-12-10 NOTE — BRIEF OP NOTE
Brief Postoperative Note    Patient: Danielito Thurston  YOB: 1960  MRN: [id number]    Date of Procedure: 12/9/2022     Pre-Op Diagnosis: LEFT FOOT GANGREENE    Post-Op Diagnosis:  Gas gangrene with deep abscess, cellulitis left foot, osteomyelitis with gangrene left second toe, ischemic gangrene left third and fourth toes, nondisplaced fracture left second third and fourth metatarsal necks, type 1 diabetes with severe ASO PVD and peripheral neuropathy    Procedure(s):  AND BONE INFECTION. PARTIAL AMPUTATION OF LEFT 2ND, 3RD, 4TH METATARSALS, AMPUTATION OF TOES 2,3,4, INCISION ADN DRAINAGE LEFT FOOT DEEP.     Surgeon(s):  Tahir Abdi DPM    Surgical Assistant: Surg Asst-1: Laly VICTOR    Anesthesia: General     Estimated Blood Loss (mL): Minimal    Complications: None    Specimens:   ID Type Source Tests Collected by Time Destination   1 : Left Toes 2,3,4 and Left Metatarsal Heads 2,3,4 Preservative Foot, left  [facility] 12/9/2022 2223 Pathology   1 : Left Foot Wound Culture Wound Foot, left CULTURE, ANAEROBIC, CULTURE, WOUND W [facility] 12/9/2022 2201 Microbiology        Implants: * No implants in log *    Drains: * No LDAs found *    Findings: Gas gangrene with deep abscess, cellulitis left foot,osteomyelitis with gangrene left second toe, ischemic gangrene left third and fourth toes, nondisplaced fracture left second third and fourth metatarsal necks, type 1 diabetes with severe ASO PVD and peripheral neuropathy    Electronically Signed by Froy Biggs DPM on 12/9/2022 at 11:07 PM

## 2022-12-10 NOTE — PROGRESS NOTES
Critical Result Notification    Received and verbally repeated the following test results + blood cultures from anaerobic bottle gram + cocci and clusters  from  ([place]) on 12/10/22 at [address] Claudia Soto MD notified

## 2022-12-10 NOTE — PROGRESS NOTES
12/10/2022 PT note: consult received and chart reviewed. Pt currently receiving HD treatment per chart. Will f/u ater as pt schedule allows for PT evaluation.  Thank you for this referral.   Gia Castellon, PT

## 2022-12-10 NOTE — PROGRESS NOTES
TRANSFER - IN REPORT:    Verbal report received from ELLYN Mcqueen RN(name) on Cheo Grater  being received from KIM Carter(unit) for routine progression of care      Report consisted of patients Situation, Background, Assessment and   Recommendations(SBAR). Information from the following report(s) SBAR, Kardex, Intake/Output, and MAR was reviewed with the receiving nurse. Opportunity for questions and clarification was provided. Assessment completed upon patients arrival to unit and care assumed. 46 - Patient s/p amputation to 2nd, 3rd, and 4th toes of left foot. Denied pain, denied discomfort. Dressings with ace wraps CDI. Patient with neuropathy to BLE, discolorations, and dry skin. Scabbed over abrasions to fingers of left hand. Vital signs stable. 0740 -Bedside and Verbal shift change report given to Renato Hill RN (oncoming nurse) by Ary Lew (offgoing nurse). Report included the following information SBAR, Kardex, Intake/Output, and MAR.

## 2022-12-10 NOTE — PERIOP NOTES
Verbal hand off at the bedside with Gab Sanchez RN provided opportunity for questions. Personal belongings with patient.

## 2022-12-10 NOTE — ANESTHESIA PREPROCEDURE EVALUATION
Relevant Problems   CARDIOVASCULAR   (+) CAD (coronary artery disease)      RENAL FAILURE   (+) ESRD (end stage renal disease) (HCC)      ENDOCRINE   (+) Diabetes mellitus with foot ulcer (HCC)      HEMATOLOGY   (+) Acute hematogenous osteomyelitis of right foot (HCC)       Anesthetic History   No history of anesthetic complications     Pertinent negatives: No PONV       Review of Systems / Medical History  Patient summary reviewed, nursing notes reviewed and pertinent labs reviewed    Pulmonary  Within defined limits      Sleep apnea      Pertinent negatives: No COPD and asthma     Neuro/Psych   Within defined limits        Pertinent negatives: No seizures and CVA   Cardiovascular    Hypertension          CAD and cardiac stents  Pertinent negatives: No CHF  Exercise tolerance: >4 METS     GI/Hepatic/Renal         Renal disease: ESRD and dialysis    Pertinent negatives: No liver disease   Endo/Other    Diabetes         Other Findings              Physical Exam    Airway  Mallampati: III  TM Distance: 4 - 6 cm  Neck ROM: decreased range of motion   Mouth opening: Normal     Cardiovascular               Dental    Dentition: Poor dentition  Comments: Gross discoloration   Pulmonary                 Abdominal  GI exam deferred       Other Findings            Anesthetic Plan    ASA: 4  Anesthesia type: MAC          Induction: Intravenous  Anesthetic plan and risks discussed with: Patient

## 2022-12-10 NOTE — PERIOP NOTES
TRANSFER - OUT REPORT:    Verbal report given to Allison Jones RN on Britta Brooks  being transferred to [address] [place] [address] (unit) for routine post - op       Report consisted of patients Situation, Background, Assessment and   Recommendations(SBAR). Information from the following report(s) SBAR was reviewed with the receiving nurse. Lines:   Peripheral IV 12/09/22 Left Forearm (Active)   Site Assessment Clean, dry, & intact 12/09/22 2344   Phlebitis Assessment 0 12/09/22 2344   Infiltration Assessment 0 12/09/22 2344   Dressing Status Clean, dry, & intact 12/09/22 2344   Dressing Type Tape;Transparent 12/09/22 2344   Hub Color/Line Status Infusing;Patent;Pink 12/09/22 2344   Action Taken Blood drawn 12/09/22 1158        Opportunity for questions and clarification was provided.       Patient transported with:   Registered Nurse

## 2022-12-10 NOTE — H&P
Date of Surgery Update:  Nataly Case was seen and examined. History and physical has been reviewed. The patient has been examined.  There have been no significant clinical changes since the completion of the originally dated History and Physical.    Signed By: Paola Markham DPM     December 9, 2022 9:38 PM

## 2022-12-10 NOTE — DIALYSIS
TREATMENT SUMMARY   Patient dialyzed in room 327  Tolerated treatment well without complaint or complications.    RIJ TDC functioning well without complication of BFR or accessing       2500 ml removed via UF with a with a net removal 2000 ml  Report given to Pauline Arana RN with all questions answered     TREATMENT  NOTES                                                                                                     ACUTE HEMODIALYSIS FLOW SHEET       ACCESS   CATHETER ACCESS: [] N/A  [x] RIGHT  [] LEFT  [] IJ  [] SUBCL [] FEM                    [] First use X-ray  [x] Tunnel     [] Non-Tunneled      [] No S/S infection  [] Redness [] Drainage  [] Cultured [] Swelling [] Pain                    [x] Medical Aseptic [x] Prep Dressing Changed                  [] Clotted [] Patent []      Flows: [x] Good [] Poor [] Reversed                 If Access Problem Dr. Blaze Eller: [] Yes [] No    Date:_____  [] N/A[]   GRAFT/FISTULA ACCESS:  [x] N/A  [] RIGHT  [] LEFT  [] UE   [] LE       [] AVG  [] AVF [] BUTTONHOLE    [] +BRUIT/THRILL [] MEDICAL ASEPTIC PREP     [] No S/S infection  [] Redness [] Drainage  [] Cultured [] Swelling [] Pain              If Access Problem Dr. Blaze Eller: [] Yes [] No    Date:______ [] N/A     RO/HEMODIAYLSIS MACHINE SAFETY CHECKS- 2 Blaise Timberlake TREATMENT          [x] [facility]: Machine Serial #1:  [id number]    RO Serial #[id number]                       [] [facility]: Machine Serial #2:  [id number] RO Serial #[id number]     [] [facility]: Machine Serial #3:  [id number]  RO Serial #[id number]    Alarm Test: [x] Pass  Time___1020_____  [x] RO/Machine Log Complete    [x] Extracorporeal circuit Tested for integrity           Dialyzer__[id number]__   Tubing_22B26-11__    Dialysate: pH__7.2_  Temp.__36___ HD Machine__14_   CHLORINE TESTING- BEFORE EACH TREATMENT AND EVERY 4 HOURS   Total Chlorine: [x] Less than 0.1 ppm Time:__1045___2nd Check Time:______  (If greater than 0.1 ppm from Primary then every 30 minutes from Secondary)   TREATMENT INIATION-WITH DIALYSIS PRECAUTIONS   [x] All Connections Secured   [x] Saline Line Double Clamped    [x] Venous Parameters Set [x] Arterial Parameters Set    [x] Prime Given 250 ml     [x] Air Foam Detector Engaged   PRE-TREATMENT   UF Calculations: Wt to lose:__2000__ml(+) Oral:_0_ml(+)IV Meds/Fluids/Blood prods_0__ml(+) Prime/Rinse__500_ml(=)Total UF Goal__2500__mL     Tx Initiation Note: RECEIVED REPORT. PATIENT ALERT AND ORIENTED. VITAL SIGNS WNL. NAD. ALL QUESTIONS ANSWERED WITH PATIENT  SAFETY CHECKS COMPLETE. TIME OUT COMPLETE. TREATMENT INITIATED    [x] Time Out/Safety Check  Time:__1045___     Dialyzer cleared: [x] Good [] Fair [] Poor     Blood Volume Processed __71.5__L   Net UF Removed __2000__mL  Post Tx Access:                  AVF/AVG: Bleeding Stop Time      Art. NA_min Avtar.__NA_min []+bruit/thrill                              Catheter: Locking Solution  [x] Heparin 1 ml/1000 units                                                             [] Normal Saline                                                                      Art.__1.8___ ml Avtar.__1.8___ml                                                           [x] New caps placed       Abbreviations: AVG-arterial venous graft, AVF-arterial venous fistula, IJ-Internal Jugular,  Subcl-Subclavian, Fem-Femoral, Tx-treatment, AP/HR-apical heart rate, DFR-dialysate flow rate, BFR-blood flow rate, AP-arterial pressure, -venous pressure, UF-ultrafiltrate, TMP-transmembrane pressure, Avtar-Venous, Art-Arterial, RO-Reverse Osmosis

## 2022-12-10 NOTE — PROGRESS NOTES
Vancomycin - Pharmacy to Dose  Consult provided for this 60y.o. year old ,male for indication of Diabetic Foot Infection. Therapy day 2        Wt Readings from Last 1 Encounters:   12/10/22 98 kg (216 lb)       Ht Readings from Last 1 Encounters:   07/18/22 182.9 cm (72\")     Dosing Weight:98 kg    Additional Antibiotics: Zosyn     Date:  12/10/2022    Lab Results   Component Value Date/Time    Creatinine 7.35 (H) 12/10/2022 06:33 AM     creatinine clearance:  HD Patient    white blood cell count:  15.0    Patient received Vancomycin 1250 mg IV at 14:41 12/9/2022    Patient received HD today ( T-Th-Sa )     Will re-dose with Vancomycin 1000 mg IV today.     Goal Trough Level 15-20 mg/l    Pharmacy will continue to monitor and adjust    Pharmacist Clotilde Lundberg, [facility]

## 2022-12-10 NOTE — PROGRESS NOTES
Nephrology [place] Note      HPI:  59 yo PMH DM, HTN. PVD, ESRD on HD TTS at [facility] under the [address] nephrology sent in for admission by wound care due to left foot infection ,was told he needed surgery. Podiatry in to see pt now. He has been on abx recently- oral vanco    Seen on HD , no complaints.      Past Medical History:   Diagnosis Date    CAD (coronary artery disease)     Chronic kidney disease     Diabetes mellitus     Hypertension     Sleep apnea      Past Surgical History:   Procedure Laterality Date    HX ORTHOPAEDIC      HX PACEMAKER      IR INSERT TUNL CVC W/O PORT OVER 5 YR  07/07/2022    IR INSERT TUNL CVC W/O PORT OVER 5 YR  6/29/2022    IR REMOVE TUNL CVAD W/O PORT / PUMP  8/12/2022    [place] CARDIAC SURG PROCEDURE UNLIST       Social History     Socioeconomic History    Marital status:      Spouse name: Not on file    Number of children: Not on file    Years of education: Not on file    Highest education level: Not on file   Occupational History    Not on file   Tobacco Use    Smoking status: Never    Smokeless tobacco: Never   Vaping Use    Vaping Use: Never used   Substance and Sexual Activity    Alcohol use: Not Currently    Drug use: Never    Sexual activity: Not on file   Other Topics Concern    Dental Braces Not Asked    Endoscopic Camera Pill Not Asked    Metallic Foreign Body Not Asked    Medication Patches Not Asked    Taking Feraheme Not Asked    Claustrophobic Not Asked   Social History Narrative    Not on file     Social Determinants of Health     Financial Resource Strain: Not on file   Food Insecurity: Not on file   Transportation Needs: Not on file   Physical Activity: Not on file   Stress: Not on file   Social Connections: Not on file   Intimate Partner Violence: Not on file   Housing Stability: Not on file       Family Hx:no hx kidney disease  No Known Allergies    Home Medications:     reviewed  Review of Systems:       Visit Vitals  BP (P) 105/60   Pulse (P) 68 Temp 98 °F (36.7 °C) (Oral)   Resp (P) 16   Wt 98 kg (216 lb)   SpO2 99%   BMI 29.29 kg/m²     Constitutional:  Negative for chills and fever. HENT:  Negative for congestion and rhinorrhea. Eyes:  Negative for visual disturbance. Respiratory:  Negative for cough and shortness of breath. Cardiovascular:  Negative for chest pain. Gastrointestinal:  Positive for abdominal pain and diarrhea. Negative for nausea and vomiting. Genitourinary:  Negative for urgency. Musculoskeletal:  Negative for myalgias. Left foot discomfort  Skin:   Negative for rash. Neurological:  Negative for headaches.        Physical Assessment:     Wt Readings from Last 3 Encounters:   12/10/22 98 kg (216 lb)   07/19/22 102.6 kg (226 lb 4.8 oz)     Temp Readings from Last 3 Encounters:   12/10/22 98 °F (36.7 °C) (Oral)   12/09/22 99.1 °F (37.3 °C)   11/11/22 99 °F (37.2 °C)     BP Readings from Last 3 Encounters:   12/10/22 (P) 105/60   12/09/22 119/60   11/11/22 (!) 97/53     Pulse Readings from Last 3 Encounters:   12/10/22 (P) 68   12/09/22 76   11/11/22 84   GEN- NAD  HEENT- NCAT  CV- RRR  Resp- clear  Abd- Soft, NTND +BS  Extremities- no edema, left foot with gangrenous skin changes  Skin- no rash   Neuro- no focal deficits    WBC 23 H/H 11.6/35.4 plt 254  Na 130 K 4.4 Cl 92 CO2 30 BUN/cr 39/5.9    Impression:   ESRD  Left diabetic foot infection w/ gangrenous changes s/p Lt 2/3/4 metatarsal amputation   DM  HTN  PVD  Anemia  SHPT    Plan:   HD today   mavis Cortes MD

## 2022-12-10 NOTE — OP NOTES
Operative Note    Patient: Lexy Yoon  YOB: 1960  MRN: [id number]    Date of Procedure: 12/9/2022     Pre-Op Diagnosis: LEFT FOOT GANGREENE    Post-Op Diagnosis: Gas gangrene with deep abscess, cellulitis left foot,osteomyelitis with gangrene left second toe, ischemic gangrene left third and fourth toes, nondisplaced fracture left second third and fourth metatarsal necks, type 1 diabetes with severe ASO PVD and peripheral neuropathy      Procedure(s):  AND BONE INFECTION. PARTIAL AMPUTATION OF LEFT 2ND, 3RD, 4TH METATARSALS, AMPUTATION OF TOES 2,3,4, INCISION AND DRAINAGE LEFT FOOT DEEP. Surgeon(s):  Zheng Lujan DPM    Surgical Assistant: Surg Asst-1: Tobias VICTOR    Anesthesia: General     Estimated Blood Loss (mL):  Minimal    Complications: None    Specimens:   ID Type Source Tests Collected by Time Destination   1 : Left Toes 2,3,4 and Left Metatarsal Heads 2,3,4 Preservative Foot, left  [facility] 12/9/2022 2223 Pathology   1 : Left Foot Wound Culture Wound Foot, left CULTURE, ANAEROBIC, CULTURE, WOUND [facility] 12/9/2022 2201 Microbiology        Implants: * No implants in log *    Drains: * No LDAs found *    Findings: Gas gangrene with deep abscess, cellulitis left foot,osteomyelitis with gangrene left second toe, ischemic gangrene left third and fourth toes, nondisplaced fracture left second third and fourth metatarsal necks, type 1 diabetes with severe ASO PVD and peripheral neuropathy    Detailed Description of Procedure:    Incision and drainage complicated/deep left foot, partial excision left second third and fourth metatarsals, amputation left second third and fourth toes at the MP joint,    Electronically Signed by Adwoa Neal DPM on 12/9/2022 at 11:12 PM

## 2022-12-10 NOTE — ANESTHESIA POSTPROCEDURE EVALUATION
Post-Anesthesia Evaluation and Assessment    Cardiovascular Function/Vital Signs  Visit Vitals  BP (!) 97/48   Pulse 74   Temp 36.4 °C (97.5 °F)   Resp 18   SpO2 96%       Patient is status post Procedure(s):  PARTIAL AMPUTATION OF LEFT 2ND, 3RD, 4TH METATARSALS, AMPUTATION OF TOES 2,3,4, INCISION AND DRAINAGE LEFT FOOT DEEP. .    Nausea/Vomiting: Controlled. Postoperative hydration reviewed and adequate. Pain:  Pain Scale 1: FLACC (12/09/22 2258)  Pain Intensity 1: 0 (12/09/22 2258)   Managed. Neurological Status: At baseline. Mental Status and Level of Consciousness: Arousable. Pulmonary Status:   O2 Device: Non-rebreather mask (12/09/22 9703)   Adequate oxygenation and airway patent. Complications related to anesthesia: None    Post-anesthesia assessment completed. No concerns. Patient has met all discharge requirements.     Signed By: Claudia Levin CRNA    December 9, 2022

## 2022-12-10 NOTE — CONSULTS
Brief ID/MICRO Note    Chart reviewed/pt NOT seen. Don't sweat the single (+) admission BCx with GPC  Already on pip/tzb+vanco  Will await this PCR panel first - if CoNS, contaminant. I'll scut OR MICRO over weekend. Stay on current abx.   [facility] did NOT order a CDT - - they empirically treated with PO brian Swenson MD  Cell [id number] [facility] IV in right AC infiltrated during fluid bolus and became hard and painful to touch. Infusion stopped and new iv sites x2 established in left arm d/t IV incompability.

## 2022-12-10 NOTE — PROGRESS NOTES
Podiatry:    Is status post left foot surgery for severe gas gangrene, osteomyelitis, fracture of the left second third and fourth metatarsals. Her operative cultures were taken for infectious disease reference. He should start on IV antibiotics as per infectious disease as soon as possible. He is nonweightbearing left foot. Nursing to start daily dressing changes to the left foot tomorrow then please see your from wound care is to continue wound care for his right heel and start wound care for his left foot. All infected bone should have been removed in surgery, and he should not need long-term IV antibiotics for osteomyelitis. We were unable to close his left foot incision, and may return to the operating room next week for SISTERS OF  graft if needed. I will follow this patient as needed.

## 2022-12-10 NOTE — PROGRESS NOTES
Reason for Admission:   Chart reviewed; per H&P, patient is a \"60 y.o. male with history of diabetes, diabetic ulcer, CAD, hyperlipidemia, hypertension end-stage renal disease on HD presented to ER due to left foot lesion. He visited wound clinic for right heel ulcer, he was found left foot gangrene lesion. He reported his foot lesion that this about 2 weeks. \"  Went to OR 12/9/22 for AND BONE INFECTION. PARTIAL AMPUTATION OF LEFT 2ND, 3RD, 4TH METATARSALS, AMPUTATION OF TOES 2,3,4, INCISION ADN DRAINAGE LEFT FOOT DEEP. RUR Score:     moderate; 18%             PCP: First and Last name:   Bonifacio Tamayo MD     Name of Practice:    Family Practice    Are you a current patient: Yes/No: yes   Approximate date of last visit: in November; next appt is 1/9/23   Can you participate in a virtual visit if needed:     Do you (patient/family) have any concerns for transition/discharge? Concerned about mobility- spends most of day in wheelchair; 3 steps to get into house; goes to dialysis T TH SAT 1000 chair time at [address] [facility]              Plan for utilizing home health:   TBD but anticipate for dressing changes, possible IV abx    Current Advanced Directive/Advance Care Plan:  Full Code      Healthcare Decision Maker:   Click here to complete [address] including [address] Relationship (ie \"Primary\")              Transition of Care Plan:        [address] met with patient as he was undergoing dialysis; states he lives alone, gets to dialysis via [facility]. At this time, not clear if patient will need course of IV abx or if [facility] is recommended - PT/OT evals are pending. Will continue to follow. Care Management Interventions  PCP Verified by CM:  Yes  Mode of Transport at Discharge: Self  Transition of Care Consult (CM Consult): Discharge Planning  Support Systems: Friend/Neighbor  The Plan for Transition of Care is Related to the Following Treatment Goals : gas gangrene  Discharge Location  Patient Expects to be Discharged to[de-identified]  (home with [facility] vs SNF)

## 2022-12-10 NOTE — PROGRESS NOTES
Hospitalist Progress Note    Patient: Carlos Guido MRN: [id number]  CSN: [id number]    YOB: 1960  Age: 60 y.o. Sex: male    DOA: 12/9/2022 LOS:  LOS: 1 day                Assessment and Plan: Active Problems:    Diabetes mellitus with foot ulcer ([facility] (6/27/2022)      CAD (coronary artery disease) (6/28/2022)      ESRD (end stage renal disease) ([facility] (6/28/2022)      Hypertension (7/21/2022)      Leukocytosis (12/9/2022)      Diabetic foot infection ([facility] (12/9/2022)      Diarrhea (12/9/2022)      Type 2 diabetes mellitus, with long-term current use of insulin ([facility]) ()      Acute hematogenous osteomyelitis of left foot ([facility] (12/9/2022)      Nondisplaced fracture of second metatarsal bone of left foot (12/9/2022)      Nondisplaced fracture of third metatarsal bone of left foot (12/9/2022)      Closed nondisplaced fracture of fourth metatarsal bone of left foot (12/9/2022)        Gangrene:    Appreciate ID note  IV antibiotics and s/p surgery    Leukocytosis has improved    CAD:  medical treatment    Diarrhea: empiric po vanco started    ESRD:  followed by nephrology      DM: lantus and sliding scale    Blood pressure is good          Chief complaint:  admitted for gas gangrene      Subjective:    He feels better today. Foot is still sore      Review of systems:    General: No fevers or chills. Cardiovascular: No chest pain or pressure. No palpitations. Pulmonary: No shortness of breath. Gastrointestinal: No nausea, vomiting. Objective:    Vital signs/Intake and Output:  Visit Vitals  BP (P) 105/60   Pulse (P) 68   Temp 98 °F (36.7 °C) (Oral)   Resp (P) 16   Wt 98 kg (216 lb)   SpO2 99%   BMI 29.29 kg/m²     Current Shift:  No intake/output data recorded. Last three shifts:  12/08 1901 - 12/10 0700  In: 1430 [P.O.:480; I.V.:950]  Out: 5     Physical Exam:  General: NAD, AAOx3. Non-toxic. HEENT: NC/AT. PERRLA, EOMI.  MMM. Lungs: Nml inspection. CTA B/L.  No wheezing, rales or rhonchi. Heart:  S1S2 RRR,  PMI mid 5th IC space. No M/RG. Abdomen: Soft, NT/ND.  BS+. No peritoneal signs. Extremities: No C/C/E. Psych:   Nml affect. Neurologic:  2-12 intact. Strength 5/5 throughout. Sensation symmetrical.          Labs: Results:       Chemistry Recent Labs     12/10/22  0633 12/09/22  1155   * 185*   * 130*   K 4.4 4.4   CL 96* 92*   CO2 26 30   BUN 55* 39*   CREA 7.35* 5.98*   CA 7.3* 8.4*   AGAP 12 8   BUCR 7* 7*   AP  --  152*   TP  --  8.3*   ALB  --  2.7*   GLOB  --  5.6*   AGRAT  --  0.5*      CBC w/Diff Recent Labs     12/10/22  0633 12/09/22  1155   WBC 15.0* 23.0*   RBC 3.39* 3.84*   HGB 10.0* 11.6*   HCT 30.9* 35.4*    254   GRANS 84* 84*   LYMPH 6* 6*   EOS 1 0      Cardiac Enzymes No results for input(s): CPK, CKND1, PAULO in the last 72 hours. No lab exists for component: CKRMB, TROIP   Coagulation No results for input(s): PTP, INR, APTT, INREXT in the last 72 hours. Lipid Panel Lab Results   Component Value Date/Time    Cholesterol, total 188 07/19/2022 03:12 AM    HDL Cholesterol 42 07/19/2022 03:12 AM    LDL, calculated 131.2 (H) 07/19/2022 03:12 AM    VLDL, calculated 14.8 07/19/2022 03:12 AM    Triglyceride 74 07/19/2022 03:12 AM    CHOL/HDL Ratio 4.5 07/19/2022 03:12 AM      BNP No results for input(s): BNPP in the last 72 hours.    Liver Enzymes Recent Labs     12/09/22  1155   TP 8.3*   ALB 2.7*   *      Thyroid Studies No results found for: T4, T3U, TSH, TSHEXT     Procedures/imaging: see electronic medical records for all procedures/Xrays and details which were not copied into this note but were reviewed prior to creation of Plan

## 2022-12-10 NOTE — PROGRESS NOTES
Occupational Therapy Evaluation Attempt     OT eval orders received. Chart reviewed. Attempted Occupational Therapy Evaluation, however, patient unable to be seen due to:  []  Nausea/vomiting  []  Eating  []  Pain  []  Patient too lethargic  []  Off Unit for testing/procedure  [x]  Dialysis treatment in progress  []  Telemetry Results  []  Other:      Will f/u later as patient's schedule allows.   David Mendoza, OTR/L

## 2022-12-11 PROBLEM — R78.81 POSITIVE BLOOD CULTURE: Status: ACTIVE | Noted: 2022-12-11

## 2022-12-11 LAB
ACC. NO. FROM MICRO ORDER, ACCP: ABNORMAL
ACINETOBACTER CALCOACETICUS-BAUMANII COMPLEX, ACBCX: NOT DETECTED
ALBUMIN SERPL-MCNC: 2.2 G/DL (ref 3.4–5)
ALBUMIN/GLOB SERPL: 0.5 (ref 0.8–1.7)
ALP SERPL-CCNC: 120 U/L (ref 45–117)
ALT SERPL-CCNC: 130 U/L (ref 16–61)
ANION GAP SERPL CALC-SCNC: 10 MMOL/L (ref 3–18)
AST SERPL-CCNC: 73 U/L (ref 10–38)
BACTEROIDES FRAGILIS, BFRA: NOT DETECTED
BASOPHILS # BLD: 0.1 K/UL (ref 0–0.1)
BASOPHILS NFR BLD: 0 % (ref 0–2)
BILIRUB SERPL-MCNC: 0.5 MG/DL (ref 0.2–1)
BIOFIRE COMMENT, BCIDPF: ABNORMAL
BUN SERPL-MCNC: 38 MG/DL (ref 7–18)
BUN/CREAT SERPL: 6 (ref 12–20)
C GLABRATA DNA VAG QL NAA+PROBE: NOT DETECTED
CALCIUM SERPL-MCNC: 7.5 MG/DL (ref 8.5–10.1)
CANDIDA ALBICANS: NOT DETECTED
CANDIDA AURIS, CAAU: NOT DETECTED
CANDIDA KRUSEI, CKRP: NOT DETECTED
CANDIDA PARAPSILOSIS, CPAUP: NOT DETECTED
CANDIDA TROPICALIS, CTROP: NOT DETECTED
CHLORIDE SERPL-SCNC: 97 MMOL/L (ref 100–111)
CO2 SERPL-SCNC: 25 MMOL/L (ref 21–32)
CREAT SERPL-MCNC: 5.96 MG/DL (ref 0.6–1.3)
CRYPTO NEOFORMANS/GATTII, CRYNEG: NOT DETECTED
DIFFERENTIAL METHOD BLD: ABNORMAL
ENTEROBACTER CLOACAE COMPLEX, ECCP: NOT DETECTED
ENTEROBACTERALES SP. , ENBLS: NOT DETECTED
ENTEROCOCCUS FAECALIS, ENFA: NOT DETECTED
ENTEROCOCCUS FAECIUM, ENFAM: NOT DETECTED
EOSINOPHIL # BLD: 0.3 K/UL (ref 0–0.4)
EOSINOPHIL NFR BLD: 2 % (ref 0–5)
ERYTHROCYTE [DISTWIDTH] IN BLOOD BY AUTOMATED COUNT: 17.6 % (ref 11.6–14.5)
ESCHERICHIA COLI: NOT DETECTED
GLOBULIN SER CALC-MCNC: 4.5 G/DL (ref 2–4)
GLUCOSE BLD STRIP.AUTO-MCNC: 197 MG/DL (ref 70–110)
GLUCOSE BLD STRIP.AUTO-MCNC: 198 MG/DL (ref 70–110)
GLUCOSE BLD STRIP.AUTO-MCNC: 216 MG/DL (ref 70–110)
GLUCOSE BLD STRIP.AUTO-MCNC: 345 MG/DL (ref 70–110)
GLUCOSE BLD STRIP.AUTO-MCNC: 361 MG/DL (ref 70–110)
GLUCOSE SERPL-MCNC: 251 MG/DL (ref 74–99)
HAEMOPHILUS INFLUENZAE, HMI: NOT DETECTED
HCT VFR BLD AUTO: 32.1 % (ref 36–48)
HGB BLD-MCNC: 10.1 G/DL (ref 13–16)
IMM GRANULOCYTES # BLD AUTO: 0.1 K/UL (ref 0–0.04)
IMM GRANULOCYTES NFR BLD AUTO: 1 % (ref 0–0.5)
KLEBSIELLA AEROGENES, KLAE: NOT DETECTED
KLEBSIELLA OXYTOCA: NOT DETECTED
KLEBSIELLA PNEUMONIAE GROUP, KPPG: NOT DETECTED
LISTERIA MONOCYTOGENES, LMONP: NOT DETECTED
LYMPHOCYTES # BLD: 1.2 K/UL (ref 0.9–3.6)
LYMPHOCYTES NFR BLD: 8 % (ref 21–52)
MAGNESIUM SERPL-MCNC: 1.9 MG/DL (ref 1.6–2.6)
MCH RBC QN AUTO: 29.5 PG (ref 24–34)
MCHC RBC AUTO-ENTMCNC: 31.5 G/DL (ref 31–37)
MCV RBC AUTO: 93.9 FL (ref 78–100)
MECA/C (METHICILLIN RESISTANT GENE), MECACP: DETECTED
MONOCYTES # BLD: 1.2 K/UL (ref 0.05–1.2)
MONOCYTES NFR BLD: 8 % (ref 3–10)
NEISSERIA MENINGITIDIS, NMNI: NOT DETECTED
NEUTS SEG # BLD: 12 K/UL (ref 1.8–8)
NEUTS SEG NFR BLD: 81 % (ref 40–73)
NRBC # BLD: 0 K/UL (ref 0–0.01)
NRBC BLD-RTO: 0 PER 100 WBC
PLATELET # BLD AUTO: 202 K/UL (ref 135–420)
PMV BLD AUTO: 10.9 FL (ref 9.2–11.8)
POTASSIUM SERPL-SCNC: 4.6 MMOL/L (ref 3.5–5.5)
PROT SERPL-MCNC: 6.7 G/DL (ref 6.4–8.2)
PROTEUS, PRP: NOT DETECTED
PSEUDOMONAS AERUGINOSA: NOT DETECTED
RBC # BLD AUTO: 3.42 M/UL (ref 4.35–5.65)
RESISTANT GENE SPACE, REGENE: ABNORMAL
SALMONELLA, SALMO: NOT DETECTED
SERRATIA MARCESCENS: NOT DETECTED
SODIUM SERPL-SCNC: 132 MMOL/L (ref 136–145)
STAPH EPIDERMIDIS, STEP: DETECTED
STAPH LUGDUNENSIS, STALUG: NOT DETECTED
STAPHYLOCOCCUS AUREUS: NOT DETECTED
STAPHYLOCOCCUS, STAPP: DETECTED
STENO MALTOPHILIA, STMA: NOT DETECTED
STREPTOCOCCUS , STPSP: NOT DETECTED
STREPTOCOCCUS AGALACTIAE (GROUP B): NOT DETECTED
STREPTOCOCCUS PNEUMONIAE , SPNP: NOT DETECTED
STREPTOCOCCUS PYOGENES (GROUP A), SPYOP: NOT DETECTED
WBC # BLD AUTO: 14.9 K/UL (ref 4.6–13.2)

## 2022-12-11 PROCEDURE — 74011636637 HC RX REV CODE- 636/637: Performed by: HOSPITALIST

## 2022-12-11 PROCEDURE — 74011000250 HC RX REV CODE- 250: Performed by: HOSPITALIST

## 2022-12-11 PROCEDURE — 74011250636 HC RX REV CODE- 250/636: Performed by: HOSPITALIST

## 2022-12-11 PROCEDURE — 97167 OT EVAL HIGH COMPLEX 60 MIN: CPT

## 2022-12-11 PROCEDURE — 83735 ASSAY OF MAGNESIUM: CPT

## 2022-12-11 PROCEDURE — 85025 COMPLETE CBC W/AUTO DIFF WBC: CPT

## 2022-12-11 PROCEDURE — 36415 COLL VENOUS BLD VENIPUNCTURE: CPT

## 2022-12-11 PROCEDURE — 97161 PT EVAL LOW COMPLEX 20 MIN: CPT

## 2022-12-11 PROCEDURE — 97530 THERAPEUTIC ACTIVITIES: CPT

## 2022-12-11 PROCEDURE — 74011250636 HC RX REV CODE- 250/636: Performed by: EMERGENCY MEDICINE

## 2022-12-11 PROCEDURE — 65270000029 HC RM PRIVATE

## 2022-12-11 PROCEDURE — 74011250637 HC RX REV CODE- 250/637: Performed by: HOSPITALIST

## 2022-12-11 PROCEDURE — 80053 COMPREHEN METABOLIC PANEL: CPT

## 2022-12-11 PROCEDURE — 74011000250 HC RX REV CODE- 250: Performed by: EMERGENCY MEDICINE

## 2022-12-11 PROCEDURE — 74011000258 HC RX REV CODE- 258: Performed by: EMERGENCY MEDICINE

## 2022-12-11 PROCEDURE — 82962 GLUCOSE BLOOD TEST: CPT

## 2022-12-11 RX ORDER — INSULIN LISPRO 100 [IU]/ML
INJECTION, SOLUTION INTRAVENOUS; SUBCUTANEOUS
Status: DISCONTINUED | OUTPATIENT
Start: 2022-12-11 | End: 2022-12-11

## 2022-12-11 RX ORDER — INSULIN LISPRO 100 [IU]/ML
INJECTION, SOLUTION INTRAVENOUS; SUBCUTANEOUS
Status: DISCONTINUED | OUTPATIENT
Start: 2022-12-11 | End: 2022-12-13 | Stop reason: SDUPTHER

## 2022-12-11 RX ORDER — INSULIN GLARGINE 100 [IU]/ML
10 INJECTION, SOLUTION SUBCUTANEOUS
Status: DISCONTINUED | OUTPATIENT
Start: 2022-12-11 | End: 2022-12-23

## 2022-12-11 RX ADMIN — CARVEDILOL 12.5 MG: 12.5 TABLET, FILM COATED ORAL at 22:20

## 2022-12-11 RX ADMIN — Medication 2 UNITS: at 06:05

## 2022-12-11 RX ADMIN — B-COMPLEX W/ C & FOLIC ACID TAB 1 MG 1 TABLET: 1 TAB at 10:20

## 2022-12-11 RX ADMIN — SODIUM CHLORIDE, PRESERVATIVE FREE 5 ML: 5 INJECTION INTRAVENOUS at 22:00

## 2022-12-11 RX ADMIN — SEVELAMER CARBONATE 1600 MG: 800 TABLET, FILM COATED ORAL at 18:32

## 2022-12-11 RX ADMIN — PANTOPRAZOLE SODIUM 40 MG: 40 TABLET, DELAYED RELEASE ORAL at 06:07

## 2022-12-11 RX ADMIN — EZETIMIBE 10 MG: 10 TABLET ORAL at 10:20

## 2022-12-11 RX ADMIN — Medication 4 UNITS: at 00:00

## 2022-12-11 RX ADMIN — INSULIN GLARGINE 10 UNITS: 100 INJECTION, SOLUTION SUBCUTANEOUS at 22:20

## 2022-12-11 RX ADMIN — CARVEDILOL 12.5 MG: 12.5 TABLET, FILM COATED ORAL at 10:20

## 2022-12-11 RX ADMIN — PIPERACILLIN AND TAZOBACTAM 4.5 G: 4; .5 INJECTION, POWDER, FOR SOLUTION INTRAVENOUS at 02:12

## 2022-12-11 RX ADMIN — ASPIRIN 81 MG: 81 TABLET, CHEWABLE ORAL at 10:20

## 2022-12-11 RX ADMIN — PIPERACILLIN AND TAZOBACTAM 4.5 G: 4; .5 INJECTION, POWDER, FOR SOLUTION INTRAVENOUS at 13:31

## 2022-12-11 RX ADMIN — HEPARIN SODIUM 5000 UNITS: 5000 INJECTION INTRAVENOUS; SUBCUTANEOUS at 18:32

## 2022-12-11 RX ADMIN — INSULIN LISPRO 2 UNITS: 100 INJECTION, SOLUTION INTRAVENOUS; SUBCUTANEOUS at 22:19

## 2022-12-11 RX ADMIN — ALLOPURINOL 100 MG: 100 TABLET ORAL at 10:20

## 2022-12-11 RX ADMIN — SEVELAMER CARBONATE 1600 MG: 800 TABLET, FILM COATED ORAL at 13:32

## 2022-12-11 RX ADMIN — Medication 125 MG: at 15:53

## 2022-12-11 RX ADMIN — HEPARIN SODIUM 5000 UNITS: 5000 INJECTION INTRAVENOUS; SUBCUTANEOUS at 02:11

## 2022-12-11 RX ADMIN — INSULIN LISPRO 4 UNITS: 100 INJECTION, SOLUTION INTRAVENOUS; SUBCUTANEOUS at 18:32

## 2022-12-11 RX ADMIN — CLOPIDOGREL BISULFATE 75 MG: 75 TABLET ORAL at 10:20

## 2022-12-11 RX ADMIN — Medication 8 UNITS: at 13:32

## 2022-12-11 RX ADMIN — SEVELAMER CARBONATE 1600 MG: 800 TABLET, FILM COATED ORAL at 10:20

## 2022-12-11 RX ADMIN — Medication 500 MG: at 10:20

## 2022-12-11 RX ADMIN — SODIUM CHLORIDE, PRESERVATIVE FREE 10 ML: 5 INJECTION INTRAVENOUS at 13:34

## 2022-12-11 RX ADMIN — AMLODIPINE BESYLATE 10 MG: 5 TABLET ORAL at 10:19

## 2022-12-11 RX ADMIN — HEPARIN SODIUM 5000 UNITS: 5000 INJECTION INTRAVENOUS; SUBCUTANEOUS at 10:19

## 2022-12-11 RX ADMIN — Medication 125 MG: at 02:14

## 2022-12-11 NOTE — PROGRESS NOTES
Problem: Mobility Impaired (Adult and Pediatric)  Goal: *Acute Goals and Plan of Care (Insert Text)  Description: Physical Therapy Goals  Initiated 12/11/2022  1. Patient will move from supine to sit and sit to supine  in bed with supervision/set-up within 7 day(s). 2.  Patient will transfer from bed to chair and chair to bed with minimal assistance/contact guard assist using the least restrictive device within 7 day(s). 3.  Patient will perform sit to stand with minimal assistance/contact guard assist within 7 day(s). 4.  Patient will ambulate with minimal assistance/contact guard assist for 10 feet with the least restrictive device within 7 day(s). Outcome: Progressing Towards Goal  []  Patient has met MD mobilization crineftaly for d/c home   []  Recommend HH with 24 hour adult care   [x]  Benefit from additional acute PT session to address:  Functional mobility/endurance    PHYSICAL THERAPY EVALUATION    Patient: Paris Lewis (51 y.o. male)  Date: 12/11/2022  Primary Diagnosis: Diabetic foot infection ([facility] [address] [E11.628, L08.9]  Leukocytosis [D72.829]  Procedure(s) (LRB):  PARTIAL AMPUTATION OF LEFT 2ND, 3RD, 4TH METATARSALS, AMPUTATION OF TOES 2,3,4, INCISION AND DRAINAGE LEFT FOOT DEEP. (Left) 2 Days Post-Op   Precautions: NWB LLE     NWB    ASSESSMENT :  Based on the objective data described below, the patient presents with decreased functional endurance/mobility. Pt. Received sitting EOB eating lunch and agreeable to PT. Pt. Reluctant to agree to OOB mobility but agreeable with encouragement. Pt. Scoots at EOB min A, requires max A with increased time to rise from elevated bed. Pt. Able to stand for ~15 seconds before max A sitting onto EOB refusing further mobility at this time. Pt. Explained benefits of movement and further educated pt. On NWB status. Pt. Struggled to maintain NWB status throughout requiring verbal/tactile cueing during STS attempt.  Pt. Left with all needs met and will benefit from SNF at d/c. Patient will benefit from skilled intervention to address the above impairments. Patient's rehabilitation potential is considered to be Good  Factors which may influence rehabilitation potential include:   []         None noted  []         Mental ability/status  [x]         Medical condition  [x]         Home/family situation and support systems  [x]         Safety awareness  [x]         Pain tolerance/management  []         Other:      PLAN :  Recommendations and Planned Interventions:   [x]           Bed Mobility Training             [x]    Neuromuscular Re-Education  [x]           Transfer Training                   []    Orthotic/Prosthetic Training  [x]           Gait Training                          []    Modalities  [x]           Therapeutic Exercises               Edema Management/Control  [x]           Therapeutic Activities            [][x]    Family Training/Education  [x]           Patient Education  []           Other (comment):    Frequency/Duration: Patient will be followed by physical therapy 1-2 times per day/4-7 days per week to address goals. Discharge Recommendations: SNF  Further Equipment Recommendations for Discharge: TBD    AMPA: 11/20    This AMPAC score should be considered in conjunction with interdisciplinary team recommendations to determine the most appropriate discharge setting. Patient's social support, diagnosis, medical stability, and prior level of function should also be taken into consideration. SUBJECTIVE:   Patient stated Im not sure about this.     OBJECTIVE DATA SUMMARY:     Past Medical History:   Diagnosis Date    CAD (coronary artery disease)     Chronic kidney disease     Diabetes mellitus     Hypertension     Sleep apnea      Past Surgical History:   Procedure Laterality Date    HX ORTHOPAEDIC      HX PACEMAKER      IR INSERT TUNL CVC W/O PORT OVER 5 YR  07/07/2022    IR INSERT TUNL CVC W/O PORT OVER 5 YR  6/29/2022    IR REMOVE TUNL CVAD W/O PORT / PUMP  8/12/2022    [place] CARDIAC SURG PROCEDURE UNLIST       Barriers to Learning/Limitations: None  Compensate with: N/A  Home Situation:  Home Situation  Support Systems: Friend/Neighbor  Patient Expects to be Discharged to[de-identified]  (home with HH vs SNF)     Functional Mobility:    Transfers:  Sit to Stand: Maximum assistance  Stand to Sit: Maximum assistance    Pain:  Pain level pre-treatment: 4/10   Pain level post-treatment: 8/10     Activity Tolerance:   FAIR  Please refer to the flowsheet for vital signs taken during this treatment. After treatment:   []         Patient left in no apparent distress sitting up in chair  [x]         Patient left in no apparent distress in bed  [x]         Call bell left within reach  []         Nursing notified  []         Caregiver present  []         Bed alarm activated  []         SCDs applied    COMMUNICATION/EDUCATION:   [x]         Role of Physical Therapy in the acute care setting. [x]         Fall prevention education was provided and the patient/caregiver indicated understanding. []         Patient/family have participated as able in goal setting and plan of care. []         Patient/family agree to work toward stated goals and plan of care. []         Patient understands intent and goals of therapy, but is neutral about his/her participation. []         Patient is unable to participate in goal setting/plan of care: ongoing with therapy staff.  []         Other:     Thank you for this referral.  Mary Aponte, PT, DPT   Time Calculation: 10 mins      Eval Complexity: History: LOW Complexity : Zero comorbidities / personal factors that will impact the outcome / POCExam:LOW Complexity : 1-2 Standardized tests and measures addressing body structure, function, activity limitation and / or participation in recreation  Presentation: LOW Complexity : Stable, uncomplicated  Clinical Decision Making:Low Complexity    Overall Complexity:LOW     [employer] AM-PAC® Basic Mobility Inpatient Short Form (6-Clicks) Version 2    How much HELP from another person does the patient currently need    (If the patient hasn't done an activity recently, how much help from another person do you think he/she would need if he/she tried?)   Total (Total A or Dep)   A Lot  (Mod to Max A)   A Little (Sup or Min A)   None (Mod I to I)   Turning from your back to your side while in a flat bed without using bedrails? [] 1 [] 2 [x] 3 [] 4   2. Moving from lying on your back to sitting on the side of a flat bed without using bedrails? [] 1 [] 2 [x] 3 [] 4   3. Moving to and from a bed to a chair (including a wheelchair)? [] 1 [x] 2 [] 3 [] 4   4. Standing up from a chair using your arms (e.g., wheelchair, or bedside chair)? [] 1 [x] 2 [] 3 [] 4   5. Walking in hospital room? [x] 1 [] 2 [] 3 [] 4   6. Climbing 3-5 steps with a railing?+   [] 1 [] 2 [] 3 [] 4   +If stair climbing cannot be assessed, skip item #6. Sum responses from items 1-5. Based on an AM-PAC score of **/24 (11/20 if omitting stairs) and their current functional mobility deficits, it is recommended that the patient have 3-5 sessions per week of Physical Therapy at d/c to increase the patient's independence.

## 2022-12-11 NOTE — PROGRESS NOTES
[facility]      HPI:  Elaine Quinn is 61 yo PMHx of  DM, HTN. PVD, ESRD on HD TTS at [facility] under the [address] nephrology sent in for admission by wound care due to left foot infection ,was told he needed surgery. Podiatry in to see pt now. He has been on abx recently- oral vanco    S/P Lt 2/3/4 metatarsal amputation    -Pt w/o complaints. Last HD 12/10.     Past Medical History:   Diagnosis Date    CAD (coronary artery disease)     Chronic kidney disease     Diabetes mellitus     Hypertension     Sleep apnea      Past Surgical History:   Procedure Laterality Date    HX ORTHOPAEDIC      HX PACEMAKER      IR INSERT TUNL CVC W/O PORT OVER 5 YR  07/07/2022    IR INSERT TUNL CVC W/O PORT OVER 5 YR  6/29/2022    IR REMOVE TUNL CVAD W/O PORT / PUMP  8/12/2022    [place] CARDIAC SURG PROCEDURE UNLIST       Social History     Socioeconomic History    Marital status:      Spouse name: Not on file    Number of children: Not on file    Years of education: Not on file    Highest education level: Not on file   Occupational History    Not on file   Tobacco Use    Smoking status: Never    Smokeless tobacco: Never   Vaping Use    Vaping Use: Never used   Substance and Sexual Activity    Alcohol use: Not Currently    Drug use: Never    Sexual activity: Not on file   Other Topics Concern    Dental Braces Not Asked    Endoscopic Camera Pill Not Asked    Metallic Foreign Body Not Asked    Medication Patches Not Asked    Taking Feraheme Not Asked    Claustrophobic Not Asked   Social History Narrative    Not on file     Social Determinants of Health     Financial Resource Strain: Not on file   Food Insecurity: Not on file   Transportation Needs: Not on file   Physical Activity: Not on file   Stress: Not on file   Social Connections: Not on file   Intimate Partner Violence: Not on file   Housing Stability: Not on file       Family Hx:no hx kidney disease  No Known Allergies    Home Medications:     reviewed  Review of Systems:       Visit Vitals  BP (!) 116/54 (BP 1 Location: Right upper arm)   Pulse 69   Temp 99.3 °F (37.4 °C)   Resp 16   Wt 98 kg (216 lb)   SpO2 100% Comment: room air/ nasal cannula at bedside   BMI 29.29 kg/m²     Constitutional:  Negative for chills and fever. HENT:  Negative for congestion and rhinorrhea. Eyes:  Negative for visual disturbance. Respiratory:  Negative for cough and shortness of breath. Cardiovascular:  Negative for chest pain. Gastrointestinal:  Positive for abdominal pain and diarrhea. Negative for nausea and vomiting. Genitourinary:  Negative for urgency. Musculoskeletal:  Negative for myalgias. Left foot discomfort  Skin:   Negative for rash. Neurological:  Negative for headaches.        Physical Assessment:     Wt Readings from Last 3 Encounters:   12/10/22 98 kg (216 lb)   07/19/22 102.6 kg (226 lb 4.8 oz)     Temp Readings from Last 3 Encounters:   12/10/22 99.3 °F (37.4 °C)   12/09/22 99.1 °F (37.3 °C)   11/11/22 99 °F (37.2 °C)     BP Readings from Last 3 Encounters:   12/10/22 (!) 116/54   12/09/22 119/60   11/11/22 (!) 97/53     Pulse Readings from Last 3 Encounters:   12/10/22 69   12/09/22 76   11/11/22 84   GEN- NAD  HEENT- NCAT  CV- RRR  Resp- clear  Abd- Soft, NTND +BS  Extremities- no edema, left foot with gangrenous skin changes  Skin- no rash   Neuro- no focal deficits    WBC 23 H/H 11.6/35.4 plt 254  Na 130 K 4.4 Cl 92 CO2 30 BUN/cr 39/5.9    Impression:   ESRD on HD TTS  Left diabetic foot infection w/ gangrenous changes s/p Lt 2/3/4 metatarsal amputation   DM  HTN  PVD  Anemia  SHPT    Plan:   Next scheduled HD on Tuesday  Kishor Cortés MD

## 2022-12-11 NOTE — PROGRESS NOTES
OCCUPATIONAL THERAPY EVALUATION    Problem: Self Care Deficits Care Plan (Adult)  Goal: *Acute Goals and Plan of Care (Insert Text)  Outcome: Progressing Towards Goal  Note:   FUNCTIONAL STATUS PRIOR TO ADMISSION: Patient was modified independent using a wheelchair for functional mobility. HOME SUPPORT: The patient lived alone with no local support. Initial Occupational Therapy Goals (12/11/2022) Within 7 day(s):  1. Patient will perform perform grooming seated EOB for 5 minutes for increased independence in ADLs. 2. Patient will perform UB dressing with set up seated EOB for increased independence with ADLs. 3. Patient will perform LB dressing with mod I & A/E PRN for increased independence with ADLs. 4. Patient will perform all aspects of toileting with mod I for increased independence with ADLs. 5. Patient will perform LE ADLs utilizing body mechanics & adaptive strategies with 1 verbal cue for increased safety in ADLs. 6. Patient will independently apply energy conservation techniques with no verbal cue(s) for increased independence with ADLs. 7. Patient will perform transfer from bed to w/c with mod I and no LOB     Patient: Maribel Dunham (79 y.o. male)  Date: 12/11/2022  Primary Diagnosis: Diabetic foot infection ([facility] [address] [E11.628, L08.9]  Leukocytosis [D72.829]  Procedure(s) (LRB):  PARTIAL AMPUTATION OF LEFT 2ND, 3RD, 4TH METATARSALS, AMPUTATION OF TOES 2,3,4, INCISION AND DRAINAGE LEFT FOOT DEEP. (Left) 2 Days Post-Op   Precautions:   Fall, NWB (LLE)  PLOF: Pt reports he wsa mod I performing SPT to w/c from bed and to toilet, however later he reports he was performing toileting in bed and staying mostly in bed at home, getting home care and aid to complete self care tasks    ASSESSMENT :  Based on the objective data described below, the patient presents with decreased independence in self care tasks, decreased transfer abilities, decreased functional balance.  Pt supine in bed, agreeable to work on trying to get into chair. Pt min A supine to sit with additional time however poor sitting balance with Left sided lean. Pt able to don right shoe with moderate support to maintain sitting balance. Pt sit to stand with FWW with mod A, unable to maintain support without WB and with posterior LOB and fatigue. Pt educated on WB precautions. Sitting able to perform tooth brush and wash face while sitting EOB without LOB. Pt left sitting at EOB. Pt would benefit from rehab upon d/c to maximize therapeutic benefit and independence in transfers and function    Patient will benefit from skilled intervention to address the above impairments. Patient's rehabilitation potential is considered to be Fair  Factors which may influence rehabilitation potential include:   []             None noted  []             Mental ability/status  [x]             Medical condition  [x]             Home/family situation and support systems  [x]             Safety awareness  [x]             Pain tolerance/management  []             Other:      PLAN :  Recommendations and Planned Interventions:   [x]               Self Care Training                  [x]      Therapeutic Activities  [x]               Functional Mobility Training   []      Cognitive Retraining  [x]               Therapeutic Exercises           [x]      Endurance Activities  [x]               Balance Training                    [x]      Neuromuscular Re-Education  []               Visual/Perceptual Training     [x]      Home Safety Training  [x]               Patient Education                   [x]      Family Training/Education  []               Other (comment):    Frequency/Duration: Patient will be followed by occupational therapy daily to address goals.   Discharge Recommendations: Skilled Nursing Facility  Further Equipment Recommendations for Discharge: Slideboard    AMPAC: 15    This AMPAC score should be considered in conjunction with interdisciplinary team recommendations to determine the most appropriate discharge setting. Patient's social support, diagnosis, medical stability, and prior level of function should also be taken into consideration. SUBJECTIVE:   Patient stated ill try.     OBJECTIVE DATA SUMMARY:     Past Medical History:   Diagnosis Date    CAD (coronary artery disease)     Chronic kidney disease     Diabetes mellitus     Hypertension     Sleep apnea      Past Surgical History:   Procedure Laterality Date    HX ORTHOPAEDIC      HX PACEMAKER      IR INSERT TUNL CVC W/O PORT OVER 5 YR  07/07/2022    IR INSERT TUNL CVC W/O PORT OVER 5 YR  6/29/2022    IR REMOVE TUNL CVAD W/O PORT / PUMP  8/12/2022    [place] CARDIAC SURG PROCEDURE UNLIST       Barriers to Learning/Limitations: None  Compensate with: visual, verbal, tactile, kinesthetic cues/model    Home Situation:   Home Situation  Home Environment: Private residence  # Steps to Enter: 3  One/Two Story Residence: One story  Living Alone: Yes  Support Systems: Friend/Neighbor  Patient Expects to be Discharged to[de-identified] Home  Current DME Used/Available at Home: Grab bars, Shower chair, Walker, rolling, Wheelchair  Tub or Shower Type: Tub/Shower combination  [x]  Right hand dominant   []  Left hand dominant    Cognitive/Behavioral Status:  Neurologic State: Alert  Orientation Level: Oriented X4  Cognition: Appropriate decision making  Safety/Judgement: Decreased insight into deficits    Skin: ace wrap intact  Edema: noted through B LE     Coordination: BUE  Coordination: Generally decreased, functional  Fine Motor Skills-Upper: Left Intact; Right Intact    Gross Motor Skills-Upper: Left Intact; Right Intact    Balance:  Sitting: Impaired  Sitting - Static: Good (unsupported)  Sitting - Dynamic: Fair (occasional)  Standing: Impaired; With support  Standing - Static: Poor  Standing - Dynamic : Poor    Strength: BUE    Strength: Generally decreased, functional          Tone & Sensation: BUE    Tone: Normal  Sensation: Impaired    Range of Motion: BUE    AROM: Generally decreased, functional       Functional Mobility and Transfers for ADLs:  Bed Mobility:     Supine to Sit: Minimum assistance  Sit to Supine: Minimum assistance     Transfers:  Sit to Stand: Maximum assistance  Stand to Sit: Maximum assistance     Bed to Chair: Maximum assistance         ADL Assessment:   Feeding: Setup    Oral Facial Hygiene/Grooming: Contact guard assistance    Bathing: Moderate assistance    Upper Body Dressing: Contact guard assistance    Lower Body Dressing: Moderate assistance    Toileting: Moderate assistance                ADL Intervention:         Lower Body Dressing Assistance  Dressing Assistance: Moderate assistance  Underpants: Moderate assistance  Socks: Moderate assistance  Leg Crossed Method Used: Yes         Cognitive Retraining  Problem Solving: General alternative solution; Identifying the task; Identifying the problem  Executive Functions: Managing time;Regulating behavior  Safety/Judgement: Decreased insight into deficits      Pain:  Pain level pre-treatment: 0/10   Pain level post-treatment: 0/10   Pain Intervention(s): Medication (see MAR); Rest, Ice, Repositioning   Response to intervention: Nurse notified, See doc flow    Activity Tolerance:   Low, pt gets fatigued quickly  Please refer to the flowsheet for vital signs taken during this treatment. After treatment:   [] Patient left in no apparent distress sitting up in chair  [x] Patient left in no apparent distress in bed  [x] Call bell left within reach  [x] Nursing notified  [] Caregiver present  [x] Bed alarm activated    COMMUNICATION/EDUCATION:   [x] Role of Occupational Therapy in the acute care setting  [x] Home safety education was provided and the patient/caregiver indicated understanding. [x] Patient/family have participated as able in goal setting and plan of care. [x] Patient/family agree to work toward stated goals and plan of care.   [x] Patient understands intent and goals of therapy, but is neutral about his/her participation. [] Patient is unable to participate in goal setting and plan of care. Thank you for this referral.  Daniele Packer OTD, OTR/L   Time Calculation: 24 mins    Eval Complexity: History: HIGH Complexity : Extensive review of history including physical, cognitive and psychosocial history ; Examination: HIGH Complexity : 5 or more performance deficits relating to physical, cognitive , or psychosocial skils that result in activity limitations and / or participation restrictions; Decision Making:HIGH Complexity : Patient presents with comorbidities that affect occupational performance. Signifigant modification of tasks or assistance (eg, physical or verbal) with assessment (s) is necessary to enable patient to complete evaluation     [address] [facility] [address] [address] AM-PAC® Daily Activity Inpatient Short Form (6-Clicks)*    How much HELP from another person does the patient currently need    (If the patient hasn't done an activity recently, how much help from another person do you think he/she would need if he/she tried?)   Total (Total A or Dep)   A Lot  (Mod to Max A)   A Little (Sup or Min A)   None (Mod I to I)   Putting on and taking off regular lower body clothing? [] 1 [x] 2 [] 3 [] 4   2. Bathing (including washing, rinsing,      drying)? [] 1 [x] 2 [] 3 [] 4   3. Toileting, which includes using toilet, bedpan or urinal?   [] 1 [x] 2 [] 3 [] 4   4. Putting on and taking off regular upper body clothing? [] 1 [] 2 [x] 3 [] 4   5. Taking care of personal grooming such as brushing teeth? [] 1 [] 2 [x] 3 [] 4   6. Eating meals? [] 1 [] 2 [x] 3 [] 4     Based on an AM-PAC score of **/24 and their current ADL deficits; it is recommended that the patient have 5-7 sessions per week of Occupational Therapy at d/c to increase the patient's independence.   Currently, this patient demonstrates the potential endurance, and/or tolerance for 3 hours of therapy each day at d/c. Based on an AM-PAC score of **/24 and their current ADL deficits; it is recommended that the patient have 3-5 sessions per week of Occupational Therapy at d/c to increase the patient's independence. Based on an AM-PAC score of **/24 and their current ADL deficits; it is recommended that the patient have 2-3 sessions per week of Occupational Therapy at d/c to increase the patient's independence. At this time and based on an AM-PAC score of **/24, no further OT is recommended upon discharge due to (i.e. patient at baseline functional statusetc). Recommend patient returns to prior setting with prior services.

## 2022-12-11 NOTE — PROGRESS NOTES
Hospitalist Progress Note-critical care note     Patient: Clair Valadez MRN: [id number]  CSN: [id number]    YOB: 1960  Age: 60 y.o.   Sex: male    DOA: 12/9/2022 LOS:  LOS: 2 days            Chief complaint: leukocytosis, positive bcx     Assessment/Plan         Hospital Problems  Date Reviewed: 6/28/2022            Codes Class Noted POA    Positive blood culture ICD-10-CM: R78.81  ICD-9-CM: 790.7  12/11/2022 Unknown        Leukocytosis ICD-10-CM: D72.829  ICD-9-CM: 288.60  12/9/2022 Unknown        Diabetic foot infection ([facility] ICD-10-CM: E11.628, L08.9  ICD-9-CM: 250.80, 686.9  12/9/2022 Unknown        Diarrhea ICD-10-CM: R19.7  ICD-9-CM: 787.91  12/9/2022 Unknown        Type 2 diabetes mellitus, with long-term current use of insulin ([facility] ICD-10-CM: E11.9, Z79.4  ICD-9-CM: 250.00, V58.67  Unknown Unknown        Acute hematogenous osteomyelitis of left foot ([facility] ICD-10-CM: M86.072  ICD-9-CM: 730.07  12/9/2022 Unknown        Nondisplaced fracture of second metatarsal bone of left foot ICD-10-CM: G01.967T  ICD-9-CM: 825.25  12/9/2022 Unknown        Nondisplaced fracture of third metatarsal bone of left foot ICD-10-CM: Q81.125T  ICD-9-CM: 825.25  12/9/2022 Unknown        Closed nondisplaced fracture of fourth metatarsal bone of left foot ICD-10-CM: S92.345A  ICD-9-CM: 825.25  12/9/2022 Unknown        Hypertension ICD-10-CM: I10  ICD-9-CM: 401.9  7/21/2022 Yes        CAD (coronary artery disease) ICD-10-CM: I25.10  ICD-9-CM: 414.00  6/28/2022 Yes        ESRD (end stage renal disease) ([facility] ICD-10-CM: N18.6  ICD-9-CM: 585.6  6/28/2022 Yes        Diabetes mellitus with foot ulcer ([facility] ICD-10-CM: E11.621, L97.509  ICD-9-CM: 250.80, 707.15  6/27/2022 Yes              Gangrene of left foot, leukocytosis -wbc down to 14   PARTIAL AMPUTATION OF LEFT 2ND, 3RD, 4TH METATARSALS, AMPUTATION OF TOES 2,3,4, INCISION ADN DRAINAGE LEFT FOOT DEEP  ID and podiatrist on board,  Will have pain control and PT /OT Positive blood cx  STAPHYLOCOCCUS SPECIES GROWING IN 1 OF 2 BOTTLES  Will see ID recommendation     Diabetic foot ulcer  Follow-up with Dr. Renetta Elizabeth wound care     CAD, last stent Severe single-vessel coronary artery disease. Distal RCA to drug-eluting stent in July 2022, continue plavix -he took plavix today   No chest pain  Will have one-time troponin  Reported epigastric pain  EKG PVC     Hypertension continue home medication     Diarrhea epigastric pain  CT abdomen no acute process  Resolved      End-stage renal disease on HD  TTS, renal consulted received hd yesterday      DM  type II   Lantus at night , ssi will increase lantus to 10     Subjective feel better, no fever and no diarrhea       Disposition :tbd,   Review of systems:    General: No fevers or chills. Cardiovascular: No chest pain or pressure. No palpitations. Pulmonary: No shortness of breath. Gastrointestinal: No nausea, vomiting. Vital signs/Intake and Output:  Visit Vitals  BP (!) 138/50 (BP 1 Location: Right upper arm)   Pulse 66   Temp 99.1 °F (37.3 °C)   Resp 18   Wt 98 kg (216 lb)   SpO2 95%   BMI 29.29 kg/m²     Current Shift:  No intake/output data recorded. Last three shifts:  12/09 1901 - 12/11 0700  In: 1080 [P.O.:480; I.V.:600]  Out: 2005     Physical Exam:  General: WD, WN. Alert, cooperative, no acute distress    HEENT: NC, Atraumatic. PERRLA, anicteric sclerae. Lungs: CTA Bilaterally. No Wheezing/Rhonchi/Rales. Heart:  Regular  rhythm,  No murmur, No Rubs, No Gallops  Abdomen: Soft, Non distended, Non tender. +Bowel sounds,   Extremities: No c/c, both feet wrapped with gauze   Psych:   Not anxious or agitated. Neurologic:  No acute neurological deficit.              Labs: Results:       Chemistry Recent Labs     12/11/22  0447 12/10/22  0633 12/09/22  1155   * 198* 185*   * 134* 130*   K 4.6 4.4 4.4   CL 97* 96* 92*   CO2 25 26 30   BUN 38* 55* 39*   CREA 5.96* 7.35* 5.98*   CA 7.5* 7.3* 8.4*   AGAP 10 12 8   BUCR 6* 7* 7*   *  --  152*   TP 6.7  --  8.3*   ALB 2.2*  --  2.7*   GLOB 4.5*  --  5.6*   AGRAT 0.5*  --  0.5*      CBC w/Diff Recent Labs     12/11/22  0447 12/10/22  0633 12/09/22  1155   WBC 14.9* 15.0* 23.0*   RBC 3.42* 3.39* 3.84*   HGB 10.1* 10.0* 11.6*   HCT 32.1* 30.9* 35.4*    215 254   GRANS 81* 84* 84*   LYMPH 8* 6* 6*   EOS 2 1 0      Cardiac Enzymes No results for input(s): CPK, CKND1, PAULO in the last 72 hours. No lab exists for component: CKRMB, TROIP   Coagulation No results for input(s): PTP, INR, APTT, INREXT, INREXT in the last 72 hours. Lipid Panel Lab Results   Component Value Date/Time    Cholesterol, total 188 07/19/2022 03:12 AM    HDL Cholesterol 42 07/19/2022 03:12 AM    LDL, calculated 131.2 (H) 07/19/2022 03:12 AM    VLDL, calculated 14.8 07/19/2022 03:12 AM    Triglyceride 74 07/19/2022 03:12 AM    CHOL/HDL Ratio 4.5 07/19/2022 03:12 AM      BNP No results for input(s): BNPP in the last 72 hours. Liver Enzymes Recent Labs     12/11/22 0447   TP 6.7   ALB 2.2*   *      Thyroid Studies No results found for: T4, T3U, TSH, TSHEXT, TSHEXT     Procedures/imaging: see electronic medical records for all procedures/Xrays and details which were not copied into this note but were reviewed prior to creation of Plan    XR FOOT LT AP/LAT    Result Date: 12/9/2022  EXAM: 2 radiographic views of the left foot. INDICATION: Left foot pain. COMPARISON: December 9, 2022 _______________ FINDINGS: There is been interval amputation of the second, third, and fourth rays at the level of the metatarsal diaphysis. As before, the first ray is amputated at the metatarsal phalangeal joint. The small toe remains. There are expected postoperative findings to include regional air density and soft tissue swelling. There is Monckeberg type vascular calcification. There is low-grade mid foot degeneration. _______________     Standard immediate postoperative findings. _______________     XR FOOT LT MIN 3 V    Result Date: 12/9/2022  EXAM: XR FOOT LT MIN 3 V CLINICAL INDICATION/HISTORY: Gangrenous 2nd digit   > Additional: None. COMPARISON: None. TECHNIQUE: 3 views left foot _______________ FINDINGS: Soft tissue gas formation is seen along the second digit with ill-defined limitus changes extending along the medial forefoot. Prior amputation first right. Patchy demineralization and osseous erosions are seen involving the distal portion of the first metatarsal head. Additional patchy areas of osseous erosion are also noted involving the proximal phalanx of the second toe. Diffuse soft tissue swelling. Diffuse atherosclerotic vascular calcifications. No retained radiopaque foreign object. _______________     Soft gas formation and ulceration involving the medial forefoot with findings concerning for osteomyelitis involving the first and second rays described above. MRI FOOT LT WO CONT    Result Date: 12/9/2022  EXAM: MRI FOOT LT WO CONT CLINICAL INDICATION/HISTORY: Foot wound; preoperative  >Additional: None COMPARISON: Radiograph performed December 9, 2022  >Reference exam: None. TECHNIQUE: Axial long axis TI and T2 with fat saturation, sagittal and coronal short axis TI and STIR sequences through the foot were obtained without contrast. _______________ FINDINGS: FIRST RAY: Prior dictation the first digit. There is mild bony proliferative change at the head of the first metatarsal with preserved marrow signal. Minimal patchy edema is noted which is nonspecific. No definite cortical destructive change. SECOND RAY: Nondisplaced obliquely oriented fracture through the head neck junction of the second metatarsal. There is heterogeneous diminished T1 marrow with patchy edema and surrounding soft tissue gas along the course of the second digit with associated subluxation of the distal phalanx.  Subtle diminished T1 marrow is noted with suspected foci of intraosseous gas, concerning for acute osteomyelitis. THIRD RAY: Nondisplaced fracture through the third metatarsal neck with slight impaction. Mild edema is noted within the third digit, possibly stress reaction. No convincing osseous erosions or T1 marrow signal change. FOURTH RAY: Nondisplaced fractures of the neck of the fourth metatarsal. Minimal edema in the proximal phalanx but otherwise preserved marrow signal. FIFTH RAY: Unremarkable. Additional degenerative changes with patchy edema, joint space narrowing, and osteophyte formation at the midfoot, likely related to underlying Charcot arthropathy. SOFT TISSUES: Soft tissue irregularity with gas and ulceration around the second digit noted. Diffuse fluid signal seen throughout the intrinsic foot musculature, commonly seen in the setting of diabetes. Mild skin thickening about the forefoot. INCIDENTAL FINDINGS: None. _______________     1. Marrow signal abnormality with soft tissue wound and gas around the second digit concerning for acute osteomyelitis. 2.  Nondisplaced fractures of the head neck junction of the second-fourth metatarsals. 3.  Prior indication the first digit. 4.  Soft tissue swelling and skin thickening about the forefoot concerning for cellulitis. No drainable abscess. 5.  Additional chronic/degenerative changes of the foot. CT ABD PELV WO CONT    Result Date: 12/9/2022  EXAM: CT of the abdomen and pelvis INDICATION: Abdominal pain with distention. COMPARISON: None. TECHNIQUE: Axial CT imaging of the abdomen and pelvis was performed without intravenous contrast. Multiplanar reformats were generated. One or more dose reduction techniques were used on this CT: automated exposure control, adjustment of the mAs and/or kVp according to patient size, and iterative reconstruction techniques. The specific techniques used on this CT exam have been documented in the patient's electronic medical record.   Digital Imaging and Communications in Medicine (DICOM) format image data are available to nonaffiliated external healthcare facilities or entities on a secure, media free, reciprocally searchable basis with patient authorization for at least a 12-month period after this study. _______________ FINDINGS: LOWER CHEST: Partial inclusion of multivessel coronary arterial atherosclerosis and central venous catheter. Clear lung bases. Several punctate 2 to 3 mm pulmonary nodule seen in the right lower lobe (image 8) LIVER, BILIARY: Liver is normal. No biliary dilation. Color contains a gallstone without evidence of dilatation or gallbladder wall thickening. PANCREAS: Normal. SPLEEN: Punctate granulomatous calcifications otherwise unremarkable. ADRENALS: Mild adreniform thickening of each adrenal gland. KIDNEYS/URETERS/BLADDER: No hydronephrosis. Bilateral renal hypodensities are present either to small to accurately characterize or in keeping with simple cysts which are prior no further dedicated imaging follow-up. Bladder decompressed. PELVIC ORGANS: Unremarkable. VASCULATURE: Diffuse aortic and visceral arterial atherosclerosis without evidence of aneurysmal dilatation. LYMPH NODES: Scattered subcentimeter mesenteric and retroperitoneal lymph nodes are demonstrated without evidence of adenopathy. Prominent left inguinal lymph node is present (image 127) measuring approximately 15 mm in short axis dimension. GASTROINTESTINAL TRACT: No bowel dilation or wall thickening. Peritoneal gas. Normal appendix. Scattered colonic diverticula without evidence of diverticulitis. BONES: No acute or aggressive osseous abnormalities identified. OTHER: None. _______________     1. Cholelithiasis without evidence of cholecystitis. 2. No abnormal bowel wall thickening or dilatation. 3. No hydronephrosis or obstructive uropathy. 4. Several small right lower lobe pulmonary nodules (2-3 mm in size). See below guidelines for proposed follow-up.  5. Borderline enlarged and nonspecific left inguinal lymph node, potentially reactive in etiology. ======== Fleischner Society pulmonary nodule guidelines (revised 2017): Multiple solid nodules <6 mm: -Low risk for lung cancer: No follow-up. -High risk for lung cancer: Optional chest CT in 12 months. XR CHEST PORT    Result Date: 12/9/2022  EXAM: XR CHEST PORT CLINICAL INDICATION/HISTORY: sepsis -Additional: None COMPARISON: July 12, 2022 TECHNIQUE: Portable frontal view of the chest _______________ FINDINGS: SUPPORT DEVICES: Right IJ approach dialysis catheter in stable position. HEART AND MEDIASTINUM: Stable appearing cardiac size and mediastinal contours. LUNGS AND PLEURAL SPACES: Lungs are clear. No focal pneumonic opacity. No pneumothorax or pleural effusion. BONY THORAX AND SOFT TISSUES: Unremarkable. _______________     No active cardiopulmonary disease.        Bonilla Ledezma MD

## 2022-12-11 NOTE — PROGRESS NOTES
Vancomycin - Pharmacy to Dose    Consult provided for this 60y.o. year old ,male for indication of Diabetic Foot Infection. Therapy day 3       Wt Readings from Last 1 Encounters:   12/10/22 98 kg (216 lb)       Ht Readings from Last 1 Encounters:   07/18/22 182.9 cm (72\")     Dosing Weight: 98 kg     Additional Antibiotic:  Zosyn     Date 12/12/2022   Lab Results   Component Value Date/Time    Creatinine 5.96 (H) 12/11/2022 04:47 AM     creatinine clearance:  HD patient    white blood cell count:  14.9    Last dose:  Vancomycin 1000 mg IV at 18:41 12/11/2022    Vancomycin Random Level ordered with am labs 12/12/2022    Pharmacy to follow daily and will make changes to dose and/or frequency based on clinical status.     [id number] [facility], [facility]

## 2022-12-11 NOTE — CONSULTS
Brief [facility] ID Note    Chart/MICRO reviewed. OR cultures still developing (early yet)  One admission BCx (+) for a CoNS --- don't sweat this/contaminant.     Hilario Keith MD  Cell [phone]  [facility]

## 2022-12-11 NOTE — PROGRESS NOTES
Problem: Risk for Spread of Infection  Goal: Prevent transmission of infectious organism to others  Description: Prevent the transmission of infectious organisms to other patients, staff members, and visitors. Outcome: Progressing Towards Goal     Problem: Patient Education:  Go to Education Activity  Goal: Patient/Family Education  Outcome: Progressing Towards Goal     Problem: Diabetes Self-Management  Goal: *Disease process and treatment process  Description: Define diabetes and identify own type of diabetes; list 3 options for treating diabetes. Outcome: Progressing Towards Goal  Goal: *Incorporating nutritional management into lifestyle  Description: Describe effect of type, amount and timing of food on blood glucose; list 3 methods for planning meals. Outcome: Progressing Towards Goal  Goal: *Incorporating physical activity into lifestyle  Description: State effect of exercise on blood glucose levels. Outcome: Progressing Towards Goal  Goal: *Developing strategies to promote health/change behavior  Description: Define the ABC's of diabetes; identify appropriate screenings, schedule and personal plan for screenings. Outcome: Progressing Towards Goal  Goal: *Using medications safely  Description: State effect of diabetes medications on diabetes; name diabetes medication taking, action and side effects. Outcome: Progressing Towards Goal  Goal: *Monitoring blood glucose, interpreting and using results  Description: Identify recommended blood glucose targets  and personal targets. Outcome: Progressing Towards Goal  Goal: *Prevention, detection, treatment of acute complications  Description: List symptoms of hyper- and hypoglycemia; describe how to treat low blood sugar and actions for lowering  high blood glucose level.   Outcome: Progressing Towards Goal  Goal: *Prevention, detection and treatment of chronic complications  Description: Define the natural course of diabetes and describe the relationship of blood glucose levels to long term complications of diabetes. Outcome: Progressing Towards Goal  Goal: *Developing strategies to address psychosocial issues  Description: Describe feelings about living with diabetes; identify support needed and support network  Outcome: Progressing Towards Goal  Goal: *Insulin pump training  Outcome: Progressing Towards Goal  Goal: *Sick day guidelines  Outcome: Progressing Towards Goal  Goal: *Patient Specific Goal (EDIT GOAL, INSERT TEXT)  Outcome: Progressing Towards Goal     Problem: Patient Education: Go to Patient Education Activity  Goal: Patient/Family Education  Outcome: Progressing Towards Goal     Problem: Falls - Risk of  Goal: *Absence of Falls  Description: Document Jaxson Fall Risk and appropriate interventions in the flowsheet. Outcome: Progressing Towards Goal  Note: Fall Risk Interventions:  Mobility Interventions: PT Consult for mobility concerns         Medication Interventions: Bed/chair exit alarm, Evaluate medications/consider consulting pharmacy    Elimination Interventions: Bed/chair exit alarm, Call light in reach    History of Falls Interventions: Bed/chair exit alarm         Problem: Patient Education: Go to Patient Education Activity  Goal: Patient/Family Education  Outcome: Progressing Towards Goal     Problem: Chronic Renal Failure  Goal: *Fluid and electrolytes stabilized  Outcome: Progressing Towards Goal     Problem: Patient Education: Go to Patient Education Activity  Goal: Patient/Family Education  Outcome: Progressing Towards Goal     Problem: Pressure Injury - Risk of  Goal: *Prevention of pressure injury  Description: Document Semaj Scale and appropriate interventions in the flowsheet.   Outcome: Progressing Towards Goal     Problem: Patient Education: Go to Patient Education Activity  Goal: Patient/Family Education  Outcome: Progressing Towards Goal     Problem: Patient Education: Go to Patient Education Activity  Goal: Patient/Family Education  Outcome: Progressing Towards Goal

## 2022-12-11 NOTE — PROGRESS NOTES
Pt currently under c-diff rule out, pt having formed stools, Dr. Iram Sullivan made aware, cancel c-diff rule out per Dr. Iram Sullivan. Resident currently with a diet, blood sugar checks and sliding scale insulin times to be changed to ACHS from every 6 hours per Dr. Iram Sullivan.

## 2022-12-12 LAB
ANION GAP SERPL CALC-SCNC: 11 MMOL/L (ref 3–18)
BACTERIA SPEC CULT: ABNORMAL
BASOPHILS # BLD: 0.1 K/UL (ref 0–0.1)
BASOPHILS NFR BLD: 0 % (ref 0–2)
BUN SERPL-MCNC: 52 MG/DL (ref 7–18)
BUN/CREAT SERPL: 7 (ref 12–20)
CALCIUM SERPL-MCNC: 7.4 MG/DL (ref 8.5–10.1)
CHLORIDE SERPL-SCNC: 96 MMOL/L (ref 100–111)
CO2 SERPL-SCNC: 25 MMOL/L (ref 21–32)
CREAT SERPL-MCNC: 7.56 MG/DL (ref 0.6–1.3)
DIFFERENTIAL METHOD BLD: ABNORMAL
EOSINOPHIL # BLD: 0.4 K/UL (ref 0–0.4)
EOSINOPHIL NFR BLD: 2 % (ref 0–5)
ERYTHROCYTE [DISTWIDTH] IN BLOOD BY AUTOMATED COUNT: 17.2 % (ref 11.6–14.5)
GLUCOSE BLD STRIP.AUTO-MCNC: 181 MG/DL (ref 70–110)
GLUCOSE BLD STRIP.AUTO-MCNC: 217 MG/DL (ref 70–110)
GLUCOSE BLD STRIP.AUTO-MCNC: 224 MG/DL (ref 70–110)
GLUCOSE BLD STRIP.AUTO-MCNC: 261 MG/DL (ref 70–110)
GLUCOSE SERPL-MCNC: 234 MG/DL (ref 74–99)
GRAM STN SPEC: ABNORMAL
GRAM STN SPEC: ABNORMAL
HBV SURFACE AB SER QL IA: POSITIVE
HBV SURFACE AB SERPL IA-ACNC: 23.52 MIU/ML
HCT VFR BLD AUTO: 30.9 % (ref 36–48)
HEP BS AB COMMENT,HBSAC: NORMAL
HGB BLD-MCNC: 10.1 G/DL (ref 13–16)
IMM GRANULOCYTES # BLD AUTO: 0.1 K/UL (ref 0–0.04)
IMM GRANULOCYTES NFR BLD AUTO: 1 % (ref 0–0.5)
LYMPHOCYTES # BLD: 1.5 K/UL (ref 0.9–3.6)
LYMPHOCYTES NFR BLD: 9 % (ref 21–52)
MAGNESIUM SERPL-MCNC: 1.9 MG/DL (ref 1.6–2.6)
MCH RBC QN AUTO: 30.4 PG (ref 24–34)
MCHC RBC AUTO-ENTMCNC: 32.7 G/DL (ref 31–37)
MCV RBC AUTO: 93.1 FL (ref 78–100)
MONOCYTES # BLD: 1.3 K/UL (ref 0.05–1.2)
MONOCYTES NFR BLD: 8 % (ref 3–10)
NEUTS SEG # BLD: 12.8 K/UL (ref 1.8–8)
NEUTS SEG NFR BLD: 79 % (ref 40–73)
NRBC # BLD: 0 K/UL (ref 0–0.01)
NRBC BLD-RTO: 0 PER 100 WBC
PLATELET # BLD AUTO: 219 K/UL (ref 135–420)
PMV BLD AUTO: 11.3 FL (ref 9.2–11.8)
POTASSIUM SERPL-SCNC: 4.7 MMOL/L (ref 3.5–5.5)
RBC # BLD AUTO: 3.32 M/UL (ref 4.35–5.65)
SERVICE CMNT-IMP: ABNORMAL
SODIUM SERPL-SCNC: 132 MMOL/L (ref 136–145)
VANCOMYCIN SERPL-MCNC: 15.8 UG/ML (ref 5–40)
WBC # BLD AUTO: 16.1 K/UL (ref 4.6–13.2)

## 2022-12-12 PROCEDURE — 74011250637 HC RX REV CODE- 250/637: Performed by: HOSPITALIST

## 2022-12-12 PROCEDURE — 36415 COLL VENOUS BLD VENIPUNCTURE: CPT

## 2022-12-12 PROCEDURE — 74011000258 HC RX REV CODE- 258: Performed by: EMERGENCY MEDICINE

## 2022-12-12 PROCEDURE — 74011250636 HC RX REV CODE- 250/636: Performed by: EMERGENCY MEDICINE

## 2022-12-12 PROCEDURE — 74011000250 HC RX REV CODE- 250: Performed by: HOSPITALIST

## 2022-12-12 PROCEDURE — 74011636637 HC RX REV CODE- 636/637: Performed by: HOSPITALIST

## 2022-12-12 PROCEDURE — 74011000250 HC RX REV CODE- 250: Performed by: EMERGENCY MEDICINE

## 2022-12-12 PROCEDURE — 80202 ASSAY OF VANCOMYCIN: CPT

## 2022-12-12 PROCEDURE — 82962 GLUCOSE BLOOD TEST: CPT

## 2022-12-12 PROCEDURE — 83735 ASSAY OF MAGNESIUM: CPT

## 2022-12-12 PROCEDURE — 97530 THERAPEUTIC ACTIVITIES: CPT

## 2022-12-12 PROCEDURE — 85025 COMPLETE CBC W/AUTO DIFF WBC: CPT

## 2022-12-12 PROCEDURE — 65270000029 HC RM PRIVATE

## 2022-12-12 PROCEDURE — 74011250636 HC RX REV CODE- 250/636: Performed by: HOSPITALIST

## 2022-12-12 PROCEDURE — 80048 BASIC METABOLIC PNL TOTAL CA: CPT

## 2022-12-12 RX ORDER — INSULIN LISPRO 100 [IU]/ML
3 INJECTION, SOLUTION INTRAVENOUS; SUBCUTANEOUS
Status: DISCONTINUED | OUTPATIENT
Start: 2022-12-12 | End: 2022-12-14

## 2022-12-12 RX ADMIN — INSULIN LISPRO 6 UNITS: 100 INJECTION, SOLUTION INTRAVENOUS; SUBCUTANEOUS at 16:35

## 2022-12-12 RX ADMIN — Medication 125 MG: at 02:16

## 2022-12-12 RX ADMIN — CLOPIDOGREL BISULFATE 75 MG: 75 TABLET ORAL at 08:35

## 2022-12-12 RX ADMIN — SEVELAMER CARBONATE 1600 MG: 800 TABLET, FILM COATED ORAL at 12:19

## 2022-12-12 RX ADMIN — ASPIRIN 81 MG: 81 TABLET, CHEWABLE ORAL at 08:34

## 2022-12-12 RX ADMIN — INSULIN LISPRO 4 UNITS: 100 INJECTION, SOLUTION INTRAVENOUS; SUBCUTANEOUS at 21:24

## 2022-12-12 RX ADMIN — HEPARIN SODIUM 5000 UNITS: 5000 INJECTION INTRAVENOUS; SUBCUTANEOUS at 01:37

## 2022-12-12 RX ADMIN — Medication 500 MG: at 08:35

## 2022-12-12 RX ADMIN — SEVELAMER CARBONATE 1600 MG: 800 TABLET, FILM COATED ORAL at 08:34

## 2022-12-12 RX ADMIN — PIPERACILLIN AND TAZOBACTAM 4.5 G: 4; .5 INJECTION, POWDER, FOR SOLUTION INTRAVENOUS at 12:19

## 2022-12-12 RX ADMIN — INSULIN LISPRO 4 UNITS: 100 INJECTION, SOLUTION INTRAVENOUS; SUBCUTANEOUS at 12:19

## 2022-12-12 RX ADMIN — HEPARIN SODIUM 5000 UNITS: 5000 INJECTION INTRAVENOUS; SUBCUTANEOUS at 16:35

## 2022-12-12 RX ADMIN — B-COMPLEX W/ C & FOLIC ACID TAB 1 MG 1 TABLET: 1 TAB at 08:34

## 2022-12-12 RX ADMIN — INSULIN GLARGINE 10 UNITS: 100 INJECTION, SOLUTION SUBCUTANEOUS at 21:23

## 2022-12-12 RX ADMIN — INSULIN LISPRO 3 UNITS: 100 INJECTION, SOLUTION INTRAVENOUS; SUBCUTANEOUS at 16:34

## 2022-12-12 RX ADMIN — PANTOPRAZOLE SODIUM 40 MG: 40 TABLET, DELAYED RELEASE ORAL at 06:42

## 2022-12-12 RX ADMIN — SEVELAMER CARBONATE 1600 MG: 800 TABLET, FILM COATED ORAL at 16:35

## 2022-12-12 RX ADMIN — AMLODIPINE BESYLATE 10 MG: 5 TABLET ORAL at 08:35

## 2022-12-12 RX ADMIN — HEPARIN SODIUM 5000 UNITS: 5000 INJECTION INTRAVENOUS; SUBCUTANEOUS at 08:36

## 2022-12-12 RX ADMIN — EZETIMIBE 10 MG: 10 TABLET ORAL at 08:34

## 2022-12-12 RX ADMIN — PIPERACILLIN AND TAZOBACTAM 4.5 G: 4; .5 INJECTION, POWDER, FOR SOLUTION INTRAVENOUS at 01:37

## 2022-12-12 RX ADMIN — CARVEDILOL 12.5 MG: 12.5 TABLET, FILM COATED ORAL at 08:35

## 2022-12-12 RX ADMIN — SODIUM CHLORIDE, PRESERVATIVE FREE 10 ML: 5 INJECTION INTRAVENOUS at 14:00

## 2022-12-12 RX ADMIN — ALLOPURINOL 100 MG: 100 TABLET ORAL at 08:34

## 2022-12-12 RX ADMIN — CARVEDILOL 12.5 MG: 12.5 TABLET, FILM COATED ORAL at 21:23

## 2022-12-12 RX ADMIN — Medication 125 MG: at 15:40

## 2022-12-12 RX ADMIN — INSULIN LISPRO 2 UNITS: 100 INJECTION, SOLUTION INTRAVENOUS; SUBCUTANEOUS at 08:35

## 2022-12-12 NOTE — PROGRESS NOTES
OCCUPATIONAL THERAPY TREATMENT    Problem: Self Care Deficits Care Plan (Adult)  Goal: *Acute Goals and Plan of Care (Insert Text)  Outcome: Progressing Towards Goal     Patient: Anjum Lane (68 y.o. male)  Date: 12/12/2022  Diagnosis: Diabetic foot infection ([facility] [E11.628, L08.9]  Leukocytosis [D72.829] Gas gangrene ([facility]  Procedure(s) (LRB):  PARTIAL AMPUTATION OF LEFT 2ND, 3RD, 4TH METATARSALS, AMPUTATION OF TOES 2,3,4, INCISION AND DRAINAGE LEFT FOOT DEEP. (Left) 3 Days Post-Op  Precautions: Fall, NWB      Chart, occupational therapy assessment, plan of care, and goals were reviewed. ASSESSMENT:  Pt presents sideling in bed, reporting to want to perform transfer practice. SBA sit to EOB, better sitting balance today. Pt able to don shoe with min A. Sit ot stand with walker withmax A, better balance but unable to perform lateral hop and step and poor control of NWB status. Set up chair to perform squat pivot. Pt is able to perform with max A to maintain NWB hower reports cramp through left hamstring indicating WB during this motion. Pt would like to stay up in chair for awhile, will return to work back towards bed. Progression toward goals:  []          Improving appropriately and progressing toward goals  [x]          Improving slowly and progressing toward goals  []          Not making progress toward goals and plan of care will be adjusted     PLAN:  Patient continues to benefit from skilled intervention to address the above impairments. Continue treatment per established plan of care. Discharge Recommendations:  Hakan Story  Further Equipment Recommendations for Discharge:  Slideboard     SUBJECTIVE:   Patient stated my back of my leg hurts.     OBJECTIVE DATA SUMMARY:   Cognitive/Behavioral Status:  Neurologic State: Alert  Orientation Level: Oriented X4  Cognition: Follows commands  Safety/Judgement: Decreased awareness of environment, Decreased awareness of need for assistance, Decreased awareness of need for safety    Functional Mobility and Transfers for ADLs:   Bed Mobility:     Supine to Sit: Stand-by assistance  Sit to Supine: Stand-by assistance      Transfers:  Sit to Stand: Maximum assistance     Bed to Chair: Maximum assistance      Balance:  Sitting: Impaired  Sitting - Static: Good (unsupported)  Sitting - Dynamic: Fair (occasional)  Standing: Impaired  Standing - Static: Poor  Standing - Dynamic : Poor    ADL Intervention:       Cognitive Retraining  Problem Solving: General alternative solution  Executive Functions: Managing time;Regulating behavior  Safety/Judgement: Decreased awareness of environment;Decreased awareness of need for assistance;Decreased awareness of need for safety        Pain:  Pain level pre-treatment: 4/10   Pain level post-treatment: 4/10  Pain Intervention(s): Medication administered by RN (see MAR); Rest, Ice, Repositioning=   Response to intervention: Nurse notified, See doc flow sheet    Activity Tolerance:    Low but improvement from yesterday    Please refer to the flowsheet for vital signs taken during this treatment. After treatment:   []  Patient left in no apparent distress sitting up in chair  []  Patient left in no apparent distress in bed  [x]  Call bell left within reach  [x]  Nursing notified  []  Caregiver present  []  Bed alarm activated    COMMUNICATION/EDUCATION:   [x] Role of Occupational Therapy in the acute care setting  [x] Home safety education was provided and the patient/caregiver indicated understanding. [x] Patient/family have participated as able in working towards goals and plan of care. [x] Patient/family agree to work toward stated goals and plan of care. [] Patient understands intent and goals of therapy, but is neutral about his/her participation. [] Patient is unable to participate in goal setting and plan of care.       Thank you for this referral.  Vikas MORALES, OTR/L   Time Calculation: 34 mins

## 2022-12-12 NOTE — PROGRESS NOTES
[facility]  (A Division of [address] [place] [address])    Follow-up Note      Date of Admission: 12/9/2022       Date of Note:  12/12/2022    Summary:  Mr Becka Alexandre is a diabetic 60y AAM who has been fighting chronic R heel sores for months/years, but noted onset of L foot pain along with f/s/c this past week. Had DPM appt this morning and re-directed to ED for urgent admission for surgery. Pretty vague on duration of symptoms, but pretty sure it was this week. Sugars have been elevated. Also has been having stomach issues with pain/anorexia/diarrhea off/on for past month. Seen at [facility] 2wks ago and treated for \"stomach infection\" that required PO vanco.     [employer]ired [employer]-        CC:  \"My foot feels better\"  HPI:  Weekend events reviewed/pt seen  Foot feels better, pain controlled. No f/s/c.  HD access doing well  Having some formed stools, but diarrhea (non-bloody) last night  Tm <100  WBC about the same 16.1k    Current Antimicrobials:    Prior Antimicrobials:  Vanco IV (12/9-) #3  Pip/tzb IV (12/9-) #3  PO Vanco (12/9-) #3        Assessment: Rec / Plan:   Dry Gangrene L foot distally - POD#3  12/9:  ESR 92mm/h  12/9:  PCT 3.12ng/mL  Better, but I bet he may still need some more debridement. His little piggie/5th toe is blue and there is some foul suppuration in incision line. WBC still high as admission, but lymphocytes/subsets improving. NO hemodynamic instabiliy ->Pip/tzb-vanc #3    No change to abx at this time. If no more surgery or no complete cure at next surgery/amputation, he'll need a dedicated tunneled line for 6wks abx. Scut MICRO  I'll be back on Wednesday   CDI  I looked at [facility] chart Friday night, and they did NOT sent CDT to verify.   I'd continue the same for now (concurrent PO vanco to suppress)    Pseudobacteremia - CoNS  No treatment needed    Peripheral Vascular Disease    ESRD/HD        Microbiology:  12/9 - OR (+) GNRs      BCx 1/2 (+) CoNS      Lines / Catheters: R CW [facility] and peripheral        Patient Active Problem List   Diagnosis Code    Diabetes mellitus with foot ulcer ([facility] E11.621, L97.509    CAD (coronary artery disease) I25.10    ESRD (end stage renal disease) ([facility] N18.6    Acute hematogenous osteomyelitis of right foot (HCC) M86.071    Cellulitis of right heel L03.115    Diabetic foot ulcer with osteomyelitis ([facility] E11.621, E11.69, L97.509, M86.9    Ulcer of right heel, with necrosis of bone ([facility] L97.414    Infection and inflammatory reaction due to internal fixation device of other site, initial encounter [facility]) T84.69XA    Left arm swelling M79.89    Chest pain at rest R07.9    Abnormal nuclear stress test R94.39    History of anemia due to CKD N18.9, Z86.2    S/P angioplasty with stent Z95.820    Hypertension I10    Leukocytosis D72.829    Diabetic foot infection ([facility] E11.628, L08.9    Diarrhea R19.7    Type 2 diabetes mellitus, with long-term current use of insulin (HCC) E11.9, Z79.4    Acute hematogenous osteomyelitis of left foot ([facility]) M86.072    Nondisplaced fracture of second metatarsal bone of left foot S92.325A    Nondisplaced fracture of third metatarsal bone of left foot S92.335A    Closed nondisplaced fracture of fourth metatarsal bone of left foot S92.345A    Positive blood culture R78.81       Current Facility-Administered Medications   Medication Dose Route Frequency    insulin glargine (LANTUS) injection 10 Units  10 Units SubCUTAneous QHS    Vancomycin Random Level with am labs 12/12/2022  1 Each Other ONCE    insulin lispro (HUMALOG) injection   SubCUTAneous AC&HS    heparin (porcine) 1,000 unit/mL injection 3,600 Units  3,600 Units IntraCATHeter DIALYSIS PRN    Vancomycin - Pharmacy to Dose  1 Each Other Rx Dosing/Monitoring    piperacillin-tazobactam (ZOSYN) 4.5 g in 0.9% sodium chloride (MBP/ADV) 100 mL MBP  4.5 g IntraVENous Q12H    vancomycin 50 mg/mL oral solution (compounded) 125 mg  125 mg Oral Q12H    glucose chewable tablet 16 g  4 Tablet Oral PRN    glucagon (GLUCAGEN) injection 1 mg  1 mg IntraMUSCular PRN    dextrose 10% infusion 0-250 mL  0-250 mL IntraVENous PRN    0.9% sodium chloride infusion  25 mL/hr IntraVENous DIALYSIS PRN    clopidogreL (PLAVIX) tablet 75 mg  75 mg Oral DAILY    carvediloL (COREG) tablet 12.5 mg  12.5 mg Oral BID    aspirin chewable tablet 81 mg  81 mg Oral DAILY    amLODIPine (NORVASC) tablet 10 mg  10 mg Oral DAILY    allopurinoL (ZYLOPRIM) tablet 100 mg  100 mg Oral DAILY    ascorbic acid (vitamin C) (VITAMIN C) tablet 500 mg  500 mg Oral DAILY    ezetimibe (ZETIA) tablet 10 mg  10 mg Oral DAILY    pantoprazole (PROTONIX) tablet 40 mg  40 mg Oral ACB    sevelamer carbonate (RENVELA) tab 1,600 mg  1,600 mg Oral TID WITH MEALS    vit B Cmplx 3-FA-Vit C-Biotin (NEPHRO NIKITA RX) tablet 1 Tablet  1 Tablet Oral DAILY    sodium chloride (NS) flush 5-40 mL  5-40 mL IntraVENous Q8H    sodium chloride (NS) flush 5-40 mL  5-40 mL IntraVENous PRN    acetaminophen (TYLENOL) tablet 650 mg  650 mg Oral Q6H PRN    Or    acetaminophen (TYLENOL) suppository 650 mg  650 mg Rectal Q6H PRN    bisacodyL (DULCOLAX) suppository 10 mg  10 mg Rectal DAILY PRN    promethazine (PHENERGAN) tablet 12.5 mg  12.5 mg Oral Q6H PRN    Or    ondansetron (ZOFRAN) injection 4 mg  4 mg IntraVENous Q6H PRN    heparin (porcine) injection 5,000 Units  5,000 Units SubCUTAneous Q8H    oxyCODONE-acetaminophen (PERCOCET) 5-325 mg per tablet 1 Tablet  1 Tablet Oral Q6H PRN         Review of Systems - General ROS: negative for - chills, fever, or night sweats  Respiratory ROS: no cough, shortness of breath, or wheezing  Cardiovascular ROS: no chest pain or dyspnea on exertion  Gastrointestinal ROS: no abdominal pain, change in bowel habits, or black or bloody stools       Objective:    Visit Vitals  BP (!) 132/59   Pulse 60   Temp 98.8 °F (37.1 °C)   Resp 16   Wt 98 kg (216 lb)   SpO2 99%   BMI 29.29 kg/m²       Temp (24hrs), Av °F (37.2 °C), Min:98.1 °F (36.7 °C), Max:99.7 °F (37.6 °C)      GEN: WDWN AAM in NAD  HEENT: anicteric  CHEST: CTA  CVS:RRR  ABD: NT NABS  EXT: OR dressing taken down/off- 5th toe blue and incision with modest maloderous DC         Lab results:    Chemistry  Recent Labs     12/12/22  0154 12/11/22  0447 12/10/22  0633   * 251* 198*   * 132* 134*   K 4.7 4.6 4.4   CL 96* 97* 96*   CO2 25 25 26   BUN 52* 38* 55*   CREA 7.56* 5.96* 7.35*   CA 7.4* 7.5* 7.3*   AGAP 11 10 12   BUCR 7* 6* 7*   AP  --  120*  --    TP  --  6.7  --    ALB  --  2.2*  --    GLOB  --  4.5*  --    AGRAT  --  0.5*  --        CBC w/ Diff  Recent Labs     12/12/22  0154 12/11/22  0447 12/10/22  0633   WBC 16.1* 14.9* 15.0*   RBC 3.32* 3.42* 3.39*   HGB 10.1* 10.1* 10.0*   HCT 30.9* 32.1* 30.9*    202 215   GRANS 79* 81* 84*   LYMPH 9* 8* 6*   EOS 2 2 1       Microbiology  All Micro Results       Procedure Component Value Units Date/Time    CULTURE, BLOOD [[id number]]  (Abnormal) Collected: 22 1155    Order Status: Completed Specimen: Blood Updated: 2246     Special Requests: LEFT AC     GRAM STAIN       ANAEROBIC BOTTLE GRAM POSITIVE COCCI IN CLUSTERS                  SMEAR CALLED TO AND CORRECTLY REPEATED BY: Gab Campuzano RN 3S 12/10/22 AT 1147 BY ANI           Culture result:       STAPHYLOCOCCUS SPECIES, COAGULASE NEGATIVE GROWING IN 1 OF 2 BOTTLES DRAWN  SITE = LAC          CULTURE, BLOOD 2nd DRAW (required for [facility]) [[id number]] Collected: 22 1215    Order Status: Completed Specimen: Blood Updated: 2234     Special Requests: LEFT HAND     Culture result: NO GROWTH 3 DAYS       CULTURE, WOUND Laurian Rey STAIN [[id number]]  (Abnormal) Collected: 22 2201    Order Status: Completed Specimen: Wound from Foot Updated: 22 1241     Special Requests: NO SPECIAL REQUESTS        GRAM STAIN RARE WBCS SEEN               2+ APPARENT GRAM VARIABLE RODS           Culture result: HEAVY GRAM NEGATIVE RODS               HEAVY MIXED SKIN TO ISOLATED          BLOOD CULTURE ID PANEL [[id number]]  (Abnormal) Collected: 12/09/22 1145    Order Status: Completed Specimen: Blood Updated: 12/11/22 0655     Acc. no. from Micro Order [id number]     Enterococcus faecalis Not detected        Enterococcus faecium Not detected        Listeria monocytogenes Not detected        Staphylococcus Detected        Staphylococcus aureus Not detected        Staph epidermidis Detected        Staph lugdunensis Not detected        Streptococcus Not detected        Streptococcus agalactiae (Group B) Not detected        Streptococcus pneumoniae Not detected        Streptococcus pyogenes (Group A) Not detected        Acinetobacter calcoaceticus-baumanii complex Not detected        Bacteroides fragilis Not detected        Enterobacterales species Not detected        Enterobacter cloacae complex Not detected        Escherichia coli Not detected        Klebsiella aerogenes Not detected        Klebsiella oxytoca Not detected        Klebsiella pneumoniae group Not detected        Proteus Not detected        Salmonella Not detected        Serratia marcescens Not detected        Haemophilus influenzae Not detected        Neisseria meningitidis Not detected        Pseudomonas aeruginosa Not detected        Steno maltophilia Not detected        Candida albicans Not detected        Candida auris Not detected        Candida glabrata Not detected        Candida krusei Not detected        Candida parapsilosis Not detected        Candida tropicalis Not detected        Crypto neoformans/gattii Not detected        RESISTANT GENES:            MECA/C (Methicillin resistant gene) Detected        Comment       False positive results may rarely occur.  Correlate with clinical,epidemiologic, and other laboratory findings           Comment: Please see BCID Interpretation Guide in [facility] [[id number]] Collected: 12/09/22 2209    Order Status: Sent Specimen: Foot, left Updated: 12/10/22 2145    C. DIFFICILE AG & TOXIN A/B [[id number]]     Order Status: Canceled Specimen: Stool              Kellen Luciano MD  Cell [phone]  [facility]   12/12/2022   11:59 AM

## 2022-12-12 NOTE — PROGRESS NOTES
D/c plan: SNF. CM attempted to call into pt's room to discuss SNF options. No answer. CM to follow-up.

## 2022-12-12 NOTE — PROGRESS NOTES
Hospitalist Progress Note-critical care note     Patient: Paris Lewis MRN: [id number]  CSN: [id number]    YOB: 1960  Age: 60 y.o.   Sex: male    DOA: 12/9/2022 LOS:  LOS: 3 days            Chief complaint: leukocytosis, positive bcx , foot infection , om  esrd on hd     Assessment/Plan         Hospital Problems  Date Reviewed: 6/28/2022            Codes Class Noted POA    Positive blood culture ICD-10-CM: R78.81  ICD-9-CM: 790.7  12/11/2022 Unknown        Leukocytosis ICD-10-CM: D72.829  ICD-9-CM: 288.60  12/9/2022 Unknown        Diabetic foot infection ([facility] ICD-10-CM: E11.628, L08.9  ICD-9-CM: 250.80, 686.9  12/9/2022 Unknown        Diarrhea ICD-10-CM: R19.7  ICD-9-CM: 787.91  12/9/2022 Unknown        Type 2 diabetes mellitus, with long-term current use of insulin ([facility] ICD-10-CM: E11.9, Z79.4  ICD-9-CM: 250.00, V58.67  Unknown Unknown        Acute hematogenous osteomyelitis of left foot ([facility] ICD-10-CM: M86.072  ICD-9-CM: 730.07  12/9/2022 Unknown        Nondisplaced fracture of second metatarsal bone of left foot ICD-10-CM: Y38.436L  ICD-9-CM: 825.25  12/9/2022 Unknown        Nondisplaced fracture of third metatarsal bone of left foot ICD-10-CM: T17.718W  ICD-9-CM: 825.25  12/9/2022 Unknown        Closed nondisplaced fracture of fourth metatarsal bone of left foot ICD-10-CM: S92.345A  ICD-9-CM: 825.25  12/9/2022 Unknown        Hypertension ICD-10-CM: I10  ICD-9-CM: 401.9  7/21/2022 Yes        CAD (coronary artery disease) ICD-10-CM: I25.10  ICD-9-CM: 414.00  6/28/2022 Yes        ESRD (end stage renal disease) ([facility] ICD-10-CM: N18.6  ICD-9-CM: 585.6  6/28/2022 Yes        Diabetes mellitus with foot ulcer ([facility] ICD-10-CM: E11.621, L97.509  ICD-9-CM: 250.80, 707.15  6/27/2022 Yes            Gangrene of left foot, leukocytosis 16 K   PARTIAL AMPUTATION OF LEFT 2ND, 3RD, 4TH METATARSALS, AMPUTATION OF TOES 2,3,4, INCISION ADN DRAINAGE LEFT FOOT DEEP per dr. Uche Peterson   ID and podiatrist on board  Continue vanc and zosyn ,wound cx G-     Positive blood cx  STAPHYLOCOCCUS SPECIES GROWING IN 1 OF 2 BOTTLES  Will see ID recommendation     Diabetic foot ulcer  Follow-up with Dr. Trinh Hunter wound care     CAD, last stent Severe single-vessel coronary artery disease. Distal RCA to drug-eluting stent in July 2022, continue plavix -he took plavix today   No chest pain  Reported epigastric pain  EKG PVC     Hypertension continue home medication     Diarrhea epigastric pain  CT abdomen no acute process  Resolved      End-stage renal disease on HD  TTS, will have hd tomorrow      DM  type II   Lantus at night , ssi will increase lantus to 10     Subjective abdomen cramping like will have BM        Disposition :tbd,   Review of systems:    General: No fevers or chills. Cardiovascular: No chest pain or pressure. No palpitations. Pulmonary: No shortness of breath. Gastrointestinal: No nausea, vomiting. Mild abdomen pain     Vital signs/Intake and Output:  Visit Vitals  BP (!) 132/59   Pulse 60   Temp 98.8 °F (37.1 °C)   Resp 16   Wt 98 kg (216 lb)   SpO2 99%   BMI 29.29 kg/m²     Current Shift:  No intake/output data recorded. Last three shifts:  No intake/output data recorded. Physical Exam:  General: WD, WN. Alert, cooperative, no acute distress    HEENT: NC, Atraumatic. PERRLA, anicteric sclerae. Lungs: CTA Bilaterally. No Wheezing/Rhonchi/Rales. Heart:  Regular  rhythm,  No murmur, No Rubs, No Gallops  Abdomen: Soft, Non distended, Non tender. +Bowel sounds,   Extremities: No c/c, both feet wrapped with gauze   Psych:   Not anxious or agitated. Neurologic:  No acute neurological deficit.              Labs: Results:       Chemistry Recent Labs     12/12/22  0154 12/11/22  0447 12/10/22  0633   * 251* 198*   * 132* 134*   K 4.7 4.6 4.4   CL 96* 97* 96*   CO2 25 25 26   BUN 52* 38* 55*   CREA 7.56* 5.96* 7.35*   CA 7.4* 7.5* 7.3*   AGAP 11 10 12   BUCR 7* 6* 7*   AP  --  120*  -- TP  --  6.7  --    ALB  --  2.2*  --    GLOB  --  4.5*  --    AGRAT  --  0.5*  --         CBC w/Diff Recent Labs     12/12/22  0154 12/11/22  0447 12/10/22  0633   WBC 16.1* 14.9* 15.0*   RBC 3.32* 3.42* 3.39*   HGB 10.1* 10.1* 10.0*   HCT 30.9* 32.1* 30.9*    202 215   GRANS 79* 81* 84*   LYMPH 9* 8* 6*   EOS 2 2 1        Cardiac Enzymes No results for input(s): CPK, CKND1, PAULO in the last 72 hours. No lab exists for component: CKRMB, TROIP   Coagulation No results for input(s): PTP, INR, APTT, INREXT, INREXT in the last 72 hours. Lipid Panel Lab Results   Component Value Date/Time    Cholesterol, total 188 07/19/2022 03:12 AM    HDL Cholesterol 42 07/19/2022 03:12 AM    LDL, calculated 131.2 (H) 07/19/2022 03:12 AM    VLDL, calculated 14.8 07/19/2022 03:12 AM    Triglyceride 74 07/19/2022 03:12 AM    CHOL/HDL Ratio 4.5 07/19/2022 03:12 AM      BNP No results for input(s): BNPP in the last 72 hours. Liver Enzymes Recent Labs     12/11/22  0447   TP 6.7   ALB 2.2*   *        Thyroid Studies No results found for: T4, T3U, TSH, TSHEXT, TSHEXT     Procedures/imaging: see electronic medical records for all procedures/Xrays and details which were not copied into this note but were reviewed prior to creation of Plan    XR FOOT LT AP/LAT    Result Date: 12/9/2022  EXAM: 2 radiographic views of the left foot. INDICATION: Left foot pain. COMPARISON: December 9, 2022 _______________ FINDINGS: There is been interval amputation of the second, third, and fourth rays at the level of the metatarsal diaphysis. As before, the first ray is amputated at the metatarsal phalangeal joint. The small toe remains. There are expected postoperative findings to include regional air density and soft tissue swelling. There is Monckeberg type vascular calcification. There is low-grade mid foot degeneration. _______________     Standard immediate postoperative findings.  _______________     XR FOOT LT MIN 3 V    Result Date: 12/9/2022  EXAM: XR FOOT LT MIN 3 V CLINICAL INDICATION/HISTORY: Gangrenous 2nd digit   > Additional: None. COMPARISON: None. TECHNIQUE: 3 views left foot _______________ FINDINGS: Soft tissue gas formation is seen along the second digit with ill-defined limitus changes extending along the medial forefoot. Prior amputation first right. Patchy demineralization and osseous erosions are seen involving the distal portion of the first metatarsal head. Additional patchy areas of osseous erosion are also noted involving the proximal phalanx of the second toe. Diffuse soft tissue swelling. Diffuse atherosclerotic vascular calcifications. No retained radiopaque foreign object. _______________     Soft gas formation and ulceration involving the medial forefoot with findings concerning for osteomyelitis involving the first and second rays described above. MRI FOOT LT WO CONT    Result Date: 12/9/2022  EXAM: MRI FOOT LT WO CONT CLINICAL INDICATION/HISTORY: Foot wound; preoperative  >Additional: None COMPARISON: Radiograph performed December 9, 2022  >Reference exam: None. TECHNIQUE: Axial long axis TI and T2 with fat saturation, sagittal and coronal short axis TI and STIR sequences through the foot were obtained without contrast. _______________ FINDINGS: FIRST RAY: Prior dictation the first digit. There is mild bony proliferative change at the head of the first metatarsal with preserved marrow signal. Minimal patchy edema is noted which is nonspecific. No definite cortical destructive change. SECOND RAY: Nondisplaced obliquely oriented fracture through the head neck junction of the second metatarsal. There is heterogeneous diminished T1 marrow with patchy edema and surrounding soft tissue gas along the course of the second digit with associated subluxation of the distal phalanx. Subtle diminished T1 marrow is noted with suspected foci of intraosseous gas, concerning for acute osteomyelitis.  THIRD RAY: Nondisplaced fracture through the third metatarsal neck with slight impaction. Mild edema is noted within the third digit, possibly stress reaction. No convincing osseous erosions or T1 marrow signal change. FOURTH RAY: Nondisplaced fractures of the neck of the fourth metatarsal. Minimal edema in the proximal phalanx but otherwise preserved marrow signal. FIFTH RAY: Unremarkable. Additional degenerative changes with patchy edema, joint space narrowing, and osteophyte formation at the midfoot, likely related to underlying Charcot arthropathy. SOFT TISSUES: Soft tissue irregularity with gas and ulceration around the second digit noted. Diffuse fluid signal seen throughout the intrinsic foot musculature, commonly seen in the setting of diabetes. Mild skin thickening about the forefoot. INCIDENTAL FINDINGS: None. _______________     1. Marrow signal abnormality with soft tissue wound and gas around the second digit concerning for acute osteomyelitis. 2.  Nondisplaced fractures of the head neck junction of the second-fourth metatarsals. 3.  Prior indication the first digit. 4.  Soft tissue swelling and skin thickening about the forefoot concerning for cellulitis. No drainable abscess. 5.  Additional chronic/degenerative changes of the foot. CT ABD PELV WO CONT    Result Date: 12/9/2022  EXAM: CT of the abdomen and pelvis INDICATION: Abdominal pain with distention. COMPARISON: None. TECHNIQUE: Axial CT imaging of the abdomen and pelvis was performed without intravenous contrast. Multiplanar reformats were generated. One or more dose reduction techniques were used on this CT: automated exposure control, adjustment of the mAs and/or kVp according to patient size, and iterative reconstruction techniques. The specific techniques used on this CT exam have been documented in the patient's electronic medical record.   Digital Imaging and Communications in Medicine (DICOM) format image data are available to nonaffiliated external healthcare facilities or entities on a secure, media free, reciprocally searchable basis with patient authorization for at least a 12-month period after this study. _______________ FINDINGS: LOWER CHEST: Partial inclusion of multivessel coronary arterial atherosclerosis and central venous catheter. Clear lung bases. Several punctate 2 to 3 mm pulmonary nodule seen in the right lower lobe (image 8) LIVER, BILIARY: Liver is normal. No biliary dilation. Color contains a gallstone without evidence of dilatation or gallbladder wall thickening. PANCREAS: Normal. SPLEEN: Punctate granulomatous calcifications otherwise unremarkable. ADRENALS: Mild adreniform thickening of each adrenal gland. KIDNEYS/URETERS/BLADDER: No hydronephrosis. Bilateral renal hypodensities are present either to small to accurately characterize or in keeping with simple cysts which are prior no further dedicated imaging follow-up. Bladder decompressed. PELVIC ORGANS: Unremarkable. VASCULATURE: Diffuse aortic and visceral arterial atherosclerosis without evidence of aneurysmal dilatation. LYMPH NODES: Scattered subcentimeter mesenteric and retroperitoneal lymph nodes are demonstrated without evidence of adenopathy. Prominent left inguinal lymph node is present (image 127) measuring approximately 15 mm in short axis dimension. GASTROINTESTINAL TRACT: No bowel dilation or wall thickening. Peritoneal gas. Normal appendix. Scattered colonic diverticula without evidence of diverticulitis. BONES: No acute or aggressive osseous abnormalities identified. OTHER: None. _______________     1. Cholelithiasis without evidence of cholecystitis. 2. No abnormal bowel wall thickening or dilatation. 3. No hydronephrosis or obstructive uropathy. 4. Several small right lower lobe pulmonary nodules (2-3 mm in size). See below guidelines for proposed follow-up.  5. Borderline enlarged and nonspecific left inguinal lymph node, potentially reactive in etiology. ======== Antonette Society pulmonary nodule guidelines (revised 2017): Multiple solid nodules <6 mm: -Low risk for lung cancer: No follow-up. -High risk for lung cancer: Optional chest CT in 12 months. XR CHEST PORT    Result Date: 12/9/2022  EXAM: XR CHEST PORT CLINICAL INDICATION/HISTORY: sepsis -Additional: None COMPARISON: July 12, 2022 TECHNIQUE: Portable frontal view of the chest _______________ FINDINGS: SUPPORT DEVICES: Right IJ approach dialysis catheter in stable position. HEART AND MEDIASTINUM: Stable appearing cardiac size and mediastinal contours. LUNGS AND PLEURAL SPACES: Lungs are clear. No focal pneumonic opacity. No pneumothorax or pleural effusion. BONY THORAX AND SOFT TISSUES: Unremarkable. _______________     No active cardiopulmonary disease.        Renan Cifuentes MD

## 2022-12-12 NOTE — WOUND CARE
IP WOUND CONSULT    Ronnie Duong  MEDICAL RECORD NUMBER:  [id number]  AGE: 60 y.o. GENDER: male  : 1960  TODAY'S DATE:  2022    GENERAL     [] Follow-up   [x] New Consult    [] Present on Admission  [] Hospital Acquired     Ronnie Duong is a 60 y.o. male referred by:   [] Physician  [x] Nursing  [] Other:         PAST MEDICAL HISTORY    Past Medical History:   Diagnosis Date    CAD (coronary artery disease)     Chronic kidney disease     Diabetes mellitus     Hypertension     Sleep apnea         PAST SURGICAL HISTORY    Past Surgical History:   Procedure Laterality Date    HX ORTHOPAEDIC      HX PACEMAKER      IR INSERT TUNL CVC W/O PORT OVER 5 YR  2022    IR INSERT TUNL CVC W/O PORT OVER 5 YR  2022    IR REMOVE TUNL CVAD W/O PORT / PUMP  2022    [facility] PROCEDURE UNLIST         FAMILY HISTORY    Family History   Problem Relation Age of Onset    Heart Disease Mother     Diabetes Father     Diabetes Sister     Diabetes Brother        SOCIAL HISTORY    Social History     Tobacco Use    Smoking status: Never    Smokeless tobacco: Never   Vaping Use    Vaping Use: Never used   Substance Use Topics    Alcohol use: Not Currently    Drug use: Never       ALLERGIES    No Known Allergies    MEDICATIONS    No current facility-administered medications on file prior to encounter. Current Outpatient Medications on File Prior to Encounter   Medication Sig Dispense Refill    clopidogreL (PLAVIX) 75 mg tab Take 1 Tablet by mouth in the morning. 30 Tablet 2    isosorbide mononitrate ER (IMDUR) 30 mg tablet Take 1 Tablet by mouth daily. 30 Tablet 0    pantoprazole (PROTONIX) 40 mg tablet Take 1 Tablet by mouth Daily (before breakfast). 30 Tablet 0    bacitracin zinc (BACITRACIN) ointment Apply  to affected area two (2) times a day. 15 g 0    nystatin (MYCOSTATIN) powder Apply  to affected area two (2) times a day.  5 g 0    insulin lispro (HUMALOG) 100 unit/mL injection Use as directed 1 Each 0    Lactobacillus Acidoph & Bulgar (FLORANEX) 1 million cell tab tablet Take 2 Tablets by mouth two (2) times a day. 60 Tablet 0    melatonin, rapid dissolve, 5 mg TbDi tablet Take 2 Tablets by mouth nightly as needed (slsee[). 30 Tablet 0    sevelamer carbonate (RENVELA) 800 mg tab tab Take 2 Tablets by mouth three (3) times daily (with meals). 90 Tablet 0    vit B Cmplx 3-FA-Vit C-Biotin (NEPHRO NIKITA RX) 1- mg-mg-mcg tablet Take 1 Tablet by mouth daily. 30 Tablet 0    atorvastatin (LIPITOR) 40 mg tablet Take 40 mg by mouth daily. gabapentin (NEURONTIN) 300 mg capsule Take 300 mg by mouth nightly. allopurinoL (ZYLOPRIM) 100 mg tablet Take 100 mg by mouth daily. ezetimibe (ZETIA) 10 mg tablet Take 10 mg by mouth. carvediloL (COREG) 12.5 mg tablet Take 12.5 mg by mouth two (2) times a day. amLODIPine (NORVASC) 10 mg tablet Take 10 mg by mouth daily. aspirin 81 mg chewable tablet Take 81 mg by mouth daily. ascorbic acid, vitamin C, (VITAMIN C) 500 mg tablet Take 500 mg by mouth. insulin glargine (LANTUS) 100 unit/mL injection 10 Units by SubCUTAneous route nightly. Wt Readings from Last 3 Encounters:   12/10/22 98 kg (216 lb)   07/19/22 102.6 kg (226 lb 4.8 oz)       John@[employer] Vitals  BP (!) 132/59   Pulse 60   Temp 98.8 °F (37.1 °C)   Resp 16   Wt 98 kg (216 lb)   SpO2 99%   BMI 29.29 kg/m²       ASSESSMENT     Skin impairment Identification:  Type:  surgical    Contributing Factors: diabetes and poor glucose control    Wound Heel Right (Active)   Wound Image   12/12/22 1600   Wound Etiology Diabetic 12/09/22 1038   Dressing Status Clean;Dry; Intact 12/09/22 2250   Cleansed Wound cleanser 12/09/22 1038   Dressing/Treatment Ace wrap 12/09/22 2300   Wound Length (cm) 2.5 cm 12/09/22 1038   Wound Width (cm) 3 cm 12/09/22 1038   Wound Depth (cm) 0.3 cm 12/09/22 1038   Wound Surface Area (cm^2) 7.5 cm^2 12/09/22 1038   Change in Wound Size % 66.67 12/09/22 1038   Wound Volume (cm^3) 2.25 cm^3 12/09/22 1038   Wound Healing % 93 12/09/22 1038   Wound Assessment Devitalized tissue;Dusky;Eschar dry 12/09/22 1038   Drainage Amount Small 12/09/22 1038   Drainage Description Serosanguinous; Thick 12/09/22 1038   Wound Odor Malodorous/Putrid 12/09/22 1038   Leticia-Wound/Incision Assessment Dry/flaky; Intact; Hyperkeratosis (Callous); Maceration 12/09/22 1038   Edges Undefined edges 12/09/22 1038   Wound Thickness Description Full thickness 12/09/22 1038   Number of days: 122       Incision 07/05/22 Foot Right;Dorsal (Active)   Number of days: 160       Incision 12/09/22 Foot Left (Active)   Wound Image     12/12/22 1600   Dressing Status Clean; Intact;Dry 12/12/22 1600   Cleansed Cleansed with saline 12/12/22 1600   Dressing/Treatment Alginate;Gauze dressing/dressing sponge;Roll gauze; Ace wrap 12/12/22 1600   Closure Sutures 12/12/22 1600   Margins Approximated 12/12/22 1600   Incision Assessment Eschar 12/12/22 1600   Drainage Amount None 12/12/22 1600   Wound Odor None 12/12/22 1600   Leticia-Wound/Incision Assessment Cool; Other (Comment) 12/12/22 1600   Number of days: 3              PLAN     Skin Care & Pressure Relief Recommendations  Minimize layers of linen  Pads under patient to optimize support surface    Physician/Provider notified: Yes Dr. Grant Dangelo  Recommendations: leave dressing intact     Teaching completed with:   [x] Patient           [] Family member       [] Caregiver          [] Nursing  [] Other    Patient/Caregiver Teaching:  Level of patient/caregiver understanding able to:   [x] Indicates understanding       [] Needs reinforcement  [] Unsuccessful      [] Verbal Understanding  [] Demonstrated understanding       [] No evidence of learning  [] Refused teaching         [] N/A       Electronically signed by Jus Madrigal RN on 12/12/2022 at 5:18 PM

## 2022-12-12 NOTE — PROGRESS NOTES
[address] [facility] Pharmacokinetic Monitoring Service - Vancomycin    Consulting Provider: Dr. Nicolette Saab   Indication: Diabetic Foot Infection  Target Concentration: Pre-Dialysis Concentration 15-20 mg/L  Day of Therapy: 4  Additional Antimicrobials: zosyn    Pertinent Laboratory Values: Wt Readings from Last 1 Encounters:   12/10/22 98 kg (216 lb)     Temp Readings from Last 1 Encounters:   12/12/22 98.8 °F (37.1 °C)     No components found for: PROCAL  Recent Labs     12/12/22  0154 12/11/22  0447   WBC 16.1* 14.9*       Assessment:  Date/Time Recent Dose Concentration Dialysis Session   12/12/22 at 12:30 Vancomycin 1000 mg IV once, 12/10/22 at 18:41 Random level = 15.8  (12/12/22 at 01:54) Next HD scheduled for 12/13/22     Plan:  Concentration-guided dosing due to renal impairment and intermittent hemodialysis   2. Vancomycin Random level = 15.8 mg/L (12/12/22 at 01:54)  (therapeutic range),     no dialysis scheduled for today (12/12/22)   3. Vancomycin 1000 mg IV once, ordered for 12/13/22 after dialysis.     4.  Pharmacy will continue to monitor patient and adjust therapy as indicated    Thank you for the consult,  TALYA Raines  12/12/2022 12:28 PM

## 2022-12-12 NOTE — PROGRESS NOTES
Nephrology Progress note    Subjective:     Alice Mendoza is a 60 y.o. male with PMHx of  DM, HTN. PVD, ESRD on HD TTS at [facility] under the [address] [place] [address]. Nephrology sent in for admission by wound care clinic due to left foot infection ,was told he needed surgery. Podiatry has been in to see pt . He has been on abx recently     S/P Lt 2/3/4 metatarsal amputation     -Pt w/o complaints. Last HD 12/10.        Admit Date: 12/9/2022  Active Problems:    Diabetes mellitus with foot ulcer ([facility] (6/27/2022)      CAD (coronary artery disease) (6/28/2022)      ESRD (end stage renal disease) ([facility] (6/28/2022)      Hypertension (7/21/2022)      Leukocytosis (12/9/2022)      Diabetic foot infection ([facility] (12/9/2022)      Diarrhea (12/9/2022)      Type 2 diabetes mellitus, with long-term current use of insulin ([facility]) ()      Acute hematogenous osteomyelitis of left foot ([address] (12/9/2022)      Nondisplaced fracture of second metatarsal bone of left foot (12/9/2022)      Nondisplaced fracture of third metatarsal bone of left foot (12/9/2022)      Closed nondisplaced fracture of fourth metatarsal bone of left foot (12/9/2022)      Positive blood culture (12/11/2022)      Current Facility-Administered Medications   Medication Dose Route Frequency    insulin glargine (LANTUS) injection 10 Units  10 Units SubCUTAneous QHS    Vancomycin Random Level with am labs 12/12/2022  1 Each Other ONCE    insulin lispro (HUMALOG) injection   SubCUTAneous AC&HS    heparin (porcine) 1,000 unit/mL injection 3,600 Units  3,600 Units IntraCATHeter DIALYSIS PRN    Vancomycin - Pharmacy to Dose  1 Each Other Rx Dosing/Monitoring    piperacillin-tazobactam (ZOSYN) 4.5 g in 0.9% sodium chloride (MBP/ADV) 100 mL MBP  4.5 g IntraVENous Q12H    vancomycin 50 mg/mL oral solution (compounded) 125 mg  125 mg Oral Q12H    glucose chewable tablet 16 g  4 Tablet Oral PRN    glucagon (GLUCAGEN) injection 1 mg  1 mg IntraMUSCular PRN    dextrose 10% infusion 0-250 mL  0-250 mL IntraVENous PRN    0.9% sodium chloride infusion  25 mL/hr IntraVENous DIALYSIS PRN    clopidogreL (PLAVIX) tablet 75 mg  75 mg Oral DAILY    carvediloL (COREG) tablet 12.5 mg  12.5 mg Oral BID    aspirin chewable tablet 81 mg  81 mg Oral DAILY    amLODIPine (NORVASC) tablet 10 mg  10 mg Oral DAILY    allopurinoL (ZYLOPRIM) tablet 100 mg  100 mg Oral DAILY    ascorbic acid (vitamin C) (VITAMIN C) tablet 500 mg  500 mg Oral DAILY    ezetimibe (ZETIA) tablet 10 mg  10 mg Oral DAILY    pantoprazole (PROTONIX) tablet 40 mg  40 mg Oral ACB    sevelamer carbonate (RENVELA) tab 1,600 mg  1,600 mg Oral TID WITH MEALS    vit B Cmplx 3-FA-Vit C-Biotin (NEPHRO NIKITA RX) tablet 1 Tablet  1 Tablet Oral DAILY    sodium chloride (NS) flush 5-40 mL  5-40 mL IntraVENous Q8H    sodium chloride (NS) flush 5-40 mL  5-40 mL IntraVENous PRN    acetaminophen (TYLENOL) tablet 650 mg  650 mg Oral Q6H PRN    Or    acetaminophen (TYLENOL) suppository 650 mg  650 mg Rectal Q6H PRN    bisacodyL (DULCOLAX) suppository 10 mg  10 mg Rectal DAILY PRN    promethazine (PHENERGAN) tablet 12.5 mg  12.5 mg Oral Q6H PRN    Or    ondansetron (ZOFRAN) injection 4 mg  4 mg IntraVENous Q6H PRN    heparin (porcine) injection 5,000 Units  5,000 Units SubCUTAneous Q8H    oxyCODONE-acetaminophen (PERCOCET) 5-325 mg per tablet 1 Tablet  1 Tablet Oral Q6H PRN         Allergy:   No Known Allergies     Objective:     Visit Vitals  BP (!) 132/59   Pulse 60   Temp 98.8 °F (37.1 °C)   Resp 16   Wt 98 kg (216 lb)   SpO2 99%   BMI 29.29 kg/m²       No intake or output data in the 24 hours ending 12/12/22 1223    Physical Exam:       General: No acute distress   HENT: Atraumatic and normocephalic   Eyes: Normal conjunctiva   Neck: Supple No JVD   Cardiovascular: Normal S1 & S2, no m/r/g   Pulmonary/Chest Wall: Clear to auscultation bilaterally   Abdominal: Soft and non-tender   Musculoskeletal: No edema S/p Amputation of multiple toes Left foot Neurological: No focal deficits    HD Access: RIJ TDC in place  Data Review:  Lab Results   Component Value Date/Time    Sodium 132 (L) 12/12/2022 01:54 AM    Potassium 4.7 12/12/2022 01:54 AM    Chloride 96 (L) 12/12/2022 01:54 AM    CO2 25 12/12/2022 01:54 AM    Anion gap 11 12/12/2022 01:54 AM    Glucose 234 (H) 12/12/2022 01:54 AM    BUN 52 (H) 12/12/2022 01:54 AM    Creatinine 7.56 (H) 12/12/2022 01:54 AM    BUN/Creatinine ratio 7 (L) 12/12/2022 01:54 AM    GFR est AA 13 (L) 07/21/2022 02:15 PM    GFR est non-AA 11 (L) 07/21/2022 02:15 PM    Calcium 7.4 (L) 12/12/2022 01:54 AM     Lab Results   Component Value Date/Time    WBC 16.1 (H) 12/12/2022 01:54 AM    HGB 10.1 (L) 12/12/2022 01:54 AM    HCT 30.9 (L) 12/12/2022 01:54 AM    PLATELET 528 50/85/9097 01:54 AM    MCV 93.1 12/12/2022 01:54 AM     Lab Results   Component Value Date/Time    Calcium 7.4 (L) 12/12/2022 01:54 AM    Phosphorus 4.8 07/11/2022 05:30 AM     No results found for: IRON, FE, TIBC, IBCT, PSAT, FERR  No results found for: FERR      Impression:     ESRD on HD TTS  Left diabetic foot infection w/ gangrenous changes s/p Lt 2/3/4 metatarsal amputation   DM  HTN  PVD  Anemia  SHPT    Plan:     HD tomorrow 12/13  IV Antibiotic per ID  Epo for Anemia    Junior Closs, MD, MPH [facility]  [phone]

## 2022-12-13 LAB
ALBUMIN SERPL-MCNC: 1.9 G/DL (ref 3.4–5)
ANION GAP SERPL CALC-SCNC: 13 MMOL/L (ref 3–18)
BACTERIA SPEC CULT: ABNORMAL
BUN SERPL-MCNC: 68 MG/DL (ref 7–18)
BUN/CREAT SERPL: 7 (ref 12–20)
CALCIUM SERPL-MCNC: 8 MG/DL (ref 8.5–10.1)
CHLORIDE SERPL-SCNC: 96 MMOL/L (ref 100–111)
CO2 SERPL-SCNC: 24 MMOL/L (ref 21–32)
CREAT SERPL-MCNC: 9.44 MG/DL (ref 0.6–1.3)
GLUCOSE BLD STRIP.AUTO-MCNC: 198 MG/DL (ref 70–110)
GLUCOSE BLD STRIP.AUTO-MCNC: 213 MG/DL (ref 70–110)
GLUCOSE BLD STRIP.AUTO-MCNC: 238 MG/DL (ref 70–110)
GLUCOSE BLD STRIP.AUTO-MCNC: 249 MG/DL (ref 70–110)
GLUCOSE SERPL-MCNC: 199 MG/DL (ref 74–99)
MAGNESIUM SERPL-MCNC: 2.1 MG/DL (ref 1.6–2.6)
PHOSPHATE SERPL-MCNC: 3.8 MG/DL (ref 2.5–4.9)
POTASSIUM SERPL-SCNC: 5.3 MMOL/L (ref 3.5–5.5)
SERVICE CMNT-IMP: ABNORMAL
SODIUM SERPL-SCNC: 133 MMOL/L (ref 136–145)

## 2022-12-13 PROCEDURE — 74011636637 HC RX REV CODE- 636/637: Performed by: HOSPITALIST

## 2022-12-13 PROCEDURE — 74011250636 HC RX REV CODE- 250/636: Performed by: EMERGENCY MEDICINE

## 2022-12-13 PROCEDURE — 74011000250 HC RX REV CODE- 250: Performed by: HOSPITALIST

## 2022-12-13 PROCEDURE — 74011000258 HC RX REV CODE- 258: Performed by: EMERGENCY MEDICINE

## 2022-12-13 PROCEDURE — 80069 RENAL FUNCTION PANEL: CPT

## 2022-12-13 PROCEDURE — 82962 GLUCOSE BLOOD TEST: CPT

## 2022-12-13 PROCEDURE — 65270000029 HC RM PRIVATE

## 2022-12-13 PROCEDURE — 74011000250 HC RX REV CODE- 250: Performed by: EMERGENCY MEDICINE

## 2022-12-13 PROCEDURE — 83735 ASSAY OF MAGNESIUM: CPT

## 2022-12-13 PROCEDURE — 97110 THERAPEUTIC EXERCISES: CPT

## 2022-12-13 PROCEDURE — 74011250637 HC RX REV CODE- 250/637: Performed by: HOSPITALIST

## 2022-12-13 PROCEDURE — 74011250636 HC RX REV CODE- 250/636: Performed by: HOSPITALIST

## 2022-12-13 PROCEDURE — 36415 COLL VENOUS BLD VENIPUNCTURE: CPT

## 2022-12-13 RX ORDER — INSULIN LISPRO 100 [IU]/ML
INJECTION, SOLUTION INTRAVENOUS; SUBCUTANEOUS
Status: DISPENSED | OUTPATIENT
Start: 2022-12-14 | End: 2022-12-17

## 2022-12-13 RX ORDER — IBUPROFEN 200 MG
16 TABLET ORAL AS NEEDED
Status: DISCONTINUED | OUTPATIENT
Start: 2022-12-13 | End: 2023-01-12 | Stop reason: HOSPADM

## 2022-12-13 RX ADMIN — SEVELAMER CARBONATE 1600 MG: 800 TABLET, FILM COATED ORAL at 08:55

## 2022-12-13 RX ADMIN — CLOPIDOGREL BISULFATE 75 MG: 75 TABLET ORAL at 08:55

## 2022-12-13 RX ADMIN — B-COMPLEX W/ C & FOLIC ACID TAB 1 MG 1 TABLET: 1 TAB at 08:55

## 2022-12-13 RX ADMIN — PIPERACILLIN AND TAZOBACTAM 4.5 G: 4; .5 INJECTION, POWDER, FOR SOLUTION INTRAVENOUS at 12:43

## 2022-12-13 RX ADMIN — INSULIN LISPRO 3 UNITS: 100 INJECTION, SOLUTION INTRAVENOUS; SUBCUTANEOUS at 16:50

## 2022-12-13 RX ADMIN — AMLODIPINE BESYLATE 10 MG: 5 TABLET ORAL at 08:55

## 2022-12-13 RX ADMIN — VANCOMYCIN HYDROCHLORIDE 1000 MG: 1 INJECTION, POWDER, LYOPHILIZED, FOR SOLUTION INTRAVENOUS at 18:55

## 2022-12-13 RX ADMIN — INSULIN LISPRO 4 UNITS: 100 INJECTION, SOLUTION INTRAVENOUS; SUBCUTANEOUS at 12:39

## 2022-12-13 RX ADMIN — HEPARIN SODIUM 5000 UNITS: 5000 INJECTION INTRAVENOUS; SUBCUTANEOUS at 17:48

## 2022-12-13 RX ADMIN — Medication 125 MG: at 04:30

## 2022-12-13 RX ADMIN — INSULIN LISPRO 4 UNITS: 100 INJECTION, SOLUTION INTRAVENOUS; SUBCUTANEOUS at 16:50

## 2022-12-13 RX ADMIN — SEVELAMER CARBONATE 1600 MG: 800 TABLET, FILM COATED ORAL at 17:00

## 2022-12-13 RX ADMIN — INSULIN LISPRO 2 UNITS: 100 INJECTION, SOLUTION INTRAVENOUS; SUBCUTANEOUS at 08:57

## 2022-12-13 RX ADMIN — SEVELAMER CARBONATE 1600 MG: 800 TABLET, FILM COATED ORAL at 12:38

## 2022-12-13 RX ADMIN — Medication 125 MG: at 18:40

## 2022-12-13 RX ADMIN — INSULIN LISPRO 3 UNITS: 100 INJECTION, SOLUTION INTRAVENOUS; SUBCUTANEOUS at 08:56

## 2022-12-13 RX ADMIN — CARVEDILOL 12.5 MG: 12.5 TABLET, FILM COATED ORAL at 08:55

## 2022-12-13 RX ADMIN — ASPIRIN 81 MG: 81 TABLET, CHEWABLE ORAL at 08:55

## 2022-12-13 RX ADMIN — HEPARIN SODIUM 5000 UNITS: 5000 INJECTION INTRAVENOUS; SUBCUTANEOUS at 02:42

## 2022-12-13 RX ADMIN — INSULIN LISPRO 3 UNITS: 100 INJECTION, SOLUTION INTRAVENOUS; SUBCUTANEOUS at 12:00

## 2022-12-13 RX ADMIN — PANTOPRAZOLE SODIUM 40 MG: 40 TABLET, DELAYED RELEASE ORAL at 08:55

## 2022-12-13 RX ADMIN — HEPARIN SODIUM 5000 UNITS: 5000 INJECTION INTRAVENOUS; SUBCUTANEOUS at 08:56

## 2022-12-13 RX ADMIN — ALLOPURINOL 100 MG: 100 TABLET ORAL at 08:55

## 2022-12-13 RX ADMIN — SODIUM CHLORIDE, PRESERVATIVE FREE 10 ML: 5 INJECTION INTRAVENOUS at 14:00

## 2022-12-13 RX ADMIN — Medication 500 MG: at 08:55

## 2022-12-13 RX ADMIN — PIPERACILLIN AND TAZOBACTAM 4.5 G: 4; .5 INJECTION, POWDER, FOR SOLUTION INTRAVENOUS at 02:42

## 2022-12-13 RX ADMIN — EZETIMIBE 10 MG: 10 TABLET ORAL at 08:56

## 2022-12-13 NOTE — PROGRESS NOTES
Problem: Risk for Spread of Infection  Goal: Prevent transmission of infectious organism to others  Description: Prevent the transmission of infectious organisms to other patients, staff members, and visitors. Outcome: Progressing Towards Goal     Problem: Patient Education:  Go to Education Activity  Goal: Patient/Family Education  Outcome: Progressing Towards Goal     Problem: Diabetes Self-Management  Goal: *Disease process and treatment process  Description: Define diabetes and identify own type of diabetes; list 3 options for treating diabetes. Outcome: Progressing Towards Goal  Goal: *Incorporating nutritional management into lifestyle  Description: Describe effect of type, amount and timing of food on blood glucose; list 3 methods for planning meals. Outcome: Progressing Towards Goal  Goal: *Incorporating physical activity into lifestyle  Description: State effect of exercise on blood glucose levels. Outcome: Progressing Towards Goal  Goal: *Developing strategies to promote health/change behavior  Description: Define the ABC's of diabetes; identify appropriate screenings, schedule and personal plan for screenings. Outcome: Progressing Towards Goal  Goal: *Using medications safely  Description: State effect of diabetes medications on diabetes; name diabetes medication taking, action and side effects. Outcome: Progressing Towards Goal  Goal: *Monitoring blood glucose, interpreting and using results  Description: Identify recommended blood glucose targets  and personal targets. Outcome: Progressing Towards Goal  Goal: *Prevention, detection, treatment of acute complications  Description: List symptoms of hyper- and hypoglycemia; describe how to treat low blood sugar and actions for lowering  high blood glucose level.   Outcome: Progressing Towards Goal  Goal: *Prevention, detection and treatment of chronic complications  Description: Define the natural course of diabetes and describe the relationship of blood glucose levels to long term complications of diabetes. Outcome: Progressing Towards Goal  Goal: *Developing strategies to address psychosocial issues  Description: Describe feelings about living with diabetes; identify support needed and support network  Outcome: Progressing Towards Goal  Goal: *Insulin pump training  Outcome: Progressing Towards Goal  Goal: *Sick day guidelines  Outcome: Progressing Towards Goal  Goal: *Patient Specific Goal (EDIT GOAL, INSERT TEXT)  Outcome: Progressing Towards Goal     Problem: Patient Education: Go to Patient Education Activity  Goal: Patient/Family Education  Outcome: Progressing Towards Goal     Problem: Falls - Risk of  Goal: *Absence of Falls  Description: Document Jaxson Fall Risk and appropriate interventions in the flowsheet. Outcome: Progressing Towards Goal  Note: Fall Risk Interventions:  Mobility Interventions: Assess mobility with egress test, Bed/chair exit alarm, Patient to call before getting OOB         Medication Interventions: Teach patient to arise slowly, Bed/chair exit alarm    Elimination Interventions: Call light in reach    History of Falls Interventions: Bed/chair exit alarm, Utilize gait belt for transfer/ambulation         Problem: Patient Education: Go to Patient Education Activity  Goal: Patient/Family Education  Outcome: Progressing Towards Goal     Problem: Chronic Renal Failure  Goal: *Fluid and electrolytes stabilized  Outcome: Progressing Towards Goal     Problem: Patient Education: Go to Patient Education Activity  Goal: Patient/Family Education  Outcome: Progressing Towards Goal     Problem: Pressure Injury - Risk of  Goal: *Prevention of pressure injury  Description: Document Semaj Scale and appropriate interventions in the flowsheet.   Outcome: Progressing Towards Goal  Note: Pressure Injury Interventions:  Sensory Interventions: Pressure redistribution bed/mattress (bed type)    Moisture Interventions: Absorbent underpads    Activity Interventions: Pressure redistribution bed/mattress(bed type)    Mobility Interventions: Pressure redistribution bed/mattress (bed type)    Nutrition Interventions: Document food/fluid/supplement intake    Friction and Shear Interventions: Minimize layers                Problem: Patient Education: Go to Patient Education Activity  Goal: Patient/Family Education  Outcome: Progressing Towards Goal     Problem: Patient Education: Go to Patient Education Activity  Goal: Patient/Family Education  Outcome: Progressing Towards Goal

## 2022-12-13 NOTE — PROGRESS NOTES
Hospitalist Progress Note-critical care note     Patient: Ezra Andrews MRN: [id number]  [facility]: [id number]    YOB: 1960  Age: 60 y.o.   Sex: male    DOA: 12/9/2022 LOS:  LOS: 4 days            Chief complaint: leukocytosis, positive bcx , foot infection , om  esrd on hd     Assessment/Plan         Hospital Problems  Date Reviewed: 6/28/2022            Codes Class Noted POA    Positive blood culture ICD-10-CM: R78.81  ICD-9-CM: 790.7  12/11/2022 Unknown        Leukocytosis ICD-10-CM: D72.829  ICD-9-CM: 288.60  12/9/2022 Unknown        Diabetic foot infection ([facility] ICD-10-CM: E11.628, L08.9  ICD-9-CM: 250.80, 686.9  12/9/2022 Unknown        Diarrhea ICD-10-CM: R19.7  ICD-9-CM: 787.91  12/9/2022 Unknown        Type 2 diabetes mellitus, with long-term current use of insulin ([facility] ICD-10-CM: E11.9, Z79.4  ICD-9-CM: 250.00, V58.67  Unknown Unknown        Acute hematogenous osteomyelitis of left foot ([facility] ICD-10-CM: M86.072  ICD-9-CM: 730.07  12/9/2022 Unknown        Nondisplaced fracture of second metatarsal bone of left foot ICD-10-CM: Y06.378Y  ICD-9-CM: 825.25  12/9/2022 Unknown        Nondisplaced fracture of third metatarsal bone of left foot ICD-10-CM: H07.633T  ICD-9-CM: 825.25  12/9/2022 Unknown        Closed nondisplaced fracture of fourth metatarsal bone of left foot ICD-10-CM: S92.345A  ICD-9-CM: 825.25  12/9/2022 Unknown        Hypertension ICD-10-CM: I10  ICD-9-CM: 401.9  7/21/2022 Yes        CAD (coronary artery disease) ICD-10-CM: I25.10  ICD-9-CM: 414.00  6/28/2022 Yes        ESRD (end stage renal disease) ([facility] ICD-10-CM: N18.6  ICD-9-CM: 585.6  6/28/2022 Yes        Diabetes mellitus with foot ulcer ([facility] ICD-10-CM: E11.621, L97.509  ICD-9-CM: 250.80, 707.15  6/27/2022 Yes            Gangrene of left foot, leukocytosis 16 K   PARTIAL AMPUTATION OF LEFT 2ND, 3RD, 4TH METATARSALS, AMPUTATION OF TOES 2,3,4, INCISION ADN DRAINAGE LEFT FOOT DEEP per dr. Diane Hung   Discussed with  Jennifer-planning surgery on Friday , will peace and duplex of LE artery   ID and podiatrist on board  Continue vanc and zosyn ,wound cx G-     Positive blood cx  STAPHYLOCOCCUS SPECIES GROWING IN 1 OF 2 BOTTLES  Will see ID recommendation     Diabetic foot ulcer  Follow-up with Dr. Sam Bolton wound care     CAD, last stent Severe single-vessel coronary artery disease. Distal RCA to drug-eluting stent in July 2022, continue plavix -he took plavix today   No chest pain  Reported epigastric pain  EKG PVC     Hypertension continue home medication     Diarrhea epigastric pain  CT abdomen no acute process  Resolved      End-stage renal disease on HD  TTS, will have hd tomorrow      DM  type II   Lantus at night , ssi will increase lantus to 10     Subjective  I feel fine , I am ok to go to  rehab     Disposition :tbd,   Review of systems:    General: No fevers or chills. Cardiovascular: No chest pain or pressure. No palpitations. Pulmonary: No shortness of breath. Gastrointestinal: No nausea, vomiting. No  abdomen pain     Vital signs/Intake and Output:  Visit Vitals  BP (!) 149/58   Pulse 60   Temp 98.7 °F (37.1 °C)   Resp 18   Wt 99.3 kg (219 lb)   SpO2 98%   BMI 29.70 kg/m²     Current Shift:  No intake/output data recorded. Last three shifts:  No intake/output data recorded. Physical Exam:  General: WD, WN. Alert, cooperative, no acute distress    HEENT: NC, Atraumatic. PERRLA, anicteric sclerae. Lungs: CTA Bilaterally. No Wheezing/Rhonchi/Rales. Heart:  Regular  rhythm,  No murmur, No Rubs, No Gallops  Abdomen: Soft, Non distended, Non tender. +Bowel sounds,   Extremities: No c/c, both feet wrapped with gauze   Psych:   Not anxious or agitated. Neurologic:  No acute neurological deficit.              Labs: Results:       Chemistry Recent Labs     12/13/22  0254 12/12/22  0154 12/11/22  0447   * 234* 251*   * 132* 132*   K 5.3 4.7 4.6   CL 96* 96* 97*   CO2 24 25 25   BUN 68* 52* 38* CREA 9.44* 7.56* 5.96*   CA 8.0* 7.4* 7.5*   AGAP 13 11 10   BUCR 7* 7* 6*   AP  --   --  120*   TP  --   --  6.7   ALB 1.9*  --  2.2*   GLOB  --   --  4.5*   AGRAT  --   --  0.5*        CBC w/Diff Recent Labs     12/12/22  0154 12/11/22  0447   WBC 16.1* 14.9*   RBC 3.32* 3.42*   HGB 10.1* 10.1*   HCT 30.9* 32.1*    202   GRANS 79* 81*   LYMPH 9* 8*   EOS 2 2        Cardiac Enzymes No results for input(s): CPK, CKND1, PAULO in the last 72 hours. No lab exists for component: CKRMB, TROIP   Coagulation No results for input(s): PTP, INR, APTT, INREXT, INREXT in the last 72 hours. Lipid Panel Lab Results   Component Value Date/Time    Cholesterol, total 188 07/19/2022 03:12 AM    HDL Cholesterol 42 07/19/2022 03:12 AM    LDL, calculated 131.2 (H) 07/19/2022 03:12 AM    VLDL, calculated 14.8 07/19/2022 03:12 AM    Triglyceride 74 07/19/2022 03:12 AM    CHOL/HDL Ratio 4.5 07/19/2022 03:12 AM      BNP No results for input(s): BNPP in the last 72 hours. Liver Enzymes Recent Labs     12/13/22  0254 12/11/22  0447   TP  --  6.7   ALB 1.9* 2.2*   AP  --  120*        Thyroid Studies No results found for: T4, T3U, TSH, TSHEXT, TSHEXT     Procedures/imaging: see electronic medical records for all procedures/Xrays and details which were not copied into this note but were reviewed prior to creation of Plan    XR FOOT LT AP/LAT    Result Date: 12/9/2022  EXAM: 2 radiographic views of the left foot. INDICATION: Left foot pain. COMPARISON: December 9, 2022 _______________ FINDINGS: There is been interval amputation of the second, third, and fourth rays at the level of the metatarsal diaphysis. As before, the first ray is amputated at the metatarsal phalangeal joint. The small toe remains. There are expected postoperative findings to include regional air density and soft tissue swelling. There is Monckeberg type vascular calcification. There is low-grade mid foot degeneration.  _______________     Standard immediate postoperative findings. _______________     XR FOOT LT MIN 3 V    Result Date: 12/9/2022  EXAM: XR FOOT LT MIN 3 V CLINICAL INDICATION/HISTORY: Gangrenous 2nd digit   > Additional: None. COMPARISON: None. TECHNIQUE: 3 views left foot _______________ FINDINGS: Soft tissue gas formation is seen along the second digit with ill-defined limitus changes extending along the medial forefoot. Prior amputation first right. Patchy demineralization and osseous erosions are seen involving the distal portion of the first metatarsal head. Additional patchy areas of osseous erosion are also noted involving the proximal phalanx of the second toe. Diffuse soft tissue swelling. Diffuse atherosclerotic vascular calcifications. No retained radiopaque foreign object. _______________     Soft gas formation and ulceration involving the medial forefoot with findings concerning for osteomyelitis involving the first and second rays described above. MRI FOOT LT WO CONT    Result Date: 12/9/2022  EXAM: MRI FOOT LT WO CONT CLINICAL INDICATION/HISTORY: Foot wound; preoperative  >Additional: None COMPARISON: Radiograph performed December 9, 2022  >Reference exam: None. TECHNIQUE: Axial long axis TI and T2 with fat saturation, sagittal and coronal short axis TI and STIR sequences through the foot were obtained without contrast. _______________ FINDINGS: FIRST RAY: Prior dictation the first digit. There is mild bony proliferative change at the head of the first metatarsal with preserved marrow signal. Minimal patchy edema is noted which is nonspecific. No definite cortical destructive change. SECOND RAY: Nondisplaced obliquely oriented fracture through the head neck junction of the second metatarsal. There is heterogeneous diminished T1 marrow with patchy edema and surrounding soft tissue gas along the course of the second digit with associated subluxation of the distal phalanx.  Subtle diminished T1 marrow is noted with suspected foci of intraosseous gas, concerning for acute osteomyelitis. THIRD RAY: Nondisplaced fracture through the third metatarsal neck with slight impaction. Mild edema is noted within the third digit, possibly stress reaction. No convincing osseous erosions or T1 marrow signal change. FOURTH RAY: Nondisplaced fractures of the neck of the fourth metatarsal. Minimal edema in the proximal phalanx but otherwise preserved marrow signal. FIFTH RAY: Unremarkable. Additional degenerative changes with patchy edema, joint space narrowing, and osteophyte formation at the midfoot, likely related to underlying Charcot arthropathy. SOFT TISSUES: Soft tissue irregularity with gas and ulceration around the second digit noted. Diffuse fluid signal seen throughout the intrinsic foot musculature, commonly seen in the setting of diabetes. Mild skin thickening about the forefoot. INCIDENTAL FINDINGS: None. _______________     1. Marrow signal abnormality with soft tissue wound and gas around the second digit concerning for acute osteomyelitis. 2.  Nondisplaced fractures of the head neck junction of the second-fourth metatarsals. 3.  Prior indication the first digit. 4.  Soft tissue swelling and skin thickening about the forefoot concerning for cellulitis. No drainable abscess. 5.  Additional chronic/degenerative changes of the foot. CT ABD PELV WO CONT    Result Date: 12/9/2022  EXAM: CT of the abdomen and pelvis INDICATION: Abdominal pain with distention. COMPARISON: None. TECHNIQUE: Axial CT imaging of the abdomen and pelvis was performed without intravenous contrast. Multiplanar reformats were generated. One or more dose reduction techniques were used on this CT: automated exposure control, adjustment of the mAs and/or kVp according to patient size, and iterative reconstruction techniques. The specific techniques used on this CT exam have been documented in the patient's electronic medical record.   Digital Imaging and Communications in Medicine (DICOM) format image data are available to nonaffiliated external healthcare facilities or entities on a secure, media free, reciprocally searchable basis with patient authorization for at least a 12-month period after this study. _______________ FINDINGS: LOWER CHEST: Partial inclusion of multivessel coronary arterial atherosclerosis and central venous catheter. Clear lung bases. Several punctate 2 to 3 mm pulmonary nodule seen in the right lower lobe (image 8) LIVER, BILIARY: Liver is normal. No biliary dilation. Color contains a gallstone without evidence of dilatation or gallbladder wall thickening. PANCREAS: Normal. SPLEEN: Punctate granulomatous calcifications otherwise unremarkable. ADRENALS: Mild adreniform thickening of each adrenal gland. KIDNEYS/URETERS/BLADDER: No hydronephrosis. Bilateral renal hypodensities are present either to small to accurately characterize or in keeping with simple cysts which are prior no further dedicated imaging follow-up. Bladder decompressed. PELVIC ORGANS: Unremarkable. VASCULATURE: Diffuse aortic and visceral arterial atherosclerosis without evidence of aneurysmal dilatation. LYMPH NODES: Scattered subcentimeter mesenteric and retroperitoneal lymph nodes are demonstrated without evidence of adenopathy. Prominent left inguinal lymph node is present (image 127) measuring approximately 15 mm in short axis dimension. GASTROINTESTINAL TRACT: No bowel dilation or wall thickening. Peritoneal gas. Normal appendix. Scattered colonic diverticula without evidence of diverticulitis. BONES: No acute or aggressive osseous abnormalities identified. OTHER: None. _______________     1. Cholelithiasis without evidence of cholecystitis. 2. No abnormal bowel wall thickening or dilatation. 3. No hydronephrosis or obstructive uropathy. 4. Several small right lower lobe pulmonary nodules (2-3 mm in size). See below guidelines for proposed follow-up.  5. Borderline enlarged and nonspecific left inguinal lymph node, potentially reactive in etiology. ======== Fleischner Society pulmonary nodule guidelines (revised 2017): Multiple solid nodules <6 mm: -Low risk for lung cancer: No follow-up. -High risk for lung cancer: Optional chest CT in 12 months. XR CHEST PORT    Result Date: 12/9/2022  EXAM: XR CHEST PORT CLINICAL INDICATION/HISTORY: sepsis -Additional: None COMPARISON: July 12, 2022 TECHNIQUE: Portable frontal view of the chest _______________ FINDINGS: SUPPORT DEVICES: Right IJ approach dialysis catheter in stable position. HEART AND MEDIASTINUM: Stable appearing cardiac size and mediastinal contours. LUNGS AND PLEURAL SPACES: Lungs are clear. No focal pneumonic opacity. No pneumothorax or pleural effusion. BONY THORAX AND SOFT TISSUES: Unremarkable. _______________     No active cardiopulmonary disease.        Josue Trinidad MD

## 2022-12-13 NOTE — PROGRESS NOTES
Problem: Mobility Impaired (Adult and Pediatric)  Goal: *Acute Goals and Plan of Care (Insert Text)  Description: Physical Therapy Goals  Initiated 12/11/2022  1. Patient will move from supine to sit and sit to supine  in bed with supervision/set-up within 7 day(s). 2.  Patient will transfer from bed to chair and chair to bed with minimal assistance/contact guard assist using the least restrictive device within 7 day(s). 3.  Patient will perform sit to stand with minimal assistance/contact guard assist within 7 day(s). 4.  Patient will ambulate with minimal assistance/contact guard assist for 10 feet with the least restrictive device within 7 day(s). Outcome: Progressing Towards Goal   []  Patient has met MD mobilization sahara for d/c home   []  Recommend HH with 24 hour adult care   []  Benefit from additional acute PT session to address:  rehab placement    PHYSICAL THERAPY TREATMENT    Patient: Akiko Hartley (57 y.o. male)  Date: 12/13/2022  Diagnosis: Diabetic foot infection ([facility] [E11.628, L08.9]  Leukocytosis [D72.829] Gas gangrene ([facility]  Procedure(s) (LRB):  PARTIAL AMPUTATION OF LEFT 2ND, 3RD, 4TH METATARSALS, AMPUTATION OF TOES 2,3,4, INCISION AND DRAINAGE LEFT FOOT DEEP. (Left) 4 Days Post-Op  Precautions: Fall, NWB  PLOF: lives alone w/c dependent, neighbor assists with stairs    ASSESSMENT:  Pt supine in bed upon arrival, increased time and Andrade to sit up EOB with multiple attempts. When sitting up pt toppling over onto elbow requiring Andrade to obtain balance. Performed seated LAQs on (B)LEs. Pt able to scoot along EOB with Andrade and increased time. Returned to supine and performed supine exercises.   Progression toward goals:   []      Improving appropriately and progressing toward goals  [x]      Improving slowly and progressing toward goals  []      Not making progress toward goals and plan of care will be adjusted     PLAN:  Patient continues to benefit from skilled intervention to address the above impairments. Continue treatment per established plan of care. Discharge Recommendations: Rehab  Further Equipment Recommendations for Discharge:  N/A    AMPAC: 10/20    This AMPAC score should be considered in conjunction with interdisciplinary team recommendations to determine the most appropriate discharge setting. Patient's social support, diagnosis, medical stability, and prior level of function should also be taken into consideration. SUBJECTIVE:   Patient stated ok.     OBJECTIVE DATA SUMMARY:   Critical Behavior:  Neurologic State: Alert, Appropriate for age  Orientation Level: Oriented X4  Cognition: Follows commands  Safety/Judgement: Decreased awareness of environment, Decreased awareness of need for assistance, Decreased awareness of need for safety  Functional Mobility Training:  Bed Mobility:    Supine to Sit: Additional time;Contact guard assistance;Minimum assistance  Sit to Supine: Stand-by assistance  Balance:  Sitting: Impaired  Sitting - Static: Fair (occasional)  Sitting - Dynamic: Fair (occasional)  Therapeutic Exercises:   (B)LEs      EXERCISE   Sets   Reps   Active Active Assist   Passive Self ROM   Comments   Ankle Pumps    [] [] [] []    Quad Sets/Glut Sets  10ea  [x] [] [] [] Hold for 5 secs   Hamstring Sets   [] [] [] []    Short Arc Quads   [] [] [] []    Heel Slides   [] [] [] []    Straight Leg Raises   [] [] [] []    Hip Add   [] [] [] [] Hold for 5 secs, w/ pillow squeeze   Long Arc Quads  10 [x] [] [] []    Seated Marching   [] [] [] []    Standing Marching   [] [] [] []       [] [] [] []        Pain:  Pain level pre-treatment: 5/10  Pain level post-treatment: 5/10   Pain Intervention(s): Medication (see MAR); Rest, Ice, Repositioning   Response to intervention: Nurse notified, See doc flow    Activity Tolerance:   Fair-  Please refer to the flowsheet for vital signs taken during this treatment.   After treatment:   [] Patient left in no apparent distress sitting up in chair  [x] Patient left in no apparent distress in bed  [x] Call bell left within reach  [] Nursing notified  [] Caregiver present  [] Bed alarm activated  [] SCDs applied      COMMUNICATION/EDUCATION:   [x]         Role of Physical Therapy in the acute care setting. [x]         Fall prevention education was provided and the patient/caregiver indicated understanding. [x]         Patient/family have participated as able in working toward goals and plan of care. [x]         Patient/family agree to work toward stated goals and plan of care. []         Patient understands intent and goals of therapy, but is neutral about his/her participation. []         Patient is unable to participate in stated goals/plan of care: ongoing with therapy staff.  []         Other:        Gelacio Thomas PTA   Time Calculation: 13 mins    Phil Carballo AM-PAC® Basic Mobility Inpatient Short Form (6-Clicks) Version 2    How much HELP from another person does the patient currently need    (If the patient hasn't done an activity recently, how much help from another person do you think he/she would need if he/she tried?)   Total (Total A or Dep)   A Lot  (Mod to Max A)   A Little (Sup or Min A)   None (Mod I to I)   Turning from your back to your side while in a flat bed without using bedrails? [] 1 [] 2 [x] 3 [] 4   2. Moving from lying on your back to sitting on the side of a flat bed without using bedrails? [] 1 [] 2 [x] 3 [] 4   3. Moving to and from a bed to a chair (including a wheelchair)? [] 1 [x] 2 [] 3 [] 4   4. Standing up from a chair using your arms (e.g., wheelchair, or bedside chair)? [x] 1 [] 2 [] 3 [] 4   5. Walking in hospital room? [x] 1 [] 2 [] 3 [] 4   6. Climbing 3-5 steps with a railing?+   [] 1 [] 2 [] 3 [] 4   +If stair climbing cannot be assessed, skip item #6. Sum responses from items 1-5.      Based on an AM-PAC score of **/24 (or **/20 if omitting stairs) and their current functional mobility deficits, it is recommended that the patient have 5-7 sessions per week of Physical Therapy at d/c to increase the patient's independence. Currently, this patient demonstrates the potential endurance, and/or tolerance for 3 hours of therapy each day at d/c. Based on an AM-PAC score of **/24 (10/20 if omitting stairs) and their current functional mobility deficits, it is recommended that the patient have 3-5 sessions per week of Physical Therapy at d/c to increase the patient's independence. Based on an AM-PAC score of **/24 (or **/20 if omitting stairs) and their current functional mobility deficits, it is recommended that the patient have 2-3 sessions per week of Physical Therapy at d/c to increase the patient's independence. At this time and based on an AM-PAC score of **/24 (or **/20 if omitting stairs), no further PT is recommended upon discharge due to (i.e. patient at baseline functional statusetc). Recommend patient returns to prior setting with prior services.

## 2022-12-13 NOTE — PROGRESS NOTES
Problem: Mobility Impaired (Adult and Pediatric)  Goal: *Acute Goals and Plan of Care (Insert Text)  Description: Physical Therapy Goals  Initiated 12/11/2022  1. Patient will move from supine to sit and sit to supine  in bed with supervision/set-up within 7 day(s). 2.  Patient will transfer from bed to chair and chair to bed with minimal assistance/contact guard assist using the least restrictive device within 7 day(s). 3.  Patient will perform sit to stand with minimal assistance/contact guard assist within 7 day(s). 4.  Patient will ambulate with minimal assistance/contact guard assist for 10 feet with the least restrictive device within 7 day(s). Note:   PHYSICAL THERAPY TREATMENT    Patient: Katelynn Darden (41 y.o. male)  Date: 12/12/2022  Diagnosis: Diabetic foot infection ([facility] [address] [E11.628, L08.9]  Leukocytosis [D72.829] Gas gangrene ([facility] [address]  Procedure(s) (LRB):  PARTIAL AMPUTATION OF LEFT 2ND, 3RD, 4TH METATARSALS, AMPUTATION OF TOES 2,3,4, INCISION AND DRAINAGE LEFT FOOT DEEP. (Left) 3 Days Post-Op  Precautions: Fall, NWB    ASSESSMENT:  Pt required Mod-MaxA for transfer bed to chair; max verbal and tactile cues for adhering to NWB. Good static sitting balance and ability to perform LE there ex. Pt performed transition to supine with SBA. Recommend SNF at d/c. Progression toward goals:   []      Improving appropriately and progressing toward goals  [x]      Improving slowly and progressing toward goals  []      Not making progress toward goals and plan of care will be adjusted     PLAN:  Patient continues to benefit from skilled intervention to address the above impairments. Continue treatment per established plan of care. Discharge Recommendations: Hakan Jez  Further Equipment Recommendations for Discharge:  N/A    AMPA: 11/20    This AMPA score should be considered in conjunction with interdisciplinary team recommendations to determine the most appropriate discharge setting. Patient's social support, diagnosis, medical stability, and prior level of function should also be taken into consideration. SUBJECTIVE:   Patient stated I liked sitting in chair.     OBJECTIVE DATA SUMMARY:   Critical Behavior:  Neurologic State: Alert  Orientation Level: Oriented X4  Cognition: Follows commands  Safety/Judgement: Decreased awareness of environment, Decreased awareness of need for assistance, Decreased awareness of need for safety  Functional Mobility Training:  Bed Mobility:   Supine to Sit: Stand-by assistance  Sit to Supine: Stand-by assistance  Transfers:  Sit to Stand: Maximum assistance  Stand to Sit: Maximum assistance  Bed to Chair: Moderate assistance;Assist x2  Balance:  Sitting: Impaired; With support;High guard  Sitting - Static: Good (unsupported)  Sitting - Dynamic: Fair (occasional)  Standing: Impaired; With support  Standing - Static: Poor  Standing - Dynamic : Poor  Pain:  Pain level pre-treatment: 0/10  Pain level post-treatment: 0/10     Activity Tolerance:   Fair  Please refer to the flowsheet for vital signs taken during this treatment. After treatment:   [] Patient left in no apparent distress sitting up in chair  [x] Patient left in no apparent distress in bed  [x] Call bell left within reach  [x] Nursing notified  [] Caregiver present  [] Bed alarm activated  [] SCDs applied      COMMUNICATION/EDUCATION:   [x]         Role of Physical Therapy in the acute care setting. [x]         Fall prevention education was provided and the patient/caregiver indicated understanding. [x]         Patient/family have participated as able in working toward goals and plan of care. []         Patient/family agree to work toward stated goals and plan of care. []         Patient understands intent and goals of therapy, but is neutral about his/her participation.   []         Patient is unable to participate in stated goals/plan of care: ongoing with therapy staff.  []         Other: Selina Lopez   Time Calculation: 10 mins    [address] [facility] [address] AM-PAC® Basic Mobility Inpatient Short Form (6-Clicks) Version 2    How much HELP from another person does the patient currently need    (If the patient hasn't done an activity recently, how much help from another person do you think he/she would need if he/she tried?)   Total (Total A or Dep)   A Lot  (Mod to Max A)   A Little (Sup or Min A)   None (Mod I to I)   Turning from your back to your side while in a flat bed without using bedrails? [] 1 [] 2 [x] 3 [] 4   2. Moving from lying on your back to sitting on the side of a flat bed without using bedrails? [] 1 [] 2 [x] 3 [] 4   3. Moving to and from a bed to a chair (including a wheelchair)? [] 1 [x] 2 [] 3 [] 4   4. Standing up from a chair using your arms (e.g., wheelchair, or bedside chair)? [] 1 [x] 2 [] 3 [] 4   5. Walking in hospital room? [x] 1 [] 2 [] 3 [] 4   6. Climbing 3-5 steps with a railing?+   [] 1 [] 2 [] 3 [] 4   +If stair climbing cannot be assessed, skip item #6. Sum responses from items 1-5.

## 2022-12-13 NOTE — PROGRESS NOTES
Nephrology Progress note    Subjective:     Ezra Andrews is a 60 y.o. male with PMHx of  DM, HTN. PVD, ESRD on HD TTS at [facility] under the [address] [place] [address]. Nephrology sent in for admission by wound care clinic due to left foot infection ,was told he needed surgery. Podiatry has been in to see pt . He has been on abx recently     S/P Lt 2/3/4 metatarsal amputation     -Pt w/o complaints.         Admit Date: 12/9/2022  Active Problems:    Diabetes mellitus with foot ulcer ([facility] [address] (6/27/2022)      CAD (coronary artery disease) (6/28/2022)      ESRD (end stage renal disease) ([facility] [address] (6/28/2022)      Hypertension (7/21/2022)      Leukocytosis (12/9/2022)      Diabetic foot infection ([facility] [address] (12/9/2022)      Diarrhea (12/9/2022)      Type 2 diabetes mellitus, with long-term current use of insulin ([facility]) ()      Acute hematogenous osteomyelitis of left foot ([facility] [address] (12/9/2022)      Nondisplaced fracture of second metatarsal bone of left foot (12/9/2022)      Nondisplaced fracture of third metatarsal bone of left foot (12/9/2022)      Closed nondisplaced fracture of fourth metatarsal bone of left foot (12/9/2022)      Positive blood culture (12/11/2022)    Current Facility-Administered Medications   Medication Dose Route Frequency    vancomycin (VANCOCIN) 1,000 mg in 0.9% sodium chloride 250 mL (VIAL-MATE)  1,000 mg IntraVENous ONCE    epoetin lisette-epbx (RETACRIT) injection 4,000 Units  4,000 Units SubCUTAneous Q TUE, THU & SAT    insulin lispro (HUMALOG) injection 3 Units  3 Units SubCUTAneous TIDAC    insulin glargine (LANTUS) injection 10 Units  10 Units SubCUTAneous QHS    insulin lispro (HUMALOG) injection   SubCUTAneous AC&HS    heparin (porcine) 1,000 unit/mL injection 3,600 Units  3,600 Units IntraCATHeter DIALYSIS PRN    Vancomycin - Pharmacy to Dose  1 Each Other Rx Dosing/Monitoring    piperacillin-tazobactam (ZOSYN) 4.5 g in 0.9% sodium chloride (MBP/ADV) 100 mL MBP  4.5 g IntraVENous Q12H    vancomycin 50 mg/mL oral solution (compounded) 125 mg  125 mg Oral Q12H    glucose chewable tablet 16 g  4 Tablet Oral PRN    glucagon (GLUCAGEN) injection 1 mg  1 mg IntraMUSCular PRN    dextrose 10% infusion 0-250 mL  0-250 mL IntraVENous PRN    0.9% sodium chloride infusion  25 mL/hr IntraVENous DIALYSIS PRN    clopidogreL (PLAVIX) tablet 75 mg  75 mg Oral DAILY    carvediloL (COREG) tablet 12.5 mg  12.5 mg Oral BID    aspirin chewable tablet 81 mg  81 mg Oral DAILY    amLODIPine (NORVASC) tablet 10 mg  10 mg Oral DAILY    allopurinoL (ZYLOPRIM) tablet 100 mg  100 mg Oral DAILY    ascorbic acid (vitamin C) (VITAMIN C) tablet 500 mg  500 mg Oral DAILY    ezetimibe (ZETIA) tablet 10 mg  10 mg Oral DAILY    pantoprazole (PROTONIX) tablet 40 mg  40 mg Oral ACB    sevelamer carbonate (RENVELA) tab 1,600 mg  1,600 mg Oral TID WITH MEALS    vit B Cmplx 3-FA-Vit C-Biotin (NEPHRO NIKITA RX) tablet 1 Tablet  1 Tablet Oral DAILY    sodium chloride (NS) flush 5-40 mL  5-40 mL IntraVENous Q8H    sodium chloride (NS) flush 5-40 mL  5-40 mL IntraVENous PRN    acetaminophen (TYLENOL) tablet 650 mg  650 mg Oral Q6H PRN    Or    acetaminophen (TYLENOL) suppository 650 mg  650 mg Rectal Q6H PRN    bisacodyL (DULCOLAX) suppository 10 mg  10 mg Rectal DAILY PRN    promethazine (PHENERGAN) tablet 12.5 mg  12.5 mg Oral Q6H PRN    Or    ondansetron (ZOFRAN) injection 4 mg  4 mg IntraVENous Q6H PRN    heparin (porcine) injection 5,000 Units  5,000 Units SubCUTAneous Q8H    oxyCODONE-acetaminophen (PERCOCET) 5-325 mg per tablet 1 Tablet  1 Tablet Oral Q6H PRN         Allergy:   No Known Allergies     Objective:     Visit Vitals  BP (!) 149/58   Pulse 60   Temp 98.7 °F (37.1 °C)   Resp 18   Wt 99.3 kg (219 lb)   SpO2 98%   BMI 29.70 kg/m²       No intake or output data in the 24 hours ending 12/13/22 1333    Physical Exam:       General: No acute distress   HENT: Atraumatic and normocephalic   Eyes: Normal conjunctiva   Neck: Supple No JVD   Cardiovascular: Normal S1 & S2, no m/r/g   Pulmonary/Chest Wall: Clear to auscultation bilaterally   Abdominal: Soft and non-tender   Musculoskeletal: No edema S/p Amputation of multiple toes Left foot   Neurological: No focal deficits    HD Access: RIJ TDC in place  Data Review:  Lab Results   Component Value Date/Time    Sodium 133 (L) 12/13/2022 02:54 AM    Potassium 5.3 12/13/2022 02:54 AM    Chloride 96 (L) 12/13/2022 02:54 AM    CO2 24 12/13/2022 02:54 AM    Anion gap 13 12/13/2022 02:54 AM    Glucose 199 (H) 12/13/2022 02:54 AM    BUN 68 (H) 12/13/2022 02:54 AM    Creatinine 9.44 (H) 12/13/2022 02:54 AM    BUN/Creatinine ratio 7 (L) 12/13/2022 02:54 AM    GFR est AA 13 (L) 07/21/2022 02:15 PM    GFR est non-AA 11 (L) 07/21/2022 02:15 PM    Calcium 8.0 (L) 12/13/2022 02:54 AM     Lab Results   Component Value Date/Time    WBC 16.1 (H) 12/12/2022 01:54 AM    HGB 10.1 (L) 12/12/2022 01:54 AM    HCT 30.9 (L) 12/12/2022 01:54 AM    PLATELET 483 58/87/7207 01:54 AM    MCV 93.1 12/12/2022 01:54 AM     Lab Results   Component Value Date/Time    Calcium 8.0 (L) 12/13/2022 02:54 AM    Phosphorus 3.8 12/13/2022 02:54 AM     No results found for: IRON, FE, TIBC, IBCT, PSAT, FERR  No results found for: FERR      Impression:     ESRD on HD TTS  Left diabetic foot infection w/ gangrenous changes s/p Lt 2/3/4 metatarsal amputation   DM  HTN  PVD  Anemia  SHPT    Plan:     HD today  IV Antibiotic per ID  Epo for Anemia    Addendum  Pt have not received HD due to staff shortage.      Dev Boo MD,   Bonifacio Barrera  [phone]

## 2022-12-13 NOTE — WOUND CARE
IP WOUND CONSULT    Robe Morgan  MEDICAL RECORD NUMBER:  [id number]  AGE: 60 y.o. GENDER: male  : 1960  TODAY'S DATE:  2022    GENERAL     [x] Follow-up   [] New Consult    [] Present on Admission  [] Hospital Acquired    Robe Morgan is a 60 y.o. male referred by:   [x] Physician  [] Nursing  [] Other:         PAST MEDICAL HISTORY    Past Medical History:   Diagnosis Date    CAD (coronary artery disease)     Chronic kidney disease     Diabetes mellitus     Hypertension     Sleep apnea         PAST SURGICAL HISTORY    Past Surgical History:   Procedure Laterality Date    HX ORTHOPAEDIC      HX PACEMAKER      IR INSERT TUNL CVC W/O PORT OVER 5 YR  2022    IR INSERT TUNL CVC W/O PORT OVER 5 YR  2022    IR REMOVE TUNL CVAD W/O PORT / PUMP  2022    VT CARDIAC SURG PROCEDURE UNLIST         FAMILY HISTORY    Family History   Problem Relation Age of Onset    Heart Disease Mother     Diabetes Father     Diabetes Sister     Diabetes Brother        SOCIAL HISTORY    Social History     Tobacco Use    Smoking status: Never    Smokeless tobacco: Never   Vaping Use    Vaping Use: Never used   Substance Use Topics    Alcohol use: Not Currently    Drug use: Never       ALLERGIES    No Known Allergies    MEDICATIONS    No current facility-administered medications on file prior to encounter. Current Outpatient Medications on File Prior to Encounter   Medication Sig Dispense Refill    clopidogreL (PLAVIX) 75 mg tab Take 1 Tablet by mouth in the morning. 30 Tablet 2    isosorbide mononitrate ER (IMDUR) 30 mg tablet Take 1 Tablet by mouth daily. 30 Tablet 0    pantoprazole (PROTONIX) 40 mg tablet Take 1 Tablet by mouth Daily (before breakfast). 30 Tablet 0    bacitracin zinc (BACITRACIN) ointment Apply  to affected area two (2) times a day. 15 g 0    nystatin (MYCOSTATIN) powder Apply  to affected area two (2) times a day.  5 g 0    insulin lispro (HUMALOG) 100 unit/mL injection Use as directed 1 Each 0    Lactobacillus Acidoph & Bulgar (FLORANEX) 1 million cell tab tablet Take 2 Tablets by mouth two (2) times a day. 60 Tablet 0    melatonin, rapid dissolve, 5 mg TbDi tablet Take 2 Tablets by mouth nightly as needed (slsee[). 30 Tablet 0    sevelamer carbonate (RENVELA) 800 mg tab tab Take 2 Tablets by mouth three (3) times daily (with meals). 90 Tablet 0    vit B Cmplx 3-FA-Vit C-Biotin (NEPHRO NIKITA RX) 1- mg-mg-mcg tablet Take 1 Tablet by mouth daily. 30 Tablet 0    atorvastatin (LIPITOR) 40 mg tablet Take 40 mg by mouth daily. gabapentin (NEURONTIN) 300 mg capsule Take 300 mg by mouth nightly. allopurinoL (ZYLOPRIM) 100 mg tablet Take 100 mg by mouth daily. ezetimibe (ZETIA) 10 mg tablet Take 10 mg by mouth. carvediloL (COREG) 12.5 mg tablet Take 12.5 mg by mouth two (2) times a day. amLODIPine (NORVASC) 10 mg tablet Take 10 mg by mouth daily. aspirin 81 mg chewable tablet Take 81 mg by mouth daily. ascorbic acid, vitamin C, (VITAMIN C) 500 mg tablet Take 500 mg by mouth. insulin glargine (LANTUS) 100 unit/mL injection 10 Units by SubCUTAneous route nightly. Wt Readings from Last 3 Encounters:   12/13/22 99.3 kg (219 lb)   07/19/22 102.6 kg (226 lb 4.8 oz)       [email or web address] Aero Vitals  BP (!) 125/54   Pulse 63   Temp 98.6 °F (37 °C)   Resp 18   Wt 99.3 kg (219 lb)   SpO2 99%   BMI 29.70 kg/m²       ASSESSMENT     Skin impairment Identification:  Type:  surgical    Contributing Factors: diabetes and poor glucose control    Wound Ankle Right;Medial;Proximal (Active)   Number of days: 151       Wound Ankle Right;Medial (Active)   Number of days: 151       Wound Heel Right (Active)   Wound Image   12/12/22 1600   Wound Etiology Diabetic 12/09/22 1038   Dressing Status Clean;Dry; Intact 12/09/22 2250   Cleansed Wound cleanser 12/09/22 1038   Dressing/Treatment Ace wrap 12/09/22 2300   Wound Length (cm) 2.5 cm 12/09/22 1038   Wound Width (cm) 3 cm 12/09/22 1038   Wound Depth (cm) 0.3 cm 12/09/22 1038   Wound Surface Area (cm^2) 7.5 cm^2 12/09/22 1038   Change in Wound Size % 66.67 12/09/22 1038   Wound Volume (cm^3) 2.25 cm^3 12/09/22 1038   Wound Healing % 93 12/09/22 1038   Wound Assessment Devitalized tissue;Dusky;Eschar dry 12/09/22 1038   Drainage Amount Small 12/09/22 1038   Drainage Description Serosanguinous; Thick 12/09/22 1038   Wound Odor Malodorous/Putrid 12/09/22 1038   Leticia-Wound/Incision Assessment Dry/flaky; Intact; Hyperkeratosis (Callous); Maceration 12/09/22 1038   Edges Undefined edges 12/09/22 1038   Wound Thickness Description Full thickness 12/09/22 1038   Number of days: 123       Incision 07/05/22 Foot Right;Dorsal (Active)   Number of days: 161       Incision 12/09/22 Foot Left (Active)   Wound Image    12/13/22 1029   Dressing Status Clean;Dry; Intact 12/13/22 1029   Cleansed Cleansed with saline 12/13/22 1029   Dressing/Treatment Gauze dressing/dressing sponge;Roll gauze; Ace wrap 12/13/22 1029   Closure Sutures 12/13/22 1029   Margins Approximated 12/13/22 1029   Incision Assessment Eschar 12/13/22 1029   Drainage Amount Small 12/13/22 1029   Drainage Description Serosanguinous 12/13/22 1029   Wound Odor None 12/13/22 1029   Leticia-Wound/Incision Assessment Cool;Ecchymosis 12/13/22 1029   Number of days: 4          PLAN     Skin Care & Pressure Relief Recommendations  Minimize layers of linen  Pads under patient to optimize support surface      Recommendations: dry dressing loosely wrapped if break through drainage    Teaching completed with:   [x] Patient           [] Family member       [] Caregiver          [x] Nursing  [] Other    Patient/Caregiver Teaching:  Level of patient/caregiver understanding able to:   [x] Indicates understanding       [] Needs reinforcement  [] Unsuccessful      [] Verbal Understanding  [] Demonstrated understanding       [] No evidence of learning  [] Refused teaching         [] N/A Electronically signed by Stacey Mcmahon RN on 12/13/2022 at 10:31 AM

## 2022-12-13 NOTE — PROGRESS NOTES
Podiatry:  Patient is status post 4 days left foot surgery, for treatment of cellulitis with gangrene and metatarsal fractures 2 3 and 4. Patient's left foot shows some ischemic changes with dorsal darker skin areas and some purple discoloration to the left fifth toe. Patient has long history of diabetes with end-stage renal disease and peripheral arterial disease. Recommend arterial Doppler (ABIs) with toe pressures bilateral lower extremities. Patient's left foot may require further surgery with transmetatarsal amputation if he has adequate circulation to heal it, or possibly BKA if his ABIs are too poor. Recommend vascular consult.

## 2022-12-14 ENCOUNTER — APPOINTMENT (OUTPATIENT)
Dept: VASCULAR SURGERY | Age: 62
DRG: 239 | End: 2022-12-14
Attending: HOSPITALIST
Payer: MEDICARE

## 2022-12-14 LAB
ANION GAP SERPL CALC-SCNC: 16 MMOL/L (ref 3–18)
ATRIAL RATE: 72 BPM
BASOPHILS # BLD: 0.1 K/UL (ref 0–0.1)
BASOPHILS NFR BLD: 0 % (ref 0–2)
BUN SERPL-MCNC: 82 MG/DL (ref 7–18)
BUN/CREAT SERPL: 8 (ref 12–20)
CALCIUM SERPL-MCNC: 7.6 MG/DL (ref 8.5–10.1)
CALCULATED P AXIS, ECG09: 55 DEGREES
CALCULATED R AXIS, ECG10: -37 DEGREES
CALCULATED T AXIS, ECG11: 41 DEGREES
CHLORIDE SERPL-SCNC: 98 MMOL/L (ref 100–111)
CO2 SERPL-SCNC: 21 MMOL/L (ref 21–32)
CREAT SERPL-MCNC: 10.7 MG/DL (ref 0.6–1.3)
CRP SERPL-MCNC: 15.2 MG/DL (ref 0–0.3)
DIAGNOSIS, 93000: NORMAL
DIFFERENTIAL METHOD BLD: ABNORMAL
EOSINOPHIL # BLD: 0.5 K/UL (ref 0–0.4)
EOSINOPHIL NFR BLD: 3 % (ref 0–5)
ERYTHROCYTE [DISTWIDTH] IN BLOOD BY AUTOMATED COUNT: 17.4 % (ref 11.6–14.5)
ERYTHROCYTE [SEDIMENTATION RATE] IN BLOOD: >140 MM/HR (ref 0–20)
GLUCOSE BLD STRIP.AUTO-MCNC: 104 MG/DL (ref 70–110)
GLUCOSE BLD STRIP.AUTO-MCNC: 146 MG/DL (ref 70–110)
GLUCOSE BLD STRIP.AUTO-MCNC: 172 MG/DL (ref 70–110)
GLUCOSE BLD STRIP.AUTO-MCNC: 93 MG/DL (ref 70–110)
GLUCOSE SERPL-MCNC: 132 MG/DL (ref 74–99)
HCT VFR BLD AUTO: 28.9 % (ref 36–48)
HGB BLD-MCNC: 9.3 G/DL (ref 13–16)
IMM GRANULOCYTES # BLD AUTO: 0.1 K/UL (ref 0–0.04)
IMM GRANULOCYTES NFR BLD AUTO: 1 % (ref 0–0.5)
LYMPHOCYTES # BLD: 1.5 K/UL (ref 0.9–3.6)
LYMPHOCYTES NFR BLD: 9 % (ref 21–52)
MAGNESIUM SERPL-MCNC: 2 MG/DL (ref 1.6–2.6)
MCH RBC QN AUTO: 30.3 PG (ref 24–34)
MCHC RBC AUTO-ENTMCNC: 32.2 G/DL (ref 31–37)
MCV RBC AUTO: 94.1 FL (ref 78–100)
MONOCYTES # BLD: 1.1 K/UL (ref 0.05–1.2)
MONOCYTES NFR BLD: 7 % (ref 3–10)
NEUTS SEG # BLD: 13.1 K/UL (ref 1.8–8)
NEUTS SEG NFR BLD: 81 % (ref 40–73)
NRBC # BLD: 0 K/UL (ref 0–0.01)
NRBC BLD-RTO: 0 PER 100 WBC
P-R INTERVAL, ECG05: 162 MS
PLATELET # BLD AUTO: 237 K/UL (ref 135–420)
PMV BLD AUTO: 11.2 FL (ref 9.2–11.8)
POTASSIUM SERPL-SCNC: 5.7 MMOL/L (ref 3.5–5.5)
PROCALCITONIN SERPL-MCNC: 1.74 NG/ML
Q-T INTERVAL, ECG07: 436 MS
QRS DURATION, ECG06: 88 MS
QTC CALCULATION (BEZET), ECG08: 477 MS
RBC # BLD AUTO: 3.07 M/UL (ref 4.35–5.65)
SODIUM SERPL-SCNC: 135 MMOL/L (ref 136–145)
VENTRICULAR RATE, ECG03: 72 BPM
WBC # BLD AUTO: 16.2 K/UL (ref 4.6–13.2)

## 2022-12-14 PROCEDURE — 83735 ASSAY OF MAGNESIUM: CPT

## 2022-12-14 PROCEDURE — 65270000029 HC RM PRIVATE

## 2022-12-14 PROCEDURE — 74011000250 HC RX REV CODE- 250: Performed by: EMERGENCY MEDICINE

## 2022-12-14 PROCEDURE — 86140 C-REACTIVE PROTEIN: CPT

## 2022-12-14 PROCEDURE — 74011250636 HC RX REV CODE- 250/636: Performed by: INTERNAL MEDICINE

## 2022-12-14 PROCEDURE — 74011250636 HC RX REV CODE- 250/636: Performed by: EMERGENCY MEDICINE

## 2022-12-14 PROCEDURE — 74011250636 HC RX REV CODE- 250/636: Performed by: HOSPITALIST

## 2022-12-14 PROCEDURE — 74011250637 HC RX REV CODE- 250/637: Performed by: HOSPITALIST

## 2022-12-14 PROCEDURE — 74011000250 HC RX REV CODE- 250: Performed by: INTERNAL MEDICINE

## 2022-12-14 PROCEDURE — 74011000250 HC RX REV CODE- 250: Performed by: HOSPITALIST

## 2022-12-14 PROCEDURE — 80048 BASIC METABOLIC PNL TOTAL CA: CPT

## 2022-12-14 PROCEDURE — 36415 COLL VENOUS BLD VENIPUNCTURE: CPT

## 2022-12-14 PROCEDURE — 82962 GLUCOSE BLOOD TEST: CPT

## 2022-12-14 PROCEDURE — 74011250637 HC RX REV CODE- 250/637: Performed by: FAMILY MEDICINE

## 2022-12-14 PROCEDURE — 74011636637 HC RX REV CODE- 636/637: Performed by: HOSPITALIST

## 2022-12-14 PROCEDURE — 74011000258 HC RX REV CODE- 258: Performed by: EMERGENCY MEDICINE

## 2022-12-14 PROCEDURE — 85652 RBC SED RATE AUTOMATED: CPT

## 2022-12-14 PROCEDURE — 85025 COMPLETE CBC W/AUTO DIFF WBC: CPT

## 2022-12-14 PROCEDURE — 90935 HEMODIALYSIS ONE EVALUATION: CPT

## 2022-12-14 PROCEDURE — 84145 PROCALCITONIN (PCT): CPT

## 2022-12-14 RX ORDER — INSULIN LISPRO 100 [IU]/ML
2 INJECTION, SOLUTION INTRAVENOUS; SUBCUTANEOUS
Status: DISCONTINUED | OUTPATIENT
Start: 2022-12-14 | End: 2022-12-24

## 2022-12-14 RX ORDER — MAG HYDROX/ALUMINUM HYD/SIMETH 200-200-20
30 SUSPENSION, ORAL (FINAL DOSE FORM) ORAL
Status: DISCONTINUED | OUTPATIENT
Start: 2022-12-14 | End: 2023-01-12 | Stop reason: HOSPADM

## 2022-12-14 RX ORDER — HEPARIN SODIUM 1000 [USP'U]/ML
3600 INJECTION, SOLUTION INTRAVENOUS; SUBCUTANEOUS ONCE
Status: ACTIVE | OUTPATIENT
Start: 2022-12-14 | End: 2022-12-15

## 2022-12-14 RX ORDER — HEPARIN SODIUM 1000 [USP'U]/ML
2000 INJECTION, SOLUTION INTRAVENOUS; SUBCUTANEOUS ONCE
Status: COMPLETED | OUTPATIENT
Start: 2022-12-14 | End: 2022-12-14

## 2022-12-14 RX ADMIN — PIPERACILLIN AND TAZOBACTAM 4.5 G: 4; .5 INJECTION, POWDER, FOR SOLUTION INTRAVENOUS at 00:34

## 2022-12-14 RX ADMIN — HEPARIN SODIUM 5000 UNITS: 5000 INJECTION INTRAVENOUS; SUBCUTANEOUS at 00:33

## 2022-12-14 RX ADMIN — OXYCODONE AND ACETAMINOPHEN 1 TABLET: 5; 325 TABLET ORAL at 11:47

## 2022-12-14 RX ADMIN — HEPARIN SODIUM 5000 UNITS: 5000 INJECTION INTRAVENOUS; SUBCUTANEOUS at 17:10

## 2022-12-14 RX ADMIN — INSULIN GLARGINE 10 UNITS: 100 INJECTION, SOLUTION SUBCUTANEOUS at 00:31

## 2022-12-14 RX ADMIN — INSULIN GLARGINE 10 UNITS: 100 INJECTION, SOLUTION SUBCUTANEOUS at 22:01

## 2022-12-14 RX ADMIN — INSULIN LISPRO 3 UNITS: 100 INJECTION, SOLUTION INTRAVENOUS; SUBCUTANEOUS at 22:00

## 2022-12-14 RX ADMIN — CARVEDILOL 12.5 MG: 12.5 TABLET, FILM COATED ORAL at 08:25

## 2022-12-14 RX ADMIN — EPOETIN ALFA-EPBX 4000 UNITS: 4000 INJECTION, SOLUTION INTRAVENOUS; SUBCUTANEOUS at 00:39

## 2022-12-14 RX ADMIN — PROMETHAZINE HYDROCHLORIDE 12.5 MG: 25 TABLET ORAL at 23:47

## 2022-12-14 RX ADMIN — ASPIRIN 81 MG: 81 TABLET, CHEWABLE ORAL at 08:24

## 2022-12-14 RX ADMIN — SODIUM CHLORIDE, PRESERVATIVE FREE 10 ML: 5 INJECTION INTRAVENOUS at 21:59

## 2022-12-14 RX ADMIN — CLOPIDOGREL BISULFATE 75 MG: 75 TABLET ORAL at 08:25

## 2022-12-14 RX ADMIN — SODIUM CHLORIDE, PRESERVATIVE FREE 10 ML: 5 INJECTION INTRAVENOUS at 14:00

## 2022-12-14 RX ADMIN — B-COMPLEX W/ C & FOLIC ACID TAB 1 MG 1 TABLET: 1 TAB at 08:25

## 2022-12-14 RX ADMIN — SEVELAMER CARBONATE 1600 MG: 800 TABLET, FILM COATED ORAL at 08:25

## 2022-12-14 RX ADMIN — HEPARIN SODIUM 3600 UNITS: 1000 INJECTION INTRAVENOUS; SUBCUTANEOUS at 14:15

## 2022-12-14 RX ADMIN — ALTEPLASE 4 MG: 2.2 INJECTION, POWDER, LYOPHILIZED, FOR SOLUTION INTRAVENOUS at 10:59

## 2022-12-14 RX ADMIN — ALUMINUM HYDROXIDE, MAGNESIUM HYDROXIDE, AND SIMETHICONE 30 ML: 200; 200; 20 SUSPENSION ORAL at 22:20

## 2022-12-14 RX ADMIN — INSULIN LISPRO 6 UNITS: 100 INJECTION, SOLUTION INTRAVENOUS; SUBCUTANEOUS at 00:29

## 2022-12-14 RX ADMIN — ALLOPURINOL 100 MG: 100 TABLET ORAL at 08:25

## 2022-12-14 RX ADMIN — Medication 125 MG: at 05:42

## 2022-12-14 RX ADMIN — EZETIMIBE 10 MG: 10 TABLET ORAL at 08:24

## 2022-12-14 RX ADMIN — PANTOPRAZOLE SODIUM 40 MG: 40 TABLET, DELAYED RELEASE ORAL at 08:25

## 2022-12-14 RX ADMIN — CARVEDILOL 12.5 MG: 12.5 TABLET, FILM COATED ORAL at 00:29

## 2022-12-14 RX ADMIN — SEVELAMER CARBONATE 1600 MG: 800 TABLET, FILM COATED ORAL at 17:11

## 2022-12-14 RX ADMIN — Medication 500 MG: at 08:24

## 2022-12-14 RX ADMIN — INSULIN LISPRO 3 UNITS: 100 INJECTION, SOLUTION INTRAVENOUS; SUBCUTANEOUS at 08:39

## 2022-12-14 RX ADMIN — HEPARIN SODIUM 5000 UNITS: 5000 INJECTION INTRAVENOUS; SUBCUTANEOUS at 08:36

## 2022-12-14 RX ADMIN — SODIUM CHLORIDE, PRESERVATIVE FREE 10 ML: 5 INJECTION INTRAVENOUS at 00:35

## 2022-12-14 RX ADMIN — AMLODIPINE BESYLATE 10 MG: 5 TABLET ORAL at 08:24

## 2022-12-14 RX ADMIN — HEPARIN SODIUM 2000 UNITS: 1000 INJECTION INTRAVENOUS; SUBCUTANEOUS at 11:00

## 2022-12-14 RX ADMIN — Medication 2 UNITS: at 17:10

## 2022-12-14 RX ADMIN — Medication 125 MG: at 17:13

## 2022-12-14 RX ADMIN — CARVEDILOL 12.5 MG: 12.5 TABLET, FILM COATED ORAL at 22:00

## 2022-12-14 RX ADMIN — PIPERACILLIN AND TAZOBACTAM 4.5 G: 4; .5 INJECTION, POWDER, FOR SOLUTION INTRAVENOUS at 14:30

## 2022-12-14 NOTE — PROGRESS NOTES
[facility]  (A Division of [address] [place] [address])    Follow-up Note      Date of Admission: 12/9/2022       Date of Note:  12/14/2022    Summary:  Mr Geetha Lackey is a diabetic 60y AAM who has been fighting chronic R heel sores for months/years, but noted onset of L foot pain along with f/s/c this past week. Had DPM appt this morning and re-directed to ED for urgent admission for surgery. Pretty vague on duration of symptoms, but pretty sure it was this week. Sugars have been elevated. Also has been having stomach issues with pain/anorexia/diarrhea off/on for past month. Seen at [facility] 2wks ago and treated for \"stomach infection\" that required PO vanco.     [employer]-         CC:  \"No appetite\"  HPI:  Events last few days reviewed/tracked - pt seen during HD session with Dr Rachel Hurtado. Pain controlled. Stomach settling. Occ loose stool. Tm <100  WBC 16.2k - roughly the same as earlier this week; no LEFT SHIFT      Current Antimicrobials:    Prior Antimicrobials:  Vanco IV (12/9-) #5  Pip/tzb IV (12/9-) #5  PO Vanco (12/9-) #5        Assessment: Rec / Plan:   Dry Gangrene L foot distally - POD#3  12/9:  ESR 92mm/h  12/9:  PCT 3.12ng/mL  12/14:  ESR >140mm/h and CRP/PCT pending  All things being equal, inflammatory markers worsening and WBC not improving as rapidly as he should be doing. Going back to OR, but waiting on PALAK/vascular studies. Flesh stinks a little bit - incision dry/little toe blue. I'm holding off on ordering tunneled line to see if he will get surgical cure with next OR/amputation. IF no BKA or residual infection, will need tunneled line. ->Pip/tzb-vanc #5 and POD#5    MICRO reviewed - good abx. The recovered Alicaligenes faecalis are generally S to pip/tzb, cephalosporins, and carbapenems. No change to abx.    Recent/likely CDI  Continue concomitant PO vanco    Pseudobacteremia - CoNS  No treatment needed    Peripheral Vascular Disease ESRD/HD        Microbiology:                12/9 - OR (+) Serratia fonticola (S to pip/all except cefazolin), Alcaligenes faecalis (R FQ), Enterbacter cloacae (still being worked up), and anaerobic Bacteroides vulgatus (BL +)                                                      BCx 1/2 (+) CoNS        Lines / Catheters:         R CW [facility] and peripheral        Patient Active Problem List   Diagnosis Code    Diabetes mellitus with foot ulcer ([facility] E11.621, L97.509    CAD (coronary artery disease) I25.10    ESRD (end stage renal disease) ([facility] N18.6    Acute hematogenous osteomyelitis of right foot ([facility]) M86.071    Cellulitis of right heel L03.115    Diabetic foot ulcer with osteomyelitis ([facility] E11.621, E11.69, L97.509, M86.9    Ulcer of right heel, with necrosis of bone ([facility] L97.414    Infection and inflammatory reaction due to internal fixation device of other site, initial encounter [facility]) T84.69XA    Left arm swelling M79.89    Chest pain at rest R07.9    Abnormal nuclear stress test R94.39    History of anemia due to CKD N18.9, Z86.2    S/P angioplasty with stent Z95.820    Hypertension I10    Leukocytosis D72.829    Diabetic foot infection ([facility] E11.628, L08.9    Diarrhea R19.7    Type 2 diabetes mellitus, with long-term current use of insulin ([facility]) E11.9, Z79.4    Acute hematogenous osteomyelitis of left foot ([facility]) M86.072    Nondisplaced fracture of second metatarsal bone of left foot S92.325A    Nondisplaced fracture of third metatarsal bone of left foot S92.335A    Closed nondisplaced fracture of fourth metatarsal bone of left foot S92.345A    Positive blood culture R78.81       Current Facility-Administered Medications   Medication Dose Route Frequency    insulin lispro (HUMALOG) injection 2 Units  2 Units SubCUTAneous TIDAC    heparin (porcine) 1,000 unit/mL injection 3,600 Units  3,600 Units Hemodialysis ONCE    heparin (porcine) 1,000 unit/mL injection 2,000 Units  2,000 Units Hemodialysis ONCE insulin lispro (HUMALOG) injection   SubCUTAneous AC&HS    glucose chewable tablet 16 g  16 g Oral PRN    glucagon (GLUCAGEN) injection 1 mg  1 mg IntraMUSCular PRN    epoetin lisette-epbx (RETACRIT) injection 4,000 Units  4,000 Units SubCUTAneous Q TUE, THU & SAT    insulin glargine (LANTUS) injection 10 Units  10 Units SubCUTAneous QHS    heparin (porcine) 1,000 unit/mL injection 3,600 Units  3,600 Units IntraCATHeter DIALYSIS PRN    Vancomycin - Pharmacy to Dose  1 Each Other Rx Dosing/Monitoring    piperacillin-tazobactam (ZOSYN) 4.5 g in 0.9% sodium chloride (MBP/ADV) 100 mL MBP  4.5 g IntraVENous Q12H    vancomycin 50 mg/mL oral solution (compounded) 125 mg  125 mg Oral Q12H    glucose chewable tablet 16 g  4 Tablet Oral PRN    glucagon (GLUCAGEN) injection 1 mg  1 mg IntraMUSCular PRN    dextrose 10% infusion 0-250 mL  0-250 mL IntraVENous PRN    0.9% sodium chloride infusion  25 mL/hr IntraVENous DIALYSIS PRN    clopidogreL (PLAVIX) tablet 75 mg  75 mg Oral DAILY    carvediloL (COREG) tablet 12.5 mg  12.5 mg Oral BID    aspirin chewable tablet 81 mg  81 mg Oral DAILY    amLODIPine (NORVASC) tablet 10 mg  10 mg Oral DAILY    allopurinoL (ZYLOPRIM) tablet 100 mg  100 mg Oral DAILY    ascorbic acid (vitamin C) (VITAMIN C) tablet 500 mg  500 mg Oral DAILY    ezetimibe (ZETIA) tablet 10 mg  10 mg Oral DAILY    pantoprazole (PROTONIX) tablet 40 mg  40 mg Oral ACB    sevelamer carbonate (RENVELA) tab 1,600 mg  1,600 mg Oral TID WITH MEALS    vit B Cmplx 3-FA-Vit C-Biotin (NEPHRO NIKITA RX) tablet 1 Tablet  1 Tablet Oral DAILY    sodium chloride (NS) flush 5-40 mL  5-40 mL IntraVENous Q8H    sodium chloride (NS) flush 5-40 mL  5-40 mL IntraVENous PRN    acetaminophen (TYLENOL) tablet 650 mg  650 mg Oral Q6H PRN    Or    acetaminophen (TYLENOL) suppository 650 mg  650 mg Rectal Q6H PRN    bisacodyL (DULCOLAX) suppository 10 mg  10 mg Rectal DAILY PRN    promethazine (PHENERGAN) tablet 12.5 mg  12.5 mg Oral Q6H PRN    Or ondansetron (ZOFRAN) injection 4 mg  4 mg IntraVENous Q6H PRN    heparin (porcine) injection 5,000 Units  5,000 Units SubCUTAneous Q8H    oxyCODONE-acetaminophen (PERCOCET) 5-325 mg per tablet 1 Tablet  1 Tablet Oral Q6H PRN         Review of Systems - General ROS: negative for - chills, fever, or night sweats  Respiratory ROS: no cough, shortness of breath, or wheezing  Cardiovascular ROS: no chest pain or dyspnea on exertion  Gastrointestinal ROS: no abdominal pain, change in bowel habits, or black or bloody stools       Objective:    Visit Vitals  /67   Pulse 66   Temp 97.4 °F (36.3 °C)   Resp 20   Wt 97.4 kg (214 lb 11.2 oz)   SpO2 93%   BMI 29.12 kg/m²       Temp (24hrs), Av.5 °F (36.9 °C), Min:97.4 °F (36.3 °C), Max:99.4 °F (37.4 °C)      GEN: WDWN AAM in NAD  HEENT: anicterus  CHEST: CTA  CVS:RRR  ABD: NT  EXT: L foot reviewed with Dr Demetrio Reyes - no active bleeding/blue little toe/small smell         Lab results:    Chemistry  Recent Labs     22  0254 22  015   * 199* 234*   * 133* 132*   K 5.7* 5.3 4.7   CL 98* 96* 96*   CO2 21 24 25   BUN 82* 68* 52*   CREA 10.70* 9.44* 7.56*   CA 7.6* 8.0* 7.4*   AGAP 16 13 11   BUCR 8* 7* 7*   ALB  --  1.9*  --        CBC w/ Diff  Recent Labs     22  015   WBC 16.2* 16.1*   RBC 3.07* 3.32*   HGB 9.3* 10.1*   HCT 28.9* 30.9*    219   GRANS 81* 79*   LYMPH 9* 9*   EOS 3 2       Microbiology  All Micro Results       Procedure Component Value Units Date/Time    CULTURE, WOUND Walker Face STAIN [[id number]]  (Abnormal)  (Susceptibility) Collected: 12/09/22 2201    Order Status: Completed Specimen: Wound from Foot Updated: 22 1225     Special Requests: NO SPECIAL REQUESTS        GRAM STAIN RARE WBCS SEEN               2+ APPARENT GRAM VARIABLE RODS           Culture result:       HEAVY Serratia fonticola YAO = 28, SENSITIVE                  HEAVY ALCALIGENES FAECALIS                  HEAVY ALCALIGENES FAECALIS (2ND COLONY TYPE/STRAIN)                  HEAVY ENTEROBACTER CLOACAE SENSITIVITY TO FOLLOW                  HEAVY MIXED SKIN TO ISOLATED          CULTURE, BLOOD 2nd DRAW (required for [facility]) [[id number]] Collected: 12/09/22 1215    Order Status: Completed Specimen: Blood Updated: 12/14/22 0753     Special Requests: LEFT HAND     Culture result: NO GROWTH 5 DAYS       CULTURE, ANAEROBIC [[id number]]  (Abnormal) Collected: 12/09/22 2201    Order Status: Completed Specimen: Foot, left Updated: 12/13/22 1246     Special Requests: NO SPECIAL REQUESTS        Culture result:       MODERATE BACTEROIDES VULGATUS BETA LACTAMASE POSITIVE          CULTURE, BLOOD [[id number]]  (Abnormal) Collected: 12/09/22 1155    Order Status: Completed Specimen: Blood Updated: 12/12/22 0743     Special Requests: LEFT AC     GRAM STAIN       ANAEROBIC BOTTLE GRAM POSITIVE COCCI IN CLUSTERS                  SMEAR CALLED TO AND CORRECTLY REPEATED BY: Juany Gonzalez RN 3S 12/10/22 AT 1 BY ANI           Culture result:       STAPHYLOCOCCUS SPECIES, COAGULASE NEGATIVE GROWING IN 1 OF 2 BOTTLES DRAWN  SITE = LAC          BLOOD CULTURE ID PANEL [[id number]]  (Abnormal) Collected: 12/09/22 1145    Order Status: Completed Specimen: Blood Updated: 12/11/22 0655     Acc. no. from Micro Order [id number]     Enterococcus faecalis Not detected        Enterococcus faecium Not detected        Listeria monocytogenes Not detected        Staphylococcus Detected        Staphylococcus aureus Not detected        Staph epidermidis Detected        Staph lugdunensis Not detected        Streptococcus Not detected        Streptococcus agalactiae (Group B) Not detected        Streptococcus pneumoniae Not detected        Streptococcus pyogenes (Group A) Not detected        Acinetobacter calcoaceticus-baumanii complex Not detected        Bacteroides fragilis Not detected        Enterobacterales species Not detected        Enterobacter cloacae complex Not detected        Escherichia coli Not detected        Klebsiella aerogenes Not detected        Klebsiella oxytoca Not detected        Klebsiella pneumoniae group Not detected        Proteus Not detected        Salmonella Not detected        Serratia marcescens Not detected        Haemophilus influenzae Not detected        Neisseria meningitidis Not detected        Pseudomonas aeruginosa Not detected        Steno maltophilia Not detected        Candida albicans Not detected        Candida auris Not detected        Candida glabrata Not detected        Candida krusei Not detected        Candida parapsilosis Not detected        Candida tropicalis Not detected        Crypto neoformans/gattii Not detected        RESISTANT GENES:            MECA/C (Methicillin resistant gene) Detected        Comment       False positive results may rarely occur.  Correlate with clinical,epidemiologic, and other laboratory findings           Comment: Please see BCID Interpretation Guide in EPIC Links       C. DIFFICILE AG & TOXIN A/B [[id number]]     Order Status: Canceled Specimen: Stool              Jose Juan Lopez MD  Cell [phone]  [facility]   12/14/2022   12:55 PM

## 2022-12-14 NOTE — PROGRESS NOTES
Hospitalist Progress Note-critical care note     Patient: Meg Blue MRN: [id number]  CSN: [id number]    YOB: 1960  Age: 60 y.o.   Sex: male    DOA: 12/9/2022 LOS:  LOS: 5 days            Chief complaint: leukocytosis, positive bcx , foot infection , om  esrd on hd     Assessment/Plan         Hospital Problems  Date Reviewed: 6/28/2022            Codes Class Noted POA    Positive blood culture ICD-10-CM: R78.81  ICD-9-CM: 790.7  12/11/2022 Unknown        Leukocytosis ICD-10-CM: D72.829  ICD-9-CM: 288.60  12/9/2022 Unknown        Diabetic foot infection ([facility] ICD-10-CM: E11.628, L08.9  ICD-9-CM: 250.80, 686.9  12/9/2022 Unknown        Diarrhea ICD-10-CM: R19.7  ICD-9-CM: 787.91  12/9/2022 Unknown        Type 2 diabetes mellitus, with long-term current use of insulin ([facility] ICD-10-CM: E11.9, Z79.4  ICD-9-CM: 250.00, V58.67  Unknown Unknown        Acute hematogenous osteomyelitis of left foot ([facility] ICD-10-CM: M86.072  ICD-9-CM: 730.07  12/9/2022 Unknown        Nondisplaced fracture of second metatarsal bone of left foot ICD-10-CM: O43.953C  ICD-9-CM: 825.25  12/9/2022 Unknown        Nondisplaced fracture of third metatarsal bone of left foot ICD-10-CM: S96.466I  ICD-9-CM: 825.25  12/9/2022 Unknown        Closed nondisplaced fracture of fourth metatarsal bone of left foot ICD-10-CM: S92.345A  ICD-9-CM: 825.25  12/9/2022 Unknown        Hypertension ICD-10-CM: I10  ICD-9-CM: 401.9  7/21/2022 Yes        CAD (coronary artery disease) ICD-10-CM: I25.10  ICD-9-CM: 414.00  6/28/2022 Yes        ESRD (end stage renal disease) ([facility] ICD-10-CM: N18.6  ICD-9-CM: 585.6  6/28/2022 Yes        Diabetes mellitus with foot ulcer ([facility] ICD-10-CM: E11.621, L97.509  ICD-9-CM: 250.80, 707.15  6/27/2022 Yes            Gangrene of left foot, leukocytosis 16 K   PARTIAL AMPUTATION OF LEFT 2ND, 3RD, 4TH METATARSALS, AMPUTATION OF TOES 2,3,4, INCISION ADN DRAINAGE LEFT FOOT DEEP per dr. Ariadna Davis   Discussed with  Jennifer-planning surgery on Friday , peace and duplex of LE artery  ordered not done   ID and podiatrist on board  Continue vanc and zosyn ,wound cx G-     Positive blood cx  STAPHYLOCOCCUS SPECIES GROWING IN 1 OF 2 BOTTLES-like due to contamination     Diabetic foot ulcer  Follow-up with Dr. Merlin Comment wound care     CAD, last stent Severe single-vessel coronary artery disease. Distal RCA to drug-eluting stent in July 2022, continue plavix -he took plavix today   No chest pain  Reported epigastric pain  EKG PVC     Hypertension continue home medication     Diarrhea epigastric pain  CT abdomen no acute process  Resolved      End-stage renal disease on HD  TTS, will have hd tomorrow      DM  type II   Lantus at night , ssi will increase lantus to 10     Subjective  they did not give hd last Saturday, some nausea   Disposition :tbd,   Review of systems:    General: No fevers or chills. Cardiovascular: No chest pain or pressure. No palpitations. Pulmonary: No shortness of breath. Gastrointestinal: No nausea, vomiting. No  abdomen pain     Vital signs/Intake and Output:  Visit Vitals  /67   Pulse 66   Temp 97.4 °F (36.3 °C)   Resp 20   Wt 97.4 kg (214 lb 11.2 oz)   SpO2 93%   BMI 29.12 kg/m²     Current Shift:  No intake/output data recorded. Last three shifts:  No intake/output data recorded. Physical Exam:  General: WD, WN. Alert, cooperative, no acute distress    HEENT: NC, Atraumatic. PERRLA, anicteric sclerae. Lungs: CTA Bilaterally. No Wheezing/Rhonchi/Rales. Heart:  Regular  rhythm,  No murmur, No Rubs, No Gallops  Abdomen: Soft, Non distended, Non tender. +Bowel sounds,   Extremities: No c/c, both feet wrapped with gauze   Psych:   Not anxious or agitated. Neurologic:  No acute neurological deficit.              Labs: Results:       Chemistry Recent Labs     12/14/22  0131 12/13/22  0254 12/12/22  0154   * 199* 234*   * 133* 132*   K 5.7* 5.3 4.7   CL 98* 96* 96*   CO2 21 24 25   BUN 82* 68* 52*   CREA 10.70* 9.44* 7.56*   CA 7.6* 8.0* 7.4*   AGAP 16 13 11   BUCR 8* 7* 7*   ALB  --  1.9*  --         CBC w/Diff Recent Labs     12/14/22  0131 12/12/22  0154   WBC 16.2* 16.1*   RBC 3.07* 3.32*   HGB 9.3* 10.1*   HCT 28.9* 30.9*    219   GRANS 81* 79*   LYMPH 9* 9*   EOS 3 2        Cardiac Enzymes No results for input(s): CPK, CKND1, PAULO in the last 72 hours. No lab exists for component: CKRMB, TROIP   Coagulation No results for input(s): PTP, INR, APTT, INREXT, INREXT in the last 72 hours. Lipid Panel Lab Results   Component Value Date/Time    Cholesterol, total 188 07/19/2022 03:12 AM    HDL Cholesterol 42 07/19/2022 03:12 AM    LDL, calculated 131.2 (H) 07/19/2022 03:12 AM    VLDL, calculated 14.8 07/19/2022 03:12 AM    Triglyceride 74 07/19/2022 03:12 AM    CHOL/HDL Ratio 4.5 07/19/2022 03:12 AM      BNP No results for input(s): BNPP in the last 72 hours. Liver Enzymes Recent Labs     12/13/22  0254   ALB 1.9*        Thyroid Studies No results found for: T4, T3U, TSH, TSHEXT, TSHEXT     Procedures/imaging: see electronic medical records for all procedures/Xrays and details which were not copied into this note but were reviewed prior to creation of Plan    XR FOOT LT AP/LAT    Result Date: 12/9/2022  EXAM: 2 radiographic views of the left foot. INDICATION: Left foot pain. COMPARISON: December 9, 2022 _______________ FINDINGS: There is been interval amputation of the second, third, and fourth rays at the level of the metatarsal diaphysis. As before, the first ray is amputated at the metatarsal phalangeal joint. The small toe remains. There are expected postoperative findings to include regional air density and soft tissue swelling. There is Monckeberg type vascular calcification. There is low-grade mid foot degeneration. _______________     Standard immediate postoperative findings.  _______________     XR FOOT LT MIN 3 V    Result Date: 12/9/2022  EXAM: XR FOOT LT MIN 3 V CLINICAL INDICATION/HISTORY: Gangrenous 2nd digit   > Additional: None. COMPARISON: None. TECHNIQUE: 3 views left foot _______________ FINDINGS: Soft tissue gas formation is seen along the second digit with ill-defined limitus changes extending along the medial forefoot. Prior amputation first right. Patchy demineralization and osseous erosions are seen involving the distal portion of the first metatarsal head. Additional patchy areas of osseous erosion are also noted involving the proximal phalanx of the second toe. Diffuse soft tissue swelling. Diffuse atherosclerotic vascular calcifications. No retained radiopaque foreign object. _______________     Soft gas formation and ulceration involving the medial forefoot with findings concerning for osteomyelitis involving the first and second rays described above. MRI FOOT LT WO CONT    Result Date: 12/9/2022  EXAM: MRI FOOT LT WO CONT CLINICAL INDICATION/HISTORY: Foot wound; preoperative  >Additional: None COMPARISON: Radiograph performed December 9, 2022  >Reference exam: None. TECHNIQUE: Axial long axis TI and T2 with fat saturation, sagittal and coronal short axis TI and STIR sequences through the foot were obtained without contrast. _______________ FINDINGS: FIRST RAY: Prior dictation the first digit. There is mild bony proliferative change at the head of the first metatarsal with preserved marrow signal. Minimal patchy edema is noted which is nonspecific. No definite cortical destructive change. SECOND RAY: Nondisplaced obliquely oriented fracture through the head neck junction of the second metatarsal. There is heterogeneous diminished T1 marrow with patchy edema and surrounding soft tissue gas along the course of the second digit with associated subluxation of the distal phalanx. Subtle diminished T1 marrow is noted with suspected foci of intraosseous gas, concerning for acute osteomyelitis.  THIRD RAY: Nondisplaced fracture through the third metatarsal neck with slight impaction. Mild edema is noted within the third digit, possibly stress reaction. No convincing osseous erosions or T1 marrow signal change. FOURTH RAY: Nondisplaced fractures of the neck of the fourth metatarsal. Minimal edema in the proximal phalanx but otherwise preserved marrow signal. FIFTH RAY: Unremarkable. Additional degenerative changes with patchy edema, joint space narrowing, and osteophyte formation at the midfoot, likely related to underlying Charcot arthropathy. SOFT TISSUES: Soft tissue irregularity with gas and ulceration around the second digit noted. Diffuse fluid signal seen throughout the intrinsic foot musculature, commonly seen in the setting of diabetes. Mild skin thickening about the forefoot. INCIDENTAL FINDINGS: None. _______________     1. Marrow signal abnormality with soft tissue wound and gas around the second digit concerning for acute osteomyelitis. 2.  Nondisplaced fractures of the head neck junction of the second-fourth metatarsals. 3.  Prior indication the first digit. 4.  Soft tissue swelling and skin thickening about the forefoot concerning for cellulitis. No drainable abscess. 5.  Additional chronic/degenerative changes of the foot. CT ABD PELV WO CONT    Result Date: 12/9/2022  EXAM: CT of the abdomen and pelvis INDICATION: Abdominal pain with distention. COMPARISON: None. TECHNIQUE: Axial CT imaging of the abdomen and pelvis was performed without intravenous contrast. Multiplanar reformats were generated. One or more dose reduction techniques were used on this CT: automated exposure control, adjustment of the mAs and/or kVp according to patient size, and iterative reconstruction techniques. The specific techniques used on this CT exam have been documented in the patient's electronic medical record.   Digital Imaging and Communications in Medicine (DICOM) format image data are available to nonaffiliated external healthcare facilities or entities on a secure, media free, reciprocally searchable basis with patient authorization for at least a 12-month period after this study. _______________ FINDINGS: LOWER CHEST: Partial inclusion of multivessel coronary arterial atherosclerosis and central venous catheter. Clear lung bases. Several punctate 2 to 3 mm pulmonary nodule seen in the right lower lobe (image 8) LIVER, BILIARY: Liver is normal. No biliary dilation. Color contains a gallstone without evidence of dilatation or gallbladder wall thickening. PANCREAS: Normal. SPLEEN: Punctate granulomatous calcifications otherwise unremarkable. ADRENALS: Mild adreniform thickening of each adrenal gland. KIDNEYS/URETERS/BLADDER: No hydronephrosis. Bilateral renal hypodensities are present either to small to accurately characterize or in keeping with simple cysts which are prior no further dedicated imaging follow-up. Bladder decompressed. PELVIC ORGANS: Unremarkable. VASCULATURE: Diffuse aortic and visceral arterial atherosclerosis without evidence of aneurysmal dilatation. LYMPH NODES: Scattered subcentimeter mesenteric and retroperitoneal lymph nodes are demonstrated without evidence of adenopathy. Prominent left inguinal lymph node is present (image 127) measuring approximately 15 mm in short axis dimension. GASTROINTESTINAL TRACT: No bowel dilation or wall thickening. Peritoneal gas. Normal appendix. Scattered colonic diverticula without evidence of diverticulitis. BONES: No acute or aggressive osseous abnormalities identified. OTHER: None. _______________     1. Cholelithiasis without evidence of cholecystitis. 2. No abnormal bowel wall thickening or dilatation. 3. No hydronephrosis or obstructive uropathy. 4. Several small right lower lobe pulmonary nodules (2-3 mm in size). See below guidelines for proposed follow-up.  5. Borderline enlarged and nonspecific left inguinal lymph node, potentially reactive in etiology. ======== Fleischner Society pulmonary nodule guidelines (revised 2017): Multiple solid nodules <6 mm: -Low risk for lung cancer: No follow-up. -High risk for lung cancer: Optional chest CT in 12 months. XR CHEST PORT    Result Date: 12/9/2022  EXAM: XR CHEST PORT CLINICAL INDICATION/HISTORY: sepsis -Additional: None COMPARISON: July 12, 2022 TECHNIQUE: Portable frontal view of the chest _______________ FINDINGS: SUPPORT DEVICES: Right IJ approach dialysis catheter in stable position. HEART AND MEDIASTINUM: Stable appearing cardiac size and mediastinal contours. LUNGS AND PLEURAL SPACES: Lungs are clear. No focal pneumonic opacity. No pneumothorax or pleural effusion. BONY THORAX AND SOFT TISSUES: Unremarkable. _______________     No active cardiopulmonary disease.        Pepe Quintanilla MD

## 2022-12-14 NOTE — PROGRESS NOTES
D/C Plan: SNF    CM met with pt at bedside to discuss care transition and SNF. Pt is willing to consider SNF but does not wish to return to the facility he went to in the past ([facility]). CM provided pt with a list of area SNF to review to assist with care transition. Noted plan for surgical intervention on Friday. Pt will need an accepting facility and insurance auth to transition to SNF.   CM to continue to follow and assist.

## 2022-12-14 NOTE — PROGRESS NOTES
Problem: Risk for Spread of Infection  Goal: Prevent transmission of infectious organism to others  Description: Prevent the transmission of infectious organisms to other patients, staff members, and visitors. Outcome: Progressing Towards Goal     Problem: Patient Education:  Go to Education Activity  Goal: Patient/Family Education  Outcome: Progressing Towards Goal     Problem: Diabetes Self-Management  Goal: *Disease process and treatment process  Description: Define diabetes and identify own type of diabetes; list 3 options for treating diabetes. Outcome: Progressing Towards Goal  Goal: *Incorporating nutritional management into lifestyle  Description: Describe effect of type, amount and timing of food on blood glucose; list 3 methods for planning meals. Outcome: Progressing Towards Goal  Goal: *Incorporating physical activity into lifestyle  Description: State effect of exercise on blood glucose levels. Outcome: Progressing Towards Goal  Goal: *Developing strategies to promote health/change behavior  Description: Define the ABC's of diabetes; identify appropriate screenings, schedule and personal plan for screenings. Outcome: Progressing Towards Goal  Goal: *Using medications safely  Description: State effect of diabetes medications on diabetes; name diabetes medication taking, action and side effects. Outcome: Progressing Towards Goal  Goal: *Monitoring blood glucose, interpreting and using results  Description: Identify recommended blood glucose targets  and personal targets. Outcome: Progressing Towards Goal  Goal: *Prevention, detection, treatment of acute complications  Description: List symptoms of hyper- and hypoglycemia; describe how to treat low blood sugar and actions for lowering  high blood glucose level.   Outcome: Progressing Towards Goal  Goal: *Prevention, detection and treatment of chronic complications  Description: Define the natural course of diabetes and describe the relationship of blood glucose levels to long term complications of diabetes. Outcome: Progressing Towards Goal  Goal: *Developing strategies to address psychosocial issues  Description: Describe feelings about living with diabetes; identify support needed and support network  Outcome: Progressing Towards Goal  Goal: *Insulin pump training  Outcome: Progressing Towards Goal  Goal: *Sick day guidelines  Outcome: Progressing Towards Goal  Goal: *Patient Specific Goal (EDIT GOAL, INSERT TEXT)  Outcome: Progressing Towards Goal     Problem: Patient Education: Go to Patient Education Activity  Goal: Patient/Family Education  Outcome: Progressing Towards Goal     Problem: Falls - Risk of  Goal: *Absence of Falls  Description: Document Jaxson Fall Risk and appropriate interventions in the flowsheet. Outcome: Progressing Towards Goal  Note: Fall Risk Interventions:  Mobility Interventions: Strengthening exercises (ROM-active/passive), Utilize walker, cane, or other assistive device         Medication Interventions: Bed/chair exit alarm, Teach patient to arise slowly    Elimination Interventions: Bed/chair exit alarm, Call light in reach    History of Falls Interventions: Bed/chair exit alarm         Problem: Patient Education: Go to Patient Education Activity  Goal: Patient/Family Education  Outcome: Progressing Towards Goal     Problem: Chronic Renal Failure  Goal: *Fluid and electrolytes stabilized  Outcome: Progressing Towards Goal     Problem: Patient Education: Go to Patient Education Activity  Goal: Patient/Family Education  Outcome: Progressing Towards Goal     Problem: Pressure Injury - Risk of  Goal: *Prevention of pressure injury  Description: Document Semaj Scale and appropriate interventions in the flowsheet.   Outcome: Progressing Towards Goal  Note: Pressure Injury Interventions:  Sensory Interventions: Minimize linen layers    Moisture Interventions: Absorbent underpads    Activity Interventions: Pressure redistribution bed/mattress(bed type)    Mobility Interventions: Pressure redistribution bed/mattress (bed type)    Nutrition Interventions: Document food/fluid/supplement intake    Friction and Shear Interventions: Minimize layers                Problem: Patient Education: Go to Patient Education Activity  Goal: Patient/Family Education  Outcome: Progressing Towards Goal     Problem: Patient Education: Go to Patient Education Activity  Goal: Patient/Family Education  Outcome: Progressing Towards Goal

## 2022-12-14 NOTE — PROGRESS NOTES
In to dialyze patient, Red port not aspirating but flushes, blue port flushes and partially returns, line reversed and treatment initiated. Machine continues to alarm even at pump speed of 200. Dr. Cyndi Phillip notified gave order to instill TPa and check back later.

## 2022-12-14 NOTE — PROGRESS NOTES
ACUTE HEMODIALYSIS TREATMENT    HEMODIALYSIS ORDERS: Physician: Dr. Kenya Saez     Dialyzer: Revaclear   Duration: 4 hr   BFR: 400   DFR: 800   Dialysate:  Temp 36-37*C   K+  2    Ca+ 2.5   Na 138   Bicarb 35   Wt Readings from Last 1 Encounters:   12/14/22 97.4 kg (214 lb 11.2 oz)    Patient Chart [x]   Unable to Obtain []  Dry weight/UF Goal: 2000 ml    Heparin []  Bolus    Units    [] Hourly    Units    [x]None       Pre BP:   130/60   Pulse:  63   Respirations: 18   Temp:  97.9  [] Oral [] Axillary [] Esophageal   Labs: []  Pre  []  Post:   [x] N/A   Additional Orders(medications, blood products, hypotension management): [] Yes   [] No     [x]  Yashita Consent Verified     CATHETER ACCESS:  []N/A   [x]Right   []Left   []IJ   []Fem  [x]Chest wall  []TransHepatic   [] First use X-ray verified     [x]Tunnel    [] Non Tunneled   [x]No S/S infection  []Redness  []Drainage []Cultured []Swelling []Pain   [x]Medical Aseptic Prep Utilized   []Dressing Changed  [x] Biopatch  Date:    []Clotted   [x]Patent   Flows: []Good  [x]Poor  [x]Reversed   If access problem,  notified: [x]Yes    []N/A        GENERAL ASSESSMENT:    LUNGS:  Resp Rate 18   [x] Clear  [] Coarse  [] Crackles  [] Wheezing  [] Diminished         Respirations:  [x]Easy  []Labored  []N/A  Cough:  []Productive  []Dry  [x]N/A      Therapy:  [x]RA  O2 Type:  []NC  Mask: []  NRB    [] BiPaP  Flow:  l/min                   [] Ventilated  [] Intubated  [] Trach     CARDIAC: [x] Regular      [] Irregular   [] Rhythm:          [] Monitored   [] Bedside   [] Remotely monitored     EDEMA: [] None   [x]Generalized  [] Pitting [] 1+   [] 2 +   [] 3+    [] 4+  [] Pedal    SKIN:   [x] Warm  [] Hot     [] Cold   [x] Dry     [] Pale   [] Diaphoretic                  [] Flushed  [] Jaundiced  [] Cyanotic     LOC:    [] Alert      []Oriented:    [] Person     [] Place  []Time               [] Confused  [] Lethargic  [] Medicated  [] Non-responsive  [] Non Verbal   GI / ABDOMEN:                     [] Flat    [] Distended    [x] Soft    [] Firm   []  Obese                   [] Diarrhea   [] FMS [x] Bowel Sounds  [] Nausea  [] Vomiting                   [] NGT  [] OGT    [] PEG  [] Tube Feedings @     / URINE ASSESSMENT:                   [] Voiding  []  Mcmahon  [x] Oliguria  [] Anuria                     [] Incontinent  []  Incontinent Brief   []  PureWick     PAIN:  [x] 0 []1  []2   []3   []4   []5   []6   []7   []8   []9   []10                MOBILITY:  [x] Bed    [] Stretcher      All Vitals and Treatment Details on Attached [address]: [facility]          Room # 327/01    [x] Routine         [] 1st Time Acute/Chronic   [] Urgent      [] Stat            [] Acute Room   [x]  Bedside    [] ICU/CCU     [] ER   Isolation Precautions:  [x] Dialysis    There are currently no Active Isolations     ALLERGIES:     No Known Allergies   Code Status:  Full Code     Hepatitis Status      Lab Results   Component Value Date/Time    Hepatitis B surface Ag <0.10 12/10/2022 06:33 AM    Hepatitis B surface Ab 23.52 12/10/2022 06:33 AM        Current Labs:      Lab Results   Component Value Date/Time    WBC 16.2 (H) 12/14/2022 01:31 AM    HGB 9.3 (L) 12/14/2022 01:31 AM    HCT 28.9 (L) 12/14/2022 01:31 AM    PLATELET 262 95/32/8333 01:31 AM    MCV 94.1 12/14/2022 01:31 AM     Lab Results   Component Value Date/Time    Sodium 135 (L) 12/14/2022 01:31 AM    Potassium 5.7 (H) 12/14/2022 01:31 AM    Chloride 98 (L) 12/14/2022 01:31 AM    CO2 21 12/14/2022 01:31 AM    Anion gap 16 12/14/2022 01:31 AM    Glucose 132 (H) 12/14/2022 01:31 AM    BUN 82 (H) 12/14/2022 01:31 AM    Creatinine 10.70 (H) 12/14/2022 01:31 AM    BUN/Creatinine ratio 8 (L) 12/14/2022 01:31 AM    GFR est AA 13 (L) 07/21/2022 02:15 PM    GFR est non-AA 11 (L) 07/21/2022 02:15 PM    Calcium 7.6 (L) 12/14/2022 01:31 AM          DIET:  DIET ADULT     PRIMARY NURSE REPORT:   Pre Dialysis: Sera Bolanos RN    Time: 0830     EDUCATION: [x] Patient           Knowledge Basis: [x]None []Minimal [] Substantial [] Unknown  Barriers to learning  [x]N/A  [] Intubated/Trached/Ventilated  [] Sedated/Paralyzed   [] Access Care     [] S&S of infection  [] Fluid Management  [] K+   [x] Procedural    [] Medications   [] Tx Options   [] Transplant   [] Diet      Teaching Tools:  [x] Explain  [] Demo  [] Handouts [] Video  Patient response: [] Verbalized understanding   [x] Requires follow up        [x] Time Out/Safety Check    [x] Extracorporeal Circuit Tested for integrity       RO/HEMODIALYSIS MACHINE SAFETY CHECKS - Before each treatment:        [address] [place] [address]                                                                      [x] Portable Machine [id number] #10/RO serial # [id number]                                                                                                        Alarm Test:  Pass time             [x] RO/Machine Log Complete    Machine Temp    36-37*C             Dialysate: pH 7.4    Conductivity: Meter 14   HD Machine  14.1     TCD: 14  Dialyzer Lot # [id number]     Blood Tubing Lot # [id number]     Saline Lot # [id number]     CHLORINE TESTING-Before each treatment and every 4 hours    Total Chlorine: [x] less than 0.1 ppm  Initial Time Check: 0850       4 Hr/2nd Check Time: 1250   (if greater than 0.1 ppm from Primary then every 30 minutes from Secondary)     TREATMENT INITIATION - with Dialysis Precautions:   [x] All Connections Secured              [x] Saline Line Double Clamped   [x] Venous Parameters Set               [x] Arterial Parameters Set    [x] Prime Given 250ml NSS              [x]Air Foam Detector Engaged      Treatment Initiation Note:  Received  Patient in bed awake and alert, immediately c/o not getting  dialyzed last night and wants  some answers. He was told that his complaint will be register to the management. VS stable for treatment.  Access red port not aspirating but flushes, blue port flushes and aspirates sluggish, line reversed Treatment initiated. During Treatment Notes:  0900  Vascular access visible with arterial and venous line connections intact. Pt resting comfortably. [id number]  Vascular access visible with arterial and venous line connections intact. Pt resting comfortably. 0930  Vascular access visible with arterial and venous line connections intact. Pt resting comfortably. 1015  Vascular access visible with arterial and venous line connections intact. Pt resting comfortably. 1024  Machine alarming for high pressure both lines partially occluded. Dr. Ronnie Gunderson notifie gave order  for TPa. Tpa dwell for an hour and access opened both line flushes and aspirates well  1145  Vascular access visible with arterial and venous line connections intact. Pt resting comfortably. 1200  Vascular access visible with arterial and venous line connections intact. Pt resting comfortably. 1230  Vascular access visible with arterial and venous line connections intact. Pt resting comfortably. 1245  Vascular access visible with arterial and venous line connections intact. Pt resting comfortably. 1300  Vascular access visible with arterial and venous line connections intact. Pt resting comfortably. 1330  Vascular access visible with arterial and venous line connections intact. Pt resting comfortably. 1345  Vascular access visible with arterial and venous line connections intact. Pt resting comfortably. 1354  Dialysis treatment complete.        Medication Dose Volume Route Time Yashita Nurse, Title   Cat hanh  4ml  HD  Kimani De Leon, KIM De Leon, RN      SREEDHAR De Leon, KIM     Post Assessment  Dialyzer Cleared:   [] Good  [x] Fair  [] Poor  Blood processed:  46 L  UF Removed:  2000 Ml    Post /60   Pulse  63 Resp  18  Temp 98 Lungs: [x] Clear [] Course  [] Crackles                 []  Wheezing   [] Diminished   Post Tx Vascular Access: [x] N/A Cardiac :[x] Regular   [] Irregular   Rhythm:  [x] Monitored   [] Not Monitored    CVC Catheter: [] N/A  Locking solution: Heparin 1:1000 U  Arterial port 1.8 ml   Venous port 1.8 ml   Edema:  [] None  [x] Generalized                     Skin:[x] Warm  [x] Dry [] Diaphoretic               [] Flushed  [] Pale [] Cyanotic Pain:  [x]0  []1 []2  []3 []4  []5  []6  []7 []8    []9  []10     Post Treatment Note:    Patient completed and  tolerated 4 hrs of hemo dialysis. 2000 ml of fluid taken off. TPa use to declott the catheter.       POST TREATMENT PRIMARY NURSE HANDOFF REPORT:   Post Dialysis: Jacobs Asp  RN               Time:  6050       Abbreviations: AVG-arterial venous graft, AVF-arterial venous fistula, IJ-Internal Jugular, Subcl-Subclavian, Fem-Femoral, Tx-treatment, AP/HR-apical heart rate, VSS- Vital Signs Stable, CVC- Central Venous Catheter, DFR-dialysate flow rate, BFR-blood flow rate, AP-arterial pressure, -venous pressure, UF-ultrafiltrate, TMP-transmembrane pressure, Avtar-Venous, Art-Arterial, RO-Reverse Osmosis

## 2022-12-15 ENCOUNTER — APPOINTMENT (OUTPATIENT)
Dept: VASCULAR SURGERY | Age: 62
DRG: 239 | End: 2022-12-15
Attending: HOSPITALIST
Payer: MEDICARE

## 2022-12-15 LAB
ANION GAP SERPL CALC-SCNC: 13 MMOL/L (ref 3–18)
BACTERIA SPEC CULT: ABNORMAL
BACTERIA SPEC CULT: NORMAL
BUN SERPL-MCNC: 64 MG/DL (ref 7–18)
BUN/CREAT SERPL: 7 (ref 12–20)
CALCIUM SERPL-MCNC: 8 MG/DL (ref 8.5–10.1)
CHLORIDE SERPL-SCNC: 100 MMOL/L (ref 100–111)
CO2 SERPL-SCNC: 23 MMOL/L (ref 21–32)
CREAT SERPL-MCNC: 9.32 MG/DL (ref 0.6–1.3)
GLUCOSE BLD STRIP.AUTO-MCNC: 131 MG/DL (ref 70–110)
GLUCOSE BLD STRIP.AUTO-MCNC: 174 MG/DL (ref 70–110)
GLUCOSE BLD STRIP.AUTO-MCNC: 246 MG/DL (ref 70–110)
GLUCOSE BLD STRIP.AUTO-MCNC: 269 MG/DL (ref 70–110)
GLUCOSE SERPL-MCNC: 145 MG/DL (ref 74–99)
GRAM STN SPEC: ABNORMAL
GRAM STN SPEC: ABNORMAL
LEFT ABI: 1.14
LEFT DIST ATA VELOCITY: 57.4 CM/S
LEFT DIST PTA PSV: 20.2 CM/S
LEFT POP A PROX VEL RATIO: 0.77
LEFT POPLITEAL DIST SYS PSV: 70 CM/S
LEFT POPLITEAL PROX SYS PSV: 81.5 CM/S
LEFT POSTERIOR TIBIAL: 156 MMHG
LEFT PROX PFA A PSV: 63.6 CM/S
LEFT SFA DIST VEL RATIO: 0.7
LEFT SFA MID VEL RATIO: 0.91
LEFT SUPER FEMORAL DIST SYS PSV: 106.3 CM/S
LEFT SUPER FEMORAL MID SYS PSV: 150.8 CM/S
LEFT SUPER FEMORAL PROX SYS PSV: 165.9 CM/S
MAGNESIUM SERPL-MCNC: 2.1 MG/DL (ref 1.6–2.6)
POTASSIUM SERPL-SCNC: 5.9 MMOL/L (ref 3.5–5.5)
RIGHT ABI: 1.32
RIGHT ARM BP: 139 MMHG
RIGHT CFA DIST SYS PSV: 95.4 CM/S
RIGHT DIST ATA VELOCITY: 92.9 CM/S
RIGHT DIST PTA PSV: 50.4 CM/S
RIGHT PERONEAL DIST VELOCITY: 163.7 CM/S
RIGHT POP A PROX VEL RATIO: 1.23
RIGHT POPLITEAL DIST SYS PSV: 60.3 CM/S
RIGHT POPLITEAL PROX SYS PSV: 65.8 CM/S
RIGHT POSTERIOR TIBIAL: 146 MMHG
RIGHT PROX ATA VELOCITY: 16.2 CM/S
RIGHT PROX PFA A PSV: 51.5 CM/S
RIGHT SFA DIST VEL RATIO: 0.75
RIGHT SFA MID VEL RATIO: 1.2
RIGHT SFA PROX VEL RATIO: 0.6
RIGHT SUPER FEMORAL DIST SYS PSV: 53.7 CM/S
RIGHT SUPER FEMORAL MID SYS PSV: 71.3 CM/S
RIGHT SUPER FEMORAL PROX SYS PSV: 57.6 CM/S
RIGHT TBI: 0.59
RIGHT TOE PRESSURE: 82 MMHG
SERVICE CMNT-IMP: ABNORMAL
SERVICE CMNT-IMP: NORMAL
SODIUM SERPL-SCNC: 136 MMOL/L (ref 136–145)
VANCOMYCIN SERPL-MCNC: 19 UG/ML (ref 5–40)
VAS LEFT DORSALIS PEDIS BP: 158 MMHG
VAS RIGHT DORSALIS PEDIS BP: 183 MMHG

## 2022-12-15 PROCEDURE — 74011000258 HC RX REV CODE- 258: Performed by: EMERGENCY MEDICINE

## 2022-12-15 PROCEDURE — 74011250636 HC RX REV CODE- 250/636: Performed by: INTERNAL MEDICINE

## 2022-12-15 PROCEDURE — 82962 GLUCOSE BLOOD TEST: CPT

## 2022-12-15 PROCEDURE — 80202 ASSAY OF VANCOMYCIN: CPT

## 2022-12-15 PROCEDURE — 36415 COLL VENOUS BLD VENIPUNCTURE: CPT

## 2022-12-15 PROCEDURE — 65270000029 HC RM PRIVATE

## 2022-12-15 PROCEDURE — 74011000250 HC RX REV CODE- 250: Performed by: EMERGENCY MEDICINE

## 2022-12-15 PROCEDURE — 74011250636 HC RX REV CODE- 250/636: Performed by: HOSPITALIST

## 2022-12-15 PROCEDURE — 74011250636 HC RX REV CODE- 250/636: Performed by: EMERGENCY MEDICINE

## 2022-12-15 PROCEDURE — 74011250637 HC RX REV CODE- 250/637: Performed by: HOSPITALIST

## 2022-12-15 PROCEDURE — 74011000250 HC RX REV CODE- 250: Performed by: HOSPITALIST

## 2022-12-15 PROCEDURE — 97530 THERAPEUTIC ACTIVITIES: CPT

## 2022-12-15 PROCEDURE — 80048 BASIC METABOLIC PNL TOTAL CA: CPT

## 2022-12-15 PROCEDURE — 93922 UPR/L XTREMITY ART 2 LEVELS: CPT

## 2022-12-15 PROCEDURE — 74011636637 HC RX REV CODE- 636/637: Performed by: HOSPITALIST

## 2022-12-15 PROCEDURE — 93925 LOWER EXTREMITY STUDY: CPT

## 2022-12-15 PROCEDURE — 83735 ASSAY OF MAGNESIUM: CPT

## 2022-12-15 RX ORDER — LOPERAMIDE HYDROCHLORIDE 2 MG/1
2 CAPSULE ORAL
Status: DISCONTINUED | OUTPATIENT
Start: 2022-12-15 | End: 2023-01-12 | Stop reason: HOSPADM

## 2022-12-15 RX ORDER — NYSTATIN 100000 [USP'U]/G
POWDER TOPICAL 2 TIMES DAILY
Status: DISCONTINUED | OUTPATIENT
Start: 2022-12-15 | End: 2023-01-12 | Stop reason: HOSPADM

## 2022-12-15 RX ADMIN — SEVELAMER CARBONATE 1600 MG: 800 TABLET, FILM COATED ORAL at 12:54

## 2022-12-15 RX ADMIN — EZETIMIBE 10 MG: 10 TABLET ORAL at 08:47

## 2022-12-15 RX ADMIN — VANCOMYCIN HYDROCHLORIDE 1000 MG: 1 INJECTION, POWDER, LYOPHILIZED, FOR SOLUTION INTRAVENOUS at 21:56

## 2022-12-15 RX ADMIN — Medication 125 MG: at 22:09

## 2022-12-15 RX ADMIN — Medication 2 UNITS: at 12:54

## 2022-12-15 RX ADMIN — SODIUM CHLORIDE, PRESERVATIVE FREE 10 ML: 5 INJECTION INTRAVENOUS at 05:55

## 2022-12-15 RX ADMIN — PANTOPRAZOLE SODIUM 40 MG: 40 TABLET, DELAYED RELEASE ORAL at 06:47

## 2022-12-15 RX ADMIN — CARVEDILOL 12.5 MG: 12.5 TABLET, FILM COATED ORAL at 21:59

## 2022-12-15 RX ADMIN — HEPARIN SODIUM 5000 UNITS: 5000 INJECTION INTRAVENOUS; SUBCUTANEOUS at 08:47

## 2022-12-15 RX ADMIN — Medication 500 MG: at 08:47

## 2022-12-15 RX ADMIN — ASPIRIN 81 MG: 81 TABLET, CHEWABLE ORAL at 08:47

## 2022-12-15 RX ADMIN — Medication 2 UNITS: at 08:47

## 2022-12-15 RX ADMIN — OXYCODONE AND ACETAMINOPHEN 1 TABLET: 5; 325 TABLET ORAL at 08:47

## 2022-12-15 RX ADMIN — INSULIN LISPRO 6 UNITS: 100 INJECTION, SOLUTION INTRAVENOUS; SUBCUTANEOUS at 22:10

## 2022-12-15 RX ADMIN — ALLOPURINOL 100 MG: 100 TABLET ORAL at 08:47

## 2022-12-15 RX ADMIN — CLOPIDOGREL BISULFATE 75 MG: 75 TABLET ORAL at 08:48

## 2022-12-15 RX ADMIN — B-COMPLEX W/ C & FOLIC ACID TAB 1 MG 1 TABLET: 1 TAB at 08:48

## 2022-12-15 RX ADMIN — Medication 125 MG: at 05:54

## 2022-12-15 RX ADMIN — PIPERACILLIN AND TAZOBACTAM 4.5 G: 4; .5 INJECTION, POWDER, FOR SOLUTION INTRAVENOUS at 12:55

## 2022-12-15 RX ADMIN — NYSTATIN: 100000 POWDER TOPICAL at 13:03

## 2022-12-15 RX ADMIN — INSULIN GLARGINE 10 UNITS: 100 INJECTION, SOLUTION SUBCUTANEOUS at 21:56

## 2022-12-15 RX ADMIN — INSULIN LISPRO 3 UNITS: 100 INJECTION, SOLUTION INTRAVENOUS; SUBCUTANEOUS at 12:55

## 2022-12-15 RX ADMIN — EPOETIN ALFA-EPBX 4000 UNITS: 4000 INJECTION, SOLUTION INTRAVENOUS; SUBCUTANEOUS at 22:10

## 2022-12-15 RX ADMIN — HEPARIN SODIUM 5000 UNITS: 5000 INJECTION INTRAVENOUS; SUBCUTANEOUS at 01:34

## 2022-12-15 RX ADMIN — PIPERACILLIN AND TAZOBACTAM 4.5 G: 4; .5 INJECTION, POWDER, FOR SOLUTION INTRAVENOUS at 01:31

## 2022-12-15 RX ADMIN — SEVELAMER CARBONATE 1600 MG: 800 TABLET, FILM COATED ORAL at 08:47

## 2022-12-15 NOTE — PROGRESS NOTES
Podiatry:    Patient seen at bedside today during hemodialysis with ID/Dr. Crystal Louis, for evaluation of left foot. Patient had surgery last Friday for partial amputation secondary to osteomyelitis and gangrene. Today his left foot amputation site, distal forefoot appears ischemic with black skin and purple left fifth toe. The dorsal incision has dried and dehisced. The patient will require further surgery, but the level will depend on the results of arterial Doppler study. This test was ordered for the 13th, but apparently was delayed possibly because of hemodialysis today. Hopefully will be completed tomorrow, so surgical level can be determined, i.e. transmetatarsal amputation (TMA) left foot or possible BKA. I should be able to schedule his foot surgery on Friday. (Surgery should be scheduled on a non-hemodialysis day). I would recommend a Vascular Consult, as their input would really help to determine proper level for amputation, with the best chance of healing. Also to see if there could be a vascular procedure done to increase his lower extremity blood flow, prior to amputation.

## 2022-12-15 NOTE — PROGRESS NOTES
Nephrology Progress note    Subjective:     Ezra Andrews is a 60 y.o. male with PMHx of  DM, HTN. PVD, ESRD on HD TTS at [facility] under the [address] [place] [address]. Nephrology sent in for admission by wound care clinic due to left foot infection ,was told he needed surgery. Podiatry has been in to see pt . He has been on abx recently     S/P Lt 2/3/4 metatarsal amputation     -Pt seen on HD, w/o complaints. Elder. Bruno Wagoner [address] worked better after Alteplase and Hep bolus.         Admit Date: 12/9/2022  Active Problems:    Diabetes mellitus with foot ulcer ([address] (6/27/2022)      CAD (coronary artery disease) (6/28/2022)      ESRD (end stage renal disease) ([address] (6/28/2022)      Hypertension (7/21/2022)      Leukocytosis (12/9/2022)      Diabetic foot infection ([address] (12/9/2022)      Diarrhea (12/9/2022)      Type 2 diabetes mellitus, with long-term current use of insulin ([facility]) ()      Acute hematogenous osteomyelitis of left foot ([address] (12/9/2022)      Nondisplaced fracture of second metatarsal bone of left foot (12/9/2022)      Nondisplaced fracture of third metatarsal bone of left foot (12/9/2022)      Closed nondisplaced fracture of fourth metatarsal bone of left foot (12/9/2022)      Positive blood culture (12/11/2022)    Current Facility-Administered Medications   Medication Dose Route Frequency    insulin lispro (HUMALOG) injection 2 Units  2 Units SubCUTAneous TIDAC    heparin (porcine) 1,000 unit/mL injection 3,600 Units  3,600 Units Hemodialysis ONCE    [START ON 12/15/2022] Vancomycin Random level due 12/15/22 at 04:00  1 Each Other ONCE    insulin lispro (HUMALOG) injection   SubCUTAneous AC&HS    glucose chewable tablet 16 g  16 g Oral PRN    glucagon (GLUCAGEN) injection 1 mg  1 mg IntraMUSCular PRN    epoetin lisette-epbx (RETACRIT) injection 4,000 Units  4,000 Units SubCUTAneous Q TUE, THU & SAT    insulin glargine (LANTUS) injection 10 Units  10 Units SubCUTAneous QHS    heparin (porcine) 1,000 unit/mL injection 3,600 Units 3,600 Units IntraCATHeter DIALYSIS PRN    Vancomycin - Pharmacy to Dose  1 Each Other Rx Dosing/Monitoring    piperacillin-tazobactam (ZOSYN) 4.5 g in 0.9% sodium chloride (MBP/ADV) 100 mL MBP  4.5 g IntraVENous Q12H    vancomycin 50 mg/mL oral solution (compounded) 125 mg  125 mg Oral Q12H    dextrose 10% infusion 0-250 mL  0-250 mL IntraVENous PRN    0.9% sodium chloride infusion  25 mL/hr IntraVENous DIALYSIS PRN    clopidogreL (PLAVIX) tablet 75 mg  75 mg Oral DAILY    carvediloL (COREG) tablet 12.5 mg  12.5 mg Oral BID    aspirin chewable tablet 81 mg  81 mg Oral DAILY    amLODIPine (NORVASC) tablet 10 mg  10 mg Oral DAILY    allopurinoL (ZYLOPRIM) tablet 100 mg  100 mg Oral DAILY    ascorbic acid (vitamin C) (VITAMIN C) tablet 500 mg  500 mg Oral DAILY    ezetimibe (ZETIA) tablet 10 mg  10 mg Oral DAILY    pantoprazole (PROTONIX) tablet 40 mg  40 mg Oral ACB    sevelamer carbonate (RENVELA) tab 1,600 mg  1,600 mg Oral TID WITH MEALS    vit B Cmplx 3-FA-Vit C-Biotin (NEPHRO NIKITA RX) tablet 1 Tablet  1 Tablet Oral DAILY    sodium chloride (NS) flush 5-40 mL  5-40 mL IntraVENous Q8H    sodium chloride (NS) flush 5-40 mL  5-40 mL IntraVENous PRN    acetaminophen (TYLENOL) tablet 650 mg  650 mg Oral Q6H PRN    Or    acetaminophen (TYLENOL) suppository 650 mg  650 mg Rectal Q6H PRN    bisacodyL (DULCOLAX) suppository 10 mg  10 mg Rectal DAILY PRN    promethazine (PHENERGAN) tablet 12.5 mg  12.5 mg Oral Q6H PRN    Or    ondansetron (ZOFRAN) injection 4 mg  4 mg IntraVENous Q6H PRN    heparin (porcine) injection 5,000 Units  5,000 Units SubCUTAneous Q8H    oxyCODONE-acetaminophen (PERCOCET) 5-325 mg per tablet 1 Tablet  1 Tablet Oral Q6H PRN         Allergy:   No Known Allergies     Objective:     Visit Vitals  /64 (BP 1 Location: Right upper arm, BP Patient Position: At rest)   Pulse 69   Temp 98.1 °F (36.7 °C)   Resp 16   Wt 97.4 kg (214 lb 11.2 oz)   SpO2 96%   BMI 29.12 kg/m²         Intake/Output Summary (Last 24 hours) at 12/14/2022 1957  Last data filed at 12/14/2022 1401  Gross per 24 hour   Intake --   Output 2000 ml   Net -2000 ml       Physical Exam:       General: No acute distress   HENT: Atraumatic and normocephalic   Eyes: Normal conjunctiva   Neck: Supple No JVD   Cardiovascular: Normal S1 & S2, no m/r/g   Pulmonary/Chest Wall: Clear to auscultation bilaterally   Abdominal: Soft and non-tender   Musculoskeletal: No edema S/p Amputation of multiple toes Left foot   Neurological: No focal deficits    HD Access: [facility] TD in place  Data Review:  Lab Results   Component Value Date/Time    Sodium 135 (L) 12/14/2022 01:31 AM    Potassium 5.7 (H) 12/14/2022 01:31 AM    Chloride 98 (L) 12/14/2022 01:31 AM    CO2 21 12/14/2022 01:31 AM    Anion gap 16 12/14/2022 01:31 AM    Glucose 132 (H) 12/14/2022 01:31 AM    BUN 82 (H) 12/14/2022 01:31 AM    Creatinine 10.70 (H) 12/14/2022 01:31 AM    BUN/Creatinine ratio 8 (L) 12/14/2022 01:31 AM    GFR est AA 13 (L) 07/21/2022 02:15 PM    GFR est non-AA 11 (L) 07/21/2022 02:15 PM    Calcium 7.6 (L) 12/14/2022 01:31 AM     Lab Results   Component Value Date/Time    WBC 16.2 (H) 12/14/2022 01:31 AM    HGB 9.3 (L) 12/14/2022 01:31 AM    HCT 28.9 (L) 12/14/2022 01:31 AM    PLATELET 522 81/20/8529 01:31 AM    MCV 94.1 12/14/2022 01:31 AM     Lab Results   Component Value Date/Time    Calcium 7.6 (L) 12/14/2022 01:31 AM    Phosphorus 3.8 12/13/2022 02:54 AM     No results found for: IRON, FE, TIBC, IBCT, PSAT, FERR  No results found for: FERR      Impression:     ESRD on HD TTS  Left diabetic foot infection w/ gangrenous changes s/p Lt 2/3/4 metatarsal amputation   DM  HTN  PVD  Anemia  SHPT    Plan:     HD today to make up for yesterday  Next HD tomorrow per regular schedule   IV Antibiotic per ID  Epo for Anemia    Vincent Contreras MD,   Bonif[facility] Barrera  [phone]

## 2022-12-15 NOTE — PROGRESS NOTES
Problem: Risk for Spread of Infection  Goal: Prevent transmission of infectious organism to others  Description: Prevent the transmission of infectious organisms to other patients, staff members, and visitors. Outcome: Progressing Towards Goal     Problem: Patient Education:  Go to Education Activity  Goal: Patient/Family Education  Outcome: Progressing Towards Goal     Problem: Diabetes Self-Management  Goal: *Disease process and treatment process  Description: Define diabetes and identify own type of diabetes; list 3 options for treating diabetes. Outcome: Progressing Towards Goal  Goal: *Incorporating nutritional management into lifestyle  Description: Describe effect of type, amount and timing of food on blood glucose; list 3 methods for planning meals. Outcome: Progressing Towards Goal  Goal: *Incorporating physical activity into lifestyle  Description: State effect of exercise on blood glucose levels. Outcome: Progressing Towards Goal  Goal: *Developing strategies to promote health/change behavior  Description: Define the ABC's of diabetes; identify appropriate screenings, schedule and personal plan for screenings. Outcome: Progressing Towards Goal  Goal: *Using medications safely  Description: State effect of diabetes medications on diabetes; name diabetes medication taking, action and side effects. Outcome: Progressing Towards Goal  Goal: *Monitoring blood glucose, interpreting and using results  Description: Identify recommended blood glucose targets  and personal targets. Outcome: Progressing Towards Goal  Goal: *Prevention, detection, treatment of acute complications  Description: List symptoms of hyper- and hypoglycemia; describe how to treat low blood sugar and actions for lowering  high blood glucose level.   Outcome: Progressing Towards Goal  Goal: *Prevention, detection and treatment of chronic complications  Description: Define the natural course of diabetes and describe the relationship of blood glucose levels to long term complications of diabetes. Outcome: Progressing Towards Goal  Goal: *Developing strategies to address psychosocial issues  Description: Describe feelings about living with diabetes; identify support needed and support network  Outcome: Progressing Towards Goal  Goal: *Insulin pump training  Outcome: Progressing Towards Goal  Goal: *Sick day guidelines  Outcome: Progressing Towards Goal  Goal: *Patient Specific Goal (EDIT GOAL, INSERT TEXT)  Outcome: Progressing Towards Goal     Problem: Patient Education: Go to Patient Education Activity  Goal: Patient/Family Education  Outcome: Progressing Towards Goal     Problem: Falls - Risk of  Goal: *Absence of Falls  Description: Document Jaxson Fall Risk and appropriate interventions in the flowsheet. Outcome: Progressing Towards Goal  Note: Fall Risk Interventions:  Mobility Interventions: Strengthening exercises (ROM-active/passive), Utilize walker, cane, or other assistive device         Medication Interventions: Bed/chair exit alarm, Teach patient to arise slowly    Elimination Interventions: Bed/chair exit alarm, Call light in reach    History of Falls Interventions: Bed/chair exit alarm         Problem: Patient Education: Go to Patient Education Activity  Goal: Patient/Family Education  Outcome: Progressing Towards Goal     Problem: Chronic Renal Failure  Goal: *Fluid and electrolytes stabilized  Outcome: Progressing Towards Goal     Problem: Patient Education: Go to Patient Education Activity  Goal: Patient/Family Education  Outcome: Progressing Towards Goal     Problem: Pressure Injury - Risk of  Goal: *Prevention of pressure injury  Description: Document Semaj Scale and appropriate interventions in the flowsheet.   Outcome: Progressing Towards Goal  Note: Pressure Injury Interventions:  Sensory Interventions: Keep linens dry and wrinkle-free, Minimize linen layers    Moisture Interventions: Limit adult briefs    Activity Interventions: Pressure redistribution bed/mattress(bed type)    Mobility Interventions: Pressure redistribution bed/mattress (bed type)    Nutrition Interventions: Document food/fluid/supplement intake    Friction and Shear Interventions: Minimize layers                Problem: Patient Education: Go to Patient Education Activity  Goal: Patient/Family Education  Outcome: Progressing Towards Goal     Problem: Patient Education: Go to Patient Education Activity  Goal: Patient/Family Education  Outcome: Progressing Towards Goal

## 2022-12-15 NOTE — PROGRESS NOTES
Problem: Mobility Impaired (Adult and Pediatric)  Goal: *Acute Goals and Plan of Care (Insert Text)  Description: Physical Therapy Goals  Initiated 12/11/2022  1. Patient will move from supine to sit and sit to supine  in bed with supervision/set-up within 7 day(s). 2.  Patient will transfer from bed to chair and chair to bed with minimal assistance/contact guard assist using the least restrictive device within 7 day(s). 3.  Patient will perform sit to stand with minimal assistance/contact guard assist within 7 day(s). 4.  Patient will ambulate with minimal assistance/contact guard assist for 10 feet with the least restrictive device within 7 day(s). Outcome: Progressing Towards Goal   []  Patient has met MD mobilization sahara for d/c home   []  Recommend HH with 24 hour adult care   [x]  Benefit from additional acute PT session to address:  rehab placement    PHYSICAL THERAPY TREATMENT    Patient: Diamante Jackson (40 y.o. male)  Date: 12/15/2022  Diagnosis: Diabetic foot infection ([facility] [address] [E11.628, L08.9]  Leukocytosis [D72.829] Gas gangrene ([facility] [address]  Procedure(s) (LRB):  PARTIAL AMPUTATION OF LEFT 2ND, 3RD, 4TH METATARSALS, AMPUTATION OF TOES 2,3,4, INCISION AND DRAINAGE LEFT FOOT DEEP. (Left) 6 Days Post-Op  Precautions: Fall, NWB  PLOF: lives alone, neighbor helps with stairs, w/c dependent    ASSESSMENT:  Possible surgery tomorrow. Pt sat up up EOB  with use of bed rails and increased time to carry out. Pt able to scoot laterally with Andrade with LLE to maintain NWB. Performed seated and then supine ROM strengthening exercises on (B)LEs. Progression toward goals:   []      Improving appropriately and progressing toward goals  [x]      Improving slowly and progressing toward goals  []      Not making progress toward goals and plan of care will be adjusted     PLAN:  Patient continues to benefit from skilled intervention to address the above impairments.   Continue treatment per established plan of care.  Discharge Recommendations: Rehab  Further Equipment Recommendations for Discharge:  N/A    AMPAC: 11/20    This AMPAC score should be considered in conjunction with interdisciplinary team recommendations to determine the most appropriate discharge setting. Patient's social support, diagnosis, medical stability, and prior level of function should also be taken into consideration. SUBJECTIVE:   Patient stated ok.     OBJECTIVE DATA SUMMARY:   Critical Behavior:  Neurologic State: Alert  Orientation Level: Appropriate for age, Oriented X4  Cognition: Appropriate decision making, Appropriate for age attention/concentration, Appropriate safety awareness  Safety/Judgement: Decreased awareness of environment, Decreased awareness of need for assistance, Decreased awareness of need for safety  Functional Mobility Training:  Bed Mobility:    Supine to Sit: Contact guard assistance; Additional time  Sit to Supine: Stand-by assistance  Scooting: Minimum assistance  Balance:  Sitting: Intact; With support  Sitting - Static: Fair (occasional)  Sitting - Dynamic: Fair (occasional)  Therapeutic Exercises:   (B)LE      EXERCISE   Sets   Reps   Active Active Assist   Passive Self ROM   Comments   Ankle Pumps    [] [] [] []    Quad Sets/Glut Sets  10ea  [x] [] [] [] Hold for 5 secs   Hamstring Sets   [] [] [] []    Short Arc Quads   [] [] [] []    Heel Slides   [] [] [] []    Straight Leg Raises   [] [] [] []    Hip Add   [] [] [] [] Hold for 5 secs, w/ pillow squeeze   Long Arc Quads  10 [x] [] [] []    Seated Marching   [] [] [] []    Standing Marching   [] [] [] []       [] [] [] []        Pain:  Pain level pre-treatment: 0/10  Pain level post-treatment: 0/10   Pain Intervention(s): Medication (see MAR); Rest, Ice, Repositioning   Response to intervention: Nurse notified, See doc flow    Activity Tolerance:   Fair-/poor  Please refer to the flowsheet for vital signs taken during this treatment.   After treatment:   [] Patient left in no apparent distress sitting up in chair  [x] Patient left in no apparent distress in bed  [x] Call bell left within reach  [] Nursing notified  [] Caregiver present  [] Bed alarm activated  [] SCDs applied      COMMUNICATION/EDUCATION:   [x]         Role of Physical Therapy in the acute care setting. [x]         Fall prevention education was provided and the patient/caregiver indicated understanding. [x]         Patient/family have participated as able in working toward goals and plan of care. [x]         Patient/family agree to work toward stated goals and plan of care. []         Patient understands intent and goals of therapy, but is neutral about his/her participation. []         Patient is unable to participate in stated goals/plan of care: ongoing with therapy staff.  []         Other:        Neftali Vargas PTA   Time Calculation: 11 mins    Reyes Birks AM-PAC® Basic Mobility Inpatient Short Form (6-Clicks) Version 2    How much HELP from another person does the patient currently need    (If the patient hasn't done an activity recently, how much help from another person do you think he/she would need if he/she tried?)   Total (Total A or Dep)   A Lot  (Mod to Max A)   A Little (Sup or Min A)   None (Mod I to I)   Turning from your back to your side while in a flat bed without using bedrails? [] 1 [] 2 [x] 3 [] 4   2. Moving from lying on your back to sitting on the side of a flat bed without using bedrails? [] 1 [] 2 [x] 3 [] 4   3. Moving to and from a bed to a chair (including a wheelchair)? [] 1 [x] 2 [] 3 [] 4   4. Standing up from a chair using your arms (e.g., wheelchair, or bedside chair)? [] 1 [x] 2 [] 3 [] 4   5. Walking in hospital room? [x] 1 [] 2 [] 3 [] 4   6. Climbing 3-5 steps with a railing?+   [] 1 [] 2 [] 3 [] 4   +If stair climbing cannot be assessed, skip item #6. Sum responses from items 1-5.      Based on an AM-PAC score of **/24 (or **/20 if omitting stairs) and their current functional mobility deficits, it is recommended that the patient have 5-7 sessions per week of Physical Therapy at d/c to increase the patient's independence. Currently, this patient demonstrates the potential endurance, and/or tolerance for 3 hours of therapy each day at d/c. Based on an AM-PAC score of **/24 (11/20 if omitting stairs) and their current functional mobility deficits, it is recommended that the patient have 3-5 sessions per week of Physical Therapy at d/c to increase the patient's independence. Based on an AM-PAC score of **/24 (or **/20 if omitting stairs) and their current functional mobility deficits, it is recommended that the patient have 2-3 sessions per week of Physical Therapy at d/c to increase the patient's independence. At this time and based on an AM-PAC score of **/24 (or **/20 if omitting stairs), no further PT is recommended upon discharge due to (i.e. patient at baseline functional statusetc). Recommend patient returns to prior setting with prior services.

## 2022-12-15 NOTE — PROGRESS NOTES
Received phone call from Dr Hotrencia Mccabe (podiatry) in references to pt receiving a arterial Doppler study. There is an order from pt to have done. No report/results in computer. Will f/u and will continue to monitor.

## 2022-12-15 NOTE — PROGRESS NOTES
Podiatry:    Patient seen for continued care of left foot wound. He is status post partial amputation second third and fourth metatarsals with the toes and I&D. Unfortunately the left foot amputation site shows signs of ischemia and necrosis. He will require further surgery, possible TMA. Vascular is on board to try and help determine if a TMA will possibly heal or if a BKA is recommended. Vascular is planning arteriogram left lower extremity the beginning of next week, to see if there is any procedure they can do to increase his blood flow, or if TMA or BKA are the best choice for the patient.

## 2022-12-15 NOTE — PROGRESS NOTES
Hospitalist Progress Note-critical care note     Patient: Cheo Power MRN: [id number]  CSN: [id number]    YOB: 1960  Age: 60 y.o.   Sex: male    DOA: 12/9/2022 LOS:  LOS: 6 days            Chief complaint: leukocytosis, positive bcx , foot infection , om  esrd on hd     Assessment/Plan         Hospital Problems  Date Reviewed: 6/28/2022            Codes Class Noted POA    Positive blood culture ICD-10-CM: R78.81  ICD-9-CM: 790.7  12/11/2022 Unknown        Leukocytosis ICD-10-CM: D72.829  ICD-9-CM: 288.60  12/9/2022 Unknown        Diabetic foot infection ([facility] ICD-10-CM: E11.628, L08.9  ICD-9-CM: 250.80, 686.9  12/9/2022 Unknown        Diarrhea ICD-10-CM: R19.7  ICD-9-CM: 787.91  12/9/2022 Unknown        Type 2 diabetes mellitus, with long-term current use of insulin ([facility] ICD-10-CM: E11.9, Z79.4  ICD-9-CM: 250.00, V58.67  Unknown Unknown        Acute hematogenous osteomyelitis of left foot ([facility] ICD-10-CM: M86.072  ICD-9-CM: 730.07  12/9/2022 Unknown        Nondisplaced fracture of second metatarsal bone of left foot ICD-10-CM: C74.844X  ICD-9-CM: 825.25  12/9/2022 Unknown        Nondisplaced fracture of third metatarsal bone of left foot ICD-10-CM: Y48.550N  ICD-9-CM: 825.25  12/9/2022 Unknown        Closed nondisplaced fracture of fourth metatarsal bone of left foot ICD-10-CM: S92.345A  ICD-9-CM: 825.25  12/9/2022 Unknown        Hypertension ICD-10-CM: I10  ICD-9-CM: 401.9  7/21/2022 Yes        CAD (coronary artery disease) ICD-10-CM: I25.10  ICD-9-CM: 414.00  6/28/2022 Yes        ESRD (end stage renal disease) ([facility] ICD-10-CM: N18.6  ICD-9-CM: 585.6  6/28/2022 Yes        Diabetes mellitus with foot ulcer ([facility] ICD-10-CM: E11.621, L97.509  ICD-9-CM: 250.80, 707.15  6/27/2022 Yes            Gangrene of left foot, leukocytosis 16 K   PARTIAL AMPUTATION OF LEFT 2ND, 3RD, 4TH METATARSALS, AMPUTATION OF TOES 2,3,4, INCISION ADN DRAINAGE LEFT FOOT DEEP per dr. Pao Lopes   Discussed with  Jennifer-planning surgery on Friday , peace and duplex of LE artery done vascular consulted   ID and podiatrist on board  Continue vanc and zosyn   Cx results reviewed,     Positive blood cx  STAPHYLOCOCCUS SPECIES GROWING IN 1 OF 2 BOTTLES-like due to contamination     Diabetic foot ulcer  Follow-up with Dr. Lenward Curling wound care     CAD, last stent Severe single-vessel coronary artery disease. Distal RCA to drug-eluting stent in July 2022, continue plavix -he took plavix today   No chest pain  Reported epigastric pain  EKG PVC     Hypertension continue home medication     Diarrhea epigastric pain  CT abdomen no acute process  Resolved      End-stage renal disease on HD  TTS, l have hd today      DM  type II   Lantus at night , ssi will increase lantus to 10     K 5.9 today -will have hd today -renal seeing pt AM     Subjective  feel better today, they will give me another HD , diarrhea and pain at the bottom due to diarrhea   Will put imodium ,nystatin powder       Disposition :tbd,   Review of systems:    General: No fevers or chills. Cardiovascular: No chest pain or pressure. No palpitations. Pulmonary: No shortness of breath. Gastrointestinal: No nausea, vomiting. No  abdomen pain     Vital signs/Intake and Output:  Visit Vitals  BP (!) 131/57 (BP 1 Location: Right upper arm)   Pulse 61   Temp 98.3 °F (36.8 °C)   Resp 16   Wt 97.2 kg (214 lb 3.2 oz)   SpO2 95%   BMI 29.05 kg/m²     Current Shift:  12/15 0701 - 12/15 1900  In: 240 [P.O.:240]  Out: -   Last three shifts:  12/13 1901 - 12/15 0700  In: -   Out: 2000     Physical Exam:  General: WD, WN. Alert, cooperative, no acute distress    HEENT: NC, Atraumatic. PERRLA, anicteric sclerae. Lungs: CTA Bilaterally. No Wheezing/Rhonchi/Rales. Heart:  Regular  rhythm,  No murmur, No Rubs, No Gallops  Abdomen: Soft, Non distended, Non tender.   +Bowel sounds,   Extremities: No c/c, both feet wrapped with gauze   Psych:   Not anxious or agitated. Neurologic:  No acute neurological deficit. Labs: Results:       Chemistry Recent Labs     12/15/22  0645 12/14/22  0131 12/13/22  0254   * 132* 199*    135* 133*   K 5.9* 5.7* 5.3    98* 96*   CO2 23 21 24   BUN 64* 82* 68*   CREA 9.32* 10.70* 9.44*   CA 8.0* 7.6* 8.0*   AGAP 13 16 13   BUCR 7* 8* 7*   ALB  --   --  1.9*        CBC w/Diff Recent Labs     12/14/22 0131   WBC 16.2*   RBC 3.07*   HGB 9.3*   HCT 28.9*      GRANS 81*   LYMPH 9*   EOS 3        Cardiac Enzymes No results for input(s): CPK, CKND1, PAULO in the last 72 hours. No lab exists for component: CKRMB, TROIP   Coagulation No results for input(s): PTP, INR, APTT, INREXT, INREXT in the last 72 hours. Lipid Panel Lab Results   Component Value Date/Time    Cholesterol, total 188 07/19/2022 03:12 AM    HDL Cholesterol 42 07/19/2022 03:12 AM    LDL, calculated 131.2 (H) 07/19/2022 03:12 AM    VLDL, calculated 14.8 07/19/2022 03:12 AM    Triglyceride 74 07/19/2022 03:12 AM    CHOL/HDL Ratio 4.5 07/19/2022 03:12 AM      BNP No results for input(s): BNPP in the last 72 hours. Liver Enzymes Recent Labs     12/13/22 0254   ALB 1.9*        Thyroid Studies No results found for: T4, T3U, TSH, TSHEXT, TSHEXT     Procedures/imaging: see electronic medical records for all procedures/Xrays and details which were not copied into this note but were reviewed prior to creation of Plan    XR FOOT LT AP/LAT    Result Date: 12/9/2022  EXAM: 2 radiographic views of the left foot. INDICATION: Left foot pain. COMPARISON: December 9, 2022 _______________ FINDINGS: There is been interval amputation of the second, third, and fourth rays at the level of the metatarsal diaphysis. As before, the first ray is amputated at the metatarsal phalangeal joint. The small toe remains. There are expected postoperative findings to include regional air density and soft tissue swelling. There is Monckeberg type vascular calcification. There is low-grade mid foot degeneration. _______________     Standard immediate postoperative findings. _______________     XR FOOT LT MIN 3 V    Result Date: 12/9/2022  EXAM: XR FOOT LT MIN 3 V CLINICAL INDICATION/HISTORY: Gangrenous 2nd digit   > Additional: None. COMPARISON: None. TECHNIQUE: 3 views left foot _______________ FINDINGS: Soft tissue gas formation is seen along the second digit with ill-defined limitus changes extending along the medial forefoot. Prior amputation first right. Patchy demineralization and osseous erosions are seen involving the distal portion of the first metatarsal head. Additional patchy areas of osseous erosion are also noted involving the proximal phalanx of the second toe. Diffuse soft tissue swelling. Diffuse atherosclerotic vascular calcifications. No retained radiopaque foreign object. _______________     Soft gas formation and ulceration involving the medial forefoot with findings concerning for osteomyelitis involving the first and second rays described above. MRI FOOT LT WO CONT    Result Date: 12/9/2022  EXAM: MRI FOOT LT WO CONT CLINICAL INDICATION/HISTORY: Foot wound; preoperative  >Additional: None COMPARISON: Radiograph performed December 9, 2022  >Reference exam: None. TECHNIQUE: Axial long axis TI and T2 with fat saturation, sagittal and coronal short axis TI and STIR sequences through the foot were obtained without contrast. _______________ FINDINGS: FIRST RAY: Prior dictation the first digit. There is mild bony proliferative change at the head of the first metatarsal with preserved marrow signal. Minimal patchy edema is noted which is nonspecific. No definite cortical destructive change.  SECOND RAY: Nondisplaced obliquely oriented fracture through the head neck junction of the second metatarsal. There is heterogeneous diminished T1 marrow with patchy edema and surrounding soft tissue gas along the course of the second digit with associated subluxation of the distal phalanx. Subtle diminished T1 marrow is noted with suspected foci of intraosseous gas, concerning for acute osteomyelitis. THIRD RAY: Nondisplaced fracture through the third metatarsal neck with slight impaction. Mild edema is noted within the third digit, possibly stress reaction. No convincing osseous erosions or T1 marrow signal change. FOURTH RAY: Nondisplaced fractures of the neck of the fourth metatarsal. Minimal edema in the proximal phalanx but otherwise preserved marrow signal. FIFTH RAY: Unremarkable. Additional degenerative changes with patchy edema, joint space narrowing, and osteophyte formation at the midfoot, likely related to underlying Charcot arthropathy. SOFT TISSUES: Soft tissue irregularity with gas and ulceration around the second digit noted. Diffuse fluid signal seen throughout the intrinsic foot musculature, commonly seen in the setting of diabetes. Mild skin thickening about the forefoot. INCIDENTAL FINDINGS: None. _______________     1. Marrow signal abnormality with soft tissue wound and gas around the second digit concerning for acute osteomyelitis. 2.  Nondisplaced fractures of the head neck junction of the second-fourth metatarsals. 3.  Prior indication the first digit. 4.  Soft tissue swelling and skin thickening about the forefoot concerning for cellulitis. No drainable abscess. 5.  Additional chronic/degenerative changes of the foot. CT ABD PELV WO CONT    Result Date: 12/9/2022  EXAM: CT of the abdomen and pelvis INDICATION: Abdominal pain with distention. COMPARISON: None. TECHNIQUE: Axial CT imaging of the abdomen and pelvis was performed without intravenous contrast. Multiplanar reformats were generated. One or more dose reduction techniques were used on this CT: automated exposure control, adjustment of the mAs and/or kVp according to patient size, and iterative reconstruction techniques.   The specific techniques used on this CT exam have been documented in the patient's electronic medical record. Digital Imaging and Communications in Medicine (DICOM) format image data are available to nonaffiliated external healthcare facilities or entities on a secure, media free, reciprocally searchable basis with patient authorization for at least a 12-month period after this study. _______________ FINDINGS: LOWER CHEST: Partial inclusion of multivessel coronary arterial atherosclerosis and central venous catheter. Clear lung bases. Several punctate 2 to 3 mm pulmonary nodule seen in the right lower lobe (image 8) LIVER, BILIARY: Liver is normal. No biliary dilation. Color contains a gallstone without evidence of dilatation or gallbladder wall thickening. PANCREAS: Normal. SPLEEN: Punctate granulomatous calcifications otherwise unremarkable. ADRENALS: Mild adreniform thickening of each adrenal gland. KIDNEYS/URETERS/BLADDER: No hydronephrosis. Bilateral renal hypodensities are present either to small to accurately characterize or in keeping with simple cysts which are prior no further dedicated imaging follow-up. Bladder decompressed. PELVIC ORGANS: Unremarkable. VASCULATURE: Diffuse aortic and visceral arterial atherosclerosis without evidence of aneurysmal dilatation. LYMPH NODES: Scattered subcentimeter mesenteric and retroperitoneal lymph nodes are demonstrated without evidence of adenopathy. Prominent left inguinal lymph node is present (image 127) measuring approximately 15 mm in short axis dimension. GASTROINTESTINAL TRACT: No bowel dilation or wall thickening. Peritoneal gas. Normal appendix. Scattered colonic diverticula without evidence of diverticulitis. BONES: No acute or aggressive osseous abnormalities identified. OTHER: None. _______________     1. Cholelithiasis without evidence of cholecystitis. 2. No abnormal bowel wall thickening or dilatation. 3. No hydronephrosis or obstructive uropathy. 4. Several small right lower lobe pulmonary nodules (2-3 mm in size).  See below guidelines for proposed follow-up. 5. Borderline enlarged and nonspecific left inguinal lymph node, potentially reactive in etiology. ======== Fleischner Society pulmonary nodule guidelines (revised 2017): Multiple solid nodules <6 mm: -Low risk for lung cancer: No follow-up. -High risk for lung cancer: Optional chest CT in 12 months. XR CHEST PORT    Result Date: 12/9/2022  EXAM: XR CHEST PORT CLINICAL INDICATION/HISTORY: sepsis -Additional: None COMPARISON: July 12, 2022 TECHNIQUE: Portable frontal view of the chest _______________ FINDINGS: SUPPORT DEVICES: Right IJ approach dialysis catheter in stable position. HEART AND MEDIASTINUM: Stable appearing cardiac size and mediastinal contours. LUNGS AND PLEURAL SPACES: Lungs are clear. No focal pneumonic opacity. No pneumothorax or pleural effusion. BONY THORAX AND SOFT TISSUES: Unremarkable. _______________     No active cardiopulmonary disease.        Josue Trinidad MD

## 2022-12-15 NOTE — PROGRESS NOTES
[address] Pharmacokinetic Monitoring Service - Vancomycin    Consulting Provider: Sophia Marques   Indication: Diabetic Foot Infection  Target Concentration: Pre-Dialysis Level 20-24 mg/L  Day of Therapy: 7  Additional Antimicrobials: Zosyn 4.5g q12h    Pertinent Laboratory Values: Wt Readings from Last 1 Encounters:   12/14/22 97.2 kg (214 lb 3.2 oz)     Temp Readings from Last 1 Encounters:   12/15/22 98.3 °F (36.8 °C)     No components found for: PROCAL  Estimated Creatinine Clearance: 9.8 mL/min (A) (based on SCr of 9.32 mg/dL (H)).   Recent Labs     12/14/22  0131   WBC 16.2*         Assessment:  Date/Time Current Dose Concentration Timing of Concentration (h) AUC   12/15/22 @1149 Vanc 1G HD 19.0 0645 N/A   Note: Serum concentrations collected for AUC dosing may appear elevated if collected in close proximity to the dose administered, this is not necessarily an indication of toxicity    Plan:  Current dosing regimen is Vancomycin 1G HD  Continue current regimen  Repeat vancomycin concentration ordered for 12/17 @ 0400   Pharmacy will continue to monitor patient and adjust therapy as indicated      TALYA Jimenez  12/15/2022 11:47 AM

## 2022-12-15 NOTE — PROGRESS NOTES
Nephrology Progress note    Subjective:     Jus Ospina is a 60 y.o. male with PMHx of  DM, HTN. PVD, ESRD on HD TTS at [facility] under the [address] [place] [address] Division . Nephrology sent in for admission by wound care clinic due to left foot infection ,was told he needed surgery. Podiatry has been in to see pt .   He has been on abx recently     S/P Lt 2/3/4 metatarsal amputation     -Pt s/p HD yesterday       Admit Date: 12/9/2022  Active Problems:    Diabetes mellitus with foot ulcer ([facility] (6/27/2022)      CAD (coronary artery disease) (6/28/2022)      ESRD (end stage renal disease) ([facility] (6/28/2022)      Hypertension (7/21/2022)      Leukocytosis (12/9/2022)      Diabetic foot infection ([facility] (12/9/2022)      Diarrhea (12/9/2022)      Type 2 diabetes mellitus, with long-term current use of insulin ([facility]) ()      Acute hematogenous osteomyelitis of left foot ([facility] (12/9/2022)      Nondisplaced fracture of second metatarsal bone of left foot (12/9/2022)      Nondisplaced fracture of third metatarsal bone of left foot (12/9/2022)      Closed nondisplaced fracture of fourth metatarsal bone of left foot (12/9/2022)      Positive blood culture (12/11/2022)    Current Facility-Administered Medications   Medication Dose Route Frequency    loperamide (IMODIUM) capsule 2 mg  2 mg Oral Q4H PRN    nystatin (MYCOSTATIN) 100,000 unit/gram powder   Topical BID    insulin lispro (HUMALOG) injection 2 Units  2 Units SubCUTAneous TIDAC    Vancomycin Random level due 12/15/22 at 04:00  1 Each Other ONCE    alum-mag hydroxide-simeth (MYLANTA) oral suspension 30 mL  30 mL Oral Q4H PRN    insulin lispro (HUMALOG) injection   SubCUTAneous AC&HS    glucose chewable tablet 16 g  16 g Oral PRN    glucagon (GLUCAGEN) injection 1 mg  1 mg IntraMUSCular PRN    epoetin lisette-epbx (RETACRIT) injection 4,000 Units  4,000 Units SubCUTAneous Q TUE, THU & SAT    insulin glargine (LANTUS) injection 10 Units  10 Units SubCUTAneous QHS    heparin (porcine) 1,000 unit/mL injection 3,600 Units  3,600 Units IntraCATHeter DIALYSIS PRN    Vancomycin - Pharmacy to Dose  1 Each Other Rx Dosing/Monitoring    piperacillin-tazobactam (ZOSYN) 4.5 g in 0.9% sodium chloride (MBP/ADV) 100 mL MBP  4.5 g IntraVENous Q12H    vancomycin 50 mg/mL oral solution (compounded) 125 mg  125 mg Oral Q12H    dextrose 10% infusion 0-250 mL  0-250 mL IntraVENous PRN    0.9% sodium chloride infusion  25 mL/hr IntraVENous DIALYSIS PRN    clopidogreL (PLAVIX) tablet 75 mg  75 mg Oral DAILY    carvediloL (COREG) tablet 12.5 mg  12.5 mg Oral BID    aspirin chewable tablet 81 mg  81 mg Oral DAILY    amLODIPine (NORVASC) tablet 10 mg  10 mg Oral DAILY    allopurinoL (ZYLOPRIM) tablet 100 mg  100 mg Oral DAILY    ascorbic acid (vitamin C) (VITAMIN C) tablet 500 mg  500 mg Oral DAILY    ezetimibe (ZETIA) tablet 10 mg  10 mg Oral DAILY    pantoprazole (PROTONIX) tablet 40 mg  40 mg Oral ACB    sevelamer carbonate (RENVELA) tab 1,600 mg  1,600 mg Oral TID WITH MEALS    vit B Cmplx 3-FA-Vit C-Biotin (NEPHRO NIKITA RX) tablet 1 Tablet  1 Tablet Oral DAILY    sodium chloride (NS) flush 5-40 mL  5-40 mL IntraVENous Q8H    sodium chloride (NS) flush 5-40 mL  5-40 mL IntraVENous PRN    acetaminophen (TYLENOL) tablet 650 mg  650 mg Oral Q6H PRN    Or    acetaminophen (TYLENOL) suppository 650 mg  650 mg Rectal Q6H PRN    bisacodyL (DULCOLAX) suppository 10 mg  10 mg Rectal DAILY PRN    promethazine (PHENERGAN) tablet 12.5 mg  12.5 mg Oral Q6H PRN    Or    ondansetron (ZOFRAN) injection 4 mg  4 mg IntraVENous Q6H PRN    heparin (porcine) injection 5,000 Units  5,000 Units SubCUTAneous Q8H    oxyCODONE-acetaminophen (PERCOCET) 5-325 mg per tablet 1 Tablet  1 Tablet Oral Q6H PRN         Allergy:   No Known Allergies     Objective:     Visit Vitals  BP (!) 139/57   Pulse 68   Temp 98.3 °F (36.8 °C)   Resp 17   Wt 97.2 kg (214 lb 3.2 oz)   SpO2 98%   BMI 29.05 kg/m²         Intake/Output Summary (Last 24 hours) at 12/15/2022 12 [facility] filed at 12/15/2022 0853  Gross per 24 hour   Intake 240 ml   Output 2000 ml   Net -1760 ml         Physical Exam:       General: No acute distress   HENT: Atraumatic and normocephalic   Eyes: Normal conjunctiva   Neck: Supple No JVD   Cardiovascular: Normal S1 & S2, no m/r/g   Pulmonary/Chest Wall: Clear to auscultation bilaterally   Abdominal: Soft and non-tender   Musculoskeletal: No edema S/p Amputation of multiple toes Left foot   Neurological: No focal deficits    HD Access: [facility] TD in place  Data Review:  Lab Results   Component Value Date/Time    Sodium 136 12/15/2022 06:45 AM    Potassium 5.9 (H) 12/15/2022 06:45 AM    Chloride 100 12/15/2022 06:45 AM    CO2 23 12/15/2022 06:45 AM    Anion gap 13 12/15/2022 06:45 AM    Glucose 145 (H) 12/15/2022 06:45 AM    BUN 64 (H) 12/15/2022 06:45 AM    Creatinine 9.32 (H) 12/15/2022 06:45 AM    BUN/Creatinine ratio 7 (L) 12/15/2022 06:45 AM    GFR est AA 13 (L) 07/21/2022 02:15 PM    GFR est non-AA 11 (L) 07/21/2022 02:15 PM    Calcium 8.0 (L) 12/15/2022 06:45 AM     Lab Results   Component Value Date/Time    WBC 16.2 (H) 12/14/2022 01:31 AM    HGB 9.3 (L) 12/14/2022 01:31 AM    HCT 28.9 (L) 12/14/2022 01:31 AM    PLATELET 126 53/61/1453 01:31 AM    MCV 94.1 12/14/2022 01:31 AM     Lab Results   Component Value Date/Time    Calcium 8.0 (L) 12/15/2022 06:45 AM    Phosphorus 3.8 12/13/2022 02:54 AM     No results found for: IRON, FE, TIBC, IBCT, PSAT, FERR  No results found for: FERR      Impression:     ESRD on HD TTS  Left diabetic foot infection w/ gangrenous changes s/p Lt 2/3/4 metatarsal amputation   DM  HTN  PVD  Anemia  SHPT    Plan:     HD today per usual schedule  IV Antibiotic per ID  Epo for Anemia    Michelle Lopez MD,   Bonifacio Barrera  [phone]

## 2022-12-15 NOTE — PROGRESS NOTES
Physician Progress Note      Dilan Mann  [facility] #:                  [id number]  :                       1960  ADMIT DATE:       2022 11:33 AM  DISCH DATE:  RESPONDING  PROVIDER #:        Salima VALDEZ MD          QUERY TEXT:    Pt admitted with diabetic foot infection . Pt noted to have left foot cellulitis. If possible, please document in progress notes and discharge summary the relationship, if any, between cellulitis and DM. The medical record reflects the following:  Risk Factors: diabetic foot infection  Clinical Indicators: Per OP note findings :  Gas gangrene with deep abscess, cellulitis left foot,  Treatment: Vancomycin , Zosyn,  AND BONE INFECTION. PARTIAL AMPUTATION OF LEFT 2ND, 3RD, 4TH METATARSALS, AMPUTATION OF TOES 2,3,4, INCISION AND DRAINAGE LEFT FOOT DEEP. Thank You  Sp Johnson RN, CDI, CRCR  Options provided:  -- Left foot cellulitis associated with Diabetes  -- Left foot cellulitis unrelated to Diabetes  -- Other - I will add my own diagnosis  -- Disagree - Not applicable / Not valid  -- Disagree - Clinically unable to determine / Unknown  -- Refer to Clinical Documentation Reviewer    PROVIDER RESPONSE TEXT:    Left foot cellulitis associated with Diabetes.     Query created by: Tona Venegas on 12/15/2022 7:46 AM      Electronically signed by:  Salima Spain MD 12/15/2022 4:30 PM

## 2022-12-15 NOTE — CONSULTS
VASCULAR CONSULTATION NOTE    A vascular consultation has been requested on Rossi Chavira, who is a 60 y.o. male admitted to the hospital on 12/9/2022 with an admitting diagnosis of Diabetic foot infection ([facility] [address] [E11.628, L08.9]  Leukocytosis [D72.829]. He is being seen for evaluation and possible treatment of  peripheral vascular disease. Attending Physician: Dr. Yo Srinivasan             Referring Physician: Dr. Amina Barksdale    HPI:  this is a 61 yo male known to our practice secondary to esrd and hemodialysis access. Pt now presents with ischemic changes to the toes of left foot. He is s/p PARTIAL AMPUTATION OF LEFT 2ND, 3RD, 4TH METATARSALS, AMPUTATION OF TOES 2,3,4, INCISION ADN DRAINAGE LEFT FOOT DEEP. By Dr. Carol Simmonds on 12/9/22. This wound is failing to heal with continued gangrenous changes. Pt will require subsequent podiatric surgery, scheduled for tomorrow. Asked to evaluate if tma will heal or if patient require bka. Lower extremity arterial studies shows:    Left:  Monophasic Doppler waveforms in the left anterior tibial, posterior tibial and dorsalis pedis artery. Biphasic Doppler waveforms in the left tibial/peroneal artery. Triphasic Doppler waveforms in the left distal common femoral, profunda femoris, proximal superficial femoral, middle superficial femoral, distal superficial femoral, proximal popliteal and distal popliteal artery    Right:  Monophasic Doppler waveforms in the right anterior tibial artery. Biphasic Doppler waveforms in the right distal common femoral, profunda femoris, proximal superficial femoral and middle superficial femoral artery. Triphasic Doppler waveforms in the right distal superficial femoral, proximal popliteal, distal popliteal, tibial/peroneal trunk, posterior tibial, peroneal and dorsalis pedis artery. Patient lives independently.   He uses wheelchair most of the time but does utilize walker at times. He does not smoke cigarettes. Patient Active Problem List   Diagnosis Code    Diabetes mellitus with foot ulcer ([facility] [address] E11.621, L97.509    CAD (coronary artery disease) I25.10    ESRD (end stage renal disease) ([facility] [address] N18.6    Acute hematogenous osteomyelitis of right foot ([facility]) M86.071    Cellulitis of right heel L03.115    Diabetic foot ulcer with osteomyelitis [facility] E11.621, E11.69, L97.509, M86.9    Ulcer of right heel, with necrosis of bone ([facility] [address] L97.414    Infection and inflammatory reaction due to internal fixation device of other site, initial encounter [facility]) T84.69XA    Left arm swelling M79.89    Chest pain at rest R07.9    Abnormal nuclear stress test R94.39    History of anemia due to CKD N18.9, Z86.2    S/P angioplasty with stent Z95.820    Hypertension I10    Leukocytosis D72.829    Diabetic foot infection (HCC) E11.628, L08.9    Diarrhea R19.7    Type 2 diabetes mellitus, with long-term current use of insulin (HCC) E11.9, Z79.4    Acute hematogenous osteomyelitis of left foot ([facility]) M86.072    Nondisplaced fracture of second metatarsal bone of left foot S92.325A    Nondisplaced fracture of third metatarsal bone of left foot S92.335A    Closed nondisplaced fracture of fourth metatarsal bone of left foot S92.345A    Positive blood culture R78.81       Past Medical History:   Diagnosis Date    CAD (coronary artery disease)     Chronic kidney disease     Diabetes mellitus     Hypertension     Sleep apnea      Past Surgical History:   Procedure Laterality Date    HX ORTHOPAEDIC      HX PACEMAKER      IR INSERT TUNL CVC W/O PORT OVER 5 YR  07/07/2022    IR INSERT TUNL CVC W/O PORT OVER 5 YR  6/29/2022    IR REMOVE TUNL CVAD W/O PORT / PUMP  8/12/2022    [place] CARDIAC SURG PROCEDURE UNLIST       @socr@  Family History   Problem Relation Age of Onset    Heart Disease Mother     Diabetes Father     Diabetes Sister     Diabetes Brother        Risk factors:   The patient has the following risk factors as above.    The patient denies a history of as above. No Known Allergies    Home Medications:   [unfilled]    In Patient Medications:   [unfilled]    Review of Systems:   Constitutional: negative for fevers and chills  Eyes: negative for visual disturbance  Ears, nose, mouth, throat, and face: negative for epistaxis  Respiratory: negative for cough, hemoptysis, or dyspnea on exertion  Cardiovascular: negative for chest pain, claudication, lower extremity edema  Gastrointestinal: negative for melena  Genitourinary:negative for hematuria  Integument/breast: positive for color change left foot, negative for rash  Hematologic/lymphatic: negative for easy bruising and bleeding  Musculoskeletal:negative for myalgias and back pain  Neurological: negative for memory problems  Behavioral/Psych: negative for depression  Endocrine: positive for diabetic symptoms including poor wound healing, negative for temperature intolerance  Allergic/Immunologic: negative for anaphylaxis    Physical Assessment:   [unfilled]    HEENT:  normal cephalic, atraumatic. No scleral icterus, mucus membranes pink and moist.    Neck:  there is a right internal jugular vein tunneled hemodialysis catheter in place. No jvd. Lungs:  cta,  Card:  rrr. Abdomen:  positive bs. Soft, non-tender,  no hsm or pulsatile mass. Ext:  the left upper extremity had non-functioning arterial venous accesses. The left foot had gangrene to dorsum and toe amputation sites. The right foot has a pressure ulcer on the heel. Right ankle is fussed. Right:      Carotid       no bruit    Radial         2+    Femoral      2+    Popliteal     2+    Dorsalis      absent pulse. There is a monophasic signal present. Post Tib      absent pulse. There is a biphasic signal present. Left:        Carotid       no bruit. Radial         2+    Femoral      2+    Popliteal     2+    Dorsalis      absent pulse. Monophasic signal present.      Post Tib absent pulse. Monophasic signal present. Lab    CBC:   Lab Results   Component Value Date/Time    WBC 16.2 (H) 12/14/2022 01:31 AM    RBC 3.07 (L) 12/14/2022 01:31 AM     BMP:   Lab Results   Component Value Date/Time    CO2 23 12/15/2022 06:45 AM    BUN 64 (H) 12/15/2022 06:45 AM       Xray   N/a    PVL   I have independently reviewed lower extremity arterial duplex:    Lower Extremity Arterial Findings    Right Lower Arterial    Monophasic Doppler waveforms in the right anterior tibial artery. Biphasic Doppler waveforms in the right distal common femoral, profunda femoris, proximal superficial femoral and middle superficial femoral artery. Triphasic Doppler waveforms in the right distal superficial femoral, proximal popliteal, distal popliteal, tibial/peroneal trunk, posterior tibial, peroneal and dorsalis pedis artery. Left Lower Arterial    Monophasic Doppler waveforms in the left anterior tibial, posterior tibial and dorsalis pedis artery. Biphasic Doppler waveforms in the left tibial/peroneal artery. Triphasic Doppler waveforms in the left distal common femoral, profunda femoris, proximal superficial femoral, middle superficial femoral, distal superficial femoral, proximal popliteal and distal popliteal artery. Impression:     61 yo male with atherosclerosis of native arteries with gangrene. Esrd on hemodialysis. Minimally ambulatory. Plan:     I believe that the patient may benefit from left lower extremity angiogram for possible limb salvage. Will tentatively plan for the beginning of next week secondary to angio suite availability. Thank you for allowing us to participate in the care of this patient. Delbert Castillo PA-C   [phone].

## 2022-12-16 LAB
ANION GAP SERPL CALC-SCNC: 8 MMOL/L (ref 3–18)
BUN SERPL-MCNC: 41 MG/DL (ref 7–18)
BUN/CREAT SERPL: 6 (ref 12–20)
CALCIUM SERPL-MCNC: 8.4 MG/DL (ref 8.5–10.1)
CHLORIDE SERPL-SCNC: 99 MMOL/L (ref 100–111)
CO2 SERPL-SCNC: 28 MMOL/L (ref 21–32)
CREAT SERPL-MCNC: 6.83 MG/DL (ref 0.6–1.3)
GLUCOSE BLD STRIP.AUTO-MCNC: 135 MG/DL (ref 70–110)
GLUCOSE BLD STRIP.AUTO-MCNC: 141 MG/DL (ref 70–110)
GLUCOSE BLD STRIP.AUTO-MCNC: 199 MG/DL (ref 70–110)
GLUCOSE BLD STRIP.AUTO-MCNC: 205 MG/DL (ref 70–110)
GLUCOSE SERPL-MCNC: 206 MG/DL (ref 74–99)
MAGNESIUM SERPL-MCNC: 2.1 MG/DL (ref 1.6–2.6)
POTASSIUM SERPL-SCNC: 5.3 MMOL/L (ref 3.5–5.5)
SODIUM SERPL-SCNC: 135 MMOL/L (ref 136–145)

## 2022-12-16 PROCEDURE — 74011636637 HC RX REV CODE- 636/637: Performed by: HOSPITALIST

## 2022-12-16 PROCEDURE — 74011000258 HC RX REV CODE- 258: Performed by: EMERGENCY MEDICINE

## 2022-12-16 PROCEDURE — 83735 ASSAY OF MAGNESIUM: CPT

## 2022-12-16 PROCEDURE — 74011000250 HC RX REV CODE- 250: Performed by: EMERGENCY MEDICINE

## 2022-12-16 PROCEDURE — 74011250636 HC RX REV CODE- 250/636: Performed by: EMERGENCY MEDICINE

## 2022-12-16 PROCEDURE — 82962 GLUCOSE BLOOD TEST: CPT

## 2022-12-16 PROCEDURE — 36415 COLL VENOUS BLD VENIPUNCTURE: CPT

## 2022-12-16 PROCEDURE — 80048 BASIC METABOLIC PNL TOTAL CA: CPT

## 2022-12-16 PROCEDURE — 74011250637 HC RX REV CODE- 250/637: Performed by: HOSPITALIST

## 2022-12-16 PROCEDURE — 65270000029 HC RM PRIVATE

## 2022-12-16 PROCEDURE — 74011250636 HC RX REV CODE- 250/636: Performed by: HOSPITALIST

## 2022-12-16 PROCEDURE — 74011000250 HC RX REV CODE- 250: Performed by: HOSPITALIST

## 2022-12-16 RX ADMIN — Medication 125 MG: at 17:11

## 2022-12-16 RX ADMIN — INSULIN LISPRO 6 UNITS: 100 INJECTION, SOLUTION INTRAVENOUS; SUBCUTANEOUS at 17:06

## 2022-12-16 RX ADMIN — SEVELAMER CARBONATE 1600 MG: 800 TABLET, FILM COATED ORAL at 09:31

## 2022-12-16 RX ADMIN — ALLOPURINOL 100 MG: 100 TABLET ORAL at 09:31

## 2022-12-16 RX ADMIN — Medication 2 UNITS: at 11:49

## 2022-12-16 RX ADMIN — AMLODIPINE BESYLATE 10 MG: 5 TABLET ORAL at 09:31

## 2022-12-16 RX ADMIN — Medication 2 UNITS: at 17:06

## 2022-12-16 RX ADMIN — B-COMPLEX W/ C & FOLIC ACID TAB 1 MG 1 TABLET: 1 TAB at 09:31

## 2022-12-16 RX ADMIN — SEVELAMER CARBONATE 1600 MG: 800 TABLET, FILM COATED ORAL at 17:07

## 2022-12-16 RX ADMIN — EZETIMIBE 10 MG: 10 TABLET ORAL at 09:31

## 2022-12-16 RX ADMIN — CARVEDILOL 12.5 MG: 12.5 TABLET, FILM COATED ORAL at 21:55

## 2022-12-16 RX ADMIN — SEVELAMER CARBONATE 1600 MG: 800 TABLET, FILM COATED ORAL at 11:50

## 2022-12-16 RX ADMIN — SODIUM CHLORIDE, PRESERVATIVE FREE 10 ML: 5 INJECTION INTRAVENOUS at 22:01

## 2022-12-16 RX ADMIN — PIPERACILLIN AND TAZOBACTAM 4.5 G: 4; .5 INJECTION, POWDER, FOR SOLUTION INTRAVENOUS at 11:51

## 2022-12-16 RX ADMIN — PANTOPRAZOLE SODIUM 40 MG: 40 TABLET, DELAYED RELEASE ORAL at 09:31

## 2022-12-16 RX ADMIN — CLOPIDOGREL BISULFATE 75 MG: 75 TABLET ORAL at 09:31

## 2022-12-16 RX ADMIN — ASPIRIN 81 MG: 81 TABLET, CHEWABLE ORAL at 09:31

## 2022-12-16 RX ADMIN — SODIUM CHLORIDE, PRESERVATIVE FREE 10 ML: 5 INJECTION INTRAVENOUS at 17:09

## 2022-12-16 RX ADMIN — HEPARIN SODIUM 5000 UNITS: 5000 INJECTION INTRAVENOUS; SUBCUTANEOUS at 17:07

## 2022-12-16 RX ADMIN — HEPARIN SODIUM 5000 UNITS: 5000 INJECTION INTRAVENOUS; SUBCUTANEOUS at 01:35

## 2022-12-16 RX ADMIN — NYSTATIN: 100000 POWDER TOPICAL at 09:35

## 2022-12-16 RX ADMIN — HEPARIN SODIUM 5000 UNITS: 5000 INJECTION INTRAVENOUS; SUBCUTANEOUS at 09:31

## 2022-12-16 RX ADMIN — INSULIN LISPRO 3 UNITS: 100 INJECTION, SOLUTION INTRAVENOUS; SUBCUTANEOUS at 11:49

## 2022-12-16 RX ADMIN — CARVEDILOL 12.5 MG: 12.5 TABLET, FILM COATED ORAL at 09:31

## 2022-12-16 RX ADMIN — NYSTATIN: 100000 POWDER TOPICAL at 21:56

## 2022-12-16 RX ADMIN — Medication 125 MG: at 06:32

## 2022-12-16 RX ADMIN — PIPERACILLIN AND TAZOBACTAM 4.5 G: 4; .5 INJECTION, POWDER, FOR SOLUTION INTRAVENOUS at 01:35

## 2022-12-16 RX ADMIN — Medication 500 MG: at 09:31

## 2022-12-16 RX ADMIN — INSULIN GLARGINE 10 UNITS: 100 INJECTION, SOLUTION SUBCUTANEOUS at 21:55

## 2022-12-16 NOTE — PROGRESS NOTES
D/C Plan: SNF     CM met with pt at bedside to discuss care transition and SNF. Pt is willing to consider SNF but does not wish to return to the facility he went to in the past ([address]). CM provided pt with a list of area SNF to review to assist with care transition. Noted plan for surgical intervention. Anticipate pt will remain inpt through the weekend. Pt will need an accepting facility and insurance auth to transition to SNF. CM to continue to follow and assist.     Care Management Interventions  PCP Verified by CM:  Yes  Mode of Transport at Discharge: BLS  Transition of Care Consult (CM Consult): SNF  Health Maintenance Reviewed: Yes  Physical Therapy Consult: Yes  Occupational Therapy Consult: Yes  Support Systems: Other Family Member(s)  The Plan for Transition of Care is Related to the Following Treatment Goals : SNF  The Patient and/or Patient Representative was Provided with a Choice of Provider and Agrees with the Discharge Plan?: Yes  Name of the Patient Representative Who was Provided with a Choice of Provider and Agrees with the Discharge Plan: pt  Freedom of Choice List was Provided with Basic Dialogue that Supports the Patient's Individualized Plan of Care/Goals, Treatment Preferences and Shares the Quality Data Associated with the Providers?: Yes  Discharge Location  Patient Expects to be Discharged to[de-identified] Skilled nursing facility

## 2022-12-16 NOTE — PROGRESS NOTES
Nephrology Progress note    Subjective:     Ronnie Duong is a 60 y.o. male with PMHx of  DM, HTN. PVD, ESRD on HD TTS at [facility] under the [address] [place] [address]. Nephrology sent in for admission by wound care clinic due to left foot infection ,was told he needed surgery. Podiatry has been in to see pt .   He has been on abx recently     S/P Lt 2/3/4 metatarsal amputation     No new complaints denies SOB/Fever/Chills   Last HD session 12/15    Admit Date: 12/9/2022  Active Problems:    Diabetes mellitus with foot ulcer ([facility] [address] (6/27/2022)      CAD (coronary artery disease) (6/28/2022)      ESRD (end stage renal disease) ([address] (6/28/2022)      Hypertension (7/21/2022)      Leukocytosis (12/9/2022)      Diabetic foot infection ([address] (12/9/2022)      Diarrhea (12/9/2022)      Type 2 diabetes mellitus, with long-term current use of insulin ([facility]) ()      Acute hematogenous osteomyelitis of left foot ([facility] [address] (12/9/2022)      Nondisplaced fracture of second metatarsal bone of left foot (12/9/2022)      Nondisplaced fracture of third metatarsal bone of left foot (12/9/2022)      Closed nondisplaced fracture of fourth metatarsal bone of left foot (12/9/2022)      Positive blood culture (12/11/2022)    Current Facility-Administered Medications   Medication Dose Route Frequency    loperamide (IMODIUM) capsule 2 mg  2 mg Oral Q4H PRN    nystatin (MYCOSTATIN) 100,000 unit/gram powder   Topical BID    [START ON 12/17/2022] Vancomycin Random 12/17/22 with AM Labs  1 Each Other ONCE    insulin lispro (HUMALOG) injection 2 Units  2 Units SubCUTAneous TIDAC    alum-mag hydroxide-simeth (MYLANTA) oral suspension 30 mL  30 mL Oral Q4H PRN    insulin lispro (HUMALOG) injection   SubCUTAneous AC&HS    glucose chewable tablet 16 g  16 g Oral PRN    glucagon (GLUCAGEN) injection 1 mg  1 mg IntraMUSCular PRN    epoetin lisette-epbx (RETACRIT) injection 4,000 Units  4,000 Units SubCUTAneous Q TUE, THU & SAT    insulin glargine (LANTUS) injection 10 Units  10 Units SubCUTAneous QHS    heparin (porcine) 1,000 unit/mL injection 3,600 Units  3,600 Units IntraCATHeter DIALYSIS PRN    Vancomycin - Pharmacy to Dose  1 Each Other Rx Dosing/Monitoring    piperacillin-tazobactam (ZOSYN) 4.5 g in 0.9% sodium chloride (MBP/ADV) 100 mL MBP  4.5 g IntraVENous Q12H    vancomycin 50 mg/mL oral solution (compounded) 125 mg  125 mg Oral Q12H    dextrose 10% infusion 0-250 mL  0-250 mL IntraVENous PRN    0.9% sodium chloride infusion  25 mL/hr IntraVENous DIALYSIS PRN    clopidogreL (PLAVIX) tablet 75 mg  75 mg Oral DAILY    carvediloL (COREG) tablet 12.5 mg  12.5 mg Oral BID    aspirin chewable tablet 81 mg  81 mg Oral DAILY    amLODIPine (NORVASC) tablet 10 mg  10 mg Oral DAILY    allopurinoL (ZYLOPRIM) tablet 100 mg  100 mg Oral DAILY    ascorbic acid (vitamin C) (VITAMIN C) tablet 500 mg  500 mg Oral DAILY    ezetimibe (ZETIA) tablet 10 mg  10 mg Oral DAILY    pantoprazole (PROTONIX) tablet 40 mg  40 mg Oral ACB    sevelamer carbonate (RENVELA) tab 1,600 mg  1,600 mg Oral TID WITH MEALS    vit B Cmplx 3-FA-Vit C-Biotin (NEPHRO NIKITA RX) tablet 1 Tablet  1 Tablet Oral DAILY    sodium chloride (NS) flush 5-40 mL  5-40 mL IntraVENous Q8H    sodium chloride (NS) flush 5-40 mL  5-40 mL IntraVENous PRN    acetaminophen (TYLENOL) tablet 650 mg  650 mg Oral Q6H PRN    Or    acetaminophen (TYLENOL) suppository 650 mg  650 mg Rectal Q6H PRN    bisacodyL (DULCOLAX) suppository 10 mg  10 mg Rectal DAILY PRN    promethazine (PHENERGAN) tablet 12.5 mg  12.5 mg Oral Q6H PRN    Or    ondansetron (ZOFRAN) injection 4 mg  4 mg IntraVENous Q6H PRN    heparin (porcine) injection 5,000 Units  5,000 Units SubCUTAneous Q8H    oxyCODONE-acetaminophen (PERCOCET) 5-325 mg per tablet 1 Tablet  1 Tablet Oral Q6H PRN         Allergy:   No Known Allergies     Objective:     Visit Vitals  /62 (BP 1 Location: Right upper arm, BP Patient Position: At rest)   Pulse 67   Temp 98.3 °F (36.8 °C)   Resp 17 Ht 6' (1.829 m)   Wt 96.5 kg (212 lb 11.2 oz)   SpO2 98%   BMI 28.85 kg/m²         Intake/Output Summary (Last 24 hours) at 12/16/2022 1247  Last data filed at 12/16/2022 0930  Gross per 24 hour   Intake 240 ml   Output --   Net 240 ml         Physical Exam:       General: No acute distress   HENT: Atraumatic and normocephalic   Eyes: Normal conjunctiva   Neck: Supple No JVD   Cardiovascular: Normal S1 & S2, no m/r/g   Pulmonary/Chest Wall: Clear to auscultation bilaterally   Abdominal: Soft and non-tender   Musculoskeletal: No edema S/p Amputation of multiple toes Left foot   Neurological: No focal deficits    HD Access: [facility] TD in place  Data Review:  Lab Results   Component Value Date/Time    Sodium 135 (L) 12/16/2022 03:02 AM    Potassium 5.3 12/16/2022 03:02 AM    Chloride 99 (L) 12/16/2022 03:02 AM    CO2 28 12/16/2022 03:02 AM    Anion gap 8 12/16/2022 03:02 AM    Glucose 206 (H) 12/16/2022 03:02 AM    BUN 41 (H) 12/16/2022 03:02 AM    Creatinine 6.83 (H) 12/16/2022 03:02 AM    BUN/Creatinine ratio 6 (L) 12/16/2022 03:02 AM    GFR est AA 13 (L) 07/21/2022 02:15 PM    GFR est non-AA 11 (L) 07/21/2022 02:15 PM    Calcium 8.4 (L) 12/16/2022 03:02 AM     Lab Results   Component Value Date/Time    WBC 16.2 (H) 12/14/2022 01:31 AM    HGB 9.3 (L) 12/14/2022 01:31 AM    HCT 28.9 (L) 12/14/2022 01:31 AM    PLATELET 087 27/32/6556 01:31 AM    MCV 94.1 12/14/2022 01:31 AM     Lab Results   Component Value Date/Time    Calcium 8.4 (L) 12/16/2022 03:02 AM    Phosphorus 3.8 12/13/2022 02:54 AM     No results found for: IRON, FE, TIBC, IBCT, PSAT, FERR  No results found for: FERR      Impression:     ESRD on HD TTS  Left diabetic foot infection w/ gangrenous changes s/p Lt 2/3/4 metatarsal amputation   DM  HTN  PVD, seen by Vasc Surgery, anticipate LLE angio and possible intervention.   Anemia  SHPT    Plan:     HD  tomorrow , Sat  IV Antibiotic per ID  Epo for Anemia  Anticipate LE Angio on Monday    Cleophas Harada, MD,   [facility]rera  [phone]

## 2022-12-16 NOTE — PROGRESS NOTES
1101: Pt asleep, barely opened eye to communicate, will follow up for PT as schedule permits. 1402: Pt asleep, did nor arouse to verbal stim. Will follow up as schedule permits.

## 2022-12-16 NOTE — PROGRESS NOTES
Comprehensive Nutrition Assessment    Type and Reason for Visit: Initial, RD nutrition re-screen/LOS    Nutrition Recommendations/Plan:   Pt requesting to remove diet restrictions- will defer to MD  Continue to monitor %intake. Monitor need for ONS- recommend nepro 1x daily if needed. Malnutrition Assessment:  Malnutrition Status: At risk for malnutrition (specify) (12/16/22 0334)      Nutrition Assessment:    H/o DM, diabetic ulcer, CAD, hyperlipidemia, HTN, ESRD-HD. Admitted with Gangrene of left foot, leukocytosis, diabetic foot ulcer. S/p PARTIAL AMPUTATION OF LEFT 2ND, 3RD, 4TH METATARSALS, AMPUTATION OF TOES 2,3,4, INCISION ADN DRAINAGE LEFT FOOT DEEP 12/9. Last HD 12/14. No significant wt lost per hx: 212lb (11/2022), 212lb (8/2022), 202lb (4/2022), 220lb (2/2022), 212lb (11/2021). Saw pt at bedside- eating well. Meal tray at bedside (%). Nutrition Related Findings:    Na 135, GFR 8, BUN 41, Cr 6.83. Vit c, retacrit, lantus, humalog, protonix, nepro natividad rx. BM 12/16. Wound Type: Surgical incision, Diabetic ulcer    Current Nutrition Intake & Therapies:  Average Meal Intake: %     ADULT DIET Regular; 3 carb choices (45 gm/meal); Less than 60 gm    Anthropometric Measures:  Height: 6' (182.9 cm)  Ideal Body Weight (IBW): 178 lbs (81 kg)     Current Body Wt:  96.5 kg (212 lb 11.9 oz), 119.5 % IBW. Current BMI (kg/m2): 28.8        Weight Adjustment: No adjustment                 BMI Category: Overweight (BMI 25.0-29. 9)    Estimated Daily Nutrient Needs:  Energy Requirements Based On: Formula (MSJ x1.2-1.4)  Weight Used for Energy Requirements: Current  Energy (kcal/day): 9982-8033  Weight Used for Protein Requirements: Current (1.2-1.3g/kg; increase needs d/t HD)  Protein (g/day): 115-125  Method Used for Fluid Requirements: 1 ml/kcal  Fluid (ml/day): 8717-9721    Nutrition Diagnosis:   Increased nutrient needs related to renal dysfunction as evidenced by dialysis    Nutrition Interventions:   Food and/or Nutrient Delivery: Continue current diet  Nutrition Education/Counseling: No recommendations at this time  Coordination of Nutrition Care: Continue to monitor while inpatient, Interdisciplinary rounds       Goals:     Goals: Meet at least 75% of estimated needs, by next RD assessment       Nutrition Monitoring and Evaluation:   Behavioral-Environmental Outcomes: None identified  Food/Nutrient Intake Outcomes: Food and nutrient intake  Physical Signs/Symptoms Outcomes: Biochemical data, Meal time behavior, Nutrition focused physical findings, Skin, Weight, GI status    Discharge Planning:    Continue current diet    Arun General, RD

## 2022-12-16 NOTE — PROGRESS NOTES
Pt is alert and oriented and compliant with patient care. He is accepting of care provided. Dressings intact to LE with no breakthrough drainage. Cream/powder applied to buttocks and groin.  No further concerns at this time

## 2022-12-16 NOTE — PROGRESS NOTES
[facility]  (A Division of [address] [place] [address])    My partner Lizzy Li DO will  ID service Monday morning. Follow-up Note      Date of Admission: 12/9/2022       Date of Note:  12/16/2022    Summary:  Mr Evette Joy is a diabetic 60y AAM who has been fighting chronic R heel sores for months/years, but noted onset of L foot pain along with f/s/c this past week. Had DPM appt this morning and re-directed to ED for urgent admission for surgery. Pretty vague on duration of symptoms, but pretty sure it was this week. Sugars have been elevated. Also has been having stomach issues with pain/anorexia/diarrhea off/on for past month. Seen at [facility] 2wks ago and treated for \"stomach infection\" that required PO vanco.     [employer]-         CC:  \"Pain controlled\"  HPI:  Events last few days reviewed/tracked - pt seen. Handling the admission well. Sad  Pain controlled. Occ loose stool - no diarrhea/BRBPR  Tm <100  No CBC today      Current Antimicrobials:    Prior Antimicrobials:  Vanco IV (12/9-) #7  Pip/tzb IV (12/9-) #7  PO Vanco (12/9-) #7        Assessment: Rec / Plan:   Dry Gangrene L foot distally - POD#3  12/9:  ESR 92mm/h  12/9:  PCT 3.12ng/mL  12/14:  ESR >140mm/h  12/14:  CRP 152mg/L  12/14:  PCT 1.78ng/mL  Inflammatory markers for the most part better/stable. Will recheck Monday. Needs more debridement that will take place next week with A-gram.  IF no complete amputation, please place tunneled line for me to give weeks IV abx. ->Pip/tzb-vanc #7 and POD#7    No change over weekend  OR on MON    If full/complete amputation, he's done.   IF not, please re-culture for me and place tunneled line (CKD)    Dr Maryellen Zuñiga will  service on Monday, but I'll be available this weekend for calls   Recent/likely CDI  Continue concomitant PO vanco    Pseudobacteremia - CoNS  No treatment needed    Peripheral Vascular Disease    ESRD/HD Microbiology:                12/9 - OR (+) Serratia fonticola (S to pip/all except cefazolin), Alcaligenes faecalis (R FQ), Enterbacter cloacae (still being worked up), and anaerobic Bacteroides vulgatus (BL +)                                                      BCx 1/2 (+) CoNS        Lines / Catheters:         R CW [facility] and peripheral        Patient Active Problem List   Diagnosis Code    Diabetes mellitus with foot ulcer ([facility] E11.621, L97.509    CAD (coronary artery disease) I25.10    ESRD (end stage renal disease) ([facility] N18.6    Acute hematogenous osteomyelitis of right foot ([facility]) M86.071    Cellulitis of right heel L03.115    Diabetic foot ulcer with osteomyelitis ([facility] E11.621, E11.69, L97.509, M86.9    Ulcer of right heel, with necrosis of bone [facility] L97.414    Infection and inflammatory reaction due to internal fixation device of other site, initial encounter [facility]) T84.69XA    Left arm swelling M79.89    Chest pain at rest R07.9    Abnormal nuclear stress test R94.39    History of anemia due to CKD N18.9, Z86.2    S/P angioplasty with stent Z95.820    Hypertension I10    Leukocytosis D72.829    Diabetic foot infection ([facility] E11.628, L08.9    Diarrhea R19.7    Type 2 diabetes mellitus, with long-term current use of insulin ([facility]) E11.9, Z79.4    Acute hematogenous osteomyelitis of left foot ([facility]) M86.072    Nondisplaced fracture of second metatarsal bone of left foot S92.325A    Nondisplaced fracture of third metatarsal bone of left foot S92.335A    Closed nondisplaced fracture of fourth metatarsal bone of left foot S92.345A    Positive blood culture R78.81       Current Facility-Administered Medications   Medication Dose Route Frequency    loperamide (IMODIUM) capsule 2 mg  2 mg Oral Q4H PRN    nystatin (MYCOSTATIN) 100,000 unit/gram powder   Topical BID    [START ON 12/17/2022] Vancomycin Random 12/17/22 with AM Labs  1 Each Other ONCE    insulin lispro (HUMALOG) injection 2 Units  2 Units SubCUTAneous TIDAC    alum-mag hydroxide-simeth (MYLANTA) oral suspension 30 mL  30 mL Oral Q4H PRN    insulin lispro (HUMALOG) injection   SubCUTAneous AC&HS    glucose chewable tablet 16 g  16 g Oral PRN    glucagon (GLUCAGEN) injection 1 mg  1 mg IntraMUSCular PRN    epoetin lisette-epbx (RETACRIT) injection 4,000 Units  4,000 Units SubCUTAneous Q TUE, THU & SAT    insulin glargine (LANTUS) injection 10 Units  10 Units SubCUTAneous QHS    heparin (porcine) 1,000 unit/mL injection 3,600 Units  3,600 Units IntraCATHeter DIALYSIS PRN    Vancomycin - Pharmacy to Dose  1 Each Other Rx Dosing/Monitoring    piperacillin-tazobactam (ZOSYN) 4.5 g in 0.9% sodium chloride (MBP/ADV) 100 mL MBP  4.5 g IntraVENous Q12H    vancomycin 50 mg/mL oral solution (compounded) 125 mg  125 mg Oral Q12H    dextrose 10% infusion 0-250 mL  0-250 mL IntraVENous PRN    0.9% sodium chloride infusion  25 mL/hr IntraVENous DIALYSIS PRN    clopidogreL (PLAVIX) tablet 75 mg  75 mg Oral DAILY    carvediloL (COREG) tablet 12.5 mg  12.5 mg Oral BID    aspirin chewable tablet 81 mg  81 mg Oral DAILY    amLODIPine (NORVASC) tablet 10 mg  10 mg Oral DAILY    allopurinoL (ZYLOPRIM) tablet 100 mg  100 mg Oral DAILY    ascorbic acid (vitamin C) (VITAMIN C) tablet 500 mg  500 mg Oral DAILY    ezetimibe (ZETIA) tablet 10 mg  10 mg Oral DAILY    pantoprazole (PROTONIX) tablet 40 mg  40 mg Oral ACB    sevelamer carbonate (RENVELA) tab 1,600 mg  1,600 mg Oral TID WITH MEALS    vit B Cmplx 3-FA-Vit C-Biotin (NEPHRO NIKITA RX) tablet 1 Tablet  1 Tablet Oral DAILY    sodium chloride (NS) flush 5-40 mL  5-40 mL IntraVENous Q8H    sodium chloride (NS) flush 5-40 mL  5-40 mL IntraVENous PRN    acetaminophen (TYLENOL) tablet 650 mg  650 mg Oral Q6H PRN    Or    acetaminophen (TYLENOL) suppository 650 mg  650 mg Rectal Q6H PRN    bisacodyL (DULCOLAX) suppository 10 mg  10 mg Rectal DAILY PRN    promethazine (PHENERGAN) tablet 12.5 mg  12.5 mg Oral Q6H PRN    Or ondansetron (ZOFRAN) injection 4 mg  4 mg IntraVENous Q6H PRN    heparin (porcine) injection 5,000 Units  5,000 Units SubCUTAneous Q8H    oxyCODONE-acetaminophen (PERCOCET) 5-325 mg per tablet 1 Tablet  1 Tablet Oral Q6H PRN         Review of Systems - General ROS: negative for - chills, fever, or night sweats  Respiratory ROS: no cough, shortness of breath, or wheezing  Cardiovascular ROS: no chest pain or dyspnea on exertion  Gastrointestinal ROS: no abdominal pain, change in bowel habits, or black or bloody stools       Objective:    Visit Vitals  /62 (BP 1 Location: Right upper arm, BP Patient Position: At rest)   Pulse 67   Temp 98.3 °F (36.8 °C)   Resp 17   Ht 6' (1.829 m)   Wt 96.5 kg (212 lb 11.2 oz)   SpO2 98%   BMI 28.85 kg/m²       Temp (24hrs), Av.6 °F (37 °C), Min:98.3 °F (36.8 °C), Max:99.6 °F (37.6 °C)      GEN: WDWN AAM in NAD  HEENT: anicteric  CHEST: CTA  CVS:RRR  ABD: NT  EXT: L foot wrapped         Lab results:    Chemistry  Recent Labs     22  0302 12/15/22  0645 22  013   * 145* 132*   * 136 135*   K 5.3 5.9* 5.7*   CL 99* 100 98*   CO2 28 23 21   BUN 41* 64* 82*   CREA 6.83* 9.32* 10.70*   CA 8.4* 8.0* 7.6*   AGAP 8 13 16   BUCR 6* 7* 8*       CBC w/ Diff  Recent Labs     22  013   WBC 16.2*   RBC 3.07*   HGB 9.3*   HCT 28.9*      GRANS 81*   LYMPH 9*   EOS 3       Microbiology  All Micro Results       Procedure Component Value Units Date/Time    CULTURE, BLOOD 2nd DRAW (required for [facility]) [[id number]] Collected: 22 1215    Order Status: Completed Specimen: Blood Updated: 12/15/22 3526     Special Requests: LEFT HAND     Culture result: NO GROWTH 6 DAYS       CULTURE, WOUND [facility] STAIN [[id number]]  (Abnormal)  (Susceptibility) Collected: 22 3755    Order Status: Completed Specimen: Wound from Foot Updated: 12/15/22 0655     Special Requests: NO SPECIAL REQUESTS        GRAM STAIN RARE WBCS SEEN               2+ APPARENT GRAM VARIABLE RODS           Culture result:       HEAVY Serratia fonticola PIPKRISTOPHER = 28, SENSITIVE                  HEAVY ALCALIGENES FAECALIS                  HEAVY ALCALIGENES FAECALIS (2ND COLONY TYPE/STRAIN)                  HEAVY ENTEROBACTER CLOACAE                  HEAVY MIXED SKIN TO ISOLATED          CULTURE, ANAEROBIC [[id number]]  (Abnormal) Collected: 12/09/22 2201    Order Status: Completed Specimen: Foot, left Updated: 12/13/22 1246     Special Requests: NO SPECIAL REQUESTS        Culture result:       MODERATE BACTEROIDES VULGATUS BETA LACTAMASE POSITIVE          CULTURE, BLOOD [[id number]]  (Abnormal) Collected: 12/09/22 1155    Order Status: Completed Specimen: Blood Updated: 12/12/22 0743     Special Requests: LEFT AC     GRAM STAIN       ANAEROBIC BOTTLE GRAM POSITIVE COCCI IN CLUSTERS                  SMEAR CALLED TO AND CORRECTLY REPEATED BY: Saar Zuleta RN 3S 12/10/22 AT 1 BY ANI           Culture result:       STAPHYLOCOCCUS SPECIES, COAGULASE NEGATIVE GROWING IN 1 OF 2 BOTTLES DRAWN  SITE = LAC          BLOOD CULTURE ID PANEL [[id number]]  (Abnormal) Collected: 12/09/22 1145    Order Status: Completed Specimen: Blood Updated: 12/11/22 0655     Acc. no. from Micro Order [id number]     Enterococcus faecalis Not detected        Enterococcus faecium Not detected        Listeria monocytogenes Not detected        Staphylococcus Detected        Staphylococcus aureus Not detected        Staph epidermidis Detected        Staph lugdunensis Not detected        Streptococcus Not detected        Streptococcus agalactiae (Group B) Not detected        Streptococcus pneumoniae Not detected        Streptococcus pyogenes (Group A) Not detected        Acinetobacter calcoaceticus-baumanii complex Not detected        Bacteroides fragilis Not detected        Enterobacterales species Not detected        Enterobacter cloacae complex Not detected        Escherichia coli Not detected        Klebsiella aerogenes Not detected        Klebsiella oxytoca Not detected        Klebsiella pneumoniae group Not detected        Proteus Not detected        Salmonella Not detected        Serratia marcescens Not detected        Haemophilus influenzae Not detected        Neisseria meningitidis Not detected        Pseudomonas aeruginosa Not detected        Steno maltophilia Not detected        Candida albicans Not detected        Candida auris Not detected        Candida glabrata Not detected        Candida krusei Not detected        Candida parapsilosis Not detected        Candida tropicalis Not detected        Crypto neoformans/gattii Not detected        RESISTANT GENES:            MECA/C (Methicillin resistant gene) Detected        Comment       False positive results may rarely occur.  Correlate with clinical,epidemiologic, and other laboratory findings           Comment: Please see BCID Interpretation Guide in EPIC Links       C. DIFFICILE AG & TOXIN A/B [[id number]]     Order Status: Canceled Specimen: Stool              Savannah Figueroa MD  Cell [phone]  [facility]   12/16/2022   2:24 PM

## 2022-12-16 NOTE — PROGRESS NOTES
Problem: Risk for Spread of Infection  Goal: Prevent transmission of infectious organism to others  Description: Prevent the transmission of infectious organisms to other patients, staff members, and visitors. Outcome: Progressing Towards Goal     Problem: Diabetes Self-Management  Goal: *Developing strategies to promote health/change behavior  Description: Define the ABC's of diabetes; identify appropriate screenings, schedule and personal plan for screenings. Outcome: Progressing Towards Goal  Goal: *Using medications safely  Description: State effect of diabetes medications on diabetes; name diabetes medication taking, action and side effects. Outcome: Progressing Towards Goal     Problem: Falls - Risk of  Goal: *Absence of Falls  Description: Document Giovanni Duongelle Fall Risk and appropriate interventions in the flowsheet. Outcome: Progressing Towards Goal  Note: Fall Risk Interventions:  Mobility Interventions: OT consult for ADLs, Patient to call before getting OOB, PT Consult for mobility concerns         Medication Interventions: Patient to call before getting OOB    Elimination Interventions: Call light in reach, Toileting schedule/hourly rounds    History of Falls Interventions: Bed/chair exit alarm         Problem: Chronic Renal Failure  Goal: *Fluid and electrolytes stabilized  Outcome: Progressing Towards Goal     Problem: Pressure Injury - Risk of  Goal: *Prevention of pressure injury  Description: Document Semaj Scale and appropriate interventions in the flowsheet.   Outcome: Progressing Towards Goal  Note: Pressure Injury Interventions:  Sensory Interventions: Keep linens dry and wrinkle-free, Minimize linen layers, Pressure redistribution bed/mattress (bed type)    Moisture Interventions: Apply protective barrier, creams and emollients, Absorbent underpads    Activity Interventions: Increase time out of bed, Pressure redistribution bed/mattress(bed type)    Mobility Interventions: Pressure redistribution bed/mattress (bed type)    Nutrition Interventions: Document food/fluid/supplement intake    Friction and Shear Interventions: Feet elevated on foot rest

## 2022-12-17 LAB
ANION GAP SERPL CALC-SCNC: 12 MMOL/L (ref 3–18)
BUN SERPL-MCNC: 58 MG/DL (ref 7–18)
BUN/CREAT SERPL: 6 (ref 12–20)
CALCIUM SERPL-MCNC: 8 MG/DL (ref 8.5–10.1)
CHLORIDE SERPL-SCNC: 98 MMOL/L (ref 100–111)
CO2 SERPL-SCNC: 25 MMOL/L (ref 21–32)
CREAT SERPL-MCNC: 9.28 MG/DL (ref 0.6–1.3)
GLUCOSE BLD STRIP.AUTO-MCNC: 147 MG/DL (ref 70–110)
GLUCOSE BLD STRIP.AUTO-MCNC: 164 MG/DL (ref 70–110)
GLUCOSE BLD STRIP.AUTO-MCNC: 180 MG/DL (ref 70–110)
GLUCOSE BLD STRIP.AUTO-MCNC: 202 MG/DL (ref 70–110)
GLUCOSE SERPL-MCNC: 146 MG/DL (ref 74–99)
MAGNESIUM SERPL-MCNC: 2.2 MG/DL (ref 1.6–2.6)
POTASSIUM SERPL-SCNC: 6 MMOL/L (ref 3.5–5.5)
SODIUM SERPL-SCNC: 135 MMOL/L (ref 136–145)
VANCOMYCIN SERPL-MCNC: 22.4 UG/ML (ref 5–40)

## 2022-12-17 PROCEDURE — 74011000258 HC RX REV CODE- 258: Performed by: EMERGENCY MEDICINE

## 2022-12-17 PROCEDURE — 74011250636 HC RX REV CODE- 250/636: Performed by: HOSPITALIST

## 2022-12-17 PROCEDURE — 74011636637 HC RX REV CODE- 636/637: Performed by: HOSPITALIST

## 2022-12-17 PROCEDURE — 74011250636 HC RX REV CODE- 250/636: Performed by: EMERGENCY MEDICINE

## 2022-12-17 PROCEDURE — 74011250636 HC RX REV CODE- 250/636: Performed by: INTERNAL MEDICINE

## 2022-12-17 PROCEDURE — 74011000250 HC RX REV CODE- 250: Performed by: EMERGENCY MEDICINE

## 2022-12-17 PROCEDURE — 74011250637 HC RX REV CODE- 250/637: Performed by: HOSPITALIST

## 2022-12-17 PROCEDURE — 80202 ASSAY OF VANCOMYCIN: CPT

## 2022-12-17 PROCEDURE — 82962 GLUCOSE BLOOD TEST: CPT

## 2022-12-17 PROCEDURE — 83735 ASSAY OF MAGNESIUM: CPT

## 2022-12-17 PROCEDURE — 74011000250 HC RX REV CODE- 250: Performed by: HOSPITALIST

## 2022-12-17 PROCEDURE — 80048 BASIC METABOLIC PNL TOTAL CA: CPT

## 2022-12-17 PROCEDURE — 65270000029 HC RM PRIVATE

## 2022-12-17 PROCEDURE — 36415 COLL VENOUS BLD VENIPUNCTURE: CPT

## 2022-12-17 RX ADMIN — Medication 125 MG: at 06:21

## 2022-12-17 RX ADMIN — SEVELAMER CARBONATE 1600 MG: 800 TABLET, FILM COATED ORAL at 08:50

## 2022-12-17 RX ADMIN — Medication 2 UNITS: at 18:20

## 2022-12-17 RX ADMIN — VANCOMYCIN HYDROCHLORIDE 1000 MG: 1 INJECTION, POWDER, LYOPHILIZED, FOR SOLUTION INTRAVENOUS at 18:22

## 2022-12-17 RX ADMIN — CLOPIDOGREL BISULFATE 75 MG: 75 TABLET ORAL at 08:50

## 2022-12-17 RX ADMIN — SEVELAMER CARBONATE 1600 MG: 800 TABLET, FILM COATED ORAL at 18:21

## 2022-12-17 RX ADMIN — HEPARIN SODIUM 5000 UNITS: 5000 INJECTION INTRAVENOUS; SUBCUTANEOUS at 01:24

## 2022-12-17 RX ADMIN — NYSTATIN: 100000 POWDER TOPICAL at 13:08

## 2022-12-17 RX ADMIN — EPOETIN ALFA-EPBX 8000 UNITS: 4000 INJECTION, SOLUTION INTRAVENOUS; SUBCUTANEOUS at 21:14

## 2022-12-17 RX ADMIN — HEPARIN SODIUM 5000 UNITS: 5000 INJECTION INTRAVENOUS; SUBCUTANEOUS at 08:49

## 2022-12-17 RX ADMIN — PIPERACILLIN AND TAZOBACTAM 4.5 G: 4; .5 INJECTION, POWDER, FOR SOLUTION INTRAVENOUS at 18:21

## 2022-12-17 RX ADMIN — PANTOPRAZOLE SODIUM 40 MG: 40 TABLET, DELAYED RELEASE ORAL at 07:33

## 2022-12-17 RX ADMIN — ALLOPURINOL 100 MG: 100 TABLET ORAL at 08:50

## 2022-12-17 RX ADMIN — CARVEDILOL 12.5 MG: 12.5 TABLET, FILM COATED ORAL at 21:15

## 2022-12-17 RX ADMIN — SODIUM CHLORIDE, PRESERVATIVE FREE 10 ML: 5 INJECTION INTRAVENOUS at 18:23

## 2022-12-17 RX ADMIN — B-COMPLEX W/ C & FOLIC ACID TAB 1 MG 1 TABLET: 1 TAB at 08:49

## 2022-12-17 RX ADMIN — INSULIN GLARGINE 10 UNITS: 100 INJECTION, SOLUTION SUBCUTANEOUS at 21:15

## 2022-12-17 RX ADMIN — SODIUM CHLORIDE, PRESERVATIVE FREE 10 ML: 5 INJECTION INTRAVENOUS at 06:21

## 2022-12-17 RX ADMIN — SEVELAMER CARBONATE 1600 MG: 800 TABLET, FILM COATED ORAL at 13:08

## 2022-12-17 RX ADMIN — EZETIMIBE 10 MG: 10 TABLET ORAL at 08:49

## 2022-12-17 RX ADMIN — ASPIRIN 81 MG: 81 TABLET, CHEWABLE ORAL at 08:50

## 2022-12-17 RX ADMIN — NYSTATIN: 100000 POWDER TOPICAL at 21:16

## 2022-12-17 RX ADMIN — INSULIN LISPRO 6 UNITS: 100 INJECTION, SOLUTION INTRAVENOUS; SUBCUTANEOUS at 13:07

## 2022-12-17 RX ADMIN — Medication 125 MG: at 18:34

## 2022-12-17 RX ADMIN — Medication 2 UNITS: at 08:50

## 2022-12-17 RX ADMIN — Medication 500 MG: at 08:50

## 2022-12-17 RX ADMIN — INSULIN LISPRO 3 UNITS: 100 INJECTION, SOLUTION INTRAVENOUS; SUBCUTANEOUS at 08:50

## 2022-12-17 RX ADMIN — PIPERACILLIN AND TAZOBACTAM 4.5 G: 4; .5 INJECTION, POWDER, FOR SOLUTION INTRAVENOUS at 01:25

## 2022-12-17 RX ADMIN — HEPARIN SODIUM 5000 UNITS: 5000 INJECTION INTRAVENOUS; SUBCUTANEOUS at 18:21

## 2022-12-17 RX ADMIN — Medication 2 UNITS: at 13:06

## 2022-12-17 RX ADMIN — SODIUM CHLORIDE, PRESERVATIVE FREE 10 ML: 5 INJECTION INTRAVENOUS at 21:16

## 2022-12-17 NOTE — PROGRESS NOTES
Problem: Risk for Spread of Infection  Goal: Prevent transmission of infectious organism to others  Description: Prevent the transmission of infectious organisms to other patients, staff members, and visitors. Outcome: Progressing Towards Goal     Problem: Patient Education:  Go to Education Activity  Goal: Patient/Family Education  Outcome: Progressing Towards Goal     Problem: Diabetes Self-Management  Goal: *Disease process and treatment process  Description: Define diabetes and identify own type of diabetes; list 3 options for treating diabetes. Outcome: Progressing Towards Goal  Goal: *Incorporating nutritional management into lifestyle  Description: Describe effect of type, amount and timing of food on blood glucose; list 3 methods for planning meals.   Outcome: Progressing Towards Goal

## 2022-12-17 NOTE — PROGRESS NOTES
Hospitalist Progress Note-critical care note     Patient: Bay Stuart MRN: [id number]  [facility]: [id number]    YOB: 1960  Age: 60 y.o.   Sex: male    DOA: 12/9/2022 LOS:  LOS: 7 days            Chief complaint: leukocytosis, positive bcx , foot infection , om  esrd on hd     Assessment/Plan         Hospital Problems  Date Reviewed: 6/28/2022            Codes Class Noted POA    Positive blood culture ICD-10-CM: R78.81  ICD-9-CM: 790.7  12/11/2022 Unknown        Leukocytosis ICD-10-CM: D72.829  ICD-9-CM: 288.60  12/9/2022 Unknown        Diabetic foot infection ([facility] ICD-10-CM: E11.628, L08.9  ICD-9-CM: 250.80, 686.9  12/9/2022 Unknown        Diarrhea ICD-10-CM: R19.7  ICD-9-CM: 787.91  12/9/2022 Unknown        Type 2 diabetes mellitus, with long-term current use of insulin ([facility] ICD-10-CM: E11.9, Z79.4  ICD-9-CM: 250.00, V58.67  Unknown Unknown        Acute hematogenous osteomyelitis of left foot ([facility] ICD-10-CM: M86.072  ICD-9-CM: 730.07  12/9/2022 Unknown        Nondisplaced fracture of second metatarsal bone of left foot ICD-10-CM: B19.634I  ICD-9-CM: 825.25  12/9/2022 Unknown        Nondisplaced fracture of third metatarsal bone of left foot ICD-10-CM: X55.868Q  ICD-9-CM: 825.25  12/9/2022 Unknown        Closed nondisplaced fracture of fourth metatarsal bone of left foot ICD-10-CM: S92.345A  ICD-9-CM: 825.25  12/9/2022 Unknown        Hypertension ICD-10-CM: I10  ICD-9-CM: 401.9  7/21/2022 Yes        CAD (coronary artery disease) ICD-10-CM: I25.10  ICD-9-CM: 414.00  6/28/2022 Yes        ESRD (end stage renal disease) ([facility] ICD-10-CM: N18.6  ICD-9-CM: 585.6  6/28/2022 Yes        Diabetes mellitus with foot ulcer ([facility] ICD-10-CM: E11.621, L97.509  ICD-9-CM: 250.80, 707.15  6/27/2022 Yes            Gangrene of left foot, leukocytosis 16 K   PARTIAL AMPUTATION OF LEFT 2ND, 3RD, 4TH METATARSALS, AMPUTATION OF TOES 2,3,4, INCISION ADN DRAINAGE LEFT FOOT DEEP per dr. Miguel Hdz surgery next week after vascular surgeon procedure next week   ID and podiatrist on board  Continue vanc and zosyn   Cx results reviewed,     Positive blood cx  STAPHYLOCOCCUS SPECIES GROWING IN 1 OF 2 BOTTLES-like due to contamination     Diabetic foot ulcer  Follow-up with Dr. Gadiel Kaiser wound care     CAD, last stent Severe single-vessel coronary artery disease. Distal RCA to drug-eluting stent in July 2022, continue plavix -he took plavix today   No chest pain  Reported epigastric pain  EKG PVC     Hypertension continue home medication     Diarrhea epigastric pain  CT abdomen no acute process  Resolved      End-stage renal disease on HD  TTS, l have hd today      DM  type II   Lantus at night , ssi will increase lantus to 10       Subjective  I am fine, feel better       Disposition :tbd,   Review of systems:    General: No fevers or chills. Cardiovascular: No chest pain or pressure. No palpitations. Pulmonary: No shortness of breath. Gastrointestinal: No nausea, vomiting. No  abdomen pain     Vital signs/Intake and Output:  Visit Vitals  BP (!) 136/52   Pulse 66   Temp 98.9 °F (37.2 °C)   Resp 17   Ht 6' (1.829 m)   Wt 96.5 kg (212 lb 11.2 oz)   SpO2 93%   BMI 28.85 kg/m²     Current Shift:  No intake/output data recorded. Last three shifts:  12/15 0701 - 12/16 1900  In: 480 [P.O.:480]  Out: -     Physical Exam:  General: WD, WN. Alert, cooperative, no acute distress    HEENT: NC, Atraumatic. PERRLA, anicteric sclerae. Lungs: CTA Bilaterally. No Wheezing/Rhonchi/Rales. Heart:  Regular  rhythm,  No murmur, No Rubs, No Gallops  Abdomen: Soft, Non distended, Non tender. +Bowel sounds,   Extremities: No c/c, both feet wrapped with gauze   Psych:   Not anxious or agitated. Neurologic:  No acute neurological deficit.              Labs: Results:       Chemistry Recent Labs     12/16/22  0302 12/15/22  0645 12/14/22  0131   * 145* 132*   * 136 135*   K 5.3 5.9* 5.7*   CL 99* 100 98*   CO2 28 23 21   BUN 41* 64* 82*   CREA 6.83* 9.32* 10.70*   CA 8.4* 8.0* 7.6*   AGAP 8 13 16   BUCR 6* 7* 8*        CBC w/Diff Recent Labs     12/14/22  0131   WBC 16.2*   RBC 3.07*   HGB 9.3*   HCT 28.9*      GRANS 81*   LYMPH 9*   EOS 3        Cardiac Enzymes No results for input(s): CPK, CKND1, PAULO in the last 72 hours. No lab exists for component: CKRMB, TROIP   Coagulation No results for input(s): PTP, INR, APTT, INREXT, INREXT in the last 72 hours. Lipid Panel Lab Results   Component Value Date/Time    Cholesterol, total 188 07/19/2022 03:12 AM    HDL Cholesterol 42 07/19/2022 03:12 AM    LDL, calculated 131.2 (H) 07/19/2022 03:12 AM    VLDL, calculated 14.8 07/19/2022 03:12 AM    Triglyceride 74 07/19/2022 03:12 AM    CHOL/HDL Ratio 4.5 07/19/2022 03:12 AM      BNP No results for input(s): BNPP in the last 72 hours. Liver Enzymes No results for input(s): TP, ALB, TBIL, AP in the last 72 hours. No lab exists for component: SGOT, GPT, DBIL     Thyroid Studies No results found for: T4, T3U, TSH, TSHEXT, TSHEXT     Procedures/imaging: see electronic medical records for all procedures/Xrays and details which were not copied into this note but were reviewed prior to creation of Plan    XR FOOT LT AP/LAT    Result Date: 12/9/2022  EXAM: 2 radiographic views of the left foot. INDICATION: Left foot pain. COMPARISON: December 9, 2022 _______________ FINDINGS: There is been interval amputation of the second, third, and fourth rays at the level of the metatarsal diaphysis. As before, the first ray is amputated at the metatarsal phalangeal joint. The small toe remains. There are expected postoperative findings to include regional air density and soft tissue swelling. There is Monckeberg type vascular calcification. There is low-grade mid foot degeneration. _______________     Standard immediate postoperative findings.  _______________     XR FOOT LT MIN 3 V    Result Date: 12/9/2022  EXAM: XR FOOT LT MIN 3 V CLINICAL INDICATION/HISTORY: Gangrenous 2nd digit   > Additional: None. COMPARISON: None. TECHNIQUE: 3 views left foot _______________ FINDINGS: Soft tissue gas formation is seen along the second digit with ill-defined limitus changes extending along the medial forefoot. Prior amputation first right. Patchy demineralization and osseous erosions are seen involving the distal portion of the first metatarsal head. Additional patchy areas of osseous erosion are also noted involving the proximal phalanx of the second toe. Diffuse soft tissue swelling. Diffuse atherosclerotic vascular calcifications. No retained radiopaque foreign object. _______________     Soft gas formation and ulceration involving the medial forefoot with findings concerning for osteomyelitis involving the first and second rays described above. MRI FOOT LT WO CONT    Result Date: 12/9/2022  EXAM: MRI FOOT LT WO CONT CLINICAL INDICATION/HISTORY: Foot wound; preoperative  >Additional: None COMPARISON: Radiograph performed December 9, 2022  >Reference exam: None. TECHNIQUE: Axial long axis TI and T2 with fat saturation, sagittal and coronal short axis TI and STIR sequences through the foot were obtained without contrast. _______________ FINDINGS: FIRST RAY: Prior dictation the first digit. There is mild bony proliferative change at the head of the first metatarsal with preserved marrow signal. Minimal patchy edema is noted which is nonspecific. No definite cortical destructive change. SECOND RAY: Nondisplaced obliquely oriented fracture through the head neck junction of the second metatarsal. There is heterogeneous diminished T1 marrow with patchy edema and surrounding soft tissue gas along the course of the second digit with associated subluxation of the distal phalanx. Subtle diminished T1 marrow is noted with suspected foci of intraosseous gas, concerning for acute osteomyelitis.  THIRD RAY: Nondisplaced fracture through the third metatarsal neck with slight impaction. Mild edema is noted within the third digit, possibly stress reaction. No convincing osseous erosions or T1 marrow signal change. FOURTH RAY: Nondisplaced fractures of the neck of the fourth metatarsal. Minimal edema in the proximal phalanx but otherwise preserved marrow signal. FIFTH RAY: Unremarkable. Additional degenerative changes with patchy edema, joint space narrowing, and osteophyte formation at the midfoot, likely related to underlying Charcot arthropathy. SOFT TISSUES: Soft tissue irregularity with gas and ulceration around the second digit noted. Diffuse fluid signal seen throughout the intrinsic foot musculature, commonly seen in the setting of diabetes. Mild skin thickening about the forefoot. INCIDENTAL FINDINGS: None. _______________     1. Marrow signal abnormality with soft tissue wound and gas around the second digit concerning for acute osteomyelitis. 2.  Nondisplaced fractures of the head neck junction of the second-fourth metatarsals. 3.  Prior indication the first digit. 4.  Soft tissue swelling and skin thickening about the forefoot concerning for cellulitis. No drainable abscess. 5.  Additional chronic/degenerative changes of the foot. CT ABD PELV WO CONT    Result Date: 12/9/2022  EXAM: CT of the abdomen and pelvis INDICATION: Abdominal pain with distention. COMPARISON: None. TECHNIQUE: Axial CT imaging of the abdomen and pelvis was performed without intravenous contrast. Multiplanar reformats were generated. One or more dose reduction techniques were used on this CT: automated exposure control, adjustment of the mAs and/or kVp according to patient size, and iterative reconstruction techniques. The specific techniques used on this CT exam have been documented in the patient's electronic medical record.   Digital Imaging and Communications in Medicine (DICOM) format image data are available to nonaffiliated external healthcare facilities or entities on a secure, media free, reciprocally searchable basis with patient authorization for at least a 12-month period after this study. _______________ FINDINGS: LOWER CHEST: Partial inclusion of multivessel coronary arterial atherosclerosis and central venous catheter. Clear lung bases. Several punctate 2 to 3 mm pulmonary nodule seen in the right lower lobe (image 8) LIVER, BILIARY: Liver is normal. No biliary dilation. Color contains a gallstone without evidence of dilatation or gallbladder wall thickening. PANCREAS: Normal. SPLEEN: Punctate granulomatous calcifications otherwise unremarkable. ADRENALS: Mild adreniform thickening of each adrenal gland. KIDNEYS/URETERS/BLADDER: No hydronephrosis. Bilateral renal hypodensities are present either to small to accurately characterize or in keeping with simple cysts which are prior no further dedicated imaging follow-up. Bladder decompressed. PELVIC ORGANS: Unremarkable. VASCULATURE: Diffuse aortic and visceral arterial atherosclerosis without evidence of aneurysmal dilatation. LYMPH NODES: Scattered subcentimeter mesenteric and retroperitoneal lymph nodes are demonstrated without evidence of adenopathy. Prominent left inguinal lymph node is present (image 127) measuring approximately 15 mm in short axis dimension. GASTROINTESTINAL TRACT: No bowel dilation or wall thickening. Peritoneal gas. Normal appendix. Scattered colonic diverticula without evidence of diverticulitis. BONES: No acute or aggressive osseous abnormalities identified. OTHER: None. _______________     1. Cholelithiasis without evidence of cholecystitis. 2. No abnormal bowel wall thickening or dilatation. 3. No hydronephrosis or obstructive uropathy. 4. Several small right lower lobe pulmonary nodules (2-3 mm in size). See below guidelines for proposed follow-up.  5. Borderline enlarged and nonspecific left inguinal lymph node, potentially reactive in etiology. ======== Fleischner Society pulmonary nodule guidelines (revised 2017): Multiple solid nodules <6 mm: -Low risk for lung cancer: No follow-up. -High risk for lung cancer: Optional chest CT in 12 months. XR CHEST PORT    Result Date: 12/9/2022  EXAM: XR CHEST PORT CLINICAL INDICATION/HISTORY: sepsis -Additional: None COMPARISON: July 12, 2022 TECHNIQUE: Portable frontal view of the chest _______________ FINDINGS: SUPPORT DEVICES: Right IJ approach dialysis catheter in stable position. HEART AND MEDIASTINUM: Stable appearing cardiac size and mediastinal contours. LUNGS AND PLEURAL SPACES: Lungs are clear. No focal pneumonic opacity. No pneumothorax or pleural effusion. BONY THORAX AND SOFT TISSUES: Unremarkable. _______________     No active cardiopulmonary disease.        Fariba Gallagher MD

## 2022-12-17 NOTE — PROGRESS NOTES
Hospitalist Progress Note-critical care note     Patient: Anjum Lane MRN: [id number]  [facility]: [id number]    YOB: 1960  Age: 60 y.o.   Sex: male    DOA: 12/9/2022 LOS:  LOS: 8 days            Chief complaint: leukocytosis, positive bcx , foot infection , om  esrd on hd     Assessment/Plan         Hospital Problems  Date Reviewed: 6/28/2022            Codes Class Noted POA    Positive blood culture ICD-10-CM: R78.81  ICD-9-CM: 790.7  12/11/2022 Unknown        Leukocytosis ICD-10-CM: D72.829  ICD-9-CM: 288.60  12/9/2022 Unknown        Diabetic foot infection ([facility] ICD-10-CM: E11.628, L08.9  ICD-9-CM: 250.80, 686.9  12/9/2022 Unknown        Diarrhea ICD-10-CM: R19.7  ICD-9-CM: 787.91  12/9/2022 Unknown        Type 2 diabetes mellitus, with long-term current use of insulin ([facility] ICD-10-CM: E11.9, Z79.4  ICD-9-CM: 250.00, V58.67  Unknown Unknown        Acute hematogenous osteomyelitis of left foot ([facility] ICD-10-CM: M86.072  ICD-9-CM: 730.07  12/9/2022 Unknown        Nondisplaced fracture of second metatarsal bone of left foot ICD-10-CM: B24.518G  ICD-9-CM: 825.25  12/9/2022 Unknown        Nondisplaced fracture of third metatarsal bone of left foot ICD-10-CM: F00.281S  ICD-9-CM: 825.25  12/9/2022 Unknown        Closed nondisplaced fracture of fourth metatarsal bone of left foot ICD-10-CM: S92.345A  ICD-9-CM: 825.25  12/9/2022 Unknown        Hypertension ICD-10-CM: I10  ICD-9-CM: 401.9  7/21/2022 Yes        CAD (coronary artery disease) ICD-10-CM: I25.10  ICD-9-CM: 414.00  6/28/2022 Yes        ESRD (end stage renal disease) ([facility] ICD-10-CM: N18.6  ICD-9-CM: 585.6  6/28/2022 Yes        Diabetes mellitus with foot ulcer ([facility] ICD-10-CM: E11.621, L97.509  ICD-9-CM: 250.80, 707.15  6/27/2022 Yes            Gangrene of left foot,   PARTIAL AMPUTATION OF LEFT 2ND, 3RD, 4TH METATARSALS, AMPUTATION OF TOES 2,3,4, INCISION ADN DRAINAGE LEFT FOOT DEEP per dr. Richie Delcid surgery next week after vascular surgeon procedure next week   ID and podiatrist on board  Continue vanc and zosyn     Positive blood cx  STAPHYLOCOCCUS SPECIES GROWING IN 1 OF 2 BOTTLES-like due to contamination     Diabetic foot ulcer  Follow-up with Dr. Thompson Reyna wound care     CAD, last stent Severe single-vessel coronary artery disease. Distal RCA to drug-eluting stent in July 2022, continue plavix -he took plavix today   No chest pain  Reported epigastric pain  EKG PVC     Hypertension continue home medication     Diarrhea epigastric pain  CT abdomen no acute process  Resolved      End-stage renal disease on HD  TTS, had HD yesterday     DM  type II   Lantus at night , ssi will increase lantus to 10       Subjective  need more help , frustrated       Disposition :tbd,   Review of systems:    General: No fevers or chills. Cardiovascular: No chest pain or pressure. No palpitations. Pulmonary: No shortness of breath. Gastrointestinal: No nausea, vomiting. No  abdomen pain     Vital signs/Intake and Output:  Visit Vitals  BP (!) 141/63 (BP 1 Location: Right upper arm, BP Patient Position: At rest;Lying)   Pulse 63   Temp 98.7 °F (37.1 °C)   Resp 17   Ht 6' (1.829 m)   Wt 98 kg (216 lb 0.8 oz)   SpO2 99%   BMI 29.30 kg/m²     Current Shift:  No intake/output data recorded. Last three shifts:  12/15 1901 - 12/17 0700  In: 240 [P.O.:240]  Out: -     Physical Exam:  General: WD, WN. Alert, cooperative, no acute distress    HEENT: NC, Atraumatic. PERRLA, anicteric sclerae. Lungs: CTA Bilaterally. No Wheezing/Rhonchi/Rales. Heart:  Regular  rhythm,  No murmur, No Rubs, No Gallops  Abdomen: Soft, Non distended, Non tender. +Bowel sounds,   Extremities: No c/c, both feet wrapped with gauze   Psych:   Not anxious or agitated. Neurologic:  No acute neurological deficit.              Labs: Results:       Chemistry Recent Labs     12/17/22  0656 12/16/22  0302 12/15/22  0645   * 206* 145*   * 135* 136   K 6.0* 5.3 5.9*   CL 98* 99* 100   CO2 25 28 23   BUN 58* 41* 64*   CREA 9.28* 6.83* 9.32*   CA 8.0* 8.4* 8.0*   AGAP 12 8 13   BUCR 6* 6* 7*        CBC w/Diff No results for input(s): WBC, RBC, HGB, HCT, PLT, GRANS, LYMPH, EOS, HGBEXT, HCTEXT, PLTEXT, HGBEXT, HCTEXT, PLTEXT in the last 72 hours. Cardiac Enzymes No results for input(s): CPK, CKND1, PAULO in the last 72 hours. No lab exists for component: CKRMB, TROIP   Coagulation No results for input(s): PTP, INR, APTT, INREXT, INREXT in the last 72 hours. Lipid Panel Lab Results   Component Value Date/Time    Cholesterol, total 188 07/19/2022 03:12 AM    HDL Cholesterol 42 07/19/2022 03:12 AM    LDL, calculated 131.2 (H) 07/19/2022 03:12 AM    VLDL, calculated 14.8 07/19/2022 03:12 AM    Triglyceride 74 07/19/2022 03:12 AM    CHOL/HDL Ratio 4.5 07/19/2022 03:12 AM      BNP No results for input(s): BNPP in the last 72 hours. Liver Enzymes No results for input(s): TP, ALB, TBIL, AP in the last 72 hours. No lab exists for component: SGOT, GPT, DBIL     Thyroid Studies No results found for: T4, T3U, TSH, TSHEXT, TSHEXT     Procedures/imaging: see electronic medical records for all procedures/Xrays and details which were not copied into this note but were reviewed prior to creation of Plan    XR FOOT LT AP/LAT    Result Date: 12/9/2022  EXAM: 2 radiographic views of the left foot. INDICATION: Left foot pain. COMPARISON: December 9, 2022 _______________ FINDINGS: There is been interval amputation of the second, third, and fourth rays at the level of the metatarsal diaphysis. As before, the first ray is amputated at the metatarsal phalangeal joint. The small toe remains. There are expected postoperative findings to include regional air density and soft tissue swelling. There is Monckeberg type vascular calcification. There is low-grade mid foot degeneration. _______________     Standard immediate postoperative findings.  _______________     XR FOOT LT MIN 3 V    Result Date: 12/9/2022  EXAM: XR FOOT LT MIN 3 V CLINICAL INDICATION/HISTORY: Gangrenous 2nd digit   > Additional: None. COMPARISON: None. TECHNIQUE: 3 views left foot _______________ FINDINGS: Soft tissue gas formation is seen along the second digit with ill-defined limitus changes extending along the medial forefoot. Prior amputation first right. Patchy demineralization and osseous erosions are seen involving the distal portion of the first metatarsal head. Additional patchy areas of osseous erosion are also noted involving the proximal phalanx of the second toe. Diffuse soft tissue swelling. Diffuse atherosclerotic vascular calcifications. No retained radiopaque foreign object. _______________     Soft gas formation and ulceration involving the medial forefoot with findings concerning for osteomyelitis involving the first and second rays described above. MRI FOOT LT WO CONT    Result Date: 12/9/2022  EXAM: MRI FOOT LT WO CONT CLINICAL INDICATION/HISTORY: Foot wound; preoperative  >Additional: None COMPARISON: Radiograph performed December 9, 2022  >Reference exam: None. TECHNIQUE: Axial long axis TI and T2 with fat saturation, sagittal and coronal short axis TI and STIR sequences through the foot were obtained without contrast. _______________ FINDINGS: FIRST RAY: Prior dictation the first digit. There is mild bony proliferative change at the head of the first metatarsal with preserved marrow signal. Minimal patchy edema is noted which is nonspecific. No definite cortical destructive change. SECOND RAY: Nondisplaced obliquely oriented fracture through the head neck junction of the second metatarsal. There is heterogeneous diminished T1 marrow with patchy edema and surrounding soft tissue gas along the course of the second digit with associated subluxation of the distal phalanx. Subtle diminished T1 marrow is noted with suspected foci of intraosseous gas, concerning for acute osteomyelitis.  THIRD RAY: Nondisplaced fracture through the third metatarsal neck with slight impaction. Mild edema is noted within the third digit, possibly stress reaction. No convincing osseous erosions or T1 marrow signal change. FOURTH RAY: Nondisplaced fractures of the neck of the fourth metatarsal. Minimal edema in the proximal phalanx but otherwise preserved marrow signal. FIFTH RAY: Unremarkable. Additional degenerative changes with patchy edema, joint space narrowing, and osteophyte formation at the midfoot, likely related to underlying Charcot arthropathy. SOFT TISSUES: Soft tissue irregularity with gas and ulceration around the second digit noted. Diffuse fluid signal seen throughout the intrinsic foot musculature, commonly seen in the setting of diabetes. Mild skin thickening about the forefoot. INCIDENTAL FINDINGS: None. _______________     1. Marrow signal abnormality with soft tissue wound and gas around the second digit concerning for acute osteomyelitis. 2.  Nondisplaced fractures of the head neck junction of the second-fourth metatarsals. 3.  Prior indication the first digit. 4.  Soft tissue swelling and skin thickening about the forefoot concerning for cellulitis. No drainable abscess. 5.  Additional chronic/degenerative changes of the foot. CT ABD PELV WO CONT    Result Date: 12/9/2022  EXAM: CT of the abdomen and pelvis INDICATION: Abdominal pain with distention. COMPARISON: None. TECHNIQUE: Axial CT imaging of the abdomen and pelvis was performed without intravenous contrast. Multiplanar reformats were generated. One or more dose reduction techniques were used on this CT: automated exposure control, adjustment of the mAs and/or kVp according to patient size, and iterative reconstruction techniques. The specific techniques used on this CT exam have been documented in the patient's electronic medical record.   Digital Imaging and Communications in Medicine (DICOM) format image data are available to nonaffiliated external healthcare facilities or entities on a secure, media free, reciprocally searchable basis with patient authorization for at least a 12-month period after this study. _______________ FINDINGS: LOWER CHEST: Partial inclusion of multivessel coronary arterial atherosclerosis and central venous catheter. Clear lung bases. Several punctate 2 to 3 mm pulmonary nodule seen in the right lower lobe (image 8) LIVER, BILIARY: Liver is normal. No biliary dilation. Color contains a gallstone without evidence of dilatation or gallbladder wall thickening. PANCREAS: Normal. SPLEEN: Punctate granulomatous calcifications otherwise unremarkable. ADRENALS: Mild adreniform thickening of each adrenal gland. KIDNEYS/URETERS/BLADDER: No hydronephrosis. Bilateral renal hypodensities are present either to small to accurately characterize or in keeping with simple cysts which are prior no further dedicated imaging follow-up. Bladder decompressed. PELVIC ORGANS: Unremarkable. VASCULATURE: Diffuse aortic and visceral arterial atherosclerosis without evidence of aneurysmal dilatation. LYMPH NODES: Scattered subcentimeter mesenteric and retroperitoneal lymph nodes are demonstrated without evidence of adenopathy. Prominent left inguinal lymph node is present (image 127) measuring approximately 15 mm in short axis dimension. GASTROINTESTINAL TRACT: No bowel dilation or wall thickening. Peritoneal gas. Normal appendix. Scattered colonic diverticula without evidence of diverticulitis. BONES: No acute or aggressive osseous abnormalities identified. OTHER: None. _______________     1. Cholelithiasis without evidence of cholecystitis. 2. No abnormal bowel wall thickening or dilatation. 3. No hydronephrosis or obstructive uropathy. 4. Several small right lower lobe pulmonary nodules (2-3 mm in size). See below guidelines for proposed follow-up.  5. Borderline enlarged and nonspecific left inguinal lymph node, potentially reactive in etiology. ======== Fleischner Society pulmonary nodule guidelines (revised 2017): Multiple solid nodules <6 mm: -Low risk for lung cancer: No follow-up. -High risk for lung cancer: Optional chest CT in 12 months. XR CHEST PORT    Result Date: 12/9/2022  EXAM: XR CHEST PORT CLINICAL INDICATION/HISTORY: sepsis -Additional: None COMPARISON: July 12, 2022 TECHNIQUE: Portable frontal view of the chest _______________ FINDINGS: SUPPORT DEVICES: Right IJ approach dialysis catheter in stable position. HEART AND MEDIASTINUM: Stable appearing cardiac size and mediastinal contours. LUNGS AND PLEURAL SPACES: Lungs are clear. No focal pneumonic opacity. No pneumothorax or pleural effusion. BONY THORAX AND SOFT TISSUES: Unremarkable. _______________     No active cardiopulmonary disease.        Tyron Camp MD

## 2022-12-17 NOTE — PROGRESS NOTES
Problem: Risk for Spread of Infection  Goal: Prevent transmission of infectious organism to others  Description: Prevent the transmission of infectious organisms to other patients, staff members, and visitors. Outcome: Progressing Towards Goal     Problem: Patient Education:  Go to Education Activity  Goal: Patient/Family Education  Outcome: Progressing Towards Goal     Problem: Diabetes Self-Management  Goal: *Disease process and treatment process  Description: Define diabetes and identify own type of diabetes; list 3 options for treating diabetes. Outcome: Progressing Towards Goal  Goal: *Incorporating nutritional management into lifestyle  Description: Describe effect of type, amount and timing of food on blood glucose; list 3 methods for planning meals. Outcome: Progressing Towards Goal  Goal: *Incorporating physical activity into lifestyle  Description: State effect of exercise on blood glucose levels. Outcome: Progressing Towards Goal  Goal: *Developing strategies to promote health/change behavior  Description: Define the ABC's of diabetes; identify appropriate screenings, schedule and personal plan for screenings. Outcome: Progressing Towards Goal  Goal: *Using medications safely  Description: State effect of diabetes medications on diabetes; name diabetes medication taking, action and side effects. Outcome: Progressing Towards Goal  Goal: *Monitoring blood glucose, interpreting and using results  Description: Identify recommended blood glucose targets  and personal targets. Outcome: Progressing Towards Goal  Goal: *Prevention, detection, treatment of acute complications  Description: List symptoms of hyper- and hypoglycemia; describe how to treat low blood sugar and actions for lowering  high blood glucose level.   Outcome: Progressing Towards Goal  Goal: *Prevention, detection and treatment of chronic complications  Description: Define the natural course of diabetes and describe the relationship of blood glucose levels to long term complications of diabetes. Outcome: Progressing Towards Goal  Goal: *Developing strategies to address psychosocial issues  Description: Describe feelings about living with diabetes; identify support needed and support network  Outcome: Progressing Towards Goal  Goal: *Insulin pump training  Outcome: Progressing Towards Goal  Goal: *Sick day guidelines  Outcome: Progressing Towards Goal  Goal: *Patient Specific Goal (EDIT GOAL, INSERT TEXT)  Outcome: Progressing Towards Goal     Problem: Patient Education: Go to Patient Education Activity  Goal: Patient/Family Education  Outcome: Progressing Towards Goal     Problem: Falls - Risk of  Goal: *Absence of Falls  Description: Document Jaxson Fall Risk and appropriate interventions in the flowsheet.   Outcome: Progressing Towards Goal  Note: Fall Risk Interventions:  Mobility Interventions: Bed/chair exit alarm, Patient to call before getting OOB, PT Consult for mobility concerns, PT Consult for assist device competence         Medication Interventions: Bed/chair exit alarm, Patient to call before getting OOB, Teach patient to arise slowly    Elimination Interventions: Bed/chair exit alarm, Call light in reach, Patient to call for help with toileting needs, Toilet paper/wipes in reach, Urinal in reach, Toileting schedule/hourly rounds    History of Falls Interventions: Bed/chair exit alarm, Door open when patient unattended         Problem: Patient Education: Go to Patient Education Activity  Goal: Patient/Family Education  Outcome: Progressing Towards Goal     Problem: Chronic Renal Failure  Goal: *Fluid and electrolytes stabilized  Outcome: Progressing Towards Goal     Problem: Patient Education: Go to Patient Education Activity  Goal: Patient/Family Education  Outcome: Progressing Towards Goal     Problem: Pressure Injury - Risk of  Goal: *Prevention of pressure injury  Description: Document Semaj Scale and appropriate interventions in the flowsheet.   Outcome: Progressing Towards Goal  Note: Pressure Injury Interventions:  Sensory Interventions: Keep linens dry and wrinkle-free    Moisture Interventions: Apply protective barrier, creams and emollients    Activity Interventions: Increase time out of bed    Mobility Interventions: Pressure redistribution bed/mattress (bed type)    Nutrition Interventions: Document food/fluid/supplement intake    Friction and Shear Interventions: Feet elevated on foot rest                Problem: Patient Education: Go to Patient Education Activity  Goal: Patient/Family Education  Outcome: Progressing Towards Goal     Problem: Patient Education: Go to Patient Education Activity  Goal: Patient/Family Education  Outcome: Progressing Towards Goal

## 2022-12-17 NOTE — PROGRESS NOTES
Nephrology Inpatient Progress note    Subjective:     Nataly Murphy is a 60 y.o. male with PMHx of  DM, HTN. PVD, ESRD on HD TTS at [facility] under the [address] [place] [address]. Nephrology sent in for admission by wound care clinic due to left foot infection ,was told he needed surgery. Podiatry has been in to see pt . He has been on abx recently     S/P Lt 2/3/4 metatarsal amputation  Stable overnight. Pending HD today.     Admit Date: 12/9/2022  Active Problems:    Diabetes mellitus with foot ulcer ([facility] (6/27/2022)      CAD (coronary artery disease) (6/28/2022)      ESRD (end stage renal disease) ([facility] (6/28/2022)      Hypertension (7/21/2022)      Leukocytosis (12/9/2022)      Diabetic foot infection ([facility] (12/9/2022)      Diarrhea (12/9/2022)      Type 2 diabetes mellitus, with long-term current use of insulin ([facility]) ()      Acute hematogenous osteomyelitis of left foot ([facility] (12/9/2022)      Nondisplaced fracture of second metatarsal bone of left foot (12/9/2022)      Nondisplaced fracture of third metatarsal bone of left foot (12/9/2022)      Closed nondisplaced fracture of fourth metatarsal bone of left foot (12/9/2022)      Positive blood culture (12/11/2022)    Current Facility-Administered Medications   Medication Dose Route Frequency    loperamide (IMODIUM) capsule 2 mg  2 mg Oral Q4H PRN    nystatin (MYCOSTATIN) 100,000 unit/gram powder   Topical BID    Vancomycin Random 12/17/22 with AM Labs  1 Each Other ONCE    insulin lispro (HUMALOG) injection 2 Units  2 Units SubCUTAneous TIDAC    alum-mag hydroxide-simeth (MYLANTA) oral suspension 30 mL  30 mL Oral Q4H PRN    insulin lispro (HUMALOG) injection   SubCUTAneous AC&HS    glucose chewable tablet 16 g  16 g Oral PRN    glucagon (GLUCAGEN) injection 1 mg  1 mg IntraMUSCular PRN    epoetin lisette-epbx (RETACRIT) injection 4,000 Units  4,000 Units SubCUTAneous Q TUE, THU & SAT    insulin glargine (LANTUS) injection 10 Units  10 Units SubCUTAneous QHS    heparin (porcine) 1,000 unit/mL injection 3,600 Units  3,600 Units IntraCATHeter DIALYSIS PRN    Vancomycin - Pharmacy to Dose  1 Each Other Rx Dosing/Monitoring    piperacillin-tazobactam (ZOSYN) 4.5 g in 0.9% sodium chloride (MBP/ADV) 100 mL MBP  4.5 g IntraVENous Q12H    vancomycin 50 mg/mL oral solution (compounded) 125 mg  125 mg Oral Q12H    dextrose 10% infusion 0-250 mL  0-250 mL IntraVENous PRN    0.9% sodium chloride infusion  25 mL/hr IntraVENous DIALYSIS PRN    clopidogreL (PLAVIX) tablet 75 mg  75 mg Oral DAILY    carvediloL (COREG) tablet 12.5 mg  12.5 mg Oral BID    aspirin chewable tablet 81 mg  81 mg Oral DAILY    amLODIPine (NORVASC) tablet 10 mg  10 mg Oral DAILY    allopurinoL (ZYLOPRIM) tablet 100 mg  100 mg Oral DAILY    ascorbic acid (vitamin C) (VITAMIN C) tablet 500 mg  500 mg Oral DAILY    ezetimibe (ZETIA) tablet 10 mg  10 mg Oral DAILY    pantoprazole (PROTONIX) tablet 40 mg  40 mg Oral ACB    sevelamer carbonate (RENVELA) tab 1,600 mg  1,600 mg Oral TID WITH MEALS    vit B Cmplx 3-FA-Vit C-Biotin (NEPHRO NIKITA RX) tablet 1 Tablet  1 Tablet Oral DAILY    sodium chloride (NS) flush 5-40 mL  5-40 mL IntraVENous Q8H    sodium chloride (NS) flush 5-40 mL  5-40 mL IntraVENous PRN    acetaminophen (TYLENOL) tablet 650 mg  650 mg Oral Q6H PRN    Or    acetaminophen (TYLENOL) suppository 650 mg  650 mg Rectal Q6H PRN    bisacodyL (DULCOLAX) suppository 10 mg  10 mg Rectal DAILY PRN    promethazine (PHENERGAN) tablet 12.5 mg  12.5 mg Oral Q6H PRN    Or    ondansetron (ZOFRAN) injection 4 mg  4 mg IntraVENous Q6H PRN    heparin (porcine) injection 5,000 Units  5,000 Units SubCUTAneous Q8H    oxyCODONE-acetaminophen (PERCOCET) 5-325 mg per tablet 1 Tablet  1 Tablet Oral Q6H PRN         Allergy:   No Known Allergies     Objective:     Visit Vitals  BP (!) 156/60 (BP 1 Location: Right upper arm, BP Patient Position: At rest)   Pulse 63   Temp 98.4 °F (36.9 °C)   Resp 17   Ht 6' (1.829 m)   Wt 98 kg (216 lb 0.8 oz)   SpO2 97%   BMI 29.30 kg/m²         Intake/Output Summary (Last 24 hours) at 12/17/2022 0849  Last data filed at 12/16/2022 0930  Gross per 24 hour   Intake 240 ml   Output --   Net 240 ml         Physical Exam:       General: No acute distress   HENT: Atraumatic and normocephalic   Eyes: Normal conjunctiva   Neck: Supple No JVD or mass   Cardiovascular: Normal S1 & S2, no m/r/g   Pulmonary/Chest Wall: Clear to auscultation bilaterally   Abdominal: Soft and non-tender   Musculoskeletal: No edema S/p Amputation of multiple toes Left foot   Neurological: No focal deficits    HD Access: [facility] TD +    Data Review:  Lab Results   Component Value Date/Time    Sodium 135 (L) 12/16/2022 03:02 AM    Potassium 5.3 12/16/2022 03:02 AM    Chloride 99 (L) 12/16/2022 03:02 AM    CO2 28 12/16/2022 03:02 AM    Anion gap 8 12/16/2022 03:02 AM    Glucose 206 (H) 12/16/2022 03:02 AM    BUN 41 (H) 12/16/2022 03:02 AM    Creatinine 6.83 (H) 12/16/2022 03:02 AM    BUN/Creatinine ratio 6 (L) 12/16/2022 03:02 AM    GFR est AA 13 (L) 07/21/2022 02:15 PM    GFR est non-AA 11 (L) 07/21/2022 02:15 PM    Calcium 8.4 (L) 12/16/2022 03:02 AM     Lab Results   Component Value Date/Time    WBC 16.2 (H) 12/14/2022 01:31 AM    HGB 9.3 (L) 12/14/2022 01:31 AM    HCT 28.9 (L) 12/14/2022 01:31 AM    PLATELET 044 80/68/0428 01:31 AM    MCV 94.1 12/14/2022 01:31 AM     Lab Results   Component Value Date/Time    Calcium 8.4 (L) 12/16/2022 03:02 AM    Phosphorus 3.8 12/13/2022 02:54 AM     No results found for: IRON, FE, TIBC, IBCT, PSAT, FERR  No results found for: FERR      Impression:     ESRD on HD TTS schedule  Left diabetic foot infection w/ gangrenous changes s/p Lt 2/3/4 metatarsal amputation   DM  HTN  PVD, seen by Vasc Surgery, anticipate LLE angio and possible intervention.   Anemia  SHPT    Plan:     HD today Sat  Use current access for HD  IV Antibiotic per ID  Cont DALTON for Anemia  Anticipate LE Angio on Monday    Liberty Harrison MD, [facility]if[facility] Barrera  [phone]

## 2022-12-17 NOTE — PROGRESS NOTES
[address] [facility] Pharmacokinetic Monitoring Service - Vancomycin    Consulting Provider: Nicolette Saab   Indication: Diabetic Foot Infection  Target Concentration: Pre-Dialysis Level 20-24 mg/L  Day of Therapy: 9  Additional Antimicrobials: Zosyn    Pertinent Laboratory Values: Wt Readings from Last 1 Encounters:   12/17/22 98 kg (216 lb 0.8 oz)     Temp Readings from Last 1 Encounters:   12/17/22 98.4 °F (36.9 °C)     No components found for: PROCAL  Estimated Creatinine Clearance: 9.9 mL/min (A) (based on SCr of 9.28 mg/dL (H)). No results for input(s): WBC in the last 72 hours.     No lab exists for component: CREATININE,  BUN    Assessment:  Date/Time Current Dose Concentration Timing of Concentration (h) AUC   12/17/22 Vancomycin 1000 post HD  22.4 12/17 @ 0656 N/A   Note: Serum concentrations collected for AUC dosing may appear elevated if collected in close proximity to the dose administered, this is not necessarily an indication of toxicity    Plan:  Current dosing regimen is therapeutic    Repeat vancomycin concentration ordered for 12/20/22 @ 0400   Pharmacy will continue to monitor patient and adjust therapy as indicated    Thank you for the consult,  Gracia Weaver [facility]  12/17/2022 11:32 AM

## 2022-12-17 NOTE — PROGRESS NOTES
ACUTE HEMODIALYSIS TREATMENT    HEMODIALYSIS ORDERS: Physician: Dr. Debra Miller     Dialyzer: Revaclear   Duration: 4 hr   BFR: 400   DFR: 800   Dialysate:  Temp 36-37*C   K+  2    Ca+ 2.5   Na 138   Bicarb 35   Wt Readings from Last 1 Encounters:   12/17/22 98 kg (216 lb 0.8 oz)    Patient Chart [x]   Unable to Obtain []  Dry weight/UF Goal: 2000 ml    Heparin []  Bolus    Units    [] Hourly    Units    [x]None       Pre BP:   130/60   Pulse:  63   Respirations: 18   Temp:  97.9  [] Oral [] Axillary [] Esophageal   Labs: []  Pre  []  Post:   [x] N/A   Additional Orders(medications, blood products, hypotension management): [] Yes   [] No     [x]  Anoop Consent Verified     CATHETER ACCESS:  []N/A   [x]Right   []Left   []IJ   []Fem  [x]Chest wall  []TransHepatic   [] First use X-ray verified     [x]Tunnel    [] Non Tunneled   [x]No S/S infection  []Redness  []Drainage []Cultured []Swelling []Pain   [x]Medical Aseptic Prep Utilized   []Dressing Changed  [x] Biopatch  Date:    []Clotted   [x]Patent   Flows: []Good  [x]Poor  [x]Reversed   If access problem,  notified: [x]Yes    []N/A        GENERAL ASSESSMENT:    LUNGS:  Resp Rate 18   [x] Clear  [] Coarse  [] Crackles  [] Wheezing  [] Diminished         Respirations:  [x]Easy  []Labored  []N/A  Cough:  []Productive  []Dry  [x]N/A      Therapy:  [x]RA  O2 Type:  []NC  Mask: []  NRB    [] BiPaP  Flow:  l/min                   [] Ventilated  [] Intubated  [] Trach     CARDIAC: [x] Regular      [] Irregular   [] Rhythm:          [] Monitored   [] Bedside   [] Remotely monitored     EDEMA: [] None   [x]Generalized  [] Pitting [] 1+   [] 2 +   [] 3+    [] 4+  [] Pedal    SKIN:   [x] Warm  [] Hot     [] Cold   [x] Dry     [] Pale   [] Diaphoretic                  [] Flushed  [] Jaundiced  [] Cyanotic     LOC:    [] Alert      []Oriented:    [] Person     [] Place  []Time               [] Confused  [] Lethargic  [] Medicated  [] Non-responsive  [] Non Verbal   GI / ABDOMEN:                     [] Flat    [] Distended    [x] Soft    [] Firm   []  Obese                   [] Diarrhea   [] FMS [x] Bowel Sounds  [] Nausea  [] Vomiting                   [] NGT  [] OGT    [] PEG  [] Tube Feedings @     / URINE ASSESSMENT:                   [] Voiding  []  Mcmahon  [x] Oliguria  [] Anuria                     [] Incontinent  []  Incontinent Brief   []  PureWick     PAIN:  [x] 0 []1  []2   []3   []4   []5   []6   []7   []8   []9   []10                MOBILITY:  [x] Bed    [] Stretcher      All Vitals and Treatment Details on [address]: [facility]          Room # 327    [x] Routine         [] 1st Time Acute/Chronic   [] Urgent      [] Stat            [] Acute Room   [x]  Bedside    [] ICU/CCU     [] ER   Isolation Precautions:  [x] Dialysis    There are currently no Active Isolations     ALLERGIES:     No Known Allergies   Code Status:  Full Code     Hepatitis Status      Lab Results   Component Value Date/Time    Hepatitis B surface Ag <0.10 12/10/2022 06:33 AM    Hepatitis B surface Ab 23.52 12/10/2022 06:33 AM        Current Labs:      Lab Results   Component Value Date/Time    WBC 16.2 (H) 12/14/2022 01:31 AM    HGB 9.3 (L) 12/14/2022 01:31 AM    HCT 28.9 (L) 12/14/2022 01:31 AM    PLATELET 994 60/32/3287 01:31 AM    MCV 94.1 12/14/2022 01:31 AM     Lab Results   Component Value Date/Time    Sodium 135 (L) 12/17/2022 06:56 AM    Potassium 6.0 (H) 12/17/2022 06:56 AM    Chloride 98 (L) 12/17/2022 06:56 AM    CO2 25 12/17/2022 06:56 AM    Anion gap 12 12/17/2022 06:56 AM    Glucose 146 (H) 12/17/2022 06:56 AM    BUN 58 (H) 12/17/2022 06:56 AM    Creatinine 9.28 (H) 12/17/2022 06:56 AM    BUN/Creatinine ratio 6 (L) 12/17/2022 06:56 AM    GFR est AA 13 (L) 07/21/2022 02:15 PM    GFR est non-AA 11 (L) 07/21/2022 02:15 PM    Calcium 8.0 (L) 12/17/2022 06:56 AM          DIET:  DIET ADULT     PRIMARY NURSE REPORT:   Pre Dialysis: Clay Garcia RN    Time: 200     EDUCATION: [x] Patient           Knowledge Basis: [x]None []Minimal [] Substantial [] Unknown  Barriers to learning  [x]N/A  [] Intubated/Trached/Ventilated  [] Sedated/Paralyzed   [] Access Care     [] S&S of infection  [] Fluid Management  [] K+   [x] Procedural    [] Medications   [] Tx Options   [] Transplant   [] Diet      Teaching Tools:  [x] Explain  [] Demo  [] Handouts [] Video  Patient response: [] Verbalized understanding   [x] Requires follow up        [x] Time Out/Safety Check    [x] Extracorporeal Circuit Tested for integrity       RO/HEMODIALYSIS MACHINE SAFETY CHECKS - Before each treatment:        [facility]                                                                     [x] Portable Machine #2  /RO serial # [id number]                                                                                                        Alarm Test:  Pass time             [x] RO/Machine Log Complete    Machine Temp    36-37*C             Dialysate: pH 7.4    Conductivity: Meter 14   HD Machine  14.1     TCD: 14  Dialyzer Lot # [id number]     Blood Tubing Lot # [id number]     Saline Lot # [id number]     CHLORINE TESTING-Before each treatment and every 4 hours    Total Chlorine: [x] less than 0.1 ppm  Initial Time Check: 1245      4 Hr/2nd Check Time: 8695   (if greater than 0.1 ppm from Primary then every 30 minutes from Secondary)     TREATMENT INITIATION - with Dialysis Precautions:   [x] All Connections Secured              [x] Saline Line Double Clamped   [x] Venous Parameters Set               [x] Arterial Parameters Set    [x] Prime Given 250ml NSS              [x]Air Foam Detector Engaged      Treatment Initiation Note:   Received patient in bed a/o x4 pleasant, VS stable for treatment. Right chest TDC Accessed with not s/s of complication, Treatment initiated and monitored by ROBERTO so [place]. During Treatment Notes:  1301  Vascular access visible with arterial and venous line connections intact.   Pt resting comfortably. 1330  Vascular access visible with arterial and venous line connections intact. Pt resting comfortably. 1500  Vascular access visible with arterial and venous line connections intact. Pt resting comfortably. 1600  Vascular access visible with arterial and venous line connections intact. Pt resting comfortably. 1702  Dialysis treatment complete. Medication Dose Volume Route Time Anoop Nurse, Title      KIM Quach RN HD Merritt Berke, RN     Post Assessment  Dialyzer Cleared:   [] Good  [x] Fair  [] Poor  Blood processed:  81.4 L  UF Removed:  2500 Ml    Post /67   Pulse  63 Resp  18  Temp 98 Lungs: [x] Clear [] Course  [] Crackles                 []  Wheezing   [] Diminished   Post Tx Vascular Access: [x] N/A Cardiac :[x] Regular   [] Irregular   Rhythm:  [x] Monitored   [] Not Monitored    CVC Catheter: [] N/A  Locking solution: Heparin 1:1000 U  Arterial port 1.8 ml   Venous port 1.8 ml   Edema:  [] None  [x] Generalized                     Skin:[x] Warm  [x] Dry [] Diaphoretic               [] Flushed  [] Pale [] Cyanotic Pain:  [x]0  []1 []2  []3 []4  []5  []6  []7 []8    []9  []10     Post Treatment Note:    Patient completed and  tolerated 4 hrs of hemo dialysis. 2500 ml of fluid taken off. Catheter patent an intact flushed and locked with heparin.      POST TREATMENT PRIMARY NURSE HANDOFF REPORT:   Post Dialysis: Emmy Minor RN               Time:  0970       Abbreviations: AVG-arterial venous graft, AVF-arterial venous fistula, IJ-Internal Jugular, Subcl-Subclavian, Fem-Femoral, Tx-treatment, AP/HR-apical heart rate, VSS- Vital Signs Stable, CVC- Central Venous Catheter, DFR-dialysate flow rate, BFR-blood flow rate, AP-arterial pressure, -venous pressure, UF-ultrafiltrate, TMP-transmembrane pressure, Avtar-Venous, Art-Arterial, RO-Reverse Osmosis

## 2022-12-18 LAB
ALBUMIN SERPL-MCNC: 2 G/DL (ref 3.4–5)
ANION GAP SERPL CALC-SCNC: 12 MMOL/L (ref 3–18)
BUN SERPL-MCNC: 34 MG/DL (ref 7–18)
BUN/CREAT SERPL: 5 (ref 12–20)
CALCIUM SERPL-MCNC: 8 MG/DL (ref 8.5–10.1)
CHLORIDE SERPL-SCNC: 98 MMOL/L (ref 100–111)
CO2 SERPL-SCNC: 26 MMOL/L (ref 21–32)
CREAT SERPL-MCNC: 6.36 MG/DL (ref 0.6–1.3)
GLUCOSE BLD STRIP.AUTO-MCNC: 165 MG/DL (ref 70–110)
GLUCOSE BLD STRIP.AUTO-MCNC: 183 MG/DL (ref 70–110)
GLUCOSE BLD STRIP.AUTO-MCNC: 213 MG/DL (ref 70–110)
GLUCOSE BLD STRIP.AUTO-MCNC: 220 MG/DL (ref 70–110)
GLUCOSE BLD STRIP.AUTO-MCNC: 238 MG/DL (ref 70–110)
GLUCOSE SERPL-MCNC: 178 MG/DL (ref 74–99)
MAGNESIUM SERPL-MCNC: 2.1 MG/DL (ref 1.6–2.6)
PHOSPHATE SERPL-MCNC: 4.1 MG/DL (ref 2.5–4.9)
POTASSIUM SERPL-SCNC: 5.5 MMOL/L (ref 3.5–5.5)
SODIUM SERPL-SCNC: 136 MMOL/L (ref 136–145)

## 2022-12-18 PROCEDURE — 36415 COLL VENOUS BLD VENIPUNCTURE: CPT

## 2022-12-18 PROCEDURE — 82962 GLUCOSE BLOOD TEST: CPT

## 2022-12-18 PROCEDURE — 74011250636 HC RX REV CODE- 250/636: Performed by: HOSPITALIST

## 2022-12-18 PROCEDURE — 65270000029 HC RM PRIVATE

## 2022-12-18 PROCEDURE — 74011000250 HC RX REV CODE- 250: Performed by: HOSPITALIST

## 2022-12-18 PROCEDURE — 74011250636 HC RX REV CODE- 250/636: Performed by: EMERGENCY MEDICINE

## 2022-12-18 PROCEDURE — 74011636637 HC RX REV CODE- 636/637: Performed by: HOSPITALIST

## 2022-12-18 PROCEDURE — 74011250637 HC RX REV CODE- 250/637: Performed by: HOSPITALIST

## 2022-12-18 PROCEDURE — 97530 THERAPEUTIC ACTIVITIES: CPT

## 2022-12-18 PROCEDURE — 74011000250 HC RX REV CODE- 250: Performed by: EMERGENCY MEDICINE

## 2022-12-18 PROCEDURE — 97110 THERAPEUTIC EXERCISES: CPT

## 2022-12-18 PROCEDURE — 80069 RENAL FUNCTION PANEL: CPT

## 2022-12-18 PROCEDURE — 83735 ASSAY OF MAGNESIUM: CPT

## 2022-12-18 PROCEDURE — 74011000258 HC RX REV CODE- 258: Performed by: EMERGENCY MEDICINE

## 2022-12-18 RX ADMIN — ASPIRIN 81 MG: 81 TABLET, CHEWABLE ORAL at 09:31

## 2022-12-18 RX ADMIN — HEPARIN SODIUM 5000 UNITS: 5000 INJECTION INTRAVENOUS; SUBCUTANEOUS at 17:21

## 2022-12-18 RX ADMIN — ACETAMINOPHEN 650 MG: 325 TABLET ORAL at 10:11

## 2022-12-18 RX ADMIN — EZETIMIBE 10 MG: 10 TABLET ORAL at 09:30

## 2022-12-18 RX ADMIN — B-COMPLEX W/ C & FOLIC ACID TAB 1 MG 1 TABLET: 1 TAB at 09:30

## 2022-12-18 RX ADMIN — ALLOPURINOL 100 MG: 100 TABLET ORAL at 09:30

## 2022-12-18 RX ADMIN — INSULIN GLARGINE 10 UNITS: 100 INJECTION, SOLUTION SUBCUTANEOUS at 21:58

## 2022-12-18 RX ADMIN — NYSTATIN: 100000 POWDER TOPICAL at 17:22

## 2022-12-18 RX ADMIN — AMLODIPINE BESYLATE 10 MG: 5 TABLET ORAL at 09:30

## 2022-12-18 RX ADMIN — Medication 500 MG: at 09:30

## 2022-12-18 RX ADMIN — HEPARIN SODIUM 5000 UNITS: 5000 INJECTION INTRAVENOUS; SUBCUTANEOUS at 09:29

## 2022-12-18 RX ADMIN — SEVELAMER CARBONATE 1600 MG: 800 TABLET, FILM COATED ORAL at 17:21

## 2022-12-18 RX ADMIN — SODIUM CHLORIDE, PRESERVATIVE FREE 10 ML: 5 INJECTION INTRAVENOUS at 13:04

## 2022-12-18 RX ADMIN — Medication 2 UNITS: at 09:29

## 2022-12-18 RX ADMIN — Medication 125 MG: at 05:42

## 2022-12-18 RX ADMIN — SODIUM CHLORIDE, PRESERVATIVE FREE 10 ML: 5 INJECTION INTRAVENOUS at 22:06

## 2022-12-18 RX ADMIN — CARVEDILOL 12.5 MG: 12.5 TABLET, FILM COATED ORAL at 21:58

## 2022-12-18 RX ADMIN — Medication 125 MG: at 17:29

## 2022-12-18 RX ADMIN — PIPERACILLIN AND TAZOBACTAM 4.5 G: 4; .5 INJECTION, POWDER, FOR SOLUTION INTRAVENOUS at 13:02

## 2022-12-18 RX ADMIN — PIPERACILLIN AND TAZOBACTAM 4.5 G: 4; .5 INJECTION, POWDER, FOR SOLUTION INTRAVENOUS at 00:42

## 2022-12-18 RX ADMIN — SEVELAMER CARBONATE 1600 MG: 800 TABLET, FILM COATED ORAL at 09:30

## 2022-12-18 RX ADMIN — Medication 2 UNITS: at 17:22

## 2022-12-18 RX ADMIN — SODIUM CHLORIDE, PRESERVATIVE FREE 10 ML: 5 INJECTION INTRAVENOUS at 05:43

## 2022-12-18 RX ADMIN — Medication 2 UNITS: at 13:01

## 2022-12-18 RX ADMIN — NYSTATIN: 100000 POWDER TOPICAL at 22:01

## 2022-12-18 RX ADMIN — HEPARIN SODIUM 5000 UNITS: 5000 INJECTION INTRAVENOUS; SUBCUTANEOUS at 00:42

## 2022-12-18 RX ADMIN — PANTOPRAZOLE SODIUM 40 MG: 40 TABLET, DELAYED RELEASE ORAL at 06:36

## 2022-12-18 RX ADMIN — CARVEDILOL 12.5 MG: 12.5 TABLET, FILM COATED ORAL at 09:31

## 2022-12-18 RX ADMIN — SEVELAMER CARBONATE 1600 MG: 800 TABLET, FILM COATED ORAL at 13:02

## 2022-12-18 RX ADMIN — CLOPIDOGREL BISULFATE 75 MG: 75 TABLET ORAL at 09:30

## 2022-12-18 NOTE — PROGRESS NOTES
Nephrology Inpatient Progress note    Subjective:     Haris Stringer is a 60 y.o. male with PMHx of  DM, HTN. PVD, ESRD on HD TTS at [facility] under the [address] [place] [address]. Nephrology sent in for admission by wound care clinic due to left foot infection ,was told he needed surgery. Podiatry has been in to see pt . He has been on abx recently     S/P Lt 2/3/4 metatarsal amputation  Stable overnight. Pending HD today.     Admit Date: 12/9/2022  Active Problems:    Diabetes mellitus with foot ulcer ([facility] (6/27/2022)      CAD (coronary artery disease) (6/28/2022)      ESRD (end stage renal disease) ([facility] (6/28/2022)      Hypertension (7/21/2022)      Leukocytosis (12/9/2022)      Diabetic foot infection ([facility] (12/9/2022)      Diarrhea (12/9/2022)      Type 2 diabetes mellitus, with long-term current use of insulin ([facility]) ()      Acute hematogenous osteomyelitis of left foot ([facility] (12/9/2022)      Nondisplaced fracture of second metatarsal bone of left foot (12/9/2022)      Nondisplaced fracture of third metatarsal bone of left foot (12/9/2022)      Closed nondisplaced fracture of fourth metatarsal bone of left foot (12/9/2022)      Positive blood culture (12/11/2022)    Current Facility-Administered Medications   Medication Dose Route Frequency    epoetin lisette-epbx (RETACRIT) injection 8,000 Units  8,000 Units SubCUTAneous Q TUE, THU & SAT    loperamide (IMODIUM) capsule 2 mg  2 mg Oral Q4H PRN    nystatin (MYCOSTATIN) 100,000 unit/gram powder   Topical BID    insulin lispro (HUMALOG) injection 2 Units  2 Units SubCUTAneous TIDAC    alum-mag hydroxide-simeth (MYLANTA) oral suspension 30 mL  30 mL Oral Q4H PRN    glucose chewable tablet 16 g  16 g Oral PRN    glucagon (GLUCAGEN) injection 1 mg  1 mg IntraMUSCular PRN    insulin glargine (LANTUS) injection 10 Units  10 Units SubCUTAneous QHS    heparin (porcine) 1,000 unit/mL injection 3,600 Units  3,600 Units IntraCATHeter DIALYSIS PRN    Vancomycin - Pharmacy to Dose  1 Each Other Rx Dosing/Monitoring    piperacillin-tazobactam (ZOSYN) 4.5 g in 0.9% sodium chloride (MBP/ADV) 100 mL MBP  4.5 g IntraVENous Q12H    vancomycin 50 mg/mL oral solution (compounded) 125 mg  125 mg Oral Q12H    dextrose 10% infusion 0-250 mL  0-250 mL IntraVENous PRN    0.9% sodium chloride infusion  25 mL/hr IntraVENous DIALYSIS PRN    clopidogreL (PLAVIX) tablet 75 mg  75 mg Oral DAILY    carvediloL (COREG) tablet 12.5 mg  12.5 mg Oral BID    aspirin chewable tablet 81 mg  81 mg Oral DAILY    amLODIPine (NORVASC) tablet 10 mg  10 mg Oral DAILY    allopurinoL (ZYLOPRIM) tablet 100 mg  100 mg Oral DAILY    ascorbic acid (vitamin C) (VITAMIN C) tablet 500 mg  500 mg Oral DAILY    ezetimibe (ZETIA) tablet 10 mg  10 mg Oral DAILY    pantoprazole (PROTONIX) tablet 40 mg  40 mg Oral ACB    sevelamer carbonate (RENVELA) tab 1,600 mg  1,600 mg Oral TID WITH MEALS    vit B Cmplx 3-FA-Vit C-Biotin (NEPHRO NIKITA RX) tablet 1 Tablet  1 Tablet Oral DAILY    sodium chloride (NS) flush 5-40 mL  5-40 mL IntraVENous Q8H    sodium chloride (NS) flush 5-40 mL  5-40 mL IntraVENous PRN    acetaminophen (TYLENOL) tablet 650 mg  650 mg Oral Q6H PRN    Or    acetaminophen (TYLENOL) suppository 650 mg  650 mg Rectal Q6H PRN    bisacodyL (DULCOLAX) suppository 10 mg  10 mg Rectal DAILY PRN    promethazine (PHENERGAN) tablet 12.5 mg  12.5 mg Oral Q6H PRN    Or    ondansetron (ZOFRAN) injection 4 mg  4 mg IntraVENous Q6H PRN    heparin (porcine) injection 5,000 Units  5,000 Units SubCUTAneous Q8H    oxyCODONE-acetaminophen (PERCOCET) 5-325 mg per tablet 1 Tablet  1 Tablet Oral Q6H PRN         Allergy:   No Known Allergies     Objective:     Visit Vitals  /63 (BP 1 Location: Right upper arm, BP Patient Position: At rest)   Pulse 70   Temp 98.9 °F (37.2 °C)   Resp 17   Ht 6' (1.829 m)   Wt 96.2 kg (212 lb 1.6 oz)   SpO2 (!) 85%   BMI 28.77 kg/m²       No intake or output data in the 24 hours ending 12/18/22 1412      Physical Exam:       General: No acute distress   HENT: Atraumatic and normocephalic   Eyes: Normal conjunctiva   Neck: Supple No JVD or mass   Cardiovascular: Normal S1 & S2, no m/r/g   Pulmonary/Chest Wall: Clear to auscultation bilaterally   Abdominal: Soft and non-tender   Musculoskeletal: No edema S/p Amputation of multiple toes Left foot   Neurological: No focal deficits    HD Access: RI TDC +    Data Review:  Lab Results   Component Value Date/Time    Sodium 136 12/18/2022 06:01 AM    Potassium 5.5 12/18/2022 06:01 AM    Chloride 98 (L) 12/18/2022 06:01 AM    CO2 26 12/18/2022 06:01 AM    Anion gap 12 12/18/2022 06:01 AM    Glucose 178 (H) 12/18/2022 06:01 AM    BUN 34 (H) 12/18/2022 06:01 AM    Creatinine 6.36 (H) 12/18/2022 06:01 AM    BUN/Creatinine ratio 5 (L) 12/18/2022 06:01 AM    GFR est AA 13 (L) 07/21/2022 02:15 PM    GFR est non-AA 11 (L) 07/21/2022 02:15 PM    Calcium 8.0 (L) 12/18/2022 06:01 AM     Lab Results   Component Value Date/Time    WBC 16.2 (H) 12/14/2022 01:31 AM    HGB 9.3 (L) 12/14/2022 01:31 AM    HCT 28.9 (L) 12/14/2022 01:31 AM    PLATELET 186 17/72/7299 01:31 AM    MCV 94.1 12/14/2022 01:31 AM     Lab Results   Component Value Date/Time    Calcium 8.0 (L) 12/18/2022 06:01 AM    Phosphorus 4.1 12/18/2022 06:01 AM     No results found for: IRON, FE, TIBC, IBCT, PSAT, FERR  No results found for: FERR      Impression:     ESRD on HD TTS schedule  Left diabetic foot infection w/ gangrenous changes s/p Lt 2/3/4 metatarsal amputation   DM  HTN  PVD, seen by Vasc Surgery, anticipate LLE angio and possible intervention.   Anemia  SHPT    Plan:     HD today Sat  Use current access for HD  IV Antibiotic per ID  Cont DALTON for Anemia  Anticipate LE Angio on Monday    Jane Myers MD,   Bonifacio Barrera  [phone]

## 2022-12-18 NOTE — PROGRESS NOTES
Problem: Risk for Spread of Infection  Goal: Prevent transmission of infectious organism to others  Description: Prevent the transmission of infectious organisms to other patients, staff members, and visitors. Outcome: Progressing Towards Goal     Problem: Diabetes Self-Management  Goal: *Disease process and treatment process  Description: Define diabetes and identify own type of diabetes; list 3 options for treating diabetes. Outcome: Progressing Towards Goal  Goal: *Incorporating nutritional management into lifestyle  Description: Describe effect of type, amount and timing of food on blood glucose; list 3 methods for planning meals. Outcome: Progressing Towards Goal  Goal: *Incorporating physical activity into lifestyle  Description: State effect of exercise on blood glucose levels. Outcome: Progressing Towards Goal  Goal: *Developing strategies to promote health/change behavior  Description: Define the ABC's of diabetes; identify appropriate screenings, schedule and personal plan for screenings. Outcome: Progressing Towards Goal  Goal: *Using medications safely  Description: State effect of diabetes medications on diabetes; name diabetes medication taking, action and side effects. Outcome: Progressing Towards Goal  Goal: *Monitoring blood glucose, interpreting and using results  Description: Identify recommended blood glucose targets  and personal targets. Outcome: Progressing Towards Goal  Goal: *Prevention, detection, treatment of acute complications  Description: List symptoms of hyper- and hypoglycemia; describe how to treat low blood sugar and actions for lowering  high blood glucose level. Outcome: Progressing Towards Goal  Goal: *Prevention, detection and treatment of chronic complications  Description: Define the natural course of diabetes and describe the relationship of blood glucose levels to long term complications of diabetes.   Outcome: Progressing Towards Goal  Goal: *Developing strategies to address psychosocial issues  Description: Describe feelings about living with diabetes; identify support needed and support network  Outcome: Progressing Towards Goal  Goal: *Insulin pump training  Outcome: Progressing Towards Goal  Goal: *Sick day guidelines  Outcome: Progressing Towards Goal  Goal: *Patient Specific Goal (EDIT GOAL, INSERT TEXT)  Outcome: Progressing Towards Goal     Problem: Patient Education:  Go to Education Activity  Goal: Patient/Family Education  Outcome: Progressing Towards Goal

## 2022-12-18 NOTE — PROGRESS NOTES
Hospitalist Progress Note-critical care note     Patient: Freeman Heller MRN: [id number]  CSN: [id number]    YOB: 1960  Age: 60 y.o.   Sex: male    DOA: 12/9/2022 LOS:  LOS: 9 days            Chief complaint: leukocytosis, positive bcx , foot infection , om  esrd on hd     Assessment/Plan         Hospital Problems  Date Reviewed: 6/28/2022            Codes Class Noted POA    Positive blood culture ICD-10-CM: R78.81  ICD-9-CM: 790.7  12/11/2022 Unknown        Leukocytosis ICD-10-CM: D72.829  ICD-9-CM: 288.60  12/9/2022 Unknown        Diabetic foot infection ([facility] ICD-10-CM: E11.628, L08.9  ICD-9-CM: 250.80, 686.9  12/9/2022 Unknown        Diarrhea ICD-10-CM: R19.7  ICD-9-CM: 787.91  12/9/2022 Unknown        Type 2 diabetes mellitus, with long-term current use of insulin ([facility] ICD-10-CM: E11.9, Z79.4  ICD-9-CM: 250.00, V58.67  Unknown Unknown        Acute hematogenous osteomyelitis of left foot ([facility] ICD-10-CM: M86.072  ICD-9-CM: 730.07  12/9/2022 Unknown        Nondisplaced fracture of second metatarsal bone of left foot ICD-10-CM: V24.714M  ICD-9-CM: 825.25  12/9/2022 Unknown        Nondisplaced fracture of third metatarsal bone of left foot ICD-10-CM: T38.278W  ICD-9-CM: 825.25  12/9/2022 Unknown        Closed nondisplaced fracture of fourth metatarsal bone of left foot ICD-10-CM: S92.345A  ICD-9-CM: 825.25  12/9/2022 Unknown        Hypertension ICD-10-CM: I10  ICD-9-CM: 401.9  7/21/2022 Yes        CAD (coronary artery disease) ICD-10-CM: I25.10  ICD-9-CM: 414.00  6/28/2022 Yes        ESRD (end stage renal disease) ([facility] ICD-10-CM: N18.6  ICD-9-CM: 585.6  6/28/2022 Yes        Diabetes mellitus with foot ulcer ([facility] ICD-10-CM: E11.621, L97.509  ICD-9-CM: 250.80, 707.15  6/27/2022 Yes            Gangrene of left foot,   PARTIAL AMPUTATION OF LEFT 2ND, 3RD, 4TH METATARSALS, AMPUTATION OF TOES 2,3,4, INCISION ADN DRAINAGE LEFT FOOT DEEP per dr. Oumar Dasilva surgeon procedure next week   ID and podiatrist on board  Continue vanc and zosyn     Positive blood cx   due to contamination     Diabetic foot ulcer  Follow-up with Dr. Breonna Sung wound care     CAD, last stent Severe single-vessel coronary artery disease. Distal RCA to drug-eluting stent in July 2022, continue plavix -he took plavix today   No chest pain  Reported epigastric pain  EKG PVC     Hypertension continue home medication     Diarrhea epigastric pain-resolved   CT abdomen no acute process  Resolved      End-stage renal disease on HD  TTS, had HD     DM  type II   Lantus at night , ssi will increase lantus to 10       Subjective  Had a good night     So far not sure vascular will do procedure tomorrow or not , will keep npo in case       Disposition :tbd,   Review of systems:    General: No fevers or chills. Cardiovascular: No chest pain or pressure. No palpitations. Pulmonary: No shortness of breath. Gastrointestinal: No nausea, vomiting. No  abdomen pain     Vital signs/Intake and Output:  Visit Vitals  /63 (BP 1 Location: Right upper arm, BP Patient Position: At rest)   Pulse 70   Temp 98.9 °F (37.2 °C)   Resp 17   Ht 6' (1.829 m)   Wt 96.2 kg (212 lb 1.6 oz)   SpO2 (!) 85%   BMI 28.77 kg/m²     Current Shift:  No intake/output data recorded. Last three shifts:  No intake/output data recorded. Physical Exam:  General: WD, WN. Alert, cooperative, no acute distress    HEENT: NC, Atraumatic. PERRLA, anicteric sclerae. Lungs: CTA Bilaterally. No Wheezing/Rhonchi/Rales. Heart:  Regular  rhythm,  No murmur, No Rubs, No Gallops  Abdomen: Soft, Non distended, Non tender. +Bowel sounds,   Extremities: No c/c, both feet wrapped with gauze   Psych:   Not anxious or agitated. Neurologic:  No acute neurological deficit.              Labs: Results:       Chemistry Recent Labs     12/18/22  0601 12/17/22  0656 12/16/22  0302   * 146* 206*    135* 135*   K 5.5 6.0* 5.3   CL 98* 98* 99*   CO2 26 25 28 BUN 34* 58* 41*   CREA 6.36* 9.28* 6.83*   CA 8.0* 8.0* 8.4*   AGAP 12 12 8   BUCR 5* 6* 6*   ALB 2.0*  --   --         CBC w/Diff No results for input(s): WBC, RBC, HGB, HCT, PLT, GRANS, LYMPH, EOS, HGBEXT, HCTEXT, PLTEXT, HGBEXT, HCTEXT, PLTEXT in the last 72 hours. Cardiac Enzymes No results for input(s): CPK, CKND1, PAULO in the last 72 hours. No lab exists for component: CKRMB, TROIP   Coagulation No results for input(s): PTP, INR, APTT, INREXT, INREXT in the last 72 hours. Lipid Panel Lab Results   Component Value Date/Time    Cholesterol, total 188 07/19/2022 03:12 AM    HDL Cholesterol 42 07/19/2022 03:12 AM    LDL, calculated 131.2 (H) 07/19/2022 03:12 AM    VLDL, calculated 14.8 07/19/2022 03:12 AM    Triglyceride 74 07/19/2022 03:12 AM    CHOL/HDL Ratio 4.5 07/19/2022 03:12 AM      BNP No results for input(s): BNPP in the last 72 hours. Liver Enzymes Recent Labs     12/18/22  0601   ALB 2.0*        Thyroid Studies No results found for: T4, T3U, TSH, TSHEXT, TSHEXT     Procedures/imaging: see electronic medical records for all procedures/Xrays and details which were not copied into this note but were reviewed prior to creation of Plan    XR FOOT LT AP/LAT    Result Date: 12/9/2022  EXAM: 2 radiographic views of the left foot. INDICATION: Left foot pain. COMPARISON: December 9, 2022 _______________ FINDINGS: There is been interval amputation of the second, third, and fourth rays at the level of the metatarsal diaphysis. As before, the first ray is amputated at the metatarsal phalangeal joint. The small toe remains. There are expected postoperative findings to include regional air density and soft tissue swelling. There is Monckeberg type vascular calcification. There is low-grade mid foot degeneration. _______________     Standard immediate postoperative findings.  _______________     XR FOOT LT MIN 3 V    Result Date: 12/9/2022  EXAM: XR FOOT LT MIN 3 V CLINICAL INDICATION/HISTORY: Gangrenous 2nd digit   > Additional: None. COMPARISON: None. TECHNIQUE: 3 views left foot _______________ FINDINGS: Soft tissue gas formation is seen along the second digit with ill-defined limitus changes extending along the medial forefoot. Prior amputation first right. Patchy demineralization and osseous erosions are seen involving the distal portion of the first metatarsal head. Additional patchy areas of osseous erosion are also noted involving the proximal phalanx of the second toe. Diffuse soft tissue swelling. Diffuse atherosclerotic vascular calcifications. No retained radiopaque foreign object. _______________     Soft gas formation and ulceration involving the medial forefoot with findings concerning for osteomyelitis involving the first and second rays described above. MRI FOOT LT WO CONT    Result Date: 12/9/2022  EXAM: MRI FOOT LT WO CONT CLINICAL INDICATION/HISTORY: Foot wound; preoperative  >Additional: None COMPARISON: Radiograph performed December 9, 2022  >Reference exam: None. TECHNIQUE: Axial long axis TI and T2 with fat saturation, sagittal and coronal short axis TI and STIR sequences through the foot were obtained without contrast. _______________ FINDINGS: FIRST RAY: Prior dictation the first digit. There is mild bony proliferative change at the head of the first metatarsal with preserved marrow signal. Minimal patchy edema is noted which is nonspecific. No definite cortical destructive change. SECOND RAY: Nondisplaced obliquely oriented fracture through the head neck junction of the second metatarsal. There is heterogeneous diminished T1 marrow with patchy edema and surrounding soft tissue gas along the course of the second digit with associated subluxation of the distal phalanx. Subtle diminished T1 marrow is noted with suspected foci of intraosseous gas, concerning for acute osteomyelitis. THIRD RAY: Nondisplaced fracture through the third metatarsal neck with slight impaction.  Mild edema is noted within the third digit, possibly stress reaction. No convincing osseous erosions or T1 marrow signal change. FOURTH RAY: Nondisplaced fractures of the neck of the fourth metatarsal. Minimal edema in the proximal phalanx but otherwise preserved marrow signal. FIFTH RAY: Unremarkable. Additional degenerative changes with patchy edema, joint space narrowing, and osteophyte formation at the midfoot, likely related to underlying Charcot arthropathy. SOFT TISSUES: Soft tissue irregularity with gas and ulceration around the second digit noted. Diffuse fluid signal seen throughout the intrinsic foot musculature, commonly seen in the setting of diabetes. Mild skin thickening about the forefoot. INCIDENTAL FINDINGS: None. _______________     1. Marrow signal abnormality with soft tissue wound and gas around the second digit concerning for acute osteomyelitis. 2.  Nondisplaced fractures of the head neck junction of the second-fourth metatarsals. 3.  Prior indication the first digit. 4.  Soft tissue swelling and skin thickening about the forefoot concerning for cellulitis. No drainable abscess. 5.  Additional chronic/degenerative changes of the foot. CT ABD PELV WO CONT    Result Date: 12/9/2022  EXAM: CT of the abdomen and pelvis INDICATION: Abdominal pain with distention. COMPARISON: None. TECHNIQUE: Axial CT imaging of the abdomen and pelvis was performed without intravenous contrast. Multiplanar reformats were generated. One or more dose reduction techniques were used on this CT: automated exposure control, adjustment of the mAs and/or kVp according to patient size, and iterative reconstruction techniques. The specific techniques used on this CT exam have been documented in the patient's electronic medical record.   Digital Imaging and Communications in Medicine (DICOM) format image data are available to nonaffiliated external healthcare facilities or entities on a secure, media free, reciprocally searchable basis with patient authorization for at least a 12-month period after this study. _______________ FINDINGS: LOWER CHEST: Partial inclusion of multivessel coronary arterial atherosclerosis and central venous catheter. Clear lung bases. Several punctate 2 to 3 mm pulmonary nodule seen in the right lower lobe (image 8) LIVER, BILIARY: Liver is normal. No biliary dilation. Color contains a gallstone without evidence of dilatation or gallbladder wall thickening. PANCREAS: Normal. SPLEEN: Punctate granulomatous calcifications otherwise unremarkable. ADRENALS: Mild adreniform thickening of each adrenal gland. KIDNEYS/URETERS/BLADDER: No hydronephrosis. Bilateral renal hypodensities are present either to small to accurately characterize or in keeping with simple cysts which are prior no further dedicated imaging follow-up. Bladder decompressed. PELVIC ORGANS: Unremarkable. VASCULATURE: Diffuse aortic and visceral arterial atherosclerosis without evidence of aneurysmal dilatation. LYMPH NODES: Scattered subcentimeter mesenteric and retroperitoneal lymph nodes are demonstrated without evidence of adenopathy. Prominent left inguinal lymph node is present (image 127) measuring approximately 15 mm in short axis dimension. GASTROINTESTINAL TRACT: No bowel dilation or wall thickening. Peritoneal gas. Normal appendix. Scattered colonic diverticula without evidence of diverticulitis. BONES: No acute or aggressive osseous abnormalities identified. OTHER: None. _______________     1. Cholelithiasis without evidence of cholecystitis. 2. No abnormal bowel wall thickening or dilatation. 3. No hydronephrosis or obstructive uropathy. 4. Several small right lower lobe pulmonary nodules (2-3 mm in size). See below guidelines for proposed follow-up. 5. Borderline enlarged and nonspecific left inguinal lymph node, potentially reactive in etiology. ======== Fleischner Society pulmonary nodule guidelines (revised 2017):  Multiple solid nodules <6 mm: -Low risk for lung cancer: No follow-up. -High risk for lung cancer: Optional chest CT in 12 months. XR CHEST PORT    Result Date: 12/9/2022  EXAM: XR CHEST PORT CLINICAL INDICATION/HISTORY: sepsis -Additional: None COMPARISON: July 12, 2022 TECHNIQUE: Portable frontal view of the chest _______________ FINDINGS: SUPPORT DEVICES: Right IJ approach dialysis catheter in stable position. HEART AND MEDIASTINUM: Stable appearing cardiac size and mediastinal contours. LUNGS AND PLEURAL SPACES: Lungs are clear. No focal pneumonic opacity. No pneumothorax or pleural effusion. BONY THORAX AND SOFT TISSUES: Unremarkable. _______________     No active cardiopulmonary disease.        Katarzyna Joyce MD

## 2022-12-19 LAB
ANION GAP SERPL CALC-SCNC: 10 MMOL/L (ref 3–18)
BASOPHILS # BLD: 0.1 K/UL (ref 0–0.1)
BASOPHILS NFR BLD: 1 % (ref 0–2)
BUN SERPL-MCNC: 49 MG/DL (ref 7–18)
BUN/CREAT SERPL: 6 (ref 12–20)
CALCIUM SERPL-MCNC: 7.9 MG/DL (ref 8.5–10.1)
CHLORIDE SERPL-SCNC: 99 MMOL/L (ref 100–111)
CO2 SERPL-SCNC: 27 MMOL/L (ref 21–32)
CREAT SERPL-MCNC: 8.27 MG/DL (ref 0.6–1.3)
CRP SERPL-MCNC: 14.2 MG/DL (ref 0–0.3)
DIFFERENTIAL METHOD BLD: ABNORMAL
EOSINOPHIL # BLD: 0.5 K/UL (ref 0–0.4)
EOSINOPHIL NFR BLD: 3 % (ref 0–5)
ERYTHROCYTE [DISTWIDTH] IN BLOOD BY AUTOMATED COUNT: 17.1 % (ref 11.6–14.5)
GLUCOSE BLD STRIP.AUTO-MCNC: 126 MG/DL (ref 70–110)
GLUCOSE BLD STRIP.AUTO-MCNC: 127 MG/DL (ref 70–110)
GLUCOSE BLD STRIP.AUTO-MCNC: 140 MG/DL (ref 70–110)
GLUCOSE BLD STRIP.AUTO-MCNC: 206 MG/DL (ref 70–110)
GLUCOSE SERPL-MCNC: 138 MG/DL (ref 74–99)
HCT VFR BLD AUTO: 29.4 % (ref 36–48)
HGB BLD-MCNC: 9.2 G/DL (ref 13–16)
IMM GRANULOCYTES # BLD AUTO: 0.1 K/UL (ref 0–0.04)
IMM GRANULOCYTES NFR BLD AUTO: 1 % (ref 0–0.5)
LYMPHOCYTES # BLD: 1.7 K/UL (ref 0.9–3.6)
LYMPHOCYTES NFR BLD: 13 % (ref 21–52)
MAGNESIUM SERPL-MCNC: 2.1 MG/DL (ref 1.6–2.6)
MCH RBC QN AUTO: 30 PG (ref 24–34)
MCHC RBC AUTO-ENTMCNC: 31.3 G/DL (ref 31–37)
MCV RBC AUTO: 95.8 FL (ref 78–100)
MONOCYTES # BLD: 1.1 K/UL (ref 0.05–1.2)
MONOCYTES NFR BLD: 8 % (ref 3–10)
NEUTS SEG # BLD: 10.2 K/UL (ref 1.8–8)
NEUTS SEG NFR BLD: 75 % (ref 40–73)
NRBC # BLD: 0 K/UL (ref 0–0.01)
NRBC BLD-RTO: 0 PER 100 WBC
PLATELET # BLD AUTO: 293 K/UL (ref 135–420)
PMV BLD AUTO: 10.6 FL (ref 9.2–11.8)
POTASSIUM SERPL-SCNC: 5.9 MMOL/L (ref 3.5–5.5)
RBC # BLD AUTO: 3.07 M/UL (ref 4.35–5.65)
SODIUM SERPL-SCNC: 136 MMOL/L (ref 136–145)
VANCOMYCIN SERPL-MCNC: 24.2 UG/ML (ref 5–40)
WBC # BLD AUTO: 13.6 K/UL (ref 4.6–13.2)

## 2022-12-19 PROCEDURE — 80202 ASSAY OF VANCOMYCIN: CPT

## 2022-12-19 PROCEDURE — 74011636637 HC RX REV CODE- 636/637: Performed by: HOSPITALIST

## 2022-12-19 PROCEDURE — 74011000258 HC RX REV CODE- 258: Performed by: EMERGENCY MEDICINE

## 2022-12-19 PROCEDURE — 74011000250 HC RX REV CODE- 250: Performed by: HOSPITALIST

## 2022-12-19 PROCEDURE — 80048 BASIC METABOLIC PNL TOTAL CA: CPT

## 2022-12-19 PROCEDURE — 65270000029 HC RM PRIVATE

## 2022-12-19 PROCEDURE — 74011250636 HC RX REV CODE- 250/636: Performed by: EMERGENCY MEDICINE

## 2022-12-19 PROCEDURE — 86140 C-REACTIVE PROTEIN: CPT

## 2022-12-19 PROCEDURE — 83735 ASSAY OF MAGNESIUM: CPT

## 2022-12-19 PROCEDURE — 36415 COLL VENOUS BLD VENIPUNCTURE: CPT

## 2022-12-19 PROCEDURE — 74011250637 HC RX REV CODE- 250/637: Performed by: HOSPITALIST

## 2022-12-19 PROCEDURE — 82962 GLUCOSE BLOOD TEST: CPT

## 2022-12-19 PROCEDURE — 74011250636 HC RX REV CODE- 250/636: Performed by: HOSPITALIST

## 2022-12-19 PROCEDURE — 85025 COMPLETE CBC W/AUTO DIFF WBC: CPT

## 2022-12-19 RX ADMIN — PIPERACILLIN AND TAZOBACTAM 4.5 G: 4; .5 INJECTION, POWDER, FOR SOLUTION INTRAVENOUS at 13:38

## 2022-12-19 RX ADMIN — SEVELAMER CARBONATE 1600 MG: 800 TABLET, FILM COATED ORAL at 13:39

## 2022-12-19 RX ADMIN — NYSTATIN: 100000 POWDER TOPICAL at 21:28

## 2022-12-19 RX ADMIN — SODIUM CHLORIDE, PRESERVATIVE FREE 10 ML: 5 INJECTION INTRAVENOUS at 06:05

## 2022-12-19 RX ADMIN — EZETIMIBE 10 MG: 10 TABLET ORAL at 09:04

## 2022-12-19 RX ADMIN — SEVELAMER CARBONATE 1600 MG: 800 TABLET, FILM COATED ORAL at 17:03

## 2022-12-19 RX ADMIN — PANTOPRAZOLE SODIUM 40 MG: 40 TABLET, DELAYED RELEASE ORAL at 06:48

## 2022-12-19 RX ADMIN — SODIUM CHLORIDE, PRESERVATIVE FREE 10 ML: 5 INJECTION INTRAVENOUS at 21:32

## 2022-12-19 RX ADMIN — HEPARIN SODIUM 5000 UNITS: 5000 INJECTION INTRAVENOUS; SUBCUTANEOUS at 17:03

## 2022-12-19 RX ADMIN — CARVEDILOL 12.5 MG: 12.5 TABLET, FILM COATED ORAL at 09:05

## 2022-12-19 RX ADMIN — AMLODIPINE BESYLATE 10 MG: 5 TABLET ORAL at 09:04

## 2022-12-19 RX ADMIN — SODIUM CHLORIDE, PRESERVATIVE FREE 10 ML: 5 INJECTION INTRAVENOUS at 13:39

## 2022-12-19 RX ADMIN — INSULIN GLARGINE 10 UNITS: 100 INJECTION, SOLUTION SUBCUTANEOUS at 21:27

## 2022-12-19 RX ADMIN — PIPERACILLIN AND TAZOBACTAM 4.5 G: 4; .5 INJECTION, POWDER, FOR SOLUTION INTRAVENOUS at 00:47

## 2022-12-19 RX ADMIN — CARVEDILOL 12.5 MG: 12.5 TABLET, FILM COATED ORAL at 21:19

## 2022-12-19 RX ADMIN — Medication 500 MG: at 09:05

## 2022-12-19 RX ADMIN — ALLOPURINOL 100 MG: 100 TABLET ORAL at 09:05

## 2022-12-19 RX ADMIN — NYSTATIN: 100000 POWDER TOPICAL at 09:07

## 2022-12-19 RX ADMIN — B-COMPLEX W/ C & FOLIC ACID TAB 1 MG 1 TABLET: 1 TAB at 09:05

## 2022-12-19 RX ADMIN — SEVELAMER CARBONATE 1600 MG: 800 TABLET, FILM COATED ORAL at 09:04

## 2022-12-19 NOTE — PROGRESS NOTES
Problem: Mobility Impaired (Adult and Pediatric)  Goal: *Acute Goals and Plan of Care (Insert Text)  Description: Physical Therapy Goals  Initiated 12/11/2022  1. Patient will move from supine to sit and sit to supine  in bed with supervision/set-up within 7 day(s). 2.  Patient will transfer from bed to chair and chair to bed with minimal assistance/contact guard assist using the least restrictive device within 7 day(s). 3.  Patient will perform sit to stand with minimal assistance/contact guard assist within 7 day(s). 4.  Patient will ambulate with minimal assistance/contact guard assist for 10 feet with the least restrictive device within 7 day(s). Outcome: Progressing Towards Goal   physical Therapy TREATMENT    Patient: Neyda Stapleton (9 y.o. male)  Date: 12/18/2022  Diagnosis: Diabetic foot infection ([facility] [E11.628, L08.9]  Leukocytosis [D72.829] Gas gangrene ([facility]  Procedure(s) (LRB):  PARTIAL AMPUTATION OF LEFT 2ND, 3RD, 4TH METATARSALS, AMPUTATION OF TOES 2,3,4, INCISION AND DRAINAGE LEFT FOOT DEEP. (Left) 9 Days Post-Op  Precautions: Fall, NWB   Chart, physical therapy assessment, plan of care and goals were reviewed. ASSESSMENT:  Pt is agreeable to EOB exercises, but notes wanting to wait for vasc procedure, before attempting OOB. Exercises performed as listed below. Moderate fatigue. Pt note feeling of improvement while sitting EOB. Suggested occasional EOB sitting. Progression toward goals:  []      Improving appropriately and progressing toward goals  [x]      Improving slowly and progressing toward goals  []      Not making progress toward goals and plan of care will be adjusted     PLAN:  Patient continues to benefit from skilled intervention to address the above impairments. Continue treatment per established plan of care.   Discharge Recommendations:  Hakan Story  Further Equipment Recommendations for Discharge:  N/A     SUBJECTIVE:   Patient stated I'll try.    OBJECTIVE DATA SUMMARY:   Critical Behavior:  Neurologic State: Alert  Orientation Level: Oriented X4  Cognition: Follows commands  Safety/Judgement: Decreased awareness of environment, Decreased awareness of need for assistance, Decreased awareness of need for safety  Functional Mobility Training:  Bed Mobility:  Rolling: Minimum assistance  Supine to Sit: Contact guard assistance;Minimum assistance  Sit to Supine: Contact guard assistance  Balance:  Sitting: Impaired; With support  Sitting - Static: Fair (occasional)  Sitting - Dynamic: Poor (constant support)  Therapeutic Exercises:       EXERCISE   Sets   Reps   Active Active Assist   Passive Self ROM   Comments   Ankle Pumps 1 15  [x] [] [] []    Quad Sets/Glut Sets   [] [] [] []    Hamstring Sets   [] [] [] []    Short Arc Quads   [] [] [] []    Heel Slides   [] [] [] []    Straight Leg Raises   [] [] [] []    Hip Abd/Add 1 10 [x] [] [] []    Long Arc Quads 1 10 [x] [] [] []    Seated Marching 1 10 [x] [] [] []    UE punch 1 10 [x] [] [] []    UE press up 1 10 [x] [] [] []       Pain:  Pain Scale 1: Numeric (0 - 10)  Pain Intensity 1: 0  Pain out: 0  Activity Tolerance:   Fair  Please refer to the flowsheet for vital signs taken during this treatment.   After treatment:   [] Patient left in no apparent distress sitting up in chair  [x] Patient left in no apparent distress in bed  [x] Call bell left within reach  [x] Nursing notified  [x] Caregiver present  [] Bed alarm activated      Joshua Diss, PTA   Time Calculation: 29 mins      Chiquita Malave AM-PAC® Basic Mobility Inpatient Short Form (6-Clicks) Version 2    How much HELP from another person does the patient currently need    (If the patient hasn't done an activity recently, how much help from another person do you think he/she would need if he/she tried?)   Total (Total A or Dep)   A Lot  (Mod to Max A)   A Little (Sup or Min A)   None (Mod I to I)   Turning from your back to your side while in a flat bed without using bedrails? [] 1 [] 2 [x] 3 [] 4   2. Moving from lying on your back to sitting on the side of a flat bed without using bedrails? [] 1 [] 2 [x] 3 [] 4   3. Moving to and from a bed to a chair (including a wheelchair)? [x] 1 [] 2 [] 3 [] 4   4. Standing up from a chair using your arms (e.g., wheelchair, or bedside chair)? [x] 1 [] 2 [] 3 [] 4   5. Walking in hospital room? [x] 1 [] 2 [] 3 [] 4   6. Climbing 3-5 steps with a railing?+   [] 1 [] 2 [] 3 [] 4   +If stair climbing cannot be assessed, skip item #6. Sum responses from items 1-5. Based on an AM-PAC score of 9/24 (9/20 if omitting stairs) and their current functional mobility deficits, it is recommended that the patient have 3-5 sessions per week of Physical Therapy at d/c to increase the patient's independence.

## 2022-12-19 NOTE — PROGRESS NOTES
2348  Bedside and verbal shift change report given to Cheyenne Ambrocio RN (on coming nurse) by Jayda Russell RN (off going nurse). Report included the following information SBAR, Kardex, Intake/Output and MAR.    0731  Bedside and verbal shift change report given by KIM Tran (off going nurse) to Lashell Cortez RN(on coming nurse). Report included the following information SBAR, Kardex, Intake/Output and MAR.

## 2022-12-19 NOTE — PROGRESS NOTES
Dr. Annemarie Gallardo spoke with podiatrist Dr. Lorna Dumont regarding pt. Given limited availability in the cath lab tomorrow, plan for pt to go to the OR tomorrow with Dr. Lorna Dumont for wash-out and debridement. Angio will likely be performed this Thursday. NPO for surgery tomorrow with podiatry.     Morgan Younger NP, pager [phone]  [facility]

## 2022-12-19 NOTE — PROGRESS NOTES
[facility]  (A Division of [address] [place] [address])    Follow-up Note      Date of Admission: 12/9/2022       Date of Note:  12/19/2022    Summary:  Mr Sharona Mclean is a diabetic 60y AAM who has been fighting chronic R heel sores for months/years, but noted onset of L foot pain along with f/s/c this past week. Had DPM appt this morning and re-directed to ED for urgent admission for surgery. Pretty vague on duration of symptoms, but pretty sure it was this week. Sugars have been elevated. Also has been having stomach issues with pain/anorexia/diarrhea off/on for past month. Seen at [facility] 2wks ago and treated for \"stomach infection\" that required PO vanco.     Retired ship-         CC:  \"Pain controlled\"  HPI:  Weekend events reviewed. Hungry. Awaiting angiogram with vascular surgery today. Fevers over the weekend. Tolerating antibiotics so far. Current Antimicrobials:    Prior Antimicrobials:  Vanco IV (12/9-) #10  Pip/tzb IV (12/9-) #10  PO Vanco (12/9-) #10        Assessment: Rec / Plan:   Dry Gangrene L foot distally - POD#3  12/9:  ESR 92mm/h  12/9:  PCT 3.12ng/mL  12/14:  ESR >140mm/h  12/14:  CRP 152mg/L  12/14:  PCT 1.74ng/mL  -  Needs more debridement that will take place next week with A-gram.  IF no complete amputation, please place tunneled line for me to give weeks IV abx. ->Pip/tzb-vanc #10 and POD#10    - > OR on MON    If full/complete amputation, he's done.   IF not, please re-culture  and place tunneled line (CKD)     Recent/likely CDI  Continue concomitant PO vanco    Pseudobacteremia - CoNS  No treatment needed    Peripheral Vascular Disease    ESRD/HD        Microbiology:                12/9 - OR (+) Serratia fonticola (S to pip/all except cefazolin), Alcaligenes faecalis (R FQ), Enterbacter cloacae (still being worked up), and anaerobic Bacteroides vulgatus (BL +)                                                      BCx 1/2 (+) CoNS Lines / Catheters:         R CW [facility] and peripheral        Patient Active Problem List   Diagnosis Code    Diabetes mellitus with foot ulcer ([facility] E11.621, L97.509    CAD (coronary artery disease) I25.10    ESRD (end stage renal disease) ([facility]) N18.6    Acute hematogenous osteomyelitis of right foot ([facility]) M86.071    Cellulitis of right heel L03.115    Diabetic foot ulcer with osteomyelitis ([facility]) E11.621, E11.69, L97.509, M86.9    Ulcer of right heel, with necrosis of bone ([facility]) L97.414    Infection and inflammatory reaction due to internal fixation device of other site, initial encounter [facility]) T84.69XA    Left arm swelling M79.89    Chest pain at rest R07.9    Abnormal nuclear stress test R94.39    History of anemia due to CKD N18.9, Z86.2    S/P angioplasty with stent Z95.820    Hypertension I10    Leukocytosis D72.829    Diabetic foot infection ([facility] E11.628, L08.9    Diarrhea R19.7    Type 2 diabetes mellitus, with long-term current use of insulin ([facility]) E11.9, Z79.4    Acute hematogenous osteomyelitis of left foot ([facility]) M86.072    Nondisplaced fracture of second metatarsal bone of left foot S92.325A    Nondisplaced fracture of third metatarsal bone of left foot S92.335A    Closed nondisplaced fracture of fourth metatarsal bone of left foot S92.345A    Positive blood culture R78.81       Current Facility-Administered Medications   Medication Dose Route Frequency    epoetin lisette-epbx (RETACRIT) injection 8,000 Units  8,000 Units SubCUTAneous Q TUE, THU & SAT    loperamide (IMODIUM) capsule 2 mg  2 mg Oral Q4H PRN    nystatin (MYCOSTATIN) 100,000 unit/gram powder   Topical BID    insulin lispro (HUMALOG) injection 2 Units  2 Units SubCUTAneous TIDAC    alum-mag hydroxide-simeth (MYLANTA) oral suspension 30 mL  30 mL Oral Q4H PRN    glucose chewable tablet 16 g  16 g Oral PRN    glucagon (GLUCAGEN) injection 1 mg  1 mg IntraMUSCular PRN    insulin glargine (LANTUS) injection 10 Units  10 Units SubCUTAneous QHS heparin (porcine) 1,000 unit/mL injection 3,600 Units  3,600 Units IntraCATHeter DIALYSIS PRN    Vancomycin - Pharmacy to Dose  1 Each Other Rx Dosing/Monitoring    piperacillin-tazobactam (ZOSYN) 4.5 g in 0.9% sodium chloride (MBP/ADV) 100 mL MBP  4.5 g IntraVENous Q12H    dextrose 10% infusion 0-250 mL  0-250 mL IntraVENous PRN    0.9% sodium chloride infusion  25 mL/hr IntraVENous DIALYSIS PRN    clopidogreL (PLAVIX) tablet 75 mg  75 mg Oral DAILY    carvediloL (COREG) tablet 12.5 mg  12.5 mg Oral BID    aspirin chewable tablet 81 mg  81 mg Oral DAILY    amLODIPine (NORVASC) tablet 10 mg  10 mg Oral DAILY    allopurinoL (ZYLOPRIM) tablet 100 mg  100 mg Oral DAILY    ascorbic acid (vitamin C) (VITAMIN C) tablet 500 mg  500 mg Oral DAILY    ezetimibe (ZETIA) tablet 10 mg  10 mg Oral DAILY    pantoprazole (PROTONIX) tablet 40 mg  40 mg Oral ACB    sevelamer carbonate (RENVELA) tab 1,600 mg  1,600 mg Oral TID WITH MEALS    vit B Cmplx 3-FA-Vit C-Biotin (NEPHRO NIKITA RX) tablet 1 Tablet  1 Tablet Oral DAILY    sodium chloride (NS) flush 5-40 mL  5-40 mL IntraVENous Q8H    sodium chloride (NS) flush 5-40 mL  5-40 mL IntraVENous PRN    acetaminophen (TYLENOL) tablet 650 mg  650 mg Oral Q6H PRN    Or    acetaminophen (TYLENOL) suppository 650 mg  650 mg Rectal Q6H PRN    bisacodyL (DULCOLAX) suppository 10 mg  10 mg Rectal DAILY PRN    promethazine (PHENERGAN) tablet 12.5 mg  12.5 mg Oral Q6H PRN    Or    ondansetron (ZOFRAN) injection 4 mg  4 mg IntraVENous Q6H PRN    heparin (porcine) injection 5,000 Units  5,000 Units SubCUTAneous Q8H    oxyCODONE-acetaminophen (PERCOCET) 5-325 mg per tablet 1 Tablet  1 Tablet Oral Q6H PRN         Review of Systems - General ROS: negative for - chills, fever, or night sweats  Respiratory ROS: no cough, shortness of breath, or wheezing  Cardiovascular ROS: no chest pain or dyspnea on exertion  Gastrointestinal ROS: no abdominal pain, change in bowel habits, or black or bloody stools Objective:    Visit Vitals  BP (!) 144/59   Pulse 65   Temp 97.6 °F (36.4 °C)   Resp 18   Ht 6' (1.829 m)   Wt 96.2 kg (212 lb)   SpO2 96%   BMI 28.75 kg/m²       Temp (24hrs), Av.2 °F (36.8 °C), Min:97 °F (36.1 °C), Max:98.9 °F (37.2 °C)      GEN: WDWN AAM in NAD  HEENT: anicteric  CHEST: CTA  CVS:RRR  ABD: NT  EXT: L foot wrapped         Lab results:    Chemistry  Recent Labs     22  0538 22  0601 22  0656   * 178* 146*    136 135*   K 5.9* 5.5 6.0*   CL 99* 98* 98*   CO2 27 26 25   BUN 49* 34* 58*   CREA 8.27* 6.36* 9.28*   CA 7.9* 8.0* 8.0*   AGAP 10 12 12   BUCR 6* 5* 6*   ALB  --  2.0*  --          CBC w/ Diff  Recent Labs     22  0538   WBC 13.6*   RBC 3.07*   HGB 9.2*   HCT 29.4*      GRANS 75*   LYMPH 13*   EOS 3         Microbiology  All Micro Results       Procedure Component Value Units Date/Time    CULTURE, BLOOD 2nd DRAW (required for [facility]) [[id number]] Collected: 22 1215    Order Status: Completed Specimen: Blood Updated: 12/15/22 0940     Special Requests: LEFT HAND     Culture result: NO GROWTH 6 DAYS       CULTURE, WOUND Adra Mall STAIN [[id number]]  (Abnormal)  (Susceptibility) Collected: 22    Order Status: Completed Specimen: Wound from Foot Updated: 12/15/22 0650     Special Requests: NO SPECIAL REQUESTS        GRAM STAIN RARE WBCS SEEN               2+ APPARENT GRAM VARIABLE RODS           Culture result:       HEAVY Serratia fonticola YAO = 28, SENSITIVE                  HEAVY ALCALIGENES FAECALIS                  HEAVY ALCALIGENES FAECALIS (2ND COLONY TYPE/STRAIN)                  HEAVY ENTEROBACTER CLOACAE                  HEAVY MIXED SKIN TO ISOLATED          CULTURE, ANAEROBIC [[id number]]  (Abnormal) Collected: 22    Order Status: Completed Specimen: Foot, left Updated: 22 3588     Special Requests: NO SPECIAL REQUESTS        Culture result:       MODERATE BACTEROIDES VULGATUS BETA LACTAMASE POSITIVE          CULTURE, BLOOD [[id number]]  (Abnormal) Collected: 12/09/22 1155    Order Status: Completed Specimen: Blood Updated: 12/12/22 0743     Special Requests: LEFT AC     GRAM STAIN       ANAEROBIC BOTTLE GRAM POSITIVE COCCI IN CLUSTERS                  SMEAR CALLED TO AND CORRECTLY REPEATED BY: Caden Mcneil RN 3S 12/10/22 AT 1147 BY ANI           Culture result:       STAPHYLOCOCCUS SPECIES, COAGULASE NEGATIVE GROWING IN 1 OF 2 BOTTLES DRAWN  SITE = LAC          BLOOD CULTURE ID PANEL [[id number]]  (Abnormal) Collected: 12/09/22 1145    Order Status: Completed Specimen: Blood Updated: 12/11/22 0655     Acc. no. from Micro Order [id number]     Enterococcus faecalis Not detected        Enterococcus faecium Not detected        Listeria monocytogenes Not detected        Staphylococcus Detected        Staphylococcus aureus Not detected        Staph epidermidis Detected        Staph lugdunensis Not detected        Streptococcus Not detected        Streptococcus agalactiae (Group B) Not detected        Streptococcus pneumoniae Not detected        Streptococcus pyogenes (Group A) Not detected        Acinetobacter calcoaceticus-baumanii complex Not detected        Bacteroides fragilis Not detected        Enterobacterales species Not detected        Enterobacter cloacae complex Not detected        Escherichia coli Not detected        Klebsiella aerogenes Not detected        Klebsiella oxytoca Not detected        Klebsiella pneumoniae group Not detected        Proteus Not detected        Salmonella Not detected        Serratia marcescens Not detected        Haemophilus influenzae Not detected        Neisseria meningitidis Not detected        Pseudomonas aeruginosa Not detected        Steno maltophilia Not detected        Candida albicans Not detected        Candida auris Not detected        Candida glabrata Not detected        Candida krusei Not detected        Candida parapsilosis Not detected        Candida tropicalis Not detected        Crypto neoformans/gattii Not detected        RESISTANT GENES:            MECA/C (Methicillin resistant gene) Detected        Comment       False positive results may rarely occur.  Correlate with clinical,epidemiologic, and other laboratory findings           Comment: Please see BCID Interpretation Guide in EPIC Links       C. DIFFICILE AG & TOXIN A/B [[id number]]     Order Status: Canceled Specimen: Stool              DO Fransisco Arzola [facility]   12/19/2022

## 2022-12-19 NOTE — PROGRESS NOTES
Problem: Falls - Risk of  Goal: *Absence of Falls  Description: Document Enmanuel Parry Fall Risk and appropriate interventions in the flowsheet.   Outcome: Progressing Towards Goal  Note: Fall Risk Interventions:  Mobility Interventions: Bed/chair exit alarm, Patient to call before getting OOB, Utilize walker, cane, or other assistive device         Medication Interventions: Bed/chair exit alarm, Patient to call before getting OOB, Teach patient to arise slowly    Elimination Interventions: Bed/chair exit alarm, Call light in reach, Toilet paper/wipes in reach, Urinal in reach    History of Falls Interventions: Bed/chair exit alarm, Door open when patient unattended

## 2022-12-19 NOTE — PROGRESS NOTES
Bedside and Verbal shift change report given to KIM Ewing (oncoming nurse) by Anu Cannon RN (offgoing nurse). Report included the following information SBAR, Kardex, Procedure Summary, Intake/Output, MAR, and Recent Results.

## 2022-12-19 NOTE — PROGRESS NOTES
Nephrology Inpatient Progress note    Subjective:     Carlos Guido is a 60 y.o. male with PMHx of  DM, HTN. PVD, ESRD on HD TTS at [facility] under the [address] [place] [address]. Nephrology sent in for admission by wound care clinic due to left foot infection ,was told he needed surgery. Podiatry has been in to see pt . He has been on abx recently     S/P Lt 2/3/4 metatarsal amputation    -Stable overnight. No complaints.      Admit Date: 12/9/2022  Active Problems:    Diabetes mellitus with foot ulcer ([facility] (6/27/2022)      CAD (coronary artery disease) (6/28/2022)      ESRD (end stage renal disease) ([facility] (6/28/2022)      Hypertension (7/21/2022)      Leukocytosis (12/9/2022)      Diabetic foot infection ([facility] (12/9/2022)      Diarrhea (12/9/2022)      Type 2 diabetes mellitus, with long-term current use of insulin ([facility]) ()      Acute hematogenous osteomyelitis of left foot ([facility] (12/9/2022)      Nondisplaced fracture of second metatarsal bone of left foot (12/9/2022)      Nondisplaced fracture of third metatarsal bone of left foot (12/9/2022)      Closed nondisplaced fracture of fourth metatarsal bone of left foot (12/9/2022)      Positive blood culture (12/11/2022)    Current Facility-Administered Medications   Medication Dose Route Frequency    epoetin lisette-epbx (RETACRIT) injection 8,000 Units  8,000 Units SubCUTAneous Q TUE, THU & SAT    loperamide (IMODIUM) capsule 2 mg  2 mg Oral Q4H PRN    nystatin (MYCOSTATIN) 100,000 unit/gram powder   Topical BID    insulin lispro (HUMALOG) injection 2 Units  2 Units SubCUTAneous TIDAC    alum-mag hydroxide-simeth (MYLANTA) oral suspension 30 mL  30 mL Oral Q4H PRN    glucose chewable tablet 16 g  16 g Oral PRN    glucagon (GLUCAGEN) injection 1 mg  1 mg IntraMUSCular PRN    insulin glargine (LANTUS) injection 10 Units  10 Units SubCUTAneous QHS    heparin (porcine) 1,000 unit/mL injection 3,600 Units  3,600 Units IntraCATHeter DIALYSIS PRN    Vancomycin - Pharmacy to Dose  1 Each Other Rx Dosing/Monitoring    piperacillin-tazobactam (ZOSYN) 4.5 g in 0.9% sodium chloride (MBP/ADV) 100 mL MBP  4.5 g IntraVENous Q12H    dextrose 10% infusion 0-250 mL  0-250 mL IntraVENous PRN    0.9% sodium chloride infusion  25 mL/hr IntraVENous DIALYSIS PRN    clopidogreL (PLAVIX) tablet 75 mg  75 mg Oral DAILY    carvediloL (COREG) tablet 12.5 mg  12.5 mg Oral BID    aspirin chewable tablet 81 mg  81 mg Oral DAILY    amLODIPine (NORVASC) tablet 10 mg  10 mg Oral DAILY    allopurinoL (ZYLOPRIM) tablet 100 mg  100 mg Oral DAILY    ascorbic acid (vitamin C) (VITAMIN C) tablet 500 mg  500 mg Oral DAILY    ezetimibe (ZETIA) tablet 10 mg  10 mg Oral DAILY    pantoprazole (PROTONIX) tablet 40 mg  40 mg Oral ACB    sevelamer carbonate (RENVELA) tab 1,600 mg  1,600 mg Oral TID WITH MEALS    vit B Cmplx 3-FA-Vit C-Biotin (NEPHRO NIKITA RX) tablet 1 Tablet  1 Tablet Oral DAILY    sodium chloride (NS) flush 5-40 mL  5-40 mL IntraVENous Q8H    sodium chloride (NS) flush 5-40 mL  5-40 mL IntraVENous PRN    acetaminophen (TYLENOL) tablet 650 mg  650 mg Oral Q6H PRN    Or    acetaminophen (TYLENOL) suppository 650 mg  650 mg Rectal Q6H PRN    bisacodyL (DULCOLAX) suppository 10 mg  10 mg Rectal DAILY PRN    promethazine (PHENERGAN) tablet 12.5 mg  12.5 mg Oral Q6H PRN    Or    ondansetron (ZOFRAN) injection 4 mg  4 mg IntraVENous Q6H PRN    heparin (porcine) injection 5,000 Units  5,000 Units SubCUTAneous Q8H    oxyCODONE-acetaminophen (PERCOCET) 5-325 mg per tablet 1 Tablet  1 Tablet Oral Q6H PRN         Allergy:   No Known Allergies     Objective:     Visit Vitals  BP (!) 144/59   Pulse 65   Temp 97.6 °F (36.4 °C)   Resp 18   Ht 6' (1.829 m)   Wt 96.2 kg (212 lb)   SpO2 96%   BMI 28.75 kg/m²         Intake/Output Summary (Last 24 hours) at 12/19/2022 1053  Last data filed at 12/18/2022 1841  Gross per 24 hour   Intake 480 ml   Output --   Net 480 ml         Physical Exam:       General: No acute distress   HENT: Atraumatic and normocephalic   Eyes: Normal conjunctiva   Neck: Supple No JVD or mass   Cardiovascular: Normal S1 & S2, no m/r/g   Pulmonary/Chest Wall: Clear to auscultation bilaterally   Abdominal: Soft and non-tender   Musculoskeletal: No edema S/p Amputation of multiple toes Left foot   Neurological: No focal deficits    HD Access: [facility] +    Data Review:  Lab Results   Component Value Date/Time    Sodium 136 12/19/2022 05:38 AM    Potassium 5.9 (H) 12/19/2022 05:38 AM    Chloride 99 (L) 12/19/2022 05:38 AM    CO2 27 12/19/2022 05:38 AM    Anion gap 10 12/19/2022 05:38 AM    Glucose 138 (H) 12/19/2022 05:38 AM    BUN 49 (H) 12/19/2022 05:38 AM    Creatinine 8.27 (H) 12/19/2022 05:38 AM    BUN/Creatinine ratio 6 (L) 12/19/2022 05:38 AM    GFR est AA 13 (L) 07/21/2022 02:15 PM    GFR est non-AA 11 (L) 07/21/2022 02:15 PM    Calcium 7.9 (L) 12/19/2022 05:38 AM     Lab Results   Component Value Date/Time    WBC 13.6 (H) 12/19/2022 05:38 AM    HGB 9.2 (L) 12/19/2022 05:38 AM    HCT 29.4 (L) 12/19/2022 05:38 AM    PLATELET 648 93/30/1924 05:38 AM    MCV 95.8 12/19/2022 05:38 AM     Lab Results   Component Value Date/Time    Calcium 7.9 (L) 12/19/2022 05:38 AM    Phosphorus 4.1 12/18/2022 06:01 AM     No results found for: IRON, FE, TIBC, IBCT, PSAT, FERR  No results found for: FERR      Impression:     ESRD on HD TTS schedule  Hyperkalemia, mild  Left diabetic foot infection w/ gangrenous changes s/p Lt 2/3/4 metatarsal amputation   DM  HTN  PVD, seen by Vasc Surgery, anticipate LLE angio and possible intervention.   Anemia  SHPT    Plan:     Next HD tomorrow  IV Antibiotic per ID  Cont DALTON for Anemia  Anticipate LE Angio today    Bryce Tobias MD,   Bonifacio Barrera  [phone]

## 2022-12-19 NOTE — PROGRESS NOTES
D/C Plan: SNF    CM and provider met with pt at bedside to discuss care transition. CM discussed care transition. Pt is agreeable to having clinical information sent to facilities in Hpt with the exception of [facility]. CMS has been notified to assist.  Noted plan surgery tomorrow with podiatry. CM to continue to follow and assist.    Care Management Interventions  PCP Verified by CM:  Yes  Mode of Transport at Discharge: BLS  Transition of Care Consult (CM Consult): SNF  Health Maintenance Reviewed: Yes  Physical Therapy Consult: Yes  Occupational Therapy Consult: Yes  Support Systems: Other Family Member(s)  The Plan for Transition of Care is Related to the Following Treatment Goals : SNF  The Patient and/or Patient Representative was Provided with a Choice of Provider and Agrees with the Discharge Plan?: Yes  Name of the Patient Representative Who was Provided with a Choice of Provider and Agrees with the Discharge Plan: pt  Freedom of Choice List was Provided with Basic Dialogue that Supports the Patient's Individualized Plan of Care/Goals, Treatment Preferences and Shares the Quality Data Associated with the Providers?: Yes  Discharge Location  Patient Expects to be Discharged to[de-identified] Skilled nursing facility

## 2022-12-19 NOTE — WOUND CARE
IP WOUND CONSULT    Cheo Power  MEDICAL RECORD NUMBER:  [id number]  AGE: 60 y.o. GENDER: male  : 1960  TODAY'S DATE:  2022    GENERAL     [x] Follow-up   [] New Consult    [] Present on Admission  [x] Hospital Acquired -ischemic surgical site    Cheo Power is a 60 y.o. male referred by:   [x] Physician  [] Nursing  [] Other:         PAST MEDICAL HISTORY    Past Medical History:   Diagnosis Date    CAD (coronary artery disease)     Chronic kidney disease     Diabetes mellitus     Hypertension     Sleep apnea         PAST SURGICAL HISTORY    Past Surgical History:   Procedure Laterality Date    HX ORTHOPAEDIC      HX PACEMAKER      IR INSERT TUNL CVC W/O PORT OVER 5 YR  2022    IR INSERT TUNL CVC W/O PORT OVER 5 YR  2022    IR REMOVE TUNL CVAD W/O PORT / PUMP  2022    HI CARDIAC SURG PROCEDURE UNLIST         FAMILY HISTORY    Family History   Problem Relation Age of Onset    Heart Disease Mother     Diabetes Father     Diabetes Sister     Diabetes Brother        SOCIAL HISTORY    Social History     Tobacco Use    Smoking status: Never    Smokeless tobacco: Never   Vaping Use    Vaping Use: Never used   Substance Use Topics    Alcohol use: Not Currently    Drug use: Never       ALLERGIES    No Known Allergies    MEDICATIONS    No current facility-administered medications on file prior to encounter. Current Outpatient Medications on File Prior to Encounter   Medication Sig Dispense Refill    clopidogreL (PLAVIX) 75 mg tab Take 1 Tablet by mouth in the morning. 30 Tablet 2    isosorbide mononitrate ER (IMDUR) 30 mg tablet Take 1 Tablet by mouth daily. 30 Tablet 0    pantoprazole (PROTONIX) 40 mg tablet Take 1 Tablet by mouth Daily (before breakfast). 30 Tablet 0    bacitracin zinc (BACITRACIN) ointment Apply  to affected area two (2) times a day. 15 g 0    nystatin (MYCOSTATIN) powder Apply  to affected area two (2) times a day.  5 g 0    insulin lispro (HUMALOG) 100 unit/mL injection Use as directed 1 Each 0    Lactobacillus Acidoph & Bulgar (FLORANEX) 1 million cell tab tablet Take 2 Tablets by mouth two (2) times a day. 60 Tablet 0    melatonin, rapid dissolve, 5 mg TbDi tablet Take 2 Tablets by mouth nightly as needed (slsee[). 30 Tablet 0    sevelamer carbonate (RENVELA) 800 mg tab tab Take 2 Tablets by mouth three (3) times daily (with meals). 90 Tablet 0    vit B Cmplx 3-FA-Vit C-Biotin (NEPHRO NIKITA RX) 1- mg-mg-mcg tablet Take 1 Tablet by mouth daily. 30 Tablet 0    atorvastatin (LIPITOR) 40 mg tablet Take 40 mg by mouth daily. gabapentin (NEURONTIN) 300 mg capsule Take 300 mg by mouth nightly. allopurinoL (ZYLOPRIM) 100 mg tablet Take 100 mg by mouth daily. ezetimibe (ZETIA) 10 mg tablet Take 10 mg by mouth. carvediloL (COREG) 12.5 mg tablet Take 12.5 mg by mouth two (2) times a day. amLODIPine (NORVASC) 10 mg tablet Take 10 mg by mouth daily. aspirin 81 mg chewable tablet Take 81 mg by mouth daily. ascorbic acid, vitamin C, (VITAMIN C) 500 mg tablet Take 500 mg by mouth. insulin glargine (LANTUS) 100 unit/mL injection 10 Units by SubCUTAneous route nightly. Wt Readings from Last 3 Encounters:   12/19/22 96.2 kg (212 lb)   07/19/22 102.6 kg (226 lb 4.8 oz)       [email or web address] Vitals  BP (!) 143/64 (BP 1 Location: Right upper arm, BP Patient Position: Semi fowlers)   Pulse 66   Temp 98.3 °F (36.8 °C)   Resp 15   Ht 6' (1.829 m)   Wt 96.2 kg (212 lb)   SpO2 97%   BMI 28.75 kg/m²       ASSESSMENT     Skin impairment Identification:  Type:  surgical     Contributing Factors: decreased tissue oxygenation    Incision 12/09/22 Foot Left (Active)   Wound Image    12/19/22 1600   Dressing Status Clean;Dry; Intact 12/19/22 1600   Cleansed Betadine/Povidone Iodine 12/19/22 1600   Dressing/Treatment Betadine swabs/Povidone Iodine;Gauze dressing/dressing sponge;Roll gauze; Ace wrap 12/19/22 1600   Closure Sutures 12/19/22 1600   Margins Approximated 12/19/22 1600   Incision Assessment Eschar 12/19/22 1600   Drainage Amount Scant 12/19/22 1600   Drainage Description Serosanguinous 12/19/22 1600   Wound Odor None 12/13/22 1029   Leticia-Wound/Incision Assessment Other (Comment) 12/19/22 1600   Number of days: 10          PLAN     Skin Care & Pressure Relief Recommendations  Minimize layers of linen  Pads under patient to optimize support surface    Physician/Provider notified: Yes Rodriguez  Recommendations: paint with betadine and cover loosely     Teaching completed with:   [x] Patient           [] Family member       [] Caregiver          [x] Nursing  [] Other    Patient/Caregiver Teaching:  Level of patient/caregiver understanding able to:   [x] Indicates understanding       [] Needs reinforcement  [] Unsuccessful      [] Verbal Understanding  [] Demonstrated understanding       [] No evidence of learning  [] Refused teaching         [] N/A       Electronically signed by Tamar Brooks RN on 12/19/2022 at 4:45 PM

## 2022-12-20 ENCOUNTER — APPOINTMENT (OUTPATIENT)
Dept: INTERVENTIONAL RADIOLOGY/VASCULAR | Age: 62
DRG: 239 | End: 2022-12-20
Attending: SURGERY
Payer: MEDICARE

## 2022-12-20 LAB
ANION GAP SERPL CALC-SCNC: 11 MMOL/L (ref 3–18)
ANION GAP SERPL CALC-SCNC: 9 MMOL/L (ref 3–18)
BUN SERPL-MCNC: 29 MG/DL (ref 7–18)
BUN SERPL-MCNC: 58 MG/DL (ref 7–18)
BUN/CREAT SERPL: 5 (ref 12–20)
BUN/CREAT SERPL: 6 (ref 12–20)
CALCIUM SERPL-MCNC: 8.1 MG/DL (ref 8.5–10.1)
CALCIUM SERPL-MCNC: 8.1 MG/DL (ref 8.5–10.1)
CHLORIDE SERPL-SCNC: 98 MMOL/L (ref 100–111)
CHLORIDE SERPL-SCNC: 99 MMOL/L (ref 100–111)
CO2 SERPL-SCNC: 25 MMOL/L (ref 21–32)
CO2 SERPL-SCNC: 29 MMOL/L (ref 21–32)
CREAT SERPL-MCNC: 6.05 MG/DL (ref 0.6–1.3)
CREAT SERPL-MCNC: 9.93 MG/DL (ref 0.6–1.3)
GLUCOSE BLD STRIP.AUTO-MCNC: 116 MG/DL (ref 70–110)
GLUCOSE BLD STRIP.AUTO-MCNC: 125 MG/DL (ref 70–110)
GLUCOSE BLD STRIP.AUTO-MCNC: 142 MG/DL (ref 70–110)
GLUCOSE BLD STRIP.AUTO-MCNC: 155 MG/DL (ref 70–110)
GLUCOSE SERPL-MCNC: 106 MG/DL (ref 74–99)
GLUCOSE SERPL-MCNC: 107 MG/DL (ref 74–99)
MAGNESIUM SERPL-MCNC: 2.2 MG/DL (ref 1.6–2.6)
POTASSIUM SERPL-SCNC: 4.9 MMOL/L (ref 3.5–5.5)
POTASSIUM SERPL-SCNC: 6.4 MMOL/L (ref 3.5–5.5)
SODIUM SERPL-SCNC: 135 MMOL/L (ref 136–145)
SODIUM SERPL-SCNC: 136 MMOL/L (ref 136–145)
VANCOMYCIN SERPL-MCNC: 19.5 UG/ML (ref 5–40)

## 2022-12-20 PROCEDURE — 74011000250 HC RX REV CODE- 250: Performed by: STUDENT IN AN ORGANIZED HEALTH CARE EDUCATION/TRAINING PROGRAM

## 2022-12-20 PROCEDURE — 99153 MOD SED SAME PHYS/QHP EA: CPT

## 2022-12-20 PROCEDURE — 74011250636 HC RX REV CODE- 250/636: Performed by: EMERGENCY MEDICINE

## 2022-12-20 PROCEDURE — 77030013687 IR ANGIO EXT LOWER LT

## 2022-12-20 PROCEDURE — 99152 MOD SED SAME PHYS/QHP 5/>YRS: CPT

## 2022-12-20 PROCEDURE — 80048 BASIC METABOLIC PNL TOTAL CA: CPT

## 2022-12-20 PROCEDURE — 74011250636 HC RX REV CODE- 250/636: Performed by: STUDENT IN AN ORGANIZED HEALTH CARE EDUCATION/TRAINING PROGRAM

## 2022-12-20 PROCEDURE — 36415 COLL VENOUS BLD VENIPUNCTURE: CPT

## 2022-12-20 PROCEDURE — 74011000636 HC RX REV CODE- 636: Performed by: STUDENT IN AN ORGANIZED HEALTH CARE EDUCATION/TRAINING PROGRAM

## 2022-12-20 PROCEDURE — 047Y3ZZ DILATION OF LOWER ARTERY, PERCUTANEOUS APPROACH: ICD-10-PCS | Performed by: STUDENT IN AN ORGANIZED HEALTH CARE EDUCATION/TRAINING PROGRAM

## 2022-12-20 PROCEDURE — 74011636637 HC RX REV CODE- 636/637: Performed by: HOSPITALIST

## 2022-12-20 PROCEDURE — 047U3ZZ DILATION OF LEFT PERONEAL ARTERY, PERCUTANEOUS APPROACH: ICD-10-PCS | Performed by: STUDENT IN AN ORGANIZED HEALTH CARE EDUCATION/TRAINING PROGRAM

## 2022-12-20 PROCEDURE — 74011000258 HC RX REV CODE- 258: Performed by: EMERGENCY MEDICINE

## 2022-12-20 PROCEDURE — B4101ZZ FLUOROSCOPY OF ABDOMINAL AORTA USING LOW OSMOLAR CONTRAST: ICD-10-PCS | Performed by: STUDENT IN AN ORGANIZED HEALTH CARE EDUCATION/TRAINING PROGRAM

## 2022-12-20 PROCEDURE — 74011250637 HC RX REV CODE- 250/637: Performed by: HOSPITALIST

## 2022-12-20 PROCEDURE — 82962 GLUCOSE BLOOD TEST: CPT

## 2022-12-20 PROCEDURE — 74011250636 HC RX REV CODE- 250/636: Performed by: INTERNAL MEDICINE

## 2022-12-20 PROCEDURE — B41G1ZZ FLUOROSCOPY OF LEFT LOWER EXTREMITY ARTERIES USING LOW OSMOLAR CONTRAST: ICD-10-PCS | Performed by: STUDENT IN AN ORGANIZED HEALTH CARE EDUCATION/TRAINING PROGRAM

## 2022-12-20 PROCEDURE — 65270000029 HC RM PRIVATE

## 2022-12-20 PROCEDURE — 80202 ASSAY OF VANCOMYCIN: CPT

## 2022-12-20 PROCEDURE — 83735 ASSAY OF MAGNESIUM: CPT

## 2022-12-20 RX ORDER — FLUMAZENIL 0.1 MG/ML
0.2 INJECTION INTRAVENOUS
Status: DISCONTINUED | OUTPATIENT
Start: 2022-12-20 | End: 2022-12-28

## 2022-12-20 RX ORDER — SODIUM CHLORIDE 0.9 % (FLUSH) 0.9 %
5-40 SYRINGE (ML) INJECTION EVERY 8 HOURS
Status: DISCONTINUED | OUTPATIENT
Start: 2022-12-20 | End: 2022-12-23 | Stop reason: SDUPTHER

## 2022-12-20 RX ORDER — LIDOCAINE HYDROCHLORIDE 10 MG/ML
1-10 INJECTION, SOLUTION EPIDURAL; INFILTRATION; INTRACAUDAL; PERINEURAL
Status: COMPLETED | OUTPATIENT
Start: 2022-12-20 | End: 2022-12-20

## 2022-12-20 RX ORDER — HEPARIN SODIUM 1000 [USP'U]/ML
1000-10000 INJECTION, SOLUTION INTRAVENOUS; SUBCUTANEOUS
Status: DISCONTINUED | OUTPATIENT
Start: 2022-12-20 | End: 2023-01-06

## 2022-12-20 RX ORDER — FENTANYL CITRATE 50 UG/ML
25-100 INJECTION, SOLUTION INTRAMUSCULAR; INTRAVENOUS
Status: DISCONTINUED | OUTPATIENT
Start: 2022-12-20 | End: 2022-12-28

## 2022-12-20 RX ORDER — CEFAZOLIN SODIUM/WATER 2 G/20 ML
2 SYRINGE (ML) INTRAVENOUS ONCE
Status: ACTIVE | OUTPATIENT
Start: 2022-12-20 | End: 2022-12-21

## 2022-12-20 RX ORDER — HEPARIN SODIUM 200 [USP'U]/100ML
1000 INJECTION, SOLUTION INTRAVENOUS
Status: DISCONTINUED | OUTPATIENT
Start: 2022-12-20 | End: 2023-01-06

## 2022-12-20 RX ORDER — NALOXONE HYDROCHLORIDE 0.4 MG/ML
0.1 INJECTION, SOLUTION INTRAMUSCULAR; INTRAVENOUS; SUBCUTANEOUS
Status: DISCONTINUED | OUTPATIENT
Start: 2022-12-20 | End: 2022-12-28

## 2022-12-20 RX ORDER — SODIUM CHLORIDE 0.9 % (FLUSH) 0.9 %
5-40 SYRINGE (ML) INJECTION AS NEEDED
Status: DISCONTINUED | OUTPATIENT
Start: 2022-12-20 | End: 2022-12-25

## 2022-12-20 RX ORDER — MIDAZOLAM HYDROCHLORIDE 1 MG/ML
.5-2 INJECTION, SOLUTION INTRAMUSCULAR; INTRAVENOUS
Status: DISCONTINUED | OUTPATIENT
Start: 2022-12-20 | End: 2022-12-28

## 2022-12-20 RX ADMIN — MIDAZOLAM 0.5 MG: 1 INJECTION INTRAMUSCULAR; INTRAVENOUS at 16:12

## 2022-12-20 RX ADMIN — NITROGLYCERIN 300 MCG: 10 INJECTION INTRAVENOUS at 16:49

## 2022-12-20 RX ADMIN — EZETIMIBE 10 MG: 10 TABLET ORAL at 10:12

## 2022-12-20 RX ADMIN — EPOETIN ALFA-EPBX 8000 UNITS: 4000 INJECTION, SOLUTION INTRAVENOUS; SUBCUTANEOUS at 23:08

## 2022-12-20 RX ADMIN — MIDAZOLAM 0.5 MG: 1 INJECTION INTRAMUSCULAR; INTRAVENOUS at 15:37

## 2022-12-20 RX ADMIN — FENTANYL CITRATE 50 MCG: 0.05 INJECTION, SOLUTION INTRAMUSCULAR; INTRAVENOUS at 15:30

## 2022-12-20 RX ADMIN — FENTANYL CITRATE 25 MCG: 0.05 INJECTION, SOLUTION INTRAMUSCULAR; INTRAVENOUS at 16:28

## 2022-12-20 RX ADMIN — PIPERACILLIN AND TAZOBACTAM 4.5 G: 4; .5 INJECTION, POWDER, FOR SOLUTION INTRAVENOUS at 14:15

## 2022-12-20 RX ADMIN — INSULIN GLARGINE 10 UNITS: 100 INJECTION, SOLUTION SUBCUTANEOUS at 21:28

## 2022-12-20 RX ADMIN — Medication 500 MG: at 09:52

## 2022-12-20 RX ADMIN — B-COMPLEX W/ C & FOLIC ACID TAB 1 MG 1 TABLET: 1 TAB at 10:12

## 2022-12-20 RX ADMIN — HEPARIN SODIUM IN SODIUM CHLORIDE 2000 UNITS: 200 INJECTION INTRAVENOUS at 15:09

## 2022-12-20 RX ADMIN — IOPAMIDOL 108 ML: 510 INJECTION, SOLUTION INTRAVASCULAR at 17:30

## 2022-12-20 RX ADMIN — HEPARIN SODIUM 5000 UNITS: 1000 INJECTION INTRAVENOUS; SUBCUTANEOUS at 16:14

## 2022-12-20 RX ADMIN — FENTANYL CITRATE 25 MCG: 0.05 INJECTION, SOLUTION INTRAMUSCULAR; INTRAVENOUS at 15:42

## 2022-12-20 RX ADMIN — PANTOPRAZOLE SODIUM 40 MG: 40 TABLET, DELAYED RELEASE ORAL at 07:30

## 2022-12-20 RX ADMIN — FENTANYL CITRATE 50 MCG: 0.05 INJECTION, SOLUTION INTRAMUSCULAR; INTRAVENOUS at 15:37

## 2022-12-20 RX ADMIN — MIDAZOLAM 0.5 MG: 1 INJECTION INTRAMUSCULAR; INTRAVENOUS at 15:54

## 2022-12-20 RX ADMIN — FENTANYL CITRATE 25 MCG: 0.05 INJECTION, SOLUTION INTRAMUSCULAR; INTRAVENOUS at 16:01

## 2022-12-20 RX ADMIN — SEVELAMER CARBONATE 1600 MG: 800 TABLET, FILM COATED ORAL at 10:12

## 2022-12-20 RX ADMIN — MIDAZOLAM 0.25 MG: 1 INJECTION INTRAMUSCULAR; INTRAVENOUS at 16:34

## 2022-12-20 RX ADMIN — LIDOCAINE HYDROCHLORIDE ANHYDROUS 10 ML: 10 INJECTION, SOLUTION INFILTRATION at 17:30

## 2022-12-20 RX ADMIN — CARVEDILOL 12.5 MG: 12.5 TABLET, FILM COATED ORAL at 21:29

## 2022-12-20 RX ADMIN — MIDAZOLAM 1 MG: 1 INJECTION INTRAMUSCULAR; INTRAVENOUS at 15:30

## 2022-12-20 RX ADMIN — FENTANYL CITRATE 25 MCG: 0.05 INJECTION, SOLUTION INTRAMUSCULAR; INTRAVENOUS at 15:55

## 2022-12-20 RX ADMIN — PIPERACILLIN AND TAZOBACTAM 4.5 G: 4; .5 INJECTION, POWDER, FOR SOLUTION INTRAVENOUS at 00:21

## 2022-12-20 RX ADMIN — NYSTATIN: 100000 POWDER TOPICAL at 21:29

## 2022-12-20 RX ADMIN — NYSTATIN: 100000 POWDER TOPICAL at 10:18

## 2022-12-20 RX ADMIN — SODIUM CHLORIDE, PRESERVATIVE FREE 10 ML: 5 INJECTION INTRAVENOUS at 21:30

## 2022-12-20 RX ADMIN — ALLOPURINOL 100 MG: 100 TABLET ORAL at 10:12

## 2022-12-20 NOTE — ROUTINE PROCESS
TRANSFER - OUT REPORT:    Verbal report given to africa(name) on [facility]   being transferred to [address] [place] [address](unit) for routine progression of care       Report consisted of patients Situation, Background, Assessment and   Recommendations(SBAR). Information from the following report(s) SBAR, Kardex, Procedure Summary, Intake/Output, Procedure Verification, and Dual Neuro Assessment was reviewed with the receiving nurse. Lines:   Peripheral IV 12/10/22 Anterior;Right Forearm (Active)   Site Assessment Clean, dry, & intact 12/19/22 2127   Phlebitis Assessment 0 12/19/22 2127   Infiltration Assessment 0 12/19/22 2127   Dressing Status Clean, dry, & intact 12/19/22 2127   Dressing Type Transparent 12/19/22 2127   Hub Color/Line Status Blue;Capped;Flushed;Patent 12/19/22 2127   Action Taken Open ports on tubing capped 12/18/22 2000   Alcohol Cap Used Yes 12/19/22 2127        Opportunity for questions and clarification was provided.       Patient transported with:   Monitor  Registered Nurse

## 2022-12-20 NOTE — OP NOTES
Vascular Surgery   Operative Note    Date: 12/20/2022    Pre-Op Dx: LLE CLTI w ischemic tissue loss    Post-Op Dx: Same    Procedure:     1. US guided R CFA access     2. Abdominal aortogram     3. Selective left lower extremity angiogram with runoff     4. Left proximal PT PTA (3 mm x 220 mm; 2 mm x 200 mm)     5. Left peroneal PTA (3 mm x 220 mm)    Surgeon: Ena Forte MD    Assistant: EVELIN    Anesthesia: Moderate sedation was administered under my supervision - IV fentanyl 200mcg and versed 2.75mg. Patient was constantly monitored with continuous cardiac monitoring and pulse oximetry. Time of sedation from 1529 until 1530. Independent trained observer (RN/RCIS) was present during the course of the procedure: [place]. Findings: Aorta - Patent aortoiliac vessels     Femoral vessels - Patent femoral bifurcation, SFA and popliteal artery     Tibial vessels - Single vessel run off via peroneal with filling of pedal arch via collateralization at the ankle; proximal AT/PT occlusion     *PTA of proximal PT to the level of the mid-calf was performed, unable to navigate into the pedal arch; PTA of the peroneal artery performed with improved filling of pedal arch at completion*    Indication: As above    Description of Procedure:     After informed consent was obtained, the patient was brought to the angio suite and positioned supine on the table. Bilateral groins were prepped and draped in sterile fashion. A safety pause was performed with all necessary personnel and equipment in the room. Conscious sedation was given. The R CFA was evaluated with US and found to be patent without significant disease. 1% lidocaine was infiltrated into the subcutaneous tissue. The R CFA was accessed with micro needle under US guidance. Images were stored for documentation purposes. A micro wire and sheath were advanced. Over a 0.035 wire, a 5 Fr sheath was then advanced into the R CFA.  An omniflush catheter was advanced into the visceral aorta and aortogram was obtained, demonstrating the above findings. Wire and catheter were advanced into the contralateral CFA and run off images of the left lower extremity were obtained. This was notable for tibial occlusive disease as described. Decision was made to intervene. 5 Fr sheath was exchanged for a 5 Fr by 75 cm sheath, seated in the left SFA. Heparin was given. 0.018 wire and catheter were advanced into the PT to the level of the mid calf. I was unable to navigate further from contralateral access. PTA was performed first with a 3 mm balloon and then about 60 mm further a 2 mm balloon. Repeat arteriogram showed patency proximally with filling via collaterals of the PT just below the ankle. Next 0.18 wire and catheter were advanced into the peroneal artery and this was treated with long 3 mm balloon angioplasty - completion arteriogram showed improved filling into the pedal arch via the peroneal. I was overall pleased with this result. Wire and sheath were withdrawn into the ipsilateral iliac artery. Percutaneous closure was performed with a Mynx device with good hemostasis. Sterile dressing was applied. Patient tolerated the procedure well and was transported to recovery in stable condition.      Complications:   None     EBL: Minimal    Contrast: 100 ml      Luiz Saenz MD, [address] [facility]  [phone] ([phone] nightly . Historical Provider, MD   traMADol (ULTRAM) 50 MG tablet Take 1 tablet by mouth every 6 hours as needed for Pain .  12/14/17   Beverley Vaughan MD   dicyclomine (BENTYL) 10 MG capsule Take 1 capsule by mouth 4 times daily (before meals and nightly) 10/22/17   Isak Alejo PA-C   acetaminophen (APAP EXTRA STRENGTH) 500 MG tablet Take 1 tablet by mouth every 4 hours as needed for Pain (Take when out of Norco or when the pain is less severe, do not take at the same time as both contain acetaminophen) 9/22/17   Milo Diamond DO   ondansetron (ZOFRAN) 4 MG tablet Take 1 tablet by mouth every 8 hours as needed for Nausea 9/11/17   Etelvina Chatterjee, DO   ondansetron (ZOFRAN ODT) 4 MG disintegrating tablet Take 1 tablet by mouth every 8 hours as needed for Nausea 8/22/17   Layla Galvan MD   tiZANidine (ZANAFLEX) 4 MG tablet Take 1 tablet by mouth every 6 hours as needed (pain/spasm) 7/2/17   Jerry Miller, DO   ondansetron (ZOFRAN ODT) 4 MG disintegrating tablet Take 1 tablet by mouth every 8 hours as needed for Nausea 6/2/17   Reji Peralta MD   zolpidem (AMBIEN) 5 MG tablet Take 5 mg by mouth nightly as needed for Sleep    Historical Provider, MD   LORazepam (ATIVAN) 1 MG tablet Take 1 mg by mouth 2 times daily as needed for Anxiety    Historical Provider, MD   LORazepam (ATIVAN) 2 MG tablet Take 2 mg by mouth nightly as needed for Anxiety    Historical Provider, MD   esomeprazole Magnesium (NEXIUM) 40 MG PACK Take 40 mg by mouth daily    Historical Provider, MD   diltiazem (CARDIZEM CD) 240 MG extended release capsule Take 1 capsule by mouth daily 4/10/17   Amy Ivan MD   DULoxetine (CYMBALTA) 60 MG extended release capsule Take 1 capsule by mouth daily 4/10/17   Amy Ivan MD   insulin glargine (LANTUS) 100 UNIT/ML injection vial Inject 30 Units into the skin nightly 4/10/17 12/24/17  Amy Ivan MD   lisinopril (PRINIVIL;ZESTRIL) 5 MG tablet Take 1 tablet by mouth 3.7 - 5.3 mmol/L    Chloride 97 (L) 98 - 107 mmol/L    CO2 22 20 - 31 mmol/L    Anion Gap 16 9 - 17 mmol/L    Alkaline Phosphatase 150 (H) 35 - 104 U/L    ALT 25 5 - 33 U/L    AST 19 <32 U/L    Total Bilirubin <0.15 (L) 0.3 - 1.2 mg/dL    Total Protein 7.5 6.4 - 8.3 g/dL    Alb 3.8 3.5 - 5.2 g/dL    Albumin/Globulin Ratio NOT REPORTED 1.0 - 2.5    GFR Non-African American >60 >60 mL/min    GFR African American >60 >60 mL/min    GFR Comment          GFR Staging NOT REPORTED    Lipase   Result Value Ref Range    Lipase 31 13 - 60 U/L   Lactic Acid   Result Value Ref Range    Lactic Acid 2.0 0.5 - 2.2 mmol/L       IMPRESSION: Patient evaluated by myself and sending physician. Patient. No acute distress. Vital signs normal vital arrival.  Patient is obese and abdomen with multiple scars and skin changes consistent with multiple surgeries. Abdomen remained soft throughout but does have some fibrosis in the area of her scars and surgeries. Patient is tender to palpation in the epigastric region without guarding, rigidity, or peritoneal signs. Negative McBurney's point. Patient states she has had her gallbladder out. Patient has had hysterectomy. We'll continue with abdominal labs and x-ray abdomen series to rule out obstruction/perforation although unlikely. Patient has had multiple episodes of abdominal pain and evaluated in our emergency department as well as [facility] and elsewhere. We'll treat her symptomatically. Low concern for any infection as patient afebrile. RADIOLOGY:  Xr Acute Abd Series Chest 1 Vw    Result Date: 2/28/2018  EXAMINATION: ACUTE ABDOMINAL SERIES WITH SINGLE  VIEW OF THE CHEST 2/28/2018 10:21 pm COMPARISON: 12/29/2017 HISTORY: ORDERING SYSTEM PROVIDED HISTORY: abdominal pain TECHNOLOGIST PROVIDED HISTORY: Reason for exam:->abdominal pain Ordering Physician Provided Reason for Exam: abdominal pain Acuity: Acute Type of Exam: Initial FINDINGS: Chest:  No significant change. PCP.    PROCEDURES:  None    CONSULTS:  None    CRITICAL CARE:  None    FINAL IMPRESSION      1. Pain of upper abdomen    2. Hyperglycemia    3. Non-intractable vomiting with nausea, unspecified vomiting type          DISPOSITION / PLAN     DISPOSITION        PATIENT REFERRED TO:  No follow-up provider specified.     DISCHARGE MEDICATIONS:  New Prescriptions    No medications on file       Macario Barnes DO  Emergency Medicine Resident    (Please note that portions of this note were completed with a voice recognition program.  Efforts were made to edit the dictations but occasionally words are mis-transcribed.)        Macario Barnes DO  03/01/18 0001

## 2022-12-20 NOTE — PROGRESS NOTES
Vascular Surgery    Pt seen and examined. LLE CLTI w ischemic tissue loss. 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 PM s/f ESRD on HD, DM, CAD. 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 Plan for LLE arteriogram today, possible intervention. Risks including but not limited to bleeding, need for more procedures and limb loss were reviewed. Appropriate consent obtained.        Loc Monge MD, [address] [place] [address] [facility]  [phone] (C) [phone]

## 2022-12-20 NOTE — WOUND CARE
[address]   Progress Note and Procedure Note   NO Procedure      Koko Trujillo  MEDICAL RECORD NUMBER:  [id number]  AGE: 60 y.o. RACE BLACK/  GENDER: male  : 1960  EPISODE DATE:  2022    Subjective:     Chief Complaint   Patient presents with    Abdominal Pain    Foot Pain         HISTORY of PRESENT ILLNESS HPI    [email or web address] Gigi See is a 60 y.o. male who presents today for wound/ulcer evaluation. Patient is a diabetic and returning to the [address] [place] [address] today, for continued follow up treatment on his right heel ulcer, and on his left 2nd toe with an abscess. Patient's 2nd toe on the left foot is black with an abscess, he has been advised to check into the Emergency Room at [facility], for surgery. History of Wound Context: Patient is a diabetic, with a right heel ulcer, and an abscess on his left 2nd toe, which is black. Wound/Ulcer Pain Timing/Severity: severe  Quality of pain: aching, sharp, and throbbing  Severity:  2 / 10   Modifying Factors: Pain worsens with walking  Associated Signs/Symptoms: erythema, increased drainage, and odor    Ulcer Identification:  Ulcer Type: diabetic    Contributing Factors: diabetes    Wound: Follow up on the right heel ulcer and on his left 2nd toe/abscess.          PAST MEDICAL HISTORY    Past Medical History:   Diagnosis Date    CAD (coronary artery disease)     Chronic kidney disease     Diabetes mellitus     Hypertension     Sleep apnea         PAST SURGICAL HISTORY    Past Surgical History:   Procedure Laterality Date    HX ORTHOPAEDIC      HX PACEMAKER      IR INSERT TUNL CVC W/O PORT OVER 5 YR  2022    IR INSERT TUNL CVC W/O PORT OVER 5 YR  2022    IR REMOVE TUNL CVAD W/O PORT / PUMP  2022    SC CARDIAC SURG PROCEDURE UNLIST         FAMILY HISTORY    Family History   Problem Relation Age of Onset    Heart Disease Mother     Diabetes Father     Diabetes Sister     Diabetes Brother        SOCIAL HISTORY    Social History     Tobacco Use    Smoking status: Never    Smokeless tobacco: Never   Vaping Use    Vaping Use: Never used   Substance Use Topics    Alcohol use: Not Currently    Drug use: Never       ALLERGIES    No Known Allergies    MEDICATIONS    No current facility-administered medications on file prior to encounter. Current Outpatient Medications on File Prior to Encounter   Medication Sig Dispense Refill    clopidogreL (PLAVIX) 75 mg tab Take 1 Tablet by mouth in the morning. 30 Tablet 2    isosorbide mononitrate ER (IMDUR) 30 mg tablet Take 1 Tablet by mouth daily. 30 Tablet 0    pantoprazole (PROTONIX) 40 mg tablet Take 1 Tablet by mouth Daily (before breakfast). 30 Tablet 0    bacitracin zinc (BACITRACIN) ointment Apply  to affected area two (2) times a day. 15 g 0    nystatin (MYCOSTATIN) powder Apply  to affected area two (2) times a day. 5 g 0    insulin lispro (HUMALOG) 100 unit/mL injection Use as directed 1 Each 0    Lactobacillus Acidoph & Bulgar (FLORANEX) 1 million cell tab tablet Take 2 Tablets by mouth two (2) times a day. 60 Tablet 0    melatonin, rapid dissolve, 5 mg TbDi tablet Take 2 Tablets by mouth nightly as needed (slsee[). 30 Tablet 0    sevelamer carbonate (RENVELA) 800 mg tab tab Take 2 Tablets by mouth three (3) times daily (with meals). 90 Tablet 0    vit B Cmplx 3-FA-Vit C-Biotin (NEPHRO NIKITA RX) 1- mg-mg-mcg tablet Take 1 Tablet by mouth daily. 30 Tablet 0    atorvastatin (LIPITOR) 40 mg tablet Take 40 mg by mouth daily. gabapentin (NEURONTIN) 300 mg capsule Take 300 mg by mouth nightly. allopurinoL (ZYLOPRIM) 100 mg tablet Take 100 mg by mouth daily. ezetimibe (ZETIA) 10 mg tablet Take 10 mg by mouth. carvediloL (COREG) 12.5 mg tablet Take 12.5 mg by mouth two (2) times a day. amLODIPine (NORVASC) 10 mg tablet Take 10 mg by mouth daily.       aspirin 81 mg chewable tablet Take 81 mg by mouth daily.      ascorbic acid, vitamin C, (VITAMIN C) 500 mg tablet Take 500 mg by mouth. insulin glargine (LANTUS) 100 unit/mL injection 10 Units by SubCUTAneous route nightly. REVIEW OF SYSTEMS    A comprehensive review of systems was negative except for that written in the HPI. Objective:     Visit Vitals  BP (!) 148/63 (BP 1 Location: Right upper arm, BP Patient Position: At rest;Lying)   Pulse 67   Temp 98.3 °F (36.8 °C)   Resp 17   Ht 6' (1.829 m)   Wt 96.2 kg (212 lb)   SpO2 97%   BMI 28.75 kg/m²       Wt Readings from Last 3 Encounters:   12/19/22 96.2 kg (212 lb)   07/19/22 102.6 kg (226 lb 4.8 oz)       PHYSICAL EXAM    Pertinent items are noted in HPI. Vascular:  Dorsalis pedis pulses are 2/4 bilateral.  Posterior tibial pulses are 2/4 bilateral.  Capillary fill time is less than 3 seconds, bilateral.  Edema is observed bilateral lower extremities. Varicosities are observed bilateral lower extremities. Derm:  Skin temperature of lower extremities warm to cool proximal to distal bilateral.  Inspection of skin shows negative digital hair with thin shinny skin bilateral.  Right heel ulcer measuring 2.5 x 3.0 x 0.3 cm, and the left foot 2nd toe with abscess with a devitalized tissue, eschar, dry tissue, non- blanchable erythema, small amounts of thick serosanguinous drainage, malodorous/Putrid odor, and Hyperkeratosis (calluses), Ecchymosis, maceration. Ortho:  Muscle strength 5/5 for all groups tested. Muscle tone normal. Inspection/palpation of bones - joints- muscle shows - within normal limits on the bilateral lower extremities. Neuro:  Light touch, sharp/dull, vibratory, and proprioception sensations all decreased bilateral lower extremities. Deep tendon reflexes decreased bilaterally. Assessment:       Right Heel Ulcer. Left foot 2nd toe black with abscess. Type II Diabetic.   \  Problem List Items Addressed This Visit          Other    Leukocytosis     Other Visit Diagnoses       Diabetic ulcer of toe of left foot associated with type 2 diabetes mellitus, unspecified ulcer stage ([facility] [address]    -  Primary            Procedure Note  Indications:  Based on my examination of this patient's wound(s)/ulcer(s) today, debridement is not required to promote healing and evaluate the wound base. Wound Ankle Right;Medial;Proximal (Active)   Number of days: 157       Wound Ankle Right;Medial (Active)   Number of days: 157       Wound Heel Right (Active)   Wound Image   12/12/22 1600   Wound Etiology Diabetic 12/09/22 1038   Dressing Status Clean;Dry; Intact 12/16/22 0930   Cleansed Wound cleanser 12/09/22 1038   Dressing/Treatment Ace wrap 12/18/22 0938   Wound Length (cm) 2.5 cm 12/09/22 1038   Wound Width (cm) 3 cm 12/09/22 1038   Wound Depth (cm) 0.3 cm 12/09/22 1038   Wound Surface Area (cm^2) 7.5 cm^2 12/09/22 1038   Change in Wound Size % 66.67 12/09/22 1038   Wound Volume (cm^3) 2.25 cm^3 12/09/22 1038   Wound Healing % 93 12/09/22 1038   Wound Assessment Devitalized tissue;Dusky;Eschar dry 12/09/22 1038   Drainage Amount Small 12/09/22 1038   Drainage Description Serosanguinous; Thick 12/09/22 1038   Wound Odor Malodorous/Putrid 12/09/22 1038   Leticia-Wound/Incision Assessment Dry/flaky; Intact; Hyperkeratosis (Callous); Maceration 12/09/22 1038   Edges Undefined edges 12/09/22 1038   Wound Thickness Description Full thickness 12/09/22 1038   Number of days: 129       Incision 07/05/22 Foot Right;Dorsal (Active)   Number of days: 167       Incision 12/09/22 Foot Left (Active)   Wound Image    12/19/22 1600   Dressing Status Clean;Dry; Intact 12/19/22 1600   Cleansed Betadine/Povidone Iodine 12/19/22 1600   Dressing/Treatment Betadine swabs/Povidone Iodine;Gauze dressing/dressing sponge;Roll gauze; Ace wrap 12/19/22 1600   Closure Sutures 12/19/22 1600   Margins Approximated 12/19/22 1600   Incision Assessment Eschar 12/19/22 1600   Drainage Amount Scant 12/19/22 1600   Drainage Description Serosanguinous 12/19/22 1600   Wound Odor None 12/13/22 1029   Leticia-Wound/Incision Assessment Other (Comment) 12/19/22 1600   Number of days: 10        Plan:      Office visit- perform local wound care on the right heel, cleaned with a wound cleanser, applied a Quin dressing. Perform Clinical evaluation on the left foot - 2nd toe:  Sent patient to the Emergency Room for admissions. Patient requires surgery on the left foot - 2nd toe - black with abscess. Patient to report back to the [facility] in two weeks for continued follow up treatment. Treatment Note please see attached Discharge Instructions    Written patient dismissal instructions given to patient or POA.          Electronically signed by Sowmya Brasher DPM on 12/19/2022 at 8:05 PM

## 2022-12-20 NOTE — PROGRESS NOTES
Occupational Therapy Treatment Attempt     Chart reviewed. Attempted Occupational Therapy Treatment, however, patient unable to be seen due to:  []  Nausea/vomiting  []  Eating  []  Pain  []  Patient too lethargic  [x]  Off Unit for testing/procedure  []  Dialysis treatment in progress  []  Telemetry Results  []  Other:      Will f/u later as patient's schedule allows.   Bert Hitchcock, OTR/L

## 2022-12-20 NOTE — PROGRESS NOTES
Nephrology Inpatient Progress note    Subjective:     Rossi Chavira is a 60 y.o. male with PMHx of  DM, HTN. PVD, ESRD on HD TTS at [facility] under the [address] [place] [address]. Nephrology sent in for admission by wound care clinic due to left foot infection ,was told he needed surgery. Podiatry has been in to see pt . He has been on abx recently     S/P Lt 2/3/4 metatarsal amputation    Stable today. Pending HD.     Admit Date: 12/9/2022  Active Problems:    Diabetes mellitus with foot ulcer ([facility] (6/27/2022)      CAD (coronary artery disease) (6/28/2022)      ESRD (end stage renal disease) ([facility] (6/28/2022)      Hypertension (7/21/2022)      Leukocytosis (12/9/2022)      Diabetic foot infection ([facility] (12/9/2022)      Diarrhea (12/9/2022)      Type 2 diabetes mellitus, with long-term current use of insulin ([facility]) ()      Acute hematogenous osteomyelitis of left foot ([facility] (12/9/2022)      Nondisplaced fracture of second metatarsal bone of left foot (12/9/2022)      Nondisplaced fracture of third metatarsal bone of left foot (12/9/2022)      Closed nondisplaced fracture of fourth metatarsal bone of left foot (12/9/2022)      Positive blood culture (12/11/2022)    Current Facility-Administered Medications   Medication Dose Route Frequency    epoetin lisette-epbx (RETACRIT) injection 8,000 Units  8,000 Units SubCUTAneous Q TUE, THU & SAT    loperamide (IMODIUM) capsule 2 mg  2 mg Oral Q4H PRN    nystatin (MYCOSTATIN) 100,000 unit/gram powder   Topical BID    insulin lispro (HUMALOG) injection 2 Units  2 Units SubCUTAneous TIDAC    alum-mag hydroxide-simeth (MYLANTA) oral suspension 30 mL  30 mL Oral Q4H PRN    glucose chewable tablet 16 g  16 g Oral PRN    glucagon (GLUCAGEN) injection 1 mg  1 mg IntraMUSCular PRN    insulin glargine (LANTUS) injection 10 Units  10 Units SubCUTAneous QHS    heparin (porcine) 1,000 unit/mL injection 3,600 Units  3,600 Units IntraCATHeter DIALYSIS PRN    Vancomycin - Pharmacy to Dose  1 Each Other Rx Dosing/Monitoring    piperacillin-tazobactam (ZOSYN) 4.5 g in 0.9% sodium chloride (MBP/ADV) 100 mL MBP  4.5 g IntraVENous Q12H    dextrose 10% infusion 0-250 mL  0-250 mL IntraVENous PRN    0.9% sodium chloride infusion  25 mL/hr IntraVENous DIALYSIS PRN    clopidogreL (PLAVIX) tablet 75 mg  75 mg Oral DAILY    carvediloL (COREG) tablet 12.5 mg  12.5 mg Oral BID    aspirin chewable tablet 81 mg  81 mg Oral DAILY    amLODIPine (NORVASC) tablet 10 mg  10 mg Oral DAILY    allopurinoL (ZYLOPRIM) tablet 100 mg  100 mg Oral DAILY    ascorbic acid (vitamin C) (VITAMIN C) tablet 500 mg  500 mg Oral DAILY    ezetimibe (ZETIA) tablet 10 mg  10 mg Oral DAILY    pantoprazole (PROTONIX) tablet 40 mg  40 mg Oral ACB    sevelamer carbonate (RENVELA) tab 1,600 mg  1,600 mg Oral TID WITH MEALS    vit B Cmplx 3-FA-Vit C-Biotin (NEPHRO NIKITA RX) tablet 1 Tablet  1 Tablet Oral DAILY    sodium chloride (NS) flush 5-40 mL  5-40 mL IntraVENous Q8H    sodium chloride (NS) flush 5-40 mL  5-40 mL IntraVENous PRN    acetaminophen (TYLENOL) tablet 650 mg  650 mg Oral Q6H PRN    Or    acetaminophen (TYLENOL) suppository 650 mg  650 mg Rectal Q6H PRN    bisacodyL (DULCOLAX) suppository 10 mg  10 mg Rectal DAILY PRN    promethazine (PHENERGAN) tablet 12.5 mg  12.5 mg Oral Q6H PRN    Or    ondansetron (ZOFRAN) injection 4 mg  4 mg IntraVENous Q6H PRN    heparin (porcine) injection 5,000 Units  5,000 Units SubCUTAneous Q8H    oxyCODONE-acetaminophen (PERCOCET) 5-325 mg per tablet 1 Tablet  1 Tablet Oral Q6H PRN         Allergy:   No Known Allergies     Objective:     Visit Vitals  BP (!) 156/70 (BP 1 Location: Right upper arm, BP Patient Position: At rest)   Pulse 68   Temp 97.4 °F (36.3 °C)   Resp 16   Ht 6' (1.829 m)   Wt 96.2 kg (212 lb)   SpO2 100%   BMI 28.75 kg/m²         Intake/Output Summary (Last 24 hours) at 12/20/2022 0859  Last data filed at 12/20/2022 0700  Gross per 24 hour   Intake 100 ml   Output 400 ml   Net -300 ml Physical Exam:       General: No acute distress   HENT: Atraumatic and normocephalic   Eyes: Normal conjunctiva   Neck: Supple No JVD or mass   Cardiovascular: Normal S1 & S2, no m/r/g   Pulmonary/Chest Wall: Clear to auscultation bilaterally   Abdominal: Soft and non-tender   Musculoskeletal: No edema S/p Amputation of multiple toes Left foot   Neurological: No focal deficits    HD Access: RIJ TDC +    Data Review:  Lab Results   Component Value Date/Time    Sodium 135 (L) 12/20/2022 06:01 AM    Potassium 6.4 (HH) 12/20/2022 06:01 AM    Chloride 99 (L) 12/20/2022 06:01 AM    CO2 25 12/20/2022 06:01 AM    Anion gap 11 12/20/2022 06:01 AM    Glucose 106 (H) 12/20/2022 06:01 AM    BUN 58 (H) 12/20/2022 06:01 AM    Creatinine 9.93 (H) 12/20/2022 06:01 AM    BUN/Creatinine ratio 6 (L) 12/20/2022 06:01 AM    GFR est AA 13 (L) 07/21/2022 02:15 PM    GFR est non-AA 11 (L) 07/21/2022 02:15 PM    Calcium 8.1 (L) 12/20/2022 06:01 AM     Lab Results   Component Value Date/Time    WBC 13.6 (H) 12/19/2022 05:38 AM    HGB 9.2 (L) 12/19/2022 05:38 AM    HCT 29.4 (L) 12/19/2022 05:38 AM    PLATELET 487 00/35/6475 05:38 AM    MCV 95.8 12/19/2022 05:38 AM     Lab Results   Component Value Date/Time    Calcium 8.1 (L) 12/20/2022 06:01 AM    Phosphorus 4.1 12/18/2022 06:01 AM     No results found for: IRON, FE, TIBC, IBCT, PSAT, FERR  No results found for: FERR      Impression:     ESRD on HD TTS schedule  Hyperkalemia  Left diabetic foot infection w/ gangrenous changes s/p Lt 2/3/4 metatarsal amputation   DM  HTN  PVD, seen by Vasc Surgery, anticipate LLE angio and possible intervention.   Anemia  SHPT    Plan:     Notified HD team to dialysis urgently for hyperkalemia  IV Antibiotic per ID  Cont DALTON for Anemia  Vascular and ID specialists following  AM labs    Estephania Fuentes MD,   Bonifacio Barrera  [phone]

## 2022-12-20 NOTE — PROGRESS NOTES
Hospitalist Progress Note    Patient: Jus Ospina MRN: [id number]  CSN: [id number]    YOB: 1960  Age: 60 y.o. Sex: male    DOA: 12/9/2022 LOS:  LOS: 10 days                Assessment/Plan     Patient Active Problem List   Diagnosis Code    Diabetes mellitus with foot ulcer ([facility] E11.621, L97.509    CAD (coronary artery disease) I25.10    ESRD (end stage renal disease) ([facility] N18.6    Acute hematogenous osteomyelitis of right foot (HCC) M86.071    Cellulitis of right heel L03.115    Diabetic foot ulcer with osteomyelitis ([facility] E11.621, E11.69, L97.509, M86.9    Ulcer of right heel, with necrosis of bone ([facility] L97.414    Infection and inflammatory reaction due to internal fixation device of other site, initial encounter [facility]) T84.69XA    Left arm swelling M79.89    Chest pain at rest R07.9    Abnormal nuclear stress test R94.39    History of anemia due to CKD N18.9, Z86.2    S/P angioplasty with stent Z95.820    Hypertension I10    Leukocytosis D72.829    Diabetic foot infection (HCC) E11.628, L08.9    Diarrhea R19.7    Type 2 diabetes mellitus, with long-term current use of insulin (HCC) E11.9, Z79.4    Acute hematogenous osteomyelitis of left foot (HCC) M86.072    Nondisplaced fracture of second metatarsal bone of left foot S92.325A    Nondisplaced fracture of third metatarsal bone of left foot S92.335A    Closed nondisplaced fracture of fourth metatarsal bone of left foot S92.345A    Positive blood culture R78.81        Chief complaint :  Foot gangrene, DFU  60 y.o. male with history of diabetes, diabetic ulcer, CAD, hyperlipidemia, hypertension end-stage renal disease on HD presented to ER due to left foot lesion. Gangrene of left foot/DFU   PARTIAL AMPUTATION OF LEFT 2ND, 3RD, 4TH METATARSALS, AMPUTATION OF TOES 2,3,4, INCISION ADN DRAINAGE LEFT FOOT on 12/09/2022  Podiatry planning TMA tomorrow  Vascular planning angiogram on Thursday.   ID following  Continue zosyn      Positive blood cx -  Coag negative staph  Likely contaminant contamination      CAD-   Distal RCA to drug-eluting stent in July 2022, continue plavix -   No chest pain    Hypertension -  continue home medication     Diarrhea epigastric pain-  resolved   CT abdomen no acute process  Resolved      End stage renal disease -  on HD        DM  type II   Lantus at night , ssi     Disposition : TBD    Review of systems  General: No fevers or chills. Cardiovascular: No chest pain or pressure. No palpitations. Pulmonary: No shortness of breath. Gastrointestinal: No nausea, vomiting. Physical Exam:  General: Awake, cooperative, no acute distress    HEENT: NC, Atraumatic. PERRLA, anicteric sclerae. Lungs: CTA Bilaterally. No Wheezing/Rhonchi/Rales. Heart:  S1 S2,  No murmur, No Rubs, No Gallops  Abdomen: Soft, Non distended, Non tender. +Bowel sounds,   Extremities: Left foot with dressing. Psych:   Not anxious or agitated. Neurologic:  No acute neurological deficit. Vital signs/Intake and Output:  Visit Vitals  BP (!) 148/63 (BP 1 Location: Right upper arm, BP Patient Position: At rest;Lying)   Pulse 67   Temp 98.3 °F (36.8 °C)   Resp 17   Ht 6' (1.829 m)   Wt 96.2 kg (212 lb)   SpO2 97%   BMI 28.75 kg/m²     Current Shift:  No intake/output data recorded. Last three shifts:  12/18 0701 - 12/19 1900  In: 720 [P.O.:720]  Out: -             Labs: Results:       Chemistry Recent Labs     12/19/22  0538 12/18/22  0601 12/17/22  0656   * 178* 146*    136 135*   K 5.9* 5.5 6.0*   CL 99* 98* 98*   CO2 27 26 25   BUN 49* 34* 58*   CREA 8.27* 6.36* 9.28*   CA 7.9* 8.0* 8.0*   AGAP 10 12 12   BUCR 6* 5* 6*   ALB  --  2.0*  --       CBC w/Diff Recent Labs     12/19/22  0538   WBC 13.6*   RBC 3.07*   HGB 9.2*   HCT 29.4*      GRANS 75*   LYMPH 13*   EOS 3      Cardiac Enzymes No results for input(s): CPK, CKND1, PAULO in the last 72 hours.     No lab exists for component: CKRMB, TROIP   Coagulation No results for input(s): PTP, INR, APTT, INREXT in the last 72 hours. Lipid Panel Lab Results   Component Value Date/Time    Cholesterol, total 188 07/19/2022 03:12 AM    HDL Cholesterol 42 07/19/2022 03:12 AM    LDL, calculated 131.2 (H) 07/19/2022 03:12 AM    VLDL, calculated 14.8 07/19/2022 03:12 AM    Triglyceride 74 07/19/2022 03:12 AM    CHOL/HDL Ratio 4.5 07/19/2022 03:12 AM      BNP No results for input(s): BNPP in the last 72 hours.    Liver Enzymes Recent Labs     12/18/22  0601   ALB 2.0*      Thyroid Studies No results found for: T4, T3U, TSH, TSHEXT     Procedures/imaging: see electronic medical records for all procedures/Xrays and details which were not copied into this note but were reviewed prior to creation of Plan

## 2022-12-20 NOTE — ROUTINE PROCESS
Bedside and Verbal shift change report given to DAVID Echols RN (oncoming nurse) by Zoey Sharp (offgoing nurse). Report included the following information SBAR, Kardex, Intake/Output, and MAR.

## 2022-12-20 NOTE — PROGRESS NOTES
Hospitalist Progress Note    Patient: Carlos Guido MRN: [id number]  [facility]: [id number]    YOB: 1960  Age: 60 y.o. Sex: male    DOA: 12/9/2022 LOS:  LOS: 11 days                Assessment/Plan     Patient Active Problem List   Diagnosis Code    Diabetes mellitus with foot ulcer ([facility] E11.621, L97.509    CAD (coronary artery disease) I25.10    ESRD (end stage renal disease) ([facility] N18.6    Acute hematogenous osteomyelitis of right foot (HCC) M86.071    Cellulitis of right heel L03.115    Diabetic foot ulcer with osteomyelitis ([facility] E11.621, E11.69, L97.509, M86.9    Ulcer of right heel, with necrosis of bone ([facility] L97.414    Infection and inflammatory reaction due to internal fixation device of other site, initial encounter [facility]) T84.69XA    Left arm swelling M79.89    Chest pain at rest R07.9    Abnormal nuclear stress test R94.39    History of anemia due to CKD N18.9, Z86.2    S/P angioplasty with stent Z95.820    Hypertension I10    Leukocytosis D72.829    Diabetic foot infection (HCC) E11.628, L08.9    Diarrhea R19.7    Type 2 diabetes mellitus, with long-term current use of insulin (HCC) E11.9, Z79.4    Acute hematogenous osteomyelitis of left foot (HCC) M86.072    Nondisplaced fracture of second metatarsal bone of left foot S92.325A    Nondisplaced fracture of third metatarsal bone of left foot S92.335A    Closed nondisplaced fracture of fourth metatarsal bone of left foot S92.345A    Positive blood culture R78.81        Chief complaint :  Foot gangrene, DFU  60 y.o. male with history of diabetes, diabetic ulcer, CAD, hyperlipidemia, hypertension end-stage renal disease on HD presented to ER due to left foot lesion. Gangrene of left foot/DFU   PARTIAL AMPUTATION OF LEFT 2ND, 3RD, 4TH METATARSALS, AMPUTATION OF TOES 2,3,4, INCISION ADN DRAINAGE LEFT FOOT on 12/09/2022  Vascular planning angiogram today.  Depending on angiogram podiatry planning on TMA today  ID following  Continue zosyn Positive blood cx -  Coag negative staph  Likely contaminant contamination      CAD-   Distal RCA to drug-eluting stent in July 2022, continue plavix -   No chest pain    Hypertension -  continue home medication     Diarrhea epigastric pain-  resolved   CT abdomen no acute process  Resolved      End stage renal disease -  on HD  Hyperkalemia, correction with HD, follow BMP        DM  type II   Lantus at night , ssi     Disposition : TBD    Review of systems  General: No fevers or chills. Cardiovascular: No chest pain or pressure. No palpitations. Pulmonary: No shortness of breath. Gastrointestinal: No nausea, vomiting. Physical Exam:  General: Awake, cooperative, no acute distress    HEENT: NC, Atraumatic. PERRLA, anicteric sclerae. Lungs: CTA Bilaterally. No Wheezing/Rhonchi/Rales. Heart:  S1 S2,  No murmur, No Rubs, No Gallops  Abdomen: Soft, Non distended, Non tender. +Bowel sounds,   Extremities: Left foot with dressing. Psych:   Not anxious or agitated. Neurologic:  No acute neurological deficit. Vital signs/Intake and Output:  Visit Vitals  BP (!) 153/77   Pulse 71   Temp 97.4 °F (36.3 °C)   Resp 24   Ht 6' (1.829 m)   Wt 96.2 kg (212 lb)   SpO2 97%   BMI 28.75 kg/m²     Current Shift:  No intake/output data recorded. Last three shifts:  12/18 1901 - 12/20 0700  In: 100 [I.V.:100]  Out: 400 [Urine:400]            Labs: Results:       Chemistry Recent Labs     12/20/22  0601 12/19/22  0538 12/18/22  0601   * 138* 178*   * 136 136   K 6.4* 5.9* 5.5   CL 99* 99* 98*   CO2 25 27 26   BUN 58* 49* 34*   CREA 9.93* 8.27* 6.36*   CA 8.1* 7.9* 8.0*   AGAP 11 10 12   BUCR 6* 6* 5*   ALB  --   --  2.0*      CBC w/Diff Recent Labs     12/19/22  0538   WBC 13.6*   RBC 3.07*   HGB 9.2*   HCT 29.4*      GRANS 75*   LYMPH 13*   EOS 3      Cardiac Enzymes No results for input(s): CPK, CKND1, PAULO in the last 72 hours.     No lab exists for component: CKRMB, TROIP   Coagulation No results for input(s): PTP, INR, APTT, INREXT, INREXT in the last 72 hours. Lipid Panel Lab Results   Component Value Date/Time    Cholesterol, total 188 07/19/2022 03:12 AM    HDL Cholesterol 42 07/19/2022 03:12 AM    LDL, calculated 131.2 (H) 07/19/2022 03:12 AM    VLDL, calculated 14.8 07/19/2022 03:12 AM    Triglyceride 74 07/19/2022 03:12 AM    CHOL/HDL Ratio 4.5 07/19/2022 03:12 AM      BNP No results for input(s): BNPP in the last 72 hours.    Liver Enzymes Recent Labs     12/18/22  0601   ALB 2.0*      Thyroid Studies No results found for: T4, T3U, TSH, TSHEXT, TSHEXT     Procedures/imaging: see electronic medical records for all procedures/Xrays and details which were not copied into this note but were reviewed prior to creation of Plan

## 2022-12-20 NOTE — PROGRESS NOTES
Spoke with cath lab yesterday evening and with Dr Francine Padron this morning.  Will be able to proceed with angio today at 2 pm. Continue NPO this morning for angiogram.    Loa Leyden NP, pager  [facility]

## 2022-12-20 NOTE — PROGRESS NOTES
12/20/2022  PT treatment not completed due to:  [x] Off Unit for testing/procedure-vascular procedure  [] Pain  [] Eating  [] Patient too lethargic  [] Nausea/vomiting  [] Dialysis treatment in progress   [] Other:  Will f/u tomorrow accordingly. Thank you.    Vashti Goel, PT

## 2022-12-20 NOTE — PROGRESS NOTES
Pt. Received from IR via bed, sedated but arouses to voice. Dressing to right groin dry and intact. Pt. States left leg pain\"6\" on pain scale but drifts off to sleep. O2 at 2l applied for sao2 88% on room air. 1750 Pt. Drowsy but awake . Sao2 99% on room air. States leg is sore. 1840 Pt. Transferred to room 327 via bed in c/o nurse. Pain level \"4\" on pain scale- soreness left leg. Pt. In stable condition. Dressing to right groin dry and intact.

## 2022-12-20 NOTE — PROGRESS NOTES
Bedside shift change report given to Montserrat Solomon (oncoming nurse) by Maria Luz Moreira RN   (offgoing nurse). Report included the following information SBAR, Kardex, Intake/Output, MAR, and Recent Results.

## 2022-12-20 NOTE — NURSE NAVIGATOR
Case has been rescheduled for tomorrow due to dialysis being done today. Dr. Zach Salvador, Dr. Demetrio Reyes, Unit nurse and OR  aware.

## 2022-12-20 NOTE — PROGRESS NOTES
Podiatry: Will plan left foot surgery tomorrow for TMA at about 6PM.  Vascular may be able to get his angiogram done early Tuesday afternoon, and review the results with me. I will try and save as much of his foot that looks like it will heal.  Otherwise he will need a BKA.     Patient should eat breakfast Tuesday morning then NPO at 10 AM until surgery at [facility].

## 2022-12-20 NOTE — PROGRESS NOTES
Podiatry:    Patient completed his angiogram this afternoon, and was still in the care unit. He was tentatively scheduled for left foot surgery tonight (Tuesday), but, at this point 7 PM, it makes more sense to cancel for tonight, but the patient eat some dinner and rest.  We will schedule him for left foot surgery tomorrow (Wednesday) at 6 PM to try again for his left foot. This will also allow us to review his angiogram for his left lower extremity and talk to vascular to try to determine the best procedure for his left lower extremity.     Patient should be able to eat breakfast Wednesday morning, and then n.p.o. after 10 AM anticipating his left foot surgery at 6 PM.

## 2022-12-20 NOTE — PROGRESS NOTES
Pt educated on procedure and sedation. Verbalized understanding. Pt confirmed NPO status, nothing to eat or drink since 0000.  Pt confirmed no issues with sedation in the past.

## 2022-12-20 NOTE — PROGRESS NOTES
conducted an initial consultation and Spiritual Assessment for Anjum Lane, who is a 60 y.o.,male. Patients Primary Language is: [place]. According to the patients EMR [employer]ism Affiliation is: [facility].     The reason the Patient came to the hospital is:   Patient Active Problem List    Diagnosis Date Noted    Positive blood culture 12/11/2022    Leukocytosis 12/09/2022    Diabetic foot infection ([address] 12/09/2022    Diarrhea 12/09/2022    Acute hematogenous osteomyelitis of left foot ([address] 12/09/2022    Nondisplaced fracture of second metatarsal bone of left foot 12/09/2022    Nondisplaced fracture of third metatarsal bone of left foot 12/09/2022    Closed nondisplaced fracture of fourth metatarsal bone of left foot 12/09/2022    Type 2 diabetes mellitus, with long-term current use of insulin ([facility])     History of anemia due to CKD 07/21/2022    S/P angioplasty with stent 07/21/2022    Hypertension 07/21/2022    Left arm swelling 07/10/2022    Infection and inflammatory reaction due to internal fixation device of other site, initial encounter ([address] 07/05/2022    CAD (coronary artery disease) 06/28/2022    ESRD (end stage renal disease) ([address] 06/28/2022    Acute hematogenous osteomyelitis of right foot ([address] 06/28/2022    Cellulitis of right heel 06/28/2022    Diabetic foot ulcer with osteomyelitis ([address] 06/28/2022    Ulcer of right heel, with necrosis of bone ([address] 06/28/2022    Diabetes mellitus with foot ulcer ([address] 06/27/2022    Chest pain at rest 06/27/2022    Abnormal nuclear stress test 06/27/2022      Patient is going for surgery this afternoon and we prayed together. Very appreciative for visit and prayer. The  provided the following Interventions:  Initiated a relationship of care and support. Explored issues of jenny, belief, spirituality and Confucianist/ritual needs while hospitalized. Listened empathically. Provided chaplaincy education.   Provided information about Spiritual Care Services. Offered prayer and assurance of continued prayers on patients behalf. Chart reviewed. The following outcomes were achieved:  Patient shared limited information about both their medical narrative and spiritual journey/beliefs. Patient processed feeling about current hospitalization. Patient expressed gratitude for pastoral care visit. Assessment:  Patient does not have any Episcopal/cultural needs that will affect patients preferences in health care. There are no further spiritual or Episcopal issues which require intervention at this time. Plan:  Chaplains will continue to follow and will provide pastoral care on an as needed/requested basis.  recommends bedside caregivers page  on duty if patient shows signs of acute spiritual or emotional distress.       Sister Mandaeism [place], [address] [phone]

## 2022-12-21 ENCOUNTER — HOSPITAL ENCOUNTER (INPATIENT)
Dept: VASCULAR SURGERY | Age: 62
Discharge: HOME | DRG: 239 | End: 2022-12-21
Attending: NURSE PRACTITIONER
Payer: MEDICARE

## 2022-12-21 ENCOUNTER — ANESTHESIA EVENT (OUTPATIENT)
Dept: SURGERY | Age: 62
DRG: 239 | End: 2022-12-21
Payer: MEDICARE

## 2022-12-21 ENCOUNTER — ANESTHESIA (OUTPATIENT)
Dept: SURGERY | Age: 62
DRG: 239 | End: 2022-12-21
Payer: MEDICARE

## 2022-12-21 ENCOUNTER — APPOINTMENT (OUTPATIENT)
Dept: GENERAL RADIOLOGY | Age: 62
DRG: 239 | End: 2022-12-21
Attending: PODIATRIST
Payer: MEDICARE

## 2022-12-21 PROBLEM — I96 ISCHEMIC GANGRENE (HCC): Status: RESOLVED | Noted: 2022-12-21 | Resolved: 2022-12-21

## 2022-12-21 PROBLEM — I96 ISCHEMIC GANGRENE (HCC): Status: ACTIVE | Noted: 2022-12-21

## 2022-12-21 LAB
ALBUMIN SERPL-MCNC: 2 G/DL (ref 3.4–5)
ANION GAP SERPL CALC-SCNC: 8 MMOL/L (ref 3–18)
ANION GAP SERPL CALC-SCNC: 8 MMOL/L (ref 3–18)
BUN SERPL-MCNC: 35 MG/DL (ref 7–18)
BUN SERPL-MCNC: 43 MG/DL (ref 7–18)
BUN/CREAT SERPL: 5 (ref 12–20)
BUN/CREAT SERPL: 6 (ref 12–20)
CALCIUM SERPL-MCNC: 7.5 MG/DL (ref 8.5–10.1)
CALCIUM SERPL-MCNC: 7.6 MG/DL (ref 8.5–10.1)
CHLORIDE SERPL-SCNC: 100 MMOL/L (ref 100–111)
CHLORIDE SERPL-SCNC: 100 MMOL/L (ref 100–111)
CO2 SERPL-SCNC: 27 MMOL/L (ref 21–32)
CO2 SERPL-SCNC: 28 MMOL/L (ref 21–32)
CREAT SERPL-MCNC: 6.67 MG/DL (ref 0.6–1.3)
CREAT SERPL-MCNC: 7.55 MG/DL (ref 0.6–1.3)
GLUCOSE BLD STRIP.AUTO-MCNC: 126 MG/DL (ref 70–110)
GLUCOSE BLD STRIP.AUTO-MCNC: 127 MG/DL (ref 70–110)
GLUCOSE BLD STRIP.AUTO-MCNC: 128 MG/DL (ref 70–110)
GLUCOSE BLD STRIP.AUTO-MCNC: 171 MG/DL (ref 70–110)
GLUCOSE BLD STRIP.AUTO-MCNC: 189 MG/DL (ref 70–110)
GLUCOSE SERPL-MCNC: 127 MG/DL (ref 74–99)
GLUCOSE SERPL-MCNC: 201 MG/DL (ref 74–99)
LEFT ABI: 0.89
LEFT POSTERIOR TIBIAL: 144 MMHG
MAGNESIUM SERPL-MCNC: 1.9 MG/DL (ref 1.6–2.6)
PHOSPHATE SERPL-MCNC: 4.9 MG/DL (ref 2.5–4.9)
POTASSIUM SERPL-SCNC: 5.4 MMOL/L (ref 3.5–5.5)
POTASSIUM SERPL-SCNC: 5.5 MMOL/L (ref 3.5–5.5)
RIGHT ABI: 1.19
RIGHT ARM BP: 162 MMHG
RIGHT POSTERIOR TIBIAL: 188 MMHG
SODIUM SERPL-SCNC: 135 MMOL/L (ref 136–145)
SODIUM SERPL-SCNC: 136 MMOL/L (ref 136–145)
VAS LEFT DORSALIS PEDIS BP: 142 MMHG
VAS RIGHT DORSALIS PEDIS BP: 193 MMHG

## 2022-12-21 PROCEDURE — 77030012508 HC MSK AIRWY LMA AMBU -A: Performed by: ANESTHESIOLOGY

## 2022-12-21 PROCEDURE — 80069 RENAL FUNCTION PANEL: CPT

## 2022-12-21 PROCEDURE — 74011000258 HC RX REV CODE- 258: Performed by: EMERGENCY MEDICINE

## 2022-12-21 PROCEDURE — 0Y6N0ZB DETACHMENT AT LEFT FOOT, PARTIAL 2ND RAY, OPEN APPROACH: ICD-10-PCS | Performed by: PODIATRIST

## 2022-12-21 PROCEDURE — 74011636637 HC RX REV CODE- 636/637: Performed by: HOSPITALIST

## 2022-12-21 PROCEDURE — 76210000063 HC OR PH I REC FIRST 0.5 HR: Performed by: PODIATRIST

## 2022-12-21 PROCEDURE — 80048 BASIC METABOLIC PNL TOTAL CA: CPT

## 2022-12-21 PROCEDURE — 74011000250 HC RX REV CODE- 250: Performed by: PODIATRIST

## 2022-12-21 PROCEDURE — 0Y6N0ZD DETACHMENT AT LEFT FOOT, PARTIAL 4TH RAY, OPEN APPROACH: ICD-10-PCS | Performed by: PODIATRIST

## 2022-12-21 PROCEDURE — 74011250636 HC RX REV CODE- 250/636: Performed by: REGISTERED NURSE

## 2022-12-21 PROCEDURE — 76010000131 HC OR TIME 2 TO 2.5 HR: Performed by: PODIATRIST

## 2022-12-21 PROCEDURE — 74011000250 HC RX REV CODE- 250: Performed by: REGISTERED NURSE

## 2022-12-21 PROCEDURE — 74011250636 HC RX REV CODE- 250/636: Performed by: EMERGENCY MEDICINE

## 2022-12-21 PROCEDURE — 83735 ASSAY OF MAGNESIUM: CPT

## 2022-12-21 PROCEDURE — 36415 COLL VENOUS BLD VENIPUNCTURE: CPT

## 2022-12-21 PROCEDURE — 0Y6N0ZC DETACHMENT AT LEFT FOOT, PARTIAL 3RD RAY, OPEN APPROACH: ICD-10-PCS | Performed by: PODIATRIST

## 2022-12-21 PROCEDURE — 77030020782 HC GWN BAIR PAWS FLX 3M -B: Performed by: PODIATRIST

## 2022-12-21 PROCEDURE — 74011000250 HC RX REV CODE- 250: Performed by: HOSPITALIST

## 2022-12-21 PROCEDURE — 74011000258 HC RX REV CODE- 258: Performed by: REGISTERED NURSE

## 2022-12-21 PROCEDURE — 65270000029 HC RM PRIVATE

## 2022-12-21 PROCEDURE — 77030006788 HC BLD SAW OSC STRY -B: Performed by: PODIATRIST

## 2022-12-21 PROCEDURE — 74011000250 HC RX REV CODE- 250: Performed by: STUDENT IN AN ORGANIZED HEALTH CARE EDUCATION/TRAINING PROGRAM

## 2022-12-21 PROCEDURE — 93922 UPR/L XTREMITY ART 2 LEVELS: CPT

## 2022-12-21 PROCEDURE — 77030040361 HC SLV COMPR DVT MDII -B: Performed by: PODIATRIST

## 2022-12-21 PROCEDURE — 74011250637 HC RX REV CODE- 250/637: Performed by: HOSPITALIST

## 2022-12-21 PROCEDURE — 76060000035 HC ANESTHESIA 2 TO 2.5 HR: Performed by: PODIATRIST

## 2022-12-21 PROCEDURE — 74011250636 HC RX REV CODE- 250/636: Performed by: PODIATRIST

## 2022-12-21 PROCEDURE — 88311 DECALCIFY TISSUE: CPT

## 2022-12-21 PROCEDURE — 73620 X-RAY EXAM OF FOOT: CPT

## 2022-12-21 PROCEDURE — 88307 TISSUE EXAM BY PATHOLOGIST: CPT

## 2022-12-21 PROCEDURE — 77030031139 HC SUT VCRL2 J&J -A: Performed by: PODIATRIST

## 2022-12-21 PROCEDURE — 74011000250 HC RX REV CODE- 250

## 2022-12-21 PROCEDURE — 2709999900 HC NON-CHARGEABLE SUPPLY: Performed by: PODIATRIST

## 2022-12-21 PROCEDURE — 77030002916 HC SUT ETHLN J&J -A: Performed by: PODIATRIST

## 2022-12-21 PROCEDURE — 82962 GLUCOSE BLOOD TEST: CPT

## 2022-12-21 RX ORDER — NALOXONE HYDROCHLORIDE 0.4 MG/ML
0.04 INJECTION, SOLUTION INTRAMUSCULAR; INTRAVENOUS; SUBCUTANEOUS
Status: DISCONTINUED | OUTPATIENT
Start: 2022-12-21 | End: 2022-12-21 | Stop reason: HOSPADM

## 2022-12-21 RX ORDER — NALBUPHINE HYDROCHLORIDE 10 MG/ML
5 INJECTION, SOLUTION INTRAMUSCULAR; INTRAVENOUS; SUBCUTANEOUS
Status: DISCONTINUED | OUTPATIENT
Start: 2022-12-21 | End: 2022-12-21 | Stop reason: HOSPADM

## 2022-12-21 RX ORDER — MIDAZOLAM HYDROCHLORIDE 1 MG/ML
INJECTION, SOLUTION INTRAMUSCULAR; INTRAVENOUS AS NEEDED
Status: DISCONTINUED | OUTPATIENT
Start: 2022-12-21 | End: 2022-12-21 | Stop reason: HOSPADM

## 2022-12-21 RX ORDER — LIDOCAINE HYDROCHLORIDE 20 MG/ML
INJECTION, SOLUTION EPIDURAL; INFILTRATION; INTRACAUDAL; PERINEURAL AS NEEDED
Status: DISCONTINUED | OUTPATIENT
Start: 2022-12-21 | End: 2022-12-21 | Stop reason: HOSPADM

## 2022-12-21 RX ORDER — DIPHENHYDRAMINE HYDROCHLORIDE 50 MG/ML
12.5 INJECTION, SOLUTION INTRAMUSCULAR; INTRAVENOUS
Status: DISCONTINUED | OUTPATIENT
Start: 2022-12-21 | End: 2022-12-21 | Stop reason: HOSPADM

## 2022-12-21 RX ORDER — SODIUM CHLORIDE 9 MG/ML
INJECTION, SOLUTION INTRAVENOUS
Status: DISCONTINUED | OUTPATIENT
Start: 2022-12-21 | End: 2022-12-21 | Stop reason: HOSPADM

## 2022-12-21 RX ORDER — SODIUM CHLORIDE 0.9 % (FLUSH) 0.9 %
5-40 SYRINGE (ML) INJECTION EVERY 8 HOURS
Status: DISCONTINUED | OUTPATIENT
Start: 2022-12-21 | End: 2022-12-21 | Stop reason: HOSPADM

## 2022-12-21 RX ORDER — FENTANYL CITRATE 50 UG/ML
50 INJECTION, SOLUTION INTRAMUSCULAR; INTRAVENOUS
Status: DISCONTINUED | OUTPATIENT
Start: 2022-12-21 | End: 2022-12-21 | Stop reason: HOSPADM

## 2022-12-21 RX ORDER — ONDANSETRON 2 MG/ML
4 INJECTION INTRAMUSCULAR; INTRAVENOUS ONCE
Status: DISCONTINUED | OUTPATIENT
Start: 2022-12-21 | End: 2022-12-21 | Stop reason: HOSPADM

## 2022-12-21 RX ORDER — FENTANYL CITRATE 50 UG/ML
INJECTION, SOLUTION INTRAMUSCULAR; INTRAVENOUS AS NEEDED
Status: DISCONTINUED | OUTPATIENT
Start: 2022-12-21 | End: 2022-12-21 | Stop reason: HOSPADM

## 2022-12-21 RX ORDER — ALBUTEROL SULFATE 0.83 MG/ML
2.5 SOLUTION RESPIRATORY (INHALATION)
Status: DISCONTINUED | OUTPATIENT
Start: 2022-12-21 | End: 2022-12-21 | Stop reason: HOSPADM

## 2022-12-21 RX ORDER — PROPOFOL 10 MG/ML
INJECTION, EMULSION INTRAVENOUS AS NEEDED
Status: DISCONTINUED | OUTPATIENT
Start: 2022-12-21 | End: 2022-12-21 | Stop reason: HOSPADM

## 2022-12-21 RX ORDER — METOCLOPRAMIDE HYDROCHLORIDE 5 MG/ML
INJECTION INTRAMUSCULAR; INTRAVENOUS AS NEEDED
Status: DISCONTINUED | OUTPATIENT
Start: 2022-12-21 | End: 2022-12-21 | Stop reason: HOSPADM

## 2022-12-21 RX ORDER — SODIUM CHLORIDE, SODIUM LACTATE, POTASSIUM CHLORIDE, CALCIUM CHLORIDE 600; 310; 30; 20 MG/100ML; MG/100ML; MG/100ML; MG/100ML
50 INJECTION, SOLUTION INTRAVENOUS CONTINUOUS
Status: DISCONTINUED | OUTPATIENT
Start: 2022-12-21 | End: 2022-12-21 | Stop reason: HOSPADM

## 2022-12-21 RX ORDER — HYDROMORPHONE HYDROCHLORIDE 1 MG/ML
0.5 INJECTION, SOLUTION INTRAMUSCULAR; INTRAVENOUS; SUBCUTANEOUS AS NEEDED
Status: DISCONTINUED | OUTPATIENT
Start: 2022-12-21 | End: 2022-12-21 | Stop reason: HOSPADM

## 2022-12-21 RX ORDER — ONDANSETRON 2 MG/ML
INJECTION INTRAMUSCULAR; INTRAVENOUS AS NEEDED
Status: DISCONTINUED | OUTPATIENT
Start: 2022-12-21 | End: 2022-12-21 | Stop reason: HOSPADM

## 2022-12-21 RX ORDER — BUPIVACAINE HYDROCHLORIDE 5 MG/ML
INJECTION, SOLUTION EPIDURAL; INTRACAUDAL AS NEEDED
Status: DISCONTINUED | OUTPATIENT
Start: 2022-12-21 | End: 2022-12-21 | Stop reason: HOSPADM

## 2022-12-21 RX ORDER — SODIUM CHLORIDE 0.9 % (FLUSH) 0.9 %
5-40 SYRINGE (ML) INJECTION AS NEEDED
Status: DISCONTINUED | OUTPATIENT
Start: 2022-12-21 | End: 2022-12-21 | Stop reason: HOSPADM

## 2022-12-21 RX ADMIN — FENTANYL CITRATE 50 MCG: 50 INJECTION, SOLUTION INTRAMUSCULAR; INTRAVENOUS at 18:47

## 2022-12-21 RX ADMIN — Medication 2 UNITS: at 08:58

## 2022-12-21 RX ADMIN — LIDOCAINE HYDROCHLORIDE 100 MG: 20 INJECTION, SOLUTION INTRAVENOUS at 18:39

## 2022-12-21 RX ADMIN — SODIUM CHLORIDE, PRESERVATIVE FREE 10 ML: 5 INJECTION INTRAVENOUS at 13:25

## 2022-12-21 RX ADMIN — SODIUM CHLORIDE: 9 INJECTION, SOLUTION INTRAVENOUS at 18:34

## 2022-12-21 RX ADMIN — CARVEDILOL 12.5 MG: 12.5 TABLET, FILM COATED ORAL at 22:11

## 2022-12-21 RX ADMIN — SODIUM CHLORIDE, PRESERVATIVE FREE 10 ML: 5 INJECTION INTRAVENOUS at 06:50

## 2022-12-21 RX ADMIN — MIDAZOLAM HYDROCHLORIDE 2 MG: 1 INJECTION, SOLUTION INTRAMUSCULAR; INTRAVENOUS at 18:35

## 2022-12-21 RX ADMIN — Medication 2 UNITS: at 12:12

## 2022-12-21 RX ADMIN — AMLODIPINE BESYLATE 10 MG: 5 TABLET ORAL at 08:57

## 2022-12-21 RX ADMIN — NYSTATIN: 100000 POWDER TOPICAL at 21:00

## 2022-12-21 RX ADMIN — ONDANSETRON HYDROCHLORIDE 4 MG: 2 INJECTION INTRAMUSCULAR; INTRAVENOUS at 18:48

## 2022-12-21 RX ADMIN — Medication 500 MG: at 08:57

## 2022-12-21 RX ADMIN — SODIUM CHLORIDE, PRESERVATIVE FREE 10 ML: 5 INJECTION INTRAVENOUS at 22:00

## 2022-12-21 RX ADMIN — PROPOFOL 200 MG: 10 INJECTION, EMULSION INTRAVENOUS at 18:40

## 2022-12-21 RX ADMIN — PIPERACILLIN AND TAZOBACTAM 4.5 G: 4; .5 INJECTION, POWDER, FOR SOLUTION INTRAVENOUS at 05:09

## 2022-12-21 RX ADMIN — FENTANYL CITRATE 50 MCG: 50 INJECTION, SOLUTION INTRAMUSCULAR; INTRAVENOUS at 19:27

## 2022-12-21 RX ADMIN — CARVEDILOL 12.5 MG: 12.5 TABLET, FILM COATED ORAL at 08:57

## 2022-12-21 RX ADMIN — ALLOPURINOL 100 MG: 100 TABLET ORAL at 08:57

## 2022-12-21 RX ADMIN — EZETIMIBE 10 MG: 10 TABLET ORAL at 08:57

## 2022-12-21 RX ADMIN — VANCOMYCIN HYDROCHLORIDE 1000 MG: 1 INJECTION, POWDER, LYOPHILIZED, FOR SOLUTION INTRAVENOUS at 12:12

## 2022-12-21 RX ADMIN — SODIUM CHLORIDE, PRESERVATIVE FREE 10 ML: 5 INJECTION INTRAVENOUS at 13:24

## 2022-12-21 RX ADMIN — B-COMPLEX W/ C & FOLIC ACID TAB 1 MG 1 TABLET: 1 TAB at 08:57

## 2022-12-21 RX ADMIN — PIPERACILLIN AND TAZOBACTAM 4.5 G: 4; .5 INJECTION, POWDER, FOR SOLUTION INTRAVENOUS at 13:24

## 2022-12-21 RX ADMIN — INSULIN GLARGINE 10 UNITS: 100 INJECTION, SOLUTION SUBCUTANEOUS at 22:11

## 2022-12-21 RX ADMIN — NYSTATIN: 100000 POWDER TOPICAL at 09:00

## 2022-12-21 RX ADMIN — PANTOPRAZOLE SODIUM 40 MG: 40 TABLET, DELAYED RELEASE ORAL at 08:57

## 2022-12-21 RX ADMIN — METOCLOPRAMIDE 10 MG: 5 INJECTION, SOLUTION INTRAMUSCULAR; INTRAVENOUS at 18:48

## 2022-12-21 RX ADMIN — SEVELAMER CARBONATE 1600 MG: 800 TABLET, FILM COATED ORAL at 08:57

## 2022-12-21 NOTE — PROGRESS NOTES
Pt is aware he is NPO after breakfast to prepare for surgery later today; morning blood thinner meds held. No new concerns at this time.

## 2022-12-21 NOTE — PROGRESS NOTES
12/21/2022  PT treatment not completed due to:  [x] Off Unit for testing/procedure  [] Pain  [] Eating  [] Patient too lethargic  [] Nausea/vomiting  [] Dialysis treatment in progress   [x] Other: pt off the  floor for vascular procedure  earlier and now having surgery L foot per nurse. Will f/up as appropriate tomorrow. Thank you.    Eulogio Apley, PT

## 2022-12-21 NOTE — ANESTHESIA PREPROCEDURE EVALUATION
Relevant Problems   CARDIOVASCULAR   (+) CAD (coronary artery disease)   (+) Hypertension      RENAL FAILURE   (+) ESRD (end stage renal disease) (HCC)      ENDOCRINE   (+) Diabetes mellitus with foot ulcer (HCC)   (+) Type 2 diabetes mellitus, with long-term current use of insulin (HCC)      HEMATOLOGY   (+) Acute hematogenous osteomyelitis of left foot (HCC)   (+) Acute hematogenous osteomyelitis of right foot (HCC)       Anesthetic History   No history of anesthetic complications            Review of Systems / Medical History  Patient summary reviewed, nursing notes reviewed and pertinent labs reviewed    Pulmonary        Sleep apnea           Neuro/Psych              Cardiovascular    Hypertension          CAD         GI/Hepatic/Renal         Renal disease: dialysis       Endo/Other    Diabetes    Obesity     Other Findings              Physical Exam    Airway  Mallampati: II  TM Distance: 4 - 6 cm  Neck ROM: normal range of motion   Mouth opening: Normal     Cardiovascular  Regular rate and rhythm,  S1 and S2 normal,  no murmur, click, rub, or gallop             Dental  No notable dental hx       Pulmonary  Breath sounds clear to auscultation               Abdominal  GI exam deferred       Other Findings            Anesthetic Plan    ASA: 3  Anesthesia type: general          Induction: Intravenous  Anesthetic plan and risks discussed with: Patient

## 2022-12-21 NOTE — PROGRESS NOTES
Bedside shift change report given to gold ALVAREZ (oncoming nurse) by Ani Garsia RN   (offgoing nurse). Report included the following information SBAR, Kardex, Procedure Summary, MAR, and Recent Results.

## 2022-12-21 NOTE — PROGRESS NOTES
Occupational Therapy Treatment Attempt     Chart reviewed. Attempted Occupational Therapy Treatment, however, patient unable to be seen due to:  []  Nausea/vomiting  []  Eating  []  Pain  []  Patient too lethargic  [x]  Off Unit for testing/procedure  []  Dialysis treatment in progress  []  Telemetry Results  []  Other:      Will f/u later as patient's schedule allows.   David Mendoza, OTR/L

## 2022-12-21 NOTE — PROGRESS NOTES
Nephrology Inpatient Progress note    Subjective:     Farida Solomon is a 60 y.o. male with a PMHx of  DM, HTN. PVD, ESRD on HD TTS at [facility] under the [address] [place] [address]. Nephrology sent in for admission by wound care clinic due to left foot infection ,was told he needed surgery. Podiatry has been in to see pt . He has been on abx recently     S/P Lt 2/3/4 metatarsal amputation  S/p HD yesterday, stable today. 2.5L off yesterday.     Admit Date: 12/9/2022  Active Problems:    Diabetes mellitus with foot ulcer ([address] (6/27/2022)      CAD (coronary artery disease) (6/28/2022)      ESRD (end stage renal disease) ([address] (6/28/2022)      Hypertension (7/21/2022)      Leukocytosis (12/9/2022)      Diabetic foot infection ([address] (12/9/2022)      Diarrhea (12/9/2022)      Type 2 diabetes mellitus, with long-term current use of insulin ([facility]) ()      Acute hematogenous osteomyelitis of left foot ([address] (12/9/2022)      Nondisplaced fracture of second metatarsal bone of left foot (12/9/2022)      Nondisplaced fracture of third metatarsal bone of left foot (12/9/2022)      Closed nondisplaced fracture of fourth metatarsal bone of left foot (12/9/2022)      Positive blood culture (12/11/2022)    Current Facility-Administered Medications   Medication Dose Route Frequency    vancomycin (VANCOCIN) 1,000 mg in 0.9% sodium chloride 250 mL (VIAL-MATE)  1,000 mg IntraVENous ONCE    sodium chloride (NS) flush 5-40 mL  5-40 mL IntraVENous Q8H    sodium chloride (NS) flush 5-40 mL  5-40 mL IntraVENous PRN    fentaNYL citrate (PF) injection  mcg   mcg IntraVENous Rad Multiple    midazolam (VERSED) injection 0.5-2 mg  0.5-2 mg IntraVENous Rad Multiple    naloxone (NARCAN) injection 0.1 mg  0.1 mg IntraVENous Multiple    flumazeniL (ROMAZICON) 0.1 mg/mL injection 0.2 mg  0.2 mg IntraVENous Multiple    heparin (porcine) 1,000 unit/mL injection 1,000-10,000 Units  1,000-10,000 Units IntraVENous Rad Multiple    nitroGLYcerin 0.1 mg/mL in D5W injection  1-5 mL IntraarTERial Rad Multiple    heparinized saline 2 units/mL infusion 2,000 Units  1,000 mL Irrigation RAD CONTINUOUS    iopamidoL (ISOVUE 250) 250 mg iodine /mL (51 %) contrast injection 1-150 mL  1-150 mL IntraarTERial RAD CONTINUOUS    epoetin lisette-epbx (RETACRIT) injection 8,000 Units  8,000 Units SubCUTAneous Q TUE, THU & SAT    loperamide (IMODIUM) capsule 2 mg  2 mg Oral Q4H PRN    nystatin (MYCOSTATIN) 100,000 unit/gram powder   Topical BID    insulin lispro (HUMALOG) injection 2 Units  2 Units SubCUTAneous TIDAC    alum-mag hydroxide-simeth (MYLANTA) oral suspension 30 mL  30 mL Oral Q4H PRN    glucose chewable tablet 16 g  16 g Oral PRN    glucagon (GLUCAGEN) injection 1 mg  1 mg IntraMUSCular PRN    insulin glargine (LANTUS) injection 10 Units  10 Units SubCUTAneous QHS    heparin (porcine) 1,000 unit/mL injection 3,600 Units  3,600 Units IntraCATHeter DIALYSIS PRN    Vancomycin - Pharmacy to Dose  1 Each Other Rx Dosing/Monitoring    piperacillin-tazobactam (ZOSYN) 4.5 g in 0.9% sodium chloride (MBP/ADV) 100 mL MBP  4.5 g IntraVENous Q12H    dextrose 10% infusion 0-250 mL  0-250 mL IntraVENous PRN    0.9% sodium chloride infusion  25 mL/hr IntraVENous DIALYSIS PRN    clopidogreL (PLAVIX) tablet 75 mg  75 mg Oral DAILY    carvediloL (COREG) tablet 12.5 mg  12.5 mg Oral BID    aspirin chewable tablet 81 mg  81 mg Oral DAILY    amLODIPine (NORVASC) tablet 10 mg  10 mg Oral DAILY    allopurinoL (ZYLOPRIM) tablet 100 mg  100 mg Oral DAILY    ascorbic acid (vitamin C) (VITAMIN C) tablet 500 mg  500 mg Oral DAILY    ezetimibe (ZETIA) tablet 10 mg  10 mg Oral DAILY    pantoprazole (PROTONIX) tablet 40 mg  40 mg Oral ACB    sevelamer carbonate (RENVELA) tab 1,600 mg  1,600 mg Oral TID WITH MEALS    vit B Cmplx 3-FA-Vit C-Biotin (NEPHRO NIKITA RX) tablet 1 Tablet  1 Tablet Oral DAILY    sodium chloride (NS) flush 5-40 mL  5-40 mL IntraVENous Q8H    sodium chloride (NS) flush 5-40 mL  5-40 mL IntraVENous PRN    acetaminophen (TYLENOL) tablet 650 mg  650 mg Oral Q6H PRN    Or    acetaminophen (TYLENOL) suppository 650 mg  650 mg Rectal Q6H PRN    bisacodyL (DULCOLAX) suppository 10 mg  10 mg Rectal DAILY PRN    promethazine (PHENERGAN) tablet 12.5 mg  12.5 mg Oral Q6H PRN    Or    ondansetron (ZOFRAN) injection 4 mg  4 mg IntraVENous Q6H PRN    heparin (porcine) injection 5,000 Units  5,000 Units SubCUTAneous Q8H    oxyCODONE-acetaminophen (PERCOCET) 5-325 mg per tablet 1 Tablet  1 Tablet Oral Q6H PRN         Allergy:   No Known Allergies     Objective:     Visit Vitals  BP (!) 143/59 (BP 1 Location: Right upper arm, BP Patient Position: At rest)   Pulse 70   Temp 98.6 °F (37 °C)   Resp 18   Ht 6' (1.829 m)   Wt 97.8 kg (215 lb 9.8 oz)   SpO2 96%   BMI 29.24 kg/m²         Intake/Output Summary (Last 24 hours) at 12/21/2022 8289  Last data filed at 12/21/2022 0901  Gross per 24 hour   Intake 240 ml   Output --   Net 240 ml         Physical Exam:       General: No acute distress   HENT: Atraumatic and normocephalic   Eyes: Normal conjunctiva   Neck: Supple No JVD or mass   Cardiovascular: Normal S1 & S2, no m/r/g   Pulmonary/Chest Wall: Clear to auscultation bilaterally   Abdominal: Soft and +NABS   Musculoskeletal: No edema S/p Amputation of multiple toes Left foot   Neurological: No focal deficits    HD [facility]: [facility] +    Data Review:  Lab Results   Component Value Date/Time    Sodium 135 (L) 12/21/2022 02:13 AM    Potassium 5.4 12/21/2022 02:13 AM    Chloride 100 12/21/2022 02:13 AM    CO2 27 12/21/2022 02:13 AM    Anion gap 8 12/21/2022 02:13 AM    Glucose 201 (H) 12/21/2022 02:13 AM    BUN 35 (H) 12/21/2022 02:13 AM    Creatinine 6.67 (H) 12/21/2022 02:13 AM    BUN/Creatinine ratio 5 (L) 12/21/2022 02:13 AM    GFR est AA 13 (L) 07/21/2022 02:15 PM    GFR est non-AA 11 (L) 07/21/2022 02:15 PM    Calcium 7.5 (L) 12/21/2022 02:13 AM     Lab Results   Component Value Date/Time    WBC 13.6 (H) 12/19/2022 05:38 AM    HGB 9.2 (L) 12/19/2022 05:38 AM    HCT 29.4 (L) 12/19/2022 05:38 AM    PLATELET 334 25/43/7361 05:38 AM    MCV 95.8 12/19/2022 05:38 AM     Lab Results   Component Value Date/Time    Calcium 7.5 (L) 12/21/2022 02:13 AM    Phosphorus 4.9 12/21/2022 02:13 AM     No results found for: IRON, FE, TIBC, IBCT, PSAT, FERR  No results found for: FERR      Impression:     ESRD on HD TTS schedule  Hyperkalemia, resolved  Left diabetic foot infection w/ gangrenous changes s/p Lt 2/3/4 metatarsal amputation   DM  HTN  PVD, seen by Vasc Surgery, anticipate LLE angio and possible intervention.   Anemia  SHPT    Plan:     Next HD tomorrow  IV Antibiotic per ID  Cont DALTON for Anemia  Vascular and ID specialists following  AM renal panel and cbc    Sherri Coleman MD,   VIA [facility]  [phone]

## 2022-12-21 NOTE — PROGRESS NOTES
Pt is post procedure day #1 from a LLE angiogram by Dr. Lola Olivares with PTA of the proximal PT and peroneal arteries. He was seen and examined this morning. Right groin access site soft. Will check an PALAK today to see if there is any interval improvement his perfusion to the ankle. Plan for OR today with podiatry Dr. Darion Freed for debridement. Pt may need a repeat angio next week if there is a chance that his TMA is salvageable. Dr. Lola Olivares will discuss this with podiatry post-op.     Rebbecca Cogan NP, pager [phone]  [facility]

## 2022-12-21 NOTE — PROGRESS NOTES
ACUTE HEMODIALYSIS TREATMENT    HEMODIALYSIS ORDERS: Physician: Dr. Elmore Crown: Shara   Duration: 4 hr   BFR: 400   DFR: 800   Dialysate:  Temp 36-37*C   K+  1    Ca+ 2.5   Na 138   Bicarb 35   Wt Readings from Last 1 Encounters:   12/19/22 96.2 kg (212 lb)    Patient Chart [x]   Unable to Obtain []  Dry weight/UF Goal: 2000 ml    Heparin []  Bolus    Units    [] Hourly    Units    [x]None       Pre BP:   140/71   Pulse:  68   Respirations: 18   Temp:  97.9  [] Oral [] Axillary [] Esophageal   Labs: []  Pre  []  Post:   [x] N/A   Additional Orders(medications, blood products, hypotension management): [] Yes   [] No     [x]  Anoop Consent Verified     CATHETER ACCESS:  []N/A   [x]Right   []Left   []IJ   []Fem  [x]Chest wall  []TransHepatic   [] First use X-ray verified     [x]Tunnel    [] Non Tunneled   [x]No S/S infection  []Redness  []Drainage []Cultured []Swelling []Pain   [x]Medical Aseptic Prep Utilized   []Dressing Changed  [x] Biopatch  Date:    []Clotted   [x]Patent   Flows: []Good  [x]Poor  [x]Reversed   If access problem,  notified: [x]Yes    []N/A        GENERAL ASSESSMENT:    LUNGS:  Resp Rate 18   [x] Clear  [] Coarse  [] Crackles  [] Wheezing  [] Diminished         Respirations:  [x]Easy  []Labored  []N/A  Cough:  []Productive  []Dry  [x]N/A      Therapy:  [x]RA  O2 Type:  []NC  Mask: []  NRB    [] BiPaP  Flow:  l/min                   [] Ventilated  [] Intubated  [] Trach     CARDIAC: [x] Regular      [] Irregular   [] Rhythm:          [] Monitored   [] Bedside   [] Remotely monitored     EDEMA: [] None   [x]Generalized  [] Pitting [] 1+   [] 2 +   [] 3+    [] 4+  [] Pedal    SKIN:   [x] Warm  [] Hot     [] Cold   [x] Dry     [] Pale   [] Diaphoretic                  [] Flushed  [] Jaundiced  [] Cyanotic     LOC:    [] Alert      []Oriented:    [] Person     [] Place  []Time               [] Confused  [] Lethargic  [] Medicated  [] Non-responsive  [] Non Verbal   GI / ABDOMEN:                     [] Flat    [] Distended    [x] Soft    [] Firm   []  Obese                   [] Diarrhea   [] FMS [x] Bowel Sounds  [] Nausea  [] Vomiting                   [] NGT  [] OGT    [] PEG  [] Tube Feedings @     / URINE ASSESSMENT:                   [] Voiding  []  Mcmahon  [x] Oliguria  [] Anuria                     [] Incontinent  []  Incontinent Brief   []  PureWick     PAIN:  [x] 0 []1  []2   []3   []4   []5   []6   []7   []8   []9   []10                MOBILITY:  [x] Bed    [] Stretcher      All Vitals and Treatment Details on Attached [address]: THE [facility]          Room # 327    [x] Routine         [] 1st Time Acute/Chronic   [] Urgent      [] Stat            [] Acute Room   [x]  Bedside    [] ICU/CCU     [] ER   Isolation Precautions:  [x] Dialysis    There are currently no Active Isolations     ALLERGIES:     No Known Allergies   Code Status:  Full Code     Hepatitis Status      Lab Results   Component Value Date/Time    Hepatitis B surface Ag <0.10 12/10/2022 06:33 AM    Hepatitis B surface Ab 23.52 12/10/2022 06:33 AM        Current Labs:      Lab Results   Component Value Date/Time    WBC 13.6 (H) 12/19/2022 05:38 AM    HGB 9.2 (L) 12/19/2022 05:38 AM    HCT 29.4 (L) 12/19/2022 05:38 AM    PLATELET 970 08/59/8214 05:38 AM    MCV 95.8 12/19/2022 05:38 AM     Lab Results   Component Value Date/Time    Sodium 136 12/20/2022 07:18 PM    Potassium 4.9 12/20/2022 07:18 PM    Chloride 98 (L) 12/20/2022 07:18 PM    CO2 29 12/20/2022 07:18 PM    Anion gap 9 12/20/2022 07:18 PM    Glucose 107 (H) 12/20/2022 07:18 PM    BUN 29 (H) 12/20/2022 07:18 PM    Creatinine 6.05 (H) 12/20/2022 07:18 PM    BUN/Creatinine ratio 5 (L) 12/20/2022 07:18 PM    GFR est AA 13 (L) 07/21/2022 02:15 PM    GFR est non-AA 11 (L) 07/21/2022 02:15 PM    Calcium 8.1 (L) 12/20/2022 07:18 PM          DIET:  DIET NPO  DIET NPO  DIET NPO     PRIMARY NURSE REPORT:   Pre Dialysis: Marleny Means RN    Time: 0900     EDUCATION:    [x] Patient           Knowledge Basis: []None [x]Minimal [] Substantial [] Unknown  Barriers to learning  [x]N/A  [] Intubated/Trached/Ventilated  [] Sedated/Paralyzed   [] Access Care     [] S&S of infection  [] Fluid Management  [] K+   [x] Procedural    [] Medications   [] Tx Options   [] Transplant   [] Diet      Teaching Tools:  [x] Explain  [] Demo  [] Handouts [] Video  Patient response: [] Verbalized understanding   [x] Requires follow up        [x] Time Out/Safety Check    [x] Extracorporeal Circuit Tested for integrity       RO/HEMODIALYSIS MACHINE SAFETY CHECKS - Before each treatment:        [facility]                                                                     [x] Portable Machine #2 [id number] /RO serial # [id number]                                                                                                       Alarm Test:  Pass time             [x] RO/Machine Log Complete    Machine Temp    36-37*C             Dialysate: pH 7.4    Conductivity: Meter 14   HD Machine  14.1     TCD: 14  Dialyzer Lot # [id number]     Blood Tubing Lot # [id number]     Saline Lot # [id number]     CHLORINE TESTING-Before each treatment and every 4 hours    Total Chlorine: [x] less than 0.1 ppm  Initial Time Check: 0900      4 Hr/2nd Check Time: 1300   (if greater than 0.1 ppm from Primary then every 30 minutes from Secondary)     TREATMENT INITIATION - with Dialysis Precautions:   [x] All Connections Secured              [x] Saline Line Double Clamped   [x] Venous Parameters Set               [x] Arterial Parameters Set    [x] Prime Given 250ml NSS              [x]Air Foam Detector Engaged      Treatment Initiation Note:   Received patient in bed a/o x4 pleasant, VS stable for treatment. Right chest TDC Accessed with not s/s of complication, Treatment initiated and monitored by ROBERTO so [place].      During Treatment Notes:  6433  Vascular access visible with arterial and venous line connections intact. Pt resting comfortably. 1030  Vascular access visible with arterial and venous line connections intact. Pt resting comfortably. 1130  Vascular access visible with arterial and venous line connections intact. Pt resting comfortably. 1245  Vascular access visible with arterial and venous line connections intact. Pt resting comfortably. 1339  Dialysis treatment complete. Medication Dose Volume Route Time Anoop Nurse, Title      KIM Avitia, KIM Coburn RN     Post Assessment  Dialyzer Cleared:   [] Good  [x] Fair  [] Poor  Blood processed:  81.4 L  UF Removed:  2500 Ml    Post /75   Pulse  78 Resp  18  Temp 97.8 Lungs: [x] Clear [] Course  [] Crackles                 []  Wheezing   [] Diminished   Post Tx Vascular Access: [x] N/A Cardiac :[x] Regular   [] Irregular   Rhythm:  [x] Monitored   [] Not Monitored    CVC Catheter: [] N/A  Locking solution: Heparin 1:1000 U  Arterial port 1.8 ml   Venous port 1.8 ml   Edema:  [] None  [x] Generalized                     Skin:[x] Warm  [x] Dry [] Diaphoretic               [] Flushed  [] Pale [] Cyanotic Pain:  [x]0  []1 []2  []3 []4  []5  []6  []7 []8    []9  []10     Post Treatment Note:    Patient completed and  tolerated 4 hrs of hemo dialysis. 2500 ml of fluid taken off. Catheter patent an intact flushed and locked with heparin.      POST TREATMENT PRIMARY NURSE HANDOFF REPORT:   Post Dialysis: Silvestre Haywood RN               Time:  1400       Abbreviations: AVG-arterial venous graft, AVF-arterial venous fistula, IJ-Internal Jugular, Subcl-Subclavian, Fem-Femoral, Tx-treatment, AP/HR-apical heart rate, VSS- Vital Signs Stable, CVC- Central Venous Catheter, DFR-dialysate flow rate, BFR-blood flow rate, AP-arterial pressure, -venous pressure, UF-ultrafiltrate, TMP-transmembrane pressure, Avtar-Venous, Art-Arterial, RO-Reverse Osmosis

## 2022-12-21 NOTE — PROGRESS NOTES
Hospitalist Progress Note    Patient: Sravan Church MRN: [id number]  [facility]: [id number]    YOB: 1960  Age: 60 y.o. Sex: male    DOA: 12/9/2022 LOS:  LOS: 12 days                Assessment/Plan     Patient Active Problem List   Diagnosis Code    Diabetes mellitus with foot ulcer ([facility] E11.621, L97.509    CAD (coronary artery disease) I25.10    ESRD (end stage renal disease) ([facility] N18.6    Acute hematogenous osteomyelitis of right foot (HCC) M86.071    Cellulitis of right heel L03.115    Diabetic foot ulcer with osteomyelitis ([facility] E11.621, E11.69, L97.509, M86.9    Ulcer of right heel, with necrosis of bone ([facility] L97.414    Infection and inflammatory reaction due to internal fixation device of other site, initial encounter [facility]) T84.69XA    Left arm swelling M79.89    Chest pain at rest R07.9    Abnormal nuclear stress test R94.39    History of anemia due to CKD N18.9, Z86.2    S/P angioplasty with stent Z95.820    Hypertension I10    Leukocytosis D72.829    Diabetic foot infection (HCC) E11.628, L08.9    Diarrhea R19.7    Type 2 diabetes mellitus, with long-term current use of insulin (HCC) E11.9, Z79.4    Acute hematogenous osteomyelitis of left foot (HCC) M86.072    Nondisplaced fracture of second metatarsal bone of left foot S92.325A    Nondisplaced fracture of third metatarsal bone of left foot S92.335A    Closed nondisplaced fracture of fourth metatarsal bone of left foot S92.345A    Positive blood culture R78.81        Chief complaint :  Foot gangrene, DFU  60 y.o. male with history of diabetes, diabetic ulcer, CAD, hyperlipidemia, hypertension end-stage renal disease on HD presented to ER due to left foot lesion. Gangrene of left foot/DFU   PARTIAL AMPUTATION OF LEFT 2ND, 3RD, 4TH METATARSALS, AMPUTATION OF TOES 2,3,4, INCISION ADN DRAINAGE LEFT FOOT on 12/09/2022  S/p angiogram with PTA of the proximal PT and peroneal arteries.   podiatry planning on TMA today  ID following  Continue zosyn      Positive blood cx -  Coag negative staph  Likely contaminant contamination      CAD-   Distal RCA to drug-eluting stent in July 2022, continue plavix -   No chest pain    Hypertension -  continue home medication     Diarrhea epigastric pain-  resolved   CT abdomen no acute process  Resolved      End stage renal disease -  on HD  Hyperkalemia, correction with HD, follow BMP        DM  type II   Lantus at night , ssi     Disposition : TBD    Review of systems  General: No fevers or chills. Cardiovascular: No chest pain or pressure. No palpitations. Pulmonary: No shortness of breath. Gastrointestinal: No nausea, vomiting. Physical Exam:  General: Awake, cooperative, no acute distress    HEENT: NC, Atraumatic. PERRLA, anicteric sclerae. Lungs: CTA Bilaterally. No Wheezing/Rhonchi/Rales. Heart:  S1 S2,  No murmur, No Rubs, No Gallops  Abdomen: Soft, Non distended, Non tender. +Bowel sounds,   Extremities: Left foot with dressing. Psych:   Not anxious or agitated. Neurologic:  No acute neurological deficit.          Vital signs/Intake and Output:  Visit Vitals  BP (!) 153/75 (BP 1 Location: Right upper arm, BP Patient Position: At rest;Supine)   Pulse 96   Temp 99 °F (37.2 °C)   Resp 18   Ht 6' (1.829 m)   Wt 97.8 kg (215 lb 9.8 oz)   SpO2 96%   BMI 29.24 kg/m²     Current Shift:  12/21 0701 - 12/21 1900  In: 240 [P.O.:240]  Out: -   Last three shifts:  12/19 1901 - 12/21 0700  In: 100 [I.V.:100]  Out: 400 [Urine:400]            Labs: Results:       Chemistry Recent Labs     12/21/22  1400 12/21/22  0213 12/20/22  1918   * 201* 107*    135* 136   K 5.5 5.4 4.9    100 98*   CO2 28 27 29   BUN 43* 35* 29*   CREA 7.55* 6.67* 6.05*   CA 7.6* 7.5* 8.1*   AGAP 8 8 9   BUCR 6* 5* 5*   ALB  --  2.0*  --       CBC w/Diff Recent Labs     12/19/22  0538   WBC 13.6*   RBC 3.07*   HGB 9.2*   HCT 29.4*      GRANS 75*   LYMPH 13*   EOS 3      Cardiac Enzymes No results for input(s): CPK, CKND1, PAULO in the last 72 hours. No lab exists for component: CKRMB, TROIP   Coagulation No results for input(s): PTP, INR, APTT, INREXT, INREXT in the last 72 hours. Lipid Panel Lab Results   Component Value Date/Time    Cholesterol, total 188 07/19/2022 03:12 AM    HDL Cholesterol 42 07/19/2022 03:12 AM    LDL, calculated 131.2 (H) 07/19/2022 03:12 AM    VLDL, calculated 14.8 07/19/2022 03:12 AM    Triglyceride 74 07/19/2022 03:12 AM    CHOL/HDL Ratio 4.5 07/19/2022 03:12 AM      BNP No results for input(s): BNPP in the last 72 hours.    Liver Enzymes Recent Labs     12/21/22  0213   ALB 2.0*      Thyroid Studies No results found for: T4, T3U, TSH, TSHEXT, TSHEXT     Procedures/imaging: see electronic medical records for all procedures/Xrays and details which were not copied into this note but were reviewed prior to creation of Plan

## 2022-12-21 NOTE — PROGRESS NOTES
Problem: Risk for Spread of Infection  Goal: Prevent transmission of infectious organism to others  Description: Prevent the transmission of infectious organisms to other patients, staff members, and visitors. Outcome: Progressing Towards Goal     Problem: Diabetes Self-Management  Goal: *Using medications safely  Description: State effect of diabetes medications on diabetes; name diabetes medication taking, action and side effects. Outcome: Progressing Towards Goal     Problem: Falls - Risk of  Goal: *Absence of Falls  Description: Document Chloe Weiss Fall Risk and appropriate interventions in the flowsheet. Outcome: Progressing Towards Goal  Note: Fall Risk Interventions:  Mobility Interventions: Bed/chair exit alarm, Communicate number of staff needed for ambulation/transfer, Patient to call before getting OOB         Medication Interventions: Patient to call before getting OOB    Elimination Interventions: Call light in reach, Toileting schedule/hourly rounds    History of Falls Interventions: Bed/chair exit alarm, Door open when patient unattended, Evaluate medications/consider consulting pharmacy         Problem: Chronic Renal Failure  Goal: *Fluid and electrolytes stabilized  Outcome: Progressing Towards Goal     Problem: Pressure Injury - Risk of  Goal: *Prevention of pressure injury  Description: Document Semaj Scale and appropriate interventions in the flowsheet.   Outcome: Progressing Towards Goal  Note: Pressure Injury Interventions:  Sensory Interventions: Avoid rigorous massage over bony prominences, Keep linens dry and wrinkle-free, Minimize linen layers    Moisture Interventions: Absorbent underpads, Apply protective barrier, creams and emollients    Activity Interventions: Pressure redistribution bed/mattress(bed type)    Mobility Interventions: HOB 30 degrees or less, Pressure redistribution bed/mattress (bed type)    Nutrition Interventions: Document food/fluid/supplement intake    Friction and Shear Interventions: HOB 30 degrees or less

## 2022-12-21 NOTE — PROGRESS NOTES
Report called to [address] [facility] in the OR. Pt prepared for surgery. No new concerns at this time.

## 2022-12-21 NOTE — H&P
Date of Surgery Update:  Meg Blue was seen and examined. History and physical has been reviewed. The patient has been examined.  There have been no significant clinical changes since the completion of the originally dated History and Physical.    Signed By: Naz Wahl DPM     December 21, 2022 6:05 PM

## 2022-12-21 NOTE — ROUTINE PROCESS
Bedside shift change report given to Priya Joseph RN (oncoming nurse) by Petar Lopes RN (offgoing nurse). Report included the following information SBAR, Procedure Summary, Intake/Output, MAR, and Recent Results.

## 2022-12-21 NOTE — PROGRESS NOTES
Bedside report received on pt while resting carefully in bed, no complaint voiced, call light within pt's reach.

## 2022-12-21 NOTE — PERIOP NOTES
TRANSFER - IN REPORT:    Verbal report received from [facility], [facility] (name) on Cassius Rivas  being received from (unit) for ordered procedure      Report consisted of patients Situation, Background, Assessment and   Recommendations(SBAR). Information from the following report(s) SBAR, Procedure Summary, Intake/Output, MAR, and Pre Procedure Checklist was reviewed with the receiving nurse. Opportunity for questions and clarification was provided. Assessment completed upon patients arrival to unit and care assumed.

## 2022-12-21 NOTE — PROGRESS NOTES
[facility]  (A Division of [address] [place] [address])    Follow-up Note      Date of Admission: 12/9/2022       Date of Note:  12/21/2022    Summary:  Mr Adriano Fishman is a diabetic 60y AAM who has been fighting chronic R heel sores for months/years, but noted onset of L foot pain along with f/s/c this past week. Had DPM appt this morning and re-directed to ED for urgent admission for surgery. Pretty vague on duration of symptoms, but pretty sure it was this week. Sugars have been elevated. Also has been having stomach issues with pain/anorexia/diarrhea off/on for past month. Seen at [facility] 2wks ago and treated for \"stomach infection\" that required PO vanco.     [employer]ired [employer]-         CC:  \"Pain controlled\"  HPI:  NUrsing in room at time of my visit today. Hungry. Plans for surgical debridement and possible amputation this evening. Fevers over the weekend. Tolerating antibiotics so far. Current Antimicrobials:    Prior Antimicrobials:  Vanco IV (12/9-) #14  Pip/tzb IV (12/9-) #14   PO Vanco (12/9-12/19)       Assessment: Rec / Plan:   Dry Gangrene L foot distally - POD#3  12/9:  ESR 92mm/h  12/9:  PCT 3.12ng/mL  12/14:  ESR >140mm/h  12/14:  CRP 152mg/L  12/14:  PCT 1.74ng/mL  - debridement planned for 12/21 ->Pip/tzb-vanc #12 and POD#12    - > back to OR today for debridement with podiatry    If full/complete amputation, he's done.   IF not, please re-culture  and place tunneled line (CKD)     Recent/likely CDI  -completed po vanc on 12/19 Completed po vanc x 10 days  At risk for recurrence given ongoing Abx therapy   Pseudobacteremia - CoNS  No treatment needed    Peripheral Vascular Disease  - s/p angio on 12/20    ESRD/HD        Microbiology:                12/9 - OR (+) Serratia fonticola (S to pip/all except cefazolin), Alcaligenes faecalis (R FQ), Enterbacter cloacae (still being worked up), and anaerobic Bacteroides vulgatus (BL +) BCx 1/2 (+) CoNS        Lines / Catheters:         R CW [facility] and peripheral        Patient Active Problem List   Diagnosis Code    Diabetes mellitus with foot ulcer ([facility] E11.621, L97.509    CAD (coronary artery disease) I25.10    ESRD (end stage renal disease) ([facility]) N18.6    Acute hematogenous osteomyelitis of right foot ([facility]) M86.071    Cellulitis of right heel L03.115    Diabetic foot ulcer with osteomyelitis ([facility]) E11.621, E11.69, L97.509, M86.9    Ulcer of right heel, with necrosis of bone ([facility]) L97.414    Infection and inflammatory reaction due to internal fixation device of other site, initial encounter [facility]) T84.69XA    Left arm swelling M79.89    Chest pain at rest R07.9    Abnormal nuclear stress test R94.39    History of anemia due to CKD N18.9, Z86.2    S/P angioplasty with stent Z95.820    Hypertension I10    Leukocytosis D72.829    Diabetic foot infection ([facility] E11.628, L08.9    Diarrhea R19.7    Type 2 diabetes mellitus, with long-term current use of insulin ([facility]) E11.9, Z79.4    Acute hematogenous osteomyelitis of left foot ([facility]) M86.072    Nondisplaced fracture of second metatarsal bone of left foot S92.325A    Nondisplaced fracture of third metatarsal bone of left foot S92.335A    Closed nondisplaced fracture of fourth metatarsal bone of left foot S92.345A    Positive blood culture R78.81       Current Facility-Administered Medications   Medication Dose Route Frequency    vancomycin (VANCOCIN) 1,000 mg in 0.9% sodium chloride 250 mL (VIAL-MATE)  1,000 mg IntraVENous ONCE    sodium chloride (NS) flush 5-40 mL  5-40 mL IntraVENous Q8H    sodium chloride (NS) flush 5-40 mL  5-40 mL IntraVENous PRN    fentaNYL citrate (PF) injection  mcg   mcg IntraVENous Rad Multiple    midazolam (VERSED) injection 0.5-2 mg  0.5-2 mg IntraVENous Rad Multiple    naloxone (NARCAN) injection 0.1 mg  0.1 mg IntraVENous Multiple    flumazeniL (ROMAZICON) 0.1 mg/mL injection 0.2 mg  0.2 mg IntraVENous Multiple    heparin (porcine) 1,000 unit/mL injection 1,000-10,000 Units  1,000-10,000 Units IntraVENous Rad Multiple    nitroGLYcerin 0.1 mg/mL in D5W injection  1-5 mL IntraarTERial Rad Multiple    heparinized saline 2 units/mL infusion 2,000 Units  1,000 mL Irrigation RAD CONTINUOUS    iopamidoL (ISOVUE 250) 250 mg iodine /mL (51 %) contrast injection 1-150 mL  1-150 mL IntraarTERial RAD CONTINUOUS    epoetin lisette-epbx (RETACRIT) injection 8,000 Units  8,000 Units SubCUTAneous Q TUE, THU & SAT    loperamide (IMODIUM) capsule 2 mg  2 mg Oral Q4H PRN    nystatin (MYCOSTATIN) 100,000 unit/gram powder   Topical BID    insulin lispro (HUMALOG) injection 2 Units  2 Units SubCUTAneous TIDAC    alum-mag hydroxide-simeth (MYLANTA) oral suspension 30 mL  30 mL Oral Q4H PRN    glucose chewable tablet 16 g  16 g Oral PRN    glucagon (GLUCAGEN) injection 1 mg  1 mg IntraMUSCular PRN    insulin glargine (LANTUS) injection 10 Units  10 Units SubCUTAneous QHS    heparin (porcine) 1,000 unit/mL injection 3,600 Units  3,600 Units IntraCATHeter DIALYSIS PRN    Vancomycin - Pharmacy to Dose  1 Each Other Rx Dosing/Monitoring    piperacillin-tazobactam (ZOSYN) 4.5 g in 0.9% sodium chloride (MBP/ADV) 100 mL MBP  4.5 g IntraVENous Q12H    dextrose 10% infusion 0-250 mL  0-250 mL IntraVENous PRN    0.9% sodium chloride infusion  25 mL/hr IntraVENous DIALYSIS PRN    clopidogreL (PLAVIX) tablet 75 mg  75 mg Oral DAILY    carvediloL (COREG) tablet 12.5 mg  12.5 mg Oral BID    aspirin chewable tablet 81 mg  81 mg Oral DAILY    amLODIPine (NORVASC) tablet 10 mg  10 mg Oral DAILY    allopurinoL (ZYLOPRIM) tablet 100 mg  100 mg Oral DAILY    ascorbic acid (vitamin C) (VITAMIN C) tablet 500 mg  500 mg Oral DAILY    ezetimibe (ZETIA) tablet 10 mg  10 mg Oral DAILY    pantoprazole (PROTONIX) tablet 40 mg  40 mg Oral ACB    sevelamer carbonate (RENVELA) tab 1,600 mg  1,600 mg Oral TID WITH MEALS    vit B Cmplx 3-FA-Vit C-Biotin (NEPHRO NIKITA RX) tablet 1 Tablet  1 Tablet Oral DAILY    sodium chloride (NS) flush 5-40 mL  5-40 mL IntraVENous Q8H    sodium chloride (NS) flush 5-40 mL  5-40 mL IntraVENous PRN    acetaminophen (TYLENOL) tablet 650 mg  650 mg Oral Q6H PRN    Or    acetaminophen (TYLENOL) suppository 650 mg  650 mg Rectal Q6H PRN    bisacodyL (DULCOLAX) suppository 10 mg  10 mg Rectal DAILY PRN    promethazine (PHENERGAN) tablet 12.5 mg  12.5 mg Oral Q6H PRN    Or    ondansetron (ZOFRAN) injection 4 mg  4 mg IntraVENous Q6H PRN    heparin (porcine) injection 5,000 Units  5,000 Units SubCUTAneous Q8H    oxyCODONE-acetaminophen (PERCOCET) 5-325 mg per tablet 1 Tablet  1 Tablet Oral Q6H PRN         Review of Systems - General ROS: negative for - chills, fever, or night sweats  Respiratory ROS: no cough, shortness of breath, or wheezing  Cardiovascular ROS: no chest pain or dyspnea on exertion  Gastrointestinal ROS: no abdominal pain, change in bowel habits, or black or bloody stools       Objective:    Visit Vitals  BP (!) 143/59 (BP 1 Location: Right upper arm, BP Patient Position: At rest)   Pulse 70   Temp 98.6 °F (37 °C)   Resp 18   Ht 6' (1.829 m)   Wt 97.8 kg (215 lb 9.8 oz)   SpO2 96%   BMI 29.24 kg/m²       Temp (24hrs), Av.5 °F (36.9 °C), Min:98.3 °F (36.8 °C), Max:98.7 °F (37.1 °C)      GEN: WDWN AAM in NAD  HEENT: anicteric  CHEST: CTA  CVS:RRR  ABD: NT  EXT: L foot wrapped; right heel with ace wrap and dressing in place.          Lab results:    Chemistry  Recent Labs     22  0213 22  1918 22  0601   * 107* 106*   * 136 135*   K 5.4 4.9 6.4*    98* 99*   CO2 27 29 25   BUN 35* 29* 58*   CREA 6.67* 6.05* 9.93*   CA 7.5* 8.1* 8.1*   AGAP 8 9 11   BUCR 5* 5* 6*   ALB 2.0*  --   --        CBC w/ Diff  Recent Labs     22  0538   WBC 13.6*   RBC 3.07*   HGB 9.2*   HCT 29.4*      GRANS 75*   LYMPH 13*   EOS 3       Microbiology  All Micro Results       Procedure Component Value Units Date/Time    CULTURE, BLOOD 2nd DRAW (required for [facility]) [[id number]] Collected: 12/09/22 1215    Order Status: Completed Specimen: Blood Updated: 12/15/22 0940     Special Requests: LEFT HAND     Culture result: NO GROWTH 6 DAYS       CULTURE, WOUND Rosa Isela Shannon STAIN [[id number]]  (Abnormal)  (Susceptibility) Collected: 12/09/22 2201    Order Status: Completed Specimen: Wound from Foot Updated: 12/15/22 0650     Special Requests: NO SPECIAL REQUESTS        GRAM STAIN RARE WBCS SEEN               2+ APPARENT GRAM VARIABLE RODS           Culture result:       HEAVY Serratia fonticola PIPTAZ = 28, SENSITIVE                  HEAVY ALCALIGENES FAECALIS                  HEAVY ALCALIGENES FAECALIS (2ND COLONY TYPE/STRAIN)                  HEAVY ENTEROBACTER CLOACAE                  HEAVY MIXED SKIN TO ISOLATED          CULTURE, ANAEROBIC [[id number]]  (Abnormal) Collected: 12/09/22 2201    Order Status: Completed Specimen: Foot, left Updated: 12/13/22 1246     Special Requests: NO SPECIAL REQUESTS        Culture result:       MODERATE BACTEROIDES VULGATUS BETA LACTAMASE POSITIVE          CULTURE, BLOOD [[id number]]  (Abnormal) Collected: 12/09/22 1155    Order Status: Completed Specimen: Blood Updated: 12/12/22 0743     Special Requests: LEFT AC     GRAM STAIN       ANAEROBIC BOTTLE GRAM POSITIVE COCCI IN CLUSTERS                  SMEAR CALLED TO AND CORRECTLY REPEATED BY: Oksana Rubin RN 3S 12/10/22 AT 1147 BY ANI           Culture result:       STAPHYLOCOCCUS SPECIES, COAGULASE NEGATIVE GROWING IN 1 OF 2 BOTTLES DRAWN  SITE = LAC          BLOOD CULTURE ID PANEL [[id number]]  (Abnormal) Collected: 12/09/22 1145    Order Status: Completed Specimen: Blood Updated: 12/11/22 0655     Acc. no. from Micro Order [id number]     Enterococcus faecalis Not detected        Enterococcus faecium Not detected        Listeria monocytogenes Not detected        Staphylococcus Detected        Staphylococcus aureus Not detected        Staph epidermidis Detected        Staph lugdunensis Not detected        Streptococcus Not detected        Streptococcus agalactiae (Group B) Not detected        Streptococcus pneumoniae Not detected        Streptococcus pyogenes (Group A) Not detected        Acinetobacter calcoaceticus-baumanii complex Not detected        Bacteroides fragilis Not detected        Enterobacterales species Not detected        Enterobacter cloacae complex Not detected        Escherichia coli Not detected        Klebsiella aerogenes Not detected        Klebsiella oxytoca Not detected        Klebsiella pneumoniae group Not detected        Proteus Not detected        Salmonella Not detected        Serratia marcescens Not detected        Haemophilus influenzae Not detected        Neisseria meningitidis Not detected        Pseudomonas aeruginosa Not detected        Steno maltophilia Not detected        Candida albicans Not detected        Candida auris Not detected        Candida glabrata Not detected        Candida krusei Not detected        Candida parapsilosis Not detected        Candida tropicalis Not detected        Crypto neoformans/gattii Not detected        RESISTANT GENES:            MECA/C (Methicillin resistant gene) Detected        Comment       False positive results may rarely occur.  Correlate with clinical,epidemiologic, and other laboratory findings           Comment: Please see BCID Interpretation Guide in EP[employer] Links       C. DIFFICILE AG & TOXIN A/B [[id number]]     Order Status: Canceled Specimen: Stool              DO Fransisco Cardona [facility]   12/21/2022

## 2022-12-22 LAB
ALBUMIN SERPL-MCNC: 1.9 G/DL (ref 3.4–5)
ANION GAP SERPL CALC-SCNC: 10 MMOL/L (ref 3–18)
BUN SERPL-MCNC: 47 MG/DL (ref 7–18)
BUN/CREAT SERPL: 5 (ref 12–20)
CALCIUM SERPL-MCNC: 7.8 MG/DL (ref 8.5–10.1)
CHLORIDE SERPL-SCNC: 101 MMOL/L (ref 100–111)
CO2 SERPL-SCNC: 24 MMOL/L (ref 21–32)
CREAT SERPL-MCNC: 8.68 MG/DL (ref 0.6–1.3)
ERYTHROCYTE [DISTWIDTH] IN BLOOD BY AUTOMATED COUNT: 17.5 % (ref 11.6–14.5)
GLUCOSE BLD STRIP.AUTO-MCNC: 176 MG/DL (ref 70–110)
GLUCOSE BLD STRIP.AUTO-MCNC: 199 MG/DL (ref 70–110)
GLUCOSE BLD STRIP.AUTO-MCNC: 215 MG/DL (ref 70–110)
GLUCOSE BLD STRIP.AUTO-MCNC: 229 MG/DL (ref 70–110)
GLUCOSE BLD STRIP.AUTO-MCNC: 230 MG/DL (ref 70–110)
GLUCOSE BLD STRIP.AUTO-MCNC: 231 MG/DL (ref 70–110)
GLUCOSE SERPL-MCNC: 219 MG/DL (ref 74–99)
HCT VFR BLD AUTO: 29.3 % (ref 36–48)
HGB BLD-MCNC: 9.2 G/DL (ref 13–16)
MAGNESIUM SERPL-MCNC: 2.2 MG/DL (ref 1.6–2.6)
MCH RBC QN AUTO: 30.7 PG (ref 24–34)
MCHC RBC AUTO-ENTMCNC: 31.4 G/DL (ref 31–37)
MCV RBC AUTO: 97.7 FL (ref 78–100)
NRBC # BLD: 0.03 K/UL (ref 0–0.01)
NRBC BLD-RTO: 0.3 PER 100 WBC
PHOSPHATE SERPL-MCNC: 6 MG/DL (ref 2.5–4.9)
PLATELET # BLD AUTO: 247 K/UL (ref 135–420)
PMV BLD AUTO: 10.3 FL (ref 9.2–11.8)
POTASSIUM SERPL-SCNC: 5.9 MMOL/L (ref 3.5–5.5)
RBC # BLD AUTO: 3 M/UL (ref 4.35–5.65)
SODIUM SERPL-SCNC: 135 MMOL/L (ref 136–145)
VANCOMYCIN SERPL-MCNC: 25.3 UG/ML (ref 5–40)
WBC # BLD AUTO: 11.2 K/UL (ref 4.6–13.2)

## 2022-12-22 PROCEDURE — 74011636637 HC RX REV CODE- 636/637: Performed by: HOSPITALIST

## 2022-12-22 PROCEDURE — 85027 COMPLETE CBC AUTOMATED: CPT

## 2022-12-22 PROCEDURE — 82962 GLUCOSE BLOOD TEST: CPT

## 2022-12-22 PROCEDURE — 80202 ASSAY OF VANCOMYCIN: CPT

## 2022-12-22 PROCEDURE — 90935 HEMODIALYSIS ONE EVALUATION: CPT

## 2022-12-22 PROCEDURE — 74011250636 HC RX REV CODE- 250/636: Performed by: EMERGENCY MEDICINE

## 2022-12-22 PROCEDURE — 65270000029 HC RM PRIVATE

## 2022-12-22 PROCEDURE — 74011000250 HC RX REV CODE- 250: Performed by: STUDENT IN AN ORGANIZED HEALTH CARE EDUCATION/TRAINING PROGRAM

## 2022-12-22 PROCEDURE — 74011250636 HC RX REV CODE- 250/636: Performed by: HOSPITALIST

## 2022-12-22 PROCEDURE — 83735 ASSAY OF MAGNESIUM: CPT

## 2022-12-22 PROCEDURE — 74011000250 HC RX REV CODE- 250: Performed by: HOSPITALIST

## 2022-12-22 PROCEDURE — 74011250637 HC RX REV CODE- 250/637: Performed by: HOSPITALIST

## 2022-12-22 PROCEDURE — 74011250636 HC RX REV CODE- 250/636: Performed by: INTERNAL MEDICINE

## 2022-12-22 PROCEDURE — 74011000258 HC RX REV CODE- 258: Performed by: EMERGENCY MEDICINE

## 2022-12-22 PROCEDURE — 80069 RENAL FUNCTION PANEL: CPT

## 2022-12-22 PROCEDURE — 36415 COLL VENOUS BLD VENIPUNCTURE: CPT

## 2022-12-22 RX ADMIN — PANTOPRAZOLE SODIUM 40 MG: 40 TABLET, DELAYED RELEASE ORAL at 08:28

## 2022-12-22 RX ADMIN — CARVEDILOL 12.5 MG: 12.5 TABLET, FILM COATED ORAL at 08:28

## 2022-12-22 RX ADMIN — CARVEDILOL 12.5 MG: 12.5 TABLET, FILM COATED ORAL at 21:33

## 2022-12-22 RX ADMIN — SODIUM CHLORIDE, PRESERVATIVE FREE 10 ML: 5 INJECTION INTRAVENOUS at 13:32

## 2022-12-22 RX ADMIN — SODIUM CHLORIDE, PRESERVATIVE FREE 10 ML: 5 INJECTION INTRAVENOUS at 22:00

## 2022-12-22 RX ADMIN — ACETAMINOPHEN 650 MG: 325 TABLET ORAL at 13:31

## 2022-12-22 RX ADMIN — NYSTATIN: 100000 POWDER TOPICAL at 21:00

## 2022-12-22 RX ADMIN — Medication 2 UNITS: at 08:27

## 2022-12-22 RX ADMIN — EZETIMIBE 10 MG: 10 TABLET ORAL at 08:28

## 2022-12-22 RX ADMIN — ALLOPURINOL 100 MG: 100 TABLET ORAL at 08:28

## 2022-12-22 RX ADMIN — CLOPIDOGREL BISULFATE 75 MG: 75 TABLET ORAL at 08:28

## 2022-12-22 RX ADMIN — B-COMPLEX W/ C & FOLIC ACID TAB 1 MG 1 TABLET: 1 TAB at 08:28

## 2022-12-22 RX ADMIN — SODIUM CHLORIDE, PRESERVATIVE FREE 10 ML: 5 INJECTION INTRAVENOUS at 06:21

## 2022-12-22 RX ADMIN — HEPARIN SODIUM 5000 UNITS: 5000 INJECTION INTRAVENOUS; SUBCUTANEOUS at 00:41

## 2022-12-22 RX ADMIN — SEVELAMER CARBONATE 1600 MG: 800 TABLET, FILM COATED ORAL at 08:28

## 2022-12-22 RX ADMIN — ACETAMINOPHEN 650 MG: 325 TABLET ORAL at 21:33

## 2022-12-22 RX ADMIN — SEVELAMER CARBONATE 1600 MG: 800 TABLET, FILM COATED ORAL at 12:19

## 2022-12-22 RX ADMIN — Medication 500 MG: at 08:28

## 2022-12-22 RX ADMIN — NYSTATIN: 100000 POWDER TOPICAL at 08:29

## 2022-12-22 RX ADMIN — PIPERACILLIN AND TAZOBACTAM 4.5 G: 4; .5 INJECTION, POWDER, FOR SOLUTION INTRAVENOUS at 00:41

## 2022-12-22 RX ADMIN — SODIUM CHLORIDE, PRESERVATIVE FREE 10 ML: 5 INJECTION INTRAVENOUS at 06:20

## 2022-12-22 RX ADMIN — AMLODIPINE BESYLATE 10 MG: 5 TABLET ORAL at 08:28

## 2022-12-22 RX ADMIN — HEPARIN SODIUM 5000 UNITS: 5000 INJECTION INTRAVENOUS; SUBCUTANEOUS at 08:27

## 2022-12-22 RX ADMIN — PIPERACILLIN AND TAZOBACTAM 4.5 G: 4; .5 INJECTION, POWDER, FOR SOLUTION INTRAVENOUS at 12:19

## 2022-12-22 RX ADMIN — Medication 2 UNITS: at 12:19

## 2022-12-22 RX ADMIN — ASPIRIN 81 MG: 81 TABLET, CHEWABLE ORAL at 08:28

## 2022-12-22 RX ADMIN — EPOETIN ALFA-EPBX 8000 UNITS: 4000 INJECTION, SOLUTION INTRAVENOUS; SUBCUTANEOUS at 21:33

## 2022-12-22 RX ADMIN — INSULIN GLARGINE 10 UNITS: 100 INJECTION, SOLUTION SUBCUTANEOUS at 21:33

## 2022-12-22 RX ADMIN — OXYCODONE AND ACETAMINOPHEN 1 TABLET: 5; 325 TABLET ORAL at 18:14

## 2022-12-22 NOTE — PROGRESS NOTES
Nephrology Inpatient Progress note    Subjective:     Diomedes Land is a 60 y.o. male with a PMHx of  DM, HTN. PVD, ESRD on HD TTS at [facility] under the [address] [place] [address]. Nephrology sent in for admission by wound care clinic due to left foot infection ,was told he needed surgery. Podiatry has been in to see pt .   He has been on abx recently     S/P Lt 2/3/4 metatarsal amputation  No cp/sob, awaiting HD today    Admit Date: 12/9/2022  Active Problems:    Diabetes mellitus with foot ulcer ([address] (6/27/2022)      CAD (coronary artery disease) (6/28/2022)      ESRD (end stage renal disease) ([address] (6/28/2022)      Hypertension (7/21/2022)      Leukocytosis (12/9/2022)      Diabetic foot infection ([address] (12/9/2022)      Diarrhea (12/9/2022)      Type 2 diabetes mellitus, with long-term current use of insulin ([facility]) ()      Acute hematogenous osteomyelitis of left foot ([address] (12/9/2022)      Nondisplaced fracture of second metatarsal bone of left foot (12/9/2022)      Nondisplaced fracture of third metatarsal bone of left foot (12/9/2022)      Closed nondisplaced fracture of fourth metatarsal bone of left foot (12/9/2022)      Positive blood culture (12/11/2022)    Current Facility-Administered Medications   Medication Dose Route Frequency    Vancomycin Random to be drawn prior to HD 12/22/2022  1 Each Other ONCE    sodium chloride (NS) flush 5-40 mL  5-40 mL IntraVENous Q8H    sodium chloride (NS) flush 5-40 mL  5-40 mL IntraVENous PRN    fentaNYL citrate (PF) injection  mcg   mcg IntraVENous Rad Multiple    midazolam (VERSED) injection 0.5-2 mg  0.5-2 mg IntraVENous Rad Multiple    naloxone (NARCAN) injection 0.1 mg  0.1 mg IntraVENous Multiple    flumazeniL (ROMAZICON) 0.1 mg/mL injection 0.2 mg  0.2 mg IntraVENous Multiple    heparin (porcine) 1,000 unit/mL injection 1,000-10,000 Units  1,000-10,000 Units IntraVENous Rad Multiple    nitroGLYcerin 0.1 mg/mL in D5W injection  1-5 mL IntraarTERial Rad Multiple heparinized saline 2 units/mL infusion 2,000 Units  1,000 mL Irrigation RAD CONTINUOUS    iopamidoL (ISOVUE 250) 250 mg iodine /mL (51 %) contrast injection 1-150 mL  1-150 mL IntraarTERial RAD CONTINUOUS    epoetin lisette-epbx (RETACRIT) injection 8,000 Units  8,000 Units SubCUTAneous Q TUE, THU & SAT    loperamide (IMODIUM) capsule 2 mg  2 mg Oral Q4H PRN    nystatin (MYCOSTATIN) 100,000 unit/gram powder   Topical BID    insulin lispro (HUMALOG) injection 2 Units  2 Units SubCUTAneous TIDAC    alum-mag hydroxide-simeth (MYLANTA) oral suspension 30 mL  30 mL Oral Q4H PRN    glucose chewable tablet 16 g  16 g Oral PRN    glucagon (GLUCAGEN) injection 1 mg  1 mg IntraMUSCular PRN    insulin glargine (LANTUS) injection 10 Units  10 Units SubCUTAneous QHS    heparin (porcine) 1,000 unit/mL injection 3,600 Units  3,600 Units IntraCATHeter DIALYSIS PRN    Vancomycin - Pharmacy to Dose  1 Each Other Rx Dosing/Monitoring    piperacillin-tazobactam (ZOSYN) 4.5 g in 0.9% sodium chloride (MBP/ADV) 100 mL MBP  4.5 g IntraVENous Q12H    dextrose 10% infusion 0-250 mL  0-250 mL IntraVENous PRN    0.9% sodium chloride infusion  25 mL/hr IntraVENous DIALYSIS PRN    clopidogreL (PLAVIX) tablet 75 mg  75 mg Oral DAILY    carvediloL (COREG) tablet 12.5 mg  12.5 mg Oral BID    aspirin chewable tablet 81 mg  81 mg Oral DAILY    amLODIPine (NORVASC) tablet 10 mg  10 mg Oral DAILY    allopurinoL (ZYLOPRIM) tablet 100 mg  100 mg Oral DAILY    ascorbic acid (vitamin C) (VITAMIN C) tablet 500 mg  500 mg Oral DAILY    ezetimibe (ZETIA) tablet 10 mg  10 mg Oral DAILY    pantoprazole (PROTONIX) tablet 40 mg  40 mg Oral ACB    sevelamer carbonate (RENVELA) tab 1,600 mg  1,600 mg Oral TID WITH MEALS    vit B Cmplx 3-FA-Vit C-Biotin (NEPHRO NIKITA RX) tablet 1 Tablet  1 Tablet Oral DAILY    sodium chloride (NS) flush 5-40 mL  5-40 mL IntraVENous Q8H    sodium chloride (NS) flush 5-40 mL  5-40 mL IntraVENous PRN    acetaminophen (TYLENOL) tablet 650 mg  650 mg Oral Q6H PRN    Or    acetaminophen (TYLENOL) suppository 650 mg  650 mg Rectal Q6H PRN    bisacodyL (DULCOLAX) suppository 10 mg  10 mg Rectal DAILY PRN    promethazine (PHENERGAN) tablet 12.5 mg  12.5 mg Oral Q6H PRN    Or    ondansetron (ZOFRAN) injection 4 mg  4 mg IntraVENous Q6H PRN    heparin (porcine) injection 5,000 Units  5,000 Units SubCUTAneous Q8H    oxyCODONE-acetaminophen (PERCOCET) 5-325 mg per tablet 1 Tablet  1 Tablet Oral Q6H PRN         Allergy:   No Known Allergies     Objective:     Visit Vitals  BP (!) 142/56   Pulse 65   Temp 98.5 °F (36.9 °C)   Resp 18   Ht 6' (1.829 m)   Wt 97.5 kg (215 lb)   SpO2 100%   BMI 29.16 kg/m²         Intake/Output Summary (Last 24 hours) at 12/22/2022 0919  Last data filed at 12/21/2022 2040  Gross per 24 hour   Intake 50 ml   Output 5 ml   Net 45 ml         Physical Exam:       General: No resp distress   HENT: Atraumatic and normocephalic   Eyes: Normal conjunctiva   Neck: Supple No JVD or mass   Cardiovascular: Normal S1 & S2, no m/r/g   Pulmonary/Chest Wall: Clear to auscultation bilaterally   Abdominal: Soft and +NABS   Musculoskeletal: No edema S/p Amputation of multiple toes Left foot   Neurological: No focal deficits    HD Access: RI TDC +    Data Review:  Lab Results   Component Value Date/Time    Sodium 135 (L) 12/22/2022 04:49 AM    Potassium 5.9 (H) 12/22/2022 04:49 AM    Chloride 101 12/22/2022 04:49 AM    CO2 24 12/22/2022 04:49 AM    Anion gap 10 12/22/2022 04:49 AM    Glucose 219 (H) 12/22/2022 04:49 AM    BUN 47 (H) 12/22/2022 04:49 AM    Creatinine 8.68 (H) 12/22/2022 04:49 AM    BUN/Creatinine ratio 5 (L) 12/22/2022 04:49 AM    GFR est AA 13 (L) 07/21/2022 02:15 PM    GFR est non-AA 11 (L) 07/21/2022 02:15 PM    Calcium 7.8 (L) 12/22/2022 04:49 AM     Lab Results   Component Value Date/Time    WBC 11.2 12/22/2022 04:49 AM    HGB 9.2 (L) 12/22/2022 04:49 AM    HCT 29.3 (L) 12/22/2022 04:49 AM    PLATELET 294 00/73/2225 04:49 AM MCV 97.7 12/22/2022 04:49 AM     Lab Results   Component Value Date/Time    Calcium 7.8 (L) 12/22/2022 04:49 AM    Phosphorus 6.0 (H) 12/22/2022 04:49 AM     No results found for: IRON, FE, TIBC, IBCT, PSAT, FERR  No results found for: FERR      Impression:     ESRD on HD TTS schedule  Hyperkalemia, resolved  Left diabetic foot infection w/ gangrenous changes s/p Lt 2/3/4 metatarsal amputation   DM  HTN  PVD, seen by Vasc Surgery, anticipate LLE angio and possible intervention.   Anemia  SHPT    Plan:     HD today Thursday  IV Antibiotic per ID  Cont DALTON for Anemia  Vascular, podiatry and ID specialists following  D/w HD nurse    Asad Guzman MD,   [facility]if[facility]rera  [phone]

## 2022-12-22 NOTE — PROGRESS NOTES
Bedside and Verbal shift change report given to Chelsie Cm RN (oncoming nurse) by Kellee Ferrera RN (offgoing nurse). Report included the following information SBAR, Kardex, MAR, and Recent Results.

## 2022-12-22 NOTE — BRIEF OP NOTE
Brief Postoperative Note    Patient: Lexy Yoon  YOB: 1960  MRN: [id number]    Date of Procedure: 12/21/2022     Pre-Op Diagnosis: Ischemic gangrene left foot    Post-Op Diagnosis: Same as preoperative diagnosis.       Procedure(s):  AMPUTATION - TRANSMETATARSAL LEFT FOOT    Surgeon(s):  Zheng Lujan DPM    Surgical Assistant: Surg Asst-1: Jeromy Davis    Anesthesia: General     Estimated Blood Loss (mL): Minimal    Complications: None    Specimens:   ID Type Source Tests Collected by Time Destination   1 : LEFT FOREFOOT Preservative Foot, left  Zheng Lujan [facility] 12/21/2022 1904 Pathology        Implants: * No implants in log *    Drains: * No LDAs found *    Findings: Ischemic gangrene left foot    Electronically Signed by Adwoa Neal DPM on 12/21/2022 at 9:07 PM

## 2022-12-22 NOTE — PROGRESS NOTES
Problem: Risk for Spread of Infection  Goal: Prevent transmission of infectious organism to others  Description: Prevent the transmission of infectious organisms to other patients, staff members, and visitors. Outcome: Progressing Towards Goal     Problem: Patient Education:  Go to Education Activity  Goal: Patient/Family Education  Outcome: Progressing Towards Goal     Problem: Diabetes Self-Management  Goal: *Disease process and treatment process  Description: Define diabetes and identify own type of diabetes; list 3 options for treating diabetes. Outcome: Progressing Towards Goal  Goal: *Incorporating nutritional management into lifestyle  Description: Describe effect of type, amount and timing of food on blood glucose; list 3 methods for planning meals. Outcome: Progressing Towards Goal  Goal: *Incorporating physical activity into lifestyle  Description: State effect of exercise on blood glucose levels. Outcome: Progressing Towards Goal  Goal: *Developing strategies to promote health/change behavior  Description: Define the ABC's of diabetes; identify appropriate screenings, schedule and personal plan for screenings. Outcome: Progressing Towards Goal  Goal: *Using medications safely  Description: State effect of diabetes medications on diabetes; name diabetes medication taking, action and side effects. Outcome: Progressing Towards Goal  Goal: *Monitoring blood glucose, interpreting and using results  Description: Identify recommended blood glucose targets  and personal targets. Outcome: Progressing Towards Goal  Goal: *Prevention, detection, treatment of acute complications  Description: List symptoms of hyper- and hypoglycemia; describe how to treat low blood sugar and actions for lowering  high blood glucose level.   Outcome: Progressing Towards Goal  Goal: *Prevention, detection and treatment of chronic complications  Description: Define the natural course of diabetes and describe the relationship of blood glucose levels to long term complications of diabetes. Outcome: Progressing Towards Goal  Goal: *Developing strategies to address psychosocial issues  Description: Describe feelings about living with diabetes; identify support needed and support network  Outcome: Progressing Towards Goal  Goal: *Insulin pump training  Outcome: Progressing Towards Goal  Goal: *Sick day guidelines  Outcome: Progressing Towards Goal  Goal: *Patient Specific Goal (EDIT GOAL, INSERT TEXT)  Outcome: Progressing Towards Goal     Problem: Patient Education: Go to Patient Education Activity  Goal: Patient/Family Education  Outcome: Progressing Towards Goal     Problem: Falls - Risk of  Goal: *Absence of Falls  Description: Document Jaxson Fall Risk and appropriate interventions in the flowsheet. Outcome: Progressing Towards Goal  Note: Fall Risk Interventions:  Mobility Interventions: PT Consult for mobility concerns, OT consult for ADLs         Medication Interventions: Patient to call before getting OOB    Elimination Interventions: Call light in reach, Toileting schedule/hourly rounds    History of Falls Interventions: Room close to nurse's station         Problem: Patient Education: Go to Patient Education Activity  Goal: Patient/Family Education  Outcome: Progressing Towards Goal     Problem: Chronic Renal Failure  Goal: *Fluid and electrolytes stabilized  Outcome: Progressing Towards Goal     Problem: Patient Education: Go to Patient Education Activity  Goal: Patient/Family Education  Outcome: Progressing Towards Goal     Problem: Pressure Injury - Risk of  Goal: *Prevention of pressure injury  Description: Document Semaj Scale and appropriate interventions in the flowsheet.   Outcome: Progressing Towards Goal  Note: Pressure Injury Interventions:  Sensory Interventions: Avoid rigorous massage over bony prominences, Assess changes in LOC, Minimize linen layers    Moisture Interventions: Absorbent underpads, Apply protective barrier, creams and emollients    Activity Interventions: PT/OT evaluation, Pressure redistribution bed/mattress(bed type), Increase time out of bed    Mobility Interventions: Float heels, HOB 30 degrees or less, PT/OT evaluation    Nutrition Interventions: Document food/fluid/supplement intake    Friction and Shear Interventions: HOB 30 degrees or less, Minimize layers                Problem: Patient Education: Go to Patient Education Activity  Goal: Patient/Family Education  Outcome: Progressing Towards Goal     Problem: Patient Education: Go to Patient Education Activity  Goal: Patient/Family Education  Outcome: Progressing Towards Goal

## 2022-12-22 NOTE — PROGRESS NOTES
Pt noted with elevated temp above 100F. PRN tylenol provided with no relief at this time. Page sent to MD Hilton Perez. Awaiting return call. Pt reports some chills but not visibly shaking. HD currently in the room. MD returned call and is aware.

## 2022-12-22 NOTE — PROGRESS NOTES
Comprehensive Nutrition Assessment    Type and Reason for Visit: Reassess    Nutrition Recommendations/Plan:   Continue w/ POC  Monitor % intake. Monitor need for ONS- recommend nepro 1x daily if needed     Malnutrition Assessment:  Malnutrition Status: At risk for malnutrition (specify) (12/16/22 1154)      Nutrition Assessment:    Admitted with Gangrene of left foot, leukocytosis, diabetic foot ulcer. S/p procedure 12/9. S/p AMPUTATION - TRANSMETATARSAL LEFT FOOT 12/21. Last dialysis 12/20 2.5 L removed. Weight + 3lb x1 week. Recent PO of %. Nutrition Related Findings:    K 5.9, Cr 8.68, BUN 47, GFR 6, POC glucose (24hr): 126-189. Vit c, humalog, lantus, protonix, renvela, nephro natividad rx. Wound Type: Surgical incision, Diabetic ulcer    Current Nutrition Intake & Therapies:  Average Meal Intake: %     ADULT DIET Regular; 3 carb choices (45 gm/meal); Less than 60 gm    Anthropometric Measures:  Height: 6' (182.9 cm)  Ideal Body Weight (IBW): 178 lbs (81 kg)     Current Body Wt:  97.5 kg (215 lb) (12/22/22), 119.5 % IBW. Current BMI (kg/m2): 29.2        Weight Adjustment: No adjustment                 BMI Category: Overweight (BMI 25.0-29. 9)    Estimated Daily Nutrient Needs:  Energy Requirements Based On: Formula (MSJ x1.2-1.4)  Weight Used for Energy Requirements: Current  Energy (kcal/day): 3855-7058  Weight Used for Protein Requirements: Current (1.2-1.3g/kg)  Protein (g/day): 115-125  Method Used for Fluid Requirements: 1 ml/kcal  Fluid (ml/day): 1632-0674    Nutrition Diagnosis:   Increased nutrient needs related to renal dysfunction as evidenced by dialysis    Nutrition Interventions:   Food and/or Nutrient Delivery: Continue current diet  Nutrition Education/Counseling: No recommendations at this time  Coordination of Nutrition Care: Continue to monitor while inpatient, Interdisciplinary rounds       Goals:  Previous Goal Met: Goal(s) achieved  Goals: Meet at least 75% of estimated needs, by next RD assessment       Nutrition Monitoring and Evaluation:   Behavioral-Environmental Outcomes: None identified  Food/Nutrient Intake Outcomes: Food and nutrient intake  Physical Signs/Symptoms Outcomes: Biochemical data, Meal time behavior, Nutrition focused physical findings, Skin, Weight    Discharge Planning:    Continue current diet    Alyssa Gan RD

## 2022-12-22 NOTE — PROGRESS NOTES
Hospitalist Progress Note    Patient: Amadou Wiseman MRN: [id number]  [facility]: [id number]    YOB: 1960  Age: 60 y.o. Sex: male    DOA: 12/9/2022 LOS:  LOS: 13 days                Assessment/Plan     Patient Active Problem List   Diagnosis Code    Diabetes mellitus with foot ulcer ([facility] E11.621, L97.509    CAD (coronary artery disease) I25.10    ESRD (end stage renal disease) ([facility] N18.6    Acute hematogenous osteomyelitis of right foot (HCC) M86.071    Cellulitis of right heel L03.115    Diabetic foot ulcer with osteomyelitis ([facility] E11.621, E11.69, L97.509, M86.9    Ulcer of right heel, with necrosis of bone ([facility] L97.414    Infection and inflammatory reaction due to internal fixation device of other site, initial encounter [facility]) T84.69XA    Left arm swelling M79.89    Chest pain at rest R07.9    Abnormal nuclear stress test R94.39    History of anemia due to CKD N18.9, Z86.2    S/P angioplasty with stent Z95.820    Hypertension I10    Leukocytosis D72.829    Diabetic foot infection (HCC) E11.628, L08.9    Diarrhea R19.7    Type 2 diabetes mellitus, with long-term current use of insulin (HCC) E11.9, Z79.4    Acute hematogenous osteomyelitis of left foot (HCC) M86.072    Nondisplaced fracture of second metatarsal bone of left foot S92.325A    Nondisplaced fracture of third metatarsal bone of left foot S92.335A    Closed nondisplaced fracture of fourth metatarsal bone of left foot S92.345A    Positive blood culture R78.81        Chief complaint :  Foot gangrene, DFU  60 y.o. male with history of diabetes, diabetic ulcer, CAD, hyperlipidemia, hypertension end-stage renal disease on HD presented to ER due to left foot lesion. Gangrene of left foot/DFU   PARTIAL AMPUTATION OF LEFT 2ND, 3RD, 4TH METATARSALS, AMPUTATION OF TOES 2,3,4, INCISION ADN DRAINAGE LEFT FOOT on 12/09/2022  S/p angiogram with PTA of the proximal PT and peroneal arteries.   Vascular planning repeat procedure next week, but can be done as outpatient. S/p Left TMA 12/21/2022. ID following  Continue zosyn      Positive blood cx -  Coag negative staph  Likely contaminant contamination      CAD-   Distal RCA to drug-eluting stent in July 2022, continue plavix -   No chest pain    Hypertension -  continue home medication     Diarrhea epigastric pain-  resolved   CT abdomen no acute process  Resolved      End stage renal disease -  on HD       DM  type II   Lantus at night , ssi     Disposition : await placement    Review of systems  General: No fevers or chills. Cardiovascular: No chest pain or pressure. No palpitations. Pulmonary: No shortness of breath. Gastrointestinal: No nausea, vomiting. Physical Exam:  General: Awake, cooperative, no acute distress    HEENT: NC, Atraumatic. PERRLA, anicteric sclerae. Lungs: CTA Bilaterally. No Wheezing/Rhonchi/Rales. Heart:  S1 S2,  No murmur, No Rubs, No Gallops  Abdomen: Soft, Non distended, Non tender. +Bowel sounds,   Extremities: Left foot with dressing. Psych:   Not anxious or agitated. Neurologic:  No acute neurological deficit. Vital signs/Intake and Output:  Visit Vitals  /67   Pulse 79   Temp (!) 101 °F (38.3 °C) (Oral)   Resp 18   Ht 6' (1.829 m)   Wt 97.5 kg (215 lb)   SpO2 98%   BMI 29.16 kg/m²     Current Shift:  12/22 0701 - 12/22 1900  In: 480 [P.O.:480]  Out: -   Last three shifts:  12/20 1901 - 12/22 0700  In: 290 [P.O.:240; I.V.:50]  Out: 5             Labs: Results:       Chemistry Recent Labs     12/22/22  0449 12/21/22  1400 12/21/22  0213   * 127* 201*   * 136 135*   K 5.9* 5.5 5.4    100 100   CO2 24 28 27   BUN 47* 43* 35*   CREA 8.68* 7.55* 6.67*   CA 7.8* 7.6* 7.5*   AGAP 10 8 8   BUCR 5* 6* 5*   ALB 1.9*  --  2.0*      CBC w/Diff Recent Labs     12/22/22  0449   WBC 11.2   RBC 3.00*   HGB 9.2*   HCT 29.3*         Cardiac Enzymes No results for input(s): CPK, CKND1, PAULO in the last 72 hours.     No lab exists for component: CKRMB, TROIP   Coagulation No results for input(s): PTP, INR, APTT, INREXT, INREXT in the last 72 hours. Lipid Panel Lab Results   Component Value Date/Time    Cholesterol, total 188 07/19/2022 03:12 AM    HDL Cholesterol 42 07/19/2022 03:12 AM    LDL, calculated 131.2 (H) 07/19/2022 03:12 AM    VLDL, calculated 14.8 07/19/2022 03:12 AM    Triglyceride 74 07/19/2022 03:12 AM    CHOL/HDL Ratio 4.5 07/19/2022 03:12 AM      BNP No results for input(s): BNPP in the last 72 hours.    Liver Enzymes Recent Labs     12/22/22  0449   ALB 1.9*      Thyroid Studies No results found for: T4, T3U, TSH, TSHEXT, TSHEXT     Procedures/imaging: see electronic medical records for all procedures/Xrays and details which were not copied into this note but were reviewed prior to creation of Plan

## 2022-12-22 NOTE — ANESTHESIA POSTPROCEDURE EVALUATION
Post-Anesthesia Evaluation and Assessment    Patient: Danielito Thurston MRN: [id number]  SSN: [id number]   YOB: 1960  Age: 60 y.o. Sex: male      Cardiovascular Function/Vital Signs  /61   Pulse (!) 58   Temp 36.6 °C (97.8 °F)   Resp 12   Ht 6' (1.829 m)   Wt 97.8 kg (215 lb 9.8 oz)   SpO2 100%   BMI 29.24 kg/m²     Patient is status post Procedure(s) with comments:  AMPUTATION - TRANSMETATARSAL LEFT FOOT - NECROTIC. Nausea/Vomiting: Controlled. Postoperative hydration reviewed and adequate. Pain:  Pain Scale 1: Visual (12/21/22 2050)  Pain Intensity 1: 0 (12/21/22 2050)   Managed. Neurological Status:   Neuro (WDL): Exceptions to WDL (12/21/22 2050)   At baseline. Mental Status and Level of Consciousness: Arousable. Pulmonary Status:   O2 Device: Nasal cannula (12/21/22 2100)   Adequate oxygenation and airway patent. Complications related to anesthesia: None    Post-anesthesia assessment completed. No concerns. Patient has met all discharge requirements. Signed By: Zurdo Hawkins CRNA    December 21, 2022             Procedure(s):  AMPUTATION - TRANSMETATARSAL LEFT FOOT. general    <BSHSIANPOST>    INITIAL Post-op Vital signs:   Vitals Value Taken Time   /62 12/21/22 2103   Temp 36.6 °C (97.8 °F) 12/21/22 2054   Pulse 61 12/21/22 2107   Resp 15 12/21/22 2107   SpO2 98 % 12/21/22 2107   Vitals shown include unvalidated device data.

## 2022-12-22 NOTE — PROGRESS NOTES
Case discussed with Dr. Robe Zamarripa. Pt will have a follow up left lower extremity angiogram tentatively scheduled for next week, Tuesday, 12-27-22. If patient is ready for discharge prior to angiogram next week, his angiogram can be done as an outpatient.

## 2022-12-22 NOTE — OP NOTES
Operative Note    Patient: Luanne Giles  YOB: 1960  MRN: [id number]    Date of Procedure: 12/21/2022     Pre-Op Diagnosis: Ischemic gangrene left foot    Post-Op Diagnosis: Same as preoperative diagnosis.       Procedure(s):  AMPUTATION - TRANSMETATARSAL LEFT FOOT    Surgeon(s):  Davida Oseguera DPM    Surgical Assistant: Surg Asst-1: Leticia Bautista    Anesthesia: General     Estimated Blood Loss (mL):  Minimal    Complications: None    Specimens:   ID Type Source Tests Collected by Time Destination   1 : LEFT FOREFOOT Preservative Foot, left  Davida Oseguera [facility] 12/21/2022 1904 Pathology        Implants: * No implants in log *    Drains: * No LDAs found *    Findings: Ischemic gangrene left foot    Detailed Description of Procedure:   Transmetatarsal amputation left foot    Electronically Signed by Rola Wong DPM on 12/21/2022 at 9:08 PM

## 2022-12-22 NOTE — PERIOP NOTES
TRANSFER - OUT REPORT:    Verbal report given to Nurse on Haris Stringer  being transferred to [address] [place] [address](unit) for routine progression of care       Report consisted of patients Situation, Background, Assessment and   Recommendations(SBAR). Information from the following report(s) SBAR was reviewed with the receiving nurse. Lines:   Peripheral IV 12/10/22 Anterior;Right Forearm (Active)   Site Assessment Clean, dry, & intact 12/21/22 2105   Phlebitis Assessment 0 12/21/22 2105   Infiltration Assessment 0 12/21/22 2105   Dressing Status Clean, dry, & intact 12/21/22 2105   Dressing Type Transparent 12/21/22 2105   Hub Color/Line Status Blue 12/21/22 2105   Action Taken Open ports on tubing capped 12/21/22 0850   Alcohol Cap Used Yes 12/21/22 1700        Opportunity for questions and clarification was provided.       Patient transported with:   Registered Nurse

## 2022-12-22 NOTE — PROGRESS NOTES
Vancomycin - Pharmacy to Dose  Consult provided for this 60y.o. year old ,male for indication of Diabetic Foot Infection. Therapy day 1        Wt Readings from Last 1 Encounters:   12/22/22 97.5 kg (215 lb)       Ht Readings from Last 1 Encounters:   12/16/22 182.9 cm (72\")     Dosing Weight:  97.5 kg     Additional Antibiotics: Zosyn  Date 12/22/2022   Lab Results   Component Value Date/Time    Creatinine 8.68 (H) 12/22/2022 04:49 AM     creatinine clearance:  HD Patient    white blood cell count:  11.2    Last Dose:  Vancomycin 1000 mg IV at 12:12 12/21/2022    Last Level:  25.3 mg/l at 09:45 12/22/2022    No dose for now. Pre-Dialysis goal level ~ 20 mg/l    Pharmacy to follow daily and will make changes to dose and/or frequency based on clinical status.       [address] [facility], [address]

## 2022-12-22 NOTE — PROGRESS NOTES
12/22/2022  PT treatment not completed due to:  [] Off Unit for testing/procedure  [] Pain  [] Eating  [] Patient too lethargic  [] Nausea/vomiting  [x] Dialysis treatment in progress   [] Other:   Will f/u at later time as pt schedule allows. Thank you.    Domenic Romberg, PT

## 2022-12-22 NOTE — PROGRESS NOTES
Occupational Therapy Treatment Attempt     Chart reviewed. Attempted Occupational Therapy Treatment, however, patient unable to be seen due to:  []  Nausea/vomiting  []  Eating  []  Pain  []  Patient too lethargic  []  Off Unit for testing/procedure  [x]  Dialysis treatment in progress  []  Telemetry Results  []  Other:      Will f/u later as patient's schedule allows.   Lalitha Marsh, OTR/L

## 2022-12-22 NOTE — PROGRESS NOTES
Problem: Risk for Spread of Infection  Goal: Prevent transmission of infectious organism to others  Description: Prevent the transmission of infectious organisms to other patients, staff members, and visitors. Outcome: Progressing Towards Goal     Problem: Diabetes Self-Management  Goal: *Disease process and treatment process  Description: Define diabetes and identify own type of diabetes; list 3 options for treating diabetes. Outcome: Progressing Towards Goal  Goal: *Incorporating physical activity into lifestyle  Description: State effect of exercise on blood glucose levels. Outcome: Progressing Towards Goal  Goal: *Using medications safely  Description: State effect of diabetes medications on diabetes; name diabetes medication taking, action and side effects. Outcome: Progressing Towards Goal     Problem: Falls - Risk of  Goal: *Absence of Falls  Description: Document Rosales Garcia Fall Risk and appropriate interventions in the flowsheet. Outcome: Progressing Towards Goal  Note: Fall Risk Interventions:  Mobility Interventions: PT Consult for mobility concerns, OT consult for ADLs         Medication Interventions: Teach patient to arise slowly    Elimination Interventions: Call light in reach    History of Falls Interventions: Room close to nurse's station         Problem: Pressure Injury - Risk of  Goal: *Prevention of pressure injury  Description: Document Semaj Scale and appropriate interventions in the flowsheet.   Outcome: Progressing Towards Goal  Note: Pressure Injury Interventions:  Sensory Interventions: Keep linens dry and wrinkle-free, Pressure redistribution bed/mattress (bed type)    Moisture Interventions: Absorbent underpads, Apply protective barrier, creams and emollients    Activity Interventions: Pressure redistribution bed/mattress(bed type)    Mobility Interventions: HOB 30 degrees or less, Pressure redistribution bed/mattress (bed type)    Nutrition Interventions: Document food/fluid/supplement intake    Friction and Shear Interventions: HOB 30 degrees or less, Minimize layers

## 2022-12-22 NOTE — PERIOP NOTES
[facility] made aware that SBAR is ready for review. Patient assigned room #327.  Brooke Dimas RN will be the nurse

## 2022-12-22 NOTE — PROGRESS NOTES
podiatry:  Patient is status post transmetatarsal amputation left foot secondary to ischemic gangrene. He is nonweightbearing left foot. He should continue his IV antibiotics as per ID. Keanu Solomon from wound care should continue treatment of right heel ulcer, and left foot. This second surgery did show better bleeding into the midfoot. Vascular, Dr. Francine Padron mentioned the possibility of a second try to improve his left lower extremity blood flow this week. I will follow the patient as needed, and after discharge I will see him in the [address] [place] [address] for continued care.

## 2022-12-23 ENCOUNTER — APPOINTMENT (OUTPATIENT)
Dept: GENERAL RADIOLOGY | Age: 62
DRG: 239 | End: 2022-12-23
Attending: FAMILY MEDICINE
Payer: MEDICARE

## 2022-12-23 LAB
ANION GAP SERPL CALC-SCNC: 8 MMOL/L (ref 3–18)
BUN SERPL-MCNC: 22 MG/DL (ref 7–18)
BUN/CREAT SERPL: 4 (ref 12–20)
CALCIUM SERPL-MCNC: 7.8 MG/DL (ref 8.5–10.1)
CHLORIDE SERPL-SCNC: 98 MMOL/L (ref 100–111)
CO2 SERPL-SCNC: 28 MMOL/L (ref 21–32)
CREAT SERPL-MCNC: 5.08 MG/DL (ref 0.6–1.3)
GLUCOSE BLD STRIP.AUTO-MCNC: 194 MG/DL (ref 70–110)
GLUCOSE BLD STRIP.AUTO-MCNC: 204 MG/DL (ref 70–110)
GLUCOSE BLD STRIP.AUTO-MCNC: 230 MG/DL (ref 70–110)
GLUCOSE BLD STRIP.AUTO-MCNC: 253 MG/DL (ref 70–110)
GLUCOSE SERPL-MCNC: 211 MG/DL (ref 74–99)
LACTATE SERPL-SCNC: 2 MMOL/L (ref 0.4–2)
MAGNESIUM SERPL-MCNC: 2 MG/DL (ref 1.6–2.6)
POTASSIUM SERPL-SCNC: 4.8 MMOL/L (ref 3.5–5.5)
SODIUM SERPL-SCNC: 134 MMOL/L (ref 136–145)

## 2022-12-23 PROCEDURE — 74011250636 HC RX REV CODE- 250/636: Performed by: HOSPITALIST

## 2022-12-23 PROCEDURE — 97164 PT RE-EVAL EST PLAN CARE: CPT

## 2022-12-23 PROCEDURE — 97530 THERAPEUTIC ACTIVITIES: CPT

## 2022-12-23 PROCEDURE — 74011250636 HC RX REV CODE- 250/636: Performed by: EMERGENCY MEDICINE

## 2022-12-23 PROCEDURE — 74011000250 HC RX REV CODE- 250: Performed by: HOSPITALIST

## 2022-12-23 PROCEDURE — 80048 BASIC METABOLIC PNL TOTAL CA: CPT

## 2022-12-23 PROCEDURE — 74011636637 HC RX REV CODE- 636/637: Performed by: HOSPITALIST

## 2022-12-23 PROCEDURE — 97110 THERAPEUTIC EXERCISES: CPT

## 2022-12-23 PROCEDURE — 87040 BLOOD CULTURE FOR BACTERIA: CPT

## 2022-12-23 PROCEDURE — 74011250637 HC RX REV CODE- 250/637: Performed by: HOSPITALIST

## 2022-12-23 PROCEDURE — 74011000258 HC RX REV CODE- 258: Performed by: EMERGENCY MEDICINE

## 2022-12-23 PROCEDURE — 74011000258 HC RX REV CODE- 258: Performed by: FAMILY MEDICINE

## 2022-12-23 PROCEDURE — 83605 ASSAY OF LACTIC ACID: CPT

## 2022-12-23 PROCEDURE — 74011000250 HC RX REV CODE- 250: Performed by: STUDENT IN AN ORGANIZED HEALTH CARE EDUCATION/TRAINING PROGRAM

## 2022-12-23 PROCEDURE — 74011250636 HC RX REV CODE- 250/636: Performed by: INTERNAL MEDICINE

## 2022-12-23 PROCEDURE — 74011250636 HC RX REV CODE- 250/636: Performed by: FAMILY MEDICINE

## 2022-12-23 PROCEDURE — 83735 ASSAY OF MAGNESIUM: CPT

## 2022-12-23 PROCEDURE — 36415 COLL VENOUS BLD VENIPUNCTURE: CPT

## 2022-12-23 PROCEDURE — 74011000250 HC RX REV CODE- 250: Performed by: INTERNAL MEDICINE

## 2022-12-23 PROCEDURE — 82962 GLUCOSE BLOOD TEST: CPT

## 2022-12-23 PROCEDURE — 65270000029 HC RM PRIVATE

## 2022-12-23 PROCEDURE — 71045 X-RAY EXAM CHEST 1 VIEW: CPT

## 2022-12-23 RX ORDER — INSULIN GLARGINE 100 [IU]/ML
15 INJECTION, SOLUTION SUBCUTANEOUS
Status: DISCONTINUED | OUTPATIENT
Start: 2022-12-23 | End: 2023-01-12 | Stop reason: HOSPADM

## 2022-12-23 RX ADMIN — ACETAMINOPHEN 650 MG: 325 TABLET ORAL at 17:19

## 2022-12-23 RX ADMIN — Medication 2 UNITS: at 09:21

## 2022-12-23 RX ADMIN — CLOPIDOGREL BISULFATE 75 MG: 75 TABLET ORAL at 09:13

## 2022-12-23 RX ADMIN — OXYCODONE AND ACETAMINOPHEN 1 TABLET: 5; 325 TABLET ORAL at 11:35

## 2022-12-23 RX ADMIN — HEPARIN SODIUM 5000 UNITS: 5000 INJECTION INTRAVENOUS; SUBCUTANEOUS at 16:35

## 2022-12-23 RX ADMIN — Medication 2 UNITS: at 12:36

## 2022-12-23 RX ADMIN — PIPERACILLIN AND TAZOBACTAM 4.5 G: 4; .5 INJECTION, POWDER, FOR SOLUTION INTRAVENOUS at 12:38

## 2022-12-23 RX ADMIN — CARVEDILOL 12.5 MG: 12.5 TABLET, FILM COATED ORAL at 09:13

## 2022-12-23 RX ADMIN — SEVELAMER CARBONATE 1600 MG: 800 TABLET, FILM COATED ORAL at 12:36

## 2022-12-23 RX ADMIN — Medication 2 UNITS: at 16:34

## 2022-12-23 RX ADMIN — ASPIRIN 81 MG: 81 TABLET, CHEWABLE ORAL at 09:13

## 2022-12-23 RX ADMIN — HEPARIN SODIUM 5000 UNITS: 5000 INJECTION INTRAVENOUS; SUBCUTANEOUS at 09:13

## 2022-12-23 RX ADMIN — CARVEDILOL 12.5 MG: 12.5 TABLET, FILM COATED ORAL at 21:47

## 2022-12-23 RX ADMIN — SODIUM CHLORIDE, PRESERVATIVE FREE 10 ML: 5 INJECTION INTRAVENOUS at 13:27

## 2022-12-23 RX ADMIN — PIPERACILLIN AND TAZOBACTAM 4.5 G: 4; .5 INJECTION, POWDER, FOR SOLUTION INTRAVENOUS at 01:15

## 2022-12-23 RX ADMIN — NYSTATIN: 100000 POWDER TOPICAL at 09:14

## 2022-12-23 RX ADMIN — SEVELAMER CARBONATE 1600 MG: 800 TABLET, FILM COATED ORAL at 09:13

## 2022-12-23 RX ADMIN — SODIUM CHLORIDE, PRESERVATIVE FREE 10 ML: 5 INJECTION INTRAVENOUS at 21:49

## 2022-12-23 RX ADMIN — VANCOMYCIN HYDROCHLORIDE 125 MG: 1 INJECTION, POWDER, LYOPHILIZED, FOR SOLUTION INTRAVENOUS at 16:35

## 2022-12-23 RX ADMIN — MEROPENEM 1 G: 1 INJECTION, POWDER, FOR SOLUTION INTRAVENOUS at 21:47

## 2022-12-23 RX ADMIN — Medication 500 MG: at 09:12

## 2022-12-23 RX ADMIN — AMLODIPINE BESYLATE 10 MG: 5 TABLET ORAL at 09:13

## 2022-12-23 RX ADMIN — HEPARIN SODIUM 5000 UNITS: 5000 INJECTION INTRAVENOUS; SUBCUTANEOUS at 01:16

## 2022-12-23 RX ADMIN — NYSTATIN: 100000 POWDER TOPICAL at 21:48

## 2022-12-23 RX ADMIN — ALLOPURINOL 100 MG: 100 TABLET ORAL at 09:13

## 2022-12-23 RX ADMIN — Medication 15 UNITS: at 21:48

## 2022-12-23 RX ADMIN — PANTOPRAZOLE SODIUM 40 MG: 40 TABLET, DELAYED RELEASE ORAL at 09:13

## 2022-12-23 RX ADMIN — B-COMPLEX W/ C & FOLIC ACID TAB 1 MG 1 TABLET: 1 TAB at 09:13

## 2022-12-23 RX ADMIN — SEVELAMER CARBONATE 1600 MG: 800 TABLET, FILM COATED ORAL at 16:35

## 2022-12-23 RX ADMIN — EZETIMIBE 10 MG: 10 TABLET ORAL at 09:13

## 2022-12-23 NOTE — PROGRESS NOTES
Occupational Therapy Treatment Attempt     Chart reviewed. Attempted Occupational Therapy Treatment, however, patient unable to be seen due to:  []  Nausea/vomiting  []  Eating  []  Pain  []  Patient too lethargic  []  Off Unit for testing/procedure  []  Dialysis treatment in progress  []  Telemetry Results  [x]  Other: pt reports fatigued from earlier session with PT. Requests to rest for the day. Will f/u later as patient's schedule allows.   Cuauhtemoc Shea, OTR/L

## 2022-12-23 NOTE — PROGRESS NOTES
Problem: Mobility Impaired (Adult and Pediatric)  Goal: *Acute Goals and Plan of Care (Insert Text)  Description: Physical Therapy Goals   Updated 12/23/2022 and to be accomplished within 7 day(s)  1. Patient will move from supine <> sit with mod I in prep for out of bed activity and change of position. 2.  Patient will transfer from bed <> chair via slide board with min assist for time up in chair for completion of ADL activity. 3.  Patient will demonstrate ability to perform w/c management with SBA for functional w/c level mobility. 4.  Patient will demonstrate HEP performance for LE ROM/strengthening in order to achieve above goals and performance st discharge. Physical Therapy Goals  Initiated 12/11/2022  1. Patient will move from supine to sit and sit to supine  in bed with supervision/set-up within 7 day(s). 2.  Patient will transfer from bed to chair and chair to bed with minimal assistance/contact guard assist using the least restrictive device within 7 day(s). 3.  Patient will perform sit to stand with minimal assistance/contact guard assist within 7 day(s). 4.  Patient will ambulate with minimal assistance/contact guard assist for 10 feet with the least restrictive device within 7 day(s). Outcome: Progressing Towards Goal    PHYSICAL THERAPY RE-EVALUATION    Patient: Carlos Guido (2 y.o. male)  Date: 12/23/2022  Primary Diagnosis: Diabetic foot infection ([facility] [E11.628, L08.9]  Leukocytosis [D72.829]  Procedure(s) (LRB):  AMPUTATION - TRANSMETATARSAL LEFT FOOT (Left) 2 Days Post-Op   Precautions:   Fall, NWB (L foot)  PLOF: w/c level functioning PTA; lives alone in [facility] with 3 TAMY, no ramp (neighbor assist in bringing w/c up/down steps per pt report)    ASSESSMENT :  The patient is seen on medical unit; s/p surgery as above L foot-NWB L foot (R LE WB'g TBD when seen by Dr. Rachel Hurtado today per phone conversation with [address] nurse).   Pt treatment limited by surgery/procedures, as well as HD treatments thus far. Pt presents with BLE ROM/motor performance decr'd but functional; note tight hamstrings bilat L>R. Strength by MMT grossly 4+/5 Hip flexors, 3+/5 at range available quadriceps, DF/PF deferred bilat at this time. Pt rolls with mod I using bed rail, and requires CG/min assist supine>sit. Tolerated therapeutic ex well EOB. Transfer bed to pt's personal w/c attempted, however, unable to be completed (pt armrests unable to be removed bilat). Attempted scooting posteriorly via backing into w/c, however, pt unable to establish dynamic sitting balance using UE's in order to initiate task. Pt  left supine in bed, foot of bed elevated and LE's resting in pillows. Will continue IPPT to address goals above. Pt may benefit from SNF for rehab and possible consideration of [facility] vs LTC eventually. Patient will benefit from skilled intervention to address the above impairments. Patient's rehabilitation potential is considered to be Fair  Factors which may influence rehabilitation potential include:   []         None noted  []         Mental ability/status  [x]         Medical condition  []         Home/family situation and support systems  []         Safety awareness  [x]         Pain tolerance/management  []         Other:      PLAN :  Recommendations and Planned Interventions:   [x]           Bed Mobility Training             [x]    Neuromuscular Re-Education  [x]           Transfer Training                   []    Orthotic/Prosthetic Training  []           Gait Training                          []    Modalities  [x]           Therapeutic Exercises           []    Edema Management/Control  [x]           Therapeutic Activities            []    Family Training/Education  [x]           Patient Education  []           Other (comment):    Frequency/Duration: Patient will be followed by physical therapy 1-2 times per day/4-7 days per week to address goals.   Discharge Recommendations: Skilled Nursing Facility  Further Equipment Recommendations for Discharge: N/A    AMPA: 9/20    This AMPA score should be considered in conjunction with interdisciplinary team recommendations to determine the most appropriate discharge setting. Patient's social support, diagnosis, medical stability, and prior level of function should also be taken into consideration. SUBJECTIVE:   Patient stated I'm getting better.     OBJECTIVE DATA SUMMARY:   Hospital course since last seen and reason for re-evaluation: as noted above  Past Medical History:   Diagnosis Date    CAD (coronary artery disease)     Chronic kidney disease     Diabetes mellitus     Hypertension     Sleep apnea      Past Surgical History:   Procedure Laterality Date    HX ORTHOPAEDIC      HX PACEMAKER      IR INSERT TUNL CVC W/O PORT OVER 5 YR  07/07/2022    IR INSERT TUNL CVC W/O PORT OVER 5 YR  6/29/2022    IR REMOVE TUNL CVAD W/O PORT / PUMP  8/12/2022    TX CARDIAC SURG PROCEDURE UNLIST       Barriers to Learning/Limitations: yes;  physical  Compensate with: Visual Cues, Verbal Cues, and Tactile Cues  Home Situation:   Home Situation  Home Environment: Private residence  # Steps to Enter: 3  Wheelchair Ramp: No  One/Two Story Residence: One story  Living Alone: Yes  Support Systems: Friend/Neighbor  Patient Expects to be Discharged to[de-identified] Home  Current DME Used/Available at Home: Walker, rolling, Walker, rollator, Wheelchair, Commode, bedside (3 in 1 currently over commode)  Tub or Shower Type: Shower  Critical Behavior:  Neurologic State: Alert; Appropriate for age  Orientation Level: Oriented X4  Cognition: Follows commands  Psychosocial  Patient Behaviors: Calm; Cooperative  Strength:    Strength: Generally decreased, functional  Tone & Sensation:   Tone: Normal  Sensation: Impaired  Range Of Motion:  AROM: Generally decreased, functional (LE's NWB bilat)  Functional Mobility:  Bed Mobility:  Rolling: Modified independent  Supine to Sit: Contact guard assistance;Minimum assistance  Sit to Supine: Contact guard assistance  Scooting: Bed Modified;Supervision (bed in trendelenburg and pt able to pull self up using headboard rail)  Balance:   Sitting: Intact  Sitting - Static: Good (unsupported)  Sitting - Dynamic: Fair (occasional)  Ambulation/Gait Training:  Left Side Weight Bearing: Non-weight bearing (s/p TMA, R LE? TBD when seen by Dr. Darion Freed and wound care nurse today per [address] nurse)  Therapeutic Exercises:   Seated EOB: LAQ x 10, Seated marching x 5  Pain:  Pain level pre-treatment: 6 /10   Pain level post-treatment: 6/10   Pain Intervention(s) : Medication (see MAR); Rest, Ice, Repositioning   Response to intervention: Nurse notified, See doc flow    Activity Tolerance:   Fair   Please refer to the flowsheet for vital signs taken during this treatment. After treatment:   []         Patient left in no apparent distress sitting up in chair  [x]         Patient left in no apparent distress in bed  [x]         Call bell left within reach  [x]         Nursing notified  []         Caregiver present  []         Bed alarm activated  []         SCDs applied    COMMUNICATION/EDUCATION:   [x]         Role of Physical Therapy in the acute care setting. [x]         Fall prevention education was provided and the patient/caregiver indicated understanding. [x]         Patient/family have participated as able in goal setting and plan of care. [x]         Patient/family agree to work toward stated goals and plan of care. []         Patient understands intent and goals of therapy, but is neutral about his/her participation. []         Patient is unable to participate in goal setting/plan of care: ongoing with therapy staff.  []         Other:     Thank you for this referral.  Helen Billings, PT   Time Calculation: 42 mins    [address] [facility] [address] [address] AM-PAC® Basic Mobility Inpatient Short Form (6-Clicks) Version 2    How much HELP from another person does the patient currently need    (If the patient hasn't done an activity recently, how much help from another person do you think he/she would need if he/she tried?)   Total (Total A or Dep)   A Lot  (Mod to Max A)   A Little (Sup or Min A)   None (Mod I to I)   Turning from your back to your side while in a flat bed without using bedrails? [] 1 [] 2 [x] 3 [] 4   2. Moving from lying on your back to sitting on the side of a flat bed without using bedrails? [] 1 [] 2 [x] 3 [] 4   3. Moving to and from a bed to a chair (including a wheelchair)? [x] 1 [] 2 [] 3 [] 4   4. Standing up from a chair using your arms (e.g., wheelchair, or bedside chair)? [x] 1 [] 2 [] 3 [] 4   5. Walking in hospital room? [x] 1 [] 2 [] 3 [] 4   6. Climbing 3-5 steps with a railing?+   [] 1 [] 2 [] 3 [] 4   +If stair climbing cannot be assessed, skip item #6. Sum responses from items 1-5. Based on an AM-PAC score of /24 (9/20 if omitting stairs) and their current functional mobility deficits, it is recommended that the patient have 3-5 sessions per week of Physical Therapy at d/c to increase the patient's independence.

## 2022-12-23 NOTE — PROGRESS NOTES
ACUTE HEMODIALYSIS TREATMENT     HEMODIALYSIS ORDERS: Physician: Dr. Nito Metcalf: Sh[place]   Duration: 4 hr   BFR: 450   DFR: 800   Dialysate:  Temp 36*C   K+  2   Ca+ 2.5   Na 138   Bicarb 35       Wt Readings from Last 1 Encounters:   12/22/22 97.5 kg (215 lb)    Patient Chart [x]   Unable to Obtain []  Dry weight/UF Goal: 2500 ml    Heparin []  Bolus    Units    [] Hourly    Units    [x]None       Pre BP:  121/59   Pulse:  73  Respirations: 18   Temp:  100.5  [x] Oral [] Axillary [] Esophageal   Labs: []  Pre  []  Post:   [x] N/A   Additional Orders(medications, blood products, hypotension management): [] Yes   [] No      [x]  Anoop Consent Verified      CATHETER ACCESS:  []N/A   [x]Right   []Left   [x]IJ   []Fem  []Chest wall  []TransHepatic   [] First use X-ray verified     [x]Tunnel    [] Non Tunneled   [x]No S/S infection  []Redness  []Drainage []Cultured []Swelling []Pain   [x]Medical Aseptic Prep Utilized   []Dressing Changed  [] Biopatch  Date:    []Clotted   [x]Patent   Flows: [x]Good  []Poor  []Reversed   If access problem,  notified: [x]Yes    []N/A         GENERAL ASSESSMENT:    LUNGS:  Resp Rate 18   [x] Clear  [] Coarse  [] Crackles  [] Wheezing  [] Diminished         Respirations:  [x]Easy  []Labored  []N/A  Cough:  []Productive  []Dry  [x]N/A      Therapy:  [x]RA  O2 Type:  []NC  Mask: []  NRB    [] BiPaP  Flow:  l/min                   [] Ventilated  [] Intubated  [] Trach     CARDIAC: [x] Regular      [] Irregular   [] Rhythm:                     [] Monitored   [] Bedside   [] Remotely monitored     EDEMA: [] None   [x]Generalized  [] Pitting [] 1+   [] 2 +   [] 3+    [] 4+  [] Pedal    SKIN:   [x] Warm  [] Hot     [] Cold   [x] Dry     [] Pale   [] Diaphoretic                  [] Flushed  [] Jaundiced  [] Cyanotic     LOC:    [x] Alert      [x]Oriented:    [x] Person     [x] Place  []Time               [] Confused  [] Lethargic  [] Medicated  [] Non-responsive  [] Non Verbal   GI / ABDOMEN:                     [] Flat    [] Distended    [x] Soft    [] Firm   []  Obese                   [] Diarrhea   [] FMS [x] Bowel Sounds  [] Nausea  [] Vomiting                   [] NGT  [] OGT    [] PEG  [] Tube Feedings @      / URINE ASSESSMENT:                   [] Voiding  []  Mcmahon  [x] Oliguria  [] Anuria                     [] Incontinent  []  Incontinent Brief   []  PureWick      PAIN:  [x] 0 []1  []2   []3   []4   []5   []6   []7   []8   []9   []10                MOBILITY:  [x] Bed    [] Stretcher       All Vitals and Treatment Details on [address]: [facility]          Room # 327    [x] Routine         [] 1st Time Acute/Chronic   [] Urgent      [] Stat            [] Acute Room   [x]  Bedside    [] ICU/CCU     [] ER   Isolation Precautions:  [x] Dialysis    There are currently no Active Isolations      ALLERGIES:     No Known Allergies   Code Status:  Full Code      Hepatitis Status            Lab Results   Component Value Date/Time     Hepatitis B surface Ag <0.10 12/10/2022 06:33 AM     Hepatitis B surface Ab 23.52 12/10/2022 06:33 AM                DIET:        PRIMARY NURSE REPORT:   Pre Dialysis:  Sally Osorio RN    Time: 1500      EDUCATION:    [x] Patient           Knowledge Basis: []None [x]Minimal [] Substantial [] Unknown  Barriers to learning  [x]N/A  [] Intubated/Trached/Ventilated  [] Sedated/Paralyzed   [] Access Care     [] S&S of infection  [] Fluid Management  [] K+   [x] Procedural    [] Medications   [] Tx Options   [] Transplant   [] Diet      Teaching Tools:  [x] Explain  [] Demo  [] Handouts [] Video  Patient response: [x] Verbalized understanding   [] Requires follow up          [x] Time Out/Safety Check    [x] Extracorporeal Circuit Tested for integrity                       RO/HEMODIALYSIS MACHINE SAFETY CHECKS - Before each treatment:        [facility]                                                                      [x] Portable Machine #3  /RO serial # 3 Alarm Test:  Pass time             [x] RO/Machine Log Complete    Machine Temp    36*C             Dialysate: pH 7.4    Conductivity: Meter    HD Machine  14.1     TCD:   Dialyzer Lot # [id number]     Blood Tubing Lot # [id number]     Saline Lot # [id number]      CHLORINE TESTING-Before each treatment and every 4 hours    Total Chlorine: [x] less than 0.1 ppm  Initial Time Check: 1500     4 Hr/2nd Check Time: 1300   (if greater than 0.1 ppm from Primary then every 30 minutes from Secondary)      TREATMENT INITIATION - with Dialysis Precautions:   [x] All Connections Secured              [x] Saline Line Double Clamped   [x] Venous Parameters Set               [x] Arterial Parameters Set    [x] Prime Given 250ml NSS              [x]Air Foam Detector Engaged       Treatment Initiation Note:   Temperature 101, on recheck temperature 100.5 oral. Primary RN Donn Reyna made aware. No s/s of distress. Patient in bed pleasant and cooperative. Upon arrival to room pt states he wants to use the bed pan. Treatment start delayed. Right chest TDC Accessed with not s/s of complication. Treatment initiated and monitored by benji Hernandez. During Treatment Notes: Tolerating well. No complaints or s/s of distress.            Dialyzer Cleared:   [] Good  [x] Fair  [] Poor  Blood processed:  98.7 L  UF Removed:  2500 Ml     Post /71   Pulse 89  Resp  18  Temp  Lungs: [x] Clear [] Course  [] Crackles                 []  Wheezing   [] Diminished   Post Tx Vascular Access: [x] N/A Cardiac :[x] Regular   [] Irregular   Rhythm:  [x] Monitored   [] Not Monitored    CVC Catheter: [] N/A  Locking solution: Heparin 1:1000 U  Arterial port 1.8 ml   Venous port 1.8 ml    Edema:  [] None  [x] Generalized                     Skin:[x] Warm  [x] Dry [] Diaphoretic               [] Flushed  [] Pale [] Cyanotic Pain:  [x]0  []1 []2  []3 []4  []5  []6  []7 []8 []9  []10      Post Treatment Note:   Tolerated 4 hr treatment well. 2500 ml removed. Heparin left indwelling and Curos caps applied to the end of both lumens.       POST TREATMENT PRIMARY NURSE HANDOFF REPORT:   Post Dialysis: Georgiana Tanner RN               Time:  1915         Abbreviations: AVG-arterial venous graft, AVF-arterial venous fistula, IJ-Internal Jugular, Subcl-Subclavian, Fem-Femoral, Tx-treatment, AP/HR-apical heart rate, VSS- Vital Signs Stable, CVC- Central Venous Catheter, DFR-dialysate flow rate, BFR-blood flow rate, AP-arterial pressure, -venous pressure, UF-ultrafiltrate, TMP-transmembrane pressure, Avtar-Venous, Art-Arterial, RO-Reverse Osmosis

## 2022-12-23 NOTE — PROGRESS NOTES
Problem: Diabetes Self-Management  Goal: *Disease process and treatment process  Description: Define diabetes and identify own type of diabetes; list 3 options for treating diabetes. Outcome: Progressing Towards Goal     Problem: Patient Education: Go to Patient Education Activity  Goal: Patient/Family Education  Outcome: Progressing Towards Goal     Problem: Falls - Risk of  Goal: *Absence of Falls  Description: Document Gypsy Diaz Fall Risk and appropriate interventions in the flowsheet.   Outcome: Progressing Towards Goal  Note: Fall Risk Interventions:  Mobility Interventions: Bed/chair exit alarm, Patient to call before getting OOB, PT Consult for mobility concerns, PT Consult for assist device competence         Medication Interventions: Bed/chair exit alarm, Patient to call before getting OOB    Elimination Interventions: Call light in reach, Bed/chair exit alarm    History of Falls Interventions: Bed/chair exit alarm, Investigate reason for fall, Utilize gait belt for transfer/ambulation         Problem: Chronic Renal Failure  Goal: *Fluid and electrolytes stabilized  Outcome: Progressing Towards Goal

## 2022-12-23 NOTE — PROGRESS NOTES
Nephrology Inpatient Progress note    Subjective:     Freeman Heller is a 60 y.o. male with a PMHx of  DM, HTN. PVD, ESRD on HD TTS at [facility] under the [address] [place] [address]. Nephrology sent in for admission by wound care clinic due to left foot infection ,was told he needed surgery. Podiatry has been in to see pt . He has been on abx recently     S/P Lt 2/3/4 metatarsal amputation  Pt with no complaint today. No cp/sob, 2.5L removed on HD yesterday, tolerated well.      Admit Date: 12/9/2022  Active Problems:    Diabetes mellitus with foot ulcer ([address] (6/27/2022)      CAD (coronary artery disease) (6/28/2022)      ESRD (end stage renal disease) ([facility] (6/28/2022)      Hypertension (7/21/2022)      Leukocytosis (12/9/2022)      Diabetic foot infection ([facility] (12/9/2022)      Diarrhea (12/9/2022)      Type 2 diabetes mellitus, with long-term current use of insulin ([facility]) ()      Acute hematogenous osteomyelitis of left foot ([facility] (12/9/2022)      Nondisplaced fracture of second metatarsal bone of left foot (12/9/2022)      Nondisplaced fracture of third metatarsal bone of left foot (12/9/2022)      Closed nondisplaced fracture of fourth metatarsal bone of left foot (12/9/2022)      Positive blood culture (12/11/2022)    Current Facility-Administered Medications   Medication Dose Route Frequency    insulin glargine (LANTUS) injection 15 Units  15 Units SubCUTAneous QHS    sodium chloride (NS) flush 5-40 mL  5-40 mL IntraVENous Q8H    sodium chloride (NS) flush 5-40 mL  5-40 mL IntraVENous PRN    fentaNYL citrate (PF) injection  mcg   mcg IntraVENous Rad Multiple    midazolam (VERSED) injection 0.5-2 mg  0.5-2 mg IntraVENous Rad Multiple    naloxone (NARCAN) injection 0.1 mg  0.1 mg IntraVENous Multiple    flumazeniL (ROMAZICON) 0.1 mg/mL injection 0.2 mg  0.2 mg IntraVENous Multiple    heparin (porcine) 1,000 unit/mL injection 1,000-10,000 Units  1,000-10,000 Units IntraVENous Rad Multiple    nitroGLYcerin 0.1 mg/mL in D5W injection  1-5 mL IntraarTERial Rad Multiple    heparinized saline 2 units/mL infusion 2,000 Units  1,000 mL Irrigation RAD CONTINUOUS    iopamidoL (ISOVUE 250) 250 mg iodine /mL (51 %) contrast injection 1-150 mL  1-150 mL IntraarTERial RAD CONTINUOUS    epoetin lisette-epbx (RETACRIT) injection 8,000 Units  8,000 Units SubCUTAneous Q TUE, THU & SAT    loperamide (IMODIUM) capsule 2 mg  2 mg Oral Q4H PRN    nystatin (MYCOSTATIN) 100,000 unit/gram powder   Topical BID    insulin lispro (HUMALOG) injection 2 Units  2 Units SubCUTAneous TIDAC    alum-mag hydroxide-simeth (MYLANTA) oral suspension 30 mL  30 mL Oral Q4H PRN    glucose chewable tablet 16 g  16 g Oral PRN    glucagon (GLUCAGEN) injection 1 mg  1 mg IntraMUSCular PRN    heparin (porcine) 1,000 unit/mL injection 3,600 Units  3,600 Units IntraCATHeter DIALYSIS PRN    Vancomycin - Pharmacy to Dose  1 Each Other Rx Dosing/Monitoring    piperacillin-tazobactam (ZOSYN) 4.5 g in 0.9% sodium chloride (MBP/ADV) 100 mL MBP  4.5 g IntraVENous Q12H    dextrose 10% infusion 0-250 mL  0-250 mL IntraVENous PRN    0.9% sodium chloride infusion  25 mL/hr IntraVENous DIALYSIS PRN    clopidogreL (PLAVIX) tablet 75 mg  75 mg Oral DAILY    carvediloL (COREG) tablet 12.5 mg  12.5 mg Oral BID    aspirin chewable tablet 81 mg  81 mg Oral DAILY    amLODIPine (NORVASC) tablet 10 mg  10 mg Oral DAILY    allopurinoL (ZYLOPRIM) tablet 100 mg  100 mg Oral DAILY    ascorbic acid (vitamin C) (VITAMIN C) tablet 500 mg  500 mg Oral DAILY    ezetimibe (ZETIA) tablet 10 mg  10 mg Oral DAILY    pantoprazole (PROTONIX) tablet 40 mg  40 mg Oral ACB    sevelamer carbonate (RENVELA) tab 1,600 mg  1,600 mg Oral TID WITH MEALS    vit B Cmplx 3-FA-Vit C-Biotin (NEPHRO NIKITA RX) tablet 1 Tablet  1 Tablet Oral DAILY    sodium chloride (NS) flush 5-40 mL  5-40 mL IntraVENous Q8H    sodium chloride (NS) flush 5-40 mL  5-40 mL IntraVENous PRN    acetaminophen (TYLENOL) tablet 650 mg  650 mg Oral Q6H PRN    Or    acetaminophen (TYLENOL) suppository 650 mg  650 mg Rectal Q6H PRN    bisacodyL (DULCOLAX) suppository 10 mg  10 mg Rectal DAILY PRN    promethazine (PHENERGAN) tablet 12.5 mg  12.5 mg Oral Q6H PRN    Or    ondansetron (ZOFRAN) injection 4 mg  4 mg IntraVENous Q6H PRN    heparin (porcine) injection 5,000 Units  5,000 Units SubCUTAneous Q8H    oxyCODONE-acetaminophen (PERCOCET) 5-325 mg per tablet 1 Tablet  1 Tablet Oral Q6H PRN         Allergy:   No Known Allergies     Objective:     Visit Vitals  BP (!) 138/53 (BP 1 Location: Right upper arm, BP Patient Position: At rest)   Pulse 74   Temp 98.2 °F (36.8 °C)   Resp 20   Ht 6' (1.829 m)   Wt 97.5 kg (215 lb)   SpO2 94%   BMI 29.16 kg/m²         Intake/Output Summary (Last 24 hours) at 12/23/2022 1056  Last data filed at 12/22/2022 1915  Gross per 24 hour   Intake --   Output 2500 ml   Net -2500 ml         Physical Exam:       General: No resp distress   HENT: Atraumatic and normocephalic   Eyes: Normal conjunctiva   Neck: Supple No JVD or mass   Cardiovascular: Normal S1 & S2, no m/r/g   Pulmonary/Chest Wall: Clear to auscultation bilaterally   Abdominal: Soft and +NABS   Musculoskeletal: No edema S/p Amputation of multiple toes Left foot   Neurological: No focal deficits    HD Access: [facility] TD +    Data Review:  Lab Results   Component Value Date/Time    Sodium 134 (L) 12/23/2022 02:05 AM    Potassium 4.8 12/23/2022 02:05 AM    Chloride 98 (L) 12/23/2022 02:05 AM    CO2 28 12/23/2022 02:05 AM    Anion gap 8 12/23/2022 02:05 AM    Glucose 211 (H) 12/23/2022 02:05 AM    BUN 22 (H) 12/23/2022 02:05 AM    Creatinine 5.08 (H) 12/23/2022 02:05 AM    BUN/Creatinine ratio 4 (L) 12/23/2022 02:05 AM    GFR est AA 13 (L) 07/21/2022 02:15 PM    GFR est non-AA 11 (L) 07/21/2022 02:15 PM    Calcium 7.8 (L) 12/23/2022 02:05 AM     Lab Results   Component Value Date/Time    WBC 11.2 12/22/2022 04:49 AM    HGB 9.2 (L) 12/22/2022 04:49 AM    HCT 29.3 (L) 12/22/2022 04:49 AM    PLATELET 052 83/16/9771 04:49 AM    MCV 97.7 12/22/2022 04:49 AM     Lab Results   Component Value Date/Time    Calcium 7.8 (L) 12/23/2022 02:05 AM    Phosphorus 6.0 (H) 12/22/2022 04:49 AM     No results found for: IRON, FE, TIBC, IBCT, PSAT, FERR  No results found for: FERR      Impression:     ESRD on HD TTS schedule  Hyperkalemia, resolved  Left diabetic foot infection w/ gangrenous changes s/p Lt 2/3/4 metatarsal amputation   DM  HTN  PVD, seen by Vasc Surgery, anticipate LLE angio and possible intervention.   Anemia  SHPT    Plan:     Next HD tomorrow  IV Antibiotic per ID  Cont [facility] for Anemia  Vascular, podiatry and ID specialists following  Will cont follow    Senthil Noel MD,   MINIMALLY INVASIVE SURGERY [facility]

## 2022-12-23 NOTE — PROGRESS NOTES
Bedside and Verbal shift change report given to [address] [place] [address] (oncoming nurse) by KIM Brown (offgoing nurse). Report included the following information SBAR, Kardex, MAR, and Recent Results.

## 2022-12-23 NOTE — PROGRESS NOTES
Paged hospitalist in regards to pt's /49. Waiting for call back. MD notified. No new orders. Will continue to monitor.

## 2022-12-23 NOTE — PROGRESS NOTES
BSSR received from [employer] Intake. Patient A/O X 4  resting comfortably in the bed in NAD. Standard safety precautions in place.

## 2022-12-23 NOTE — PROGRESS NOTES
podiatry:    Patient seen at bedside today status post left foot surgery for TMA secondary to ischemic gangrene. His incision is closed and appears to have adequate blood flow at this point to heal.  The incision skin edges appear to be pink with some capillary refill and no drainage, erythema, or signs of infection. Patient should continue nonweightbearing left foot and is now able to bear weight on his right foot. Prevalon boots should have been ordered for both heel protection. Carol Stewart from wound care will continue treatment. I will follow him as needed. At this point hopefully no further left foot surgery is required. Also no long-term IV antibiotics should be required. After discharge I will follow this patient in [facility] ED wound care center as before.

## 2022-12-23 NOTE — PROGRESS NOTES
Hospitalist Progress Note    Patient: Meg Blue MRN: [id number]  [facility]: [id number]    YOB: 1960  Age: 60 y.o. Sex: male    DOA: 12/9/2022 LOS:  LOS: 14 days                Assessment/Plan     Patient Active Problem List   Diagnosis Code    Diabetes mellitus with foot ulcer ([facility] E11.621, L97.509    CAD (coronary artery disease) I25.10    ESRD (end stage renal disease) ([facility] N18.6    Acute hematogenous osteomyelitis of right foot (HCC) M86.071    Cellulitis of right heel L03.115    Diabetic foot ulcer with osteomyelitis ([facility] E11.621, E11.69, L97.509, M86.9    Ulcer of right heel, with necrosis of bone ([facility] L97.414    Infection and inflammatory reaction due to internal fixation device of other site, initial encounter [facility]) T84.69XA    Left arm swelling M79.89    Chest pain at rest R07.9    Abnormal nuclear stress test R94.39    History of anemia due to CKD N18.9, Z86.2    S/P angioplasty with stent Z95.820    Hypertension I10    Leukocytosis D72.829    Diabetic foot infection (HCC) E11.628, L08.9    Diarrhea R19.7    Type 2 diabetes mellitus, with long-term current use of insulin (HCC) E11.9, Z79.4    Acute hematogenous osteomyelitis of left foot (HCC) M86.072    Nondisplaced fracture of second metatarsal bone of left foot S92.325A    Nondisplaced fracture of third metatarsal bone of left foot S92.335A    Closed nondisplaced fracture of fourth metatarsal bone of left foot S92.345A    Positive blood culture R78.81        Chief complaint :  Foot gangrene, DFU  60 y.o. male with history of diabetes, diabetic ulcer, CAD, hyperlipidemia, hypertension end-stage renal disease on HD presented to ER due to left foot lesion. Gangrene of left foot/DFU   PARTIAL AMPUTATION OF LEFT 2ND, 3RD, 4TH METATARSALS, AMPUTATION OF TOES 2,3,4, INCISION ADN DRAINAGE LEFT FOOT on 12/09/2022  S/p angiogram with PTA of the proximal PT and peroneal arteries.   Vascular planning repeat procedure next week, but can be done as outpatient. S/p Left TMA 12/21/2022. ID following  Continue zosyn   Awaiting bone biopsy to decide on antibiotics at discharge. Positive blood cx -  Coag negative staph  Likely contaminant contamination      CAD-   Distal RCA to drug-eluting stent in July 2022, continue plavix -   No chest pain    Hypertension -  continue home medication     Diarrhea epigastric pain-  resolved   CT abdomen no acute process  Resolved      End stage renal disease -  on HD       DM  type II   Lantus at night , ssi     Disposition : await placement    Review of systems  General: No fevers or chills. Cardiovascular: No chest pain or pressure. No palpitations. Pulmonary: No shortness of breath. Gastrointestinal: No nausea, vomiting. Physical Exam:  General: Awake, cooperative, no acute distress    HEENT: NC, Atraumatic. PERRLA, anicteric sclerae. Lungs: CTA Bilaterally. No Wheezing/Rhonchi/Rales. Heart:  S1 S2,  No murmur, No Rubs, No Gallops  Abdomen: Soft, Non distended, Non tender. +Bowel sounds,   Extremities: Left foot with dressing. Psych:   Not anxious or agitated. Neurologic:  No acute neurological deficit. Vital signs/Intake and Output:  Visit Vitals  BP (!) 132/58 (BP 1 Location: Right upper arm, BP Patient Position: At rest)   Pulse 77   Temp 99.9 °F (37.7 °C)   Resp 21   Ht 6' (1.829 m)   Wt 97.5 kg (215 lb)   SpO2 93%   BMI 29.16 kg/m²     Current Shift:  No intake/output data recorded.   Last three shifts:  12/21 1901 - 12/23 0700  In: 530 [P.O.:480; I.V.:50]  Out: 2505             Labs: Results:       Chemistry Recent Labs     12/23/22  0205 12/22/22  0449 12/21/22  1400 12/21/22  0213   * 219* 127* 201*   * 135* 136 135*   K 4.8 5.9* 5.5 5.4   CL 98* 101 100 100   CO2 28 24 28 27   BUN 22* 47* 43* 35*   CREA 5.08* 8.68* 7.55* 6.67*   CA 7.8* 7.8* 7.6* 7.5*   AGAP 8 10 8 8   BUCR 4* 5* 6* 5*   ALB  --  1.9*  --  2.0*      CBC w/Diff Recent Labs     12/22/22  4634   WBC 11.2   RBC 3.00*   HGB 9.2*   HCT 29.3*         Cardiac Enzymes No results for input(s): CPK, CKND1, PAULO in the last 72 hours. No lab exists for component: CKRMB, TROIP   Coagulation No results for input(s): PTP, INR, APTT, INREXT, INREXT in the last 72 hours. Lipid Panel Lab Results   Component Value Date/Time    Cholesterol, total 188 07/19/2022 03:12 AM    HDL Cholesterol 42 07/19/2022 03:12 AM    LDL, calculated 131.2 (H) 07/19/2022 03:12 AM    VLDL, calculated 14.8 07/19/2022 03:12 AM    Triglyceride 74 07/19/2022 03:12 AM    CHOL/HDL Ratio 4.5 07/19/2022 03:12 AM      BNP No results for input(s): BNPP in the last 72 hours.    Liver Enzymes Recent Labs     12/22/22  0449   ALB 1.9*      Thyroid Studies No results found for: T4, T3U, TSH, TSHEXT, TSHEXT     Procedures/imaging: see electronic medical records for all procedures/Xrays and details which were not copied into this note but were reviewed prior to creation of Plan

## 2022-12-23 NOTE — PROGRESS NOTES
[facility]  (A Division of [address] [place] [address])    Follow-up Note      Date of Admission: 12/9/2022       Date of Note:  12/23/2022         Current Antimicrobials:    Prior Antimicrobials:   Vanco/Zosyn 12/9 to date PO vanco 12/9 to 12/19     Antibiotic allergy: NOne     Assessment:     Dry L foot gangrene- distal  --S/P partial ampuation L 2nd/3rd/4th MT, I and D of left foot- deep 12/09/22  --S/P TMA Left foot 12/21/22-- Biopsy pending  PAD--S/P angiogram 12/20- Vascular  Recent CDI  ESRD on HD  R foot OM-ankle- treated and completed treatment 09/2022    Recommendation -- ID related:     Continue vanco and Zosyn  FU biopsy report  Will place on oral vanco 125mg BID pre-emptively-- for prevention of CDI  DW Dr Gómez [place]- states resection likely adequate, to await biopsy prior to final plan  Vascular following  Dr Onelia Giles will cover the holiday weekend. Please call via [employer] or  for consults or questions. Thanks. Subjective:        Feels ok, S/P Tma 12/21-- tolerating current abx.    Denies F/C/R  Tolerating current ABX ok- denies diarrhea/abd pain/N/V      Microbiology:                    12/9 - OR (+) Serratia fonticola (S to pip/all except cefazolin), Alcaligenes faecalis (R FQ), Enterbacter cloacae (still being worked up), and anaerobic Bacteroides vulgatus (BL +)                                                      BCx 1/2 (+) CoNS        Lines / Catheters:         R HD cath and peripheral        Patient Active Problem List   Diagnosis Code    Diabetes mellitus with foot ulcer ([facility] E11.621, L97.509    CAD (coronary artery disease) I25.10    ESRD (end stage renal disease) ([facility] N18.6    Acute hematogenous osteomyelitis of right foot (HCC) M86.071    Cellulitis of right heel L03.115    Diabetic foot ulcer with osteomyelitis (HCC) E11.621, E11.69, L97.509, M86.9    Ulcer of right heel, with necrosis of bone ([facility] L97.414    Infection and inflammatory reaction due to internal fixation device of other site, initial encounter [facility]) T84.69XA    Left arm swelling M79.89    Chest pain at rest R07.9    Abnormal nuclear stress test R94.39    History of anemia due to CKD N18.9, Z86.2    S/P angioplasty with stent Z95.820    Hypertension I10    Leukocytosis D72.829    Diabetic foot infection (HCC) E11.628, L08.9    Diarrhea R19.7    Type 2 diabetes mellitus, with long-term current use of insulin (HCC) E11.9, Z79.4    Acute hematogenous osteomyelitis of left foot (HCC) M86.072    Nondisplaced fracture of second metatarsal bone of left foot S92.325A    Nondisplaced fracture of third metatarsal bone of left foot S92.335A    Closed nondisplaced fracture of fourth metatarsal bone of left foot S92.345A    Positive blood culture R78.81       Current Facility-Administered Medications   Medication Dose Route Frequency    insulin glargine (LANTUS) injection 15 Units  15 Units SubCUTAneous QHS    sodium chloride (NS) flush 5-40 mL  5-40 mL IntraVENous Q8H    sodium chloride (NS) flush 5-40 mL  5-40 mL IntraVENous PRN    fentaNYL citrate (PF) injection  mcg   mcg IntraVENous Rad Multiple    midazolam (VERSED) injection 0.5-2 mg  0.5-2 mg IntraVENous Rad Multiple    naloxone (NARCAN) injection 0.1 mg  0.1 mg IntraVENous Multiple    flumazeniL (ROMAZICON) 0.1 mg/mL injection 0.2 mg  0.2 mg IntraVENous Multiple    heparin (porcine) 1,000 unit/mL injection 1,000-10,000 Units  1,000-10,000 Units IntraVENous Rad Multiple    nitroGLYcerin 0.1 mg/mL in D5W injection  1-5 mL IntraarTERial Rad Multiple    heparinized saline 2 units/mL infusion 2,000 Units  1,000 mL Irrigation RAD CONTINUOUS    iopamidoL (ISOVUE 250) 250 mg iodine /mL (51 %) contrast injection 1-150 mL  1-150 mL IntraarTERial RAD CONTINUOUS    epoetin lisette-epbx (RETACRIT) injection 8,000 Units  8,000 Units SubCUTAneous Q TUE, THU & SAT    loperamide (IMODIUM) capsule 2 mg  2 mg Oral Q4H PRN    nystatin (MYCOSTATIN) 100,000 unit/gram powder   Topical BID    insulin lispro (HUMALOG) injection 2 Units  2 Units SubCUTAneous TIDAC    alum-mag hydroxide-simeth (MYLANTA) oral suspension 30 mL  30 mL Oral Q4H PRN    glucose chewable tablet 16 g  16 g Oral PRN    glucagon (GLUCAGEN) injection 1 mg  1 mg IntraMUSCular PRN    heparin (porcine) 1,000 unit/mL injection 3,600 Units  3,600 Units IntraCATHeter DIALYSIS PRN    Vancomycin - Pharmacy to Dose  1 Each Other Rx Dosing/Monitoring    piperacillin-tazobactam (ZOSYN) 4.5 g in 0.9% sodium chloride (MBP/ADV) 100 mL MBP  4.5 g IntraVENous Q12H    dextrose 10% infusion 0-250 mL  0-250 mL IntraVENous PRN    0.9% sodium chloride infusion  25 mL/hr IntraVENous DIALYSIS PRN    clopidogreL (PLAVIX) tablet 75 mg  75 mg Oral DAILY    carvediloL (COREG) tablet 12.5 mg  12.5 mg Oral BID    aspirin chewable tablet 81 mg  81 mg Oral DAILY    amLODIPine (NORVASC) tablet 10 mg  10 mg Oral DAILY    allopurinoL (ZYLOPRIM) tablet 100 mg  100 mg Oral DAILY    ascorbic acid (vitamin C) (VITAMIN C) tablet 500 mg  500 mg Oral DAILY    ezetimibe (ZETIA) tablet 10 mg  10 mg Oral DAILY    pantoprazole (PROTONIX) tablet 40 mg  40 mg Oral ACB    sevelamer carbonate (RENVELA) tab 1,600 mg  1,600 mg Oral TID WITH MEALS    vit B Cmplx 3-FA-Vit C-Biotin (NEPHRO NIKITA RX) tablet 1 Tablet  1 Tablet Oral DAILY    sodium chloride (NS) flush 5-40 mL  5-40 mL IntraVENous Q8H    sodium chloride (NS) flush 5-40 mL  5-40 mL IntraVENous PRN    acetaminophen (TYLENOL) tablet 650 mg  650 mg Oral Q6H PRN    Or    acetaminophen (TYLENOL) suppository 650 mg  650 mg Rectal Q6H PRN    bisacodyL (DULCOLAX) suppository 10 mg  10 mg Rectal DAILY PRN    promethazine (PHENERGAN) tablet 12.5 mg  12.5 mg Oral Q6H PRN    Or    ondansetron (ZOFRAN) injection 4 mg  4 mg IntraVENous Q6H PRN    heparin (porcine) injection 5,000 Units  5,000 Units SubCUTAneous Q8H    oxyCODONE-acetaminophen (PERCOCET) 5-325 mg per tablet 1 Tablet  1 Tablet Oral Q6H PRN         Review of Systems - General ROS: negative for - chills, fever, or night sweats  Respiratory ROS: no cough, shortness of breath, or wheezing  Cardiovascular ROS: no chest pain or dyspnea on exertion  Gastrointestinal ROS: no abdominal pain, change in bowel habits, or black or bloody stools       Objective:    Visit Vitals  BP (!) 156/52 (BP 1 Location: Right upper arm, BP Patient Position: At rest)   Pulse 74   Temp 99.4 °F (37.4 °C)   Resp 20   Ht 6' (1.829 m)   Wt 97.5 kg (215 lb)   SpO2 95%   BMI 29.16 kg/m²       Temp (24hrs), Av.8 °F (37.7 °C), Min:98.2 °F (36.8 °C), Max:101 °F (38.3 °C)      GEN: WDWN AAM in NAD  HEENT: anicteric  CHEST: CTA  CVS:RRR  ABD: NT  EXT: L foot wrapped; right heel with ace wrap and dressing in place.          Lab results:    Chemistry  Recent Labs     22  0205 22  0449 22  1400 22  0213   * 219* 127* 201*   * 135* 136 135*   K 4.8 5.9* 5.5 5.4   CL 98* 101 100 100   CO2 28 24 28 27   BUN 22* 47* 43* 35*   CREA 5.08* 8.68* 7.55* 6.67*   CA 7.8* 7.8* 7.6* 7.5*   AGAP 8 10 8 8   BUCR 4* 5* 6* 5*   ALB  --  1.9*  --  2.0*       CBC w/ Diff  Recent Labs     22  0449   WBC 11.2   RBC 3.00*   HGB 9.2*   HCT 29.3*          Microbiology  All Micro Results       Procedure Component Value Units Date/Time    CULTURE, BLOOD 2nd DRAW (required for DMC/MMC/HBV) [[id number]] Collected: 22 1215    Order Status: Completed Specimen: Blood Updated: 12/15/22 0971     Special Requests: LEFT HAND     Culture result: NO GROWTH 6 DAYS       CULTURE, WOUND [place] STAIN [[id number]]  (Abnormal)  (Susceptibility) Collected: 22 6976    Order Status: Completed Specimen: Wound from Foot Updated: 12/15/22 8750     Special Requests: NO SPECIAL REQUESTS        GRAM STAIN RARE WBCS SEEN               2+ APPARENT GRAM VARIABLE RODS           Culture result:       HEAVY Serratia fonticola YAO = 28, SENSITIVE                  HEAVY ALCALIGENES FAECALIS                  HEAVY ALCALIGENES FAECALIS (2ND COLONY TYPE/STRAIN)                  HEAVY ENTEROBACTER CLOACAE                  HEAVY MIXED SKIN TO ISOLATED          CULTURE, ANAEROBIC [[id number]]  (Abnormal) Collected: 12/09/22 2201    Order Status: Completed Specimen: Foot, left Updated: 12/13/22 1246     Special Requests: NO SPECIAL REQUESTS        Culture result:       MODERATE BACTEROIDES VULGATUS BETA LACTAMASE POSITIVE          CULTURE, BLOOD [[id number]]  (Abnormal) Collected: 12/09/22 1155    Order Status: Completed Specimen: Blood Updated: 12/12/22 0743     Special Requests: LEFT AC     GRAM STAIN       ANAEROBIC BOTTLE GRAM POSITIVE COCCI IN CLUSTERS                  SMEAR CALLED TO AND CORRECTLY REPEATED BY: Stacey Hyde RN 3S 12/10/22 AT 1 BY ANI           Culture result:       STAPHYLOCOCCUS SPECIES, COAGULASE NEGATIVE GROWING IN 1 OF 2 BOTTLES DRAWN  SITE = LAC          BLOOD CULTURE ID PANEL [[id number]]  (Abnormal) Collected: 12/09/22 1145    Order Status: Completed Specimen: Blood Updated: 12/11/22 0655     Acc. no. from Micro Order [id number]     Enterococcus faecalis Not detected        Enterococcus faecium Not detected        Listeria monocytogenes Not detected        Staphylococcus Detected        Staphylococcus aureus Not detected        Staph epidermidis Detected        Staph lugdunensis Not detected        Streptococcus Not detected        Streptococcus agalactiae (Group B) Not detected        Streptococcus pneumoniae Not detected        Streptococcus pyogenes (Group A) Not detected        Acinetobacter calcoaceticus-baumanii complex Not detected        Bacteroides fragilis Not detected        Enterobacterales species Not detected        Enterobacter cloacae complex Not detected        Escherichia coli Not detected        Klebsiella aerogenes Not detected        Klebsiella oxytoca Not detected        Klebsiella pneumoniae group Not detected        Proteus Not detected Salmonella Not detected        Serratia marcescens Not detected        Haemophilus influenzae Not detected        Neisseria meningitidis Not detected        Pseudomonas aeruginosa Not detected        Steno maltophilia Not detected        Candida albicans Not detected        Candida auris Not detected        Candida glabrata Not detected        Candida krusei Not detected        Candida parapsilosis Not detected        Candida tropicalis Not detected        Crypto neoformans/gattii Not detected        RESISTANT GENES:            MECA/C (Methicillin resistant gene) Detected        Comment       False positive results may rarely occur.  Correlate with clinical,epidemiologic, and other laboratory findings           Comment: Please see BCID Interpretation Guide in EPIC Links       C. DIFFICILE AG & TOXIN A/B [[id number]]     Order Status: Canceled Specimen: Stool

## 2022-12-24 LAB
ANION GAP SERPL CALC-SCNC: 10 MMOL/L (ref 3–18)
BUN SERPL-MCNC: 39 MG/DL (ref 7–18)
BUN/CREAT SERPL: 5 (ref 12–20)
CALCIUM SERPL-MCNC: 8.2 MG/DL (ref 8.5–10.1)
CHLORIDE SERPL-SCNC: 98 MMOL/L (ref 100–111)
CO2 SERPL-SCNC: 26 MMOL/L (ref 21–32)
CREAT SERPL-MCNC: 7.49 MG/DL (ref 0.6–1.3)
FLUAV RNA SPEC QL NAA+PROBE: NOT DETECTED
FLUBV RNA SPEC QL NAA+PROBE: NOT DETECTED
GLUCOSE BLD STRIP.AUTO-MCNC: 185 MG/DL (ref 70–110)
GLUCOSE BLD STRIP.AUTO-MCNC: 190 MG/DL (ref 70–110)
GLUCOSE BLD STRIP.AUTO-MCNC: 203 MG/DL (ref 70–110)
GLUCOSE SERPL-MCNC: 149 MG/DL (ref 74–99)
MAGNESIUM SERPL-MCNC: 1.9 MG/DL (ref 1.6–2.6)
POTASSIUM SERPL-SCNC: 4.7 MMOL/L (ref 3.5–5.5)
SARS-COV-2, COV2: NOT DETECTED
SODIUM SERPL-SCNC: 134 MMOL/L (ref 136–145)
VANCOMYCIN SERPL-MCNC: 18.1 UG/ML (ref 5–40)

## 2022-12-24 PROCEDURE — 74011250636 HC RX REV CODE- 250/636: Performed by: INTERNAL MEDICINE

## 2022-12-24 PROCEDURE — 90935 HEMODIALYSIS ONE EVALUATION: CPT

## 2022-12-24 PROCEDURE — 80202 ASSAY OF VANCOMYCIN: CPT

## 2022-12-24 PROCEDURE — 80048 BASIC METABOLIC PNL TOTAL CA: CPT

## 2022-12-24 PROCEDURE — 65270000029 HC RM PRIVATE

## 2022-12-24 PROCEDURE — 82962 GLUCOSE BLOOD TEST: CPT

## 2022-12-24 PROCEDURE — 87040 BLOOD CULTURE FOR BACTERIA: CPT

## 2022-12-24 PROCEDURE — 74011250636 HC RX REV CODE- 250/636: Performed by: FAMILY MEDICINE

## 2022-12-24 PROCEDURE — 74011250636 HC RX REV CODE- 250/636: Performed by: HOSPITALIST

## 2022-12-24 PROCEDURE — 87636 SARSCOV2 & INF A&B AMP PRB: CPT

## 2022-12-24 PROCEDURE — 36415 COLL VENOUS BLD VENIPUNCTURE: CPT

## 2022-12-24 PROCEDURE — 74011000250 HC RX REV CODE- 250: Performed by: INTERNAL MEDICINE

## 2022-12-24 PROCEDURE — 74011636637 HC RX REV CODE- 636/637: Performed by: HOSPITALIST

## 2022-12-24 PROCEDURE — 74011000250 HC RX REV CODE- 250: Performed by: HOSPITALIST

## 2022-12-24 PROCEDURE — 83735 ASSAY OF MAGNESIUM: CPT

## 2022-12-24 PROCEDURE — 74011000258 HC RX REV CODE- 258: Performed by: FAMILY MEDICINE

## 2022-12-24 PROCEDURE — 74011250637 HC RX REV CODE- 250/637: Performed by: HOSPITALIST

## 2022-12-24 RX ORDER — INSULIN LISPRO 100 [IU]/ML
INJECTION, SOLUTION INTRAVENOUS; SUBCUTANEOUS
Status: DISCONTINUED | OUTPATIENT
Start: 2022-12-24 | End: 2023-01-12 | Stop reason: HOSPADM

## 2022-12-24 RX ADMIN — PANTOPRAZOLE SODIUM 40 MG: 40 TABLET, DELAYED RELEASE ORAL at 08:33

## 2022-12-24 RX ADMIN — AMLODIPINE BESYLATE 10 MG: 5 TABLET ORAL at 08:34

## 2022-12-24 RX ADMIN — SODIUM CHLORIDE, PRESERVATIVE FREE 10 ML: 5 INJECTION INTRAVENOUS at 14:59

## 2022-12-24 RX ADMIN — SEVELAMER CARBONATE 1600 MG: 800 TABLET, FILM COATED ORAL at 08:33

## 2022-12-24 RX ADMIN — HEPARIN SODIUM 5000 UNITS: 5000 INJECTION INTRAVENOUS; SUBCUTANEOUS at 10:00

## 2022-12-24 RX ADMIN — VANCOMYCIN HYDROCHLORIDE 125 MG: 1 INJECTION, POWDER, LYOPHILIZED, FOR SOLUTION INTRAVENOUS at 14:59

## 2022-12-24 RX ADMIN — Medication 500 MG: at 08:32

## 2022-12-24 RX ADMIN — VANCOMYCIN HYDROCHLORIDE 125 MG: 1 INJECTION, POWDER, LYOPHILIZED, FOR SOLUTION INTRAVENOUS at 02:13

## 2022-12-24 RX ADMIN — HEPARIN SODIUM 3600 UNITS: 1000 INJECTION INTRAVENOUS; SUBCUTANEOUS at 23:20

## 2022-12-24 RX ADMIN — CARVEDILOL 12.5 MG: 12.5 TABLET, FILM COATED ORAL at 22:23

## 2022-12-24 RX ADMIN — HEPARIN SODIUM 5000 UNITS: 5000 INJECTION INTRAVENOUS; SUBCUTANEOUS at 18:46

## 2022-12-24 RX ADMIN — Medication 2 UNITS: at 16:50

## 2022-12-24 RX ADMIN — ALLOPURINOL 100 MG: 100 TABLET ORAL at 08:34

## 2022-12-24 RX ADMIN — Medication 2 UNITS: at 08:14

## 2022-12-24 RX ADMIN — MEROPENEM 1 G: 1 INJECTION, POWDER, FOR SOLUTION INTRAVENOUS at 08:35

## 2022-12-24 RX ADMIN — NYSTATIN: 100000 POWDER TOPICAL at 11:50

## 2022-12-24 RX ADMIN — Medication 15 UNITS: at 22:40

## 2022-12-24 RX ADMIN — ASPIRIN 81 MG: 81 TABLET, CHEWABLE ORAL at 08:33

## 2022-12-24 RX ADMIN — CARVEDILOL 12.5 MG: 12.5 TABLET, FILM COATED ORAL at 08:33

## 2022-12-24 RX ADMIN — HEPARIN SODIUM 5000 UNITS: 5000 INJECTION INTRAVENOUS; SUBCUTANEOUS at 02:12

## 2022-12-24 RX ADMIN — ACETAMINOPHEN 650 MG: 325 TABLET ORAL at 22:40

## 2022-12-24 RX ADMIN — B-COMPLEX W/ C & FOLIC ACID TAB 1 MG 1 TABLET: 1 TAB at 08:33

## 2022-12-24 RX ADMIN — SEVELAMER CARBONATE 1600 MG: 800 TABLET, FILM COATED ORAL at 18:46

## 2022-12-24 RX ADMIN — EZETIMIBE 10 MG: 10 TABLET ORAL at 08:32

## 2022-12-24 RX ADMIN — CLOPIDOGREL BISULFATE 75 MG: 75 TABLET ORAL at 08:33

## 2022-12-24 RX ADMIN — EPOETIN ALFA-EPBX 8000 UNITS: 4000 INJECTION, SOLUTION INTRAVENOUS; SUBCUTANEOUS at 22:38

## 2022-12-24 RX ADMIN — ACETAMINOPHEN 650 MG: 325 TABLET ORAL at 16:49

## 2022-12-24 NOTE — PROGRESS NOTES
Nephrology Inpatient Progress note    Subjective:     Oneil Gómez is a 60 y.o. male with a PMHx of  DM, HTN. PVD, ESRD on HD TTS at [facility] under the [address] [place] [address] Division . Nephrology sent in for admission by wound care clinic due to left foot infection ,was told he needed surgery. Podiatry has been in to see pt . He has been on abx recently     S/P Lt 2/3/4 metatarsal amputation  Pt with no complaint today. Had fever to 102.9 yest- abx changed to meropenem.  Temp down today, no SOB    Admit Date: 12/9/2022  Active Problems:    Diabetes mellitus with foot ulcer ([facility] (6/27/2022)      CAD (coronary artery disease) (6/28/2022)      ESRD (end stage renal disease) ([facility] (6/28/2022)      Hypertension (7/21/2022)      Leukocytosis (12/9/2022)      Diabetic foot infection ([facility] (12/9/2022)      Diarrhea (12/9/2022)      Type 2 diabetes mellitus, with long-term current use of insulin ([facility]) ()      Acute hematogenous osteomyelitis of left foot ([facility] (12/9/2022)      Nondisplaced fracture of second metatarsal bone of left foot (12/9/2022)      Nondisplaced fracture of third metatarsal bone of left foot (12/9/2022)      Closed nondisplaced fracture of fourth metatarsal bone of left foot (12/9/2022)      Positive blood culture (12/11/2022)    Current Facility-Administered Medications   Medication Dose Route Frequency    vancomycin (VANCOCIN) 1,000 mg in 0.9% sodium chloride 250 mL (VIAL-MATE)  1,000 mg IntraVENous ONCE    insulin glargine (LANTUS) injection 15 Units  15 Units SubCUTAneous QHS    vancomycin 50 mg/mL oral solution (compounded) 125 mg  125 mg Oral Q12H    meropenem (MERREM) 1 g in 0.9% sodium chloride (MBP/ADV) 50 mL MBP  1 g IntraVENous Q12H    sodium chloride (NS) flush 5-40 mL  5-40 mL IntraVENous PRN    fentaNYL citrate (PF) injection  mcg   mcg IntraVENous Rad Multiple    midazolam (VERSED) injection 0.5-2 mg  0.5-2 mg IntraVENous Rad Multiple    naloxone (NARCAN) injection 0.1 mg  0.1 mg IntraVENous Multiple    flumazeniL (ROMAZICON) 0.1 mg/mL injection 0.2 mg  0.2 mg IntraVENous Multiple    heparin (porcine) 1,000 unit/mL injection 1,000-10,000 Units  1,000-10,000 Units IntraVENous Rad Multiple    nitroGLYcerin 0.1 mg/mL in D5W injection  1-5 mL IntraarTERial Rad Multiple    heparinized saline 2 units/mL infusion 2,000 Units  1,000 mL Irrigation RAD CONTINUOUS    iopamidoL (ISOVUE 250) 250 mg iodine /mL (51 %) contrast injection 1-150 mL  1-150 mL IntraarTERial RAD CONTINUOUS    epoetin lisette-epbx (RETACRIT) injection 8,000 Units  8,000 Units SubCUTAneous Q TUE, THU & SAT    loperamide (IMODIUM) capsule 2 mg  2 mg Oral Q4H PRN    nystatin (MYCOSTATIN) 100,000 unit/gram powder   Topical BID    insulin lispro (HUMALOG) injection 2 Units  2 Units SubCUTAneous TIDAC    alum-mag hydroxide-simeth (MYLANTA) oral suspension 30 mL  30 mL Oral Q4H PRN    glucose chewable tablet 16 g  16 g Oral PRN    glucagon (GLUCAGEN) injection 1 mg  1 mg IntraMUSCular PRN    heparin (porcine) 1,000 unit/mL injection 3,600 Units  3,600 Units IntraCATHeter DIALYSIS PRN    Vancomycin - Pharmacy to Dose  1 Each Other Rx Dosing/Monitoring    dextrose 10% infusion 0-250 mL  0-250 mL IntraVENous PRN    0.9% sodium chloride infusion  25 mL/hr IntraVENous DIALYSIS PRN    clopidogreL (PLAVIX) tablet 75 mg  75 mg Oral DAILY    carvediloL (COREG) tablet 12.5 mg  12.5 mg Oral BID    aspirin chewable tablet 81 mg  81 mg Oral DAILY    amLODIPine (NORVASC) tablet 10 mg  10 mg Oral DAILY    allopurinoL (ZYLOPRIM) tablet 100 mg  100 mg Oral DAILY    ascorbic acid (vitamin C) (VITAMIN C) tablet 500 mg  500 mg Oral DAILY    ezetimibe (ZETIA) tablet 10 mg  10 mg Oral DAILY    pantoprazole (PROTONIX) tablet 40 mg  40 mg Oral ACB    sevelamer carbonate (RENVELA) tab 1,600 mg  1,600 mg Oral TID WITH MEALS    vit B Cmplx 3-FA-Vit C-Biotin (NEPHRO NIKITA RX) tablet 1 Tablet  1 Tablet Oral DAILY    sodium chloride (NS) flush 5-40 mL  5-40 mL IntraVENous Q8H    sodium chloride (NS) flush 5-40 mL  5-40 mL IntraVENous PRN    acetaminophen (TYLENOL) tablet 650 mg  650 mg Oral Q6H PRN    Or    acetaminophen (TYLENOL) suppository 650 mg  650 mg Rectal Q6H PRN    bisacodyL (DULCOLAX) suppository 10 mg  10 mg Rectal DAILY PRN    promethazine (PHENERGAN) tablet 12.5 mg  12.5 mg Oral Q6H PRN    Or    ondansetron (ZOFRAN) injection 4 mg  4 mg IntraVENous Q6H PRN    heparin (porcine) injection 5,000 Units  5,000 Units SubCUTAneous Q8H    oxyCODONE-acetaminophen (PERCOCET) 5-325 mg per tablet 1 Tablet  1 Tablet Oral Q6H PRN         Allergy:   No Known Allergies     Objective:     Visit Vitals  /62 (BP 1 Location: Right upper arm, BP Patient Position: At rest)   Pulse 76   Temp 98.3 °F (36.8 °C)   Resp 16   Ht 6' (1.829 m)   Wt 97.5 kg (215 lb)   SpO2 95%   BMI 29.16 kg/m²       No intake or output data in the 24 hours ending 12/24/22 0326      Physical Exam:       General: No resp distress   HENT: Atraumatic and normocephalic   Eyes: Normal conjunctiva   Neck: Supple No JVD or mass   Cardiovascular: Normal S1 & S2, no m/r/g   Pulmonary/Chest Wall: Clear to auscultation bilaterally   Abdominal: Soft and +NABS   Musculoskeletal: No edema S/p Amputation of multiple toes Left foot   Neurological: No focal deficits    HD Access: [facility] TDC +    Data Review:  Lab Results   Component Value Date/Time    Sodium 134 (L) 12/24/2022 03:25 AM    Potassium 4.7 12/24/2022 03:25 AM    Chloride 98 (L) 12/24/2022 03:25 AM    CO2 26 12/24/2022 03:25 AM    Anion gap 10 12/24/2022 03:25 AM    Glucose 149 (H) 12/24/2022 03:25 AM    BUN 39 (H) 12/24/2022 03:25 AM    Creatinine 7.49 (H) 12/24/2022 03:25 AM    BUN/Creatinine ratio 5 (L) 12/24/2022 03:25 AM    GFR est AA 13 (L) 07/21/2022 02:15 PM    GFR est non-AA 11 (L) 07/21/2022 02:15 PM    Calcium 8.2 (L) 12/24/2022 03:25 AM     Lab Results   Component Value Date/Time    WBC 11.2 12/22/2022 04:49 AM    HGB 9.2 (L) 12/22/2022 04:49 AM HCT 29.3 (L) 12/22/2022 04:49 AM    PLATELET 429 [birth year]1970 04:49 AM    MCV 97.7 12/22/2022 04:49 AM     Lab Results   Component Value Date/Time    Calcium 8.2 (L) 12/24/2022 03:25 AM    Phosphorus 6.0 (H) 12/22/2022 04:49 AM     No results found for: IRON, FE, TIBC, IBCT, PSAT, FERR  No results found for: FERR      Impression:     ESRD on HD TTS schedule  Hyperkalemia, resolved  Left diabetic foot infection w/ gangrenous changes s/p Lt 2/3/4 metatarsal amputation   DM  HTN  PVD, seen by Vasc Surgery, anticipate LLE angio and possible intervention.   Anemia  SHPT    Plan:     HD today  IV Antibiotic per ID  Cont [place] for Anemia  Vascular, podiatry and ID specialists following  Will cont follow    Arlin Schofield MD,   MINIMALLY INVASIVE SURGERY [facility]

## 2022-12-24 NOTE — PROGRESS NOTES
Spoke to on call hospitalist Dr. Randy Montero regarding spike in [place] despite prn Tylenol. Blood cultures, UA, and C xray to be ordered.

## 2022-12-24 NOTE — PROGRESS NOTES
Hospitalist Progress Note    Patient: Nataly Murphy MRN: [id number]  CSN: [id number]    YOB: 1960  Age: 60 y.o. Sex: male    DOA: 12/9/2022 LOS:  LOS: 15 days                Assessment/Plan     Patient Active Problem List   Diagnosis Code    Diabetes mellitus with foot ulcer ([facility] E11.621, L97.509    CAD (coronary artery disease) I25.10    ESRD (end stage renal disease) ([facility] N18.6    Acute hematogenous osteomyelitis of right foot (HCC) M86.071    Cellulitis of right heel L03.115    Diabetic foot ulcer with osteomyelitis ([facility] E11.621, E11.69, L97.509, M86.9    Ulcer of right heel, with necrosis of bone ([facility] L97.414    Infection and inflammatory reaction due to internal fixation device of other site, initial encounter [facility]) T84.69XA    Left arm swelling M79.89    Chest pain at rest R07.9    Abnormal nuclear stress test R94.39    History of anemia due to CKD N18.9, Z86.2    S/P angioplasty with stent Z95.820    Hypertension I10    Leukocytosis D72.829    Diabetic foot infection (HCC) E11.628, L08.9    Diarrhea R19.7    Type 2 diabetes mellitus, with long-term current use of insulin (HCC) E11.9, Z79.4    Acute hematogenous osteomyelitis of left foot (HCC) M86.072    Nondisplaced fracture of second metatarsal bone of left foot S92.325A    Nondisplaced fracture of third metatarsal bone of left foot S92.335A    Closed nondisplaced fracture of fourth metatarsal bone of left foot S92.345A    Positive blood culture R78.81        Chief complaint :  Foot gangrene, DFU  60 y.o. male with history of diabetes, diabetic ulcer, CAD, hyperlipidemia, hypertension end-stage renal disease on HD presented to ER due to left foot lesion. Spiked fever, antibiotics broadened, CXR negative, blood cultures ordered.     Gangrene of left foot/DFU   PARTIAL AMPUTATION OF LEFT 2ND, 3RD, 4TH METATARSALS, AMPUTATION OF TOES 2,3,4, INCISION ADN DRAINAGE LEFT FOOT on 12/09/2022  S/p angiogram with PTA of the proximal PT and peroneal arteries. Vascular planning repeat procedure next week, but can be done as outpatient. S/p Left TMA 12/21/2022. ID following  Continue zosyn   Awaiting bone biopsy to decide on antibiotics at discharge. Positive blood cx -  Coag negative staph  Likely contaminant contamination      CAD-   Distal RCA to drug-eluting stent in July 2022, continue plavix -   No chest pain    Hypertension -  continue home medication     Diarrhea epigastric pain-  resolved   CT abdomen no acute process  Resolved      End stage renal disease -  on HD       DM  type II   Lantus at night , ssi     Disposition : await placement    Review of systems  General: No fevers or chills. Cardiovascular: No chest pain or pressure. No palpitations. Pulmonary: No shortness of breath. Gastrointestinal: No nausea, vomiting. Physical Exam:  General: Awake, cooperative, no acute distress    HEENT: NC, Atraumatic. PERRLA, anicteric sclerae. Lungs: CTA Bilaterally. No Wheezing/Rhonchi/Rales. Heart:  S1 S2,  No murmur, No Rubs, No Gallops  Abdomen: Soft, Non distended, Non tender. +Bowel sounds,   Extremities: Left foot with dressing. Psych:   Not anxious or agitated. Neurologic:  No acute neurological deficit. Vital signs/Intake and Output:  Visit Vitals  BP (!) 139/50   Pulse 75   Temp (!) 101 °F (38.3 °C)   Resp 14   Ht 6' (1.829 m)   Wt 98.6 kg (217 lb 6.4 oz)   SpO2 95%   BMI 29.48 kg/m²     Current Shift:  No intake/output data recorded.   Last three shifts:  12/22 1901 - 12/24 0700  In: -   Out: 2500             Labs: Results:       Chemistry Recent Labs     12/24/22  0325 12/23/22  0205 12/22/22  0449   * 211* 219*   * 134* 135*   K 4.7 4.8 5.9*   CL 98* 98* 101   CO2 26 28 24   BUN 39* 22* 47*   CREA 7.49* 5.08* 8.68*   CA 8.2* 7.8* 7.8*   AGAP 10 8 10   BUCR 5* 4* 5*   ALB  --   --  1.9*      CBC w/Diff Recent Labs     12/22/22  0449   WBC 11.2   RBC 3.00*   HGB 9.2*   HCT 29.3*    Cardiac Enzymes No results for input(s): CPK, CKND1, PAULO in the last 72 hours. No lab exists for component: CKRMB, TROIP   Coagulation No results for input(s): PTP, INR, APTT, INREXT, INREXT in the last 72 hours. Lipid Panel Lab Results   Component Value Date/Time    Cholesterol, total 188 07/19/2022 03:12 AM    HDL Cholesterol 42 07/19/2022 03:12 AM    LDL, calculated 131.2 (H) 07/19/2022 03:12 AM    VLDL, calculated 14.8 07/19/2022 03:12 AM    Triglyceride 74 07/19/2022 03:12 AM    CHOL/HDL Ratio 4.5 07/19/2022 03:12 AM      BNP No results for input(s): BNPP in the last 72 hours.    Liver Enzymes Recent Labs     12/22/22  0449   ALB 1.9*      Thyroid Studies No results found for: T4, T3U, TSH, TSHEXT, TSHEXT     Procedures/imaging: see electronic medical records for all procedures/Xrays and details which were not copied into this note but were reviewed prior to creation of Plan

## 2022-12-24 NOTE — PROGRESS NOTES
Vancomycin level = 18.1 @ 0325 on 12/24/2022   H-D slated for today (12/24/2022)   Vancomycin 1 gm iv ordered after H-D(2100)

## 2022-12-24 NOTE — PROGRESS NOTES
Cross cover note    Pt with high fever to 102.9. I have broadened abx from zosyn to meropenem. He is on oral vanco for C diff prevention. I have ordered blood cx, UA, CXR, and lactic acid for sepsis workup.

## 2022-12-24 NOTE — PROGRESS NOTES
0704  Bedside shift change report given to [address] (oncoming nurse) by Sandra Hayes RN (offgoing nurse). Report included the following information SBAR, Kardex, Intake/Output, and MAR.     1344  Notified Dr. Marjan Salazar that patient is not eating meals in one sitting. He is grazing. Verbal order with readback: dc insulin meal bolus and order humalog normal sliding scale. 1649  Patient temp 101. C/o chills that are making his arms feel as if \"electricity is shocking him\" and like \"numbness/razor blades. \"    2582  Paged on call. 1736  Paged on call. 1937  Dr. Tacho Jaime returned call. Notified of above. She is placing orders. Bedside and verbal shift change report given to [facility] by Jessica Pastrana RN. Report included SBAR, kardex, MAR, and recent results. 1947  Temp 100.5. Patient states he feels much better. Denies discomfort in arms now.

## 2022-12-25 LAB
ALBUMIN SERPL-MCNC: 2 G/DL (ref 3.4–5)
ALBUMIN/GLOB SERPL: 0.4 (ref 0.8–1.7)
ALP SERPL-CCNC: 104 U/L (ref 45–117)
ALT SERPL-CCNC: 31 U/L (ref 16–61)
ANION GAP SERPL CALC-SCNC: 8 MMOL/L (ref 3–18)
AST SERPL-CCNC: 19 U/L (ref 10–38)
BASOPHILS # BLD: 0.1 K/UL (ref 0–0.1)
BASOPHILS NFR BLD: 1 % (ref 0–2)
BILIRUB SERPL-MCNC: 0.4 MG/DL (ref 0.2–1)
BUN SERPL-MCNC: 25 MG/DL (ref 7–18)
BUN/CREAT SERPL: 4 (ref 12–20)
CALCIUM SERPL-MCNC: 8.6 MG/DL (ref 8.5–10.1)
CHLORIDE SERPL-SCNC: 98 MMOL/L (ref 100–111)
CO2 SERPL-SCNC: 29 MMOL/L (ref 21–32)
CREAT SERPL-MCNC: 5.65 MG/DL (ref 0.6–1.3)
DIFFERENTIAL METHOD BLD: ABNORMAL
EOSINOPHIL # BLD: 0.5 K/UL (ref 0–0.4)
EOSINOPHIL NFR BLD: 3 % (ref 0–5)
ERYTHROCYTE [DISTWIDTH] IN BLOOD BY AUTOMATED COUNT: 17.4 % (ref 11.6–14.5)
GLOBULIN SER CALC-MCNC: 5.3 G/DL (ref 2–4)
GLUCOSE BLD STRIP.AUTO-MCNC: 125 MG/DL (ref 70–110)
GLUCOSE BLD STRIP.AUTO-MCNC: 152 MG/DL (ref 70–110)
GLUCOSE BLD STRIP.AUTO-MCNC: 175 MG/DL (ref 70–110)
GLUCOSE BLD STRIP.AUTO-MCNC: 178 MG/DL (ref 70–110)
GLUCOSE SERPL-MCNC: 115 MG/DL (ref 74–99)
HCT VFR BLD AUTO: 26.8 % (ref 36–48)
HGB BLD-MCNC: 8.5 G/DL (ref 13–16)
IMM GRANULOCYTES # BLD AUTO: 0.1 K/UL (ref 0–0.04)
IMM GRANULOCYTES NFR BLD AUTO: 1 % (ref 0–0.5)
LYMPHOCYTES # BLD: 1.5 K/UL (ref 0.9–3.6)
LYMPHOCYTES NFR BLD: 9 % (ref 21–52)
MAGNESIUM SERPL-MCNC: 1.9 MG/DL (ref 1.6–2.6)
MCH RBC QN AUTO: 30.4 PG (ref 24–34)
MCHC RBC AUTO-ENTMCNC: 31.7 G/DL (ref 31–37)
MCV RBC AUTO: 95.7 FL (ref 78–100)
MONOCYTES # BLD: 1.5 K/UL (ref 0.05–1.2)
MONOCYTES NFR BLD: 10 % (ref 3–10)
NEUTS SEG # BLD: 12.1 K/UL (ref 1.8–8)
NEUTS SEG NFR BLD: 77 % (ref 40–73)
NRBC # BLD: 0 K/UL (ref 0–0.01)
NRBC BLD-RTO: 0 PER 100 WBC
PLATELET # BLD AUTO: 280 K/UL (ref 135–420)
PMV BLD AUTO: 10.5 FL (ref 9.2–11.8)
POTASSIUM SERPL-SCNC: 4.4 MMOL/L (ref 3.5–5.5)
PROT SERPL-MCNC: 7.3 G/DL (ref 6.4–8.2)
RBC # BLD AUTO: 2.8 M/UL (ref 4.35–5.65)
SODIUM SERPL-SCNC: 135 MMOL/L (ref 136–145)
WBC # BLD AUTO: 15.7 K/UL (ref 4.6–13.2)

## 2022-12-25 PROCEDURE — 74011000258 HC RX REV CODE- 258: Performed by: FAMILY MEDICINE

## 2022-12-25 PROCEDURE — 74011250636 HC RX REV CODE- 250/636: Performed by: EMERGENCY MEDICINE

## 2022-12-25 PROCEDURE — 97535 SELF CARE MNGMENT TRAINING: CPT

## 2022-12-25 PROCEDURE — 74011250636 HC RX REV CODE- 250/636: Performed by: HOSPITALIST

## 2022-12-25 PROCEDURE — 74011250636 HC RX REV CODE- 250/636: Performed by: INTERNAL MEDICINE

## 2022-12-25 PROCEDURE — 82962 GLUCOSE BLOOD TEST: CPT

## 2022-12-25 PROCEDURE — 36415 COLL VENOUS BLD VENIPUNCTURE: CPT

## 2022-12-25 PROCEDURE — 74011000250 HC RX REV CODE- 250: Performed by: INTERNAL MEDICINE

## 2022-12-25 PROCEDURE — 80053 COMPREHEN METABOLIC PANEL: CPT

## 2022-12-25 PROCEDURE — 74011250637 HC RX REV CODE- 250/637: Performed by: HOSPITALIST

## 2022-12-25 PROCEDURE — 74011000250 HC RX REV CODE- 250: Performed by: HOSPITALIST

## 2022-12-25 PROCEDURE — 83735 ASSAY OF MAGNESIUM: CPT

## 2022-12-25 PROCEDURE — 74011250636 HC RX REV CODE- 250/636: Performed by: FAMILY MEDICINE

## 2022-12-25 PROCEDURE — 85025 COMPLETE CBC W/AUTO DIFF WBC: CPT

## 2022-12-25 PROCEDURE — 97168 OT RE-EVAL EST PLAN CARE: CPT

## 2022-12-25 PROCEDURE — 65270000029 HC RM PRIVATE

## 2022-12-25 PROCEDURE — 74011636637 HC RX REV CODE- 636/637: Performed by: HOSPITALIST

## 2022-12-25 RX ORDER — SAME BUTANEDISULFONATE/BETAINE 400-600 MG
500 POWDER IN PACKET (EA) ORAL 2 TIMES DAILY
Status: DISCONTINUED | OUTPATIENT
Start: 2022-12-25 | End: 2023-01-12 | Stop reason: HOSPADM

## 2022-12-25 RX ADMIN — ACETAMINOPHEN 650 MG: 325 TABLET ORAL at 17:06

## 2022-12-25 RX ADMIN — MEROPENEM 1 G: 1 INJECTION, POWDER, FOR SOLUTION INTRAVENOUS at 01:19

## 2022-12-25 RX ADMIN — ALLOPURINOL 100 MG: 100 TABLET ORAL at 08:27

## 2022-12-25 RX ADMIN — Medication 2 UNITS: at 21:46

## 2022-12-25 RX ADMIN — MEROPENEM 1 G: 1 INJECTION, POWDER, FOR SOLUTION INTRAVENOUS at 08:24

## 2022-12-25 RX ADMIN — SODIUM CHLORIDE, PRESERVATIVE FREE 10 ML: 5 INJECTION INTRAVENOUS at 15:15

## 2022-12-25 RX ADMIN — NYSTATIN: 100000 POWDER TOPICAL at 00:00

## 2022-12-25 RX ADMIN — VANCOMYCIN HYDROCHLORIDE 125 MG: 1 INJECTION, POWDER, LYOPHILIZED, FOR SOLUTION INTRAVENOUS at 02:43

## 2022-12-25 RX ADMIN — NYSTATIN: 100000 POWDER TOPICAL at 08:39

## 2022-12-25 RX ADMIN — PANTOPRAZOLE SODIUM 40 MG: 40 TABLET, DELAYED RELEASE ORAL at 08:27

## 2022-12-25 RX ADMIN — Medication 2 UNITS: at 17:07

## 2022-12-25 RX ADMIN — B-COMPLEX W/ C & FOLIC ACID TAB 1 MG 1 TABLET: 1 TAB at 08:27

## 2022-12-25 RX ADMIN — HEPARIN SODIUM 5000 UNITS: 5000 INJECTION INTRAVENOUS; SUBCUTANEOUS at 12:14

## 2022-12-25 RX ADMIN — HEPARIN SODIUM 5000 UNITS: 5000 INJECTION INTRAVENOUS; SUBCUTANEOUS at 17:06

## 2022-12-25 RX ADMIN — Medication 15 UNITS: at 21:46

## 2022-12-25 RX ADMIN — CARVEDILOL 12.5 MG: 12.5 TABLET, FILM COATED ORAL at 08:27

## 2022-12-25 RX ADMIN — CARVEDILOL 12.5 MG: 12.5 TABLET, FILM COATED ORAL at 20:43

## 2022-12-25 RX ADMIN — SEVELAMER CARBONATE 1600 MG: 800 TABLET, FILM COATED ORAL at 08:27

## 2022-12-25 RX ADMIN — SEVELAMER CARBONATE 1600 MG: 800 TABLET, FILM COATED ORAL at 12:14

## 2022-12-25 RX ADMIN — OXYCODONE AND ACETAMINOPHEN 1 TABLET: 5; 325 TABLET ORAL at 19:50

## 2022-12-25 RX ADMIN — CLOPIDOGREL BISULFATE 75 MG: 75 TABLET ORAL at 08:27

## 2022-12-25 RX ADMIN — SEVELAMER CARBONATE 1600 MG: 800 TABLET, FILM COATED ORAL at 17:06

## 2022-12-25 RX ADMIN — ASPIRIN 81 MG: 81 TABLET, CHEWABLE ORAL at 08:27

## 2022-12-25 RX ADMIN — Medication 500 MG: at 15:14

## 2022-12-25 RX ADMIN — Medication 2 UNITS: at 12:25

## 2022-12-25 RX ADMIN — OXYCODONE AND ACETAMINOPHEN 1 TABLET: 5; 325 TABLET ORAL at 08:34

## 2022-12-25 RX ADMIN — Medication 125 MG: at 15:14

## 2022-12-25 RX ADMIN — EZETIMIBE 10 MG: 10 TABLET ORAL at 08:27

## 2022-12-25 RX ADMIN — VANCOMYCIN HYDROCHLORIDE 1000 MG: 1 INJECTION, POWDER, LYOPHILIZED, FOR SOLUTION INTRAVENOUS at 02:43

## 2022-12-25 RX ADMIN — SODIUM CHLORIDE, PRESERVATIVE FREE 10 ML: 5 INJECTION INTRAVENOUS at 01:24

## 2022-12-25 RX ADMIN — SODIUM CHLORIDE, PRESERVATIVE FREE 10 ML: 5 INJECTION INTRAVENOUS at 07:35

## 2022-12-25 RX ADMIN — Medication 500 MG: at 20:43

## 2022-12-25 RX ADMIN — HEPARIN SODIUM 5000 UNITS: 5000 INJECTION INTRAVENOUS; SUBCUTANEOUS at 01:28

## 2022-12-25 RX ADMIN — Medication 125 MG: at 20:43

## 2022-12-25 RX ADMIN — SODIUM CHLORIDE, PRESERVATIVE FREE 10 ML: 5 INJECTION INTRAVENOUS at 22:55

## 2022-12-25 RX ADMIN — AMLODIPINE BESYLATE 10 MG: 5 TABLET ORAL at 08:27

## 2022-12-25 RX ADMIN — Medication 500 MG: at 08:27

## 2022-12-25 RX ADMIN — NYSTATIN: 100000 POWDER TOPICAL at 22:00

## 2022-12-25 NOTE — PROGRESS NOTES
Hospitalist Progress Note    Patient: Jus Ospina MRN: [id number]  CSN: [id number]    YOB: 1960  Age: 60 y.o. Sex: male    DOA: 12/9/2022 LOS:  LOS: 16 days                Assessment/Plan     Patient Active Problem List   Diagnosis Code    Diabetes mellitus with foot ulcer ([facility] E11.621, L97.509    CAD (coronary artery disease) I25.10    ESRD (end stage renal disease) ([facility] N18.6    Acute hematogenous osteomyelitis of right foot (HCC) M86.071    Cellulitis of right heel L03.115    Diabetic foot ulcer with osteomyelitis ([facility] E11.621, E11.69, L97.509, M86.9    Ulcer of right heel, with necrosis of bone ([facility] L97.414    Infection and inflammatory reaction due to internal fixation device of other site, initial encounter [facility]) T84.69XA    Left arm swelling M79.89    Chest pain at rest R07.9    Abnormal nuclear stress test R94.39    History of anemia due to CKD N18.9, Z86.2    S/P angioplasty with stent Z95.820    Hypertension I10    Leukocytosis D72.829    Diabetic foot infection (HCC) E11.628, L08.9    Diarrhea R19.7    Type 2 diabetes mellitus, with long-term current use of insulin (HCC) E11.9, Z79.4    Acute hematogenous osteomyelitis of left foot (HCC) M86.072    Nondisplaced fracture of second metatarsal bone of left foot S92.325A    Nondisplaced fracture of third metatarsal bone of left foot S92.335A    Closed nondisplaced fracture of fourth metatarsal bone of left foot S92.345A    Positive blood culture R78.81        Chief complaint :  Foot gangrene, DFU  60 y.o. male with history of diabetes, diabetic ulcer, CAD, hyperlipidemia, hypertension end-stage renal disease on HD presented to ER due to left foot lesion. Spiked fever, antibiotics broadened, CXR negative, blood cultures ordered.     Gangrene of left foot/DFU   PARTIAL AMPUTATION OF LEFT 2ND, 3RD, 4TH METATARSALS, AMPUTATION OF TOES 2,3,4, INCISION ADN DRAINAGE LEFT FOOT on 12/09/2022  S/p angiogram with PTA of the proximal PT and peroneal arteries. Vascular planning repeat procedure next week, but can be done as outpatient. S/p Left TMA 12/21/2022. ID following  Continue zosyn   Awaiting bone biopsy to decide on antibiotics at discharge. History of C-diff -  On oral vancomycin to prevent recurrence  Added florastor. Positive blood cx -  Coag negative staph  Likely contaminant contamination      CAD-   Distal RCA to drug-eluting stent in July 2022, continue plavix -   No chest pain    Hypertension -  continue home medication     End stage renal disease -  on HD per nephrology       DM  type II -  Lantus andssi     Disposition : await placement    Review of systems  General: No fevers or chills. Cardiovascular: No chest pain or pressure. No palpitations. Pulmonary: No shortness of breath. Gastrointestinal: No nausea, vomiting. Physical Exam:  General: Awake, cooperative, no acute distress    HEENT: NC, Atraumatic. PERRLA, anicteric sclerae. Lungs: CTA Bilaterally. No Wheezing/Rhonchi/Rales. Heart:  S1 S2,  No murmur, No Rubs, No Gallops  Abdomen: Soft, Non distended, Non tender. +Bowel sounds,   Extremities: Left foot with dressing. Psych:   Not anxious or agitated. Neurologic:  No acute neurological deficit. Vital signs/Intake and Output:  Visit Vitals  BP (!) 128/50 (BP 1 Location: Right upper arm, BP Patient Position: At rest)   Pulse 75   Temp 97.2 °F (36.2 °C)   Resp 16   Ht 6' (1.829 m)   Wt 98.6 kg (217 lb 6.4 oz)   SpO2 96%   BMI 29.48 kg/m²     Current Shift:  No intake/output data recorded.   Last three shifts:  12/23 1901 - 12/25 0700  In: 515 [P.O.:440; I.V.:75]  Out: 2500             Labs: Results:       Chemistry Recent Labs     12/25/22  0512 12/24/22  0325 12/23/22  0205   * 149* 211*   * 134* 134*   K 4.4 4.7 4.8   CL 98* 98* 98*   CO2 29 26 28   BUN 25* 39* 22*   CREA 5.65* 7.49* 5.08*   CA 8.6 8.2* 7.8*   AGAP 8 10 8   BUCR 4* 5* 4*     --   --    TP 7.3  --   -- ALB 2.0*  --   --    GLOB 5.3*  --   --    AGRAT 0.4*  --   --       CBC w/Diff Recent Labs     12/25/22  0512   WBC 15.7*   RBC 2.80*   HGB 8.5*   HCT 26.8*      GRANS 77*   LYMPH 9*   EOS 3      Cardiac Enzymes No results for input(s): CPK, CKND1, PAULO in the last 72 hours. No lab exists for component: CKRMB, TROIP   Coagulation No results for input(s): PTP, INR, APTT, INREXT, INREXT in the last 72 hours. Lipid Panel Lab Results   Component Value Date/Time    Cholesterol, total 188 07/19/2022 03:12 AM    HDL Cholesterol 42 07/19/2022 03:12 AM    LDL, calculated 131.2 (H) 07/19/2022 03:12 AM    VLDL, calculated 14.8 07/19/2022 03:12 AM    Triglyceride 74 07/19/2022 03:12 AM    CHOL/HDL Ratio 4.5 07/19/2022 03:12 AM      BNP No results for input(s): BNPP in the last 72 hours.    Liver Enzymes Recent Labs     12/25/22  0512   TP 7.3   ALB 2.0*         Thyroid Studies No results found for: T4, T3U, TSH, TSHEXT, TSHEXT     Procedures/imaging: see electronic medical records for all procedures/Xrays and details which were not copied into this note but were reviewed prior to creation of Plan

## 2022-12-25 NOTE — PROGRESS NOTES
Intervention: Monitor/Manage Fluid Electrolyte/Acid Base Balance    PROBLEM: HEMODIALYSIS ADULT    INTERVENTION: Hemodynamic stabilization  Maintain BP WNL for this patient while on hemodialysis    INTERVENTION: Fluid Management  Will attempt 2500 ml total fluid removal as tolerated    INTERVENTION: Metabolic / Electrolyte Imbalance Management  2 K+ potassium, 2.5 Ca calcium bath today with dialysis    GOAL: Signs and symptoms of listed potential problems will be absent or manageable  (reference Hemodialysis ADULT CPG)    OUTCOME: Progressing  HD planned for 3 hours today      Problem: Chronic Renal Failure  Goal: *Fluid and electrolytes stabilized  Outcome: Progressing Towards Goal     Problem: Patient Education: Go to Patient Education Activity  Goal: Patient/Family Education  Outcome: Progressing Towards Goal

## 2022-12-25 NOTE — PROGRESS NOTES
Nephrology Inpatient Progress note    Subjective:     Neyda Stapleton is a 60 y.o. male with a PMHx of  DM, HTN. PVD, ESRD on HD TTS at [facility] under the [address] [place] [address]. Nephrology sent in for admission by wound care clinic due to left foot infection ,was told he needed surgery. Podiatry has been in to see pt . He has been on abx recently     S/P Lt 2/3/4 metatarsal amputation  Pt with no complaint today. Had fever again to 101 yest- abx changed to meropenem.  Temp down today, no SOB  S/p HD yesterday  Admit Date: 12/9/2022  Active Problems:    Diabetes mellitus with foot ulcer ([facility] (6/27/2022)      CAD (coronary artery disease) (6/28/2022)      ESRD (end stage renal disease) ([facility] (6/28/2022)      Hypertension (7/21/2022)      Leukocytosis (12/9/2022)      Diabetic foot infection ([facility] (12/9/2022)      Diarrhea (12/9/2022)      Type 2 diabetes mellitus, with long-term current use of insulin ([facility]) ()      Acute hematogenous osteomyelitis of left foot ([facility] (12/9/2022)      Nondisplaced fracture of second metatarsal bone of left foot (12/9/2022)      Nondisplaced fracture of third metatarsal bone of left foot (12/9/2022)      Closed nondisplaced fracture of fourth metatarsal bone of left foot (12/9/2022)      Positive blood culture (12/11/2022)    Current Facility-Administered Medications   Medication Dose Route Frequency    insulin lispro (HUMALOG) injection   SubCUTAneous AC&HS    insulin glargine (LANTUS) injection 15 Units  15 Units SubCUTAneous QHS    vancomycin 50 mg/mL oral solution (compounded) 125 mg  125 mg Oral Q12H    meropenem (MERREM) 1 g in 0.9% sodium chloride (MBP/ADV) 50 mL MBP  1 g IntraVENous Q12H    sodium chloride (NS) flush 5-40 mL  5-40 mL IntraVENous PRN    fentaNYL citrate (PF) injection  mcg   mcg IntraVENous Rad Multiple    midazolam (VERSED) injection 0.5-2 mg  0.5-2 mg IntraVENous Rad Multiple    naloxone (NARCAN) injection 0.1 mg  0.1 mg IntraVENous Multiple    flumazeniL (ROMAZICON) 0.1 mg/mL injection 0.2 mg  0.2 mg IntraVENous Multiple    heparin (porcine) 1,000 unit/mL injection 1,000-10,000 Units  1,000-10,000 Units IntraVENous Rad Multiple    nitroGLYcerin 0.1 mg/mL in D5W injection  1-5 mL IntraarTERial Rad Multiple    heparinized saline 2 units/mL infusion 2,000 Units  1,000 mL Irrigation RAD CONTINUOUS    iopamidoL (ISOVUE 250) 250 mg iodine /mL (51 %) contrast injection 1-150 mL  1-150 mL IntraarTERial RAD CONTINUOUS    epoetin lisette-epbx (RETACRIT) injection 8,000 Units  8,000 Units SubCUTAneous Q TUE, THU & SAT    loperamide (IMODIUM) capsule 2 mg  2 mg Oral Q4H PRN    nystatin (MYCOSTATIN) 100,000 unit/gram powder   Topical BID    alum-mag hydroxide-simeth (MYLANTA) oral suspension 30 mL  30 mL Oral Q4H PRN    glucose chewable tablet 16 g  16 g Oral PRN    glucagon (GLUCAGEN) injection 1 mg  1 mg IntraMUSCular PRN    heparin (porcine) 1,000 unit/mL injection 3,600 Units  3,600 Units IntraCATHeter DIALYSIS PRN    Vancomycin - Pharmacy to Dose  1 Each Other Rx Dosing/Monitoring    dextrose 10% infusion 0-250 mL  0-250 mL IntraVENous PRN    0.9% sodium chloride infusion  25 mL/hr IntraVENous DIALYSIS PRN    clopidogreL (PLAVIX) tablet 75 mg  75 mg Oral DAILY    carvediloL (COREG) tablet 12.5 mg  12.5 mg Oral BID    aspirin chewable tablet 81 mg  81 mg Oral DAILY    amLODIPine (NORVASC) tablet 10 mg  10 mg Oral DAILY    allopurinoL (ZYLOPRIM) tablet 100 mg  100 mg Oral DAILY    ascorbic acid (vitamin C) (VITAMIN C) tablet 500 mg  500 mg Oral DAILY    ezetimibe (ZETIA) tablet 10 mg  10 mg Oral DAILY    pantoprazole (PROTONIX) tablet 40 mg  40 mg Oral ACB    sevelamer carbonate (RENVELA) tab 1,600 mg  1,600 mg Oral TID WITH MEALS    vit B Cmplx 3-FA-Vit C-Biotin (NEPHRO NIKITA RX) tablet 1 Tablet  1 Tablet Oral DAILY    sodium chloride (NS) flush 5-40 mL  5-40 mL IntraVENous Q8H    sodium chloride (NS) flush 5-40 mL  5-40 mL IntraVENous PRN    acetaminophen (TYLENOL) tablet 650 mg  650 mg Oral Q6H PRN    Or    acetaminophen (TYLENOL) suppository 650 mg  650 mg Rectal Q6H PRN    bisacodyL (DULCOLAX) suppository 10 mg  10 mg Rectal DAILY PRN    promethazine (PHENERGAN) tablet 12.5 mg  12.5 mg Oral Q6H PRN    Or    ondansetron (ZOFRAN) injection 4 mg  4 mg IntraVENous Q6H PRN    heparin (porcine) injection 5,000 Units  5,000 Units SubCUTAneous Q8H    oxyCODONE-acetaminophen (PERCOCET) 5-325 mg per tablet 1 Tablet  1 Tablet Oral Q6H PRN         Allergy:   No Known Allergies     Objective:     Visit Vitals  BP (!) 118/54 (BP 1 Location: Right upper arm, BP Patient Position: At rest)   Pulse 78   Temp 97.2 °F (36.2 °C)   Resp 16   Ht 6' (1.829 m)   Wt 98.6 kg (217 lb 6.4 oz)   SpO2 98%   BMI 29.48 kg/m²         Intake/Output Summary (Last 24 hours) at 12/25/2022 0945  Last data filed at 12/24/2022 2332  Gross per 24 hour   Intake 295 ml   Output 2500 ml   Net -2205 ml         Physical Exam:       General: No resp distress   HENT: Atraumatic and normocephalic   Eyes: Normal conjunctiva   Neck: Supple No JVD or mass   Cardiovascular: Normal S1 & S2, no m/r/g   Pulmonary/Chest Wall: Clear to auscultation bilaterally   Abdominal: Soft and +NABS   Musculoskeletal: No edema S/p Amputation of multiple toes Left foot   Neurological: No focal deficits    HD Access: [facility] TD +    Data Review:  Lab Results   Component Value Date/Time    Sodium 135 (L) 12/25/2022 05:12 AM    Potassium 4.4 12/25/2022 05:12 AM    Chloride 98 (L) 12/25/2022 05:12 AM    CO2 29 12/25/2022 05:12 AM    Anion gap 8 12/25/2022 05:12 AM    Glucose 115 (H) 12/25/2022 05:12 AM    BUN 25 (H) 12/25/2022 05:12 AM    Creatinine 5.65 (H) 12/25/2022 05:12 AM    BUN/Creatinine ratio 4 (L) 12/25/2022 05:12 AM    GFR est AA 13 (L) 07/21/2022 02:15 PM    GFR est non-AA 11 (L) 07/21/2022 02:15 PM    Calcium 8.6 12/25/2022 05:12 AM     Lab Results   Component Value Date/Time    WBC 15.7 (H) 12/25/2022 05:12 AM    HGB 8.5 (L) 12/25/2022 05:12 AM HCT 26.8 (L) 12/25/2022 05:12 AM    PLATELET 854 97/06/2787 05:12 AM    MCV 95.7 12/25/2022 05:12 AM     Lab Results   Component Value Date/Time    Calcium 8.6 12/25/2022 05:12 AM    Phosphorus 6.0 (H) 12/22/2022 04:49 AM     No results found for: IRON, FE, TIBC, IBCT, PSAT, FERR  No results found for: FERR      Impression:     ESRD on HD TTS schedule  Hyperkalemia, resolved  Left diabetic foot infection w/ gangrenous changes s/p Lt 2/3/4 metatarsal amputation   DM  HTN  PVD, seen by Vasc Surgery, anticipate LLE angio and possible intervention.   Anemia  SHPT    Plan:     HD TTS  IV Antibiotic per ID  Cont DALTON for Anemia  Vascular, podiatry and ID specialists following  Will cont follow    Akua Granados MD,   MINIMALLY INVASIVE SURGERY [facility]

## 2022-12-25 NOTE — PROGRESS NOTES
Problem: Self Care Deficits Care Plan (Adult)  Goal: *Acute Goals and Plan of Care (Insert Text)  Description: Initial Occupational Therapy Goals (12/11/2022) Within 7 day(s):  1. Patient will perform perform grooming seated EOB for 5 minutes for increased independence in ADLs. 2. Patient will perform UB dressing with set up seated EOB for increased independence with ADLs. 3. Patient will perform LB dressing with mod I & A/E PRN for increased independence with ADLs. 4. Patient will perform all aspects of toileting with mod I for increased independence with ADLs. 5. Patient will perform LE ADLs utilizing body mechanics & adaptive strategies with 1 verbal cue for increased safety in ADLs. 6. Patient will independently apply energy conservation techniques with no verbal cue(s) for increased independence with ADLs. 7. Patient will perform transfer from bed to w/c with mod I and no LOB. Revised 12/25/2022; continue with current POC for 7 days    Outcome: Progressing Towards Goal    OCCUPATIONAL THERAPY RE-EVALUATION    Patient: Meg Blue (22 y.o. male)  Date: 12/25/2022  Primary Diagnosis: [facility] [E11.628, L08.9]  Leukocytosis [D72.829]  Procedure(s) (LRB):  AMPUTATION - TRANSMETATARSAL LEFT FOOT (Left) 4 Days Post-Op   Precautions:   Fall, NWB (L foot)  PLOF:     ASSESSMENT :  Based on the objective data described below, the patient presents with decreased strength, endurance and balance for carryover of ADLs and transfers following above mentioned medical diagnosis. Pt presented supine in bed at the beginning of session and agrees to participate with therapy. Pt performed bed mobility, supine<>sit; tolerate sitting EOB for ~3 minutes before requesting to return to bed. Noted pt had a bowel movement and soiled underpad and gown. Pt perform rolling in bed R<>L for ashutosh care and gown change. Pt was left supine in bed at the end of session in NAD.     Patient will benefit from skilled intervention to address the above impairments. Patient's rehabilitation potential is considered to be Fair  Factors which may influence rehabilitation potential include:   []             None noted  []             Mental ability/status  [x]             Medical condition  []             Home/family situation and support systems  []             Safety awareness  []             Pain tolerance/management  []             Other:      PLAN :  Recommendations and Planned Interventions:   [x]               Self Care Training                  [x]      Therapeutic Activities  [x]               Functional Mobility Training   []      Cognitive Retraining  [x]               Therapeutic Exercises           [x]      Endurance Activities  [x]               Balance Training                    []      Neuromuscular Re-Education  []               Visual/Perceptual Training     [x]      Home Safety Training  [x]               Patient Education                   [x]      Family Training/Education  []               Other (comment):    Frequency/Duration: Patient will be followed by occupational therapy 3 times a week to address goals. Discharge Recommendations: Skilled Nursing Facility  Further Equipment Recommendations for Discharge: N/A    AMPAC: 16/24    This AMPAC score should be considered in conjunction with interdisciplinary team recommendations to determine the most appropriate discharge setting. Patient's social support, diagnosis, medical stability, and prior level of function should also be taken into consideration. SUBJECTIVE:   Patient stated Mauro Rota will try to get up.     OBJECTIVE DATA SUMMARY:   Hospital course since last seen and reason for reevaluation: s/p surgical procedure at L foot   Past Medical History:   Diagnosis Date    CAD (coronary artery disease)     Chronic kidney disease     Diabetes mellitus     Hypertension     Sleep apnea      Past Surgical History:   Procedure Laterality Date    HX ORTHOPAEDIC HX PACEMAKER      IR INSERT TUNL CVC W/O PORT OVER 5 YR  07/07/2022    IR INSERT TUNL CVC W/O PORT OVER 5 YR  6/29/2022    IR REMOVE TUNL CVAD W/O PORT / PUMP  8/12/2022    MD CARDIAC SURG PROCEDURE UNLIST       Barriers to Learning/Limitations: None  Compensate with: visual, verbal, tactile, kinesthetic cues/model    Home Situation:   Home Situation  Home Environment: Private residence  # Steps to Enter: 3  Wheelchair Ramp: No  One/Two Story Residence: One story  Living Alone: Yes  Support Systems: Friend/Neighbor  Patient Expects to be Discharged to[de-identified] Home  Current DME Used/Available at Home: [address], rolling, Walker, rollator, Wheelchair, Commode, bedside (3 in 1 currently over commode)  Tub or Shower Type: Shower  []  Right hand dominant   []  Left hand dominant    Cognitive/Behavioral Status:  Neurologic State: Alert  Orientation Level: Oriented X4  Cognition: Follows commands  Safety/Judgement: Fall prevention    Skin: intact  Edema: none    Vision/Perceptual:    Tracking: Able to track stimulus in all quadrants w/o difficulty    Coordination: BUE  Coordination: Within functional limits  Fine Motor Skills-Upper: Left Intact; Right Intact    Gross Motor Skills-Upper: Left Intact; Right Intact  Balance:  Sitting: Impaired  Sitting - Static: Good (unsupported)  Sitting - Dynamic: Fair (occasional)  Strength: BUE  Strength: Generally decreased, functional  Tone & Sensation: BUE  Tone: Normal  Sensation: Intact  Range of Motion: BUE  AROM: Generally decreased, functional  PROM: Generally decreased, functional  Functional Mobility and Transfers for ADLs:  Bed Mobility:  Rolling: Contact guard assistance  Supine to Sit: Contact guard assistance  Sit to Supine: Contact guard assistance  Scooting: Minimum assistance  ADL Assessment:   Feeding: Independent    Oral Facial Hygiene/Grooming: Contact guard assistance    Upper Body Dressing: Contact guard assistance    Lower Body Dressing: Maximum assistance    Toileting: Maximum assistance  ADL Intervention:  Lower Body Bathing  Bathing Assistance: Maximum assistance  Perineal  : Maximum assistance  Position Performed: Supine    Upper Body Dressing Assistance  Dressing Assistance: Contact guard assistance  Hospital Gown: Contact guard assistance    Cognitive Retraining  Safety/Judgement: Fall prevention  Pain:  Pain level pre-treatment: 4/10   Pain level post-treatment: 4/10  Pain Intervention(s): Medication (see MAR); Rest, Ice, Repositioning   Response to intervention: Nurse notified, See doc flow    Activity Tolerance:   Fair     Please refer to the flowsheet for vital signs taken during this treatment. After treatment:   [] Patient left in no apparent distress sitting up in chair  [x] Patient left in no apparent distress in bed  [x] Call bell left within reach  [x] Nursing notified  [] Caregiver present  [] Bed alarm activated    COMMUNICATION/EDUCATION:   [x] Role of Occupational Therapy in the acute care setting  [x] Home safety education was provided and the patient/caregiver indicated understanding. [x] Patient/family have participated as able in goal setting and plan of care. [x] Patient/family agree to work toward stated goals and plan of care. [] Patient understands intent and goals of therapy, but is neutral about his/her participation. [] Patient is unable to participate in goal setting and plan of care. Thank you for this referral.  Thuy Kebede, OTR/L  Time Calculation: 26 mins    Natasharierin Huang AM-PAC® Daily Activity Inpatient Short Form (6-Clicks)*    How much HELP from another person does the patient currently need    (If the patient hasn't done an activity recently, how much help from another person do you think he/she would need if he/she tried?)   Total (Total A or Dep)   A Lot  (Mod to Max A)   A Little (Sup or Min A)   None (Mod I to I)   Putting on and taking off regular lower body clothing? [] 1 [x] 2 [] 3 [] 4   2.  Bathing (including washing, rinsing,      drying)? [] 1 [x] 2 [] 3 [] 4   3. Toileting, which includes using toilet, bedpan or urinal?   [] 1 [x] 2 [] 3 [] 4   4. Putting on and taking off regular upper body clothing? [] 1 [] 2 [x] 3 [] 4   5. Taking care of personal grooming such as brushing teeth? [] 1 [] 2 [x] 3 [] 4   6. Eating meals? [] 1 [] 2 [] 3 [x] 4      Based on an AM-PAC score of **/24 and their current ADL deficits; it is recommended that the patient have 5-7 sessions per week of Occupational Therapy at d/c to increase the patient's independence. Currently, this patient demonstrates the potential endurance, and/or tolerance for 3 hours of therapy each day at d/c. Based on an AM-PAC score of **/24 and their current ADL deficits; it is recommended that the patient have 3-5 sessions per week of Occupational Therapy at d/c to increase the patient's independence. Based on an AM-PAC score of **/24 and their current ADL deficits; it is recommended that the patient have 2-3 sessions per week of Occupational Therapy at d/c to increase the patient's independence. At this time and based on an AM-PAC score of **/24, no further OT is recommended upon discharge due to (i.e. patient at baseline functional statusetc). Recommend patient returns to prior setting with prior services.

## 2022-12-25 NOTE — PROGRESS NOTES
TREATMENT SUMMARY     Patient in room 327 dialyzed for 3 hours. Tolerated tx well with without complaint or complications. RIJ CVC functioning without complication accessing or BFR      3000 ml UF removed with a net UF removal of 2500 ml. Report given to Carlos Alberto Cabrera RN with all questions answered. TREATMENT NOTES      2215 Dressing to RIJ CVC changed. No s/s of infection noted to same. 2238 Retacrit 8,000 units administered. ACUTE HEMODIALYSIS FLOW SHEET       PATIENT INFORMATION   [facility]       DR. Brooks    []1st Time Acute  []Stat[x] Routine []Urgent []Chronic Unit   []Acute Room [x]Bedside  []ICU/CCU []ER   Isolation Precautions: [x]Dialysis[] Airborne []Contact []Droplet []Reverse    Special Considerations:_______  [] Blood Consent Verified  [x]N/A   Allergies:[x] NKA                 [] _____________ Code Status [x]Full Code [] DNR  [] Other_____   Diet: [x] Renal [] NPO [] Diabetic   [] Enteral Feeding [] Cardiac Diabetic: [x]Yes []No     [x]Signed Treatment Consent Verified   [x] Time Out/ Safety Check   PRIMARY NURSE REPORT: FIRST INITIAL/ LAST NAME/TITLE  PRE DIALYSIS:      Carlos Alberto Cabrera RN                                     TIME: 19:45   ACCESS   CATHETER ACCESS: [] N/A  [x] RIGHT  [] LEFT  [x] IJ  [] SUBCL [] FEM                    [] First use X-ray  [x] Tunnel     [] Non-Tunneled      [x] No S/S infection  [] Redness [] Drainage  [] Cultured [] Swelling [] Pain                    [x] Medical Aseptic [x] Prep Dressing Changed                  [] Clotted [x] Patent []      Flows: [x] Good [] Poor [] Reversed                 If Access Problem Dr. Ariana Baumann: [] Yes [] No    Date:_____  [] N/A[x]   GRAFT/FISTULA ACCESS:  [x] N/A  [] RIGHT  [] LEFT  [] UE   [] LE       [] AVG  [] AVF [] BUTTONHOLE    [] +BRUIT/THRILL [] MEDICAL ASEPTIC PREP     [] No S/S infection  [] Redness [] Drainage  [] Cultured [] Swelling [] Pain              If Access Problem Dr. Annalisa Steinberg: [] Yes [] No    Date:______ [] N/A   GENERAL ASSESSMENT   LUNGS:  SaO2% _96___ [x] Clear [] Coarse [] Crackles [] Wheezing               [] Diminished Location: [] RLL [] LLL [x] RUL [x] JOBY    COUGH:  [] Productive  [] Loose[] Dry [x] N/A  RESPIRATIONS: [x] Easy [] Labored    THERAPY: [x] RA   [] NC _____. L/min    Mask: [] NRB [] Venti  _____O2%                  [] Ventilator [] Intubated [] Trach [] BiPap [] CPap [] HI Flow   CARDIAC: [x] Regular [] Irregular [] Pericardial Rub [] JVD               Monitored Rhythm:______ [] N/A   EDEMA: [] None [x] Generalized [] Facial [] Pedal [] UE [x] LE             [x] Pitting [x] 1 [] 2 [] 3 [] 4    [] Right [] Left [x] Bilateral   SKIN:    [x] Warm [] Hot [] Cold  [x] Dry [] Pale [] Diaphoretic              [] Flushed [] Jaundiced [] Cyanotic [] Rash [] Weeping     LOC:    [x] Alert  Oriented to: [x] Person [x] Place [x] Time             [] Confused [] Lethargic [] Medicated [] Non-responsive    GI/ABDOMEN: [] Flat [x] Distended [x] Soft [] Firm [] Diarrhea [x] Bowel Sounds Present [] Nausea [] Vomiting    PAIN: [x] 0 [] 1 [] 2 [] 3 [] 4 [] 5 [] 6 [] 7 [] 8 [] 9 [] 10          Scale 1-10 Action/Follow Up_____   MOBILITY: [] Amb [x] Amb/Assist [] Bed  [] Wheelchair    CURRENT LABS   HBsAg ONLY: Date Drawn:  12/10/2022           [x] Negative [] Positive [] Unknown.      HBsAb: Date Drawn:    12/10/2022          [] Susceptible <10 [x] Immune ?10 [] Unknown   Date of Current Labs:  ATTACHED     EDUCATION   Person Educated: [x] Patient [] Other_________   Knowledge base: [] None [x] Minimal [] Substantial    Barriers to learning  [x] None _______________   Preferred method of learning: [] Written [x] Oral [x] Visual [] Hands on    Topic: [x] Access Care [x] S&S of infection [x] Fluid Management   [] K+  [x] Procedural  [] Albumin [] Medications [] Tx Options   [] Transplant [x] Diet [] Other    Teaching Tools: [x] Explain [] Demonstration [] Handout_____ [] Video______  CARE PLAN    [x] Renal Failure (Adult)  Interdisciplinary  Fluid and electrolytes stabilized  Interventions  Dehydration signs and symptoms (eg: Weight/lab monitoring; vomiting/diarrhea/urine; tenting; mucous membranes; dizziness/lethargy/irritability/confusion; weak pulse; tachycardia; blood pressure; I&O)  Fluid overload signs and symptoms assessment (eg: Body weight increased; dyspnea; edema; hypertension; respiratory crackles/wheezing; JVD; lab monitoring; mental status changes; I&O)  Monitor appropriate lab values  COMPLIANCE WITH PRESCRIBED THERAPY  ARTERIAL ACCESS SITE ASSESSMENT  NUTRITION SCREENING  Vital signs monitoring per assessed patient condition or unit standard  Cardiac monitoring  Hydration management  Intake and output measurement  Body weight monitoring  Skin care  DIALYSIS  Nutrition Care Process per nutrition screen  Oral hygiene care every 2 hours  Pain management     Outcome   [x] Progressing Towards Goal  [] Not Progressing Towards Goals  [] Goals Met/Resolved  [] Goals Not Met/ Resolved        Patient/ Family Education  Progressing Towards Goals          RO/HEMODIAYLSIS MACHINE SAFETY CHECKS- BEFORE EACH TREATMENT                       [x] [facility]: Machine Serial #2:  [id number]  RO Serial #2: [id number]         Alarm Test: [x] Pass  Time__2022______  [x] RO/Machine Log Complete    [x] Extracorporeal circuit Tested for integrity           Dialyzer [id number]__________   Tubing 22E18-9_________    Dialysate: pH_7.4__  Temp. _36___Conductivity: Meter ____ HD Machine_13.8_   CHLORINE TESTING- BEFORE EACH TREATMENT AND EVERY 4 HOURS   Total Chlorine: [x] Less than 0.1 ppm Time:_2022____2nd Check Time:__N/A____  (If greater than 0.1 ppm from Primary then every 30 minutes from Secondary)   TREATMENT INIATION-WITH DIALYSIS PRECAUTIONS   [x] All Connections Secured   [x] Saline Line Double Clamped [x] Venous Parameters Set [x] Arterial Parameters Set    [x] Prime Given 250 ml     [x] Air Foam Detector Engaged   PRE-TREATMENT   UF Calculations: Wt to lose: 2500____ml(+) Oral:__ml(+)IV Meds/Fluids/Blood prods___ml(+) Prime/Rinse_500__ml(=)Total UF Goal____mL   Scale Type:[x] Bed scale [] Sling Scale [] Wheel Chair Scale []  Not Ordered [] [] Unable to obtain pt on stretcher/ bed scale malfunctioning   Tx Initiation Note:  Tx Initiation Note: Received patient in bed semi fowlers position. Right IJ CVC remains intact. No s/s of infection noted. Dressing to same in place. Treatment initiated as per MD order without incident. Plan to remove 2.5 liters for 3 hours while monitoring patient's well-being and vital signs. [x] Time Out/Safety Check  Time:_20:32____   INTRADIALYTIC MONITORING  (SEE ATTACHED FLOWSHEET)     POST TREATMENT    Time Medication Dose Volume Route    Initials   2238 Retacrit 8000 2 mL Subcutaneous GM                                   [facility] Signatures Title Initials Time   Mindi Landing RN GM [id number]               Dialyzer cleared: [x] Good [] Fair [] Poor     Blood Processed _67.8__Liters    Net UF Removed _2500___mL  Post Tx Access:                  AVF/AVG: Bleeding Stop       Art.___min Avtar.____min []+bruit/thrill                Catheter: Locking Solution [x] Heparin 1 ml/1000 units  [] Normal Saline                                                                               Art._1.8____ ml Avtar.__1.8___ml  Post Assessment:              Skin: [x]Warm []Dry []Diaphoretic []Flushed [] Pale []Cyanotic            Lungs: [x]Clear []Coarse []Crackles []Wheezing             Cardiac: [x]Regular []Irregular  []Monitored rhythm____ []N/A            Edema: []None [x]General []Facial []Pedal  []UE []LE [x]RIGHT [x]LEFT            Pain: [x]0 []1 []2 []3 []4 []5 []6 []7 []8 []9 []10   POST Tx Note:  HD treatment completed and tolerated well.  Patient rinsed with 250 mL 0.9% N/S. 1.8 mL instilled in arterial lumen, 1.8 mL instilled in venous lumen of Right IJ CVC. Red Curos Caps applied to lumen ends securely. Patient remains in bed 327 in stable condition, freely breathing room air. O2 Sat  95 %. No acute distress noted.            Primary Nurse Report: First initial/Last name/Title    Post Dialysis: KIM Reyna______________________         Time:_2345___________    Abbreviations: AVG-arterial venous graft, AVF-arterial venous fistula, IJ-Internal Jugular,  Subcl-Subclavian, Fem-Femoral, Tx-treatment, AP/HR-apical heart rate, DFR-dialysate flow rate, BFR-blood flow rate, AP-arterial pressure, -venous pressure, UF-ultrafiltrate, TMP-transmembrane pressure, Avtar-Venous, Art-Arterial, RO-Reverse Osmosis

## 2022-12-25 NOTE — PROGRESS NOTES
Pharmacy Dosing Services: [address] [place] [address]    Pharmacist Renal Dosing Progress Note for Merrem   Physician Dr. Theora Lesch    The following medication: Kamlesh [address] was automatically dose-adjusted per THE [facility] P&T Committee Protocol, with respect to renal function. Consult provided for this   60 y.o. , male , for the indication of Sepsis. Pt Weight:   Wt Readings from Last 1 Encounters:   12/24/22 98.6 kg (217 lb 6.4 oz)         Previous Regimen Merrem 1g q12h   Serum Creatinine Lab Results   Component Value Date/Time    Creatinine 5.65 (H) 12/25/2022 05:12 AM       Creatinine Clearance Estimated Creatinine Clearance: 16.3 mL/min (A) (based on SCr of 5.65 mg/dL (H)). BUN Lab Results   Component Value Date/Time    BUN 25 (H) 12/25/2022 05:12 AM       Dosage changed to:  Merrem 1G q24h (with dose scheduled in evening to occur after dialysis when patient does receive HD.)        Pharmacy to continue to monitor patient daily. Will make dosage adjustments based upon changing renal function.   Signed Mariah García PHARMD. Contact information:

## 2022-12-26 LAB
ALBUMIN SERPL-MCNC: 1.9 G/DL (ref 3.4–5)
ALBUMIN/GLOB SERPL: 0.4 (ref 0.8–1.7)
ALP SERPL-CCNC: 111 U/L (ref 45–117)
ALT SERPL-CCNC: 35 U/L (ref 16–61)
ANION GAP SERPL CALC-SCNC: 9 MMOL/L (ref 3–18)
AST SERPL-CCNC: 24 U/L (ref 10–38)
BASOPHILS # BLD: 0.1 K/UL (ref 0–0.1)
BASOPHILS NFR BLD: 1 % (ref 0–2)
BILIRUB SERPL-MCNC: 0.3 MG/DL (ref 0.2–1)
BUN SERPL-MCNC: 39 MG/DL (ref 7–18)
BUN/CREAT SERPL: 5 (ref 12–20)
CALCIUM SERPL-MCNC: 8.3 MG/DL (ref 8.5–10.1)
CHLORIDE SERPL-SCNC: 98 MMOL/L (ref 100–111)
CO2 SERPL-SCNC: 28 MMOL/L (ref 21–32)
CREAT SERPL-MCNC: 7.46 MG/DL (ref 0.6–1.3)
DIFFERENTIAL METHOD BLD: ABNORMAL
EOSINOPHIL # BLD: 0.5 K/UL (ref 0–0.4)
EOSINOPHIL NFR BLD: 4 % (ref 0–5)
ERYTHROCYTE [DISTWIDTH] IN BLOOD BY AUTOMATED COUNT: 17.4 % (ref 11.6–14.5)
GLOBULIN SER CALC-MCNC: 5.1 G/DL (ref 2–4)
GLUCOSE BLD STRIP.AUTO-MCNC: 133 MG/DL (ref 70–110)
GLUCOSE BLD STRIP.AUTO-MCNC: 166 MG/DL (ref 70–110)
GLUCOSE BLD STRIP.AUTO-MCNC: 172 MG/DL (ref 70–110)
GLUCOSE BLD STRIP.AUTO-MCNC: 187 MG/DL (ref 70–110)
GLUCOSE SERPL-MCNC: 162 MG/DL (ref 74–99)
HCT VFR BLD AUTO: 27.3 % (ref 36–48)
HGB BLD-MCNC: 8.8 G/DL (ref 13–16)
IMM GRANULOCYTES # BLD AUTO: 0.1 K/UL (ref 0–0.04)
IMM GRANULOCYTES NFR BLD AUTO: 1 % (ref 0–0.5)
LYMPHOCYTES # BLD: 1.5 K/UL (ref 0.9–3.6)
LYMPHOCYTES NFR BLD: 11 % (ref 21–52)
MAGNESIUM SERPL-MCNC: 2.1 MG/DL (ref 1.6–2.6)
MCH RBC QN AUTO: 30.7 PG (ref 24–34)
MCHC RBC AUTO-ENTMCNC: 32.2 G/DL (ref 31–37)
MCV RBC AUTO: 95.1 FL (ref 78–100)
MONOCYTES # BLD: 1.5 K/UL (ref 0.05–1.2)
MONOCYTES NFR BLD: 11 % (ref 3–10)
NEUTS SEG # BLD: 9.8 K/UL (ref 1.8–8)
NEUTS SEG NFR BLD: 73 % (ref 40–73)
NRBC # BLD: 0 K/UL (ref 0–0.01)
NRBC BLD-RTO: 0 PER 100 WBC
PLATELET # BLD AUTO: 298 K/UL (ref 135–420)
PMV BLD AUTO: 10.6 FL (ref 9.2–11.8)
POTASSIUM SERPL-SCNC: 5.1 MMOL/L (ref 3.5–5.5)
PROT SERPL-MCNC: 7 G/DL (ref 6.4–8.2)
RBC # BLD AUTO: 2.87 M/UL (ref 4.35–5.65)
SODIUM SERPL-SCNC: 135 MMOL/L (ref 136–145)
WBC # BLD AUTO: 13.5 K/UL (ref 4.6–13.2)

## 2022-12-26 PROCEDURE — 65270000029 HC RM PRIVATE

## 2022-12-26 PROCEDURE — 74011250636 HC RX REV CODE- 250/636: Performed by: HOSPITALIST

## 2022-12-26 PROCEDURE — 74011000258 HC RX REV CODE- 258: Performed by: FAMILY MEDICINE

## 2022-12-26 PROCEDURE — 80053 COMPREHEN METABOLIC PANEL: CPT

## 2022-12-26 PROCEDURE — 83735 ASSAY OF MAGNESIUM: CPT

## 2022-12-26 PROCEDURE — 74011636637 HC RX REV CODE- 636/637: Performed by: HOSPITALIST

## 2022-12-26 PROCEDURE — 74011250636 HC RX REV CODE- 250/636: Performed by: FAMILY MEDICINE

## 2022-12-26 PROCEDURE — 85025 COMPLETE CBC W/AUTO DIFF WBC: CPT

## 2022-12-26 PROCEDURE — 74011250637 HC RX REV CODE- 250/637: Performed by: HOSPITALIST

## 2022-12-26 PROCEDURE — 36415 COLL VENOUS BLD VENIPUNCTURE: CPT

## 2022-12-26 PROCEDURE — 82962 GLUCOSE BLOOD TEST: CPT

## 2022-12-26 PROCEDURE — 74011000250 HC RX REV CODE- 250: Performed by: HOSPITALIST

## 2022-12-26 RX ADMIN — ASPIRIN 81 MG: 81 TABLET, CHEWABLE ORAL at 08:43

## 2022-12-26 RX ADMIN — Medication 125 MG: at 03:05

## 2022-12-26 RX ADMIN — ACETAMINOPHEN 650 MG: 325 TABLET ORAL at 20:27

## 2022-12-26 RX ADMIN — HEPARIN SODIUM 5000 UNITS: 5000 INJECTION INTRAVENOUS; SUBCUTANEOUS at 17:33

## 2022-12-26 RX ADMIN — Medication 500 MG: at 08:43

## 2022-12-26 RX ADMIN — CLOPIDOGREL BISULFATE 75 MG: 75 TABLET ORAL at 08:42

## 2022-12-26 RX ADMIN — SODIUM CHLORIDE, PRESERVATIVE FREE 10 ML: 5 INJECTION INTRAVENOUS at 05:21

## 2022-12-26 RX ADMIN — Medication 500 MG: at 20:27

## 2022-12-26 RX ADMIN — SODIUM CHLORIDE, PRESERVATIVE FREE 10 ML: 5 INJECTION INTRAVENOUS at 14:00

## 2022-12-26 RX ADMIN — Medication 125 MG: at 20:28

## 2022-12-26 RX ADMIN — SEVELAMER CARBONATE 1600 MG: 800 TABLET, FILM COATED ORAL at 12:21

## 2022-12-26 RX ADMIN — MEROPENEM 1 G: 1 INJECTION, POWDER, FOR SOLUTION INTRAVENOUS at 17:30

## 2022-12-26 RX ADMIN — Medication 2 UNITS: at 21:51

## 2022-12-26 RX ADMIN — HEPARIN SODIUM 5000 UNITS: 5000 INJECTION INTRAVENOUS; SUBCUTANEOUS at 01:58

## 2022-12-26 RX ADMIN — CARVEDILOL 12.5 MG: 12.5 TABLET, FILM COATED ORAL at 20:27

## 2022-12-26 RX ADMIN — Medication 15 UNITS: at 21:52

## 2022-12-26 RX ADMIN — SEVELAMER CARBONATE 1600 MG: 800 TABLET, FILM COATED ORAL at 17:31

## 2022-12-26 RX ADMIN — Medication 125 MG: at 15:06

## 2022-12-26 RX ADMIN — CARVEDILOL 12.5 MG: 12.5 TABLET, FILM COATED ORAL at 08:43

## 2022-12-26 RX ADMIN — SODIUM CHLORIDE, PRESERVATIVE FREE 10 ML: 5 INJECTION INTRAVENOUS at 22:37

## 2022-12-26 RX ADMIN — HEPARIN SODIUM 5000 UNITS: 5000 INJECTION INTRAVENOUS; SUBCUTANEOUS at 12:21

## 2022-12-26 RX ADMIN — NYSTATIN: 100000 POWDER TOPICAL at 20:28

## 2022-12-26 RX ADMIN — AMLODIPINE BESYLATE 10 MG: 5 TABLET ORAL at 08:43

## 2022-12-26 RX ADMIN — SEVELAMER CARBONATE 1600 MG: 800 TABLET, FILM COATED ORAL at 08:43

## 2022-12-26 RX ADMIN — PANTOPRAZOLE SODIUM 40 MG: 40 TABLET, DELAYED RELEASE ORAL at 06:20

## 2022-12-26 RX ADMIN — B-COMPLEX W/ C & FOLIC ACID TAB 1 MG 1 TABLET: 1 TAB at 08:43

## 2022-12-26 RX ADMIN — EZETIMIBE 10 MG: 10 TABLET ORAL at 08:43

## 2022-12-26 RX ADMIN — ALLOPURINOL 100 MG: 100 TABLET ORAL at 08:43

## 2022-12-26 RX ADMIN — Medication 2 UNITS: at 07:54

## 2022-12-26 RX ADMIN — Medication 125 MG: at 08:42

## 2022-12-26 RX ADMIN — Medication 2 UNITS: at 12:22

## 2022-12-26 NOTE — PROGRESS NOTES
Tentative plan for repeat LE arteriogram tomorrow with Dr. Vladislav Hirsch. NPO at midnight.     Leonora Angeles NP, pager [phone]  [facility]

## 2022-12-26 NOTE — PROGRESS NOTES
19:40 Assessment completed. Lungs are clear bialt but are decreased in the bases. Bilat ft dsg's remain C/D/I with cradle boots in place. 22:55 Shift assessment completed. See nsg flow sheet for details. 03:05 Reassessed with 0 changes noted. Resting quietly in bed with eyes closed between cares. 07:10 Bedside and Verbal shift change report given to Oralia Vargas (oncoming nurse) by Dawna Forte RN (offgoing nurse). Report included the following information SBAR.

## 2022-12-26 NOTE — PROGRESS NOTES
[facility]  (A Division of [address] [place] [address])                                           Amanda Mcdermott MD                                   Office #:     [phone]-ZMESVD [phone]                                   Office Fax: [phone]      Follow-up Note      Date of Admission: 12/9/2022       Date of Note:  12/26/2022  Referral: L foot infection/osteomyelitis/gangrene       Current Antimicrobials:    Prior Antimicrobials:   Vanco 12/9 to date  Zosyn 12/9 to 12/23  Meropenem 12/23 to dateate PO vanco 12/9 to 12/19     Antibiotic allergy: NOne     Assessment:     Intermittent Fever-- etiology not clear. CXR/repeat BCX normal so far from 12/23. Doubt CDI- no diarrhea and WBC coming down. L foot surgical wound without significant necrosis/ischemic change or drainage/cellulitis. Possible GI source Vs drug fever. Dry L foot gangrene- distal  --S/P partial ampuation L 2nd/3rd/4th MT, I and D of left foot- deep 12/09/22  --S/P TMA Left foot 12/21/22-- Biopsy with acute Osteomyitis  PAD--S/P angiogram 12/20- Vascular  Bacteremia 2/2 MRSE on 12/9--is procurement contamination   Recent CDI--prophylactic oral vanco from 12/23  ESRD on HD  R foot OM-ankle- treated and completed treatment 09/2022    Recommendation -- ID related:     Cont Meropenem daily IV for now  DC vanco-- no MRSA infection so far  Biopsy report with acute OM 12/21-- Podiatry to clarify if proximal margin was sent. If proximal biopsy, then will need 6 weeks IV ABX  Repeat vascular procedure- plans pending  Cont oral Vanco- pre-emptive rx  Check procal/ESR/CRP in am-- ordered  Wound RN will examine foot tomorrow -DW nursing    Subjective:      He reports fevers and chills late in afternoon.   Recorded 102.9 on 12/23-- trending lower after that  Denies cough/sore throat/N/V/Abd pain or diarrhea  No new onset back/neck pain    Covid/flu pcr negative , CXR is normal    Notes/Labs reviewed: changes in ABX to meropenem from [facility] noted    Microbiology:                    12/9 - OR (+) Serratia fonticola (S to pip/all except cefazolin), Alcaligenes faecalis (R FQ), Enterbacter cloacae (still being worked up), and anaerobic Bacteroides vulgatus (BL +)                                                      BCx 1/2 (+) CoNS        Lines / Catheters:         R HD cath and peripheral        Patient Active Problem List   Diagnosis Code    Diabetes mellitus with foot ulcer ([facility] E11.621, L97.509    CAD (coronary artery disease) I25.10    ESRD (end stage renal disease) ([facility] N18.6    Acute hematogenous osteomyelitis of right foot ([facility]) M86.071    Cellulitis of right heel L03.115    Diabetic foot ulcer with osteomyelitis ([facility] E11.621, E11.69, L97.509, M86.9    Ulcer of right heel, with necrosis of bone ([facility] L97.414    Infection and inflammatory reaction due to internal fixation device of other site, initial encounter [facility]) T84.69XA    Left arm swelling M79.89    Chest pain at rest R07.9    Abnormal nuclear stress test R94.39    History of anemia due to CKD N18.9, Z86.2    S/P angioplasty with stent Z95.820    Hypertension I10    Leukocytosis D72.829    Diabetic foot infection ([facility] E11.628, L08.9    Diarrhea R19.7    Type 2 diabetes mellitus, with long-term current use of insulin ([facility] E11.9, Z79.4    Acute hematogenous osteomyelitis of left foot ([facility] M86.072    Nondisplaced fracture of second metatarsal bone of left foot S92.325A    Nondisplaced fracture of third metatarsal bone of left foot S92.335A    Closed nondisplaced fracture of fourth metatarsal bone of left foot S92.345A    Positive blood culture R78.81       Current Facility-Administered Medications   Medication Dose Route Frequency    meropenem (MERREM) 1 g in 0.9% sodium chloride (MBP/ADV) 50 mL MBP  1 g IntraVENous Q24H    Saccharomyces boulardii (FLORASTOR) capsule 500 mg  500 mg Oral BID    vancomycin 50 mg/mL oral solution (compounded) 125 mg  125 mg Oral Q6H    insulin lispro (HUMALOG) injection   SubCUTAneous AC&HS    insulin glargine (LANTUS) injection 15 Units  15 Units SubCUTAneous QHS    fentaNYL citrate (PF) injection  mcg   mcg IntraVENous Rad Multiple    midazolam (VERSED) injection 0.5-2 mg  0.5-2 mg IntraVENous Rad Multiple    naloxone (NARCAN) injection 0.1 mg  0.1 mg IntraVENous Multiple    flumazeniL (ROMAZICON) 0.1 mg/mL injection 0.2 mg  0.2 mg IntraVENous Multiple    heparin (porcine) 1,000 unit/mL injection 1,000-10,000 Units  1,000-10,000 Units IntraVENous Rad Multiple    nitroGLYcerin 0.1 mg/mL in D5W injection  1-5 mL IntraarTERial Rad Multiple    heparinized saline 2 units/mL infusion 2,000 Units  1,000 mL Irrigation RAD CONTINUOUS    iopamidoL (ISOVUE 250) 250 mg iodine /mL (51 %) contrast injection 1-150 mL  1-150 mL IntraarTERial RAD CONTINUOUS    epoetin lisette-epbx (RETACRIT) injection 8,000 Units  8,000 Units SubCUTAneous Q TUE, THU & SAT    loperamide (IMODIUM) capsule 2 mg  2 mg Oral Q4H PRN    nystatin (MYCOSTATIN) 100,000 unit/gram powder   Topical BID    alum-mag hydroxide-simeth (MYLANTA) oral suspension 30 mL  30 mL Oral Q4H PRN    glucose chewable tablet 16 g  16 g Oral PRN    glucagon (GLUCAGEN) injection 1 mg  1 mg IntraMUSCular PRN    heparin (porcine) 1,000 unit/mL injection 3,600 Units  3,600 Units IntraCATHeter DIALYSIS PRN    Vancomycin - Pharmacy to Dose  1 Each Other Rx Dosing/Monitoring    dextrose 10% infusion 0-250 mL  0-250 mL IntraVENous PRN    0.9% sodium chloride infusion  25 mL/hr IntraVENous DIALYSIS PRN    clopidogreL (PLAVIX) tablet 75 mg  75 mg Oral DAILY    carvediloL (COREG) tablet 12.5 mg  12.5 mg Oral BID    aspirin chewable tablet 81 mg  81 mg Oral DAILY    amLODIPine (NORVASC) tablet 10 mg  10 mg Oral DAILY    allopurinoL (ZYLOPRIM) tablet 100 mg  100 mg Oral DAILY    ascorbic acid (vitamin C) (VITAMIN C) tablet 500 mg  500 mg Oral DAILY    ezetimibe (ZETIA) tablet 10 mg  10 mg Oral DAILY    pantoprazole (PROTONIX) tablet 40 mg  40 mg Oral ACB    sevelamer carbonate (RENVELA) tab 1,600 mg  1,600 mg Oral TID WITH MEALS    vit B Cmplx 3-FA-Vit C-Biotin (NEPHRO NIKITA RX) tablet 1 Tablet  1 Tablet Oral DAILY    sodium chloride (NS) flush 5-40 mL  5-40 mL IntraVENous Q8H    sodium chloride (NS) flush 5-40 mL  5-40 mL IntraVENous PRN    acetaminophen (TYLENOL) tablet 650 mg  650 mg Oral Q6H PRN    Or    acetaminophen (TYLENOL) suppository 650 mg  650 mg Rectal Q6H PRN    bisacodyL (DULCOLAX) suppository 10 mg  10 mg Rectal DAILY PRN    promethazine (PHENERGAN) tablet 12.5 mg  12.5 mg Oral Q6H PRN    Or    ondansetron (ZOFRAN) injection 4 mg  4 mg IntraVENous Q6H PRN    heparin (porcine) injection 5,000 Units  5,000 Units SubCUTAneous Q8H    oxyCODONE-acetaminophen (PERCOCET) 5-325 mg per tablet 1 Tablet  1 Tablet Oral Q6H PRN         Review of Systems - General ROS: negative for - chills, fever, or night sweats  Respiratory ROS: no cough, shortness of breath, or wheezing  Cardiovascular ROS: no chest pain or dyspnea on exertion  Gastrointestinal ROS: no abdominal pain, change in bowel habits, or black or bloody stools       Objective:    Visit Vitals  /62 (BP 1 Location: Right upper arm, BP Patient Position: Sitting)   Pulse 70   Temp 98.3 °F (36.8 °C)   Resp 14   Ht 6' (1.829 m)   Wt 95.5 kg (210 lb 8.6 oz)   SpO2 100%   BMI 28.55 kg/m²       Temp (24hrs), Av °F (37.2 °C), Min:97.7 °F (36.5 °C), Max:100.2 °F (37.9 °C)      GEN: WDWN AAM in NAD  HEENT: anicteric  CHEST: CTA  CVS:RRR  ABD: NT  EXT: L foot unwrapped and examined-- clean/stable looking post surgical wound- no marked swelling/hematoma/drainage/ cellulitis finding on exam  Right heel with ace wrap and dressing in place.          Lab results:    Chemistry  Recent Labs     22  0438 22  0512 22  0325   * 115* 149*   * 135* 134*   K 5.1 4.4 4.7   CL 98* 98* 98*   CO2 28 29 26   BUN 39* 25* 39*   CREA 7.46* 5.65* 7.49*   CA 8. 3* 8.6 8.2*   AGAP 9 8 10   BUCR 5* 4* 5*    104  --    TP 7.0 7.3  --    ALB 1.9* 2.0*  --    GLOB 5.1* 5.3*  --    AGRAT 0.4* 0.4*  --        CBC w/ Diff  Recent Labs     12/26/22  0438 12/25/22  0512   WBC 13.5* 15.7*   RBC 2.87* 2.80*   HGB 8.8* 8.5*   HCT 27.3* 26.8*    280   GRANS 73 77*   LYMPH 11* 9*   EOS 4 3       Microbiology  All Micro Results       Procedure Component Value Units Date/Time    CULTURE, BLOOD [[id number]] Collected: 12/24/22 1745    Order Status: Completed Specimen: Blood Updated: 12/26/22 0718     Special Requests: NO SPECIAL REQUESTS        Culture result: NO GROWTH 2 DAYS       CULTURE, BLOOD [[id number]] Collected: 12/24/22 1745    Order Status: Completed Specimen: Blood Updated: 12/26/22 0718     Special Requests: NO SPECIAL REQUESTS        Culture result: NO GROWTH 2 DAYS       CULTURE, BLOOD [[id number]] Collected: 12/23/22 2052    Order Status: Completed Specimen: Blood Updated: 12/26/22 0718     Special Requests: NO SPECIAL REQUESTS        Culture result: NO GROWTH 3 DAYS       COVID-19 WITH INFLUENZA A/B [[id number]] Collected: 12/24/22 1941    Order Status: Completed Specimen: Nasopharyngeal Updated: 12/24/22 2028     SARS-CoV-2 by PCR Not detected        Comment: Not Detected results do not preclude SARS-CoV-2 infection and should not be used as the sole basis for patient management decisions. Results must be combined with clinical observations, patient history, and epidemiological information. Influenza A by PCR Not detected        Influenza B by PCR Not detected        Comment: Testing was performed using cheryl Alla SARS-CoV-2 and Influenza A/B nucleic acid assay.   This test is a multiplex Real-Time Reverse Transcriptase Polymerase Chain Reaction (RT-PCR) based in vitro diagnostic test intended for the qualitative detection of nucleic acids from SARS-CoV-2, Influenza A, and Influenza B in nasopharyngeal for use under the FDA's Emergency Use Authorization(EAU) only.        Fact sheet for Patients: FindDrives.pl  Fact sheet for Healthcare Providers: FindDrives.pl         CULTURE, BLOOD 2nd DRAW (required for [facility]) [[id number]] Collected: 12/09/22 1215    Order Status: Completed Specimen: Blood Updated: 12/15/22 0940     Special Requests: LEFT HAND     Culture result: NO GROWTH 6 DAYS       CULTURE, WOUND Dennis Ricardo STAIN [[id number]]  (Abnormal)  (Susceptibility) Collected: 12/09/22 2201    Order Status: Completed Specimen: Wound from Foot Updated: 12/15/22 0650     Special Requests: NO SPECIAL REQUESTS        GRAM STAIN RARE WBCS SEEN               2+ APPARENT GRAM VARIABLE RODS           Culture result:       HEAVY Serratia fonticola PIPTAZ = 28, SENSITIVE                  HEAVY ALCALIGENES FAECALIS                  HEAVY ALCALIGENES FAECALIS (2ND COLONY TYPE/STRAIN)                  HEAVY ENTEROBACTER CLOACAE                  HEAVY MIXED SKIN TO ISOLATED          CULTURE, ANAEROBIC [[id number]]  (Abnormal) Collected: 12/09/22 2201    Order Status: Completed Specimen: Foot, left Updated: 12/13/22 1246     Special Requests: NO SPECIAL REQUESTS        Culture result:       MODERATE BACTEROIDES VULGATUS BETA LACTAMASE POSITIVE          CULTURE, BLOOD [[id number]]  (Abnormal) Collected: 12/09/22 1155    Order Status: Completed Specimen: Blood Updated: 12/12/22 0743     Special Requests: LEFT AC     GRAM STAIN       ANAEROBIC BOTTLE GRAM POSITIVE COCCI IN CLUSTERS                  SMEAR CALLED TO AND CORRECTLY REPEATED BY: Tammi Olivares RN 3S 12/10/22 AT 1147 BY ANI           Culture result:       STAPHYLOCOCCUS SPECIES, COAGULASE NEGATIVE GROWING IN 1 OF 2 BOTTLES DRAWN  SITE = LAC          BLOOD CULTURE ID PANEL [[id number]]  (Abnormal) Collected: 12/09/22 1145    Order Status: Completed Specimen: Blood Updated: 12/11/22 0655     Acc. no. from Micro Order [id number]     Enterococcus faecalis Not detected Enterococcus faecium Not detected        Listeria monocytogenes Not detected        Staphylococcus Detected        Staphylococcus aureus Not detected        Staph epidermidis Detected        Staph lugdunensis Not detected        Streptococcus Not detected        Streptococcus agalactiae (Group B) Not detected        Streptococcus pneumoniae Not detected        Streptococcus pyogenes (Group A) Not detected        Acinetobacter calcoaceticus-baumanii complex Not detected        Bacteroides fragilis Not detected        Enterobacterales species Not detected        Enterobacter cloacae complex Not detected        Escherichia coli Not detected        Klebsiella aerogenes Not detected        Klebsiella oxytoca Not detected        Klebsiella pneumoniae group Not detected        Proteus Not detected        Salmonella Not detected        Serratia marcescens Not detected        Haemophilus influenzae Not detected        Neisseria meningitidis Not detected        Pseudomonas aeruginosa Not detected        Steno maltophilia Not detected        Candida albicans Not detected        Candida auris Not detected        Candida glabrata Not detected        Candida krusei Not detected        Candida parapsilosis Not detected        Candida tropicalis Not detected        Crypto neoformans/gattii Not detected        RESISTANT GENES:            MECA/C (Methicillin resistant gene) Detected        Comment       False positive results may rarely occur.  Correlate with clinical,epidemiologic, and other laboratory findings           Comment: Please see BCID Interpretation Guide in EPIC Links       C. DIFFICILE AG & TOXIN A/B [[id number]]     Order Status: Canceled Specimen: Stool

## 2022-12-26 NOTE — PROGRESS NOTES
Patient transferred from bed to wheel chair with minimal assist weight bearing on right foot only. Patient transferred from wheelchair to toilet with minimal assist but required moderate assist to transfer back to wheel chair.

## 2022-12-26 NOTE — PROGRESS NOTES
Nephrology Inpatient Progress note    Subjective:     Freeman Heller is a 60 y.o. male with a PMHx of  DM, HTN. PVD, ESRD on HD TTS at [facility] under the [address] [place] [address]. Nephrology sent in for admission by wound care clinic due to left foot infection ,was told he needed surgery. Podiatry has been in to see pt . He has been on abx recently     S/P Lt 2/3/4 metatarsal amputation  Pt with no complaint today. No F/C, CP/SOB. Appetite good.      Admit Date: 12/9/2022  Active Problems:    Diabetes mellitus with foot ulcer ([address] (6/27/2022)      CAD (coronary artery disease) (6/28/2022)      ESRD (end stage renal disease) ([facility] (6/28/2022)      Hypertension (7/21/2022)      Leukocytosis (12/9/2022)      Diabetic foot infection ([address] (12/9/2022)      Diarrhea (12/9/2022)      Type 2 diabetes mellitus, with long-term current use of insulin ([facility]) ()      Acute hematogenous osteomyelitis of left foot ([address] (12/9/2022)      Nondisplaced fracture of second metatarsal bone of left foot (12/9/2022)      Nondisplaced fracture of third metatarsal bone of left foot (12/9/2022)      Closed nondisplaced fracture of fourth metatarsal bone of left foot (12/9/2022)      Positive blood culture (12/11/2022)    Current Facility-Administered Medications   Medication Dose Route Frequency    meropenem (MERREM) 1 g in 0.9% sodium chloride (MBP/ADV) 50 mL MBP  1 g IntraVENous Q24H    Saccharomyces boulardii (FLORASTOR) capsule 500 mg  500 mg Oral BID    vancomycin 50 mg/mL oral solution (compounded) 125 mg  125 mg Oral Q6H    insulin lispro (HUMALOG) injection   SubCUTAneous AC&HS    insulin glargine (LANTUS) injection 15 Units  15 Units SubCUTAneous QHS    fentaNYL citrate (PF) injection  mcg   mcg IntraVENous Rad Multiple    midazolam (VERSED) injection 0.5-2 mg  0.5-2 mg IntraVENous Rad Multiple    naloxone (NARCAN) injection 0.1 mg  0.1 mg IntraVENous Multiple    flumazeniL (ROMAZICON) 0.1 mg/mL injection 0.2 mg  0.2 mg IntraVENous Multiple    heparin (porcine) 1,000 unit/mL injection 1,000-10,000 Units  1,000-10,000 Units IntraVENous Rad Multiple    nitroGLYcerin 0.1 mg/mL in D5W injection  1-5 mL IntraarTERial Rad Multiple    heparinized saline 2 units/mL infusion 2,000 Units  1,000 mL Irrigation RAD CONTINUOUS    iopamidoL (ISOVUE 250) 250 mg iodine /mL (51 %) contrast injection 1-150 mL  1-150 mL IntraarTERial RAD CONTINUOUS    epoetin lisette-epbx (RETACRIT) injection 8,000 Units  8,000 Units SubCUTAneous Q TUE, THU & SAT    loperamide (IMODIUM) capsule 2 mg  2 mg Oral Q4H PRN    nystatin (MYCOSTATIN) 100,000 unit/gram powder   Topical BID    alum-mag hydroxide-simeth (MYLANTA) oral suspension 30 mL  30 mL Oral Q4H PRN    glucose chewable tablet 16 g  16 g Oral PRN    glucagon (GLUCAGEN) injection 1 mg  1 mg IntraMUSCular PRN    heparin (porcine) 1,000 unit/mL injection 3,600 Units  3,600 Units IntraCATHeter DIALYSIS PRN    dextrose 10% infusion 0-250 mL  0-250 mL IntraVENous PRN    0.9% sodium chloride infusion  25 mL/hr IntraVENous DIALYSIS PRN    clopidogreL (PLAVIX) tablet 75 mg  75 mg Oral DAILY    carvediloL (COREG) tablet 12.5 mg  12.5 mg Oral BID    aspirin chewable tablet 81 mg  81 mg Oral DAILY    amLODIPine (NORVASC) tablet 10 mg  10 mg Oral DAILY    allopurinoL (ZYLOPRIM) tablet 100 mg  100 mg Oral DAILY    ascorbic acid (vitamin C) (VITAMIN C) tablet 500 mg  500 mg Oral DAILY    ezetimibe (ZETIA) tablet 10 mg  10 mg Oral DAILY    pantoprazole (PROTONIX) tablet 40 mg  40 mg Oral ACB    sevelamer carbonate (RENVELA) tab 1,600 mg  1,600 mg Oral TID WITH MEALS    vit B Cmplx 3-FA-Vit C-Biotin (NEPHRO NIKITA RX) tablet 1 Tablet  1 Tablet Oral DAILY    sodium chloride (NS) flush 5-40 mL  5-40 mL IntraVENous Q8H    sodium chloride (NS) flush 5-40 mL  5-40 mL IntraVENous PRN    acetaminophen (TYLENOL) tablet 650 mg  650 mg Oral Q6H PRN    Or    acetaminophen (TYLENOL) suppository 650 mg  650 mg Rectal Q6H PRN    bisacodyL (DULCOLAX) suppository 10 mg  10 mg Rectal DAILY PRN    promethazine (PHENERGAN) tablet 12.5 mg  12.5 mg Oral Q6H PRN    Or    ondansetron (ZOFRAN) injection 4 mg  4 mg IntraVENous Q6H PRN    heparin (porcine) injection 5,000 Units  5,000 Units SubCUTAneous Q8H    oxyCODONE-acetaminophen (PERCOCET) 5-325 mg per tablet 1 Tablet  1 Tablet Oral Q6H PRN         Allergy:   No Known Allergies     Objective:     Visit Vitals  /62 (BP 1 Location: Right upper arm, BP Patient Position: Sitting)   Pulse 70   Temp 98.3 °F (36.8 °C)   Resp 14   Ht 6' (1.829 m)   Wt 95.5 kg (210 lb 8.6 oz)   SpO2 100%   BMI 28.55 kg/m²       No intake or output data in the 24 hours ending 12/26/22 1240      Physical Exam:       General: No resp distress   HENT: Atraumatic and normocephalic   Eyes: Normal conjunctiva   Neck: Supple No JVD or mass   Cardiovascular: Normal S1 & S2, no m/r/g   Pulmonary/Chest Wall: Clear to auscultation bilaterally   Abdominal: Soft and +NABS   Musculoskeletal: No edema S/p Amputation of multiple toes Left foot   Neurological: No focal deficits    HD Access: [facility] +    Data Review:  Lab Results   Component Value Date/Time    Sodium 135 (L) 12/26/2022 04:38 AM    Potassium 5.1 12/26/2022 04:38 AM    Chloride 98 (L) 12/26/2022 04:38 AM    CO2 28 12/26/2022 04:38 AM    Anion gap 9 12/26/2022 04:38 AM    Glucose 162 (H) 12/26/2022 04:38 AM    BUN 39 (H) 12/26/2022 04:38 AM    Creatinine 7.46 (H) 12/26/2022 04:38 AM    BUN/Creatinine ratio 5 (L) 12/26/2022 04:38 AM    GFR est AA 13 (L) 07/21/2022 02:15 PM    GFR est non-AA 11 (L) 07/21/2022 02:15 PM    Calcium 8.3 (L) 12/26/2022 04:38 AM     Lab Results   Component Value Date/Time    WBC 13.5 (H) 12/26/2022 04:38 AM    HGB 8.8 (L) 12/26/2022 04:38 AM    HCT 27.3 (L) 12/26/2022 04:38 AM    PLATELET 770 51/74/2452 04:38 AM    MCV 95.1 12/26/2022 04:38 AM     Lab Results   Component Value Date/Time    Calcium 8.3 (L) 12/26/2022 04:38 AM    Phosphorus 6.0 (H) 12/22/2022 04:49 AM     No results found for: IRON, FE, TIBC, IBCT, PSAT, FERR  No results found for: FERR      Impression:     ESRD on HD TTS schedule  Hyperkalemia, resolved  Left diabetic foot infection w/ gangrenous changes s/p Lt 2/3/4 metatarsal amputation   DM  HTN  PVD, seen by Vasc Surgery, anticipate LLE angio and possible intervention.   Anemia  SHPT    Plan:     HD TTS  IV Antibiotic per ID  Cont DALTON for Anemia  Vascular, podiatry and ID specialists following  Will cont follow    Ayala Alvarez MD,   MINIMALLY INVASIVE SURGERY [facility]

## 2022-12-26 NOTE — PROGRESS NOTES
Problem: Falls - Risk of  Goal: *Absence of Falls  Description: Document Vernia Colder Fall Risk and appropriate interventions in the flowsheet.   Outcome: Progressing Towards Goal  Note: Fall Risk Interventions:  Mobility Interventions: Communicate number of staff needed for ambulation/transfer, Patient to call before getting OOB         Medication Interventions: Assess postural VS orthostatic hypotension, Patient to call before getting OOB, Teach patient to arise slowly    Elimination Interventions: Call light in reach, Patient to call for help with toileting needs    History of Falls Interventions: Bed/chair exit alarm

## 2022-12-26 NOTE — PROGRESS NOTES
Hospitalist Progress Note    Patient: Clair Valadez MRN: [id number]  [facility]: [id number]    YOB: 1960  Age: 60 y.o. Sex: male    DOA: 12/9/2022 LOS:  LOS: 17 days                Assessment/Plan     Patient Active Problem List   Diagnosis Code    Diabetes mellitus with foot ulcer ([facility] E11.621, L97.509    CAD (coronary artery disease) I25.10    ESRD (end stage renal disease) ([facility] N18.6    Acute hematogenous osteomyelitis of right foot (HCC) M86.071    Cellulitis of right heel L03.115    Diabetic foot ulcer with osteomyelitis ([facility] E11.621, E11.69, L97.509, M86.9    Ulcer of right heel, with necrosis of bone ([facility] L97.414    Infection and inflammatory reaction due to internal fixation device of other site, initial encounter [facility]) T84.69XA    Left arm swelling M79.89    Chest pain at rest R07.9    Abnormal nuclear stress test R94.39    History of anemia due to CKD N18.9, Z86.2    S/P angioplasty with stent Z95.820    Hypertension I10    Leukocytosis D72.829    Diabetic foot infection (HCC) E11.628, L08.9    Diarrhea R19.7    Type 2 diabetes mellitus, with long-term current use of insulin (HCC) E11.9, Z79.4    Acute hematogenous osteomyelitis of left foot (HCC) M86.072    Nondisplaced fracture of second metatarsal bone of left foot S92.325A    Nondisplaced fracture of third metatarsal bone of left foot S92.335A    Closed nondisplaced fracture of fourth metatarsal bone of left foot S92.345A    Positive blood culture R78.81        Chief complaint :  Foot gangrene, DFU  60 y.o. male with history of diabetes, diabetic ulcer, CAD, hyperlipidemia, hypertension end-stage renal disease on HD presented to ER due to left foot lesion. Gangrene of left foot/DFU   PARTIAL AMPUTATION OF LEFT 2ND, 3RD, 4TH METATARSALS, AMPUTATION OF TOES 2,3,4, INCISION ADN DRAINAGE LEFT FOOT on 12/09/2022  S/p angiogram with PTA of the proximal PT and peroneal arteries. Vascular planning repeat procedure tomorrow.    S/p Left TMA 12/21/2022. ID following  Antibiotics at discharge per ID    History of C-diff -  On oral vancomycin to prevent recurrence  Added florastor. Positive blood cx -  Coag negative staph  Likely contaminant contamination      CAD-   Distal RCA to drug-eluting stent in July 2022, continue plavix -   No chest pain    Hypertension -  continue home medication     End stage renal disease -  on HD per nephrology     DM  type II -  Lantus andssi     Disposition : await placement    Review of systems  General: No fevers or chills. Cardiovascular: No chest pain or pressure. No palpitations. Pulmonary: No shortness of breath. Gastrointestinal: No nausea, vomiting. Physical Exam:  General: Awake, cooperative, no acute distress    HEENT: NC, Atraumatic. PERRLA, anicteric sclerae. Lungs: CTA Bilaterally. No Wheezing/Rhonchi/Rales. Heart:  S1 S2,  No murmur, No Rubs, No Gallops  Abdomen: Soft, Non distended, Non tender. +Bowel sounds,   Extremities: Left foot with dressing. Psych:   Not anxious or agitated. Neurologic:  No acute neurological deficit. Vital signs/Intake and Output:  Visit Vitals  BP (!) 118/46 (BP 1 Location: Right upper arm, BP Patient Position: Semi fowlers; Lying left side)   Pulse 71   Temp 98.2 °F (36.8 °C)   Resp 18   Ht 6' (1.829 m)   Wt 95.5 kg (210 lb 8.6 oz)   SpO2 98%   BMI 28.55 kg/m²     Current Shift:  No intake/output data recorded.   Last three shifts:  12/24 1901 - 12/26 0700  In: -   Out: 2500             Labs: Results:       Chemistry Recent Labs     12/26/22  0438 12/25/22  0512 12/24/22  0325   * 115* 149*   * 135* 134*   K 5.1 4.4 4.7   CL 98* 98* 98*   CO2 28 29 26   BUN 39* 25* 39*   CREA 7.46* 5.65* 7.49*   CA 8.3* 8.6 8.2*   AGAP 9 8 10   BUCR 5* 4* 5*    104  --    TP 7.0 7.3  --    ALB 1.9* 2.0*  --    GLOB 5.1* 5.3*  --    AGRAT 0.4* 0.4*  --       CBC w/Diff Recent Labs     12/26/22  0438 12/25/22  0512   WBC 13.5* 15.7*   RBC 2.87* 2.80*   HGB 8.8* 8.5*   HCT 27.3* 26.8*    280   GRANS 73 77*   LYMPH 11* 9*   EOS 4 3      Cardiac Enzymes No results for input(s): CPK, CKND1, PAULO in the last 72 hours. No lab exists for component: CKRMB, TROIP   Coagulation No results for input(s): PTP, INR, APTT, INREXT, INREXT in the last 72 hours. Lipid Panel Lab Results   Component Value Date/Time    Cholesterol, total 188 07/19/2022 03:12 AM    HDL Cholesterol 42 07/19/2022 03:12 AM    LDL, calculated 131.2 (H) 07/19/2022 03:12 AM    VLDL, calculated 14.8 07/19/2022 03:12 AM    Triglyceride 74 07/19/2022 03:12 AM    CHOL/HDL Ratio 4.5 07/19/2022 03:12 AM      BNP No results for input(s): BNPP in the last 72 hours.    Liver Enzymes Recent Labs     12/26/22  0438   TP 7.0   ALB 1.9*         Thyroid Studies No results found for: T4, T3U, TSH, TSHEXT, TSHEXT     Procedures/imaging: see electronic medical records for all procedures/Xrays and details which were not copied into this note but were reviewed prior to creation of Plan

## 2022-12-27 PROBLEM — I73.9 PAD (PERIPHERAL ARTERY DISEASE) (HCC): Status: ACTIVE | Noted: 2022-12-27

## 2022-12-27 LAB
ALBUMIN SERPL-MCNC: 1.8 G/DL (ref 3.4–5)
ALBUMIN/GLOB SERPL: 0.3 (ref 0.8–1.7)
ALP SERPL-CCNC: 103 U/L (ref 45–117)
ALT SERPL-CCNC: 35 U/L (ref 16–61)
ANION GAP SERPL CALC-SCNC: 10 MMOL/L (ref 3–18)
AST SERPL-CCNC: 24 U/L (ref 10–38)
BASOPHILS # BLD: 0.1 K/UL (ref 0–0.1)
BASOPHILS NFR BLD: 1 % (ref 0–2)
BILIRUB SERPL-MCNC: 0.3 MG/DL (ref 0.2–1)
BUN SERPL-MCNC: 54 MG/DL (ref 7–18)
BUN/CREAT SERPL: 6 (ref 12–20)
CALCIUM SERPL-MCNC: 8.6 MG/DL (ref 8.5–10.1)
CHLORIDE SERPL-SCNC: 98 MMOL/L (ref 100–111)
CO2 SERPL-SCNC: 27 MMOL/L (ref 21–32)
CREAT SERPL-MCNC: 9.32 MG/DL (ref 0.6–1.3)
CRP SERPL-MCNC: 25.4 MG/DL (ref 0–0.3)
DIFFERENTIAL METHOD BLD: ABNORMAL
EOSINOPHIL # BLD: 0.6 K/UL (ref 0–0.4)
EOSINOPHIL NFR BLD: 5 % (ref 0–5)
ERYTHROCYTE [DISTWIDTH] IN BLOOD BY AUTOMATED COUNT: 17.1 % (ref 11.6–14.5)
ERYTHROCYTE [SEDIMENTATION RATE] IN BLOOD: >140 MM/HR (ref 0–20)
GLOBULIN SER CALC-MCNC: 6 G/DL (ref 2–4)
GLUCOSE BLD STRIP.AUTO-MCNC: 107 MG/DL (ref 70–110)
GLUCOSE BLD STRIP.AUTO-MCNC: 134 MG/DL (ref 70–110)
GLUCOSE BLD STRIP.AUTO-MCNC: 188 MG/DL (ref 70–110)
GLUCOSE BLD STRIP.AUTO-MCNC: 92 MG/DL (ref 70–110)
GLUCOSE SERPL-MCNC: 83 MG/DL (ref 74–99)
HCT VFR BLD AUTO: 25.7 % (ref 36–48)
HGB BLD-MCNC: 8.4 G/DL (ref 13–16)
IMM GRANULOCYTES # BLD AUTO: 0.1 K/UL (ref 0–0.04)
IMM GRANULOCYTES NFR BLD AUTO: 1 % (ref 0–0.5)
LYMPHOCYTES # BLD: 1.3 K/UL (ref 0.9–3.6)
LYMPHOCYTES NFR BLD: 11 % (ref 21–52)
MAGNESIUM SERPL-MCNC: 2.2 MG/DL (ref 1.6–2.6)
MCH RBC QN AUTO: 30.8 PG (ref 24–34)
MCHC RBC AUTO-ENTMCNC: 32.7 G/DL (ref 31–37)
MCV RBC AUTO: 94.1 FL (ref 78–100)
MONOCYTES # BLD: 1.5 K/UL (ref 0.05–1.2)
MONOCYTES NFR BLD: 12 % (ref 3–10)
NEUTS SEG # BLD: 9.1 K/UL (ref 1.8–8)
NEUTS SEG NFR BLD: 72 % (ref 40–73)
NRBC # BLD: 0 K/UL (ref 0–0.01)
NRBC BLD-RTO: 0 PER 100 WBC
PLATELET # BLD AUTO: 328 K/UL (ref 135–420)
PMV BLD AUTO: 10.5 FL (ref 9.2–11.8)
POTASSIUM SERPL-SCNC: 5.1 MMOL/L (ref 3.5–5.5)
PROCALCITONIN SERPL-MCNC: 1.31 NG/ML
PROT SERPL-MCNC: 7.8 G/DL (ref 6.4–8.2)
RBC # BLD AUTO: 2.73 M/UL (ref 4.35–5.65)
SODIUM SERPL-SCNC: 135 MMOL/L (ref 136–145)
WBC # BLD AUTO: 12.7 K/UL (ref 4.6–13.2)

## 2022-12-27 PROCEDURE — 83735 ASSAY OF MAGNESIUM: CPT

## 2022-12-27 PROCEDURE — 86140 C-REACTIVE PROTEIN: CPT

## 2022-12-27 PROCEDURE — 84145 PROCALCITONIN (PCT): CPT

## 2022-12-27 PROCEDURE — 85652 RBC SED RATE AUTOMATED: CPT

## 2022-12-27 PROCEDURE — 74011250636 HC RX REV CODE- 250/636: Performed by: INTERNAL MEDICINE

## 2022-12-27 PROCEDURE — 74011000250 HC RX REV CODE- 250: Performed by: HOSPITALIST

## 2022-12-27 PROCEDURE — 74011250636 HC RX REV CODE- 250/636: Performed by: HOSPITALIST

## 2022-12-27 PROCEDURE — 36415 COLL VENOUS BLD VENIPUNCTURE: CPT

## 2022-12-27 PROCEDURE — 74011250637 HC RX REV CODE- 250/637: Performed by: HOSPITALIST

## 2022-12-27 PROCEDURE — 74011636637 HC RX REV CODE- 636/637: Performed by: HOSPITALIST

## 2022-12-27 PROCEDURE — 85025 COMPLETE CBC W/AUTO DIFF WBC: CPT

## 2022-12-27 PROCEDURE — 65270000029 HC RM PRIVATE

## 2022-12-27 PROCEDURE — 74011000258 HC RX REV CODE- 258: Performed by: FAMILY MEDICINE

## 2022-12-27 PROCEDURE — 74011250636 HC RX REV CODE- 250/636: Performed by: FAMILY MEDICINE

## 2022-12-27 PROCEDURE — 80053 COMPREHEN METABOLIC PANEL: CPT

## 2022-12-27 PROCEDURE — 82962 GLUCOSE BLOOD TEST: CPT

## 2022-12-27 PROCEDURE — 90935 HEMODIALYSIS ONE EVALUATION: CPT

## 2022-12-27 RX ORDER — DIPHENHYDRAMINE HYDROCHLORIDE 50 MG/ML
12.5 INJECTION, SOLUTION INTRAMUSCULAR; INTRAVENOUS
Status: DISCONTINUED | OUTPATIENT
Start: 2022-12-27 | End: 2023-01-12 | Stop reason: HOSPADM

## 2022-12-27 RX ORDER — AMMONIUM LACTATE 12 G/100G
LOTION TOPICAL
Status: DISCONTINUED | OUTPATIENT
Start: 2022-12-27 | End: 2023-01-12 | Stop reason: HOSPADM

## 2022-12-27 RX ADMIN — Medication 500 MG: at 21:49

## 2022-12-27 RX ADMIN — EPOETIN ALFA-EPBX 8000 UNITS: 4000 INJECTION, SOLUTION INTRAVENOUS; SUBCUTANEOUS at 21:49

## 2022-12-27 RX ADMIN — SODIUM CHLORIDE, PRESERVATIVE FREE 10 ML: 5 INJECTION INTRAVENOUS at 05:19

## 2022-12-27 RX ADMIN — SODIUM CHLORIDE, PRESERVATIVE FREE 10 ML: 5 INJECTION INTRAVENOUS at 14:00

## 2022-12-27 RX ADMIN — MEROPENEM 1 G: 1 INJECTION, POWDER, FOR SOLUTION INTRAVENOUS at 18:12

## 2022-12-27 RX ADMIN — CARVEDILOL 12.5 MG: 12.5 TABLET, FILM COATED ORAL at 08:45

## 2022-12-27 RX ADMIN — HEPARIN SODIUM 3600 UNITS: 1000 INJECTION INTRAVENOUS; SUBCUTANEOUS at 16:10

## 2022-12-27 RX ADMIN — Medication 125 MG: at 08:44

## 2022-12-27 RX ADMIN — AMLODIPINE BESYLATE 10 MG: 5 TABLET ORAL at 08:45

## 2022-12-27 RX ADMIN — Medication: at 17:44

## 2022-12-27 RX ADMIN — Medication 2 UNITS: at 21:50

## 2022-12-27 RX ADMIN — Medication 15 UNITS: at 21:50

## 2022-12-27 RX ADMIN — ASPIRIN 81 MG: 81 TABLET, CHEWABLE ORAL at 08:44

## 2022-12-27 RX ADMIN — PANTOPRAZOLE SODIUM 40 MG: 40 TABLET, DELAYED RELEASE ORAL at 08:44

## 2022-12-27 RX ADMIN — NYSTATIN: 100000 POWDER TOPICAL at 08:46

## 2022-12-27 RX ADMIN — Medication 125 MG: at 21:49

## 2022-12-27 RX ADMIN — EZETIMIBE 10 MG: 10 TABLET ORAL at 08:45

## 2022-12-27 RX ADMIN — Medication 125 MG: at 13:27

## 2022-12-27 RX ADMIN — CARVEDILOL 12.5 MG: 12.5 TABLET, FILM COATED ORAL at 21:49

## 2022-12-27 RX ADMIN — Medication 500 MG: at 08:44

## 2022-12-27 RX ADMIN — Medication 125 MG: at 02:39

## 2022-12-27 RX ADMIN — HEPARIN SODIUM 5000 UNITS: 5000 INJECTION INTRAVENOUS; SUBCUTANEOUS at 17:54

## 2022-12-27 RX ADMIN — B-COMPLEX W/ C & FOLIC ACID TAB 1 MG 1 TABLET: 1 TAB at 08:44

## 2022-12-27 RX ADMIN — ALLOPURINOL 100 MG: 100 TABLET ORAL at 08:44

## 2022-12-27 RX ADMIN — NYSTATIN: 100000 POWDER TOPICAL at 21:52

## 2022-12-27 RX ADMIN — CLOPIDOGREL BISULFATE 75 MG: 75 TABLET ORAL at 08:45

## 2022-12-27 RX ADMIN — SEVELAMER CARBONATE 1600 MG: 800 TABLET, FILM COATED ORAL at 17:54

## 2022-12-27 RX ADMIN — DIPHENHYDRAMINE HYDROCHLORIDE 12.5 MG: 50 INJECTION, SOLUTION INTRAMUSCULAR; INTRAVENOUS at 13:23

## 2022-12-27 RX ADMIN — SODIUM CHLORIDE, PRESERVATIVE FREE 10 ML: 5 INJECTION INTRAVENOUS at 21:51

## 2022-12-27 NOTE — PROGRESS NOTES
Occupational Therapy Treatment Attempt     Chart reviewed. Attempted Occupational Therapy Treatment, however, patient unable to be seen due to:  []  Nausea/vomiting  []  Eating  []  Pain  []  Patient too lethargic  []  Off Unit for testing/procedure  [x]  Dialysis treatment in progress  []  Telemetry Results  []  Other:      Will f/u later as patient's schedule allows.   Landon Razo, OTR/L

## 2022-12-27 NOTE — PROGRESS NOTES
20:25  Assessment completed. Lungs are clear bilat. Bilat ft dsg's remain C/D/I. Resting quietly in bed with dinner @ bedside. 22:50 Shift assessment completed. See nsg flow sheet for details. 03:05 Reassessed with 0 changes noted. Resting quietly in bed with eyes closed between cares. 07:30 Bedside and Verbal shift change report given to Cornell Magana RN (oncoming nurse) by Sarah Hartley RN (offgoing nurse). Report included the following information SBAR.

## 2022-12-27 NOTE — PROGRESS NOTES
Nephrology Inpatient Progress note    Subjective:     Oneil Gómez is a 60 y.o. male with a PMHx of  DM, HTN. PVD, ESRD on HD TTS [facility] under the [address] [place] [address]. Nephrology sent in for admission by wound care clinic due to left foot infection ,was told he needed surgery. Podiatry has been in to see pt . He has been on abx recently     S/P Lt 2/3/4 metatarsal amputation  Pt with no complaint today. No F/C, CP/SOB.      Admit Date: 12/9/2022  Active Problems:    Diabetes mellitus with foot ulcer ([facility] (6/27/2022)      CAD (coronary artery disease) (6/28/2022)      ESRD (end stage renal disease) ([facility] (6/28/2022)      Hypertension (7/21/2022)      Leukocytosis (12/9/2022)      Diabetic foot infection ([facility] (12/9/2022)      Diarrhea (12/9/2022)      Type 2 diabetes mellitus, with long-term current use of insulin ([facility]) ()      Acute hematogenous osteomyelitis of left foot ([facility] (12/9/2022)      Nondisplaced fracture of second metatarsal bone of left foot (12/9/2022)      Nondisplaced fracture of third metatarsal bone of left foot (12/9/2022)      Closed nondisplaced fracture of fourth metatarsal bone of left foot (12/9/2022)      Positive blood culture (12/11/2022)    Current Facility-Administered Medications   Medication Dose Route Frequency    meropenem (MERREM) 1 g in 0.9% sodium chloride (MBP/ADV) 50 mL MBP  1 g IntraVENous Q24H    Saccharomyces boulardii (FLORASTOR) capsule 500 mg  500 mg Oral BID    vancomycin 50 mg/mL oral solution (compounded) 125 mg  125 mg Oral Q6H    insulin lispro (HUMALOG) injection   SubCUTAneous AC&HS    insulin glargine (LANTUS) injection 15 Units  15 Units SubCUTAneous QHS    fentaNYL citrate (PF) injection  mcg   mcg IntraVENous Rad Multiple    midazolam (VERSED) injection 0.5-2 mg  0.5-2 mg IntraVENous Rad Multiple    naloxone (NARCAN) injection 0.1 mg  0.1 mg IntraVENous Multiple    flumazeniL (ROMAZICON) 0.1 mg/mL injection 0.2 mg  0.2 mg IntraVENous Multiple heparin (porcine) 1,000 unit/mL injection 1,000-10,000 Units  1,000-10,000 Units IntraVENous Rad Multiple    nitroGLYcerin 0.1 mg/mL in D5W injection  1-5 mL IntraarTERial Rad Multiple    heparinized saline 2 units/mL infusion 2,000 Units  1,000 mL Irrigation RAD CONTINUOUS    iopamidoL (ISOVUE 250) 250 mg iodine /mL (51 %) contrast injection 1-150 mL  1-150 mL IntraarTERial RAD CONTINUOUS    epoetin lisette-epbx (RETACRIT) injection 8,000 Units  8,000 Units SubCUTAneous Q TUE, THU & SAT    loperamide (IMODIUM) capsule 2 mg  2 mg Oral Q4H PRN    nystatin (MYCOSTATIN) 100,000 unit/gram powder   Topical BID    alum-mag hydroxide-simeth (MYLANTA) oral suspension 30 mL  30 mL Oral Q4H PRN    glucose chewable tablet 16 g  16 g Oral PRN    glucagon (GLUCAGEN) injection 1 mg  1 mg IntraMUSCular PRN    heparin (porcine) 1,000 unit/mL injection 3,600 Units  3,600 Units IntraCATHeter DIALYSIS PRN    dextrose 10% infusion 0-250 mL  0-250 mL IntraVENous PRN    0.9% sodium chloride infusion  25 mL/hr IntraVENous DIALYSIS PRN    clopidogreL (PLAVIX) tablet 75 mg  75 mg Oral DAILY    carvediloL (COREG) tablet 12.5 mg  12.5 mg Oral BID    aspirin chewable tablet 81 mg  81 mg Oral DAILY    amLODIPine (NORVASC) tablet 10 mg  10 mg Oral DAILY    allopurinoL (ZYLOPRIM) tablet 100 mg  100 mg Oral DAILY    ascorbic acid (vitamin C) (VITAMIN C) tablet 500 mg  500 mg Oral DAILY    ezetimibe (ZETIA) tablet 10 mg  10 mg Oral DAILY    pantoprazole (PROTONIX) tablet 40 mg  40 mg Oral ACB    sevelamer carbonate (RENVELA) tab 1,600 mg  1,600 mg Oral TID WITH MEALS    vit B Cmplx 3-FA-Vit C-Biotin (NEPHRO NIKITA RX) tablet 1 Tablet  1 Tablet Oral DAILY    sodium chloride (NS) flush 5-40 mL  5-40 mL IntraVENous Q8H    sodium chloride (NS) flush 5-40 mL  5-40 mL IntraVENous PRN    acetaminophen (TYLENOL) tablet 650 mg  650 mg Oral Q6H PRN    Or    acetaminophen (TYLENOL) suppository 650 mg  650 mg Rectal Q6H PRN    bisacodyL (DULCOLAX) suppository 10 mg 10 mg Rectal DAILY PRN    promethazine (PHENERGAN) tablet 12.5 mg  12.5 mg Oral Q6H PRN    Or    ondansetron (ZOFRAN) injection 4 mg  4 mg IntraVENous Q6H PRN    heparin (porcine) injection 5,000 Units  5,000 Units SubCUTAneous Q8H    oxyCODONE-acetaminophen (PERCOCET) 5-325 mg per tablet 1 Tablet  1 Tablet Oral Q6H PRN         Allergy:   No Known Allergies     Objective:     Visit Vitals  BP (!) 130/53 (BP 1 Location: Right upper arm, BP Patient Position: Semi fowlers; At rest)   Pulse 69   Temp 97.9 °F (36.6 °C)   Resp 20   Ht 6' (1.829 m)   Wt 99.4 kg (219 lb 1.6 oz)   SpO2 97%   BMI 29.72 kg/m²       No intake or output data in the 24 hours ending 12/27/22 0913      Physical Exam:       General: No resp distress   HENT: Atraumatic and normocephalic   Eyes: Normal conjunctiva   Neck: Supple No JVD or mass   Cardiovascular: Normal S1 & S2, no m/r/g   Pulmonary/Chest Wall: Clear to auscultation bilaterally   Abdominal: Soft and +NABS   Musculoskeletal: No edema S/p Amputation of multiple toes Left foot   Neurological: No focal deficits    HD Access: [facility] TDC +    Data Review:  Lab Results   Component Value Date/Time    Sodium 135 (L) 12/27/2022 05:50 AM    Potassium 5.1 12/27/2022 05:50 AM    Chloride 98 (L) 12/27/2022 05:50 AM    CO2 27 12/27/2022 05:50 AM    Anion gap 10 12/27/2022 05:50 AM    Glucose 83 12/27/2022 05:50 AM    BUN 54 (H) 12/27/2022 05:50 AM    Creatinine 9.32 (H) 12/27/2022 05:50 AM    BUN/Creatinine ratio 6 (L) 12/27/2022 05:50 AM    GFR est AA 13 (L) 07/21/2022 02:15 PM    GFR est non-AA 11 (L) 07/21/2022 02:15 PM    Calcium 8.6 12/27/2022 05:50 AM     Lab Results   Component Value Date/Time    WBC 12.7 12/27/2022 05:50 AM    HGB 8.4 (L) 12/27/2022 05:50 AM    HCT 25.7 (L) 12/27/2022 05:50 AM    PLATELET 586 79/99/8120 05:50 AM    MCV 94.1 12/27/2022 05:50 AM     Lab Results   Component Value Date/Time    Calcium 8.6 12/27/2022 05:50 AM    Phosphorus 6.0 (H) 12/22/2022 04:49 AM     No results found for: IRON, FE, TIBC, IBCT, PSAT, FERR  No results found for: FERR      Impression:     ESRD on HD TTS schedule  Hyperkalemia, resolved  Left diabetic foot infection w/ gangrenous changes s/p Lt 2/3/4 metatarsal amputation   DM  HTN  PVD, seen by Vasc Surgery, anticipate LLE angio and possible intervention.   Anemia  SHPT    Plan:     HD today  IV Antibiotic per ID  Cont DALTON for Anemia  Vascular, podiatry and ID specialists following  For LE arteriogram today    Maria Isabel Ott MD,   MINIMALLY INVASIVE SURGERY \[facility]\"" [facility]

## 2022-12-27 NOTE — WOUND CARE
IP WOUND CONSULT    Katelynn Darden  MEDICAL RECORD NUMBER:  [id number]  AGE: 60 y.o. GENDER: male  : 1960  TODAY'S DATE:  2022    GENERAL     [x] Follow-up   [] New Consult    [x] Present on Admission  [] Hospital Acquired    Katelynn Darden is a 60 y.o. male referred by:   [x] Physician  [] Nursing  [] Other:         PAST MEDICAL HISTORY    Past Medical History:   Diagnosis Date    CAD (coronary artery disease)     Chronic kidney disease     Diabetes mellitus     Hypertension     Sleep apnea         PAST SURGICAL HISTORY    Past Surgical History:   Procedure Laterality Date    HX ORTHOPAEDIC      HX PACEMAKER      IR INSERT TUNL CVC W/O PORT OVER 5 YR  2022    IR INSERT TUNL CVC W/O PORT OVER 5 YR  2022    IR REMOVE TUNL CVAD W/O PORT / PUMP  2022    CT CARDIAC SURG PROCEDURE UNLIST         FAMILY HISTORY    Family History   Problem Relation Age of Onset    Heart Disease Mother     Diabetes Father     Diabetes Sister     Diabetes Brother        SOCIAL HISTORY    Social History     Tobacco Use    Smoking status: Never    Smokeless tobacco: Never   Vaping Use    Vaping Use: Never used   Substance Use Topics    Alcohol use: Not Currently    Drug use: Never       ALLERGIES    No Known Allergies    MEDICATIONS    No current facility-administered medications on file prior to encounter. Current Outpatient Medications on File Prior to Encounter   Medication Sig Dispense Refill    clopidogreL (PLAVIX) 75 mg tab Take 1 Tablet by mouth in the morning. 30 Tablet 2    isosorbide mononitrate ER (IMDUR) 30 mg tablet Take 1 Tablet by mouth daily. 30 Tablet 0    pantoprazole (PROTONIX) 40 mg tablet Take 1 Tablet by mouth Daily (before breakfast). 30 Tablet 0    bacitracin zinc (BACITRACIN) ointment Apply  to affected area two (2) times a day. 15 g 0    nystatin (MYCOSTATIN) powder Apply  to affected area two (2) times a day.  5 g 0    insulin lispro (HUMALOG) 100 unit/mL injection Use as directed 1 Each 0    Lactobacillus Acidoph & Bulgar (FLORANEX) 1 million cell tab tablet Take 2 Tablets by mouth two (2) times a day. 60 Tablet 0    melatonin, rapid dissolve, 5 mg TbDi tablet Take 2 Tablets by mouth nightly as needed (slsee[). 30 Tablet 0    sevelamer carbonate (RENVELA) 800 mg tab tab Take 2 Tablets by mouth three (3) times daily (with meals). 90 Tablet 0    vit B Cmplx 3-FA-Vit C-Biotin (NEPHRO NIKITA RX) 1- mg-mg-mcg tablet Take 1 Tablet by mouth daily. 30 Tablet 0    atorvastatin (LIPITOR) 40 mg tablet Take 40 mg by mouth daily. gabapentin (NEURONTIN) 300 mg capsule Take 300 mg by mouth nightly. allopurinoL (ZYLOPRIM) 100 mg tablet Take 100 mg by mouth daily. ezetimibe (ZETIA) 10 mg tablet Take 10 mg by mouth. carvediloL (COREG) 12.5 mg tablet Take 12.5 mg by mouth two (2) times a day. amLODIPine (NORVASC) 10 mg tablet Take 10 mg by mouth daily. aspirin 81 mg chewable tablet Take 81 mg by mouth daily. ascorbic acid, vitamin C, (VITAMIN C) 500 mg tablet Take 500 mg by mouth. insulin glargine (LANTUS) 100 unit/mL injection 10 Units by SubCUTAneous route nightly. Wt Readings from Last 3 Encounters:   12/27/22 99.4 kg (219 lb 1.6 oz)   07/19/22 102.6 kg (226 lb 4.8 oz)       [email or web address] Vitals  BP (!) 130/53 (BP 1 Location: Right upper arm, BP Patient Position: Semi fowlers; At rest)   Pulse 69   Temp 97.9 °F (36.6 °C)   Resp 20   Ht 6' (1.829 m)   Wt 99.4 kg (219 lb 1.6 oz)   SpO2 97%   BMI 29.72 kg/m²       ASSESSMENT     Skin impairment Identification:  Type:  surgical    Contributing Factors:  surgical    Incision 12/21/22 Foot Left (Active)   Wound Image    12/27/22 1030   Dressing Status Intact; New drainage noted; Old drainage noted 12/27/22 1030   Dressing Change Due 12/30/22 12/27/22 1030   Cleansed Soap and water 12/27/22 1030   Dressing/Treatment Alginate with Ag;ABD pad;Roll gauze; Ace wrap 12/27/22 1030   Closure Sutures 12/27/22 1030   Margins Approximated 12/27/22 1030   Incision Assessment Bleeding 12/27/22 1030   Drainage Amount Moderate 12/27/22 1030   Drainage Description Serosanguinous 12/27/22 1030   Wound Odor None 12/27/22 1030   Leticia-Wound/Incision Assessment Ecchymosis 12/27/22 1030   Number of days: 6          PLAN     Skin Care & Pressure Relief Recommendations  Minimize layers of linen  Pads under patient to optimize support surface  Turn/reposition approximately every 2 hours  Pillow wedges  Promote continence; Skin Protective lotion/cream to buttocks and sacrum daily and as needed with incontinence care  Offloading boots    Physician/Provider notified: Yes Dr. Richie Leggett  Recommendations: keep dressing in place    Teaching completed with:   [x] Patient           [] Family member       [] Caregiver          [] Nursing  [] Other    Patient/Caregiver Teaching:  Level of patient/caregiver understanding able to:   [x] Indicates understanding       [] Needs reinforcement  [] Unsuccessful      [] Verbal Understanding  [] Demonstrated understanding       [] No evidence of learning  [] Refused teaching         [] N/A       Electronically signed by Juanita Garcia RN on 12/27/2022 at 11:21 AM

## 2022-12-27 NOTE — PROGRESS NOTES
Problem: Diabetes Self-Management  Goal: *Disease process and treatment process  Description: Define diabetes and identify own type of diabetes; list 3 options for treating diabetes. Outcome: Progressing Towards Goal  Goal: *Incorporating nutritional management into lifestyle  Description: Describe effect of type, amount and timing of food on blood glucose; list 3 methods for planning meals. Outcome: Progressing Towards Goal  Goal: *Incorporating physical activity into lifestyle  Description: State effect of exercise on blood glucose levels. Outcome: Progressing Towards Goal  Goal: *Developing strategies to promote health/change behavior  Description: Define the ABC's of diabetes; identify appropriate screenings, schedule and personal plan for screenings. Outcome: Progressing Towards Goal  Goal: *Using medications safely  Description: State effect of diabetes medications on diabetes; name diabetes medication taking, action and side effects. Outcome: Progressing Towards Goal  Goal: *Monitoring blood glucose, interpreting and using results  Description: Identify recommended blood glucose targets  and personal targets. Outcome: Progressing Towards Goal  Goal: *Prevention, detection, treatment of acute complications  Description: List symptoms of hyper- and hypoglycemia; describe how to treat low blood sugar and actions for lowering  high blood glucose level. Outcome: Progressing Towards Goal  Goal: *Prevention, detection and treatment of chronic complications  Description: Define the natural course of diabetes and describe the relationship of blood glucose levels to long term complications of diabetes.   Outcome: Progressing Towards Goal  Goal: *Developing strategies to address psychosocial issues  Description: Describe feelings about living with diabetes; identify support needed and support network  Outcome: Progressing Towards Goal  Goal: *Insulin pump training  Outcome: Progressing Towards Goal  Goal: *Sick day guidelines  Outcome: Progressing Towards Goal  Goal: *Patient Specific Goal (EDIT GOAL, INSERT TEXT)  Outcome: Progressing Towards Goal     Problem: Patient Education: Go to Patient Education Activity  Goal: Patient/Family Education  Outcome: Progressing Towards Goal     Problem: Falls - Risk of  Goal: *Absence of Falls  Description: Document Theodore Blight Fall Risk and appropriate interventions in the flowsheet. Outcome: Progressing Towards Goal  Note: Fall Risk Interventions:  Mobility Interventions: Bed/chair exit alarm, Patient to call before getting OOB, PT Consult for mobility concerns, PT Consult for assist device competence, Strengthening exercises (ROM-active/passive), Utilize walker, cane, or other assistive device         Medication Interventions: Assess postural VS orthostatic hypotension, Patient to call before getting OOB, Teach patient to arise slowly    Elimination Interventions: Bed/chair exit alarm, Call light in reach    History of Falls Interventions: Bed/chair exit alarm, Investigate reason for fall, Room close to nurse's station, Utilize gait belt for transfer/ambulation         Problem: Patient Education: Go to Patient Education Activity  Goal: Patient/Family Education  Outcome: Progressing Towards Goal     Problem: Chronic Renal Failure  Goal: *Fluid and electrolytes stabilized  Outcome: Progressing Towards Goal     Problem: Patient Education: Go to Patient Education Activity  Goal: Patient/Family Education  Outcome: Progressing Towards Goal     Problem: Pressure Injury - Risk of  Goal: *Prevention of pressure injury  Description: Document Semaj Scale and appropriate interventions in the flowsheet.   Outcome: Progressing Towards Goal  Note: Pressure Injury Interventions:  Sensory Interventions: Assess changes in LOC, Minimize linen layers    Moisture Interventions: Absorbent underpads, Apply protective barrier, creams and emollients    Activity Interventions: Pressure redistribution bed/mattress(bed type), Increase time out of bed, PT/OT evaluation    Mobility Interventions: Pressure redistribution bed/mattress (bed type), PT/OT evaluation    Nutrition Interventions: Document food/fluid/supplement intake    Friction and Shear Interventions: Minimize layers                Problem: Patient Education: Go to Patient Education Activity  Goal: Patient/Family Education  Outcome: Progressing Towards Goal     Problem: Patient Education: Go to Patient Education Activity  Goal: Patient/Family Education  Outcome: Progressing Towards Goal     Problem: Patient Education: Go to Patient Education Activity  Goal: Patient/Family Education  Outcome: Progressing Towards Goal

## 2022-12-27 NOTE — PROGRESS NOTES
ACUTE HEMODIALYSIS TREATMENT    HEMODIALYSIS ORDERS: Physician: Dr. Chico Tang     Dialyzer: Revaclear   Duration: 4 hr   BFR: 400   DFR: 800   Dialysate:  Temp 36-37*C   K+  2    Ca+ 2.5   Na 138   Bicarb 35   Wt Readings from Last 1 Encounters:   12/27/22 99.4 kg (219 lb 1.6 oz)    Patient Chart [x]   Unable to Obtain []  Dry weight/UF Goal: 2500 ml    Heparin []  Bolus    Units    [] Hourly    Units    [x]None       Pre BP:   125/63   Pulse:  68   Respirations: 18   Temp:  97.9  [] Oral [] Axillary [] Esophageal   Labs: []  Pre  []  Post:   [x] N/A   Additional Orders(medications, blood products, hypotension management): [] Yes   [] No     [x]  Anoop Consent Verified     CATHETER ACCESS:  []N/A   [x]Right   []Left   []IJ   []Fem  [x]Chest wall  []TransHepatic   [] First use X-ray verified     [x]Tunnel    [] Non Tunneled   [x]No S/S infection  []Redness  []Drainage []Cultured []Swelling []Pain   [x]Medical Aseptic Prep Utilized   []Dressing Changed  [x] Biopatch  Date:    []Clotted   [x]Patent   Flows: []Good  [x]Poor  [x]Reversed   If access problem,  notified: [x]Yes    []N/A        GENERAL ASSESSMENT:    LUNGS:  Resp Rate 18   [x] Clear  [] Coarse  [] Crackles  [] Wheezing  [] Diminished         Respirations:  [x]Easy  []Labored  []N/A  Cough:  []Productive  []Dry  [x]N/A      Therapy:  [x]RA  O2 Type:  []NC  Mask: []  NRB    [] BiPaP  Flow:  l/min                   [] Ventilated  [] Intubated  [] Trach     CARDIAC: [x] Regular      [] Irregular   [] Rhythm:          [] Monitored   [] Bedside   [] Remotely monitored     EDEMA: [] None   [x]Generalized  [] Pitting [] 1+   [] 2 +   [] 3+    [] 4+  [] Pedal    SKIN:   [x] Warm  [] Hot     [] Cold   [x] Dry     [] Pale   [] Diaphoretic                  [] Flushed  [] Jaundiced  [] Cyanotic     LOC:    [x] Alert      [x]Oriented:    [x] Person     [x] Place  []Time               [] Confused  [] Lethargic  [] Medicated  [] Non-responsive  [] Non Verbal GI / ABDOMEN:                     [] Flat    [] Distended    [x] Soft    [] Firm   []  Obese                   [] Diarrhea   [] FMS [x] Bowel Sounds  [] Nausea  [] Vomiting                   [] NGT  [] OGT    [] PEG  [] Tube Feedings @     / URINE ASSESSMENT:                   [] Voiding  []  Mcmahon  [x] Oliguria  [] Anuria                     [] Incontinent  []  Incontinent Brief   []  PureWick     PAIN:  [x] 0 []1  []2   []3   []4   []5   []6   []7   []8   []9   []10                MOBILITY:  [x] Bed    [] Stretcher      All Vitals and Treatment Details on Attached [address]: THE [facility]          Room # 327    [x] Routine         [] 1st Time Acute/Chronic   [] Urgent      [] Stat            [] Acute Room   [x]  Bedside    [] ICU/CCU     [] ER   Isolation Precautions:  [x] Dialysis    There are currently no Active Isolations     ALLERGIES:     No Known Allergies   Code Status:  Full Code     Hepatitis Status      Lab Results   Component Value Date/Time    Hepatitis B surface Ag <0.10 12/10/2022 06:33 AM    Hepatitis B surface Ab 23.52 12/10/2022 06:33 AM        Current Labs:      Lab Results   Component Value Date/Time    WBC 12.7 12/27/2022 05:50 AM    HGB 8.4 (L) 12/27/2022 05:50 AM    HCT 25.7 (L) 12/27/2022 05:50 AM    PLATELET 099 48/49/8106 05:50 AM    MCV 94.1 12/27/2022 05:50 AM     Lab Results   Component Value Date/Time    Sodium 135 (L) 12/27/2022 05:50 AM    Potassium 5.1 12/27/2022 05:50 AM    Chloride 98 (L) 12/27/2022 05:50 AM    CO2 27 12/27/2022 05:50 AM    Anion gap 10 12/27/2022 05:50 AM    Glucose 83 12/27/2022 05:50 AM    BUN 54 (H) 12/27/2022 05:50 AM    Creatinine 9.32 (H) 12/27/2022 05:50 AM    BUN/Creatinine ratio 6 (L) 12/27/2022 05:50 AM    GFR est AA 13 (L) 07/21/2022 02:15 PM    GFR est non-AA 11 (L) 07/21/2022 02:15 PM    Calcium 8.6 12/27/2022 05:50 AM          DIET:  DIET ADULT  DIET NPO     PRIMARY NURSE REPORT:   Pre Dialysis: Abraham Bruce RN    Time: 1200     EDUCATION: [x] Patient           Knowledge Basis: []None [x]Minimal [] Substantial [] Unknown  Barriers to learning  [x]N/A  [] Intubated/Trached/Ventilated  [] Sedated/Paralyzed   [] Access Care     [] S&S of infection  [] Fluid Management  [] K+   [x] Procedural    [] Medications   [] Tx Options   [] Transplant   [] Diet      Teaching Tools:  [x] Explain  [] Demo  [] Handouts [] Video  Patient response: [] Verbalized understanding   [x] Requires follow up        [x] Time Out/Safety Check    [x] Extracorporeal Circuit Tested for integrity       RO/HEMODIALYSIS MACHINE SAFETY CHECKS - Before each treatment:        THE [facility]                                                                     [x] Portable Machine #2 [id number] /RO serial # [id number]                                                                                                       Alarm Test:  Pass time             [x] RO/Machine Log Complete    Machine Temp    36-37*C             Dialysate: pH 7.4    Conductivity: Meter 14   HD Machine  14.1     TCD: 14  Dialyzer Lot # [id number]     Blood Tubing Lot # [id number]     Saline Lot # [id number]     CHLORINE TESTING-Before each treatment and every 4 hours    Total Chlorine: [x] less than 0.1 ppm  Initial Time Check: 1230      4 Hr/2nd Check Time:    (if greater than 0.1 ppm from Primary then every 30 minutes from Secondary)     TREATMENT INITIATION - with Dialysis Precautions:   [x] All Connections Secured              [x] Saline Line Double Clamped   [x] Venous Parameters Set               [x] Arterial Parameters Set    [x] Prime Given 250ml NSS              [x]Air Foam Detector Engaged      Treatment Initiation Note:   Received patient in bed awake a/o x4 pleasant, VS stable for treatment. Right chest TDC Accessed red port no aspiration, blue port aspirates and fluishes. Line reversed,  Treatment initiated. During Treatment Notes:  2255  Vascular access visible with arterial and venous line connections intact.   Pt resting comfortably. 1300  Vascular access visible with arterial and venous line connections intact. Pt resting comfortably. 1315  Vascular access visible with arterial and venous line connections intact. Pt resting comfortably. 1345  Vascular access visible with arterial and venous line connections intact. Pt resting comfortably. 1415  Vascular access visible with arterial and venous line connections intact. Pt resting comfortably. 1430  Vascular access visible with arterial and venous line connections intact. Pt resting comfortably. 1445  Vascular access visible with arterial and venous line connections intact. Pt resting comfortably. 1500  Vascular access visible with arterial and venous line connections intact. Pt resting comfortably. 1515  Vascular access visible with arterial and venous line connections intact. Pt resting comfortably. 1530  Vascular access visible with arterial and venous line connections intact. Pt resting comfortably. 1545  Vascular access visible with arterial and venous line connections intact. Pt resting comfortably. 1549  Dialysis treatment complete.        Medication Dose Volume Route Time Anoop Nurse, Title      KIM Wayne RN HD Elyse Silvas, RN     Post Assessment  Dialyzer Cleared:   [] Good  [x] Fair  [] Poor  Blood processed:  60.8 L  UF Removed:  2500 Ml    Post /75   Pulse  78 Resp  18  Temp 97.8 Lungs: [x] Clear [] Course  [] Crackles                 []  Wheezing   [] Diminished   Post Tx Vascular Access: [x] N/A Cardiac :[x] Regular   [] Irregular   Rhythm:  [x] Monitored   [] Not Monitored    CVC Catheter: [] N/A  Locking solution: Heparin 1:1000 U  Arterial port 1.8 ml   Venous port 1.8 ml   Edema:  [] None  [x] Generalized                     Skin:[x] Warm  [x] Dry [] Diaphoretic               [] Flushed  [] Pale [] Cyanotic Pain:  [x]0  []1 []2  []3 []4  []5  []6  []7 []8    []9  []10     Post Treatment Note: Patient completed and  tolerated 3 hrs of hemo dialysis. 2500 ml of fluid taken off. Catheter patent and intact flushed and locked with heparin.      POST TREATMENT PRIMARY NURSE HANDOFF REPORT:   Post Dialysis: Sury Barkley  RN               Time:  1600       Abbreviations: AVG-arterial venous graft, AVF-arterial venous fistula, IJ-Internal Jugular, Subcl-Subclavian, Fem-Femoral, Tx-treatment, AP/HR-apical heart rate, VSS- Vital Signs Stable, CVC- Central Venous Catheter, DFR-dialysate flow rate, BFR-blood flow rate, AP-arterial pressure, -venous pressure, UF-ultrafiltrate, TMP-transmembrane pressure, Avtar-Venous, Art-Arterial, RO-Reverse Osmosis

## 2022-12-27 NOTE — PROGRESS NOTES
Spoke w/ KIM Diaz, angiogram scheduled for tomorrow afternoon, patient to be NPO after midnight (order placed in EMR).

## 2022-12-27 NOTE — PROGRESS NOTES
1056: Pt refusing PT until he can eat. Pt NPO due to procedure today. Will follow up as schedule permits. 1320: Pt receiving dialysis, will follow up as schedule permits.

## 2022-12-27 NOTE — PROGRESS NOTES
Problem: Falls - Risk of  Goal: *Absence of Falls  Description: Document Valentina Александрsalomonrenaldo Fall Risk and appropriate interventions in the flowsheet.   Outcome: Progressing Towards Goal  Note: Fall Risk Interventions:  Mobility Interventions: Communicate number of staff needed for ambulation/transfer, Patient to call before getting OOB, Utilize walker, cane, or other assistive device         Medication Interventions: Assess postural VS orthostatic hypotension, Patient to call before getting OOB, Teach patient to arise slowly    Elimination Interventions: Call light in reach, Patient to call for help with toileting needs, Urinal in reach    History of Falls Interventions: Bed/chair exit alarm

## 2022-12-27 NOTE — PROGRESS NOTES
[facility]  (A Division of [address] [place] [address])                                           Amanda Everett MD                                   Office #:     [phone]-YJIWPN #[phone]                                   Office Fax: [phone]      Follow-up Note      Date of Admission: 12/9/2022       Date of Note:  12/27/2022  Referral: L foot infection/osteomyelitis/gangrene       Current Antimicrobials:    Prior Antimicrobials:   Vanco 12/9 to date  Zosyn 12/9 to 12/23  Meropenem 12/23 to dateate PO vanco 12/9 to 12/19     Antibiotic allergy: NOne     Assessment:     Isolated high Fever 12/23-- etiology not clear. CXR/repeat BCX normal so far from 12/23. Doubt CDI- no diarrhea and WBC coming down. L foot surgical wound without significant necrosis/ischemic change or drainage/cellulitis. Possible GI source Vs drug fever. Dry L foot gangrene- distal  --S/P partial ampuation L 2nd/3rd/4th MT, I and D of left foot- deep 12/09/22  --S/P TMA Left foot 12/21/22-- Biopsy with acute Osteomyitis  PAD--S/P angiogram 12/20- Vascular  Bacteremia 2/2 MRSE on 12/9--is procurement contamination   Recent CDI--prophylactic oral vanco from 12/23  ESRD on HD  R foot OM-ankle- treated and completed treatment 09/2022    Recommendation -- ID related:     Cont meropenem -vascular procedure pending  DW Dr Darion Freed regarding biopsy result- per Surgeon, resection/TMA is well beyond the margin and confident OM was resected out. Biopsy sent likely from the resected bone and he doesn't think long term needed  Can switch to Bactrim DS 1 tab QHS daily for 1 week on DC until seen by Dr Darion Freed  Can DC oral Vanco after 10 days dose.   DW Dr Osito Boyd, Dr Darion Freed and wound RN-Anyi Jutsin      Subjective:      Reports some itching -no rash, likely dry skin  No high fever  No cough/SOB  No N/V/Diarrhea  He is NPO for vascular procedure today  Discussed with wound RN and pictures reviewed    Notes/Labs reviewed: changes in ABX to meropenem from Zosyn noted    Microbiology:                    12/9 - OR (+) Serratia fonticola (S to pip/all except cefazolin), Alcaligenes faecalis (R FQ), Enterbacter cloacae (still being worked up), and anaerobic Bacteroides vulgatus (BL +)                                                      BCx 1/2 (+) CoNS        Lines / Catheters:         R HD cath and peripheral        Patient Active Problem List   Diagnosis Code    Diabetes mellitus with foot ulcer ([facility] E11.621, L97.509    CAD (coronary artery disease) I25.10    ESRD (end stage renal disease) ([facility] N18.6    Acute hematogenous osteomyelitis of right foot ([facility]) M86.071    Cellulitis of right heel L03.115    Diabetic foot ulcer with osteomyelitis ([facility] E11.621, E11.69, L97.509, M86.9    Ulcer of right heel, with necrosis of bone ([facility] [facility] L97.414    Infection and inflammatory reaction due to internal fixation device of other site, initial encounter [facility]) T84.69XA    Left arm swelling M79.89    Chest pain at rest R07.9    Abnormal nuclear stress test R94.39    History of anemia due to CKD N18.9, Z86.2    S/P angioplasty with stent Z95.820    Hypertension I10    Leukocytosis D72.829    Diabetic foot infection ([address] E11.628, L08.9    Diarrhea R19.7    Type 2 diabetes mellitus, with long-term current use of insulin ([facility] [facility] E11.9, Z79.4    Acute hematogenous osteomyelitis of left foot ([facility] M86.072    Nondisplaced fracture of second metatarsal bone of left foot S92.325A    Nondisplaced fracture of third metatarsal bone of left foot S92.335A    Closed nondisplaced fracture of fourth metatarsal bone of left foot S92.345A    Positive blood culture R78.81       Current Facility-Administered Medications   Medication Dose Route Frequency    meropenem (MERREM) 1 g in 0.9% sodium chloride (MBP/ADV) 50 mL MBP  1 g IntraVENous Q24H    Saccharomyces boulardii (FLORASTOR) capsule 500 mg  500 mg Oral BID    vancomycin 50 mg/mL oral solution (compounded) 125 mg  125 mg Oral Q6H insulin lispro (HUMALOG) injection   SubCUTAneous AC&HS    insulin glargine (LANTUS) injection 15 Units  15 Units SubCUTAneous QHS    fentaNYL citrate (PF) injection  mcg   mcg IntraVENous Rad Multiple    midazolam (VERSED) injection 0.5-2 mg  0.5-2 mg IntraVENous Rad Multiple    naloxone (NARCAN) injection 0.1 mg  0.1 mg IntraVENous Multiple    flumazeniL (ROMAZICON) 0.1 mg/mL injection 0.2 mg  0.2 mg IntraVENous Multiple    heparin (porcine) 1,000 unit/mL injection 1,000-10,000 Units  1,000-10,000 Units IntraVENous Rad Multiple    nitroGLYcerin 0.1 mg/mL in D5W injection  1-5 mL IntraarTERial Rad Multiple    heparinized saline 2 units/mL infusion 2,000 Units  1,000 mL Irrigation RAD CONTINUOUS    iopamidoL (ISOVUE 250) 250 mg iodine /mL (51 %) contrast injection 1-150 mL  1-150 mL IntraarTERial RAD CONTINUOUS    epoetin lisette-epbx (RETACRIT) injection 8,000 Units  8,000 Units SubCUTAneous Q TUE, THU & SAT    loperamide (IMODIUM) capsule 2 mg  2 mg Oral Q4H PRN    nystatin (MYCOSTATIN) 100,000 unit/gram powder   Topical BID    alum-mag hydroxide-simeth (MYLANTA) oral suspension 30 mL  30 mL Oral Q4H PRN    glucose chewable tablet 16 g  16 g Oral PRN    glucagon (GLUCAGEN) injection 1 mg  1 mg IntraMUSCular PRN    heparin (porcine) 1,000 unit/mL injection 3,600 Units  3,600 Units IntraCATHeter DIALYSIS PRN    dextrose 10% infusion 0-250 mL  0-250 mL IntraVENous PRN    0.9% sodium chloride infusion  25 mL/hr IntraVENous DIALYSIS PRN    clopidogreL (PLAVIX) tablet 75 mg  75 mg Oral DAILY    carvediloL (COREG) tablet 12.5 mg  12.5 mg Oral BID    aspirin chewable tablet 81 mg  81 mg Oral DAILY    amLODIPine (NORVASC) tablet 10 mg  10 mg Oral DAILY    allopurinoL (ZYLOPRIM) tablet 100 mg  100 mg Oral DAILY    ascorbic acid (vitamin C) (VITAMIN C) tablet 500 mg  500 mg Oral DAILY    ezetimibe (ZETIA) tablet 10 mg  10 mg Oral DAILY    pantoprazole (PROTONIX) tablet 40 mg  40 mg Oral ACB    sevelamer carbonate (RENVELA) tab 1,600 mg  1,600 mg Oral TID WITH MEALS    vit B Cmplx 3-FA-Vit C-Biotin (NEPHRO NIKITA RX) tablet 1 Tablet  1 Tablet Oral DAILY    sodium chloride (NS) flush 5-40 mL  5-40 mL IntraVENous Q8H    sodium chloride (NS) flush 5-40 mL  5-40 mL IntraVENous PRN    acetaminophen (TYLENOL) tablet 650 mg  650 mg Oral Q6H PRN    Or    acetaminophen (TYLENOL) suppository 650 mg  650 mg Rectal Q6H PRN    bisacodyL (DULCOLAX) suppository 10 mg  10 mg Rectal DAILY PRN    promethazine (PHENERGAN) tablet 12.5 mg  12.5 mg Oral Q6H PRN    Or    ondansetron (ZOFRAN) injection 4 mg  4 mg IntraVENous Q6H PRN    heparin (porcine) injection 5,000 Units  5,000 Units SubCUTAneous Q8H    oxyCODONE-acetaminophen (PERCOCET) 5-325 mg per tablet 1 Tablet  1 Tablet Oral Q6H PRN         Review of Systems - General ROS: negative for - chills, fever, or night sweats  Respiratory ROS: no cough, shortness of breath, or wheezing  Cardiovascular ROS: no chest pain or dyspnea on exertion  Gastrointestinal ROS: no abdominal pain, change in bowel habits, or black or bloody stools       Objective:    Visit Vitals  BP (!) 119/51 (BP 1 Location: Right upper arm, BP Patient Position: Semi fowlers; At rest)   Pulse 66   Temp 98.2 °F (36.8 °C)   Resp 15   Ht 6' (1.829 m)   Wt 99.4 kg (219 lb 1.6 oz)   SpO2 96%   BMI 29.72 kg/m²       Temp (24hrs), Av.5 °F (36.9 °C), Min:97.9 °F (36.6 °C), Max:99.4 °F (37.4 °C)      GEN: WDWN AAM in NAD  HEENT: anicteric  CHEST: CTA  CVS:RRR  ABD: NT  EXT: L foot unwrapped and examined-- clean/stable looking post surgical wound- no marked swelling/hematoma/drainage/ cellulitis finding on exam  Right heel with ace wrap and dressing in place.          Lab results:    Chemistry  Recent Labs     22  0550 22  0438 22  0512   GLU 83 162* 115*   * 135* 135*   K 5.1 5.1 4.4   CL 98* 98* 98*   CO2 27 28 29   BUN 54* 39* 25*   CREA 9.32* 7.46* 5.65*   CA 8.6 8.3* 8.6   AGAP 10 9 8   BUCR 6* 5* 4*   AP 103 111 104   TP 7.8 7.0 7.3   ALB 1.8* 1.9* 2.0*   GLOB 6.0* 5.1* 5.3*   AGRAT 0.3* 0.4* 0.4*       CBC w/ Diff  Recent Labs     12/27/22  0550 12/26/22  0438 12/25/22  0512   WBC 12.7 13.5* 15.7*   RBC 2.73* 2.87* 2.80*   HGB 8.4* 8.8* 8.5*   HCT 25.7* 27.3* 26.8*    298 280   GRANS 72 73 77*   LYMPH 11* 11* 9*   EOS 5 4 3       Microbiology  All Micro Results       Procedure Component Value Units Date/Time    CULTURE, BLOOD [[id number]] Collected: 12/24/22 1745    Order Status: Completed Specimen: Blood Updated: 12/27/22 0830     Special Requests: NO SPECIAL REQUESTS        Culture result: NO GROWTH 3 DAYS       CULTURE, BLOOD [[id number]] Collected: 12/24/22 1745    Order Status: Completed Specimen: Blood Updated: 12/27/22 0830     Special Requests: NO SPECIAL REQUESTS        Culture result: NO GROWTH 3 DAYS       CULTURE, BLOOD [[id number]] Collected: 12/23/22 2052    Order Status: Completed Specimen: Blood Updated: 12/27/22 0830     Special Requests: NO SPECIAL REQUESTS        Culture result: NO GROWTH 4 DAYS       COVID-19 WITH INFLUENZA A/B [[id number]] Collected: 12/24/22 1941    Order Status: Completed Specimen: Nasopharyngeal Updated: 12/24/22 2028     SARS-CoV-2 by PCR Not detected        Comment: Not Detected results do not preclude SARS-CoV-2 infection and should not be used as the sole basis for patient management decisions. Results must be combined with clinical observations, patient history, and epidemiological information. Influenza A by PCR Not detected        Influenza B by PCR Not detected        Comment: Testing was performed using cheryl Alla SARS-CoV-2 and Influenza A/B nucleic acid assay.   This test is a multiplex Real-Time Reverse Transcriptase Polymerase Chain Reaction (RT-PCR) based in vitro diagnostic test intended for the qualitative detection of nucleic acids from SARS-CoV-2, Influenza A, and Influenza B in nasopharyngeal for use under the FDA's Emergency Use Authorization(EAU) only.       Fact sheet for Patients: FindDrives.pl  Fact sheet for Healthcare Providers: FindDrives.pl         CULTURE, BLOOD 2nd DRAW (required for [facility]) [[id number]] Collected: 12/09/22 1215    Order Status: Completed Specimen: Blood Updated: 12/15/22 0940     Special Requests: LEFT HAND     Culture result: NO GROWTH 6 DAYS       CULTURE, WOUND Harrie [place] STAIN [[id number]]  (Abnormal)  (Susceptibility) Collected: 12/09/22 2201    Order Status: Completed Specimen: Wound from Foot Updated: 12/15/22 0650     Special Requests: NO SPECIAL REQUESTS        GRAM STAIN RARE WBCS SEEN               2+ APPARENT GRAM VARIABLE RODS           Culture result:       HEAVY Serratia fonticola PIPTAZ = 28, SENSITIVE                  HEAVY ALCALIGENES FAECALIS                  HEAVY ALCALIGENES FAECALIS (2ND COLONY TYPE/STRAIN)                  HEAVY ENTEROBACTER CLOACAE                  HEAVY MIXED SKIN TO ISOLATED          CULTURE, ANAEROBIC [[id number]]  (Abnormal) Collected: 12/09/22 2201    Order Status: Completed Specimen: Foot, left Updated: 12/13/22 1246     Special Requests: NO SPECIAL REQUESTS        Culture result:       MODERATE BACTEROIDES VULGATUS BETA LACTAMASE POSITIVE          CULTURE, BLOOD [[id number]]  (Abnormal) Collected: 12/09/22 1155    Order Status: Completed Specimen: Blood Updated: 12/12/22 0743     Special Requests: LEFT AC     GRAM STAIN       ANAEROBIC BOTTLE GRAM POSITIVE COCCI IN CLUSTERS                  SMEAR CALLED TO AND CORRECTLY REPEATED BY: Lei Luong RN 3S 12/10/22 AT 1147 BY ANI           Culture result:       STAPHYLOCOCCUS SPECIES, COAGULASE NEGATIVE GROWING IN 1 OF 2 BOTTLES DRAWN  SITE = LAC          BLOOD CULTURE ID PANEL [[id number]]  (Abnormal) Collected: 12/09/22 1145    Order Status: Completed Specimen: Blood Updated: 12/11/22 0655     Acc. no. from Micro Order [id number]     Enterococcus faecalis Not detected Enterococcus faecium Not detected        Listeria monocytogenes Not detected        Staphylococcus Detected        Staphylococcus aureus Not detected        Staph epidermidis Detected        Staph lugdunensis Not detected        Streptococcus Not detected        Streptococcus agalactiae (Group B) Not detected        Streptococcus pneumoniae Not detected        Streptococcus pyogenes (Group A) Not detected        Acinetobacter calcoaceticus-baumanii complex Not detected        Bacteroides fragilis Not detected        Enterobacterales species Not detected        Enterobacter cloacae complex Not detected        Escherichia coli Not detected        Klebsiella aerogenes Not detected        Klebsiella oxytoca Not detected        Klebsiella pneumoniae group Not detected        Proteus Not detected        Salmonella Not detected        Serratia marcescens Not detected        Haemophilus influenzae Not detected        Neisseria meningitidis Not detected        Pseudomonas aeruginosa Not detected        Steno maltophilia Not detected        Candida albicans Not detected        Candida auris Not detected        Candida glabrata Not detected        Candida krusei Not detected        Candida parapsilosis Not detected        Candida tropicalis Not detected        Crypto neoformans/gattii Not detected        RESISTANT GENES:            MECA/C (Methicillin resistant gene) Detected        Comment       False positive results may rarely occur.  Correlate with clinical,epidemiologic, and other laboratory findings           Comment: Please see BCID Interpretation Guide in EPIC Links       C. DIFFICILE AG & TOXIN A/B [[id number]]     Order Status: Canceled Specimen: Stool

## 2022-12-27 NOTE — PROGRESS NOTES
Hospitalist Progress Note    Patient: Sravan Church MRN: [id number]  [facility]: [id number]    YOB: 1960  Age: 60 y.o. Sex: male    DOA: 12/9/2022 LOS:  LOS: 18 days                Assessment/Plan     Patient Active Problem List   Diagnosis Code    Diabetes mellitus with foot ulcer ([facility] E11.621, L97.509    CAD (coronary artery disease) I25.10    ESRD (end stage renal disease) ([facility] N18.6    Acute hematogenous osteomyelitis of right foot ([facility]) M86.071    Cellulitis of right heel L03.115    Diabetic foot ulcer with osteomyelitis ([facility] E11.621, E11.69, L97.509, M86.9    Ulcer of right heel, with necrosis of bone ([facility] L97.414    Infection and inflammatory reaction due to internal fixation device of other site, initial encounter [facility]) T84.69XA    Left arm swelling M79.89    Chest pain at rest R07.9    Abnormal nuclear stress test R94.39    History of anemia due to CKD N18.9, Z86.2    S/P angioplasty with stent Z95.820    Hypertension I10    Leukocytosis D72.829    Diabetic foot infection (HCC) E11.628, L08.9    Diarrhea R19.7    Type 2 diabetes mellitus, with long-term current use of insulin ([facility]) E11.9, Z79.4    Acute hematogenous osteomyelitis of left foot ([facility]) M86.072    Nondisplaced fracture of second metatarsal bone of left foot S92.325A    Nondisplaced fracture of third metatarsal bone of left foot S92.335A    Closed nondisplaced fracture of fourth metatarsal bone of left foot S92.345A    Positive blood culture R78.81    PAD (peripheral artery disease) ([facility]) I73.9        Chief complaint :  Foot gangrene, DFU  60 y.o. male with history of diabetes, diabetic ulcer, CAD, hyperlipidemia, hypertension end-stage renal disease on HD presented to ER due to left foot lesion.        Gangrene of left foot/DFU   PARTIAL AMPUTATION OF LEFT 2ND, 3RD, 4TH METATARSALS, AMPUTATION OF TOES 2,3,4, INCISION ADN DRAINAGE LEFT FOOT on 12/09/2022  S/p angiogram with PTA of the proximal PT and peroneal arteries. Vascular planning repeat procedure tomorrow. S/p Left TMA 12/21/2022. ID recommend discharging on bactrim DS qhs for 7 days. History of C-diff -  On oral vancomycin to prevent recurrence  Added florastor. Positive blood cx -  Coag negative staph  Likely contaminant contamination      CAD-   Distal RCA to drug-eluting stent in July 2022, continue plavix -   No chest pain    Hypertension -  continue home medication     End stage renal disease -  on HD per nephrology     DM  type II -  Lantus andssi     Disposition : await placement    Review of systems  General: No fevers or chills. Cardiovascular: No chest pain or pressure. No palpitations. Pulmonary: No shortness of breath. Gastrointestinal: No nausea, vomiting. Physical Exam:  General: Awake, cooperative, no acute distress    HEENT: NC, Atraumatic. PERRLA, anicteric sclerae. Lungs: CTA Bilaterally. No Wheezing/Rhonchi/Rales. Heart:  S1 S2,  No murmur, No Rubs, No Gallops  Abdomen: Soft, Non distended, Non tender. +Bowel sounds,   Extremities: Left foot with dressing. Psych:   Not anxious or agitated. Neurologic:  No acute neurological deficit. Vital signs/Intake and Output:  Visit Vitals  BP (P) 135/63   Pulse (P) 77   Temp 98.3 °F (36.8 °C)   Resp (P) 18   Ht 6' (1.829 m)   Wt 99.4 kg (219 lb 1.6 oz)   SpO2 96%   BMI 29.72 kg/m²     Current Shift:  No intake/output data recorded. Last three shifts:  No intake/output data recorded.             Labs: Results:       Chemistry Recent Labs     12/27/22  0550 12/26/22  0438 12/25/22  0512   GLU 83 162* 115*   * 135* 135*   K 5.1 5.1 4.4   CL 98* 98* 98*   CO2 27 28 29   BUN 54* 39* 25*   CREA 9.32* 7.46* 5.65*   CA 8.6 8.3* 8.6   AGAP 10 9 8   BUCR 6* 5* 4*    111 104   TP 7.8 7.0 7.3   ALB 1.8* 1.9* 2.0*   GLOB 6.0* 5.1* 5.3*   AGRAT 0.3* 0.4* 0.4*      CBC w/Diff Recent Labs     12/27/22  0550 12/26/22  0438 12/25/22  0512   WBC 12.7 13.5* 15.7*   RBC 2.73* 2.87* 2.80*   HGB 8.4* 8.8* 8.5*   HCT 25.7* 27.3* 26.8*    298 280   GRANS 72 73 77*   LYMPH 11* 11* 9*   EOS 5 4 3      Cardiac Enzymes No results for input(s): CPK, CKND1, PAULO in the last 72 hours. No lab exists for component: CKRMB, TROIP   Coagulation No results for input(s): PTP, INR, APTT, INREXT, INREXT in the last 72 hours. Lipid Panel Lab Results   Component Value Date/Time    Cholesterol, total 188 07/19/2022 03:12 AM    HDL Cholesterol 42 07/19/2022 03:12 AM    LDL, calculated 131.2 (H) 07/19/2022 03:12 AM    VLDL, calculated 14.8 07/19/2022 03:12 AM    Triglyceride 74 07/19/2022 03:12 AM    CHOL/HDL Ratio 4.5 07/19/2022 03:12 AM      BNP No results for input(s): BNPP in the last 72 hours.    Liver Enzymes Recent Labs     12/27/22  0550   TP 7.8   ALB 1.8*         Thyroid Studies No results found for: T4, T3U, TSH, TSHEXT, TSHEXT     Procedures/imaging: see electronic medical records for all procedures/Xrays and details which were not copied into this note but were reviewed prior to creation of Plan

## 2022-12-28 ENCOUNTER — APPOINTMENT (OUTPATIENT)
Dept: INTERVENTIONAL RADIOLOGY/VASCULAR | Age: 62
DRG: 239 | End: 2022-12-28
Attending: STUDENT IN AN ORGANIZED HEALTH CARE EDUCATION/TRAINING PROGRAM
Payer: MEDICARE

## 2022-12-28 LAB
ANION GAP SERPL CALC-SCNC: 8 MMOL/L (ref 3–18)
BUN SERPL-MCNC: 39 MG/DL (ref 7–18)
BUN/CREAT SERPL: 5 (ref 12–20)
CALCIUM SERPL-MCNC: 8.1 MG/DL (ref 8.5–10.1)
CHLORIDE SERPL-SCNC: 97 MMOL/L (ref 100–111)
CO2 SERPL-SCNC: 31 MMOL/L (ref 21–32)
CREAT SERPL-MCNC: 7.34 MG/DL (ref 0.6–1.3)
GLUCOSE BLD STRIP.AUTO-MCNC: 118 MG/DL (ref 70–110)
GLUCOSE BLD STRIP.AUTO-MCNC: 124 MG/DL (ref 70–110)
GLUCOSE BLD STRIP.AUTO-MCNC: 139 MG/DL (ref 70–110)
GLUCOSE BLD STRIP.AUTO-MCNC: 182 MG/DL (ref 70–110)
GLUCOSE SERPL-MCNC: 155 MG/DL (ref 74–99)
INR PPP: 1.1 (ref 0.8–1.2)
MAGNESIUM SERPL-MCNC: 2.1 MG/DL (ref 1.6–2.6)
POTASSIUM SERPL-SCNC: 5 MMOL/L (ref 3.5–5.5)
PROTHROMBIN TIME: 14.5 SEC (ref 11.5–15.2)
SODIUM SERPL-SCNC: 136 MMOL/L (ref 136–145)

## 2022-12-28 PROCEDURE — C1725 CATH, TRANSLUMIN NON-LASER: HCPCS

## 2022-12-28 PROCEDURE — 047Y3ZZ DILATION OF LOWER ARTERY, PERCUTANEOUS APPROACH: ICD-10-PCS | Performed by: STUDENT IN AN ORGANIZED HEALTH CARE EDUCATION/TRAINING PROGRAM

## 2022-12-28 PROCEDURE — 74011000250 HC RX REV CODE- 250: Performed by: HOSPITALIST

## 2022-12-28 PROCEDURE — C1769 GUIDE WIRE: HCPCS

## 2022-12-28 PROCEDURE — 74011636637 HC RX REV CODE- 636/637: Performed by: HOSPITALIST

## 2022-12-28 PROCEDURE — 74011250636 HC RX REV CODE- 250/636: Performed by: HOSPITALIST

## 2022-12-28 PROCEDURE — 74011000258 HC RX REV CODE- 258: Performed by: FAMILY MEDICINE

## 2022-12-28 PROCEDURE — 74011250636 HC RX REV CODE- 250/636: Performed by: FAMILY MEDICINE

## 2022-12-28 PROCEDURE — 99153 MOD SED SAME PHYS/QHP EA: CPT

## 2022-12-28 PROCEDURE — 74011000636 HC RX REV CODE- 636: Performed by: STUDENT IN AN ORGANIZED HEALTH CARE EDUCATION/TRAINING PROGRAM

## 2022-12-28 PROCEDURE — 74011000250 HC RX REV CODE- 250: Performed by: STUDENT IN AN ORGANIZED HEALTH CARE EDUCATION/TRAINING PROGRAM

## 2022-12-28 PROCEDURE — 82962 GLUCOSE BLOOD TEST: CPT

## 2022-12-28 PROCEDURE — 85610 PROTHROMBIN TIME: CPT

## 2022-12-28 PROCEDURE — 36415 COLL VENOUS BLD VENIPUNCTURE: CPT

## 2022-12-28 PROCEDURE — 74011250636 HC RX REV CODE- 250/636

## 2022-12-28 PROCEDURE — G0269 OCCLUSIVE DEVICE IN VEIN ART: HCPCS

## 2022-12-28 PROCEDURE — 99152 MOD SED SAME PHYS/QHP 5/>YRS: CPT

## 2022-12-28 PROCEDURE — 74011250637 HC RX REV CODE- 250/637: Performed by: HOSPITALIST

## 2022-12-28 PROCEDURE — 74011250636 HC RX REV CODE- 250/636: Performed by: STUDENT IN AN ORGANIZED HEALTH CARE EDUCATION/TRAINING PROGRAM

## 2022-12-28 PROCEDURE — B41G1ZZ FLUOROSCOPY OF LEFT LOWER EXTREMITY ARTERIES USING LOW OSMOLAR CONTRAST: ICD-10-PCS | Performed by: STUDENT IN AN ORGANIZED HEALTH CARE EDUCATION/TRAINING PROGRAM

## 2022-12-28 PROCEDURE — 3E05317 INTRODUCTION OF OTHER THROMBOLYTIC INTO PERIPHERAL ARTERY, PERCUTANEOUS APPROACH: ICD-10-PCS | Performed by: STUDENT IN AN ORGANIZED HEALTH CARE EDUCATION/TRAINING PROGRAM

## 2022-12-28 PROCEDURE — 83735 ASSAY OF MAGNESIUM: CPT

## 2022-12-28 PROCEDURE — 65270000029 HC RM PRIVATE

## 2022-12-28 PROCEDURE — 80048 BASIC METABOLIC PNL TOTAL CA: CPT

## 2022-12-28 RX ORDER — HEPARIN SODIUM 1000 [USP'U]/ML
1000-10000 INJECTION, SOLUTION INTRAVENOUS; SUBCUTANEOUS
Status: DISCONTINUED | OUTPATIENT
Start: 2022-12-28 | End: 2023-01-06

## 2022-12-28 RX ORDER — FENTANYL CITRATE 50 UG/ML
25-100 INJECTION, SOLUTION INTRAMUSCULAR; INTRAVENOUS
Status: DISCONTINUED | OUTPATIENT
Start: 2022-12-28 | End: 2023-01-06

## 2022-12-28 RX ORDER — LIDOCAINE HYDROCHLORIDE 10 MG/ML
1-10 INJECTION, SOLUTION EPIDURAL; INFILTRATION; INTRACAUDAL; PERINEURAL
Status: COMPLETED | OUTPATIENT
Start: 2022-12-28 | End: 2022-12-28

## 2022-12-28 RX ORDER — NALOXONE HYDROCHLORIDE 0.4 MG/ML
0.4 INJECTION, SOLUTION INTRAMUSCULAR; INTRAVENOUS; SUBCUTANEOUS
Status: DISCONTINUED | OUTPATIENT
Start: 2022-12-28 | End: 2023-01-06

## 2022-12-28 RX ORDER — FLUMAZENIL 0.1 MG/ML
0.2 INJECTION INTRAVENOUS AS NEEDED
Status: DISCONTINUED | OUTPATIENT
Start: 2022-12-28 | End: 2023-01-06

## 2022-12-28 RX ORDER — DIPHENHYDRAMINE HYDROCHLORIDE 50 MG/ML
25 INJECTION, SOLUTION INTRAMUSCULAR; INTRAVENOUS
Status: DISCONTINUED | OUTPATIENT
Start: 2022-12-28 | End: 2023-01-06

## 2022-12-28 RX ORDER — WATER FOR INJECTION,STERILE
VIAL (ML) INJECTION
Status: DISPENSED
Start: 2022-12-28 | End: 2022-12-29

## 2022-12-28 RX ORDER — HEPARIN SODIUM 200 [USP'U]/100ML
500 INJECTION, SOLUTION INTRAVENOUS
Status: DISPENSED | OUTPATIENT
Start: 2022-12-28 | End: 2022-12-29

## 2022-12-28 RX ORDER — DIPHENHYDRAMINE HYDROCHLORIDE 50 MG/ML
INJECTION, SOLUTION INTRAMUSCULAR; INTRAVENOUS
Status: COMPLETED
Start: 2022-12-28 | End: 2022-12-28

## 2022-12-28 RX ORDER — MIDAZOLAM HYDROCHLORIDE 1 MG/ML
.5-2 INJECTION, SOLUTION INTRAMUSCULAR; INTRAVENOUS
Status: DISCONTINUED | OUTPATIENT
Start: 2022-12-28 | End: 2023-01-06

## 2022-12-28 RX ADMIN — FENTANYL CITRATE 25 MCG: 50 INJECTION, SOLUTION INTRAMUSCULAR; INTRAVENOUS at 14:25

## 2022-12-28 RX ADMIN — PANTOPRAZOLE SODIUM 40 MG: 40 TABLET, DELAYED RELEASE ORAL at 06:40

## 2022-12-28 RX ADMIN — SODIUM CHLORIDE, PRESERVATIVE FREE 10 ML: 5 INJECTION INTRAVENOUS at 14:00

## 2022-12-28 RX ADMIN — NITROGLYCERIN 200 MCG: 10 INJECTION INTRAVENOUS at 14:43

## 2022-12-28 RX ADMIN — MEROPENEM 1 G: 1 INJECTION, POWDER, FOR SOLUTION INTRAVENOUS at 17:58

## 2022-12-28 RX ADMIN — Medication 125 MG: at 02:29

## 2022-12-28 RX ADMIN — FENTANYL CITRATE 25 MCG: 50 INJECTION, SOLUTION INTRAMUSCULAR; INTRAVENOUS at 14:18

## 2022-12-28 RX ADMIN — HEPARIN SODIUM 5000 UNITS: 1000 INJECTION INTRAVENOUS; SUBCUTANEOUS at 14:03

## 2022-12-28 RX ADMIN — SODIUM CHLORIDE, PRESERVATIVE FREE 10 ML: 5 INJECTION INTRAVENOUS at 06:41

## 2022-12-28 RX ADMIN — ALTEPLASE 4 MG: 2.2 INJECTION, POWDER, LYOPHILIZED, FOR SOLUTION INTRAVENOUS at 14:59

## 2022-12-28 RX ADMIN — MIDAZOLAM 0.5 MG: 1 INJECTION INTRAMUSCULAR; INTRAVENOUS at 14:48

## 2022-12-28 RX ADMIN — NITROGLYCERIN 100 MCG: 10 INJECTION INTRAVENOUS at 14:55

## 2022-12-28 RX ADMIN — Medication 15 UNITS: at 21:26

## 2022-12-28 RX ADMIN — NYSTATIN: 100000 POWDER TOPICAL at 21:27

## 2022-12-28 RX ADMIN — SODIUM CHLORIDE, PRESERVATIVE FREE 10 ML: 5 INJECTION INTRAVENOUS at 21:27

## 2022-12-28 RX ADMIN — CARVEDILOL 12.5 MG: 12.5 TABLET, FILM COATED ORAL at 21:25

## 2022-12-28 RX ADMIN — IOPAMIDOL 55 ML: 510 INJECTION, SOLUTION INTRAVASCULAR at 15:07

## 2022-12-28 RX ADMIN — Medication 125 MG: at 21:25

## 2022-12-28 RX ADMIN — DIPHENHYDRAMINE HYDROCHLORIDE 25 MG: 50 INJECTION, SOLUTION INTRAMUSCULAR; INTRAVENOUS at 13:55

## 2022-12-28 RX ADMIN — HEPARIN SODIUM 5000 UNITS: 5000 INJECTION INTRAVENOUS; SUBCUTANEOUS at 17:58

## 2022-12-28 RX ADMIN — MIDAZOLAM 1 MG: 1 INJECTION INTRAMUSCULAR; INTRAVENOUS at 13:46

## 2022-12-28 RX ADMIN — FENTANYL CITRATE 50 MCG: 50 INJECTION, SOLUTION INTRAMUSCULAR; INTRAVENOUS at 13:46

## 2022-12-28 RX ADMIN — FENTANYL CITRATE 25 MCG: 50 INJECTION, SOLUTION INTRAMUSCULAR; INTRAVENOUS at 14:47

## 2022-12-28 RX ADMIN — Medication 500 MG: at 21:25

## 2022-12-28 RX ADMIN — FENTANYL CITRATE 25 MCG: 50 INJECTION, SOLUTION INTRAMUSCULAR; INTRAVENOUS at 13:56

## 2022-12-28 RX ADMIN — MIDAZOLAM 0.5 MG: 1 INJECTION INTRAMUSCULAR; INTRAVENOUS at 14:30

## 2022-12-28 RX ADMIN — MIDAZOLAM 0.5 MG: 1 INJECTION INTRAMUSCULAR; INTRAVENOUS at 14:16

## 2022-12-28 RX ADMIN — LIDOCAINE HYDROCHLORIDE ANHYDROUS 10 ML: 10 INJECTION, SOLUTION INFILTRATION at 15:08

## 2022-12-28 RX ADMIN — NITROGLYCERIN 300 MCG: 10 INJECTION INTRAVENOUS at 14:40

## 2022-12-28 NOTE — PROGRESS NOTES
TRANSFER - OUT REPORT:    Verbal report given to bisi(name) on [facility]  being transferred to [address] [place] [address](unit) for routine progression of care       Report consisted of patients Situation, Background, Assessment and   Recommendations(SBAR). Information from the following report(s) SBAR, Kardex, Procedure Summary, Intake/Output, Procedure Verification, and Dual Neuro Assessment was reviewed with the receiving nurse. Lines:   Peripheral IV 12/10/22 Anterior;Right Forearm (Active)   Site Assessment Clean, dry, & intact 12/28/22 0800   Phlebitis Assessment 0 12/28/22 0800   Infiltration Assessment 0 12/28/22 0800   Dressing Status Clean, dry, & intact 12/28/22 0800   Dressing Type Transparent 12/26/22 2225   Hub Color/Line Status Blue;Capped 12/26/22 2225   Action Taken Open ports on tubing capped 12/26/22 2225   Alcohol Cap Used Yes 12/26/22 2225        Opportunity for questions and clarification was provided.       Patient transported with:   Registered Nurse

## 2022-12-28 NOTE — PROGRESS NOTES
Problem: Diabetes Self-Management  Goal: *Disease process and treatment process  Description: Define diabetes and identify own type of diabetes; list 3 options for treating diabetes. Outcome: Progressing Towards Goal  Goal: *Incorporating nutritional management into lifestyle  Description: Describe effect of type, amount and timing of food on blood glucose; list 3 methods for planning meals. Outcome: Progressing Towards Goal  Goal: *Incorporating physical activity into lifestyle  Description: State effect of exercise on blood glucose levels. Outcome: Progressing Towards Goal  Goal: *Developing strategies to promote health/change behavior  Description: Define the ABC's of diabetes; identify appropriate screenings, schedule and personal plan for screenings. Outcome: Progressing Towards Goal  Goal: *Using medications safely  Description: State effect of diabetes medications on diabetes; name diabetes medication taking, action and side effects. Outcome: Progressing Towards Goal  Goal: *Monitoring blood glucose, interpreting and using results  Description: Identify recommended blood glucose targets  and personal targets. Outcome: Progressing Towards Goal  Goal: *Prevention, detection, treatment of acute complications  Description: List symptoms of hyper- and hypoglycemia; describe how to treat low blood sugar and actions for lowering  high blood glucose level. Outcome: Progressing Towards Goal  Goal: *Prevention, detection and treatment of chronic complications  Description: Define the natural course of diabetes and describe the relationship of blood glucose levels to long term complications of diabetes.   Outcome: Progressing Towards Goal  Goal: *Developing strategies to address psychosocial issues  Description: Describe feelings about living with diabetes; identify support needed and support network  Outcome: Progressing Towards Goal  Goal: *Insulin pump training  Outcome: Progressing Towards Goal  Goal: *Sick day guidelines  Outcome: Progressing Towards Goal  Goal: *Patient Specific Goal (EDIT GOAL, INSERT TEXT)  Outcome: Progressing Towards Goal     Problem: Falls - Risk of  Goal: *Absence of Falls  Description: Document Jaxson Fall Risk and appropriate interventions in the flowsheet.   Outcome: Progressing Towards Goal  Note: Fall Risk Interventions:  Mobility Interventions: Bed/chair exit alarm, Patient to call before getting OOB, PT Consult for mobility concerns, PT Consult for assist device competence, Strengthening exercises (ROM-active/passive), Utilize walker, cane, or other assistive device         Medication Interventions: Assess postural VS orthostatic hypotension, Patient to call before getting OOB, Teach patient to arise slowly    Elimination Interventions: Bed/chair exit alarm, Call light in reach    History of Falls Interventions: Bed/chair exit alarm, Investigate reason for fall, Room close to nurse's station, Utilize gait belt for transfer/ambulation         Problem: Chronic Renal Failure  Goal: *Fluid and electrolytes stabilized  Outcome: Progressing Towards Goal

## 2022-12-28 NOTE — PROGRESS NOTES
Vascular Surgery    Pt seen and examined. S/p left TMA in setting of severe PAD. Plan for repeat left leg angiogram, ipsilateral access with attempt at recannulization of the PT artery. Associated risks and benefits reviewed, including risk of bleeding, vessel damage, need for more procedures and limb loss. Consent obtained.        Codey Stewart MD, [address] [place] [address] [facility]  [phone] (f) [phone]

## 2022-12-28 NOTE — PROGRESS NOTES
Nephrology Inpatient Progress note    Subjective:     Britta Brooks is a 60 y.o. male with a PMHx of  DM, HTN. PVD, ESRD on HD TTS at [facility] under the [address] [place] [address]. Nephrology sent in for admission by wound care clinic due to left foot infection ,was told he needed surgery. Podiatry has been in to see pt. He has been on abx recently     S/P Lt TMA    Pt with no complaint today. No F/C, CP/SOB.      Admit Date: 12/9/2022  Active Problems:    Diabetes mellitus with foot ulcer ([facility] (6/27/2022)      CAD (coronary artery disease) (6/28/2022)      ESRD (end stage renal disease) ([facility] (6/28/2022)      Hypertension (7/21/2022)      Leukocytosis (12/9/2022)      Diabetic foot infection ([facility] (12/9/2022)      Diarrhea (12/9/2022)      Type 2 diabetes mellitus, with long-term current use of insulin ([facility]) ()      Acute hematogenous osteomyelitis of left foot ([facility] (12/9/2022)      Nondisplaced fracture of second metatarsal bone of left foot (12/9/2022)      Nondisplaced fracture of third metatarsal bone of left foot (12/9/2022)      Closed nondisplaced fracture of fourth metatarsal bone of left foot (12/9/2022)      Positive blood culture (12/11/2022)      PAD (peripheral artery disease) ([facility] (12/27/2022)    Current Facility-Administered Medications   Medication Dose Route Frequency    fentaNYL citrate (PF) injection  mcg   mcg IntraVENous Rad Multiple    midazolam (VERSED) injection 0.5-2 mg  0.5-2 mg IntraVENous Rad Multiple    flumazeniL (ROMAZICON) 0.1 mg/mL injection 0.2 mg  0.2 mg IntraVENous PRN    naloxone (NARCAN) injection 0.4 mg  0.4 mg IntraVENous Rad Multiple    heparinized saline 2 units/mL infusion 1,000 Units  500 mL Irrigation RAD ONCE    iopamidoL (ISOVUE 250) 250 mg iodine /mL (51 %) contrast injection 1-150 mL  1-150 mL IntraarTERial RAD CONTINUOUS    heparin (porcine) 1,000 unit/mL injection 1,000-10,000 Units  1,000-10,000 Units IntraVENous Rad Multiple    diphenhydrAMINE (BENADRYL) injection 25 mg  25 mg IntraVENous Rad Multiple    sterile water (preservative free) injection        diphenhydrAMINE (BENADRYL) injection 12.5 mg  12.5 mg IntraVENous Q6H PRN    ammonium lactate (LAC-HYDRIN) 12 % lotion   Topical BID PRN    meropenem (MERREM) 1 g in 0.9% sodium chloride (MBP/ADV) 50 mL MBP  1 g IntraVENous Q24H    Saccharomyces boulardii (FLORASTOR) capsule 500 mg  500 mg Oral BID    vancomycin 50 mg/mL oral solution (compounded) 125 mg  125 mg Oral Q6H    insulin lispro (HUMALOG) injection   SubCUTAneous AC&HS    insulin glargine (LANTUS) injection 15 Units  15 Units SubCUTAneous QHS    heparin (porcine) 1,000 unit/mL injection 1,000-10,000 Units  1,000-10,000 Units IntraVENous Rad Multiple    nitroGLYcerin 0.1 mg/mL in D5W injection  1-5 mL IntraarTERial Rad Multiple    heparinized saline 2 units/mL infusion 2,000 Units  1,000 mL Irrigation RAD CONTINUOUS    iopamidoL (ISOVUE 250) 250 mg iodine /mL (51 %) contrast injection 1-150 mL  1-150 mL IntraarTERial RAD CONTINUOUS    epoetin lisette-epbx (RETACRIT) injection 8,000 Units  8,000 Units SubCUTAneous Q TUE, THU & SAT    loperamide (IMODIUM) capsule 2 mg  2 mg Oral Q4H PRN    nystatin (MYCOSTATIN) 100,000 unit/gram powder   Topical BID    alum-mag hydroxide-simeth (MYLANTA) oral suspension 30 mL  30 mL Oral Q4H PRN    glucose chewable tablet 16 g  16 g Oral PRN    glucagon (GLUCAGEN) injection 1 mg  1 mg IntraMUSCular PRN    heparin (porcine) 1,000 unit/mL injection 3,600 Units  3,600 Units IntraCATHeter DIALYSIS PRN    dextrose 10% infusion 0-250 mL  0-250 mL IntraVENous PRN    0.9% sodium chloride infusion  25 mL/hr IntraVENous DIALYSIS PRN    clopidogreL (PLAVIX) tablet 75 mg  75 mg Oral DAILY    carvediloL (COREG) tablet 12.5 mg  12.5 mg Oral BID    aspirin chewable tablet 81 mg  81 mg Oral DAILY    amLODIPine (NORVASC) tablet 10 mg  10 mg Oral DAILY    allopurinoL (ZYLOPRIM) tablet 100 mg  100 mg Oral DAILY    ascorbic acid (vitamin C) (VITAMIN C) tablet 500 mg  500 mg Oral DAILY    ezetimibe (ZETIA) tablet 10 mg  10 mg Oral DAILY    pantoprazole (PROTONIX) tablet 40 mg  40 mg Oral ACB    sevelamer carbonate (RENVELA) tab 1,600 mg  1,600 mg Oral TID WITH MEALS    vit B Cmplx 3-FA-Vit C-Biotin (NEPHRO NIKITA RX) tablet 1 Tablet  1 Tablet Oral DAILY    sodium chloride (NS) flush 5-40 mL  5-40 mL IntraVENous Q8H    sodium chloride (NS) flush 5-40 mL  5-40 mL IntraVENous PRN    acetaminophen (TYLENOL) tablet 650 mg  650 mg Oral Q6H PRN    Or    acetaminophen (TYLENOL) suppository 650 mg  650 mg Rectal Q6H PRN    bisacodyL (DULCOLAX) suppository 10 mg  10 mg Rectal DAILY PRN    promethazine (PHENERGAN) tablet 12.5 mg  12.5 mg Oral Q6H PRN    Or    ondansetron (ZOFRAN) injection 4 mg  4 mg IntraVENous Q6H PRN    heparin (porcine) injection 5,000 Units  5,000 Units SubCUTAneous Q8H    oxyCODONE-acetaminophen (PERCOCET) 5-325 mg per tablet 1 Tablet  1 Tablet Oral Q6H PRN         Allergy:   No Known Allergies     Objective:     Visit Vitals  BP (!) 149/82   Pulse 90   Temp 97.9 °F (36.6 °C)   Resp 18   Ht 6' (1.829 m)   Wt 99.4 kg (219 lb 1.6 oz)   SpO2 100%   BMI 29.72 kg/m²       No intake or output data in the 24 hours ending 12/28/22 1613      Physical Exam:       General: No resp distress   HENT: Atraumatic and normocephalic   Eyes: Normal conjunctiva   Neck: Supple No JVD or mass   Cardiovascular: Normal S1 & S2, no m/r/g   Pulmonary/Chest Wall: Clear to auscultation bilaterally   Abdominal: Soft and +NABS   Musculoskeletal: No edema S/p Amputation of multiple toes Left foot   Neurological: No focal deficits    HD Access: [facility] TDC +    Data Review:  Lab Results   Component Value Date/Time    Sodium 136 12/28/2022 02:57 AM    Potassium 5.0 12/28/2022 02:57 AM    Chloride 97 (L) 12/28/2022 02:57 AM    CO2 31 12/28/2022 02:57 AM    Anion gap 8 12/28/2022 02:57 AM    Glucose 155 (H) 12/28/2022 02:57 AM    BUN 39 (H) 12/28/2022 02:57 AM    Creatinine 7.34 (H) 12/28/2022 02:57 AM    BUN/Creatinine ratio 5 (L) 12/28/2022 02:57 AM    GFR est AA 13 (L) 07/21/2022 02:15 PM    GFR est non-AA 11 (L) 07/21/2022 02:15 PM    Calcium 8.1 (L) 12/28/2022 02:57 AM     Lab Results   Component Value Date/Time    WBC 12.7 12/27/2022 05:50 AM    HGB 8.4 (L) 12/27/2022 05:50 AM    HCT 25.7 (L) 12/27/2022 05:50 AM    PLATELET 583 36/89/3603 05:50 AM    MCV 94.1 12/27/2022 05:50 AM     Lab Results   Component Value Date/Time    Calcium 8.1 (L) 12/28/2022 02:57 AM    Phosphorus 6.0 (H) 12/22/2022 04:49 AM     No results found for: IRON, FE, TIBC, IBCT, PSAT, FERR  No results found for: FERR      Impression:     ESRD on HD TTS schedule  Hyperkalemia, resolved  Left diabetic foot infection w/ gangrenous changes s/p Lt 2/3/4 metatarsal amputation   DM  HTN  PVD, seen by Vasc Surgery, s/p LLE angio 12/28  Anemia  SHPT    Plan:     HD tomorrow  IV Antibiotic per ID  Cont DALTON for Anemia  Vascular, podiatry and ID specialists following      Dhara Stanley MD,   [facility]

## 2022-12-28 NOTE — PROGRESS NOTES
Pt. Received from IR via bed, awake and alert. No c/o pain. Pt. States has pulsating sensation to left leg. Pulses x2 noted to left ankle area. Pt. Requesting food. 1630 No change in status. Pt. Yelling that he wants something to eat. Chicken salad sandwich and juice given. 1720 Pt. Transferred to room 327 via bed  in c/o nurse. No c/o pain. Pedal pulses good left foot. Dressing to left groin dry and intact. Pt. In stable condition.

## 2022-12-28 NOTE — OP NOTES
Vascular Surgery   Operative Note    Date: 12/28/2022    Pre-Op Dx: LLE CLTI with ischemic tissue loss    Post-Op Dx: Same    Procedure:     1. US guided proximal left SFA access     2. Selective left lower extremity angiogram with runoff     3. Left PT balloon angioplasty (distal 2 mm x 200 mm; proximal 3 mm x 80 mm)    Surgeon: Santy Lew MD    Assistant: EVELIN    Anesthesia: Moderate sedation was administered under my supervision - IV fentanyl 150mcg and versed 2.5mg. Patient was constantly monitored with continuous cardiac monitoring and pulse oximetry. Time of sedation from 1330 until 1430. Independent trained observer (RN/RCIS) was present during the course of the procedure: [place]. Findings:     Successful recanalization of the left PT as described above - at completion two vessel run off into the foot via PT and peroneal with filling of the pedal arch    Indication: As above    Description of Procedure:     After informed consent was obtained, the patient was brought to the angio suite and positioned supine on the table. Bilateral groins were prepped and draped in sterile fashion. A safety pause was performed with all necessary personnel and equipment in the room. Conscious sedation was given. The left femoral bifurcation was evaluated with US and found to be patent without significant disease. 1% lidocaine was infiltrated into the subcutaneous tissue. The proximal left SFA was accessed with micro needle under US guidance. Images were stored for documentation purposes. A micro wire and sheath were advanced. Over a 0.035 wire, a 5 Fr sheath was then advanced into the left SFA. LLE angiogram was performed showing long segment occlusion of the PT, known from recent angiogram, along with peroneal run off. Long 5 Fr sheath was positioned in the left popliteal artery. Heparin was given. 0.018 wire and catheter were navigated across the PT lesion into the pedal arch.   A 2 mm x 200 mm balloon was advanced across the distal segment and inflated. A 3 mm x 80 mm balloon was similarly used to treat the proximal segment. Repeat angiogram showed patency of the PT. There was noted to be a short defect in the distal peroneal vessel. I elected to give 4mg tPA. Repeat angiogram showed improved patency again with filling via collaterals at the ankle and into the pedal arch. At completion there was two vessel run off. Wire and sheath were removed. Percutaneous closure was performed with a Mynx device with good hemostasis. Sterile dressing was applied. Patient tolerated the procedure well and was transported to recovery in stable condition.      Complications:   None     EBL: Minimal    Contrast: 55 ml      Alden Fitzpatrick MD, [address] [facility]  [phone] (i) [phone]

## 2022-12-28 NOTE — PROGRESS NOTES
Chart reviewed. No accepting SNF at this time. Will need to send updates once PT/OT updated notes are available.     Alex Pollock, MSN, RN, [address], [facility]  Care Management  [phone]

## 2022-12-28 NOTE — PROGRESS NOTES
TRANSFER - OUT REPORT:    Verbal report given to [address]  being transferred to Care Unit for routine progression of care       Report consisted of patients Situation, Background, Assessment and   Recommendations(SBAR). Information from the following report(s) SBAR, Procedure Summary and MAR was reviewed with the receiving nurse. Lines:   Peripheral IV 12/10/22 Anterior;Right Forearm (Active)   Site Assessment Clean, dry, & intact 12/28/22 0800   Phlebitis Assessment 0 12/28/22 0800   Infiltration Assessment 0 12/28/22 0800   Dressing Status Clean, dry, & intact 12/28/22 0800   Dressing Type Transparent 12/26/22 2225   Hub Color/Line Status Blue;Capped 12/26/22 2225   Action Taken Open ports on tubing capped 12/26/22 2225   Alcohol Cap Used Yes 12/26/22 2225        Opportunity for questions and clarification was provided.       Patient transported with:   Registered Nurse

## 2022-12-28 NOTE — PROGRESS NOTES
Occupational Therapy Treatment Attempt     Chart reviewed. Attempted Occupational Therapy Treatment, however, patient unable to be seen due to:  []  Nausea/vomiting  []  Eating  []  Pain  []  Patient too lethargic  [x]  Off Unit for testing/procedure  []  Dialysis treatment in progress  []  Telemetry Results  []  Other:      Will f/u later as patient's schedule allows.   Bogdan Phillips, OTR/L

## 2022-12-28 NOTE — PROGRESS NOTES
Pt educated on procedure and sedation. Verbalized understanding. Pt confirmed NPO status, nothing to eat or drink since 0000. Pt takes aspirin and plavix but did not take any medications today.  Pt confirmed no issues with sedation in the past.

## 2022-12-28 NOTE — PROGRESS NOTES
Problem: Falls - Risk of  Goal: *Absence of Falls  Description: Document Chloe Weiss Fall Risk and appropriate interventions in the flowsheet. Outcome: Progressing Towards Goal  Note: Fall Risk Interventions:  Mobility Interventions: Bed/chair exit alarm, Patient to call before getting OOB         Medication Interventions: Teach patient to arise slowly, Patient to call before getting OOB    Elimination Interventions: Bed/chair exit alarm, Call light in reach    History of Falls Interventions: Bed/chair exit alarm         Problem: Pressure Injury - Risk of  Goal: *Prevention of pressure injury  Description: Document Smeaj Scale and appropriate interventions in the flowsheet.   Outcome: Progressing Towards Goal  Note: Pressure Injury Interventions:  Sensory Interventions: Keep linens dry and wrinkle-free, Minimize linen layers, Pressure redistribution bed/mattress (bed type)    Moisture Interventions: Absorbent underpads, Apply protective barrier, creams and emollients    Activity Interventions: Pressure redistribution bed/mattress(bed type)    Mobility Interventions: Pressure redistribution bed/mattress (bed type)    Nutrition Interventions: Document food/fluid/supplement intake    Friction and Shear Interventions: Apply protective barrier, creams and emollients, Minimize layers

## 2022-12-28 NOTE — PROGRESS NOTES
Hospitalist Progress Note    Patient: Gutierrez Simons MRN: [id number]  CSN: [id number]    YOB: 1960  Age: 60 y.o. Sex: male    DOA: 12/9/2022 LOS:  LOS: 19 days                Assessment/Plan     Patient Active Problem List   Diagnosis Code    Diabetes mellitus with foot ulcer ([facility] E11.621, L97.509    CAD (coronary artery disease) I25.10    ESRD (end stage renal disease) ([facility] N18.6    Acute hematogenous osteomyelitis of right foot ([facility]) M86.071    Cellulitis of right heel L03.115    Diabetic foot ulcer with osteomyelitis ([facility] E11.621, E11.69, L97.509, M86.9    Ulcer of right heel, with necrosis of bone ([facility] L97.414    Infection and inflammatory reaction due to internal fixation device of other site, initial encounter [facility]) T84.69XA    Left arm swelling M79.89    Chest pain at rest R07.9    Abnormal nuclear stress test R94.39    History of anemia due to CKD N18.9, Z86.2    S/P angioplasty with stent Z95.820    Hypertension I10    Leukocytosis D72.829    Diabetic foot infection (HCC) E11.628, L08.9    Diarrhea R19.7    Type 2 diabetes mellitus, with long-term current use of insulin (HCC) E11.9, Z79.4    Acute hematogenous osteomyelitis of left foot ([facility]) M86.072    Nondisplaced fracture of second metatarsal bone of left foot S92.325A    Nondisplaced fracture of third metatarsal bone of left foot S92.335A    Closed nondisplaced fracture of fourth metatarsal bone of left foot S92.345A    Positive blood culture R78.81    PAD (peripheral artery disease) ([facility]) I73.9        Chief complaint :  Foot gangrene, DFU  60 y.o. male with history of diabetes, diabetic ulcer, CAD, hyperlipidemia, hypertension end-stage renal disease on HD presented to ER due to left foot lesion.        Gangrene of left foot/DFU   PARTIAL AMPUTATION OF LEFT 2ND, 3RD, 4TH METATARSALS, AMPUTATION OF TOES 2,3,4, INCISION ADN DRAINAGE LEFT FOOT on 12/09/2022  S/p angiogram with PTA of the proximal PT and peroneal arteries. Vascular planning repeat procedure today. S/p Left TMA 12/21/2022. ID recommend discharging on bactrim DS qhs for 7 days. History of C-diff -  On oral vancomycin to prevent recurrence  Added florastor. Positive blood cx -  Coag negative staph  Likely contaminant contamination      CAD-   Distal RCA to drug-eluting stent in July 2022, continue plavix -   No chest pain    Hypertension -  continue home medication     End stage renal disease -  on HD per nephrology     DM  type II -  Lantus andssi     Disposition : await placement    Review of systems  General: No fevers or chills. Cardiovascular: No chest pain or pressure. No palpitations. Pulmonary: No shortness of breath. Gastrointestinal: No nausea, vomiting. Physical Exam:  General: Awake, cooperative, no acute distress    HEENT: NC, Atraumatic. PERRLA, anicteric sclerae. Lungs: CTA Bilaterally. No Wheezing/Rhonchi/Rales. Heart:  S1 S2,  No murmur, No Rubs, No Gallops  Abdomen: Soft, Non distended, Non tender. +Bowel sounds,   Extremities: Left foot with dressing. Psych:   Not anxious or agitated. Neurologic:  No acute neurological deficit. Vital signs/Intake and Output:  Visit Vitals  BP (!) 149/83   Pulse 69   Temp 97.9 °F (36.6 °C)   Resp 11   Ht 6' (1.829 m)   Wt 99.4 kg (219 lb 1.6 oz)   SpO2 100%   BMI 29.72 kg/m²     Current Shift:  No intake/output data recorded.   Last three shifts:  12/26 1901 - 12/28 0700  In: -   Out: 2500             Labs: Results:       Chemistry Recent Labs     12/28/22  0257 12/27/22  0550 12/26/22  0438   * 83 162*    135* 135*   K 5.0 5.1 5.1   CL 97* 98* 98*   CO2 31 27 28   BUN 39* 54* 39*   CREA 7.34* 9.32* 7.46*   CA 8.1* 8.6 8.3*   AGAP 8 10 9   BUCR 5* 6* 5*   AP  --  103 111   TP  --  7.8 7.0   ALB  --  1.8* 1.9*   GLOB  --  6.0* 5.1*   AGRAT  --  0.3* 0.4*      CBC w/Diff Recent Labs     12/27/22  0550 12/26/22  0438   WBC 12.7 13.5*   RBC 2.73* 2.87*   HGB 8.4* 8.8* HCT 25.7* 27.3*    298   GRANS 72 73   LYMPH 11* 11*   EOS 5 4      Cardiac Enzymes No results for input(s): CPK, CKND1, PAULO in the last 72 hours. No lab exists for component: CKRMB, TROIP   Coagulation Recent Labs     12/28/22  0257   PTP 14.5   INR 1.1       Lipid Panel Lab Results   Component Value Date/Time    Cholesterol, total 188 07/19/2022 03:12 AM    HDL Cholesterol 42 07/19/2022 03:12 AM    LDL, calculated 131.2 (H) 07/19/2022 03:12 AM    VLDL, calculated 14.8 07/19/2022 03:12 AM    Triglyceride 74 07/19/2022 03:12 AM    CHOL/HDL Ratio 4.5 07/19/2022 03:12 AM      BNP No results for input(s): BNPP in the last 72 hours.    Liver Enzymes Recent Labs     12/27/22  0550   TP 7.8   ALB 1.8*         Thyroid Studies No results found for: T4, T3U, TSH, TSHEXT, TSHEXT     Procedures/imaging: see electronic medical records for all procedures/Xrays and details which were not copied into this note but were reviewed prior to creation of Plan

## 2022-12-29 LAB
ANION GAP SERPL CALC-SCNC: 9 MMOL/L (ref 3–18)
BACTERIA SPEC CULT: NORMAL
BUN SERPL-MCNC: 55 MG/DL (ref 7–18)
BUN/CREAT SERPL: 6 (ref 12–20)
CALCIUM SERPL-MCNC: 8.3 MG/DL (ref 8.5–10.1)
CHLORIDE SERPL-SCNC: 97 MMOL/L (ref 100–111)
CO2 SERPL-SCNC: 29 MMOL/L (ref 21–32)
CREAT SERPL-MCNC: 9.64 MG/DL (ref 0.6–1.3)
GLUCOSE BLD STRIP.AUTO-MCNC: 115 MG/DL (ref 70–110)
GLUCOSE BLD STRIP.AUTO-MCNC: 150 MG/DL (ref 70–110)
GLUCOSE BLD STRIP.AUTO-MCNC: 154 MG/DL (ref 70–110)
GLUCOSE BLD STRIP.AUTO-MCNC: 186 MG/DL (ref 70–110)
GLUCOSE SERPL-MCNC: 171 MG/DL (ref 74–99)
MAGNESIUM SERPL-MCNC: 2.2 MG/DL (ref 1.6–2.6)
POTASSIUM SERPL-SCNC: 6.1 MMOL/L (ref 3.5–5.5)
SERVICE CMNT-IMP: NORMAL
SODIUM SERPL-SCNC: 135 MMOL/L (ref 136–145)

## 2022-12-29 PROCEDURE — 74011636637 HC RX REV CODE- 636/637: Performed by: HOSPITALIST

## 2022-12-29 PROCEDURE — 65270000029 HC RM PRIVATE

## 2022-12-29 PROCEDURE — 74011250636 HC RX REV CODE- 250/636: Performed by: HOSPITALIST

## 2022-12-29 PROCEDURE — 74011250636 HC RX REV CODE- 250/636: Performed by: INTERNAL MEDICINE

## 2022-12-29 PROCEDURE — 36415 COLL VENOUS BLD VENIPUNCTURE: CPT

## 2022-12-29 PROCEDURE — 82962 GLUCOSE BLOOD TEST: CPT

## 2022-12-29 PROCEDURE — 90935 HEMODIALYSIS ONE EVALUATION: CPT

## 2022-12-29 PROCEDURE — 74011250637 HC RX REV CODE- 250/637: Performed by: HOSPITALIST

## 2022-12-29 PROCEDURE — 74011000250 HC RX REV CODE- 250: Performed by: HOSPITALIST

## 2022-12-29 PROCEDURE — 97535 SELF CARE MNGMENT TRAINING: CPT

## 2022-12-29 PROCEDURE — 80048 BASIC METABOLIC PNL TOTAL CA: CPT

## 2022-12-29 PROCEDURE — 97110 THERAPEUTIC EXERCISES: CPT

## 2022-12-29 PROCEDURE — 83735 ASSAY OF MAGNESIUM: CPT

## 2022-12-29 PROCEDURE — 97530 THERAPEUTIC ACTIVITIES: CPT

## 2022-12-29 RX ORDER — SULFAMETHOXAZOLE AND TRIMETHOPRIM 800; 160 MG/1; MG/1
1 TABLET ORAL
Status: DISCONTINUED | OUTPATIENT
Start: 2022-12-31 | End: 2023-01-04

## 2022-12-29 RX ADMIN — NYSTATIN: 100000 POWDER TOPICAL at 21:58

## 2022-12-29 RX ADMIN — Medication 500 MG: at 21:55

## 2022-12-29 RX ADMIN — CARVEDILOL 12.5 MG: 12.5 TABLET, FILM COATED ORAL at 09:04

## 2022-12-29 RX ADMIN — Medication 15 UNITS: at 21:56

## 2022-12-29 RX ADMIN — SODIUM CHLORIDE, PRESERVATIVE FREE 10 ML: 5 INJECTION INTRAVENOUS at 21:57

## 2022-12-29 RX ADMIN — NYSTATIN: 100000 POWDER TOPICAL at 09:06

## 2022-12-29 RX ADMIN — ALLOPURINOL 100 MG: 100 TABLET ORAL at 09:04

## 2022-12-29 RX ADMIN — HEPARIN SODIUM 5000 UNITS: 5000 INJECTION INTRAVENOUS; SUBCUTANEOUS at 01:17

## 2022-12-29 RX ADMIN — HEPARIN SODIUM 5000 UNITS: 5000 INJECTION INTRAVENOUS; SUBCUTANEOUS at 20:02

## 2022-12-29 RX ADMIN — AMLODIPINE BESYLATE 10 MG: 5 TABLET ORAL at 09:04

## 2022-12-29 RX ADMIN — SODIUM CHLORIDE, PRESERVATIVE FREE 10 ML: 5 INJECTION INTRAVENOUS at 06:31

## 2022-12-29 RX ADMIN — CARVEDILOL 12.5 MG: 12.5 TABLET, FILM COATED ORAL at 21:57

## 2022-12-29 RX ADMIN — Medication 125 MG: at 01:17

## 2022-12-29 RX ADMIN — Medication 125 MG: at 09:04

## 2022-12-29 RX ADMIN — SODIUM CHLORIDE, PRESERVATIVE FREE 10 ML: 5 INJECTION INTRAVENOUS at 20:09

## 2022-12-29 RX ADMIN — EPOETIN ALFA-EPBX 8000 UNITS: 4000 INJECTION, SOLUTION INTRAVENOUS; SUBCUTANEOUS at 23:47

## 2022-12-29 RX ADMIN — ASPIRIN 81 MG: 81 TABLET, CHEWABLE ORAL at 09:04

## 2022-12-29 RX ADMIN — Medication 2 UNITS: at 21:56

## 2022-12-29 RX ADMIN — SEVELAMER CARBONATE 1600 MG: 800 TABLET, FILM COATED ORAL at 15:24

## 2022-12-29 RX ADMIN — Medication 2 UNITS: at 09:05

## 2022-12-29 RX ADMIN — Medication 500 MG: at 09:04

## 2022-12-29 RX ADMIN — EZETIMIBE 10 MG: 10 TABLET ORAL at 09:03

## 2022-12-29 RX ADMIN — Medication 125 MG: at 20:01

## 2022-12-29 RX ADMIN — SEVELAMER CARBONATE 1600 MG: 800 TABLET, FILM COATED ORAL at 20:01

## 2022-12-29 RX ADMIN — HEPARIN SODIUM 5000 UNITS: 5000 INJECTION INTRAVENOUS; SUBCUTANEOUS at 09:05

## 2022-12-29 RX ADMIN — PANTOPRAZOLE SODIUM 40 MG: 40 TABLET, DELAYED RELEASE ORAL at 06:31

## 2022-12-29 RX ADMIN — SEVELAMER CARBONATE 1600 MG: 800 TABLET, FILM COATED ORAL at 09:04

## 2022-12-29 RX ADMIN — CLOPIDOGREL BISULFATE 75 MG: 75 TABLET ORAL at 09:04

## 2022-12-29 NOTE — PROGRESS NOTES
Pod 1.  S/p:  1. US guided proximal left SFA access     2. Selective left lower extremity angiogram with runoff     3. Left PT balloon angioplasty (distal 2 mm x 200 mm; proximal 3 mm x 80 mm)    Pt without complaints today. No pain left groin or left leg. Left groin is soft, no hematoma. There is a palpable left popliteal artery pulse. There are multiphasic pt/at signals. No further vascular surgical intervention at this time. Will follow up as out patient 4-6 weeks.

## 2022-12-29 NOTE — PROGRESS NOTES
Nephrology Inpatient Progress note    Subjective:     Anjum Lane is a 60 y.o. male with a PMHx of  DM, HTN. PVD, ESRD on HD TTS at [facility] under the [address] [place] [id number]. Nephrology sent in for admission by wound care clinic due to left foot infection ,was told he needed surgery. Podiatry has been in to see pt. He has been on abx recently     S/P Lt TMA    Pt with no complaint today. No F/C, CP/SOB.      Admit Date: 12/9/2022  Active Problems:    Diabetes mellitus with foot ulcer ([facility] (6/27/2022)      CAD (coronary artery disease) (6/28/2022)      ESRD (end stage renal disease) ([facility] (6/28/2022)      Hypertension (7/21/2022)      Leukocytosis (12/9/2022)      Diabetic foot infection ([facility] (12/9/2022)      Diarrhea (12/9/2022)      Type 2 diabetes mellitus, with long-term current use of insulin ([facility]) ()      Acute hematogenous osteomyelitis of left foot ([facility] (12/9/2022)      Nondisplaced fracture of second metatarsal bone of left foot (12/9/2022)      Nondisplaced fracture of third metatarsal bone of left foot (12/9/2022)      Closed nondisplaced fracture of fourth metatarsal bone of left foot (12/9/2022)      Positive blood culture (12/11/2022)      PAD (peripheral artery disease) ([facility] (12/27/2022)    Current Facility-Administered Medications   Medication Dose Route Frequency    [START ON 12/31/2022] trimethoprim-sulfamethoxazole (BACTRIM DS, SEPTRA DS) 160-800 mg per tablet 1 Tablet  1 Tablet Oral DIALYSIS TUE, THU & SAT    fentaNYL citrate (PF) injection  mcg   mcg IntraVENous Rad Multiple    midazolam (VERSED) injection 0.5-2 mg  0.5-2 mg IntraVENous Rad Multiple    flumazeniL (ROMAZICON) 0.1 mg/mL injection 0.2 mg  0.2 mg IntraVENous PRN    naloxone (NARCAN) injection 0.4 mg  0.4 mg IntraVENous Rad Multiple    iopamidoL (ISOVUE 250) 250 mg iodine /mL (51 %) contrast injection 1-150 mL  1-150 mL IntraarTERial RAD CONTINUOUS    heparin (porcine) 1,000 unit/mL injection 1,000-10,000 Units 1,000-10,000 Units IntraVENous Rad Multiple    diphenhydrAMINE (BENADRYL) injection 25 mg  25 mg IntraVENous Rad Multiple    diphenhydrAMINE (BENADRYL) injection 12.5 mg  12.5 mg IntraVENous Q6H PRN    ammonium lactate (LAC-HYDRIN) 12 % lotion   Topical BID PRN    Saccharomyces boulardii (FLORASTOR) capsule 500 mg  500 mg Oral BID    vancomycin 50 mg/mL oral solution (compounded) 125 mg  125 mg Oral Q6H    insulin lispro (HUMALOG) injection   SubCUTAneous AC&HS    insulin glargine (LANTUS) injection 15 Units  15 Units SubCUTAneous QHS    heparin (porcine) 1,000 unit/mL injection 1,000-10,000 Units  1,000-10,000 Units IntraVENous Rad Multiple    nitroGLYcerin 0.1 mg/mL in D5W injection  1-5 mL IntraarTERial Rad Multiple    heparinized saline 2 units/mL infusion 2,000 Units  1,000 mL Irrigation RAD CONTINUOUS    iopamidoL (ISOVUE 250) 250 mg iodine /mL (51 %) contrast injection 1-150 mL  1-150 mL IntraarTERial RAD CONTINUOUS    epoetin lisette-epbx (RETACRIT) injection 8,000 Units  8,000 Units SubCUTAneous Q TUE, THU & SAT    loperamide (IMODIUM) capsule 2 mg  2 mg Oral Q4H PRN    nystatin (MYCOSTATIN) 100,000 unit/gram powder   Topical BID    alum-mag hydroxide-simeth (MYLANTA) oral suspension 30 mL  30 mL Oral Q4H PRN    glucose chewable tablet 16 g  16 g Oral PRN    glucagon (GLUCAGEN) injection 1 mg  1 mg IntraMUSCular PRN    heparin (porcine) 1,000 unit/mL injection 3,600 Units  3,600 Units IntraCATHeter DIALYSIS PRN    dextrose 10% infusion 0-250 mL  0-250 mL IntraVENous PRN    0.9% sodium chloride infusion  25 mL/hr IntraVENous DIALYSIS PRN    clopidogreL (PLAVIX) tablet 75 mg  75 mg Oral DAILY    carvediloL (COREG) tablet 12.5 mg  12.5 mg Oral BID    aspirin chewable tablet 81 mg  81 mg Oral DAILY    amLODIPine (NORVASC) tablet 10 mg  10 mg Oral DAILY    allopurinoL (ZYLOPRIM) tablet 100 mg  100 mg Oral DAILY    ascorbic acid (vitamin C) (VITAMIN C) tablet 500 mg  500 mg Oral DAILY    ezetimibe (ZETIA) tablet 10 mg  10 mg Oral DAILY    pantoprazole (PROTONIX) tablet 40 mg  40 mg Oral ACB    sevelamer carbonate (RENVELA) tab 1,600 mg  1,600 mg Oral TID WITH MEALS    vit B Cmplx 3-FA-Vit C-Biotin (NEPHRO NIKITA RX) tablet 1 Tablet  1 Tablet Oral DAILY    sodium chloride (NS) flush 5-40 mL  5-40 mL IntraVENous Q8H    sodium chloride (NS) flush 5-40 mL  5-40 mL IntraVENous PRN    acetaminophen (TYLENOL) tablet 650 mg  650 mg Oral Q6H PRN    Or    acetaminophen (TYLENOL) suppository 650 mg  650 mg Rectal Q6H PRN    bisacodyL (DULCOLAX) suppository 10 mg  10 mg Rectal DAILY PRN    promethazine (PHENERGAN) tablet 12.5 mg  12.5 mg Oral Q6H PRN    Or    ondansetron (ZOFRAN) injection 4 mg  4 mg IntraVENous Q6H PRN    heparin (porcine) injection 5,000 Units  5,000 Units SubCUTAneous Q8H    oxyCODONE-acetaminophen (PERCOCET) 5-325 mg per tablet 1 Tablet  1 Tablet Oral Q6H PRN         Allergy:   No Known Allergies     Objective:     Visit Vitals  /68   Pulse 76   Temp 98.2 °F (36.8 °C)   Resp 18   Ht 6' (1.829 m)   Wt 99.5 kg (219 lb 4.8 oz)   SpO2 96%   BMI 29.74 kg/m²         Intake/Output Summary (Last 24 hours) at 12/29/2022 1418  Last data filed at 12/29/2022 0419  Gross per 24 hour   Intake --   Output 2 ml   Net -2 ml         Physical Exam:       General: No resp distress   HENT: Atraumatic and normocephalic   Eyes: Normal conjunctiva   Neck: Supple No JVD or mass   Cardiovascular: Normal S1 & S2, no m/r/g   Pulmonary/Chest Wall: Clear to auscultation bilaterally   Abdominal: Soft and +NABS   Musculoskeletal: No edema S/p Amputation of multiple toes Left foot   Neurological: No focal deficits    HD Access: [facility] TDC +    Data Review:  Lab Results   Component Value Date/Time    Sodium 135 (L) 12/29/2022 06:21 AM    Potassium 6.1 (HH) 12/29/2022 06:21 AM    Chloride 97 (L) 12/29/2022 06:21 AM    CO2 29 12/29/2022 06:21 AM    Anion gap 9 12/29/2022 06:21 AM    Glucose 171 (H) 12/29/2022 06:21 AM    BUN 55 (H) 12/29/2022 06:21 AM Creatinine 9.64 (H) 12/29/2022 06:21 AM    BUN/Creatinine ratio 6 (L) 12/29/2022 06:21 AM    GFR est AA 13 (L) 07/21/2022 02:15 PM    GFR est non-AA 11 (L) 07/21/2022 02:15 PM    Calcium 8.3 (L) 12/29/2022 06:21 AM     Lab Results   Component Value Date/Time    WBC 12.7 12/27/2022 05:50 AM    HGB 8.4 (L) 12/27/2022 05:50 AM    HCT 25.7 (L) 12/27/2022 05:50 AM    PLATELET 568 22/13/9691 05:50 AM    MCV 94.1 12/27/2022 05:50 AM     Lab Results   Component Value Date/Time    Calcium 8.3 (L) 12/29/2022 06:21 AM    Phosphorus 6.0 (H) 12/22/2022 04:49 AM     No results found for: IRON, FE, TIBC, IBCT, PSAT, FERR  No results found for: FERR      Impression:     ESRD on HD TTS schedule  Hyperkalemia, resolved  Left diabetic foot infection w/ gangrenous changes s/p Lt 2/3/4 metatarsal amputation   DM  HTN  PVD, seen by Vasc Surgery, s/p LLE angio 12/28  Anemia  SHPT    Plan:     HD today  IV Antibiotic per ID  Cont DALTON for Anemia  Vascular, podiatry and ID specialists following      Mal Lerma MD,   [facility]3]

## 2022-12-29 NOTE — PROGRESS NOTES
ACUTE HEMODIALYSIS TREATMENT     HEMODIALYSIS ORDERS: Physician: Dr. Haro Plant: Shara   Duration: 3 hr   BFR: 400   DFR: 800   Dialysate:  Temp 36*C   K+  2    Ca+ 2.5   Na 138   Bicarb 35       Wt Readings from Last 1 Encounters:   12/29/22 94.9 kg (209 lb 3.5 oz)    Patient Chart [x]   Unable to Obtain []  Dry weight/UF Goal: 2500 ml    Heparin []  Bolus    Units    [] Hourly    Units    [x]None       Pre BP:   144/77   Pulse:  77   Respirations: 18   Temp:  98.0  [x] Oral [] Axillary [] Esophageal   Labs: []  Pre  []  Post:   [x] N/A   Additional Orders(medications, blood products, hypotension management): [] Yes   [] No      [x]  [facility] Consent Verified      CATHETER ACCESS:  []N/A   [x]Right   []Left   [x]IJ   []Fem  []Chest wall  []TransHepatic   [] First use X-ray verified     [x]Tunnel    [] Non Tunneled   [x]No S/S infection  []Redness  []Drainage []Cultured []Swelling []Pain   [x]Medical Aseptic Prep Utilized   []Dressing Changed  [] Biopatch  Date:    []Clotted   [x]Patent   Flows: []Good  []Poor  [x]Reversed   If access problem, Dr. Orestes Yao notified: [x]Yes    []N/A         GENERAL ASSESSMENT:    LUNGS:  Resp Rate 18   [x] Clear  [] Coarse  [] Crackles  [] Wheezing  [] Diminished         Respirations:  [x]Easy  []Labored  []N/A  Cough:  []Productive  []Dry  [x]N/A      Therapy:  [x]RA  O2 Type:  []NC  Mask: []  NRB    [] BiPaP  Flow:  l/min                   [] Ventilated  [] Intubated  [] Trach     CARDIAC: [x] Regular      [] Irregular   [] Rhythm:                     [] Monitored   [] Bedside   [] Remotely monitored     EDEMA: [] None   [x]Generalized  [] Pitting [] 1+   [] 2 +   [] 3+    [] 4+  [] Pedal    SKIN:   [x] Warm  [] Hot     [] Cold   [x] Dry     [] Pale   [] Diaphoretic                  [] Flushed  [] Jaundiced  [] Cyanotic     LOC:    [x] Alert      [x]Oriented:    [x] Person     [x] Place  []Time               [] Confused  [] Lethargic  [] Medicated  [] Non-responsive  [] Non Verbal   GI / ABDOMEN:                     [] Flat    [] Distended    [x] Soft    [] Firm   []  Obese                   [] Diarrhea   [] FMS [x] Bowel Sounds  [] Nausea  [] Vomiting                   [] NGT  [] OGT    [] PEG  [] Tube Feedings @      / URINE ASSESSMENT:                   [] Voiding  []  Mcmahon  [x] Oliguria  [] Anuria                     [] Incontinent  []  Incontinent Brief   []  PureWick      PAIN:  [x] 0 []1  []2   []3   []4   []5   []6   []7   []8   []9   []10                MOBILITY:  [x] Bed    [] Stretcher       All Vitals and Treatment Details on Attached [place]: THE [facility]          Room # 327    [x] Routine         [] 1st Time Acute/Chronic   [] Urgent      [] Stat            [] Acute Room   [x]  Bedside    [] ICU/CCU     [] ER   Isolation Precautions:  [x] Dialysis    There are currently no Active Isolations      ALLERGIES:     No Known Allergies   Code Status:  Full Code      Hepatitis Status            Lab Results   Component Value Date/Time     Hepatitis B surface Ag <0.10 12/10/2022 06:33 AM     Hepatitis B surface Ab 23.52 12/10/2022 06:33 AM                DIET:  DIET DIABETIC AND RENAL      PRIMARY NURSE REPORT:   Pre Dialysis: Philip Otero RN    Time: (01) [id number]      EDUCATION:    [x] Patient           Knowledge Basis: []None [x]Minimal [] Substantial [] Unknown  Barriers to learning  [x]N/A  [] Intubated/Trached/Ventilated  [] Sedated/Paralyzed   [] Access Care     [] S&S of infection  [] Fluid Management  [] K+   [x] Procedural    [] Medications   [] Tx Options   [] Transplant   [] Diet      Teaching Tools:  [x] Explain  [] Demo  [] Handouts [] Video  Patient response: [] Verbalized understanding   [x] Requires follow up          [x] Time Out/Safety Check    [x] Extracorporeal Circuit Tested for integrity                       RO/HEMODIALYSIS MACHINE SAFETY CHECKS - Before each treatment:        THE [facility]                                                                      [x] Portable Machine #3 [id number] /RO serial # [id number]                                                                                                        Alarm Test:  Pass time             [x] RO/Machine Log Complete    Machine Temp    36*C             Dialysate: pH 7.4    Conductivity: Meter NA HD Machine  14.1     TCD: 14  Dialyzer Lot # [id number]     Blood Tubing Lot # [id number]     Saline Lot # [id number]      CHLORINE TESTING-Before each treatment and every 4 hours    Total Chlorine: [x] less than 0.1 ppm  Initial Time Check: 1515      4 Hr/2nd Check Time:    (if greater than 0.1 ppm from Primary then every 30 minutes from Secondary)      TREATMENT INITIATION - with Dialysis Precautions:   [x] All Connections Secured              [x] Saline Line Double Clamped   [x] Venous Parameters Set               [x] Arterial Parameters Set    [x] Prime Given 250ml NSS              [x]Air Foam Detector Engaged       Treatment Initiation Note:  Pt in bed a/o x4. Pleasant and cooperative. VS stable for treatment. Right chest TDC. Accessed arterial port-no blood return, blue port aspirates and flushes with no issues. Line reversed and treatment initiated. Dr. Ron Wylie made aware, Cathflo ordered but will admin on 12/30 so it is not dwelling for >24 hours. During Treatment Notes:  Arterial pressure 240. Repositioned pt in bed, power flushed lumens, and now working.                                                Post Assessment  Dialyzer Cleared:   [] Good  [x] Fair  [] Poor  Blood processed:   L  UF Removed:  2500 Ml     Post /74   Pulse 61  Resp  16  Temp 97.9 Lungs: [x] Clear [] Course  [] Crackles                 []  Wheezing   [] Diminished   Post Tx Vascular Access: [x] N/A Cardiac :[x] Regular   [] Irregular   Rhythm:  [x] Monitored   [] Not Monitored    CVC Catheter: [] N/A  Locking solution: Heparin 1:1000 U  Arterial port 1.8 ml   Venous port 1.8 ml    Edema:  [] None  [x] Generalized                     Skin:[x] Warm  [x] Dry [] Diaphoretic               [] Flushed  [] Pale [] Cyanotic Pain:  [x]0  []1 []2  []3 []4  []5  []6  []7 []8    []9  []10      Post Treatment Note:    Patient tolerated 3 hr treatment. 2500 ml of fluid removed. TDC patent and  locked with heparin. RedCuros caps placed to the end of both lumens.       POST TREATMENT PRIMARY NURSE HANDOFF REPORT:   Post Dialysis: Clara Paul RN               Time:  1238         Abbreviations: AVG-arterial venous graft, AVF-arterial venous fistula, IJ-Internal Jugular, Subcl-Subclavian, Fem-Femoral, Tx-treatment, AP/HR-apical heart rate, VSS- Vital Signs Stable, CVC- Central Venous Catheter, DFR-dialysate flow rate, BFR-blood flow rate, AP-arterial pressure, -venous pressure, UF-ultrafiltrate, TMP-transmembrane pressure, Avtar-Venous, Art-Arterial, RO-Reverse Osmosis

## 2022-12-29 NOTE — PROGRESS NOTES
Problem: Mobility Impaired (Adult and Pediatric)  Goal: *Acute Goals and Plan of Care (Insert Text)  Description: Physical Therapy Goals   Updated 12/23/2022 and to be accomplished within 7 day(s)  1. Patient will move from supine <> sit with mod I in prep for out of bed activity and change of position. 2.  Patient will transfer from bed <> chair via slide board with min assist for time up in chair for completion of ADL activity. 3.  Patient will demonstrate ability to perform w/c management with SBA for functional w/c level mobility. 4.  Patient will demonstrate HEP performance for LE ROM/strengthening in order to achieve above goals and performance st discharge. Physical Therapy Goals  Initiated 12/11/2022  1. Patient will move from supine to sit and sit to supine  in bed with supervision/set-up within 7 day(s). 2.  Patient will transfer from bed to chair and chair to bed with minimal assistance/contact guard assist using the least restrictive device within 7 day(s). 3.  Patient will perform sit to stand with minimal assistance/contact guard assist within 7 day(s). 4.  Patient will ambulate with minimal assistance/contact guard assist for 10 feet with the least restrictive device within 7 day(s).    Outcome: Progressing Towards Goal

## 2022-12-29 NOTE — PROGRESS NOTES
[facility]  (A Division of [address] [place] [address])                                                Amanda Morse MD                                        Office #:     [phone]  [phone]                                        Office Fax: [phone]      Follow-up Note      Date of Admission: 12/9/2022       Date of Note:  12/29/2022  Referral: L foot infection/osteomyelitis/gangrene       Current Antimicrobials:    Prior Antimicrobials:    Meropenem 12/23 to date PO vanco 12/9 to 12/19    Vanco 12/9 to date  Zosyn 12/9 to 12/23     Antibiotic allergy: NOne     Assessment:     Isolated high Fever 12/23-- etiology not clear. CXR/repeat BCX normal so far from 12/23. Doubt CDI- no diarrhea and WBC coming down. L foot surgical wound without significant necrosis/ischemic change or drainage/cellulitis. Possible GI source Vs drug fever. Dry L foot gangrene- distal--S/P TMA- with clear margin per Surgeon 12/21/22  --S/P partial ampuation L 2nd/3rd/4th MT, I and D of left foot- deep 12/09/22  --S/P TMA Left foot 12/21/22-- Biopsy with acute Osteomylitis  --CRP 25.4- elevated, procal 1.31 and ESR >140 on 12/27/22  PAD--S/P angiogram 12/20 and S/P L PT baloon angioplasty 12/28/22 --Vascular following  Bacteremia 2/2 MRSE on 12/9--is procurement contamination   Recent CDI--prophylactic oral vanco from 12/23  ESRD on HD  R foot OM-ankle- treated and completed treatment 09/2022    Recommendation -- ID related:     DC meropenem  Bactrim DS 1 tab PO post HD on TTS X10 days  Can Schedule patient with ID during his FU with wound clinic on Friday's with Dr Dallin Claros on discharge      Subjective:      \" When do I go home? \"  No high fever  No cough/SOB  No N/V/Diarrhea    Notes/Labs including recent CBC with diff, BMP, ESR/CRP/procalcitonin and recent cultures from 12/24- reviewed.  Noted K of 6.1- reportedly for HD today    Microbiology:                    12/9 - OR (+) Serratia fonticola (S to pip/all except cefazolin), Alcaligenes faecalis (R FQ), Enterbacter cloacae (still being worked up), and anaerobic Bacteroides vulgatus (BL +)                                                      BCx 1/2 (+) CoNS        Lines / Catheters:         R HD cath and peripheral        Patient Active Problem List   Diagnosis Code    Diabetes mellitus with foot ulcer ([facility] E11.621, L97.509    CAD (coronary artery disease) I25.10    ESRD (end stage renal disease) ([facility]) N18.6    Acute hematogenous osteomyelitis of right foot ([facility]) M86.071    Cellulitis of right heel L03.115    Diabetic foot ulcer with osteomyelitis ([facility]) E11.621, E11.69, L97.509, M86.9    Ulcer of right heel, with necrosis of bone ([facility]) L97.414    Infection and inflammatory reaction due to internal fixation device of other site, initial encounter [facility]) T84.69XA    Left arm swelling M79.89    Chest pain at rest R07.9    Abnormal nuclear stress test R94.39    History of anemia due to CKD N18.9, Z86.2    S/P angioplasty with stent Z95.820    Hypertension I10    Leukocytosis D72.829    Diabetic foot infection ([facility] E11.628, L08.9    Diarrhea R19.7    Type 2 diabetes mellitus, with long-term current use of insulin ([facility]) E11.9, Z79.4    Acute hematogenous osteomyelitis of left foot ([facility]) M86.072    Nondisplaced fracture of second metatarsal bone of left foot S92.325A    Nondisplaced fracture of third metatarsal bone of left foot S92.335A    Closed nondisplaced fracture of fourth metatarsal bone of left foot S92.345A    Positive blood culture R78.81    PAD (peripheral artery disease) ([facility]) I73.9       Current Facility-Administered Medications   Medication Dose Route Frequency    [START ON 12/31/2022] trimethoprim-sulfamethoxazole (BACTRIM DS, SEPTRA DS) 160-800 mg per tablet 1 Tablet  1 Tablet Oral DIALYSIS TUE, THU & SAT    fentaNYL citrate (PF) injection  mcg   mcg IntraVENous Rad Multiple    midazolam (VERSED) injection 0.5-2 mg  0.5-2 mg IntraVENous Rad Multiple    flumazeniL (ROMAZICON) 0.1 mg/mL injection 0.2 mg  0.2 mg IntraVENous PRN    naloxone (NARCAN) injection 0.4 mg  0.4 mg IntraVENous Rad Multiple    iopamidoL (ISOVUE 250) 250 mg iodine /mL (51 %) contrast injection 1-150 mL  1-150 mL IntraarTERial RAD CONTINUOUS    heparin (porcine) 1,000 unit/mL injection 1,000-10,000 Units  1,000-10,000 Units IntraVENous Rad Multiple    diphenhydrAMINE (BENADRYL) injection 25 mg  25 mg IntraVENous Rad Multiple    diphenhydrAMINE (BENADRYL) injection 12.5 mg  12.5 mg IntraVENous Q6H PRN    ammonium lactate (LAC-HYDRIN) 12 % lotion   Topical BID PRN    Saccharomyces boulardii (FLORASTOR) capsule 500 mg  500 mg Oral BID    vancomycin 50 mg/mL oral solution (compounded) 125 mg  125 mg Oral Q6H    insulin lispro (HUMALOG) injection   SubCUTAneous AC&HS    insulin glargine (LANTUS) injection 15 Units  15 Units SubCUTAneous QHS    heparin (porcine) 1,000 unit/mL injection 1,000-10,000 Units  1,000-10,000 Units IntraVENous Rad Multiple    nitroGLYcerin 0.1 mg/mL in D5W injection  1-5 mL IntraarTERial Rad Multiple    heparinized saline 2 units/mL infusion 2,000 Units  1,000 mL Irrigation RAD CONTINUOUS    iopamidoL (ISOVUE 250) 250 mg iodine /mL (51 %) contrast injection 1-150 mL  1-150 mL IntraarTERial RAD CONTINUOUS    epoetin lisette-epbx (RETACRIT) injection 8,000 Units  8,000 Units SubCUTAneous Q TUE, THU & SAT    loperamide (IMODIUM) capsule 2 mg  2 mg Oral Q4H PRN    nystatin (MYCOSTATIN) 100,000 unit/gram powder   Topical BID    alum-mag hydroxide-simeth (MYLANTA) oral suspension 30 mL  30 mL Oral Q4H PRN    glucose chewable tablet 16 g  16 g Oral PRN    glucagon (GLUCAGEN) injection 1 mg  1 mg IntraMUSCular PRN    heparin (porcine) 1,000 unit/mL injection 3,600 Units  3,600 Units IntraCATHeter DIALYSIS PRN    dextrose 10% infusion 0-250 mL  0-250 mL IntraVENous PRN    0.9% sodium chloride infusion  25 mL/hr IntraVENous DIALYSIS PRN    clopidogreL (PLAVIX) tablet 75 mg 75 mg Oral DAILY    carvediloL (COREG) tablet 12.5 mg  12.5 mg Oral BID    aspirin chewable tablet 81 mg  81 mg Oral DAILY    amLODIPine (NORVASC) tablet 10 mg  10 mg Oral DAILY    allopurinoL (ZYLOPRIM) tablet 100 mg  100 mg Oral DAILY    ascorbic acid (vitamin C) (VITAMIN C) tablet 500 mg  500 mg Oral DAILY    ezetimibe (ZETIA) tablet 10 mg  10 mg Oral DAILY    pantoprazole (PROTONIX) tablet 40 mg  40 mg Oral ACB    sevelamer carbonate (RENVELA) tab 1,600 mg  1,600 mg Oral TID WITH MEALS    vit B Cmplx 3-FA-Vit C-Biotin (NEPHRO NIKITA RX) tablet 1 Tablet  1 Tablet Oral DAILY    sodium chloride (NS) flush 5-40 mL  5-40 mL IntraVENous Q8H    sodium chloride (NS) flush 5-40 mL  5-40 mL IntraVENous PRN    acetaminophen (TYLENOL) tablet 650 mg  650 mg Oral Q6H PRN    Or    acetaminophen (TYLENOL) suppository 650 mg  650 mg Rectal Q6H PRN    bisacodyL (DULCOLAX) suppository 10 mg  10 mg Rectal DAILY PRN    promethazine (PHENERGAN) tablet 12.5 mg  12.5 mg Oral Q6H PRN    Or    ondansetron (ZOFRAN) injection 4 mg  4 mg IntraVENous Q6H PRN    heparin (porcine) injection 5,000 Units  5,000 Units SubCUTAneous Q8H    oxyCODONE-acetaminophen (PERCOCET) 5-325 mg per tablet 1 Tablet  1 Tablet Oral Q6H PRN         Review of Systems - General ROS: negative for - chills, fever, or night sweats  Respiratory ROS: no cough, shortness of breath, or wheezing  Cardiovascular ROS: no chest pain or dyspnea on exertion  Gastrointestinal ROS: no abdominal pain, change in bowel habits, or black or bloody stools       Objective:    Visit Vitals  BP (!) 149/65   Pulse 74   Temp 98.4 °F (36.9 °C)   Resp 18   Ht 6' (1.829 m)   Wt 99.5 kg (219 lb 4.8 oz)   SpO2 99%   BMI 29.74 kg/m²       Temp (24hrs), Av.2 °F (36.8 °C), Min:97.5 °F (36.4 °C), Max:98.6 °F (37 °C)    Right HD cath in place    GEN: WDWN AAM in NAD  HEENT: anicteric  CHEST: Non laboured breathing  CVS:RRR  ABD: NT  EXT: L foot unwrapped and examined-- clean/stable looking post surgical wound- no marked swelling/hematoma/drainage/ cellulitis finding on exam  Right heel with ace wrap and dressing in place. Lab results:    Chemistry  Recent Labs     12/29/22  0621 12/28/22  0257 12/27/22  0550   * 155* 83   * 136 135*   K 6.1* 5.0 5.1   CL 97* 97* 98*   CO2 29 31 27   BUN 55* 39* 54*   CREA 9.64* 7.34* 9.32*   CA 8.3* 8.1* 8.6   AGAP 9 8 10   BUCR 6* 5* 6*   AP  --   --  103   TP  --   --  7.8   ALB  --   --  1.8*   GLOB  --   --  6.0*   AGRAT  --   --  0.3*       CBC w/ Diff  Recent Labs     12/27/22  0550   WBC 12.7   RBC 2.73*   HGB 8.4*   HCT 25.7*      GRANS 72   LYMPH 11*   EOS 5       Microbiology  All Micro Results       Procedure Component Value Units Date/Time    CULTURE, BLOOD [[id number]] Collected: 12/24/22 1745    Order Status: Completed Specimen: Blood Updated: 12/29/22 0746     Special Requests: NO SPECIAL REQUESTS        Culture result: NO GROWTH 5 DAYS       CULTURE, BLOOD [[id number]] Collected: 12/24/22 1745    Order Status: Completed Specimen: Blood Updated: 12/29/22 0746     Special Requests: NO SPECIAL REQUESTS        Culture result: NO GROWTH 5 DAYS       CULTURE, BLOOD [[id number]] Collected: 12/23/22 2052    Order Status: Completed Specimen: Blood Updated: 12/29/22 0746     Special Requests: NO SPECIAL REQUESTS        Culture result: NO GROWTH 6 DAYS       COVID-19 WITH INFLUENZA A/B [[id number]] Collected: 12/24/22 1941    Order Status: Completed Specimen: Nasopharyngeal Updated: 12/24/22 2028     SARS-CoV-2 by PCR Not detected        Comment: Not Detected results do not preclude SARS-CoV-2 infection and should not be used as the sole basis for patient management decisions. Results must be combined with clinical observations, patient history, and epidemiological information.         Influenza A by PCR Not detected        Influenza B by PCR Not detected        Comment: Testing was performed using cheryl Alla SARS-CoV-2 and Influenza A/B nucleic acid assay. This test is a multiplex Real-Time Reverse Transcriptase Polymerase Chain Reaction (RT-PCR) based in vitro diagnostic test intended for the qualitative detection of nucleic acids from SARS-CoV-2, Influenza A, and Influenza B in nasopharyngeal for use under the FDA's Emergency Use Authorization(EAU) only.        Fact sheet for Patients: FindDrives.pl  Fact sheet for Healthcare Providers: FindDrives.pl         CULTURE, BLOOD 2nd DRAW (required for [facility]) [[id number]] Collected: 12/09/22 1215    Order Status: Completed Specimen: Blood Updated: 12/15/22 0940     Special Requests: LEFT HAND     Culture result: NO GROWTH 6 DAYS       CULTURE, WOUND [address] STAIN [[id number]]  (Abnormal)  (Susceptibility) Collected: 12/09/22 2201    Order Status: Completed Specimen: Wound from Foot Updated: 12/15/22 0650     Special Requests: NO SPECIAL REQUESTS        GRAM STAIN RARE WBCS SEEN               2+ APPARENT GRAM VARIABLE RODS           Culture result:       HEAVY Serratia fonticola PIPTAZ = 28, SENSITIVE                  HEAVY ALCALIGENES FAECALIS                  HEAVY ALCALIGENES FAECALIS (2ND COLONY TYPE/STRAIN)                  HEAVY ENTEROBACTER CLOACAE                  HEAVY MIXED SKIN TO ISOLATED          CULTURE, ANAEROBIC [[id number]]  (Abnormal) Collected: 12/09/22 2201    Order Status: Completed Specimen: Foot, left Updated: 12/13/22 1246     Special Requests: NO SPECIAL REQUESTS        Culture result:       MODERATE BACTEROIDES VULGATUS BETA LACTAMASE POSITIVE          CULTURE, BLOOD [[id number]]  (Abnormal) Collected: 12/09/22 1155    Order Status: Completed Specimen: Blood Updated: 12/12/22 0743     Special Requests: LEFT AC     GRAM STAIN       ANAEROBIC BOTTLE GRAM POSITIVE COCCI IN CLUSTERS                  SMEAR CALLED TO AND CORRECTLY REPEATED BY: Gerardo Fernández RN 3S 12/10/22 AT 1147 BY ANI           Culture result:       STAPHYLOCOCCUS SPECIES, COAGULASE NEGATIVE GROWING IN 1 OF 2 BOTTLES DRAWN  SITE = LAC          BLOOD CULTURE ID PANEL [[id number]]  (Abnormal) Collected: 12/09/22 1145    Order Status: Completed Specimen: Blood Updated: 12/11/22 0655     Acc. no. from Micro Order [id number]     Enterococcus faecalis Not detected        Enterococcus faecium Not detected        Listeria monocytogenes Not detected        Staphylococcus Detected        Staphylococcus aureus Not detected        Staph epidermidis Detected        Staph lugdunensis Not detected        Streptococcus Not detected        Streptococcus agalactiae (Group B) Not detected        Streptococcus pneumoniae Not detected        Streptococcus pyogenes (Group A) Not detected        Acinetobacter calcoaceticus-baumanii complex Not detected        Bacteroides fragilis Not detected        Enterobacterales species Not detected        Enterobacter cloacae complex Not detected        Escherichia coli Not detected        Klebsiella aerogenes Not detected        Klebsiella oxytoca Not detected        Klebsiella pneumoniae group Not detected        Proteus Not detected        Salmonella Not detected        Serratia marcescens Not detected        Haemophilus influenzae Not detected        Neisseria meningitidis Not detected        Pseudomonas aeruginosa Not detected        Steno maltophilia Not detected        Candida albicans Not detected        Candida auris Not detected        Candida glabrata Not detected        Candida krusei Not detected        Candida parapsilosis Not detected        Candida tropicalis Not detected        Crypto neoformans/gattii Not detected        RESISTANT GENES:            MECA/C (Methicillin resistant gene) Detected        Comment       False positive results may rarely occur.  Correlate with clinical,epidemiologic, and other laboratory findings           Comment: Please see BCID Interpretation Guide in EPIC Links       C. DIFFICILE AG & TOXIN A/B [[id number]]     Order Status: Canceled Specimen: Stool

## 2022-12-29 NOTE — PROGRESS NOTES
Problem: Self Care Deficits Care Plan (Adult)  Goal: *Acute Goals and Plan of Care (Insert Text)  Description: Initial Occupational Therapy Goals (12/11/2022) Within 7 day(s):  1. Patient will perform perform grooming seated EOB for 5 minutes for increased independence in ADLs. 2. Patient will perform UB dressing with set up seated EOB for increased independence with ADLs. 3. Patient will perform LB dressing with mod I & A/E PRN for increased independence with ADLs. 4. Patient will perform all aspects of toileting with mod I for increased independence with ADLs. 5. Patient will perform LE ADLs utilizing body mechanics & adaptive strategies with 1 verbal cue for increased safety in ADLs. 6. Patient will independently apply energy conservation techniques with no verbal cue(s) for increased independence with ADLs. 7. Patient will perform transfer from bed to w/c with mod I and no LOB. Revised 12/25/2022; continue with current POC for 7 days  Outcome: Progressing Towards Goal  OCCUPATIONAL THERAPY TREATMENT    Patient: Farida Solomon (88 y.o. male)  Date: 12/29/2022  Diagnosis: Diabetic foot infection ([facility] [E11.628, L08.9]  Leukocytosis [D72.829] Ischemic gangrene ([facility]  Procedure(s) (LRB):  AMPUTATION - TRANSMETATARSAL LEFT FOOT (Left) 8 Days Post-Op  Precautions: Fall, NWB    Chart, occupational therapy assessment, plan of care, and goals were reviewed. ASSESSMENT:  Pt found supine in bed, reporting pain 3/10, agreeable to therapy. Pt able to transition to seated EOB with SBA. Sitting balance intact, facilitated therapeutic activity requiring reaching outside [place], pt able to complete with occasional CGA. Pt performed simple grooming tasks in seated with setup/SBA. Pt participate in BUE therex as noted below, pt required intermittent rest breaks d/t fatigue. Dialysis RN present to start treatment, pt left seated EOB, call bell/phone within reach.      Progression toward goals:  [x]          Improving appropriately and progressing toward goals  []          Improving slowly and progressing toward goals  []          Not making progress toward goals and plan of care will be adjusted     PLAN:  Patient continues to benefit from skilled intervention to address the above impairments. Continue treatment per established plan of care. Discharge Recommendations:  Rehab  Further Equipment Recommendations for Discharge: To Be Determined (TBD) at next level of care    AMPAC: Based on an AM-PAC score of 15/24 and their current ADL deficits; it is recommended that the patient have 3-5 sessions per week of Occupational Therapy at d/c to increase the patient's independence. This AMPAC score should be considered in conjunction with interdisciplinary team recommendations to determine the most appropriate discharge setting. Patient's social support, diagnosis, medical stability, and prior level of function should also be taken into consideration. SUBJECTIVE:   Patient stated ok.     OBJECTIVE DATA SUMMARY:   Cognitive/Behavioral Status:  Neurologic State: Alert  Orientation Level: Oriented X4  Cognition: Appropriate decision making, Appropriate safety awareness, Appropriate for age attention/concentration, Follows commands  Safety/Judgement: Awareness of environment    Functional Mobility and Transfers for ADLs:   Bed Mobility:  Supine to Sit: Stand-by assistance  Scooting: Stand-by assistance    Balance:  Sitting: Intact  Sitting - Static: Good (unsupported)  Sitting - Dynamic: Fair (occasional)    ADL Intervention:  Feeding  Feeding Assistance: Supervision  Drink to Mouth: Supervision    Grooming  Grooming Assistance: Set-up; Stand-by assistance  Position Performed: Seated edge of bed  Brushing Teeth: Stand-by assistance    Cognitive Retraining  Safety/Judgement: Awareness of environment    UE Therapeutic Exercises:  Theraband: B shoulder flex/abd, elbow flex/ext x10    Pain:  Pain level pre-treatment: 3/10   Pain level post-treatment: 3/10  Pain Intervention(s): Rest, Ice, Repositioning   Response to intervention: Nurse notified, See doc flow sheet    Activity Tolerance:    Fair. Pt able to sit EOB ~20 minutes while performing functional activities, intermittent rest breaks required    Please refer to the flowsheet for vital signs taken during this treatment. After treatment:   []  Patient left in no apparent distress sitting up in chair  [x]  Patient left in no apparent distress seated EOB  [x]  Call bell left within reach  [x]  Nursing notified  []  Caregiver present  []  Bed alarm activated    COMMUNICATION/EDUCATION:   [x] Role of Occupational Therapy in the acute care setting  [x] Home safety education was provided and the patient/caregiver indicated understanding. [x] Patient/family have participated as able in working towards goals and plan of care. [x] Patient/family agree to work toward stated goals and plan of care. [] Patient understands intent and goals of therapy, but is neutral about his/her participation. [] Patient is unable to participate in goal setting and plan of care. Thank you for this referral.  Marcial Miranda OTR/L  Time Calculation: 23 mins    [facility] AM-PAC® Daily Activity Inpatient Short Form (6-Clicks)*    How much HELP from another person does the patient currently need    (If the patient hasn't done an activity recently, how much help from another person do you think he/she would need if he/she tried?)   Total (Total A or Dep)   A Lot  (Mod to Max A)   A Little (Sup or Min A)   None (Mod I to I)   Putting on and taking off regular lower body clothing? [] 1 [x] 2 [] 3 [] 4   2. Bathing (including washing, rinsing,      drying)? [] 1 [x] 2 [] 3 [] 4   3. Toileting, which includes using toilet, bedpan or urinal?   [] 1 [x] 2 [] 3 [] 4   4. Putting on and taking off regular upper body clothing? [] 1 [] 2 [x] 3 [] 4   5.  Taking care of personal grooming such as brushing teeth?   [] 1 [] 2 [x] 3 [] 4   6. Eating meals? [] 1 [] 2 [x] 3 [] 4     Based on an AM-PAC score of 15/24 and their current ADL deficits; it is recommended that the patient have 3-5 sessions per week of Occupational Therapy at d/c to increase the patient's independence.

## 2022-12-29 NOTE — PROGRESS NOTES
Hospitalist Progress Note-critical care note     Patient: Farida Solomon MRN: [id number]  CSN: [id number]    YOB: 1960  Age: 60 y.o.   Sex: male    DOA: 12/9/2022 LOS:  LOS: 20 days            Chief complaint: pad , foot infection dm esrd     Assessment/Plan         Hospital Problems  Date Reviewed: 12/21/2022            Codes Class Noted POA    PAD (peripheral artery disease) ([facility] ICD-10-CM: I73.9  ICD-9-CM: 443.9  12/27/2022 Unknown        Positive blood culture ICD-10-CM: R78.81  ICD-9-CM: 790.7  12/11/2022 Unknown        Leukocytosis ICD-10-CM: D72.829  ICD-9-CM: 288.60  12/9/2022 Unknown        Diabetic foot infection ([facility] ICD-10-CM: E11.628, L08.9  ICD-9-CM: 250.80, 686.9  12/9/2022 Unknown        Diarrhea ICD-10-CM: R19.7  ICD-9-CM: 787.91  12/9/2022 Unknown        Type 2 diabetes mellitus, with long-term current use of insulin ([facility] ICD-10-CM: E11.9, Z79.4  ICD-9-CM: 250.00, V58.67  Unknown Unknown        Acute hematogenous osteomyelitis of left foot ([facility] ICD-10-CM: M86.072  ICD-9-CM: 730.07  12/9/2022 Unknown        Nondisplaced fracture of second metatarsal bone of left foot ICD-10-CM: L15.950R  ICD-9-CM: 825.25  12/9/2022 Unknown        Nondisplaced fracture of third metatarsal bone of left foot ICD-10-CM: D11.558N  ICD-9-CM: 825.25  12/9/2022 Unknown        Closed nondisplaced fracture of fourth metatarsal bone of left foot ICD-10-CM: S92.345A  ICD-9-CM: 825.25  12/9/2022 Unknown        Hypertension ICD-10-CM: I10  ICD-9-CM: 401.9  7/21/2022 Yes        CAD (coronary artery disease) ICD-10-CM: I25.10  ICD-9-CM: 414.00  6/28/2022 Yes        ESRD (end stage renal disease) ([facility] ICD-10-CM: N18.6  ICD-9-CM: 585.6  6/28/2022 Yes        Diabetes mellitus with foot ulcer ([facility] ICD-10-CM: E11.621, L97.509  ICD-9-CM: 250.80, 707.15  6/27/2022 Yes           60 y.o. male with history of diabetes, diabetic ulcer, CAD, hyperlipidemia, hypertension end-stage renal disease on HD presented to ER due to left foot lesion. Gangrene of left foot/DFU   PARTIAL AMPUTATION OF LEFT 2ND, 3RD, 4TH METATARSALS, AMPUTATION OF TOES 2,3,4, INCISION ADN DRAINAGE LEFT FOOT on 12/09/2022  S/p angiogram with PTA of the proximal PT and peroneal arteries. Vascular planning repeat procedure on 12/28-tolerated well   S/p Left TMA 12/21/2022. ID recommend Bactrim DS 1 tab PO post HD on TTS X10 days     History of C-diff -  On oral vancomycin to prevent recurrence-now hold per ID      Positive blood cx -  Coag negative staph  Likely contaminant contamination      CAD-   Distal RCA to drug-eluting stent in July 2022, continue plavix -   No chest pain     Hypertension -  continue home medication     End stage renal disease - hyperkalemia -hd today   on HD per nephrology-will have hd today      DM  type II -  Lantus and ssi      Subjective  I feel fine, I want to go home , not want to go to rehab     Disposition :dc home tomorrow    Review of systems:    General: No fevers or chills. Cardiovascular: No chest pain or pressure. No palpitations. Pulmonary: No shortness of breath. Gastrointestinal: No nausea, vomiting. Vital signs/Intake and Output:  Visit Vitals  BP (!) 144/77   Pulse 77   Temp 98 °F (36.7 °C)   Resp 20   Ht 6' (1.829 m)   Wt 94.9 kg (209 lb 3.5 oz)   SpO2 97%   BMI 28.37 kg/m²     Current Shift:  No intake/output data recorded. Last three shifts:  12/27 1901 - 12/29 0700  In: -   Out: 2     Physical Exam:  General: WD, WN. Alert, cooperative, no acute distress    HEENT: NC, Atraumatic. PERRLA, anicteric sclerae. Lungs: CTA Bilaterally. No Wheezing/Rhonchi/Rales. Heart:  Regular  rhythm,  No murmur, No Rubs, No Gallops  Abdomen: Soft, Non distended, Non tender. +Bowel sounds,   Extremities: No c/c, bilateral foot wrapped with ace bandage   Psych:   Not anxious or agitated. Neurologic:  No acute neurological deficit.              Labs: Results:       Chemistry Recent Labs     12/29/22  4926 12/28/22  0257 12/27/22  0550   * 155* 83   * 136 135*   K 6.1* 5.0 5.1   CL 97* 97* 98*   CO2 29 31 27   BUN 55* 39* 54*   CREA 9.64* 7.34* 9.32*   CA 8.3* 8.1* 8.6   AGAP 9 8 10   BUCR 6* 5* 6*   AP  --   --  103   TP  --   --  7.8   ALB  --   --  1.8*   GLOB  --   --  6.0*   AGRAT  --   --  0.3*      CBC w/Diff Recent Labs     12/27/22  0550   WBC 12.7   RBC 2.73*   HGB 8.4*   HCT 25.7*      GRANS 72   LYMPH 11*   EOS 5      Cardiac Enzymes No results for input(s): CPK, CKND1, PAULO in the last 72 hours. No lab exists for component: CKRMB, TROIP   Coagulation Recent Labs     12/28/22 0257   PTP 14.5   INR 1.1       Lipid Panel Lab Results   Component Value Date/Time    Cholesterol, total 188 07/19/2022 03:12 AM    HDL Cholesterol 42 07/19/2022 03:12 AM    LDL, calculated 131.2 (H) 07/19/2022 03:12 AM    VLDL, calculated 14.8 07/19/2022 03:12 AM    Triglyceride 74 07/19/2022 03:12 AM    CHOL/HDL Ratio 4.5 07/19/2022 03:12 AM      BNP No results for input(s): BNPP in the last 72 hours. Liver Enzymes Recent Labs     12/27/22  0550   TP 7.8   ALB 1.8*         Thyroid Studies No results found for: T4, T3U, TSH, TSHEXT     Procedures/imaging: see electronic medical records for all procedures/Xrays and details which were not copied into this note but were reviewed prior to creation of Plan    XR FOOT LT AP/LAT    Result Date: 12/22/2022  EXAM: XR FOOT LT AP/LAT CLINICAL INDICATION/HISTORY:  POST OP XRAY   > Additional: None COMPARISON: 12/9/2022 TECHNIQUE: AP and lateral views _______________ FINDINGS: Interval transmetatarsal amputation. The metatarsal stumps are not entirely sharp or well-defined. Midfoot ossicles appear intact and normally aligned with unremarkable hindfoot bones. Soft tissues covering the amputation stump are edematous. There are some gas bubbles within the soft tissues as would be expected following recent surgery.  There is extensive arterial vascular calcification, Monckeberg sclerosis pattern, characteristic of long-standing diabetes. _______________     Status post TMA as described. The indistinctness of the metatarsal stumps is concerning for residual infection although ultimately nonspecific. Follow-up imaging suggested within several weeks for reassessment. XR FOOT LT AP/LAT    Result Date: 12/9/2022  EXAM: 2 radiographic views of the left foot. INDICATION: Left foot pain. COMPARISON: December 9, 2022 _______________ FINDINGS: There is been interval amputation of the second, third, and fourth rays at the level of the metatarsal diaphysis. As before, the first ray is amputated at the metatarsal phalangeal joint. The small toe remains. There are expected postoperative findings to include regional air density and soft tissue swelling. There is Monckeberg type vascular calcification. There is low-grade mid foot degeneration. _______________     Standard immediate postoperative findings. _______________     XR FOOT LT MIN 3 V    Result Date: 12/9/2022  EXAM: XR FOOT LT MIN 3 V CLINICAL INDICATION/HISTORY: Gangrenous 2nd digit   > Additional: None. COMPARISON: None. TECHNIQUE: 3 views left foot _______________ FINDINGS: Soft tissue gas formation is seen along the second digit with ill-defined limitus changes extending along the medial forefoot. Prior amputation first right. Patchy demineralization and osseous erosions are seen involving the distal portion of the first metatarsal head. Additional patchy areas of osseous erosion are also noted involving the proximal phalanx of the second toe. Diffuse soft tissue swelling. Diffuse atherosclerotic vascular calcifications. No retained radiopaque foreign object. _______________     Soft gas formation and ulceration involving the medial forefoot with findings concerning for osteomyelitis involving the first and second rays described above.     MRI FOOT LT WO CONT    Result Date: 12/9/2022  EXAM: MRI FOOT LT WO CONT CLINICAL INDICATION/HISTORY: Foot wound; preoperative  >Additional: None COMPARISON: Radiograph performed December 9, 2022  >Reference exam: None. TECHNIQUE: Axial long axis TI and T2 with fat saturation, sagittal and coronal short axis TI and STIR sequences through the foot were obtained without contrast. _______________ FINDINGS: FIRST RAY: Prior dictation the first digit. There is mild bony proliferative change at the head of the first metatarsal with preserved marrow signal. Minimal patchy edema is noted which is nonspecific. No definite cortical destructive change. SECOND RAY: Nondisplaced obliquely oriented fracture through the head neck junction of the second metatarsal. There is heterogeneous diminished T1 marrow with patchy edema and surrounding soft tissue gas along the course of the second digit with associated subluxation of the distal phalanx. Subtle diminished T1 marrow is noted with suspected foci of intraosseous gas, concerning for acute osteomyelitis. THIRD RAY: Nondisplaced fracture through the third metatarsal neck with slight impaction. Mild edema is noted within the third digit, possibly stress reaction. No convincing osseous erosions or T1 marrow signal change. FOURTH RAY: Nondisplaced fractures of the neck of the fourth metatarsal. Minimal edema in the proximal phalanx but otherwise preserved marrow signal. FIFTH RAY: Unremarkable. Additional degenerative changes with patchy edema, joint space narrowing, and osteophyte formation at the midfoot, likely related to underlying Charcot arthropathy. SOFT TISSUES: Soft tissue irregularity with gas and ulceration around the second digit noted. Diffuse fluid signal seen throughout the intrinsic foot musculature, commonly seen in the setting of diabetes. Mild skin thickening about the forefoot. INCIDENTAL FINDINGS: None. _______________     1. Marrow signal abnormality with soft tissue wound and gas around the second digit concerning for acute osteomyelitis.  2. Nondisplaced fractures of the head neck junction of the second-fourth metatarsals. 3.  Prior indication the first digit. 4.  Soft tissue swelling and skin thickening about the forefoot concerning for cellulitis. No drainable abscess. 5.  Additional chronic/degenerative changes of the foot. CT ABD PELV WO CONT    Result Date: 12/9/2022  EXAM: CT of the abdomen and pelvis INDICATION: Abdominal pain with distention. COMPARISON: None. TECHNIQUE: Axial CT imaging of the abdomen and pelvis was performed without intravenous contrast. Multiplanar reformats were generated. One or more dose reduction techniques were used on this CT: automated exposure control, adjustment of the mAs and/or kVp according to patient size, and iterative reconstruction techniques. The specific techniques used on this CT exam have been documented in the patient's electronic medical record. Digital Imaging and Communications in Medicine (DICOM) format image data are available to nonaffiliated external healthcare facilities or entities on a secure, media free, reciprocally searchable basis with patient authorization for at least a 12-month period after this study. _______________ FINDINGS: LOWER CHEST: Partial inclusion of multivessel coronary arterial atherosclerosis and central venous catheter. Clear lung bases. Several punctate 2 to 3 mm pulmonary nodule seen in the right lower lobe (image 8) LIVER, BILIARY: Liver is normal. No biliary dilation. Color contains a gallstone without evidence of dilatation or gallbladder wall thickening. PANCREAS: Normal. SPLEEN: Punctate granulomatous calcifications otherwise unremarkable. ADRENALS: Mild adreniform thickening of each adrenal gland. KIDNEYS/URETERS/BLADDER: No hydronephrosis. Bilateral renal hypodensities are present either to small to accurately characterize or in keeping with simple cysts which are prior no further dedicated imaging follow-up. Bladder decompressed. PELVIC ORGANS: Unremarkable. VASCULATURE: Diffuse aortic and visceral arterial atherosclerosis without evidence of aneurysmal dilatation. LYMPH NODES: Scattered subcentimeter mesenteric and retroperitoneal lymph nodes are demonstrated without evidence of adenopathy. Prominent left inguinal lymph node is present (image 127) measuring approximately 15 mm in short axis dimension. GASTROINTESTINAL TRACT: No bowel dilation or wall thickening. Peritoneal gas. Normal appendix. Scattered colonic diverticula without evidence of diverticulitis. BONES: No acute or aggressive osseous abnormalities identified. OTHER: None. _______________     1. Cholelithiasis without evidence of cholecystitis. 2. No abnormal bowel wall thickening or dilatation. 3. No hydronephrosis or obstructive uropathy. 4. Several small right lower lobe pulmonary nodules (2-3 mm in size). See below guidelines for proposed follow-up. 5. Borderline enlarged and nonspecific left inguinal lymph node, potentially reactive in etiology. ======== Fleischner Society pulmonary nodule guidelines (revised 2017): Multiple solid nodules <6 mm: -Low risk for lung cancer: No follow-up. -High risk for lung cancer: Optional chest CT in 12 months. XR CHEST PORT    Result Date: 12/23/2022  EXAM: CHEST RADIOGRAPH CLINICAL INDICATION/HISTORY: sepsis -Additional: None COMPARISON: 12/9/2022 TECHNIQUE: Portable frontal view of the chest _______________ FINDINGS: SUPPORT DEVICES: Indwelling dialysis catheter, tip in the mid to lower SVC. HEART AND MEDIASTINUM: No appreciable cardiomegaly. Remaining mediastinal contours within normal limits. LUNGS AND PLEURAL SPACES: Clear. No consolidation, mass or effusion. BONY THORAX AND SOFT TISSUES: Unremarkable. _______________     No active cardiopulmonary disease.     XR CHEST PORT    Result Date: 12/9/2022  EXAM: XR CHEST PORT CLINICAL INDICATION/HISTORY: sepsis -Additional: None COMPARISON: July 12, 2022 TECHNIQUE: Portable frontal view of the chest _______________ FINDINGS: SUPPORT DEVICES: Right IJ approach dialysis catheter in stable position. HEART AND MEDIASTINUM: Stable appearing cardiac size and mediastinal contours. LUNGS AND PLEURAL SPACES: Lungs are clear. No focal pneumonic opacity. No pneumothorax or pleural effusion. BONY THORAX AND SOFT TISSUES: Unremarkable. _______________     No active cardiopulmonary disease. ANKLE BRACHIAL INDEX    Result Date: 12/21/2022  · Right ankle/brachial index is 1.19 · The left ankle/ brachial index is 0.89      ANKLE BRACHIAL INDEX    Result Date: 12/15/2022  Falsely elevated Ankle Brachial Index pressure measurements noted due to calcified vessels with abnormal waveforms bilaterally. Unable to get left brachial pressure due to dialysis access graft. The right toe/brachial index is 0.59  With a toe pressure of 82 mmhg. Unable to get left toe pressure due to left toes amputation. DUPLEX LOWER EXT ARTERY BILAT    Result Date: 12/15/2022   Elevated velocities (169 cm/s) in the left proximal femoral artery consistent with mild stenosis. Bilateral tibial arteries are patnet at the ankle. Bilateral anterior tibial artery is occluded proximally but reconstituted distally by the collateral flow.         Parminder Cifuentes MD

## 2022-12-29 NOTE — PROGRESS NOTES
Problem: Mobility Impaired (Adult and Pediatric)  Goal: *Acute Goals and Plan of Care (Insert Text)  Description: Physical Therapy Goals   Updated 12/23/2022 and to be accomplished within 7 day(s)  1. Patient will move from supine <> sit with mod I in prep for out of bed activity and change of position. 2.  Patient will transfer from bed <> chair via slide board with min assist for time up in chair for completion of ADL activity. 3.  Patient will demonstrate ability to perform w/c management with SBA for functional w/c level mobility. 4.  Patient will demonstrate HEP performance for LE ROM/strengthening in order to achieve above goals and performance st discharge. Physical Therapy Goals  Initiated 12/11/2022  1. Patient will move from supine to sit and sit to supine  in bed with supervision/set-up within 7 day(s). 2.  Patient will transfer from bed to chair and chair to bed with minimal assistance/contact guard assist using the least restrictive device within 7 day(s). 3.  Patient will perform sit to stand with minimal assistance/contact guard assist within 7 day(s). 4.  Patient will ambulate with minimal assistance/contact guard assist for 10 feet with the least restrictive device within 7 day(s).    12/29/2022 1042 by Bria Santos PTA  Outcome: Progressing Towards Goal  12/29/2022 1042 by Bria Santos PTA  Outcome: Progressing Towards Goal   []  Patient has met MD mobilization sahara for d/c home   []  Recommend HH with 24 hour adult care   []  Benefit from additional acute PT session to address:  rehab placement    PHYSICAL THERAPY TREATMENT    Patient: Tolu Trevizo (3 y.o. male)  Date: 12/29/2022  Diagnosis: Diabetic foot infection ([facility] [E11.628, L08.9]  Leukocytosis [D72.829] Ischemic gangrene ([facility]  Procedure(s) (LRB):  AMPUTATION - TRANSMETATARSAL LEFT FOOT (Left) 8 Days Post-Op  Precautions: Fall, NWB (L foot)  PLOF: lives alone, neighbor assists with steps to enter home, w/c dependent    ASSESSMENT:  Pt supine in bed upon arrival.  Reports discomfort at L groin incision site. Sat up EOB with use of bed rail. Increased difficulty scooting to EOB due to avoiding use of RLE. Instructed pt to placed R foot on the floor to assist as pt is full wt bearing on the R.  Elevated  bed to assist with sit to stand, pt able to stand briefly with modA, needs VC for NWB on the LLE. Returned to sitting EOB. Performed LAQs on (B)LEs. Pt able to scoot along EOB and therefore will be able to scoot in/out of w/c. Pt left sitting EOB per pt request.  Rehab placement is highly recommended for a safe return home independently. Progression toward goals:   []      Improving appropriately and progressing toward goals  [x]      Improving slowly and progressing toward goals  []      Not making progress toward goals and plan of care will be adjusted     PLAN:  Patient continues to benefit from skilled intervention to address the above impairments. Continue treatment per established plan of care. Discharge Recommendations: Rehab  Further Equipment Recommendations for Discharge:  N/A    AMPA: 11/20    This AMPAC score should be considered in conjunction with interdisciplinary team recommendations to determine the most appropriate discharge setting. Patient's social support, diagnosis, medical stability, and prior level of function should also be taken into consideration. SUBJECTIVE:   Patient stated ok.     OBJECTIVE DATA SUMMARY:   Critical Behavior:  Neurologic State: Alert, Eyes open spontaneously  Orientation Level: Oriented X4  Cognition: Appropriate decision making, Appropriate for age attention/concentration, Appropriate safety awareness, Follows commands  Safety/Judgement: Fall prevention  Functional Mobility Training:  Bed Mobility:    Supine to Sit: Stand-by assistance    Scooting: Contact guard assistance  Transfers:  Sit to Stand:  Moderate assistance  Stand to Sit: Minimum assistance  Balance:  Sitting: Impaired; With support  Sitting - Static: Fair (occasional)  Sitting - Dynamic: Fair (occasional)  Standing: Impaired; With support  Standing - Static: Fair Argie Pintos/poor)  Standing - Dynamic : Not tested   Ambulation/Gait Training:    Assistive Device: Gait belt;Walker, rolling  Right Side Weight Bearing: Full  Left Side Weight Bearing: Non-weight bearing  Base of Support: Shift to right  Therapeutic Exercises:   (B)LEs      EXERCISE   Sets   Reps   Active Active Assist   Passive Self ROM   Comments   Ankle Pumps    [] [] [] []    Quad Sets/Glut Sets    [] [] [] [] Hold for 5 secs   Hamstring Sets   [] [] [] []    Short Arc Quads   [] [] [] []    Heel Slides   [] [] [] []    Straight Leg Raises   [] [] [] []    Hip Add   [] [] [] [] Hold for 5 secs, w/ pillow squeeze   Long Arc Quads  10 [x] [] [] []    Seated Marching   [] [] [] []    Standing Marching   [] [] [] []       [] [] [] []        Pain:  Pain level pre-treatment: 4/10  Pain level post-treatment: 4/10   Pain Intervention(s): Medication (see MAR); Rest, Ice, Repositioning   Response to intervention: Nurse notified, See doc flow    Activity Tolerance:   Fair-  Please refer to the flowsheet for vital signs taken during this treatment. After treatment:   [] Patient left in no apparent distress sitting up in chair  [x] Patient left in no apparent distress in bed  [x] Call bell left within reach  [] Nursing notified  [] Caregiver present  [] Bed alarm activated  [] SCDs applied      COMMUNICATION/EDUCATION:   [x]         Role of Physical Therapy in the acute care setting. [x]         Fall prevention education was provided and the patient/caregiver indicated understanding. [x]         Patient/family have participated as able in working toward goals and plan of care. [x]         Patient/family agree to work toward stated goals and plan of care.   []         Patient understands intent and goals of therapy, but is neutral about his/her participation. []         Patient is unable to participate in stated goals/plan of care: ongoing with therapy staff.  []         Other:        Radha Bueno, AMANDA   Time Calculation: 14 mins    [employer] AM-PAC® Basic Mobility Inpatient Short Form (6-Clicks) Version 2    How much HELP from another person does the patient currently need    (If the patient hasn't done an activity recently, how much help from another person do you think he/she would need if he/she tried?)   Total (Total A or Dep)   A Lot  (Mod to Max A)   A Little (Sup or Min A)   None (Mod I to I)   Turning from your back to your side while in a flat bed without using bedrails? [] 1 [] 2 [x] 3 [] 4   2. Moving from lying on your back to sitting on the side of a flat bed without using bedrails? [] 1 [] 2 [x] 3 [] 4   3. Moving to and from a bed to a chair (including a wheelchair)? [] 1 [x] 2 [] 3 [] 4   4. Standing up from a chair using your arms (e.g., wheelchair, or bedside chair)? [] 1 [x] 2 [] 3 [] 4   5. Walking in hospital room? [x] 1 [] 2 [] 3 [] 4   6. Climbing 3-5 steps with a railing?+   [] 1 [] 2 [] 3 [] 4   +If stair climbing cannot be assessed, skip item #6. Sum responses from items 1-5. Based on an AM-PAC score of **/24 (or **/20 if omitting stairs) and their current functional mobility deficits, it is recommended that the patient have 5-7 sessions per week of Physical Therapy at d/c to increase the patient's independence. Currently, this patient demonstrates the potential endurance, and/or tolerance for 3 hours of therapy each day at d/c. Based on an AM-PAC score of **/24 (11/20 if omitting stairs) and their current functional mobility deficits, it is recommended that the patient have 3-5 sessions per week of Physical Therapy at d/c to increase the patient's independence.      Based on an AM-PAC score of **/24 (or **/20 if omitting stairs) and their current functional mobility deficits, it is recommended that the patient have 2-3 sessions per week of Physical Therapy at d/c to increase the patient's independence. At this time and based on an AM-PAC score of **/24 (or **/20 if omitting stairs), no further PT is recommended upon discharge due to (i.e. patient at baseline functional statusetc). Recommend patient returns to prior setting with prior services.

## 2022-12-29 NOTE — PROGRESS NOTES
0730 - Bedside shift report received from Godfrey Luke RN. Assumed care of patient. Patient noted resting in bed at this time. Call light in reach. Mona Alonso notified of potassium of 6.1. No new orders received. Stated patient will have dialysis today. 9731 - Assessment completed as per flowsheet. 1430 - Florastor and nephrovite not available in pyxis. Pharmacy notified. [place] with Dr. Tim Pradhan and notified of K+ 6.1 and asked about if patient receiving dialysis today. Stated patient is on schedule but attempting to find nurse to perform dialysis. No new orders received. 1545 - Dialysis receiving dialysis. 200 - Dialysis nurse reported 2.5L removed during dialysis.

## 2022-12-29 NOTE — PROGRESS NOTES
Comprehensive Nutrition Assessment    Type and Reason for Visit: Reassess    Nutrition Recommendations/Plan:   Add oral nutrition supplement to optimize nutrition intake opportunity: Nepro with Carb Steady (each provides 425 kcal, 19g protein) once daily. Continue current diet and monitor PO intake, compliance with oral supplements, weight, and POC while admitted. Malnutrition Assessment:  Malnutrition Status: At risk for malnutrition (specify) (r/t ESRD on dialysis and variable meal intake) (12/29/22 1407)      Nutrition Assessment:    Admitted with Gangrene of left foot, leukocytosis, diabetic foot ulcer. S/p procedure 12/9. S/p AMPUTATION - TRANSMETATARSAL LEFT FOOT 12/21. Last dialyzed 12/27, 2.5 L removed (per chart, pt is on schedule to receive dialysis today). Most intake over the previous 1 week shows % meal intake, though slightly variable. Nutrition Related Findings:    K 6.1 H, BUN/Cr 55/9.64 H/H. Pertinent meds: vitamin C, Retacrit, Lantus, Humalog, Imodium, Phenergan, Protonix, Florastor, Nephrovite, Renvela. + BM 12/28. Wound Type: Surgical incision, Diabetic ulcer       Current Nutrition Intake & Therapies:  Average Meal Intake: %     ADULT DIET Regular; 3 carb choices (45 gm/meal)    Anthropometric Measures:  Height: 6' (182.9 cm)  Ideal Body Weight (IBW): 178 lbs (81 kg)     Current Body Wt:  97.5 kg (215 lb) (12/22/22), 119.5 % IBW. Current BMI (kg/m2): 29.2        Weight Adjustment: No adjustment  BMI Category: Overweight (BMI 25.0-29. 9)    Estimated Daily Nutrient Needs:  Energy Requirements Based On: Formula (MSJ x1.2-1.4)  Weight Used for Energy Requirements: Current  Energy (kcal/day): 9083-7867  Weight Used for Protein Requirements: Current (1.2-1.3g/kg)  Protein (g/day): 115-125  Method Used for Fluid Requirements: 1 ml/kcal  Fluid (ml/day): 4275-6467    Nutrition Diagnosis:   786-    Nutrition Interventions:   Food and/or Nutrient Delivery: Continue current diet, Start oral nutrition supplement  Nutrition Education/Counseling: No recommendations at this time  Coordination of Nutrition Care: Continue to monitor while inpatient       Goals:  Previous Goal Met: Progressing toward goal(s)  Goals: Meet at least 75% of estimated needs, by next RD assessment       Nutrition Monitoring and Evaluation:   Behavioral-Environmental Outcomes: None identified  Food/Nutrient Intake Outcomes: Food and nutrient intake, Supplement intake  Physical Signs/Symptoms Outcomes: Biochemical data, GI status, Meal time behavior, Weight    Discharge Planning:    Continue current diet    Iline Najjar, [address]  Contact: [phone]

## 2022-12-29 NOTE — WOUND CARE
Discharge Instructions from  [address] [facility]  [address] [place], [place], [address] [facility]  [phone] Fax [phone]    NAME:  Vicente Padilla OF BIRTH:  1960  MEDICAL RECORD NUMBER:  [id number]  DATE:  12/9/2022    Wound Cleansing:   Do not scrub or use excessive force. Cleanse wound prior to applying a clean dressing with:      [x] Wound Cleanser        Topical Treatments:  Do not apply lotions, creams, or ointments to wound bed unless directed. May apply lotion to feet away from incision         Dressings:           Wound Location left foot   [x] Apply Primary Dressing:      [x] Other:  cover incision with xeroform     [x] Cover and Secure with:     [x] Gauze [x] ABD     [x] Other: secure with soft roll and coban with no tension do not compress foot   Avoid contact of tape with skin. [x] Change dressing:  [x] Other: 2 times a week    Dressings:           Wound Location: Right heel   [] Apply Primary Dressing:      [x] Alginate with Silver [x] Collagen   [x] Cover and Secure with:     [x] Gauze [x] Kerlix    [x] Other: coban   Avoid contact of tape with skin.   [] Change dressing  [x]  Other: 2 times a week             Return Appointment:  [x] Return Appointment: With   Future Appointments   Date Time Provider Regis Ham   [facility]   1/13/2023 11:00 AM Nuria Tam [facility]         Electronically signed Sergio Walton RN on 12/29/2022 at 11:54 AM

## 2022-12-29 NOTE — PROGRESS NOTES
Comprehensive Nutrition Assessment    Type and Reason for Visit: Reassess    Nutrition Recommendations/Plan:   Add oral nutrition supplement to optimize nutrition intake opportunity: Nepro with Carb Steady (each provides 425 kcal, 19g protein) once daily  Monitor PO intake, compliance with oral supplement, weight, and POC while admitted. Malnutrition Assessment:  Malnutrition Status: At risk for malnutrition (specify) (r/t ESRD on dialysis and variable meal intake) (12/29/22 1407)      Nutrition Assessment:    Admitted with Gangrene of left foot, leukocytosis, diabetic foot ulcer. S/p procedure 12/9. S/p AMPUTATION - TRANSMETATARSAL LEFT FOOT 12/21. Last dialyzed 12/27, 2.5 L removed (per chart, pt is on schedule to receive dialysis today). Most intake over the previous 1 week shows % meal intake, though slightly variable. Nutrition Related Findings:    K 6.1 H, BUN/Cr 55/9.64 H/H. Pertinent meds: vitamin C, Retacrit, Lantus, Humalog, Imodium, Phenergan, Protonix, Florastor, Nephrovite, Renvela. + BM 12/28. Wound Type: Surgical incision, Diabetic ulcer    Current Nutrition Intake & Therapies:  Average Meal Intake: 51-75%  Average Supplement Intake: None ordered  ADULT DIET Regular; 3 carb choices (45 gm/meal)    Anthropometric Measures:  Height: 6' (182.9 cm)  Ideal Body Weight (IBW): 178 lbs (81 kg)     Current Body Wt:  97.5 kg (215 lb) (12/22/22), 119.5 % IBW. Current BMI (kg/m2): 29.2  Weight Adjustment: No adjustment  BMI Category: Overweight (BMI 25.0-29. 9)    Estimated Daily Nutrient Needs:  Energy Requirements Based On: Formula (MSJ x1.2-1.4)  Weight Used for Energy Requirements: Current  Energy (kcal/day): 3129-4242  Weight Used for Protein Requirements: Current (1.2-1.3g/kg)  Protein (g/day): 115-125  Method Used for Fluid Requirements: 1 ml/kcal  Fluid (ml/day): 5982-7991    Nutrition Diagnosis:   Increased nutrient needs related to renal dysfunction as evidenced by dialysis    Nutrition Interventions:   Food and/or Nutrient Delivery: Continue current diet, Start oral nutrition supplement  Nutrition Education/Counseling: No recommendations at this time  Coordination of Nutrition Care: Continue to monitor while inpatient       Goals:  Previous Goal Met: Progressing toward goal(s)  Goals: Meet at least 75% of estimated needs, by next RD assessment       Nutrition Monitoring and Evaluation:   Behavioral-Environmental Outcomes: None identified  Food/Nutrient Intake Outcomes: Food and nutrient intake, Supplement intake  Physical Signs/Symptoms Outcomes: Biochemical data, GI status, Meal time behavior, Weight    Discharge Planning:    Continue current diet    Roberta Chow, [address]  Contact: [phone]

## 2022-12-29 NOTE — PROGRESS NOTES
Problem: Diabetes Self-Management  Goal: *Disease process and treatment process  Description: Define diabetes and identify own type of diabetes; list 3 options for treating diabetes. Outcome: Progressing Towards Goal  Goal: *Incorporating nutritional management into lifestyle  Description: Describe effect of type, amount and timing of food on blood glucose; list 3 methods for planning meals. Outcome: Progressing Towards Goal  Goal: *Incorporating physical activity into lifestyle  Description: State effect of exercise on blood glucose levels. Outcome: Progressing Towards Goal  Goal: *Developing strategies to promote health/change behavior  Description: Define the ABC's of diabetes; identify appropriate screenings, schedule and personal plan for screenings. Outcome: Progressing Towards Goal  Goal: *Using medications safely  Description: State effect of diabetes medications on diabetes; name diabetes medication taking, action and side effects. Outcome: Progressing Towards Goal  Goal: *Monitoring blood glucose, interpreting and using results  Description: Identify recommended blood glucose targets  and personal targets. Outcome: Progressing Towards Goal  Goal: *Prevention, detection, treatment of acute complications  Description: List symptoms of hyper- and hypoglycemia; describe how to treat low blood sugar and actions for lowering  high blood glucose level. Outcome: Progressing Towards Goal  Goal: *Prevention, detection and treatment of chronic complications  Description: Define the natural course of diabetes and describe the relationship of blood glucose levels to long term complications of diabetes.   Outcome: Progressing Towards Goal  Goal: *Developing strategies to address psychosocial issues  Description: Describe feelings about living with diabetes; identify support needed and support network  Outcome: Progressing Towards Goal  Goal: *Insulin pump training  Outcome: Progressing Towards Goal  Goal: *Sick day guidelines  Outcome: Progressing Towards Goal  Goal: *Patient Specific Goal (EDIT GOAL, INSERT TEXT)  Outcome: Progressing Towards Goal     Problem: Patient Education: Go to Patient Education Activity  Goal: Patient/Family Education  Outcome: Progressing Towards Goal     Problem: Falls - Risk of  Goal: *Absence of Falls  Description: Document Jemma Solano Fall Risk and appropriate interventions in the flowsheet. Outcome: Progressing Towards Goal  Note: Fall Risk Interventions:  Mobility Interventions: Bed/chair exit alarm, Patient to call before getting OOB         Medication Interventions: Teach patient to arise slowly, Patient to call before getting OOB    Elimination Interventions: Bed/chair exit alarm, Call light in reach    History of Falls Interventions: Bed/chair exit alarm         Problem: Patient Education: Go to Patient Education Activity  Goal: Patient/Family Education  Outcome: Progressing Towards Goal     Problem: Pressure Injury - Risk of  Goal: *Prevention of pressure injury  Description: Document Semaj Scale and appropriate interventions in the flowsheet.   Outcome: Progressing Towards Goal  Note: Pressure Injury Interventions:  Sensory Interventions: Maintain/enhance activity level, Minimize linen layers, Pressure redistribution bed/mattress (bed type)    Moisture Interventions: Absorbent underpads, Check for incontinence Q2 hours and as needed, Minimize layers    Activity Interventions: Pressure redistribution bed/mattress(bed type)    Mobility Interventions: Pressure redistribution bed/mattress (bed type)    Nutrition Interventions: Document food/fluid/supplement intake    Friction and Shear Interventions: Minimize layers, HOB 30 degrees or less

## 2022-12-30 LAB
BACTERIA SPEC CULT: NORMAL
BACTERIA SPEC CULT: NORMAL
GLUCOSE BLD STRIP.AUTO-MCNC: 168 MG/DL (ref 70–110)
GLUCOSE BLD STRIP.AUTO-MCNC: 187 MG/DL (ref 70–110)
GLUCOSE BLD STRIP.AUTO-MCNC: 188 MG/DL (ref 70–110)
GLUCOSE BLD STRIP.AUTO-MCNC: 199 MG/DL (ref 70–110)
GLUCOSE BLD STRIP.AUTO-MCNC: 225 MG/DL (ref 70–110)
MAGNESIUM SERPL-MCNC: 2.1 MG/DL (ref 1.6–2.6)
SERVICE CMNT-IMP: NORMAL
SERVICE CMNT-IMP: NORMAL

## 2022-12-30 PROCEDURE — 74011000250 HC RX REV CODE- 250: Performed by: INTERNAL MEDICINE

## 2022-12-30 PROCEDURE — 74011000250 HC RX REV CODE- 250: Performed by: HOSPITALIST

## 2022-12-30 PROCEDURE — 74011636637 HC RX REV CODE- 636/637: Performed by: HOSPITALIST

## 2022-12-30 PROCEDURE — 74011250636 HC RX REV CODE- 250/636: Performed by: HOSPITALIST

## 2022-12-30 PROCEDURE — 97530 THERAPEUTIC ACTIVITIES: CPT

## 2022-12-30 PROCEDURE — 74011250637 HC RX REV CODE- 250/637: Performed by: HOSPITALIST

## 2022-12-30 PROCEDURE — 74011250636 HC RX REV CODE- 250/636: Performed by: INTERNAL MEDICINE

## 2022-12-30 PROCEDURE — 83735 ASSAY OF MAGNESIUM: CPT

## 2022-12-30 PROCEDURE — 82962 GLUCOSE BLOOD TEST: CPT

## 2022-12-30 PROCEDURE — 36415 COLL VENOUS BLD VENIPUNCTURE: CPT

## 2022-12-30 PROCEDURE — 65270000029 HC RM PRIVATE

## 2022-12-30 RX ADMIN — PANTOPRAZOLE SODIUM 40 MG: 40 TABLET, DELAYED RELEASE ORAL at 06:43

## 2022-12-30 RX ADMIN — SEVELAMER CARBONATE 1600 MG: 800 TABLET, FILM COATED ORAL at 08:30

## 2022-12-30 RX ADMIN — EZETIMIBE 10 MG: 10 TABLET ORAL at 10:17

## 2022-12-30 RX ADMIN — Medication 125 MG: at 19:03

## 2022-12-30 RX ADMIN — WATER 2 MG: 1 INJECTION INTRAMUSCULAR; INTRAVENOUS; SUBCUTANEOUS at 20:11

## 2022-12-30 RX ADMIN — Medication 15 UNITS: at 21:39

## 2022-12-30 RX ADMIN — CLOPIDOGREL BISULFATE 75 MG: 75 TABLET ORAL at 08:30

## 2022-12-30 RX ADMIN — SEVELAMER CARBONATE 1600 MG: 800 TABLET, FILM COATED ORAL at 13:24

## 2022-12-30 RX ADMIN — AMLODIPINE BESYLATE 10 MG: 5 TABLET ORAL at 08:29

## 2022-12-30 RX ADMIN — NYSTATIN: 100000 POWDER TOPICAL at 10:26

## 2022-12-30 RX ADMIN — CARVEDILOL 12.5 MG: 12.5 TABLET, FILM COATED ORAL at 08:30

## 2022-12-30 RX ADMIN — Medication 125 MG: at 02:43

## 2022-12-30 RX ADMIN — SEVELAMER CARBONATE 1600 MG: 800 TABLET, FILM COATED ORAL at 18:17

## 2022-12-30 RX ADMIN — Medication 2 UNITS: at 18:17

## 2022-12-30 RX ADMIN — Medication 500 MG: at 10:18

## 2022-12-30 RX ADMIN — ALLOPURINOL 100 MG: 100 TABLET ORAL at 08:29

## 2022-12-30 RX ADMIN — HEPARIN SODIUM 5000 UNITS: 5000 INJECTION INTRAVENOUS; SUBCUTANEOUS at 10:19

## 2022-12-30 RX ADMIN — HEPARIN SODIUM 5000 UNITS: 5000 INJECTION INTRAVENOUS; SUBCUTANEOUS at 02:42

## 2022-12-30 RX ADMIN — Medication 500 MG: at 10:17

## 2022-12-30 RX ADMIN — SODIUM CHLORIDE, PRESERVATIVE FREE 10 ML: 5 INJECTION INTRAVENOUS at 10:24

## 2022-12-30 RX ADMIN — ASPIRIN 81 MG: 81 TABLET, CHEWABLE ORAL at 08:30

## 2022-12-30 RX ADMIN — Medication 2 UNITS: at 13:24

## 2022-12-30 RX ADMIN — Medication 2 UNITS: at 08:28

## 2022-12-30 RX ADMIN — OXYCODONE AND ACETAMINOPHEN 1 TABLET: 5; 325 TABLET ORAL at 19:03

## 2022-12-30 RX ADMIN — CARVEDILOL 12.5 MG: 12.5 TABLET, FILM COATED ORAL at 21:40

## 2022-12-30 RX ADMIN — HEPARIN SODIUM 5000 UNITS: 5000 INJECTION INTRAVENOUS; SUBCUTANEOUS at 18:17

## 2022-12-30 RX ADMIN — SODIUM CHLORIDE, PRESERVATIVE FREE 10 ML: 5 INJECTION INTRAVENOUS at 14:00

## 2022-12-30 RX ADMIN — B-COMPLEX W/ C & FOLIC ACID TAB 1 MG 1 TABLET: 1 TAB at 10:18

## 2022-12-30 RX ADMIN — Medication 125 MG: at 10:19

## 2022-12-30 RX ADMIN — Medication 500 MG: at 21:40

## 2022-12-30 RX ADMIN — Medication 4 UNITS: at 21:40

## 2022-12-30 NOTE — PROGRESS NOTES
Problem: Diabetes Self-Management  Goal: *Disease process and treatment process  Description: Define diabetes and identify own type of diabetes; list 3 options for treating diabetes. Outcome: Progressing Towards Goal  Goal: *Incorporating nutritional management into lifestyle  Description: Describe effect of type, amount and timing of food on blood glucose; list 3 methods for planning meals. Outcome: Progressing Towards Goal  Goal: *Incorporating physical activity into lifestyle  Description: State effect of exercise on blood glucose levels. Outcome: Progressing Towards Goal  Goal: *Developing strategies to promote health/change behavior  Description: Define the ABC's of diabetes; identify appropriate screenings, schedule and personal plan for screenings. Outcome: Progressing Towards Goal  Goal: *Using medications safely  Description: State effect of diabetes medications on diabetes; name diabetes medication taking, action and side effects. Outcome: Progressing Towards Goal  Goal: *Monitoring blood glucose, interpreting and using results  Description: Identify recommended blood glucose targets  and personal targets. Outcome: Progressing Towards Goal  Goal: *Prevention, detection, treatment of acute complications  Description: List symptoms of hyper- and hypoglycemia; describe how to treat low blood sugar and actions for lowering  high blood glucose level. Outcome: Progressing Towards Goal  Goal: *Prevention, detection and treatment of chronic complications  Description: Define the natural course of diabetes and describe the relationship of blood glucose levels to long term complications of diabetes.   Outcome: Progressing Towards Goal  Goal: *Developing strategies to address psychosocial issues  Description: Describe feelings about living with diabetes; identify support needed and support network  Outcome: Progressing Towards Goal  Goal: *Sick day guidelines  Outcome: Progressing Towards Goal  Goal: *Patient Specific Goal (EDIT GOAL, INSERT TEXT)  Outcome: Progressing Towards Goal     Problem: Patient Education: Go to Patient Education Activity  Goal: Patient/Family Education  Outcome: Progressing Towards Goal     Problem: Falls - Risk of  Goal: *Absence of Falls  Description: Document Baisden Fail Fall Risk and appropriate interventions in the flowsheet. Outcome: Progressing Towards Goal  Note: Fall Risk Interventions:  Mobility Interventions: Bed/chair exit alarm, Patient to call before getting OOB         Medication Interventions: Bed/chair exit alarm, Evaluate medications/consider consulting pharmacy, Patient to call before getting OOB    Elimination Interventions: Call light in reach, Bed/chair exit alarm, Patient to call for help with toileting needs, Toileting schedule/hourly rounds    History of Falls Interventions: Bed/chair exit alarm, Consult care management for discharge planning, Door open when patient unattended, Evaluate medications/consider consulting pharmacy, Room close to nurse's station         Problem: Patient Education: Go to Patient Education Activity  Goal: Patient/Family Education  Outcome: Progressing Towards Goal     Problem: Chronic Renal Failure  Goal: *Fluid and electrolytes stabilized  Outcome: Progressing Towards Goal     Problem: Patient Education: Go to Patient Education Activity  Goal: Patient/Family Education  Outcome: Progressing Towards Goal     Problem: Pressure Injury - Risk of  Goal: *Prevention of pressure injury  Description: Document Semaj Scale and appropriate interventions in the flowsheet.   Outcome: Progressing Towards Goal  Note: Pressure Injury Interventions:  Sensory Interventions: Keep linens dry and wrinkle-free, Pressure redistribution bed/mattress (bed type)    Moisture Interventions: Absorbent underpads, Maintain skin hydration (lotion/cream), Minimize layers    Activity Interventions: Pressure redistribution bed/mattress(bed type)    Mobility Interventions: HOB 30 degrees or less, Pressure redistribution bed/mattress (bed type), PT/OT evaluation, Turn and reposition approx.  every two hours(pillow and wedges)    Nutrition Interventions: Document food/fluid/supplement intake    Friction and Shear Interventions: Minimize layers, HOB 30 degrees or less                Problem: Patient Education: Go to Patient Education Activity  Goal: Patient/Family Education  Outcome: Progressing Towards Goal

## 2022-12-30 NOTE — PROGRESS NOTES
Red arterial port on dialysis catheter flushes but no blood return noted. Nephrologist made aware and Cathflo ordered. Pt's dialysis treatment successful today after lines were reversed. .3L removed. Will administer Cathflo on 12/30 so it is not left indwelling >24 hours. Next routine dialysis should be on 12/31.

## 2022-12-30 NOTE — PROGRESS NOTES
Nephrology Inpatient Progress note    Subjective:     Akiko Hartley is a 60 y.o. male with a PMHx of  DM, HTN. PVD, ESRD on HD TTS at [facility] under the [address] [place] [address]. Nephrology sent in for admission by wound care clinic due to left foot infection ,was told he needed surgery. Podiatry has been in to see pt. He has been on abx recently     S/P Lt TMA    Pt w/o F/C, CP/SOB.      Admit Date: 12/9/2022  Active Problems:    Diabetes mellitus with foot ulcer ([facility] (6/27/2022)      CAD (coronary artery disease) (6/28/2022)      ESRD (end stage renal disease) ([facility] (6/28/2022)      Hypertension (7/21/2022)      Leukocytosis (12/9/2022)      Diabetic foot infection ([facility] (12/9/2022)      Diarrhea (12/9/2022)      Type 2 diabetes mellitus, with long-term current use of insulin ([facility]) ()      Acute hematogenous osteomyelitis of left foot ([facility] (12/9/2022)      Nondisplaced fracture of second metatarsal bone of left foot (12/9/2022)      Nondisplaced fracture of third metatarsal bone of left foot (12/9/2022)      Closed nondisplaced fracture of fourth metatarsal bone of left foot (12/9/2022)      Positive blood culture (12/11/2022)      PAD (peripheral artery disease) ([facility] (12/27/2022)    Current Facility-Administered Medications   Medication Dose Route Frequency    [START ON 12/31/2022] trimethoprim-sulfamethoxazole (BACTRIM DS, SEPTRA DS) 160-800 mg per tablet 1 Tablet  1 Tablet Oral DIALYSIS TUE, THU & SAT    alteplase (CATHFLO) 2 mg in sterile water (preservative free) 2 mL injection  2 mg InterCATHeter PRN    fentaNYL citrate (PF) injection  mcg   mcg IntraVENous Rad Multiple    midazolam (VERSED) injection 0.5-2 mg  0.5-2 mg IntraVENous Rad Multiple    flumazeniL (ROMAZICON) 0.1 mg/mL injection 0.2 mg  0.2 mg IntraVENous PRN    naloxone (NARCAN) injection 0.4 mg  0.4 mg IntraVENous Rad Multiple    iopamidoL (ISOVUE 250) 250 mg iodine /mL (51 %) contrast injection 1-150 mL  1-150 mL IntraarTERial RAD CONTINUOUS    heparin (porcine) 1,000 unit/mL injection 1,000-10,000 Units  1,000-10,000 Units IntraVENous Rad Multiple    diphenhydrAMINE (BENADRYL) injection 25 mg  25 mg IntraVENous Rad Multiple    diphenhydrAMINE (BENADRYL) injection 12.5 mg  12.5 mg IntraVENous Q6H PRN    ammonium lactate (LAC-HYDRIN) 12 % lotion   Topical BID PRN    Saccharomyces boulardii (FLORASTOR) capsule 500 mg  500 mg Oral BID    vancomycin 50 mg/mL oral solution (compounded) 125 mg  125 mg Oral Q6H    insulin lispro (HUMALOG) injection   SubCUTAneous AC&HS    insulin glargine (LANTUS) injection 15 Units  15 Units SubCUTAneous QHS    heparin (porcine) 1,000 unit/mL injection 1,000-10,000 Units  1,000-10,000 Units IntraVENous Rad Multiple    nitroGLYcerin 0.1 mg/mL in D5W injection  1-5 mL IntraarTERial Rad Multiple    heparinized saline 2 units/mL infusion 2,000 Units  1,000 mL Irrigation RAD CONTINUOUS    iopamidoL (ISOVUE 250) 250 mg iodine /mL (51 %) contrast injection 1-150 mL  1-150 mL IntraarTERial RAD CONTINUOUS    epoetin lisette-epbx (RETACRIT) injection 8,000 Units  8,000 Units SubCUTAneous Q TUE, THU & SAT    loperamide (IMODIUM) capsule 2 mg  2 mg Oral Q4H PRN    nystatin (MYCOSTATIN) 100,000 unit/gram powder   Topical BID    alum-mag hydroxide-simeth (MYLANTA) oral suspension 30 mL  30 mL Oral Q4H PRN    glucose chewable tablet 16 g  16 g Oral PRN    glucagon (GLUCAGEN) injection 1 mg  1 mg IntraMUSCular PRN    heparin (porcine) 1,000 unit/mL injection 3,600 Units  3,600 Units IntraCATHeter DIALYSIS PRN    dextrose 10% infusion 0-250 mL  0-250 mL IntraVENous PRN    0.9% sodium chloride infusion  25 mL/hr IntraVENous DIALYSIS PRN    clopidogreL (PLAVIX) tablet 75 mg  75 mg Oral DAILY    carvediloL (COREG) tablet 12.5 mg  12.5 mg Oral BID    aspirin chewable tablet 81 mg  81 mg Oral DAILY    amLODIPine (NORVASC) tablet 10 mg  10 mg Oral DAILY    allopurinoL (ZYLOPRIM) tablet 100 mg  100 mg Oral DAILY    ascorbic acid (vitamin C) (VITAMIN C) tablet 500 mg  500 mg Oral DAILY    ezetimibe (ZETIA) tablet 10 mg  10 mg Oral DAILY    pantoprazole (PROTONIX) tablet 40 mg  40 mg Oral ACB    sevelamer carbonate (RENVELA) tab 1,600 mg  1,600 mg Oral TID WITH MEALS    vit B Cmplx 3-FA-Vit C-Biotin (NEPHRO NIKITA RX) tablet 1 Tablet  1 Tablet Oral DAILY    sodium chloride (NS) flush 5-40 mL  5-40 mL IntraVENous Q8H    sodium chloride (NS) flush 5-40 mL  5-40 mL IntraVENous PRN    acetaminophen (TYLENOL) tablet 650 mg  650 mg Oral Q6H PRN    Or    acetaminophen (TYLENOL) suppository 650 mg  650 mg Rectal Q6H PRN    bisacodyL (DULCOLAX) suppository 10 mg  10 mg Rectal DAILY PRN    promethazine (PHENERGAN) tablet 12.5 mg  12.5 mg Oral Q6H PRN    Or    ondansetron (ZOFRAN) injection 4 mg  4 mg IntraVENous Q6H PRN    heparin (porcine) injection 5,000 Units  5,000 Units SubCUTAneous Q8H    oxyCODONE-acetaminophen (PERCOCET) 5-325 mg per tablet 1 Tablet  1 Tablet Oral Q6H PRN         Allergy:   No Known Allergies     Objective:     Visit Vitals  BP (!) 144/62   Pulse 75   Temp 97.7 °F (36.5 °C)   Resp 18   Ht 6' (1.829 m)   Wt 97 kg (213 lb 13.5 oz)   SpO2 99%   BMI 29.00 kg/m²         Intake/Output Summary (Last 24 hours) at 12/30/2022 1425  Last data filed at 12/29/2022 1830  Gross per 24 hour   Intake --   Output 2500 ml   Net -2500 ml         Physical Exam:       General: No resp distress   HENT: Atraumatic and normocephalic   Eyes: Normal conjunctiva   Neck: Supple No JVD or mass   Cardiovascular: Normal S1 & S2, no m/r/g   Pulmonary/Chest Wall: Clear to auscultation bilaterally   Abdominal: Soft and +NABS   Musculoskeletal: No edema S/p Amputation of multiple toes Left foot   Neurological: No focal deficits    HD Access: [facility] +    Data Review:  Lab Results   Component Value Date/Time    Sodium 135 (L) 12/29/2022 06:21 AM    Potassium 6.1 (HH) 12/29/2022 06:21 AM    Chloride 97 (L) 12/29/2022 06:21 AM    CO2 29 12/29/2022 06:21 AM    Anion gap 9 12/29/2022 06:21 AM    Glucose 171 (H) 12/29/2022 06:21 AM    BUN 55 (H) 12/29/2022 06:21 AM    Creatinine 9.64 (H) 12/29/2022 06:21 AM    BUN/Creatinine ratio 6 (L) 12/29/2022 06:21 AM    GFR est AA 13 (L) 07/21/2022 02:15 PM    GFR est non-AA 11 (L) 07/21/2022 02:15 PM    Calcium 8.3 (L) 12/29/2022 06:21 AM     Lab Results   Component Value Date/Time    WBC 12.7 12/27/2022 05:50 AM    HGB 8.4 (L) 12/27/2022 05:50 AM    HCT 25.7 (L) 12/27/2022 05:50 AM    PLATELET 748 80/76/5325 05:50 AM    MCV 94.1 12/27/2022 05:50 AM     Lab Results   Component Value Date/Time    Calcium 8.3 (L) 12/29/2022 06:21 AM    Phosphorus 6.0 (H) 12/22/2022 04:49 AM     No results found for: IRON, FE, TIBC, IBCT, PSAT, FERR  No results found for: FERR      Impression:     ESRD on HD TTS schedule  Hyperkalemia, resolved  Left diabetic foot infection w/ gangrenous changes s/p Lt 2/3/4 metatarsal amputation   DM  HTN  PVD, seen by Vasc Surgery, s/p LLE angio 12/28  Anemia  SHPT    Plan:     HD tomorrow  Cont DALTON for Anemia        Emmy Alvarez MD,   Bonifacio Barrera

## 2022-12-30 NOTE — ROUTINE PROCESS
Bedside and Verbal shift change report given to Citlalli Sotelo RN (oncoming nurse) by JOANNA Herrera RN (offgoing nurse). Report included the following information SBAR, Kardex, ED Summary, OR Summary, Intake/Output, MAR, and Recent Results.

## 2022-12-30 NOTE — PROGRESS NOTES
Hospitalist Progress Note-critical care note     Patient: Gutierrez Simons MRN: [id number]  CSN: [id number]    YOB: 1960  Age: 60 y.o.   Sex: male    DOA: 12/9/2022 LOS:  LOS: 21 days            Chief complaint: pad , foot infection dm esrd     Assessment/Plan     Hospital Problems  Date Reviewed: 12/21/2022            Codes Class Noted POA    PAD (peripheral artery disease) ([facility] ICD-10-CM: I73.9  ICD-9-CM: 443.9  12/27/2022 Unknown        Positive blood culture ICD-10-CM: R78.81  ICD-9-CM: 790.7  12/11/2022 Unknown        Leukocytosis ICD-10-CM: D72.829  ICD-9-CM: 288.60  12/9/2022 Unknown        Diabetic foot infection ([facility] ICD-10-CM: E11.628, L08.9  ICD-9-CM: 250.80, 686.9  12/9/2022 Unknown        Diarrhea ICD-10-CM: R19.7  ICD-9-CM: 787.91  12/9/2022 Unknown        Type 2 diabetes mellitus, with long-term current use of insulin ([facility] ICD-10-CM: E11.9, Z79.4  ICD-9-CM: 250.00, V58.67  Unknown Unknown        Acute hematogenous osteomyelitis of left foot ([facility] ICD-10-CM: M86.072  ICD-9-CM: 730.07  12/9/2022 Unknown        Nondisplaced fracture of second metatarsal bone of left foot ICD-10-CM: B07.840Z  ICD-9-CM: 825.25  12/9/2022 Unknown        Nondisplaced fracture of third metatarsal bone of left foot ICD-10-CM: Y00.962U  ICD-9-CM: 825.25  12/9/2022 Unknown        Closed nondisplaced fracture of fourth metatarsal bone of left foot ICD-10-CM: S92.345A  ICD-9-CM: 825.25  12/9/2022 Unknown        Hypertension ICD-10-CM: I10  ICD-9-CM: 401.9  7/21/2022 Yes        CAD (coronary artery disease) ICD-10-CM: I25.10  ICD-9-CM: 414.00  6/28/2022 Yes        ESRD (end stage renal disease) ([facility] ICD-10-CM: N18.6  ICD-9-CM: 585.6  6/28/2022 Yes        Diabetes mellitus with foot ulcer ([facility] ICD-10-CM: E11.621, L97.509  ICD-9-CM: 250.80, 707.15  6/27/2022 Yes         60 y.o. male with history of diabetes, diabetic ulcer, CAD, hyperlipidemia, hypertension end-stage renal disease on HD presented to ER due to left foot lesion. Gangrene of left foot/DFU   PARTIAL AMPUTATION OF LEFT 2ND, 3RD, 4TH METATARSALS, AMPUTATION OF TOES 2,3,4, INCISION ADN DRAINAGE LEFT FOOT on 12/09/2022  S/p Left TMA 12/21/2022.    12/28/22: S/p:    1. US guided proximal left SFA access  2. Selective left lower extremity angiogram with runoff  3. Left PT balloon angioplasty (distal 2 mm x 200 mm; proximal 3 mm x 80 mm)    ID recommend Bactrim DS 1 tab PO post HD on TTS X10 days     History of C-diff -  On oral vancomycin to prevent recurrence-now hold per ID      Positive blood cx -  Coag negative staph  Likely contaminant contamination      CAD-   Distal RCA to drug-eluting stent in July 2022, continue plavix -   No chest pain     Hypertension -  continue home medication     End stage renal disease - hyperkalemia -hd today   on HD per nephrology-will have hd today      DM  type II -  Lantus and ssi     Long discission with pt regarding his ability to be safe on discharge at this point. He is not safe for DC home at this time. He lives alone and has no help. His mobility is much reduced and he is not at baseline. PT/OT are recommending rehab and I concur. I relayed this to the patient. He is very upset and insisted that he be allowed to go home for an hour and return. He became more irate when he realized that this was not possible. He finally angrily decided that he would agree with rehab placement. S: upset that he cannot be DC home    Review of systems:    General: No fevers or chills. Cardiovascular: No chest pain or pressure. No palpitations. Pulmonary: No shortness of breath. Gastrointestinal: No nausea, vomiting. Vital signs/Intake and Output:  Visit Vitals  BP (!) 144/62   Pulse 75   Temp 97.7 °F (36.5 °C)   Resp 18   Ht 6' (1.829 m)   Wt 97 kg (213 lb 13.5 oz)   SpO2 99%   BMI 29.00 kg/m²     Current Shift:  No intake/output data recorded.   Last three shifts:  12/28 1901 - 12/30 0700  In: -   Out: 2502 Physical Exam:  General: WD, WN. Alert, cooperative, no acute distress    HEENT: NC, Atraumatic. PERRLA, anicteric sclerae. Lungs: CTA Bilaterally. No Wheezing/Rhonchi/Rales. Heart:  Regular  rhythm,  No murmur, No Rubs, No Gallops  Abdomen: Soft, Non distended, Non tender. +Bowel sounds,   Extremities: No c/c, bilateral foot wrapped with ace bandage   Psych:   Not anxious or agitated. Neurologic:  No acute neurological deficit. Labs: Results:       Chemistry Recent Labs     12/29/22 0621 12/28/22 0257   * 155*   * 136   K 6.1* 5.0   CL 97* 97*   CO2 29 31   BUN 55* 39*   CREA 9.64* 7.34*   CA 8.3* 8.1*   AGAP 9 8   BUCR 6* 5*        CBC w/Diff No results for input(s): WBC, RBC, HGB, HCT, PLT, GRANS, LYMPH, EOS, HGBEXT, HCTEXT, PLTEXT, HGBEXT, HCTEXT, PLTEXT in the last 72 hours. Cardiac Enzymes No results for input(s): CPK, CKND1, PAULO in the last 72 hours. No lab exists for component: CKRMB, TROIP   Coagulation Recent Labs     12/28/22 0257   PTP 14.5   INR 1.1         Lipid Panel Lab Results   Component Value Date/Time    Cholesterol, total 188 07/19/2022 03:12 AM    HDL Cholesterol 42 07/19/2022 03:12 AM    LDL, calculated 131.2 (H) 07/19/2022 03:12 AM    VLDL, calculated 14.8 07/19/2022 03:12 AM    Triglyceride 74 07/19/2022 03:12 AM    CHOL/HDL Ratio 4.5 07/19/2022 03:12 AM      BNP No results for input(s): BNPP in the last 72 hours. Liver Enzymes No results for input(s): TP, ALB, TBIL, AP in the last 72 hours.     No lab exists for component: SGOT, GPT, DBIL     Thyroid Studies No results found for: T4, T3U, TSH, TSHEXT, TSHEXT     Procedures/imaging: see electronic medical records for all procedures/Xrays and details which were not copied into this note but were reviewed prior to creation of Plan    XR FOOT LT AP/LAT    Result Date: 12/22/2022  EXAM: XR FOOT LT AP/LAT CLINICAL INDICATION/HISTORY:  POST OP XRAY   > Additional: None COMPARISON: 12/9/2022 TECHNIQUE: AP and lateral views _______________ FINDINGS: Interval transmetatarsal amputation. The metatarsal stumps are not entirely sharp or well-defined. Midfoot ossicles appear intact and normally aligned with unremarkable hindfoot bones. Soft tissues covering the amputation stump are edematous. There are some gas bubbles within the soft tissues as would be expected following recent surgery. There is extensive arterial vascular calcification, Monckeberg sclerosis pattern, characteristic of long-standing diabetes. _______________     Status post TMA as described. The indistinctness of the metatarsal stumps is concerning for residual infection although ultimately nonspecific. Follow-up imaging suggested within several weeks for reassessment. XR FOOT LT AP/LAT    Result Date: 12/9/2022  EXAM: 2 radiographic views of the left foot. INDICATION: Left foot pain. COMPARISON: December 9, 2022 _______________ FINDINGS: There is been interval amputation of the second, third, and fourth rays at the level of the metatarsal diaphysis. As before, the first ray is amputated at the metatarsal phalangeal joint. The small toe remains. There are expected postoperative findings to include regional air density and soft tissue swelling. There is Monckeberg type vascular calcification. There is low-grade mid foot degeneration. _______________     Standard immediate postoperative findings. _______________     XR FOOT LT MIN 3 V    Result Date: 12/9/2022  EXAM: XR FOOT LT MIN 3 V CLINICAL INDICATION/HISTORY: Gangrenous 2nd digit   > Additional: None. COMPARISON: None. TECHNIQUE: 3 views left foot _______________ FINDINGS: Soft tissue gas formation is seen along the second digit with ill-defined limitus changes extending along the medial forefoot. Prior amputation first right. Patchy demineralization and osseous erosions are seen involving the distal portion of the first metatarsal head.  Additional patchy areas of osseous erosion are also noted involving the proximal phalanx of the second toe. Diffuse soft tissue swelling. Diffuse atherosclerotic vascular calcifications. No retained radiopaque foreign object. _______________     Soft gas formation and ulceration involving the medial forefoot with findings concerning for osteomyelitis involving the first and second rays described above. MRI FOOT LT WO CONT    Result Date: 12/9/2022  EXAM: MRI FOOT LT WO CONT CLINICAL INDICATION/HISTORY: Foot wound; preoperative  >Additional: None COMPARISON: Radiograph performed December 9, 2022  >Reference exam: None. TECHNIQUE: Axial long axis TI and T2 with fat saturation, sagittal and coronal short axis TI and STIR sequences through the foot were obtained without contrast. _______________ FINDINGS: FIRST RAY: Prior dictation the first digit. There is mild bony proliferative change at the head of the first metatarsal with preserved marrow signal. Minimal patchy edema is noted which is nonspecific. No definite cortical destructive change. SECOND RAY: Nondisplaced obliquely oriented fracture through the head neck junction of the second metatarsal. There is heterogeneous diminished T1 marrow with patchy edema and surrounding soft tissue gas along the course of the second digit with associated subluxation of the distal phalanx. Subtle diminished T1 marrow is noted with suspected foci of intraosseous gas, concerning for acute osteomyelitis. THIRD RAY: Nondisplaced fracture through the third metatarsal neck with slight impaction. Mild edema is noted within the third digit, possibly stress reaction. No convincing osseous erosions or T1 marrow signal change. FOURTH RAY: Nondisplaced fractures of the neck of the fourth metatarsal. Minimal edema in the proximal phalanx but otherwise preserved marrow signal. FIFTH RAY: Unremarkable.  Additional degenerative changes with patchy edema, joint space narrowing, and osteophyte formation at the midfoot, likely related to underlying Charcot arthropathy. SOFT TISSUES: Soft tissue irregularity with gas and ulceration around the second digit noted. Diffuse fluid signal seen throughout the intrinsic foot musculature, commonly seen in the setting of diabetes. Mild skin thickening about the forefoot. INCIDENTAL FINDINGS: None. _______________     1. Marrow signal abnormality with soft tissue wound and gas around the second digit concerning for acute osteomyelitis. 2.  Nondisplaced fractures of the head neck junction of the second-fourth metatarsals. 3.  Prior indication the first digit. 4.  Soft tissue swelling and skin thickening about the forefoot concerning for cellulitis. No drainable abscess. 5.  Additional chronic/degenerative changes of the foot. CT ABD PELV WO CONT    Result Date: 12/9/2022  EXAM: CT of the abdomen and pelvis INDICATION: Abdominal pain with distention. COMPARISON: None. TECHNIQUE: Axial CT imaging of the abdomen and pelvis was performed without intravenous contrast. Multiplanar reformats were generated. One or more dose reduction techniques were used on this CT: automated exposure control, adjustment of the mAs and/or kVp according to patient size, and iterative reconstruction techniques. The specific techniques used on this CT exam have been documented in the patient's electronic medical record. Digital Imaging and Communications in Medicine (DICOM) format image data are available to nonaffiliated external healthcare facilities or entities on a secure, media free, reciprocally searchable basis with patient authorization for at least a 12-month period after this study. _______________ FINDINGS: LOWER CHEST: Partial inclusion of multivessel coronary arterial atherosclerosis and central venous catheter. Clear lung bases. Several punctate 2 to 3 mm pulmonary nodule seen in the right lower lobe (image 8) LIVER, BILIARY: Liver is normal. No biliary dilation.  Color contains a gallstone without evidence of dilatation or gallbladder wall thickening. PANCREAS: Normal. SPLEEN: Punctate granulomatous calcifications otherwise unremarkable. ADRENALS: Mild adreniform thickening of each adrenal gland. KIDNEYS/URETERS/BLADDER: No hydronephrosis. Bilateral renal hypodensities are present either to small to accurately characterize or in keeping with simple cysts which are prior no further dedicated imaging follow-up. Bladder decompressed. PELVIC ORGANS: Unremarkable. VASCULATURE: Diffuse aortic and visceral arterial atherosclerosis without evidence of aneurysmal dilatation. LYMPH NODES: Scattered subcentimeter mesenteric and retroperitoneal lymph nodes are demonstrated without evidence of adenopathy. Prominent left inguinal lymph node is present (image 127) measuring approximately 15 mm in short axis dimension. GASTROINTESTINAL TRACT: No bowel dilation or wall thickening. Peritoneal gas. Normal appendix. Scattered colonic diverticula without evidence of diverticulitis. BONES: No acute or aggressive osseous abnormalities identified. OTHER: None. _______________     1. Cholelithiasis without evidence of cholecystitis. 2. No abnormal bowel wall thickening or dilatation. 3. No hydronephrosis or obstructive uropathy. 4. Several small right lower lobe pulmonary nodules (2-3 mm in size). See below guidelines for proposed follow-up. 5. Borderline enlarged and nonspecific left inguinal lymph node, potentially reactive in etiology. ======== Fleischner Society pulmonary nodule guidelines (revised 2017): Multiple solid nodules <6 mm: -Low risk for lung cancer: No follow-up. -High risk for lung cancer: Optional chest CT in 12 months. XR CHEST PORT    Result Date: 12/23/2022  EXAM: CHEST RADIOGRAPH CLINICAL INDICATION/HISTORY: sepsis -Additional: None COMPARISON: 12/9/2022 TECHNIQUE: Portable frontal view of the chest _______________ FINDINGS: SUPPORT DEVICES: Indwelling dialysis catheter, tip in the mid to lower SVC.  HEART AND MEDIASTINUM: No appreciable cardiomegaly. Remaining mediastinal contours within normal limits. LUNGS AND PLEURAL SPACES: Clear. No consolidation, mass or effusion. BONY THORAX AND SOFT TISSUES: Unremarkable. _______________     No active cardiopulmonary disease. XR CHEST PORT    Result Date: 12/9/2022  EXAM: XR CHEST PORT CLINICAL INDICATION/HISTORY: sepsis -Additional: None COMPARISON: July 12, 2022 TECHNIQUE: Portable frontal view of the chest _______________ FINDINGS: SUPPORT DEVICES: Right IJ approach dialysis catheter in stable position. HEART AND MEDIASTINUM: Stable appearing cardiac size and mediastinal contours. LUNGS AND PLEURAL SPACES: Lungs are clear. No focal pneumonic opacity. No pneumothorax or pleural effusion. BONY THORAX AND SOFT TISSUES: Unremarkable. _______________     No active cardiopulmonary disease. ANKLE BRACHIAL INDEX    Result Date: 12/21/2022  · Right ankle/brachial index is 1.19 · The left ankle/ brachial index is 0.89      ANKLE BRACHIAL INDEX    Result Date: 12/15/2022  Falsely elevated Ankle Brachial Index pressure measurements noted due to calcified vessels with abnormal waveforms bilaterally. Unable to get left brachial pressure due to dialysis access graft. The right toe/brachial index is 0.59  With a toe pressure of 82 mmhg. Unable to get left toe pressure due to left toes amputation. DUPLEX LOWER EXT ARTERY BILAT    Result Date: 12/15/2022   Elevated velocities (169 cm/s) in the left proximal femoral artery consistent with mild stenosis. Bilateral tibial arteries are patnet at the ankle. Bilateral anterior tibial artery is occluded proximally but reconstituted distally by the collateral flow.

## 2022-12-30 NOTE — PROGRESS NOTES
Problem: Mobility Impaired (Adult and Pediatric)  Goal: *Acute Goals and Plan of Care (Insert Text)  Description: Physical Therapy Goals   Updated 12/23/2022 and to be accomplished within 7 day(s)  1. Patient will move from supine <> sit with mod I in prep for out of bed activity and change of position. 2.  Patient will transfer from bed <> chair via slide board with min assist for time up in chair for completion of ADL activity. 3.  Patient will demonstrate ability to perform w/c management with SBA for functional w/c level mobility. 4.  Patient will demonstrate HEP performance for LE ROM/strengthening in order to achieve above goals and performance st discharge. Physical Therapy Goals  Initiated 12/11/2022  1. Patient will move from supine to sit and sit to supine  in bed with supervision/set-up within 7 day(s). 2.  Patient will transfer from bed to chair and chair to bed with minimal assistance/contact guard assist using the least restrictive device within 7 day(s). 3.  Patient will perform sit to stand with minimal assistance/contact guard assist within 7 day(s). 4.  Patient will ambulate with minimal assistance/contact guard assist for 10 feet with the least restrictive device within 7 day(s). Outcome: Progressing Towards Goal   []  Patient has met MD mobilization critieria for d/c home   []  Recommend HH with 24 hour adult care   [x]  Benefit from additional acute PT session to address:  rehab placement, safety and independence    PHYSICAL THERAPY TREATMENT    Patient: Danielito Thurston (12 y.o. male)  Date: 12/30/2022  Diagnosis: Diabetic foot infection ([facility] [E11.628, L08.9]  Leukocytosis [D72.829] Ischemic gangrene ([facility]  Procedure(s) (LRB):  AMPUTATION - TRANSMETATARSAL LEFT FOOT (Left) 9 Days Post-Op  Precautions: Fall, NWB  PLOF: Pt lives alone, w/c dependent, neighbor assists with getting w/c into home    ASSESSMENT:  Pt is adamant on returning home.   Pt able to manage bed mobility but transfer to/from w/c is poor and pt unable to maintain NWB on the LLE. Pt unable to stand with bed in lowest position or from w/c with use of RW. Pt transferred to/from w/c by scooting and thrusting self over. Pt navigated w/c 40ft , does not use w/c properly, pulls on furniture and wall railing to propel chair and when then instructed pt on how to use w/c with UEs, the pt fatigues very quickly and resorts back to pulling technique. Encouraged pt to reconsider rehab placement. Pt left sitting EOB. Progression toward goals:   []      Improving appropriately and progressing toward goals  [x]      Improving slowly and progressing toward goals  []      Not making progress toward goals and plan of care will be adjusted     PLAN:  Patient continues to benefit from skilled intervention to address the above impairments. Continue treatment per established plan of care. Discharge Recommendations: Rehab placement  Further Equipment Recommendations for Discharge:  N/A    AMPA: 14/24    This AMPA score should be considered in conjunction with interdisciplinary team recommendations to determine the most appropriate discharge setting. Patient's social support, diagnosis, medical stability, and prior level of function should also be taken into consideration. SUBJECTIVE:   Patient stated I am trying to get someone to help me at home.     OBJECTIVE DATA SUMMARY:   Critical Behavior:  Neurologic State: Alert  Orientation Level: Oriented X4  Cognition: Follows commands, Appropriate decision making, Appropriate safety awareness  Safety/Judgement: Awareness of environment  Functional Mobility Training:  Bed Mobility:    Supine to Sit: Supervision    Scooting: Contact guard assistance;Minimum assistance  Transfers:  Bed to Chair: Additional time;Minimum assistance  Balance:  Sitting: Intact  Sitting - Static: Fair (occasional)  Sitting - Dynamic: Fair (occasional)  Standing: Impaired; With support        Pain:  Pain level pre-treatment: 0/10  Pain level post-treatment: 0/10   Pain Intervention(s): Medication (see MAR); Rest, Ice, Repositioning   Response to intervention: Nurse notified, See doc flow    Activity Tolerance:   poor  Please refer to the flowsheet for vital signs taken during this treatment. After treatment:   [] Patient left in no apparent distress sitting up in chair  [x] Patient left in no apparent distress in bed  [x] Call bell left within reach  [x] Nursing notified  [] Caregiver present  [] Bed alarm activated  [] SCDs applied      COMMUNICATION/EDUCATION:   [x]         Role of Physical Therapy in the acute care setting. [x]         Fall prevention education was provided and the patient/caregiver indicated understanding. [x]         Patient/family have participated as able in working toward goals and plan of care. [x]         Patient/family agree to work toward stated goals and plan of care. []         Patient understands intent and goals of therapy, but is neutral about his/her participation. []         Patient is unable to participate in stated goals/plan of care: ongoing with therapy staff.  []         Other:        Luis Fisher PTA   Time Calculation: 14 mins    Antionette Jimenez AM-PAC® Basic Mobility Inpatient Short Form (6-Clicks) Version 2    How much HELP from another person does the patient currently need    (If the patient hasn't done an activity recently, how much help from another person do you think he/she would need if he/she tried?)   Total (Total A or Dep)   A Lot  (Mod to Max A)   A Little (Sup or Min A)   None (Mod I to I)   Turning from your back to your side while in a flat bed without using bedrails? [] 1 [] 2 [x] 3 [] 4   2. Moving from lying on your back to sitting on the side of a flat bed without using bedrails? [] 1 [] 2 [x] 3 [] 4   3. Moving to and from a bed to a chair (including a wheelchair)? [] 1 [] 2 [x] 3 [] 4   4.  Standing up from a chair using your arms (e.g., wheelchair, or bedside chair)? [] 1 [x] 2 [] 3 [] 4   5. Walking in hospital room? [x] 1 [] 2 [] 3 [] 4   6. Climbing 3-5 steps with a railing?+   [] 1 [x] 2 [] 3 [] 4   +If stair climbing cannot be assessed, skip item #6. Sum responses from items 1-5. Based on an AM-PAC score of **/24 (or **/20 if omitting stairs) and their current functional mobility deficits, it is recommended that the patient have 5-7 sessions per week of Physical Therapy at d/c to increase the patient's independence. Currently, this patient demonstrates the potential endurance, and/or tolerance for 3 hours of therapy each day at d/c. Based on an AM-PAC score of 14/24 (**/20 if omitting stairs) and their current functional mobility deficits, it is recommended that the patient have 3-5 sessions per week of Physical Therapy at d/c to increase the patient's independence. Based on an AM-PAC score of **/24 (or **/20 if omitting stairs) and their current functional mobility deficits, it is recommended that the patient have 2-3 sessions per week of Physical Therapy at d/c to increase the patient's independence. At this time and based on an AM-PAC score of **/24 (or **/20 if omitting stairs), no further PT is recommended upon discharge due to (i.e. patient at baseline functional statusetc). Recommend patient returns to prior setting with prior services.

## 2022-12-30 NOTE — PROGRESS NOTES
D/C Plan: SNF vs [facility] with physician follow up     Noted not accepting facilities at this time. CM met with pt at bedside to provide an update. Pt states he will be returning home with [facility]. CM offered [facility]. Pt has selected [facility]. CMS has been notified to assist.       CM spoke with therapy later today and have been notified their recommendation continues to be rehab. CM will follow up with pt again tomorrow to further discuss therapy recommendations.   CM to continue to follow and assist.

## 2022-12-31 LAB
ANION GAP SERPL CALC-SCNC: 12 MMOL/L (ref 3–18)
BUN SERPL-MCNC: 69 MG/DL (ref 7–18)
BUN/CREAT SERPL: 6 (ref 12–20)
CALCIUM SERPL-MCNC: 8.6 MG/DL (ref 8.5–10.1)
CHLORIDE SERPL-SCNC: 97 MMOL/L (ref 100–111)
CO2 SERPL-SCNC: 26 MMOL/L (ref 21–32)
CREAT SERPL-MCNC: 10.9 MG/DL (ref 0.6–1.3)
GLUCOSE BLD STRIP.AUTO-MCNC: 113 MG/DL (ref 70–110)
GLUCOSE BLD STRIP.AUTO-MCNC: 146 MG/DL (ref 70–110)
GLUCOSE BLD STRIP.AUTO-MCNC: 158 MG/DL (ref 70–110)
GLUCOSE BLD STRIP.AUTO-MCNC: 178 MG/DL (ref 70–110)
GLUCOSE SERPL-MCNC: 200 MG/DL (ref 74–99)
MAGNESIUM SERPL-MCNC: 2.3 MG/DL (ref 1.6–2.6)
POTASSIUM SERPL-SCNC: 5.8 MMOL/L (ref 3.5–5.5)
SODIUM SERPL-SCNC: 135 MMOL/L (ref 136–145)

## 2022-12-31 PROCEDURE — 74011000250 HC RX REV CODE- 250: Performed by: HOSPITALIST

## 2022-12-31 PROCEDURE — 90935 HEMODIALYSIS ONE EVALUATION: CPT

## 2022-12-31 PROCEDURE — 80048 BASIC METABOLIC PNL TOTAL CA: CPT

## 2022-12-31 PROCEDURE — 65270000029 HC RM PRIVATE

## 2022-12-31 PROCEDURE — 74011250636 HC RX REV CODE- 250/636: Performed by: HOSPITALIST

## 2022-12-31 PROCEDURE — 97530 THERAPEUTIC ACTIVITIES: CPT

## 2022-12-31 PROCEDURE — 74011636637 HC RX REV CODE- 636/637: Performed by: HOSPITALIST

## 2022-12-31 PROCEDURE — 36415 COLL VENOUS BLD VENIPUNCTURE: CPT

## 2022-12-31 PROCEDURE — 83735 ASSAY OF MAGNESIUM: CPT

## 2022-12-31 PROCEDURE — 74011250637 HC RX REV CODE- 250/637: Performed by: HOSPITALIST

## 2022-12-31 PROCEDURE — 74011250636 HC RX REV CODE- 250/636: Performed by: INTERNAL MEDICINE

## 2022-12-31 PROCEDURE — 82962 GLUCOSE BLOOD TEST: CPT

## 2022-12-31 PROCEDURE — 74011250637 HC RX REV CODE- 250/637: Performed by: INTERNAL MEDICINE

## 2022-12-31 RX ADMIN — SEVELAMER CARBONATE 1600 MG: 800 TABLET, FILM COATED ORAL at 12:36

## 2022-12-31 RX ADMIN — HEPARIN SODIUM 5000 UNITS: 5000 INJECTION INTRAVENOUS; SUBCUTANEOUS at 12:28

## 2022-12-31 RX ADMIN — EZETIMIBE 10 MG: 10 TABLET ORAL at 08:56

## 2022-12-31 RX ADMIN — NYSTATIN: 100000 POWDER TOPICAL at 22:52

## 2022-12-31 RX ADMIN — SEVELAMER CARBONATE 1600 MG: 800 TABLET, FILM COATED ORAL at 08:56

## 2022-12-31 RX ADMIN — HEPARIN SODIUM 5000 UNITS: 5000 INJECTION INTRAVENOUS; SUBCUTANEOUS at 01:51

## 2022-12-31 RX ADMIN — SEVELAMER CARBONATE 1600 MG: 800 TABLET, FILM COATED ORAL at 20:07

## 2022-12-31 RX ADMIN — Medication 15 UNITS: at 22:48

## 2022-12-31 RX ADMIN — CLOPIDOGREL BISULFATE 75 MG: 75 TABLET ORAL at 08:56

## 2022-12-31 RX ADMIN — AMLODIPINE BESYLATE 10 MG: 5 TABLET ORAL at 08:57

## 2022-12-31 RX ADMIN — Medication 2 UNITS: at 22:50

## 2022-12-31 RX ADMIN — NYSTATIN: 100000 POWDER TOPICAL at 12:29

## 2022-12-31 RX ADMIN — Medication 125 MG: at 20:03

## 2022-12-31 RX ADMIN — Medication 500 MG: at 08:57

## 2022-12-31 RX ADMIN — EPOETIN ALFA-EPBX 8000 UNITS: 4000 INJECTION, SOLUTION INTRAVENOUS; SUBCUTANEOUS at 22:51

## 2022-12-31 RX ADMIN — ALLOPURINOL 100 MG: 100 TABLET ORAL at 08:56

## 2022-12-31 RX ADMIN — PANTOPRAZOLE SODIUM 40 MG: 40 TABLET, DELAYED RELEASE ORAL at 06:43

## 2022-12-31 RX ADMIN — ASPIRIN 81 MG: 81 TABLET, CHEWABLE ORAL at 08:56

## 2022-12-31 RX ADMIN — CARVEDILOL 12.5 MG: 12.5 TABLET, FILM COATED ORAL at 08:56

## 2022-12-31 RX ADMIN — Medication 125 MG: at 13:51

## 2022-12-31 RX ADMIN — Medication 125 MG: at 01:50

## 2022-12-31 RX ADMIN — SODIUM CHLORIDE, PRESERVATIVE FREE 10 ML: 5 INJECTION INTRAVENOUS at 20:08

## 2022-12-31 RX ADMIN — CARVEDILOL 12.5 MG: 12.5 TABLET, FILM COATED ORAL at 20:08

## 2022-12-31 RX ADMIN — Medication 125 MG: at 06:02

## 2022-12-31 RX ADMIN — SODIUM CHLORIDE, PRESERVATIVE FREE 10 ML: 5 INJECTION INTRAVENOUS at 05:25

## 2022-12-31 RX ADMIN — NYSTATIN: 100000 POWDER TOPICAL at 01:53

## 2022-12-31 RX ADMIN — SULFAMETHOXAZOLE AND TRIMETHOPRIM 1 TABLET: 800; 160 TABLET ORAL at 20:07

## 2022-12-31 RX ADMIN — HEPARIN SODIUM 5000 UNITS: 5000 INJECTION INTRAVENOUS; SUBCUTANEOUS at 20:07

## 2022-12-31 RX ADMIN — Medication 500 MG: at 20:07

## 2022-12-31 NOTE — PROGRESS NOTES
Problem: Mobility Impaired (Adult and Pediatric)  Goal: *Acute Goals and Plan of Care (Insert Text)  Description: Physical Therapy Goals   Updated 12/23/2022 and to be accomplished within 7 day(s)  1. Patient will move from supine <> sit with mod I in prep for out of bed activity and change of position. 2.  Patient will transfer from bed <> chair via slide board with min assist for time up in chair for completion of ADL activity. 3.  Patient will demonstrate ability to perform w/c management with SBA for functional w/c level mobility. 4.  Patient will demonstrate HEP performance for LE ROM/strengthening in order to achieve above goals and performance st discharge. Physical Therapy Goals  Initiated 12/11/2022  1. Patient will move from supine to sit and sit to supine  in bed with supervision/set-up within 7 day(s). 2.  Patient will transfer from bed to chair and chair to bed with minimal assistance/contact guard assist using the least restrictive device within 7 day(s). 3.  Patient will perform sit to stand with minimal assistance/contact guard assist within 7 day(s). 4.  Patient will ambulate with minimal assistance/contact guard assist for 10 feet with the least restrictive device within 7 day(s). Outcome: Progressing Towards Goal   []  Patient has met MD mobilization critieria for d/c home   []  Recommend HH with 24 hour adult care   []  Benefit from additional acute PT session to address:  rehab placement    PHYSICAL THERAPY TREATMENT    Patient: Claudette Augusta (89 y.o. male)  Date: 12/31/2022  Diagnosis: Diabetic foot infection ([facility] [E11.628, L08.9]  Leukocytosis [D72.829] Ischemic gangrene ([facility]  Procedure(s) (LRB):  AMPUTATION - TRANSMETATARSAL LEFT FOOT (Left) 10 Days Post-Op  Precautions: Fall, NWB  PLOF: lives alone, w/c dependent    ASSESSMENT:  Pt supine in bed upon arrival.  Sat up EOB without use of bed rail but increased time needed to carry out.   Elevated bed to assist with sit to stand. ModA for sit to stand with a strong lean to the L when reaching for the RW.  2 attempts at sit to stand. Pt able to scoot laterally in bed and returned to supine without assist.  Progression toward goals:   []      Improving appropriately and progressing toward goals  [x]      Improving slowly and progressing toward goals  []      Not making progress toward goals and plan of care will be adjusted     PLAN:  Patient continues to benefit from skilled intervention to address the above impairments. Continue treatment per established plan of care. Discharge Recommendations: Rehab  Further Equipment Recommendations for Discharge:  N/A    AMPAC: 12/24    This AMPAC score should be considered in conjunction with interdisciplinary team recommendations to determine the most appropriate discharge setting. Patient's social support, diagnosis, medical stability, and prior level of function should also be taken into consideration. SUBJECTIVE:   Patient stated How long will it bed until I can put weight on this leg?     OBJECTIVE DATA SUMMARY:   Critical Behavior:  Neurologic State: Alert, Appropriate for age  Orientation Level: Oriented X4  Cognition: Follows commands, Appropriate decision making, Appropriate safety awareness  Safety/Judgement: Awareness of environment  Functional Mobility Training:  Bed Mobility:    Supine to Sit: Supervision; Additional time  Sit to Supine: Stand-by assistance  Scooting: Contact guard assistance  Transfers:  Sit to Stand: Moderate assistance  Stand to Sit: Contact guard assistance  Balance:  Sitting: Intact  Sitting - Static: Fair (occasional)  Sitting - Dynamic: Fair (occasional)  Standing: Impaired; With support  Standing - Static: Fair Humberto Leash/poor)         Pain:  Pain level pre-treatment: 0/10  Pain level post-treatment: 0/10   Pain Intervention(s): Medication (see MAR);  Rest, Ice, Repositioning   Response to intervention: Nurse notified, See doc flow    Activity Tolerance: Fair-  Please refer to the flowsheet for vital signs taken during this treatment. After treatment:   [] Patient left in no apparent distress sitting up in chair  [x] Patient left in no apparent distress in bed  [x] Call bell left within reach  [] Nursing notified  [] Caregiver present  [] Bed alarm activated  [] SCDs applied      COMMUNICATION/EDUCATION:   [x]         Role of Physical Therapy in the acute care setting. [x]         Fall prevention education was provided and the patient/caregiver indicated understanding. [x]         Patient/family have participated as able in working toward goals and plan of care. [x]         Patient/family agree to work toward stated goals and plan of care. []         Patient understands intent and goals of therapy, but is neutral about his/her participation. []         Patient is unable to participate in stated goals/plan of care: ongoing with therapy staff.  []         Other:        Irwin Pradhan PTA   Time Calculation: 14 mins    Anatoly Gonzalez AM-PAC® Basic Mobility Inpatient Short Form (6-Clicks) Version 2    How much HELP from another person does the patient currently need    (If the patient hasn't done an activity recently, how much help from another person do you think he/she would need if he/she tried?)   Total (Total A or Dep)   A Lot  (Mod to Max A)   A Little (Sup or Min A)   None (Mod I to I)   Turning from your back to your side while in a flat bed without using bedrails? [] 1 [] 2 [x] 3 [] 4   2. Moving from lying on your back to sitting on the side of a flat bed without using bedrails? [] 1 [] 2 [x] 3 [] 4   3. Moving to and from a bed to a chair (including a wheelchair)? [] 1 [x] 2 [] 3 [] 4   4. Standing up from a chair using your arms (e.g., wheelchair, or bedside chair)? [] 1 [x] 2 [] 3 [] 4   5. Walking in hospital room? [x] 1 [] 2 [] 3 [] 4   6.  Climbing 3-5 steps with a railing?+   [x] 1 [] 2 [] 3 [] 4   +If stair climbing cannot be assessed, skip item #6. Sum responses from items 1-5. Based on an AM-PAC score of **/24 (or **/20 if omitting stairs) and their current functional mobility deficits, it is recommended that the patient have 5-7 sessions per week of Physical Therapy at d/c to increase the patient's independence. Currently, this patient demonstrates the potential endurance, and/or tolerance for 3 hours of therapy each day at d/c. Based on an AM-PAC score of 12/24 (**/20 if omitting stairs) and their current functional mobility deficits, it is recommended that the patient have 3-5 sessions per week of Physical Therapy at d/c to increase the patient's independence. Based on an AM-PAC score of **/24 (or **/20 if omitting stairs) and their current functional mobility deficits, it is recommended that the patient have 2-3 sessions per week of Physical Therapy at d/c to increase the patient's independence. At this time and based on an AM-PAC score of **/24 (or **/20 if omitting stairs), no further PT is recommended upon discharge due to (i.e. patient at baseline functional statusetc). Recommend patient returns to prior setting with prior services.

## 2022-12-31 NOTE — PROGRESS NOTES
5647  Bedside shift change report given to Mecca GARCÍA RN (oncoming nurse) by Teo Rodriguez RN (offgoing nurse). Report included the following information SBAR, Kardex, Intake/Output, and MAR.     1916  Patient had uneventful shift. Bedside and verbal shift change report given to [address] [place] [address] by Linda Johnson RN. Report included SBAR, kardex, MAR, and recent results.

## 2022-12-31 NOTE — PROGRESS NOTES
Bedside and Verbal shift change report given to JOANNA Ramirez RN (oncoming nurse) by Fahad Can RN  (offgoing nurse). Report included the following information SBAR and Kardex.

## 2022-12-31 NOTE — PROGRESS NOTES
Hospitalist Progress Note    Patient: Lexy Yoon MRN: [id number]  CSN: [id number]    YOB: 1960  Age: 60 y.o.   Sex: male    DOA: 12/9/2022 LOS:  LOS: 22 days            Assessment/Plan   Gangrene of left foot sp partial amputation 2/3/4 metatarsals I&D  + blood culture- acquisition contaminant   ESRD on HD  CAD  HTN controlled  DM2  Ho cdiff    Plan:  - cmplete bactrim course  - basal/bolus insulin  - continue antihypertensives  - HD per nephrology   For SNF    Patient Active Problem List   Diagnosis Code    Diabetes mellitus with foot ulcer ([facility] E11.621, L97.509    CAD (coronary artery disease) I25.10    ESRD (end stage renal disease) ([facility] N18.6    Acute hematogenous osteomyelitis of right foot ([facility]) M86.071    Cellulitis of right heel L03.115    Diabetic foot ulcer with osteomyelitis ([facility] E11.621, E11.69, L97.509, M86.9    Ulcer of right heel, with necrosis of bone ([facility] L97.414    Infection and inflammatory reaction due to internal fixation device of other site, initial encounter [facility]) T84.69XA    Left arm swelling M79.89    Chest pain at rest R07.9    Abnormal nuclear stress test R94.39    History of anemia due to CKD N18.9, Z86.2    S/P angioplasty with stent Z95.820    Hypertension I10    Leukocytosis D72.829    Diabetic foot infection ([facility] E11.628, L08.9    Diarrhea R19.7    Type 2 diabetes mellitus, with long-term current use of insulin ([facility]) E11.9, Z79.4    Acute hematogenous osteomyelitis of left foot ([facility] M86.072    Nondisplaced fracture of second metatarsal bone of left foot S92.325A    Nondisplaced fracture of third metatarsal bone of left foot S92.335A    Closed nondisplaced fracture of fourth metatarsal bone of left foot S92.345A    Positive blood culture R78.81    PAD (peripheral artery disease) ([facility]) I73.9               Subjective:    cc: \" im doing fine\"  No acute events overnight  Complaints  Deanna pain  No diarrhea      REVIEW OF SYSTEMS:  General: No fevers or chills. Cardiovascular: No chest pain or pressure. No palpitations. Pulmonary: No shortness of breath. Gastrointestinal: No nausea, vomiting. Objective:        Vital signs/Intake and Output:  Visit Vitals  /62 (BP 1 Location: Right arm, BP Patient Position: At rest)   Pulse 72   Temp 98.3 °F (36.8 °C)   Resp 16   Ht 6' (1.829 m)   Wt 97 kg (213 lb 13.5 oz)   SpO2 100%   BMI 29.00 kg/m²     Current Shift:  No intake/output data recorded. Last three shifts:  12/29 1901 - 12/31 0700  In: 1140 [P.O.:1130; I.V.:10]  Out: 0     Body mass index is 29 kg/m².     Physical Exam:  GEN: Alert and oriented times three NAD  EYES: conjunctiva normal, lids with out lesions  HEENT: MMM, No thyromegaly, no lymphadenopathy  HEART: RRR +S1 +S2, no JVD, pulses 2+ distally  LUNGS: CTA B/L no wheezes, rales or rhonchi  ABDOMEN: + BS, soft NT/ND no organomegaly,  No rebound  EXTREMITIES: No edema cyanosis, cap refill normal, feet wrapped ci   SKIN: no rashes or skin breakdown, no nodules, normal turgor  Current Facility-Administered Medications   Medication Dose Route Frequency    trimethoprim-sulfamethoxazole (BACTRIM DS, SEPTRA DS) 160-800 mg per tablet 1 Tablet  1 Tablet Oral DIALYSIS TUE, THU & SAT    fentaNYL citrate (PF) injection  mcg   mcg IntraVENous Rad Multiple    midazolam (VERSED) injection 0.5-2 mg  0.5-2 mg IntraVENous Rad Multiple    flumazeniL (ROMAZICON) 0.1 mg/mL injection 0.2 mg  0.2 mg IntraVENous PRN    naloxone (NARCAN) injection 0.4 mg  0.4 mg IntraVENous Rad Multiple    iopamidoL (ISOVUE 250) 250 mg iodine /mL (51 %) contrast injection 1-150 mL  1-150 mL IntraarTERial RAD CONTINUOUS    heparin (porcine) 1,000 unit/mL injection 1,000-10,000 Units  1,000-10,000 Units IntraVENous Rad Multiple    diphenhydrAMINE (BENADRYL) injection 25 mg  25 mg IntraVENous Rad Multiple    diphenhydrAMINE (BENADRYL) injection 12.5 mg  12.5 mg IntraVENous Q6H PRN    ammonium lactate (LAC-HYDRIN) 12 % lotion   Topical BID PRN    Saccharomyces boulardii (FLORASTOR) capsule 500 mg  500 mg Oral BID    vancomycin 50 mg/mL oral solution (compounded) 125 mg  125 mg Oral Q6H    insulin lispro (HUMALOG) injection   SubCUTAneous AC&HS    insulin glargine (LANTUS) injection 15 Units  15 Units SubCUTAneous QHS    heparin (porcine) 1,000 unit/mL injection 1,000-10,000 Units  1,000-10,000 Units IntraVENous Rad Multiple    nitroGLYcerin 0.1 mg/mL in D5W injection  1-5 mL IntraarTERial Rad Multiple    heparinized saline 2 units/mL infusion 2,000 Units  1,000 mL Irrigation RAD CONTINUOUS    iopamidoL (ISOVUE 250) 250 mg iodine /mL (51 %) contrast injection 1-150 mL  1-150 mL IntraarTERial RAD CONTINUOUS    epoetin lisette-epbx (RETACRIT) injection 8,000 Units  8,000 Units SubCUTAneous Q TUE, THU & SAT    loperamide (IMODIUM) capsule 2 mg  2 mg Oral Q4H PRN    nystatin (MYCOSTATIN) 100,000 unit/gram powder   Topical BID    alum-mag hydroxide-simeth (MYLANTA) oral suspension 30 mL  30 mL Oral Q4H PRN    glucose chewable tablet 16 g  16 g Oral PRN    glucagon (GLUCAGEN) injection 1 mg  1 mg IntraMUSCular PRN    heparin (porcine) 1,000 unit/mL injection 3,600 Units  3,600 Units IntraCATHeter DIALYSIS PRN    dextrose 10% infusion 0-250 mL  0-250 mL IntraVENous PRN    0.9% sodium chloride infusion  25 mL/hr IntraVENous DIALYSIS PRN    clopidogreL (PLAVIX) tablet 75 mg  75 mg Oral DAILY    carvediloL (COREG) tablet 12.5 mg  12.5 mg Oral BID    aspirin chewable tablet 81 mg  81 mg Oral DAILY    amLODIPine (NORVASC) tablet 10 mg  10 mg Oral DAILY    allopurinoL (ZYLOPRIM) tablet 100 mg  100 mg Oral DAILY    ascorbic acid (vitamin C) (VITAMIN C) tablet 500 mg  500 mg Oral DAILY    ezetimibe (ZETIA) tablet 10 mg  10 mg Oral DAILY    pantoprazole (PROTONIX) tablet 40 mg  40 mg Oral ACB    sevelamer carbonate (RENVELA) tab 1,600 mg  1,600 mg Oral TID WITH MEALS    vit B Cmplx 3-FA-Vit C-Biotin (NEPHRO NIKITA RX) tablet 1 Tablet  1 Tablet Oral DAILY    sodium chloride (NS) flush 5-40 mL  5-40 mL IntraVENous Q8H    sodium chloride (NS) flush 5-40 mL  5-40 mL IntraVENous PRN    acetaminophen (TYLENOL) tablet 650 mg  650 mg Oral Q6H PRN    Or    acetaminophen (TYLENOL) suppository 650 mg  650 mg Rectal Q6H PRN    bisacodyL (DULCOLAX) suppository 10 mg  10 mg Rectal DAILY PRN    promethazine (PHENERGAN) tablet 12.5 mg  12.5 mg Oral Q6H PRN    Or    ondansetron (ZOFRAN) injection 4 mg  4 mg IntraVENous Q6H PRN    heparin (porcine) injection 5,000 Units  5,000 Units SubCUTAneous Q8H    oxyCODONE-acetaminophen (PERCOCET) 5-325 mg per tablet 1 Tablet  1 Tablet Oral Q6H PRN         All the patient's labs over the past 24 hours were reviewed both during my initial daily workflow process and at the time notated as \"note time\" in [facility]. (It is not time stamped separately in this workflow.)  Select labs are listed below.         Labs: Results:       Chemistry Recent Labs     12/29/22  0621   *   *   K 6.1*   CL 97*   CO2 29   BUN 55*   CREA 9.64*   CA 8.3*   AGAP 9   BUCR 6*                  Lipid Panel Lab Results   Component Value Date/Time    Cholesterol, total 188 07/19/2022 03:12 AM    HDL Cholesterol 42 07/19/2022 03:12 AM    LDL, calculated 131.2 (H) 07/19/2022 03:12 AM    VLDL, calculated 14.8 07/19/2022 03:12 AM    Triglyceride 74 07/19/2022 03:12 AM    CHOL/HDL Ratio 4.5 07/19/2022 03:12 AM                  Procedures/imaging: see electronic medical records for all procedures/Xrays and details which were not copied into this note but were reviewed prior to creation of Plan    Imaging personally reviewed:              Ronni Avila, DO  Internal Medicine/Geriatrics

## 2022-12-31 NOTE — PROGRESS NOTES
D/C Plan: SNF    CM met with pt at bedside to discuss care transition. Pt continues to indicated he will be returning home with [facility]. CM discussed concerns for safety with him not having any assistance. Pt states he will be fine. CM discussed safety and lack of support in the home for a safe transition as well as therapy recommendations. Pt would not make eye contact with CM. CM to continue to follow and assist.    1430:  CM has been notified by provider that pt is now agreeable to SNF. Pt has not accepting facilities at this time. Clinical information will be sent to all area SNF to assist with care transition. Anticipate pt will transition to SNF once an accepting facility has been identified and insurance auth has been obtained. CM to continue to follow and assist.    Care Management Interventions  PCP Verified by CM:  Yes  Mode of Transport at Discharge: BLS  Transition of Care Consult (CM Consult): SNF  Health Maintenance Reviewed: Yes  Physical Therapy Consult: Yes  Occupational Therapy Consult: Yes  Support Systems: Friend/Neighbor  The Plan for Transition of Care is Related to the Following Treatment Goals : SNF  The Patient and/or Patient Representative was Provided with a Choice of Provider and Agrees with the Discharge Plan?: Yes  Name of the Patient Representative Who was Provided with a Choice of Provider and Agrees with the Discharge Plan: pt  Freedom of Choice List was Provided with Basic Dialogue that Supports the Patient's Individualized Plan of Care/Goals, Treatment Preferences and Shares the Quality Data Associated with the Providers?: Yes  Discharge Location  Patient Expects to be Discharged to[de-identified] Skilled nursing facility

## 2022-12-31 NOTE — PROGRESS NOTES
2338  Bedside and verbal shift change report given to Carlota Figueroa RN (on coming nurse) by Tri Weldon RN (off going nurse). Report included the following information SBAR, Kardex, Intake/Output and MAR.    0723  Bedside and verbal shift change report given by KIM Tran (off going nurse) to KIM Hayes(on coming nurse). Report included the following information SBAR, Kardex, Intake/Output and MAR.

## 2022-12-31 NOTE — PROGRESS NOTES
Nephrology Inpatient Progress note    Subjective:     Claudette Augusta is a 60 y.o. male with a PMHx of  DM, HTN. PVD, ESRD on HD TTS at [facility] under the [address] [place] [address] Division . Nephrology sent in for admission by wound care clinic due to left foot infection ,was told he needed surgery. Podiatry has been in to see pt. He has been on abx recently     S/P Lt TMA    Pt has no new complaint. No F/C, CP, SOB at rest. Reports orthopnea. With good appetite.       Admit Date: 12/9/2022  Active Problems:    Diabetes mellitus with foot ulcer ([facility] (6/27/2022)      CAD (coronary artery disease) (6/28/2022)      ESRD (end stage renal disease) ([facility] (6/28/2022)      Hypertension (7/21/2022)      Leukocytosis (12/9/2022)      Diabetic foot infection ([facility] (12/9/2022)      Diarrhea (12/9/2022)      Type 2 diabetes mellitus, with long-term current use of insulin ([facility]) ()      Acute hematogenous osteomyelitis of left foot ([facility] (12/9/2022)      Nondisplaced fracture of second metatarsal bone of left foot (12/9/2022)      Nondisplaced fracture of third metatarsal bone of left foot (12/9/2022)      Closed nondisplaced fracture of fourth metatarsal bone of left foot (12/9/2022)      Positive blood culture (12/11/2022)      PAD (peripheral artery disease) ([facility] (12/27/2022)    Current Facility-Administered Medications   Medication Dose Route Frequency    vit B Cmplx 3-FA-Vit C-Biotin (NEPHRO NIKITA RX) tablet 1 Tablet  1 Tablet Oral DAILY    trimethoprim-sulfamethoxazole (BACTRIM DS, SEPTRA DS) 160-800 mg per tablet 1 Tablet  1 Tablet Oral DIALYSIS TUE, THU & SAT    fentaNYL citrate (PF) injection  mcg   mcg IntraVENous Rad Multiple    midazolam (VERSED) injection 0.5-2 mg  0.5-2 mg IntraVENous Rad Multiple    flumazeniL (ROMAZICON) 0.1 mg/mL injection 0.2 mg  0.2 mg IntraVENous PRN    naloxone (NARCAN) injection 0.4 mg  0.4 mg IntraVENous Rad Multiple    iopamidoL (ISOVUE 250) 250 mg iodine /mL (51 %) contrast injection 1-150 mL 1-150 mL IntraarTERial RAD CONTINUOUS    heparin (porcine) 1,000 unit/mL injection 1,000-10,000 Units  1,000-10,000 Units IntraVENous Rad Multiple    diphenhydrAMINE (BENADRYL) injection 25 mg  25 mg IntraVENous Rad Multiple    diphenhydrAMINE (BENADRYL) injection 12.5 mg  12.5 mg IntraVENous Q6H PRN    ammonium lactate (LAC-HYDRIN) 12 % lotion   Topical BID PRN    Saccharomyces boulardii (FLORASTOR) capsule 500 mg  500 mg Oral BID    vancomycin 50 mg/mL oral solution (compounded) 125 mg  125 mg Oral Q6H    insulin lispro (HUMALOG) injection   SubCUTAneous AC&HS    insulin glargine (LANTUS) injection 15 Units  15 Units SubCUTAneous QHS    heparin (porcine) 1,000 unit/mL injection 1,000-10,000 Units  1,000-10,000 Units IntraVENous Rad Multiple    nitroGLYcerin 0.1 mg/mL in D5W injection  1-5 mL IntraarTERial Rad Multiple    heparinized saline 2 units/mL infusion 2,000 Units  1,000 mL Irrigation RAD CONTINUOUS    iopamidoL (ISOVUE 250) 250 mg iodine /mL (51 %) contrast injection 1-150 mL  1-150 mL IntraarTERial RAD CONTINUOUS    epoetin lisette-epbx (RETACRIT) injection 8,000 Units  8,000 Units SubCUTAneous Q TUE, THU & SAT    loperamide (IMODIUM) capsule 2 mg  2 mg Oral Q4H PRN    nystatin (MYCOSTATIN) 100,000 unit/gram powder   Topical BID    alum-mag hydroxide-simeth (MYLANTA) oral suspension 30 mL  30 mL Oral Q4H PRN    glucose chewable tablet 16 g  16 g Oral PRN    glucagon (GLUCAGEN) injection 1 mg  1 mg IntraMUSCular PRN    heparin (porcine) 1,000 unit/mL injection 3,600 Units  3,600 Units IntraCATHeter DIALYSIS PRN    dextrose 10% infusion 0-250 mL  0-250 mL IntraVENous PRN    0.9% sodium chloride infusion  25 mL/hr IntraVENous DIALYSIS PRN    clopidogreL (PLAVIX) tablet 75 mg  75 mg Oral DAILY    carvediloL (COREG) tablet 12.5 mg  12.5 mg Oral BID    aspirin chewable tablet 81 mg  81 mg Oral DAILY    amLODIPine (NORVASC) tablet 10 mg  10 mg Oral DAILY    allopurinoL (ZYLOPRIM) tablet 100 mg  100 mg Oral DAILY ascorbic acid (vitamin C) (VITAMIN C) tablet 500 mg  500 mg Oral DAILY    ezetimibe (ZETIA) tablet 10 mg  10 mg Oral DAILY    pantoprazole (PROTONIX) tablet 40 mg  40 mg Oral ACB    sevelamer carbonate (RENVELA) tab 1,600 mg  1,600 mg Oral TID WITH MEALS    sodium chloride (NS) flush 5-40 mL  5-40 mL IntraVENous Q8H    sodium chloride (NS) flush 5-40 mL  5-40 mL IntraVENous PRN    acetaminophen (TYLENOL) tablet 650 mg  650 mg Oral Q6H PRN    Or    acetaminophen (TYLENOL) suppository 650 mg  650 mg Rectal Q6H PRN    bisacodyL (DULCOLAX) suppository 10 mg  10 mg Rectal DAILY PRN    promethazine (PHENERGAN) tablet 12.5 mg  12.5 mg Oral Q6H PRN    Or    ondansetron (ZOFRAN) injection 4 mg  4 mg IntraVENous Q6H PRN    heparin (porcine) injection 5,000 Units  5,000 Units SubCUTAneous Q8H    oxyCODONE-acetaminophen (PERCOCET) 5-325 mg per tablet 1 Tablet  1 Tablet Oral Q6H PRN         Allergy:   No Known Allergies     Objective:     Visit Vitals  BP (!) 130/57 (BP 1 Location: Right arm, BP Patient Position: At rest)   Pulse 73   Temp 98.3 °F (36.8 °C)   Resp 17   Ht 6' (1.829 m)   Wt 97 kg (213 lb 13.5 oz)   SpO2 100%   BMI 29.00 kg/m²       No intake or output data in the 24 hours ending 12/31/22 1859      Physical Exam:       General: No resp distress   HENT: Atraumatic and normocephalic   Eyes: Normal conjunctiva   Neck: Supple +JVD    Cardiovascular: Normal S1 & S2, no m/r/g   Pulmonary/Chest Wall: Clear to auscultation bilaterally   Abdominal: Soft and +NABS   Musculoskeletal: No edema S/p Amputation of multiple toes Left foot   Neurological: No focal deficits    HD Access: [facility] +    Data Review:  Lab Results   Component Value Date/Time    Sodium 135 (L) 12/29/2022 06:21 AM    Potassium 6.1 (HH) 12/29/2022 06:21 AM    Chloride 97 (L) 12/29/2022 06:21 AM    CO2 29 12/29/2022 06:21 AM    Anion gap 9 12/29/2022 06:21 AM    Glucose 171 (H) 12/29/2022 06:21 AM    BUN 55 (H) 12/29/2022 06:21 AM    Creatinine 9.64 (H) 12/29/2022 06:21 AM    BUN/Creatinine ratio 6 (L) 12/29/2022 06:21 AM    GFR est AA 13 (L) 07/21/2022 02:15 PM    GFR est non-AA 11 (L) 07/21/2022 02:15 PM    Calcium 8.3 (L) 12/29/2022 06:21 AM     Lab Results   Component Value Date/Time    WBC 12.7 12/27/2022 05:50 AM    HGB 8.4 (L) 12/27/2022 05:50 AM    HCT 25.7 (L) 12/27/2022 05:50 AM    PLATELET 493 90/85/8122 05:50 AM    MCV 94.1 12/27/2022 05:50 AM     Lab Results   Component Value Date/Time    Calcium 8.3 (L) 12/29/2022 06:21 AM    Phosphorus 6.0 (H) 12/22/2022 04:49 AM     No results found for: IRON, FE, TIBC, IBCT, PSAT, FERR  No results found for: FERR      Impression:     ESRD on HD TTS schedule  Hyperkalemia, resolved  Left diabetic foot infection w/ gangrenous changes s/p Lt 2/3/4 metatarsal amputation   DM  HTN  PVD, seen by [facility] Surgery, s/p LLE angio 12/28  Anemia  SHPT    Plan:     HD today  UF 3L as tolerated  Cont [facility] for Anemia  D/w dialysis staff, will cont follow        Meaghan Irene MD,   MINIMALLY INVASIVE SURGERY [facility]

## 2022-12-31 NOTE — PROGRESS NOTES
Problem: Falls - Risk of  Goal: *Absence of Falls  Description: Document Jo Monalisa Fall Risk and appropriate interventions in the flowsheet. Outcome: Progressing Towards Goal  Note: Fall Risk Interventions:  Mobility Interventions: OT consult for ADLs, PT Consult for mobility concerns, Utilize walker, cane, or other assistive device, Communicate number of staff needed for ambulation/transfer         Medication Interventions: Patient to call before getting OOB, Teach patient to arise slowly    Elimination Interventions: Call light in reach    History of Falls Interventions: Room close to nurse's station, Vital signs minimum Q4HRs X 24 hrs (comment for end date), Consult care management for discharge planning         Problem: Pressure Injury - Risk of  Goal: *Prevention of pressure injury  Description: Document Semaj Scale and appropriate interventions in the flowsheet.   Outcome: Progressing Towards Goal  Note: Pressure Injury Interventions:  Sensory Interventions: Keep linens dry and wrinkle-free, Pressure redistribution bed/mattress (bed type)    Moisture Interventions: Check for incontinence Q2 hours and as needed, Minimize layers, Absorbent underpads    Activity Interventions: Increase time out of bed, PT/OT evaluation, Pressure redistribution bed/mattress(bed type)    Mobility Interventions: Pressure redistribution bed/mattress (bed type), PT/OT evaluation    Nutrition Interventions: Document food/fluid/supplement intake, Offer support with meals,snacks and hydration    Friction and Shear Interventions: Minimize layers, HOB 30 degrees or less

## 2023-01-01 LAB
ANION GAP SERPL CALC-SCNC: 8 MMOL/L (ref 3–18)
BUN SERPL-MCNC: 44 MG/DL (ref 7–18)
BUN/CREAT SERPL: 6 (ref 12–20)
CALCIUM SERPL-MCNC: 8.6 MG/DL (ref 8.5–10.1)
CHLORIDE SERPL-SCNC: 99 MMOL/L (ref 100–111)
CO2 SERPL-SCNC: 29 MMOL/L (ref 21–32)
CREAT SERPL-MCNC: 7.56 MG/DL (ref 0.6–1.3)
GLUCOSE BLD STRIP.AUTO-MCNC: 171 MG/DL (ref 70–110)
GLUCOSE BLD STRIP.AUTO-MCNC: 182 MG/DL (ref 70–110)
GLUCOSE BLD STRIP.AUTO-MCNC: 204 MG/DL (ref 70–110)
GLUCOSE BLD STRIP.AUTO-MCNC: 223 MG/DL (ref 70–110)
GLUCOSE SERPL-MCNC: 211 MG/DL (ref 74–99)
MAGNESIUM SERPL-MCNC: 2.1 MG/DL (ref 1.6–2.6)
POTASSIUM SERPL-SCNC: 5.2 MMOL/L (ref 3.5–5.5)
SODIUM SERPL-SCNC: 136 MMOL/L (ref 136–145)

## 2023-01-01 PROCEDURE — 74011636637 HC RX REV CODE- 636/637: Performed by: HOSPITALIST

## 2023-01-01 PROCEDURE — 74011250636 HC RX REV CODE- 250/636: Performed by: HOSPITALIST

## 2023-01-01 PROCEDURE — 74011000250 HC RX REV CODE- 250: Performed by: HOSPITALIST

## 2023-01-01 PROCEDURE — 74011250637 HC RX REV CODE- 250/637: Performed by: HOSPITALIST

## 2023-01-01 PROCEDURE — 83735 ASSAY OF MAGNESIUM: CPT

## 2023-01-01 PROCEDURE — 82962 GLUCOSE BLOOD TEST: CPT

## 2023-01-01 PROCEDURE — 80048 BASIC METABOLIC PNL TOTAL CA: CPT

## 2023-01-01 PROCEDURE — 65270000029 HC RM PRIVATE

## 2023-01-01 PROCEDURE — 36415 COLL VENOUS BLD VENIPUNCTURE: CPT

## 2023-01-01 RX ORDER — FAMOTIDINE 20 MG/1
20 TABLET, FILM COATED ORAL DAILY
Status: DISCONTINUED | OUTPATIENT
Start: 2023-01-02 | End: 2023-01-06

## 2023-01-01 RX ADMIN — Medication 125 MG: at 19:54

## 2023-01-01 RX ADMIN — Medication 4 UNITS: at 18:11

## 2023-01-01 RX ADMIN — NYSTATIN: 100000 POWDER TOPICAL at 21:46

## 2023-01-01 RX ADMIN — HEPARIN SODIUM 5000 UNITS: 5000 INJECTION INTRAVENOUS; SUBCUTANEOUS at 10:29

## 2023-01-01 RX ADMIN — CARVEDILOL 12.5 MG: 12.5 TABLET, FILM COATED ORAL at 08:07

## 2023-01-01 RX ADMIN — ALLOPURINOL 100 MG: 100 TABLET ORAL at 08:07

## 2023-01-01 RX ADMIN — Medication 125 MG: at 00:12

## 2023-01-01 RX ADMIN — HEPARIN SODIUM 5000 UNITS: 5000 INJECTION INTRAVENOUS; SUBCUTANEOUS at 01:09

## 2023-01-01 RX ADMIN — SEVELAMER CARBONATE 1600 MG: 800 TABLET, FILM COATED ORAL at 12:58

## 2023-01-01 RX ADMIN — Medication 500 MG: at 08:08

## 2023-01-01 RX ADMIN — AMLODIPINE BESYLATE 10 MG: 5 TABLET ORAL at 08:07

## 2023-01-01 RX ADMIN — CARVEDILOL 12.5 MG: 12.5 TABLET, FILM COATED ORAL at 21:47

## 2023-01-01 RX ADMIN — CLOPIDOGREL BISULFATE 75 MG: 75 TABLET ORAL at 09:00

## 2023-01-01 RX ADMIN — Medication 4 UNITS: at 21:46

## 2023-01-01 RX ADMIN — SODIUM CHLORIDE, PRESERVATIVE FREE 10 ML: 5 INJECTION INTRAVENOUS at 05:29

## 2023-01-01 RX ADMIN — HEPARIN SODIUM 5000 UNITS: 5000 INJECTION INTRAVENOUS; SUBCUTANEOUS at 18:11

## 2023-01-01 RX ADMIN — Medication 500 MG: at 21:47

## 2023-01-01 RX ADMIN — Medication 125 MG: at 14:57

## 2023-01-01 RX ADMIN — Medication 125 MG: at 06:28

## 2023-01-01 RX ADMIN — EZETIMIBE 10 MG: 10 TABLET ORAL at 08:08

## 2023-01-01 RX ADMIN — Medication 2 UNITS: at 12:58

## 2023-01-01 RX ADMIN — SODIUM CHLORIDE, PRESERVATIVE FREE 10 ML: 5 INJECTION INTRAVENOUS at 21:51

## 2023-01-01 RX ADMIN — Medication 2 UNITS: at 08:20

## 2023-01-01 RX ADMIN — B-COMPLEX W/ C & FOLIC ACID TAB 1 MG 1 TABLET: 1 TAB at 10:29

## 2023-01-01 RX ADMIN — SEVELAMER CARBONATE 1600 MG: 800 TABLET, FILM COATED ORAL at 18:11

## 2023-01-01 RX ADMIN — SEVELAMER CARBONATE 1600 MG: 800 TABLET, FILM COATED ORAL at 08:06

## 2023-01-01 RX ADMIN — ASPIRIN 81 MG: 81 TABLET, CHEWABLE ORAL at 08:07

## 2023-01-01 RX ADMIN — Medication 500 MG: at 08:07

## 2023-01-01 RX ADMIN — NYSTATIN: 100000 POWDER TOPICAL at 10:30

## 2023-01-01 RX ADMIN — PANTOPRAZOLE SODIUM 40 MG: 40 TABLET, DELAYED RELEASE ORAL at 06:30

## 2023-01-01 RX ADMIN — SODIUM CHLORIDE, PRESERVATIVE FREE 10 ML: 5 INJECTION INTRAVENOUS at 14:58

## 2023-01-01 RX ADMIN — SODIUM CHLORIDE, PRESERVATIVE FREE 10 ML: 5 INJECTION INTRAVENOUS at 00:12

## 2023-01-01 RX ADMIN — Medication 15 UNITS: at 21:47

## 2023-01-01 NOTE — PROGRESS NOTES
ACUTE HEMODIALYSIS TREATMENT    HEMODIALYSIS ORDERS: Physician: Dr. Maxwell Guzman     Dialyzer: Revaclear   Duration: 3 hr   BFR: 400   DFR: 800   Dialysate:  Temp 36-37*C   K+  2    Ca+ 2.5   Na 138   Bicarb 35   Wt Readings from Last 1 Encounters:   12/31/22 97 kg (213 lb 13.5 oz)    Patient Chart [x]   Unable to Obtain []  Dry weight/UF Goal: 3000 ml    Heparin []  Bolus    Units    [] Hourly    Units    [x]None       Pre BP:   138/66   Pulse:  76   Respirations: 18   Temp:  97.9  [] Oral [] Axillary [] Esophageal   Labs: []  Pre  []  Post:   [x] N/A   Additional Orders(medications, blood products, hypotension management): [] Yes   [] No     [x]  Anoop Consent Verified     CATHETER ACCESS:  []N/A   [x]Right   []Left   []IJ   []Fem  [x]Chest wall  []TransHepatic   [] First use X-ray verified     [x]Tunnel    [] Non Tunneled   [x]No S/S infection  []Redness  []Drainage []Cultured []Swelling []Pain   [x]Medical Aseptic Prep Utilized   []Dressing Changed  [x] Biopatch  Date:    []Clotted   [x]Patent   Flows: [x]Good  []Poor  []Reversed   If access problem,  notified: [x]Yes    []N/A        GENERAL ASSESSMENT:    LUNGS:  Resp Rate 18   [x] Clear  [] Coarse  [] Crackles  [] Wheezing  [] Diminished         Respirations:  [x]Easy  []Labored  []N/A  Cough:  []Productive  []Dry  [x]N/A      Therapy:  [x]RA  O2 Type:  []NC  Mask: []  NRB    [] BiPaP  Flow:  l/min                   [] Ventilated  [] Intubated  [] Trach     CARDIAC: [x] Regular      [] Irregular   [] Rhythm:          [] Monitored   [] Bedside   [] Remotely monitored     EDEMA: [] None   [x]Generalized  [] Pitting [] 1+   [] 2 +   [] 3+    [] 4+  [] Pedal    SKIN:   [x] Warm  [] Hot     [] Cold   [x] Dry     [] Pale   [] Diaphoretic                  [] Flushed  [] Jaundiced  [] Cyanotic     LOC:    [x] Alert      [x]Oriented:    [x] Person     [x] Place  [x]Time               [] Confused  [] Lethargic  [] Medicated  [] Non-responsive  [] Non Verbal   GI / ABDOMEN:                     [] Flat    [] Distended    [x] Soft    [] Firm   []  Obese                   [] Diarrhea   [] FMS [x] Bowel Sounds  [] Nausea  [] Vomiting                   [] NGT  [] OGT    [] PEG  [] Tube Feedings @     / URINE ASSESSMENT:                   [] Voiding  []  Mcmahon  [x] Oliguria  [] Anuria                     [] Incontinent  []  Incontinent Brief   []  PureWick     PAIN:  [x] 0 []1  []2   []3   []4   []5   []6   []7   []8   []9   []10                MOBILITY:  [x] Bed    [] Stretcher      All Vitals and Treatment Details on Attached [address]: THE [facility]          Room # 327    [x] Routine         [] 1st Time Acute/Chronic   [] Urgent      [] Stat            [] Acute Room   [x]  Bedside    [] ICU/CCU     [] ER   Isolation Precautions:  [x] Dialysis    There are currently no Active Isolations     ALLERGIES:     No Known Allergies   Code Status:  Full Code     Hepatitis Status      Lab Results   Component Value Date/Time    Hepatitis B surface Ag <0.10 12/10/2022 06:33 AM    Hepatitis B surface Ab 23.52 12/10/2022 06:33 AM        Current Labs:      Lab Results   Component Value Date/Time    WBC 12.7 12/27/2022 05:50 AM    HGB 8.4 (L) 12/27/2022 05:50 AM    HCT 25.7 (L) 12/27/2022 05:50 AM    PLATELET 843 01/35/1162 05:50 AM    MCV 94.1 12/27/2022 05:50 AM     Lab Results   Component Value Date/Time    Sodium 135 (L) 12/31/2022 08:22 PM    Potassium 5.8 (H) 12/31/2022 08:22 PM    Chloride 97 (L) 12/31/2022 08:22 PM    CO2 26 12/31/2022 08:22 PM    Anion gap 12 12/31/2022 08:22 PM    Glucose 200 (H) 12/31/2022 08:22 PM    BUN 69 (H) 12/31/2022 08:22 PM    Creatinine 10.90 (H) 12/31/2022 08:22 PM    BUN/Creatinine ratio 6 (L) 12/31/2022 08:22 PM    GFR est AA 13 (L) 07/21/2022 02:15 PM    GFR est non-AA 11 (L) 07/21/2022 02:15 PM    Calcium 8.6 12/31/2022 08:22 PM          DIET:  DIET ADULT  DIET ADULT ORAL NUTRITION SUPPLEMENT     PRIMARY NURSE REPORT:   Pre Dialysis:  RN    Time: 1830     EDUCATION:    [x] Patient           Knowledge Basis: []None [x]Minimal [] Substantial [] Unknown  Barriers to learning  [x]N/A  [] Intubated/Trached/Ventilated  [] Sedated/Paralyzed   [] Access Care     [] S&S of infection  [] Fluid Management  [] K+   [x] Procedural    [] Medications   [] Tx Options   [] Transplant   [] Diet      Teaching Tools:  [x] Explain  [] Demo  [] Handouts [] Video  Patient response: [] Verbalized understanding   [x] Requires follow up        [x] Time Out/Safety Check    [x] Extracorporeal Circuit Tested for integrity       RO/HEMODIALYSIS MACHINE SAFETY CHECKS - Before each treatment:        THE [facility]                                                                     [x] Portable Machine #3 [id number] /RO serial # [id number]                                                                                                     Alarm Test:  Pass time   1840          [x] RO/Machine Log Complete    Machine Temp    36-37*C             Dialysate: pH 7.4    Conductivity: Meter 14   HD Machine  14.1     TCD: 14  Dialyzer Lot # [id number]     Blood Tubing Lot # [id number]    Saline Lot # [id number]     CHLORINE TESTING-Before each treatment and every 4 hours    Total Chlorine: [x] less than 0.1 ppm  Initial Time Check: 1850      4 Hr/2nd Check Time:    (if greater than 0.1 ppm from Primary then every 30 minutes from Secondary)     TREATMENT INITIATION - with Dialysis Precautions:   [x] All Connections Secured              [x] Saline Line Double Clamped   [x] Venous Parameters Set               [x] Arterial Parameters Set    [x] Prime Given 250ml NSS              [x]Air Foam Detector Engaged      Treatment Initiation Note:   Received patient in bed awake a/o x4 pleasant, c/o itching in his bottom. VS stable for treatment. Right chest TDC Accessed red port aspirated off indwelling TPA infused previous night. Blue port aspirates and flushes well. Treatment initiated.     Dr. Lowell Nj in the room gave order to take off 3L as tolerated. During Treatment Notes:  1904  Treatment started. 1915  Vascular access visible with arterial and venous line connections intact. Pt resting comfortably. 1930  Vascular access visible with arterial and venous line connections intact. Pt resting comfortably. 1945  Vascular access visible with arterial and venous line connections intact. Pt resting comfortably. 2000  Vascular access visible with arterial and venous line connections intact. Pt resting comfortably. 2015  Vascular access visible with arterial and venous line connections intact. Pt resting comfortably. 2030  Vascular access visible with arterial and venous line connections intact. Pt resting comfortably. 2045  Vascular access visible with arterial and venous line connections intact. Pt resting comfortably. 2100  Vascular access visible with arterial and venous line connections intact. Pt resting comfortably. 2115  Vascular access visible with arterial and venous line connections intact. Pt resting comfortably. 2130  Vascular access visible with arterial and venous line connections intact. Pt resting comfortably. 2145  Vascular access visible with arterial and venous line connections intact. Pt resting comfortably. 2200  Vascular access visible with arterial and venous line connections intact. Pt resting comfortably. 2221 Dialysis treatment complete.        Medication Dose Volume Route Time [facility] Nurse, Title      HD  Robbie Coburn, RN      SREEDHAR Coburn, RN      SREEDHAR Coburn, RN     Post Assessment  Dialyzer Cleared:   [] Good  [x] Fair  [] Poor  Blood processed:  56 L  UF Removed:  3000 Ml    Post /82   Pulse  83 Resp  18  Temp 98 Lungs: [x] Clear [] Course  [] Crackles                 []  Wheezing   [] Diminished   Post Tx Vascular Access: [x] N/A Cardiac :[x] Regular   [] Irregular   Rhythm:  [x] Monitored   [] Not Monitored    CVC Catheter: [] N/A  Locking solution: Heparin 1:1000 U  Arterial port 1.8 ml   Venous port 1.8 ml   Edema:  [] None  [x] Generalized                     Skin:[x] Warm  [x] Dry [] Diaphoretic               [] Flushed  [] Pale [] Cyanotic Pain:  [x]0  []1 []2  []3 []4  []5  []6  []7 []8    []9  []10     Post Treatment Note:    Patient completed and  tolerated 3 hrs of hemo dialysis. 3000 ml of fluid taken off. Catheter patent and intact flushed and locked with heparin.      POST TREATMENT PRIMARY NURSE HANDOFF REPORT:   Post Dialysis: Maxime Castaneda  RN               Time:  65       Abbreviations: AVG-arterial venous graft, AVF-arterial venous fistula, IJ-Internal Jugular, Subcl-Subclavian, Fem-Femoral, Tx-treatment, AP/HR-apical heart rate, VSS- Vital Signs Stable, CVC- Central Venous Catheter, DFR-dialysate flow rate, BFR-blood flow rate, AP-arterial pressure, -venous pressure, UF-ultrafiltrate, TMP-transmembrane pressure, Avtar-Venous, Art-Arterial, RO-Reverse Osmosis

## 2023-01-01 NOTE — PROGRESS NOTES
2323  Bedside and verbal shift change report given to Adi Acosta RN (on coming nurse) by Shawna Fisher RN (off going nurse). Report included the following information SBAR, Kardex, Intake/Output and MAR.    0512  Pt's /66. Will administer held carvedilol tb 12.5 mg due to hemodialysis on 2008, 12/31/22.    0659  Pt's /59.    0741  Bedside and verbal shift change report given by KIM Tran (off going nurse) to KIM Hayes(on coming nurse). Report included the following information SBAR, Kardex, Intake/Output and MAR.

## 2023-01-01 NOTE — PROGRESS NOTES
Hospitalist Progress Note    Patient: Jus Ospina MRN: [id number]  CSN: [id number]    YOB: 1960  Age: 60 y.o.   Sex: male    DOA: 12/9/2022 LOS:  LOS: 23 days            Assessment/Plan   Gangrene of left foot sp partial amputation 2/3/4 metatarsals I&D  + blood culture- acquisition contaminant   ESRD on HD  CAD  HTN controlled  DM2  Ho cdiff    Plan:  cmplete bactrim course  - basal/bolus insulin  - continue antihypertensives  - HD per nephrology  - chagne PPI to pepcid given Cdiff    For SNF      Patient Active Problem List   Diagnosis Code    Diabetes mellitus with foot ulcer ([facility] E11.621, L97.509    CAD (coronary artery disease) I25.10    ESRD (end stage renal disease) ([facility] [facility] N18.6    Acute hematogenous osteomyelitis of right foot ([address] M86.071    Cellulitis of right heel L03.115    Diabetic foot ulcer with osteomyelitis ([facility] [facility] E11.621, E11.69, L97.509, M86.9    Ulcer of right heel, with necrosis of bone ([address] L97.414    Infection and inflammatory reaction due to internal fixation device of other site, initial encounter [facility]) T84.69XA    Left arm swelling M79.89    Chest pain at rest R07.9    Abnormal nuclear stress test R94.39    History of anemia due to CKD N18.9, Z86.2    S/P angioplasty with stent Z95.820    Hypertension I10    Leukocytosis D72.829    Diabetic foot infection ([address] E11.628, L08.9    Diarrhea R19.7    Type 2 diabetes mellitus, with long-term current use of insulin ([facility]) E11.9, Z79.4    Acute hematogenous osteomyelitis of left foot ([facility] M86.072    Nondisplaced fracture of second metatarsal bone of left foot S92.325A    Nondisplaced fracture of third metatarsal bone of left foot S92.335A    Closed nondisplaced fracture of fourth metatarsal bone of left foot S92.345A    Positive blood culture R78.81    PAD (peripheral artery disease) ([facility]) I73.9               Subjective:    cc: \" I need to get out of here\"  No acute events overnight  Denies physical complaints today      REVIEW OF SYSTEMS:  General: No fevers or chills. Cardiovascular: No chest pain or pressure. No palpitations. Pulmonary: No shortness of breath. Gastrointestinal: No nausea, vomiting. Objective:        Vital signs/Intake and Output:  Visit Vitals  /60 (BP 1 Location: Right upper arm)   Pulse 72   Temp 98.8 °F (37.1 °C)   Resp 20   Ht 6' (1.829 m)   Wt 95.3 kg (210 lb 1.6 oz)   SpO2 94%   BMI 28.49 kg/m²     Current Shift:  No intake/output data recorded. Last three shifts:  12/30 1901 - 01/01 0700  In: -   Out: 3000     Body mass index is 28.49 kg/m².     Physical Exam:  GEN: Alert and oriented times three NAD  EYES: conjunctiva normal, lids with out lesions  HEENT: MMM, No thyromegaly, no lymphadenopathy  HEART: RRR +S1 +S2, no JVD, pulses 2+ distally  LUNGS: CTA B/L no wheezes, rales or rhonchi  ABDOMEN: + BS, soft NT/ND no organomegaly,  No rebound  EXTREMITIES: No edema cyanosis, cap refill normal feet bandaged cdi   SKIN: no rashes or skin breakdown, no nodules, normal turgor  Current Facility-Administered Medications   Medication Dose Route Frequency    [START ON 1/2/2023] famotidine (PEPCID) tablet 20 mg  20 mg Oral DAILY    vit B Cmplx 3-FA-Vit C-Biotin (NEPHRO NIKITA RX) tablet 1 Tablet  1 Tablet Oral DAILY    trimethoprim-sulfamethoxazole (BACTRIM DS, SEPTRA DS) 160-800 mg per tablet 1 Tablet  1 Tablet Oral DIALYSIS TUE, THU & SAT    fentaNYL citrate (PF) injection  mcg   mcg IntraVENous Rad Multiple    midazolam (VERSED) injection 0.5-2 mg  0.5-2 mg IntraVENous Rad Multiple    flumazeniL (ROMAZICON) 0.1 mg/mL injection 0.2 mg  0.2 mg IntraVENous PRN    naloxone (NARCAN) injection 0.4 mg  0.4 mg IntraVENous Rad Multiple    iopamidoL (ISOVUE 250) 250 mg iodine /mL (51 %) contrast injection 1-150 mL  1-150 mL IntraarTERial RAD CONTINUOUS    heparin (porcine) 1,000 unit/mL injection 1,000-10,000 Units  1,000-10,000 Units IntraVENous Rad Multiple diphenhydrAMINE (BENADRYL) injection 25 mg  25 mg IntraVENous Rad Multiple    diphenhydrAMINE (BENADRYL) injection 12.5 mg  12.5 mg IntraVENous Q6H PRN    ammonium lactate (LAC-HYDRIN) 12 % lotion   Topical BID PRN    Saccharomyces boulardii (FLORASTOR) capsule 500 mg  500 mg Oral BID    vancomycin 50 mg/mL oral solution (compounded) 125 mg  125 mg Oral Q6H    insulin lispro (HUMALOG) injection   SubCUTAneous AC&HS    insulin glargine (LANTUS) injection 15 Units  15 Units SubCUTAneous QHS    heparin (porcine) 1,000 unit/mL injection 1,000-10,000 Units  1,000-10,000 Units IntraVENous Rad Multiple    nitroGLYcerin 0.1 mg/mL in D5W injection  1-5 mL IntraarTERial Rad Multiple    heparinized saline 2 units/mL infusion 2,000 Units  1,000 mL Irrigation RAD CONTINUOUS    iopamidoL (ISOVUE 250) 250 mg iodine /mL (51 %) contrast injection 1-150 mL  1-150 mL IntraarTERial RAD CONTINUOUS    epoetin lisette-epbx (RETACRIT) injection 8,000 Units  8,000 Units SubCUTAneous Q TUE, THU & SAT    loperamide (IMODIUM) capsule 2 mg  2 mg Oral Q4H PRN    nystatin (MYCOSTATIN) 100,000 unit/gram powder   Topical BID    alum-mag hydroxide-simeth (MYLANTA) oral suspension 30 mL  30 mL Oral Q4H PRN    glucose chewable tablet 16 g  16 g Oral PRN    glucagon (GLUCAGEN) injection 1 mg  1 mg IntraMUSCular PRN    heparin (porcine) 1,000 unit/mL injection 3,600 Units  3,600 Units IntraCATHeter DIALYSIS PRN    dextrose 10% infusion 0-250 mL  0-250 mL IntraVENous PRN    0.9% sodium chloride infusion  25 mL/hr IntraVENous DIALYSIS PRN    clopidogreL (PLAVIX) tablet 75 mg  75 mg Oral DAILY    carvediloL (COREG) tablet 12.5 mg  12.5 mg Oral BID    aspirin chewable tablet 81 mg  81 mg Oral DAILY    amLODIPine (NORVASC) tablet 10 mg  10 mg Oral DAILY    allopurinoL (ZYLOPRIM) tablet 100 mg  100 mg Oral DAILY    ascorbic acid (vitamin C) (VITAMIN C) tablet 500 mg  500 mg Oral DAILY    ezetimibe (ZETIA) tablet 10 mg  10 mg Oral DAILY    sevelamer carbonate (RENVELA) tab 1,600 mg  1,600 mg Oral TID WITH MEALS    sodium chloride (NS) flush 5-40 mL  5-40 mL IntraVENous Q8H    sodium chloride (NS) flush 5-40 mL  5-40 mL IntraVENous PRN    acetaminophen (TYLENOL) tablet 650 mg  650 mg Oral Q6H PRN    Or    acetaminophen (TYLENOL) suppository 650 mg  650 mg Rectal Q6H PRN    bisacodyL (DULCOLAX) suppository 10 mg  10 mg Rectal DAILY PRN    promethazine (PHENERGAN) tablet 12.5 mg  12.5 mg Oral Q6H PRN    Or    ondansetron (ZOFRAN) injection 4 mg  4 mg IntraVENous Q6H PRN    heparin (porcine) injection 5,000 Units  5,000 Units SubCUTAneous Q8H    oxyCODONE-acetaminophen (PERCOCET) 5-325 mg per tablet 1 Tablet  1 Tablet Oral Q6H PRN         All the patient's labs over the past 24 hours were reviewed both during my initial daily workflow process and at the time notated as \"note time\" in [facility]. (It is not time stamped separately in this workflow.)  Select labs are listed below.         Labs: Results:       Chemistry Recent Labs     01/01/23  0352 12/31/22 2022   * 200*    135*   K 5.2 5.8*   CL 99* 97*   CO2 29 26   BUN 44* 69*   CREA 7.56* 10.90*   CA 8.6 8.6   AGAP 8 12   BUCR 6* 6*                  Lipid Panel Lab Results   Component Value Date/Time    Cholesterol, total 188 07/19/2022 03:12 AM    HDL Cholesterol 42 07/19/2022 03:12 AM    LDL, calculated 131.2 (H) 07/19/2022 03:12 AM    VLDL, calculated 14.8 07/19/2022 03:12 AM    Triglyceride 74 07/19/2022 03:12 AM    CHOL/HDL Ratio 4.5 07/19/2022 03:12 AM                  Procedures/imaging: see electronic medical records for all procedures/Xrays and details which were not copied into this note but were reviewed prior to creation of [address] [place] [address],   Internal Medicine/Geriatrics

## 2023-01-01 NOTE — PROGRESS NOTES
Dialysis ongoing at this time    2230 Dialysis done - 3 li taken off per dialysis nurse, pt resting and finishing dinner, no complaints, ashutosh care done

## 2023-01-01 NOTE — PROGRESS NOTES
Podiatry:    Patient seen today at bedside for left foot wound. It is stable with slight seeping/bleeding from the distal incision. Parts of the skin flap have turned dark with a dry eschar. No signs of infection. He is status post a second Angioplasty of the left lower extremity, by Dr. Gurdeep Crenshaw, which should help us to heal his left foot. I think he would benefit from a debridement and application of Stravix graft left foot and a wound VAC. I could do this on Wednesday evening. It will take a few days to get the grafts. I have already ordered them.

## 2023-01-01 NOTE — PROGRESS NOTES
Nephrology Inpatient Progress note    Subjective:     Sravan Church is a 60 y.o. male with a PMHx of  DM, HTN. PVD, ESRD on HD TTS at [facility] under the [address] [place] [address]. Nephrology sent in for admission by wound care clinic due to left foot infection ,was told he needed surgery. Podiatry has been in to see pt. He has been on abx recently     S/P Lt TMA    Pt has no complaint today. 3L removed on HD yesterday. Orthopnea improved.      Admit Date: 12/9/2022  Active Problems:    Diabetes mellitus with foot ulcer ([facility] [address] (6/27/2022)      CAD (coronary artery disease) (6/28/2022)      ESRD (end stage renal disease) ([facility] [facility] (6/28/2022)      Hypertension (7/21/2022)      Leukocytosis (12/9/2022)      Diabetic foot infection ([facility] [facility] (12/9/2022)      Diarrhea (12/9/2022)      Type 2 diabetes mellitus, with long-term current use of insulin ([facility]) ()      Acute hematogenous osteomyelitis of left foot ([facility] [address] (12/9/2022)      Nondisplaced fracture of second metatarsal bone of left foot (12/9/2022)      Nondisplaced fracture of third metatarsal bone of left foot (12/9/2022)      Closed nondisplaced fracture of fourth metatarsal bone of left foot (12/9/2022)      Positive blood culture (12/11/2022)      PAD (peripheral artery disease) ([facility] [address] [facility] (12/27/2022)    Current Facility-Administered Medications   Medication Dose Route Frequency    vit B Cmplx 3-FA-Vit C-Biotin (NEPHRO NIKITA RX) tablet 1 Tablet  1 Tablet Oral DAILY    trimethoprim-sulfamethoxazole (BACTRIM DS, SEPTRA DS) 160-800 mg per tablet 1 Tablet  1 Tablet Oral DIALYSIS TUE, THU & SAT    fentaNYL citrate (PF) injection  mcg   mcg IntraVENous Rad Multiple    midazolam (VERSED) injection 0.5-2 mg  0.5-2 mg IntraVENous Rad Multiple    flumazeniL (ROMAZICON) 0.1 mg/mL injection 0.2 mg  0.2 mg IntraVENous PRN    naloxone (NARCAN) injection 0.4 mg  0.4 mg IntraVENous Rad Multiple    iopamidoL (ISOVUE 250) 250 mg iodine /mL (51 %) contrast injection 1-150 mL  1-150 mL IntraarTERial RAD CONTINUOUS    heparin (porcine) 1,000 unit/mL injection 1,000-10,000 Units  1,000-10,000 Units IntraVENous Rad Multiple    diphenhydrAMINE (BENADRYL) injection 25 mg  25 mg IntraVENous Rad Multiple    diphenhydrAMINE (BENADRYL) injection 12.5 mg  12.5 mg IntraVENous Q6H PRN    ammonium lactate (LAC-HYDRIN) 12 % lotion   Topical BID PRN    Saccharomyces boulardii (FLORASTOR) capsule 500 mg  500 mg Oral BID    vancomycin 50 mg/mL oral solution (compounded) 125 mg  125 mg Oral Q6H    insulin lispro (HUMALOG) injection   SubCUTAneous AC&HS    insulin glargine (LANTUS) injection 15 Units  15 Units SubCUTAneous QHS    heparin (porcine) 1,000 unit/mL injection 1,000-10,000 Units  1,000-10,000 Units IntraVENous Rad Multiple    nitroGLYcerin 0.1 mg/mL in D5W injection  1-5 mL IntraarTERial Rad Multiple    heparinized saline 2 units/mL infusion 2,000 Units  1,000 mL Irrigation RAD CONTINUOUS    iopamidoL (ISOVUE 250) 250 mg iodine /mL (51 %) contrast injection 1-150 mL  1-150 mL IntraarTERial RAD CONTINUOUS    epoetin lisette-epbx (RETACRIT) injection 8,000 Units  8,000 Units SubCUTAneous Q TUE, THU & SAT    loperamide (IMODIUM) capsule 2 mg  2 mg Oral Q4H PRN    nystatin (MYCOSTATIN) 100,000 unit/gram powder   Topical BID    alum-mag hydroxide-simeth (MYLANTA) oral suspension 30 mL  30 mL Oral Q4H PRN    glucose chewable tablet 16 g  16 g Oral PRN    glucagon (GLUCAGEN) injection 1 mg  1 mg IntraMUSCular PRN    heparin (porcine) 1,000 unit/mL injection 3,600 Units  3,600 Units IntraCATHeter DIALYSIS PRN    dextrose 10% infusion 0-250 mL  0-250 mL IntraVENous PRN    0.9% sodium chloride infusion  25 mL/hr IntraVENous DIALYSIS PRN    clopidogreL (PLAVIX) tablet 75 mg  75 mg Oral DAILY    carvediloL (COREG) tablet 12.5 mg  12.5 mg Oral BID    aspirin chewable tablet 81 mg  81 mg Oral DAILY    amLODIPine (NORVASC) tablet 10 mg  10 mg Oral DAILY    allopurinoL (ZYLOPRIM) tablet 100 mg  100 mg Oral DAILY    ascorbic acid (vitamin C) (VITAMIN C) tablet 500 mg  500 mg Oral DAILY    ezetimibe (ZETIA) tablet 10 mg  10 mg Oral DAILY    pantoprazole (PROTONIX) tablet 40 mg  40 mg Oral ACB    sevelamer carbonate (RENVELA) tab 1,600 mg  1,600 mg Oral TID WITH MEALS    sodium chloride (NS) flush 5-40 mL  5-40 mL IntraVENous Q8H    sodium chloride (NS) flush 5-40 mL  5-40 mL IntraVENous PRN    acetaminophen (TYLENOL) tablet 650 mg  650 mg Oral Q6H PRN    Or    acetaminophen (TYLENOL) suppository 650 mg  650 mg Rectal Q6H PRN    bisacodyL (DULCOLAX) suppository 10 mg  10 mg Rectal DAILY PRN    promethazine (PHENERGAN) tablet 12.5 mg  12.5 mg Oral Q6H PRN    Or    ondansetron (ZOFRAN) injection 4 mg  4 mg IntraVENous Q6H PRN    heparin (porcine) injection 5,000 Units  5,000 Units SubCUTAneous Q8H    oxyCODONE-acetaminophen (PERCOCET) 5-325 mg per tablet 1 Tablet  1 Tablet Oral Q6H PRN         Allergy:   No Known Allergies     Objective:     Visit Vitals  BP (!) 125/53 (BP 1 Location: Right upper arm)   Pulse 74   Temp 98.2 °F (36.8 °C)   Resp 18   Ht 6' (1.829 m)   Wt 95.3 kg (210 lb 1.6 oz)   SpO2 96%   BMI 28.49 kg/m²         Intake/Output Summary (Last 24 hours) at 1/1/2023 1201  Last data filed at 12/31/2022 2226  Gross per 24 hour   Intake --   Output 3000 ml   Net -3000 ml         Physical Exam:       General: No resp distress   HENT: Atraumatic and normocephalic   Eyes: Normal conjunctiva   Neck: Supple +JVD    Cardiovascular: Normal S1 & S2, no m/r/g   Pulmonary/Chest Wall: Clear to auscultation bilaterally   Abdominal: Soft and +NABS   Musculoskeletal: No edema S/p Amputation of multiple toes Left foot   Neurological: No focal deficits    HD Access: [facility] TD +    Data Review:  Lab Results   Component Value Date/Time    Sodium 136 01/01/2023 03:52 AM    Potassium 5.2 01/01/2023 03:52 AM    Chloride 99 (L) 01/01/2023 03:52 AM    CO2 29 01/01/2023 03:52 AM    Anion gap 8 01/01/2023 03:52 AM    Glucose 211 (H) 01/01/2023 03:52 AM BUN 44 (H) 01/01/2023 03:52 AM    Creatinine 7.56 (H) 01/01/2023 03:52 AM    BUN/Creatinine ratio 6 (L) 01/01/2023 03:52 AM    GFR est AA 13 (L) 07/21/2022 02:15 PM    GFR est non-AA 11 (L) 07/21/2022 02:15 PM    Calcium 8.6 01/01/2023 03:52 AM     Lab Results   Component Value Date/Time    WBC 12.7 12/27/2022 05:50 AM    HGB 8.4 (L) 12/27/2022 05:50 AM    HCT 25.7 (L) 12/27/2022 05:50 AM    PLATELET 215 80/64/4352 05:50 AM    MCV 94.1 12/27/2022 05:50 AM     Lab Results   Component Value Date/Time    Calcium 8.6 01/01/2023 03:52 AM    Phosphorus 6.0 (H) 12/22/2022 04:49 AM     No results found for: IRON, FE, TIBC, IBCT, PSAT, FERR  No results found for: FERR      Impression:     ESRD on HD TTS schedule  Hyperkalemia, resolved  Left diabetic foot infection w/ gangrenous changes s/p Lt 2/3/4 metatarsal amputation   DM  HTN  PVD, seen by [facility] Surgery, s/p LLE angio 12/28  Anemia  SHPT    Plan:     Next HD on tuesday  Cont DALTON for Anemia  will cont follow      Gt Paige MD,   MINIMALLY INVASIVE SURGERY [facility]

## 2023-01-02 LAB
GLUCOSE BLD STRIP.AUTO-MCNC: 122 MG/DL (ref 70–110)
GLUCOSE BLD STRIP.AUTO-MCNC: 150 MG/DL (ref 70–110)
GLUCOSE BLD STRIP.AUTO-MCNC: 173 MG/DL (ref 70–110)
GLUCOSE BLD STRIP.AUTO-MCNC: 190 MG/DL (ref 70–110)
MAGNESIUM SERPL-MCNC: 2.2 MG/DL (ref 1.6–2.6)

## 2023-01-02 PROCEDURE — 36415 COLL VENOUS BLD VENIPUNCTURE: CPT

## 2023-01-02 PROCEDURE — 74011636637 HC RX REV CODE- 636/637: Performed by: HOSPITALIST

## 2023-01-02 PROCEDURE — 82962 GLUCOSE BLOOD TEST: CPT

## 2023-01-02 PROCEDURE — 74011250637 HC RX REV CODE- 250/637: Performed by: INTERNAL MEDICINE

## 2023-01-02 PROCEDURE — 74011250637 HC RX REV CODE- 250/637: Performed by: HOSPITALIST

## 2023-01-02 PROCEDURE — 74011250636 HC RX REV CODE- 250/636: Performed by: HOSPITALIST

## 2023-01-02 PROCEDURE — 83735 ASSAY OF MAGNESIUM: CPT

## 2023-01-02 PROCEDURE — 65270000029 HC RM PRIVATE

## 2023-01-02 PROCEDURE — 74011000250 HC RX REV CODE- 250: Performed by: HOSPITALIST

## 2023-01-02 RX ADMIN — Medication 125 MG: at 06:00

## 2023-01-02 RX ADMIN — SODIUM CHLORIDE, PRESERVATIVE FREE 10 ML: 5 INJECTION INTRAVENOUS at 05:58

## 2023-01-02 RX ADMIN — CARVEDILOL 12.5 MG: 12.5 TABLET, FILM COATED ORAL at 21:55

## 2023-01-02 RX ADMIN — SEVELAMER CARBONATE 1600 MG: 800 TABLET, FILM COATED ORAL at 16:58

## 2023-01-02 RX ADMIN — HEPARIN SODIUM 5000 UNITS: 5000 INJECTION INTRAVENOUS; SUBCUTANEOUS at 01:06

## 2023-01-02 RX ADMIN — Medication 2 UNITS: at 07:56

## 2023-01-02 RX ADMIN — CARVEDILOL 12.5 MG: 12.5 TABLET, FILM COATED ORAL at 08:18

## 2023-01-02 RX ADMIN — EZETIMIBE 10 MG: 10 TABLET ORAL at 08:17

## 2023-01-02 RX ADMIN — CLOPIDOGREL BISULFATE 75 MG: 75 TABLET ORAL at 08:18

## 2023-01-02 RX ADMIN — NYSTATIN: 100000 POWDER TOPICAL at 08:19

## 2023-01-02 RX ADMIN — Medication 125 MG: at 18:59

## 2023-01-02 RX ADMIN — SODIUM CHLORIDE, PRESERVATIVE FREE 10 ML: 5 INJECTION INTRAVENOUS at 15:56

## 2023-01-02 RX ADMIN — FAMOTIDINE 20 MG: 20 TABLET, FILM COATED ORAL at 08:18

## 2023-01-02 RX ADMIN — Medication 125 MG: at 00:45

## 2023-01-02 RX ADMIN — HEPARIN SODIUM 5000 UNITS: 5000 INJECTION INTRAVENOUS; SUBCUTANEOUS at 18:59

## 2023-01-02 RX ADMIN — Medication 125 MG: at 15:55

## 2023-01-02 RX ADMIN — Medication 2 UNITS: at 21:56

## 2023-01-02 RX ADMIN — HEPARIN SODIUM 5000 UNITS: 5000 INJECTION INTRAVENOUS; SUBCUTANEOUS at 10:57

## 2023-01-02 RX ADMIN — Medication 2 UNITS: at 16:58

## 2023-01-02 RX ADMIN — ALLOPURINOL 100 MG: 100 TABLET ORAL at 08:18

## 2023-01-02 RX ADMIN — Medication 500 MG: at 21:55

## 2023-01-02 RX ADMIN — B-COMPLEX W/ C & FOLIC ACID TAB 1 MG 1 TABLET: 1 TAB at 08:17

## 2023-01-02 RX ADMIN — ASPIRIN 81 MG: 81 TABLET, CHEWABLE ORAL at 08:17

## 2023-01-02 RX ADMIN — Medication 500 MG: at 08:18

## 2023-01-02 RX ADMIN — Medication 15 UNITS: at 21:56

## 2023-01-02 RX ADMIN — SEVELAMER CARBONATE 1600 MG: 800 TABLET, FILM COATED ORAL at 07:56

## 2023-01-02 RX ADMIN — AMLODIPINE BESYLATE 10 MG: 5 TABLET ORAL at 08:18

## 2023-01-02 NOTE — PROGRESS NOTES
Nephrology Inpatient Progress note    Subjective:     Clair Valadez is a 60 y.o. male with a PMHx of  DM, HTN. PVD, ESRD on HD TTS at [facility] under the [address] [place] [address] Division . Nephrology sent in for admission by wound care clinic due to left foot infection ,was told he needed surgery. Podiatry has been in to see pt. He has been on abx recently     S/P Lt TMA    Pt has no complaint today. No SOB.      Admit Date: 12/9/2022  Active Problems:    Diabetes mellitus with foot ulcer ([facility] [address] (6/27/2022)      CAD (coronary artery disease) (6/28/2022)      ESRD (end stage renal disease) ([facility] [address] (6/28/2022)      Hypertension (7/21/2022)      Leukocytosis (12/9/2022)      Diabetic foot infection ([facility] [address] (12/9/2022)      Diarrhea (12/9/2022)      Type 2 diabetes mellitus, with long-term current use of insulin ([facility]) ()      Acute hematogenous osteomyelitis of left foot ([facility] [address] (12/9/2022)      Nondisplaced fracture of second metatarsal bone of left foot (12/9/2022)      Nondisplaced fracture of third metatarsal bone of left foot (12/9/2022)      Closed nondisplaced fracture of fourth metatarsal bone of left foot (12/9/2022)      Positive blood culture (12/11/2022)      PAD (peripheral artery disease) ([facility] [address] (12/27/2022)    Current Facility-Administered Medications   Medication Dose Route Frequency    famotidine (PEPCID) tablet 20 mg  20 mg Oral DAILY    vit B Cmplx 3-FA-Vit C-Biotin (NEPHRO NIKITA RX) tablet 1 Tablet  1 Tablet Oral DAILY    trimethoprim-sulfamethoxazole (BACTRIM DS, SEPTRA DS) 160-800 mg per tablet 1 Tablet  1 Tablet Oral DIALYSIS TUE, THU & SAT    fentaNYL citrate (PF) injection  mcg   mcg IntraVENous Rad Multiple    midazolam (VERSED) injection 0.5-2 mg  0.5-2 mg IntraVENous Rad Multiple    flumazeniL (ROMAZICON) 0.1 mg/mL injection 0.2 mg  0.2 mg IntraVENous PRN    naloxone (NARCAN) injection 0.4 mg  0.4 mg IntraVENous Rad Multiple    iopamidoL (ISOVUE 250) 250 mg iodine /mL (51 %) contrast injection 1-150 mL 1-150 mL IntraarTERial RAD CONTINUOUS    heparin (porcine) 1,000 unit/mL injection 1,000-10,000 Units  1,000-10,000 Units IntraVENous Rad Multiple    diphenhydrAMINE (BENADRYL) injection 25 mg  25 mg IntraVENous Rad Multiple    diphenhydrAMINE (BENADRYL) injection 12.5 mg  12.5 mg IntraVENous Q6H PRN    ammonium lactate (LAC-HYDRIN) 12 % lotion   Topical BID PRN    Saccharomyces boulardii (FLORASTOR) capsule 500 mg  500 mg Oral BID    vancomycin 50 mg/mL oral solution (compounded) 125 mg  125 mg Oral Q6H    insulin lispro (HUMALOG) injection   SubCUTAneous AC&HS    insulin glargine (LANTUS) injection 15 Units  15 Units SubCUTAneous QHS    heparin (porcine) 1,000 unit/mL injection 1,000-10,000 Units  1,000-10,000 Units IntraVENous Rad Multiple    nitroGLYcerin 0.1 mg/mL in D5W injection  1-5 mL IntraarTERial Rad Multiple    heparinized saline 2 units/mL infusion 2,000 Units  1,000 mL Irrigation RAD CONTINUOUS    iopamidoL (ISOVUE 250) 250 mg iodine /mL (51 %) contrast injection 1-150 mL  1-150 mL IntraarTERial RAD CONTINUOUS    epoetin lisette-epbx (RETACRIT) injection 8,000 Units  8,000 Units SubCUTAneous Q TUE, THU & SAT    loperamide (IMODIUM) capsule 2 mg  2 mg Oral Q4H PRN    nystatin (MYCOSTATIN) 100,000 unit/gram powder   Topical BID    alum-mag hydroxide-simeth (MYLANTA) oral suspension 30 mL  30 mL Oral Q4H PRN    glucose chewable tablet 16 g  16 g Oral PRN    glucagon (GLUCAGEN) injection 1 mg  1 mg IntraMUSCular PRN    heparin (porcine) 1,000 unit/mL injection 3,600 Units  3,600 Units IntraCATHeter DIALYSIS PRN    dextrose 10% infusion 0-250 mL  0-250 mL IntraVENous PRN    0.9% sodium chloride infusion  25 mL/hr IntraVENous DIALYSIS PRN    clopidogreL (PLAVIX) tablet 75 mg  75 mg Oral DAILY    carvediloL (COREG) tablet 12.5 mg  12.5 mg Oral BID    aspirin chewable tablet 81 mg  81 mg Oral DAILY    amLODIPine (NORVASC) tablet 10 mg  10 mg Oral DAILY    allopurinoL (ZYLOPRIM) tablet 100 mg  100 mg Oral DAILY ascorbic acid (vitamin C) (VITAMIN C) tablet 500 mg  500 mg Oral DAILY    ezetimibe (ZETIA) tablet 10 mg  10 mg Oral DAILY    sevelamer carbonate (RENVELA) tab 1,600 mg  1,600 mg Oral TID WITH MEALS    sodium chloride (NS) flush 5-40 mL  5-40 mL IntraVENous Q8H    sodium chloride (NS) flush 5-40 mL  5-40 mL IntraVENous PRN    acetaminophen (TYLENOL) tablet 650 mg  650 mg Oral Q6H PRN    Or    acetaminophen (TYLENOL) suppository 650 mg  650 mg Rectal Q6H PRN    bisacodyL (DULCOLAX) suppository 10 mg  10 mg Rectal DAILY PRN    promethazine (PHENERGAN) tablet 12.5 mg  12.5 mg Oral Q6H PRN    Or    ondansetron (ZOFRAN) injection 4 mg  4 mg IntraVENous Q6H PRN    heparin (porcine) injection 5,000 Units  5,000 Units SubCUTAneous Q8H    oxyCODONE-acetaminophen (PERCOCET) 5-325 mg per tablet 1 Tablet  1 Tablet Oral Q6H PRN         Allergy:   No Known Allergies     Objective:     Visit Vitals  BP (!) 141/54   Pulse 77   Temp 98.3 °F (36.8 °C)   Resp 16   Ht 6' (1.829 m)   Wt 97.1 kg (214 lb 1.6 oz)   SpO2 94%   BMI 29.04 kg/m²       No intake or output data in the 24 hours ending 01/02/23 1003      Physical Exam:       General: No resp distress   HENT: Atraumatic and normocephalic   Eyes: Normal conjunctiva   Neck: Supple +JVD    Cardiovascular: Normal S1 & S2, no m/r/g   Pulmonary/Chest Wall: Clear to auscultation bilaterally   Abdominal: Soft and +NABS   Musculoskeletal: No edema S/p Amputation of multiple toes Left foot   Neurological: No focal deficits    HD Access: [facility] TD +    Data Review:  Lab Results   Component Value Date/Time    Sodium 136 01/01/2023 03:52 AM    Potassium 5.2 01/01/2023 03:52 AM    Chloride 99 (L) 01/01/2023 03:52 AM    CO2 29 01/01/2023 03:52 AM    Anion gap 8 01/01/2023 03:52 AM    Glucose 211 (H) 01/01/2023 03:52 AM    BUN 44 (H) 01/01/2023 03:52 AM    Creatinine 7.56 (H) 01/01/2023 03:52 AM    BUN/Creatinine ratio 6 (L) 01/01/2023 03:52 AM    GFR est AA 13 (L) 07/21/2022 02:15 PM    GFR est non-AA 11 (L) 07/21/2022 02:15 PM    Calcium 8.6 01/01/2023 03:52 AM     Lab Results   Component Value Date/Time    WBC 12.7 12/27/2022 05:50 AM    HGB 8.4 (L) 12/27/2022 05:50 AM    HCT 25.7 (L) 12/27/2022 05:50 AM    PLATELET 946 58/04/3708 05:50 AM    MCV 94.1 12/27/2022 05:50 AM     Lab Results   Component Value Date/Time    Calcium 8.6 01/01/2023 03:52 AM    Phosphorus 6.0 (H) 12/22/2022 04:49 AM     No results found for: IRON, FE, TIBC, IBCT, PSAT, FERR  No results found for: FERR      Impression:     ESRD on HD TTS schedule  Hyperkalemia, resolved  Left diabetic foot infection w/ gangrenous changes s/p Lt 2/3/4 metatarsal amputation   DM  HTN  PVD, seen by [facility] Surgery, s/p LLE angio 12/28  Anemia  SHPT    Plan:     Next HD on tuesday  Cont DALTON for Anemia      Bryce Tobias MD,   [facility]

## 2023-01-02 NOTE — PROGRESS NOTES
Hospitalist Progress Note    Patient: Carlos Guido MRN: [id number]  CSN: [id number]    YOB: 1960  Age: 60 y.o.   Sex: male    DOA: 12/9/2022 LOS:  LOS: 24 days            Assessment/Plan   Left diabetic foot infection w/ gangrenous changes s/p Lt 2/3/4 metatarsal amputation with existing left foot wound with incisional weeping/seeping with developed eschar of skin flap   PVD, seen by [facility], s/p LLE angio 12/28  ESRD on HD TTS schedule   Anemia   CAD  HTN controlled  DM2  [facility]    Nephrology following   Podiatry following     Plan:  cmplete bactrim course  basal/bolus insulin  continue antihypertensives  HD per nephrology  Cont DALTON for Anemia   S/p 2nd angioplasty of LLE   Dr. Rachel Hurtado plans debridement and application of Stravix graft left foot with wound VAC on wednesday (in 2 days)       Patient Active Problem List   Diagnosis Code    Diabetes mellitus with foot ulcer ([address] E11.621, L97.509    CAD (coronary artery disease) I25.10    ESRD (end stage renal disease) ([address] N18.6    Acute hematogenous osteomyelitis of right foot (HCC) M86.071    Cellulitis of right heel L03.115    Diabetic foot ulcer with osteomyelitis ([address] E11.621, E11.69, L97.509, M86.9    Ulcer of right heel, with necrosis of bone ([address] L97.414    Infection and inflammatory reaction due to internal fixation device of other site, initial encounter [facility]) T84.69XA    Left arm swelling M79.89    Chest pain at rest R07.9    Abnormal nuclear stress test R94.39    History of anemia due to CKD N18.9, Z86.2    S/P angioplasty with stent Z95.820    Hypertension I10    Leukocytosis D72.829    Diabetic foot infection ([address] E11.628, L08.9    Diarrhea R19.7    Type 2 diabetes mellitus, with long-term current use of insulin (HCC) E11.9, Z79.4    Acute hematogenous osteomyelitis of left foot ([address] M86.072    Nondisplaced fracture of second metatarsal bone of left foot S92.325A    Nondisplaced fracture of third metatarsal bone of left foot S92.335A    Closed nondisplaced fracture of fourth metatarsal bone of left foot S92.345A    Positive blood culture R78.81    PAD (peripheral artery disease) ([facility]) I73.9             Subjective:    cc:left foott wound     No acute events overnight  Denies physical complaints today  Eating his meal       REVIEW OF SYSTEMS:  General: No fevers or chills. Cardiovascular: No chest pain or pressure. No palpitations. Pulmonary: No shortness of breath. Gastrointestinal: No nausea, vomiting. Objective:        Vital signs/Intake and Output:  Visit Vitals  BP (!) 148/68 (BP 1 Location: Right arm, BP Patient Position: At rest)   Pulse 72   Temp 98.3 °F (36.8 °C)   Resp 17   Ht 6' (1.829 m)   Wt 97.1 kg (214 lb 1.6 oz)   SpO2 95%   BMI 29.04 kg/m²     Current Shift:  No intake/output data recorded. Last three shifts:  12/31 1901 - 01/02 0700  In: -   Out: 3000     Body mass index is 29.04 kg/m².     Physical Exam:  GEN: Alert and oriented times three NAD  EYES: conjunctiva normal, lids with out lesions  HEENT: MMM, No thyromegaly, no lymphadenopathy  HEART: RRR +S1 +S2, no JVD, pulses 2+ distally  LUNGS: CTA B/L no wheezes, rales or rhonchi  ABDOMEN: + BS, soft NT/ND no organomegaly,  No rebound  EXTREMITIES: No edema cyanosis, cap refill normal feet bandaged cdi   SKIN: no rashes or skin breakdown, no nodules, normal turgor  Current Facility-Administered Medications   Medication Dose Route Frequency    famotidine (PEPCID) tablet 20 mg  20 mg Oral DAILY    vit B Cmplx 3-FA-Vit C-Biotin (NEPHRO NIKITA RX) tablet 1 Tablet  1 Tablet Oral DAILY    trimethoprim-sulfamethoxazole (BACTRIM DS, SEPTRA DS) 160-800 mg per tablet 1 Tablet  1 Tablet Oral DIALYSIS TUE, THU & SAT    fentaNYL citrate (PF) injection  mcg   mcg IntraVENous Rad Multiple    midazolam (VERSED) injection 0.5-2 mg  0.5-2 mg IntraVENous Rad Multiple    flumazeniL (ROMAZICON) 0.1 mg/mL injection 0.2 mg  0.2 mg IntraVENous PRN    naloxone (NARCAN) injection 0.4 mg  0.4 mg IntraVENous Rad Multiple    iopamidoL (ISOVUE 250) 250 mg iodine /mL (51 %) contrast injection 1-150 mL  1-150 mL IntraarTERial RAD CONTINUOUS    heparin (porcine) 1,000 unit/mL injection 1,000-10,000 Units  1,000-10,000 Units IntraVENous Rad Multiple    diphenhydrAMINE (BENADRYL) injection 25 mg  25 mg IntraVENous Rad Multiple    diphenhydrAMINE (BENADRYL) injection 12.5 mg  12.5 mg IntraVENous Q6H PRN    ammonium lactate (LAC-HYDRIN) 12 % lotion   Topical BID PRN    Saccharomyces boulardii (FLORASTOR) capsule 500 mg  500 mg Oral BID    vancomycin 50 mg/mL oral solution (compounded) 125 mg  125 mg Oral Q6H    insulin lispro (HUMALOG) injection   SubCUTAneous AC&HS    insulin glargine (LANTUS) injection 15 Units  15 Units SubCUTAneous QHS    heparin (porcine) 1,000 unit/mL injection 1,000-10,000 Units  1,000-10,000 Units IntraVENous Rad Multiple    nitroGLYcerin 0.1 mg/mL in D5W injection  1-5 mL IntraarTERial Rad Multiple    heparinized saline 2 units/mL infusion 2,000 Units  1,000 mL Irrigation RAD CONTINUOUS    iopamidoL (ISOVUE 250) 250 mg iodine /mL (51 %) contrast injection 1-150 mL  1-150 mL IntraarTERial RAD CONTINUOUS    epoetin lisette-epbx (RETACRIT) injection 8,000 Units  8,000 Units SubCUTAneous Q TUE, THU & SAT    loperamide (IMODIUM) capsule 2 mg  2 mg Oral Q4H PRN    nystatin (MYCOSTATIN) 100,000 unit/gram powder   Topical BID    alum-mag hydroxide-simeth (MYLANTA) oral suspension 30 mL  30 mL Oral Q4H PRN    glucose chewable tablet 16 g  16 g Oral PRN    glucagon (GLUCAGEN) injection 1 mg  1 mg IntraMUSCular PRN    heparin (porcine) 1,000 unit/mL injection 3,600 Units  3,600 Units IntraCATHeter DIALYSIS PRN    dextrose 10% infusion 0-250 mL  0-250 mL IntraVENous PRN    0.9% sodium chloride infusion  25 mL/hr IntraVENous DIALYSIS PRN    clopidogreL (PLAVIX) tablet 75 mg  75 mg Oral DAILY    carvediloL (COREG) tablet 12.5 mg  12.5 mg Oral BID aspirin chewable tablet 81 mg  81 mg Oral DAILY    amLODIPine (NORVASC) tablet 10 mg  10 mg Oral DAILY    allopurinoL (ZYLOPRIM) tablet 100 mg  100 mg Oral DAILY    ascorbic acid (vitamin C) (VITAMIN C) tablet 500 mg  500 mg Oral DAILY    ezetimibe (ZETIA) tablet 10 mg  10 mg Oral DAILY    sevelamer carbonate (RENVELA) tab 1,600 mg  1,600 mg Oral TID WITH MEALS    sodium chloride (NS) flush 5-40 mL  5-40 mL IntraVENous Q8H    sodium chloride (NS) flush 5-40 mL  5-40 mL IntraVENous PRN    acetaminophen (TYLENOL) tablet 650 mg  650 mg Oral Q6H PRN    Or    acetaminophen (TYLENOL) suppository 650 mg  650 mg Rectal Q6H PRN    bisacodyL (DULCOLAX) suppository 10 mg  10 mg Rectal DAILY PRN    promethazine (PHENERGAN) tablet 12.5 mg  12.5 mg Oral Q6H PRN    Or    ondansetron (ZOFRAN) injection 4 mg  4 mg IntraVENous Q6H PRN    heparin (porcine) injection 5,000 Units  5,000 Units SubCUTAneous Q8H    oxyCODONE-acetaminophen (PERCOCET) 5-325 mg per tablet 1 Tablet  1 Tablet Oral Q6H PRN         All the patient's labs over the past 24 hours were reviewed both during my initial daily workflow process and at the time notated as \"note time\" in [facility]. (It is not time stamped separately in this workflow.)  Select labs are listed below.       Labs: Results:       Chemistry Recent Labs     01/01/23  0352 12/31/22 2022   * 200*    135*   K 5.2 5.8*   CL 99* 97*   CO2 29 26   BUN 44* 69*   CREA 7.56* 10.90*   CA 8.6 8.6   AGAP 8 12   BUCR 6* 6*                    Lipid Panel Lab Results   Component Value Date/Time    Cholesterol, total 188 07/19/2022 03:12 AM    HDL Cholesterol 42 07/19/2022 03:12 AM    LDL, calculated 131.2 (H) 07/19/2022 03:12 AM    VLDL, calculated 14.8 07/19/2022 03:12 AM    Triglyceride 74 07/19/2022 03:12 AM    CHOL/HDL Ratio 4.5 07/19/2022 03:12 AM                  Procedures/imaging: see electronic medical records for all procedures/Xrays and details which were not copied into this note but were reviewed prior to creation of Plan

## 2023-01-02 NOTE — PROGRESS NOTES
1/2/2023  PT treatment not completed due to:  [] Off Unit for testing/procedure  [] Pain  [] Eating  [] Patient too lethargic  [] Nausea/vomiting  [] Dialysis treatment in progress   [x] Other: on bed pan. Will f/u at later time as pt schedule allows. Thank you.    Crissy Urena, PT

## 2023-01-02 NOTE — PROGRESS NOTES
Problem: Falls - Risk of  Goal: *Absence of Falls  Description: Document Dimitris De Fall Risk and appropriate interventions in the flowsheet. Outcome: Progressing Towards Goal  Note: Fall Risk Interventions:  Mobility Interventions: (P) Assess mobility with egress test, Patient to call before getting OOB, PT Consult for mobility concerns, OT consult for ADLs, PT Consult for assist device competence         Medication Interventions: (P) Evaluate medications/consider consulting pharmacy, Patient to call before getting OOB    Elimination Interventions: (P) Call light in reach, Patient to call for help with toileting needs    History of Falls Interventions: (P) Bed/chair exit alarm, Consult care management for discharge planning, Door open when patient unattended, Evaluate medications/consider consulting pharmacy, Investigate reason for fall         Problem: Pressure Injury - Risk of  Goal: *Prevention of pressure injury  Description: Document Semaj Scale and appropriate interventions in the flowsheet.   Outcome: Progressing Towards Goal  Note: Pressure Injury Interventions:  Sensory Interventions: Assess changes in LOC, Minimize linen layers, Pressure redistribution bed/mattress (bed type), Float heels, Keep linens dry and wrinkle-free    Moisture Interventions: Absorbent underpads, Minimize layers, Moisture barrier    Activity Interventions: Pressure redistribution bed/mattress(bed type), PT/OT evaluation    Mobility Interventions: Pressure redistribution bed/mattress (bed type), PT/OT evaluation, Float heels    Nutrition Interventions: Document food/fluid/supplement intake    Friction and Shear Interventions: Apply protective barrier, creams and emollients, Lift sheet, Minimize layers

## 2023-01-02 NOTE — ROUTINE PROCESS
2200 Informed pt that Dr. Sage Cook plan to do surgery on Wednesday 2350 Verbal shift change report given to Leticia Elias RN (oncoming nurse) by Ventura Hickey RN   (offgoing nurse). Report included the following information SBAR, Kardex, Intake/Output, MAR, and Recent Results.

## 2023-01-02 NOTE — ROUTINE PROCESS
Bedside shift change report given to Travis Aguilar RN (oncoming nurse) by Gus Stevenson RN   (offgoing nurse). Report included the following information SBAR, Kardex, Intake/Output, and Recent Results.

## 2023-01-02 NOTE — PROGRESS NOTES
2354  Bedside and verbal shift change report given to King Kerr RN (on coming nurse) by Josh Jimenez RN (off going nurse). Report included the following information SBAR, Kardex, Intake/Output and MAR.    0714  Bedside and verbal shift change report given by KIM Tran (off going nurse) to KIM Hayes(on coming nurse). Report included the following information SBAR, Kardex, Intake/Output and MAR.

## 2023-01-03 LAB
GLUCOSE BLD STRIP.AUTO-MCNC: 132 MG/DL (ref 70–110)
GLUCOSE BLD STRIP.AUTO-MCNC: 203 MG/DL (ref 70–110)
GLUCOSE BLD STRIP.AUTO-MCNC: 232 MG/DL (ref 70–110)
GLUCOSE BLD STRIP.AUTO-MCNC: 96 MG/DL (ref 70–110)
MAGNESIUM SERPL-MCNC: 2.3 MG/DL (ref 1.6–2.6)

## 2023-01-03 PROCEDURE — 74011636637 HC RX REV CODE- 636/637: Performed by: HOSPITALIST

## 2023-01-03 PROCEDURE — 74011250636 HC RX REV CODE- 250/636: Performed by: INTERNAL MEDICINE

## 2023-01-03 PROCEDURE — 74011000250 HC RX REV CODE- 250: Performed by: HOSPITALIST

## 2023-01-03 PROCEDURE — 90935 HEMODIALYSIS ONE EVALUATION: CPT

## 2023-01-03 PROCEDURE — 82962 GLUCOSE BLOOD TEST: CPT

## 2023-01-03 PROCEDURE — 74011250637 HC RX REV CODE- 250/637: Performed by: HOSPITALIST

## 2023-01-03 PROCEDURE — 36415 COLL VENOUS BLD VENIPUNCTURE: CPT

## 2023-01-03 PROCEDURE — 65270000029 HC RM PRIVATE

## 2023-01-03 PROCEDURE — 77030040831 HC BAG URINE DRNG MDII -A

## 2023-01-03 PROCEDURE — 74011250637 HC RX REV CODE- 250/637: Performed by: INTERNAL MEDICINE

## 2023-01-03 PROCEDURE — 97110 THERAPEUTIC EXERCISES: CPT

## 2023-01-03 PROCEDURE — 97164 PT RE-EVAL EST PLAN CARE: CPT

## 2023-01-03 PROCEDURE — 74011250636 HC RX REV CODE- 250/636: Performed by: HOSPITALIST

## 2023-01-03 PROCEDURE — 83735 ASSAY OF MAGNESIUM: CPT

## 2023-01-03 RX ORDER — LIDOCAINE 4 G/100G
1 PATCH TOPICAL EVERY 24 HOURS
Status: DISCONTINUED | OUTPATIENT
Start: 2023-01-03 | End: 2023-01-12 | Stop reason: HOSPADM

## 2023-01-03 RX ADMIN — SODIUM CHLORIDE, PRESERVATIVE FREE 10 ML: 5 INJECTION INTRAVENOUS at 15:56

## 2023-01-03 RX ADMIN — Medication 500 MG: at 23:11

## 2023-01-03 RX ADMIN — SEVELAMER CARBONATE 1600 MG: 800 TABLET, FILM COATED ORAL at 17:58

## 2023-01-03 RX ADMIN — AMLODIPINE BESYLATE 10 MG: 5 TABLET ORAL at 08:58

## 2023-01-03 RX ADMIN — OXYCODONE AND ACETAMINOPHEN 1 TABLET: 5; 325 TABLET ORAL at 18:02

## 2023-01-03 RX ADMIN — EZETIMIBE 10 MG: 10 TABLET ORAL at 08:57

## 2023-01-03 RX ADMIN — HEPARIN SODIUM 3600 UNITS: 1000 INJECTION INTRAVENOUS; SUBCUTANEOUS at 15:17

## 2023-01-03 RX ADMIN — Medication 125 MG: at 13:00

## 2023-01-03 RX ADMIN — HEPARIN SODIUM 5000 UNITS: 5000 INJECTION INTRAVENOUS; SUBCUTANEOUS at 17:57

## 2023-01-03 RX ADMIN — HEPARIN SODIUM 5000 UNITS: 5000 INJECTION INTRAVENOUS; SUBCUTANEOUS at 01:30

## 2023-01-03 RX ADMIN — NYSTATIN: 100000 POWDER TOPICAL at 23:12

## 2023-01-03 RX ADMIN — CARVEDILOL 12.5 MG: 12.5 TABLET, FILM COATED ORAL at 08:57

## 2023-01-03 RX ADMIN — CLOPIDOGREL BISULFATE 75 MG: 75 TABLET ORAL at 08:58

## 2023-01-03 RX ADMIN — OXYCODONE AND ACETAMINOPHEN 1 TABLET: 5; 325 TABLET ORAL at 10:04

## 2023-01-03 RX ADMIN — NYSTATIN: 100000 POWDER TOPICAL at 08:58

## 2023-01-03 RX ADMIN — Medication 4 UNITS: at 23:19

## 2023-01-03 RX ADMIN — Medication 15 UNITS: at 23:16

## 2023-01-03 RX ADMIN — SULFAMETHOXAZOLE AND TRIMETHOPRIM 1 TABLET: 800; 160 TABLET ORAL at 08:58

## 2023-01-03 RX ADMIN — B-COMPLEX W/ C & FOLIC ACID TAB 1 MG 1 TABLET: 1 TAB at 08:58

## 2023-01-03 RX ADMIN — SEVELAMER CARBONATE 1600 MG: 800 TABLET, FILM COATED ORAL at 08:57

## 2023-01-03 RX ADMIN — Medication 125 MG: at 07:31

## 2023-01-03 RX ADMIN — FAMOTIDINE 20 MG: 20 TABLET, FILM COATED ORAL at 08:58

## 2023-01-03 RX ADMIN — EPOETIN ALFA-EPBX 8000 UNITS: 4000 INJECTION, SOLUTION INTRAVENOUS; SUBCUTANEOUS at 23:11

## 2023-01-03 RX ADMIN — Medication 125 MG: at 18:03

## 2023-01-03 RX ADMIN — ALLOPURINOL 100 MG: 100 TABLET ORAL at 08:58

## 2023-01-03 RX ADMIN — HEPARIN SODIUM 5000 UNITS: 5000 INJECTION INTRAVENOUS; SUBCUTANEOUS at 10:04

## 2023-01-03 RX ADMIN — Medication 2 UNITS: at 17:57

## 2023-01-03 RX ADMIN — Medication 500 MG: at 08:57

## 2023-01-03 RX ADMIN — Medication 125 MG: at 01:30

## 2023-01-03 RX ADMIN — ASPIRIN 81 MG: 81 TABLET, CHEWABLE ORAL at 08:58

## 2023-01-03 NOTE — PROGRESS NOTES
ACUTE HEMODIALYSIS TREATMENT    HEMODIALYSIS ORDERS: Physician: Dr. Jazz Boggs     Dialyzer: Revaclear   Duration: 3.5 hr   BFR: 400   DFR: 800   Dialysate:  Temp 36-37*C   K+  2    Ca+ 2.5   Na 138   Bicarb 35   Wt Readings from Last 1 Encounters:   01/02/23 97.1 kg (214 lb 1.6 oz)    Patient Chart [x]   Unable to Obtain []  Dry weight/UF Goal: 3000 ml    Heparin []  Bolus    Units    [] Hourly    Units    [x]None       Pre BP:   137/65   Pulse:  70   Respirations: 18   Temp:  97.8  [] Oral [] Axillary [] Esophageal   Labs: []  Pre  []  Post:   [x] N/A   Additional Orders(medications, blood products, hypotension management): [] Yes   [] No     [x]  [facility] Consent Verified     CATHETER ACCESS:  []N/A   [x]Right   []Left   []IJ   []Fem  [x]Chest wall  []TransHepatic   [] First use X-ray verified     [x]Tunnel    [] Non Tunneled   [x]No S/S infection  []Redness  []Drainage []Cultured []Swelling []Pain   [x]Medical Aseptic Prep Utilized   []Dressing Changed  [x] Biopatch  Date: 1/3/23   []Clotted   [x]Patent   Flows: []Good  []Poor  [x]Reversed   If access problem,  notified: [x]Yes    []N/A        GENERAL ASSESSMENT:    LUNGS:  Resp Rate 18   [x] Clear  [] Coarse  [] Crackles  [] Wheezing  [] Diminished         Respirations:  [x]Easy  []Labored  []N/A  Cough:  []Productive  []Dry  [x]N/A      Therapy:  [x]RA  O2 Type:  []NC  Mask: []  NRB    [] BiPaP  Flow:  l/min                   [] Ventilated  [] Intubated  [] Trach     CARDIAC: [x] Regular      [] Irregular   [] Rhythm:          [] Monitored   [] Bedside   [] Remotely monitored     EDEMA: [] None   [x]Generalized  [] Pitting [] 1+   [] 2 +   [] 3+    [] 4+  [] Pedal    SKIN:   [x] Warm  [] Hot     [] Cold   [x] Dry     [] Pale   [] Diaphoretic                  [] Flushed  [] Jaundiced  [] Cyanotic     LOC:    [x] Alert      [x]Oriented:    [x] Person     [x] Place  []Time               [] Confused  [] Lethargic  [] Medicated  [] Non-responsive  [] Non Verbal   GI / ABDOMEN:                     [] Flat    [] Distended    [x] Soft    [] Firm   []  Obese                   [] Diarrhea   [] FMS [x] Bowel Sounds  [] Nausea  [] Vomiting                   [] NGT  [] OGT    [] PEG  [] Tube Feedings @     / URINE ASSESSMENT:                   [] Voiding  []  Mcmahon  [x] Oliguria  [] Anuria                     [] Incontinent  []  Incontinent Brief   []  PureWick     PAIN:  [x] 0 []1  []2   []3   []4   []5   []6   []7   []8   []9   []10                MOBILITY:  [x] Bed    [] Stretcher      All Vitals and Treatment Details on Attached [address]: THE [facility]          Room # 327    [x] Routine         [] 1st Time Acute/Chronic   [] Urgent      [] Stat            [] Acute Room   [x]  Bedside    [] ICU/CCU     [] ER   Isolation Precautions:  [x] Dialysis    There are currently no Active Isolations     ALLERGIES:     No Known Allergies   Code Status:  Full Code     Hepatitis Status      Lab Results   Component Value Date/Time    Hepatitis B surface Ag <0.10 12/10/2022 06:33 AM    Hepatitis B surface Ab 23.52 12/10/2022 06:33 AM        Current Labs:      Lab Results   Component Value Date/Time    WBC 12.7 12/27/2022 05:50 AM    HGB 8.4 (L) 12/27/2022 05:50 AM    HCT 25.7 (L) 12/27/2022 05:50 AM    PLATELET 228 09/82/4625 05:50 AM    MCV 94.1 12/27/2022 05:50 AM     Lab Results   Component Value Date/Time    Sodium 136 01/01/2023 03:52 AM    Potassium 5.2 01/01/2023 03:52 AM    Chloride 99 (L) 01/01/2023 03:52 AM    CO2 29 01/01/2023 03:52 AM    Anion gap 8 01/01/2023 03:52 AM    Glucose 211 (H) 01/01/2023 03:52 AM    BUN 44 (H) 01/01/2023 03:52 AM    Creatinine 7.56 (H) 01/01/2023 03:52 AM    BUN/Creatinine ratio 6 (L) 01/01/2023 03:52 AM    GFR est AA 13 (L) 07/21/2022 02:15 PM    GFR est non-AA 11 (L) 07/21/2022 02:15 PM    Calcium 8.6 01/01/2023 03:52 AM          DIET:  DIET ADULT  DIET ADULT ORAL NUTRITION SUPPLEMENT     PRIMARY NURSE REPORT:   Pre Dialysis: Fausto Tolliver RN Time: 1100     EDUCATION:    [x] Patient           Knowledge Basis: []None [x]Minimal [] Substantial [] Unknown  Barriers to learning  [x]N/A  [] Intubated/Trached/Ventilated  [] Sedated/Paralyzed   [] Access Care     [] S&S of infection  [] Fluid Management  [] K+   [x] Procedural    [] Medications   [] Tx Options   [] Transplant   [] Diet      Teaching Tools:  [x] Explain  [] Demo  [] Handouts [] Video  Patient response: [] Verbalized understanding   [x] Requires follow up        [x] Time Out/Safety Check    [x] Extracorporeal Circuit Tested for integrity       RO/HEMODIALYSIS MACHINE SAFETY CHECKS - Before each treatment:        [facility]                                                                     [x] Portable Machine #3 [id number] /RO serial # [id number]                                                                                                     Alarm Test:  Pass time   1135         [x] RO/Machine Log Complete    Machine Temp    36-37*C             Dialysate: pH 7.4    Conductivity: Meter 13.7   HD Machine  13.7     TCD: 13.8  Dialyzer Lot # [id number]     Blood Tubing Lot # [id number]   Saline Lot # [id number]     CHLORINE TESTING-Before each treatment and every 4 hours    Total Chlorine: [x] less than 0.1 ppm  Initial Time Check: 1140      4 Hr/2nd Check Time:    (if greater than 0.1 ppm from Primary then every 30 minutes from Secondary)     TREATMENT INITIATION - with Dialysis Precautions:   [x] All Connections Secured              [x] Saline Line Double Clamped   [x] Venous Parameters Set               [x] Arterial Parameters Set    [x] Prime Given 250ml NSS              [x]Air Foam Detector Engaged      Treatment Initiation Note:   Received patient in bed awake a/o x4  c/o itching in his arms. VS stable for treatment. Right chest TDC Accessed red port aspirates sluggishly but flushes, Blue port flushes and aspirates well. Line reversed Treatment initiated.     Dr. Jaimee Alexander in the room to see patient no new orders. During Treatment Notes:  8061  Treatment started. 1200  Vascular access visible with arterial and venous line connections intact. Pt resting comfortably. 1230  Vascular access visible with arterial and venous line connections intact. Pt resting comfortably. 1245  Vascular access visible with arterial and venous line connections intact. Pt resting comfortably. 1300  Vascular access visible with arterial and venous line connections intact. Pt resting comfortably. 1315  Vascular access visible with arterial and venous line connections intact. Pt resting comfortably. 1330  Vascular access visible with arterial and venous line connections intact. Pt resting comfortably. 1345  Vascular access visible with arterial and venous line connections intact. Pt resting comfortably. 1400  Vascular access visible with arterial and venous line connections intact. Pt resting comfortably. 1415  Vascular access visible with arterial and venous line connections intact. Pt resting comfortably. 1430  Vascular access visible with arterial and venous line connections intact. Pt resting comfortably. 2145  Vascular access visible with arterial and venous line connections intact. Pt resting comfortably. 1500  Vascular access visible with arterial and venous line connections intact. Pt resting comfortably. 1523 Dialysis treatment complete.        Medication Dose Volume Route Time Anoop Nurse, Title      SREEDHAR Wiley, RN      SREEDHAR Wiley, RN      SREEDHAR Wiley, RN     Post Assessment  Dialyzer Cleared:   [] Good  [x] Fair  [] Poor  Blood processed:  78.1 L  UF Removed:  2500 Ml    Post /69   Pulse  80 Resp  18  Temp 98 Lungs: [x] Clear [] Course  [] Crackles                 []  Wheezing   [] Diminished   Post Tx Vascular Access: [x] N/A Cardiac :[x] Regular   [] Irregular   Rhythm:  [x] Monitored   [] Not Monitored    CVC Catheter: [] N/A  Locking solution: Heparin 1:1000 U  Arterial port 1.8 ml Venous port 1.8 ml   Edema:  [] None  [x] Generalized                     Skin:[x] Warm  [x] Dry [] Diaphoretic               [] Flushed  [] Pale [] Cyanotic Pain:  [x]0  []1 []2  []3 []4  []5  []6  []7 []8    []9  []10     Post Treatment Note:    Patient completed and  tolerated 3.5 hrs of hemo dialysis. 2500 ml of fluid taken off. Catheter patent and intact flushed and locked with heparin.      POST TREATMENT PRIMARY NURSE HANDOFF REPORT:   Post Dialysis: Heaven Goodman  RN               Time:  0994       Abbreviations: AVG-arterial venous graft, AVF-arterial venous fistula, IJ-Internal Jugular, Subcl-Subclavian, Fem-Femoral, Tx-treatment, AP/HR-apical heart rate, VSS- Vital Signs Stable, CVC- Central Venous Catheter, DFR-dialysate flow rate, BFR-blood flow rate, AP-arterial pressure, -venous pressure, UF-ultrafiltrate, TMP-transmembrane pressure, Avtar-Venous, Art-Arterial, RO-Reverse Osmosis

## 2023-01-03 NOTE — ROUTINE PROCESS
Bedside and Verbal shift change report given to Gertrude Bragg RN (oncoming nurse) by Christina Donahue RN (offgoing nurse). Report included the following information SBAR, Kardex, Intake/Output, MAR, and Recent Results.

## 2023-01-03 NOTE — PROGRESS NOTES
Problem: Falls - Risk of  Goal: *Absence of Falls  Description: Document Inna Litter Fall Risk and appropriate interventions in the flowsheet.   Outcome: Progressing Towards Goal  Note: Fall Risk Interventions:  Mobility Interventions: Assess mobility with egress test         Medication Interventions: Evaluate medications/consider consulting pharmacy    Elimination Interventions: Call light in reach    History of Falls Interventions: (P) Bed/chair exit alarm, Consult care management for discharge planning, Door open when patient unattended, Evaluate medications/consider consulting pharmacy, Investigate reason for fall

## 2023-01-03 NOTE — PROGRESS NOTES
Nephrology Inpatient Progress note    Subjective:     Clair Valadez is a 60 y.o. male with a PMHx of  DM, HTN. PVD, ESRD on HD TTS at [facility] under the [facility] . Nephrology sent in for admission by wound care clinic due to left foot infection ,was told he needed surgery. Podiatry has been in to see pt. He has been on abx recently     S/P Lt TMA. Stable this AM, awaiting dialysis.     Admit Date: 12/9/2022  Active Problems:    Diabetes mellitus with foot ulcer ([facility] (6/27/2022)      CAD (coronary artery disease) (6/28/2022)      ESRD (end stage renal disease) ([facility] (6/28/2022)      Hypertension (7/21/2022)      Leukocytosis (12/9/2022)      Diabetic foot infection ([facility] (12/9/2022)      Diarrhea (12/9/2022)      Type 2 diabetes mellitus, with long-term current use of insulin ([facility]) ()      Acute hematogenous osteomyelitis of left foot ([facility] (12/9/2022)      Nondisplaced fracture of second metatarsal bone of left foot (12/9/2022)      Nondisplaced fracture of third metatarsal bone of left foot (12/9/2022)      Closed nondisplaced fracture of fourth metatarsal bone of left foot (12/9/2022)      Positive blood culture (12/11/2022)      PAD (peripheral artery disease) ([facility] (12/27/2022)    Current Facility-Administered Medications   Medication Dose Route Frequency    famotidine (PEPCID) tablet 20 mg  20 mg Oral DAILY    vit B Cmplx 3-FA-Vit C-Biotin (NEPHRO NIKITA RX) tablet 1 Tablet  1 Tablet Oral DAILY    trimethoprim-sulfamethoxazole (BACTRIM DS, SEPTRA DS) 160-800 mg per tablet 1 Tablet  1 Tablet Oral DIALYSIS TUE, THU & SAT    fentaNYL citrate (PF) injection  mcg   mcg IntraVENous Rad Multiple    midazolam (VERSED) injection 0.5-2 mg  0.5-2 mg IntraVENous Rad Multiple    flumazeniL (ROMAZICON) 0.1 mg/mL injection 0.2 mg  0.2 mg IntraVENous PRN    naloxone (NARCAN) injection 0.4 mg  0.4 mg IntraVENous Rad Multiple    iopamidoL (ISOVUE 250) 250 mg iodine /mL (51 %) contrast injection 1-150 mL  1-150 mL IntraarTERial RAD CONTINUOUS    heparin (porcine) 1,000 unit/mL injection 1,000-10,000 Units  1,000-10,000 Units IntraVENous Rad Multiple    diphenhydrAMINE (BENADRYL) injection 25 mg  25 mg IntraVENous Rad Multiple    diphenhydrAMINE (BENADRYL) injection 12.5 mg  12.5 mg IntraVENous Q6H PRN    ammonium lactate (LAC-HYDRIN) 12 % lotion   Topical BID PRN    Saccharomyces boulardii (FLORASTOR) capsule 500 mg  500 mg Oral BID    vancomycin 50 mg/mL oral solution (compounded) 125 mg  125 mg Oral Q6H    insulin lispro (HUMALOG) injection   SubCUTAneous AC&HS    insulin glargine (LANTUS) injection 15 Units  15 Units SubCUTAneous QHS    heparin (porcine) 1,000 unit/mL injection 1,000-10,000 Units  1,000-10,000 Units IntraVENous Rad Multiple    nitroGLYcerin 0.1 mg/mL in D5W injection  1-5 mL IntraarTERial Rad Multiple    heparinized saline 2 units/mL infusion 2,000 Units  1,000 mL Irrigation RAD CONTINUOUS    iopamidoL (ISOVUE 250) 250 mg iodine /mL (51 %) contrast injection 1-150 mL  1-150 mL IntraarTERial RAD CONTINUOUS    epoetin lisette-epbx (RETACRIT) injection 8,000 Units  8,000 Units SubCUTAneous Q TUE, THU & SAT    loperamide (IMODIUM) capsule 2 mg  2 mg Oral Q4H PRN    nystatin (MYCOSTATIN) 100,000 unit/gram powder   Topical BID    alum-mag hydroxide-simeth (MYLANTA) oral suspension 30 mL  30 mL Oral Q4H PRN    glucose chewable tablet 16 g  16 g Oral PRN    glucagon (GLUCAGEN) injection 1 mg  1 mg IntraMUSCular PRN    heparin (porcine) 1,000 unit/mL injection 3,600 Units  3,600 Units IntraCATHeter DIALYSIS PRN    dextrose 10% infusion 0-250 mL  0-250 mL IntraVENous PRN    0.9% sodium chloride infusion  25 mL/hr IntraVENous DIALYSIS PRN    clopidogreL (PLAVIX) tablet 75 mg  75 mg Oral DAILY    carvediloL (COREG) tablet 12.5 mg  12.5 mg Oral BID    aspirin chewable tablet 81 mg  81 mg Oral DAILY    amLODIPine (NORVASC) tablet 10 mg  10 mg Oral DAILY    allopurinoL (ZYLOPRIM) tablet 100 mg  100 mg Oral DAILY ascorbic acid (vitamin C) (VITAMIN C) tablet 500 mg  500 mg Oral DAILY    ezetimibe (ZETIA) tablet 10 mg  10 mg Oral DAILY    sevelamer carbonate (RENVELA) tab 1,600 mg  1,600 mg Oral TID WITH MEALS    sodium chloride (NS) flush 5-40 mL  5-40 mL IntraVENous Q8H    sodium chloride (NS) flush 5-40 mL  5-40 mL IntraVENous PRN    acetaminophen (TYLENOL) tablet 650 mg  650 mg Oral Q6H PRN    Or    acetaminophen (TYLENOL) suppository 650 mg  650 mg Rectal Q6H PRN    bisacodyL (DULCOLAX) suppository 10 mg  10 mg Rectal DAILY PRN    promethazine (PHENERGAN) tablet 12.5 mg  12.5 mg Oral Q6H PRN    Or    ondansetron (ZOFRAN) injection 4 mg  4 mg IntraVENous Q6H PRN    heparin (porcine) injection 5,000 Units  5,000 Units SubCUTAneous Q8H    oxyCODONE-acetaminophen (PERCOCET) 5-325 mg per tablet 1 Tablet  1 Tablet Oral Q6H PRN         Allergy:   No Known Allergies     Objective:     Visit Vitals  BP (!) 146/59 (BP 1 Location: Right upper arm, BP Patient Position: At rest)   Pulse 70   Temp 98.1 °F (36.7 °C)   Resp 16   Ht 6' (1.829 m)   Wt 97.1 kg (214 lb 1.6 oz)   SpO2 94%   BMI 29.04 kg/m²       No intake or output data in the 24 hours ending 01/03/23 1051      Physical Exam:       General: No resp distress   HENT: Atraumatic and normocephalic, mmm   Eyes: Normal conjunctiva   Neck: Supple +JVD    Cardiovascular: Normal S1 & S2, no m/r/g   Pulmonary/Chest Wall: Clear to auscultation bilaterally   Abdominal: Soft and +NABS   Musculoskeletal: No edema S/p Amputation of multiple toes Left foot   Neurological: No focal deficits    HD Access: [facility] +    Data Review:  Lab Results   Component Value Date/Time    Sodium 136 01/01/2023 03:52 AM    Potassium 5.2 01/01/2023 03:52 AM    Chloride 99 (L) 01/01/2023 03:52 AM    CO2 29 01/01/2023 03:52 AM    Anion gap 8 01/01/2023 03:52 AM    Glucose 211 (H) 01/01/2023 03:52 AM    BUN 44 (H) 01/01/2023 03:52 AM    Creatinine 7.56 (H) 01/01/2023 03:52 AM    BUN/Creatinine ratio 6 (L) 01/01/2023 03:52 AM    GFR est AA 13 (L) 07/21/2022 02:15 PM    GFR est non-AA 11 (L) 07/21/2022 02:15 PM    Calcium 8.6 01/01/2023 03:52 AM     Lab Results   Component Value Date/Time    WBC 12.7 12/27/2022 05:50 AM    HGB 8.4 (L) 12/27/2022 05:50 AM    HCT 25.7 (L) 12/27/2022 05:50 AM    PLATELET 287 91/65/3868 05:50 AM    MCV 94.1 12/27/2022 05:50 AM     Lab Results   Component Value Date/Time    Calcium 8.6 01/01/2023 03:52 AM    Phosphorus 6.0 (H) 12/22/2022 04:49 AM     No results found for: IRON, FE, TIBC, IBCT, PSAT, FERR  No results found for: FERR      Impression:     ESRD on HD TTS  Hyperkalemia, resolved  Left diabetic foot infection w/ gangrenous changes s/p Lt 2/3/4 metatarsal amputation   DM  HTN  PVD, seen by [facility], s/p LLE angio 12/28  Anemia  SHPT    Plan:     HD today  Use current access for HD  Cont DALTON for Anemia  Podiatry following  D/w HD staff      Alistair Smith MD,   [facility]

## 2023-01-03 NOTE — PROGRESS NOTES
Problem: Falls - Risk of  Goal: *Absence of Falls  Description: Document Gypsy Diaz Fall Risk and appropriate interventions in the flowsheet. Outcome: Progressing Towards Goal  Note: Fall Risk Interventions:  Mobility Interventions: Assess mobility with egress test         Medication Interventions: Assess postural VS orthostatic hypotension, Teach patient to arise slowly    Elimination Interventions: Call light in reach, Patient to call for help with toileting needs, Toileting schedule/hourly rounds, Urinal in reach    History of Falls Interventions: Door open when patient unattended, Room close to nurse's station         Problem: Pressure Injury - Risk of  Goal: *Prevention of pressure injury  Description: Document Semaj Scale and appropriate interventions in the flowsheet. Outcome: Progressing Towards Goal  Note: Pressure Injury Interventions:  Sensory Interventions: Float heels, Assess changes in LOC    Moisture Interventions: Absorbent underpads, Minimize layers    Activity Interventions: Pressure redistribution bed/mattress(bed type), PT/OT evaluation    Mobility Interventions: Pressure redistribution bed/mattress (bed type), PT/OT evaluation    Nutrition Interventions: Document food/fluid/supplement intake, Offer support with meals,snacks and hydration    Friction and Shear Interventions: Minimize layers                Problem: Pain  Goal: *Control of Pain  Outcome: Progressing Towards Goal     Problem: Diabetes Self-Management  Goal: *Using medications safely  Description: State effect of diabetes medications on diabetes; name diabetes medication taking, action and side effects. Outcome: Progressing Towards Goal     Problem: Diabetes Self-Management  Goal: *Monitoring blood glucose, interpreting and using results  Description: Identify recommended blood glucose targets  and personal targets.   Outcome: Progressing Towards Goal     Problem: Diabetes Self-Management  Goal: *Prevention, detection, treatment of acute complications  Description: List symptoms of hyper- and hypoglycemia; describe how to treat low blood sugar and actions for lowering  high blood glucose level.   Outcome: Progressing Towards Goal

## 2023-01-03 NOTE — PROGRESS NOTES
Problem: Diabetes Self-Management  Goal: *Disease process and treatment process  Description: Define diabetes and identify own type of diabetes; list 3 options for treating diabetes. Outcome: Progressing Towards Goal  Goal: *Incorporating nutritional management into lifestyle  Description: Describe effect of type, amount and timing of food on blood glucose; list 3 methods for planning meals. Outcome: Progressing Towards Goal  Goal: *Incorporating physical activity into lifestyle  Description: State effect of exercise on blood glucose levels. Outcome: Progressing Towards Goal  Goal: *Developing strategies to promote health/change behavior  Description: Define the ABC's of diabetes; identify appropriate screenings, schedule and personal plan for screenings. Outcome: Progressing Towards Goal  Goal: *Using medications safely  Description: State effect of diabetes medications on diabetes; name diabetes medication taking, action and side effects. Outcome: Progressing Towards Goal  Goal: *Monitoring blood glucose, interpreting and using results  Description: Identify recommended blood glucose targets  and personal targets. Outcome: Progressing Towards Goal  Goal: *Prevention, detection, treatment of acute complications  Description: List symptoms of hyper- and hypoglycemia; describe how to treat low blood sugar and actions for lowering  high blood glucose level. Outcome: Progressing Towards Goal  Goal: *Prevention, detection and treatment of chronic complications  Description: Define the natural course of diabetes and describe the relationship of blood glucose levels to long term complications of diabetes.   Outcome: Progressing Towards Goal  Goal: *Developing strategies to address psychosocial issues  Description: Describe feelings about living with diabetes; identify support needed and support network  Outcome: Progressing Towards Goal  Goal: *Sick day guidelines  Outcome: Progressing Towards Goal  Goal: *Patient Specific Goal (EDIT GOAL, INSERT TEXT)  Outcome: Progressing Towards Goal     Problem: Patient Education: Go to Patient Education Activity  Goal: Patient/Family Education  Outcome: Progressing Towards Goal     Problem: Falls - Risk of  Goal: *Absence of Falls  Description: Document Adrienne Perez Fall Risk and appropriate interventions in the flowsheet. Outcome: Progressing Towards Goal  Note: Fall Risk Interventions:  Mobility Interventions: Assess mobility with egress test         Medication Interventions: Assess postural VS orthostatic hypotension, Teach patient to arise slowly    Elimination Interventions: Call light in reach, Patient to call for help with toileting needs, Toileting schedule/hourly rounds, Urinal in reach    History of Falls Interventions: Door open when patient unattended, Room close to nurse's station         Problem: Patient Education: Go to Patient Education Activity  Goal: Patient/Family Education  Outcome: Progressing Towards Goal     Problem: Chronic Renal Failure  Goal: *Fluid and electrolytes stabilized  Outcome: Progressing Towards Goal     Problem: Patient Education: Go to Patient Education Activity  Goal: Patient/Family Education  Outcome: Progressing Towards Goal     Problem: Pressure Injury - Risk of  Goal: *Prevention of pressure injury  Description: Document Semaj Scale and appropriate interventions in the flowsheet.   Outcome: Progressing Towards Goal  Note: Pressure Injury Interventions:  Sensory Interventions: Discuss PT/OT consult with provider, Minimize linen layers, Pressure redistribution bed/mattress (bed type)    Moisture Interventions: Minimize layers    Activity Interventions: Pressure redistribution bed/mattress(bed type), PT/OT evaluation    Mobility Interventions: Pressure redistribution bed/mattress (bed type), PT/OT evaluation    Nutrition Interventions: Document food/fluid/supplement intake    Friction and Shear Interventions: Minimize layers                Problem: Patient Education: Go to Patient Education Activity  Goal: Patient/Family Education  Outcome: Progressing Towards Goal     Problem: Patient Education: Go to Patient Education Activity  Goal: Patient/Family Education  Outcome: Progressing Towards Goal     Problem: Patient Education: Go to Patient Education Activity  Goal: Patient/Family Education  Outcome: Progressing Towards Goal     Problem: Pain  Goal: *Control of Pain  Outcome: Progressing Towards Goal

## 2023-01-03 NOTE — PROGRESS NOTES
D/C Plan: SNF    No accepting facility has been identified at this time. CM discussed expanding the search to the [address] side. Pt is agreeable. CMS has been notified to assist.  CM to continue to follow and assist.    Care Management Interventions  PCP Verified by CM:  Yes  Mode of Transport at Discharge: BLS  Transition of Care Consult (CM Consult): SNF  Health Maintenance Reviewed: Yes  Physical Therapy Consult: Yes  Occupational Therapy Consult: Yes  Support Systems: Friend/Neighbor  The Plan for Transition of Care is Related to the Following Treatment Goals : SNF  The Patient and/or Patient Representative was Provided with a Choice of Provider and Agrees with the Discharge Plan?: Yes  Name of the Patient Representative Who was Provided with a Choice of Provider and Agrees with the Discharge Plan: pt  Freedom of Choice List was Provided with Basic Dialogue that Supports the Patient's Individualized Plan of Care/Goals, Treatment Preferences and Shares the Quality Data Associated with the Providers?: Yes  Discharge Location  Patient Expects to be Discharged to[de-identified] Skilled nursing facility

## 2023-01-03 NOTE — ROUTINE PROCESS
Bedside shift change report given to Praveen Morrissey RN (oncoming nurse) by Roxana Bowers RN   (offgoing nurse). Report included the following information SBAR, Kardex, Intake/Output, and Recent Results.

## 2023-01-03 NOTE — ROUTINE PROCESS
Bedside shift change report given to Vance Perez RN (oncoming nurse) by Maame Yepez RN   (offgoing nurse). Report included the following information SBAR, Kardex, Intake/Output, and Recent Results.

## 2023-01-04 ENCOUNTER — APPOINTMENT (OUTPATIENT)
Dept: CT IMAGING | Age: 63
DRG: 239 | End: 2023-01-04
Attending: HOSPITALIST
Payer: MEDICARE

## 2023-01-04 LAB
ALBUMIN SERPL-MCNC: 2.2 G/DL (ref 3.4–5)
ANION GAP SERPL CALC-SCNC: 5 MMOL/L (ref 3–18)
ANION GAP SERPL CALC-SCNC: 7 MMOL/L (ref 3–18)
BUN SERPL-MCNC: 51 MG/DL (ref 7–18)
BUN SERPL-MCNC: 52 MG/DL (ref 7–18)
BUN/CREAT SERPL: 6 (ref 12–20)
BUN/CREAT SERPL: 6 (ref 12–20)
CALCIUM SERPL-MCNC: 8.7 MG/DL (ref 8.5–10.1)
CALCIUM SERPL-MCNC: 9.3 MG/DL (ref 8.5–10.1)
CHLORIDE SERPL-SCNC: 100 MMOL/L (ref 100–111)
CHLORIDE SERPL-SCNC: 100 MMOL/L (ref 100–111)
CO2 SERPL-SCNC: 29 MMOL/L (ref 21–32)
CO2 SERPL-SCNC: 30 MMOL/L (ref 21–32)
CREAT SERPL-MCNC: 7.99 MG/DL (ref 0.6–1.3)
CREAT SERPL-MCNC: 8.1 MG/DL (ref 0.6–1.3)
GLUCOSE BLD STRIP.AUTO-MCNC: 133 MG/DL (ref 70–110)
GLUCOSE BLD STRIP.AUTO-MCNC: 142 MG/DL (ref 70–110)
GLUCOSE BLD STRIP.AUTO-MCNC: 158 MG/DL (ref 70–110)
GLUCOSE BLD STRIP.AUTO-MCNC: 176 MG/DL (ref 70–110)
GLUCOSE SERPL-MCNC: 128 MG/DL (ref 74–99)
GLUCOSE SERPL-MCNC: 134 MG/DL (ref 74–99)
MAGNESIUM SERPL-MCNC: 2.2 MG/DL (ref 1.6–2.6)
PHOSPHATE SERPL-MCNC: 4.9 MG/DL (ref 2.5–4.9)
POTASSIUM SERPL-SCNC: 5.9 MMOL/L (ref 3.5–5.5)
POTASSIUM SERPL-SCNC: 6.3 MMOL/L (ref 3.5–5.5)
POTASSIUM SERPL-SCNC: 6.3 MMOL/L (ref 3.5–5.5)
SODIUM SERPL-SCNC: 135 MMOL/L (ref 136–145)
SODIUM SERPL-SCNC: 136 MMOL/L (ref 136–145)

## 2023-01-04 PROCEDURE — 83735 ASSAY OF MAGNESIUM: CPT

## 2023-01-04 PROCEDURE — 65270000029 HC RM PRIVATE

## 2023-01-04 PROCEDURE — 74011636637 HC RX REV CODE- 636/637: Performed by: HOSPITALIST

## 2023-01-04 PROCEDURE — 74011250636 HC RX REV CODE- 250/636: Performed by: HOSPITALIST

## 2023-01-04 PROCEDURE — 80069 RENAL FUNCTION PANEL: CPT

## 2023-01-04 PROCEDURE — 74011250637 HC RX REV CODE- 250/637: Performed by: HOSPITALIST

## 2023-01-04 PROCEDURE — 70450 CT HEAD/BRAIN W/O DYE: CPT

## 2023-01-04 PROCEDURE — 84132 ASSAY OF SERUM POTASSIUM: CPT

## 2023-01-04 PROCEDURE — 74011000250 HC RX REV CODE- 250: Performed by: HOSPITALIST

## 2023-01-04 PROCEDURE — 36415 COLL VENOUS BLD VENIPUNCTURE: CPT

## 2023-01-04 PROCEDURE — 74011250637 HC RX REV CODE- 250/637: Performed by: INTERNAL MEDICINE

## 2023-01-04 PROCEDURE — 82962 GLUCOSE BLOOD TEST: CPT

## 2023-01-04 RX ADMIN — Medication 15 UNITS: at 22:06

## 2023-01-04 RX ADMIN — Medication 500 MG: at 08:42

## 2023-01-04 RX ADMIN — ALLOPURINOL 100 MG: 100 TABLET ORAL at 08:41

## 2023-01-04 RX ADMIN — Medication 500 MG: at 08:41

## 2023-01-04 RX ADMIN — SEVELAMER CARBONATE 1600 MG: 800 TABLET, FILM COATED ORAL at 08:42

## 2023-01-04 RX ADMIN — Medication 2 UNITS: at 22:07

## 2023-01-04 RX ADMIN — CARVEDILOL 12.5 MG: 12.5 TABLET, FILM COATED ORAL at 08:51

## 2023-01-04 RX ADMIN — Medication 500 MG: at 21:59

## 2023-01-04 RX ADMIN — FAMOTIDINE 20 MG: 20 TABLET, FILM COATED ORAL at 08:42

## 2023-01-04 RX ADMIN — NYSTATIN: 100000 POWDER TOPICAL at 08:44

## 2023-01-04 RX ADMIN — SEVELAMER CARBONATE 1600 MG: 800 TABLET, FILM COATED ORAL at 18:30

## 2023-01-04 RX ADMIN — Medication 125 MG: at 18:35

## 2023-01-04 RX ADMIN — HEPARIN SODIUM 5000 UNITS: 5000 INJECTION INTRAVENOUS; SUBCUTANEOUS at 18:30

## 2023-01-04 RX ADMIN — CARVEDILOL 12.5 MG: 12.5 TABLET, FILM COATED ORAL at 21:59

## 2023-01-04 RX ADMIN — NYSTATIN: 100000 POWDER TOPICAL at 20:15

## 2023-01-04 RX ADMIN — SODIUM ZIRCONIUM CYCLOSILICATE 15 G: 5 POWDER, FOR SUSPENSION ORAL at 12:12

## 2023-01-04 RX ADMIN — B-COMPLEX W/ C & FOLIC ACID TAB 1 MG 1 TABLET: 1 TAB at 08:42

## 2023-01-04 RX ADMIN — EZETIMIBE 10 MG: 10 TABLET ORAL at 08:42

## 2023-01-04 RX ADMIN — AMLODIPINE BESYLATE 10 MG: 5 TABLET ORAL at 08:41

## 2023-01-04 RX ADMIN — Medication 125 MG: at 08:42

## 2023-01-04 NOTE — PROGRESS NOTES
0691: Lab called critical result K=6.3. MD. Contreras Him aware, order placed to redraw labs,  aware.

## 2023-01-04 NOTE — PROGRESS NOTES
Nephrology Inpatient Progress note    Subjective:     Refugio Hein is a 60 y.o. male with a PMHx of  DM, HTN. PVD, ESRD on HD TTS at [facility] under the [address] [place] [address]. Nephrology sent in for admission by wound care clinic due to left foot infection ,was told he needed surgery. Podiatry has been in to see pt. He has been on abx recently     S/P Lt TMA.   S/p HD yesterday  C/o headache, has had since last night    Admit Date: 12/9/2022  Active Problems:    Diabetes mellitus with foot ulcer ([facility] (6/27/2022)      CAD (coronary artery disease) (6/28/2022)      ESRD (end stage renal disease) ([facility] (6/28/2022)      Hypertension (7/21/2022)      Leukocytosis (12/9/2022)      Diabetic foot infection ([facility] (12/9/2022)      Diarrhea (12/9/2022)      Type 2 diabetes mellitus, with long-term current use of insulin ([facility]) ()      Acute hematogenous osteomyelitis of left foot ([facility] (12/9/2022)      Nondisplaced fracture of second metatarsal bone of left foot (12/9/2022)      Nondisplaced fracture of third metatarsal bone of left foot (12/9/2022)      Closed nondisplaced fracture of fourth metatarsal bone of left foot (12/9/2022)      Positive blood culture (12/11/2022)      PAD (peripheral artery disease) ([facility] (12/27/2022)    Current Facility-Administered Medications   Medication Dose Route Frequency    lidocaine 4 % patch 1 Patch  1 Patch TransDERmal Q24H    famotidine (PEPCID) tablet 20 mg  20 mg Oral DAILY    vit B Cmplx 3-FA-Vit C-Biotin (NEPHRO NIKITA RX) tablet 1 Tablet  1 Tablet Oral DAILY    trimethoprim-sulfamethoxazole (BACTRIM DS, SEPTRA DS) 160-800 mg per tablet 1 Tablet  1 Tablet Oral DIALYSIS TUE, THU & SAT    fentaNYL citrate (PF) injection  mcg   mcg IntraVENous Rad Multiple    midazolam (VERSED) injection 0.5-2 mg  0.5-2 mg IntraVENous Rad Multiple    flumazeniL (ROMAZICON) 0.1 mg/mL injection 0.2 mg  0.2 mg IntraVENous PRN    naloxone (NARCAN) injection 0.4 mg  0.4 mg IntraVENous Rad Multiple iopamidoL (ISOVUE 250) 250 mg iodine /mL (51 %) contrast injection 1-150 mL  1-150 mL IntraarTERial RAD CONTINUOUS    heparin (porcine) 1,000 unit/mL injection 1,000-10,000 Units  1,000-10,000 Units IntraVENous Rad Multiple    diphenhydrAMINE (BENADRYL) injection 25 mg  25 mg IntraVENous Rad Multiple    diphenhydrAMINE (BENADRYL) injection 12.5 mg  12.5 mg IntraVENous Q6H PRN    ammonium lactate (LAC-HYDRIN) 12 % lotion   Topical BID PRN    Saccharomyces boulardii (FLORASTOR) capsule 500 mg  500 mg Oral BID    vancomycin 50 mg/mL oral solution (compounded) 125 mg  125 mg Oral Q6H    insulin lispro (HUMALOG) injection   SubCUTAneous AC&HS    insulin glargine (LANTUS) injection 15 Units  15 Units SubCUTAneous QHS    heparin (porcine) 1,000 unit/mL injection 1,000-10,000 Units  1,000-10,000 Units IntraVENous Rad Multiple    nitroGLYcerin 0.1 mg/mL in D5W injection  1-5 mL IntraarTERial Rad Multiple    heparinized saline 2 units/mL infusion 2,000 Units  1,000 mL Irrigation RAD CONTINUOUS    iopamidoL (ISOVUE 250) 250 mg iodine /mL (51 %) contrast injection 1-150 mL  1-150 mL IntraarTERial RAD CONTINUOUS    epoetin lisette-epbx (RETACRIT) injection 8,000 Units  8,000 Units SubCUTAneous Q TUE, THU & SAT    loperamide (IMODIUM) capsule 2 mg  2 mg Oral Q4H PRN    nystatin (MYCOSTATIN) 100,000 unit/gram powder   Topical BID    alum-mag hydroxide-simeth (MYLANTA) oral suspension 30 mL  30 mL Oral Q4H PRN    glucose chewable tablet 16 g  16 g Oral PRN    glucagon (GLUCAGEN) injection 1 mg  1 mg IntraMUSCular PRN    heparin (porcine) 1,000 unit/mL injection 3,600 Units  3,600 Units IntraCATHeter DIALYSIS PRN    dextrose 10% infusion 0-250 mL  0-250 mL IntraVENous PRN    0.9% sodium chloride infusion  25 mL/hr IntraVENous DIALYSIS PRN    clopidogreL (PLAVIX) tablet 75 mg  75 mg Oral DAILY    carvediloL (COREG) tablet 12.5 mg  12.5 mg Oral BID    aspirin chewable tablet 81 mg  81 mg Oral DAILY    amLODIPine (NORVASC) tablet 10 mg 10 mg Oral DAILY    allopurinoL (ZYLOPRIM) tablet 100 mg  100 mg Oral DAILY    ascorbic acid (vitamin C) (VITAMIN C) tablet 500 mg  500 mg Oral DAILY    ezetimibe (ZETIA) tablet 10 mg  10 mg Oral DAILY    sevelamer carbonate (RENVELA) tab 1,600 mg  1,600 mg Oral TID WITH MEALS    sodium chloride (NS) flush 5-40 mL  5-40 mL IntraVENous Q8H    sodium chloride (NS) flush 5-40 mL  5-40 mL IntraVENous PRN    acetaminophen (TYLENOL) tablet 650 mg  650 mg Oral Q6H PRN    Or    acetaminophen (TYLENOL) suppository 650 mg  650 mg Rectal Q6H PRN    bisacodyL (DULCOLAX) suppository 10 mg  10 mg Rectal DAILY PRN    promethazine (PHENERGAN) tablet 12.5 mg  12.5 mg Oral Q6H PRN    Or    ondansetron (ZOFRAN) injection 4 mg  4 mg IntraVENous Q6H PRN    heparin (porcine) injection 5,000 Units  5,000 Units SubCUTAneous Q8H    oxyCODONE-acetaminophen (PERCOCET) 5-325 mg per tablet 1 Tablet  1 Tablet Oral Q6H PRN         Allergy:   No Known Allergies     Objective:     Visit Vitals  BP (!) 142/82   Pulse 75   Temp 97.4 °F (36.3 °C)   Resp 18   Ht 6' (1.829 m)   Wt 97.1 kg (214 lb 1.6 oz)   SpO2 97%   BMI 29.04 kg/m²         Intake/Output Summary (Last 24 hours) at 1/4/2023 0650  Last data filed at 1/3/2023 1533  Gross per 24 hour   Intake --   Output 2500 ml   Net -2500 ml         Physical Exam:       General: No resp distress   HENT: Atraumatic and normocephalic, mmm   Eyes: Normal conjunctiva   Neck: Supple +JVD    Cardiovascular: Normal S1 & S2, no m/r/g   Pulmonary/Chest Wall: Clear to auscultation bilaterally   Abdominal: Soft and +NABS   Musculoskeletal: No edema S/p Amputation of multiple toes Left foot   Neurological: No focal deficits    HD Access: [facility] +    Data Review:  Lab Results   Component Value Date/Time    Sodium 136 01/04/2023 06:32 AM    Potassium 6.3 (HH) 01/04/2023 06:32 AM    Chloride 100 01/04/2023 06:32 AM    CO2 29 01/04/2023 06:32 AM    Anion gap 7 01/04/2023 06:32 AM    Glucose 134 (H) 01/04/2023 06:32 AM BUN 51 (H) 01/04/2023 06:32 AM    Creatinine 7.99 (H) 01/04/2023 06:32 AM    BUN/Creatinine ratio 6 (L) 01/04/2023 06:32 AM    GFR est AA 13 (L) 07/21/2022 02:15 PM    GFR est non-AA 11 (L) 07/21/2022 02:15 PM    Calcium 8.7 01/04/2023 06:32 AM     Lab Results   Component Value Date/Time    WBC 12.7 12/27/2022 05:50 AM    HGB 8.4 (L) 12/27/2022 05:50 AM    HCT 25.7 (L) 12/27/2022 05:50 AM    PLATELET 601 88/49/9716 05:50 AM    MCV 94.1 12/27/2022 05:50 AM     Lab Results   Component Value Date/Time    Calcium 8.7 01/04/2023 06:32 AM    Phosphorus 4.9 01/04/2023 06:32 AM     No results found for: IRON, FE, TIBC, IBCT, PSAT, FERR  No results found for: FERR      Impression:     ESRD on HD TTS  Hyperkalemia, resolved  Left diabetic foot infection w/ gangrenous changes s/p Lt 2/3/4 metatarsal amputation   DM  HTN  PVD, seen by [facility] Surgery, s/p LLE angio 12/28  Anemia  SHPT    Plan:     HD tomorrow  Cont DALTON for Anemia  Podiatry following      Tri Goodman MD,   MINIMALLY INVASIVE SURGERY [facility]

## 2023-01-04 NOTE — ROUTINE PROCESS
Bedside and Verbal shift change report given to Dennis Alexander RN (oncoming nurse) by Chelsey Castellon RN (offgoing nurse). Report included the following information SBAR, Kardex, Intake/Output, MAR, and Recent Results.

## 2023-01-04 NOTE — PROGRESS NOTES
Problem: Mobility Impaired (Adult and Pediatric)  Goal: *Acute Goals and Plan of Care (Insert Text)  Description: Physical Therapy Goals   Updated 1/3/2023 and to be accomplished within 7 day(s)  1. Patient will transfer from bed <> chair via slide board with min assist for time up in chair for completion of ADL activity. 2.  Patient will demonstrate ability to perform w/c management with SBA for functional w/c level mobility. 3.  Patient will demonstrate HEP performance for LE ROM/strengthening in order to achieve above goals and performance st discharge. Physical Therapy Goals   Updated 12/23/2022 and to be accomplished within 7 day(s)  1. Patient will move from supine <> sit with mod I in prep for out of bed activity and change of position. 2.  Patient will transfer from bed <> chair via slide board with min assist for time up in chair for completion of ADL activity. 3.  Patient will demonstrate ability to perform w/c management with SBA for functional w/c level mobility. 4.  Patient will demonstrate HEP performance for LE ROM/strengthening in order to achieve above goals and performance st discharge. Physical Therapy Goals  Initiated 12/11/2022  1. Patient will move from supine to sit and sit to supine  in bed with supervision/set-up within 7 day(s). 2.  Patient will transfer from bed to chair and chair to bed with minimal assistance/contact guard assist using the least restrictive device within 7 day(s). 3.  Patient will perform sit to stand with minimal assistance/contact guard assist within 7 day(s). 4.  Patient will ambulate with minimal assistance/contact guard assist for 10 feet with the least restrictive device within 7 day(s).    Outcome: Progressing Towards Goal    PHYSICAL THERAPY RE-EVALUATION    Patient: Claudette Augusta (80 y.o. male)  Date: 1/3/2023  Primary Diagnosis: Diabetic foot infection ([facility] [address] [E11.628, L08.9]  Leukocytosis [D72.829]  Procedure(s) (LRB):  AMPUTATION - TRANSMETATARSAL LEFT FOOT (Left) 13 Days Post-Op   Precautions:   Fall, NWB (L foot; R foot WBAT)    ASSESSMENT :  Pt seen for re-evaluation and found supine in bed in NAD. Reports posterior neck pain 4/10. Pt demonstrates bed mobility for supine >sit with mod I, and scooting along side of bed with SBA while seated. Fair tolerance for strengthening ex LE's as noted below. Pt with tight HS bilat and palpable crepitus R knee. Standing attempts deferred at this time as pt with extreme difficulty maintaining NWB L foot and is scheduled for graft tomorrow. Goals updated and will continue IPPT to address same. Recommend SNF for rehab at discharge in order to reach max level of functional independence. Patient will benefit from skilled intervention to address the above impairments. Patient's rehabilitation potential is considered to be Fair  Factors which may influence rehabilitation potential include:   []         None noted  []         Mental ability/status  [x]         Medical condition  [x]         Home/family situation and support systems  []         Safety awareness  [x]         Pain tolerance/management  []         Other:      PLAN :  Recommendations and Planned Interventions:   [x]           Bed Mobility Training             []    Neuromuscular Re-Education  [x]           Transfer Training                   []    Orthotic/Prosthetic Training  []           Gait Training                          []    Modalities  [x]           Therapeutic Exercises           []    Edema Management/Control  [x]           Therapeutic Activities            []    Family Training/Education  [x]           Patient Education  []           Other (comment):    Frequency/Duration: Patient will be followed by physical therapy 1-2 times per day/4-7 days per week to address goals.   Discharge Recommendations: [facility] Story  Further Equipment Recommendations for Discharge: N/A    AMPA: 11/20    This AMPAC score should be considered in conjunction with interdisciplinary team recommendations to determine the most appropriate discharge setting. Patient's social support, diagnosis, medical stability, and prior level of function should also be taken into consideration. SUBJECTIVE:   Patient stated [place] had some neck pain but they put a patch on it and it's better now. And I'm supposed to have surgery tomorrow (L foot).     OBJECTIVE DATA SUMMARY:   Hospital course since last seen and reason for re-evaluation: as noted above  Past Medical History:   Diagnosis Date    CAD (coronary artery disease)     Chronic kidney disease     Diabetes mellitus     Hypertension     Sleep apnea      Past Surgical History:   Procedure Laterality Date    HX ORTHOPAEDIC      HX PACEMAKER      IR INSERT TUNL CVC W/O PORT OVER 5 YR  07/07/2022    IR INSERT TUNL CVC W/O PORT OVER 5 YR  6/29/2022    IR REMOVE TUNL CVAD W/O PORT / PUMP  8/12/2022    MS CARDIAC SURG PROCEDURE UNLIST       Barriers to Learning/Limitations: yes;  physical  Compensate with: Visual Cues, Verbal Cues, and Tactile Cues  Home Situation:   Home Situation  Home Environment: Private residence  # Steps to Enter: 3  Wheelchair Ramp: No  One/Two Story Residence: One story  Living Alone: Yes  Support Systems: Friend/Neighbor  Patient Expects to be Discharged to[de-identified] Skilled nursing facility  Current DME Used/Available at Home: Pepe Galindo, Salomon Rincon, Walker, rollator, Wheelchair, Commode, bedside (3 in 1 currently over commode)  Tub or Shower Type: Shower  Critical Behavior:  Neurologic State: Alert  Orientation Level: Oriented X4  Cognition: Appropriate decision making; Appropriate for age attention/concentration; Appropriate safety awareness  Psychosocial  Patient Behaviors: Calm; Cooperative  Purposeful Interaction: Yes  Pt Identified Daily Priority: Clinical issues (comment)  Caritas Process: Nurture loving kindness;Establish trust;Teaching/learning; Attend basic human needs;Create healing environment;Supportive expression  Caring Interventions: Reassure  Reassure: Therapeutic listening; Informing; Acceptance; Instilling jenny and hope;Caring rounds  Therapeutic Modalities: Intentional therapeutic touch;Humor  Skin Condition/Temp: Dry;Warm  Skin Integrity: Incision (comment); Excoriation  Skin Integumentary  Skin Color: Appropriate for ethnicity  Skin Condition/Temp: Dry;Warm  Skin Integrity: Incision (comment); Excoriation  Strength:    Strength: Generally decreased, functional  Tone & Sensation:   Sensation: Impaired (LE's)  Range Of Motion:  AROM: Generally decreased, functional  Bilat ankles NT'd;   PROM: Generally decreased, functional  Functional Mobility:  Bed Mobility:  Rolling: Modified independent  Supine to Sit: Modified independent  Scooting: Stand-by assistance  Balance:   Sitting: Intact  Sitting - Static: Good (unsupported)  Sitting - Dynamic: Good (unsupported); Fair (occasional)  Standing:  (NT)  Therapeutic Exercises:   Seated: BLE's Marching x15, LAQ x 15  Pain:  Pain level pre-treatment: 4/10 posterior neck  Pain level post-treatment: 4/10   Pain Intervention(s) : Medication (see MAR); Rest, Ice, Repositioning   Response to intervention: Nurse notified, See doc flow  Activity Tolerance:   Fair   Please refer to the flowsheet for vital signs taken during this treatment. After treatment:   [x]         Patient left in no apparent distress sitting up EOB eating dinner  []         Patient left in no apparent distress in bed  [x]         Call bell left within reach  []         Nursing notified  []         Caregiver present  []         Bed alarm activated  []         SCDs applied    COMMUNICATION/EDUCATION:   [x]         Role of Physical Therapy in the acute care setting. [x]         Fall prevention education was provided and the patient/caregiver indicated understanding. [x]         Patient/family have participated as able in goal setting and plan of care.   [x]         Patient/family agree to work toward stated goals and plan of care. []         Patient understands intent and goals of therapy, but is neutral about his/her participation. []         Patient is unable to participate in goal setting/plan of care: ongoing with therapy staff.  []         Other: Thank you for this referral.  Dustin Chavez, PT   Time Calculation: 26 mins    Zahra Timbo AM-PAC® Basic Mobility Inpatient Short Form (6-Clicks) Version 2    How much HELP from another person does the patient currently need    (If the patient hasn't done an activity recently, how much help from another person do you think he/she would need if he/she tried?)   Total (Total A or Dep)   A Lot  (Mod to Max A)   A Little (Sup or Min A)   None (Mod I to I)   Turning from your back to your side while in a flat bed without using bedrails? [] 1 [] 2 [] 3 [x] 4   2. Moving from lying on your back to sitting on the side of a flat bed without using bedrails? [] 1 [] 2 [] 3 [x] 4   3. Moving to and from a bed to a chair (including a wheelchair)? [x] 1 [] 2 [] 3 [] 4   4. Standing up from a chair using your arms (e.g., wheelchair, or bedside chair)? [x] 1 [] 2 [] 3 [] 4   5. Walking in hospital room? [x] 1 [] 2 [] 3 [] 4   6. Climbing 3-5 steps with a railing?+   [] 1 [] 2 [] 3 [] 4   +If stair climbing cannot be assessed, skip item #6. Sum responses from items 1-5. Based on an AM-PAC score o /24 (11/20 if omitting stairs) and their current functional mobility deficits, it is recommended that the patient have 3-5 sessions per week of Physical Therapy at d/c to increase the patient's independence.

## 2023-01-04 NOTE — PROGRESS NOTES
Hospitalist Progress Note-critical care note     Patient: Maribel Dunham MRN: [id number]  CSN: [id number]    YOB: 1960  Age: 60 y.o.   Sex: male    DOA: 12/9/2022 LOS:  LOS: 26 days            Chief complaint: pad , foot infection dm esrd     Assessment/Plan         Hospital Problems  Date Reviewed: 12/21/2022            Codes Class Noted POA    PAD (peripheral artery disease) ([facility] ICD-10-CM: I73.9  ICD-9-CM: 443.9  12/27/2022 Unknown        Positive blood culture ICD-10-CM: R78.81  ICD-9-CM: 790.7  12/11/2022 Unknown        Leukocytosis ICD-10-CM: D72.829  ICD-9-CM: 288.60  12/9/2022 Unknown        Diabetic foot infection ([facility] ICD-10-CM: E11.628, L08.9  ICD-9-CM: 250.80, 686.9  12/9/2022 Unknown        Diarrhea ICD-10-CM: R19.7  ICD-9-CM: 787.91  12/9/2022 Unknown        Type 2 diabetes mellitus, with long-term current use of insulin ([facility] ICD-10-CM: E11.9, Z79.4  ICD-9-CM: 250.00, V58.67  Unknown Unknown        Acute hematogenous osteomyelitis of left foot ([facility] ICD-10-CM: M86.072  ICD-9-CM: 730.07  12/9/2022 Unknown        Nondisplaced fracture of second metatarsal bone of left foot ICD-10-CM: N45.715G  ICD-9-CM: 825.25  12/9/2022 Unknown        Nondisplaced fracture of third metatarsal bone of left foot ICD-10-CM: J75.616T  ICD-9-CM: 825.25  12/9/2022 Unknown        Closed nondisplaced fracture of fourth metatarsal bone of left foot ICD-10-CM: S92.345A  ICD-9-CM: 825.25  12/9/2022 Unknown        Hypertension ICD-10-CM: I10  ICD-9-CM: 401.9  7/21/2022 Yes        CAD (coronary artery disease) ICD-10-CM: I25.10  ICD-9-CM: 414.00  6/28/2022 Yes        ESRD (end stage renal disease) ([facility] ICD-10-CM: N18.6  ICD-9-CM: 585.6  6/28/2022 Yes        Diabetes mellitus with foot ulcer ([facility] ICD-10-CM: E11.621, L97.509  ICD-9-CM: 250.80, 707.15  6/27/2022 Yes       60 y.o. male with history of diabetes, diabetic ulcer, CAD, hyperlipidemia, hypertension end-stage renal disease on HD presented to ER due to left foot lesion. Gangrene of left foot/DFU   PARTIAL AMPUTATION OF LEFT 2ND, 3RD, 4TH METATARSALS, AMPUTATION OF TOES 2,3,4, INCISION ADN DRAINAGE LEFT FOOT on 12/09/2022  S/p angiogram with PTA of the proximal PT and peroneal arteries. Vascular planning repeat procedure on 12/28-tolerated well   S/p Left TMA 12/21/2022. ID recommend Bactrim DS 1 tab PO post HD on TTS X10 days  Dr. Uche Peterson planning to put graft today       Headache   Not improving per pain meds   Ct head      History of C-diff -  On oral vancomycin to prevent recurrence-now hold per ID      Positive blood cx -  Coag negative staph  Likely contaminant contamination      CAD-   Distal RCA to drug-eluting stent in July 2022, continue plavix -   No chest pain     Hypertension -  continue home medication     End stage renal disease - hyperkalemia -hd today   on HD per nephrology-will have hd today      DM  type II -  Lantus and ssi     Hyperkalemia   K elevated AM , received HD yesterday, will repeat to see if it lab error      Subjective  I am having headache and needs ct head       Disposition :in 1-2 days   Review of systems:    General: No fevers or chills. Cardiovascular: No chest pain or pressure. No palpitations. Pulmonary: No shortness of breath. Gastrointestinal: No nausea, vomiting. Neuro headache   Vital signs/Intake and Output:  Visit Vitals  BP (!) 142/82   Pulse 75   Temp 97.4 °F (36.3 °C)   Resp 18   Ht 6' (1.829 m)   Wt 97.1 kg (214 lb 1.6 oz)   SpO2 97%   BMI 29.04 kg/m²     Current Shift:  No intake/output data recorded. Last three shifts:  01/02 1901 - 01/04 0700  In: -   Out: 2500     Physical Exam:  General: WD, WN. Alert, cooperative, no acute distress    HEENT: NC, Atraumatic. PERRLA, anicteric sclerae. Muscle spasm of rt side of neck   Lungs: CTA Bilaterally. No Wheezing/Rhonchi/Rales. Heart:  Regular  rhythm,  No murmur, No Rubs, No Gallops  Abdomen: Soft, Non distended, Non tender.   +Bowel sounds, Extremities: No c/c, bilateral foot wrapped with ace bandage and foot protector   Psych:   Not anxious or agitated. Neurologic:  No acute neurological deficit. Labs: Results:       Chemistry Recent Labs     01/04/23  0632   *      K 6.3*      CO2 29   BUN 51*   CREA 7.99*   CA 8.7   AGAP 7   BUCR 6*   ALB 2.2*        CBC w/Diff No results for input(s): WBC, RBC, HGB, HCT, PLT, GRANS, LYMPH, EOS, HGBEXT, HCTEXT, PLTEXT, HGBEXT, HCTEXT, PLTEXT in the last 72 hours. Cardiac Enzymes No results for input(s): CPK, CKND1, PAULO in the last 72 hours. No lab exists for component: CKRMB, TROIP   Coagulation No results for input(s): PTP, INR, APTT, INREXT, INREXT in the last 72 hours. Lipid Panel Lab Results   Component Value Date/Time    Cholesterol, total 188 07/19/2022 03:12 AM    HDL Cholesterol 42 07/19/2022 03:12 AM    LDL, calculated 131.2 (H) 07/19/2022 03:12 AM    VLDL, calculated 14.8 07/19/2022 03:12 AM    Triglyceride 74 07/19/2022 03:12 AM    CHOL/HDL Ratio 4.5 07/19/2022 03:12 AM      BNP No results for input(s): BNPP in the last 72 hours. Liver Enzymes Recent Labs     01/04/23  0632   ALB 2.2*        Thyroid Studies No results found for: T4, T3U, TSH, TSHEXT, TSHEXT     Procedures/imaging: see electronic medical records for all procedures/Xrays and details which were not copied into this note but were reviewed prior to creation of Plan    XR FOOT LT AP/LAT    Result Date: 12/22/2022  EXAM: XR FOOT LT AP/LAT CLINICAL INDICATION/HISTORY:  POST OP XRAY   > Additional: None COMPARISON: 12/9/2022 TECHNIQUE: AP and lateral views _______________ FINDINGS: Interval transmetatarsal amputation. The metatarsal stumps are not entirely sharp or well-defined. Midfoot ossicles appear intact and normally aligned with unremarkable hindfoot bones. Soft tissues covering the amputation stump are edematous.  There are some gas bubbles within the soft tissues as would be expected following recent surgery. There is extensive arterial vascular calcification, Monckeberg sclerosis pattern, characteristic of long-standing diabetes. _______________     Status post TMA as described. The indistinctness of the metatarsal stumps is concerning for residual infection although ultimately nonspecific. Follow-up imaging suggested within several weeks for reassessment. XR FOOT LT AP/LAT    Result Date: 12/9/2022  EXAM: 2 radiographic views of the left foot. INDICATION: Left foot pain. COMPARISON: December 9, 2022 _______________ FINDINGS: There is been interval amputation of the second, third, and fourth rays at the level of the metatarsal diaphysis. As before, the first ray is amputated at the metatarsal phalangeal joint. The small toe remains. There are expected postoperative findings to include regional air density and soft tissue swelling. There is Monckeberg type vascular calcification. There is low-grade mid foot degeneration. _______________     Standard immediate postoperative findings. _______________     XR FOOT LT MIN 3 V    Result Date: 12/9/2022  EXAM: XR FOOT LT MIN 3 V CLINICAL INDICATION/HISTORY: Gangrenous 2nd digit   > Additional: None. COMPARISON: None. TECHNIQUE: 3 views left foot _______________ FINDINGS: Soft tissue gas formation is seen along the second digit with ill-defined limitus changes extending along the medial forefoot. Prior amputation first right. Patchy demineralization and osseous erosions are seen involving the distal portion of the first metatarsal head. Additional patchy areas of osseous erosion are also noted involving the proximal phalanx of the second toe. Diffuse soft tissue swelling. Diffuse atherosclerotic vascular calcifications. No retained radiopaque foreign object. _______________     Soft gas formation and ulceration involving the medial forefoot with findings concerning for osteomyelitis involving the first and second rays described above.     MRI FOOT LT WO CONT    Result Date: 12/9/2022  EXAM: MRI FOOT LT WO CONT CLINICAL INDICATION/HISTORY: Foot wound; preoperative  >Additional: None COMPARISON: Radiograph performed December 9, 2022  >Reference exam: None. TECHNIQUE: Axial long axis TI and T2 with fat saturation, sagittal and coronal short axis TI and STIR sequences through the foot were obtained without contrast. _______________ FINDINGS: FIRST RAY: Prior dictation the first digit. There is mild bony proliferative change at the head of the first metatarsal with preserved marrow signal. Minimal patchy edema is noted which is nonspecific. No definite cortical destructive change. SECOND RAY: Nondisplaced obliquely oriented fracture through the head neck junction of the second metatarsal. There is heterogeneous diminished T1 marrow with patchy edema and surrounding soft tissue gas along the course of the second digit with associated subluxation of the distal phalanx. Subtle diminished T1 marrow is noted with suspected foci of intraosseous gas, concerning for acute osteomyelitis. THIRD RAY: Nondisplaced fracture through the third metatarsal neck with slight impaction. Mild edema is noted within the third digit, possibly stress reaction. No convincing osseous erosions or T1 marrow signal change. FOURTH RAY: Nondisplaced fractures of the neck of the fourth metatarsal. Minimal edema in the proximal phalanx but otherwise preserved marrow signal. FIFTH RAY: Unremarkable. Additional degenerative changes with patchy edema, joint space narrowing, and osteophyte formation at the midfoot, likely related to underlying Charcot arthropathy. SOFT TISSUES: Soft tissue irregularity with gas and ulceration around the second digit noted. Diffuse fluid signal seen throughout the intrinsic foot musculature, commonly seen in the setting of diabetes. Mild skin thickening about the forefoot. INCIDENTAL FINDINGS: None. _______________     1.   Marrow signal abnormality with soft tissue wound and gas around the second digit concerning for acute osteomyelitis. 2.  Nondisplaced fractures of the head neck junction of the second-fourth metatarsals. 3.  Prior indication the first digit. 4.  Soft tissue swelling and skin thickening about the forefoot concerning for cellulitis. No drainable abscess. 5.  Additional chronic/degenerative changes of the foot. CT ABD PELV WO CONT    Result Date: 12/9/2022  EXAM: CT of the abdomen and pelvis INDICATION: Abdominal pain with distention. COMPARISON: None. TECHNIQUE: Axial CT imaging of the abdomen and pelvis was performed without intravenous contrast. Multiplanar reformats were generated. One or more dose reduction techniques were used on this CT: automated exposure control, adjustment of the mAs and/or kVp according to patient size, and iterative reconstruction techniques. The specific techniques used on this CT exam have been documented in the patient's electronic medical record. Digital Imaging and Communications in Medicine (DICOM) format image data are available to nonaffiliated external healthcare facilities or entities on a secure, media free, reciprocally searchable basis with patient authorization for at least a 12-month period after this study. _______________ FINDINGS: LOWER CHEST: Partial inclusion of multivessel coronary arterial atherosclerosis and central venous catheter. Clear lung bases. Several punctate 2 to 3 mm pulmonary nodule seen in the right lower lobe (image 8) LIVER, BILIARY: Liver is normal. No biliary dilation. Color contains a gallstone without evidence of dilatation or gallbladder wall thickening. PANCREAS: Normal. SPLEEN: Punctate granulomatous calcifications otherwise unremarkable. ADRENALS: Mild adreniform thickening of each adrenal gland. KIDNEYS/URETERS/BLADDER: No hydronephrosis.  Bilateral renal hypodensities are present either to small to accurately characterize or in keeping with simple cysts which are prior no further dedicated imaging follow-up. Bladder decompressed. PELVIC ORGANS: Unremarkable. VASCULATURE: Diffuse aortic and visceral arterial atherosclerosis without evidence of aneurysmal dilatation. LYMPH NODES: Scattered subcentimeter mesenteric and retroperitoneal lymph nodes are demonstrated without evidence of adenopathy. Prominent left inguinal lymph node is present (image 127) measuring approximately 15 mm in short axis dimension. GASTROINTESTINAL TRACT: No bowel dilation or wall thickening. Peritoneal gas. Normal appendix. Scattered colonic diverticula without evidence of diverticulitis. BONES: No acute or aggressive osseous abnormalities identified. OTHER: None. _______________     1. Cholelithiasis without evidence of cholecystitis. 2. No abnormal bowel wall thickening or dilatation. 3. No hydronephrosis or obstructive uropathy. 4. Several small right lower lobe pulmonary nodules (2-3 mm in size). See below guidelines for proposed follow-up. 5. Borderline enlarged and nonspecific left inguinal lymph node, potentially reactive in etiology. ======== Fleischner Society pulmonary nodule guidelines (revised 2017): Multiple solid nodules <6 mm: -Low risk for lung cancer: No follow-up. -High risk for lung cancer: Optional chest CT in 12 months. XR CHEST PORT    Result Date: 12/23/2022  EXAM: CHEST RADIOGRAPH CLINICAL INDICATION/HISTORY: sepsis -Additional: None COMPARISON: 12/9/2022 TECHNIQUE: Portable frontal view of the chest _______________ FINDINGS: SUPPORT DEVICES: Indwelling dialysis catheter, tip in the mid to lower SVC. HEART AND MEDIASTINUM: No appreciable cardiomegaly. Remaining mediastinal contours within normal limits. LUNGS AND PLEURAL SPACES: Clear. No consolidation, mass or effusion. BONY THORAX AND SOFT TISSUES: Unremarkable. _______________     No active cardiopulmonary disease.     XR CHEST PORT    Result Date: 12/9/2022  EXAM: XR CHEST PORT CLINICAL INDICATION/HISTORY: sepsis -Additional: None COMPARISON: July 12, 2022 TECHNIQUE: Portable frontal view of the chest _______________ FINDINGS: SUPPORT DEVICES: Right IJ approach dialysis catheter in stable position. HEART AND MEDIASTINUM: Stable appearing cardiac size and mediastinal contours. LUNGS AND PLEURAL SPACES: Lungs are clear. No focal pneumonic opacity. No pneumothorax or pleural effusion. BONY THORAX AND SOFT TISSUES: Unremarkable. _______________     No active cardiopulmonary disease. ANKLE BRACHIAL INDEX    Result Date: 12/21/2022  · Right ankle/brachial index is 1.19 · The left ankle/ brachial index is 0.89      ANKLE BRACHIAL INDEX    Result Date: 12/15/2022  Falsely elevated Ankle Brachial Index pressure measurements noted due to calcified vessels with abnormal waveforms bilaterally. Unable to get left brachial pressure due to dialysis access graft. The right toe/brachial index is 0.59  With a toe pressure of 82 mmhg. Unable to get left toe pressure due to left toes amputation. DUPLEX LOWER EXT ARTERY BILAT    Result Date: 12/15/2022   Elevated velocities (169 cm/s) in the left proximal femoral artery consistent with mild stenosis. Bilateral tibial arteries are patnet at the ankle. Bilateral anterior tibial artery is occluded proximally but reconstituted distally by the collateral flow.         Giovanna Hamman, MD

## 2023-01-04 NOTE — PROGRESS NOTES
Problem: Diabetes Self-Management  Goal: *Disease process and treatment process  Description: Define diabetes and identify own type of diabetes; list 3 options for treating diabetes. Outcome: Progressing Towards Goal  Goal: *Incorporating nutritional management into lifestyle  Description: Describe effect of type, amount and timing of food on blood glucose; list 3 methods for planning meals. Outcome: Progressing Towards Goal  Goal: *Incorporating physical activity into lifestyle  Description: State effect of exercise on blood glucose levels. Outcome: Progressing Towards Goal  Goal: *Developing strategies to promote health/change behavior  Description: Define the ABC's of diabetes; identify appropriate screenings, schedule and personal plan for screenings. Outcome: Progressing Towards Goal  Goal: *Using medications safely  Description: State effect of diabetes medications on diabetes; name diabetes medication taking, action and side effects. Outcome: Progressing Towards Goal  Goal: *Monitoring blood glucose, interpreting and using results  Description: Identify recommended blood glucose targets  and personal targets. Outcome: Progressing Towards Goal  Goal: *Prevention, detection, treatment of acute complications  Description: List symptoms of hyper- and hypoglycemia; describe how to treat low blood sugar and actions for lowering  high blood glucose level. Outcome: Progressing Towards Goal  Goal: *Prevention, detection and treatment of chronic complications  Description: Define the natural course of diabetes and describe the relationship of blood glucose levels to long term complications of diabetes.   Outcome: Progressing Towards Goal  Goal: *Developing strategies to address psychosocial issues  Description: Describe feelings about living with diabetes; identify support needed and support network  Outcome: Progressing Towards Goal  Goal: *Sick day guidelines  Outcome: Progressing Towards Goal  Goal: *Patient Specific Goal (EDIT GOAL, INSERT TEXT)  Outcome: Progressing Towards Goal     Problem: Patient Education: Go to Patient Education Activity  Goal: Patient/Family Education  Outcome: Progressing Towards Goal     Problem: Falls - Risk of  Goal: *Absence of Falls  Description: Document Jemma Solano Fall Risk and appropriate interventions in the flowsheet. Outcome: Progressing Towards Goal  Note: Fall Risk Interventions:  Mobility Interventions: Assess mobility with egress test         Medication Interventions: Assess postural VS orthostatic hypotension, Teach patient to arise slowly    Elimination Interventions: Call light in reach, Patient to call for help with toileting needs, Toileting schedule/hourly rounds, Urinal in reach    History of Falls Interventions: Door open when patient unattended, Room close to nurse's station         Problem: Patient Education: Go to Patient Education Activity  Goal: Patient/Family Education  Outcome: Progressing Towards Goal     Problem: Chronic Renal Failure  Goal: *Fluid and electrolytes stabilized  Outcome: Progressing Towards Goal     Problem: Patient Education: Go to Patient Education Activity  Goal: Patient/Family Education  Outcome: Progressing Towards Goal     Problem: Pressure Injury - Risk of  Goal: *Prevention of pressure injury  Description: Document Semaj Scale and appropriate interventions in the flowsheet.   Outcome: Progressing Towards Goal  Note: Pressure Injury Interventions:  Sensory Interventions: Discuss PT/OT consult with provider, Keep linens dry and wrinkle-free    Moisture Interventions: Apply protective barrier, creams and emollients    Activity Interventions: Pressure redistribution bed/mattress(bed type)    Mobility Interventions: Pressure redistribution bed/mattress (bed type), PT/OT evaluation    Nutrition Interventions: Document food/fluid/supplement intake    Friction and Shear Interventions: Apply protective barrier, creams and emollients Problem: Patient Education: Go to Patient Education Activity  Goal: Patient/Family Education  Outcome: Progressing Towards Goal     Problem: Patient Education: Go to Patient Education Activity  Goal: Patient/Family Education  Outcome: Progressing Towards Goal     Problem: Patient Education: Go to Patient Education Activity  Goal: Patient/Family Education  Outcome: Progressing Towards Goal     Problem: Pain  Goal: *Control of Pain  Outcome: Progressing Towards Goal +Chronic cardoso  proteus mirabilis ESBL- sensitive to Meropenem  afebrile, no leukocytosis  Continue Meropenem until 6/28  ID Dr. Schaefer

## 2023-01-04 NOTE — PROGRESS NOTES
Problem: Falls - Risk of  Goal: *Absence of Falls  Description: Document [place] Fall Risk and appropriate interventions in the flowsheet. Outcome: Progressing Towards Goal  Note: Fall Risk Interventions:  Mobility Interventions: Assess mobility with egress test         Medication Interventions: Assess postural VS orthostatic hypotension, Teach patient to arise slowly    Elimination Interventions: Call light in reach, Patient to call for help with toileting needs, Toileting schedule/hourly rounds, Urinal in reach    History of Falls Interventions: Door open when patient unattended, Room close to nurse's station         Problem: Pressure Injury - Risk of  Goal: *Prevention of pressure injury  Description: Document Semaj Scale and appropriate interventions in the flowsheet.   Outcome: Progressing Towards Goal  Note: Pressure Injury Interventions:  Sensory Interventions: Assess changes in LOC, Avoid rigorous massage over bony prominences, Check visual cues for pain, Float heels, Minimize linen layers    Moisture Interventions: Absorbent underpads, Check for incontinence Q2 hours and as needed, Minimize layers    Activity Interventions: Pressure redistribution bed/mattress(bed type), PT/OT evaluation    Mobility Interventions: Float heels, Pressure redistribution bed/mattress (bed type), PT/OT evaluation    Nutrition Interventions: Document food/fluid/supplement intake, Offer support with meals,snacks and hydration    Friction and Shear Interventions: Apply protective barrier, creams and emollients, Minimize layers                Problem: Pain  Goal: *Control of Pain  Outcome: Progressing Towards Goal

## 2023-01-04 NOTE — PROGRESS NOTES
[facility]  (A Division of [address] [place] [address])                                                Amanda Garcia MD                                        Office #:     [phone]  [id number] [phone]                                        Office Fax: [phone]      Follow-up Note      Date of Admission: 12/9/2022       Date of Note:  1/4/2023  Referral: L foot infection/osteomyelitis/gangrene       Current Antimicrobials:    Prior Antimicrobials:      Bactrim DS 12/30 PO vanco 12/9 to 12/19    Vanco 12/9 to date  Zosyn 12/9 to 12/23  Meropenem 12/23 to 12/29   Antibiotic allergy: NOne     Assessment:     Recurrent gangrene on surgical site- DW Wound RN  Hyperkalemia- recurrening, HA-- Bactrim can add to the problem  Isolated high Fever 12/23-- etiology not clear. Resolved        -CXR/repeat BCX normal so far from 12/23. Doubt CDI- no diarrhea and WBC coming down. L foot surgical wound without significant     necrosis/ischemic change or drainage/cellulitis. Possible GI source Vs drug fever.    Dry L foot gangrene- distal--S/P TMA- with clear margin per Surgeon 12/21/22  --S/P partial ampuation L 2nd/3rd/4th MT, I and D of left foot- deep 12/09/22  --S/P TMA Left foot 12/21/22-- Biopsy with acute Osteomylitis  --CRP 25.4- elevated, procal 1.31 and ESR >140 on 12/27/22  PAD--S/P angiogram 12/20 and S/P L PT baloon angioplasty 12/28/22 --Vascular following  Bacteremia 2/2 MRSE on 12/9--is procurement contamination   Recent CDI--prophylactic oral vanco from 12/23  ESRD on HD  R foot OM-ankle- treated and completed treatment 09/2022    Recommendation -- ID related:     DC Bactrim -- OM resected completely per discussion with Podiatry previously, in addition, adverse effect from abx possible  Cont oral vanco after 5 days  Depending on Podiatry surgical plans, will review finding-- without good blood flow, high risk to loose foot        Subjective:      Have HA and CT head done and without acute finding. No other complaint from patient  No fever or chills    He has gangrene changes on his wound edges and going for debridement again, per discussion with wound RN.     Notes/Labs including recent CBC with diff, BMP- elevated K noted              12/9 - OR (+) Serratia fonticola (S to pip/all except cefazolin), Alcaligenes faecalis (R FQ), Enterbacter cloacae (still being worked up), and anaerobic Bacteroides vulgatus (BL +)                                                      BCx 1/2 (+) CoNS      Lines / Catheters:         R HD cath and peripheral        Patient Active Problem List   Diagnosis Code    Diabetes mellitus with foot ulcer ([facility] E11.621, L97.509    CAD (coronary artery disease) I25.10    ESRD (end stage renal disease) ([facility]) N18.6    Acute hematogenous osteomyelitis of right foot ([facility]) M86.071    Cellulitis of right heel L03.115    Diabetic foot ulcer with osteomyelitis ([facility]) E11.621, E11.69, L97.509, M86.9    Ulcer of right heel, with necrosis of bone ([facility]) L97.414    Infection and inflammatory reaction due to internal fixation device of other site, initial encounter [facility]) T84.69XA    Left arm swelling M79.89    Chest pain at rest R07.9    Abnormal nuclear stress test R94.39    History of anemia due to CKD N18.9, Z86.2    S/P angioplasty with stent Z95.820    Hypertension I10    Leukocytosis D72.829    Diabetic foot infection ([facility] E11.628, L08.9    Diarrhea R19.7    Type 2 diabetes mellitus, with long-term current use of insulin ([facility]) E11.9, Z79.4    Acute hematogenous osteomyelitis of left foot ([facility]) M86.072    Nondisplaced fracture of second metatarsal bone of left foot S92.325A    Nondisplaced fracture of third metatarsal bone of left foot S92.335A    Closed nondisplaced fracture of fourth metatarsal bone of left foot S92.345A    Positive blood culture R78.81    PAD (peripheral artery disease) ([facility]) I73.9       Current Facility-Administered Medications   Medication Dose Route Frequency lidocaine 4 % patch 1 Patch  1 Patch TransDERmal Q24H    famotidine (PEPCID) tablet 20 mg  20 mg Oral DAILY    vit B Cmplx 3-FA-Vit C-Biotin (NEPHRO NIKITA RX) tablet 1 Tablet  1 Tablet Oral DAILY    trimethoprim-sulfamethoxazole (BACTRIM DS, SEPTRA DS) 160-800 mg per tablet 1 Tablet  1 Tablet Oral DIALYSIS TUE, THU & SAT    fentaNYL citrate (PF) injection  mcg   mcg IntraVENous Rad Multiple    midazolam (VERSED) injection 0.5-2 mg  0.5-2 mg IntraVENous Rad Multiple    flumazeniL (ROMAZICON) 0.1 mg/mL injection 0.2 mg  0.2 mg IntraVENous PRN    naloxone (NARCAN) injection 0.4 mg  0.4 mg IntraVENous Rad Multiple    iopamidoL (ISOVUE 250) 250 mg iodine /mL (51 %) contrast injection 1-150 mL  1-150 mL IntraarTERial RAD CONTINUOUS    heparin (porcine) 1,000 unit/mL injection 1,000-10,000 Units  1,000-10,000 Units IntraVENous Rad Multiple    diphenhydrAMINE (BENADRYL) injection 25 mg  25 mg IntraVENous Rad Multiple    diphenhydrAMINE (BENADRYL) injection 12.5 mg  12.5 mg IntraVENous Q6H PRN    ammonium lactate (LAC-HYDRIN) 12 % lotion   Topical BID PRN    Saccharomyces boulardii (FLORASTOR) capsule 500 mg  500 mg Oral BID    vancomycin 50 mg/mL oral solution (compounded) 125 mg  125 mg Oral Q6H    insulin lispro (HUMALOG) injection   SubCUTAneous AC&HS    insulin glargine (LANTUS) injection 15 Units  15 Units SubCUTAneous QHS    heparin (porcine) 1,000 unit/mL injection 1,000-10,000 Units  1,000-10,000 Units IntraVENous Rad Multiple    nitroGLYcerin 0.1 mg/mL in D5W injection  1-5 mL IntraarTERial Rad Multiple    heparinized saline 2 units/mL infusion 2,000 Units  1,000 mL Irrigation RAD CONTINUOUS    iopamidoL (ISOVUE 250) 250 mg iodine /mL (51 %) contrast injection 1-150 mL  1-150 mL IntraarTERial RAD CONTINUOUS    epoetin lisette-epbx (RETACRIT) injection 8,000 Units  8,000 Units SubCUTAneous Q TUE, THU & SAT    loperamide (IMODIUM) capsule 2 mg  2 mg Oral Q4H PRN    nystatin (MYCOSTATIN) 100,000 unit/gram powder   Topical BID    alum-mag hydroxide-simeth (MYLANTA) oral suspension 30 mL  30 mL Oral Q4H PRN    glucose chewable tablet 16 g  16 g Oral PRN    glucagon (GLUCAGEN) injection 1 mg  1 mg IntraMUSCular PRN    heparin (porcine) 1,000 unit/mL injection 3,600 Units  3,600 Units IntraCATHeter DIALYSIS PRN    dextrose 10% infusion 0-250 mL  0-250 mL IntraVENous PRN    0.9% sodium chloride infusion  25 mL/hr IntraVENous DIALYSIS PRN    clopidogreL (PLAVIX) tablet 75 mg  75 mg Oral DAILY    carvediloL (COREG) tablet 12.5 mg  12.5 mg Oral BID    aspirin chewable tablet 81 mg  81 mg Oral DAILY    amLODIPine (NORVASC) tablet 10 mg  10 mg Oral DAILY    allopurinoL (ZYLOPRIM) tablet 100 mg  100 mg Oral DAILY    ascorbic acid (vitamin C) (VITAMIN C) tablet 500 mg  500 mg Oral DAILY    ezetimibe (ZETIA) tablet 10 mg  10 mg Oral DAILY    sevelamer carbonate (RENVELA) tab 1,600 mg  1,600 mg Oral TID WITH MEALS    sodium chloride (NS) flush 5-40 mL  5-40 mL IntraVENous Q8H    sodium chloride (NS) flush 5-40 mL  5-40 mL IntraVENous PRN    acetaminophen (TYLENOL) tablet 650 mg  650 mg Oral Q6H PRN    Or    acetaminophen (TYLENOL) suppository 650 mg  650 mg Rectal Q6H PRN    bisacodyL (DULCOLAX) suppository 10 mg  10 mg Rectal DAILY PRN    promethazine (PHENERGAN) tablet 12.5 mg  12.5 mg Oral Q6H PRN    Or    ondansetron (ZOFRAN) injection 4 mg  4 mg IntraVENous Q6H PRN    heparin (porcine) injection 5,000 Units  5,000 Units SubCUTAneous Q8H    oxyCODONE-acetaminophen (PERCOCET) 5-325 mg per tablet 1 Tablet  1 Tablet Oral Q6H PRN         Review of Systems - General ROS: negative for - chills, fever, or night sweats  Respiratory ROS: no cough, shortness of breath, or wheezing  Cardiovascular ROS: no chest pain or dyspnea on exertion  Gastrointestinal ROS: no abdominal pain, change in bowel habits, or black or bloody stools       Objective:    Visit Vitals  /80   Pulse 73   Temp 97.7 °F (36.5 °C)   Resp 18   Ht 6' (1.829 m) Wt 97.1 kg (214 lb 1.6 oz)   SpO2 97%   BMI 29.04 kg/m²       Temp (24hrs), Av.3 °F (36.8 °C), Min:97.4 °F (36.3 °C), Max:99 °F (37.2 °C)    Right HD cath in place    GEN: WDWN AAM in NAD  HEENT: anicteric  CHEST: Non laboured breathing  EXT: L foot is dressed-- wound not seen. Lab results:    Chemistry  Recent Labs     23  0900 23  0632   * 134*   * 136   K 5.9* 6.3*    100   CO2 30 29   BUN 52* 51*   CREA 8.10* 7.99*   CA 9.3 8.7   AGAP 5 7   BUCR 6* 6*   ALB  --  2.2*       CBC w/ Diff  No results for input(s): WBC, RBC, HGB, HCT, PLT, GRANS, LYMPH, EOS, HGBEXT, HCTEXT, PLTEXT, HGBEXT, HCTEXT, PLTEXT in the last 72 hours. Microbiology  All Micro Results       Procedure Component Value Units Date/Time    CULTURE, BLOOD [[id number]] Collected: 22    Order Status: Completed Specimen: Blood Updated: 22     Special Requests: NO SPECIAL REQUESTS        Culture result: NO GROWTH 6 DAYS       CULTURE, BLOOD [[id number]] Collected: 22    Order Status: Completed Specimen: Blood Updated: 22     Special Requests: NO SPECIAL REQUESTS        Culture result: NO GROWTH 6 DAYS       CULTURE, BLOOD [[id number]] Collected: 22    Order Status: Completed Specimen: Blood Updated: 22     Special Requests: NO SPECIAL REQUESTS        Culture result: NO GROWTH 6 DAYS       COVID-19 WITH INFLUENZA A/B [[id number]] Collected: 22    Order Status: Completed Specimen: Nasopharyngeal Updated: 22     SARS-CoV-2 by PCR Not detected        Comment: Not Detected results do not preclude SARS-CoV-2 infection and should not be used as the sole basis for patient management decisions. Results must be combined with clinical observations, patient history, and epidemiological information.         Influenza A by PCR Not detected        Influenza B by PCR Not detected        Comment: Testing was performed using cheryl [facility] SARS-CoV-2 and Influenza A/B nucleic acid assay. This test is a multiplex Real-Time Reverse Transcriptase Polymerase Chain Reaction (RT-PCR) based in vitro diagnostic test intended for the qualitative detection of nucleic acids from SARS-CoV-2, Influenza A, and Influenza B in nasopharyngeal for use under the FDA's Emergency Use Authorization(EAU) only.        Fact sheet for Patients: FindDrives.pl  Fact sheet for Healthcare Providers: FindDrives.pl         CULTURE, BLOOD 2nd DRAW (required for [facility]) [[id number]] Collected: 12/09/22 1215    Order Status: Completed Specimen: Blood Updated: 12/15/22 0940     Special Requests: LEFT HAND     Culture result: NO GROWTH 6 DAYS       CULTURE, WOUND Lugenia Chain STAIN [[id number]]  (Abnormal)  (Susceptibility) Collected: 12/09/22 2201    Order Status: Completed Specimen: Wound from Foot Updated: 12/15/22 0650     Special Requests: NO SPECIAL REQUESTS        GRAM STAIN RARE WBCS SEEN               2+ APPARENT GRAM VARIABLE RODS           Culture result:       HEAVY Serratia fonticola BRADZ = 28, SENSITIVE                  HEAVY ALCALIGENES FAECALIS                  HEAVY ALCALIGENES FAECALIS (2ND COLONY TYPE/STRAIN)                  HEAVY ENTEROBACTER CLOACAE                  HEAVY MIXED SKIN TO ISOLATED          CULTURE, ANAEROBIC [[id number]]  (Abnormal) Collected: 12/09/22 2201    Order Status: Completed Specimen: Foot, left Updated: 12/13/22 1246     Special Requests: NO SPECIAL REQUESTS        Culture result:       MODERATE BACTEROIDES VULGATUS BETA LACTAMASE POSITIVE          CULTURE, BLOOD [[id number]]  (Abnormal) Collected: 12/09/22 1155    Order Status: Completed Specimen: Blood Updated: 12/12/22 0743     Special Requests: LEFT AC     GRAM STAIN       ANAEROBIC BOTTLE GRAM POSITIVE COCCI IN CLUSTERS                  SMEAR CALLED TO AND CORRECTLY REPEATED BY: Violet Miranda RN 3S 12/10/22 AT 1 BY NAY Culture result:       STAPHYLOCOCCUS SPECIES, COAGULASE NEGATIVE GROWING IN 1 OF 2 BOTTLES DRAWN  SITE = LAC          BLOOD CULTURE ID PANEL [[id number]]  (Abnormal) Collected: 12/09/22 1145    Order Status: Completed Specimen: Blood Updated: 12/11/22 0655     Acc. no. from Micro Order [id number]     Enterococcus faecalis Not detected        Enterococcus faecium Not detected        Listeria monocytogenes Not detected        Staphylococcus Detected        Staphylococcus aureus Not detected        Staph epidermidis Detected        Staph lugdunensis Not detected        Streptococcus Not detected        Streptococcus agalactiae (Group B) Not detected        Streptococcus pneumoniae Not detected        Streptococcus pyogenes (Group A) Not detected        Acinetobacter calcoaceticus-baumanii complex Not detected        Bacteroides fragilis Not detected        Enterobacterales species Not detected        Enterobacter cloacae complex Not detected        Escherichia coli Not detected        Klebsiella aerogenes Not detected        Klebsiella oxytoca Not detected        Klebsiella pneumoniae group Not detected        Proteus Not detected        Salmonella Not detected        Serratia marcescens Not detected        Haemophilus influenzae Not detected        Neisseria meningitidis Not detected        Pseudomonas aeruginosa Not detected        Steno maltophilia Not detected        Candida albicans Not detected        Candida auris Not detected        Candida glabrata Not detected        Candida krusei Not detected        Candida parapsilosis Not detected        Candida tropicalis Not detected        Crypto neoformans/gattii Not detected        RESISTANT GENES:            MECA/C (Methicillin resistant gene) Detected        Comment       False positive results may rarely occur.  Correlate with clinical,epidemiologic, and other laboratory findings           Comment: Please see BCID Interpretation Guide in EPIC Links       C. DIFFICILE AG & TOXIN A/B [[id number]]     Order Status: Canceled Specimen: Stool

## 2023-01-05 LAB
ALBUMIN SERPL-MCNC: 2.2 G/DL (ref 3.4–5)
ALBUMIN/GLOB SERPL: 0.4 (ref 0.8–1.7)
ALP SERPL-CCNC: 120 U/L (ref 45–117)
ALT SERPL-CCNC: 24 U/L (ref 16–61)
ANION GAP SERPL CALC-SCNC: 6 MMOL/L (ref 3–18)
AST SERPL-CCNC: 20 U/L (ref 10–38)
BASOPHILS # BLD: 0 K/UL (ref 0–0.1)
BASOPHILS NFR BLD: 0 % (ref 0–2)
BILIRUB SERPL-MCNC: 0.3 MG/DL (ref 0.2–1)
BUN SERPL-MCNC: 39 MG/DL (ref 7–18)
BUN/CREAT SERPL: 7 (ref 12–20)
CALCIUM SERPL-MCNC: 8.8 MG/DL (ref 8.5–10.1)
CHLORIDE SERPL-SCNC: 98 MMOL/L (ref 100–111)
CO2 SERPL-SCNC: 31 MMOL/L (ref 21–32)
CREAT SERPL-MCNC: 6 MG/DL (ref 0.6–1.3)
DIFFERENTIAL METHOD BLD: ABNORMAL
EOSINOPHIL # BLD: 0.7 K/UL (ref 0–0.4)
EOSINOPHIL NFR BLD: 5 % (ref 0–5)
ERYTHROCYTE [DISTWIDTH] IN BLOOD BY AUTOMATED COUNT: 16.4 % (ref 11.6–14.5)
GLOBULIN SER CALC-MCNC: 5.5 G/DL (ref 2–4)
GLUCOSE BLD STRIP.AUTO-MCNC: 125 MG/DL (ref 70–110)
GLUCOSE BLD STRIP.AUTO-MCNC: 141 MG/DL (ref 70–110)
GLUCOSE BLD STRIP.AUTO-MCNC: 167 MG/DL (ref 70–110)
GLUCOSE BLD STRIP.AUTO-MCNC: 190 MG/DL (ref 70–110)
GLUCOSE BLD STRIP.AUTO-MCNC: 199 MG/DL (ref 70–110)
GLUCOSE SERPL-MCNC: 144 MG/DL (ref 74–99)
HCT VFR BLD AUTO: 26.1 % (ref 36–48)
HGB BLD-MCNC: 8.3 G/DL (ref 13–16)
IMM GRANULOCYTES # BLD AUTO: 0 K/UL (ref 0–0.04)
IMM GRANULOCYTES NFR BLD AUTO: 0 % (ref 0–0.5)
LYMPHOCYTES # BLD: 1.5 K/UL (ref 0.9–3.6)
LYMPHOCYTES NFR BLD: 11 % (ref 21–52)
MAGNESIUM SERPL-MCNC: 2.3 MG/DL (ref 1.6–2.6)
MCH RBC QN AUTO: 30.4 PG (ref 24–34)
MCHC RBC AUTO-ENTMCNC: 31.8 G/DL (ref 31–37)
MCV RBC AUTO: 95.6 FL (ref 78–100)
MONOCYTES # BLD: 1.2 K/UL (ref 0.05–1.2)
MONOCYTES NFR BLD: 9 % (ref 3–10)
NEUTS SEG # BLD: 10.1 K/UL (ref 1.8–8)
NEUTS SEG NFR BLD: 75 % (ref 40–73)
NRBC # BLD: 0 K/UL (ref 0–0.01)
NRBC BLD-RTO: 0 PER 100 WBC
PLATELET # BLD AUTO: 368 K/UL (ref 135–420)
PMV BLD AUTO: 10.1 FL (ref 9.2–11.8)
POTASSIUM SERPL-SCNC: 4.4 MMOL/L (ref 3.5–5.5)
PROT SERPL-MCNC: 7.7 G/DL (ref 6.4–8.2)
RBC # BLD AUTO: 2.73 M/UL (ref 4.35–5.65)
RBC MORPH BLD: ABNORMAL
SODIUM SERPL-SCNC: 135 MMOL/L (ref 136–145)
WBC # BLD AUTO: 13.5 K/UL (ref 4.6–13.2)

## 2023-01-05 PROCEDURE — 74011000250 HC RX REV CODE- 250: Performed by: HOSPITALIST

## 2023-01-05 PROCEDURE — 74011250636 HC RX REV CODE- 250/636: Performed by: INTERNAL MEDICINE

## 2023-01-05 PROCEDURE — 36415 COLL VENOUS BLD VENIPUNCTURE: CPT

## 2023-01-05 PROCEDURE — 65270000029 HC RM PRIVATE

## 2023-01-05 PROCEDURE — 83735 ASSAY OF MAGNESIUM: CPT

## 2023-01-05 PROCEDURE — 80053 COMPREHEN METABOLIC PANEL: CPT

## 2023-01-05 PROCEDURE — 82962 GLUCOSE BLOOD TEST: CPT

## 2023-01-05 PROCEDURE — 74011250636 HC RX REV CODE- 250/636: Performed by: HOSPITALIST

## 2023-01-05 PROCEDURE — 85025 COMPLETE CBC W/AUTO DIFF WBC: CPT

## 2023-01-05 PROCEDURE — 74011636637 HC RX REV CODE- 636/637: Performed by: HOSPITALIST

## 2023-01-05 PROCEDURE — 74011250637 HC RX REV CODE- 250/637: Performed by: HOSPITALIST

## 2023-01-05 PROCEDURE — 74011250637 HC RX REV CODE- 250/637: Performed by: INTERNAL MEDICINE

## 2023-01-05 RX ADMIN — CARVEDILOL 12.5 MG: 12.5 TABLET, FILM COATED ORAL at 20:33

## 2023-01-05 RX ADMIN — SODIUM CHLORIDE, PRESERVATIVE FREE 10 ML: 5 INJECTION INTRAVENOUS at 15:24

## 2023-01-05 RX ADMIN — Medication 500 MG: at 20:33

## 2023-01-05 RX ADMIN — Medication 2 UNITS: at 21:04

## 2023-01-05 RX ADMIN — B-COMPLEX W/ C & FOLIC ACID TAB 1 MG 1 TABLET: 1 TAB at 08:47

## 2023-01-05 RX ADMIN — EPOETIN ALFA-EPBX 8000 UNITS: 4000 INJECTION, SOLUTION INTRAVENOUS; SUBCUTANEOUS at 20:47

## 2023-01-05 RX ADMIN — Medication 15 UNITS: at 21:04

## 2023-01-05 RX ADMIN — AMLODIPINE BESYLATE 10 MG: 5 TABLET ORAL at 08:46

## 2023-01-05 RX ADMIN — NYSTATIN: 100000 POWDER TOPICAL at 20:34

## 2023-01-05 RX ADMIN — Medication 2 UNITS: at 08:54

## 2023-01-05 RX ADMIN — SODIUM CHLORIDE, PRESERVATIVE FREE 10 ML: 5 INJECTION INTRAVENOUS at 20:52

## 2023-01-05 RX ADMIN — SEVELAMER CARBONATE 1600 MG: 800 TABLET, FILM COATED ORAL at 08:46

## 2023-01-05 RX ADMIN — HEPARIN SODIUM 5000 UNITS: 5000 INJECTION INTRAVENOUS; SUBCUTANEOUS at 01:32

## 2023-01-05 RX ADMIN — Medication 125 MG: at 06:24

## 2023-01-05 RX ADMIN — Medication 125 MG: at 20:33

## 2023-01-05 RX ADMIN — ALLOPURINOL 100 MG: 100 TABLET ORAL at 08:47

## 2023-01-05 RX ADMIN — SEVELAMER CARBONATE 1600 MG: 800 TABLET, FILM COATED ORAL at 14:14

## 2023-01-05 RX ADMIN — Medication 2 UNITS: at 17:51

## 2023-01-05 RX ADMIN — Medication 500 MG: at 08:46

## 2023-01-05 RX ADMIN — NYSTATIN: 100000 POWDER TOPICAL at 09:00

## 2023-01-05 RX ADMIN — CLOPIDOGREL BISULFATE 75 MG: 75 TABLET ORAL at 08:46

## 2023-01-05 RX ADMIN — Medication 500 MG: at 08:47

## 2023-01-05 RX ADMIN — HEPARIN SODIUM 5000 UNITS: 5000 INJECTION INTRAVENOUS; SUBCUTANEOUS at 17:51

## 2023-01-05 RX ADMIN — EZETIMIBE 10 MG: 10 TABLET ORAL at 08:47

## 2023-01-05 RX ADMIN — FAMOTIDINE 20 MG: 20 TABLET, FILM COATED ORAL at 08:46

## 2023-01-05 RX ADMIN — SEVELAMER CARBONATE 1600 MG: 800 TABLET, FILM COATED ORAL at 17:51

## 2023-01-05 RX ADMIN — ASPIRIN 81 MG: 81 TABLET, CHEWABLE ORAL at 08:46

## 2023-01-05 RX ADMIN — CARVEDILOL 12.5 MG: 12.5 TABLET, FILM COATED ORAL at 08:47

## 2023-01-05 RX ADMIN — Medication 125 MG: at 14:14

## 2023-01-05 RX ADMIN — Medication 125 MG: at 01:33

## 2023-01-05 NOTE — PROGRESS NOTES
1935 Bedside and Verbal shift change  Received from Clifton Francis RN (outgoing nurse), to ELLYN Wiley (oncoming)  Pt. Is AOX 4. IV patent and infusing well, Pt. denies  pain at this time. Report included the following information  SBAR, Kardex, Procedure Summary, Intake/Output, MAR, Recent Lab Results Cardiac Rhythm SR. Will resume care and monitor Pt. Condition. Pt. Educated on call bell when in need of help and assistance. Pt. verbalized understanding. Bed alarm on     Pt. Head to toe Assessment Done and documented. 2035  Pt. Resting comfortably in bed, not in distress. 2200  Pt. Night meds given, Pt. Denies discomfort. 2300  Pt made no complaints. 0100  Pt. Resting with eyes closed, easily awaken. 0300 Pt made no complaints. 0500  Pt. Able to rest and sleep well throughout the shift. 0630  Pt made no complaints. Verbal and bedside Shift changed report given to KIM Hayes (oncoming RN) on Pt. Condition. Report consisted of patients Situation, History, Activities, intake/output,  Background, Assessment and Recommendations(SBAR). Information from the following report(s) Kardex, order Summary, Lab results and MAR was reviewed with the receiving nurse. Opportunity for questions and clarification was provided.

## 2023-01-05 NOTE — PROGRESS NOTES
1/5/2023  PT treatment not completed due to:  [] Off Unit for testing/procedure  [] Pain  [] Eating  [] Patient too lethargic  [] Nausea/vomiting  [] Dialysis treatment in progress   [x] Other: pt declines PT participation. Previously received HD treatment today; surgery cancelled due to hyperkalemia. Scheduled for Friday at 10 am now. Will f/up accordingly.   Danna Black, PT

## 2023-01-05 NOTE — ROUTINE PROCESS
Dialysis nurse stated that she removed 2.5 liters of fluid from patient, and that he now weighs 92.1 kg.

## 2023-01-05 NOTE — PROGRESS NOTES
Podiatry:    Patient's left foot surgery is canceled for this afternoon due to dialysis. Anesthesia prefers to wait a day after dialysis. Therefore he is scheduled for tomorrow/Friday morning about 10 AM.  Assuming his potassium is normal...

## 2023-01-05 NOTE — PROGRESS NOTES
Nephrology Inpatient Progress note    Subjective:     Paris Lewis is a 60 y.o. male with a PMHx of  DM, HTN. PVD, ESRD on HD TTS at [facility] under the [facility] [address] [facility] . Nephrology sent in for admission by wound care clinic due to left foot infection ,was told he needed surgery. Podiatry has been in to see pt. He has been on abx recently     S/P Lt TMA. Comfortable today.  CT head neg  K 6.3- had 1 dose lokelma     Admit Date: 12/9/2022  Active Problems:    Diabetes mellitus with foot ulcer ([facility] (6/27/2022)      CAD (coronary artery disease) (6/28/2022)      ESRD (end stage renal disease) ([facility] (6/28/2022)      Hypertension (7/21/2022)      Leukocytosis (12/9/2022)      Diabetic foot infection ([facility] (12/9/2022)      Diarrhea (12/9/2022)      Type 2 diabetes mellitus, with long-term current use of insulin ([facility]) ()      Acute hematogenous osteomyelitis of left foot ([address] (12/9/2022)      Nondisplaced fracture of second metatarsal bone of left foot (12/9/2022)      Nondisplaced fracture of third metatarsal bone of left foot (12/9/2022)      Closed nondisplaced fracture of fourth metatarsal bone of left foot (12/9/2022)      Positive blood culture (12/11/2022)      PAD (peripheral artery disease) ([facility] (12/27/2022)    Current Facility-Administered Medications   Medication Dose Route Frequency    lidocaine 4 % patch 1 Patch  1 Patch TransDERmal Q24H    famotidine (PEPCID) tablet 20 mg  20 mg Oral DAILY    vit B Cmplx 3-FA-Vit C-Biotin (NEPHRO NIKITA RX) tablet 1 Tablet  1 Tablet Oral DAILY    fentaNYL citrate (PF) injection  mcg   mcg IntraVENous Rad Multiple    midazolam (VERSED) injection 0.5-2 mg  0.5-2 mg IntraVENous Rad Multiple    flumazeniL (ROMAZICON) 0.1 mg/mL injection 0.2 mg  0.2 mg IntraVENous PRN    naloxone (NARCAN) injection 0.4 mg  0.4 mg IntraVENous Rad Multiple    iopamidoL (ISOVUE 250) 250 mg iodine /mL (51 %) contrast injection 1-150 mL  1-150 mL IntraarTERial RAD CONTINUOUS    heparin (porcine) 1,000 unit/mL injection 1,000-10,000 Units  1,000-10,000 Units IntraVENous Rad Multiple    diphenhydrAMINE (BENADRYL) injection 25 mg  25 mg IntraVENous Rad Multiple    diphenhydrAMINE (BENADRYL) injection 12.5 mg  12.5 mg IntraVENous Q6H PRN    ammonium lactate (LAC-HYDRIN) 12 % lotion   Topical BID PRN    Saccharomyces boulardii (FLORASTOR) capsule 500 mg  500 mg Oral BID    vancomycin 50 mg/mL oral solution (compounded) 125 mg  125 mg Oral Q6H    insulin lispro (HUMALOG) injection   SubCUTAneous AC&HS    insulin glargine (LANTUS) injection 15 Units  15 Units SubCUTAneous QHS    heparin (porcine) 1,000 unit/mL injection 1,000-10,000 Units  1,000-10,000 Units IntraVENous Rad Multiple    nitroGLYcerin 0.1 mg/mL in D5W injection  1-5 mL IntraarTERial Rad Multiple    heparinized saline 2 units/mL infusion 2,000 Units  1,000 mL Irrigation RAD CONTINUOUS    iopamidoL (ISOVUE 250) 250 mg iodine /mL (51 %) contrast injection 1-150 mL  1-150 mL IntraarTERial RAD CONTINUOUS    epoetin lisette-epbx (RETACRIT) injection 8,000 Units  8,000 Units SubCUTAneous Q TUE, THU & SAT    loperamide (IMODIUM) capsule 2 mg  2 mg Oral Q4H PRN    nystatin (MYCOSTATIN) 100,000 unit/gram powder   Topical BID    alum-mag hydroxide-simeth (MYLANTA) oral suspension 30 mL  30 mL Oral Q4H PRN    glucose chewable tablet 16 g  16 g Oral PRN    glucagon (GLUCAGEN) injection 1 mg  1 mg IntraMUSCular PRN    heparin (porcine) 1,000 unit/mL injection 3,600 Units  3,600 Units IntraCATHeter DIALYSIS PRN    dextrose 10% infusion 0-250 mL  0-250 mL IntraVENous PRN    0.9% sodium chloride infusion  25 mL/hr IntraVENous DIALYSIS PRN    clopidogreL (PLAVIX) tablet 75 mg  75 mg Oral DAILY    carvediloL (COREG) tablet 12.5 mg  12.5 mg Oral BID    aspirin chewable tablet 81 mg  81 mg Oral DAILY    amLODIPine (NORVASC) tablet 10 mg  10 mg Oral DAILY    allopurinoL (ZYLOPRIM) tablet 100 mg  100 mg Oral DAILY    ascorbic acid (vitamin C) (VITAMIN C) tablet 500 mg  500 mg Oral DAILY    ezetimibe (ZETIA) tablet 10 mg  10 mg Oral DAILY    sevelamer carbonate (RENVELA) tab 1,600 mg  1,600 mg Oral TID WITH MEALS    sodium chloride (NS) flush 5-40 mL  5-40 mL IntraVENous Q8H    sodium chloride (NS) flush 5-40 mL  5-40 mL IntraVENous PRN    acetaminophen (TYLENOL) tablet 650 mg  650 mg Oral Q6H PRN    Or    acetaminophen (TYLENOL) suppository 650 mg  650 mg Rectal Q6H PRN    bisacodyL (DULCOLAX) suppository 10 mg  10 mg Rectal DAILY PRN    promethazine (PHENERGAN) tablet 12.5 mg  12.5 mg Oral Q6H PRN    Or    ondansetron (ZOFRAN) injection 4 mg  4 mg IntraVENous Q6H PRN    heparin (porcine) injection 5,000 Units  5,000 Units SubCUTAneous Q8H    oxyCODONE-acetaminophen (PERCOCET) 5-325 mg per tablet 1 Tablet  1 Tablet Oral Q6H PRN         Allergy:   No Known Allergies     Objective:     Visit Vitals  BP (!) 151/76 (BP 1 Location: Right upper arm, BP Patient Position: At rest;Lying)   Pulse 74   Temp 99.4 °F (37.4 °C)   Resp 20   Ht 6' (1.829 m)   Wt 97.1 kg (214 lb 1.6 oz)   SpO2 100%   BMI 29.04 kg/m²         Intake/Output Summary (Last 24 hours) at 1/5/2023 5125  Last data filed at 1/4/2023 1216  Gross per 24 hour   Intake 60 ml   Output --   Net 60 ml         Physical Exam:       General: No resp distress   HENT: Atraumatic and normocephalic, mmm   Eyes: Normal conjunctiva   Neck: Supple +JVD    Cardiovascular: Normal S1 & S2, no m/r/g   Pulmonary/Chest Wall: Clear to auscultation bilaterally   Abdominal: Soft and +NABS   Musculoskeletal: No edema S/p Amputation of multiple toes Left foot   Neurological: No focal deficits    HD Access: [facility] +    Data Review:  Lab Results   Component Value Date/Time    Sodium 135 (L) 01/04/2023 09:00 AM    Potassium 6.3 (HH) 01/04/2023 04:29 PM    Chloride 100 01/04/2023 09:00 AM    CO2 30 01/04/2023 09:00 AM    Anion gap 5 01/04/2023 09:00 AM    Glucose 128 (H) 01/04/2023 09:00 AM    BUN 52 (H) 01/04/2023 09:00 AM    Creatinine 8.10 (H) 01/04/2023 09:00 AM    BUN/Creatinine ratio 6 (L) 01/04/2023 09:00 AM    GFR est AA 13 (L) 07/21/2022 02:15 PM    GFR est non-AA 11 (L) 07/21/2022 02:15 PM    Calcium 9.3 01/04/2023 09:00 AM     Lab Results   Component Value Date/Time    WBC 12.7 12/27/2022 05:50 AM    HGB 8.4 (L) 12/27/2022 05:50 AM    HCT 25.7 (L) 12/27/2022 05:50 AM    PLATELET 224 07/77/5981 05:50 AM    MCV 94.1 12/27/2022 05:50 AM     Lab Results   Component Value Date/Time    Calcium 9.3 01/04/2023 09:00 AM    Phosphorus 4.9 01/04/2023 06:32 AM     No results found for: IRON, FE, TIBC, IBCT, PSAT, FERR  No results found for: FERR      Impression:     ESRD on HD TTS  Hyperkalemia, resolved  Left diabetic foot infection w/ gangrenous changes s/p Lt 2/3/4 metatarsal amputation   DM  HTN  PVD, seen by [facility], s/p LLE angio 12/28  Anemia  SHPT  Hyperkalemia    Plan:     HD today  Cont DALTON for Anemia  Switch to low K diet  Podiatry following, plans surgery once pt dialyzed      Gosia Alvarez MD,   Bonifacio Barrera

## 2023-01-05 NOTE — PROGRESS NOTES
Comprehensive Nutrition Assessment    Type and Reason for Visit: Reassess    Nutrition Recommendations/Plan:   Continue w/ PO diet order + Nepro 1x daily (provides 425kcal 19g PRO)  Monitor % meal intake, weight, POC. Malnutrition Assessment:  Malnutrition Status: At risk for malnutrition (specify) (r/t ESRD on dialysis and variable meal intake) (12/29/22 1407)      Nutrition Assessment:    pt was NPO 1/4/23 for procedure. Diet advanced to PO after. PO mostly %. Noted plans for HD today. Nutrition Related Findings:    BM 1/5/23. K 6.3 (1/4/23). Vit c, retacrit, pepcid, lantus, humalog, florastor, renvela, nephro natividad rx. Wound Type: Surgical incision, Diabetic ulcer    Current Nutrition Intake & Therapies:  Average Meal Intake: %  Average Supplement Intake: Unable to assess  ADULT ORAL NUTRITION SUPPLEMENT Lunch; Renal Supplement  ADULT DIET Regular; 3 carb choices (45 gm/meal); Low Potassium (Less than 3000 mg/day); Less than 60 gm    Anthropometric Measures:  Height: 6' (182.9 cm)  Ideal Body Weight (IBW): 178 lbs (81 kg)     Current Body Wt:  97.1 kg (214 lb 1.1 oz) (1/2/23), 119.5 % IBW. Bed scale  Current BMI (kg/m2): 29        Weight Adjustment: No adjustment                 BMI Category: Overweight (BMI 25.0-29. 9)    Estimated Daily Nutrient Needs:  Energy Requirements Based On: Formula (MSJ x1.2-1.4)  Weight Used for Energy Requirements: Current  Energy (kcal/day): 2838-1064  Weight Used for Protein Requirements: Other (specify) (Per MD order)  Protein (g/day): 60  Method Used for Fluid Requirements: 1 ml/kcal  Fluid (ml/day): 0581-1234    Nutrition Diagnosis:   Increased nutrient needs related to renal dysfunction as evidenced by dialysis    Nutrition Interventions:   Food and/or Nutrient Delivery: Continue current diet, Continue oral nutrition supplement  Nutrition Education/Counseling: No recommendations at this time  Coordination of Nutrition Care: Continue to monitor while inpatient       Goals:  Previous Goal Met: Goal(s) achieved  Goals: Meet at least 75% of estimated needs, by next RD assessment       Nutrition Monitoring and Evaluation:   Behavioral-Environmental Outcomes: None identified  Food/Nutrient Intake Outcomes: Food and nutrient intake, Supplement intake  Physical Signs/Symptoms Outcomes: Biochemical data, Meal time behavior, Nutrition focused physical findings, Skin, Weight    Discharge Planning:    Continue current diet    Richard Grimes RD

## 2023-01-05 NOTE — PROGRESS NOTES
Problem: Diabetes Self-Management  Goal: *Disease process and treatment process  Description: Define diabetes and identify own type of diabetes; list 3 options for treating diabetes. Outcome: Progressing Towards Goal  Goal: *Incorporating nutritional management into lifestyle  Description: Describe effect of type, amount and timing of food on blood glucose; list 3 methods for planning meals. Outcome: Progressing Towards Goal  Goal: *Incorporating physical activity into lifestyle  Description: State effect of exercise on blood glucose levels. Outcome: Progressing Towards Goal  Goal: *Developing strategies to promote health/change behavior  Description: Define the ABC's of diabetes; identify appropriate screenings, schedule and personal plan for screenings. Outcome: Progressing Towards Goal  Goal: *Using medications safely  Description: State effect of diabetes medications on diabetes; name diabetes medication taking, action and side effects. Outcome: Progressing Towards Goal  Goal: *Monitoring blood glucose, interpreting and using results  Description: Identify recommended blood glucose targets  and personal targets. Outcome: Progressing Towards Goal  Goal: *Prevention, detection, treatment of acute complications  Description: List symptoms of hyper- and hypoglycemia; describe how to treat low blood sugar and actions for lowering  high blood glucose level. Outcome: Progressing Towards Goal  Goal: *Prevention, detection and treatment of chronic complications  Description: Define the natural course of diabetes and describe the relationship of blood glucose levels to long term complications of diabetes.   Outcome: Progressing Towards Goal  Goal: *Developing strategies to address psychosocial issues  Description: Describe feelings about living with diabetes; identify support needed and support network  Outcome: Progressing Towards Goal  Goal: *Sick day guidelines  Outcome: Progressing Towards Goal  Goal: *Patient Specific Goal (EDIT GOAL, INSERT TEXT)  Outcome: Progressing Towards Goal     Problem: Patient Education: Go to Patient Education Activity  Goal: Patient/Family Education  Outcome: Progressing Towards Goal     Problem: Falls - Risk of  Goal: *Absence of Falls  Description: Document Mal Miranda Fall Risk and appropriate interventions in the flowsheet. Outcome: Progressing Towards Goal  Note: Fall Risk Interventions:  Mobility Interventions: Assess mobility with egress test         Medication Interventions: Assess postural VS orthostatic hypotension, Teach patient to arise slowly    Elimination Interventions: Call light in reach, Patient to call for help with toileting needs, Toileting schedule/hourly rounds, Urinal in reach    History of Falls Interventions: Door open when patient unattended, Room close to nurse's station         Problem: Patient Education: Go to Patient Education Activity  Goal: Patient/Family Education  Outcome: Progressing Towards Goal     Problem: Chronic Renal Failure  Goal: *Fluid and electrolytes stabilized  Outcome: Progressing Towards Goal     Problem: Patient Education: Go to Patient Education Activity  Goal: Patient/Family Education  Outcome: Progressing Towards Goal     Problem: Pressure Injury - Risk of  Goal: *Prevention of pressure injury  Description: Document Semaj Scale and appropriate interventions in the flowsheet.   Outcome: Progressing Towards Goal  Note: Pressure Injury Interventions:  Sensory Interventions: Discuss PT/OT consult with provider, Keep linens dry and wrinkle-free    Moisture Interventions: Apply protective barrier, creams and emollients    Activity Interventions: Pressure redistribution bed/mattress(bed type)    Mobility Interventions: Pressure redistribution bed/mattress (bed type), PT/OT evaluation    Nutrition Interventions: Document food/fluid/supplement intake    Friction and Shear Interventions: Apply protective barrier, creams and emollients Problem: Patient Education: Go to Patient Education Activity  Goal: Patient/Family Education  Outcome: Progressing Towards Goal     Problem: Patient Education: Go to Patient Education Activity  Goal: Patient/Family Education  Outcome: Progressing Towards Goal     Problem: Patient Education: Go to Patient Education Activity  Goal: Patient/Family Education  Outcome: Progressing Towards Goal     Problem: Pain  Goal: *Control of Pain  Outcome: Progressing Towards Goal

## 2023-01-05 NOTE — PROGRESS NOTES
Hospitalist Progress Note-critical care note     Patient: Nataly Case MRN: [id number]  CSN: [id number]    YOB: 1960  Age: 60 y.o.   Sex: male    DOA: 12/9/2022 LOS:  LOS: 27 days            Chief complaint: pad , foot infection dm esrd     Assessment/Plan         Hospital Problems  Date Reviewed: 12/21/2022            Codes Class Noted POA    PAD (peripheral artery disease) ([facility] ICD-10-CM: I73.9  ICD-9-CM: 443.9  12/27/2022 Unknown        Positive blood culture ICD-10-CM: R78.81  ICD-9-CM: 790.7  12/11/2022 Unknown        Leukocytosis ICD-10-CM: D72.829  ICD-9-CM: 288.60  12/9/2022 Unknown        Diabetic foot infection ([facility] ICD-10-CM: E11.628, L08.9  ICD-9-CM: 250.80, 686.9  12/9/2022 Unknown        Diarrhea ICD-10-CM: R19.7  ICD-9-CM: 787.91  12/9/2022 Unknown        Type 2 diabetes mellitus, with long-term current use of insulin ([facility] ICD-10-CM: E11.9, Z79.4  ICD-9-CM: 250.00, V58.67  Unknown Unknown        Acute hematogenous osteomyelitis of left foot ([facility] ICD-10-CM: M86.072  ICD-9-CM: 730.07  12/9/2022 Unknown        Nondisplaced fracture of second metatarsal bone of left foot ICD-10-CM: X30.626O  ICD-9-CM: 825.25  12/9/2022 Unknown        Nondisplaced fracture of third metatarsal bone of left foot ICD-10-CM: H33.960X  ICD-9-CM: 825.25  12/9/2022 Unknown        Closed nondisplaced fracture of fourth metatarsal bone of left foot ICD-10-CM: S92.345A  ICD-9-CM: 825.25  12/9/2022 Unknown        Hypertension ICD-10-CM: I10  ICD-9-CM: 401.9  7/21/2022 Yes        CAD (coronary artery disease) ICD-10-CM: I25.10  ICD-9-CM: 414.00  6/28/2022 Yes        ESRD (end stage renal disease) ([facility] ICD-10-CM: N18.6  ICD-9-CM: 585.6  6/28/2022 Yes        Diabetes mellitus with foot ulcer ([facility] ICD-10-CM: E11.621, L97.509  ICD-9-CM: 250.80, 707.15  6/27/2022 Yes         60 y.o. male with history of diabetes, diabetic ulcer, CAD, hyperlipidemia, hypertension end-stage renal disease on HD presented to ER due to left foot lesion. Gangrene of left foot/DFU   PARTIAL AMPUTATION OF LEFT 2ND, 3RD, 4TH METATARSALS, AMPUTATION OF TOES 2,3,4, INCISION ADN DRAINAGE LEFT FOOT on 12/09/2022  S/p angiogram with PTA of the proximal PT and peroneal arteries. Vascular planning repeat procedure on 12/28-tolerated well   S/p Left TMA 12/21/2022. ID recommend Bactrim DS 1 tab PO post HD on TTS X10 days  Dr. Lorna Dumont planning to put graft tomorrow       Headache resolved   Not improving per pain meds   Ct head no acute issue      History of C-diff -  On oral vancomycin to prevent recurrence-now hold per ID      Positive blood cx -  Coag negative staph  Likely contaminant contamination      CAD-   Distal RCA to drug-eluting stent in July 2022, continue plavix -   No chest pain     Hypertension -  continue home medication     End stage renal disease - hyperkalemia -hd today   on HD per nephrology-will have hd today      DM  type II -  Lantus and ssi     Hyperkalemia   Will have hd today      Subjective I feel fine     Discussed with Dr. Lorna Dumont and will have surgery tomorrow morning     D/c planning       Disposition :in 1-2 days   Review of systems:    General: No fevers or chills. Cardiovascular: No chest pain or pressure. No palpitations. Pulmonary: No shortness of breath. Gastrointestinal: No nausea, vomiting. Vital signs/Intake and Output:  Visit Vitals  /62 (BP 1 Location: Right upper arm)   Pulse 98   Temp 98.3 °F (36.8 °C)   Resp 20   Ht 6' (1.829 m)   Wt 97.1 kg (214 lb 1.6 oz)   SpO2 96%   BMI 29.04 kg/m²     Current Shift:  No intake/output data recorded. Last three shifts:  01/03 1901 - 01/05 0700  In: 5 [P.O.:420]  Out: -     Physical Exam:  General: WD, WN. Alert, cooperative, no acute distress    HEENT: NC, Atraumatic. PERRLA, anicteric sclerae. Muscle spasm of rt side of neck   Lungs: CTA Bilaterally. No Wheezing/Rhonchi/Rales.   Heart:  Regular  rhythm,  No murmur, No Rubs, No Gallops  Abdomen: Soft, Non distended, Non tender. +Bowel sounds,   Extremities: No c/c, bilateral foot wrapped with ace bandage and foot protector   Psych:   Not anxious or agitated. Neurologic:  No acute neurological deficit. Labs: Results:       Chemistry Recent Labs     01/04/23  1629 01/04/23  0900 01/04/23  0632   GLU  --  128* 134*   NA  --  135* 136   K 6.3* 5.9* 6.3*   CL  --  100 100   CO2  --  30 29   BUN  --  52* 51*   CREA  --  8.10* 7.99*   CA  --  9.3 8.7   AGAP  --  5 7   BUCR  --  6* 6*   ALB  --   --  2.2*        CBC w/Diff No results for input(s): WBC, RBC, HGB, HCT, PLT, GRANS, LYMPH, EOS, HGBEXT, HCTEXT, PLTEXT, HGBEXT, HCTEXT, PLTEXT in the last 72 hours. Cardiac Enzymes No results for input(s): CPK, CKND1, PAULO in the last 72 hours. No lab exists for component: CKRMB, TROIP   Coagulation No results for input(s): PTP, INR, APTT, INREXT, INREXT in the last 72 hours. Lipid Panel Lab Results   Component Value Date/Time    Cholesterol, total 188 07/19/2022 03:12 AM    HDL Cholesterol 42 07/19/2022 03:12 AM    LDL, calculated 131.2 (H) 07/19/2022 03:12 AM    VLDL, calculated 14.8 07/19/2022 03:12 AM    Triglyceride 74 07/19/2022 03:12 AM    CHOL/HDL Ratio 4.5 07/19/2022 03:12 AM      BNP No results for input(s): BNPP in the last 72 hours. Liver Enzymes Recent Labs     01/04/23  0632   ALB 2.2*        Thyroid Studies No results found for: T4, T3U, TSH, TSHEXT, TSHEXT     Procedures/imaging: see electronic medical records for all procedures/Xrays and details which were not copied into this note but were reviewed prior to creation of Plan    XR FOOT LT AP/LAT    Result Date: 12/22/2022  EXAM: XR FOOT LT AP/LAT CLINICAL INDICATION/HISTORY:  POST OP XRAY   > Additional: None COMPARISON: 12/9/2022 TECHNIQUE: AP and lateral views _______________ FINDINGS: Interval transmetatarsal amputation. The metatarsal stumps are not entirely sharp or well-defined.  Midfoot ossicles appear intact and normally aligned with unremarkable hindfoot bones. Soft tissues covering the amputation stump are edematous. There are some gas bubbles within the soft tissues as would be expected following recent surgery. There is extensive arterial vascular calcification, Monckeberg sclerosis pattern, characteristic of long-standing diabetes. _______________     Status post TMA as described. The indistinctness of the metatarsal stumps is concerning for residual infection although ultimately nonspecific. Follow-up imaging suggested within several weeks for reassessment. XR FOOT LT AP/LAT    Result Date: 12/9/2022  EXAM: 2 radiographic views of the left foot. INDICATION: Left foot pain. COMPARISON: December 9, 2022 _______________ FINDINGS: There is been interval amputation of the second, third, and fourth rays at the level of the metatarsal diaphysis. As before, the first ray is amputated at the metatarsal phalangeal joint. The small toe remains. There are expected postoperative findings to include regional air density and soft tissue swelling. There is Monckeberg type vascular calcification. There is low-grade mid foot degeneration. _______________     Standard immediate postoperative findings. _______________     XR FOOT LT MIN 3 V    Result Date: 12/9/2022  EXAM: XR FOOT LT MIN 3 V CLINICAL INDICATION/HISTORY: Gangrenous 2nd digit   > Additional: None. COMPARISON: None. TECHNIQUE: 3 views left foot _______________ FINDINGS: Soft tissue gas formation is seen along the second digit with ill-defined limitus changes extending along the medial forefoot. Prior amputation first right. Patchy demineralization and osseous erosions are seen involving the distal portion of the first metatarsal head. Additional patchy areas of osseous erosion are also noted involving the proximal phalanx of the second toe. Diffuse soft tissue swelling. Diffuse atherosclerotic vascular calcifications. No retained radiopaque foreign object. _______________     Soft gas formation and ulceration involving the medial forefoot with findings concerning for osteomyelitis involving the first and second rays described above. MRI FOOT LT WO CONT    Result Date: 12/9/2022  EXAM: MRI FOOT LT WO CONT CLINICAL INDICATION/HISTORY: Foot wound; preoperative  >Additional: None COMPARISON: Radiograph performed December 9, 2022  >Reference exam: None. TECHNIQUE: Axial long axis TI and T2 with fat saturation, sagittal and coronal short axis TI and STIR sequences through the foot were obtained without contrast. _______________ FINDINGS: FIRST RAY: Prior dictation the first digit. There is mild bony proliferative change at the head of the first metatarsal with preserved marrow signal. Minimal patchy edema is noted which is nonspecific. No definite cortical destructive change. SECOND RAY: Nondisplaced obliquely oriented fracture through the head neck junction of the second metatarsal. There is heterogeneous diminished T1 marrow with patchy edema and surrounding soft tissue gas along the course of the second digit with associated subluxation of the distal phalanx. Subtle diminished T1 marrow is noted with suspected foci of intraosseous gas, concerning for acute osteomyelitis. THIRD RAY: Nondisplaced fracture through the third metatarsal neck with slight impaction. Mild edema is noted within the third digit, possibly stress reaction. No convincing osseous erosions or T1 marrow signal change. FOURTH RAY: Nondisplaced fractures of the neck of the fourth metatarsal. Minimal edema in the proximal phalanx but otherwise preserved marrow signal. FIFTH RAY: Unremarkable. Additional degenerative changes with patchy edema, joint space narrowing, and osteophyte formation at the midfoot, likely related to underlying Charcot arthropathy. SOFT TISSUES: Soft tissue irregularity with gas and ulceration around the second digit noted.  Diffuse fluid signal seen throughout the intrinsic foot musculature, commonly seen in the setting of diabetes. Mild skin thickening about the forefoot. INCIDENTAL FINDINGS: None. _______________     1. Marrow signal abnormality with soft tissue wound and gas around the second digit concerning for acute osteomyelitis. 2.  Nondisplaced fractures of the head neck junction of the second-fourth metatarsals. 3.  Prior indication the first digit. 4.  Soft tissue swelling and skin thickening about the forefoot concerning for cellulitis. No drainable abscess. 5.  Additional chronic/degenerative changes of the foot. CT ABD PELV WO CONT    Result Date: 12/9/2022  EXAM: CT of the abdomen and pelvis INDICATION: Abdominal pain with distention. COMPARISON: None. TECHNIQUE: Axial CT imaging of the abdomen and pelvis was performed without intravenous contrast. Multiplanar reformats were generated. One or more dose reduction techniques were used on this CT: automated exposure control, adjustment of the mAs and/or kVp according to patient size, and iterative reconstruction techniques. The specific techniques used on this CT exam have been documented in the patient's electronic medical record. Digital Imaging and Communications in Medicine (DICOM) format image data are available to nonaffiliated external healthcare facilities or entities on a secure, media free, reciprocally searchable basis with patient authorization for at least a 12-month period after this study. _______________ FINDINGS: LOWER CHEST: Partial inclusion of multivessel coronary arterial atherosclerosis and central venous catheter. Clear lung bases. Several punctate 2 to 3 mm pulmonary nodule seen in the right lower lobe (image 8) LIVER, BILIARY: Liver is normal. No biliary dilation. Color contains a gallstone without evidence of dilatation or gallbladder wall thickening. PANCREAS: Normal. SPLEEN: Punctate granulomatous calcifications otherwise unremarkable. ADRENALS: Mild adreniform thickening of each adrenal gland. KIDNEYS/URETERS/BLADDER: No hydronephrosis. Bilateral renal hypodensities are present either to small to accurately characterize or in keeping with simple cysts which are prior no further dedicated imaging follow-up. Bladder decompressed. PELVIC ORGANS: Unremarkable. VASCULATURE: Diffuse aortic and visceral arterial atherosclerosis without evidence of aneurysmal dilatation. LYMPH NODES: Scattered subcentimeter mesenteric and retroperitoneal lymph nodes are demonstrated without evidence of adenopathy. Prominent left inguinal lymph node is present (image 127) measuring approximately 15 mm in short axis dimension. GASTROINTESTINAL TRACT: No bowel dilation or wall thickening. Peritoneal gas. Normal appendix. Scattered colonic diverticula without evidence of diverticulitis. BONES: No acute or aggressive osseous abnormalities identified. OTHER: None. _______________     1. Cholelithiasis without evidence of cholecystitis. 2. No abnormal bowel wall thickening or dilatation. 3. No hydronephrosis or obstructive uropathy. 4. Several small right lower lobe pulmonary nodules (2-3 mm in size). See below guidelines for proposed follow-up. 5. Borderline enlarged and nonspecific left inguinal lymph node, potentially reactive in etiology. ======== Fleischner Society pulmonary nodule guidelines (revised 2017): Multiple solid nodules <6 mm: -Low risk for lung cancer: No follow-up. -High risk for lung cancer: Optional chest CT in 12 months. XR CHEST PORT    Result Date: 12/23/2022  EXAM: CHEST RADIOGRAPH CLINICAL INDICATION/HISTORY: sepsis -Additional: None COMPARISON: 12/9/2022 TECHNIQUE: Portable frontal view of the chest _______________ FINDINGS: SUPPORT DEVICES: Indwelling dialysis catheter, tip in the mid to lower SVC. HEART AND MEDIASTINUM: No appreciable cardiomegaly. Remaining mediastinal contours within normal limits. LUNGS AND PLEURAL SPACES: Clear. No consolidation, mass or effusion.  BONY THORAX AND SOFT TISSUES: Unremarkable. _______________     No active cardiopulmonary disease. XR CHEST PORT    Result Date: 12/9/2022  EXAM: XR CHEST PORT CLINICAL INDICATION/HISTORY: sepsis -Additional: None COMPARISON: July 12, 2022 TECHNIQUE: Portable frontal view of the chest _______________ FINDINGS: SUPPORT DEVICES: Right IJ approach dialysis catheter in stable position. HEART AND MEDIASTINUM: Stable appearing cardiac size and mediastinal contours. LUNGS AND PLEURAL SPACES: Lungs are clear. No focal pneumonic opacity. No pneumothorax or pleural effusion. BONY THORAX AND SOFT TISSUES: Unremarkable. _______________     No active cardiopulmonary disease. ANKLE BRACHIAL INDEX    Result Date: 12/21/2022  · Right ankle/brachial index is 1.19 · The left ankle/ brachial index is 0.89      ANKLE BRACHIAL INDEX    Result Date: 12/15/2022  Falsely elevated Ankle Brachial Index pressure measurements noted due to calcified vessels with abnormal waveforms bilaterally. Unable to get left brachial pressure due to dialysis access graft. The right toe/brachial index is 0.59  With a toe pressure of 82 mmhg. Unable to get left toe pressure due to left toes amputation. DUPLEX LOWER EXT ARTERY BILAT    Result Date: 12/15/2022   Elevated velocities (169 cm/s) in the left proximal femoral artery consistent with mild stenosis. Bilateral tibial arteries are patnet at the ankle. Bilateral anterior tibial artery is occluded proximally but reconstituted distally by the collateral flow.         Janine Crane MD

## 2023-01-05 NOTE — PROGRESS NOTES
Problem: Diabetes Self-Management  Goal: *Disease process and treatment process  Description: Define diabetes and identify own type of diabetes; list 3 options for treating diabetes. Outcome: Progressing Towards Goal     Problem: Diabetes Self-Management  Goal: *Incorporating physical activity into lifestyle  Description: State effect of exercise on blood glucose levels. Outcome: Progressing Towards Goal     Problem: Diabetes Self-Management  Goal: *Using medications safely  Description: State effect of diabetes medications on diabetes; name diabetes medication taking, action and side effects. Outcome: Progressing Towards Goal     Problem: Diabetes Self-Management  Goal: *Prevention, detection, treatment of acute complications  Description: List symptoms of hyper- and hypoglycemia; describe how to treat low blood sugar and actions for lowering  high blood glucose level.   Outcome: Progressing Towards Goal

## 2023-01-06 ENCOUNTER — ANESTHESIA EVENT (OUTPATIENT)
Dept: SURGERY | Age: 63
DRG: 239 | End: 2023-01-06
Payer: MEDICARE

## 2023-01-06 ENCOUNTER — ANESTHESIA (OUTPATIENT)
Dept: SURGERY | Age: 63
DRG: 239 | End: 2023-01-06
Payer: MEDICARE

## 2023-01-06 PROBLEM — T81.31XA SURGICAL WOUND DEHISCENCE: Status: ACTIVE | Noted: 2023-01-06

## 2023-01-06 PROBLEM — S91.302D: Status: ACTIVE | Noted: 2023-01-06

## 2023-01-06 LAB
GLUCOSE BLD STRIP.AUTO-MCNC: 167 MG/DL (ref 70–110)
GLUCOSE BLD STRIP.AUTO-MCNC: 79 MG/DL (ref 70–110)
GLUCOSE BLD STRIP.AUTO-MCNC: 91 MG/DL (ref 70–110)
GLUCOSE BLD STRIP.AUTO-MCNC: 93 MG/DL (ref 70–110)
GLUCOSE BLD STRIP.AUTO-MCNC: 93 MG/DL (ref 70–110)
MAGNESIUM SERPL-MCNC: 2.2 MG/DL (ref 1.6–2.6)
POTASSIUM SERPL-SCNC: 5.8 MMOL/L (ref 3.5–5.5)

## 2023-01-06 PROCEDURE — 2709999900 HC NON-CHARGEABLE SUPPLY: Performed by: PODIATRIST

## 2023-01-06 PROCEDURE — 76060000034 HC ANESTHESIA 1.5 TO 2 HR: Performed by: PODIATRIST

## 2023-01-06 PROCEDURE — 74011000250 HC RX REV CODE- 250: Performed by: ANESTHESIOLOGY

## 2023-01-06 PROCEDURE — 0HRNXK3 REPLACEMENT OF LEFT FOOT SKIN WITH NONAUTOLOGOUS TISSUE SUBSTITUTE, FULL THICKNESS, EXTERNAL APPROACH: ICD-10-PCS | Performed by: PODIATRIST

## 2023-01-06 PROCEDURE — 77030000032 HC CUF TRNQT ZIMM -B: Performed by: PODIATRIST

## 2023-01-06 PROCEDURE — 74011000258 HC RX REV CODE- 258: Performed by: INTERNAL MEDICINE

## 2023-01-06 PROCEDURE — 84132 ASSAY OF SERUM POTASSIUM: CPT

## 2023-01-06 PROCEDURE — 74011250636 HC RX REV CODE- 250/636: Performed by: INTERNAL MEDICINE

## 2023-01-06 PROCEDURE — 36415 COLL VENOUS BLD VENIPUNCTURE: CPT

## 2023-01-06 PROCEDURE — 77030040361 HC SLV COMPR DVT MDII -B: Performed by: PODIATRIST

## 2023-01-06 PROCEDURE — 76010000153 HC OR TIME 1.5 TO 2 HR: Performed by: PODIATRIST

## 2023-01-06 PROCEDURE — 87205 SMEAR GRAM STAIN: CPT

## 2023-01-06 PROCEDURE — 74011000250 HC RX REV CODE- 250: Performed by: NURSE ANESTHETIST, CERTIFIED REGISTERED

## 2023-01-06 PROCEDURE — 74011000250 HC RX REV CODE- 250: Performed by: HOSPITALIST

## 2023-01-06 PROCEDURE — 97606 NEG PRS WND THER DME>50 SQCM: CPT

## 2023-01-06 PROCEDURE — 74011250637 HC RX REV CODE- 250/637: Performed by: HOSPITALIST

## 2023-01-06 PROCEDURE — 76210000063 HC OR PH I REC FIRST 0.5 HR: Performed by: PODIATRIST

## 2023-01-06 PROCEDURE — 87077 CULTURE AEROBIC IDENTIFY: CPT

## 2023-01-06 PROCEDURE — 74011250636 HC RX REV CODE- 250/636

## 2023-01-06 PROCEDURE — 74011250636 HC RX REV CODE- 250/636: Performed by: HOSPITALIST

## 2023-01-06 PROCEDURE — 82962 GLUCOSE BLOOD TEST: CPT

## 2023-01-06 PROCEDURE — 74011636637 HC RX REV CODE- 636/637: Performed by: HOSPITALIST

## 2023-01-06 PROCEDURE — 77030020782 HC GWN BAIR PAWS FLX 3M -B: Performed by: PODIATRIST

## 2023-01-06 PROCEDURE — 65270000029 HC RM PRIVATE

## 2023-01-06 PROCEDURE — 74011250636 HC RX REV CODE- 250/636: Performed by: PODIATRIST

## 2023-01-06 PROCEDURE — 74011250636 HC RX REV CODE- 250/636: Performed by: NURSE ANESTHETIST, CERTIFIED REGISTERED

## 2023-01-06 PROCEDURE — 0QBP0ZZ EXCISION OF LEFT METATARSAL, OPEN APPROACH: ICD-10-PCS | Performed by: PODIATRIST

## 2023-01-06 PROCEDURE — 0JQR0ZZ REPAIR LEFT FOOT SUBCUTANEOUS TISSUE AND FASCIA, OPEN APPROACH: ICD-10-PCS | Performed by: PODIATRIST

## 2023-01-06 PROCEDURE — 0KBW0ZZ EXCISION OF LEFT FOOT MUSCLE, OPEN APPROACH: ICD-10-PCS | Performed by: PODIATRIST

## 2023-01-06 PROCEDURE — 87186 SC STD MICRODIL/AGAR DIL: CPT

## 2023-01-06 PROCEDURE — 83735 ASSAY OF MAGNESIUM: CPT

## 2023-01-06 PROCEDURE — 0J9R0ZZ DRAINAGE OF LEFT FOOT SUBCUTANEOUS TISSUE AND FASCIA, OPEN APPROACH: ICD-10-PCS | Performed by: PODIATRIST

## 2023-01-06 PROCEDURE — 74011000250 HC RX REV CODE- 250: Performed by: PODIATRIST

## 2023-01-06 PROCEDURE — 0HRNX73 REPLACEMENT OF LEFT FOOT SKIN WITH AUTOLOGOUS TISSUE SUBSTITUTE, FULL THICKNESS, EXTERNAL APPROACH: ICD-10-PCS | Performed by: PODIATRIST

## 2023-01-06 PROCEDURE — 74011250636 HC RX REV CODE- 250/636: Performed by: ANESTHESIOLOGY

## 2023-01-06 PROCEDURE — 77030031139 HC SUT VCRL2 J&J -A: Performed by: PODIATRIST

## 2023-01-06 PROCEDURE — 87075 CULTR BACTERIA EXCEPT BLOOD: CPT

## 2023-01-06 PROCEDURE — 77030013708 HC HNDPC SUC IRR PULS STRY –B: Performed by: PODIATRIST

## 2023-01-06 DEVICE — GRAFT HUM TISS W3XL6CM CRYOPRESERVED PLCNTA TISS UMB AMNION: Type: IMPLANTABLE DEVICE | Site: FOOT | Status: FUNCTIONAL

## 2023-01-06 RX ORDER — SODIUM CHLORIDE 0.9 % (FLUSH) 0.9 %
5-40 SYRINGE (ML) INJECTION EVERY 8 HOURS
Status: DISCONTINUED | OUTPATIENT
Start: 2023-01-06 | End: 2023-01-06 | Stop reason: HOSPADM

## 2023-01-06 RX ORDER — PROPOFOL 10 MG/ML
INJECTION, EMULSION INTRAVENOUS AS NEEDED
Status: DISCONTINUED | OUTPATIENT
Start: 2023-01-06 | End: 2023-01-06 | Stop reason: HOSPADM

## 2023-01-06 RX ORDER — IBUPROFEN 200 MG
4 TABLET ORAL AS NEEDED
Status: DISCONTINUED | OUTPATIENT
Start: 2023-01-06 | End: 2023-01-06 | Stop reason: HOSPADM

## 2023-01-06 RX ORDER — PHENYLEPHRINE HCL IN 0.9% NACL 1 MG/10 ML
SYRINGE (ML) INTRAVENOUS AS NEEDED
Status: DISCONTINUED | OUTPATIENT
Start: 2023-01-06 | End: 2023-01-06 | Stop reason: HOSPADM

## 2023-01-06 RX ORDER — FENTANYL CITRATE 50 UG/ML
50 INJECTION, SOLUTION INTRAMUSCULAR; INTRAVENOUS AS NEEDED
Status: DISCONTINUED | OUTPATIENT
Start: 2023-01-06 | End: 2023-01-06 | Stop reason: HOSPADM

## 2023-01-06 RX ORDER — INSULIN LISPRO 100 [IU]/ML
INJECTION, SOLUTION INTRAVENOUS; SUBCUTANEOUS ONCE
Status: DISCONTINUED | OUTPATIENT
Start: 2023-01-06 | End: 2023-01-06 | Stop reason: HOSPADM

## 2023-01-06 RX ORDER — SODIUM CHLORIDE 9 MG/ML
25 INJECTION, SOLUTION INTRAVENOUS CONTINUOUS
Status: DISCONTINUED | OUTPATIENT
Start: 2023-01-06 | End: 2023-01-12 | Stop reason: HOSPADM

## 2023-01-06 RX ORDER — PROCHLORPERAZINE EDISYLATE 5 MG/ML
5 INJECTION INTRAMUSCULAR; INTRAVENOUS ONCE
Status: DISCONTINUED | OUTPATIENT
Start: 2023-01-06 | End: 2023-01-06 | Stop reason: HOSPADM

## 2023-01-06 RX ORDER — PROPOFOL 10 MG/ML
INJECTION, EMULSION INTRAVENOUS
Status: DISCONTINUED | OUTPATIENT
Start: 2023-01-06 | End: 2023-01-06 | Stop reason: HOSPADM

## 2023-01-06 RX ORDER — HYDROMORPHONE HYDROCHLORIDE 1 MG/ML
0.5 INJECTION, SOLUTION INTRAMUSCULAR; INTRAVENOUS; SUBCUTANEOUS
Status: DISCONTINUED | OUTPATIENT
Start: 2023-01-06 | End: 2023-01-06 | Stop reason: HOSPADM

## 2023-01-06 RX ORDER — FAMOTIDINE 20 MG/1
20 TABLET, FILM COATED ORAL EVERY EVENING
Status: DISCONTINUED | OUTPATIENT
Start: 2023-01-07 | End: 2023-01-12 | Stop reason: HOSPADM

## 2023-01-06 RX ORDER — NALOXONE HYDROCHLORIDE 0.4 MG/ML
0.1 INJECTION, SOLUTION INTRAMUSCULAR; INTRAVENOUS; SUBCUTANEOUS AS NEEDED
Status: DISCONTINUED | OUTPATIENT
Start: 2023-01-06 | End: 2023-01-06 | Stop reason: HOSPADM

## 2023-01-06 RX ORDER — LIDOCAINE HYDROCHLORIDE 20 MG/ML
INJECTION, SOLUTION EPIDURAL; INFILTRATION; INTRACAUDAL; PERINEURAL AS NEEDED
Status: DISCONTINUED | OUTPATIENT
Start: 2023-01-06 | End: 2023-01-06 | Stop reason: HOSPADM

## 2023-01-06 RX ORDER — ONDANSETRON 2 MG/ML
INJECTION INTRAMUSCULAR; INTRAVENOUS AS NEEDED
Status: DISCONTINUED | OUTPATIENT
Start: 2023-01-06 | End: 2023-01-06 | Stop reason: HOSPADM

## 2023-01-06 RX ORDER — DEXTROSE MONOHYDRATE 100 MG/ML
0-250 INJECTION, SOLUTION INTRAVENOUS AS NEEDED
Status: DISCONTINUED | OUTPATIENT
Start: 2023-01-06 | End: 2023-01-06 | Stop reason: HOSPADM

## 2023-01-06 RX ORDER — GLYCOPYRROLATE 0.2 MG/ML
INJECTION INTRAMUSCULAR; INTRAVENOUS AS NEEDED
Status: DISCONTINUED | OUTPATIENT
Start: 2023-01-06 | End: 2023-01-06 | Stop reason: HOSPADM

## 2023-01-06 RX ORDER — SODIUM CHLORIDE 0.9 % (FLUSH) 0.9 %
5-40 SYRINGE (ML) INJECTION AS NEEDED
Status: DISCONTINUED | OUTPATIENT
Start: 2023-01-06 | End: 2023-01-06 | Stop reason: HOSPADM

## 2023-01-06 RX ORDER — FLUMAZENIL 0.1 MG/ML
0.2 INJECTION INTRAVENOUS
Status: DISCONTINUED | OUTPATIENT
Start: 2023-01-06 | End: 2023-01-06 | Stop reason: HOSPADM

## 2023-01-06 RX ORDER — KETAMINE HYDROCHLORIDE 10 MG/ML
INJECTION, SOLUTION INTRAMUSCULAR; INTRAVENOUS AS NEEDED
Status: DISCONTINUED | OUTPATIENT
Start: 2023-01-06 | End: 2023-01-06 | Stop reason: HOSPADM

## 2023-01-06 RX ADMIN — CEFEPIME 1 G: 1 INJECTION, POWDER, FOR SOLUTION INTRAMUSCULAR; INTRAVENOUS at 16:45

## 2023-01-06 RX ADMIN — LIDOCAINE HYDROCHLORIDE 40 MG: 20 INJECTION, SOLUTION EPIDURAL; INFILTRATION; INTRACAUDAL; PERINEURAL at 11:54

## 2023-01-06 RX ADMIN — Medication 100 MCG: at 12:36

## 2023-01-06 RX ADMIN — Medication 500 MG: at 22:06

## 2023-01-06 RX ADMIN — Medication 125 MG: at 05:14

## 2023-01-06 RX ADMIN — ONDANSETRON HYDROCHLORIDE 4 MG: 2 INJECTION INTRAMUSCULAR; INTRAVENOUS at 11:51

## 2023-01-06 RX ADMIN — NYSTATIN: 100000 POWDER TOPICAL at 09:45

## 2023-01-06 RX ADMIN — CARVEDILOL 12.5 MG: 12.5 TABLET, FILM COATED ORAL at 09:22

## 2023-01-06 RX ADMIN — Medication 2 UNITS: at 22:06

## 2023-01-06 RX ADMIN — NYSTATIN: 100000 POWDER TOPICAL at 22:07

## 2023-01-06 RX ADMIN — SEVELAMER CARBONATE 1600 MG: 800 TABLET, FILM COATED ORAL at 16:30

## 2023-01-06 RX ADMIN — PROPOFOL 100 MCG/KG/MIN: 10 INJECTION, EMULSION INTRAVENOUS at 11:42

## 2023-01-06 RX ADMIN — SODIUM CHLORIDE, PRESERVATIVE FREE 10 ML: 5 INJECTION INTRAVENOUS at 05:15

## 2023-01-06 RX ADMIN — Medication 15 UNITS: at 22:05

## 2023-01-06 RX ADMIN — SODIUM CHLORIDE 25 ML/HR: 9 INJECTION, SOLUTION INTRAVENOUS at 10:52

## 2023-01-06 RX ADMIN — PROPOFOL 50 MG: 10 INJECTION, EMULSION INTRAVENOUS at 11:44

## 2023-01-06 RX ADMIN — GLYCOPYRROLATE 0.2 MG: 0.2 INJECTION INTRAMUSCULAR; INTRAVENOUS at 11:51

## 2023-01-06 RX ADMIN — HEPARIN SODIUM 5000 UNITS: 5000 INJECTION INTRAVENOUS; SUBCUTANEOUS at 22:07

## 2023-01-06 RX ADMIN — SODIUM CHLORIDE, PRESERVATIVE FREE 10 ML: 5 INJECTION INTRAVENOUS at 22:05

## 2023-01-06 RX ADMIN — PROPOFOL 20 MG: 10 INJECTION, EMULSION INTRAVENOUS at 12:12

## 2023-01-06 RX ADMIN — KETAMINE HYDROCHLORIDE 20 MG: 10 INJECTION, SOLUTION INTRAMUSCULAR; INTRAVENOUS at 11:42

## 2023-01-06 RX ADMIN — CARVEDILOL 12.5 MG: 12.5 TABLET, FILM COATED ORAL at 22:07

## 2023-01-06 NOTE — PROGRESS NOTES
1925 Bedside and Verbal shift change  Received from KIM Hayes (outgoing nurse), to ELLYN Joel (oncoming)  Pt. Is AOX 4. IV patent and infusing well, Pt. denies  pain at this time. Report included the following information  SBAR, Kardex, Procedure Summary, Intake/Output, MAR, Recent Lab Results Cardiac Rhythm SR. Will resume care and monitor Pt. Condition. Pt. Educated on call bell when in need of help and assistance. Pt. verbalized understanding. Bed alarm on.     1950 Pt. Head to toe Assessment Done and documented. Pt. Resting comfortably in bed, not in distress. 2150  Night meds given, not in distress. 2300  Pt. Denies discomfort. Verbal and bedside Shift changed report given to Petr Posadas RN (oncoming RN) on Pt. Condition. Report consisted of patients Situation, History, Activities, intake/output,  Background, Assessment and Recommendations(SBAR). Information from the following report(s) Kardex, order Summary, Lab results and MAR was reviewed with the receiving nurse. Opportunity for questions and clarification was provided.

## 2023-01-06 NOTE — PERIOP NOTES
TRANSFER - IN REPORT:    Verbal report received from Fang RN(name) on Neyda [place]  being received from 3S(unit) for ordered procedure      Report consisted of patients Situation, Background, Assessment and   Recommendations(SBAR). Information from the following report(s) SBAR was reviewed with the receiving nurse. Opportunity for questions and clarification was provided. Assessment completed upon patients arrival to unit and care assumed.

## 2023-01-06 NOTE — OP NOTES
Operative Note    Patient: Paris Lewis  YOB: 1960  MRN: [id number]    Date of Procedure: 1/6/2023     Pre-Op Diagnosis: DIABETIC FOOT INFECTION, ISCHEMIC GANGRENE, DIABETIC FOOT INFECTION, ISCHEMIC GANGRENE, Disruption of External Surgical Wound Left Foot, Open Wound Left Foot, Cellulitis      Post-Op Diagnosis: Same as preoperative diagnosis. Procedure(s):  LEFT FOOT DEBRIDEMENT,APPLICATION OF STRAVIX GRAFT, MAC W/LOCAL ANESTHESIA,PATIENT IS IN ROOM 327. Surgeon(s):  Sylwia Caldwell DPM    Surgical Assistant: Surg Asst-1: Wilfredo Duane    Anesthesia: MAC     Estimated Blood Loss (mL):  Minimal    Complications: None    Specimens:   ID Type Source Tests Collected by Time Destination   1 : wound left foot Wound Foot, left CULTURE, ANAEROBIC, CULTURE, WOUND W Lacey Sharif 1/6/2023 1241 Microbiology        Implants:   Implant Name Type Inv.  Item Serial No.  Lot No. LRB No. Used Action   GRAFT HUM TISS [id number] CRYOPRESERVED PLCNTA TISS [employer] - [id number]  GRAFT HUM TISS [id number] CRYOPRESERVED PLCNTA TISS UMB AMNION [id number] [employer] [id number] Left 1 Implanted   GRAFT HUM TISS [id number] CRYOPRESERVED PLCNTA TISS [employer] - [id number]  GRAFT HUM TISS [id number] CRYOPRESERVED PLCNTA TISS [employer] AMNION [id number] [employer] [id number] Left 1 Implanted   GRAFT HUM TISS [id number] CRYOPRESERVED PLCNTA TISS [employer] AM[employer] - [id number]  GRAFT HUM TISS [id number] CRYOPRESERVED PLCNTA TISS UMB AMNION [id number] [employer] [id number] Left 1 Implanted   GRAFT HUM TISS [id number] CRYOPRESERVED PLCNTA TISS [employer] AM[employer] - [id number]  GRAFT HUM TISS [id number] CRYOPRESERVED PLCNTA TISS [employer]MB AMNION [id number] [employer] [id number] Left 1 Implanted       Drains: * No LDAs found *    Findings: DIABETIC FOOT INFECTION, ISCHEMIC GANGRENE, Disruption of External Surgical Wound Left Foot, Open Wound Left Foot, Cellulitis      Detailed Description of Procedure:   Secondary closure of surgical wound dehiscence left foot, also application of STRAVIX graft left foot, incision and drainage abscess left foot.     Electronically Signed by Nora Ramos DPM on 1/6/2023 at 1:33 PM

## 2023-01-06 NOTE — H&P
Date of Surgery Update:  Lexy Yoon was seen and examined. History and physical has been reviewed. The patient has been examined.  There have been no significant clinical changes since the completion of the originally dated History and Physical.    Signed By: Adwoa Neal DPM     January 6, 2023 11:18 AM

## 2023-01-06 NOTE — PROGRESS NOTES
Nephrology Inpatient Progress note    Subjective:     Refugio Hein is a 60 y.o. male with a PMHx of  DM, HTN. PVD, ESRD on HD TTS at [facility] under the [facility] . Nephrology sent in for admission by wound care clinic due to left foot infection ,was told he needed surgery. Podiatry has been in to see pt. He has been on abx recently     S/P Lt TMA. CT head neg    -No complaints today.  Last HD 1/5    Admit Date: 12/9/2022  Active Problems:    Diabetes mellitus with foot ulcer ([facility] (6/27/2022)      CAD (coronary artery disease) (6/28/2022)      ESRD (end stage renal disease) ([facility] (6/28/2022)      Hypertension (7/21/2022)      Leukocytosis (12/9/2022)      Diabetic foot infection ([facility] (12/9/2022)      Diarrhea (12/9/2022)      Type 2 diabetes mellitus, with long-term current use of insulin ([facility]) ()      Acute hematogenous osteomyelitis of left foot ([facility] (12/9/2022)      Nondisplaced fracture of second metatarsal bone of left foot (12/9/2022)      Nondisplaced fracture of third metatarsal bone of left foot (12/9/2022)      Closed nondisplaced fracture of fourth metatarsal bone of left foot (12/9/2022)      Positive blood culture (12/11/2022)      PAD (peripheral artery disease) ([facility] (12/27/2022)    Current Facility-Administered Medications   Medication Dose Route Frequency    lidocaine 4 % patch 1 Patch  1 Patch TransDERmal Q24H    famotidine (PEPCID) tablet 20 mg  20 mg Oral DAILY    vit B Cmplx 3-FA-Vit C-Biotin (NEPHRO NIKITA RX) tablet 1 Tablet  1 Tablet Oral DAILY    fentaNYL citrate (PF) injection  mcg   mcg IntraVENous Rad Multiple    midazolam (VERSED) injection 0.5-2 mg  0.5-2 mg IntraVENous Rad Multiple    flumazeniL (ROMAZICON) 0.1 mg/mL injection 0.2 mg  0.2 mg IntraVENous PRN    naloxone (NARCAN) injection 0.4 mg  0.4 mg IntraVENous Rad Multiple    iopamidoL (ISOVUE 250) 250 mg iodine /mL (51 %) contrast injection 1-150 mL  1-150 mL IntraarTERial RAD CONTINUOUS    heparin (porcine) 1,000 unit/mL injection 1,000-10,000 Units  1,000-10,000 Units IntraVENous Rad Multiple    diphenhydrAMINE (BENADRYL) injection 25 mg  25 mg IntraVENous Rad Multiple    diphenhydrAMINE (BENADRYL) injection 12.5 mg  12.5 mg IntraVENous Q6H PRN    ammonium lactate (LAC-HYDRIN) 12 % lotion   Topical BID PRN    Saccharomyces boulardii (FLORASTOR) capsule 500 mg  500 mg Oral BID    vancomycin 50 mg/mL oral solution (compounded) 125 mg  125 mg Oral Q6H    insulin lispro (HUMALOG) injection   SubCUTAneous AC&HS    insulin glargine (LANTUS) injection 15 Units  15 Units SubCUTAneous QHS    heparin (porcine) 1,000 unit/mL injection 1,000-10,000 Units  1,000-10,000 Units IntraVENous Rad Multiple    nitroGLYcerin 0.1 mg/mL in D5W injection  1-5 mL IntraarTERial Rad Multiple    heparinized saline 2 units/mL infusion 2,000 Units  1,000 mL Irrigation RAD CONTINUOUS    iopamidoL (ISOVUE 250) 250 mg iodine /mL (51 %) contrast injection 1-150 mL  1-150 mL IntraarTERial RAD CONTINUOUS    epoetin lsiette-epbx (RETACRIT) injection 8,000 Units  8,000 Units SubCUTAneous Q TUE, THU & SAT    loperamide (IMODIUM) capsule 2 mg  2 mg Oral Q4H PRN    nystatin (MYCOSTATIN) 100,000 unit/gram powder   Topical BID    alum-mag hydroxide-simeth (MYLANTA) oral suspension 30 mL  30 mL Oral Q4H PRN    glucose chewable tablet 16 g  16 g Oral PRN    glucagon (GLUCAGEN) injection 1 mg  1 mg IntraMUSCular PRN    heparin (porcine) 1,000 unit/mL injection 3,600 Units  3,600 Units IntraCATHeter DIALYSIS PRN    dextrose 10% infusion 0-250 mL  0-250 mL IntraVENous PRN    0.9% sodium chloride infusion  25 mL/hr IntraVENous DIALYSIS PRN    clopidogreL (PLAVIX) tablet 75 mg  75 mg Oral DAILY    carvediloL (COREG) tablet 12.5 mg  12.5 mg Oral BID    aspirin chewable tablet 81 mg  81 mg Oral DAILY    amLODIPine (NORVASC) tablet 10 mg  10 mg Oral DAILY    allopurinoL (ZYLOPRIM) tablet 100 mg  100 mg Oral DAILY    ascorbic acid (vitamin C) (VITAMIN C) tablet 500 mg  500 mg Oral DAILY    ezetimibe (ZETIA) tablet 10 mg  10 mg Oral DAILY    sevelamer carbonate (RENVELA) tab 1,600 mg  1,600 mg Oral TID WITH MEALS    sodium chloride (NS) flush 5-40 mL  5-40 mL IntraVENous Q8H    sodium chloride (NS) flush 5-40 mL  5-40 mL IntraVENous PRN    acetaminophen (TYLENOL) tablet 650 mg  650 mg Oral Q6H PRN    Or    acetaminophen (TYLENOL) suppository 650 mg  650 mg Rectal Q6H PRN    bisacodyL (DULCOLAX) suppository 10 mg  10 mg Rectal DAILY PRN    promethazine (PHENERGAN) tablet 12.5 mg  12.5 mg Oral Q6H PRN    Or    ondansetron (ZOFRAN) injection 4 mg  4 mg IntraVENous Q6H PRN    heparin (porcine) injection 5,000 Units  5,000 Units SubCUTAneous Q8H    oxyCODONE-acetaminophen (PERCOCET) 5-325 mg per tablet 1 Tablet  1 Tablet Oral Q6H PRN         Allergy:   No Known Allergies     Objective:     Visit Vitals  BP (!) 149/53   Pulse 73   Temp 98.5 °F (36.9 °C)   Resp 16   Ht 6' (1.829 m)   Wt 93.9 kg (207 lb 1.6 oz)   SpO2 100%   BMI 28.09 kg/m²         Intake/Output Summary (Last 24 hours) at 1/6/2023 1005  Last data filed at 1/6/2023 0453  Gross per 24 hour   Intake --   Output 1 ml   Net -1 ml         Physical Exam:       General: No resp distress   HENT: Atraumatic and normocephalic, mmm   Eyes: Normal conjunctiva   Neck: Supple +JVD    Cardiovascular: Normal S1 & S2, no m/r/g   Pulmonary/Chest Wall: Clear to auscultation bilaterally   Abdominal: Soft and +NABS   Musculoskeletal: No edema S/p Amputation of multiple toes Left foot   Neurological: No focal deficits    HD Access: [facility] TDC +    Data Review:  Lab Results   Component Value Date/Time    Sodium 135 (L) 01/05/2023 03:48 PM    Potassium 4.4 01/05/2023 03:48 PM    Chloride 98 (L) 01/05/2023 03:48 PM    CO2 31 01/05/2023 03:48 PM    Anion gap 6 01/05/2023 03:48 PM    Glucose 144 (H) 01/05/2023 03:48 PM    BUN 39 (H) 01/05/2023 03:48 PM    Creatinine 6.00 (H) 01/05/2023 03:48 PM    BUN/Creatinine ratio 7 (L) 01/05/2023 03:48 PM    GFR est AA 13 (L) 07/21/2022 02:15 PM    GFR est non-AA 11 (L) 07/21/2022 02:15 PM    Calcium 8.8 01/05/2023 03:48 PM     Lab Results   Component Value Date/Time    WBC 13.5 (H) 01/05/2023 03:48 PM    HGB 8.3 (L) 01/05/2023 03:48 PM    HCT 26.1 (L) 01/05/2023 03:48 PM    PLATELET 023 95/30/7653 03:48 PM    MCV 95.6 01/05/2023 03:48 PM     Lab Results   Component Value Date/Time    Calcium 8.8 01/05/2023 03:48 PM    Phosphorus 4.9 01/04/2023 06:32 AM     No results found for: IRON, FE, TIBC, IBCT, PSAT, FERR  No results found for: FERR      Impression:     ESRD on HD TTS  Hyperkalemia, resolved  Left diabetic foot infection w/ gangrenous changes s/p Lt 2/3/4 metatarsal amputation   DM  HTN  PVD, seen by [facility], s/p LLE angio 12/28  Anemia  SHPT  Hyperkalemia    Plan:     HD tomorrow  Cont DALTON for Anemia  Cont low K diet  Podiatry following, plans surgery today      Dhara Stanley MD,   Bonifacio Barrera

## 2023-01-06 NOTE — ROUTINE PROCESS
TRANSFER - IN REPORT:    Verbal report received from 2640 Fracisco Walsh RN(name) on Maribel Dunham  being received from PACU(unit) for routine post - op      Report consisted of patients Situation, Background, Assessment and   Recommendations(SBAR). Information from the following report(s) SBAR, Kardex, [facility] and Recent Results was reviewed with the receiving nurse. Opportunity for questions and clarification was provided. Assessment completed upon patients arrival to unit and care assumed.

## 2023-01-06 NOTE — PROGRESS NOTES
[facility] care of pt at this time. Assessment complete. Pt alert and oriented x 4. Shows no sign of distress. Fall risk arm band in place. Denies SOB and chest pain. Pt lungs clear bilaterally. Cap refill  less than 3 seconds. Pt denies numbness and tingling to all extremities. Stated pain 0/10. Pt has 18G IV midline to R arm. Pt has ACE bandage dressing to left foot CDI. And mepilex to right heal CDI on heparin for VTE. Will hold this morning due to surgery later today. Tegaderm dressing to right chest TDC. Accessed yesterday for dialysis. Call light and possessions within reach. Bed locked and in low position. Will continue to monitor. 0930  OR nurse states lab will be up to draw K+ prior to surgery this morning. 0935  CHG wipes and nozin completed. Gown and linen changed. 1026  TRANSFER - OUT REPORT:    Verbal report given to Guy(name) on Luanne Giles  being transferred to Pre op(unit) for ordered procedure       Report consisted of patients Situation, Background, Assessment and   Recommendations(SBAR). Information from the following report(s) SBAR, Kardex, Intake/Output, MAR, and Recent Results was reviewed with the receiving nurse. Lines:       Opportunity for questions and clarification was provided. Patient transported with:  Tech and chart    1027  Pt being transported to pre op at this time    [phone]  Pt returned to room alert and ox4. Lungs clear. ACE bandage to left foot CDI. Mepilex dressing to right heal for prevention. States pain is 6/10. But denies needing pain meds at this time     1448  Wound care in to place wound vac at this time    1510  Wound vac placed at this time    Shift summary  Pt is alert and oriented x 4. Pt had IND and placement of skin graft to L foot. Wound vac placed. Pt anuric.  Pain to be controlled by PRN medication plan to d/c to SNF when medically cleared

## 2023-01-06 NOTE — PROGRESS NOTES
1/6/2023  PT treatment not completed due to:  [x] Off Unit for testing/procedure  [] Pain  [] Eating  [] Patient too lethargic  [] Nausea/vomiting  [] Dialysis treatment in progress   [x] Other: pt LANDON for surgery L foot. Will f/u accordingly. Thank you.    Vik Morfin, PT

## 2023-01-06 NOTE — PROGRESS NOTES
D/C Plan: SNF    CM spoke with pt to discuss care transition. Pt is now agreeable to having clinical information sent to [facility] for review, as they have been able to accept pt's insurance in the past.  Pt is agreeable to having clinical information resent to all facilities on the [facility] to include [facility]. CMS has been notified to assist.  Anticipate pt will with transition to SNF once an accepting facility has been identified and insurance auth has been obtained. CM to continue to follow and assist.    Care Management Interventions  PCP Verified by CM:  Yes  Mode of Transport at Discharge: BLS  Transition of Care Consult (CM Consult): SNF  Health Maintenance Reviewed: Yes  Physical Therapy Consult: Yes  Occupational Therapy Consult: Yes  Support Systems: Friend/Neighbor  The Plan for Transition of Care is Related to the Following Treatment Goals : SNF  The Patient and/or Patient Representative was Provided with a Choice of Provider and Agrees with the Discharge Plan?: Yes  Name of the Patient Representative Who was Provided with a Choice of Provider and Agrees with the Discharge Plan: pt  Freedom of Choice List was Provided with Basic Dialogue that Supports the Patient's Individualized Plan of Care/Goals, Treatment Preferences and Shares the Quality Data Associated with the Providers?: Yes  Discharge Location  Patient Expects to be Discharged to[de-identified] Skilled nursing facility

## 2023-01-06 NOTE — PERIOP NOTES
TRANSFER - OUT REPORT:    Verbal report given to Fang RN(name) on Diomedes Land  being transferred to [address] [place] [address] (unit) for routine progression of care       Report consisted of patients Situation, Background, Assessment and   Recommendations(SBAR). Information from the following report(s) SBAR was reviewed with the receiving nurse. Lines:       Opportunity for questions and clarification was provided.       Patient transported with:   Registered Nurse

## 2023-01-06 NOTE — PROGRESS NOTES
[facility]  (A Division of [address] [place] [address])                                                Amanda Braun MD                                        Office #:     [phone]  [phone]                                        Office Fax: [phone]      Follow-up Note      Date of Admission: 12/9/2022       Date of Note:  1/6/2023  Referral: L foot infection/osteomyelitis/gangrene     NB: rounded before noon. 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 Resolved        -CXR/repeat BCX normal so far from 12/23. Doubt CDI- no diarrhea and WBC coming down. L foot surgical wound without significant     necrosis/ischemic change or drainage/cellulitis. Possible GI source Vs drug fever. Dry L foot gangrene- distal--S/P TMA- with clear margin per Surgeon 12/21/22  --S/P partial ampuation L 2nd/3rd/4th MT, I and D of left foot- deep 12/09/22  --S/P TMA Left foot 12/21/22-- Biopsy with acute Osteomylitis  --CRP 25.4- elevated, procal 1.31 and ESR >140 on 12/27/22  PAD--S/P angiogram 12/20 and S/P L PT baloon angioplasty 12/28/22 --Vascular following  Bacteremia 2/2 MRSE on 12/9--is procurement contamination   Recent CDI--prophylactic oral vanco from 12/23  ESRD on HD  R foot OM-ankle- treated and completed treatment 09/2022    Recommendation -- ID related:     OR today  Will await Operative finding/ discussion with Dr Pau Mcmillan for ABX plan  Currently off ABX-- Text placed to Dr Pau Mcmillan  Addendum: DW Podiatry. Ischemic skin/ stich pus debrided surgicall.  Wound didn't look infected  Will give 1 dose of vanco/cefepime for immediate post op coverage      I am on call over the weekend. Please call via [employer] or  for consults or questions. Thanks. Subjective:      HA better.   No other complaint from patient  No fever or chills    He has gangrene changes on his wound edges and going for debridement today    Notes/Labs including recent CBC with diff, BMP            Microlab      12/9 - OR (+) Serratia fonticola (S to pip/all except cefazolin), Alcaligenes faecalis (R FQ), Enterbacter cloacae (still being worked up), and anaerobic Bacteroides vulgatus (BL +)                                                      BCx 1/2 (+) CoNS      Lines / Catheters:         R HD cath and peripheral        Patient Active Problem List   Diagnosis Code    Diabetes mellitus with foot ulcer ([facility] E11.621, L97.509    CAD (coronary artery disease) I25.10    ESRD (end stage renal disease) ([facility]) N18.6    Acute hematogenous osteomyelitis of right foot ([facility]) M86.071    Cellulitis of right heel L03.115    Diabetic foot ulcer with osteomyelitis ([facility]) E11.621, E11.69, L97.509, M86.9    Ulcer of right heel, with necrosis of bone ([facility]) L97.414    Infection and inflammatory reaction due to internal fixation device of other site, initial encounter [facility]) T84.69XA    Left arm swelling M79.89    Chest pain at rest R07.9    Abnormal nuclear stress test R94.39    History of anemia due to CKD N18.9, Z86.2    S/P angioplasty with stent Z95.820    Hypertension I10    Leukocytosis D72.829    Diabetic foot infection ([facility] E11.628, L08.9    Diarrhea R19.7    Type 2 diabetes mellitus, with long-term current use of insulin ([facility]) E11.9, Z79.4    Acute hematogenous osteomyelitis of left foot ([facility]) M86.072    Nondisplaced fracture of second metatarsal bone of left foot S92.325A    Nondisplaced fracture of third metatarsal bone of left foot S92.335A    Closed nondisplaced fracture of fourth metatarsal bone of left foot S92.345A    Positive blood culture R78.81    PAD (peripheral artery disease) ([facility] I73.9    Surgical wound dehiscence T81.31XA    Open wnd of foot, left, subsequent encounter S91.302D       Current Facility-Administered Medications   Medication Dose Route Frequency    0.9% sodium chloride infusion  25 mL/hr IntraVENous CONTINUOUS    [START ON 1/7/2023] famotidine (PEPCID) tablet 20 mg  20 mg Oral QPM    lidocaine 4 % patch 1 Patch  1 Patch TransDERmal Q24H    vit B Cmplx 3-FA-Vit C-Biotin (NEPHRO NIKITA RX) tablet 1 Tablet  1 Tablet Oral DAILY    diphenhydrAMINE (BENADRYL) injection 12.5 mg  12.5 mg IntraVENous Q6H PRN    ammonium lactate (LAC-HYDRIN) 12 % lotion   Topical BID PRN    Saccharomyces boulardii (FLORASTOR) capsule 500 mg  500 mg Oral BID    vancomycin 50 mg/mL oral solution (compounded) 125 mg  125 mg Oral Q6H    insulin lispro (HUMALOG) injection   SubCUTAneous AC&HS    insulin glargine (LANTUS) injection 15 Units  15 Units SubCUTAneous QHS    epoetin lisette-epbx (RETACRIT) injection 8,000 Units  8,000 Units SubCUTAneous Q TUE, THU & SAT    loperamide (IMODIUM) capsule 2 mg  2 mg Oral Q4H PRN    nystatin (MYCOSTATIN) 100,000 unit/gram powder   Topical BID    alum-mag hydroxide-simeth (MYLANTA) oral suspension 30 mL  30 mL Oral Q4H PRN    glucose chewable tablet 16 g  16 g Oral PRN    glucagon (GLUCAGEN) injection 1 mg  1 mg IntraMUSCular PRN    heparin (porcine) 1,000 unit/mL injection 3,600 Units  3,600 Units IntraCATHeter DIALYSIS PRN    dextrose 10% infusion 0-250 mL  0-250 mL IntraVENous PRN    0.9% sodium chloride infusion  25 mL/hr IntraVENous DIALYSIS PRN    clopidogreL (PLAVIX) tablet 75 mg  75 mg Oral DAILY    carvediloL (COREG) tablet 12.5 mg  12.5 mg Oral BID    aspirin chewable tablet 81 mg  81 mg Oral DAILY    amLODIPine (NORVASC) tablet 10 mg  10 mg Oral DAILY    allopurinoL (ZYLOPRIM) tablet 100 mg  100 mg Oral DAILY    ascorbic acid (vitamin C) (VITAMIN C) tablet 500 mg  500 mg Oral DAILY    ezetimibe (ZETIA) tablet 10 mg  10 mg Oral DAILY sevelamer carbonate (RENVELA) tab 1,600 mg  1,600 mg Oral TID WITH MEALS    sodium chloride (NS) flush 5-40 mL  5-40 mL IntraVENous Q8H    sodium chloride (NS) flush 5-40 mL  5-40 mL IntraVENous PRN    acetaminophen (TYLENOL) tablet 650 mg  650 mg Oral Q6H PRN    Or    acetaminophen (TYLENOL) suppository 650 mg  650 mg Rectal Q6H PRN    bisacodyL (DULCOLAX) suppository 10 mg  10 mg Rectal DAILY PRN    promethazine (PHENERGAN) tablet 12.5 mg  12.5 mg Oral Q6H PRN    Or    ondansetron (ZOFRAN) injection 4 mg  4 mg IntraVENous Q6H PRN    heparin (porcine) injection 5,000 Units  5,000 Units SubCUTAneous Q8H    oxyCODONE-acetaminophen (PERCOCET) 5-325 mg per tablet 1 Tablet  1 Tablet Oral Q6H PRN         Review of Systems - General ROS: negative for - chills, fever, or night sweats  Respiratory ROS: no cough, shortness of breath, or wheezing  Cardiovascular ROS: no chest pain or dyspnea on exertion  Gastrointestinal ROS: no abdominal pain, change in bowel habits, or black or bloody stools       Objective:    Visit Vitals  BP (!) 143/66   Pulse 64   Temp 98.9 °F (37.2 °C)   Resp 16   Ht 6' (1.829 m)   Wt 93.9 kg (207 lb 1.6 oz)   SpO2 98%   BMI 28.09 kg/m²       Temp (24hrs), Av.6 °F (37 °C), Min:97.4 °F (36.3 °C), Max:99.4 °F (37.4 °C)    Right HD cath in place    GEN: WDWN AAM in NAD  HEENT: anicteric  CHEST: Non laboured breathing  EXT: L foot is dressed-- wound not seen.          Lab results:    Chemistry  Recent Labs     23  0600 23  1548 23  1629 23  0900 23  0632   GLU  --  144*  --  128* 134*   NA  --  135*  --  135* 136   K 5.8* 4.4 6.3* 5.9* 6.3*   CL  --  98*  --  100 100   CO2  --  31  --  30 29   BUN  --  39*  --  52* 51*   CREA  --  6.00*  --  8.10* 7.99*   CA  --  8.8  --  9.3 8.7   AGAP  --  6  --  5 7   BUCR  --  7*  --  6* 6*   AP  --  120*  --   --   --    TP  --  7.7  --   --   --    ALB  --  2.2*  --   --  2.2*   GLOB  --  5.5*  --   --   --    AGRAT  --  0.4*  --   -- --        CBC w/ Diff  Recent Labs     01/05/23  1548   WBC 13.5*   RBC 2.73*   HGB 8.3*   HCT 26.1*      GRANS 75*   LYMPH 11*   EOS 5         Microbiology  All Micro Results       Procedure Component Value Units Date/Time    CULTURE, ANAEROBIC [[id number]] Collected: 01/06/23 1245    Order Status: Sent Specimen: Foot, left Updated: 01/06/23 1343    CULTURE, Dorla Sicilian STAIN [[id number]] Collected: 01/06/23 1245    Order Status: Sent Specimen: Wound from Foot Updated: 01/06/23 1343    CULTURE, BLOOD [[id number]] Collected: 12/24/22 1745    Order Status: Completed Specimen: Blood Updated: 12/30/22 0710     Special Requests: NO SPECIAL REQUESTS        Culture result: NO GROWTH 6 DAYS       CULTURE, BLOOD [[id number]] Collected: 12/24/22 1745    Order Status: Completed Specimen: Blood Updated: 12/30/22 0710     Special Requests: NO SPECIAL REQUESTS        Culture result: NO GROWTH 6 DAYS       CULTURE, BLOOD [[id number]] Collected: 12/23/22 2052    Order Status: Completed Specimen: Blood Updated: 12/29/22 0746     Special Requests: NO SPECIAL REQUESTS        Culture result: NO GROWTH 6 DAYS       COVID-19 WITH INFLUENZA A/B [[id number]] Collected: 12/24/22 1941    Order Status: Completed Specimen: Nasopharyngeal Updated: 12/24/22 2028     SARS-CoV-2 by PCR Not detected        Comment: Not Detected results do not preclude SARS-CoV-2 infection and should not be used as the sole basis for patient management decisions. Results must be combined with clinical observations, patient history, and epidemiological information. Influenza A by PCR Not detected        Influenza B by PCR Not detected        Comment: Testing was performed using cheryl Alla SARS-CoV-2 and Influenza A/B nucleic acid assay.   This test is a multiplex Real-Time Reverse Transcriptase Polymerase Chain Reaction (RT-PCR) based in vitro diagnostic test intended for the qualitative detection of nucleic acids from SARS-CoV-2, Influenza A, and Influenza B in nasopharyngeal for use under the FDA's Emergency Use Authorization(EAU) only.        Fact sheet for Patients: FindDrives.pl  Fact sheet for Healthcare Providers: FindDrives.pl         CULTURE, BLOOD 2nd DRAW (required for [facility]) [[id number]] Collected: 12/09/22 1215    Order Status: Completed Specimen: Blood Updated: 12/15/22 0940     Special Requests: LEFT HAND     Culture result: NO GROWTH 6 DAYS       CULTURE, WOUND Arcelia Finner STAIN [[id number]]  (Abnormal)  (Susceptibility) Collected: 12/09/22 2201    Order Status: Completed Specimen: Wound from Foot Updated: 12/15/22 0650     Special Requests: NO SPECIAL REQUESTS        GRAM STAIN RARE WBCS SEEN               2+ APPARENT GRAM VARIABLE RODS           Culture result:       HEAVY Serratia fonticola PIPTAZ = 28, SENSITIVE                  HEAVY ALCALIGENES FAECALIS                  HEAVY ALCALIGENES FAECALIS (2ND COLONY TYPE/STRAIN)                  HEAVY ENTEROBACTER CLOACAE                  HEAVY MIXED SKIN TO ISOLATED          CULTURE, ANAEROBIC [[id number]]  (Abnormal) Collected: 12/09/22 2201    Order Status: Completed Specimen: Foot, left Updated: 12/13/22 1246     Special Requests: NO SPECIAL REQUESTS        Culture result:       MODERATE BACTEROIDES VULGATUS BETA LACTAMASE POSITIVE          CULTURE, BLOOD [[id number]]  (Abnormal) Collected: 12/09/22 1155    Order Status: Completed Specimen: Blood Updated: 12/12/22 0743     Special Requests: LEFT AC     GRAM STAIN       ANAEROBIC BOTTLE GRAM POSITIVE COCCI IN CLUSTERS                  SMEAR CALLED TO AND CORRECTLY REPEATED BY: Yessenia Mcgrath RN 3S 12/10/22 AT 1147 BY ANI           Culture result:       STAPHYLOCOCCUS SPECIES, COAGULASE NEGATIVE GROWING IN 1 OF 2 BOTTLES DRAWN  SITE = LAC          BLOOD CULTURE ID PANEL [[id number]]  (Abnormal) Collected: 12/09/22 1145    Order Status: Completed Specimen: Blood Updated: 12/11/22 0655     Acc. no. from Micro Order [id number]     Enterococcus faecalis Not detected        Enterococcus faecium Not detected        Listeria monocytogenes Not detected        Staphylococcus Detected        Staphylococcus aureus Not detected        Staph epidermidis Detected        Staph lugdunensis Not detected        Streptococcus Not detected        Streptococcus agalactiae (Group B) Not detected        Streptococcus pneumoniae Not detected        Streptococcus pyogenes (Group A) Not detected        Acinetobacter calcoaceticus-baumanii complex Not detected        Bacteroides fragilis Not detected        Enterobacterales species Not detected        Enterobacter cloacae complex Not detected        Escherichia coli Not detected        Klebsiella aerogenes Not detected        Klebsiella oxytoca Not detected        Klebsiella pneumoniae group Not detected        Proteus Not detected        Salmonella Not detected        Serratia marcescens Not detected        Haemophilus influenzae Not detected        Neisseria meningitidis Not detected        Pseudomonas aeruginosa Not detected        Steno maltophilia Not detected        Candida albicans Not detected        Candida auris Not detected        Candida glabrata Not detected        Candida krusei Not detected        Candida parapsilosis Not detected        Candida tropicalis Not detected        Crypto neoformans/gattii Not detected        RESISTANT GENES:            MECA/C (Methicillin resistant gene) Detected        Comment       False positive results may rarely occur.  Correlate with clinical,epidemiologic, and other laboratory findings           Comment: Please see BCID Interpretation Guide in EPIC Links       C. DIFFICILE AG & TOXIN A/B [[id number]]     Order Status: Canceled Specimen: Stool

## 2023-01-06 NOTE — ANESTHESIA POSTPROCEDURE EVALUATION
Post-Anesthesia Evaluation and Assessment    Cardiovascular Function/Vital Signs  Visit Vitals  /66   Pulse 66   Temp 36.3 °C (97.4 °F)   Resp 20   Ht 6' (1.829 m)   Wt 93.9 kg (207 lb 1.6 oz)   SpO2 99%   BMI 28.09 kg/m²       Patient is status post Procedure(s):  LEFT FOOT DEBRIDEMENT,APPLICATION OF STRAVIX GRAFT, MAC W/LOCAL ANESTHESIA,PATIENT IS IN ROOM 327. .    Nausea/Vomiting: Controlled. Postoperative hydration reviewed and adequate. Pain:  Pain Scale 1: Numeric (0 - 10) (01/06/23 1325)  Pain Intensity 1: 0 (01/06/23 1325)   Managed. Neurological Status:   Neuro (WDL): Exceptions to WDL (01/06/23 1325)   At baseline. Mental Status and Level of Consciousness: Arousable. Pulmonary Status:   O2 Device: Nasal cannula (01/06/23 1325)   Adequate oxygenation and airway patent. Complications related to anesthesia: None    Post-anesthesia assessment completed. No concerns. Patient has met all discharge requirements. Signed By: Crystal Rosado MD    January 6, 2023               Procedure(s):  LEFT FOOT DEBRIDEMENT,APPLICATION OF STRAVIX GRAFT, MAC W/LOCAL ANESTHESIA,PATIENT IS IN ROOM 327. Lesly Rosen MAC    <BSHSIANPOST>    INITIAL Post-op Vital signs:   Vitals Value Taken Time   /66 01/06/23 1328   Temp 36.3 °C (97.4 °F) 01/06/23 1313   Pulse 66 01/06/23 1331   Resp 19 01/06/23 1331   SpO2 99 % 01/06/23 1331   Vitals shown include unvalidated device data.

## 2023-01-06 NOTE — WOUND CARE
IP WOUND CONSULT    Carlos Guido  MEDICAL RECORD NUMBER:  [id number]  AGE: 60 y.o. GENDER: male  : 1960  TODAY'S DATE:  2023    GENERAL     [x] Follow-up   [] New Consult    [] Present on Admission  [] Karuna Caro is a 60 y.o. male referred by:   [x] Physician  [] Nursing  [] Other:         PAST MEDICAL HISTORY    Past Medical History:   Diagnosis Date    CAD (coronary artery disease)     Chronic kidney disease     Diabetes mellitus     Hypertension     Sleep apnea         PAST SURGICAL HISTORY    Past Surgical History:   Procedure Laterality Date    HX ORTHOPAEDIC      HX PACEMAKER      IR INSERT TUNL CVC W/O PORT OVER 5 YR  2022    IR INSERT TUNL CVC W/O PORT OVER 5 YR  2022    IR REMOVE TUNL CVAD W/O PORT / PUMP  2022    MT UNLISTED PROCEDURE CARDIAC SURGERY         FAMILY HISTORY    Family History   Problem Relation Age of Onset    Heart Disease Mother     Diabetes Father     Diabetes Sister     Diabetes Brother        SOCIAL HISTORY    Social History     Tobacco Use    Smoking status: Never    Smokeless tobacco: Never   Vaping Use    Vaping Use: Never used   Substance Use Topics    Alcohol use: Not Currently    Drug use: Never       ALLERGIES    No Known Allergies    MEDICATIONS    No current facility-administered medications on file prior to encounter. Current Outpatient Medications on File Prior to Encounter   Medication Sig Dispense Refill    clopidogreL (PLAVIX) 75 mg tab Take 1 Tablet by mouth in the morning. 30 Tablet 2    isosorbide mononitrate ER (IMDUR) 30 mg tablet Take 1 Tablet by mouth daily. 30 Tablet 0    pantoprazole (PROTONIX) 40 mg tablet Take 1 Tablet by mouth Daily (before breakfast). 30 Tablet 0    bacitracin zinc (BACITRACIN) ointment Apply  to affected area two (2) times a day. 15 g 0    nystatin (MYCOSTATIN) powder Apply  to affected area two (2) times a day.  5 g 0    insulin lispro (HUMALOG) 100 unit/mL injection Use as directed 1 Each 0    Lactobacillus Acidoph & Bulgar (FLORANEX) 1 million cell tab tablet Take 2 Tablets by mouth two (2) times a day. 60 Tablet 0    melatonin, rapid dissolve, 5 mg TbDi tablet Take 2 Tablets by mouth nightly as needed (slsee[). 30 Tablet 0    sevelamer carbonate (RENVELA) 800 mg tab tab Take 2 Tablets by mouth three (3) times daily (with meals). 90 Tablet 0    vit B Cmplx 3-FA-Vit C-Biotin (NEPHRO NIKITA RX) 1- mg-mg-mcg tablet Take 1 Tablet by mouth daily. 30 Tablet 0    atorvastatin (LIPITOR) 40 mg tablet Take 40 mg by mouth daily. gabapentin (NEURONTIN) 300 mg capsule Take 300 mg by mouth nightly. allopurinoL (ZYLOPRIM) 100 mg tablet Take 100 mg by mouth daily. ezetimibe (ZETIA) 10 mg tablet Take 10 mg by mouth. carvediloL (COREG) 12.5 mg tablet Take 12.5 mg by mouth two (2) times a day. amLODIPine (NORVASC) 10 mg tablet Take 10 mg by mouth daily. aspirin 81 mg chewable tablet Take 81 mg by mouth daily. ascorbic acid, vitamin C, (VITAMIN C) 500 mg tablet Take 500 mg by mouth. insulin glargine (LANTUS) 100 unit/mL injection 10 Units by SubCUTAneous route nightly. Wt Readings from Last 3 Encounters:   01/06/23 93.9 kg (207 lb 1.6 oz)   07/19/22 102.6 kg (226 lb 4.8 oz)       [email or web address] Vitals  BP (!) 143/66   Pulse 64   Temp 98.9 °F (37.2 °C)   Resp 16   Ht 6' (1.829 m)   Wt 93.9 kg (207 lb 1.6 oz)   SpO2 98%   BMI 28.09 kg/m²       ASSESSMENT     Skin impairment Identification:  Type:  surgical    Contributing Factors: decreased mobility and arterial insufficiency    Transmet incision    Incision 01/06/23 Foot Left (Active)   Wound Image   01/06/23 1557   Dressing Status Dry; Intact 01/06/23 1557   Dressing Change Due 01/09/23 01/06/23 1557   Cleansed Not cleansed 01/06/23 1557   Dressing/Treatment Non-adherent;Negative Pressure Wound Therapy; Ace wrap 01/06/23 1557   Wound Length (cm) 8 cm 01/06/23 1557   Wound Width (cm) 9 cm 01/06/23 1557   Margins Other (Comment) 01/06/23 1557   Incision Assessment Other (Comment) 01/06/23 1557   Drainage Amount Scant 01/06/23 1557   Drainage Description Sanguineous 01/06/23 1557   Wound Odor None 01/06/23 1557   Leticia-Wound/Incision Assessment Intact 01/06/23 1557   Number of days: 0      Negative Pressure    NAME:  Dipak Tamayo OF BIRTH:  1960  MEDICAL RECORD NUMBER:  [id number]  DATE:  12/9/2022    Applied Negative Pressure to transmetatarsal incision . [x] Applied skin barrier prep to leticia-wound. [x] Cut strips of plastic drape to picture frame wound so that leticia-wound is covered with the drape. [x] Cut sponge, gauze or channel drain to size which will fit into the wound/ulcer bed without being forced. [x] Be sure the sponge is large enough to hold the entire round plastic flange which is attached to the tubing. Never allow flange to be larger than the sponge or it will produce suction damaging intact skin. Total number of individual pieces of foam used within the wound bed: 1 blackn. [x] Covered sponge, gauze or channel drain with plastic drape. [x] Cut a hole in this plastic drape directly over the sponge the same size as the plastic drain tubing. [x] Removed plastic liner from flange and apply it directly over the hole you cut. [x] Removed the plastic cover from the flange. [x] Attached the tubing to the wound/ulcer Negative Pressure Therapy and turn it on to be sure a vacuum is created and that there are no leaks. [x] If air leaks occur, use plastic drape to patch them. [x] Secured Negative Pressure Therapy dressing with ace wrap loosely if located on an extremity. Maintain tubing outside of ace wrap. Tubing must not exert pressure on intact skin.     Response to treatment: Well tolerated by patient      Applied per  Guidelines      Electronically signed by Alondra Baires RN on 1/6/2023 at 4:02 PM     Devante Yusuf Recommendations  Minimize layers of linen  Pads under patient to optimize support surface  Patient needs to be getting out of bed when possible to relieve sacral pressure    Recommendations: leave dressing in place and if unable to maintain a seal follow order set instructions    Teaching completed with:   [x] Patient           [] Family member       [] Caregiver          [x] Nursing  [] Other    Patient/Caregiver Teaching:  Level of patient/caregiver understanding able to:   [x] Indicates understanding       [] Needs reinforcement  [] Unsuccessful      [] Verbal Understanding  [] Demonstrated understanding       [] No evidence of learning  [] Refused teaching         [] N/A       Electronically signed by Lynsey Gutierrez RN on 1/6/2023 at 4:02 PM

## 2023-01-06 NOTE — PERIOP NOTES
[facility] made aware that SBAR is ready for review. Patient assigned room #327.

## 2023-01-06 NOTE — ANESTHESIA PREPROCEDURE EVALUATION
Relevant Problems   No relevant active problems       Anesthetic History   No history of anesthetic complications            Review of Systems / Medical History  Patient summary reviewed, nursing notes reviewed and pertinent labs reviewed    Pulmonary        Sleep apnea           Neuro/Psych              Cardiovascular    Hypertension          CAD    Exercise tolerance: <4 METS     GI/Hepatic/Renal                Endo/Other    Diabetes: well controlled         Other Findings              Physical Exam    Airway  Mallampati: III  TM Distance: 4 - 6 cm  Neck ROM: normal range of motion   Mouth opening: Normal     Cardiovascular               Dental  No notable dental hx       Pulmonary                 Abdominal  GI exam deferred       Other Findings            Anesthetic Plan    ASA: 3            Induction: Intravenous  Anesthetic plan and risks discussed with: Patient

## 2023-01-06 NOTE — PROGRESS NOTES
Hospitalist Progress Note-critical care note     Patient: Tolu Trevizo MRN: [id number]  CSN: [id number]    YOB: 1960  Age: 60 y.o.   Sex: male    DOA: 12/9/2022 LOS:  LOS: 28 days            Chief complaint: pad , foot infection dm esrd     Assessment/Plan         Hospital Problems  Date Reviewed: 1/6/2023            Codes Class Noted POA    * (Principal) Surgical wound dehiscence ICD-10-CM: T81.31XA  ICD-9-CM: 998.32  1/6/2023 Unknown        Open wnd of foot, left, subsequent encounter ICD-10-CM: S91.302D  ICD-9-CM: V58.89, 892.0  1/6/2023 Unknown        PAD (peripheral artery disease) ([facility] ICD-10-CM: I73.9  ICD-9-CM: 443.9  12/27/2022 Unknown        Positive blood culture ICD-10-CM: R78.81  ICD-9-CM: 790.7  12/11/2022 Unknown        Leukocytosis ICD-10-CM: D72.829  ICD-9-CM: 288.60  12/9/2022 Unknown        Diabetic foot infection ([facility] ICD-10-CM: E11.628, L08.9  ICD-9-CM: 250.80, 686.9  12/9/2022 Unknown        Diarrhea ICD-10-CM: R19.7  ICD-9-CM: 787.91  12/9/2022 Unknown        Type 2 diabetes mellitus, with long-term current use of insulin ([facility] ICD-10-CM: E11.9, Z79.4  ICD-9-CM: 250.00, V58.67  Unknown Unknown        Acute hematogenous osteomyelitis of left foot ([facility] ICD-10-CM: M86.072  ICD-9-CM: 730.07  12/9/2022 Unknown        Nondisplaced fracture of second metatarsal bone of left foot ICD-10-CM: S87.887E  ICD-9-CM: 825.25  12/9/2022 Unknown        Nondisplaced fracture of third metatarsal bone of left foot ICD-10-CM: P06.379V  ICD-9-CM: 825.25  12/9/2022 Unknown        Closed nondisplaced fracture of fourth metatarsal bone of left foot ICD-10-CM: W54.485F  ICD-9-CM: 825.25  12/9/2022 Unknown        Hypertension ICD-10-CM: I10  ICD-9-CM: 401.9  7/21/2022 Yes        CAD (coronary artery disease) ICD-10-CM: I25.10  ICD-9-CM: 414.00  6/28/2022 Yes        ESRD (end stage renal disease) ([facility] ICD-10-CM: N18.6  ICD-9-CM: 585.6  6/28/2022 Yes        Diabetes mellitus with foot ulcer [facility]) ICD-10-CM: E11.621, L97.509  ICD-9-CM: 250.80, 707.15  6/27/2022 Yes       60 y.o. male with history of diabetes, diabetic ulcer, CAD, hyperlipidemia, hypertension end-stage renal disease on HD presented to ER due to left foot lesion. Gangrene of left foot/DFU   PARTIAL AMPUTATION OF LEFT 2ND, 3RD, 4TH METATARSALS, AMPUTATION OF TOES 2,3,4, INCISION ADN DRAINAGE LEFT FOOT on 12/09/2022  S/p angiogram with PTA of the proximal PT and peroneal arteries. Vascular planning repeat procedure on 12/28-tolerated well   S/p Left TMA 12/21/2022. ID follow up pt   Dr. Dov Hernandez planning to put graft today        Headache resolved   Not improving per pain meds   Ct head no acute issue      History of C-diff -  On oral vancomycin to prevent recurrence-now hold per ID      Positive blood cx -  Coag negative staph  Likely contaminant contamination      CAD-   Distal RCA to drug-eluting stent in July 2022, continue plavix -   No chest pain     Hypertension -  continue home medication     End stage renal disease - hyperkalemia -hd today   on HD per nephrology-will have hd today      DM  type II -  Lantus and ssi     Hyperkalemia   Will have hd today      Subjective I feel fine , ok to rehab       D/c planning       Disposition :in 1-2 days   Review of systems:    General: No fevers or chills. Cardiovascular: No chest pain or pressure. No palpitations. Pulmonary: No shortness of breath. Gastrointestinal: No nausea, vomiting. Vital signs/Intake and Output:  Visit Vitals  BP (!) 120/52   Pulse 70   Temp 99.1 °F (37.3 °C)   Resp 17   Ht 6' (1.829 m)   Wt 93.9 kg (207 lb 1.6 oz)   SpO2 99%   BMI 28.09 kg/m²     Current Shift:  01/06 0701 - 01/06 1900  In: 550 [P.O.:400; I.V.:150]  Out: -   Last three shifts:  01/04 1901 - 01/06 0700  In: -   Out: 1     Physical Exam:  General: WD, WN. Alert, cooperative, no acute distress    HEENT: NC, Atraumatic. PERRLA, anicteric sclerae.  Muscle spasm of rt side of neck Lungs: CTA Bilaterally. No Wheezing/Rhonchi/Rales. Heart:  Regular  rhythm,  No murmur, No Rubs, No Gallops  Abdomen: Soft, Non distended, Non tender. +Bowel sounds,   Extremities: No c/c, bilateral foot wrapped with ace bandage and foot protector   Psych:   Not anxious or agitated. Neurologic:  No acute neurological deficit. Labs: Results:       Chemistry Recent Labs     01/06/23  0600 01/05/23  1548 01/04/23  1629 01/04/23  0900 01/04/23  0632   GLU  --  144*  --  128* 134*   NA  --  135*  --  135* 136   K 5.8* 4.4 6.3* 5.9* 6.3*   CL  --  98*  --  100 100   CO2  --  31  --  30 29   BUN  --  39*  --  52* 51*   CREA  --  6.00*  --  8.10* 7.99*   CA  --  8.8  --  9.3 8.7   AGAP  --  6  --  5 7   BUCR  --  7*  --  6* 6*   AP  --  120*  --   --   --    TP  --  7.7  --   --   --    ALB  --  2.2*  --   --  2.2*   GLOB  --  5.5*  --   --   --    AGRAT  --  0.4*  --   --   --         CBC w/Diff Recent Labs     01/05/23  1548   WBC 13.5*   RBC 2.73*   HGB 8.3*   HCT 26.1*      GRANS 75*   LYMPH 11*   EOS 5        Cardiac Enzymes No results for input(s): CPK, CKND1, PAULO in the last 72 hours. No lab exists for component: CKRMB, TROIP   Coagulation No results for input(s): PTP, INR, APTT, INREXT, INREXT in the last 72 hours. Lipid Panel Lab Results   Component Value Date/Time    Cholesterol, total 188 07/19/2022 03:12 AM    HDL Cholesterol 42 07/19/2022 03:12 AM    LDL, calculated 131.2 (H) 07/19/2022 03:12 AM    VLDL, calculated 14.8 07/19/2022 03:12 AM    Triglyceride 74 07/19/2022 03:12 AM    CHOL/HDL Ratio 4.5 07/19/2022 03:12 AM      BNP No results for input(s): BNPP in the last 72 hours.    Liver Enzymes Recent Labs     01/05/23  1548   TP 7.7   ALB 2.2*   *        Thyroid Studies No results found for: T4, T3U, TSH, TSHEXT, TSHEXT     Procedures/imaging: see electronic medical records for all procedures/Xrays and details which were not copied into this note but were reviewed prior to creation of Plan    XR FOOT LT AP/LAT    Result Date: 12/22/2022  EXAM: XR FOOT LT AP/LAT CLINICAL INDICATION/HISTORY:  POST OP XRAY   > Additional: None COMPARISON: 12/9/2022 TECHNIQUE: AP and lateral views _______________ FINDINGS: Interval transmetatarsal amputation. The metatarsal stumps are not entirely sharp or well-defined. Midfoot ossicles appear intact and normally aligned with unremarkable hindfoot bones. Soft tissues covering the amputation stump are edematous. There are some gas bubbles within the soft tissues as would be expected following recent surgery. There is extensive arterial vascular calcification, Monckeberg sclerosis pattern, characteristic of long-standing diabetes. _______________     Status post TMA as described. The indistinctness of the metatarsal stumps is concerning for residual infection although ultimately nonspecific. Follow-up imaging suggested within several weeks for reassessment. XR FOOT LT AP/LAT    Result Date: 12/9/2022  EXAM: 2 radiographic views of the left foot. INDICATION: Left foot pain. COMPARISON: December 9, 2022 _______________ FINDINGS: There is been interval amputation of the second, third, and fourth rays at the level of the metatarsal diaphysis. As before, the first ray is amputated at the metatarsal phalangeal joint. The small toe remains. There are expected postoperative findings to include regional air density and soft tissue swelling. There is Monckeberg type vascular calcification. There is low-grade mid foot degeneration. _______________     Standard immediate postoperative findings. _______________     XR FOOT LT MIN 3 V    Result Date: 12/9/2022  EXAM: XR FOOT LT MIN 3 V CLINICAL INDICATION/HISTORY: Gangrenous 2nd digit   > Additional: None. COMPARISON: None. TECHNIQUE: 3 views left foot _______________ FINDINGS: Soft tissue gas formation is seen along the second digit with ill-defined limitus changes extending along the medial forefoot.  Prior amputation first right. Patchy demineralization and osseous erosions are seen involving the distal portion of the first metatarsal head. Additional patchy areas of osseous erosion are also noted involving the proximal phalanx of the second toe. Diffuse soft tissue swelling. Diffuse atherosclerotic vascular calcifications. No retained radiopaque foreign object. _______________     Soft gas formation and ulceration involving the medial forefoot with findings concerning for osteomyelitis involving the first and second rays described above. MRI FOOT LT WO CONT    Result Date: 12/9/2022  EXAM: MRI FOOT LT WO CONT CLINICAL INDICATION/HISTORY: Foot wound; preoperative  >Additional: None COMPARISON: Radiograph performed December 9, 2022  >Reference exam: None. TECHNIQUE: Axial long axis TI and T2 with fat saturation, sagittal and coronal short axis TI and STIR sequences through the foot were obtained without contrast. _______________ FINDINGS: FIRST RAY: Prior dictation the first digit. There is mild bony proliferative change at the head of the first metatarsal with preserved marrow signal. Minimal patchy edema is noted which is nonspecific. No definite cortical destructive change. SECOND RAY: Nondisplaced obliquely oriented fracture through the head neck junction of the second metatarsal. There is heterogeneous diminished T1 marrow with patchy edema and surrounding soft tissue gas along the course of the second digit with associated subluxation of the distal phalanx. Subtle diminished T1 marrow is noted with suspected foci of intraosseous gas, concerning for acute osteomyelitis. THIRD RAY: Nondisplaced fracture through the third metatarsal neck with slight impaction. Mild edema is noted within the third digit, possibly stress reaction. No convincing osseous erosions or T1 marrow signal change.  FOURTH RAY: Nondisplaced fractures of the neck of the fourth metatarsal. Minimal edema in the proximal phalanx but otherwise preserved marrow signal. FIFTH RAY: Unremarkable. Additional degenerative changes with patchy edema, joint space narrowing, and osteophyte formation at the midfoot, likely related to underlying Charcot arthropathy. SOFT TISSUES: Soft tissue irregularity with gas and ulceration around the second digit noted. Diffuse fluid signal seen throughout the intrinsic foot musculature, commonly seen in the setting of diabetes. Mild skin thickening about the forefoot. INCIDENTAL FINDINGS: None. _______________     1. Marrow signal abnormality with soft tissue wound and gas around the second digit concerning for acute osteomyelitis. 2.  Nondisplaced fractures of the head neck junction of the second-fourth metatarsals. 3.  Prior indication the first digit. 4.  Soft tissue swelling and skin thickening about the forefoot concerning for cellulitis. No drainable abscess. 5.  Additional chronic/degenerative changes of the foot. CT ABD PELV WO CONT    Result Date: 12/9/2022  EXAM: CT of the abdomen and pelvis INDICATION: Abdominal pain with distention. COMPARISON: None. TECHNIQUE: Axial CT imaging of the abdomen and pelvis was performed without intravenous contrast. Multiplanar reformats were generated. One or more dose reduction techniques were used on this CT: automated exposure control, adjustment of the mAs and/or kVp according to patient size, and iterative reconstruction techniques. The specific techniques used on this CT exam have been documented in the patient's electronic medical record. Digital Imaging and Communications in Medicine (DICOM) format image data are available to nonaffiliated external healthcare facilities or entities on a secure, media free, reciprocally searchable basis with patient authorization for at least a 12-month period after this study. _______________ FINDINGS: LOWER CHEST: Partial inclusion of multivessel coronary arterial atherosclerosis and central venous catheter. Clear lung bases.  Several punctate 2 to 3 mm pulmonary nodule seen in the right lower lobe (image 8) LIVER, BILIARY: Liver is normal. No biliary dilation. Color contains a gallstone without evidence of dilatation or gallbladder wall thickening. PANCREAS: Normal. SPLEEN: Punctate granulomatous calcifications otherwise unremarkable. ADRENALS: Mild adreniform thickening of each adrenal gland. KIDNEYS/URETERS/BLADDER: No hydronephrosis. Bilateral renal hypodensities are present either to small to accurately characterize or in keeping with simple cysts which are prior no further dedicated imaging follow-up. Bladder decompressed. PELVIC ORGANS: Unremarkable. VASCULATURE: Diffuse aortic and visceral arterial atherosclerosis without evidence of aneurysmal dilatation. LYMPH NODES: Scattered subcentimeter mesenteric and retroperitoneal lymph nodes are demonstrated without evidence of adenopathy. Prominent left inguinal lymph node is present (image 127) measuring approximately 15 mm in short axis dimension. GASTROINTESTINAL TRACT: No bowel dilation or wall thickening. Peritoneal gas. Normal appendix. Scattered colonic diverticula without evidence of diverticulitis. BONES: No acute or aggressive osseous abnormalities identified. OTHER: None. _______________     1. Cholelithiasis without evidence of cholecystitis. 2. No abnormal bowel wall thickening or dilatation. 3. No hydronephrosis or obstructive uropathy. 4. Several small right lower lobe pulmonary nodules (2-3 mm in size). See below guidelines for proposed follow-up. 5. Borderline enlarged and nonspecific left inguinal lymph node, potentially reactive in etiology. ======== Fleischner Society pulmonary nodule guidelines (revised 2017): Multiple solid nodules <6 mm: -Low risk for lung cancer: No follow-up. -High risk for lung cancer: Optional chest CT in 12 months.      XR CHEST PORT    Result Date: 12/23/2022  EXAM: CHEST RADIOGRAPH CLINICAL INDICATION/HISTORY: sepsis -Additional: None COMPARISON: 12/9/2022 TECHNIQUE: Portable frontal view of the chest _______________ FINDINGS: SUPPORT DEVICES: Indwelling dialysis catheter, tip in the mid to lower SVC. HEART AND MEDIASTINUM: No appreciable cardiomegaly. Remaining mediastinal contours within normal limits. LUNGS AND PLEURAL SPACES: Clear. No consolidation, mass or effusion. BONY THORAX AND SOFT TISSUES: Unremarkable. _______________     No active cardiopulmonary disease. XR CHEST PORT    Result Date: 12/9/2022  EXAM: XR CHEST PORT CLINICAL INDICATION/HISTORY: sepsis -Additional: None COMPARISON: July 12, 2022 TECHNIQUE: Portable frontal view of the chest _______________ FINDINGS: SUPPORT DEVICES: Right IJ approach dialysis catheter in stable position. HEART AND MEDIASTINUM: Stable appearing cardiac size and mediastinal contours. LUNGS AND PLEURAL SPACES: Lungs are clear. No focal pneumonic opacity. No pneumothorax or pleural effusion. BONY THORAX AND SOFT TISSUES: Unremarkable. _______________     No active cardiopulmonary disease. ANKLE BRACHIAL INDEX    Result Date: 12/21/2022  · Right ankle/brachial index is 1.19 · The left ankle/ brachial index is 0.89      ANKLE BRACHIAL INDEX    Result Date: 12/15/2022  Falsely elevated Ankle Brachial Index pressure measurements noted due to calcified vessels with abnormal waveforms bilaterally. Unable to get left brachial pressure due to dialysis access graft. The right toe/brachial index is 0.59  With a toe pressure of 82 mmhg. Unable to get left toe pressure due to left toes amputation. DUPLEX LOWER EXT ARTERY BILAT    Result Date: 12/15/2022   Elevated velocities (169 cm/s) in the left proximal femoral artery consistent with mild stenosis. Bilateral tibial arteries are patnet at the ankle. Bilateral anterior tibial artery is occluded proximally but reconstituted distally by the collateral flow.         Jalil Portillo MD

## 2023-01-06 NOTE — BRIEF OP NOTE
Brief Postoperative Note    Patient: Karyn Paulson  YOB: 1960  MRN: [id number]    Date of Procedure: 1/6/2023     Pre-Op Diagnosis: DIABETIC FOOT INFECTION, ISCHEMIC GANGRENE, Disruption of External Surgical Wound Left Foot, Open Wound Left Foot, Cellulitis      Post-Op Diagnosis: Same as preoperative diagnosis. Procedure(s):  LEFT FOOT DEBRIDEMENT,APPLICATION OF STRAVIX GRAFT, MAC W/LOCAL ANESTHESIA,PATIENT IS IN ROOM 327. Surgeon(s):  Madhu Rivera DPM    Surgical Assistant: Surg Asst-1: Dariel Johns    Anesthesia: MAC     Estimated Blood Loss (mL): Minimal    Complications: None    Specimens:   ID Type Source Tests Collected by Time Destination   1 : wound left foot Wound Foot, left CULTURE, ANAEROBIC, CULTURE, WOUND W Enid Brewster 1/6/2023 1241 Microbiology        Implants:   Implant Name Type Inv.  Item Serial No.  Lot No. LRB No. Used Action   GRAFT HUM TISS [id number] CRYOPRESERVED PLCNTA TISS [employer] AM[employer] - [id number]  GRAFT HUM TISS [id number] CRYOPRESERVED PLCNTA TISS [employer] AMNION [id number] [employer] [id number] Left 1 Implanted   GRAFT HUM TISS [id number] CRYOPRESERVED PLCNTA TISS [employer] AMNION - [id number]  GRAFT HUM TISS [id number] CRYOPRESERVED PLCNTA TISS [employer] AMNION [id number] [employer] [id number] Left 1 Implanted   GRAFT HUM TISS [id number] CRYOPRESERVED PLCNTA TISS [employer] AMNION - [id number]  GRAFT HUM TISS [id number] CRYOPRESERVED PLCNTA TISS [employer] AMNION [id number] [employer] [id number] Left 1 Implanted   GRAFT HUM TISS [id number] CRYOPRESERVED PLCNTA TISS [employer] AMNION - [id number]  GRAFT HUM TISS [id number] CRYOPRESERVED PLCNTA TISS [employer] AMNION [id number] [employer]VisM [id number] Left 1 Implanted       Drains: * No LDAs found *    Findings: DIABETIC FOOT INFECTION, ISCHEMIC GANGRENE, Disruption of External Surgical Wound Left Foot, Open Wound Left Foot, Cellulitis      Electronically Signed by Winston Smith DPM on 1/6/2023 at 1:32 PM

## 2023-01-07 LAB
ALBUMIN SERPL-MCNC: 2.1 G/DL (ref 3.4–5)
ANION GAP SERPL CALC-SCNC: 11 MMOL/L (ref 3–18)
BUN SERPL-MCNC: 72 MG/DL (ref 7–18)
BUN/CREAT SERPL: 7 (ref 12–20)
CALCIUM SERPL-MCNC: 8.6 MG/DL (ref 8.5–10.1)
CHLORIDE SERPL-SCNC: 97 MMOL/L (ref 100–111)
CO2 SERPL-SCNC: 27 MMOL/L (ref 21–32)
CREAT SERPL-MCNC: 9.97 MG/DL (ref 0.6–1.3)
GLUCOSE BLD STRIP.AUTO-MCNC: 139 MG/DL (ref 70–110)
GLUCOSE BLD STRIP.AUTO-MCNC: 142 MG/DL (ref 70–110)
GLUCOSE BLD STRIP.AUTO-MCNC: 156 MG/DL (ref 70–110)
GLUCOSE BLD STRIP.AUTO-MCNC: 196 MG/DL (ref 70–110)
GLUCOSE BLD STRIP.AUTO-MCNC: 217 MG/DL (ref 70–110)
GLUCOSE SERPL-MCNC: 158 MG/DL (ref 74–99)
MAGNESIUM SERPL-MCNC: 2.3 MG/DL (ref 1.6–2.6)
PHOSPHATE SERPL-MCNC: 6 MG/DL (ref 2.5–4.9)
POTASSIUM SERPL-SCNC: 6.4 MMOL/L (ref 3.5–5.5)
SODIUM SERPL-SCNC: 135 MMOL/L (ref 136–145)

## 2023-01-07 PROCEDURE — 36415 COLL VENOUS BLD VENIPUNCTURE: CPT

## 2023-01-07 PROCEDURE — 90935 HEMODIALYSIS ONE EVALUATION: CPT

## 2023-01-07 PROCEDURE — 74011636637 HC RX REV CODE- 636/637: Performed by: HOSPITALIST

## 2023-01-07 PROCEDURE — 65270000029 HC RM PRIVATE

## 2023-01-07 PROCEDURE — 74011250636 HC RX REV CODE- 250/636: Performed by: HOSPITALIST

## 2023-01-07 PROCEDURE — 74011000250 HC RX REV CODE- 250: Performed by: HOSPITALIST

## 2023-01-07 PROCEDURE — 74011250637 HC RX REV CODE- 250/637: Performed by: HOSPITALIST

## 2023-01-07 PROCEDURE — 80069 RENAL FUNCTION PANEL: CPT

## 2023-01-07 PROCEDURE — 74011250636 HC RX REV CODE- 250/636: Performed by: PODIATRIST

## 2023-01-07 PROCEDURE — 83735 ASSAY OF MAGNESIUM: CPT

## 2023-01-07 PROCEDURE — 82962 GLUCOSE BLOOD TEST: CPT

## 2023-01-07 PROCEDURE — 74011250636 HC RX REV CODE- 250/636: Performed by: INTERNAL MEDICINE

## 2023-01-07 RX ADMIN — HEPARIN SODIUM 5000 UNITS: 5000 INJECTION INTRAVENOUS; SUBCUTANEOUS at 05:49

## 2023-01-07 RX ADMIN — NYSTATIN: 100000 POWDER TOPICAL at 22:06

## 2023-01-07 RX ADMIN — Medication 4 UNITS: at 16:06

## 2023-01-07 RX ADMIN — SODIUM CHLORIDE, PRESERVATIVE FREE 10 ML: 5 INJECTION INTRAVENOUS at 16:05

## 2023-01-07 RX ADMIN — ALLOPURINOL 100 MG: 100 TABLET ORAL at 08:36

## 2023-01-07 RX ADMIN — Medication 500 MG: at 08:34

## 2023-01-07 RX ADMIN — FAMOTIDINE 20 MG: 20 TABLET, FILM COATED ORAL at 17:18

## 2023-01-07 RX ADMIN — EZETIMIBE 10 MG: 10 TABLET ORAL at 08:34

## 2023-01-07 RX ADMIN — SEVELAMER CARBONATE 1600 MG: 800 TABLET, FILM COATED ORAL at 16:05

## 2023-01-07 RX ADMIN — HEPARIN SODIUM 5000 UNITS: 5000 INJECTION INTRAVENOUS; SUBCUTANEOUS at 16:05

## 2023-01-07 RX ADMIN — Medication 500 MG: at 22:06

## 2023-01-07 RX ADMIN — Medication 2 UNITS: at 08:35

## 2023-01-07 RX ADMIN — SODIUM CHLORIDE 25 ML/HR: 9 INJECTION, SOLUTION INTRAVENOUS at 08:42

## 2023-01-07 RX ADMIN — SODIUM CHLORIDE, PRESERVATIVE FREE 10 ML: 5 INJECTION INTRAVENOUS at 05:48

## 2023-01-07 RX ADMIN — B-COMPLEX W/ C & FOLIC ACID TAB 1 MG 1 TABLET: 1 TAB at 08:34

## 2023-01-07 RX ADMIN — HEPARIN SODIUM 3600 UNITS: 1000 INJECTION INTRAVENOUS; SUBCUTANEOUS at 14:33

## 2023-01-07 RX ADMIN — ASPIRIN 81 MG: 81 TABLET, CHEWABLE ORAL at 08:35

## 2023-01-07 RX ADMIN — NYSTATIN: 100000 POWDER TOPICAL at 08:36

## 2023-01-07 RX ADMIN — OXYCODONE AND ACETAMINOPHEN 1 TABLET: 5; 325 TABLET ORAL at 08:34

## 2023-01-07 RX ADMIN — CARVEDILOL 12.5 MG: 12.5 TABLET, FILM COATED ORAL at 22:06

## 2023-01-07 RX ADMIN — Medication 125 MG: at 22:06

## 2023-01-07 RX ADMIN — SODIUM CHLORIDE 25 ML/HR: 9 INJECTION, SOLUTION INTRAVENOUS at 17:18

## 2023-01-07 RX ADMIN — SEVELAMER CARBONATE 1600 MG: 800 TABLET, FILM COATED ORAL at 08:34

## 2023-01-07 RX ADMIN — CLOPIDOGREL BISULFATE 75 MG: 75 TABLET ORAL at 08:34

## 2023-01-07 NOTE — PROGRESS NOTES
1/7/2023  PT treatment not completed due to:  [] Off Unit for testing/procedure  [] Pain  [] Eating  [] Patient too lethargic  [] Nausea/vomiting  [] Dialysis treatment in progress   [x] Other:  Pt underwent LEFT FOOT [facility] on yesterday,  Please advise when okay to resume Physical Therapy, including wt bearing restrictions LE's. Thank you.    Crissy Urena, PT

## 2023-01-07 NOTE — PROGRESS NOTES
Hospitalist Progress Note    Patient: Amadou Wiseman MRN: [id number]  [facility]: [id number]    YOB: 1960  Age: 60 y.o. Sex: male    DOA: 12/9/2022 LOS:  LOS: 29 days            Assessment/Plan     Principal Problem:    Surgical wound dehiscence (1/6/2023)    Active Problems:    Diabetes mellitus with foot ulcer ([facility] (6/27/2022)      CAD (coronary artery disease) (6/28/2022)      ESRD (end stage renal disease) ([facility] (6/28/2022)      Hypertension (7/21/2022)      Leukocytosis (12/9/2022)      Diabetic foot infection ([facility] (12/9/2022)      Diarrhea (12/9/2022)      Type 2 diabetes mellitus, with long-term current use of insulin ([facility]) ()      Acute hematogenous osteomyelitis of left foot ([facility] (12/9/2022)      Nondisplaced fracture of second metatarsal bone of left foot (12/9/2022)      Nondisplaced fracture of third metatarsal bone of left foot (12/9/2022)      Closed nondisplaced fracture of fourth metatarsal bone of left foot (12/9/2022)      Positive blood culture (12/11/2022)      PAD (peripheral artery disease) ([facility] (12/27/2022)      Open wnd of foot, left, subsequent encounter (1/6/2023)        CC: 60 y.o. male with history of diabetes, diabetic ulcer, CAD, hyperlipidemia, hypertension end-stage renal disease on HD presented to ER due to left foot lesion. Gangrene of left foot/DFU :PARTIAL AMPUTATION OF LEFT 2ND, 3RD, 4TH METATARSALS, AMPUTATION OF TOES 2,3,4, INCISION ADN DRAINAGE LEFT FOOT on 12/09/2022  S/p angiogram with PTA of the proximal PT and peroneal arteries. S/p Left TMA 12/21/2022. S/p graft on January 6, 2023 with Dr. Walter Ni  ID follow up pt     Headache resolved.  Ct head no acute issue      History of C-diff : On oral vancomycin to prevent recurrence-now hold per ID      Positive blood cx : Coag negative staph  Likely contaminant contamination      CAD: Distal RCA to drug-eluting stent in July 2022, continue plavix -   No chest pain     Hypertension : continue home medication     End stage renal disease - hyperkalemia -hd today   on HD per nephrology-will have hd today      DM  type II : Lantus and ssi      Hyperkalemia : Will have hd today     DVT/GI prophylaxis     Full code    Disposition :in 1-2 days                Subjective:     Pt was seen and examined with the nurse in the morning round. \"Why my potassium is so high? What should I do? \"      Review of systems  General: No fevers or chills. Cardiovascular: No chest pain or pressure. No palpitations. Pulmonary: No cough, SOB  Gastrointestinal: +nausea, no vomiting. Objective:      Visit Vitals  BP (!) 146/66 (BP 1 Location: Right upper arm)   Pulse 76   Temp 97.6 °F (36.4 °C)   Resp 16   Ht 6' (1.829 m)   Wt 99.5 kg (219 lb 6.4 oz)   SpO2 96%   BMI 29.76 kg/m²         Physical Exam:    Gen: NAD, chronic ill appearing  Heent:  MMM, NC, AT. Cor: s1s2 RRR. No MRG. PMI mid 5th intercostal space. Resp:  CTA b/l. No w/r/r. Nml effort and diaphragmatic excursion. Abd:  NT ND.  BS positive. No rebound or guarding. No masses. Ext: No c/c, bilateral foot wrapped with ace bandage and foot protector       Intake and Output:  Current Shift:  No intake/output data recorded. Last three shifts:  01/05 1901 - 01/07 0700  In: 670 [P.O.:520; I.V.:150]  Out: 1     Labs: Results:       Chemistry Recent Labs     01/07/23  0523 01/06/23  0600 01/05/23  1548   *  --  144*   *  --  135*   K 6.4* 5.8* 4.4   CL 97*  --  98*   CO2 27  --  31   BUN 72*  --  39*   CREA 9.97*  --  6.00*   CA 8.6  --  8.8   AGAP 11  --  6   BUCR 7*  --  7*   AP  --   --  120*   TP  --   --  7.7   ALB 2.1*  --  2.2*   GLOB  --   --  5.5*   AGRAT  --   --  0.4*      CBC w/Diff Recent Labs     01/05/23  1548   WBC 13.5*   RBC 2.73*   HGB 8.3*   HCT 26.1*      GRANS 75*   LYMPH 11*   EOS 5      Cardiac Enzymes No results for input(s): CPK, CKND1, PAULO in the last 72 hours.     No lab exists for component: CKRMB, TROIP   Coagulation No results for input(s): PTP, INR, APTT, INREXT in the last 72 hours. Lipid Panel Lab Results   Component Value Date/Time    Cholesterol, total 188 07/19/2022 03:12 AM    HDL Cholesterol 42 07/19/2022 03:12 AM    LDL, calculated 131.2 (H) 07/19/2022 03:12 AM    VLDL, calculated 14.8 07/19/2022 03:12 AM    Triglyceride 74 07/19/2022 03:12 AM    CHOL/HDL Ratio 4.5 07/19/2022 03:12 AM      BNP No results for input(s): BNPP in the last 72 hours.    Liver Enzymes Recent Labs     01/07/23  0523 01/05/23  1548   TP  --  7.7   ALB 2.1* 2.2*   AP  --  120*      Thyroid Studies No results found for: T4, T3U, TSH, TSHEXT     Procedures/imaging: see electronic medical records for all procedures/Xrays and details which were not copied into this note but were reviewed prior to creation of Plan      Medications Reviewed  Alejo Huerta MD

## 2023-01-07 NOTE — ROUTINE PROCESS
Bedside and Verbal shift change report given to Nate Montanez RN (oncoming nurse) by Falguni Barnes RN   (offgoing nurse). Report included the following information SBAR, Kardex, Intake/Output, MAR, and Recent Results.

## 2023-01-07 NOTE — DIALYSIS
Hemodialysis / [phone]    Vitals Pre Post Assessment Pre Post   BP BP: (!) 149/72 (01/07/23 1130)   108/59 LOC Alert and oriented x4 Alert and oriented x4   HR Pulse (Heart Rate): 72 (01/07/23 1130) 77 Lungs Clear on room air Clear on room air   Resp Resp Rate: 20 (01/07/23 1130) 20 Cardiac B/p wnl B/p wnl   Temp Temp: 98.7 °F (37.1 °C) (01/07/23 1115) 98.5 Skin intact intact   Weight Pre-Dialysis Weight: 98.6 kg (217 lb 6 oz) (12/24/22 2018)  Edema none None    Tele status   Pain 0 0     Orders   Duration: Start: 1115 End: 1445 Total: 3.5hrs   Dialyzer: Dialyzer/Set Up Inspection: Revaclear (01/07/23 1115)   K Bath: Dialysate K (mEq/L): 1 (01/07/23 1115)   Ca Bath: Dialysate CA (mEq/L): 2.5 (01/07/23 1115)   Na: Dialysate NA (mEq/L): 138 (01/07/23 1115)   Bicarb: Dialysate HCO3 (mEq/L): 35 (01/07/23 1115)   Target Fluid Removal: Goal/Amount of Fluid to Remove (mL): 2500 mL (01/07/23 1115)     Access   Type & Location: RT Perma-cath CVC   Comments:                                     Dressing c/d/I from 1/3/23. Ports cleaned , blood aspirated and lines flushed without any issues. Good blood flow noted.  No s/s of infection present     Labs   HBsAg (Antigen) / date:      12/10/22 NEG                                         HBsAb (Antibody) / date: 12/10/22 Imm-23   Source: [facility]   Obtained/Reviewed  Critical Results Called HGB   Date Value Ref Range Status   01/05/2023 8.3 (L) 13.0 - 16.0 g/dL Final     Potassium   Date Value Ref Range Status   01/07/2023 6.4 (HH) 3.5 - 5.5 mmol/L Final     Comment:     CALLED TO AND CORRECTLY REPEATED BY:  BOSSMAN RESENDEZ RN 3S AT 0709 ON 1/7/23 TO [facility]       Calcium   Date Value Ref Range Status   01/07/2023 8.6 8.5 - 10.1 MG/DL Final     BUN   Date Value Ref Range Status   01/07/2023 72 (H) 7.0 - 18 MG/DL Final     Creatinine   Date Value Ref Range Status   01/07/2023 9.97 (H) 0.6 - 1.3 MG/DL Final        Meds Given   Name Dose Route   Heparin  3600 units Hep lock Adequacy / Fluid    Total Liters Process: 74.6   Net Fluid Removed: 2.5kg      Comments   Time Out Done:   (Time) 1030   Admitting Diagnosis: Surgical wound dehiscence   Consent obtained/signed: Informed Consent Verified: Yes (01/07/23 1115)   Machine / RO # Machine Number: [id number] (01/07/23 1115)   Primary Nurse Rpt Pre: Rey Mcgee RN   Primary Nurse Rpt Post: Rey Mcgee RN   Pt Education: ESRD   Care Plan: ESRD   Pts outpatient clinic:      Tx Summary   Comments:                         5502 Dialysis started, Dr Edison Lake to clarify K bath since pt's K is 6.4. Order received to run using a 1k bath for 2 hrs and then change to 2k  1305 K bath changed to 2k at this time. Pt c/o of feeling \"weird\" and feeling hot. Called primary to re-check glucose but it can alos be his K level dropping too quickly. Glucose -142, will continue to monitor  1445Dialysis completed and blood rinsed back. Heparin lock placed in each port with new caps.  Pt stable

## 2023-01-07 NOTE — ROUTINE PROCESS
Bedside and Verbal shift change report given to ESME Yo  (oncoming nurse) by Jamil Sanchez RN (offgoing nurse). Report included the following information SBAR, Kardex, MAR and Recent Results.

## 2023-01-07 NOTE — PROGRESS NOTES
Problem: Falls - Risk of  Goal: *Absence of Falls  Description: Document Samia Ground Fall Risk and appropriate interventions in the flowsheet. Outcome: Progressing Towards Goal  Note: Fall Risk Interventions:  Mobility Interventions: Bed/chair exit alarm, Patient to call before getting OOB, PT Consult for mobility concerns         Medication Interventions: Evaluate medications/consider consulting pharmacy, Patient to call before getting OOB    Elimination Interventions: Bed/chair exit alarm, Call light in reach, Patient to call for help with toileting needs, Toileting schedule/hourly rounds    History of Falls Interventions: Bed/chair exit alarm, Consult care management for discharge planning, Door open when patient unattended, Evaluate medications/consider consulting pharmacy         Problem: Patient Education: Go to Patient Education Activity  Goal: Patient/Family Education  Outcome: Progressing Towards Goal     Problem: Chronic Renal Failure  Goal: *Fluid and electrolytes stabilized  Outcome: Progressing Towards Goal     Problem: Patient Education: Go to Patient Education Activity  Goal: Patient/Family Education  Outcome: Progressing Towards Goal     Problem: Pressure Injury - Risk of  Goal: *Prevention of pressure injury  Description: Document Semja Scale and appropriate interventions in the flowsheet.   Outcome: Progressing Towards Goal  Note: Pressure Injury Interventions:  Sensory Interventions: Assess changes in LOC    Moisture Interventions: Absorbent underpads, Check for incontinence Q2 hours and as needed, Maintain skin hydration (lotion/cream), Minimize layers    Activity Interventions: Pressure redistribution bed/mattress(bed type), PT/OT evaluation    Mobility Interventions: HOB 30 degrees or less, Pressure redistribution bed/mattress (bed type), PT/OT evaluation    Nutrition Interventions: Document food/fluid/supplement intake    Friction and Shear Interventions: HOB 30 degrees or less Problem: Patient Education: Go to Patient Education Activity  Goal: Patient/Family Education  Outcome: Progressing Towards Goal

## 2023-01-07 NOTE — PROGRESS NOTES
Problem: Diabetes Self-Management  Goal: *Disease process and treatment process  Description: Define diabetes and identify own type of diabetes; list 3 options for treating diabetes. Outcome: Progressing Towards Goal  Goal: *Incorporating nutritional management into lifestyle  Description: Describe effect of type, amount and timing of food on blood glucose; list 3 methods for planning meals. Outcome: Progressing Towards Goal  Goal: *Incorporating physical activity into lifestyle  Description: State effect of exercise on blood glucose levels. Outcome: Progressing Towards Goal  Goal: *Developing strategies to promote health/change behavior  Description: Define the ABC's of diabetes; identify appropriate screenings, schedule and personal plan for screenings. Outcome: Progressing Towards Goal  Goal: *Using medications safely  Description: State effect of diabetes medications on diabetes; name diabetes medication taking, action and side effects. Outcome: Progressing Towards Goal  Goal: *Monitoring blood glucose, interpreting and using results  Description: Identify recommended blood glucose targets  and personal targets. Outcome: Progressing Towards Goal  Goal: *Prevention, detection, treatment of acute complications  Description: List symptoms of hyper- and hypoglycemia; describe how to treat low blood sugar and actions for lowering  high blood glucose level. Outcome: Progressing Towards Goal  Goal: *Prevention, detection and treatment of chronic complications  Description: Define the natural course of diabetes and describe the relationship of blood glucose levels to long term complications of diabetes.   Outcome: Progressing Towards Goal  Goal: *Developing strategies to address psychosocial issues  Description: Describe feelings about living with diabetes; identify support needed and support network  Outcome: Progressing Towards Goal  Goal: *Sick day guidelines  Outcome: Progressing Towards Goal  Goal: *Patient Specific Goal (EDIT GOAL, INSERT TEXT)  Outcome: Progressing Towards Goal     Problem: Patient Education: Go to Patient Education Activity  Goal: Patient/Family Education  Outcome: Progressing Towards Goal     Problem: Falls - Risk of  Goal: *Absence of Falls  Description: Document Henrietta العلي Fall Risk and appropriate interventions in the flowsheet. Outcome: Progressing Towards Goal  Note: Fall Risk Interventions:  Mobility Interventions: Bed/chair exit alarm, Patient to call before getting OOB, PT Consult for mobility concerns         Medication Interventions: Evaluate medications/consider consulting pharmacy, Patient to call before getting OOB    Elimination Interventions: Bed/chair exit alarm, Call light in reach, Patient to call for help with toileting needs, Toileting schedule/hourly rounds    History of Falls Interventions: Bed/chair exit alarm, Consult care management for discharge planning, Door open when patient unattended, Evaluate medications/consider consulting pharmacy         Problem: Patient Education: Go to Patient Education Activity  Goal: Patient/Family Education  Outcome: Progressing Towards Goal     Problem: Chronic Renal Failure  Goal: *Fluid and electrolytes stabilized  Outcome: Progressing Towards Goal     Problem: Patient Education: Go to Patient Education Activity  Goal: Patient/Family Education  Outcome: Progressing Towards Goal     Problem: Pressure Injury - Risk of  Goal: *Prevention of pressure injury  Description: Document Semaj Scale and appropriate interventions in the flowsheet.   Outcome: Progressing Towards Goal  Note: Pressure Injury Interventions:  Sensory Interventions: Discuss PT/OT consult with provider, Pressure redistribution bed/mattress (bed type)    Moisture Interventions: Check for incontinence Q2 hours and as needed, Minimize layers    Activity Interventions: Pressure redistribution bed/mattress(bed type)    Mobility Interventions: Pressure redistribution bed/mattress (bed type)    Nutrition Interventions: Document food/fluid/supplement intake    Friction and Shear Interventions: Minimize layers                Problem: Patient Education: Go to Patient Education Activity  Goal: Patient/Family Education  Outcome: Progressing Towards Goal     Problem: Patient Education: Go to Patient Education Activity  Goal: Patient/Family Education  Outcome: Progressing Towards Goal     Problem: Patient Education: Go to Patient Education Activity  Goal: Patient/Family Education  Outcome: Progressing Towards Goal     Problem: Pain  Goal: *Control of Pain  Outcome: Progressing Towards Goal

## 2023-01-08 LAB
BACTERIA SPEC CULT: NORMAL
GLUCOSE BLD STRIP.AUTO-MCNC: 124 MG/DL (ref 70–110)
GLUCOSE BLD STRIP.AUTO-MCNC: 145 MG/DL (ref 70–110)
GLUCOSE BLD STRIP.AUTO-MCNC: 199 MG/DL (ref 70–110)
GLUCOSE BLD STRIP.AUTO-MCNC: 97 MG/DL (ref 70–110)
MAGNESIUM SERPL-MCNC: 2.2 MG/DL (ref 1.6–2.6)
SERVICE CMNT-IMP: NORMAL

## 2023-01-08 PROCEDURE — 74011250637 HC RX REV CODE- 250/637: Performed by: INTERNAL MEDICINE

## 2023-01-08 PROCEDURE — 74011250636 HC RX REV CODE- 250/636: Performed by: HOSPITALIST

## 2023-01-08 PROCEDURE — 74011250637 HC RX REV CODE- 250/637: Performed by: HOSPITALIST

## 2023-01-08 PROCEDURE — 74011250636 HC RX REV CODE- 250/636: Performed by: PODIATRIST

## 2023-01-08 PROCEDURE — 74011000250 HC RX REV CODE- 250: Performed by: HOSPITALIST

## 2023-01-08 PROCEDURE — 74011250636 HC RX REV CODE- 250/636: Performed by: INTERNAL MEDICINE

## 2023-01-08 PROCEDURE — 36415 COLL VENOUS BLD VENIPUNCTURE: CPT

## 2023-01-08 PROCEDURE — 83735 ASSAY OF MAGNESIUM: CPT

## 2023-01-08 PROCEDURE — 65270000029 HC RM PRIVATE

## 2023-01-08 PROCEDURE — 74011636637 HC RX REV CODE- 636/637: Performed by: HOSPITALIST

## 2023-01-08 PROCEDURE — 82962 GLUCOSE BLOOD TEST: CPT

## 2023-01-08 RX ADMIN — AMLODIPINE BESYLATE 10 MG: 5 TABLET ORAL at 08:16

## 2023-01-08 RX ADMIN — NYSTATIN: 100000 POWDER TOPICAL at 08:15

## 2023-01-08 RX ADMIN — FAMOTIDINE 20 MG: 20 TABLET, FILM COATED ORAL at 17:01

## 2023-01-08 RX ADMIN — SEVELAMER CARBONATE 1600 MG: 800 TABLET, FILM COATED ORAL at 11:57

## 2023-01-08 RX ADMIN — Medication 500 MG: at 23:46

## 2023-01-08 RX ADMIN — B-COMPLEX W/ C & FOLIC ACID TAB 1 MG 1 TABLET: 1 TAB at 08:16

## 2023-01-08 RX ADMIN — HEPARIN SODIUM 5000 UNITS: 5000 INJECTION INTRAVENOUS; SUBCUTANEOUS at 14:24

## 2023-01-08 RX ADMIN — SODIUM CHLORIDE 25 ML/HR: 9 INJECTION, SOLUTION INTRAVENOUS at 08:23

## 2023-01-08 RX ADMIN — HEPARIN SODIUM 5000 UNITS: 5000 INJECTION INTRAVENOUS; SUBCUTANEOUS at 23:51

## 2023-01-08 RX ADMIN — EZETIMIBE 10 MG: 10 TABLET ORAL at 08:16

## 2023-01-08 RX ADMIN — Medication 125 MG: at 05:18

## 2023-01-08 RX ADMIN — SEVELAMER CARBONATE 1600 MG: 800 TABLET, FILM COATED ORAL at 08:16

## 2023-01-08 RX ADMIN — Medication 15 UNITS: at 00:34

## 2023-01-08 RX ADMIN — SODIUM CHLORIDE, PRESERVATIVE FREE 10 ML: 5 INJECTION INTRAVENOUS at 14:25

## 2023-01-08 RX ADMIN — ASPIRIN 81 MG: 81 TABLET, CHEWABLE ORAL at 08:16

## 2023-01-08 RX ADMIN — Medication 2 UNITS: at 23:52

## 2023-01-08 RX ADMIN — SODIUM CHLORIDE, PRESERVATIVE FREE 10 ML: 5 INJECTION INTRAVENOUS at 05:19

## 2023-01-08 RX ADMIN — NYSTATIN: 100000 POWDER TOPICAL at 23:54

## 2023-01-08 RX ADMIN — CARVEDILOL 12.5 MG: 12.5 TABLET, FILM COATED ORAL at 23:46

## 2023-01-08 RX ADMIN — SODIUM CHLORIDE, PRESERVATIVE FREE 10 ML: 5 INJECTION INTRAVENOUS at 23:52

## 2023-01-08 RX ADMIN — HEPARIN SODIUM 5000 UNITS: 5000 INJECTION INTRAVENOUS; SUBCUTANEOUS at 00:33

## 2023-01-08 RX ADMIN — Medication 125 MG: at 23:51

## 2023-01-08 RX ADMIN — CARVEDILOL 12.5 MG: 12.5 TABLET, FILM COATED ORAL at 08:17

## 2023-01-08 RX ADMIN — Medication 500 MG: at 08:16

## 2023-01-08 RX ADMIN — ALLOPURINOL 100 MG: 100 TABLET ORAL at 08:16

## 2023-01-08 RX ADMIN — Medication 15 UNITS: at 23:53

## 2023-01-08 RX ADMIN — CLOPIDOGREL BISULFATE 75 MG: 75 TABLET ORAL at 08:16

## 2023-01-08 RX ADMIN — SEVELAMER CARBONATE 1600 MG: 800 TABLET, FILM COATED ORAL at 17:01

## 2023-01-08 RX ADMIN — HEPARIN SODIUM 5000 UNITS: 5000 INJECTION INTRAVENOUS; SUBCUTANEOUS at 08:17

## 2023-01-08 RX ADMIN — Medication 125 MG: at 14:31

## 2023-01-08 RX ADMIN — EPOETIN ALFA-EPBX 8000 UNITS: 4000 INJECTION, SOLUTION INTRAVENOUS; SUBCUTANEOUS at 00:36

## 2023-01-08 NOTE — PROGRESS NOTES
Problem: Diabetes Self-Management  Goal: *Disease process and treatment process  Description: Define diabetes and identify own type of diabetes; list 3 options for treating diabetes. Outcome: Progressing Towards Goal  Goal: *Incorporating nutritional management into lifestyle  Description: Describe effect of type, amount and timing of food on blood glucose; list 3 methods for planning meals. Outcome: Progressing Towards Goal  Goal: *Incorporating physical activity into lifestyle  Description: State effect of exercise on blood glucose levels. Outcome: Progressing Towards Goal  Goal: *Developing strategies to promote health/change behavior  Description: Define the ABC's of diabetes; identify appropriate screenings, schedule and personal plan for screenings. Outcome: Progressing Towards Goal  Goal: *Using medications safely  Description: State effect of diabetes medications on diabetes; name diabetes medication taking, action and side effects. Outcome: Progressing Towards Goal  Goal: *Monitoring blood glucose, interpreting and using results  Description: Identify recommended blood glucose targets  and personal targets. Outcome: Progressing Towards Goal  Goal: *Prevention, detection, treatment of acute complications  Description: List symptoms of hyper- and hypoglycemia; describe how to treat low blood sugar and actions for lowering  high blood glucose level. Outcome: Progressing Towards Goal  Goal: *Prevention, detection and treatment of chronic complications  Description: Define the natural course of diabetes and describe the relationship of blood glucose levels to long term complications of diabetes.   Outcome: Progressing Towards Goal  Goal: *Developing strategies to address psychosocial issues  Description: Describe feelings about living with diabetes; identify support needed and support network  Outcome: Progressing Towards Goal  Goal: *Sick day guidelines  Outcome: Progressing Towards Goal  Goal: *Patient Specific Goal (EDIT GOAL, INSERT TEXT)  Outcome: Progressing Towards Goal     Problem: Patient Education: Go to Patient Education Activity  Goal: Patient/Family Education  Outcome: Progressing Towards Goal     Problem: Falls - Risk of  Goal: *Absence of Falls  Description: Document Darlen [place] Fall Risk and appropriate interventions in the flowsheet. Outcome: Progressing Towards Goal  Note: Fall Risk Interventions:  Mobility Interventions: Bed/chair exit alarm, Patient to call before getting OOB, PT Consult for mobility concerns         Medication Interventions: Evaluate medications/consider consulting pharmacy, Patient to call before getting OOB    Elimination Interventions: Bed/chair exit alarm, Call light in reach, Patient to call for help with toileting needs, Toileting schedule/hourly rounds    History of Falls Interventions: Bed/chair exit alarm, Consult care management for discharge planning, Door open when patient unattended, Evaluate medications/consider consulting pharmacy         Problem: Patient Education: Go to Patient Education Activity  Goal: Patient/Family Education  Outcome: Progressing Towards Goal     Problem: Chronic Renal Failure  Goal: *Fluid and electrolytes stabilized  Outcome: Progressing Towards Goal     Problem: Patient Education: Go to Patient Education Activity  Goal: Patient/Family Education  Outcome: Progressing Towards Goal     Problem: Pressure Injury - Risk of  Goal: *Prevention of pressure injury  Description: Document Semaj Scale and appropriate interventions in the flowsheet.   Outcome: Progressing Towards Goal  Note: Pressure Injury Interventions:  Sensory Interventions: Pressure redistribution bed/mattress (bed type)    Moisture Interventions: Minimize layers    Activity Interventions: Pressure redistribution bed/mattress(bed type)    Mobility Interventions: Pressure redistribution bed/mattress (bed type)    Nutrition Interventions: Document food/fluid/supplement intake    Friction and Shear Interventions: Minimize layers                Problem: Patient Education: Go to Patient Education Activity  Goal: Patient/Family Education  Outcome: Progressing Towards Goal     Problem: Patient Education: Go to Patient Education Activity  Goal: Patient/Family Education  Outcome: Progressing Towards Goal     Problem: Patient Education: Go to Patient Education Activity  Goal: Patient/Family Education  Outcome: Progressing Towards Goal     Problem: Pain  Goal: *Control of Pain  Outcome: Progressing Towards Goal

## 2023-01-08 NOTE — PROGRESS NOTES
In patient nephrology progress note    Admit Date:    2022  Name:  Gutierrez Simons  Age :  60 y.o. Impression: 1. Hyperkalemia  2. ESRD dialysis dependent , On MWF Access: right IJ TDC  3. HTN  4. Anemia of chronic disease   5. Osteomyelitis S/P left TMA 22   6. PAD S/P angiogram , S/P left PT baloon angioplasty 22     Recommendations:   HD today for hyperkalemia and clearance   UF up to 2,500 ml   Use 1.0 x 2,0 hrs and 2.0 1.5 hrs potassium dialysate   DALTON TIW and today   Continue antibiotics management per ID  Renal diet   We will continue to  follow     Subjective:   Gutierrez Simons is a 60 y.o. male with a PMHx of  DM, HTN. PVD, ESRD on HD TTS at [facility] under the [facility]. Nephrology sent in for admission by wound care clinic due to left foot infection ,was told he needed surgery. Podiatry has been in to see pt. He has been on abx recently  S/P Lt TMA. No acute event over night   : seen after dialysis today. Tolerated treatment with 2500 ml fluid removed Patient denied CP/SOB/N/Vor or others,.  Morning K 6.4     Objective:   Temp (24hrs), Av.1 °F (36.7 °C), Min:97.5 °F (36.4 °C), Max:98.9 °F (37.2 °C)      Intake/Output Summary (Last 24 hours) at 2023  Last data filed at 2023 1445  Gross per 24 hour   Intake 120 ml   Output 2500 ml   Net -2380 ml     Last 3 Recorded Weights in this Encounter    23 0451 23 1935 23 0414   Weight: 93.9 kg (207 lb 1.6 oz) 94.1 kg (207 lb 7.3 oz) 99.5 kg (219 lb 6.4 oz)       Physical Exam:     General Appearance: no pain, no distress  Head: atraumatic  HEENT: no jaundice, moist oral mucosa  Neck: no JVD noted  Chest: LS clear to auscultation bilaterally  Heart: S1/S2, no murmur, gallop or rub, RRR  Adbomen: soft, nontender, BS active   : no flank tenderness, no lew catheter  Skin: intact, no rash  Extremity: no edema, cyanosis or clubbing  MS: No joint deformity or tenderness  Neuro: conscious, alert, oriented x 3, no focal deficit    Data Review:    BMP  Lab Results   Component Value Date/Time    Sodium 135 (L) 01/07/2023 05:23 AM    Potassium 6.4 (HH) 01/07/2023 05:23 AM    Chloride 97 (L) 01/07/2023 05:23 AM    CO2 27 01/07/2023 05:23 AM    Anion gap 11 01/07/2023 05:23 AM    Glucose 158 (H) 01/07/2023 05:23 AM    BUN 72 (H) 01/07/2023 05:23 AM    Creatinine 9.97 (H) 01/07/2023 05:23 AM    BUN/Creatinine ratio 7 (L) 01/07/2023 05:23 AM    GFR est AA 13 (L) 07/21/2022 02:15 PM    GFR est non-AA 11 (L) 07/21/2022 02:15 PM    Calcium 8.6 01/07/2023 05:23 AM       CBC  Lab Results   Component Value Date/Time    WBC 13.5 (H) 01/05/2023 03:48 PM    HGB 8.3 (L) 01/05/2023 03:48 PM    HCT 26.1 (L) 01/05/2023 03:48 PM    PLATELET 760 87/16/4581 03:48 PM    MCV 95.6 01/05/2023 03:48 PM       No components found for: PTHINT  No results found for: IRON      Imaging Studies:   Mehdi Pereira MD  Secretary [facility] [phone]  Pager [phone]

## 2023-01-08 NOTE — PROGRESS NOTES
Hospitalist Progress Note    Patient: Alice Mendoza MRN: [id number]  CSN: [id number]    YOB: 1960  Age: 60 y.o. Sex: male    DOA: 12/9/2022 LOS:  LOS: 30 days            Assessment/Plan     Principal Problem:    Surgical wound dehiscence (1/6/2023)    Active Problems:    Diabetes mellitus with foot ulcer ([facility] (6/27/2022)      CAD (coronary artery disease) (6/28/2022)      ESRD (end stage renal disease) ([facility] (6/28/2022)      Hypertension (7/21/2022)      Leukocytosis (12/9/2022)      Diabetic foot infection ([facility] (12/9/2022)      Diarrhea (12/9/2022)      Type 2 diabetes mellitus, with long-term current use of insulin (HCC) ()      Acute hemat[facility] osteomyel[facility] foot [facility] (12/9/2022)      Nondisplaced fracture of second metatarsal bone of left foot (12/9/2022)      Nondisplaced fracture of third metatarsal bone of left foot (12/9/2022)      Closed nondisplaced fracture of fourth metatarsal bone of left foot (12/9/2022)      Positive blood culture (12/11/2022)      PAD (peripheral artery disease) ([facility] (12/27/2022)      Open wnd of foot, left, subsequent encounter (1/6/2023)          CC: 60 y.o. male with history of diabetes, diabetic ulcer, CAD, hyperlipidemia, hypertension end-stage renal disease on HD presented to ER due to left foot lesion. Gangrene of left foot/DFU :PARTIAL AMPUTATION OF LEFT 2ND, 3RD, 4TH METATARSALS, AMPUTATION OF TOES 2,3,4, INCISION ADN DRAINAGE LEFT FOOT on 12/09/2022  S/p angiogram with PTA of the proximal PT and peroneal arteries. S/p Left TMA 12/21/2022. CRP 25.4- elevated, procal 1.31 and ESR >140 on 12/27/22  PAD--S/P angiogram 12/20 and S/P L PT baloon angioplasty 12/28/22 --Vascular following  S/p graft on January 6, 2023 with Dr. Alma Cordova  ID follow up pt      Headache resolved.  Ct head no acute issue      History of C-diff : On oral vancomycin to prevent recurrence-now hold per ID      Positive blood cx : Coag negative staph  Likely contaminant contamination      CAD: Distal RCA to drug-eluting stent in July 2022, continue plavix -   No chest pain     Hypertension : continue home medication     End stage renal disease - hyperkalemia -hd today   on HD per nephrology-will have hd today      DM  type II : Lantus and ssi      Hyperkalemia : Will have hd today      DVT/GI prophylaxis     Full code     Disposition :in 1-2 days      Subjective:      Pt was seen and examined with the nurse in the morning round. On wound vac  No acute event overnight      Review of systems  General: No fevers or chills. Cardiovascular: No chest pain or pressure. No palpitations. Pulmonary: No cough, SOB  Gastrointestinal: + nausea, vomiting. Objective:      Visit Vitals  /66 (BP 1 Location: Right lower arm, BP Patient Position: At rest)   Pulse 74   Temp 98.3 °F (36.8 °C)   Resp 16   Ht 6' (1.829 m)   Wt 96.5 kg (212 lb 11.9 oz)   SpO2 98%   BMI 28.85 kg/m²         Physical Exam:    Gen: NAD, chronic ill appearing  Heent:  MMM, NC, AT. Cor: s1s2 RRR. No MRG. PMI mid 5th intercostal space. Resp:  CTA b/l. No w/r/r. Nml effort and diaphragmatic excursion. Abd:  NT ND.  BS positive. No rebound or guarding. No masses.   Ext: No c/c, bilateral foot wrapped with ace bandage and foot protector       Intake and Output:  Current Shift:  01/08 0701 - 01/08 1900  In: 600 [P.O.:600]  Out: -   Last three shifts:  01/06 1901 - 01/08 0700  In: 120 [P.O.:120]  Out: 2500     Labs: Results:       Chemistry Recent Labs     01/07/23  0523 01/06/23  0600 01/05/23  1548   *  --  144*   *  --  135*   K 6.4* 5.8* 4.4   CL 97*  --  98*   CO2 27  --  31   BUN 72*  --  39*   CREA 9.97*  --  6.00*   CA 8.6  --  8.8   AGAP 11  --  6   BUCR 7*  --  7*   AP  --   --  120*   TP  --   --  7.7   ALB 2.1*  --  2.2*   GLOB  --   --  5.5*   AGRAT  --   --  0.4*      CBC w/Diff Recent Labs     01/05/23  1548   WBC 13.5*   RBC 2.73*   HGB 8.3*   HCT 26.1*    GRANS 75*   LYMPH 11*   EOS 5      Cardiac Enzymes No results for input(s): CPK, CKND1, PAULO in the last 72 hours. No lab exists for component: CKRMB, TROIP   Coagulation No results for input(s): PTP, INR, APTT, INREXT in the last 72 hours. Lipid Panel Lab Results   Component Value Date/Time    Cholesterol, total 188 07/19/2022 03:12 AM    HDL Cholesterol 42 07/19/2022 03:12 AM    LDL, calculated 131.2 (H) 07/19/2022 03:12 AM    VLDL, calculated 14.8 07/19/2022 03:12 AM    Triglyceride 74 07/19/2022 03:12 AM    CHOL/HDL Ratio 4.5 07/19/2022 03:12 AM      BNP No results for input(s): BNPP in the last 72 hours.    Liver Enzymes Recent Labs     01/07/23  0523 01/05/23  1548   TP  --  7.7   ALB 2.1* 2.2*   AP  --  120*      Thyroid Studies No results found for: T4, T3U, TSH, TSHEXT     Procedures/imaging: see electronic medical records for all procedures/Xrays and details which were not copied into this note but were reviewed prior to creation of Plan      Medications Reviewed  Edilma Aj MD

## 2023-01-08 NOTE — PROGRESS NOTES
[facility]  (A Division of [address] [place] [address])                                                Amanda Flanagan MD                                        Office #:     -[facility] #2                                        Office Fax: [phone]      Follow-up Note      Date of Admission: 12/9/2022       Date of Note:  1/8/2023  Referral: L foot infection/osteomyelitis/gangrene     NB: rounded before noon. Charted late    Current Antimicrobials:    Prior Antimicrobials:      Bactrim DS 12/30 PO vanco 12/9 to 12/19    Vanco 12/9 to date  Zosyn 12/9 to 12/23  Meropenem 12/23 to 12/29   Antibiotic allergy: NOne     Assessment:     Recurrent gangrene on surgical site- S/P I and D 1/6/23  Hyperkalemia- recurrening, HA-- Bactrim can add to the problem  Isolated high Fever 12/23-- etiology not clear. Resolved        -CXR/repeat BCX normal so far from 12/23. Doubt CDI- no diarrhea and WBC coming down. L foot surgical wound without significant     necrosis/ischemic change or drainage/cellulitis. Possible GI source Vs drug fever. Dry L foot gangrene- distal--S/P TMA- with clear margin per Surgeon 12/21/22  --S/P partial ampuation L 2nd/3rd/4th MT, I and D of left foot- deep 12/09/22  --S/P TMA Left foot 12/21/22-- Biopsy with acute Osteomylitis  PAD--S/P angiogram 12/20 and S/P L PT baloon angioplasty 12/28/22 --Vascular following  Bacteremia 2/2 MRSE on 12/9--is procurement contamination   Recent CDI--prophylactic oral vanco from 12/23  ESRD on HD  R foot OM-ankle- treated and completed treatment 09/2022    Recommendation -- ID related:     Received Vanco/Cefepime IV 1/6/23-- No further systemic ABX indicated-- healing will depend on his blood flow  Enterococcus growth on tissue culture--if VRE- contact isolation  [facility] Podiatry- dr Truong Hernández -- ischemic tissue debrided, surgery done with clear margin- clinically.  No pathology sent, only culture from debrided tissue  Can DC oral Vanco BID on Jan 11  Will follow peripherally. Please call if questions       Subjective:      \" Why do I take the oral ABX? \"  Denies abd pain/diarhrea.   No fever  Woun vac in place-left foot    Notes/Labs including recent CBC with diff, BMP            Microlab      12/9 - OR (+) Serratia fonticola (S to pip/all except cefazolin), Alcaligenes faecalis (R FQ), Enterbacter cloacae (still being worked up), and anaerobic Bacteroides vulgatus (BL +)                                                      BCx 1/2 (+) CoNS      Lines / Catheters:         R HD cath and peripheral        Patient Active Problem List   Diagnosis Code    Diabetes mellitus with foot ulcer ([facility] E11.621, L97.509    CAD (coronary artery disease) I25.10    ESRD (end stage renal disease) ([facility]) N18.6    Acute hematogenous osteomyelitis of right foot ([facility]) M86.071    Cellulitis of right heel L03.115    Diabetic foot ulcer with osteomyelitis ([facility]) E11.621, E11.69, L97.509, M86.9    Ulcer of right heel, with necrosis of bone ([facility]) L97.414    Infection and inflammatory reaction due to internal fixation device of other site, initial encounter [facility]) T84.69XA    Left arm swelling M79.89    Chest pain at rest R07.9    Abnormal nuclear stress test R94.39    History of anemia due to CKD N18.9, Z86.2    S/P angioplasty with stent Z95.820    Hypertension I10    Leukocytosis D72.829    Diabetic foot infection ([facility] E11.628, L08.9    Diarrhea R19.7    Type 2 diabetes mellitus, with long-term current use of insulin ([facility]) E11.9, Z79.4    Acute hematogenous osteomyelitis of left foot ([facility]) M86.072    Nondisplaced fracture of second metatarsal bone of left foot S92.325A    Nondisplaced fracture of third metatarsal bone of left foot S92.335A    Closed nondisplaced fracture of fourth metatarsal bone of left foot S92.345A    Positive blood culture R78.81    PAD (peripheral artery disease) ([facility]) I73.9    Surgical wound dehiscence T81.31XA    Open wnd of foot, left, subsequent encounter S91.302D       Current Facility-Administered Medications   Medication Dose Route Frequency    0.9% sodium chloride infusion  25 mL/hr IntraVENous CONTINUOUS    famotidine (PEPCID) tablet 20 mg  20 mg Oral QPM    lidocaine 4 % patch 1 Patch  1 Patch TransDERmal Q24H    vit B Cmplx 3-FA-Vit C-Biotin (NEPHRO NIKITA RX) tablet 1 Tablet  1 Tablet Oral DAILY    diphenhydrAMINE (BENADRYL) injection 12.5 mg  12.5 mg IntraVENous Q6H PRN    ammonium lactate (LAC-HYDRIN) 12 % lotion   Topical BID PRN    Saccharomyces boulardii (FLORASTOR) capsule 500 mg  500 mg Oral BID    vancomycin 50 mg/mL oral solution (compounded) 125 mg  125 mg Oral Q6H    insulin lispro (HUMALOG) injection   SubCUTAneous AC&HS    insulin glargine (LANTUS) injection 15 Units  15 Units SubCUTAneous QHS    epoetin lisette-epbx (RETACRIT) injection 8,000 Units  8,000 Units SubCUTAneous Q TUE, THU & SAT    loperamide (IMODIUM) capsule 2 mg  2 mg Oral Q4H PRN    nystatin (MYCOSTATIN) 100,000 unit/gram powder   Topical BID    alum-mag hydroxide-simeth (MYLANTA) oral suspension 30 mL  30 mL Oral Q4H PRN    glucose chewable tablet 16 g  16 g Oral PRN    glucagon (GLUCAGEN) injection 1 mg  1 mg IntraMUSCular PRN    heparin (porcine) 1,000 unit/mL injection 3,600 Units  3,600 Units IntraCATHeter DIALYSIS PRN    dextrose 10% infusion 0-250 mL  0-250 mL IntraVENous PRN    0.9% sodium chloride infusion  25 mL/hr IntraVENous DIALYSIS PRN    clopidogreL (PLAVIX) tablet 75 mg  75 mg Oral DAILY    carvediloL (COREG) tablet 12.5 mg  12.5 mg Oral BID    aspirin chewable tablet 81 mg  81 mg Oral DAILY    amLODIPine (NORVASC) tablet 10 mg  10 mg Oral DAILY    allopurinoL (ZYLOPRIM) tablet 100 mg  100 mg Oral DAILY    ascorbic acid (vitamin C) (VITAMIN C) tablet 500 mg  500 mg Oral DAILY    ezetimibe (ZETIA) tablet 10 mg  10 mg Oral DAILY    sevelamer carbonate (RENVELA) tab 1,600 mg  1,600 mg Oral TID WITH MEALS    sodium chloride (NS) flush 5-40 mL  5-40 mL IntraVENous Q8H    sodium chloride (NS) flush 5-40 mL  5-40 mL IntraVENous PRN    acetaminophen (TYLENOL) tablet 650 mg  650 mg Oral Q6H PRN    Or    acetaminophen (TYLENOL) suppository 650 mg  650 mg Rectal Q6H PRN    bisacodyL (DULCOLAX) suppository 10 mg  10 mg Rectal DAILY PRN    promethazine (PHENERGAN) tablet 12.5 mg  12.5 mg Oral Q6H PRN    Or    ondansetron (ZOFRAN) injection 4 mg  4 mg IntraVENous Q6H PRN    heparin (porcine) injection 5,000 Units  5,000 Units SubCUTAneous Q8H    oxyCODONE-acetaminophen (PERCOCET) 5-325 mg per tablet 1 Tablet  1 Tablet Oral Q6H PRN         Review of Systems - General ROS: negative for - chills, fever, or night sweats  Respiratory ROS: no cough, shortness of breath, or wheezing  Cardiovascular ROS: no chest pain or dyspnea on exertion  Gastrointestinal ROS: no abdominal pain, change in bowel habits, or black or bloody stools       Objective:    Visit Vitals  /66 (BP 1 Location: Right lower arm, BP Patient Position: At rest)   Pulse 74   Temp 98.3 °F (36.8 °C)   Resp 16   Ht 6' (1.829 m)   Wt 96.5 kg (212 lb 11.9 oz)   SpO2 98%   BMI 28.85 kg/m²       Temp (24hrs), Av.2 °F (36.8 °C), Min:97.6 °F (36.4 °C), Max:98.5 °F (36.9 °C)    Right HD cath in place    GEN: WDWN AAM in NAD  HEENT: anicteric  CHEST: Non laboured breathing  EXT: L foot is dressed-- wound not seen.          Lab results:    Chemistry  Recent Labs     23  0523 23  0600 23  1548   *  --  144*   *  --  135*   K 6.4* 5.8* 4.4   CL 97*  --  98*   CO2 27  --  31   BUN 72*  --  39*   CREA 9.97*  --  6.00*   CA 8.6  --  8.8   AGAP 11  --  6   BUCR 7*  --  7*   AP  --   --  120*   TP  --   --  7.7   ALB 2.1*  --  2.2*   GLOB  --   --  5.5*   AGRAT  --   --  0.4*       CBC w/ Diff  Recent Labs     23  1548   WBC 13.5*   RBC 2.73*   HGB 8.3*   HCT 26.1*      GRANS 75*   LYMPH 11*   EOS 5         Microbiology  All Micro Results       Procedure Component Value Units Date/Time    CULTURE, Kalani Batista STAIN [[id number]]  (Abnormal) Collected: 01/06/23 1245    Order Status: Completed Specimen: Wound from Foot Updated: 01/07/23 1507     Special Requests: NO SPECIAL REQUESTS        GRAM STAIN RARE WBCS SEEN               RARE EPITHELIAL CELLS SEEN                  RARE GRAM POSITIVE COCCI IN PAIRS           Culture result: FEW GRAM NEGATIVE RODS               MODERATE POSSIBLE STREPTOCOCCUS SPECIES          CULTURE, ANAEROBIC [[id number]] Collected: 01/06/23 1245    Order Status: Sent Specimen: Foot, left Updated: 01/07/23 0018    CULTURE, BLOOD [[id number]] Collected: 12/24/22 1745    Order Status: Completed Specimen: Blood Updated: 12/30/22 0710     Special Requests: NO SPECIAL REQUESTS        Culture result: NO GROWTH 6 DAYS       CULTURE, BLOOD [[id number]] Collected: 12/24/22 1745    Order Status: Completed Specimen: Blood Updated: 12/30/22 0710     Special Requests: NO SPECIAL REQUESTS        Culture result: NO GROWTH 6 DAYS       CULTURE, BLOOD [[id number]] Collected: 12/23/22 2052    Order Status: Completed Specimen: Blood Updated: 12/29/22 0746     Special Requests: NO SPECIAL REQUESTS        Culture result: NO GROWTH 6 DAYS       COVID-19 WITH INFLUENZA A/B [[id number]] Collected: 12/24/22 1941    Order Status: Completed Specimen: Nasopharyngeal Updated: 12/24/22 2028     SARS-CoV-2 by PCR Not detected        Comment: Not Detected results do not preclude SARS-CoV-2 infection and should not be used as the sole basis for patient management decisions. Results must be combined with clinical observations, patient history, and epidemiological information. Influenza A by PCR Not detected        Influenza B by PCR Not detected        Comment: Testing was performed using cheryl Alla SARS-CoV-2 and Influenza A/B nucleic acid assay.   This test is a multiplex Real-Time Reverse Transcriptase Polymerase Chain Reaction (RT-PCR) based in vitro diagnostic test intended for the qualitative detection of nucleic acids from SARS-CoV-2, Influenza A, and Influenza B in nasopharyngeal for use under the FDA's Emergency Use Authorization(EAU) only.        Fact sheet for Patients: FindDrives.pl  Fact sheet for Healthcare Providers: FindDrives.pl         CULTURE, BLOOD 2nd DRAW (required for [facility]) [[id number]] Collected: 12/09/22 1215    Order Status: Completed Specimen: Blood Updated: 12/15/22 0940     Special Requests: LEFT HAND     Culture result: NO GROWTH 6 DAYS       CULTURE, WOUND Dana Hacking STAIN [[id number]]  (Abnormal)  (Susceptibility) Collected: 12/09/22 2201    Order Status: Completed Specimen: Wound from Foot Updated: 12/15/22 0650     Special Requests: NO SPECIAL REQUESTS        GRAM STAIN RARE WBCS SEEN               2+ APPARENT GRAM VARIABLE RODS           Culture result:       HEAVY Serratia fonticola PIPTAZ = 28, SENSITIVE                  HEAVY ALCALIGENES FAECALIS                  HEAVY ALCALIGENES FAECALIS (2ND COLONY TYPE/STRAIN)                  HEAVY ENTEROBACTER CLOACAE                  HEAVY MIXED SKIN TO ISOLATED          CULTURE, ANAEROBIC [[id number]]  (Abnormal) Collected: 12/09/22 2201    Order Status: Completed Specimen: Foot, left Updated: 12/13/22 1246     Special Requests: NO SPECIAL REQUESTS        Culture result:       MODERATE BACTEROIDES VULGATUS BETA LACTAMASE POSITIVE          CULTURE, BLOOD [[id number]]  (Abnormal) Collected: 12/09/22 1155    Order Status: Completed Specimen: Blood Updated: 12/12/22 0743     Special Requests: LEFT AC     GRAM STAIN       ANAEROBIC BOTTLE GRAM POSITIVE COCCI IN CLUSTERS                  SMEAR CALLED TO AND CORRECTLY REPEATED BY: Jaycob Allen RN 3S 12/10/22 AT 1147 BY ANI           Culture result:       STAPHYLOCOCCUS SPECIES, COAGULASE NEGATIVE GROWING IN 1 OF 2 BOTTLES DRAWN  SITE = LAC          BLOOD CULTURE ID PANEL [[id number]]  (Abnormal) Collected: 12/09/22 1145    Order Status: Completed Specimen: Blood Updated: 12/11/22 0655     Acc. no. from Micro Order [id number]     Enterococcus faecalis Not detected        Enterococcus faecium Not detected        Listeria monocytogenes Not detected        Staphylococcus Detected        Staphylococcus aureus Not detected        Staph epidermidis Detected        Staph lugdunensis Not detected        Streptococcus Not detected        Streptococcus agalactiae (Group B) Not detected        Streptococcus pneumoniae Not detected        Streptococcus pyogenes (Group A) Not detected        Acinetobacter calcoaceticus-baumanii complex Not detected        Bacteroides fragilis Not detected        Enterobacterales species Not detected        Enterobacter cloacae complex Not detected        Escherichia coli Not detected        Klebsiella aerogenes Not detected        Klebsiella oxytoca Not detected        Klebsiella pneumoniae group Not detected        Proteus Not detected        Salmonella Not detected        Serratia marcescens Not detected        Haemophilus influenzae Not detected        Neisseria meningitidis Not detected        Pseudomonas aeruginosa Not detected        Steno maltophilia Not detected        Candida albicans Not detected        Candida auris Not detected        Candida glabrata Not detected        Candida krusei Not detected        Candida parapsilosis Not detected        Candida tropicalis Not detected        Crypto neoformans/gattii Not detected        RESISTANT GENES:            MECA/C (Methicillin resistant gene) Detected        Comment       False positive results may rarely occur.  Correlate with clinical,epidemiologic, and other laboratory findings           Comment: Please see BCID Interpretation Guide in EPIC Links       C. DIFFICILE AG & TOXIN A/B [[id number]]     Order Status: Canceled Specimen: Stool

## 2023-01-08 NOTE — PROGRESS NOTES
In patient nephrology progress note    Admit Date:    2022  Name:  Jayme Ngo  Age :  60 y.o. Impression: 1. Hyperkalemia, S/P HD   2. ESRD dialysis dependent , On TTS Access: right IJ TDC  3. HTN  4. Anemia of chronic disease   5. Osteomyelitis S/P left TMA 22   6. PAD S/P angiogram , S/P left PT baloon angioplasty 22      Recommendations:   No indication for HD today    DALTON TIW   Continue antibiotics management per ID  Renal diet   Repeat BMP   We will continue to  follow      Subjective:   Jayme Ngo is a 60 y.o. male with a PMHx of  DM, HTN. PVD, ESRD on HD TTS at [facility] the [facility] . Nephrology sent in for admission by wound care clinic due to left foot infection ,was told he needed surgery. Podiatry has been in to see pt. He has been on abx recently  S/P Lt TMA. No acute event over night   : seen after dialysis today. Tolerated treatment with 2500 ml fluid removed Patient denied CP/SOB/N/Vor or others,. Morning K 6.4    : no acute events over night .  Patient has no complaints   Objective:   Temp (24hrs), Av.3 °F (36.8 °C), Min:98 °F (36.7 °C), Max:98.5 °F (36.9 °C)      Intake/Output Summary (Last 24 hours) at 2023 1614  Last data filed at 2023 1232  Gross per 24 hour   Intake 600 ml   Output --   Net 600 ml     Last 3 Recorded Weights in this Encounter    23 1935 23 0414 23 0021   Weight: 94.1 kg (207 lb 7.3 oz) 99.5 kg (219 lb 6.4 oz) 96.5 kg (212 lb 11.9 oz)       Physical Exam:     General Appearance: no pain, no distress  Head: atraumatic  HEENT: no jaundice, moist oral mucosa  Neck: no JVD noted  Chest: LS clear to auscultation bilaterally  Heart: S1/S2, no murmur, gallop or rub, RRR  Adbomen: soft, nontender, BS active   : no flank tenderness, no lew catheter  Skin: intact, no rash  Extremity: no edema, cyanosis or clubbing  MS: No joint deformity or tenderness  Neuro: conscious, alert, oriented x 3, no focal deficit    Data Review:    BMP  Lab Results   Component Value Date/Time    Sodium 135 (L) 01/07/2023 05:23 AM    Potassium 6.4 (HH) 01/07/2023 05:23 AM    Chloride 97 (L) 01/07/2023 05:23 AM    CO2 27 01/07/2023 05:23 AM    Anion gap 11 01/07/2023 05:23 AM    Glucose 158 (H) 01/07/2023 05:23 AM    BUN 72 (H) 01/07/2023 05:23 AM    Creatinine 9.97 (H) 01/07/2023 05:23 AM    BUN/Creatinine ratio 7 (L) 01/07/2023 05:23 AM    GFR est AA 13 (L) 07/21/2022 02:15 PM    GFR est non-AA 11 (L) 07/21/2022 02:15 PM    Calcium 8.6 01/07/2023 05:23 AM       CBC  Lab Results   Component Value Date/Time    WBC 13.5 (H) 01/05/2023 03:48 PM    HGB 8.3 (L) 01/05/2023 03:48 PM    HCT 26.1 (L) 01/05/2023 03:48 PM    PLATELET 443 51/57/9155 03:48 PM    MCV 95.6 01/05/2023 03:48 PM       No components found for: PTHINT  No results found for: IRON      Imaging Studies:   Corey Johnson MD  [facility] [phone]  Pager [phone]

## 2023-01-09 LAB
ALBUMIN SERPL-MCNC: 2.2 G/DL (ref 3.4–5)
ANION GAP SERPL CALC-SCNC: 10 MMOL/L (ref 3–18)
BUN SERPL-MCNC: 68 MG/DL (ref 7–18)
BUN/CREAT SERPL: 7 (ref 12–20)
CALCIUM SERPL-MCNC: 9.1 MG/DL (ref 8.5–10.1)
CHLORIDE SERPL-SCNC: 98 MMOL/L (ref 100–111)
CO2 SERPL-SCNC: 26 MMOL/L (ref 21–32)
CREAT SERPL-MCNC: 9.69 MG/DL (ref 0.6–1.3)
GLUCOSE BLD STRIP.AUTO-MCNC: 160 MG/DL (ref 70–110)
GLUCOSE BLD STRIP.AUTO-MCNC: 164 MG/DL (ref 70–110)
GLUCOSE BLD STRIP.AUTO-MCNC: 170 MG/DL (ref 70–110)
GLUCOSE BLD STRIP.AUTO-MCNC: 203 MG/DL (ref 70–110)
GLUCOSE SERPL-MCNC: 159 MG/DL (ref 74–99)
MAGNESIUM SERPL-MCNC: 2.4 MG/DL (ref 1.6–2.6)
PHOSPHATE SERPL-MCNC: 5.1 MG/DL (ref 2.5–4.9)
POTASSIUM SERPL-SCNC: 5.7 MMOL/L (ref 3.5–5.5)
SODIUM SERPL-SCNC: 134 MMOL/L (ref 136–145)

## 2023-01-09 PROCEDURE — 36415 COLL VENOUS BLD VENIPUNCTURE: CPT

## 2023-01-09 PROCEDURE — 74011000250 HC RX REV CODE- 250: Performed by: HOSPITALIST

## 2023-01-09 PROCEDURE — 74011250637 HC RX REV CODE- 250/637: Performed by: HOSPITALIST

## 2023-01-09 PROCEDURE — 77030019934 HC DRSG VAC ASST KCON -B

## 2023-01-09 PROCEDURE — 83735 ASSAY OF MAGNESIUM: CPT

## 2023-01-09 PROCEDURE — 80069 RENAL FUNCTION PANEL: CPT

## 2023-01-09 PROCEDURE — 65270000029 HC RM PRIVATE

## 2023-01-09 PROCEDURE — 82962 GLUCOSE BLOOD TEST: CPT

## 2023-01-09 PROCEDURE — 74011250637 HC RX REV CODE- 250/637: Performed by: INTERNAL MEDICINE

## 2023-01-09 PROCEDURE — 74011250636 HC RX REV CODE- 250/636: Performed by: HOSPITALIST

## 2023-01-09 PROCEDURE — 74011636637 HC RX REV CODE- 636/637: Performed by: HOSPITALIST

## 2023-01-09 RX ADMIN — CARVEDILOL 12.5 MG: 12.5 TABLET, FILM COATED ORAL at 23:14

## 2023-01-09 RX ADMIN — NYSTATIN: 100000 POWDER TOPICAL at 23:15

## 2023-01-09 RX ADMIN — AMLODIPINE BESYLATE 10 MG: 5 TABLET ORAL at 10:12

## 2023-01-09 RX ADMIN — B-COMPLEX W/ C & FOLIC ACID TAB 1 MG 1 TABLET: 1 TAB at 10:12

## 2023-01-09 RX ADMIN — Medication 125 MG: at 11:09

## 2023-01-09 RX ADMIN — CLOPIDOGREL BISULFATE 75 MG: 75 TABLET ORAL at 10:12

## 2023-01-09 RX ADMIN — Medication 500 MG: at 10:11

## 2023-01-09 RX ADMIN — Medication 500 MG: at 23:14

## 2023-01-09 RX ADMIN — Medication 2 UNITS: at 12:56

## 2023-01-09 RX ADMIN — Medication 4 UNITS: at 22:00

## 2023-01-09 RX ADMIN — SEVELAMER CARBONATE 1600 MG: 800 TABLET, FILM COATED ORAL at 10:12

## 2023-01-09 RX ADMIN — ALLOPURINOL 100 MG: 100 TABLET ORAL at 10:11

## 2023-01-09 RX ADMIN — HEPARIN SODIUM 5000 UNITS: 5000 INJECTION INTRAVENOUS; SUBCUTANEOUS at 17:01

## 2023-01-09 RX ADMIN — CARVEDILOL 12.5 MG: 12.5 TABLET, FILM COATED ORAL at 10:12

## 2023-01-09 RX ADMIN — HEPARIN SODIUM 5000 UNITS: 5000 INJECTION INTRAVENOUS; SUBCUTANEOUS at 23:14

## 2023-01-09 RX ADMIN — EZETIMIBE 10 MG: 10 TABLET ORAL at 10:11

## 2023-01-09 RX ADMIN — SODIUM CHLORIDE, PRESERVATIVE FREE 10 ML: 5 INJECTION INTRAVENOUS at 23:14

## 2023-01-09 RX ADMIN — NYSTATIN: 100000 POWDER TOPICAL at 10:19

## 2023-01-09 RX ADMIN — FAMOTIDINE 20 MG: 20 TABLET, FILM COATED ORAL at 18:04

## 2023-01-09 RX ADMIN — Medication 15 UNITS: at 22:00

## 2023-01-09 RX ADMIN — HEPARIN SODIUM 5000 UNITS: 5000 INJECTION INTRAVENOUS; SUBCUTANEOUS at 06:47

## 2023-01-09 RX ADMIN — Medication 2 UNITS: at 17:01

## 2023-01-09 RX ADMIN — Medication 2 UNITS: at 10:18

## 2023-01-09 RX ADMIN — SEVELAMER CARBONATE 1600 MG: 800 TABLET, FILM COATED ORAL at 17:01

## 2023-01-09 RX ADMIN — ASPIRIN 81 MG: 81 TABLET, CHEWABLE ORAL at 10:11

## 2023-01-09 RX ADMIN — SEVELAMER CARBONATE 1600 MG: 800 TABLET, FILM COATED ORAL at 12:57

## 2023-01-09 NOTE — WOUND CARE
Wound vac was unplugged but working off battery when wound nurse went in to evaluate. VAC dressing intact with no leaks and pressure at a continuous 125 mm Hg. No skin darkening notes around VAC sponge and dressing left intact related to skin graft in place under sponge. Dressing will be due for change on Friday 1/13/2023.

## 2023-01-09 NOTE — PROGRESS NOTES
Hospitalist Progress Note    Patient: Carlos Guido MRN: [id number]  CSN: [id number]    YOB: 1960  Age: 60 y.o. Sex: male    DOA: 12/9/2022 LOS:  LOS: 31 days                Assessment/Plan     Patient Active Problem List   Diagnosis Code    Diabetes mellitus with foot ulcer ([facility] E11.621, L97.509    CAD (coronary artery disease) I25.10    ESRD (end stage renal disease) ([facility] N18.6    Acute hematogenous osteomyelitis of right foot ([facility]) M86.071    Cellulitis of right heel L03.115    Diabetic foot ulcer with osteomyelitis ([facility] E11.621, E11.69, L97.509, M86.9    Ulcer of right heel, with necrosis of bone ([facility] L97.414    Infection and inflammatory reaction due to internal fixation device of other site, initial encounter [facility]) T84.69XA    Left arm swelling M79.89    Chest pain at rest R07.9    Abnormal nuclear stress test R94.39    History of anemia due to CKD N18.9, Z86.2    S/P angioplasty with stent Z95.820    Hypertension I10    Leukocytosis D72.829    Diabetic foot infection ([facility]) E11.628, L08.9    Diarrhea R19.7    Type 2 diabetes mellitus, with long-term current use of insulin ([facility]) E11.9, Z79.4    Acute hematogenous osteomyelitis of left foot ([facility]) M86.072    Nondisplaced fracture of second metatarsal bone of left foot S92.325A    Nondisplaced fracture of third metatarsal bone of left foot S92.335A    Closed nondisplaced fracture of fourth metatarsal bone of left foot S92.345A    Positive blood culture R78.81    PAD (peripheral artery disease) ([facility]) I73.9    Surgical wound dehiscence T81.31XA    Open wnd of foot, left, subsequent encounter S91.302D        Chief complaint :  Foot gangrene, DFU  60 y.o. male with history of diabetes, diabetic ulcer, CAD, hyperlipidemia, hypertension end-stage renal disease on HD presented to ER due to left foot lesion.        Gangrene of left foot/DFU   PARTIAL AMPUTATION OF LEFT 2ND, 3RD, 4TH METATARSALS, AMPUTATION OF TOES 2,3,4, INCISION ADN DRAINAGE LEFT FOOT on 12/09/2022  S/p angiogram with PTA of the proximal PT and peroneal arteries. Vascular planning repeat procedure today. S/p Left TMA 12/21/2022. S/p balloon angioplasty left LE 12/28/2022  S/p graft 01/06/2023    History of C-diff -  On oral vancomycin to prevent recurrence       Positive blood cx -  Coag negative staph  Likely contaminant contamination      CAD-   Distal RCA to drug-eluting stent in July 2022, continue plavix -   No chest pain    Hypertension -  continue home medication     End stage renal disease -  on HD per nephrology     DM  type II -  Lantus and ssi     Disposition : await placement    Review of systems  General: No fevers or chills. Cardiovascular: No chest pain or pressure. No palpitations. Pulmonary: No shortness of breath. Gastrointestinal: No nausea, vomiting. Physical Exam:  General: Awake, cooperative, no acute distress    HEENT: NC, Atraumatic. PERRLA, anicteric sclerae. Lungs: CTA Bilaterally. No Wheezing/Rhonchi/Rales. Heart:  S1 S2,  No murmur, No Rubs, No Gallops  Abdomen: Soft, Non distended, Non tender. +Bowel sounds,   Extremities: Left foot with dressing. Psych:   Not anxious or agitated. Neurologic:  No acute neurological deficit. Vital signs/Intake and Output:  Visit Vitals  /63 (BP 1 Location: Right upper arm, BP Patient Position: At rest)   Pulse 78   Temp 98.5 °F (36.9 °C)   Resp 18   Ht 6' (1.829 m)   Wt 98 kg (216 lb)   SpO2 95%   BMI 29.29 kg/m²     Current Shift:  No intake/output data recorded. Last three shifts:  01/07 1901 - 01/09 0700  In: 883 [P.O.:720;  I.V.:163]  Out: 0             Labs: Results:       Chemistry Recent Labs     01/09/23  0547 01/07/23  0523   * 158*   * 135*   K 5.7* 6.4*   CL 98* 97*   CO2 26 27   BUN 68* 72*   CREA 9.69* 9.97*   CA 9.1 8.6   AGAP 10 11   BUCR 7* 7*   ALB 2.2* 2.1*      CBC w/Diff No results for input(s): WBC, RBC, HGB, HCT, PLT, GRANS, LYMPH, EOS, HGBEXT, HCTEXT, PLTEXT, HGBEXT, HCTEXT, PLTEXT in the last 72 hours. Cardiac Enzymes No results for input(s): CPK, CKND1, PAULO in the last 72 hours. No lab exists for component: CKRMB, TROIP   Coagulation No results for input(s): PTP, INR, APTT, INREXT, INREXT in the last 72 hours. Lipid Panel Lab Results   Component Value Date/Time    Cholesterol, total 188 07/19/2022 03:12 AM    HDL Cholesterol 42 07/19/2022 03:12 AM    LDL, calculated 131.2 (H) 07/19/2022 03:12 AM    VLDL, calculated 14.8 07/19/2022 03:12 AM    Triglyceride 74 07/19/2022 03:12 AM    CHOL/HDL Ratio 4.5 07/19/2022 03:12 AM      BNP No results for input(s): BNPP in the last 72 hours.    Liver Enzymes Recent Labs     01/09/23  0547   ALB 2.2*      Thyroid Studies No results found for: T4, T3U, TSH, TSHEXT, TSHEXT     Procedures/imaging: see electronic medical records for all procedures/Xrays and details which were not copied into this note but were reviewed prior to creation of Plan

## 2023-01-09 NOTE — PROGRESS NOTES
720 - Bedside and Verbal shift change report given to VIVIENNE Kat RN (oncoming nurse) by Supriya Augustin (offgoing nurse). Report included the following information SBAR, Kardex, Intake/Output, and MAR.

## 2023-01-09 NOTE — PROGRESS NOTES
Comprehensive Nutrition Assessment    Type and Reason for Visit: Reassess, Consult    Nutrition Recommendations/Plan:   Supplement Nicolás x1 daily to support wound healing  Please mix Nicolás with liquids of choice. Continue with PO + ONS. Monitor %PO intake. Malnutrition Assessment:  Malnutrition Status: At risk for malnutrition (specify) (r/t ESRD on dialysis and variable meal intake) (12/29/22 1407)      Nutrition Assessment:    Continues on PO diet. s/p LEFT FOOT DEBRIDEMENT,APPLICATION OF STRAVIX GRAFT, MAC W/LOCAL ANESTHESIA. Consulted for wound. Noted wound care note 1/6- negative pressure. Last HD 1/7. PO % beside this morning which was documented as 0%. Saw pt at bedside. Lunch tray at bedside as well 100% intake + 100% nepro ONS. Pt reports eating well. Informed pt regarding Nicolás supplement. Educated pt to mix with liquid of choice to support wound healing. No questions or concerns at this time. Nutrition Related Findings:    Na 134, K 5.7, BUN 68, Cr 9.69, GFR 7. Vit c, pepcid, lantus, humalog, florastor, renvela, nephro natividad rx. BM 1/8. Wound Type: Surgical incision, Diabetic ulcer, Wound vac    Current Nutrition Intake & Therapies:  Average Meal Intake: 51-75%  Average Supplement Intake: Unable to assess  ADULT ORAL NUTRITION SUPPLEMENT Lunch; Renal Supplement  ADULT DIET Regular; 4 carb choices (60 gm/meal)    Anthropometric Measures:  Height: 6' (182.9 cm)  Ideal Body Weight (IBW): 178 lbs (81 kg)     Current Body Wt:  98 kg (216 lb 0.8 oz) (1/8/23), 119.5 % IBW. Bed scale  Current BMI (kg/m2): 29.3        Weight Adjustment: No adjustment                 BMI Category: Overweight (BMI 25.0-29. 9)    Estimated Daily Nutrient Needs:  Energy Requirements Based On: Formula (MSJ x1.2-1.4)  Weight Used for Energy Requirements: Current  Energy (kcal/day): 2959-2649  Weight Used for Protein Requirements: Current (1.2g)  Protein (g/day): 117  Method Used for Fluid Requirements: Standard renal  Fluid (ml/day): 750-1500    Nutrition Diagnosis:   Increased nutrient needs related to increased demand for energy/nutrients, renal dysfunction as evidenced by wounds, dialysis    Nutrition Interventions:   Food and/or Nutrient Delivery: Continue current diet, Continue oral nutrition supplement, Start oral nutrition supplement  Nutrition Education/Counseling: No recommendations at this time  Coordination of Nutrition Care: Continue to monitor while inpatient, Interdisciplinary rounds       Goals:  Previous Goal Met: Progressing toward goal(s)  Goals: PO intake 75% or greater, by next RD assessment       Nutrition Monitoring and Evaluation:   Behavioral-Environmental Outcomes: None identified  Food/Nutrient Intake Outcomes: Food and nutrient intake, Supplement intake  Physical Signs/Symptoms Outcomes: Biochemical data, Meal time behavior, Nutrition focused physical findings, Weight, Skin    Discharge Planning:    Continue current diet    Aubrey Kerns RD

## 2023-01-09 NOTE — PROGRESS NOTES
1/9/2023  PT treatment not completed due to:  [] Off Unit for testing/procedure  [] Pain  [] Eating  [] Patient too lethargic  [] Nausea/vomiting  [] Dialysis treatment in progress   [x] Other: Pt underwent LEFT FOOT [facility] on yesterday,  Please advise when okay to resume Physical Therapy, including wt bearing restrictions LE's. Thank you.    Reno Burns, PT

## 2023-01-09 NOTE — PROGRESS NOTES
Nephrology Inpatient Progress note    Subjective:     Lexy Yoon is a 60 y.o. male with a PMHx of  DM, HTN. PVD, ESRD on HD TTS at [facility] under the [address] [place] [address]. Nephrology sent in for admission by wound care clinic due to left foot infection ,was told he needed surgery. Podiatry has been in to see pt. He has been on abx recently     S/P Lt TMA. CT head neg    -No complaints today.  Last HD 1/7    Admit Date: 12/9/2022  Principal Problem:    Surgical wound dehiscence (1/6/2023)    Active Problems:    Diabetes mellitus with foot ulcer ([address] (6/27/2022)      CAD (coronary artery disease) (6/28/2022)      ESRD (end stage renal disease) ([address] (6/28/2022)      Hypertension (7/21/2022)      Leukocytosis (12/9/2022)      Diabetic foot infection ([address] (12/9/2022)      Diarrhea (12/9/2022)      Type 2 diabetes mellitus, with long-term current use of insulin ([facility]) ()      Acute hematogenous osteomyelitis of left foot ([address] (12/9/2022)      Nondisplaced fracture of second metatarsal bone of left foot (12/9/2022)      Nondisplaced fracture of third metatarsal bone of left foot (12/9/2022)      Closed nondisplaced fracture of fourth metatarsal bone of left foot (12/9/2022)      Positive blood culture (12/11/2022)      PAD (peripheral artery disease) ([facility] (12/27/2022)      Open wnd of foot, left, subsequent encounter (1/6/2023)    Current Facility-Administered Medications   Medication Dose Route Frequency    vancomycin 50 mg/mL oral solution (compounded) 125 mg  125 mg Oral BID    0.9% sodium chloride infusion  25 mL/hr IntraVENous CONTINUOUS    famotidine (PEPCID) tablet 20 mg  20 mg Oral QPM    lidocaine 4 % patch 1 Patch  1 Patch TransDERmal Q24H    vit B Cmplx 3-FA-Vit C-Biotin (NEPHRO NIKITA RX) tablet 1 Tablet  1 Tablet Oral DAILY    diphenhydrAMINE (BENADRYL) injection 12.5 mg  12.5 mg IntraVENous Q6H PRN    ammonium lactate (LAC-HYDRIN) 12 % lotion   Topical BID PRN    Saccharomyces boulardii (FLORASTOR) capsule 500 mg  500 mg Oral BID    insulin lispro (HUMALOG) injection   SubCUTAneous AC&HS    insulin glargine (LANTUS) injection 15 Units  15 Units SubCUTAneous QHS    epoetin lisette-epbx (RETACRIT) injection 8,000 Units  8,000 Units SubCUTAneous Q TUE, THU & SAT    loperamide (IMODIUM) capsule 2 mg  2 mg Oral Q4H PRN    nystatin (MYCOSTATIN) 100,000 unit/gram powder   Topical BID    alum-mag hydroxide-simeth (MYLANTA) oral suspension 30 mL  30 mL Oral Q4H PRN    glucose chewable tablet 16 g  16 g Oral PRN    glucagon (GLUCAGEN) injection 1 mg  1 mg IntraMUSCular PRN    heparin (porcine) 1,000 unit/mL injection 3,600 Units  3,600 Units IntraCATHeter DIALYSIS PRN    dextrose 10% infusion 0-250 mL  0-250 mL IntraVENous PRN    0.9% sodium chloride infusion  25 mL/hr IntraVENous DIALYSIS PRN    clopidogreL (PLAVIX) tablet 75 mg  75 mg Oral DAILY    carvediloL (COREG) tablet 12.5 mg  12.5 mg Oral BID    aspirin chewable tablet 81 mg  81 mg Oral DAILY    amLODIPine (NORVASC) tablet 10 mg  10 mg Oral DAILY    allopurinoL (ZYLOPRIM) tablet 100 mg  100 mg Oral DAILY    ascorbic acid (vitamin C) (VITAMIN C) tablet 500 mg  500 mg Oral DAILY    ezetimibe (ZETIA) tablet 10 mg  10 mg Oral DAILY    sevelamer carbonate (RENVELA) tab 1,600 mg  1,600 mg Oral TID WITH MEALS    sodium chloride (NS) flush 5-40 mL  5-40 mL IntraVENous Q8H    sodium chloride (NS) flush 5-40 mL  5-40 mL IntraVENous PRN    acetaminophen (TYLENOL) tablet 650 mg  650 mg Oral Q6H PRN    Or    acetaminophen (TYLENOL) suppository 650 mg  650 mg Rectal Q6H PRN    bisacodyL (DULCOLAX) suppository 10 mg  10 mg Rectal DAILY PRN    promethazine (PHENERGAN) tablet 12.5 mg  12.5 mg Oral Q6H PRN    Or    ondansetron (ZOFRAN) injection 4 mg  4 mg IntraVENous Q6H PRN    heparin (porcine) injection 5,000 Units  5,000 Units SubCUTAneous Q8H    oxyCODONE-acetaminophen (PERCOCET) 5-325 mg per tablet 1 Tablet  1 Tablet Oral Q6H PRN         Allergy:   No Known Allergies     Objective: Visit Vitals  BP (!) 159/57 (BP 1 Location: Right lower arm)   Pulse 77   Temp 98.6 °F (37 °C)   Resp 17   Ht 6' (1.829 m)   Wt 98 kg (216 lb)   SpO2 97%   BMI 29.29 kg/m²         Intake/Output Summary (Last 24 hours) at 1/9/2023 1508  Last data filed at 1/9/2023 9660  Gross per 24 hour   Intake 283 ml   Output 0 ml   Net 283 ml         Physical Exam:       General: No resp distress   HENT: Atraumatic and normocephalic   Eyes: Normal conjunctiva   Neck: Supple +JVD    Cardiovascular: Normal S1 & S2, no m/r/g   Pulmonary/Chest Wall: Clear to auscultation bilaterally   Abdominal: Soft and +NABS   Musculoskeletal: No edema S/p Amputation of multiple toes Left foot.  Wound Vac in place   Neurological: No focal deficits    HD Access: [facility] +    Data Review:  Lab Results   Component Value Date/Time    Sodium 134 (L) 01/09/2023 05:47 AM    Potassium 5.7 (H) 01/09/2023 05:47 AM    Chloride 98 (L) 01/09/2023 05:47 AM    CO2 26 01/09/2023 05:47 AM    Anion gap 10 01/09/2023 05:47 AM    Glucose 159 (H) 01/09/2023 05:47 AM    BUN 68 (H) 01/09/2023 05:47 AM    Creatinine 9.69 (H) 01/09/2023 05:47 AM    BUN/Creatinine ratio 7 (L) 01/09/2023 05:47 AM    GFR est AA 13 (L) 07/21/2022 02:15 PM    GFR est non-AA 11 (L) 07/21/2022 02:15 PM    Calcium 9.1 01/09/2023 05:47 AM     Lab Results   Component Value Date/Time    WBC 13.5 (H) 01/05/2023 03:48 PM    HGB 8.3 (L) 01/05/2023 03:48 PM    HCT 26.1 (L) 01/05/2023 03:48 PM    PLATELET 906 07/68/3559 03:48 PM    MCV 95.6 01/05/2023 03:48 PM     Lab Results   Component Value Date/Time    Calcium 9.1 01/09/2023 05:47 AM    Phosphorus 5.1 (H) 01/09/2023 05:47 AM     No results found for: IRON, FE, TIBC, IBCT, PSAT, FERR  No results found for: FERR      Impression:     ESRD on HD TTS  Hyperkalemia, resolved  Left diabetic foot infection w/ gangrenous changes s/p Lt 2/3/4 metatarsal amputation   DM  HTN  PVD, seen by Vasc Surgery, s/p LLE angio 12/28  Anemia  SHPT  Hyperkalemia    Plan: HD tomorrow, switch to 1k bath  Cont DALTON for Anemia  Cont low K diet  Continue antibiotics management per FRANCES Roper MD,   VIA [facility]

## 2023-01-10 LAB
BACTERIA SPEC CULT: ABNORMAL
GLUCOSE BLD STRIP.AUTO-MCNC: 126 MG/DL (ref 70–110)
GLUCOSE BLD STRIP.AUTO-MCNC: 149 MG/DL (ref 70–110)
GLUCOSE BLD STRIP.AUTO-MCNC: 167 MG/DL (ref 70–110)
GLUCOSE BLD STRIP.AUTO-MCNC: 171 MG/DL (ref 70–110)
GLUCOSE BLD STRIP.AUTO-MCNC: 186 MG/DL (ref 70–110)
GRAM STN SPEC: ABNORMAL
MAGNESIUM SERPL-MCNC: 2.4 MG/DL (ref 1.6–2.6)
SERVICE CMNT-IMP: ABNORMAL

## 2023-01-10 PROCEDURE — 97168 OT RE-EVAL EST PLAN CARE: CPT

## 2023-01-10 PROCEDURE — 65270000029 HC RM PRIVATE

## 2023-01-10 PROCEDURE — 82962 GLUCOSE BLOOD TEST: CPT

## 2023-01-10 PROCEDURE — 74011250636 HC RX REV CODE- 250/636: Performed by: PODIATRIST

## 2023-01-10 PROCEDURE — 97530 THERAPEUTIC ACTIVITIES: CPT

## 2023-01-10 PROCEDURE — 97164 PT RE-EVAL EST PLAN CARE: CPT

## 2023-01-10 PROCEDURE — 36415 COLL VENOUS BLD VENIPUNCTURE: CPT

## 2023-01-10 PROCEDURE — 74011000250 HC RX REV CODE- 250: Performed by: HOSPITALIST

## 2023-01-10 PROCEDURE — 90935 HEMODIALYSIS ONE EVALUATION: CPT

## 2023-01-10 PROCEDURE — 83735 ASSAY OF MAGNESIUM: CPT

## 2023-01-10 PROCEDURE — 74011250636 HC RX REV CODE- 250/636: Performed by: HOSPITALIST

## 2023-01-10 PROCEDURE — 74011636637 HC RX REV CODE- 636/637: Performed by: HOSPITALIST

## 2023-01-10 PROCEDURE — 74011250637 HC RX REV CODE- 250/637: Performed by: HOSPITALIST

## 2023-01-10 PROCEDURE — 74011250636 HC RX REV CODE- 250/636: Performed by: INTERNAL MEDICINE

## 2023-01-10 RX ADMIN — HEPARIN SODIUM 5000 UNITS: 5000 INJECTION INTRAVENOUS; SUBCUTANEOUS at 08:53

## 2023-01-10 RX ADMIN — CLOPIDOGREL BISULFATE 75 MG: 75 TABLET ORAL at 08:53

## 2023-01-10 RX ADMIN — EZETIMIBE 10 MG: 10 TABLET ORAL at 08:53

## 2023-01-10 RX ADMIN — CARVEDILOL 12.5 MG: 12.5 TABLET, FILM COATED ORAL at 22:00

## 2023-01-10 RX ADMIN — HEPARIN SODIUM 5000 UNITS: 5000 INJECTION INTRAVENOUS; SUBCUTANEOUS at 17:53

## 2023-01-10 RX ADMIN — SODIUM CHLORIDE, PRESERVATIVE FREE 10 ML: 5 INJECTION INTRAVENOUS at 06:00

## 2023-01-10 RX ADMIN — HEPARIN SODIUM 5000 UNITS: 5000 INJECTION INTRAVENOUS; SUBCUTANEOUS at 23:44

## 2023-01-10 RX ADMIN — AMLODIPINE BESYLATE 10 MG: 5 TABLET ORAL at 08:52

## 2023-01-10 RX ADMIN — Medication 2 UNITS: at 23:45

## 2023-01-10 RX ADMIN — SODIUM CHLORIDE 25 ML/HR: 9 INJECTION, SOLUTION INTRAVENOUS at 11:47

## 2023-01-10 RX ADMIN — FAMOTIDINE 20 MG: 20 TABLET, FILM COATED ORAL at 17:55

## 2023-01-10 RX ADMIN — Medication 500 MG: at 22:00

## 2023-01-10 RX ADMIN — SEVELAMER CARBONATE 1600 MG: 800 TABLET, FILM COATED ORAL at 17:53

## 2023-01-10 RX ADMIN — B-COMPLEX W/ C & FOLIC ACID TAB 1 MG 1 TABLET: 1 TAB at 08:53

## 2023-01-10 RX ADMIN — Medication 500 MG: at 08:52

## 2023-01-10 RX ADMIN — ALLOPURINOL 100 MG: 100 TABLET ORAL at 08:53

## 2023-01-10 RX ADMIN — SODIUM CHLORIDE, PRESERVATIVE FREE 10 ML: 5 INJECTION INTRAVENOUS at 17:54

## 2023-01-10 RX ADMIN — SEVELAMER CARBONATE 1600 MG: 800 TABLET, FILM COATED ORAL at 11:47

## 2023-01-10 RX ADMIN — Medication 15 UNITS: at 23:45

## 2023-01-10 RX ADMIN — NYSTATIN: 100000 POWDER TOPICAL at 08:56

## 2023-01-10 RX ADMIN — ASPIRIN 81 MG: 81 TABLET, CHEWABLE ORAL at 08:53

## 2023-01-10 RX ADMIN — Medication 2 UNITS: at 17:53

## 2023-01-10 RX ADMIN — SEVELAMER CARBONATE 1600 MG: 800 TABLET, FILM COATED ORAL at 08:52

## 2023-01-10 RX ADMIN — EPOETIN ALFA-EPBX 8000 UNITS: 4000 INJECTION, SOLUTION INTRAVENOUS; SUBCUTANEOUS at 22:00

## 2023-01-10 NOTE — PROGRESS NOTES
Problem: Diabetes Self-Management  Goal: *Disease process and treatment process  Description: Define diabetes and identify own type of diabetes; list 3 options for treating diabetes. Outcome: Progressing Towards Goal  Goal: *Incorporating nutritional management into lifestyle  Description: Describe effect of type, amount and timing of food on blood glucose; list 3 methods for planning meals. Outcome: Progressing Towards Goal  Goal: *Incorporating physical activity into lifestyle  Description: State effect of exercise on blood glucose levels. Outcome: Progressing Towards Goal  Goal: *Developing strategies to promote health/change behavior  Description: Define the ABC's of diabetes; identify appropriate screenings, schedule and personal plan for screenings. Outcome: Progressing Towards Goal  Goal: *Using medications safely  Description: State effect of diabetes medications on diabetes; name diabetes medication taking, action and side effects. Outcome: Progressing Towards Goal  Goal: *Monitoring blood glucose, interpreting and using results  Description: Identify recommended blood glucose targets  and personal targets. Outcome: Progressing Towards Goal  Goal: *Prevention, detection, treatment of acute complications  Description: List symptoms of hyper- and hypoglycemia; describe how to treat low blood sugar and actions for lowering  high blood glucose level. Outcome: Progressing Towards Goal  Goal: *Prevention, detection and treatment of chronic complications  Description: Define the natural course of diabetes and describe the relationship of blood glucose levels to long term complications of diabetes.   Outcome: Progressing Towards Goal  Goal: *Developing strategies to address psychosocial issues  Description: Describe feelings about living with diabetes; identify support needed and support network  Outcome: Progressing Towards Goal  Goal: *Sick day guidelines  Outcome: Progressing Towards Goal  Goal: *Patient Specific Goal (EDIT GOAL, INSERT TEXT)  Outcome: Progressing Towards Goal     Problem: Patient Education: Go to Patient Education Activity  Goal: Patient/Family Education  Outcome: Progressing Towards Goal     Problem: Patient Education: Go to Patient Education Activity  Goal: Patient/Family Education  Outcome: Progressing Towards Goal     Problem: Chronic Renal Failure  Goal: *Fluid and electrolytes stabilized  Outcome: Progressing Towards Goal     Problem: Patient Education: Go to Patient Education Activity  Goal: Patient/Family Education  Outcome: Progressing Towards Goal     Problem: Patient Education: Go to Patient Education Activity  Goal: Patient/Family Education  Outcome: Progressing Towards Goal     Problem: Patient Education: Go to Patient Education Activity  Goal: Patient/Family Education  Outcome: Progressing Towards Goal     Problem: Patient Education: Go to Patient Education Activity  Goal: Patient/Family Education  Outcome: Progressing Towards Goal     Problem: Pain  Goal: *Control of Pain  Outcome: Progressing Towards Goal

## 2023-01-10 NOTE — PROGRESS NOTES
0730  Assumed care of patient at this time. Patient AAOx3. PIV is C/D/I with fluids infusing, no s/s of infiltration or phlebitis. VSS on RA. Breath sounds clear bilaterally. Patient states pain 0/10. Wound vac and ACE wrap intact to left foot. Sensation intact to BUE and BLE. No other concerns at this time. Possessions and call bell within reach, will continue to monitor. 56  MD Enriquez paged for critical value of VRE present in foot wound. Awaiting return page. 46  MD Amaya Geiger made aware of critical value of VRE present in foot wound. RBV. No new orders at this time. [address] paged and made aware of PT requiring new orders to continue treating patient. Per MD Alford [place] nurse to place orders for PT/OT eval and devise plan of care upper body only, patient NWB on LLE. T.O. RBV. 26  Per MD Manriquez nurse to place patient on contact precautions for VRE of foot wound. T.O. RBV.

## 2023-01-10 NOTE — PROGRESS NOTES
Problem: Diabetes Self-Management  Goal: *Disease process and treatment process  Description: Define diabetes and identify own type of diabetes; list 3 options for treating diabetes. Outcome: Progressing Towards Goal  Goal: *Incorporating nutritional management into lifestyle  Description: Describe effect of type, amount and timing of food on blood glucose; list 3 methods for planning meals. Outcome: Progressing Towards Goal  Goal: *Incorporating physical activity into lifestyle  Description: State effect of exercise on blood glucose levels. Outcome: Progressing Towards Goal  Goal: *Developing strategies to promote health/change behavior  Description: Define the ABC's of diabetes; identify appropriate screenings, schedule and personal plan for screenings. Outcome: Progressing Towards Goal  Goal: *Using medications safely  Description: State effect of diabetes medications on diabetes; name diabetes medication taking, action and side effects. Outcome: Progressing Towards Goal  Goal: *Monitoring blood glucose, interpreting and using results  Description: Identify recommended blood glucose targets  and personal targets. Outcome: Progressing Towards Goal  Goal: *Prevention, detection, treatment of acute complications  Description: List symptoms of hyper- and hypoglycemia; describe how to treat low blood sugar and actions for lowering  high blood glucose level. Outcome: Progressing Towards Goal  Goal: *Prevention, detection and treatment of chronic complications  Description: Define the natural course of diabetes and describe the relationship of blood glucose levels to long term complications of diabetes.   Outcome: Progressing Towards Goal  Goal: *Developing strategies to address psychosocial issues  Description: Describe feelings about living with diabetes; identify support needed and support network  Outcome: Progressing Towards Goal  Goal: *Sick day guidelines  Outcome: Progressing Towards Goal  Goal: *Patient Specific Goal (EDIT GOAL, INSERT TEXT)  Outcome: Progressing Towards Goal     Problem: Patient Education: Go to Patient Education Activity  Goal: Patient/Family Education  Outcome: Progressing Towards Goal     Problem: Falls - Risk of  Goal: *Absence of Falls  Description: Document Chloe Weiss Fall Risk and appropriate interventions in the flowsheet. Outcome: Progressing Towards Goal  Note: Fall Risk Interventions:  Mobility Interventions: Communicate number of staff needed for ambulation/transfer, PT Consult for mobility concerns, PT Consult for assist device competence, Strengthening exercises (ROM-active/passive), Utilize walker, cane, or other assistive device, Utilize gait belt for transfers/ambulation         Medication Interventions: Utilize gait belt for transfers/ambulation    Elimination Interventions: Toileting schedule/hourly rounds, Toilet paper/wipes in reach, Patient to call for help with toileting needs, Call light in reach    History of Falls Interventions: Bed/chair exit alarm, Consult care management for discharge planning, Door open when patient unattended, Evaluate medications/consider consulting pharmacy         Problem: Patient Education: Go to Patient Education Activity  Goal: Patient/Family Education  Outcome: Progressing Towards Goal     Problem: Chronic Renal Failure  Goal: *Fluid and electrolytes stabilized  Outcome: Progressing Towards Goal     Problem: Patient Education: Go to Patient Education Activity  Goal: Patient/Family Education  Outcome: Progressing Towards Goal     Problem: Pressure Injury - Risk of  Goal: *Prevention of pressure injury  Description: Document Semaj Scale and appropriate interventions in the flowsheet.   Outcome: Progressing Towards Goal  Note: Pressure Injury Interventions:  Sensory Interventions: Assess changes in LOC    Moisture Interventions: Absorbent underpads, Minimize layers, Moisture barrier    Activity Interventions: Increase time out of bed, Pressure redistribution bed/mattress(bed type), PT/OT evaluation    Mobility Interventions: HOB 30 degrees or less, Pressure redistribution bed/mattress (bed type), PT/OT evaluation    Nutrition Interventions: Document food/fluid/supplement intake    Friction and Shear Interventions: Minimize layers                Problem: Patient Education: Go to Patient Education Activity  Goal: Patient/Family Education  Outcome: Progressing Towards Goal     Problem: Patient Education: Go to Patient Education Activity  Goal: Patient/Family Education  Outcome: Progressing Towards Goal     Problem: Patient Education: Go to Patient Education Activity  Goal: Patient/Family Education  Outcome: Progressing Towards Goal     Problem: Pain  Goal: *Control of Pain  Outcome: Progressing Towards Goal

## 2023-01-10 NOTE — PROGRESS NOTES
ACUTE HEMODIALYSIS TREATMENT     HEMODIALYSIS ORDERS: Physician: Dr. Sp Prince      Dialyzer: Revaclear   Duration: 3.5 hr   BFR: 400   DFR: 800   Dialysate:  Temp 36*C   K+  2    Ca+ 2.5   Na 138   Bicarb 35       Wt Readings from Last 1 Encounters:   01/10/23 101.5 kg (223 lb 12.8 oz)    Patient Chart [x]   Unable to Obtain []  Dry weight/UF Goal: 3000 ml    Heparin []  Bolus    Units    [] Hourly    Units    [x]None       Pre BP:   155/77  Pulse:  71   Respirations: 18   Temp:  97.4  [] Oral [] Axillary [] Esophageal   Labs: []  Pre  []  Post:   [x] N/A   Additional Orders(medications, blood products, hypotension management): [] Yes   [] No      [x]  [facility] Consent Verified      CATHETER ACCESS:  []N/A   [x]Right   []Left   []IJ   []Fem  [x]Chest wall  []TransHepatic   [] First use X-ray verified     [x]Tunnel    [] Non Tunneled   [x]No S/S infection  []Redness  []Drainage []Cultured []Swelling []Pain   [x]Medical Aseptic Prep Utilized   [x]Dressing Changed  [x] Biopatch  Date: 1/3/23   []Clotted   [x]Patent   Flows: []Good  []Poor  [x]Reversed   If access problem,  notified: [x]Yes    []N/A         GENERAL ASSESSMENT:    LUNGS:  Resp Rate 18   [x] Clear  [] Coarse  [] Crackles  [] Wheezing  [] Diminished         Respirations:  [x]Easy  []Labored  []N/A  Cough:  []Productive  []Dry  [x]N/A      Therapy:  [x]RA  O2 Type:  []NC  Mask: []  NRB    [] BiPaP  Flow:  l/min                   [] Ventilated  [] Intubated  [] Trach     CARDIAC: [x] Regular      [] Irregular   [] Rhythm:                     [] Monitored   [] Bedside   [] Remotely monitored     EDEMA: [] None   [x]Generalized  [] Pitting [] 1+   [] 2 +   [] 3+    [] 4+  [] Pedal    SKIN:   [x] Warm  [] Hot     [] Cold   [x] Dry     [] Pale   [] Diaphoretic                  [] Flushed  [] Jaundiced  [] Cyanotic     LOC:    [x] Alert      [x]Oriented:    [x] Person     [x] Place  []Time               [] Confused  [] Lethargic  [] Medicated  [] Non-responsive [] Non Verbal   GI / ABDOMEN:                     [] Flat    [] Distended    [x] Soft    [] Firm   []  Obese                   [] Diarrhea   [] FMS [x] Bowel Sounds  [] Nausea  [] Vomiting                   [] NGT  [] OGT    [] PEG  [] Tube Feedings @      / URINE ASSESSMENT:                   [] Voiding  []  Mcmahon  [x] Oliguria  [] Anuria                     [] Incontinent  []  Incontinent Brief   []  PureWick      PAIN:  [x] 0 []1  []2   []3   []4   []5   []6   []7   []8   []9   []10                MOBILITY:  [x] Bed    [] Stretcher       All Vitals and Treatment Details on Attached [place]: [facility]          Room # 327    [x] Routine         [] 1st Time Acute/Chronic   [] Urgent      [] Stat            [] Acute Room   [x]  Bedside    [] ICU/CCU     [] ER   Isolation Precautions:  [x] Dialysis    There are currently no Active Isolations      ALLERGIES:     No Known Allergies   Code Status:  Full Code      Hepatitis Status            Lab Results   Component Value Date/Time     Hepatitis B surface Ag <0.10 12/10/2022 06:33 AM     Hepatitis B surface Ab 23.52 12/10/2022 06:33 AM                DIET:  RENAL      PRIMARY NURSE REPORT:   Pre Dialysis: Terrence Roca RN    Time: 5049      EDUCATION:    [x] Patient           Knowledge Basis: []None [x]Minimal [] Substantial [] Unknown  Barriers to learning  [x]N/A  [] Intubated/Trached/Ventilated  [] Sedated/Paralyzed   [] Access Care     [] S&S of infection  [] Fluid Management  [] K+   [x] Procedural    [] Medications   [] Tx Options   [] Transplant   [] Diet      Teaching Tools:  [x] Explain  [] Demo  [] Handouts [] Video  Patient response: [] Verbalized understanding   [x] Requires follow up          [x] Time Out/Safety Check    [x] Extracorporeal Circuit Tested for integrity                       RO/HEMODIALYSIS MACHINE SAFETY CHECKS - Before each treatment:        [facility]                                                                      [x] Portable Machine #3 [id number]- [id number] /RO serial # [id number]                                                                                                      Alarm Test:  Pass time   1215        [x] RO/Machine Log Complete    Machine Temp    36*C             Dialysate: pH 7.4    Conductivity: Meter N/A   HD Machine  13.7     TCD: 13.8  Dialyzer Lot # [id number]     Blood Tubing Lot # [id number]   Saline Lot # [id number]      CHLORINE TESTING-Before each treatment and every 4 hours    Total Chlorine: [x] less than 0.1 ppm  Initial Time Check: 1215    4 Hr/2nd Check Time:    (if greater than 0.1 ppm from Primary then every 30 minutes from Secondary)      TREATMENT INITIATION - with Dialysis Precautions:   [x] All Connections Secured              [x] Saline Line Double Clamped   [x] Venous Parameters Set               [x] Arterial Parameters Set    [x] Prime Given 250ml NSS              [x]Air Foam Detector Engaged       Treatment Initiation Note:  A&Ox2-3. No complaints. CHG dressing is dry and intact. Dated 1/3. No s/s of infection at site. Changed CHG dressing per policy. During Treatment Notes:  Treatment started and monitored with no issues by dialysis tech Mayo Barrow.          Medication Dose Volume Route Time Anoop Nurse, Title                                         Post Assessment  Dialyzer Cleared:   [] Good  [x] Fair  [] Poor  Blood processed:   L  UF Removed:  2500 Ml     Post /74  Pulse 84  Resp  18  Temp 97.3 Lungs: [x] Clear [] Course  [] Crackles                 []  Wheezing   [] Diminished   Post Tx Vascular Access: [x] N/A Cardiac :[x] Regular   [] Irregular   Rhythm:  [x] Monitored   [] Not Monitored    CVC Catheter: [] N/A  Locking solution: Heparin 1:1000 U  Arterial port 1.8 ml   Venous port 1.8 ml    Edema:  [] None  [x] Generalized                     Skin:[x] Warm  [x] Dry [] Diaphoretic               [] Flushed  [] Pale [] Cyanotic Pain:  [x]0  []1 []2  []3 []4  []5  []6  []7 []8    []9  []10      Post Treatment Note:   Tolerated 3.5 hr treatment. No issues. Left pt in stable condition and talking.       POST TREATMENT PRIMARY NURSE HANDOFF REPORT:   Post Dialysis: KIM Abreu  RN               Time:  1600         Abbreviations: AVG-arterial venous graft, AVF-arterial venous fistula, IJ-Internal Jugular, Subcl-Subclavian, Fem-Femoral, Tx-treatment, AP/HR-apical heart rate, VSS- Vital Signs Stable, CVC- Central Venous Catheter, DFR-dialysate flow rate, BFR-blood flow rate, AP-arterial pressure, -venous pressure, UF-ultrafiltrate, TMP-transmembrane pressure, Avtar-Venous, Art-Arterial, RO-Reverse Osmosis

## 2023-01-10 NOTE — ROUTINE PROCESS
Bedside and Verbal shift change report given to Babar Napoles RN (oncoming nurse) by Chary Mejia RN (offgoing nurse). Report included the following information SBAR, Kardex, Intake/Output, MAR, and Recent Results.

## 2023-01-10 NOTE — PROGRESS NOTES
PENDING INSURANCE AUTHORIZATION    D/C Plan: [facility]    Noted pt has been medically approved for the above facility. Insurance Jane Conklin has been initiated. Anticipate pt will transition to the above facility once insurance auth has been obtained. CM will discuss transport to and from dialysis to asssit with care transition. CM to continue to follow and assist.    1225:  CM and provider met with pt at bedside to discuss care transition. Pt is aware of plan transition to [facility] pending insurance authorization. CM discussed transportation to and from dialysis. Pt states he will be using [employer] to get to and from dialysis. Pt states he will just have to provide a 24 hour notice to coordinate transportation. CM to continue to follow and assist.      Transition of care to SNF: [facility]     Communication to Patient/Family:  Met with patient and family, and they are agreeable to the transition plan. The Plan for Transition of Care is related to the following treatment goals: SNF    The Patient and/or patient representative  was provided with a choice of provider and agrees   with the discharge plan. Yes [x] No []    Freedom of choice list was provided with basic dialogue that supports the patient's individualized plan of care/goals and shares the quality data associated with the providers. Yes [x] No []    SNF/Rehab Transition:  Patient has been accepted to [facility] at [facility], [address] [place] [address], and meets criteria for admission. Patient will transported by medical transport and expected to leave once insurance auth has been obtained. Communication to SNF/Rehab:  Bedside RN has been notified to update the transition plan to the facility and call report [phone]    Discharge information has been updated on the AVS and sent via [facility] and/or CC link.     Discharge instructions to be fax'd to facility at [phone] Please include all hard scripts for controlled substances, med rec and dc summary, and AVS in packet. Please medicate for pain prior to dc if possible and needed to help offset delay when patient first arrives to facility. Reviewed and confirmed with facility, Norbert Coles can manage the patient care needs for the following:     Victor Hugo Kyle with (X) only those applicable:  Medication:  []Medications are available at the facility  []IV Antibiotics    []Controlled Substance - hard copies available sent. []Weekly Labs    Equipment:  []CPAP/BiPAP  []Wound Vacuum  []Mcmahon or Urinary Device  []PICC/Central Line  []Nebulizer  []Ventilator    Treatment:  []Isolation (for MRSA, VRE, etc.)  []Surgical Drain Management  []Tracheostomy Care  []Dressing Changes  []Dialysis with transportation  []PEG Care  []Oxygen  []Daily Weights for Heart Failure    Dietary:  []Any diet limitations  []Tube Feedings   []Total Parenteral Management (TPN)    Financial Resources:  []Medicaid Application Completed    []UAI Completed and copy given to pt/family    []A screening has previously been completed. []Level II Completed    [] Private pay individual who will not become   financially eligible for Medicaid within 6 months from admission to a [address] [place] [address]. [] Individual refused to have screening conducted. []Medicaid Application Completed    []The screening denied because it was determined individual did not need/did not qualify for nursing facility level of care. [] Out of state residents seeking direct admission to a [address] facility.   [] Individuals who are inpatients of an out of state hospital, or in state or out of state veterans/ hospital and seek direct admission to a [address] facility  [] Individuals who are pateints or residents of a state owned/operated facility that is licensed by Department of [employer]) and seek direct admission to [address] facility  [] A screening not required for enrollment in [facility] Hospice services as set out in [facility] 30-  [] [facility] staff shall perform screenings of the [facility] clients. Advanced Care Plan:  []Surrogate Decision Maker of Care  []POA  []Communicated Code Status and copy sent. Other:         Care Management Interventions  PCP Verified by CM:  Yes  Mode of Transport at Discharge: BLS  Transition of Care Consult (CM Consult): SNF  Health Maintenance Reviewed: Yes  Physical Therapy Consult: Yes  Occupational Therapy Consult: Yes  Support Systems: Friend/Neighbor  The Plan for Transition of Care is Related to the Following Treatment Goals : SNF  The Patient and/or Patient Representative was Provided with a Choice of Provider and Agrees with the Discharge Plan?: Yes  Name of the Patient Representative Who was Provided with a Choice of Provider and Agrees with the Discharge Plan: pt  Freedom of Choice List was Provided with Basic Dialogue that Supports the Patient's Individualized Plan of Care/Goals, Treatment Preferences and Shares the Quality Data Associated with the Providers?: Yes  Discharge Location  Patient Expects to be Discharged to[de-identified] Skilled nursing facility

## 2023-01-10 NOTE — PROGRESS NOTES
Nephrology Inpatient Progress note    Subjective:     Britta Brooks is a 60 y.o. male with a PMHx of  DM, HTN. PVD, ESRD on HD TTS at [facility] under the [address] [place] [address] Division . Nephrology sent in for admission by wound care clinic due to left foot infection ,was told he needed surgery. Podiatry has been in to see pt. He has been on abx recently     S/P Lt TMA. CT head neg  Stable this AM awaiting HD.     Admit Date: 12/9/2022  Principal Problem:    Surgical wound dehiscence (1/6/2023)    Active Problems:    Diabetes mellitus with foot ulcer ([address] (6/27/2022)      CAD (coronary artery disease) (6/28/2022)      ESRD (end stage renal disease) ([address] (6/28/2022)      Hypertension (7/21/2022)      Leukocytosis (12/9/2022)      Diabetic foot infection ([address] (12/9/2022)      Diarrhea (12/9/2022)      Type 2 diabetes mellitus, with long-term current use of insulin ([facility]) ()      Acute hematogenous osteomyelitis of left foot ([address] (12/9/2022)      Nondisplaced fracture of second metatarsal bone of left foot (12/9/2022)      Nondisplaced fracture of third metatarsal bone of left foot (12/9/2022)      Closed nondisplaced fracture of fourth metatarsal bone of left foot (12/9/2022)      Positive blood culture (12/11/2022)      PAD (peripheral artery disease) ([address] (12/27/2022)      Open wnd of foot, left, subsequent encounter (1/6/2023)    Current Facility-Administered Medications   Medication Dose Route Frequency    0.9% sodium chloride infusion  25 mL/hr IntraVENous CONTINUOUS    famotidine (PEPCID) tablet 20 mg  20 mg Oral QPM    lidocaine 4 % patch 1 Patch  1 Patch TransDERmal Q24H    vit B Cmplx 3-FA-Vit C-Biotin (NEPHRO NIKITA RX) tablet 1 Tablet  1 Tablet Oral DAILY    diphenhydrAMINE (BENADRYL) injection 12.5 mg  12.5 mg IntraVENous Q6H PRN    ammonium lactate (LAC-HYDRIN) 12 % lotion   Topical BID PRN    Saccharomyces boulardii (FLORASTOR) capsule 500 mg  500 mg Oral BID    insulin lispro (HUMALOG) injection   SubCUTAneous AC&HS    insulin glargine (LANTUS) injection 15 Units  15 Units SubCUTAneous QHS    epoetin lisette-epbx (RETACRIT) injection 8,000 Units  8,000 Units SubCUTAneous Q TUE, THU & SAT    loperamide (IMODIUM) capsule 2 mg  2 mg Oral Q4H PRN    nystatin (MYCOSTATIN) 100,000 unit/gram powder   Topical BID    alum-mag hydroxide-simeth (MYLANTA) oral suspension 30 mL  30 mL Oral Q4H PRN    glucose chewable tablet 16 g  16 g Oral PRN    glucagon (GLUCAGEN) injection 1 mg  1 mg IntraMUSCular PRN    heparin (porcine) 1,000 unit/mL injection 3,600 Units  3,600 Units IntraCATHeter DIALYSIS PRN    dextrose 10% infusion 0-250 mL  0-250 mL IntraVENous PRN    0.9% sodium chloride infusion  25 mL/hr IntraVENous DIALYSIS PRN    clopidogreL (PLAVIX) tablet 75 mg  75 mg Oral DAILY    carvediloL (COREG) tablet 12.5 mg  12.5 mg Oral BID    aspirin chewable tablet 81 mg  81 mg Oral DAILY    amLODIPine (NORVASC) tablet 10 mg  10 mg Oral DAILY    allopurinoL (ZYLOPRIM) tablet 100 mg  100 mg Oral DAILY    ascorbic acid (vitamin C) (VITAMIN C) tablet 500 mg  500 mg Oral DAILY    ezetimibe (ZETIA) tablet 10 mg  10 mg Oral DAILY    sevelamer carbonate (RENVELA) tab 1,600 mg  1,600 mg Oral TID WITH MEALS    sodium chloride (NS) flush 5-40 mL  5-40 mL IntraVENous Q8H    sodium chloride (NS) flush 5-40 mL  5-40 mL IntraVENous PRN    acetaminophen (TYLENOL) tablet 650 mg  650 mg Oral Q6H PRN    Or    acetaminophen (TYLENOL) suppository 650 mg  650 mg Rectal Q6H PRN    bisacodyL (DULCOLAX) suppository 10 mg  10 mg Rectal DAILY PRN    promethazine (PHENERGAN) tablet 12.5 mg  12.5 mg Oral Q6H PRN    Or    ondansetron (ZOFRAN) injection 4 mg  4 mg IntraVENous Q6H PRN    heparin (porcine) injection 5,000 Units  5,000 Units SubCUTAneous Q8H    oxyCODONE-acetaminophen (PERCOCET) 5-325 mg per tablet 1 Tablet  1 Tablet Oral Q6H PRN         Allergy:   No Known Allergies     Objective:     Visit Vitals  BP (!) 153/74 (BP 1 Location: Left upper arm, BP Patient Position: At rest)   Pulse 76   Temp 97.3 °F (36.3 °C)   Resp 18   Ht 6' (1.829 m)   Wt 101.5 kg (223 lb 12.8 oz)   SpO2 98%   BMI 30.35 kg/m²       No intake or output data in the 24 hours ending 01/10/23 0736      Physical Exam:       General: No resp distress   HENT: Atraumatic and normocephalic   Eyes: Normal conjunctiva   Neck: Supple +JVD but no mass   Cardiovascular: Normal S1 & S2, no m/r/g   Pulmonary/Chest Wall: Clear to auscultation bilaterally   Abdominal: Soft and +NABS   Musculoskeletal: No edema S/p Amputation of multiple toes Left foot. Wound Vac in place   Neurological: No focal deficits    HD Access: [facility] +    Data Review:  Lab Results   Component Value Date/Time    Sodium 134 (L) 01/09/2023 05:47 AM    Potassium 5.7 (H) 01/09/2023 05:47 AM    Chloride 98 (L) 01/09/2023 05:47 AM    CO2 26 01/09/2023 05:47 AM    Anion gap 10 01/09/2023 05:47 AM    Glucose 159 (H) 01/09/2023 05:47 AM    BUN 68 (H) 01/09/2023 05:47 AM    Creatinine 9.69 (H) 01/09/2023 05:47 AM    BUN/Creatinine ratio 7 (L) 01/09/2023 05:47 AM    GFR est AA 13 (L) 07/21/2022 02:15 PM    GFR est non-AA 11 (L) 07/21/2022 02:15 PM    Calcium 9.1 01/09/2023 05:47 AM     Lab Results   Component Value Date/Time    WBC 13.5 (H) 01/05/2023 03:48 PM    HGB 8.3 (L) 01/05/2023 03:48 PM    HCT 26.1 (L) 01/05/2023 03:48 PM    PLATELET 612 28/06/0392 03:48 PM    MCV 95.6 01/05/2023 03:48 PM     Lab Results   Component Value Date/Time    Calcium 9.1 01/09/2023 05:47 AM    Phosphorus 5.1 (H) 01/09/2023 05:47 AM     No results found for: IRON, FE, TIBC, IBCT, PSAT, FERR  No results found for: FERR      Impression:     ESRD on HD TTS  Hyperkalemia, resolved  Left diabetic foot infection w/ gangrenous changes s/p Lt 2/3/4 metatarsal amputation   DM  HTN  PVD, seen by [facility], s/p LLE angio 12/28  Anemia in CKD  SHPT  Hyperkalemia    Plan:     HD today with low K bath.   Use current access for HD  Cont DALTON for Anemia  Cont low K diet  Continue antibiotics management per ID  Wound Care      Estephania Fuentes MD,   [facility]

## 2023-01-10 NOTE — PROGRESS NOTES
Occupational Therapy Treatment Attempt     Chart reviewed. Attempted Occupational Therapy Treatment, however, patient unable to be seen due to:  []  Nausea/vomiting  []  Eating  []  Pain  []  Patient too lethargic  []  Off Unit for testing/procedure  [x]  Dialysis treatment in progress  []  Telemetry Results  []  Other:      Will f/u later as patient's schedule allows.   Jaye Gutierrez, OTR/L

## 2023-01-10 NOTE — ROUTINE PROCESS
Bedside and Verbal shift change report given to KIM Brown (oncoming nurse) by Yuliana Underwood RN (offgoing nurse). Report included the following information SBAR, Kardex, Intake/Output, MAR, and Recent Results.

## 2023-01-10 NOTE — PROGRESS NOTES
1/10/2023  PT Evaluation not completed due to:  [] Off Unit for testing/procedure  [] Pain  [] Eating  [] Patient too lethargic  [] Nausea/vomiting  [x] Dialysis treatment in progress   [x] Other: pt with 3 hours treatment left per HD nurse. Will f/u at later time as pt schedule allows. Thank you.    Vashti Goel, PT

## 2023-01-10 NOTE — PROGRESS NOTES
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

## 2023-01-10 NOTE — PROGRESS NOTES
Bedside and Verbal shift change report given to Luisito Dyson RN (oncoming nurse) by Justen Saenz RN (offgoing nurse). Report included the following information SBAR, Kardex, and Recent Results.

## 2023-01-11 LAB
ALBUMIN SERPL-MCNC: 2.1 G/DL (ref 3.4–5)
ANION GAP SERPL CALC-SCNC: 10 MMOL/L (ref 3–18)
BUN SERPL-MCNC: 50 MG/DL (ref 7–18)
BUN/CREAT SERPL: 6 (ref 12–20)
CALCIUM SERPL-MCNC: 8.8 MG/DL (ref 8.5–10.1)
CHLORIDE SERPL-SCNC: 100 MMOL/L (ref 100–111)
CO2 SERPL-SCNC: 26 MMOL/L (ref 21–32)
CREAT SERPL-MCNC: 8 MG/DL (ref 0.6–1.3)
GLUCOSE BLD STRIP.AUTO-MCNC: 106 MG/DL (ref 70–110)
GLUCOSE BLD STRIP.AUTO-MCNC: 130 MG/DL (ref 70–110)
GLUCOSE BLD STRIP.AUTO-MCNC: 174 MG/DL (ref 70–110)
GLUCOSE BLD STRIP.AUTO-MCNC: 182 MG/DL (ref 70–110)
GLUCOSE SERPL-MCNC: 150 MG/DL (ref 74–99)
MAGNESIUM SERPL-MCNC: 2.3 MG/DL (ref 1.6–2.6)
PHOSPHATE SERPL-MCNC: 4.8 MG/DL (ref 2.5–4.9)
POTASSIUM SERPL-SCNC: 5 MMOL/L (ref 3.5–5.5)
SODIUM SERPL-SCNC: 136 MMOL/L (ref 136–145)

## 2023-01-11 PROCEDURE — 83735 ASSAY OF MAGNESIUM: CPT

## 2023-01-11 PROCEDURE — 74011250636 HC RX REV CODE- 250/636: Performed by: HOSPITALIST

## 2023-01-11 PROCEDURE — 74011250637 HC RX REV CODE- 250/637: Performed by: HOSPITALIST

## 2023-01-11 PROCEDURE — 82962 GLUCOSE BLOOD TEST: CPT

## 2023-01-11 PROCEDURE — 65270000029 HC RM PRIVATE

## 2023-01-11 PROCEDURE — 74011000250 HC RX REV CODE- 250: Performed by: HOSPITALIST

## 2023-01-11 PROCEDURE — 80069 RENAL FUNCTION PANEL: CPT

## 2023-01-11 PROCEDURE — 74011636637 HC RX REV CODE- 636/637: Performed by: HOSPITALIST

## 2023-01-11 PROCEDURE — 36415 COLL VENOUS BLD VENIPUNCTURE: CPT

## 2023-01-11 RX ADMIN — CARVEDILOL 12.5 MG: 12.5 TABLET, FILM COATED ORAL at 09:25

## 2023-01-11 RX ADMIN — SEVELAMER CARBONATE 1600 MG: 800 TABLET, FILM COATED ORAL at 09:25

## 2023-01-11 RX ADMIN — Medication 500 MG: at 22:07

## 2023-01-11 RX ADMIN — SODIUM CHLORIDE, PRESERVATIVE FREE 10 ML: 5 INJECTION INTRAVENOUS at 14:00

## 2023-01-11 RX ADMIN — Medication 2 UNITS: at 22:06

## 2023-01-11 RX ADMIN — EZETIMIBE 10 MG: 10 TABLET ORAL at 09:26

## 2023-01-11 RX ADMIN — Medication 15 UNITS: at 22:06

## 2023-01-11 RX ADMIN — HEPARIN SODIUM 5000 UNITS: 5000 INJECTION INTRAVENOUS; SUBCUTANEOUS at 22:10

## 2023-01-11 RX ADMIN — ALLOPURINOL 100 MG: 100 TABLET ORAL at 09:25

## 2023-01-11 RX ADMIN — ASPIRIN 81 MG: 81 TABLET, CHEWABLE ORAL at 09:25

## 2023-01-11 RX ADMIN — B-COMPLEX W/ C & FOLIC ACID TAB 1 MG 1 TABLET: 1 TAB at 09:25

## 2023-01-11 RX ADMIN — CLOPIDOGREL BISULFATE 75 MG: 75 TABLET ORAL at 09:25

## 2023-01-11 RX ADMIN — HEPARIN SODIUM 5000 UNITS: 5000 INJECTION INTRAVENOUS; SUBCUTANEOUS at 15:48

## 2023-01-11 RX ADMIN — SODIUM CHLORIDE, PRESERVATIVE FREE 10 ML: 5 INJECTION INTRAVENOUS at 07:12

## 2023-01-11 RX ADMIN — Medication 500 MG: at 09:25

## 2023-01-11 RX ADMIN — FAMOTIDINE 20 MG: 20 TABLET, FILM COATED ORAL at 17:58

## 2023-01-11 RX ADMIN — CARVEDILOL 12.5 MG: 12.5 TABLET, FILM COATED ORAL at 22:07

## 2023-01-11 RX ADMIN — SEVELAMER CARBONATE 1600 MG: 800 TABLET, FILM COATED ORAL at 17:58

## 2023-01-11 RX ADMIN — NYSTATIN: 100000 POWDER TOPICAL at 09:26

## 2023-01-11 RX ADMIN — SEVELAMER CARBONATE 1600 MG: 800 TABLET, FILM COATED ORAL at 12:31

## 2023-01-11 RX ADMIN — Medication 2 UNITS: at 17:59

## 2023-01-11 RX ADMIN — AMLODIPINE BESYLATE 10 MG: 5 TABLET ORAL at 09:25

## 2023-01-11 RX ADMIN — HEPARIN SODIUM 5000 UNITS: 5000 INJECTION INTRAVENOUS; SUBCUTANEOUS at 07:12

## 2023-01-11 RX ADMIN — NYSTATIN: 100000 POWDER TOPICAL at 22:10

## 2023-01-11 NOTE — PROGRESS NOTES
Physician Progress Note      PATIENT:               Faye Clemente  CSN #:                  [id number]  :                       1960  ADMIT DATE:       2022 11:33 AM  DISCH DATE:  RESPONDING  PROVIDER #:        Ventura Mayfield MD          QUERY TEXT:    Pt admitted with left foot cellulitis. Pt noted to have DM type 2. If possible, please document in progress notes and discharge summary the relationship, if any, between cellulitis and DM. The medical record reflects the following:  Risk Factors: left foot diabetic infection  Clinical Indicators: /  note - Gas gangrene with deep abscess, cellulitis left foot,  Treatment: Procedure(s): Antibiotic's  AND BONE INFECTION. PARTIAL AMPUTATION OF LEFT 2ND, 3RD, 4TH METATARSALS, AMPUTATION OF TOES 2,3,4, INCISION AND DRAINAGE LEFT FOOT DEEP. Thank You  Oliva Angeles RN, CDI, CRCR  Options provided:  -- Left foot cellulitis associated with Diabetes  -- Left foot cellulitis unrelated to Diabetes  -- Other - I will add my own diagnosis  -- Disagree - Not applicable / Not valid  -- Disagree - Clinically unable to determine / Unknown  -- Refer to Clinical Documentation Reviewer    PROVIDER RESPONSE TEXT:    Provider is clinically unable to determine a response to this query.     Query created by: Fritz Rodriguez on 2023 7:11 AM      Electronically signed by:  Ventura Mayfield MD 2023 10:10 AM

## 2023-01-11 NOTE — PROGRESS NOTES
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 Resolved        -CXR/repeat BCX normal so far from 12/23. Doubt CDI- no diarrhea and WBC coming down. L foot surgical wound without significant     necrosis/ischemic change or drainage/cellulitis. Possible GI source Vs drug fever. Dry L foot gangrene- distal--S/P TMA- with clear margin per Surgeon 12/21/22  --S/P partial ampuation L 2nd/3rd/4th MT, I and D of left foot- deep 12/09/22  --S/P TMA Left foot 12/21/22-- Biopsy with acute Osteomylitis  PAD--S/P angiogram 12/20 and S/P L PT baloon angioplasty 12/28/22 --Vascular following  Bacteremia 2/2 MRSE on 12/9--is procurement contamination   Recent CDI--prophylactic oral vanco from 12/23  ESRD on HD  R foot OM-ankle- treated and completed treatment 09/2022    Recommendation -- ID related: Wound vac/care per Podiatry  VRE colonized- contact isolation    Will follow peripherally. Please call if questions       Subjective:      \" When do I go home? How long will I keep the wound vac? \"  Denies abd pain/diarhrea.   No fever/chills/diarrhea    Woun vac in place-left foot-- note from vac change 1/9/23- reviewed  frustrated that he can't go home    Notes/Labs including recent CBC with diff, BMP            Microlab      12/9 - OR (+) Serratia fonticola (S to pip/all except cefazolin), Alcaligenes faecalis (R FQ), Enterbacter cloacae (still being worked up), and anaerobic Bacteroides vulgatus (BL +)                                                      BCx 1/2 (+) CoNS      Lines / Catheters:         R HD cath and peripheral        Patient Active Problem List   Diagnosis Code    Diabetes mellitus with foot ulcer ([facility] E11.621, L97.509    CAD (coronary artery disease) I25.10    ESRD (end stage renal disease) ([facility] N18.6    Acute hematogenous osteomyelitis of right foot ([facility]) M86.071    Cellulitis of right heel L03.115    Diabetic foot ulcer with osteomyelitis ([facility] E11.621, E11.69, L97.509, M86.9    Ulcer of right heel, with necrosis of bone ([facility] L97.414    Infection and inflammatory reaction due to internal fixation device of other site, initial encounter [facility]) T84.69XA    Left arm swelling M79.89    Chest pain at rest R07.9    Abnormal nuclear stress test R94.39    History of anemia due to CKD N18.9, Z86.2    S/P angioplasty with stent Z95.820    Hypertension I10    Leukocytosis D72.829    Diabetic foot infection ([facility]) E11.628, L08.9    Diarrhea R19.7    Type 2 diabetes mellitus, with long-term current use of insulin ([facility]) E11.9, Z79.4    Acute hematogenous osteomyelitis of left foot ([facility]) M86.072    Nondisplaced fracture of second metatarsal bone of left foot S92.325A    Nondisplaced fracture of third metatarsal bone of left foot S92.335A    Closed nondisplaced fracture of fourth metatarsal bone of left foot S92.345A    Positive blood culture R78.81    PAD (peripheral artery disease) ([facility]) I73.9    Surgical wound dehiscence T81.31XA    Open wnd of foot, left, subsequent encounter S91.302D       Current Facility-Administered Medications   Medication Dose Route Frequency    0.9% sodium chloride infusion  25 mL/hr IntraVENous CONTINUOUS    famotidine (PEPCID) tablet 20 mg  20 mg Oral QPM    lidocaine 4 % patch 1 Patch  1 Patch TransDERmal Q24H    vit B Cmplx 3-FA-Vit C-Biotin (NEPHRO NIKITA RX) tablet 1 Tablet  1 Tablet Oral DAILY    diphenhydrAMINE (BENADRYL) injection 12.5 mg  12.5 mg IntraVENous Q6H PRN    ammonium lactate (LAC-HYDRIN) 12 % lotion   Topical BID PRN    Saccharomyces boulardii (FLORASTOR) capsule 500 mg  500 mg Oral BID    insulin lispro (HUMALOG) injection   SubCUTAneous AC&HS    insulin glargine (LANTUS) injection 15 Units  15 Units SubCUTAneous QHS    epoetin lisette-epbx (RETACRIT) injection 8,000 Units  8,000 Units SubCUTAneous Q TUE, THU & SAT    loperamide (IMODIUM) capsule 2 mg  2 mg Oral Q4H PRN    nystatin (MYCOSTATIN) 100,000 unit/gram powder   Topical BID    alum-mag hydroxide-simeth (MYLANTA) oral suspension 30 mL  30 mL Oral Q4H PRN    glucose chewable tablet 16 g  16 g Oral PRN    glucagon (GLUCAGEN) injection 1 mg  1 mg IntraMUSCular PRN    heparin (porcine) 1,000 unit/mL injection 3,600 Units  3,600 Units IntraCATHeter DIALYSIS PRN    dextrose 10% infusion 0-250 mL  0-250 mL IntraVENous PRN    0.9% sodium chloride infusion  25 mL/hr IntraVENous DIALYSIS PRN    clopidogreL (PLAVIX) tablet 75 mg  75 mg Oral DAILY    carvediloL (COREG) tablet 12.5 mg  12.5 mg Oral BID    aspirin chewable tablet 81 mg  81 mg Oral DAILY    amLODIPine (NORVASC) tablet 10 mg  10 mg Oral DAILY    allopurinoL (ZYLOPRIM) tablet 100 mg  100 mg Oral DAILY    ascorbic acid (vitamin C) (VITAMIN C) tablet 500 mg  500 mg Oral DAILY    ezetimibe (ZETIA) tablet 10 mg  10 mg Oral DAILY    sevelamer carbonate (RENVELA) tab 1,600 mg  1,600 mg Oral TID WITH MEALS    sodium chloride (NS) flush 5-40 mL  5-40 mL IntraVENous Q8H    sodium chloride (NS) flush 5-40 mL  5-40 mL IntraVENous PRN    acetaminophen (TYLENOL) tablet 650 mg  650 mg Oral Q6H PRN    Or    acetaminophen (TYLENOL) suppository 650 mg  650 mg Rectal Q6H PRN    bisacodyL (DULCOLAX) suppository 10 mg  10 mg Rectal DAILY PRN    promethazine (PHENERGAN) tablet 12.5 mg  12.5 mg Oral Q6H PRN    Or    ondansetron (ZOFRAN) injection 4 mg  4 mg IntraVENous Q6H PRN    heparin (porcine) injection 5,000 Units  5,000 Units SubCUTAneous Q8H    oxyCODONE-acetaminophen (PERCOCET) 5-325 mg per tablet 1 Tablet  1 Tablet Oral Q6H PRN         Review of Systems - General ROS: negative for - chills, fever, or night sweats  Respiratory ROS: no cough, shortness of breath, or wheezing  Cardiovascular ROS: no chest pain or dyspnea on exertion  Gastrointestinal ROS: no abdominal pain, change in bowel habits, or black or bloody stools       Objective:    Visit Vitals  BP (!) (P) 149/65 (BP 1 Location: Right upper arm)   Pulse (P) 86   Temp (P) 98.3 °F (36.8 °C)   Resp (P) 16   Ht 6' (1.829 m)   Wt 101.5 kg (223 lb 12.8 oz)   SpO2 (P) 94%   BMI 30.35 kg/m²       Temp (24hrs), Av.6 °F (36.4 °C), Min:97.3 °F (36.3 °C), Max:98.3 °F (36.8 °C)    Right HD cath in place    GEN: WDWN AAM in NAD  HEENT: anicteric  CHEST: Non laboured breathing  EXT: L foot is dressed-- wound not seen. Lab results:    Chemistry  Recent Labs     23  0547   *   *   K 5.7*   CL 98*   CO2 26   BUN 68*   CREA 9.69*   CA 9.1   AGAP 10   BUCR 7*   ALB 2.2*       CBC w/ Diff  No results for input(s): WBC, RBC, HGB, HCT, PLT, GRANS, LYMPH, EOS, HGBEXT, HCTEXT, PLTEXT, HGBEXT, HCTEXT, PLTEXT in the last 72 hours.         Microbiology  All Micro Results       Procedure Component Value Units Date/Time    CULTURE, Leonie Bertrand STAIN [[id number]]  (Abnormal)  (Susceptibility) Collected: 23 1245    Order Status: Completed Specimen: Wound from Foot Updated: 01/10/23 0996     Special Requests: NO SPECIAL REQUESTS        GRAM STAIN RARE WBCS SEEN               RARE EPITHELIAL CELLS SEEN                  RARE GRAM POSITIVE COCCI IN PAIRS           Culture result:       LIGHT Serratia fonticola PIP/KRISTOPHER BULLOCK TODD = 27, SENSITIVE                  MODERATE * VANCOMYCIN RESISTANT ENTEROCOCCUS FAECIUM *                  LIGHT ALCALIGENES FAECALIS                  LIGHT ALCALIGENES FAECALIS (2ND COLONY TYPE/STRAIN) (NOTE) VRE CALLED TO  KIM MAI 1/9 CMP  VRE CALLED TO DR MCCARTHY ON 1/9/23. AOW    CULTURE, ANAEROBIC [[id number]] Collected: 01/06/23 1245    Order Status: Completed Specimen: Foot, left Updated: 01/08/23 1538     Special Requests: NO SPECIAL REQUESTS        Culture result: NO ANAEROBES ISOLATED       CULTURE, BLOOD [[id number]] Collected: 12/24/22 1745    Order Status: Completed Specimen: Blood Updated: 12/30/22 0710     Special Requests: NO SPECIAL REQUESTS        Culture result: NO GROWTH 6 DAYS       CULTURE, BLOOD [[id number]] Collected: 12/24/22 1745    Order Status: Completed Specimen: Blood Updated: 12/30/22 0710     Special Requests: NO SPECIAL REQUESTS        Culture result: NO GROWTH 6 DAYS       CULTURE, BLOOD [[id number]] Collected: 12/23/22 2052    Order Status: Completed Specimen: Blood Updated: 12/29/22 0746     Special Requests: NO SPECIAL REQUESTS        Culture result: NO GROWTH 6 DAYS       COVID-19 WITH INFLUENZA A/B [[id number]] Collected: 12/24/22 1941    Order Status: Completed Specimen: Nasopharyngeal Updated: 12/24/22 2028     SARS-CoV-2 by PCR Not detected        Comment: Not Detected results do not preclude SARS-CoV-2 infection and should not be used as the sole basis for patient management decisions. Results must be combined with clinical observations, patient history, and epidemiological information. Influenza A by PCR Not detected        Influenza B by PCR Not detected        Comment: Testing was performed using cheryl Alla SARS-CoV-2 and Influenza A/B nucleic acid assay. This test is a multiplex Real-Time Reverse Transcriptase Polymerase Chain Reaction (RT-PCR) based in vitro diagnostic test intended for the qualitative detection of nucleic acids from SARS-CoV-2, Influenza A, and Influenza B in nasopharyngeal for use under the FDA's Emergency Use Authorization(EAU) only.        Fact sheet for Patients: FindDrives.pl  Fact sheet for Healthcare Providers: FindDrives.pl         CULTURE, BLOOD 2nd DRAW (required for [facility]) [[id number]] Collected: 12/09/22 1215    Order Status: Completed Specimen: Blood Updated: 12/15/22 0940     Special Requests: LEFT HAND     Culture result: NO GROWTH 6 DAYS       CULTURE, WOUND [place] Dobbs STAIN [[id number]]  (Abnormal)  (Susceptibility) Collected: 12/09/22 2201    Order Status: Completed Specimen: Wound from Foot Updated: 12/15/22 0650     Special Requests: NO SPECIAL REQUESTS        GRAM STAIN RARE WBCS SEEN               2+ APPARENT GRAM VARIABLE RODS           Culture result:       HEAVY Serratia fonticola PIPTAZ = 28, SENSITIVE                  HEAVY ALCALIGENES FAECALIS                  HEAVY ALCALIGENES FAECALIS (2ND COLONY TYPE/STRAIN)                  HEAVY ENTEROBACTER CLOACAE                  HEAVY MIXED SKIN TO ISOLATED          CULTURE, ANAEROBIC [[id number]]  (Abnormal) Collected: 12/09/22 2201    Order Status: Completed Specimen: Foot, left Updated: 12/13/22 1246     Special Requests: NO SPECIAL REQUESTS        Culture result:       MODERATE BACTEROIDES VULGATUS BETA LACTAMASE POSITIVE          CULTURE, BLOOD [[id number]]  (Abnormal) Collected: 12/09/22 1155    Order Status: Completed Specimen: Blood Updated: 12/12/22 0743     Special Requests: LEFT AC     GRAM STAIN       ANAEROBIC BOTTLE GRAM POSITIVE COCCI IN CLUSTERS                  SMEAR CALLED TO AND CORRECTLY REPEATED BY: Thompson Gomez RN 3S 12/10/22 AT 1147 BY ANI           Culture result:       STAPHYLOCOCCUS SPECIES, COAGULASE NEGATIVE GROWING IN 1 OF 2 BOTTLES DRAWN  SITE = LAC          BLOOD CULTURE ID PANEL [[id number]]  (Abnormal) Collected: 12/09/22 1145    Order Status: Completed Specimen: Blood Updated: 12/11/22 0655     Acc. no. from Micro Order [id number]     Enterococcus faecalis Not detected        Enterococcus faecium Not detected        Listeria monocytogenes Not detected        Staphylococcus Detected Staphylococcus aureus Not detected        Staph epidermidis Detected        Staph lugdunensis Not detected        Streptococcus Not detected        Streptococcus agalactiae (Group B) Not detected        Streptococcus pneumoniae Not detected        Streptococcus pyogenes (Group A) Not detected        Acinetobacter calcoaceticus-baumanii complex Not detected        Bacteroides fragilis Not detected        Enterobacterales species Not detected        Enterobacter cloacae complex Not detected        Escherichia coli Not detected        Klebsiella aerogenes Not detected        Klebsiella oxytoca Not detected        Klebsiella pneumoniae group Not detected        Proteus Not detected        Salmonella Not detected        Serratia marcescens Not detected        Haemophilus influenzae Not detected        Neisseria meningitidis Not detected        Pseudomonas aeruginosa Not detected        Steno maltophilia Not detected        Candida albicans Not detected        Candida auris Not detected        Candida glabrata Not detected        Candida krusei Not detected        Candida parapsilosis Not detected        Candida tropicalis Not detected        Crypto neoformans/gattii Not detected        RESISTANT GENES:            MECA/C (Methicillin resistant gene) Detected        Comment       False positive results may rarely occur.  Correlate with clinical,epidemiologic, and other laboratory findings           Comment: Please see BCID Interpretation Guide in EPIC Links       C. DIFFICILE AG & TOXIN A/B [[id number]]     Order Status: Canceled Specimen: Stool            Osiris Otero MD  Office #:  [id number]  Fax #: [phone]

## 2023-01-11 NOTE — PROGRESS NOTES
Problem: Mobility Impaired (Adult and Pediatric)  Goal: *Acute Goals and Plan of Care (Insert Text)  Description: Physical Therapy Goals   Updated 1/10/2023 and to be accomplished within 7 day(s)  1. Patient will transfer from supine to/from sit EOB with mod I/S for completion of ADL/strengthening activity EOB. 2.  Patient will demonstrate intact sitting balance EOB x 15 min with midline position for completion of ADL/strengthening activity EOB  3. Patient will demonstrate HEP performance for ROM/strengthening UE's (R LE if okayed by Podiatry) in order to achieve above goals and performance st discharge. Physical Therapy Goals   Updated 1/3/2023 and to be accomplished within 7 day(s)  1. Patient will transfer from bed <> chair via slide board with min assist for time up in chair for completion of ADL activity. 2.  Patient will demonstrate ability to perform w/c management with SBA for functional w/c level mobility. 3.  Patient will demonstrate HEP performance for LE ROM/strengthening in order to achieve above goals and performance st discharge. Physical Therapy Goals   Updated 12/23/2022 and to be accomplished within 7 day(s)  1. Patient will move from supine <> sit with mod I in prep for out of bed activity and change of position. 2.  Patient will transfer from bed <> chair via slide board with min assist for time up in chair for completion of ADL activity. 3.  Patient will demonstrate ability to perform w/c management with SBA for functional w/c level mobility. 4.  Patient will demonstrate HEP performance for LE ROM/strengthening in order to achieve above goals and performance st discharge. Physical Therapy Goals  Initiated 12/11/2022  1. Patient will move from supine to sit and sit to supine  in bed with supervision/set-up within 7 day(s).     2.  Patient will transfer from bed to chair and chair to bed with minimal assistance/contact guard assist using the least restrictive device within 7 day(s). 3.  Patient will perform sit to stand with minimal assistance/contact guard assist within 7 day(s). 4.  Patient will ambulate with minimal assistance/contact guard assist for 10 feet with the least restrictive device within 7 day(s). Outcome: Progressing Towards Goal    PHYSICAL THERAPY RE-EVALUATION    Patient: Paris Lewis (83 y.o. male)  Date: 1/10/2023  Primary Diagnosis: Diabetic foot infection ([facility] [E11.628, L08.9]  Leukocytosis [D72.829]  Procedure(s) (LRB):  LEFT FOOT DEBRIDEMENT,APPLICATION OF STRAVIX GRAFT, MAC W/LOCAL ANESTHESIA,PATIENT IS IN ROOM 327. (Left) 4 Days Post-Op   Precautions:   Fall, NWB (L foot; wound vac L foot;)\", Upper body PT NWB on left foot  PLOF: w/c level functioning PTA; lives alone in [facility] with 3 TAMY, no ramp (neighbor assist in bringing w/c up/down steps per pt report)       ASSESSMENT :  Pt seen on medical unit s/p surgery L foot as noted above and precautions as noted. Pt seen with OT for second set of skilled hands. Found supine in bed and reports L foot pain 6/10. IV and wound vac distal L foot in place. Able to participate with bed level activity including supine<>sit CGA/Additional time, sitting EOB balance activity (pt ending to lean on L arm after ~20sec midline due to fatigue), and scooting up in bed with Contact guard assistance;Minimum assistance with bed in Trendelenburg position, vc/tc and L foot held off bed to maintain NWB. Pt may benefit from continued PT to address goals above. Recommend rehab at [facility] upon discharge for progression to max level of functional independence. Patient will benefit from skilled intervention to address the above impairments.   Patient's rehabilitation potential is considered to be Fair  Factors which may influence rehabilitation potential include:   []         None noted  []         Mental ability/status  [x]         Medical condition  [x]         Home/family situation and support systems  [] Safety awareness  []         Pain tolerance/management  []         Other:      PLAN :  Recommendations and Planned Interventions:   [x]           Bed Mobility Training             [x]    Neuromuscular Re-Education  []           Transfer Training                   []    Orthotic/Prosthetic Training  []           Gait Training                          []    Modalities  [x]           Therapeutic Exercises           []    Edema Management/Control  [x]           Therapeutic Activities            []    Family Training/Education  [x]           Patient Education  []           Other (comment):    Frequency/Duration: Patient will be followed by physical therapy 1-2 times per day/4-7 days per week to address goals. Discharge Recommendations: Skilled Nursing Facility  Further Equipment Recommendations for Discharge: TBD at next level of care    [facility]: 9/10    This [facility] score should be considered in conjunction with interdisciplinary team recommendations to determine the most appropriate discharge setting. Patient's social support, diagnosis, medical stability, and prior level of function should also be taken into consideration. SUBJECTIVE:   Patient stated I feel okay; it hurts a little.     OBJECTIVE DATA SUMMARY:   Hospital course since last seen and reason for re-evaluation: as noted above  Past Medical History:   Diagnosis Date    CAD (coronary artery disease)     Chronic kidney disease     Diabetes mellitus     Hypertension     Sleep apnea      Past Surgical History:   Procedure Laterality Date    HX ORTHOPAEDIC      HX PACEMAKER      IR INSERT TUNL CVC W/O PORT OVER 5 YR  07/07/2022    IR INSERT TUNL CVC W/O PORT OVER 5 YR  6/29/2022    IR REMOVE TUNL CVAD W/O PORT / PUMP  8/12/2022    [place] UNLISTED PROCEDURE CARDIAC SURGERY       Barriers to Learning/Limitations: yes;  physical  Compensate with: Visual Cues, Verbal Cues, and Tactile Cues  Home Situation:   Home Situation  Home Environment: Private residence  # Steps to Enter: 3  Wheelchair Ramp: No  One/Two Story Residence: One story  Living Alone: Yes  Support Systems: Friend/Neighbor  Patient Expects to be Discharged to[de-identified] Skilled nursing facility  Current DME Used/Available at Home: Thenatalya [place], [place], Walker, rollator, Wheelchair, Commode, bedside (3 in 1 currently over commode)  Tub or Shower Type: Shower  Critical Behavior:  Neurologic State: Alert; Appropriate for age  Orientation Level: Oriented X4  Cognition: Follows commands  Safety/Judgement: Awareness of environment  Psychosocial  Patient Behaviors: Calm; Cooperative  Purposeful Interaction: Yes  Pt Identified Daily Priority: Clinical issues (comment)  Caritas Process: Nurture loving kindness;Enable jenny/hope;Establish trust;Teaching/learning; Attend basic human needs;Create healing environment  Caring Interventions: Reassure; Therapeutic modalities  Reassure: Therapeutic listening; Acceptance; Instilling jenny and hope;Caring rounds  Skin Condition/Temp: Warm;Dry;Flaky; Peeling  Skin Integrity: Incision (comment); Wound (add Wound LDA)  Skin Integumentary  Skin Color: Appropriate for ethnicity  Skin Condition/Temp: Warm;Dry;Flaky; Peeling  Skin Integrity: Incision (comment); Wound (add Wound LDA)  Turgor: Non-tenting  Strength:    Strength: Generally decreased, functional  Tone & Sensation:   Tone: Normal  Sensation: Impaired (LE's)  Range Of Motion:  AROM: Generally decreased, functional  Functional Mobility:  Bed Mobility:  Supine to Sit: Contact guard assistance; Additional time  Sit to Supine: Contact guard assistance  Scooting: Contact guard assistance;Bed Modified;Minimum assistance (Trendelenburg position, vc/tc and L foot held off bed to maintain NWB)  Balance:   Sitting: Impaired  Sitting - Static: Fair (occasional)  Sitting - Dynamic: Fair (occasional)  Standing:  (not tested)  Pain:  Pain level pre-treatment: 6/10   Pain level post-treatment: 6/10   Pain Intervention(s) : Medication (see MAR);  Rest, Ice, Repositioning Response to intervention: Nurse notified, See doc flow  Activity Tolerance:   Fair   Please refer to the flowsheet for vital signs taken during this treatment. After treatment:   []         Patient left in no apparent distress sitting up in chair  [x]         Patient left in no apparent distress in bed  [x]         Call bell left within reach  []         Nursing notified  []         Caregiver present  []         Bed alarm activated  []         SCDs applied    COMMUNICATION/EDUCATION:   [x]         Role of Physical Therapy in the acute care setting. [x]         Fall prevention education was provided and the patient/caregiver indicated understanding. [x]         Patient/family have participated as able in goal setting and plan of care. [x]         Patient/family agree to work toward stated goals and plan of care. []         Patient understands intent and goals of therapy, but is neutral about his/her participation. []         Patient is unable to participate in goal setting/plan of care: ongoing with therapy staff.  []         Other: Thank you for this referral.  Manuel Farrell, PT   Time Calculation: 18 mins    Phil Carballo AM-PAC® Basic Mobility Inpatient Short Form (6-Clicks) Version 2    How much HELP from another person does the patient currently need    (If the patient hasn't done an activity recently, how much help from another person do you think he/she would need if he/she tried?)   Total (Total A or Dep)   A Lot  (Mod to Max A)   A Little (Sup or Min A)   None (Mod I to I)   Turning from your back to your side while in a flat bed without using bedrails? [] 1 [] 2 [x] 3 [] 4   2. Moving from lying on your back to sitting on the side of a flat bed without using bedrails? [] 1 [] 2 [x] 3 [] 4   3. Moving to and from a bed to a chair (including a wheelchair)? [x] 1 [] 2 [] 3 [] 4   4. Standing up from a chair using your arms (e.g., wheelchair, or bedside chair)? [x] 1 [] 2 [] 3 [] 4   5. Walking in hospital room? [x] 1 [] 2 [] 3 [] 4   6. Climbing 3-5 steps with a railing?+   [] 1 [] 2 [] 3 [] 4   +If stair climbing cannot be assessed, skip item #6. Sum responses from items 1-5. Based on an AM-PAC score of /24 (9/20 if omitting stairs) and their current functional mobility deficits, it is recommended that the patient have 3-5 sessions per week of Physical Therapy at d/c to increase the patient's independence.

## 2023-01-11 NOTE — PROGRESS NOTES
Hospitalist Progress Note-critical care note     Patient: Neyda Stapleton MRN: [id number]  CSN: [id number]    YOB: 1960  Age: 60 y.o.   Sex: male    DOA: 12/9/2022 LOS:  LOS: 33 days            Chief complaint: pad , foot infection dm esrd     Assessment/Plan         Hospital Problems  Date Reviewed: 1/6/2023            Codes Class Noted POA    * (Principal) Surgical wound dehiscence ICD-10-CM: T81.31XA  ICD-9-CM: 998.32  1/6/2023 Unknown        Open wnd of foot, left, subsequent encounter ICD-10-CM: S91.302D  ICD-9-CM: V58.89, 892.0  1/6/2023 Unknown        PAD (peripheral artery disease) ([facility] ICD-10-CM: I73.9  ICD-9-CM: 443.9  12/27/2022 Unknown        Positive blood culture ICD-10-CM: R78.81  ICD-9-CM: 790.7  12/11/2022 Unknown        Leukocytosis ICD-10-CM: D72.829  ICD-9-CM: 288.60  12/9/2022 Unknown        Diabetic foot infection ([facility] ICD-10-CM: E11.628, L08.9  ICD-9-CM: 250.80, 686.9  12/9/2022 Unknown        Diarrhea ICD-10-CM: R19.7  ICD-9-CM: 787.91  12/9/2022 Unknown        Type 2 diabetes mellitus, with long-term current use of insulin ([facility] ICD-10-CM: E11.9, Z79.4  ICD-9-CM: 250.00, V58.67  Unknown Unknown        Acute hematogenous osteomyelitis of left foot ([facility] ICD-10-CM: M86.072  ICD-9-CM: 730.07  12/9/2022 Unknown        Nondisplaced fracture of second metatarsal bone of left foot ICD-10-CM: Z11.942Q  ICD-9-CM: 825.25  12/9/2022 Unknown        Nondisplaced fracture of third metatarsal bone of left foot ICD-10-CM: S83.039R  ICD-9-CM: 825.25  12/9/2022 Unknown        Closed nondisplaced fracture of fourth metatarsal bone of left foot ICD-10-CM: O49.817C  ICD-9-CM: 825.25  12/9/2022 Unknown        Hypertension ICD-10-CM: I10  ICD-9-CM: 401.9  7/21/2022 Yes        CAD (coronary artery disease) ICD-10-CM: I25.10  ICD-9-CM: 414.00  6/28/2022 Yes        ESRD (end stage renal disease) ([facility] ICD-10-CM: N18.6  ICD-9-CM: 585.6  6/28/2022 Yes        Diabetes mellitus with foot ulcer [facility]) ICD-10-CM: E11.621, L97.509  ICD-9-CM: 250.80, 707.15  6/27/2022 Yes       60 y.o. male with history of diabetes, diabetic ulcer, CAD, hyperlipidemia, hypertension end-stage renal disease on HD presented to ER due to left foot lesion. Gangrene of left foot/DFU   PARTIAL AMPUTATION OF LEFT 2ND, 3RD, 4TH METATARSALS, AMPUTATION OF TOES 2,3,4, INCISION ADN DRAINAGE LEFT FOOT on 12/09/2022  S/p angiogram with PTA of the proximal PT and peroneal arteries. Vascular planning repeat procedure on 12/28-tolerated well   S/p Left TMA 12/21/2022. Received graft  on 1/6   ID and Dr. Walter Ni follow up pt   Continue wound vac      History of C-diff -   Was on oral vancomycin      Positive blood cx -  Coag negative staph  Likely contaminant contamination      CAD-   Distal RCA to drug-eluting stent in July 2022, continue plavix -   No chest pain     Hypertension -  continue home medication     End stage renal disease - hyperkalemia -hd today   on HD per nephrology-will have hd today      DM  type II -  Lantus and ssi     Hyperkalemia   Will have hd today      Subjective I feel fine ,      D/c planning -still waiting placement       Disposition :in 1-2 days   Review of systems:    General: No fevers or chills. Cardiovascular: No chest pain or pressure. No palpitations. Pulmonary: No shortness of breath. Gastrointestinal: No nausea, vomiting. Vital signs/Intake and Output:  Visit Vitals  BP (!) 142/58 (BP 1 Location: Right upper arm, BP Patient Position: At rest)   Pulse 75   Temp 98.5 °F (36.9 °C)   Resp 18   Ht 6' (1.829 m)   Wt 101.9 kg (224 lb 9.6 oz)   SpO2 96%   BMI 30.46 kg/m²     Current Shift:  No intake/output data recorded. Last three shifts:  01/09 1901 - 01/11 0700  In: -   Out: 2500     Physical Exam:  General: WD, WN. Alert, cooperative, no acute distress    HEENT: NC, Atraumatic. PERRLA, anicteric sclerae. Muscle spasm of rt side of neck   Lungs: CTA Bilaterally.  No Wheezing/Rhonchi/Rales. Heart:  Regular  rhythm,  No murmur, No Rubs, No Gallops  Abdomen: Soft, Non distended, Non tender. +Bowel sounds,   Extremities: No c/c, bilateral foot wrapped with ace bandage and wound vac noted    Psych:   Not anxious or agitated. Neurologic:  No acute neurological deficit. Labs: Results:       Chemistry Recent Labs     01/11/23 0319 01/09/23  0547   * 159*    134*   K 5.0 5.7*    98*   CO2 26 26   BUN 50* 68*   CREA 8.00* 9.69*   CA 8.8 9.1   AGAP 10 10   BUCR 6* 7*   ALB 2.1* 2.2*        CBC w/Diff No results for input(s): WBC, RBC, HGB, HCT, PLT, GRANS, LYMPH, EOS, HGBEXT, HCTEXT, PLTEXT, HGBEXT, HCTEXT, PLTEXT in the last 72 hours. Cardiac Enzymes No results for input(s): CPK, CKND1, PAULO in the last 72 hours. No lab exists for component: CKRMB, TROIP   Coagulation No results for input(s): PTP, INR, APTT, INREXT, INREXT in the last 72 hours. Lipid Panel Lab Results   Component Value Date/Time    Cholesterol, total 188 07/19/2022 03:12 AM    HDL Cholesterol 42 07/19/2022 03:12 AM    LDL, calculated 131.2 (H) 07/19/2022 03:12 AM    VLDL, calculated 14.8 07/19/2022 03:12 AM    Triglyceride 74 07/19/2022 03:12 AM    CHOL/HDL Ratio 4.5 07/19/2022 03:12 AM      BNP No results for input(s): BNPP in the last 72 hours. Liver Enzymes Recent Labs     01/11/23 0319   ALB 2.1*        Thyroid Studies No results found for: T4, T3U, TSH, TSHEXT, TSHEXT     Procedures/imaging: see electronic medical records for all procedures/Xrays and details which were not copied into this note but were reviewed prior to creation of Plan    XR FOOT LT AP/LAT    Result Date: 12/22/2022  EXAM: XR FOOT LT AP/LAT CLINICAL INDICATION/HISTORY:  POST OP XRAY   > Additional: None COMPARISON: 12/9/2022 TECHNIQUE: AP and lateral views _______________ FINDINGS: Interval transmetatarsal amputation. The metatarsal stumps are not entirely sharp or well-defined.  Midfoot ossicles appear intact and normally aligned with unremarkable hindfoot bones. Soft tissues covering the amputation stump are edematous. There are some gas bubbles within the soft tissues as would be expected following recent surgery. There is extensive arterial vascular calcification, Monckeberg sclerosis pattern, characteristic of long-standing diabetes. _______________     Status post TMA as described. The indistinctness of the metatarsal stumps is concerning for residual infection although ultimately nonspecific. Follow-up imaging suggested within several weeks for reassessment. XR FOOT LT AP/LAT    Result Date: 12/9/2022  EXAM: 2 radiographic views of the left foot. INDICATION: Left foot pain. COMPARISON: December 9, 2022 _______________ FINDINGS: There is been interval amputation of the second, third, and fourth rays at the level of the metatarsal diaphysis. As before, the first ray is amputated at the metatarsal phalangeal joint. The small toe remains. There are expected postoperative findings to include regional air density and soft tissue swelling. There is Monckeberg type vascular calcification. There is low-grade mid foot degeneration. _______________     Standard immediate postoperative findings. _______________     XR FOOT LT MIN 3 V    Result Date: 12/9/2022  EXAM: XR FOOT LT MIN 3 V CLINICAL INDICATION/HISTORY: Gangrenous 2nd digit   > Additional: None. COMPARISON: None. TECHNIQUE: 3 views left foot _______________ FINDINGS: Soft tissue gas formation is seen along the second digit with ill-defined limitus changes extending along the medial forefoot. Prior amputation first right. Patchy demineralization and osseous erosions are seen involving the distal portion of the first metatarsal head. Additional patchy areas of osseous erosion are also noted involving the proximal phalanx of the second toe. Diffuse soft tissue swelling. Diffuse atherosclerotic vascular calcifications.  No retained radiopaque foreign object. _______________     Soft gas formation and ulceration involving the medial forefoot with findings concerning for osteomyelitis involving the first and second rays described above. MRI FOOT LT WO CONT    Result Date: 12/9/2022  EXAM: MRI FOOT LT WO CONT CLINICAL INDICATION/HISTORY: Foot wound; preoperative  >Additional: None COMPARISON: Radiograph performed December 9, 2022  >Reference exam: None. TECHNIQUE: Axial long axis TI and T2 with fat saturation, sagittal and coronal short axis TI and STIR sequences through the foot were obtained without contrast. _______________ FINDINGS: FIRST RAY: Prior dictation the first digit. There is mild bony proliferative change at the head of the first metatarsal with preserved marrow signal. Minimal patchy edema is noted which is nonspecific. No definite cortical destructive change. SECOND RAY: Nondisplaced obliquely oriented fracture through the head neck junction of the second metatarsal. There is heterogeneous diminished T1 marrow with patchy edema and surrounding soft tissue gas along the course of the second digit with associated subluxation of the distal phalanx. Subtle diminished T1 marrow is noted with suspected foci of intraosseous gas, concerning for acute osteomyelitis. THIRD RAY: Nondisplaced fracture through the third metatarsal neck with slight impaction. Mild edema is noted within the third digit, possibly stress reaction. No convincing osseous erosions or T1 marrow signal change. FOURTH RAY: Nondisplaced fractures of the neck of the fourth metatarsal. Minimal edema in the proximal phalanx but otherwise preserved marrow signal. FIFTH RAY: Unremarkable. Additional degenerative changes with patchy edema, joint space narrowing, and osteophyte formation at the midfoot, likely related to underlying Charcot arthropathy. SOFT TISSUES: Soft tissue irregularity with gas and ulceration around the second digit noted.  Diffuse fluid signal seen throughout the intrinsic foot musculature, commonly seen in the setting of diabetes. Mild skin thickening about the forefoot. INCIDENTAL FINDINGS: None. _______________     1. Marrow signal abnormality with soft tissue wound and gas around the second digit concerning for acute osteomyelitis. 2.  Nondisplaced fractures of the head neck junction of the second-fourth metatarsals. 3.  Prior indication the first digit. 4.  Soft tissue swelling and skin thickening about the forefoot concerning for cellulitis. No drainable abscess. 5.  Additional chronic/degenerative changes of the foot. CT ABD PELV WO CONT    Result Date: 12/9/2022  EXAM: CT of the abdomen and pelvis INDICATION: Abdominal pain with distention. COMPARISON: None. TECHNIQUE: Axial CT imaging of the abdomen and pelvis was performed without intravenous contrast. Multiplanar reformats were generated. One or more dose reduction techniques were used on this CT: automated exposure control, adjustment of the mAs and/or kVp according to patient size, and iterative reconstruction techniques. The specific techniques used on this CT exam have been documented in the patient's electronic medical record. Digital Imaging and Communications in Medicine (DICOM) format image data are available to nonaffiliated external healthcare facilities or entities on a secure, media free, reciprocally searchable basis with patient authorization for at least a 12-month period after this study. _______________ FINDINGS: LOWER CHEST: Partial inclusion of multivessel coronary arterial atherosclerosis and central venous catheter. Clear lung bases. Several punctate 2 to 3 mm pulmonary nodule seen in the right lower lobe (image 8) LIVER, BILIARY: Liver is normal. No biliary dilation. Color contains a gallstone without evidence of dilatation or gallbladder wall thickening. PANCREAS: Normal. SPLEEN: Punctate granulomatous calcifications otherwise unremarkable.  ADRENALS: Mild adreniform thickening of each adrenal gland. KIDNEYS/URETERS/BLADDER: No hydronephrosis. Bilateral renal hypodensities are present either to small to accurately characterize or in keeping with simple cysts which are prior no further dedicated imaging follow-up. Bladder decompressed. PELVIC ORGANS: Unremarkable. VASCULATURE: Diffuse aortic and visceral arterial atherosclerosis without evidence of aneurysmal dilatation. LYMPH NODES: Scattered subcentimeter mesenteric and retroperitoneal lymph nodes are demonstrated without evidence of adenopathy. Prominent left inguinal lymph node is present (image 127) measuring approximately 15 mm in short axis dimension. GASTROINTESTINAL TRACT: No bowel dilation or wall thickening. Peritoneal gas. Normal appendix. Scattered colonic diverticula without evidence of diverticulitis. BONES: No acute or aggressive osseous abnormalities identified. OTHER: None. _______________     1. Cholelithiasis without evidence of cholecystitis. 2. No abnormal bowel wall thickening or dilatation. 3. No hydronephrosis or obstructive uropathy. 4. Several small right lower lobe pulmonary nodules (2-3 mm in size). See below guidelines for proposed follow-up. 5. Borderline enlarged and nonspecific left inguinal lymph node, potentially reactive in etiology. ======== Fleischner Society pulmonary nodule guidelines (revised 2017): Multiple solid nodules <6 mm: -Low risk for lung cancer: No follow-up. -High risk for lung cancer: Optional chest CT in 12 months. XR CHEST PORT    Result Date: 12/23/2022  EXAM: CHEST RADIOGRAPH CLINICAL INDICATION/HISTORY: sepsis -Additional: None COMPARISON: 12/9/2022 TECHNIQUE: Portable frontal view of the chest _______________ FINDINGS: SUPPORT DEVICES: Indwelling dialysis catheter, tip in the mid to lower SVC. HEART AND MEDIASTINUM: No appreciable cardiomegaly. Remaining mediastinal contours within normal limits. LUNGS AND PLEURAL SPACES: Clear. No consolidation, mass or effusion.  BONY THORAX AND SOFT TISSUES: Unremarkable. _______________     No active cardiopulmonary disease. XR CHEST PORT    Result Date: 12/9/2022  EXAM: XR CHEST PORT CLINICAL INDICATION/HISTORY: sepsis -Additional: None COMPARISON: July 12, 2022 TECHNIQUE: Portable frontal view of the chest _______________ FINDINGS: SUPPORT DEVICES: Right IJ approach dialysis catheter in stable position. HEART AND MEDIASTINUM: Stable appearing cardiac size and mediastinal contours. LUNGS AND PLEURAL SPACES: Lungs are clear. No focal pneumonic opacity. No pneumothorax or pleural effusion. BONY THORAX AND SOFT TISSUES: Unremarkable. _______________     No active cardiopulmonary disease. ANKLE BRACHIAL INDEX    Result Date: 12/21/2022  · Right ankle/brachial index is 1.19 · The left ankle/ brachial index is 0.89      ANKLE BRACHIAL INDEX    Result Date: 12/15/2022  Falsely elevated Ankle Brachial Index pressure measurements noted due to calcified vessels with abnormal waveforms bilaterally. Unable to get left brachial pressure due to dialysis access graft. The right toe/brachial index is 0.59  With a toe pressure of 82 mmhg. Unable to get left toe pressure due to left toes amputation. DUPLEX LOWER EXT ARTERY BILAT    Result Date: 12/15/2022   Elevated velocities (169 cm/s) in the left proximal femoral artery consistent with mild stenosis. Bilateral tibial arteries are patnet at the ankle. Bilateral anterior tibial artery is occluded proximally but reconstituted distally by the collateral flow.         Renan Cifuentes MD

## 2023-01-11 NOTE — PROGRESS NOTES
Problem: Diabetes Self-Management  Goal: *Disease process and treatment process  Description: Define diabetes and identify own type of diabetes; list 3 options for treating diabetes. Outcome: Progressing Towards Goal  Goal: *Incorporating nutritional management into lifestyle  Description: Describe effect of type, amount and timing of food on blood glucose; list 3 methods for planning meals. Outcome: Progressing Towards Goal  Goal: *Incorporating physical activity into lifestyle  Description: State effect of exercise on blood glucose levels. Outcome: Progressing Towards Goal  Goal: *Developing strategies to promote health/change behavior  Description: Define the ABC's of diabetes; identify appropriate screenings, schedule and personal plan for screenings. Outcome: Progressing Towards Goal  Goal: *Using medications safely  Description: State effect of diabetes medications on diabetes; name diabetes medication taking, action and side effects. Outcome: Progressing Towards Goal  Goal: *Monitoring blood glucose, interpreting and using results  Description: Identify recommended blood glucose targets  and personal targets. Outcome: Progressing Towards Goal  Goal: *Prevention, detection, treatment of acute complications  Description: List symptoms of hyper- and hypoglycemia; describe how to treat low blood sugar and actions for lowering  high blood glucose level. Outcome: Progressing Towards Goal  Goal: *Prevention, detection and treatment of chronic complications  Description: Define the natural course of diabetes and describe the relationship of blood glucose levels to long term complications of diabetes.   Outcome: Progressing Towards Goal  Goal: *Developing strategies to address psychosocial issues  Description: Describe feelings about living with diabetes; identify support needed and support network  Outcome: Progressing Towards Goal  Goal: *Sick day guidelines  Outcome: Progressing Towards Goal  Goal: *Patient Specific Goal (EDIT GOAL, INSERT TEXT)  Outcome: Progressing Towards Goal     Problem: Patient Education: Go to Patient Education Activity  Goal: Patient/Family Education  Outcome: Progressing Towards Goal     Problem: Falls - Risk of  Goal: *Absence of Falls  Description: Document Cory Sienna Fall Risk and appropriate interventions in the flowsheet. Outcome: Progressing Towards Goal  Note: Fall Risk Interventions:  Mobility Interventions: Bed/chair exit alarm, Strengthening exercises (ROM-active/passive), Utilize walker, cane, or other assistive device         Medication Interventions: Bed/chair exit alarm, Patient to call before getting OOB, Teach patient to arise slowly    Elimination Interventions: Call light in reach    History of Falls Interventions: Bed/chair exit alarm, Utilize gait belt for transfer/ambulation         Problem: Patient Education: Go to Patient Education Activity  Goal: Patient/Family Education  Outcome: Progressing Towards Goal     Problem: Chronic Renal Failure  Goal: *Fluid and electrolytes stabilized  Outcome: Progressing Towards Goal     Problem: Patient Education: Go to Patient Education Activity  Goal: Patient/Family Education  Outcome: Progressing Towards Goal     Problem: Pressure Injury - Risk of  Goal: *Prevention of pressure injury  Description: Document Semaj Scale and appropriate interventions in the flowsheet.   Outcome: Progressing Towards Goal  Note: Pressure Injury Interventions:  Sensory Interventions: Minimize linen layers, Pressure redistribution bed/mattress (bed type)    Moisture Interventions: Absorbent underpads, Limit adult briefs    Activity Interventions: Pressure redistribution bed/mattress(bed type), PT/OT evaluation    Mobility Interventions: Assess need for specialty bed, Pressure redistribution bed/mattress (bed type), PT/OT evaluation    Nutrition Interventions: Document food/fluid/supplement intake    Friction and Shear Interventions: Minimize layers Problem: Patient Education: Go to Patient Education Activity  Goal: Patient/Family Education  Outcome: Progressing Towards Goal     Problem: Patient Education: Go to Patient Education Activity  Goal: Patient/Family Education  Outcome: Progressing Towards Goal     Problem: Patient Education: Go to Patient Education Activity  Goal: Patient/Family Education  Outcome: Progressing Towards Goal     Problem: Pain  Goal: *Control of Pain  Outcome: Progressing Towards Goal

## 2023-01-11 NOTE — PROGRESS NOTES
Hospitalist Progress Note    Patient: Claudette Augusta MRN: [id number]  [facility]: [id number]    YOB: 1960  Age: 60 y.o. Sex: male    DOA: 12/9/2022 LOS:  LOS: 32 days                Assessment/Plan     Patient Active Problem List   Diagnosis Code    Diabetes mellitus with foot ulcer ([facility] E11.621, L97.509    CAD (coronary artery disease) I25.10    ESRD (end stage renal disease) ([facility] N18.6    Acute hematogenous osteomyelitis of right foot ([facility]) M86.071    Cellulitis of right heel L03.115    Diabetic foot ulcer with osteomyelitis ([facility] E11.621, E11.69, L97.509, M86.9    Ulcer of right heel, with necrosis of bone ([facility] L97.414    Infection and inflammatory reaction due to internal fixation device of other site, initial encounter [facility]) T84.69XA    Left arm swelling M79.89    Chest pain at rest R07.9    Abnormal nuclear stress test R94.39    History of anemia due to CKD N18.9, Z86.2    S/P angioplasty with stent Z95.820    Hypertension I10    Leukocytosis D72.829    Diabetic foot infection (HCC) E11.628, L08.9    Diarrhea R19.7    Type 2 diabetes mellitus, with long-term current use of insulin (HCC) E11.9, Z79.4    Acute hematogenous osteomyelitis of left foot ([facility]) M86.072    Nondisplaced fracture of second metatarsal bone of left foot S92.325A    Nondisplaced fracture of third metatarsal bone of left foot S92.335A    Closed nondisplaced fracture of fourth metatarsal bone of left foot S92.345A    Positive blood culture R78.81    PAD (peripheral artery disease) ([facility]) I73.9    Surgical wound dehiscence T81.31XA    Open wnd of foot, left, subsequent encounter S91.302D        Chief complaint :  Foot gangrene, DFU  60 y.o. male with history of diabetes, diabetic ulcer, CAD, hyperlipidemia, hypertension end-stage renal disease on HD presented to ER due to left foot lesion.        Gangrene of left foot/DFU   PARTIAL AMPUTATION OF LEFT 2ND, 3RD, 4TH METATARSALS, AMPUTATION OF TOES 2,3,4, INCISION ADN DRAINAGE LEFT FOOT on 12/09/2022  S/p angiogram with PTA of the proximal PT and peroneal arteries. Vascular planning repeat procedure today. S/p Left TMA 12/21/2022. S/p balloon angioplasty left LE 12/28/2022  S/p graft 01/06/2023    Positive blood cx -  Coag negative staph  Likely contaminant contamination      CAD-   Distal RCA to drug-eluting stent in July 2022, continue plavix -   No chest pain    Hypertension -  continue home medication     End stage renal disease -  on HD per nephrology     DM  type II -  Lantus and ssi     Disposition : await placement    Review of systems  General: No fevers or chills. Cardiovascular: No chest pain or pressure. No palpitations. Pulmonary: No shortness of breath. Gastrointestinal: No nausea, vomiting. Physical Exam:  General: Awake, cooperative, no acute distress    HEENT: NC, Atraumatic. PERRLA, anicteric sclerae. Lungs: CTA Bilaterally. No Wheezing/Rhonchi/Rales. Heart:  S1 S2,  No murmur, No Rubs, No Gallops  Abdomen: Soft, Non distended, Non tender. +Bowel sounds,   Extremities: Left foot with dressing. Psych:   Not anxious or agitated. Neurologic:  No acute neurological deficit. Vital signs/Intake and Output:  Visit Vitals  BP (!) 141/74   Pulse 84   Temp 97.3 °F (36.3 °C) (Temporal)   Resp 18   Ht 6' (1.829 m)   Wt 101.5 kg (223 lb 12.8 oz)   SpO2 98%   BMI 30.35 kg/m²     Current Shift:  01/10 0701 - 01/10 1900  In: -   Out: 2500   Last three shifts:  01/08 1901 - 01/10 0700  In: 283 [P.O.:120; I.V.:163]  Out: 0             Labs: Results:       Chemistry Recent Labs     01/09/23  0547   *   *   K 5.7*   CL 98*   CO2 26   BUN 68*   CREA 9.69*   CA 9.1   AGAP 10   BUCR 7*   ALB 2.2*      CBC w/Diff No results for input(s): WBC, RBC, HGB, HCT, PLT, GRANS, LYMPH, EOS, HGBEXT, HCTEXT, PLTEXT, HGBEXT, HCTEXT, PLTEXT in the last 72 hours. Cardiac Enzymes No results for input(s): CPK, CKND1, PAULO in the last 72 hours.     No lab exists for component: CKRMB, TROIP   Coagulation No results for input(s): PTP, INR, APTT, INREXT, INREXT in the last 72 hours. Lipid Panel Lab Results   Component Value Date/Time    Cholesterol, total 188 07/19/2022 03:12 AM    HDL Cholesterol 42 07/19/2022 03:12 AM    LDL, calculated 131.2 (H) 07/19/2022 03:12 AM    VLDL, calculated 14.8 07/19/2022 03:12 AM    Triglyceride 74 07/19/2022 03:12 AM    CHOL/HDL Ratio 4.5 07/19/2022 03:12 AM      BNP No results for input(s): BNPP in the last 72 hours.    Liver Enzymes Recent Labs     01/09/23  0547   ALB 2.2*      Thyroid Studies No results found for: T4, T3U, TSH, TSHEXT, TSHEXT     Procedures/imaging: see electronic medical records for all procedures/Xrays and details which were not copied into this note but were reviewed prior to creation of Plan

## 2023-01-11 NOTE — PROGRESS NOTES
Nephrology Inpatient Progress note    Subjective:     Edwardo Palomino is a 60 y.o. male with a PMHx of  DM, HTN. PVD, ESRD on HD TTS at [facility] under the [address] [place] [address] Division . Nephrology sent in for admission by wound care clinic due to left foot infection ,was told he needed surgery. Podiatry has been in to see pt. He has been on abx recently     S/P Lt TMA.     CT head neg  S/p HD yesterday, no complaints today    Admit Date: 12/9/2022  Principal Problem:    Surgical wound dehiscence (1/6/2023)    Active Problems:    Diabetes mellitus with foot ulcer ([address] (6/27/2022)      CAD (coronary artery disease) (6/28/2022)      ESRD (end stage renal disease) ([address] (6/28/2022)      Hypertension (7/21/2022)      Leukocytosis (12/9/2022)      Diabetic foot infection ([address] (12/9/2022)      Diarrhea (12/9/2022)      Type 2 diabetes mellitus, with long-term current use of insulin ([facility]) ()      Acute hematogenous osteomyelitis of left foot ([address] (12/9/2022)      Nondisplaced fracture of second metatarsal bone of left foot (12/9/2022)      Nondisplaced fracture of third metatarsal bone of left foot (12/9/2022)      Closed nondisplaced fracture of fourth metatarsal bone of left foot (12/9/2022)      Positive blood culture (12/11/2022)      PAD (peripheral artery disease) ([facility] (12/27/2022)      Open wnd of foot, left, subsequent encounter (1/6/2023)    Current Facility-Administered Medications   Medication Dose Route Frequency    0.9% sodium chloride infusion  25 mL/hr IntraVENous CONTINUOUS    famotidine (PEPCID) tablet 20 mg  20 mg Oral QPM    lidocaine 4 % patch 1 Patch  1 Patch TransDERmal Q24H    vit B Cmplx 3-FA-Vit C-Biotin (NEPHRO NIKITA RX) tablet 1 Tablet  1 Tablet Oral DAILY    diphenhydrAMINE (BENADRYL) injection 12.5 mg  12.5 mg IntraVENous Q6H PRN    ammonium lactate (LAC-HYDRIN) 12 % lotion   Topical BID PRN    Saccharomyces boulardii (FLORASTOR) capsule 500 mg  500 mg Oral BID    insulin lispro (HUMALOG) injection SubCUTAneous AC&HS    insulin glargine (LANTUS) injection 15 Units  15 Units SubCUTAneous QHS    epoetin lisette-epbx (RETACRIT) injection 8,000 Units  8,000 Units SubCUTAneous Q TUE, THU & SAT    loperamide (IMODIUM) capsule 2 mg  2 mg Oral Q4H PRN    nystatin (MYCOSTATIN) 100,000 unit/gram powder   Topical BID    alum-mag hydroxide-simeth (MYLANTA) oral suspension 30 mL  30 mL Oral Q4H PRN    glucose chewable tablet 16 g  16 g Oral PRN    glucagon (GLUCAGEN) injection 1 mg  1 mg IntraMUSCular PRN    heparin (porcine) 1,000 unit/mL injection 3,600 Units  3,600 Units IntraCATHeter DIALYSIS PRN    dextrose 10% infusion 0-250 mL  0-250 mL IntraVENous PRN    0.9% sodium chloride infusion  25 mL/hr IntraVENous DIALYSIS PRN    clopidogreL (PLAVIX) tablet 75 mg  75 mg Oral DAILY    carvediloL (COREG) tablet 12.5 mg  12.5 mg Oral BID    aspirin chewable tablet 81 mg  81 mg Oral DAILY    amLODIPine (NORVASC) tablet 10 mg  10 mg Oral DAILY    allopurinoL (ZYLOPRIM) tablet 100 mg  100 mg Oral DAILY    ascorbic acid (vitamin C) (VITAMIN C) tablet 500 mg  500 mg Oral DAILY    ezetimibe (ZETIA) tablet 10 mg  10 mg Oral DAILY    sevelamer carbonate (RENVELA) tab 1,600 mg  1,600 mg Oral TID WITH MEALS    sodium chloride (NS) flush 5-40 mL  5-40 mL IntraVENous Q8H    sodium chloride (NS) flush 5-40 mL  5-40 mL IntraVENous PRN    acetaminophen (TYLENOL) tablet 650 mg  650 mg Oral Q6H PRN    Or    acetaminophen (TYLENOL) suppository 650 mg  650 mg Rectal Q6H PRN    bisacodyL (DULCOLAX) suppository 10 mg  10 mg Rectal DAILY PRN    promethazine (PHENERGAN) tablet 12.5 mg  12.5 mg Oral Q6H PRN    Or    ondansetron (ZOFRAN) injection 4 mg  4 mg IntraVENous Q6H PRN    heparin (porcine) injection 5,000 Units  5,000 Units SubCUTAneous Q8H    oxyCODONE-acetaminophen (PERCOCET) 5-325 mg per tablet 1 Tablet  1 Tablet Oral Q6H PRN         Allergy:   No Known Allergies     Objective:     Visit Vitals  BP (!) 134/51 (BP 1 Location: Right upper arm, BP Patient Position: At rest)   Pulse 79   Temp 97.9 °F (36.6 °C)   Resp 20   Ht 6' (1.829 m)   Wt 101.9 kg (224 lb 9.6 oz)   SpO2 94%   BMI 30.46 kg/m²         Intake/Output Summary (Last 24 hours) at 1/11/2023 1019  Last data filed at 1/10/2023 1551  Gross per 24 hour   Intake --   Output 2500 ml   Net -2500 ml         Physical Exam:       General: No resp distress   HENT: Atraumatic and normocephalic   Eyes: Normal conjunctiva   Neck: Supple +JVD but no mass   Cardiovascular: Normal S1 & S2, no m/r/g   Pulmonary/Chest Wall: Clear to auscultation bilaterally   Abdominal: Soft and +NABS   Musculoskeletal: No edema S/p Amputation of multiple toes Left foot.  Wound Vac in place   Neurological: No focal deficits    HD Access: [facility] +    Data Review:  Lab Results   Component Value Date/Time    Sodium 136 01/11/2023 03:19 AM    Potassium 5.0 01/11/2023 03:19 AM    Chloride 100 01/11/2023 03:19 AM    CO2 26 01/11/2023 03:19 AM    Anion gap 10 01/11/2023 03:19 AM    Glucose 150 (H) 01/11/2023 03:19 AM    BUN 50 (H) 01/11/2023 03:19 AM    Creatinine 8.00 (H) 01/11/2023 03:19 AM    BUN/Creatinine ratio 6 (L) 01/11/2023 03:19 AM    GFR est AA 13 (L) 07/21/2022 02:15 PM    GFR est non-AA 11 (L) 07/21/2022 02:15 PM    Calcium 8.8 01/11/2023 03:19 AM     Lab Results   Component Value Date/Time    WBC 13.5 (H) 01/05/2023 03:48 PM    HGB 8.3 (L) 01/05/2023 03:48 PM    HCT 26.1 (L) 01/05/2023 03:48 PM    PLATELET 806 99/00/3857 03:48 PM    MCV 95.6 01/05/2023 03:48 PM     Lab Results   Component Value Date/Time    Calcium 8.8 01/11/2023 03:19 AM    Phosphorus 4.8 01/11/2023 03:19 AM     No results found for: IRON, FE, TIBC, IBCT, PSAT, FERR  No results found for: FERR      Impression:     ESRD on HD TTS  Hyperkalemia, resolved  Left diabetic foot infection w/ gangrenous changes s/p Lt 2/3/4 metatarsal amputation   DM  HTN  PVD, seen by Vasc Surgery, s/p LLE angio 12/28  Anemia in CKD  SHPT  Hyperkalemia    Plan:     HD tomorrow  Cont DALTON for Anemia  Cont low K diet  Continue antibiotics management per ID  Wound Care      Yony Lackey MD,   VIA [facility]

## 2023-01-11 NOTE — PROGRESS NOTES
Problem: Self Care Deficits Care Plan (Adult)  Goal: *Acute Goals and Plan of Care (Insert Text)  Description: Initial Occupational Therapy Goals (12/11/2022) Within 7 day(s):  1. Patient will perform perform grooming seated EOB for 5 minutes for increased independence in ADLs. 2. Patient will perform UB dressing with set up seated EOB for increased independence with ADLs. 3. Patient will perform LB dressing with mod I & A/E PRN for increased independence with ADLs. 4. Patient will perform all aspects of toileting with mod I for increased independence with ADLs. 5. Patient will perform LE ADLs utilizing body mechanics & adaptive strategies with 1 verbal cue for increased safety in ADLs. 6. Patient will independently apply energy conservation techniques with no verbal cue(s) for increased independence with ADLs. 7. Patient will perform transfer from bed to w/c with mod I and no LOB. Revised 12/25/2022; continue with current POC for 7 days    Revised 1/10/2023: within 7 days  Occupational Therapy Goals  1. Patient will perform grooming with minimal assistance/contact guard assist seated EOB for 5 minutes. 2.  Patient will perform upper body dressing with independence. 3.  Patient will participate in upper extremity therapeutic exercise/activities with independence for 10 minutes. 4.  Patient will utilize energy conservation techniques during functional activities with verbal, visual, and tactile cues. Outcome: Progressing Towards Goal    OCCUPATIONAL THERAPY RE-EVALUATION    Patient: Nataly Murphy (17 y.o. male)  Date: 1/10/2023  Primary Diagnosis: Diabetic foot infection ([facility] [E11.628, L08.9]  Leukocytosis [D72.829]  Procedure(s) (LRB):  LEFT FOOT DEBRIDEMENT,APPLICATION OF STRAVIX GRAFT, MAC W/LOCAL ANESTHESIA,PATIENT IS IN ROOM 327.  (Left) 4 Days Post-Op   Precautions:   Fall, NWB (L LE)  PLOF:     ASSESSMENT :  Based on the objective data described below, the patient presents with decreased strength, endurance and balance for carryover of ADLs and transfers following above mentioned surgical procedure. Pt co-treat with PT for the need of another set of skilled hands and safety with transfers/ADLs. Pt presented supine in bed and agrees to participate with therapy. Pt performed supine<>sit; tolerate sitting EOB for ~5 minutes before returning to bed. Pt with fair- dynamic sitting balance during this session. Pt on strict NWB precautions at LLE and requires min-CGA to maintain NWB during transfers. Pt was left supine in bed with HOB elevated at the end of session. Patient will benefit from skilled intervention to address the above impairments. Patient's rehabilitation potential is considered to be Good  Factors which may influence rehabilitation potential include:   []             None noted  []             Mental ability/status  [x]             Medical condition  []             Home/family situation and support systems  []             Safety awareness  []             Pain tolerance/management  []             Other:      PLAN :  Recommendations and Planned Interventions:   [x]               Self Care Training                  [x]      Therapeutic Activities  [x]               Functional Mobility Training   []      Cognitive Retraining  [x]               Therapeutic Exercises           [x]      Endurance Activities  [x]               Balance Training                    []      Neuromuscular Re-Education  []               Visual/Perceptual Training     [x]      Home Safety Training  [x]               Patient Education                   [x]      Family Training/Education  []               Other (comment):    Frequency/Duration: Patient will be followed by occupational therapy 3 times a week to address goals.   Discharge Recommendations: Skilled Nursing Facility  Further Equipment Recommendations for Discharge: N/A    AMPAC: 15/24    This AMPA score should be considered in conjunction with interdisciplinary team recommendations to determine the most appropriate discharge setting. Patient's social support, diagnosis, medical stability, and prior level of function should also be taken into consideration. SUBJECTIVE:   Patient stated I am not in pain.     OBJECTIVE DATA SUMMARY:   Hospital course since last seen and reason for reevaluation: post procedure at [facility]   Past Medical History:   Diagnosis Date    CAD (coronary artery disease)     Chronic kidney disease     Diabetes mellitus     Hypertension     Sleep apnea      Past Surgical History:   Procedure Laterality Date    HX ORTHOPAEDIC      HX PACEMAKER      IR INSERT TUNL CVC W/O PORT OVER 5 YR  07/07/2022    IR INSERT TUNL CVC W/O PORT OVER 5 YR  6/29/2022    IR REMOVE TUNL CVAD W/O PORT / PUMP  8/12/2022    ND UNLISTED PROCEDURE CARDIAC SURGERY       Barriers to Learning/Limitations: None  Compensate with: visual, verbal, tactile, kinesthetic cues/model    Home Situation:   Home Situation  Home Environment: Private residence  # Steps to Enter: 3  Wheelchair Ramp: No  One/Two Story Residence: One story  Living Alone: Yes  Support Systems: Friend/Neighbor  Patient Expects to be Discharged to[de-identified] Skilled nursing facility  Current DME Used/Available at Home: [employer], [place], Walker, rollator, Wheelchair, Commode, bedside (3 in 1 currently over commode)  Tub or Shower Type: Shower  []  Right hand dominant   []  Left hand dominant    Cognitive/Behavioral Status:  Neurologic State: Alert  Orientation Level: Oriented X4  Cognition: Follows commands  Safety/Judgement: Fall prevention    Skin: intact  Edema: none    Vision/Perceptual:    Tracking: Able to track stimulus in all quadrants w/o difficulty    Coordination: BUE  Coordination: Generally decreased, functional  Fine Motor Skills-Upper: Left Intact; Right Intact    Gross Motor Skills-Upper: Left Intact; Right Intact  Balance:  Sitting: Impaired  Sitting - Static: Fair (occasional)  Sitting - Dynamic: Fair (occasional)  Standing:  (not tested)  Strength: BUE  Strength: Generally decreased, functional  Tone & Sensation: BUE  Tone: Normal  Sensation: Intact  Range of Motion: BUE  AROM: Generally decreased, functional  PROM: Generally decreased, functional  Functional Mobility and Transfers for ADLs:  Bed Mobility:  Supine to Sit: Contact guard assistance; Additional time  Sit to Supine: Contact guard assistance;Minimum assistance  Scooting: Contact guard assistance;Minimum assistance  ADL Assessment:   Feeding: Independent    Oral Facial Hygiene/Grooming: Minimum assistance    Upper Body Dressing: Minimum assistance    Lower Body Dressing: Maximum assistance    Toileting: Maximum assistance  ADL Intervention:  Cognitive Retraining  Safety/Judgement: Fall prevention  Pain:  Pain level pre-treatment: 6/10   Pain level post-treatment: 6/10  Pain Intervention(s): Medication (see MAR); Rest, Ice, Repositioning   Response to intervention: Nurse notified, See doc flow    Activity Tolerance:   Fair+  Please refer to the flowsheet for vital signs taken during this treatment. After treatment:   [] Patient left in no apparent distress sitting up in chair  [x] Patient left in no apparent distress in bed  [x] Call bell left within reach  [x] Nursing notified  [] Caregiver present  [] Bed alarm activated    COMMUNICATION/EDUCATION:   [x] Role of Occupational Therapy in the acute care setting  [x] Home safety education was provided and the patient/caregiver indicated understanding. [x] Patient/family have participated as able in goal setting and plan of care. [x] Patient/family agree to work toward stated goals and plan of care. [] Patient understands intent and goals of therapy, but is neutral about his/her participation. [] Patient is unable to participate in goal setting and plan of care.     Thank you for this referral.  Thuy Kebede, OTR/L  Time Calculation: 18 mins    [facility] AM-PAC® Daily Activity Inpatient Short Form (6-Clicks)*    How much HELP from another person does the patient currently need    (If the patient hasn't done an activity recently, how much help from another person do you think he/she would need if he/she tried?)   Total (Total A or Dep)   A Lot  (Mod to Max A)   A Little (Sup or Min A)   None (Mod I to I)   Putting on and taking off regular lower body clothing? [x] 1 [] 2 [] 3 [] 4   2. Bathing (including washing, rinsing,      drying)? [] 1 [x] 2 [] 3 [] 4   3. Toileting, which includes using toilet, bedpan or urinal?   [] 1 [x] 2 [] 3 [] 4   4. Putting on and taking off regular upper body clothing? [] 1 [] 2 [x] 3 [] 4   5. Taking care of personal grooming such as brushing teeth? [] 1 [] 2 [x] 3 [] 4   6. Eating meals? [] 1 [] 2 [] 3 [x] 4      Based on an AM-PAC score of **/24 and their current ADL deficits; it is recommended that the patient have 5-7 sessions per week of Occupational Therapy at d/c to increase the patient's independence. Currently, this patient demonstrates the potential endurance, and/or tolerance for 3 hours of therapy each day at d/c. Based on an AM-PAC score of **/24 and their current ADL deficits; it is recommended that the patient have 3-5 sessions per week of Occupational Therapy at d/c to increase the patient's independence. Based on an AM-PAC score of **/24 and their current ADL deficits; it is recommended that the patient have 2-3 sessions per week of Occupational Therapy at d/c to increase the patient's independence. At this time and based on an AM-PAC score of **/24, no further OT is recommended upon discharge due to (i.e. patient at baseline functional statusetc). Recommend patient returns to prior setting with prior services.

## 2023-01-12 VITALS
HEART RATE: 77 BPM | DIASTOLIC BLOOD PRESSURE: 65 MMHG | SYSTOLIC BLOOD PRESSURE: 139 MMHG | BODY MASS INDEX: 30.34 KG/M2 | OXYGEN SATURATION: 18 % | HEIGHT: 72 IN | TEMPERATURE: 97.5 F | RESPIRATION RATE: 20 BRPM | WEIGHT: 224 LBS

## 2023-01-12 LAB
GLUCOSE BLD STRIP.AUTO-MCNC: 125 MG/DL (ref 70–110)
GLUCOSE BLD STRIP.AUTO-MCNC: 139 MG/DL (ref 70–110)
GLUCOSE BLD STRIP.AUTO-MCNC: 204 MG/DL (ref 70–110)
MAGNESIUM SERPL-MCNC: 2.4 MG/DL (ref 1.6–2.6)

## 2023-01-12 PROCEDURE — 74011636637 HC RX REV CODE- 636/637: Performed by: HOSPITALIST

## 2023-01-12 PROCEDURE — 74011000250 HC RX REV CODE- 250: Performed by: HOSPITALIST

## 2023-01-12 PROCEDURE — 74011250636 HC RX REV CODE- 250/636: Performed by: HOSPITALIST

## 2023-01-12 PROCEDURE — 74011250637 HC RX REV CODE- 250/637: Performed by: HOSPITALIST

## 2023-01-12 PROCEDURE — 83735 ASSAY OF MAGNESIUM: CPT

## 2023-01-12 PROCEDURE — 36415 COLL VENOUS BLD VENIPUNCTURE: CPT

## 2023-01-12 PROCEDURE — 82962 GLUCOSE BLOOD TEST: CPT

## 2023-01-12 PROCEDURE — 90935 HEMODIALYSIS ONE EVALUATION: CPT

## 2023-01-12 RX ORDER — GABAPENTIN 300 MG/1
300 CAPSULE ORAL
Qty: 2 CAPSULE | Refills: 0 | Status: SHIPPED | OUTPATIENT
Start: 2023-01-12

## 2023-01-12 RX ADMIN — CARVEDILOL 12.5 MG: 12.5 TABLET, FILM COATED ORAL at 08:48

## 2023-01-12 RX ADMIN — Medication 4 UNITS: at 17:13

## 2023-01-12 RX ADMIN — Medication 500 MG: at 08:48

## 2023-01-12 RX ADMIN — ASPIRIN 81 MG: 81 TABLET, CHEWABLE ORAL at 08:48

## 2023-01-12 RX ADMIN — EZETIMIBE 10 MG: 10 TABLET ORAL at 08:48

## 2023-01-12 RX ADMIN — SEVELAMER CARBONATE 1600 MG: 800 TABLET, FILM COATED ORAL at 08:48

## 2023-01-12 RX ADMIN — ALLOPURINOL 100 MG: 100 TABLET ORAL at 08:48

## 2023-01-12 RX ADMIN — SODIUM CHLORIDE, PRESERVATIVE FREE 10 ML: 5 INJECTION INTRAVENOUS at 14:00

## 2023-01-12 RX ADMIN — AMLODIPINE BESYLATE 10 MG: 5 TABLET ORAL at 08:48

## 2023-01-12 RX ADMIN — HEPARIN SODIUM 5000 UNITS: 5000 INJECTION INTRAVENOUS; SUBCUTANEOUS at 06:29

## 2023-01-12 RX ADMIN — SEVELAMER CARBONATE 1600 MG: 800 TABLET, FILM COATED ORAL at 12:04

## 2023-01-12 RX ADMIN — B-COMPLEX W/ C & FOLIC ACID TAB 1 MG 1 TABLET: 1 TAB at 08:48

## 2023-01-12 RX ADMIN — FAMOTIDINE 20 MG: 20 TABLET, FILM COATED ORAL at 17:10

## 2023-01-12 RX ADMIN — SEVELAMER CARBONATE 1600 MG: 800 TABLET, FILM COATED ORAL at 17:10

## 2023-01-12 RX ADMIN — CLOPIDOGREL BISULFATE 75 MG: 75 TABLET ORAL at 08:48

## 2023-01-12 NOTE — PROGRESS NOTES
Pt laying in bed during shift assessment. He is alert and oriented and accepting of care today. Pt does not complain of pain at this time. Wound vac in place to left foot. To be removed soon to prepare for transport to rehab. Wound care orders will be sent with patient. HD currently running. No further concerns at this time.

## 2023-01-12 NOTE — PROGRESS NOTES
D/C Plan: [address] has been obtained. Anticipate pt will transition to the above facility today following dialysis. Pt is aware and in agreement with this plan. Pt states he will be using [employer] to get to and from dialysis. Pt states he will just have to provide a 24 hour notice to coordinate transportation. CM to continue to follow and assist.        Transition of care to SNF: [facility]      Communication to Patient/Family:  Met with patient and family, and they are agreeable to the transition plan. The Plan for Transition of Care is related to the following treatment goals: SNF     The Patient and/or patient representative  was provided with a choice of provider and agrees   with the discharge plan. Yes [x] No []     Freedom of choice list was provided with basic dialogue that supports the patient's individualized plan of care/goals and shares the quality data associated with the providers. Yes [x] No []     SNF/Rehab Transition:  Patient has been accepted to [facility] at [address] [place] [address], [address] [place] [address], and meets criteria for admission. Patient will transported by medical transport and expected to leave today following dialysis at 5:00pm.       Communication to SNF/Rehab:  Bedside RN has been notified to update the transition plan to the facility and call report [phone]. Discharge information has been updated on the AVS and sent via [facility] and/or CC link. Discharge instructions to be fax'd to facility at [phone]     Please include all hard scripts for controlled substances, med rec and dc summary, and AVS in packet. Please medicate for pain prior to dc if possible and needed to help offset delay when patient first arrives to facility.      Reviewed and confirmed with facility, [address] can manage the patient care needs for the following:      Faby Workmans with (X) only those applicable:  Medication:  []Medications are available at the facility  []IV Antibiotics    []Controlled Substance - hard copies available sent. []Weekly Labs     Equipment:  []CPAP/BiPAP  []Wound Vacuum  []Mcmahon or Urinary Device  []PICC/Central Line  []Nebulizer  []Ventilator     Treatment:  []Isolation (for MRSA, VRE, etc.)  []Surgical Drain Management  []Tracheostomy Care  []Dressing Changes  []Dialysis with transportation  []PEG Care  []Oxygen  []Daily Weights for Heart Failure     Dietary:  []Any diet limitations  []Tube Feedings   []Total Parenteral Management (TPN)     Financial Resources:  []Medicaid Application Completed     []UAI Completed and copy given to pt/family     []A screening has previously been completed. []Level II Completed     [] Private pay individual who will not become   financially eligible for Medicaid within 6 months from admission to a [address] [place] [address] facility. [] Individual refused to have screening conducted. []Medicaid Application Completed     []The screening denied because it was determined individual did not need/did not qualify for nursing facility level of care. [] Out of state residents seeking direct admission to a [address] facility. [] Individuals who are inpatients of an out of state hospital, or in state or out of state veterans/ hospital and seek direct admission to a [address] facility  [] Individuals who are pateints or residents of a state owned/operated facility that is licensed by [employer] of [employer] ([employer]) and seek direct admission to [address] [place] [address]  [] A screening not required for enrollment in [address] [address] [address] services as set out in [address] [place] [address] 04-  [] [facility]) staff shall perform screenings of the [facility] clients. Advanced Care Plan:  []Surrogate Decision Maker of Care  []POA  []Communicated Code Status and copy sent.      Other:             Care Management Interventions  PCP Verified by CM: Yes  Mode of Transport at Discharge: BLS  Transition of Care Consult (CM Consult): SNF  Health Maintenance Reviewed: Yes  Physical Therapy Consult: Yes  Occupational Therapy Consult: Yes  Support Systems: Friend/Neighbor  The Plan for Transition of Care is Related to the Following Treatment Goals : SNF  The Patient and/or Patient Representative was Provided with a Choice of Provider and Agrees with the Discharge Plan?: Yes  Name of the Patient Representative Who was Provided with a Choice of Provider and Agrees with the Discharge Plan: pt  Freedom of Choice List was Provided with Basic Dialogue that Supports the Patient's Individualized Plan of Care/Goals, Treatment Preferences and Shares the Quality Data Associated with the Providers?: Yes  Discharge Location  Patient Expects to be Discharged to[de-identified] Skilled nursing facility

## 2023-01-12 NOTE — PROGRESS NOTES
Nephrology Inpatient Progress note    Subjective:     Bay Stuart is a 60 y.o. male with a PMHx of  DM, HTN. PVD, ESRD on HD TTS at [facility] under the [address] [place] [address]. Nephrology sent in for admission by wound care clinic due to left foot infection ,was told he needed surgery. Podiatry has been in to see pt. He has been on abx recently     S/P Lt TMA.     CT head neg    -Seen on HD, no complaints today    Admit Date: 12/9/2022  Principal Problem:    Surgical wound dehiscence (1/6/2023)    Active Problems:    Diabetes mellitus with foot ulcer ([address] (6/27/2022)      CAD (coronary artery disease) (6/28/2022)      ESRD (end stage renal disease) ([address] (6/28/2022)      Hypertension (7/21/2022)      Leukocytosis (12/9/2022)      Diabetic foot infection ([address] (12/9/2022)      Diarrhea (12/9/2022)      Type 2 diabetes mellitus, with long-term current use of insulin ([facility]) ()      Acute hematogenous osteomyelitis of left foot ([address] (12/9/2022)      Nondisplaced fracture of second metatarsal bone of left foot (12/9/2022)      Nondisplaced fracture of third metatarsal bone of left foot (12/9/2022)      Closed nondisplaced fracture of fourth metatarsal bone of left foot (12/9/2022)      Positive blood culture (12/11/2022)      PAD (peripheral artery disease) ([facility] (12/27/2022)      Open wnd of foot, left, subsequent encounter (1/6/2023)    Current Facility-Administered Medications   Medication Dose Route Frequency    0.9% sodium chloride infusion  25 mL/hr IntraVENous CONTINUOUS    famotidine (PEPCID) tablet 20 mg  20 mg Oral QPM    lidocaine 4 % patch 1 Patch  1 Patch TransDERmal Q24H    vit B Cmplx 3-FA-Vit C-Biotin (NEPHRO NIKITA RX) tablet 1 Tablet  1 Tablet Oral DAILY    diphenhydrAMINE (BENADRYL) injection 12.5 mg  12.5 mg IntraVENous Q6H PRN    ammonium lactate (LAC-HYDRIN) 12 % lotion   Topical BID PRN    Saccharomyces boulardii (FLORASTOR) capsule 500 mg  500 mg Oral BID    insulin lispro (HUMALOG) injection SubCUTAneous AC&HS    insulin glargine (LANTUS) injection 15 Units  15 Units SubCUTAneous QHS    epoetin lisette-epbx (RETACRIT) injection 8,000 Units  8,000 Units SubCUTAneous Q TUE, THU & SAT    loperamide (IMODIUM) capsule 2 mg  2 mg Oral Q4H PRN    nystatin (MYCOSTATIN) 100,000 unit/gram powder   Topical BID    alum-mag hydroxide-simeth (MYLANTA) oral suspension 30 mL  30 mL Oral Q4H PRN    glucose chewable tablet 16 g  16 g Oral PRN    glucagon (GLUCAGEN) injection 1 mg  1 mg IntraMUSCular PRN    heparin (porcine) 1,000 unit/mL injection 3,600 Units  3,600 Units IntraCATHeter DIALYSIS PRN    dextrose 10% infusion 0-250 mL  0-250 mL IntraVENous PRN    0.9% sodium chloride infusion  25 mL/hr IntraVENous DIALYSIS PRN    clopidogreL (PLAVIX) tablet 75 mg  75 mg Oral DAILY    carvediloL (COREG) tablet 12.5 mg  12.5 mg Oral BID    aspirin chewable tablet 81 mg  81 mg Oral DAILY    amLODIPine (NORVASC) tablet 10 mg  10 mg Oral DAILY    allopurinoL (ZYLOPRIM) tablet 100 mg  100 mg Oral DAILY    ascorbic acid (vitamin C) (VITAMIN C) tablet 500 mg  500 mg Oral DAILY    ezetimibe (ZETIA) tablet 10 mg  10 mg Oral DAILY    sevelamer carbonate (RENVELA) tab 1,600 mg  1,600 mg Oral TID WITH MEALS    sodium chloride (NS) flush 5-40 mL  5-40 mL IntraVENous Q8H    sodium chloride (NS) flush 5-40 mL  5-40 mL IntraVENous PRN    acetaminophen (TYLENOL) tablet 650 mg  650 mg Oral Q6H PRN    Or    acetaminophen (TYLENOL) suppository 650 mg  650 mg Rectal Q6H PRN    bisacodyL (DULCOLAX) suppository 10 mg  10 mg Rectal DAILY PRN    promethazine (PHENERGAN) tablet 12.5 mg  12.5 mg Oral Q6H PRN    Or    ondansetron (ZOFRAN) injection 4 mg  4 mg IntraVENous Q6H PRN    heparin (porcine) injection 5,000 Units  5,000 Units SubCUTAneous Q8H    oxyCODONE-acetaminophen (PERCOCET) 5-325 mg per tablet 1 Tablet  1 Tablet Oral Q6H PRN         Allergy:   No Known Allergies     Objective:     Visit Vitals  BP (!) 152/61   Pulse 72   Temp 97.5 °F (36.4 °C)   Resp 16   Ht 6' (1.829 m)   Wt 101.6 kg (224 lb)   SpO2 (!) 18%   BMI 30.38 kg/m²         Intake/Output Summary (Last 24 hours) at 1/12/2023 1456  Last data filed at 1/12/2023 0853  Gross per 24 hour   Intake 480 ml   Output 25 ml   Net 455 ml         Physical Exam:       General: No resp distress   HENT: Atraumatic and normocephalic   Eyes: Normal conjunctiva   Neck: Supple +JVD but no mass   Cardiovascular: Normal S1 & S2, no m/r/g   Pulmonary/Chest Wall: Clear to auscultation bilaterally   Abdominal: Soft and +NABS   Musculoskeletal: No edema S/p Amputation of multiple toes Left foot.  Wound Vac in place   Neurological: No focal deficits    HD Access: [facility] +    Data Review:  Lab Results   Component Value Date/Time    Sodium 136 01/11/2023 03:19 AM    Potassium 5.0 01/11/2023 03:19 AM    Chloride 100 01/11/2023 03:19 AM    CO2 26 01/11/2023 03:19 AM    Anion gap 10 01/11/2023 03:19 AM    Glucose 150 (H) 01/11/2023 03:19 AM    BUN 50 (H) 01/11/2023 03:19 AM    Creatinine 8.00 (H) 01/11/2023 03:19 AM    BUN/Creatinine ratio 6 (L) 01/11/2023 03:19 AM    GFR est AA 13 (L) 07/21/2022 02:15 PM    GFR est non-AA 11 (L) 07/21/2022 02:15 PM    Calcium 8.8 01/11/2023 03:19 AM     Lab Results   Component Value Date/Time    WBC 13.5 (H) 01/05/2023 03:48 PM    HGB 8.3 (L) 01/05/2023 03:48 PM    HCT 26.1 (L) 01/05/2023 03:48 PM    PLATELET 086 82/79/1440 03:48 PM    MCV 95.6 01/05/2023 03:48 PM     Lab Results   Component Value Date/Time    Calcium 8.8 01/11/2023 03:19 AM    Phosphorus 4.8 01/11/2023 03:19 AM     No results found for: IRON, FE, TIBC, IBCT, PSAT, FERR  No results found for: FERR      Impression:     ESRD on HD TTS  Hyperkalemia, resolved  Left diabetic foot infection w/ gangrenous changes s/p Lt 2/3/4 metatarsal amputation   DM  HTN  PVD, seen by Vasc Surgery, s/p LLE angio 12/28  Anemia in CKD  SHPT  Hyperkalemia    Plan:     HD today  Cont DALTON for Anemia  Cont low K diet  Continue antibiotics management per FRANCES Lacey MD,   [facility]

## 2023-01-12 NOTE — WOUND CARE
Discharge Instructions from  [facility]  [address], [facility]  [phone] Fax [phone]    NAME:  Kimberly Villa OF BIRTH:  1960  MEDICAL RECORD NUMBER:  [id number]  DATE:  12/9/2022    Wound Cleansing:   Do not scrub or use excessive force. Cleanse wound prior to applying a clean dressing with:  [x] Flush graft with Normal Saline      Topical Treatments:  Do not apply lotions, creams, or ointments to wound bed unless directed. Dressings:           Wound Location left TMA    [x] Apply Primary Dressing:  [x] Mepitel one over graft      [x] Change dressing: [x] Once a week     Negative Pressure:           Wound Location:   [x] Pressure@    125 mm/Hg  [x]Continuous   [x] Black    [x]Change dressing: [x]One time a week  Graft sutured in Place      Negative Pressure    NAME:  [address] [place] [address]:  1960  MEDICAL RECORD NUMBER:  [id number]  DATE:  12/9/2022    Apply Negative Pressure to left TMA incision over graft. Make sure that sponge is wrapped in mepitel one prior to application  [x] Apply skin barrier prep to ashutosh-wound. [x] Cut strips of plastic drape to picture frame wound so that ashutosh-wound is     covered with the drape. [x] If bridging dressing to less prominent site, cover any intact skin that will come in contact with the Negative Pressure Therapy sponge, gauze or channel drain with plastic drape. The sponge should never touch intact skin. [x] Cut sponge, gauze or channel drain to size which will fit into the wound/ulcer bed without being forced. [x] Be sure the sponge is large enough to hold the entire round plastic flange which is attached to the tubing. Never allow flange to be larger than the sponge or it will produce suction damaging intact skin.   [x] If bridging the dressing away from the primary site, be sure the bridge leads to a piece of sponge large enough to hold the entire flange without allowing any of the flange to overlap onto intact skin. [x] Cover sponge, gauze or channel drain with plastic drape. [x] Cut a hole in this plastic drape directly over the sponge the same size as the plastic drain tubing. [x] Remove plastic liner from flange and apply it directly over the hole you cut. [x] Remove the plastic cover from the flange. [x] Attach the tubing to the wound/ulcer Negative Pressure Therapy and turn it on to be sure a vacuum is created and that there are no leaks. [x] If air leaks occur, use plastic drape to patch them. [x] Secured Negative Pressure Therapy dressing with ace wrap loosely if located on an extremity. Maintain tubing outside of ace wrap. Tubing must not exert pressure on intact skin.      Apply per  Guidelines    Off-Loading:   [x] Total non-weight bearing   [x] Left Leg                   Return Appointment:   [] Return Appointment: With   Future Appointments   Date Time Provider Regis Ham   2/10/2023 10:00 AM [facility]          Electronically signed Alondra Baires RN on 1/12/2023 at 9:53 AM

## 2023-01-12 NOTE — PROGRESS NOTES
Problem: Diabetes Self-Management  Goal: *Incorporating nutritional management into lifestyle  Description: Describe effect of type, amount and timing of food on blood glucose; list 3 methods for planning meals. Outcome: Progressing Towards Goal  Goal: *Incorporating physical activity into lifestyle  Description: State effect of exercise on blood glucose levels. Outcome: Progressing Towards Goal     Problem: Falls - Risk of  Goal: *Absence of Falls  Description: Document [place] Fall Risk and appropriate interventions in the flowsheet. Outcome: Progressing Towards Goal  Note: Fall Risk Interventions:  Mobility Interventions: Bed/chair exit alarm         Medication Interventions: Patient to call before getting OOB    Elimination Interventions: Call light in reach, Patient to call for help with toileting needs    History of Falls Interventions: Investigate reason for fall         Problem: Pressure Injury - Risk of  Goal: *Prevention of pressure injury  Description: Document Semaj Scale and appropriate interventions in the flowsheet.   Outcome: Progressing Towards Goal  Note: Pressure Injury Interventions:  Sensory Interventions: Keep linens dry and wrinkle-free    Moisture Interventions: Check for incontinence Q2 hours and as needed    Activity Interventions: Pressure redistribution bed/mattress(bed type)    Mobility Interventions: Pressure redistribution bed/mattress (bed type), HOB 30 degrees or less    Nutrition Interventions: Document food/fluid/supplement intake    Friction and Shear Interventions: Minimize layers                Problem: Pain  Goal: *Control of Pain  Outcome: Progressing Towards Goal

## 2023-01-12 NOTE — PROGRESS NOTES
Bedside and Verbal shift change report given to DAVID Singletary RN (oncoming nurse) by Fan Pereira (offgoing nurse). Report included the following information SBAR, Kardex, Intake/Output, and MAR.

## 2023-01-12 NOTE — PROGRESS NOTES
[facility] [place], [facility]. No new concerns at this time. Pt finishing up HD and waiting on pick-up.

## 2023-01-12 NOTE — WOUND CARE
IP WOUND CONSULT    Jus Ospina  MEDICAL RECORD NUMBER:  [id number]  AGE: 60 y.o. GENDER: male  : 1960  TODAY'S DATE:  2023    GENERAL     [x] Follow-up   [] New Consult    [] Present on Admission  [] Hospital Acquired    Jus Ospina is a 60 y.o. male referred by:   [x] Physician  [] Nursing  [] Other:         PAST MEDICAL HISTORY    Past Medical History:   Diagnosis Date    CAD (coronary artery disease)     Chronic kidney disease     Diabetes mellitus     Hypertension     Sleep apnea         PAST SURGICAL HISTORY    Past Surgical History:   Procedure Laterality Date    HX ORTHOPAEDIC      HX PACEMAKER      IR INSERT TUNL CVC W/O PORT OVER 5 YR  2022    IR INSERT TUNL CVC W/O PORT OVER 5 YR  2022    IR REMOVE TUNL CVAD W/O PORT / PUMP  2022    CO UNLISTED PROCEDURE CARDIAC SURGERY         FAMILY HISTORY    Family History   Problem Relation Age of Onset    Heart Disease Mother     Diabetes Father     Diabetes Sister     Diabetes Brother        SOCIAL HISTORY    Social History     Tobacco Use    Smoking status: Never    Smokeless tobacco: Never   Vaping Use    Vaping Use: Never used   Substance Use Topics    Alcohol use: Not Currently    Drug use: Never       ALLERGIES    No Known Allergies    MEDICATIONS    No current facility-administered medications on file prior to encounter. Current Outpatient Medications on File Prior to Encounter   Medication Sig Dispense Refill    clopidogreL (PLAVIX) 75 mg tab Take 1 Tablet by mouth in the morning. 30 Tablet 2    isosorbide mononitrate ER (IMDUR) 30 mg tablet Take 1 Tablet by mouth daily. 30 Tablet 0    pantoprazole (PROTONIX) 40 mg tablet Take 1 Tablet by mouth Daily (before breakfast). 30 Tablet 0    bacitracin zinc (BACITRACIN) ointment Apply  to affected area two (2) times a day. 15 g 0    nystatin (MYCOSTATIN) powder Apply  to affected area two (2) times a day.  5 g 0    insulin lispro (HUMALOG) 100 unit/mL injection Use as directed 1 Each 0    Lactobacillus Acidoph & Bulgar (FLORANEX) 1 million cell tab tablet Take 2 Tablets by mouth two (2) times a day. 60 Tablet 0    melatonin, rapid dissolve, 5 mg TbDi tablet Take 2 Tablets by mouth nightly as needed (slsee[). 30 Tablet 0    sevelamer carbonate (RENVELA) 800 mg tab tab Take 2 Tablets by mouth three (3) times daily (with meals). 90 Tablet 0    vit B Cmplx 3-FA-Vit C-Biotin (NEPHRO NIKITA RX) 1- mg-mg-mcg tablet Take 1 Tablet by mouth daily. 30 Tablet 0    atorvastatin (LIPITOR) 40 mg tablet Take 40 mg by mouth daily. [DISCONTINUED] gabapentin (NEURONTIN) 300 mg capsule Take 300 mg by mouth nightly. allopurinoL (ZYLOPRIM) 100 mg tablet Take 100 mg by mouth daily. ezetimibe (ZETIA) 10 mg tablet Take 10 mg by mouth. carvediloL (COREG) 12.5 mg tablet Take 12.5 mg by mouth two (2) times a day. amLODIPine (NORVASC) 10 mg tablet Take 10 mg by mouth daily. aspirin 81 mg chewable tablet Take 81 mg by mouth daily. ascorbic acid, vitamin C, (VITAMIN C) 500 mg tablet Take 500 mg by mouth. insulin glargine (LANTUS) 100 unit/mL injection 10 Units by SubCUTAneous route nightly. Wt Readings from Last 3 Encounters:   01/12/23 101.6 kg (224 lb)   07/19/22 102.6 kg (226 lb 4.8 oz)       [email or web address] Vitals  BP (!) 152/61   Pulse 72   Temp 97.5 °F (36.4 °C)   Resp 16   Ht 6' (1.829 m)   Wt 101.6 kg (224 lb)   SpO2 (!) 18%   BMI 30.38 kg/m²       ASSESSMENT     Skin impairment Identification:  Type: non-healing surgical    Contributing Factors: diabetes and ESRD    Wound Heel Right (Active)   Wound Image   01/12/23 1518   Wound Etiology Surgical 01/12/23 1518   Dressing Status Clean;Dry; Intact 01/12/23 1518   Cleansed Irrigated with saline 01/12/23 1518   Dressing/Treatment Non-adherent;Gauze dressing/dressing sponge;Roll gauze; Ace wrap 01/12/23 1518   Wound Length (cm) 8 cm 01/12/23 1518   Wound Width (cm) 9 cm 01/12/23 1518   Wound Depth (cm) 0.3 cm 12/09/22 1038   Wound Surface Area (cm^2) 72 cm^2 01/12/23 1518   Change in Wound Size % -220 01/12/23 1518   Wound Volume (cm^3) 2.25 cm^3 12/09/22 1038   Wound Healing % 93 12/09/22 1038   Wound Assessment Graft 01/12/23 1518   Drainage Amount Small 01/12/23 1518   Drainage Description Serosanguinous 01/12/23 1518   Wound Odor Mild 01/12/23 1518   Leticia-Wound/Incision Assessment Maceration 01/12/23 1518   Edges Undefined edges 01/12/23 1518   Wound Thickness Description Full thickness 01/12/23 1518   Number of days: 153          PLAN     Skin Care & Pressure Relief Recommendations  Minimize layers of linen  Pads under patient to optimize support surface  Turn/reposition approximately every 2 hours  Pillow wedges  Manage incontinence   Promote continence; Skin Protective lotion/cream to buttocks and sacrum daily and as needed with incontinence care      Recommendations: SNF to apply wound VAC to left foot and change weekly    Teaching completed with:   [x] Patient           [] Family member       [] Caregiver          [x] Nursing  [] Other    Patient/Caregiver Teaching:  Level of patient/caregiver understanding able to:   [x] Indicates understanding       [] Needs reinforcement  [] Unsuccessful      [] Verbal Understanding  [] Demonstrated understanding       [] No evidence of learning  [] Refused teaching         [] N/A       Electronically signed by Alondra Baires RN on 1/12/2023 at 3:22 PM

## 2023-01-12 NOTE — PROGRESS NOTES
ACUTE HEMODIALYSIS TREATMENT     HEMODIALYSIS ORDERS: Physician: Dr. Sushila Palacios: [place]   Duration: 3.5 hr   BFR: 400   DFR: 800   Dialysate:  Temp 36*C   K+  2    Ca+ 2.5   Na 138   Bicarb 35         Wt Readings from Last 1 Encounters:   01/12/23 101.6 kg (224 lb)    Patient Chart [x]   Unable to Obtain []  Dry weight/UF Goal: 3000 ml    Heparin []  Bolus    Units    [] Hourly    Units    [x]None       Pre BP:   178/64  Pulse:  21   Respirations: 18   Temp:  97.6  [] Oral [] Axillary [] Esophageal   Labs: []  Pre  []  Post:   [x] N/A   Additional Orders(medications, blood products, hypotension management): [] Yes   [] No      [x]  Anoop Consent Verified      CATHETER ACCESS:  []N/A   [x]Right   []Left   [x]IJ   []Fem  []Chest wall  []TransHepatic   [] First use X-ray verified     [x]Tunnel    [] Non Tunneled   [x]No S/S infection  []Redness  []Drainage []Cultured []Swelling []Pain   [x]Medical Aseptic Prep Utilized   []Dressing Changed  [x] Biopatch  Date: 1/3/23   []Clotted   [x]Patent   Flows: []Good  []Poor  [x]Reversed   If access problem,  notified: [x]Yes    []N/A         GENERAL ASSESSMENT:    LUNGS:  Resp Rate 18   [x] Clear  [] Coarse  [] Crackles  [] Wheezing  [] Diminished         Respirations:  [x]Easy  []Labored  []N/A  Cough:  []Productive  []Dry  [x]N/A      Therapy:  [x]RA  O2 Type:  []NC  Mask: []  NRB    [] BiPaP  Flow:  l/min                   [] Ventilated  [] Intubated  [] Trach     CARDIAC: [x] Regular      [] Irregular   [] Rhythm:                     [] Monitored   [] Bedside   [] Remotely monitored     EDEMA: [] None   [x]Generalized  [] Pitting [] 1+   [] 2 +   [] 3+    [] 4+  [] Pedal    SKIN:   [x] Warm  [] Hot     [] Cold   [x] Dry     [] Pale   [] Diaphoretic                  [] Flushed  [] Jaundiced  [] Cyanotic     LOC:    [x] Alert      [x]Oriented:    [x] Person     [x] Place  [x]Time               [] Confused  [] Lethargic  [] Medicated  [] Non-responsive  [] Non Verbal   GI / ABDOMEN:                     [] Flat    [] Distended    [x] Soft    [] Firm   []  Obese                   [] Diarrhea   [] FMS [x] Bowel Sounds  [] Nausea  [] Vomiting                   [] NGT  [] OGT    [] PEG  [] Tube Feedings @      / URINE ASSESSMENT:                   [] Voiding  []  Mcmahon  [x] Oliguria  [] Anuria                     [] Incontinent  []  Incontinent Brief   []  PureWick      PAIN:  [x] 0 []1  []2   []3   []4   []5   []6   []7   []8   []9   []10                MOBILITY:  [x] Bed    [] Stretcher       All Vitals and Treatment Details on Attached [place]: [facility]          Room # 327    [x] Routine         [] 1st Time Acute/Chronic   [] Urgent      [] Stat            [] Acute Room   [x]  Bedside    [] ICU/CCU     [] ER   Isolation Precautions:  [x] Dialysis    There are currently no Active Isolations      ALLERGIES:     No Known Allergies   Code Status:  Full Code      Hepatitis Status                Lab Results   Component Value Date/Time     Hepatitis B surface Ag <0.10 12/10/2022 06:33 AM     Hepatitis B surface Ab 23.52 12/10/2022 06:33 AM                 DIET:  RENAL      PRIMARY NURSE REPORT:   Pre Dialysis: Fatou Turner RN    Time: 6095      EDUCATION:    [x] Patient           Knowledge Basis: []None [x]Minimal [] Substantial [] Unknown  Barriers to learning  [x]N/A  [] Intubated/Trached/Ventilated  [] Sedated/Paralyzed   [] Access Care     [] S&S of infection  [] Fluid Management  [] K+   [x] Procedural    [] Medications   [] Tx Options   [] Transplant   [] Diet      Teaching Tools:  [x] Explain  [] Demo  [] Handouts [] Video  Patient response: [] Verbalized understanding   [x] Requires follow up          [x] Time Out/Safety Check    [x] Extracorporeal Circuit Tested for integrity                       RO/HEMODIALYSIS MACHINE SAFETY CHECKS - Before each treatment:        [facility]                                                                      [x] Portable Machine # 6TOS- [id number] /RO serial # [id number]                                                                                                      Alarm Test:  Pass time   1130        [x] RO/Machine Log Complete    Machine Temp    36*C             Dialysate: pH 7.4    Conductivity: Meter N/A   HD Machine  13.9    TCD: 13.8  Dialyzer Lot # [id number]     Blood Tubing Lot # [id number]   Saline Lot # [id number]      CHLORINE TESTING-Before each treatment and every 4 hours    Total Chlorine: [x] less than 0.1 ppm  Initial Time Check: 1130    4 Hr/2nd Check Time:    (if greater than 0.1 ppm from Primary then every 30 minutes from Secondary)      TREATMENT INITIATION - with Dialysis Precautions:   [x] All Connections Secured              [x] Saline Line Double Clamped   [x] Venous Parameters Set               [x] Arterial Parameters Set    [x] Prime Given 250ml NSS              [x]Air Foam Detector Engaged       Treatment Initiation Note:  A&Ox4. No complaints. CHG dressing is dry and intact. Dated 1/10. No s/s of infection at site. Changed CHG dressing per policy. During Treatment Notes:  Treatment started with no issues. Medication Dose Volume Route Time Yashita Nurse, Title                                                Post Assessment  Dialyzer Cleared:   [] Good  [x] Fair  [] Poor  Blood processed: 58.9  L  UF Removed:  2500 Ml     Post /65 Pulse 77  Resp  18  Temp 97.8 Lungs: [x] Clear [] Course  [] Crackles                 []  Wheezing   [] Diminished   Post Tx Vascular Access: [x] N/A Cardiac :[x] Regular   [] Irregular   Rhythm:  [x] Monitored   [] Not Monitored    CVC Catheter: [] N/A  Locking solution: Heparin 1:1000 U  Arterial port 1.8 ml   Venous port 1.8 ml    Edema:  [] None  [x] Generalized                     Skin:[x] Warm  [x] Dry [] Diaphoretic               [] Flushed  [] Pale [] Cyanotic Pain:  [x]0  []1 []2  []3 []4  []5  []6  []7 []8    []9  []10      Post Treatment Note:   Tolerated 3.5 hr treatment. No issues. Left pt in stable condition and talking.       POST TREATMENT PRIMARY NURSE HANDOFF REPORT:   Post Dialysis: KIM Helms  RN               Time:  1         Abbreviations: AVG-arterial venous graft, AVF-arterial venous fistula, IJ-Internal Jugular, Subcl-Subclavian, Fem-Femoral, Tx-treatment, AP/HR-apical heart rate, VSS- Vital Signs Stable, CVC- Central Venous Catheter, DFR-dialysate flow rate, BFR-blood flow rate, AP-arterial pressure, -venous pressure, UF-ultrafiltrate, TMP-transmembrane pressure, Avtar-Venous, Art-Arterial, RO-Reverse Osmosis

## 2023-01-12 NOTE — ROUTINE PROCESS
Verbal shift change report given to Christine Harry RN (oncoming nurse) by Meliza Abebe RN   (offgoing nurse). Report included the following information SBAR, Kardex, Intake/Output, MAR, and Recent Results.

## 2023-01-12 NOTE — DISCHARGE SUMMARY
Discharge Summary    Patient: Luanne Giles MRN: [id number]  CSN: [id number]    YOB: 1960  Age: 60 y.o.   Sex: male    DOA: 12/9/2022 LOS:  LOS: 34 days   Discharge Date:      Primary Care Provider:  Brian Castillo MD    Admission Diagnoses: Diabetic foot infection ([facility] [E11.628, L08.9]  Leukocytosis [D72.829]    Discharge Diagnoses:    Hospital Problems  Date Reviewed: 1/6/2023            Codes Class Noted POA    * (Principal) Surgical wound dehiscence ICD-10-CM: T81.31XA  ICD-9-CM: 998.32  1/6/2023 Unknown        Open wnd of foot, left, subsequent encounter ICD-10-CM: S91.302D  ICD-9-CM: V58.89, 892.0  1/6/2023 Unknown        PAD (peripheral artery disease) ([facility] ICD-10-CM: I73.9  ICD-9-CM: 443.9  12/27/2022 Unknown        Positive blood culture ICD-10-CM: R78.81  ICD-9-CM: 790.7  12/11/2022 Unknown        Leukocytosis ICD-10-CM: D72.829  ICD-9-CM: 288.60  12/9/2022 Unknown        Diabetic foot infection ([facility] ICD-10-CM: E11.628, L08.9  ICD-9-CM: 250.80, 686.9  12/9/2022 Unknown        Diarrhea ICD-10-CM: R19.7  ICD-9-CM: 787.91  12/9/2022 Unknown        Type 2 diabetes mellitus, with long-term current use of insulin ([facility] ICD-10-CM: E11.9, Z79.4  ICD-9-CM: 250.00, V58.67  Unknown Unknown        Acute hematogenous osteomyelitis of left foot ([facility] ICD-10-CM: Y93.982  ICD-9-CM: 730.07  12/9/2022 Unknown        Nondisplaced fracture of second metatarsal bone of left foot ICD-10-CM: D57.639K  ICD-9-CM: 825.25  12/9/2022 Unknown        Nondisplaced fracture of third metatarsal bone of left foot ICD-10-CM: K22.119G  ICD-9-CM: 825.25  12/9/2022 Unknown        Closed nondisplaced fracture of fourth metatarsal bone of left foot ICD-10-CM: G63.837J  ICD-9-CM: 825.25  12/9/2022 Unknown        Hypertension ICD-10-CM: I10  ICD-9-CM: 401.9  7/21/2022 Yes        CAD (coronary artery disease) ICD-10-CM: I25.10  ICD-9-CM: 414.00  6/28/2022 Yes        ESRD (end stage renal disease) ([facility] ICD-10-CM: N18.6  ICD-9-CM: 585.6  6/28/2022 Yes        Diabetes mellitus with foot ulcer ([facility] ICD-10-CM: E11.621, L97.509  ICD-9-CM: 250.80, 707.15  6/27/2022 Yes           Discharge Condition: stable     Discharge Medications:     Current Discharge Medication List        CONTINUE these medications which have CHANGED    Details   gabapentin (NEURONTIN) 300 mg capsule Take 1 Capsule by mouth nightly. Max Daily Amount: 300 mg. Qty: 2 Capsule, Refills: 0  Start date: 1/12/2023    Associated Diagnoses: Neuropathy           CONTINUE these medications which have NOT CHANGED    Details   clopidogreL (PLAVIX) 75 mg tab Take 1 Tablet by mouth in the morning. Qty: 30 Tablet, Refills: 2      isosorbide mononitrate ER (IMDUR) 30 mg tablet Take 1 Tablet by mouth daily. Qty: 30 Tablet, Refills: 0      pantoprazole (PROTONIX) 40 mg tablet Take 1 Tablet by mouth Daily (before breakfast). Qty: 30 Tablet, Refills: 0      bacitracin zinc (BACITRACIN) ointment Apply  to affected area two (2) times a day. Qty: 15 g, Refills: 0      nystatin (MYCOSTATIN) powder Apply  to affected area two (2) times a day. Qty: 5 g, Refills: 0      insulin lispro (HUMALOG) 100 unit/mL injection Use as directed  Qty: 1 Each, Refills: 0      Lactobacillus Acidoph & Bulgar (FLORANEX) 1 million cell tab tablet Take 2 Tablets by mouth two (2) times a day. Qty: 60 Tablet, Refills: 0      melatonin, rapid dissolve, 5 mg TbDi tablet Take 2 Tablets by mouth nightly as needed (slsee[). Qty: 30 Tablet, Refills: 0      sevelamer carbonate (RENVELA) 800 mg tab tab Take 2 Tablets by mouth three (3) times daily (with meals). Qty: 90 Tablet, Refills: 0      vit B Cmplx 3-FA-Vit C-Biotin (NEPHRO NIKITA RX) 1- mg-mg-mcg tablet Take 1 Tablet by mouth daily. Qty: 30 Tablet, Refills: 0      atorvastatin (LIPITOR) 40 mg tablet Take 40 mg by mouth daily. allopurinoL (ZYLOPRIM) 100 mg tablet Take 100 mg by mouth daily.       ezetimibe (ZETIA) 10 mg tablet Take 10 mg by mouth.      carvediloL (COREG) 12.5 mg tablet Take 12.5 mg by mouth two (2) times a day. amLODIPine (NORVASC) 10 mg tablet Take 10 mg by mouth daily. aspirin 81 mg chewable tablet Take 81 mg by mouth daily. ascorbic acid, vitamin C, (VITAMIN C) 500 mg tablet Take 500 mg by mouth. insulin glargine (LANTUS) 100 unit/mL injection 10 Units by SubCUTAneous route nightly. Procedures : see below     Consults: renal/ID/vascular /podiatry       PHYSICAL EXAM   Visit Vitals  BP (!) 144/66 (BP 1 Location: Right upper arm, BP Patient Position: At rest)   Pulse 83   Temp 97.5 °F (36.4 °C)   Resp 18   Ht 6' (1.829 m)   Wt 101.9 kg (224 lb 9.6 oz)   SpO2 96%   BMI 30.46 kg/m²     General: Awake, cooperative, no acute distress    HEENT: NC, Atraumatic. PERRLA, EOMI. Anicteric sclerae. Lungs:  CTA Bilaterally. No Wheezing/Rhonchi/Rales. Heart:  Regular  rhythm,  No murmur, No Rubs, No Gallops  Abdomen: Soft, Non distended, Non tender. +Bowel sounds,   Extremities: No c/c, left foot wrapped with wound vac, rt foot wrapped with ace bandage   Psych:   Not anxious or agitated. Neurologic:  No acute neurological deficits. Admission HPI :   Robe Morgan is a 60 y.o. male with history of diabetes, diabetic ulcer, CAD, hyperlipidemia, hypertension end-stage renal disease on HD presented to ER due to left foot lesion. He visited wound clinic for right heel ulcer, he was found left foot gangrene lesion. He reported his foot lesion that this about 2 weeks. He was sent to ER per podiatrist for further evaluation and surgical treatment and IV antibiotics. ID has been consulted,he denies any fever, no chest pain but reported diarrhea and epigastric pain. CT abdomen and pelvic no acute process.   Hospital Course :   60 y.o. male with history of diabetes, diabetic ulcer, CAD, hyperlipidemia, hypertension end-stage renal disease on HD presented to ER due to left foot lesion. Gangrene of left foot  Since he was admitted to the hospital, podiatrist and ID were consulted, broad coverage ABx were administrated. He received :   PARTIAL AMPUTATION OF LEFT 2ND, 3RD, 4TH METATARSALS, AMPUTATION OF TOES 2,3,4, INCISION ADN DRAINAGE LEFT FOOT on 12/09/2022  S/p angiogram with PTA of the proximal PT and peroneal arteries. Vascular planning repeat procedure on 12/28-tolerated well   S/p Left TMA 12/21/2022. Received graft  on 1/6   He completed his  abx during his stay, he needs to continue wound vac. Positive blood cx -Coag negative staph, Likely contaminant contamination      CAD- has been stable. He had Distal RCA to drug-eluting stent in July 2022, continue plavix -      Hypertension -  continue home medication     End stage renal disease on HD and hyperkalemia  resolved per HD. Received hd before discharge      DM  type II -controlled per Lantus and ssi         Discharge planning discussed with patient, pt agrees  with the plan and no questions and concerns at this point. Activity: Activity as tolerated    Diet: Cardiac Diet, Diabetic Diet, and Renal Diet    Follow-up: PCP ID-Dr. Inderjit Finch, podiatrist -Dr. Walter Ni and Dr, vascular surgeon at [address]     Disposition: rehab    Minutes spent on discharge: 60 min       Labs: Results:       Chemistry Recent Labs     01/11/23  0319   *      K 5.0      CO2 26   BUN 50*   CREA 8.00*   CA 8.8   AGAP 10   BUCR 6*   ALB 2.1*      CBC w/Diff No results for input(s): WBC, RBC, HGB, HCT, PLT, GRANS, LYMPH, EOS, HGBEXT, HCTEXT, PLTEXT in the last 72 hours. Cardiac Enzymes No results for input(s): CPK, CKND1, PAULO in the last 72 hours. No lab exists for component: CKRMB, TROIP   Coagulation No results for input(s): PTP, INR, APTT, INREXT in the last 72 hours.     Lipid Panel Lab Results   Component Value Date/Time    Cholesterol, total 188 07/19/2022 03:12 AM    HDL Cholesterol 42 07/19/2022 03:12 AM    LDL, calculated 131.2 (H) 07/19/2022 03:12 AM    VLDL, calculated 14.8 07/19/2022 03:12 AM    Triglyceride 74 07/19/2022 03:12 AM    CHOL/HDL Ratio 4.5 07/19/2022 03:12 AM      BNP No results for input(s): BNPP in the last 72 hours. Liver Enzymes Recent Labs     01/11/23  0319   ALB 2.1*      Thyroid Studies No results found for: T4, T3U, TSH, TSHEXT       @micro    Significant Diagnostic Studies: XR FOOT LT AP/LAT    Result Date: 12/22/2022  EXAM: XR FOOT LT AP/LAT CLINICAL INDICATION/HISTORY:  POST OP XRAY   > Additional: None COMPARISON: 12/9/2022 TECHNIQUE: AP and lateral views _______________ FINDINGS: Interval transmetatarsal amputation. The metatarsal stumps are not entirely sharp or well-defined. Midfoot ossicles appear intact and normally aligned with unremarkable hindfoot bones. Soft tissues covering the amputation stump are edematous. There are some gas bubbles within the soft tissues as would be expected following recent surgery. There is extensive arterial vascular calcification, Monckeberg sclerosis pattern, characteristic of long-standing diabetes. _______________     Status post TMA as described. The indistinctness of the metatarsal stumps is concerning for residual infection although ultimately nonspecific. Follow-up imaging suggested within several weeks for reassessment. CT HEAD WO CONT    Result Date: 1/4/2023  EXAMINATION:  CT head noncontrast COMPARISON: None HISTORY: headache TECHNIQUE: Noncontrasted axial images were obtained through the patient's brain from the vertex of the skull through the skull base. Bone and soft tissue windows were reviewed. One or more dose reduction techniques were used on this CT: automated exposure control, adjustment of the mAs and/or kVp according to patient size, and iterative reconstruction techniques. The specific techniques used on this CT exam have been documented in the patient's electronic medical record.   Digital Imaging and Communications in Medicine (DICOM) format image data are available to nonaffiliated external healthcare facilities or entities on a secure, media free, reciprocally searchable basis with patient authorization for at least a 12-month period after this study. Alexi Marin FINDINGS: Brain architecture is normal. No mass effect, midline shift or hemorrhage. Ventricles are normal in size, position and configuration. No abnormal extra-axial fluid collections are seen. No territorial signs of infarct. The bony calvarium appears intact without acute displaced fracture. The visualized paranasal sinuses and mastoid air cells are aerated. 1. No acute intracranial pathology. XR CHEST PORT    Result Date: 12/23/2022  EXAM: CHEST RADIOGRAPH CLINICAL INDICATION/HISTORY: sepsis -Additional: None COMPARISON: 12/9/2022 TECHNIQUE: Portable frontal view of the chest _______________ FINDINGS: SUPPORT DEVICES: Indwelling dialysis catheter, tip in the mid to lower SVC. HEART AND MEDIASTINUM: No appreciable cardiomegaly. Remaining mediastinal contours within normal limits. LUNGS AND PLEURAL SPACES: Clear. No consolidation, mass or effusion. BONY THORAX AND SOFT TISSUES: Unremarkable. _______________     No active cardiopulmonary disease. ANKLE BRACHIAL INDEX    Result Date: 12/21/2022  · Right ankle/brachial index is 1.19 · The left ankle/ brachial index is 0.89      ANKLE BRACHIAL INDEX    Result Date: 12/15/2022  Falsely elevated Ankle Brachial Index pressure measurements noted due to calcified vessels with abnormal waveforms bilaterally. Unable to get left brachial pressure due to dialysis access graft. The right toe/brachial index is 0.59  With a toe pressure of 82 mmhg. Unable to get left toe pressure due to left toes amputation. DUPLEX LOWER EXT ARTERY BILAT    Result Date: 12/15/2022   Elevated velocities (169 cm/s) in the left proximal femoral artery consistent with mild stenosis. Bilateral tibial arteries are patnet at the ankle.   Bilateral anterior tibial artery is occluded proximally but reconstituted distally by the collateral flow.               [facility]     CC: Jesus Gutierrez MD

## 2023-01-18 NOTE — PROGRESS NOTES
Physician Progress Note      PATIENT:               Nacho Langley  CSN #:                  [id number]  :                       1960  ADMIT DATE:       2022 11:33 AM  DISCH DATE:        2023 6:05 PM  RESPONDING  PROVIDER #:        Rick Quintero DPM          QUERY TEXT:    Patient admitted with Diabetic foot infection , ischemic gangrene. Per Op note dated 23 documentation of debridement. To accurately reflect the procedure performed please document if debridement was excisional or nonexcisional and the deepest depth of tissue removed as down to and including: The medical record reflects the following:  Risk Factors: Diabetic foot infection  Clinical Indicators: DIABETIC FOOT INFECTION, ISCHEMIC GANGRENE, Disruption of External Surgical Wound Left Foot, Open Wound Left Foot, Cellulitis    Treatment: LEFT FOOT DEBRIDEMENT,APPLICATION OF STRAVIX GRAFT    Thank You  Shalini García RN, CDI, CRCR  Options provided:  -- Nonexcisional debridement of skin  -- Excisional debridement of skin  -- Nonexcisional debridement of subcutaneous tissue  -- Excisional debridement of subcutaneous tissue  -- Nonexcisional debridement of fascia  -- Excisional debridement of fascia  -- Nonexcisional debridement of muscle  -- Excisional debridement of muscle  -- Nonexcisional debridement of bone  -- Excisional debridement of bone  -- Other - I will add my own diagnosis  -- Disagree - Not applicable / Not valid  -- Disagree - Clinically unable to determine / Unknown  -- Refer to Clinical Documentation Reviewer    PROVIDER RESPONSE TEXT:    Excisional debridement of muscle of LEFT FOOT was performed during procedure on 23.     Query created by: Christine Peguero on 2023 7:03 AM      Electronically signed by:  Rick Quintero DPM 2023 12:54 PM

## 2023-01-23 NOTE — OP NOTES
[facility]OM[facility]  OPERATIVE REPORT    Name:  Renae Peñaloza  MR#:   [id number]  :  1960  ACCOUNT #:  [de-identified]  DATE OF SERVICE:  2023    LOCATION:  Ops. PREOPERATIVE DIAGNOSES:  1. Insulin dependent diabetic with disruption of external surgical wound/dehiscence, left foot. 2.  Cellulitis with abscess, left foot. 3.  Unspecified wound, left foot. POSTOPERATIVE DIAGNOSES:  1. Insulin dependent diabetic with disruption of external surgical wound/dehiscence, left foot. 2.  Cellulitis with abscess, left foot. 3.  Unspecified wound, left foot. PROCEDURES PERFORMED:  1. Secondary closure of surgical wound dehiscence, complicated left foot. 2.  Incision and drainage of abscess, complicated left foot. 3.  Application of Stravix graft, left foot wound. SURGEON:  Susie Márquez DPM    ASSISTANT:  [place]. ANESTHESIA:  MAC with local consisting of 10 mL of 0.5% Marcaine plain given to the left foot. HEMOSTASIS:  None. There was no tourniquet used in this procedure. COMPLICATIONS:  None. SPECIMENS REMOVED:  None. IMPLANTS:  None. ESTIMATED BLOOD LOSS:  Minimal, less than 5 mL. MATERIALS:  3-0 Vicryl, 2-0 and 3-0 nylon. Also, [employer] and [employer] graft and also aerobic and anaerobic wound cultures were taken. INJECTABLES:  None. COMPLICATIONS:  None. INDICATIONS FOR PROCEDURE:  The patient is a 58-year-old male, insulin dependent diabetic who was admitted to [facility] on 2022 and had two previous surgeries, left foot for severe gangrene and osteomyelitis and presents today for correction of painful disruption of external surgical wound/dehiscence of his left foot. His initial surgery was done on 2022 and also his second surgery was done on 2022. Unfortunately due to ongoing infection and severe peripheral arterial disease, his left lower extremity wound had dehisced.   The patient had endured two vascular procedures by Dr. Stephenson Neighbor to increase his blood flow to his left lower extremity with angiogram and angioplasty to improve his blood flow to his left foot. The patient was subsequently treated with IV antibiotics and now the open necrotic wound left foot requires incision and drainage, surgical debridement and complicated closure of the wound in layers. All conservative treatments have failed to offer the patient adequate relief and the patient desires surgical correction. The planned procedures were explained to the patient in detail including all risks, benefits and possible complications and the patient still desires surgical correction. Medical clearance was obtained prior to surgery. Consent was signed and on chart. All the patient's questions were answered and no guarantees were given. Lastly, the patient was asked if he had any history of bleeding disorders, blood clots or sickle cell anemia and he stated that he had none. DESCRIPTION OF PROCEDURE:  The patient was brought to the operating room and placed on the operating table in the supine position. The patient's left foot was then anesthetized using 10 mL of 0.5% Marcaine plain as a local block to the left foot. The patient's left foot was then prepped and draped in usual sterile manner. Again, no tourniquet was used. Attention was then drawn to the distal aspect of the patient's left forefoot, midfoot where his previous surgeries had been done. The incision had dehisced leaving a large necrotic black eschar and open wound deep to muscle, fascial layers and bone, also the original sutures were still intact. Therefore all sutures were removed. Next, a #10 blade was then used to make an approximately 7.5 cm elliptical incision around the necrotic eschar. This incision was then carried deep through the muscle layer and also bone layer and the eschar was then totally removed.   At this point, deep wound cultures both aerobic and anaerobic local cultures were then taken and sent to micro for Gram stain and sensitivity. Next, the incision and drainage deep/complicated was then completed by opening up multiple layers of deep tissue to drain and remove any pus or exudate. Next, a pulse lavage was then performed using normal sterile saline mixed with Betadine to flush out any remaining infection. A #10 blade was then used to perform a deep debridement and remove all necrotic tissue deep to the fascia, muscle layer and also bone layers. This was done down to healthy bleeding tissue as an excisional debridement. Next, a [employer] was then used for healthy bleeding tissue. This was done to remove any remaining necrotic tissue. The wound was then secondarily closed using 3-0 Vicryl to reapproximate the deep muscle layers in a simple interrupted technique. The next layer was then closed using 3-0 Vicryl to reapproximate the fascial layers. Next, the subcutaneous layers were then closed using 3-0 Vicryl. Next, 3-0 nylon was then used to close the skin in a simple running technique. Lastly, 2-0 nylon was then used as a bolster stitch to strengthen the incision to keep it from pulling apart. Also, a [employer] grafts were also applied to the remaining open portions of the wound that lacked adequate amount of viable skin for closure. The grafts were secured using 3-0 Vicryl in a running stitch technique. Next, the wound was then dressed using Adaptic, 4x4s, ABDs, Kerlix and Ace wrap. The patient was then transported to the recovery room with vital signs stable and neurovascular status returned to baseline for the patient's left foot. The patient tolerated the procedures and anesthesia well with no complications. POSTOPERATIVE CHECK. The patient was later seen in the recovery room and his dressings clean, dry and intact with no bleeding noted to his left foot. The patient stated that he had no pain.   The patient was later returned to his room on the third floor for continued IV antibiotic and medical treatment. The patient is scheduled for application of a wound VAC for his left foot tomorrow to increase healing. Dr. Mk Cox will follow this patient as needed.       NICOLLE [email or web address]  D:  01/22/2023 20:52  T:  01/23/2023 1:19  JOB #:  [id number]

## 2023-01-27 ENCOUNTER — HOSPITAL ENCOUNTER (OUTPATIENT)
Dept: WOUND CARE | Age: 63
Discharge: HOME | End: 2023-01-27
Attending: PODIATRIST
Payer: MEDICARE

## 2023-01-27 VITALS
DIASTOLIC BLOOD PRESSURE: 59 MMHG | SYSTOLIC BLOOD PRESSURE: 132 MMHG | TEMPERATURE: 98.7 F | OXYGEN SATURATION: 99 % | RESPIRATION RATE: 18 BRPM | HEART RATE: 81 BPM

## 2023-01-27 DIAGNOSIS — E11.69 DIABETIC FOOT ULCER WITH OSTEOMYELITIS (HCC): ICD-10-CM

## 2023-01-27 DIAGNOSIS — M86.071 ACUTE HEMATOGENOUS OSTEOMYELITIS OF RIGHT FOOT (HCC): Primary | ICD-10-CM

## 2023-01-27 DIAGNOSIS — E11.621 DIABETIC FOOT ULCER WITH OSTEOMYELITIS (HCC): ICD-10-CM

## 2023-01-27 DIAGNOSIS — L97.509 DIABETIC FOOT ULCER WITH OSTEOMYELITIS (HCC): ICD-10-CM

## 2023-01-27 DIAGNOSIS — M86.9 DIABETIC FOOT ULCER WITH OSTEOMYELITIS (HCC): ICD-10-CM

## 2023-01-27 PROCEDURE — 99213 OFFICE O/P EST LOW 20 MIN: CPT

## 2023-01-27 RX ORDER — LIDOCAINE HYDROCHLORIDE 20 MG/ML
JELLY TOPICAL ONCE
OUTPATIENT
Start: 2023-01-27 | End: 2023-01-27

## 2023-01-27 NOTE — WOUND CARE
01/27/23 1132   Wound Foot Left;Residual limb   Date First Assessed/Time First Assessed: 01/27/23 1138   Present on Hospital Admission: Yes  Wound Approximate Age at First Assessment (Weeks): 4 weeks  Primary Wound Type: Open incision/surgical site  Location: Foot  Wound Location Orientation: Left;. ..    Wound Image    Wound Etiology Surgical   Dressing Status Other (Comment)  (vac sponge with no suction)   Cleansed Wound cleanser   Dressing/Treatment Other (Comment)  (vashe wet to dry)   Offloading for Diabetic Foot Ulcers Offloading boot   Dressing Change Due 01/27/23  (Upon arrival to facility)   Wound Length (cm) 6 cm   Wound Width (cm) 9 cm   Wound Depth (cm)   (graft intact unable to assess depth)   Wound Surface Area (cm^2) 54 cm^2   Wound Assessment Graft   Drainage Amount Moderate   Drainage Description Thin;Brown   Wound Odor Moderate   Leticia-Wound/Incision Assessment Maceration   Edges Unattached edges   Wound Thickness Description Full thickness   Wound Heel Right   Date First Assessed/Time First Assessed: 08/12/22 1200   Present on Hospital Admission: Yes  Primary Wound Type: Diabetic Ulcer  Location: Heel  Wound Location Orientation: Right   Wound Image    Wound Etiology Diabetic   Wound Length (cm) 0 cm   Wound Width (cm) 0 cm   Wound Depth (cm) 0 cm   Wound Surface Area (cm^2) 0 cm^2   Change in Wound Size % 100   Wound Volume (cm^3) 0 cm^3   Wound Healing % 100     Orders routed to facility

## 2023-01-27 NOTE — WOUND CARE
[address]  [address] [place], [address] [facility] [address]  [phone] Fax [phone]    Patient arrived to [facility] today with vac sponge and drape in place to left foot with no suction, suction tubing cut at disk. Patient states the seal was lost over night. Upon removal of dressing, sponge presented with foul odor and large amounts of foul smelling drainage. Patient educated that vac sponge can not remain in wound with no suction and the maximum recommended time that a dressing can remain in place without suction is 2 hours. Patient verbalized understanding. Several calls made this A.M. and messages left with  for nursing to call, as well as request for nursing to call within patients discharge orders/ summary which was routed to facility. Please review orders sent and call with any clarifying questions. Patient was discharged with wet to dry dressing and vac to be applied upon arrival. Wound clinic staff unable to apply vac in clinic, as patient was not sent with all necessary components. MD wishes for clarification on dressings and frequency, ensuring dressings to be change 3 times weekly and to be changed if seal is lost. Patient must be transferred between facilities with wet to dry dressings if vac not in place. Wound Care Center Information: Should you experience any significant changes in your wound(s) or have questions about your wound care, please contact the [address] at [address] [place] [address] 8:00 am - 4:30. If you need help with your wound outside these hours and cannot wait until we are again available, contact your PCP or go to the hospital emergency room.

## 2023-01-27 NOTE — WOUND CARE
[address] [facility]  [address] [place] [address], [facility]  [phone] Fax [phone]    NAME:  Sharla Jerome OF BIRTH:  1960  MEDICAL RECORD NUMBER:  [id number]  DATE:  1/27/2023    If any questions/concerns about how to apply vac please contact [address] [place] [address],[address]    Wound Cleansing:   Do not scrub or use excessive force. Cleanse wound prior to applying a clean dressing with:  [x] Normal Saline or vashe only- due to graft     Dressings:           Wound Location: Left foot      Negative Pressure:           Wound Location: Left distal foot/ TMA  [x] Pressure@     125      mm/Hg  [x]Continuous []Intermittent   [] Black  [] White Foam [x] Other: per facility  [x]Change dressing: [x]Three times per week        [x]Other: if vac looses seal > 2hr dressing must be removed and replaced, leave sponge with no suction puts patient at risk for infection and is against all vac manufacturers guidelines and best practice. Negative Pressure    NAME:  Lauri Stinson  YOB: 1960  MEDICAL RECORD NUMBER:  [id number]  DATE:  1/27/2023    Applied Negative Pressure to distal foot wound(s)/ulcer(s). [x] Apply skin barrier prep to ashutosh-wound. [x] Cut strips of plastic drape to picture frame wound so that ashutosh-wound is covered with the drape. [x] Cut small piece of sponge and place into center of wound at point of depth, Cut bolster sponge slightly larger than wound to cover smaller sponge  [x] Be sure the sponge is large enough to hold the entire round plastic flange which is attached to the tubing. Never allow flange to be larger than the sponge or it will produce suction damaging intact skin. Total number of individual pieces of foam used within the wound bed: 2  [x] Covered sponge, gauze or channel drain with plastic drape. [x] Cut a hole in this plastic drape directly over the sponge the same size as the plastic drain tubing.    [x] Removed plastic liner from flange and apply it directly over the hole you cut. [x] Removed the plastic cover from the flange. [x] Attached the tubing to the wound/ulcer Negative Pressure Therapy and turn it on to be sure a vacuum is created and that there are no leaks. [x] If air leaks occur, use plastic drape to patch them. [x] Secured Negative Pressure Therapy dressing with ace wrap loosely if located on an extremity. Maintain tubing outside of ace wrap. Tubing must not exert pressure on intact skin. Pressure Relief:  [x] Offloading boot    Dietary:  [x] Diet as tolerated: [x] Calorie Diabetic Diet: [] No Added Salt:  [] Increase Protein: [] Other:     Activity:  Weight bearing right foot            Return Appointment:  [] Wound and dressing supply provider:   [] ECF or Home Healthcare:  [] Wound Assessment: [] Physician or NP scheduled for Wound Assessment:   [] Return Appointment: With MD  in  [address] [place] [address])  [] Ordered tests:      Electronically signed Kym JORGE RN [facility] CMS[facility]/ Verbal orders Dr. Vonnie Victoria Information: Should you experience any significant changes in your wound(s) or have questions about your wound care, please contact the [facility] at [address] [place] [address] 8:00 am - 4:30. If you need help with your wound outside these hours and cannot wait until we are again available, contact your PCP or go to the hospital emergency room. PLEASE NOTE: IF YOU ARE UNABLE TO OBTAIN WOUND SUPPLIES, CONTINUE TO USE THE SUPPLIES YOU HAVE AVAILABLE UNTIL YOU ARE ABLE TO REACH US. IT IS MOST IMPORTANT TO KEEP THE WOUND COVERED AT ALL TIMES.

## 2023-01-30 ENCOUNTER — TELEPHONE (OUTPATIENT)
Dept: WOUND CARE | Age: 63
End: 2023-01-30

## 2023-01-31 NOTE — OP NOTES
[facility] MOUND  OPERATIVE REPORT    Name:  Sidney Arango  MR#:   [id number]  :  1960  ACCOUNT #:  [de-identified]  DATE OF SERVICE:  2022    LOCATION:  Ops    PREOPERATIVE DIAGNOSES:  1. Diabetic left foot infection with deep abscess/multiple with cellulitis. 2.  Osteomyelitis of the left metatarsals 2, 3, and 4.  3.  Diabetes with gangrene of the left toes 2, 3, and 4 with osteomyelitis. POSTOPERATIVE DIAGNOSES:  1. Diabetic left foot infection with deep abscess/multiple with cellulitis. 2.  Osteomyelitis of the left metatarsals 2, 3, and 4.  3.  Diabetes with gangrene of the left toes 2, 3, and 4 with osteomyelitis. PROCEDURES PERFORMED:  1. Incision and drainage, complicated of deep abscess, left forefoot. 2.  Partial excision of the left metatarsals 2, 3, and 4 secondary to osteomyelitis. 3.  Amputation of the left toes 2, 3, and 4 at the MP joint level, secondary to gangrene and osteomyelitis. SURGEON:  Joni Jones DPM    ASSISTANT:  Farideh Rosas    ANESTHESIA:  MAC with local consisting of 10 mL of 0.5% Marcaine with epinephrine given to the left forefoot. HEMOSTASIS:  There was no tourniquet used in this procedure. COMPLICATIONS:  None. SPECIMENS REMOVED:  Left foot toes 2, 3, and 4 at the metatarsophalangeal joint level and also metatarsal heads 2, 3, and 4. IMPLANTS:  None. MATERIALS:  3-0 Vicryl and 2-0 nylon. Also, aerobic and anaerobic cultures were taken. INJECTABLES:  None. ESTIMATED BLOOD LOSS:  Minimal, less than 5 mL. INDICATIONS FOR PROCEDURE:  The patient is a 76-year-old insulin-dependent diabetic male who was admitted to [facility] on 2022 for evaluation and surgical correction of infected left foot. The patient is status post left hallux or big toe amputation approximately one year ago. The patient has gangrene and osteomyelitis of the left forefoot with deep abscess and requires surgical correction.   All conservative treatments have failed to offer the patient adequate relief and the patient desires surgical correction. The planned procedures were explained to the patient in detail including all risks, benefits and possible complications and the patient still desires surgical correction. Medical clearance was obtained prior to surgery. Consent was signed and on chart. All the patient's questions were answered and no guarantees were given. Lastly, the patient was asked if he had any history of blood clots or bleeding disorders or sickle cell anemia and he stated that he had none. PROCEDURE DETAILS:  The patient was brought to the operating room and placed on the operating room table in the supine position. The patient's left foot was then anesthetized using 10 mL of 0.5% Marcaine with epinephrine as a local block. The patient's left foot and ankle was then prepped and draped in usual sterile manner. Attention was then drawn to the dorsal aspect of the patient's left forefoot where the infection and abscess was noted and incision and drainage of the infection/abscess complicated was then performed using a #10 blade to make an approximately 4-cm linear skin incision over the abscess. A second #10 blade was then used to deepen this incision down to bone, over the second metatarsal area. The incision then followed down along the medial and lateral portions of the left second metatarsal to open up any infected pockets of pus, to drain the abscess. At this point, a large amount of pus was then noted. Deep cultures, both aerobic and anaerobic cultures were then taken and sent to the lab for analysis. A second incision was then made over the left third metatarsal area deep to bone and also extending proximally. The wounds were then pulse lavaged using copious amounts of normal sterile saline mixed with Betadine to complete the complicated incision and drainage/multiple abscess of the left foot.     Blunt dissection was then used to expose the left second metatarsal metaphysis and head. The second metatarsal head appeared to be gray and soft in nature dorsally and also was shown to be fractured at the neck. Therefore, the second metatarsal head and neck were then excised using a sagittal saw. The head of the metatarsal was then totally removed and sent to pathology for analysis. Next, access was gained over the left third metatarsal area and blunt dissection was then used to deepen the incision to bone to expose the head and neck of the third metatarsal, which appeared to be gray and soft in nature and also showed a fracture at the neck. Therefore, the sagittal saw was then used in the frontal plane to remove a larger portion of the left third metatarsal head and neck. This portion of the third metatarsal was then removed and sent to pathology for analysis. The same exact procedure was then done to the left fourth metatarsal head and neck. Attention was then drawn to the left second black gangrenous toe. Next, a #10 blade was then used to amputate the left second toe at the metatarsophalangeal joint level and also to remove all necrotic tissue deep to tendon, muscle and bone. The same procedure was then done to amputate the left third and fourth toes at the metatarsophalangeal joint level. After the amputation was completed for all three toes and all necrotic tissue was removed, the surgical site was again flushed with normal sterile saline mixed with Betadine with a pulse lavage. All wounds were then observed and no remaining portions of necrotic tissue were found. Therefore, the multiple incisions were then closed using 3-0 Vicryl for the deeper tissues and 2-0 nylon in a simple interrupted technique. The surgical site was then dressed using 4x4s, Adaptic, ABDs, Kerlix, and an Ace wrap.   The patient was then transported to the recovery room with vital signs stable and neurovascular status returned to baseline for the patient's left foot. The patient tolerated the procedures and anesthesia well with no complications. POSTOPERATIVE CHECK:  The patient was later seen in the recovery room with his dressings clean, dry, and intact with no bleeding noted to his left foot. The patient stated that he had no pain. The patient was later returned to his room on the third floor for continued IV antibiotic and medical care. Dr. Alla Tovar will follow this patient as needed.       NICOLLE [email or web address]  D:  01/30/2023 20:34  T:  01/31/2023 7:53  JOB #:  [id number]

## 2023-01-31 NOTE — OP NOTES
[facility] [place]  OPERATIVE REPORT    Name:  Farida Prasad  MR#:   [id number]  :  1960  ACCOUNT #:  [de-identified]  DATE OF SERVICE:  2022    LOCATION:  Ops. PREOPERATIVE DIAGNOSES:  Diabetes with severe atherosclerosis obliterans, peripheral vascular disease, and gangrene of the left forefoot. POSTOPERATIVE DIAGNOSES:  Diabetes with severe atherosclerosis obliterans, peripheral vascular disease, and gangrene of the left forefoot. PROCEDURE PERFORMED:  Transmetatarsal amputation of left forefoot secondary to severe gangrene with cellulitis/infection. SURGEON:  Fredi Garcia DPM    ASSISTANT:  Jailene    ANESTHESIA:  General with local, consisting of 10 mL of 0.5% Marcaine plain given to the left foot. COMPLICATIONS:  None. SPECIMENS REMOVED:  Left forefoot fifth knee. IMPLANTS:  None. ESTIMATED BLOOD LOSS:  Minimal, less than 5 mL. HEMOSTASIS:  There was no tourniquet used in this procedure. MATERIALS:  3-0 Vicryl and 2-0 nylon. INJECTABLES:  None. INDICATIONS FOR PROCEDURE:  The patient is a 21-year-old diabetic male admitted to [facility] on 2022 for treatment of severe cellulitis, abscess, and gangrene of his left foot. Amputation of his left toes 2, 3, and 4 and metatarsal heads 2, 3, and 4 was completed on 2022. Unfortunately, his left foot turned gangrenous and therefore, he requires indication of his left forefoot. The planned procedure was explained to the patient in detail including all risks, benefits and possible complications and the patient still desires surgical correction. Medical clearance was obtained prior to surgery. Consent was signed and on chart. All the patient's questions were answered and no guarantees were given. Lastly, the patient was asked if he had any history of bleeding, blood clots or sickle cell anemia and he stated that he had none.     DESCRIPTION OF PROCEDURE:  The patient was brought to the operating room and placed on the operating table in the supine position. The patient's left foot was then anesthetized using 10 mL of 0.5% Marcaine plain as a local block. The patient's left foot was then prepped and draped in usual sterile manner. Attention was then drawn to the black gangrenous left forefoot where a #10 blade was then used to make an approximately 5-cm linear incision deep to the deeper tissues and over the forefoot deep to bone. As the incision was deepened and opened, there was noted to be deep tissue with dead necrotic gray foul-smelling tissue. The incision was then extended laterally. This also showed the same dark grayish green bed with necrotic tissue deep to bone for the entire forefoot with gangrene. Therefore, it was determined that a transmetatarsal amputation was needed at this point. The incision was then completed around the dorsal aspect of the midfoot in the frontal plane and then plantarly and then finally, the incision was deepened through the deeper layers to expose all the metatarsals. Dissection was then carried down deeper into the forefoot to dispose each of the metatarsals and the tendons and deeper muscles were transected. The transmetatarsal amputation was then continued. A sagittal saw was then used to make a through-and-through osteotomy from medial to lateral through all five metatarsals at the bases to remove the entire forefoot and complete the transmetatarsal amputation. The forefoot and metatarsals were then sent to pathology. The remaining skin flap plantarly was then prepared for closure. The wound was then irrigated with Betadine and a pulse lavage for the deep tissues. The neurovascular structures were then ligated and tied off with 3-0 Vicryl.   At this point, the remaining portion of the patient's midfoot and rear foot were then deemed to be viable with no more signs of gangrene and therefore, the incision/wound was then closed using 3-0 Vicryl for the deeper tissues and then 2-0 nylon in a simple interrupted technique for the skin. The surgical site was then dressed using 4x4s, ABDs, Kerlix and an Ace wrap. The patient was then transported to the recovery room with vital signs stable and neurovascular status returned to baseline for the patient's left mid foot and rear foot. The patient tolerated the procedure and anesthesia well with no complications. POSTOPERATIVE CHECK:  The patient was later seen in the recovery room with his dressings clean, dry, intact with no bleeding noted to his left midfoot. The patient stated that he had no pain. The patient was then transported back to his room on the third floor to continue IV antibiotics and to continue his medical care. Dr. Fatou Fisher will follow this patient as needed.       NICOLLE [email or web address]  D:  01/30/2023 21:10  T:  01/31/2023 8:10  JOB #:  [id number]

## 2023-03-09 NOTE — PROGRESS NOTES
Problem: Falls - Risk of  Goal: *Absence of Falls  Description: Document Miles Berryrohit Fall Risk and appropriate interventions in the flowsheet. Outcome: Progressing Towards Goal  Note: Fall Risk Interventions:  Mobility Interventions: Bed/chair exit alarm         Medication Interventions: Bed/chair exit alarm    Elimination Interventions: Call light in reach    History of Falls Interventions: Consult care management for discharge planning         Problem: Pressure Injury - Risk of  Goal: *Prevention of pressure injury  Description: Document Semaj Scale and appropriate interventions in the flowsheet.   Outcome: Progressing Towards Goal  Note: Pressure Injury Interventions:  Sensory Interventions: Assess changes in LOC,Minimize linen layers    Moisture Interventions: Absorbent underpads,Maintain skin hydration (lotion/cream),Minimize layers    Activity Interventions: Pressure redistribution bed/mattress(bed type)    Mobility Interventions: Pressure redistribution bed/mattress (bed type)    Nutrition Interventions: Document food/fluid/supplement intake    Friction and Shear Interventions: Minimize layers                Problem: Chronic Renal Failure  Goal: *Fluid and electrolytes stabilized  Outcome: Progressing Towards Goal Pt called back

## 2023-12-06 NOTE — PROGRESS NOTES
[facility]  Progress Note    Patient Name: Catalino Mcfadden  MRN: [id number]  Patient Class: OP- Observation   Admission Date: 12/4/2023  Length of Stay: 0 days  Attending Physician: Roger Joshua MD  Primary Care Provider: Lizbeth, Primary Doctor        Subjective:     Principal Problem:Intractable hiccups        HPI:  Catalino Mcfadden is a 59 y/o M with past medical history significant for HTN, ESRD on HD, DM2, gastroparesis and HLD who presented to the ED for further evaluation of chronic hiccups which has been ongoing x 2 years but has acutely worsened over the past couple of days.  He reports difficulty eating, sleeping, speaking and breathing.  Thus far, there have been no relieving factors.  He has tried and failed multiple treatments to include PPIs, Phenergan, Zofran, baclofen, gabapentin, Reglan, Thorazine, imipramine, bethanechol, and Elavil.  He has been seen by GI specialty and is currently undergoing workup.  Also had ambulatory referral to pulmonology due to abnormal CT scan which identified upper lobe predominant Alicia lymphatic and peribronchovascular micro nodular lung opacities and abnormal appearance of the liver.  Possible etiologies for these findings include metastatic disease although an infectious/inflammatory etiology such as granulomatous disease/sarcoidosis could have this appearance.  He was seen and determined to be a candidate for EBUS, but apparently he was lost to follow up.  It appears that he has lost about 35 pounds since March of this year, upon review of the record.   He was given zyprexa while in the emergency department with no relief of symptoms.  Majority of history is obtained through review of the medical record.  He has constant hiccups; therefore, verbal communication is limited.  He is placed in observation under the service of hospital medicine for continued monitoring and treatment.    Overview/Hospital Course:  Patient monitored closely during  Patient refused CPAP for tonight. hospitalization.  Pulmonary consulted and recommends outpatient follow-up with pulmonology for EUS to evaluate pulmonary lesion and questionable sarcoidosis.  GI consulted patient underwent upper GI without any significant findings.  Patient is medically stable for discharge from a GI standpoint.  He reports his hiccups have improved but do persist.  Patient underwent hemodialysis and was noted to have hypertension post dialysis and received 2 doses of IV hydralazine.  Patient is medically stable for discharge.    Interval History:  Patient I underwent upper GI today which is negative for significant findings.  Reviewed pulmonary note who recommends outpatient follow-up for EB EUS.  Labs reviewed hemoglobin 9.6 creatinine 7.5.  Patient underwent hemodialysis today and was noted to have elevated blood pressure with systolic blood pressure of 200 received 2 doses of IV hydralazine.  And was recommended to monitor him overnight per nocturnist.    Review of Systems   Constitutional:  Positive for fatigue.   HENT:          Hiccups   Respiratory:  Negative for cough and shortness of breath.    Cardiovascular:  Negative for chest pain and leg swelling.     Objective:     Vital Signs (Most Recent):  Temp: 98.2 °F (36.8 °C) (12/06/23 0328)  Pulse: 99 (12/06/23 0328)  Resp: 19 (12/06/23 0328)  BP: (!) 189/88 (12/06/23 0624)  SpO2: 95 % (12/06/23 0328) Vital Signs (24h Range):  Temp:  [97.9 °F (36.6 °C)-99.7 °F (37.6 °C)] 98.2 °F (36.8 °C)  Pulse:  [] 99  Resp:  [14-20] 19  SpO2:  [93 %-100 %] 95 %  BP: (174-223)/() 189/88     Weight: 102.1 kg (225 lb)  Body mass index is 29.69 kg/m².    Intake/Output Summary (Last 24 hours) at 12/6/2023 0714  Last data filed at 12/5/2023 1715  Gross per 24 hour   Intake 960 ml   Output 4500 ml   Net -3540 ml         Physical Exam  Constitutional:       Appearance: Normal appearance.   HENT:      Head: Normocephalic.   Cardiovascular:      Rate and Rhythm: Normal rate.   Pulmonary:       Effort: Pulmonary effort is normal.      Breath sounds: Normal breath sounds.   Neurological:      Mental Status: He is alert.             Significant Labs: All pertinent labs within the past 24 hours have been reviewed.  BMP:   Recent Labs   Lab 12/06/23  0443   GLU 82   *   K 4.4   CL 99   CO2 20*   BUN 26*   CREATININE 5.5*   CALCIUM 8.5*   MG 1.9     CBC:   Recent Labs   Lab 12/04/23  1302 12/05/23  0500 12/06/23  0443   WBC 4.89 3.63* 3.96   HGB 10.4* 9.5* 9.6*   HCT 31.4* 28.9* 30.4*    218 237       Significant Imaging: I have reviewed all pertinent imaging results/findings within the past 24 hours.    Assessment/Plan:      * Intractable hiccups  Etiology unclear.  Has tried and failed an extensive list of medications.  Will place him back on PPI as per most recent GI note  Consult GI-plan EGD today-- no significant findings  Continue nightly elavil    Will refill home elavil and add thorazine.       Serum total bilirubin elevated  No overt abdominal tenderness on exam  US abd  Hepatitis panel  Trend  GI consulted      Calcified granuloma of lung  With possible sarcoidosis. Pulmonary consulted. Recommends follow up with Dr. Huynh in 2 weeks for EBUS.       ESRD on hemodialysis  Chronic; consult nephrology for routine dialysis TTS    Hyponatremia  Patient has hyponatremia which is uncontrolled,We will aim to correct the sodium by 4-6mEq in 24 hours. We will monitor sodium Daily. The hyponatremia is due to Dehydration/hypovolemia. We will treat the hyponatremia with IV fluids as follows: IVF bolus NS-caution due to ESRD. The patient's sodium results have been reviewed and are listed below.  Recent Labs   Lab 12/06/23  0443   *       Nephrology consulted    Hyperlipidemia, acquired   Patient is chronically on statin.will continue for now. Monitor clinically. Last LDL was   Lab Results   Component Value Date    LDLCALC 124.8 02/22/2023            Essential hypertension  Chronic,  "uncontrolled. Latest blood pressure and vitals reviewed-     Temp:  [97.4 °F (36.3 °C)-98.8 °F (37.1 °C)]   Pulse:  []   Resp:  [16-24]   BP: (158-202)/(77-98)   SpO2:  [97 %-100 %] .   Home meds for hypertension were reviewed and noted below.   Hypertension Medications               hydrALAZINE (APRESOLINE) 100 MG tablet Take 1 tablet (100 mg total) by mouth 3 (three) times daily.    NIFEdipine (PROCARDIA XL) 90 MG (OSM) 24 hr tablet Take 1 tablet (90 mg total) by mouth once daily.    torsemide (DEMADEX) 20 MG Tab Take 1 tablet (20 mg total) by mouth once daily. Take once a day on non-HD days            While in the hospital, will manage blood pressure as follows; Continue home antihypertensive regimen    Will utilize p.r.n. blood pressure medication only if patient's blood pressure greater than 180/110 and he develops symptoms such as worsening chest pain or shortness of breath.    Type 2 diabetes mellitus with stage 5 chronic kidney disease  Patient's FSGs are controlled on current medication regimen.  Last A1c reviewed-   Lab Results   Component Value Date    HGBA1C 5.1 02/22/2023     Most recent fingerstick glucose reviewed- No results for input(s): "POCTGLUCOSE" in the last 24 hours.  Current correctional scale  Medium  Maintain anti-hyperglycemic dose as follows-   Antihyperglycemics (From admission, onward)      Start     Stop Route Frequency Ordered    12/04/23 1827  insulin aspart U-100 pen 1-10 Units         -- SubQ Before meals & nightly PRN 12/04/23 1827          Hold Oral hypoglycemics while patient is in the hospital.      VTE Risk Mitigation (From admission, onward)           Ordered     heparin (porcine) injection 4,000 Units  As needed (PRN)         12/05/23 1519     heparin (porcine) injection 5,000 Units  Every 8 hours         12/04/23 1827     IP VTE HIGH RISK PATIENT  Once         12/04/23 1827     Place sequential compression device  Until discontinued         12/04/23 1827              "       Discharge Planning   WILMER: 12/5/2023     Code Status: Full Code   Is the patient medically ready for discharge?:     Reason for patient still in hospital (select all that apply): Patient trending condition and Pending disposition  Discharge Plan A: Home   Discharge Delays: None known at this time              Yohana Perry NP  Department of Hospital Medicine   [facility]/Surg

## (undated) DEVICE — Device

## (undated) DEVICE — BANDAGE,GAUZE,BULKEE II,4.5"X4.1YD,STRL: Brand: MEDLINE

## (undated) DEVICE — SOLUTION IV 1000ML 0.9% SOD CHL

## (undated) DEVICE — DRESSING,GAUZE,XEROFORM,CURAD,1"X8",ST: Brand: CURAD

## (undated) DEVICE — SWAB CULT SGL AMIES W/O CHAR FOR THRT VAG SKIN HRT CULTSWAB

## (undated) DEVICE — SUT ETHLN 3-0 18IN PS1 BLK --

## (undated) DEVICE — PRECISION (9.0 X 0.51 X 25.0MM)

## (undated) DEVICE — SPLINT WR POS F/ARTERIAL ACC -- BX/10

## (undated) DEVICE — INTENDED FOR TISSUE SEPARATION, AND OTHER PROCEDURES THAT REQUIRE A SHARP SURGICAL BLADE TO PUNCTURE OR CUT.: Brand: BARD-PARKER SAFETY BLADES SIZE 10, STERILE

## (undated) DEVICE — SUTURE ETHLN SZ 2-0 L18IN NONABSORBABLE BLK L26MM FS 3/8 664G

## (undated) DEVICE — Z DISCONTINUED USE 2635508 SOL IRRIGATION INJ NACL 0.9% 500ML BTL

## (undated) DEVICE — CULTURETTE SGL LIQ STU -- 50EA/PK

## (undated) DEVICE — PAD,ABDOMINAL,5"X9",ST,LF,25/BX: Brand: MEDLINE INDUSTRIES, INC.

## (undated) DEVICE — PACK PROCEDURE SURG EXTREMITY CUST

## (undated) DEVICE — BANDAGE COMPR W4INXL10YD WHITE/BEIGE E MTRX HK LOOP CLSR

## (undated) DEVICE — SUT ETHLN 3-0 18IN PS2 BLK --

## (undated) DEVICE — SYR 10ML LUER LOK 1/5ML GRAD --

## (undated) DEVICE — PRESSURE MONITORING SET: Brand: TRUWAVE

## (undated) DEVICE — HANDPIECE SET WITH HIGH FLOW TIP AND SUCTION TUBE: Brand: INTERPULSE

## (undated) DEVICE — SUTURE VCRL SZ 3-0 L27IN ABSRB UD L24MM PS-1 3/8 CIR PRIM J936H

## (undated) DEVICE — DRAPE,ANGIO,BRACH,STERILE,38X44: Brand: MEDLINE

## (undated) DEVICE — SUT MONOCRYL PLUS UD 4-0 --

## (undated) DEVICE — STOPCOCK TRNSDUC 500PSI 3 W ROT M LUER LT BLU OFF HNDL R

## (undated) DEVICE — TUBERCULIN SAFETY SYRINGE WITH NEEDLE: Brand: MONOJECT

## (undated) DEVICE — GARMENT,MEDLINE,DVT,INT,CALF,MED, GEN2: Brand: MEDLINE

## (undated) DEVICE — DRESSING PETRO W3XL8IN N ADH OIL EMUL GZ CURAD

## (undated) DEVICE — 3-0 COATED VICRYL PLUS UNDYED 1X27" SH --

## (undated) DEVICE — GLOVE ORANGE PI 8   MSG9080

## (undated) DEVICE — BANDAGE COMPR W3INXL5YD BGE POLY COT E RECOVERABLE BRTH W/

## (undated) DEVICE — HI-TORQUE WHISPER ES GUIDE WIRE .014 STRAIGHTTIP 3.0 CM X 190 CM: Brand: HI-TORQUE WHISPER

## (undated) DEVICE — CATH BLLN DIL 2.5 X12MM RX -- EUPHORA

## (undated) DEVICE — VERSAJET II HYDROSURGERY SYSTEM HANDSET, 45DEG 8MM EXACT: Brand: VERSAJET

## (undated) DEVICE — PROCEDURE KIT FLUID MGMT 10 FR CUST MAINFOLD

## (undated) DEVICE — ZIMMER® STERILE DISPOSABLE TOURNIQUET CUFF WITH PROTECTIVE SLEEVE AND PLC, SINGLE PORT, SINGLE BLADDER, 34 IN. (86 CM)

## (undated) DEVICE — CURITY NON-ADHERENT STRIPS: Brand: CURITY

## (undated) DEVICE — SHIELD RAD 14X16 IN W/ SCOOP ABSORBER

## (undated) DEVICE — PAD,ABDOMINAL,5"X9",STERILE,LF,1/PK: Brand: MEDLINE INDUSTRIES, INC.

## (undated) DEVICE — REM POLYHESIVE ADULT PATIENT RETURN ELECTRODE: Brand: VALLEYLAB

## (undated) DEVICE — MICROPUNCTURE INTRODUCER SET SILHOUETTE TRANSITIONLESS WITH NITINOL WIRE GUIDE: Brand: MICROPUNCTURE

## (undated) DEVICE — INTENDED FOR TISSUE SEPARATION, AND OTHER PROCEDURES THAT REQUIRE A SHARP SURGICAL BLADE TO PUNCTURE OR CUT.: Brand: BARD-PARKER ® CARBON RIB-BACK BLADES

## (undated) DEVICE — PREMIUM WET SKIN PREP TRAY: Brand: MEDLINE INDUSTRIES, INC.

## (undated) DEVICE — 4-PORT MANIFOLD: Brand: NEPTUNE 2

## (undated) DEVICE — COPILOT BLEEDBACK CONTROL VALVE: Brand: COPILOT

## (undated) DEVICE — SUTURE ETHLN SZ 4-0 L18IN NONABSORBABLE BLK L19MM PS-2 3/8 1667H

## (undated) DEVICE — MASTISOL ADHESIVE LIQ 2/3ML

## (undated) DEVICE — ZIMMER® STERILE DISPOSABLE TOURNIQUET CUFF WITH PROTECTIVE SLEEVE AND PLC, SINGLE PORT, SINGLE BLADDER, 18 IN. (46 CM)

## (undated) DEVICE — STRIP SKIN CLSR W0.25XL4IN WHT SPUNBOUND FBR NYL HI ADH

## (undated) DEVICE — PINNACLE INTRODUCER SHEATH: Brand: PINNACLE

## (undated) DEVICE — SPONGE LAP 18X18IN STRL -- 5/PK

## (undated) DEVICE — CATH DIAG FR4 EXPO 5FRX100CM -- EXPO

## (undated) DEVICE — OPTIFOAM GENTLE LITE, BORDERED, 4X4: Brand: MEDLINE

## (undated) DEVICE — SUTURE NONABSORBABLE MONOFILAMENT 2-0 FS 18 IN ETHILON 664H

## (undated) DEVICE — STRAP,POSITIONING,KNEE/BODY,FOAM,4X60": Brand: MEDLINE

## (undated) DEVICE — CATHETER ANGIO JL4 0.045 INX5 FRX100 CM THRULUMEN EXPO

## (undated) DEVICE — DRAPE XR C ARM 41X74IN LF --

## (undated) DEVICE — SOL INJ LR 1000ML --

## (undated) DEVICE — GAUZE,PACKING STRIP,IODOFORM,1"X5YD,STRL: Brand: CURAD

## (undated) DEVICE — GUIDEWIRE VASC L260CM DIA0.035IN RAD 3MM J TIP L7CM PTFE

## (undated) DEVICE — INTENDED FOR TISSUE SEPARATION, AND OTHER PROCEDURES THAT REQUIRE A SHARP SURGICAL BLADE TO PUNCTURE OR CUT.: Brand: BARD-PARKER ® STAINLESS STEEL BLADES

## (undated) DEVICE — SPONGE GZ W4XL4IN COT 12 PLY TYP VII WVN C FLD DSGN

## (undated) DEVICE — CATHETER BALLOON DIL NC 145 CM 3X12 MM PANTERA LEO

## (undated) DEVICE — TORCON NB, ADVANTAGE CATHETER: Brand: TORCON NB

## (undated) DEVICE — SOLUTION IRRIG 3000ML LAC R FLX CONT

## (undated) DEVICE — NDL HYPO REG BVL RW 25GX1.5IN --

## (undated) DEVICE — SENSOR PLSE OXMTR AD CBL L36IN ADH FRM FIT SPO2 DISP

## (undated) DEVICE — SPONGE LAPAROTOMY W18XL18IN WHITE STRUNG RADIOPAQUE STERILE

## (undated) DEVICE — PACK PROCEDURE SURG CATH CUST

## (undated) DEVICE — PREP SKN CHLRAPRP APL 26ML STR --

## (undated) DEVICE — GAUZE SPNG 4X4 12PLY STRL LF -- 10/TY

## (undated) DEVICE — GAUZE,SPONGE,FLUFF,6"X6.75",STRL,10/TRAY: Brand: MEDLINE

## (undated) DEVICE — TOWEL,OR,DSP,ST,BLUE,STD,4/PK,20PK/CS: Brand: MEDLINE

## (undated) DEVICE — CATH GUID COR JR4.0 6FR 100CM -- LAUNCHER

## (undated) DEVICE — GLIDESHEATH SLENDER STAINLESS STEEL KIT: Brand: GLIDESHEATH SLENDER

## (undated) DEVICE — DRSG GZ OIL EMUL CURAD 3X8 -- 3/PK

## (undated) DEVICE — NEEDLE HYPO 25GA L1.5IN BLU POLYPR HUB S STL REG BVL STR

## (undated) DEVICE — TUBING PRSS MON L24IN PVC RIG NONEXPANDING M TO FEM CONN

## (undated) DEVICE — BANDAGE,ELASTIC,ESMARK,STERILE,4"X9',LF: Brand: MEDLINE